# Patient Record
Sex: MALE | Race: WHITE | NOT HISPANIC OR LATINO | Employment: OTHER | URBAN - METROPOLITAN AREA
[De-identification: names, ages, dates, MRNs, and addresses within clinical notes are randomized per-mention and may not be internally consistent; named-entity substitution may affect disease eponyms.]

---

## 2018-02-27 ENCOUNTER — OFFICE VISIT (OUTPATIENT)
Dept: INTERNAL MEDICINE CLINIC | Facility: CLINIC | Age: 68
End: 2018-02-27
Payer: MEDICARE

## 2018-02-27 VITALS
WEIGHT: 200 LBS | BODY MASS INDEX: 27.09 KG/M2 | HEIGHT: 72 IN | DIASTOLIC BLOOD PRESSURE: 80 MMHG | RESPIRATION RATE: 18 BRPM | SYSTOLIC BLOOD PRESSURE: 124 MMHG | TEMPERATURE: 97.7 F | HEART RATE: 100 BPM

## 2018-02-27 DIAGNOSIS — Z23 NEED FOR VACCINATION FOR STREP PNEUMONIAE: ICD-10-CM

## 2018-02-27 DIAGNOSIS — E66.3 OVERWEIGHT (BMI 25.0-29.9): ICD-10-CM

## 2018-02-27 DIAGNOSIS — Z71.6 ENCOUNTER FOR TOBACCO USE CESSATION COUNSELING: ICD-10-CM

## 2018-02-27 DIAGNOSIS — Z12.5 PROSTATE CANCER SCREENING ENCOUNTER, OPTIONS AND RISKS DISCUSSED: ICD-10-CM

## 2018-02-27 DIAGNOSIS — Z72.0 TOBACCO USE: Primary | ICD-10-CM

## 2018-02-27 PROCEDURE — 99202 OFFICE O/P NEW SF 15 MIN: CPT | Performed by: INTERNAL MEDICINE

## 2018-02-27 PROCEDURE — 90670 PCV13 VACCINE IM: CPT | Performed by: INTERNAL MEDICINE

## 2018-02-27 PROCEDURE — G0009 ADMIN PNEUMOCOCCAL VACCINE: HCPCS | Performed by: INTERNAL MEDICINE

## 2018-02-27 NOTE — PATIENT INSTRUCTIONS
1  Fasting blood work  2  Pneumonia vaccine (prevnar 13) today  3  Transfer previous records  4  Return in 2 months or sooner if questions  5  Consider CAT scan for lung cancer screening  6  Quitting smoking is best thing you can do for your health, let me know if you need help with this    Lung Cancer Screening   WHAT YOU NEED TO KNOW:   What is lung cancer screening? Lung cancer screening is a test done every year to find lung cancer early  Screening is different from diagnosis because screening is used before you have any signs or symptoms  Screening may be helpful if you are or were a heavy smoker, or if you smoked for many years  This is because smoking increases your risk for lung cancer  Lung cancer screening has benefits and risks  Talk with your healthcare provider about the benefits and risks to help you decide if lung cancer screening is right for you  What do I need to know about lung cancer? · Lung cancer cells can form a tumor in your lungs and can spread to other areas of your body  This cancer is often found after it has spread  By this time, it is more difficult to treat  Treatment may include surgery to remove lung tissue that contains cancer cells  · Lung cancer is the leading cause of cancer deaths  Each year, about 220,000 people find out that they have lung cancer  About 150,000 people die each year from lung cancer  · Half of the people who have lung cancer are over the age of 79  The most common age of people who die from lung cancer is 67  · Talk to your healthcare provider if you think you have signs or symptoms of lung cancer  Examples include chest pain, a cough that does not go away, coughing up blood, wheezing, hoarseness, and neck swelling  Am I a good candidate for lung cancer screening? You may be able to have lung cancer screening if the following are true:  · You are between 54and [de-identified]years old  · You have never had lung cancer      · You do not currently have any signs or symptoms of lung cancer  · You currently smoke, or you quit less than 15 years ago  You also are or were a heavy smoker (30 pack-years or more)  · You do not have any other serious conditions that may affect how long you live  · You are willing to have surgery if screening shows signs of lung cancer  · You are willing to have screening every year until you have not smoked for 15 years or reach 80years of age  Screening may also stop if you develop another health condition or you are no longer willing to have treatment  What are pack-years? Pack-years are the number of cigarette packs you smoked multiplied by the number of years you smoked  A heavy smoker has 30 pack-years or more  Examples of 30 pack-years are 1 pack of cigarettes smoked each day for 30 years, or 2 packs each day for 15 years  Your healthcare provider can help you calculate your pack-years  How is lung cancer screening done? Lung cancer screening is done with a low-dose CT scan (LDCT)  A CT uses an x-ray and a computer to give detailed pictures of your lungs  You will lie on a table for this test  A low amount of radiation from the x-ray machine is used to take pictures of your lungs  This test only takes a few minutes  You will be asked to hold your breath for about 6 seconds while the pictures are taken  What are the benefits of lung cancer screening? · LDCT can find some kinds of lung cancer early  This means it can be treated with less invasive surgery, or less lung tissue may need to be removed  · The scan is not invasive or painful, and takes only a short amount of time  · LDCT does not leave radiation in your body after the scan is done  · LDCT can lower your risk of death from lung cancer by 16% to 20% because the cancer is found early  Your risk for death from any cause is lowered by 6 7%  This is because the scan may show signs of problems other than lung cancer that need to be treated    What are the risks of lung cancer screening? · LDCT exposes you to a small amount of radiation that can increase your risk for cancer  The amount is less than is given during a mammogram  It is about the same amount you get from the sun in a year  Because you will need to be screened every year, your total radiation exposure over time is increased  · The test can give a false-positive result  This means that screening shows you have lung cancer, but follow-up tests show that you do not  You may get more tests or even treatments that are not needed  It can also be stressful to think you have lung cancer when you do not  The risk for a false-positive result is about 23%  · The test can give a false-negative result  This means that the test does not find signs of cancer, but you later develop signs and symptoms and need treatment  A false-negative result can keep you from getting treatment as early as possible  The risk for a false-negative result is about 4%  · Screening may lead to over-diagnosis if tumors are found that are not life-threatening  Some forms of cancer grow quickly and need to be treated  Other forms grow slowly and may not be life-threatening in your lifetime  · Screening may lead to over-treatment  This means you get treatment that is not necessary  About 10% of people with lung cancer receive treatment that was not needed  What happens after I have lung cancer screening? You will meet with your healthcare provider to go over the results of your screening  You may need more tests to diagnose anything that showed up on the screening test   Lung cancer screening needs to be done each year  Even if your result shows you do not have lung cancer, it is important to continue getting screened each year  This is the best way to find a new cancer, at the earliest possible stage  What questions should I ask my healthcare provider to help me make a decision about screening?    · How high is my risk for lung cancer? · What are my pack-years? · Will I be able to help create my treatment plan if screening shows I have lung cancer? · Will my insurance cover screening? · Where is the screening done? · Do I need to do anything to get ready to have screening? · When and how do I get the results of my screening? What do I need to think about before I decide to have lung cancer screening? · Benefits and risks of lung cancer screening:      ¨ Do I understand the benefits and risks of lung cancer screening with LDCT? How concerned am I about the risks? Do I think the benefits are greater than the risks? ¨ One of the main risks is that the radiation I get during screening can cause cancer  Do I understand how high the risk is? How worried am I about the risk? · Possibility of finding lung cancer: How will I feel if I find out I have lung cancer? Am I willing to get the treatment I might need? · Possibility of false results:  I might get a false-positive or false-negative result  How will I feel if the test results are wrong? · Need for annual screening:  Screening needs to be done every year  Am I willing to have screening until I am a nonsmoker for 15 years, am 80, or develop another condition? Screening will also stop if I am no longer willing to have treatment  How do I feel about the need for surgery or other treatment for lung cancer? Will I be comfortable with my decision not to have treatment, and to stop having screening? What do I need to know about quitting smoking? If you currently smoke, it is very important that you quit  If you already quit smoking, it is important that you do not start smoking again  You will also need to avoid secondhand smoke from others  Nicotine and other chemicals in cigarettes and cigars increase your risk for cancer  Your risk for lung cancer gets lower each year that you do not smoke   Even if you get lung cancer screening every year, it is still Detail Level: Zone important not to smoke  Ask your healthcare provider for information if you currently smoke and need help to quit  You can find support and more information here:  · Smokefree  gov  Phone: 7- 382 - 558-8214  Web Address: www smokefree  gov  Where can I find support and more information about lung cancer? · American Lung Association  [facility]  [address]  Phone: [facility] [address]  Phone: 7- 755 - 994-5926  Web Address: Emily Schmitt  walter  CARE AGREEMENT:   You have the right to help plan your care  Learn about your health condition and how it may be treated  Discuss treatment options with your caregivers to decide what care you want to receive  You always have the right to refuse treatment  The above information is an  only  It is not intended as medical advice for individual conditions or treatments  Talk to your doctor, nurse or pharmacist before following any medical regimen to see if it is safe and effective for you  © 2017 [employer]  Information is for End User's use only and may not be sold, redistributed or otherwise used for commercial purposes  All illustrations and images included in CareNotes® are the copyrighted property of [employer]  or [employer]  Cigarette Smoking and Your Health   AMBULATORY CARE:   Risks to your health if you smoke:  Nicotine and other chemicals found in tobacco damage every cell in your body  Even if you are a light smoker, you have an increased risk for cancer, heart disease, and lung disease  If you are pregnant or have diabetes, smoking increases your risk for complications  Benefits to your health if you stop smoking:   · You decrease respiratory symptoms such as coughing, wheezing, and shortness of breath  · You reduce your risk for cancers of the lung, mouth, throat, kidney, bladder, pancreas, stomach, and cervix  If you already have cancer, you increase the benefits of chemotherapy   You also reduce your risk for cancer returning or a second cancer from developing  · You reduce your risk for heart disease, blood clots, heart attack, and stroke  · You reduce your risk for lung infections, and diseases such as pneumonia, asthma, chronic bronchitis, and emphysema  · Your circulation improves  More oxygen can be delivered to your body  If you have diabetes, you lower your risk for complications, such as kidney, artery, and eye diseases  You also lower your risk for nerve damage  Nerve damage can lead to amputations, poor vision, and blindness  · You improve your body's ability to heal and to fight infections  Benefits to the health of others if you stop smoking:  Tobacco is harmful to nonsmokers who breathe in your secondhand smoke  The following are ways the health of others around you may improve when you stop smoking:  · You lower the risks for lung cancer and heart disease in nonsmoking adults  · If you are pregnant, you lower the risk for miscarriage, early delivery, low birth weight, and stillbirth  You also lower your baby's risk for SIDS, obesity, developmental delay, and neurobehavioral problems, such as ADHD  · If you have children, you lower their risk for ear infections, colds, pneumonia, bronchitis, and asthma  For more information and support to stop smoking:   · Smokefree  gov  Phone: 5- 173 - 995-3741  Web Address: www "[employer]  Follow up with your healthcare provider as directed:  Write down your questions so you remember to ask them during your visits  © 2017 [employer] [employer] Information is for End User's use only and may not be sold, redistributed or otherwise used for commercial purposes  All illustrations and images included in CareNotes® are the copyrighted property of [employer]  or Fabrice Calderon  The above information is an  only  It is not intended as medical advice for individual conditions or treatments  Talk to your doctor, nurse or pharmacist before following any medical regimen to see if it is safe and effective for you

## 2018-02-27 NOTE — PROGRESS NOTES
Assessment/Plan:     Diagnoses and all orders for this visit:    Tobacco use  Comments:  smoking cess counseling discussed, pt trying on his own  Orders:  -     Comprehensive metabolic panel; Future  -     Lipid Panel with Direct LDL reflex; Future    Encounter for tobacco use cessation counseling    Need for vaccination for Strep pneumoniae  -     PNEUMOCOCCAL CONJUGATE VACCINE 13-VALENT GREATER THAN 6 MONTHS    Prostate cancer screening encounter, options and risks discussed  Comments:  PSA ordered, will discuss prostate exam at f/u visit  Orders:  -     PSA    Overweight (BMI 25 0-29  9)  Comments:  BW including FLP ordered, f/u 2 mos or prn  Orders:  -     Lipid Panel with Direct LDL reflex; Future      t/c shingles vaccine at f/u visit in 2 mos    Subjective:      Patient ID: Ibrahima White is a 78 y o  male  HPI     New to practice here to establish care  Here with wife(daniela) who provides add'l history  Takes no medications on regular basis  Current smoker for 30 years, has cut back to 1/2 ppd recently, trying to quit on his own  We discussed LDCT/lung cancer screening and he would like to think about this test till next time, information provided  Exercises regularly with weights and walking, denies CP/SOB/SANCHEZ  Had colonoscopy 1 5 years ago which showed polyps, due for repeat 5 yrs from then  Never had pneumonia vaccine in past   Has had chicken pox in past but never had shingles vaccine  No BW In years  We discussed risk/benefit of prostate cancer screening and he wishes to proceed with PSA BW  No other complaints        Family History   Problem Relation Age of Onset    Heart disease Mother     Hypertension Father     Heart disease Father     Diabetes Brother     Dementia Neg Hx     Drug abuse Neg Hx     Mental illness Neg Hx     Substance Abuse Neg Hx      Social History     Social History    Marital status: /Civil Union     Spouse name: N/A    Number of children: N/A    Years of education: N/A     Occupational History    retired      Social History Main Topics    Smoking status: Current Every Day Smoker     Packs/day: 1 00     Years: 30 00    Smokeless tobacco: Never Used    Alcohol use Yes    Drug use: No    Sexual activity: Not on file     Other Topics Concern    Not on file     Social History Narrative    Drinks coffee     History reviewed  No pertinent past medical history  Vitals:    02/27/18 1154   BP: 124/80   Pulse: 100   Resp: 18   Temp: 97 7 °F (36 5 °C)   Weight: 90 7 kg (200 lb)   Height: 6' (1 829 m)     No current outpatient prescriptions on file  No Known Allergies  Past Surgical History:   Procedure Laterality Date    HERNIA REPAIR           Review of Systems   Constitutional: Negative for fever  HENT: Negative for congestion  Eyes: Positive for visual disturbance (wears glasses)  Respiratory: Negative for shortness of breath  Cardiovascular: Negative for chest pain  Genitourinary: Negative for difficulty urinating  Musculoskeletal: Negative for arthralgias and myalgias  Skin: Negative for rash  Neurological: Negative for headaches  Psychiatric/Behavioral: Negative for behavioral problems and dysphoric mood  Objective:      /80   Pulse 100   Temp 97 7 °F (36 5 °C)   Resp 18   Ht 6' (1 829 m)   Wt 90 7 kg (200 lb)   BMI 27 12 kg/m²          Physical Exam   Constitutional: He is oriented to person, place, and time  He appears well-developed and well-nourished  HENT:   Head: Normocephalic and atraumatic  Right Ear: External ear normal    Left Ear: External ear normal    Eyes: Conjunctivae are normal  Pupils are equal, round, and reactive to light  Cardiovascular: Normal rate, regular rhythm and normal heart sounds  No murmur heard  Pulmonary/Chest: Effort normal and breath sounds normal  He has no wheezes  He has no rales  Abdominal: Soft  Bowel sounds are normal  There is no tenderness     Musculoskeletal: He exhibits no edema  Neurological: He is alert and oriented to person, place, and time  Psychiatric: He has a normal mood and affect  His behavior is normal    Vitals reviewed

## 2018-02-28 ENCOUNTER — TELEPHONE (OUTPATIENT)
Dept: INTERNAL MEDICINE CLINIC | Facility: CLINIC | Age: 68
End: 2018-02-28

## 2018-02-28 ENCOUNTER — TRANSCRIBE ORDERS (OUTPATIENT)
Dept: LAB | Facility: CLINIC | Age: 68
End: 2018-02-28

## 2018-02-28 ENCOUNTER — APPOINTMENT (OUTPATIENT)
Dept: LAB | Facility: CLINIC | Age: 68
End: 2018-02-28
Payer: MEDICARE

## 2018-02-28 DIAGNOSIS — Z72.0 TOBACCO USE: ICD-10-CM

## 2018-02-28 DIAGNOSIS — R79.89 ELEVATED SERUM CREATININE: Primary | ICD-10-CM

## 2018-02-28 DIAGNOSIS — E66.3 OVERWEIGHT (BMI 25.0-29.9): ICD-10-CM

## 2018-02-28 LAB
ALBUMIN SERPL BCP-MCNC: 3.3 G/DL (ref 3.5–5)
ALP SERPL-CCNC: 100 U/L (ref 46–116)
ALT SERPL W P-5'-P-CCNC: 30 U/L (ref 12–78)
ANION GAP SERPL CALCULATED.3IONS-SCNC: 5 MMOL/L (ref 4–13)
AST SERPL W P-5'-P-CCNC: 15 U/L (ref 5–45)
BILIRUB SERPL-MCNC: 0.4 MG/DL (ref 0.2–1)
BUN SERPL-MCNC: 18 MG/DL (ref 5–25)
CALCIUM SERPL-MCNC: 9.2 MG/DL (ref 8.3–10.1)
CHLORIDE SERPL-SCNC: 107 MMOL/L (ref 100–108)
CHOLEST SERPL-MCNC: 163 MG/DL (ref 50–200)
CO2 SERPL-SCNC: 29 MMOL/L (ref 21–32)
CREAT SERPL-MCNC: 1.35 MG/DL (ref 0.6–1.3)
GFR SERPL CREATININE-BSD FRML MDRD: 54 ML/MIN/1.73SQ M
GLUCOSE P FAST SERPL-MCNC: 119 MG/DL (ref 65–99)
HDLC SERPL-MCNC: 50 MG/DL (ref 40–60)
LDLC SERPL CALC-MCNC: 96 MG/DL (ref 0–100)
POTASSIUM SERPL-SCNC: 5.4 MMOL/L (ref 3.5–5.3)
PROT SERPL-MCNC: 7.8 G/DL (ref 6.4–8.2)
PSA SERPL-MCNC: 0.8 NG/ML (ref 0–4)
SODIUM SERPL-SCNC: 141 MMOL/L (ref 136–145)
TRIGL SERPL-MCNC: 83 MG/DL

## 2018-02-28 PROCEDURE — G0103 PSA SCREENING: HCPCS | Performed by: INTERNAL MEDICINE

## 2018-02-28 PROCEDURE — 36415 COLL VENOUS BLD VENIPUNCTURE: CPT | Performed by: INTERNAL MEDICINE

## 2018-02-28 PROCEDURE — 80061 LIPID PANEL: CPT

## 2018-02-28 PROCEDURE — 80053 COMPREHEN METABOLIC PANEL: CPT

## 2018-03-01 NOTE — TELEPHONE ENCOUNTER
Called pt's provided home phone # & spoke to pt's wife(daniela, per pt's consent)    Reviewed BW results with Sandi Sanchez over phone    Pt feeling well overall, takes nsaid only occaissionally for aches/pains    Drinking fluids per Sandi Sanchez    No muscle cramps/palpitations or other sx/complaints    Plan - hydrate over next 2 weeks, avoid nsaids and re-check non-fasting BMP In 2 weeks    Sandi Sanchez appreciative of phone call    Results for orders placed or performed in visit on 02/28/18   Comprehensive metabolic panel   Result Value Ref Range    Sodium 141 136 - 145 mmol/L    Potassium 5 4 (H) 3 5 - 5 3 mmol/L    Chloride 107 100 - 108 mmol/L    CO2 29 21 - 32 mmol/L    Anion Gap 5 4 - 13 mmol/L    BUN 18 5 - 25 mg/dL    Creatinine 1 35 (H) 0 60 - 1 30 mg/dL    Glucose, Fasting 119 (H) 65 - 99 mg/dL    Calcium 9 2 8 3 - 10 1 mg/dL    AST 15 5 - 45 U/L    ALT 30 12 - 78 U/L    Alkaline Phosphatase 100 46 - 116 U/L    Total Protein 7 8 6 4 - 8 2 g/dL    Albumin 3 3 (L) 3 5 - 5 0 g/dL    Total Bilirubin 0 40 0 20 - 1 00 mg/dL    eGFR 54 ml/min/1 73sq m   Lipid Panel with Direct LDL reflex   Result Value Ref Range    Cholesterol 163 50 - 200 mg/dL    Triglycerides 83 <=150 mg/dL    HDL, Direct 50 40 - 60 mg/dL    LDL Calculated 96 0 - 100 mg/dL

## 2018-03-14 ENCOUNTER — APPOINTMENT (OUTPATIENT)
Dept: LAB | Facility: CLINIC | Age: 68
End: 2018-03-14
Payer: MEDICARE

## 2018-03-14 DIAGNOSIS — R79.89 ELEVATED SERUM CREATININE: ICD-10-CM

## 2018-03-14 LAB
ANION GAP SERPL CALCULATED.3IONS-SCNC: 9 MMOL/L (ref 4–13)
BUN SERPL-MCNC: 16 MG/DL (ref 5–25)
CALCIUM SERPL-MCNC: 9.2 MG/DL (ref 8.3–10.1)
CHLORIDE SERPL-SCNC: 101 MMOL/L (ref 100–108)
CO2 SERPL-SCNC: 29 MMOL/L (ref 21–32)
CREAT SERPL-MCNC: 1.41 MG/DL (ref 0.6–1.3)
GFR SERPL CREATININE-BSD FRML MDRD: 52 ML/MIN/1.73SQ M
GLUCOSE P FAST SERPL-MCNC: 135 MG/DL (ref 65–99)
POTASSIUM SERPL-SCNC: 4.1 MMOL/L (ref 3.5–5.3)
SODIUM SERPL-SCNC: 139 MMOL/L (ref 136–145)

## 2018-03-14 PROCEDURE — 80048 BASIC METABOLIC PNL TOTAL CA: CPT

## 2018-03-14 PROCEDURE — 36415 COLL VENOUS BLD VENIPUNCTURE: CPT

## 2018-03-15 ENCOUNTER — TELEPHONE (OUTPATIENT)
Dept: INTERNAL MEDICINE CLINIC | Facility: CLINIC | Age: 68
End: 2018-03-15

## 2018-03-15 DIAGNOSIS — R79.89 ELEVATED SERUM CREATININE: Primary | ICD-10-CM

## 2018-03-15 NOTE — TELEPHONE ENCOUNTER
----- Message from Courtney Galvez DO sent at 3/15/2018  8:03 AM EDT -----  Kidney test still shows changes/kidney damage, recommend Kelly Esteves see [facility] dr for eval   pls provide phone # to call/schedule & send msg back to me to enter ref, thx

## 2018-03-22 ENCOUNTER — OFFICE VISIT (OUTPATIENT)
Dept: NEPHROLOGY | Facility: CLINIC | Age: 68
End: 2018-03-22
Payer: MEDICARE

## 2018-03-22 ENCOUNTER — TELEPHONE (OUTPATIENT)
Dept: UROLOGY | Facility: AMBULATORY SURGERY CENTER | Age: 68
End: 2018-03-22

## 2018-03-22 VITALS
HEART RATE: 112 BPM | BODY MASS INDEX: 29.26 KG/M2 | SYSTOLIC BLOOD PRESSURE: 104 MMHG | HEIGHT: 70 IN | DIASTOLIC BLOOD PRESSURE: 84 MMHG | WEIGHT: 204.4 LBS

## 2018-03-22 DIAGNOSIS — R31.29 MICROSCOPIC HEMATURIA: ICD-10-CM

## 2018-03-22 DIAGNOSIS — R80.1 PERSISTENT PROTEINURIA: ICD-10-CM

## 2018-03-22 DIAGNOSIS — N18.30 CHRONIC KIDNEY DISEASE, STAGE III (MODERATE) (HCC): ICD-10-CM

## 2018-03-22 DIAGNOSIS — I95.9 HYPOTENSION, UNSPECIFIED HYPOTENSION TYPE: ICD-10-CM

## 2018-03-22 DIAGNOSIS — R63.5 WEIGHT GAIN: ICD-10-CM

## 2018-03-22 DIAGNOSIS — R31.0 GROSS HEMATURIA: ICD-10-CM

## 2018-03-22 DIAGNOSIS — R79.89 ELEVATED SERUM CREATININE: Primary | ICD-10-CM

## 2018-03-22 DIAGNOSIS — R73.09 ELEVATED GLUCOSE: ICD-10-CM

## 2018-03-22 PROCEDURE — 99205 OFFICE O/P NEW HI 60 MIN: CPT | Performed by: INTERNAL MEDICINE

## 2018-03-22 RX ORDER — AMOXICILLIN 125 MG/1
125 TABLET, CHEWABLE ORAL 2 TIMES DAILY
COMMUNITY
End: 2018-03-29 | Stop reason: ALTCHOICE

## 2018-03-22 RX ORDER — IBUPROFEN 600 MG/1
600 TABLET ORAL 2 TIMES DAILY
COMMUNITY
End: 2018-03-29 | Stop reason: ALTCHOICE

## 2018-03-22 RX ORDER — CHLORHEXIDINE GLUCONATE 0.12 MG/ML
15 RINSE ORAL 3 TIMES DAILY
COMMUNITY
End: 2018-03-29 | Stop reason: ALTCHOICE

## 2018-03-22 NOTE — PATIENT INSTRUCTIONS
1   Please go for fasting lab work   The in the next week or so, but please wait 1 full week after taking Motrin  2  Please go for an ultrasound of your kidneys now   3  Please go for an echocardiogram of your heart  4  Please make an appointment to see Urology regarding the blood in the urine  5  Please go for lab work in 1 month   6  Follow-up appointment in 2 months  7  General instructions:  -avoid medications such as Motrin, Naprosyn, ibuprofen, Aleve or Advil or Celebrex as they can affect your kidney function; you can use Tylenol as needed for pain or fevers if you have no liver problems  -avoid medications such as Sudafed or other medications with decongestants as they can raise your blood pressure  -try to exercise at least 30 minutes at least 3 days a week with an ultimate goal of 5 days a week  -try to lose 5-10 lb by your next visit  - please work with your medical physician to stop smoking this is imperative to your overall health  8  Depending upon the above workup I will be contacting you for further evaluation in between the appointments

## 2018-03-22 NOTE — PROGRESS NOTES
Consultation - Nephrology 3/22/2018        History of Present Illness   Reason for Consult / Principal Problem:  CKD stage 3    HPI: Gilson Lemus is a 78y o  year old male who recently moved into the area  He was noted to have a creatinine of 1 35 mg/dL 02/28/2018  Subsequently he drank well and hydrated well and is follow-up creatinine was 1 41 mg/dL  He has a history of gross hematuria back June 2016 with lower mid abdominal pain  Urinalysis at that time demonstrated 2+ proteinuria greater than 60 RBCs and leukocytes he was treated with antibiotics at that time  Urine culture was negative at that time  Patient states that the gross hematuria only lasted 1 day and he has had no recurrence  To the best of his knowledge she has never had any kidney disease  He denies any kidney stones, urinary tract infections, prostate problems, bladder problems  He currently denies any dysuria hematuria voiding symptoms or foamy urine  He has only been taking Motrin for 2 days since his tooth was pulled otherwise does not take nonsteroidal anti-inflammatory drugs on a regular basis  He overall feels well  He denies any recent illnesses  He denies any leg swelling, myalgias or arthralgias at the ordinary  No unusual skin rash  His weight has been stable he has a good appetite and energy  He denies any chronic medical conditions  General:    Good appetite and energy no fevers chills or recent cough or colds  Perhaps recent weight gain  Cardiovascular:    No chest pain shortness of breath or leg swelling  Respiratory:   No shortness of breath, no cough and no respiratory symptoms otherwise  Gastrointestinal:   No nausea vomiting or diarrhea, no constipation, no melanotic symptoms or blood in the stool  Genitourinary:   See HPI  Neurology:   No headaches, dizziness or lightheadedness  No paresthesias or any other focal symptoms    Rest of review of systems as completely reviewed with the patient are negative    Historical Information      HE DENIES ANY PAST MEDICAL HISTORY INCLUDING:  Diabetes mellitus, hypertension, CAD, cancer, thyroid disease, asthma or emphysema, liver disease, other type of lung disease, stroke or seizure, or anemia or low blood count  Past Surgical History:   Procedure Laterality Date    HERNIA REPAIR       Right inguinal herniorrhaphy    Social History   History   Alcohol Use    Yes    social alcohol    History   Drug Use No    no illicit drug use    History   Smoking Status    Current Every Day Smoker    Packs/day: 0 50    Years: 20 00    Types: Cigarettes   Smokeless Tobacco    Never Used    21  Year smoker with half pack per day currently smoking    Family History   Problem Relation Age of Onset    Heart disease Mother     Hypertension Father     Heart disease Father     Diabetes Brother     Dementia Neg Hx     Drug abuse Neg Hx     Mental illness Neg Hx     Substance Abuse Neg Hx     no family history of kidney disease    Meds/Allergies   all current active meds have been reviewed, current meds:   Current Outpatient Prescriptions:     amoxicillin (AMOXIL) 125 MG chewable tablet, Chew 125 mg 2 (two) times a day, Disp: , Rfl:     chlorhexidine (PERIDEX) 0 12 % solution, Apply 15 mL to the mouth or throat 3 (three) times a day, Disp: , Rfl:     ibuprofen (MOTRIN) 600 mg tablet, Take 600 mg by mouth 2 (two) times a day, Disp: , Rfl:     No Known Allergies    Objective   Vitals:    03/22/18 1016   BP: 104/84   Pulse: (!) 112     Body mass index is 28 94 kg/m²      General:   Well-developed well-nourished slightly obese in no acute distress  Skin:   No acute rash  Eyes:   No scleral icterus, noninjected  ENT:   Normocephalic/atraumatic, mucous membranes moist  Neck:   Supple, no jugular venous distention, 1+ carotid upstroke without carotid bruit, no thyromegaly; no lymphadenopathy  Back:   No CVA tenderness  Chest:   Good respiratory effort, clear to auscultation and percussion  CVS:   Increased heart rate but regular, no rub, murmurs or gallops appreciable  Abdomen:   Slightly obese, soft and nontender with normal bowel sounds, no hepatosplenomegaly or bruits appreciable  Extremities:   No clubbing, no cyanosis, no edema, 2+ posterior tibialis, poor dorsalis pedis; no femoral bruits; what appears to be a varicose vein nontender non erythematous on the lateral aspect of his distal left lower extremity  Neuro:   Grossly intact  Psych:   Alert, oriented x3 and appropriate    Current Weight:   Weight (last 2 days)     Date/Time   Weight    03/22/18 0955  92 7 (204 4)                Lab Results:  I have personally reviewed pertinent labs  Results for orders placed or performed in visit on 76/26/32   Basic metabolic panel   Result Value Ref Range    Sodium 139 136 - 145 mmol/L    Potassium 4 1 3 5 - 5 3 mmol/L    Chloride 101 100 - 108 mmol/L    CO2 29 21 - 32 mmol/L    Anion Gap 9 4 - 13 mmol/L    BUN 16 5 - 25 mg/dL    Creatinine 1 41 (H) 0 60 - 1 30 mg/dL    Glucose, Fasting 135 (H) 65 - 99 mg/dL    Calcium 9 2 8 3 - 10 1 mg/dL    eGFR 52 ml/min/1 73sq m      UA today in the office:  1+ proteinuria, trace non hemolyzed on dipstick with trace ketones  UA microscopic today:  0-2 RBCs per high-powered field, occasional WBCs and occasional squamous epithelial cells no casts or crystals seen        ASSESSMENT AND PLAN:   1    CKD stage 3:  Would appear the patient does have some chronic kidney disease based on the 2 separate labs which are elevated  We have no prior labs to compare with  Differential diagnosis would include prerenal azotemia secondary to relative hypotension? Cardiac dysfunction; obstructive uropathy especially given the history of hematuria; and intrinsic kidney disease such as IgA nephropathy with the episode of gross hematuria and mild microscopic hematuria now; rule out diabetic nephropathy; and other possible autoimmune/collagen vascular disorders    I would 1st do an initial workup including lab work, urinary studies including urine protein creatinine ratio and renal ultrasound  I would also recommend a urology evaluation given the episode of gross hematuria microscopic hematuria to rule out any urologic disorder  Based upon the urine protein creatinine ratio and urinalysis repeat, consideration for serologies and possibly even a kidney biopsy going forward if he had significant proteinuria or any positive serologies  In terms of treatment at this time, there is no specific treatment given low normal blood pressure,  And pending lipid profile another studies  Further recommendations will be forthcoming depending upon the results  Recommendations: Workup:  -Repeat UA   -urine protein creatinine ratio   - CBC/CMP /magnesium / phosphorus / PTH intact level/ vitamin-D level  - renal ultrasound with a PVR  -formal urology consultation regarding hematuria both microscopic and gross  - depending upon the urine protein creatinine ratio, potential serologies will be ordered  -lipid profile:  LDL 96, HDL 50 and triglycerides 83 which is a goal  - I would check SPEP/ UPEP light chains to rule out possible amyloid/ multiple myeloma given the relative hypotension in association with renal disease as both organs can be effectively amyloidosis for example  Treatment:  -We will depend upon the above results  - for now continue salt intake pending echocardiogram given low blood pressure   - we continue to exercise but lose weight, patient counseled as such  -Work to stop cigarette smoking, patient counseled as such as a can affect arteries as well as kidney function  -avoid NSAIDs, patient counseled as such as ibuprofen or Motrin  -Further treatment will be forthcoming depending upon the above results  2  Relative hypotension and mild tachycardia:  Patient is asymptomatic  But I am concerned about hypothyroidism, adrenal insufficiency and abnormal cardiac function  Recommendations:  - TSH/free T4   -A m  Cortisol  -Echocardiogram  Further workup and treatment will be forthcoming depending upon the above evaluation  3  Gross hematuria /microscopic hematuria: Recommend urologic evaluation as mentioned above  I reviewed all the above with the patient and his wife  I spent an additional 35 minutes plus the 35 minutes appointment time counseling the patient reviewing all the above information and plan and course of action going for  Answered all questions  Patient Instructions   1  Please go for fasting lab work   The in the next week or so, but please wait 1 full week after taking Motrin  2  Please go for an ultrasound of your kidneys now   3  Please go for an echocardiogram of your heart  4  Please make an appointment to see Urology regarding the blood in the urine  5  Please go for lab work in 1 month   6  Follow-up appointment in 2 months  7  General instructions:  -avoid medications such as Motrin, Naprosyn, ibuprofen, Aleve or Advil or Celebrex as they can affect your kidney function; you can use Tylenol as needed for pain or fevers if you have no liver problems  -avoid medications such as Sudafed or other medications with decongestants as they can raise your blood pressure  -try to exercise at least 30 minutes at least 3 days a week with an ultimate goal of 5 days a week  -try to lose 5-10 lb by your next visit  - please work with your medical physician to stop smoking this is imperative to your overall health  8  Depending upon the above workup I will be contacting you for further evaluation in between the appointments  Portions of the record may have been created with voice recognition software   Occasional wrong word or "sound a like" substitutions may have occurred due to the inherent limitations of voice recognition software   Read the chart carefully and recognize, using context, where substitutions have occurred      Tavo Lin MD

## 2018-03-22 NOTE — LETTER
March 22, 2018     Schuyler Dejesus DO  [address]  [place] [address]    Patient: Guadalupe Webb   YOB: 1950   Date of Visit: 3/22/2018       Dear Dr Jyoti Arnett:    Thank you for referring Guadalupe Webb to me for evaluation  Below are my notes for this consultation  If you have questions, please do not hesitate to call me  I look forward to following your patient along with you  Sincerely,        Estevan Roach MD        CC: No Recipients  Estevan Roach MD  3/22/2018 11:04 AM  Sign at close encounter  Consultation - Nephrology 3/22/2018        History of Present Illness   Reason for Consult / Principal Problem:  CKD stage 3    HPI: Guadalupe Webb is a 78y o  year old male who recently moved into the area  He was noted to have a creatinine of 1 35 mg/dL 02/28/2018  Subsequently he drank well and hydrated well and is follow-up creatinine was 1 41 mg/dL  He has a history of gross hematuria back June 2016 with lower mid abdominal pain  Urinalysis at that time demonstrated 2+ proteinuria greater than 60 RBCs and leukocytes he was treated with antibiotics at that time  Urine culture was negative at that time  Patient states that the gross hematuria only lasted 1 day and he has had no recurrence  To the best of his knowledge she has never had any kidney disease  He denies any kidney stones, urinary tract infections, prostate problems, bladder problems  He currently denies any dysuria hematuria voiding symptoms or foamy urine  He has only been taking Motrin for 2 days since his tooth was pulled otherwise does not take nonsteroidal anti-inflammatory drugs on a regular basis  He overall feels well  He denies any recent illnesses  He denies any leg swelling, myalgias or arthralgias at the ordinary  No unusual skin rash  His weight has been stable he has a good appetite and energy  He denies any chronic medical conditions          General:    Good appetite and energy no fevers chills or recent cough or colds  Perhaps recent weight gain  Cardiovascular:    No chest pain shortness of breath or leg swelling  Respiratory:   No shortness of breath, no cough and no respiratory symptoms otherwise  Gastrointestinal:   No nausea vomiting or diarrhea, no constipation, no melanotic symptoms or blood in the stool  Genitourinary:   See HPI  Neurology:   No headaches, dizziness or lightheadedness  No paresthesias or any other focal symptoms  Rest of review of systems as completely reviewed with the patient are negative    Historical Information      HE DENIES ANY PAST MEDICAL HISTORY INCLUDING:  Diabetes mellitus, hypertension, CAD, cancer, thyroid disease, asthma or emphysema, liver disease, other type of lung disease, stroke or seizure, or anemia or low blood count      Past Surgical History:   Procedure Laterality Date    HERNIA REPAIR       Right inguinal herniorrhaphy    Social History   History   Alcohol Use    Yes    social alcohol    History   Drug Use No    no illicit drug use    History   Smoking Status    Current Every Day Smoker    Packs/day: 0 50    Years: 20 00    Types: Cigarettes   Smokeless Tobacco    Never Used    21  Year smoker with half pack per day currently smoking    Family History   Problem Relation Age of Onset    Heart disease Mother     Hypertension Father     Heart disease Father     Diabetes Brother     Dementia Neg Hx     Drug abuse Neg Hx     Mental illness Neg Hx     Substance Abuse Neg Hx     no family history of kidney disease    Meds/Allergies   all current active meds have been reviewed, current meds:   Current Outpatient Prescriptions:     amoxicillin (AMOXIL) 125 MG chewable tablet, Chew 125 mg 2 (two) times a day, Disp: , Rfl:     chlorhexidine (PERIDEX) 0 12 % solution, Apply 15 mL to the mouth or throat 3 (three) times a day, Disp: , Rfl:     ibuprofen (MOTRIN) 600 mg tablet, Take 600 mg by mouth 2 (two) times a day, Disp: , Rfl: No Known Allergies    Objective   Vitals:    03/22/18 1016   BP: 104/84   Pulse: (!) 112     Body mass index is 28 94 kg/m²  General:   Well-developed well-nourished slightly obese in no acute distress  Skin:   No acute rash  Eyes:   No scleral icterus, noninjected  ENT:   Normocephalic/atraumatic, mucous membranes moist  Neck:   Supple, no jugular venous distention, 1+ carotid upstroke without carotid bruit, no thyromegaly; no lymphadenopathy  Back:   No CVA tenderness  Chest:   Good respiratory effort, clear to auscultation and percussion  CVS:   Increased heart rate but regular, no rub, murmurs or gallops appreciable  Abdomen:   Slightly obese, soft and nontender with normal bowel sounds, no hepatosplenomegaly or bruits appreciable  Extremities:   No clubbing, no cyanosis, no edema, 2+ posterior tibialis, poor dorsalis pedis; no femoral bruits; what appears to be a varicose vein nontender non erythematous on the lateral aspect of his distal left lower extremity  Neuro:   Grossly intact  Psych:   Alert, oriented x3 and appropriate    Current Weight:   Weight (last 2 days)     Date/Time   Weight    03/22/18 0955  92 7 (204 4)                Lab Results:  I have personally reviewed pertinent labs    Results for orders placed or performed in visit on 21/38/57   Basic metabolic panel   Result Value Ref Range    Sodium 139 136 - 145 mmol/L    Potassium 4 1 3 5 - 5 3 mmol/L    Chloride 101 100 - 108 mmol/L    CO2 29 21 - 32 mmol/L    Anion Gap 9 4 - 13 mmol/L    BUN 16 5 - 25 mg/dL    Creatinine 1 41 (H) 0 60 - 1 30 mg/dL    Glucose, Fasting 135 (H) 65 - 99 mg/dL    Calcium 9 2 8 3 - 10 1 mg/dL    eGFR 52 ml/min/1 73sq m      UA today in the office:  1+ proteinuria, trace non hemolyzed on dipstick with trace ketones  UA microscopic today:  0-2 RBCs per high-powered field, occasional WBCs and occasional squamous epithelial cells no casts or crystals seen        ASSESSMENT AND PLAN:   1    CKD stage 3:  Would appear the patient does have some chronic kidney disease based on the 2 separate labs which are elevated  We have no prior labs to compare with  Differential diagnosis would include prerenal azotemia secondary to relative hypotension? Cardiac dysfunction; obstructive uropathy especially given the history of hematuria; and intrinsic kidney disease such as IgA nephropathy with the episode of gross hematuria and mild microscopic hematuria now; rule out diabetic nephropathy; and other possible autoimmune/collagen vascular disorders  I would 1st do an initial workup including lab work, urinary studies including urine protein creatinine ratio and renal ultrasound  I would also recommend a urology evaluation given the episode of gross hematuria microscopic hematuria to rule out any urologic disorder  Based upon the urine protein creatinine ratio and urinalysis repeat, consideration for serologies and possibly even a kidney biopsy going forward if he had significant proteinuria or any positive serologies  In terms of treatment at this time, there is no specific treatment given low normal blood pressure,  And pending lipid profile another studies  Further recommendations will be forthcoming depending upon the results  Recommendations: Workup:  -Repeat UA   -urine protein creatinine ratio   - CBC/CMP /magnesium / phosphorus / PTH intact level/ vitamin-D level  - renal ultrasound with a PVR  -formal urology consultation regarding hematuria both microscopic and gross  - depending upon the urine protein creatinine ratio, potential serologies will be ordered  -lipid profile:  LDL 96, HDL 50 and triglycerides 83 which is a goal  - I would check SPEP/ UPEP light chains to rule out possible amyloid/ multiple myeloma given the relative hypotension in association with renal disease as both organs can be effectively amyloidosis for example  Treatment:  -We will depend upon the above results      - for now continue salt intake pending echocardiogram given low blood pressure   - we continue to exercise but lose weight, patient counseled as such  -Work to stop cigarette smoking, patient counseled as such as a can affect arteries as well as kidney function  -avoid NSAIDs, patient counseled as such as ibuprofen or Motrin  -Further treatment will be forthcoming depending upon the above results  2  Relative hypotension and mild tachycardia:  Patient is asymptomatic  But I am concerned about hypothyroidism, adrenal insufficiency and abnormal cardiac function  Recommendations:  - TSH/free T4   -A m  Cortisol  -Echocardiogram  Further workup and treatment will be forthcoming depending upon the above evaluation  3  Gross hematuria /microscopic hematuria: Recommend urologic evaluation as mentioned above  Patient Instructions   1  Please go for fasting lab work   The in the next week or so, but please wait 1 full week after taking Motrin  2  Please go for an ultrasound of your kidneys now   3  Please go for an echocardiogram of your heart  4  Please make an appointment to see Urology regarding the blood in the urine  5  Please go for lab work in 1 month   6  Follow-up appointment in 2 months  7  General instructions:  -avoid medications such as Motrin, Naprosyn, ibuprofen, Aleve or Advil or Celebrex as they can affect your kidney function; you can use Tylenol as needed for pain or fevers if you have no liver problems  -avoid medications such as Sudafed or other medications with decongestants as they can raise your blood pressure  -try to exercise at least 30 minutes at least 3 days a week with an ultimate goal of 5 days a week  -try to lose 5-10 lb by your next visit  - please work with your medical physician to stop smoking this is imperative to your overall health  8  Depending upon the above workup I will be contacting you for further evaluation in between the appointments          Portions of the record may have been created with voice recognition software   Occasional wrong word or "sound a like" substitutions may have occurred due to the inherent limitations of voice recognition software   Read the chart carefully and recognize, using context, where substitutions have occurred      Ines Parekh MD

## 2018-03-26 ENCOUNTER — HOSPITAL ENCOUNTER (OUTPATIENT)
Dept: ULTRASOUND IMAGING | Facility: HOSPITAL | Age: 68
Discharge: HOME | End: 2018-03-26
Attending: INTERNAL MEDICINE
Payer: MEDICARE

## 2018-03-26 DIAGNOSIS — R73.09 ELEVATED GLUCOSE: ICD-10-CM

## 2018-03-26 DIAGNOSIS — R31.0 GROSS HEMATURIA: ICD-10-CM

## 2018-03-26 DIAGNOSIS — R31.29 MICROSCOPIC HEMATURIA: ICD-10-CM

## 2018-03-26 DIAGNOSIS — N18.30 CHRONIC KIDNEY DISEASE, STAGE III (MODERATE) (HCC): ICD-10-CM

## 2018-03-26 DIAGNOSIS — R79.89 ELEVATED SERUM CREATININE: ICD-10-CM

## 2018-03-26 DIAGNOSIS — R80.1 PERSISTENT PROTEINURIA: ICD-10-CM

## 2018-03-26 PROCEDURE — 51798 US URINE CAPACITY MEASURE: CPT

## 2018-03-27 ENCOUNTER — HOSPITAL ENCOUNTER (OUTPATIENT)
Dept: NON INVASIVE DIAGNOSTICS | Facility: CLINIC | Age: 68
Discharge: HOME | End: 2018-03-27
Payer: MEDICARE

## 2018-03-27 DIAGNOSIS — R79.89 ELEVATED SERUM CREATININE: ICD-10-CM

## 2018-03-27 DIAGNOSIS — N18.30 CHRONIC KIDNEY DISEASE, STAGE III (MODERATE) (HCC): ICD-10-CM

## 2018-03-27 DIAGNOSIS — R31.29 MICROSCOPIC HEMATURIA: ICD-10-CM

## 2018-03-27 DIAGNOSIS — R80.1 PERSISTENT PROTEINURIA: ICD-10-CM

## 2018-03-27 DIAGNOSIS — R73.09 ELEVATED GLUCOSE: ICD-10-CM

## 2018-03-27 DIAGNOSIS — R31.0 GROSS HEMATURIA: ICD-10-CM

## 2018-03-27 PROCEDURE — 93306 TTE W/DOPPLER COMPLETE: CPT

## 2018-03-27 PROCEDURE — 93306 TTE W/DOPPLER COMPLETE: CPT | Performed by: INTERNAL MEDICINE

## 2018-03-29 ENCOUNTER — OFFICE VISIT (OUTPATIENT)
Dept: UROLOGY | Facility: AMBULATORY SURGERY CENTER | Age: 68
End: 2018-03-29
Payer: MEDICARE

## 2018-03-29 VITALS
SYSTOLIC BLOOD PRESSURE: 128 MMHG | WEIGHT: 203 LBS | HEIGHT: 70 IN | DIASTOLIC BLOOD PRESSURE: 80 MMHG | BODY MASS INDEX: 29.06 KG/M2 | HEART RATE: 88 BPM

## 2018-03-29 DIAGNOSIS — R31.29 MICROSCOPIC HEMATURIA: ICD-10-CM

## 2018-03-29 DIAGNOSIS — R31.0 GROSS HEMATURIA: Primary | ICD-10-CM

## 2018-03-29 DIAGNOSIS — R73.09 ELEVATED GLUCOSE: ICD-10-CM

## 2018-03-29 DIAGNOSIS — R79.89 ELEVATED SERUM CREATININE: ICD-10-CM

## 2018-03-29 DIAGNOSIS — N18.30 CHRONIC KIDNEY DISEASE, STAGE III (MODERATE) (HCC): ICD-10-CM

## 2018-03-29 DIAGNOSIS — R80.1 PERSISTENT PROTEINURIA: ICD-10-CM

## 2018-03-29 LAB
SL AMB  POCT GLUCOSE, UA: NORMAL
SL AMB LEUKOCYTE ESTERASE,UA: NORMAL
SL AMB POCT BILIRUBIN,UA: NORMAL
SL AMB POCT BLOOD,UA: NORMAL
SL AMB POCT CLARITY,UA: CLEAR
SL AMB POCT COLOR,UA: YELLOW
SL AMB POCT KETONES,UA: NORMAL
SL AMB POCT NITRITE,UA: NORMAL
SL AMB POCT PH,UA: 5
SL AMB POCT SPECIFIC GRAVITY,UA: 1
SL AMB POCT URINE PROTEIN: NORMAL
SL AMB POCT UROBILINOGEN: NORMAL

## 2018-03-29 PROCEDURE — 81002 URINALYSIS NONAUTO W/O SCOPE: CPT | Performed by: UROLOGY

## 2018-03-29 PROCEDURE — 99204 OFFICE O/P NEW MOD 45 MIN: CPT | Performed by: UROLOGY

## 2018-03-29 NOTE — PROGRESS NOTES
3/29/2018    Jacob Banner  1950  [id number]        Assessment  Microscopic hematuria      Discussion  We discussed his recent ultrasound of the kidneys which is normal   I recommend performing flexible cystoscopy to complete his microscopic hematuria workup  Smoking cessation discussed  History of Present Illness  78 y o  male with a history of microscopic hematuria he believes he may have had an episode of gross hematuria 2-3 years ago  He has not seen any blood since that time  He states that he is otherwise relatively healthy, but he does follow with Nephrology regarding a creatinine  AUA Symptom Score  AUA SYMPTOM SCORE    Flowsheet Row Most Recent Value   AUA SYMPTOM SCORE   How often have you had a sensation of not emptying your bladder completely after you finished urinating? 0   How often have you had to urinate again less than two hours after you finished urinating? 0   How often have you found you stopped and started again several times when you urinate?  0   How often have you found it difficult to postpone urination? 0   How often have you had a weak urinary stream?  0   How often have you had to push or strain to begin urination? 0   How many times did you most typically get up to urinate from the time you went to bed at night until the time you got up in the morning?  0   Quality of Life: If you were to spend the rest of your life with your urinary condition just the way it is now, how would you feel about that?  0   AUA SYMPTOM SCORE  0          Review of Systems  Review of Systems   Constitutional: Negative  HENT: Negative  Eyes: Negative  Respiratory: Negative  Cardiovascular: Negative  Gastrointestinal: Negative  Endocrine: Negative  Genitourinary: Positive for hematuria  Musculoskeletal: Negative  Skin: Negative  Allergic/Immunologic: Negative  Neurological: Negative  Hematological: Negative  Psychiatric/Behavioral: Negative  All other systems reviewed and are negative  Past Medical History  Past Medical History:   Diagnosis Date    Hematuria        Past Social History  Past Surgical History:   Procedure Laterality Date    HERNIA REPAIR         Past Family History  Family History   Problem Relation Age of Onset    Heart disease Mother     Hypertension Father     Heart disease Father     Diabetes Brother     Dementia Neg Hx     Drug abuse Neg Hx     Mental illness Neg Hx     Substance Abuse Neg Hx        Past Social history  Social History     Social History    Marital status: /Civil Union     Spouse name: N/A    Number of children: N/A    Years of education: N/A     Occupational History    retired      Social History Main Topics    Smoking status: Current Every Day Smoker     Packs/day: 0 50     Years: 20 00     Types: Cigarettes    Smokeless tobacco: Never Used    Alcohol use Yes    Drug use: No    Sexual activity: Not on file     Other Topics Concern    Not on file     Social History Narrative    Drinks coffee       Current Medications  No current outpatient prescriptions on file  No current facility-administered medications for this visit  Allergies  No Known Allergies    Past Medical History, Social History, Family History, medications and allergies were reviewed  Vitals  Vitals:    03/29/18 0852   BP: 128/80   Pulse: 88   Weight: 92 1 kg (203 lb)   Height: 5' 9 75" (1 772 m)       Physical Exam  Physical Exam   On examination he is in no acute distress  His abdomen is soft obese nontender nondistended   examination is deferred until the time of cystoscopy  Skin is warm  Extremities without edema    Neurologic is grossly intact and nonfocal   Gait normal   Affect normal        Results  Lab Results   Component Value Date    PSA 0 8 02/28/2018     Lab Results   Component Value Date    CALCIUM 9 2 03/14/2018     03/14/2018    K 4 1 03/14/2018    CO2 29 03/14/2018     03/14/2018    BUN 16 03/14/2018    CREATININE 1 41 (H) 03/14/2018     No results found for: WBC, HGB, HCT, MCV, PLT      Office Urine Dip  Recent Results (from the past 1 hour(s))   POCT urine dip    Collection Time: 03/29/18  9:04 AM   Result Value Ref Range    LEUKOCYTE ESTERASE,UA -      NITRITE,UA -     SL AMB POCT UROBILINOGEN -     SL AMB POCT URINE PROTEIN -      PH,UA 5      BLOOD,UA -      SPECIFIC GRAVITY,UA 1 005      KETONES,UA -      BILIRUBIN,UA -     GLUCOSE, UA -      COLOR,UA yellow      CLARITY,UA clear    ]

## 2018-04-09 ENCOUNTER — APPOINTMENT (OUTPATIENT)
Dept: LAB | Facility: CLINIC | Age: 68
End: 2018-04-09
Payer: MEDICARE

## 2018-04-09 ENCOUNTER — TELEPHONE (OUTPATIENT)
Dept: OTHER | Facility: HOSPITAL | Age: 68
End: 2018-04-09

## 2018-04-09 DIAGNOSIS — R31.0 GROSS HEMATURIA: ICD-10-CM

## 2018-04-09 DIAGNOSIS — R80.1 PERSISTENT PROTEINURIA: ICD-10-CM

## 2018-04-09 DIAGNOSIS — R31.29 MICROSCOPIC HEMATURIA: ICD-10-CM

## 2018-04-09 DIAGNOSIS — N18.30 CHRONIC KIDNEY DISEASE, STAGE III (MODERATE) (HCC): ICD-10-CM

## 2018-04-09 DIAGNOSIS — R79.89 ELEVATED SERUM CREATININE: ICD-10-CM

## 2018-04-09 DIAGNOSIS — I95.0 IDIOPATHIC HYPOTENSION: Primary | ICD-10-CM

## 2018-04-09 DIAGNOSIS — R73.09 ELEVATED GLUCOSE: ICD-10-CM

## 2018-04-09 DIAGNOSIS — I95.9 HYPOTENSION, UNSPECIFIED HYPOTENSION TYPE: ICD-10-CM

## 2018-04-09 DIAGNOSIS — R63.5 WEIGHT GAIN: ICD-10-CM

## 2018-04-09 LAB
25(OH)D3 SERPL-MCNC: 30.5 NG/ML (ref 30–100)
ALBUMIN SERPL BCP-MCNC: 3.4 G/DL (ref 3.5–5)
ALP SERPL-CCNC: 103 U/L (ref 46–116)
ALT SERPL W P-5'-P-CCNC: 30 U/L (ref 12–78)
ANION GAP SERPL CALCULATED.3IONS-SCNC: 6 MMOL/L (ref 4–13)
AST SERPL W P-5'-P-CCNC: 15 U/L (ref 5–45)
BACTERIA UR QL AUTO: ABNORMAL /HPF
BILIRUB SERPL-MCNC: 0.5 MG/DL (ref 0.2–1)
BILIRUB UR QL STRIP: NEGATIVE
BUN SERPL-MCNC: 16 MG/DL (ref 5–25)
CALCIUM SERPL-MCNC: 9.1 MG/DL (ref 8.3–10.1)
CHLORIDE SERPL-SCNC: 103 MMOL/L (ref 100–108)
CLARITY UR: CLEAR
CO2 SERPL-SCNC: 29 MMOL/L (ref 21–32)
COLOR UR: YELLOW
CORTIS AM PEAK SERPL-MCNC: 20.9 UG/DL (ref 4.2–22.4)
CREAT SERPL-MCNC: 1.13 MG/DL (ref 0.6–1.3)
CREAT UR-MCNC: 139 MG/DL
ERYTHROCYTE [DISTWIDTH] IN BLOOD BY AUTOMATED COUNT: 15 % (ref 11.6–15.1)
EST. AVERAGE GLUCOSE BLD GHB EST-MCNC: 131 MG/DL
GFR SERPL CREATININE-BSD FRML MDRD: 67 ML/MIN/1.73SQ M
GLUCOSE P FAST SERPL-MCNC: 114 MG/DL (ref 65–99)
GLUCOSE UR STRIP-MCNC: NEGATIVE MG/DL
HBA1C MFR BLD: 6.2 % (ref 4.2–6.3)
HCT VFR BLD AUTO: 50.8 % (ref 36.5–49.3)
HGB BLD-MCNC: 16.6 G/DL (ref 12–17)
HGB UR QL STRIP.AUTO: ABNORMAL
KETONES UR STRIP-MCNC: NEGATIVE MG/DL
LEUKOCYTE ESTERASE UR QL STRIP: NEGATIVE
MAGNESIUM SERPL-MCNC: 1.9 MG/DL (ref 1.6–2.6)
MCH RBC QN AUTO: 28.7 PG (ref 26.8–34.3)
MCHC RBC AUTO-ENTMCNC: 32.7 G/DL (ref 31.4–37.4)
MCV RBC AUTO: 88 FL (ref 82–98)
NITRITE UR QL STRIP: NEGATIVE
NON-SQ EPI CELLS URNS QL MICRO: ABNORMAL /HPF
PH UR STRIP.AUTO: 5.5 [PH] (ref 4.5–8)
PHOSPHATE SERPL-MCNC: 3.4 MG/DL (ref 2.3–4.1)
PLATELET # BLD AUTO: 295 THOUSANDS/UL (ref 149–390)
PMV BLD AUTO: 9.7 FL (ref 8.9–12.7)
POTASSIUM SERPL-SCNC: 4.3 MMOL/L (ref 3.5–5.3)
PROT SERPL-MCNC: 7.5 G/DL (ref 6.4–8.2)
PROT UR STRIP-MCNC: NEGATIVE MG/DL
PROT UR-MCNC: 21 MG/DL
PROT/CREAT UR: 0.15 MG/G{CREAT} (ref 0–0.1)
PTH-INTACT SERPL-MCNC: 38.3 PG/ML (ref 18.4–80.1)
RBC # BLD AUTO: 5.79 MILLION/UL (ref 3.88–5.62)
RBC #/AREA URNS AUTO: ABNORMAL /HPF
SODIUM SERPL-SCNC: 138 MMOL/L (ref 136–145)
SP GR UR STRIP.AUTO: 1.02 (ref 1–1.03)
TSH SERPL DL<=0.05 MIU/L-ACNC: 1.52 UIU/ML (ref 0.36–3.74)
UROBILINOGEN UR QL STRIP.AUTO: 0.2 E.U./DL
WBC # BLD AUTO: 9.37 THOUSAND/UL (ref 4.31–10.16)
WBC #/AREA URNS AUTO: ABNORMAL /HPF

## 2018-04-09 PROCEDURE — 82533 TOTAL CORTISOL: CPT

## 2018-04-09 PROCEDURE — 36415 COLL VENOUS BLD VENIPUNCTURE: CPT

## 2018-04-09 PROCEDURE — 82306 VITAMIN D 25 HYDROXY: CPT

## 2018-04-09 PROCEDURE — 84165 PROTEIN E-PHORESIS SERUM: CPT

## 2018-04-09 PROCEDURE — 84156 ASSAY OF PROTEIN URINE: CPT | Performed by: INTERNAL MEDICINE

## 2018-04-09 PROCEDURE — 84100 ASSAY OF PHOSPHORUS: CPT

## 2018-04-09 PROCEDURE — 85027 COMPLETE CBC AUTOMATED: CPT

## 2018-04-09 PROCEDURE — 81001 URINALYSIS AUTO W/SCOPE: CPT | Performed by: INTERNAL MEDICINE

## 2018-04-09 PROCEDURE — 83883 ASSAY NEPHELOMETRY NOT SPEC: CPT

## 2018-04-09 PROCEDURE — 83970 ASSAY OF PARATHORMONE: CPT

## 2018-04-09 PROCEDURE — 84165 PROTEIN E-PHORESIS SERUM: CPT | Performed by: PATHOLOGY

## 2018-04-09 PROCEDURE — 84166 PROTEIN E-PHORESIS/URINE/CSF: CPT | Performed by: INTERNAL MEDICINE

## 2018-04-09 PROCEDURE — 82570 ASSAY OF URINE CREATININE: CPT | Performed by: INTERNAL MEDICINE

## 2018-04-09 PROCEDURE — 84166 PROTEIN E-PHORESIS/URINE/CSF: CPT | Performed by: PATHOLOGY

## 2018-04-09 PROCEDURE — 80053 COMPREHEN METABOLIC PANEL: CPT

## 2018-04-09 PROCEDURE — 84443 ASSAY THYROID STIM HORMONE: CPT

## 2018-04-09 PROCEDURE — 83735 ASSAY OF MAGNESIUM: CPT

## 2018-04-09 PROCEDURE — 83036 HEMOGLOBIN GLYCOSYLATED A1C: CPT

## 2018-04-09 NOTE — TELEPHONE ENCOUNTER
I spoke with the wife Severa Crow who agrees with the cardiology consultation that I requested regarding slightly abnormal echocardiogram to make sure he has no coronary artery disease or any other problem to explain why his heart is not working quite normally in the setting of mildly low blood pressure and tachycardia  She has agreed to this

## 2018-04-10 LAB
KAPPA LC FREE SER-MCNC: 25.5 MG/L (ref 3.3–19.4)
KAPPA LC FREE/LAMBDA FREE SER: 1 {RATIO} (ref 0.26–1.65)
LAMBDA LC FREE SERPL-MCNC: 25.6 MG/L (ref 5.7–26.3)

## 2018-04-12 LAB
ALBUMIN SERPL ELPH-MCNC: 4.03 G/DL (ref 3.5–5)
ALBUMIN SERPL ELPH-MCNC: 54.5 % (ref 52–65)
ALBUMIN UR ELPH-MCNC: 100 %
ALPHA1 GLOB MFR UR ELPH: 0 %
ALPHA1 GLOB SERPL ELPH-MCNC: 0.41 G/DL (ref 0.1–0.4)
ALPHA1 GLOB SERPL ELPH-MCNC: 5.6 % (ref 2.5–5)
ALPHA2 GLOB MFR UR ELPH: 0 %
ALPHA2 GLOB SERPL ELPH-MCNC: 0.81 G/DL (ref 0.4–1.2)
ALPHA2 GLOB SERPL ELPH-MCNC: 11 % (ref 7–13)
B-GLOBULIN MFR UR ELPH: 0 %
BETA GLOB ABNORMAL SERPL ELPH-MCNC: 0.44 G/DL (ref 0.4–0.8)
BETA1 GLOB SERPL ELPH-MCNC: 5.9 % (ref 5–13)
BETA2 GLOB SERPL ELPH-MCNC: 5.6 % (ref 2–8)
BETA2+GAMMA GLOB SERPL ELPH-MCNC: 0.41 G/DL (ref 0.2–0.5)
GAMMA GLOB ABNORMAL SERPL ELPH-MCNC: 1.29 G/DL (ref 0.5–1.6)
GAMMA GLOB MFR UR ELPH: 0 %
GAMMA GLOB SERPL ELPH-MCNC: 17.4 % (ref 12–22)
IGG/ALB SER: 1.2 {RATIO} (ref 1.1–1.8)
PROT PATTERN SERPL ELPH-IMP: ABNORMAL
PROT PATTERN UR ELPH-IMP: ABNORMAL
PROT SERPL-MCNC: 7.4 G/DL (ref 6.4–8.2)
PROT UR-MCNC: 26 MG/DL

## 2018-04-30 ENCOUNTER — OFFICE VISIT (OUTPATIENT)
Dept: INTERNAL MEDICINE CLINIC | Facility: CLINIC | Age: 68
End: 2018-04-30
Payer: MEDICARE

## 2018-04-30 VITALS
SYSTOLIC BLOOD PRESSURE: 118 MMHG | HEART RATE: 98 BPM | DIASTOLIC BLOOD PRESSURE: 84 MMHG | RESPIRATION RATE: 16 BRPM | BODY MASS INDEX: 28.77 KG/M2 | OXYGEN SATURATION: 95 % | WEIGHT: 201 LBS | HEIGHT: 70 IN

## 2018-04-30 DIAGNOSIS — Z12.11 SCREENING FOR COLON CANCER: ICD-10-CM

## 2018-04-30 DIAGNOSIS — Z13.6 SCREENING FOR AAA (ABDOMINAL AORTIC ANEURYSM): ICD-10-CM

## 2018-04-30 DIAGNOSIS — Z72.0 TOBACCO USE: ICD-10-CM

## 2018-04-30 DIAGNOSIS — R94.30 EJECTION FRACTION < 50%: Primary | ICD-10-CM

## 2018-04-30 DIAGNOSIS — Z00.00 MEDICARE ANNUAL WELLNESS VISIT, SUBSEQUENT: ICD-10-CM

## 2018-04-30 DIAGNOSIS — Z12.2 ENCOUNTER FOR SCREENING FOR LUNG CANCER: ICD-10-CM

## 2018-04-30 PROCEDURE — 99214 OFFICE O/P EST MOD 30 MIN: CPT | Performed by: INTERNAL MEDICINE

## 2018-04-30 PROCEDURE — G0439 PPPS, SUBSEQ VISIT: HCPCS | Performed by: INTERNAL MEDICINE

## 2018-04-30 NOTE — PROGRESS NOTES
HPI:  Joni Alarcon is a 78 y o  male here for his Subsequent Wellness Visit  Patient Active Problem List   Diagnosis    Overweight (BMI 25 0-29  9)    Elevated serum creatinine    Chronic kidney disease, stage III (moderate)    Gross hematuria    Persistent proteinuria    Elevated glucose    Microscopic hematuria    Hypotension    Weight gain     Past Medical History:   Diagnosis Date    Hematuria      Past Surgical History:   Procedure Laterality Date    HERNIA REPAIR       Family History   Problem Relation Age of Onset    Heart disease Mother     Hypertension Father     Heart disease Father     Diabetes Brother     Dementia Neg Hx     Drug abuse Neg Hx     Mental illness Neg Hx     Substance Abuse Neg Hx      History   Smoking Status    Current Every Day Smoker    Packs/day: 0 50    Years: 20 00    Types: Cigarettes   Smokeless Tobacco    Never Used     History   Alcohol Use    Yes      History   Drug Use No     /84   Pulse 98   Resp 16   Ht 5' 9 75" (1 772 m)   Wt 91 2 kg (201 lb)   SpO2 95%   BMI 29 05 kg/m²       No current outpatient prescriptions on file  No current facility-administered medications for this visit        No Known Allergies  Immunization History   Administered Date(s) Administered    Pneumococcal Conjugate 13-Valent 02/27/2018       Patient Care Team:  Britton Alba DO as PCP - General (Internal Medicine)      Medicare Screening Tests and Risk Assessments:  AWV Clinical     ISAR:       Once in a Lifetime Medicare Screening:       Medicare Screening Tests and Risk Assessment:   AAA Risk Assessment    Age over 72 (males only):  Yes    Osteoporosis Risk Assessment    HIV Risk Assessment        Drug and Alcohol Use:   Tobacco use    Tobacco use duration    Tobacco Cessation Readiness    Alcohol use    Alcohol Treatment Readiness   Illicit Drug Use        Diet & Exercise:   Diet   How many servings a day of the following:   Exercise        Cognitive Impairment Screening:   Cognitive Impairment Screening        Functional Ability/Level of Safety:   Hearing    Hearing Impairment Assessment    Current Activities    Help needed with the folllowing:    ADL    Fall Risk   Injury History       Home Safety:   Home Safety Risk Factors       Advanced Directives:   Advanced Directives    Patient's End of Life Decisions        Urinary Incontinence:       Glaucoma:            Provider Screening     Preventative Screening/Counseling:   Cardiovascular Screening/Counseling:   (Labs Q5 years, EKG optional one-time)   General:  Screening Current           Diabetes Screening/Counseling:   (2 tests/year if Pre-Diabetes or 1 test/year if no Diabetes)   General:  Screening Current           Colorectal Cancer Screening/Counseling:   (FOBT Q1 yr; Flex Sig Q4 yrs or Q10 yrs after Screening Colonoscopy; Screening Colonoscpy Q2 yrs High Risk or Q10 yrs Low Risk; Barium Enema Q2 yrs High Risk or Q4 yrs Low Risk)   General:  Screening Current           Prostate Cancer Screening/Counseling:   (Annual)    General:  Screening Current          Breast Cancer Screening/Counseling:   (Baseline Age 28 - 43; Annual Age 36+)         Cervical Cancer Screening/Counseling:   (Annual for High Risk or Childbearing Age with Abnormal Pap in Last 3 yrs; Every 2 all others)         Osteoporosis Screening/Counseling:   (Every 2 Yrs if at risk or more if medically necessary)   General:  Screening Not Indicated           AAA Screening/Counseling:   (Once per Lifetime with risk factors)     Age over 72 (males only):  Yes     Counseling:  tobacco cessation counseling given [place] US:  Screening US         Glaucoma Screening/Counseling:   (Annual)         HIV Screening/Counseling:   (Voluntary;  Once annually for high risk OR 3 times for Pregnancy at diagnosis of IUP; 3rd trimester; and at Labor         Hepatitis C Screening:             Immunizations:   Influenza (annual):  Patient Declines   Pneumococcal (Once in a Lifetime):  Risks & Benefits Discussed   Zostavax (Medicare D Coverage, Pt >70 yo):  Patient Declines, Risks & Benefits Discussed       Other Preventative Couseling (Non-Medicare Wellness Visit Required):       Referrals (Non-Medicare Wellness Visit Required):       Medical Equipment/Suppliers:           Reviewed Updated [facility] Prior Wellness Visits:   Last Medicare wellness visit information was reviewed, patient interviewed and updates made to the record today yes    Assessment and Plan:  1  Ejection fraction < 50%      asymptomatic but found on echo, cardiology appt upcoming   advised to call for sooner appt   2  Tobacco use      tob cess counseling discussed, pt not ready to quit at this time   3  Screening for AAA (abdominal aortic aneurysm)  US abdominal aorta screening aaa    never had US to r/o AAA, ordered   4  Encounter for screening for lung cancer      pt defers LDCT for lung cancer screening, will re-visit at [facility] later this year   11   Medicare annual wellness visit, subsequent         Health Maintenance Due   Topic Date Due    Hepatitis C Screening  1950    COLONOSCOPY  1950    DTaP,Tdap,and Td Vaccines (1 - Tdap) 09/12/1972    Fall Risk  09/12/2016    ABDOMINAL AORTIC ANEURYSM (AAA) SCREEN  09/12/2016    PNEUMOCOCCAL POLYSACCHARIDE VACCINE AGE 72 AND OVER  09/12/2016    INFLUENZA VACCINE  09/01/2017

## 2018-04-30 NOTE — PATIENT INSTRUCTIONS
1  Ultrasound of aorta  2  Transfer colonoscopy records  3  Return in 3 months or sooner if questions  4  Call for sooner cardiology appointment, they have another office in [address]  5  consider Low dose CAT scan of chest for lung cancer screening later this year  6  Shingles vaccine(SHINGRIX vaccine)    Lung Cancer Screening   AMBULATORY CARE:   Lung cancer screening  is a test done every year to find lung cancer early  Screening is different from diagnosis because screening is used before you have any signs or symptoms  Screening may be helpful if you are or were a heavy smoker, or if you smoked for many years  This is because smoking increases your risk for lung cancer  Lung cancer screening has benefits and risks  Talk with your healthcare provider about the benefits and risks to help you decide if lung cancer screening is right for you  What you need to know about lung cancer:   · Lung cancer cells can form a tumor in your lungs and can spread to other areas of your body  This cancer is often found after it has spread  By this time, it is more difficult to treat  Treatment may include surgery to remove lung tissue that contains cancer cells  · Lung cancer is the leading cause of cancer deaths  Each year, about 220,000 people find out that they have lung cancer  About 150,000 people die each year from lung cancer  · Half of the people who have lung cancer are over the age of 79  The most common age of people who die from lung cancer is 67  · Talk to your healthcare provider if you think you have signs or symptoms of lung cancer  Examples include chest pain, a cough that does not go away, coughing up blood, wheezing, hoarseness, and neck swelling  How to know if you are a good candidate for lung cancer screening: You may be able to have lung cancer screening if the following are true:  · You are between 54and [de-identified]years old  · You have never had lung cancer      · You do not currently have any signs or symptoms of lung cancer  · You currently smoke, or you quit less than 15 years ago  You also are or were a heavy smoker (30 pack-years or more)  · You do not have any other serious conditions that may affect how long you live  · You are willing to have surgery if screening shows signs of lung cancer  · You are willing to have screening every year until you have not smoked for 15 years or reach 80years of age  Screening may also stop if you develop another health condition or you are no longer willing to have treatment  Pack-years:  Pack-years are the number of cigarette packs you smoked multiplied by the number of years you smoked  A heavy smoker has 30 pack-years or more  Examples of 30 pack-years are 1 pack of cigarettes smoked each day for 30 years, or 2 packs each day for 15 years  Your healthcare provider can help you calculate your pack-years  How lung cancer screening is done:  Lung cancer screening is done with a low-dose CT scan (LDCT)  A CT uses an x-ray and a computer to give detailed pictures of your lungs  You will lie on a table for this test  A low amount of radiation from the x-ray machine is used to take pictures of your lungs  This test only takes a few minutes  You will be asked to hold your breath for about 6 seconds while the pictures are taken  Benefits of lung cancer screening:   · LDCT can find some kinds of lung cancer early  This means it can be treated with less invasive surgery, or less lung tissue may need to be removed  · The scan is not invasive or painful, and takes only a short amount of time  · LDCT does not leave radiation in your body after the scan is done  · LDCT can lower your risk of death from lung cancer by 16% to 20% because the cancer is found early  Your risk for death from any cause is lowered by 6 7%  This is because the scan may show signs of problems other than lung cancer that need to be treated    Risks of lung cancer screening:   · LDCT exposes you to a small amount of radiation that can increase your risk for cancer  The amount is less than is given during a mammogram  It is about the same amount you get from the sun in a year  Because you will need to be screened every year, your total radiation exposure over time is increased  · The test can give a false-positive result  This means that screening shows you have lung cancer, but follow-up tests show that you do not  You may get more tests or even treatments that are not needed  It can also be stressful to think you have lung cancer when you do not  The risk for a false-positive result is about 23%  · The test can give a false-negative result  This means the test does not find signs of cancer, but you later develop signs and symptoms and need treatment  A false-negative result can keep you from getting treatment as early as possible  The risk for a false-negative result is about 4%  · Screening may lead to over-diagnosis if tumors are found that are not life-threatening  Some forms of cancer grow quickly and need to be treated  Other forms grow slowly and may not be life-threatening in your lifetime  · Screening may lead to over-treatment  This means that you get treatment that is not necessary  About 10% of people with lung cancer receive treatment that was not needed  What happens after lung cancer screening: You will meet with your healthcare provider to go over the results of your screening  You may need more tests to diagnose anything that showed up on the screening test   Lung cancer screening needs to be done each year  Even if your result shows you do not have lung cancer, it is important to continue getting screened each year  This is the best way to find a new cancer, at the earliest possible stage  Questions to ask your healthcare provider to help you make a decision about screening:   · How high is my risk for lung cancer?     · What are my pack-years? · Will I be able to help create my treatment plan if screening shows I have lung cancer? · Will my insurance cover screening? · Where is the screening done? · Do I need to do anything to get ready to have screening? · When and how do I get the results of my screening? What to think about before you decide if you will have lung cancer screening:   · Benefits and risks of lung cancer screening:      ¨ Do I understand the benefits and risks of lung cancer screening with LDCT? How concerned am I about the risks? Do I think the benefits are greater than the risks? ¨ One of the main risks is that the radiation I get during screening can cause cancer  Do I understand how high the risk is? How worried am I about the risk? · Possibility of finding lung cancer: How will I feel if I find out I have lung cancer? Am I willing to get the treatment I might need? · Possibility of false results:  I might get a false-positive or false-negative result  How will I feel if the test results are wrong? · Need for annual screening:  Screening needs to be done every year  Am I willing to have screening until I am a nonsmoker for 15 years, am 80, or develop another condition? Screening will also stop if I am no longer willing to have treatment  How do I feel about the need for surgery or other treatment for lung cancer? Will I be comfortable with my decision not to have treatment, and to stop having screening? What you need to know about quitting smoking:  If you currently smoke, it is very important that you quit  If you already quit smoking, it is important that you do not start smoking again  You will also need to avoid secondhand smoke from others  Nicotine and other chemicals in cigarettes and cigars increase your risk for cancer  Your risk for lung cancer gets lower each year that you do not smoke  Even if you get lung cancer screening every year, it is still important not to smoke   Ask your healthcare provider for information if you currently smoke and need help to quit  You can find support and more information here:  · Smokefree  gov  Phone: 8- 664 - 354-5001  Web Address: www [employer]  gov  For support and more information about lung cancer:   · American [employer] Association  [address] [facility]  [address]  Phone: [facility] [address]  Phone: 0- 573 - 020-3074  Web Address: [place]  org  © 2017 [facility] Information is for End User's use only and may not be sold, redistributed or otherwise used for commercial purposes  All illustrations and images included in CareNotes® are the copyrighted property of [employer]  or Fabrice Calderon  The above information is an  only  It is not intended as medical advice for individual conditions or treatments  Talk to your doctor, nurse or pharmacist before following any medical regimen to see if it is safe and effective for you

## 2018-04-30 NOTE — PROGRESS NOTES
HPI:  Sharon Sood is a 78 y o  male here for his Subsequent Wellness Visit  Patient Active Problem List   Diagnosis    Overweight (BMI 25 0-29  9)    Elevated serum creatinine    Chronic kidney disease, stage III (moderate)    Gross hematuria    Persistent proteinuria    Elevated glucose    Microscopic hematuria    Hypotension    Weight gain     Past Medical History:   Diagnosis Date    Hematuria      Past Surgical History:   Procedure Laterality Date    HERNIA REPAIR       Family History   Problem Relation Age of Onset    Heart disease Mother     Hypertension Father     Heart disease Father     Diabetes Brother     Dementia Neg Hx     Drug abuse Neg Hx     Mental illness Neg Hx     Substance Abuse Neg Hx      History   Smoking Status    Current Every Day Smoker    Packs/day: 0 50    Years: 20 00    Types: Cigarettes   Smokeless Tobacco    Never Used     History   Alcohol Use    Yes      History   Drug Use No     /84   Pulse 98   Resp 16   Ht 5' 9 75" (1 772 m)   Wt 91 2 kg (201 lb)   SpO2 95%   BMI 29 05 kg/m²       No current outpatient prescriptions on file  No current facility-administered medications for this visit        No Known Allergies  Immunization History   Administered Date(s) Administered    Pneumococcal Conjugate 13-Valent 02/27/2018       Patient Care Team:  Eli Wray DO as PCP - General (Internal Medicine)      Medicare Screening Tests and Risk Assessments:  AWV Clinical     ISAR:       Once in a Lifetime Medicare Screening:       Medicare Screening Tests and Risk Assessment:   AAA Risk Assessment    Age over 72 (males only):  Yes    Osteoporosis Risk Assessment    HIV Risk Assessment        Drug and Alcohol Use:   Tobacco use    Tobacco use duration    Tobacco Cessation Readiness    Alcohol use    Alcohol Treatment Readiness   Illicit Drug Use        Diet & Exercise:   Diet   How many servings a day of the following:   Exercise        Cognitive Impairment Screening:   Cognitive Impairment Screening        Functional Ability/Level of Safety:   Hearing    Hearing Impairment Assessment    Current Activities    Help needed with the folllowing:    ADL    Fall Risk   Injury History       Home Safety:   Home Safety Risk Factors       Advanced Directives:   Advanced Directives    Patient's End of Life Decisions        Urinary Incontinence:       Glaucoma:            Provider Screening     Preventative Screening/Counseling:   Cardiovascular Screening/Counseling:   (Labs Q5 years, EKG optional one-time)   General:  Screening Current           Diabetes Screening/Counseling:   (2 tests/year if Pre-Diabetes or 1 test/year if no Diabetes)   General:  Screening Current           Colorectal Cancer Screening/Counseling:   (FOBT Q1 yr; Flex Sig Q4 yrs or Q10 yrs after Screening Colonoscopy; Screening Colonoscpy Q2 yrs High Risk or Q10 yrs Low Risk; Barium Enema Q2 yrs High Risk or Q4 yrs Low Risk)   General:  Screening Current           Prostate Cancer Screening/Counseling:   (Annual)    General:  Screening Current          Breast Cancer Screening/Counseling:   (Baseline Age 28 - 43; Annual Age 36+)         Cervical Cancer Screening/Counseling:   (Annual for High Risk or Childbearing Age with Abnormal Pap in Last 3 yrs; Every 2 all others)         Osteoporosis Screening/Counseling:   (Every 2 Yrs if at risk or more if medically necessary)   General:  Screening Not Indicated           AAA Screening/Counseling:   (Once per Lifetime with risk factors)     Age over 72 (males only):  Yes     Counseling:  tobacco cessation counseling given [place] US:  Screening US         Glaucoma Screening/Counseling:   (Annual)         HIV Screening/Counseling:   (Voluntary;  Once annually for high risk OR 3 times for Pregnancy at diagnosis of IUP; 3rd trimester; and at Labor         Hepatitis C Screening:             Immunizations:   Influenza (annual):  Patient Declines   Pneumococcal (Once in a Lifetime):  Risks & Benefits Discussed   Zostavax (Medicare D Coverage, Pt >72 yo):  Patient Declines, Risks & Benefits Discussed       Other Preventative Couseling (Non-Medicare Wellness Visit Required):       Referrals (Non-Medicare Wellness Visit Required):       Medical Equipment/Suppliers:           No exam data present  Reviewed Updated [facility] Prior Wellness Visits:   Last Medicare wellness visit information was reviewed, patient interviewed , no change since last AWVyes  Last Medicare wellness visit information was reviewed, patient interviewed and updates made to the record today yes    Assessment and Plan:  1  Ejection fraction < 50%      asymptomatic but found on echo, cardiology appt upcoming   advised to call for sooner appt   2  Tobacco use      tob cess counseling discussed, pt not ready to quit at this time   3  Screening for AAA (abdominal aortic aneurysm)  US abdominal aorta screening aaa    never had US to r/o AAA, ordered   4  Encounter for screening for lung cancer      pt defers LDCT for lung cancer screening, will [facility]visit [facility] later this year   11   Medicare annual wellness visit, subsequent         Health Maintenance Due   Topic Date Due    Hepatitis C Screening  1950    COLONOSCOPY  1950    DTaP,Tdap,and Td Vaccines (1 - Tdap) 09/12/1972    Fall Risk  09/12/2016    ABDOMINAL AORTIC ANEURYSM (AAA) SCREEN  09/12/2016    PNEUMOCOCCAL POLYSACCHARIDE VACCINE AGE 72 AND OVER  09/12/2016    INFLUENZA VACCINE  09/01/2017

## 2018-04-30 NOTE — PROGRESS NOTES
Assessment/Plan:     Diagnoses and all orders for this visit:    Ejection fraction < 50%  Comments:  asymptomatic but found on echo, cardiology appt upcoming   advised to call for sooner appt    Tobacco use  Comments:  tob cess counseling discussed, pt not ready to quit at this time    Screening for AAA (abdominal aortic aneurysm)  Comments:  never had US to r/o AAA, ordered  Orders:  -     US abdominal aorta screening aaa; Future    Encounter for screening for lung cancer  Comments:  pt defers LDCT for lung cancer screening, will re-visit at [address] later this year    Medicare annual wellness visit, subsequent    Other orders  -     Cancel: Ambulatory referral to Gastroenterology; Future      Spent >25 mins with patient including >50% of time counseling/coordination of care        Subjective:      Patient ID: Joni Alarcon is a 78 y o  male  HPI    Here for follow up, here with wife(daniela)  Saw nephrology and advised to see urology for microscopic hematuria & being scheduled for cystoscopy  Had echo as well which showed low ER of 48% and advised to see cardiology  Current smoker not ready to quit  No hx of heart disease or MI in past & no CP/SOB/SANCHEZ  Had colonoscopy with Dr Ronni Funk couple of years ago  We discussed shingles vaccine, LDCT for lung cancer screening and US for AAA screening  No other complaints    Past Medical History:   Diagnosis Date    Hematuria      Vitals:    04/30/18 0937   BP: 118/84   Pulse: 98   Resp: 16   SpO2: 95%   Weight: 91 2 kg (201 lb)   Height: 5' 9 75" (1 772 m)     Body mass index is 29 05 kg/m²  No current outpatient prescriptions on file  No Known Allergies      Review of Systems   Constitutional: Negative for fever  HENT: Negative for congestion  Respiratory: Negative for shortness of breath  Cardiovascular: Negative for chest pain  Gastrointestinal: Negative for abdominal pain  Genitourinary: Negative for difficulty urinating     Musculoskeletal: Negative for arthralgias  Neurological: Negative for headaches  Psychiatric/Behavioral: Negative for dysphoric mood  Objective:      /84   Pulse 98   Resp 16   Ht 5' 9 75" (1 772 m)   Wt 91 2 kg (201 lb)   SpO2 95%   BMI 29 05 kg/m²          Physical Exam   Constitutional: He is oriented to person, place, and time  He appears well-developed and well-nourished  HENT:   Head: Normocephalic and atraumatic  Mouth/Throat: No oropharyngeal exudate  Eyes: Conjunctivae are normal  Pupils are equal, round, and reactive to light  Cardiovascular: Normal rate, regular rhythm and normal heart sounds  No murmur heard  Pulmonary/Chest: Effort normal and breath sounds normal  He has no wheezes  He has no rales  Abdominal: Soft  Bowel sounds are normal  He exhibits no abdominal bruit  There is no tenderness  Musculoskeletal: He exhibits no edema  Neurological: He is alert and oriented to person, place, and time  Psychiatric: He has a normal mood and affect  His behavior is normal    Vitals reviewed

## 2018-05-03 ENCOUNTER — HOSPITAL ENCOUNTER (OUTPATIENT)
Dept: RADIOLOGY | Age: 68
Discharge: HOME | End: 2018-05-03
Payer: MEDICARE

## 2018-05-03 ENCOUNTER — TELEPHONE (OUTPATIENT)
Dept: NEPHROLOGY | Facility: CLINIC | Age: 68
End: 2018-05-03

## 2018-05-03 ENCOUNTER — TRANSCRIBE ORDERS (OUTPATIENT)
Dept: LAB | Facility: CLINIC | Age: 68
End: 2018-05-03

## 2018-05-03 ENCOUNTER — APPOINTMENT (OUTPATIENT)
Dept: LAB | Facility: CLINIC | Age: 68
End: 2018-05-03
Payer: MEDICARE

## 2018-05-03 DIAGNOSIS — R63.5 WEIGHT GAIN: ICD-10-CM

## 2018-05-03 DIAGNOSIS — Z13.6 SCREENING FOR AAA (ABDOMINAL AORTIC ANEURYSM): ICD-10-CM

## 2018-05-03 DIAGNOSIS — R79.89 ELEVATED SERUM CREATININE: ICD-10-CM

## 2018-05-03 DIAGNOSIS — R31.0 GROSS HEMATURIA: ICD-10-CM

## 2018-05-03 DIAGNOSIS — N18.30 CHRONIC KIDNEY DISEASE, STAGE III (MODERATE) (HCC): ICD-10-CM

## 2018-05-03 DIAGNOSIS — R80.1 PERSISTENT PROTEINURIA: ICD-10-CM

## 2018-05-03 DIAGNOSIS — D72.829 LEUKOCYTOSIS, UNSPECIFIED TYPE: Primary | ICD-10-CM

## 2018-05-03 DIAGNOSIS — R73.09 ELEVATED GLUCOSE: ICD-10-CM

## 2018-05-03 DIAGNOSIS — I95.9 HYPOTENSION, UNSPECIFIED HYPOTENSION TYPE: ICD-10-CM

## 2018-05-03 DIAGNOSIS — R31.29 MICROSCOPIC HEMATURIA: ICD-10-CM

## 2018-05-03 LAB
ALBUMIN SERPL BCP-MCNC: 3.3 G/DL (ref 3.5–5)
ALP SERPL-CCNC: 105 U/L (ref 46–116)
ALT SERPL W P-5'-P-CCNC: 29 U/L (ref 12–78)
ANION GAP SERPL CALCULATED.3IONS-SCNC: 9 MMOL/L (ref 4–13)
AST SERPL W P-5'-P-CCNC: 20 U/L (ref 5–45)
BILIRUB SERPL-MCNC: 0.4 MG/DL (ref 0.2–1)
BUN SERPL-MCNC: 25 MG/DL (ref 5–25)
CALCIUM SERPL-MCNC: 8.8 MG/DL (ref 8.3–10.1)
CHLORIDE SERPL-SCNC: 106 MMOL/L (ref 100–108)
CO2 SERPL-SCNC: 26 MMOL/L (ref 21–32)
CREAT SERPL-MCNC: 1.26 MG/DL (ref 0.6–1.3)
CREAT UR-MCNC: 283 MG/DL
ERYTHROCYTE [DISTWIDTH] IN BLOOD BY AUTOMATED COUNT: 15.3 % (ref 11.6–15.1)
GFR SERPL CREATININE-BSD FRML MDRD: 59 ML/MIN/1.73SQ M
GLUCOSE P FAST SERPL-MCNC: 113 MG/DL (ref 65–99)
HCT VFR BLD AUTO: 48.5 % (ref 36.5–49.3)
HGB BLD-MCNC: 15.7 G/DL (ref 12–17)
MAGNESIUM SERPL-MCNC: 2.2 MG/DL (ref 1.6–2.6)
MCH RBC QN AUTO: 28.5 PG (ref 26.8–34.3)
MCHC RBC AUTO-ENTMCNC: 32.4 G/DL (ref 31.4–37.4)
MCV RBC AUTO: 88 FL (ref 82–98)
PHOSPHATE SERPL-MCNC: 4.1 MG/DL (ref 2.3–4.1)
PLATELET # BLD AUTO: 366 THOUSANDS/UL (ref 149–390)
PMV BLD AUTO: 9.5 FL (ref 8.9–12.7)
POTASSIUM SERPL-SCNC: 4 MMOL/L (ref 3.5–5.3)
PROT SERPL-MCNC: 7.5 G/DL (ref 6.4–8.2)
PROT UR-MCNC: 53 MG/DL
PROT/CREAT UR: 0.19 MG/G{CREAT} (ref 0–0.1)
RBC # BLD AUTO: 5.51 MILLION/UL (ref 3.88–5.62)
SODIUM SERPL-SCNC: 141 MMOL/L (ref 136–145)
WBC # BLD AUTO: 12.2 THOUSAND/UL (ref 4.31–10.16)

## 2018-05-03 PROCEDURE — 36415 COLL VENOUS BLD VENIPUNCTURE: CPT

## 2018-05-03 PROCEDURE — 80053 COMPREHEN METABOLIC PANEL: CPT

## 2018-05-03 PROCEDURE — 82570 ASSAY OF URINE CREATININE: CPT

## 2018-05-03 PROCEDURE — 84100 ASSAY OF PHOSPHORUS: CPT

## 2018-05-03 PROCEDURE — 83735 ASSAY OF MAGNESIUM: CPT

## 2018-05-03 PROCEDURE — 85027 COMPLETE CBC AUTOMATED: CPT

## 2018-05-03 PROCEDURE — 84156 ASSAY OF PROTEIN URINE: CPT

## 2018-05-03 PROCEDURE — 76706 US ABDL AORTA SCREEN AAA: CPT

## 2018-05-03 NOTE — TELEPHONE ENCOUNTER
A message has been left asking pt to contact the office       ----- Message from Marciano Samaniego MD sent at 5/3/2018  8:01 AM EDT -----  Patient with a slightly elevated WBC count  Please check with the patient make sure he has no fevers or chills or colds or any other signs or symptoms of an infection  Check to see if he is on steroids  Repeat a CBC with differential either tomorrow or early next week  Please send the results of the current CBC to the primary medical physician to follow up on as well and let him know my plan

## 2018-05-03 NOTE — TELEPHONE ENCOUNTER
I spoke with pt and made him aware of WBC count, pt denies any signs or sx of possible infection, no chills/fevers   Pt denies being on steroids as well, a repeat CBC with diff has been ordered pt will be going on Monday to have this done        ----- Message from Kavitha Morillo MD sent at 5/3/2018  8:01 AM EDT -----  Patient with a slightly elevated WBC count  Please check with the patient make sure he has no fevers or chills or colds or any other signs or symptoms of an infection  Check to see if he is on steroids  Repeat a CBC with differential either tomorrow or early next week  Please send the results of the current CBC to the primary medical physician to follow up on as well and let him know my plan

## 2018-05-04 ENCOUNTER — TELEPHONE (OUTPATIENT)
Dept: INTERNAL MEDICINE CLINIC | Facility: CLINIC | Age: 68
End: 2018-05-04

## 2018-05-04 ENCOUNTER — TELEPHONE (OUTPATIENT)
Dept: NEPHROLOGY | Facility: CLINIC | Age: 68
End: 2018-05-04

## 2018-05-04 PROBLEM — Z91.89 FRAMINGHAM CARDIAC RISK 10-20% IN NEXT 10 YEARS: Status: ACTIVE | Noted: 2018-05-04

## 2018-05-04 PROBLEM — I70.0 ABDOMINAL AORTIC ATHEROSCLEROSIS (HCC): Status: ACTIVE | Noted: 2018-05-04

## 2018-05-04 NOTE — TELEPHONE ENCOUNTER
US results are back & there is no aneurysm of abdominal aorta  There is evidence, however of atherosclerosis in his blood vessels      Based on these findings and use of tobacco, Katherine Raygoza should be taking a cholesterol lowering medication such as atorvastatin once a day    Please advise him to discuss this with cardiologist as well at his upcoming appt on 5/11 and let me know if he will start medication    thx

## 2018-05-04 NOTE — TELEPHONE ENCOUNTER
----- Message from Adam Trammell MD sent at 5/3/2018  8:01 AM EDT -----  Patient with a slightly elevated WBC count  Please check with the patient make sure he has no fevers or chills or colds or any other signs or symptoms of an infection  Check to see if he is on steroids  Repeat a CBC with differential either tomorrow or early next week  Please send the results of the current CBC to the primary medical physician to follow up on as well and let him know my plan

## 2018-05-07 ENCOUNTER — APPOINTMENT (OUTPATIENT)
Dept: LAB | Facility: CLINIC | Age: 68
End: 2018-05-07
Payer: MEDICARE

## 2018-05-07 DIAGNOSIS — D72.829 LEUKOCYTOSIS, UNSPECIFIED TYPE: ICD-10-CM

## 2018-05-07 LAB
BASOPHILS # BLD AUTO: 0.03 THOUSANDS/ΜL (ref 0–0.1)
BASOPHILS NFR BLD AUTO: 0 % (ref 0–1)
EOSINOPHIL # BLD AUTO: 0.23 THOUSAND/ΜL (ref 0–0.61)
EOSINOPHIL NFR BLD AUTO: 2 % (ref 0–6)
ERYTHROCYTE [DISTWIDTH] IN BLOOD BY AUTOMATED COUNT: 15.4 % (ref 11.6–15.1)
HCT VFR BLD AUTO: 49.8 % (ref 36.5–49.3)
HGB BLD-MCNC: 16.1 G/DL (ref 12–17)
LYMPHOCYTES # BLD AUTO: 3.07 THOUSANDS/ΜL (ref 0.6–4.47)
LYMPHOCYTES NFR BLD AUTO: 27 % (ref 14–44)
MCH RBC QN AUTO: 28.7 PG (ref 26.8–34.3)
MCHC RBC AUTO-ENTMCNC: 32.3 G/DL (ref 31.4–37.4)
MCV RBC AUTO: 89 FL (ref 82–98)
MONOCYTES # BLD AUTO: 1 THOUSAND/ΜL (ref 0.17–1.22)
MONOCYTES NFR BLD AUTO: 9 % (ref 4–12)
NEUTROPHILS # BLD AUTO: 7.17 THOUSANDS/ΜL (ref 1.85–7.62)
NEUTS SEG NFR BLD AUTO: 62 % (ref 43–75)
PLATELET # BLD AUTO: 362 THOUSANDS/UL (ref 149–390)
PMV BLD AUTO: 9.6 FL (ref 8.9–12.7)
RBC # BLD AUTO: 5.61 MILLION/UL (ref 3.88–5.62)
WBC # BLD AUTO: 11.5 THOUSAND/UL (ref 4.31–10.16)

## 2018-05-07 PROCEDURE — 85025 COMPLETE CBC W/AUTO DIFF WBC: CPT

## 2018-05-07 PROCEDURE — 36415 COLL VENOUS BLD VENIPUNCTURE: CPT

## 2018-05-07 NOTE — TELEPHONE ENCOUNTER
Notified patient, patient states he will discuss with cardiologist first and get back to us if he decides to start medication  No questions at this time

## 2018-05-08 ENCOUNTER — TELEPHONE (OUTPATIENT)
Dept: INTERNAL MEDICINE CLINIC | Facility: CLINIC | Age: 68
End: 2018-05-08

## 2018-05-08 ENCOUNTER — TELEPHONE (OUTPATIENT)
Dept: NEPHROLOGY | Facility: CLINIC | Age: 68
End: 2018-05-08

## 2018-05-08 NOTE — TELEPHONE ENCOUNTER
Dr Annie Hanson office called (Pt's nephrologist) stating that his Bw that was completed again yesterday show elevated WBC and would like for you to take over at this time  If you have any questions you can call there office

## 2018-05-08 NOTE — TELEPHONE ENCOUNTER
I spoke with Camilo Bamberger in regards to his elevated WBC, pt denies having fever/chills or signs of possible infection, states " I feel great " this has been handed over to Dr Cherry Hernandez for evaluation, I did speak with Sally Hoffman and made her aware          ----- Message from Brett Payton MD sent at 5/7/2018 10:16 AM EDT -----  Please make sure the primary physician is aware of the elevated WBC count and address as it  Also, make sure the patient has no fevers chills cough colds stomach problems or urinary symptoms

## 2018-05-11 ENCOUNTER — PROCEDURE VISIT (OUTPATIENT)
Dept: UROLOGY | Facility: AMBULATORY SURGERY CENTER | Age: 68
End: 2018-05-11
Payer: MEDICARE

## 2018-05-11 ENCOUNTER — OFFICE VISIT (OUTPATIENT)
Dept: CARDIOLOGY CLINIC | Facility: CLINIC | Age: 68
End: 2018-05-11
Payer: MEDICARE

## 2018-05-11 VITALS
HEIGHT: 71 IN | DIASTOLIC BLOOD PRESSURE: 90 MMHG | WEIGHT: 201 LBS | SYSTOLIC BLOOD PRESSURE: 124 MMHG | HEART RATE: 68 BPM | BODY MASS INDEX: 28.14 KG/M2

## 2018-05-11 VITALS
BODY MASS INDEX: 28.63 KG/M2 | WEIGHT: 200 LBS | DIASTOLIC BLOOD PRESSURE: 70 MMHG | HEART RATE: 104 BPM | HEIGHT: 70 IN | SYSTOLIC BLOOD PRESSURE: 102 MMHG

## 2018-05-11 DIAGNOSIS — Z72.0 TOBACCO ABUSE: ICD-10-CM

## 2018-05-11 DIAGNOSIS — R31.29 MICROHEMATURIA: Primary | ICD-10-CM

## 2018-05-11 DIAGNOSIS — R31.29 MICROSCOPIC HEMATURIA: ICD-10-CM

## 2018-05-11 DIAGNOSIS — R93.1 ABNORMAL ECHOCARDIOGRAM: Primary | ICD-10-CM

## 2018-05-11 DIAGNOSIS — E78.00 HYPERCHOLESTEROLEMIA: ICD-10-CM

## 2018-05-11 DIAGNOSIS — I45.2 RBBB (RIGHT BUNDLE BRANCH BLOCK WITH LEFT ANTERIOR FASCICULAR BLOCK): ICD-10-CM

## 2018-05-11 DIAGNOSIS — I25.5 ISCHEMIC CARDIOMYOPATHY: ICD-10-CM

## 2018-05-11 LAB
SL AMB  POCT GLUCOSE, UA: NORMAL
SL AMB LEUKOCYTE ESTERASE,UA: NORMAL
SL AMB POCT BILIRUBIN,UA: NORMAL
SL AMB POCT BLOOD,UA: NORMAL
SL AMB POCT CLARITY,UA: CLEAR
SL AMB POCT COLOR,UA: YELLOW
SL AMB POCT KETONES,UA: NORMAL
SL AMB POCT NITRITE,UA: NORMAL
SL AMB POCT PH,UA: 5
SL AMB POCT SPECIFIC GRAVITY,UA: 1.01
SL AMB POCT URINE PROTEIN: NORMAL
SL AMB POCT UROBILINOGEN: NORMAL

## 2018-05-11 PROCEDURE — 81002 URINALYSIS NONAUTO W/O SCOPE: CPT | Performed by: UROLOGY

## 2018-05-11 PROCEDURE — 93000 ELECTROCARDIOGRAM COMPLETE: CPT | Performed by: INTERNAL MEDICINE

## 2018-05-11 PROCEDURE — 52000 CYSTOURETHROSCOPY: CPT | Performed by: UROLOGY

## 2018-05-11 PROCEDURE — 99204 OFFICE O/P NEW MOD 45 MIN: CPT | Performed by: INTERNAL MEDICINE

## 2018-05-11 NOTE — PROGRESS NOTES
Consultation - Cardiology   Deisy Benítez 78 y o  male MRN: [id number]  Unit/Bed#:  Encounter: [id number]  Physician Requesting Consult: No att  providers found  Reason for Consult / Principal Problem: abnormal echo    Assessment:  1  Abnormal ECHO  2  Cardiomyopathy  3  RBBB / LAFB  4  Tobacco abuse      Plan:  I am very concerned that he has CAD and may possibly have had a silent MI in the past  I have asked him to start ASA 81 mg daily  He will be scheduled for an exercise stress myoview study to be done on 5/30/2018  He remains totally asymptomatic from a cardiac standpoint  Further recommendations will be based on his stress test results  History of Present Illness     HPI: Deisy Benítez is a 78y o  year old male who is seen for cardiac evaluation because of an abnormal ECHO  He denies any cardiac history  He is very active and works out at [employer] 3 days a week  He denies CP, SOB, palpitations  He denies any cardiac history  He denies HTN, DM, FH of CAD  His cholesterol levels are elevated  He does smoke 1/2 packs a day  ECG - ST, , RBBB, LAFB  ECHO - EF 48%, severe hypokinesis of the apical anterior, mid anteroseptal, mid inferoseptal, apical inferior, apical septal, and apical walls  He takes no medications      Review of Systems:    Alert awake oriented, comfortable, denies any complaints  No fevers chills nausea vomiting  No weakness, dizziness, seizures  no cough, shortness of breath, or wheezing  Denies any palpitations, chest pain, diaphoresis  Denies leg edema, pain or paresthesias  Denies any skin rashes  Denies abdominal pain, bloody stools, masses  Denies any depression or suicidal ideations      Historical Information   Past Medical History:   Diagnosis Date    Hematuria      Past Surgical History:   Procedure Laterality Date    HERNIA REPAIR       History   Alcohol Use    Yes     History   Drug Use No     History   Smoking Status    Current Every Day Smoker    Packs/day: 0 50    Years: 20 00    Types: Cigarettes   Smokeless Tobacco    Never Used     Family History: non-contributory    Meds/Allergies   all current active meds have been reviewed  No Known Allergies    Objective   Vitals: Blood pressure 102/70, pulse 104, height 5' 9 75" (1 772 m), weight 90 7 kg (200 lb)  , Body mass index is 28 9 kg/m²  ,   Weight (last 2 days)     Date/Time   Weight    05/11/18 1129  90 7 (200)                        Physical Exam:  GEN: Kole Powell appears well, alert and oriented x 3, pleasant and cooperative   HEENT: pupils equal, round, and reactive to light; extraocular muscles intact  NECK: supple, no carotid bruits   HEART: regular rhythm, normal S1 and S2, no murmurs, clicks, gallops or rubs   LUNGS: clear to auscultation bilaterally; no wheezes, rales, or rhonchi   ABDOMEN: normal bowel sounds, soft, no tenderness, no distention  EXTREMITIES: peripheral pulses normal; no clubbing, cyanosis, or edema  NEURO: no focal findings   SKIN: normal without suspicious lesions on exposed skin    Lab Results:   Office Visit on 05/11/2018   Component Date Value Ref Range Status    Interpretation 05/11/2018    Final    Sinus Tachycardia    RBBB  LAFB  Procedure visit on 05/11/2018   Component Date Value Ref Range Status    LEUKOCYTE ESTERASE,UA 05/11/2018 -   Final     NITRITE,UA 05/11/2018 -   Final    SL AMB POCT UROBILINOGEN 05/11/2018 -   Final    SL AMB POCT URINE PROTEIN 05/11/2018 -   Final     PH,UA 05/11/2018 5 0   Final     BLOOD,UA 05/11/2018 -   Final     SPECIFIC GRAVITY,UA 05/11/2018 1 010   Final     KETONES,UA 05/11/2018 -   Final     BILIRUBIN,UA 05/11/2018 -   Final    GLUCOSE, UA 05/11/2018 -   Final     COLOR,UA 05/11/2018 yellow   Final     CLARITY,UA 05/11/2018 clear   Final           Results from last 7 days  Lab Units 05/07/18  0628   WBC Thousand/uL 11 50*   HEMOGLOBIN g/dL 16 1   HEMATOCRIT % 49 8*   PLATELETS Thousands/uL 362               Imaging:  I have personally reviewed pertinent reports

## 2018-05-11 NOTE — PROGRESS NOTES
Carlton Miranda is a 50-year-old male with reported microscopic hematuria  A PSA from early 2018 is noted to be 0 8  There is no family history of prostate cancer  He denies ever seeing gross hematuria  He smokes 1/2 pack of cigarettes daily  A retroperitoneal ultrasound is normal   He presents to the office today to undergo flexible cystoscopy  Male cystoscopy procedure note:    Risk and benefits of flexible cystoscopy were discussed  Informed consent was obtained  The patient was placed in the supine position  His genitalia was prepped and draped in a sterile fashion  Viscous lidocaine jelly was instilled into the urethra and flexible cystoscopy was then performed  The urethra, prostatic urethra, and bladder were all thoroughly inspected there was no evidence of mucosal abnormalities or lesions  Both ureteral orifices were visualized with clear efflux of urine  Retroflexion was normal  Overall this was a negative cystoscopy  My impression is microscopic hematuria, negative hematuria workup  Provided the patient with reassurance that his retroperitoneal ultrasound and cystoscopy are within normal limits  Follow-up will be in 1 year with a repeat urinalysis  His next PSA and digital rectal examination can be performed in 2020 for routine prostate cancer screening

## 2018-05-16 PROBLEM — E78.5 DYSLIPIDEMIA: Status: ACTIVE | Noted: 2018-05-16

## 2018-05-16 PROBLEM — E55.9 VITAMIN D DEFICIENCY: Status: ACTIVE | Noted: 2018-05-16

## 2018-05-16 NOTE — PROGRESS NOTES
RENAL FOLLOW UP NOTE: td    ASSESSMENT AND PLAN:  1   CKD stage 3:  Felt secondary to cardiorenal syndrome/mild tachycardia and relative hypotension/arteriolar nephrosclerosis  Creatinine appears to be stable at a typical range of 1 1-1 41  It is currently 1 26 mg/dL without significant proteinuria or microscopic hematuria to rule out any significant primary glomerular process; very unlikely diabetic nephropathy given lack of proteinuria, and no obstructive uropathy  Workup:  -ultrasound 03/26/2018: Unremarkable with a negative PVR  -UA:  04/09/2018: No proteinuria, 0-1 RBCs, 0-1 WBCs  -UPC:  0 19 g  -multiple myeloma evaluation is negative:  Negative UPEP/SPEP/light chain ratio  Treatment:  -maximize hemodynamics avoid hypotension/hypoperfusion, which is acceptable today  -treat dyslipidemia  -avoid nephrotoxic agents such as NSAIDs  2   Volume:  Euvolemic  3  Hypotension:  Actually quite good today  I will have him send in 1 week a readings  Compatible with cardiomyopathy  Workup:  -ejection fraction 48% associated with severe hypokinesis of apical anterior, mild anteroseptal and mild inferoseptal, apical inferior and apical septal and apical walls  Diastolic dysfunction grade 1  Mild mitral regurgitation and tricuspid regurgitation  NO PERICARDIAL EFFUSION AND NO EVIDENCE OF AMYLOIDOSIS   -a m  Cortisol normal 20 9  -TSH normal at 1 51  4  Electrolytes: All acceptable at this time  5   Mineral bone disorder:  All acceptable including vitamin-D which is borderline at 30 5   -Recommend 2000 units over the counter daily  6   Dyslipidemia:  Was at goal with an LDL of 96  2/28/18  Recheck next visit  7   Anemia:  Normal at this time at 16 1  Mild chronic leukocytosis which will be followed  No evidence of infection  Repeat in 1   month  8   Gross hematuria/microscopic hematuria: This has resolved  Patient seen by Urology for gross hematuria  Cystoscopy was negative and recent PSA negative   No further investigation by follow-up in 1 year by Urology  9   Abnormal echocardiogram/cardiomyopathy:  Being evaluated currently by Cardiology  He probably has CAD may have had a silent MI in the past   He is being evaluated with an exercise stress Myoview study 05/30/2018 and will follow up with Cardiology subsequently  PATIENT INSTRUCTIONS:    Patient Instructions   1  Please take vitamin-D 2000 units over the counter on a daily basis  2   At your convenience send in 1 week a blood pressure readings morning and evening sitting and standing  3  Please go for nonfasting lab work in about 1 month  This is to recheck a white blood cell count  4   Follow-up in 4 months:  -please go for fasting lab work prior to your appointment  -please bring in 1 week a blood pressure readings at that appointment morning and evening, sitting and standing  -please bring in your blood pressure machine at that visit for correlation with the office machine  5  General instructions:  -please cut back on salt intake  -avoid medications such as Motrin, Naprosyn, ibuprofen, Aleve or Advil or Celebrex as they can affect your kidney function; you can use Tylenol as needed for pain or fevers if you have no liver problems  -avoid medications such as Sudafed or other medications with decongestants as they can raise your blood pressure  -continue year excellent exercise regimen  -try to lose 5-10 lb by your next visit  -please work with her primary medical physician to stop smoking  This is imperative with potential heart disease  Subjective: The patient overall is feeling well  No fevers chills cough or colds    Good appetite and good energy  No urinary symptoms including foamy urine or blood in the urine  No gastrointestinal symptoms  No cardiovascular symptoms including swelling of the legs  No headaches, dizziness or lightheadedness, except when he worked in the garden early in the week for most of the day and felt slightly lightheaded when he stood up  On aspirin for CAD      ROS:  See HPI, otherwise review of systems as completely reviewed with the patient are negative    Past Medical History:   Diagnosis Date    Hematuria      Past Surgical History:   Procedure Laterality Date    HERNIA REPAIR       Family History   Problem Relation Age of Onset    Heart disease Mother     Hypertension Father     Heart disease Father     Diabetes Brother     Dementia Neg Hx     Drug abuse Neg Hx     Mental illness Neg Hx     Substance Abuse Neg Hx       reports that he has been smoking Cigarettes  He has a 10 00 pack-year smoking history  He has never used smokeless tobacco  He reports that he drinks alcohol  He reports that he does not use drugs  I COMPLETELY REVIEWED THE PAST MEDICAL HISTORY/PAST SURGICAL HISTORY/SOCIAL HISTORY/FAMILY HISTORY/AND MEDICATIONS  AND UPDATED ALL    Objective:     Vitals:   Vitals:    18 1000   BP: 110/82    BP on left sittin/84 with a heart rate of 92 and regular  BP standing on left:  126/92 with a heart rate of 96 and regular    Weight (last 2 days)     Date/Time   Weight    18 0957  90 3 (199)            Body mass index is 29 39 kg/m²      Physical Exam: General:  Obese, No acute distress  Skin:  No acute rash  Eyes:  No scleral icterus, noninjected  ENT:  Moist mucous membranes  Neck:  Supple, no jugular venous distention  Back   No CVAT  Chest:  Clear to auscultation and percussion  CVS:  Regular rate and rhythm without a rub, murmurs or gallops  Abdomen:  Obese, Soft and nontender with normal bowel sounds  Extremities:  No cyanosis and no edema, significant varicose veins in left lower extremity but nontender  Neuro:  Grossly intact  Psych:  Alert, oriented x3 and appropriate      Medications:    Current Outpatient Prescriptions:     aspirin (ECOTRIN LOW STRENGTH) 81 mg EC tablet, Take 81 mg by mouth daily, Disp: , Rfl:     Lab, Imaging and other studies: I have personally reviewed pertinent labs  Laboratory Results:  Results for orders placed or performed in visit on 05/11/18   POCT ECG   Result Value Ref Range    Interpretation                 Radiology review:   chest X-ray    Ultrasound      Portions of the record may have been created with voice recognition software   Occasional wrong word or "sound a like" substitutions may have occurred due to the inherent limitations of voice recognition software   Read the chart carefully and recognize, using context, where substitutions have occurred

## 2018-05-17 ENCOUNTER — OFFICE VISIT (OUTPATIENT)
Dept: NEPHROLOGY | Facility: CLINIC | Age: 68
End: 2018-05-17
Payer: MEDICARE

## 2018-05-17 VITALS
BODY MASS INDEX: 29.47 KG/M2 | SYSTOLIC BLOOD PRESSURE: 110 MMHG | HEIGHT: 69 IN | DIASTOLIC BLOOD PRESSURE: 82 MMHG | WEIGHT: 199 LBS

## 2018-05-17 DIAGNOSIS — E78.5 DYSLIPIDEMIA: ICD-10-CM

## 2018-05-17 DIAGNOSIS — N18.30 CHRONIC KIDNEY DISEASE, STAGE III (MODERATE) (HCC): Primary | ICD-10-CM

## 2018-05-17 DIAGNOSIS — I95.89 OTHER SPECIFIED HYPOTENSION: ICD-10-CM

## 2018-05-17 DIAGNOSIS — E55.9 VITAMIN D DEFICIENCY: ICD-10-CM

## 2018-05-17 PROCEDURE — 99214 OFFICE O/P EST MOD 30 MIN: CPT | Performed by: INTERNAL MEDICINE

## 2018-05-17 RX ORDER — ASPIRIN 81 MG/1
81 TABLET ORAL DAILY
COMMUNITY
End: 2019-04-22

## 2018-05-17 NOTE — LETTER
May 17, 2018     Abby Pugh DO  [address]  [place] [address]    Patient: Lawrence Hunter   YOB: 1950   Date of Visit: 5/17/2018       Dear Dr Aniyah Tsang:    Thank you for referring Lawrence Hunter to me for evaluation  Below are my notes for this consultation  If you have questions, please do not hesitate to call me  I look forward to following your patient along with you  Sincerely,        Cuauhtemoc Mccollum MD        CC: MD Igor Coombs MD Awanda Hack, MD  5/17/2018 10:21 AM  Sign at close encounter  RENAL FOLLOW UP NOTE: td    ASSESSMENT AND PLAN:  1   CKD stage 3:  Felt secondary to cardiorenal syndrome/mild tachycardia and relative hypotension/arteriolar nephrosclerosis  Creatinine appears to be stable at a typical range of 1 1-1 41  It is currently 1 26 mg/dL without significant proteinuria or microscopic hematuria to rule out any significant primary glomerular process; very unlikely diabetic nephropathy given lack of proteinuria, and no obstructive uropathy  Workup:  -ultrasound 03/26/2018: Unremarkable with a negative PVR  -UA:  04/09/2018: No proteinuria, 0-1 RBCs, 0-1 WBCs  -UPC:  0 19 g  -multiple myeloma evaluation is negative:  Negative UPEP/SPEP/light chain ratio  Treatment:  -maximize hemodynamics avoid hypotension/hypoperfusion, which is acceptable today  -treat dyslipidemia  -avoid nephrotoxic agents such as NSAIDs  2   Volume:  Euvolemic  3  Hypotension:  Actually quite good today  I will have him send in 1 week a readings  Compatible with cardiomyopathy  Workup:  -ejection fraction 48% associated with severe hypokinesis of apical anterior, mild anteroseptal and mild inferoseptal, apical inferior and apical septal and apical walls  Diastolic dysfunction grade 1  Mild mitral regurgitation and tricuspid regurgitation  NO PERICARDIAL EFFUSION AND NO EVIDENCE OF AMYLOIDOSIS   -a m  Cortisol normal 20 9  -TSH normal at 1 51  4  Electrolytes: All acceptable at this time  5   Mineral bone disorder:  All acceptable including vitamin-D which is borderline at 30 5   -Recommend 2000 units over the counter daily  6   Dyslipidemia:  Was at goal with an LDL of 96  2/28/18  Recheck next visit  7   Anemia:  Normal at this time at 16 1  Mild chronic leukocytosis which will be followed  No evidence of infection  Repeat in 1   month  8   Gross hematuria/microscopic hematuria: This has resolved  Patient seen by Urology for gross hematuria  Cystoscopy was negative and recent PSA negative  No further investigation by follow-up in 1 year by Urology  9   Abnormal echocardiogram/cardiomyopathy:  Being evaluated currently by Cardiology  He probably has CAD may have had a silent MI in the past   He is being evaluated with an exercise stress Myoview study 05/30/2018 and will follow up with Cardiology subsequently  PATIENT INSTRUCTIONS:    Patient Instructions   1  Please take vitamin-D 2000 units over the counter on a daily basis  2   At your convenience send in 1 week a blood pressure readings morning and evening sitting and standing  3  Please go for nonfasting lab work in about 1 month  This is to recheck a white blood cell count  4   Follow-up in 4 months:  -please go for fasting lab work prior to your appointment  -please bring in 1 week a blood pressure readings at that appointment morning and evening, sitting and standing  -please bring in your blood pressure machine at that visit for correlation with the office machine  5  General instructions:  -please cut back on salt intake  -avoid medications such as Motrin, Naprosyn, ibuprofen, Aleve or Advil or Celebrex as they can affect your kidney function; you can use Tylenol as needed for pain or fevers if you have no liver problems  -avoid medications such as Sudafed or other medications with decongestants as they can raise your blood pressure  -continue year excellent exercise regimen  -try to lose 5-10 lb by your next visit  -please work with her primary medical physician to stop smoking  This is imperative with potential heart disease  Subjective: The patient overall is feeling well  No fevers chills cough or colds  Good appetite and good energy  No urinary symptoms including foamy urine or blood in the urine  No gastrointestinal symptoms  No cardiovascular symptoms including swelling of the legs  No headaches, dizziness or lightheadedness, except when he worked in the garden early in the week for most of the day and felt slightly lightheaded when he stood up  On aspirin for CAD      ROS:  See HPI, otherwise review of systems as completely reviewed with the patient are negative    Past Medical History:   Diagnosis Date    Hematuria      Past Surgical History:   Procedure Laterality Date    HERNIA REPAIR       Family History   Problem Relation Age of Onset    Heart disease Mother     Hypertension Father     Heart disease Father     Diabetes Brother     Dementia Neg Hx     Drug abuse Neg Hx     Mental illness Neg Hx     Substance Abuse Neg Hx       reports that he has been smoking Cigarettes  He has a 10 00 pack-year smoking history  He has never used smokeless tobacco  He reports that he drinks alcohol  He reports that he does not use drugs  I COMPLETELY REVIEWED THE PAST MEDICAL HISTORY/PAST SURGICAL HISTORY/SOCIAL HISTORY/FAMILY HISTORY/AND MEDICATIONS  AND UPDATED ALL    Objective:     Vitals:   Vitals:    18 1000   BP: 110/82    BP on left sittin/84 with a heart rate of 92 and regular  BP standing on left:  126/92 with a heart rate of 96 and regular    Weight (last 2 days)     Date/Time   Weight    18 0957  90 3 (199)            Body mass index is 29 39 kg/m²      Physical Exam: General:  Obese, No acute distress  Skin:  No acute rash  Eyes:  No scleral icterus, noninjected  ENT:  Moist mucous membranes  Neck:  Supple, no jugular venous distention  Back   No CVAT  Chest:  Clear to auscultation and percussion  CVS:  Regular rate and rhythm without a rub, murmurs or gallops  Abdomen:  Obese, Soft and nontender with normal bowel sounds  Extremities:  No cyanosis and no edema, significant varicose veins in left lower extremity but nontender  Neuro:  Grossly intact  Psych:  Alert, oriented x3 and appropriate      Medications:    Current Outpatient Prescriptions:     aspirin (ECOTRIN LOW STRENGTH) 81 mg EC tablet, Take 81 mg by mouth daily, Disp: , Rfl:     Lab, Imaging and other studies: I have personally reviewed pertinent labs  Laboratory Results:  Results for orders placed or performed in visit on 05/11/18   POCT ECG   Result Value Ref Range    Interpretation                 Radiology review:   chest X-ray    Ultrasound      Portions of the record may have been created with voice recognition software   Occasional wrong word or "sound a like" substitutions may have occurred due to the inherent limitations of voice recognition software   Read the chart carefully and recognize, using context, where substitutions have occurred

## 2018-05-17 NOTE — PATIENT INSTRUCTIONS
1   Please take vitamin-D 2000 units over the counter on a daily basis  2   At your convenience send in 1 week a blood pressure readings morning and evening sitting and standing  3  Please go for nonfasting lab work in about 1 month  This is to recheck a white blood cell count  4   Follow-up in 4 months:  -please go for fasting lab work prior to your appointment  -please bring in 1 week a blood pressure readings at that appointment morning and evening, sitting and standing  -please bring in your blood pressure machine at that visit for correlation with the office machine  5  General instructions:  -please cut back on salt intake  -avoid medications such as Motrin, Naprosyn, ibuprofen, Aleve or Advil or Celebrex as they can affect your kidney function; you can use Tylenol as needed for pain or fevers if you have no liver problems  -avoid medications such as Sudafed or other medications with decongestants as they can raise your blood pressure  -continue year excellent exercise regimen  -try to lose 5-10 lb by your next visit  -please work with her primary medical physician to stop smoking  This is imperative with potential heart disease

## 2018-05-30 ENCOUNTER — HOSPITAL ENCOUNTER (OUTPATIENT)
Dept: NON INVASIVE DIAGNOSTICS | Facility: CLINIC | Age: 68
Discharge: HOME | End: 2018-05-30
Payer: MEDICARE

## 2018-05-30 DIAGNOSIS — I25.5 ISCHEMIC CARDIOMYOPATHY: ICD-10-CM

## 2018-05-30 DIAGNOSIS — R93.1 ABNORMAL ECHOCARDIOGRAM: ICD-10-CM

## 2018-05-30 PROCEDURE — A9502 TC99M TETROFOSMIN: HCPCS

## 2018-05-30 PROCEDURE — 93017 CV STRESS TEST TRACING ONLY: CPT

## 2018-05-30 PROCEDURE — 78452 HT MUSCLE IMAGE SPECT MULT: CPT

## 2018-05-31 LAB
CHEST PAIN STATEMENT: NORMAL
MAX DIASTOLIC BP: 90 MMHG
MAX HEART RATE: 151 BPM
MAX PREDICTED HEART RATE: 154 BPM
MAX. SYSTOLIC BP: 170 MMHG
PROTOCOL NAME: NORMAL
REASON FOR TERMINATION: NORMAL
TARGET HR FORMULA: NORMAL
TEST INDICATION: NORMAL
TIME IN EXERCISE PHASE: NORMAL

## 2018-06-13 NOTE — TELEPHONE ENCOUNTER
Okay    Please remind LÄNGHEM it is time to repeat the CBC(blood count test) ONLY    Dr Kacie Joseph ordered this & we can send him a copy of this order or he can  order from office    He should Not complete the other BW tests ordered by Dr Kacie Joseph until before his next appointment with Dr Carie Chris     Let me know if ?'s

## 2018-06-22 ENCOUNTER — APPOINTMENT (OUTPATIENT)
Dept: LAB | Facility: CLINIC | Age: 68
End: 2018-06-22
Payer: MEDICARE

## 2018-06-22 DIAGNOSIS — N18.30 CHRONIC KIDNEY DISEASE, STAGE III (MODERATE) (HCC): ICD-10-CM

## 2018-06-22 DIAGNOSIS — I95.89 OTHER SPECIFIED HYPOTENSION: ICD-10-CM

## 2018-06-22 DIAGNOSIS — E55.9 VITAMIN D DEFICIENCY: ICD-10-CM

## 2018-06-22 DIAGNOSIS — E78.5 DYSLIPIDEMIA: ICD-10-CM

## 2018-06-22 LAB
ERYTHROCYTE [DISTWIDTH] IN BLOOD BY AUTOMATED COUNT: 15.4 % (ref 11.6–15.1)
HCT VFR BLD AUTO: 49.7 % (ref 36.5–49.3)
HGB BLD-MCNC: 15.9 G/DL (ref 12–17)
MCH RBC QN AUTO: 29 PG (ref 26.8–34.3)
MCHC RBC AUTO-ENTMCNC: 32 G/DL (ref 31.4–37.4)
MCV RBC AUTO: 91 FL (ref 82–98)
PLATELET # BLD AUTO: 361 THOUSANDS/UL (ref 149–390)
PMV BLD AUTO: 9.6 FL (ref 8.9–12.7)
RBC # BLD AUTO: 5.49 MILLION/UL (ref 3.88–5.62)
WBC # BLD AUTO: 11.06 THOUSAND/UL (ref 4.31–10.16)

## 2018-06-22 PROCEDURE — 36415 COLL VENOUS BLD VENIPUNCTURE: CPT

## 2018-06-22 PROCEDURE — 85027 COMPLETE CBC AUTOMATED: CPT

## 2018-07-30 ENCOUNTER — OFFICE VISIT (OUTPATIENT)
Dept: INTERNAL MEDICINE CLINIC | Facility: CLINIC | Age: 68
End: 2018-07-30
Payer: MEDICARE

## 2018-07-30 VITALS
SYSTOLIC BLOOD PRESSURE: 110 MMHG | DIASTOLIC BLOOD PRESSURE: 86 MMHG | RESPIRATION RATE: 18 BRPM | OXYGEN SATURATION: 96 % | TEMPERATURE: 97 F | BODY MASS INDEX: 28.26 KG/M2 | HEART RATE: 96 BPM | HEIGHT: 70 IN | WEIGHT: 197.4 LBS

## 2018-07-30 DIAGNOSIS — D72.829 LEUKOCYTOSIS, UNSPECIFIED TYPE: ICD-10-CM

## 2018-07-30 DIAGNOSIS — Z11.59 NEED FOR HEPATITIS C SCREENING TEST: ICD-10-CM

## 2018-07-30 DIAGNOSIS — Z72.0 TOBACCO USE: ICD-10-CM

## 2018-07-30 DIAGNOSIS — E66.3 OVERWEIGHT (BMI 25.0-29.9): ICD-10-CM

## 2018-07-30 DIAGNOSIS — I70.0 ABDOMINAL AORTIC ATHEROSCLEROSIS (HCC): Primary | ICD-10-CM

## 2018-07-30 DIAGNOSIS — N18.30 CHRONIC KIDNEY DISEASE, STAGE III (MODERATE) (HCC): ICD-10-CM

## 2018-07-30 DIAGNOSIS — Z23 NEED FOR SHINGLES VACCINE: ICD-10-CM

## 2018-07-30 PROBLEM — E78.00 HYPERCHOLESTEROLEMIA: Status: RESOLVED | Noted: 2018-05-11 | Resolved: 2018-07-30

## 2018-07-30 PROBLEM — I95.9 ARTERIAL HYPOTENSION: Status: RESOLVED | Noted: 2018-03-22 | Resolved: 2018-07-30

## 2018-07-30 PROBLEM — E78.5 DYSLIPIDEMIA: Status: RESOLVED | Noted: 2018-05-16 | Resolved: 2018-07-30

## 2018-07-30 PROBLEM — R63.5 WEIGHT GAIN: Status: RESOLVED | Noted: 2018-03-22 | Resolved: 2018-07-30

## 2018-07-30 PROBLEM — R73.01 IMPAIRED FASTING BLOOD SUGAR: Status: ACTIVE | Noted: 2018-03-22

## 2018-07-30 PROCEDURE — 99214 OFFICE O/P EST MOD 30 MIN: CPT | Performed by: INTERNAL MEDICINE

## 2018-07-30 RX ORDER — MULTIVIT-MIN/IRON/FOLIC ACID/K 18-600-40
2000 CAPSULE ORAL DAILY
COMMUNITY
End: 2021-10-25

## 2018-07-30 NOTE — PROGRESS NOTES
Assessment/Plan:       Diagnoses and all orders for this visit:    Abdominal aortic atherosclerosis ([address] )  Comments:  discussed recommendation for statin based on this and elevated FRS, he declined and wishes to speak with cardiologist first    Tobacco use  Comments:  trying to quit on his own    Overweight (BMI 25 0-29  9)    Chronic kidney disease, stage III (moderate)  Comments:  c/w asa/vit D per renal   avoid nsaids and t/c statin(as above)    Need for hepatitis C screening test  Comments:  never had before, ordered  Orders:  -     Hepatitis C antibody; Future    Need for shingles vaccine  Comments:  d/w pt risk/benefit of shingrix vaccine and he wishes to receive  Orders:  -     Zoster Vac Recomb Adjuvanted 50 MCG SUSR; Inject 1 Dose into a muscle once for 1 dose    Leukocytosis, unspecified type  Comments:  trending down, re-check CBC In 3 mos(9/2018)  Orders:  -     Cancel: CBC and differential; Future    Other orders  -     Cholecalciferol (VITAMIN D) 2000 units CAPS; Take 2,000 Units by mouth daily          Subjective:      Patient ID: Mirna Gastelum is a 78 y o  male  HPI    Here for follow up  Reviewed meds with patient, here with wife today  Pt and wife request shingrix vaccine, has had chicken pox in past   Still smoking but trying to quit on his own  Never had Hep C test in past   AAA results are back and no aneurysm noted but atherosclerosis of aorta noted  We discussed need for statin based on elevated FRS and atherosclerosis of aorta and he declined, wished to discuss with his cardiologist   Takes asa 81mg per day  Not taking nsaids, took before for right knee pain/arthralgia  Exercising regularly - weights  We discussed low weight bearing exercises  No other complaints      Past Medical History:   Diagnosis Date    Hematuria     History of rheumatic fever      Vitals:    07/30/18 0852 07/30/18 0921   BP: 110/86    Pulse: (!) 108 96   Resp: 18    Temp: (!) 97 °F (36 1 °C)    TempSrc: Oral    SpO2: 96%    Weight: 89 5 kg (197 lb 6 4 oz)    Height: 5' 10" (1 778 m)      Body mass index is 28 32 kg/m²  Current Outpatient Prescriptions:     aspirin (ECOTRIN LOW STRENGTH) 81 mg EC tablet, Take 81 mg by mouth daily, Disp: , Rfl:     Cholecalciferol (VITAMIN D) 2000 units CAPS, Take 2,000 Units by mouth daily, Disp: , Rfl:     Zoster Vac Recomb Adjuvanted 50 MCG SUSR, Inject 1 Dose into a muscle once for 1 dose, Disp: 1 each, Rfl: 1  No Known Allergies      Review of Systems   Constitutional: Negative for fever  HENT: Negative for congestion  Eyes: Negative for visual disturbance  Respiratory: Negative for shortness of breath  Cardiovascular: Negative for chest pain  Gastrointestinal: Negative for abdominal pain  Genitourinary: Negative for difficulty urinating  Musculoskeletal: Positive for arthralgias (right knee intermittent)  Neurological: Negative for headaches  Psychiatric/Behavioral: Negative for dysphoric mood  Objective:      /86   Pulse 96   Temp (!) 97 °F (36 1 °C) (Oral)   Resp 18   Ht 5' 10" (1 778 m)   Wt 89 5 kg (197 lb 6 4 oz)   SpO2 96%   BMI 28 32 kg/m²          Physical Exam   Constitutional: He appears well-developed and well-nourished  HENT:   Head: Normocephalic and atraumatic  Mouth/Throat: Oropharynx is clear and moist    Eyes: Conjunctivae are normal  Pupils are equal, round, and reactive to light  Cardiovascular: Normal rate, regular rhythm and normal heart sounds  No murmur heard  Pulmonary/Chest: Effort normal and breath sounds normal  He has no wheezes  He has no rales  Abdominal: Soft  Bowel sounds are normal  There is no tenderness  Musculoskeletal: He exhibits no edema  Neurological: He is alert  Psychiatric: He has a normal mood and affect  His behavior is normal    Vitals reviewed

## 2018-07-30 NOTE — PATIENT INSTRUCTIONS
1  Blood work in September  2  Shingles vaccine  3  Keep up with quitting smoking  4  Recommend cholesterol lowering medication such as atorvastatin due to atherosclerosis in aorta and smoking tobacco  5  Drink plenty of fluids/water  6   Return in February 2019 or sooner if questions

## 2018-08-20 ENCOUNTER — OFFICE VISIT (OUTPATIENT)
Dept: CARDIOLOGY CLINIC | Facility: CLINIC | Age: 68
End: 2018-08-20
Payer: MEDICARE

## 2018-08-20 VITALS
BODY MASS INDEX: 28.2 KG/M2 | HEIGHT: 70 IN | DIASTOLIC BLOOD PRESSURE: 84 MMHG | HEART RATE: 100 BPM | SYSTOLIC BLOOD PRESSURE: 112 MMHG | WEIGHT: 197 LBS

## 2018-08-20 DIAGNOSIS — I45.2 RBBB (RIGHT BUNDLE BRANCH BLOCK WITH LEFT ANTERIOR FASCICULAR BLOCK): Primary | ICD-10-CM

## 2018-08-20 DIAGNOSIS — R93.1 ABNORMAL ECHOCARDIOGRAM: ICD-10-CM

## 2018-08-20 DIAGNOSIS — N18.30 CHRONIC KIDNEY DISEASE, STAGE III (MODERATE) (HCC): ICD-10-CM

## 2018-08-20 DIAGNOSIS — Z72.0 TOBACCO USE: ICD-10-CM

## 2018-08-20 PROCEDURE — 99213 OFFICE O/P EST LOW 20 MIN: CPT | Performed by: INTERNAL MEDICINE

## 2018-08-20 NOTE — PROGRESS NOTES
Cardiology Follow Up    Hari Campos  1950  [id number]  [address] [facility] [id number] [facility] [phone]    1  RBBB (right bundle branch block with left anterior fascicular block)     2  Abnormal echocardiogram     3  Chronic kidney disease, stage III (moderate)     4  Tobacco use           Discussion/Summary: I stressed to him that he needs to stop smoking  His BP is controlled  He does not have DM  Recent nuclear stress test was unremarkable  I have reviewed his medications and made no changes  No further cardiac testing is needed at this time  RTO as needed  Interval History: He has not had any cardiac problems since his last OV  He still goes to the gym 3x a week  He denies CP, SOB, palpitations  He did have an ECHO which showed a slightly decreased EF with wall motion abnormalities  A subsequent nuclear stress test showed normal perfusion with a normal EF  His BP is controlled  He continues to smoke  Patient Active Problem List   Diagnosis    Overweight (BMI 25 0-29  9)    Chronic kidney disease, stage III (moderate)    Gross hematuria    Persistent proteinuria    Impaired fasting glucose    Microscopic hematuria    Rogers cardiac risk 10-20% in next 10 years    Abdominal aortic atherosclerosis (HCC)    RBBB (right bundle branch block with left anterior fascicular block)    Tobacco use    Vitamin D deficiency    Abnormal echocardiogram     Past Medical History:   Diagnosis Date    Hematuria     History of rheumatic fever      Social History     Social History    Marital status: /Civil Union     Spouse name: N/A    Number of children: N/A    Years of education: N/A     Occupational History    retired      Social History Main Topics    Smoking status: Current Every Day Smoker     Packs/day: 0 50     Years: 30 00     Types: Cigarettes    Smokeless tobacco: Never Used    Alcohol use Yes    Drug use: No    Sexual activity: Not on file     Other Topics Concern    Not on file     Social History Narrative    Drinks coffee      Family History   Problem Relation Age of Onset    Heart disease Mother     Hyperlipidemia Mother     Hypertension Father     Heart disease Father     Stroke Father     Hyperlipidemia Father     Diabetes Brother     Dementia Neg Hx     Drug abuse Neg Hx     Mental illness Neg Hx     Substance Abuse Neg Hx      Past Surgical History:   Procedure Laterality Date    HERNIA REPAIR         Current Outpatient Prescriptions:     aspirin (ECOTRIN LOW STRENGTH) 81 mg EC tablet, Take 81 mg by mouth daily, Disp: , Rfl:     Cholecalciferol (VITAMIN D) 2000 units CAPS, Take 2,000 Units by mouth daily, Disp: , Rfl:   No Known Allergies  Vitals:    08/20/18 0851   BP: 112/84   BP Location: Right arm   Patient Position: Sitting   Cuff Size: Standard   Pulse: 100   Weight: 89 4 kg (197 lb)   Height: 5' 10" (1 778 m)     Weight (last 2 days)     Date/Time   Weight    08/20/18 0851  89 4 (197)             Blood pressure 112/84, pulse 100, height 5' 10" (1 778 m), weight 89 4 kg (197 lb)  , Body mass index is 28 27 kg/m²  Labs:  Appointment on 06/22/2018   Component Date Value    WBC 06/22/2018 11 06*    RBC 06/22/2018 5 49     Hemoglobin 06/22/2018 15 9     Hematocrit 06/22/2018 49 7*    MCV 06/22/2018 91     [address] 06/22/2018 29 0     MCHC 06/22/2018 32 0     RDW 06/22/2018 15 4*    Platelets 18/02/9342 361     MPV 06/22/2018 9 6    Hospital Outpatient Visit on 05/30/2018   Component Date Value    Protocol Name 05/30/2018 ZIA     Time In Exercise Phase 05/30/2018 00:06:01     MAX   SYSTOLIC BP 98/56/2568 670     Max Diastolic Bp 71/83/3224 90     Max Heart Rate 05/30/2018 151     Max Predicted Heart Rate 05/30/2018 154     Reason for Termination 05/30/2018 Dyspnea     Test Indication 05/30/2018 CAD screening     Target Hr Formular 05/30/2018 (220 - Age)*100%     Chest Pain Statement 05/30/2018 none    Office Visit on 05/11/2018   Component Date Value    Interpretation 05/11/2018     Procedure visit on 05/11/2018   Component Date Value    LEUKOCYTE ESTERASE,UA 05/11/2018 -      NITRITE,UA 05/11/2018 -     SL AMB POCT UROBILINOGEN 05/11/2018 -     SL AMB POCT URINE PROTEIN 05/11/2018 -      PH,UA 05/11/2018 5 0      BLOOD,UA 05/11/2018 -      SPECIFIC GRAVITY,UA 05/11/2018 1 010      KETONES,UA 05/11/2018 -      BILIRUBIN,UA 05/11/2018 -     GLUCOSE, UA 05/11/2018 -      COLOR,UA 05/11/2018 yellow      CLARITY,UA 05/11/2018 clear    Appointment on 05/07/2018   Component Date Value    WBC 05/07/2018 11 50*    RBC 05/07/2018 5 61     Hemoglobin 05/07/2018 16 1     Hematocrit 05/07/2018 49 8*    MCV 05/07/2018 89     MCH 05/07/2018 28 7     MCHC 05/07/2018 32 3     RDW 05/07/2018 15 4*    MPV 05/07/2018 9 6     Platelets 32/08/3471 362     Neutrophils Relative 05/07/2018 62     Lymphocytes Relative 05/07/2018 27     Monocytes Relative 05/07/2018 9     Eosinophils Relative 05/07/2018 2     Basophils Relative 05/07/2018 0     Neutrophils Absolute 05/07/2018 7 17     Lymphocytes Absolute 05/07/2018 3 07     Monocytes Absolute 05/07/2018 1 00     Eosinophils Absolute 05/07/2018 0 23     Basophils Absolute 05/07/2018 0 03    Appointment on 05/03/2018   Component Date Value    Sodium 05/03/2018 141     Potassium 05/03/2018 4 0     Chloride 05/03/2018 106     CO2 05/03/2018 26     Anion Gap 05/03/2018 9     BUN 05/03/2018 25     Creatinine 05/03/2018 1 26     Glucose, Fasting 05/03/2018 113*    Calcium 05/03/2018 8 8     AST 05/03/2018 20     ALT 05/03/2018 29     Alkaline Phosphatase 05/03/2018 105     Total Protein 05/03/2018 7 5     Albumin 05/03/2018 3 3*    Total Bilirubin 05/03/2018 0 40     eGFR 05/03/2018 59     WBC 05/03/2018 12 20*    RBC 05/03/2018 5 51     Hemoglobin 05/03/2018 15 7     Hematocrit 05/03/2018 48 5     MCV 05/03/2018 88     MCH 05/03/2018 28 5     MCHC 05/03/2018 32 4     RDW 05/03/2018 15 3*    Platelets 18/61/0466 366     MPV 05/03/2018 9 5     Magnesium 05/03/2018 2 2     Phosphorus 05/03/2018 4 1     Creatinine, Ur 05/03/2018 283 0     Protein Urine Random 05/03/2018 53     Prot/Creat Ratio, Ur 05/03/2018 0 19*   Appointment on 04/09/2018   Component Date Value    Sodium 04/09/2018 138     Potassium 04/09/2018 4 3     Chloride 04/09/2018 103     CO2 04/09/2018 29     Anion Gap 04/09/2018 6     BUN 04/09/2018 16     Creatinine 04/09/2018 1 13     Glucose, Fasting 04/09/2018 114*    Calcium 04/09/2018 9 1     AST 04/09/2018 15     ALT 04/09/2018 30     Alkaline Phosphatase 04/09/2018 103     Total Protein 04/09/2018 7 5     Albumin 04/09/2018 3 4*    Total Bilirubin 04/09/2018 0 50     eGFR 04/09/2018 67     WBC 04/09/2018 9 37     RBC 04/09/2018 5 79*    Hemoglobin 04/09/2018 16 6     Hematocrit 04/09/2018 50 8*    MCV 04/09/2018 88     MCH 04/09/2018 28 7     MCHC 04/09/2018 32 7     RDW 04/09/2018 15 0     Platelets 96/84/1229 295     MPV 04/09/2018 9 7     Magnesium 04/09/2018 1 9     Phosphorus 04/09/2018 3 4     PTH 04/09/2018 38 3     Vit D, 25-Hydroxy 04/09/2018 30 5     Cortisol - AM 04/09/2018 20 9     Ig [place] Free Light Chain 04/09/2018 25 5*    Ig Lambda Free Light Tyra* 04/09/2018 25 6     Kappa/Lambda FluidC Ratio 04/09/2018 1 00     A/G Ratio 04/09/2018 1 20     Albumin Electrophoresis 04/09/2018 54 5     Albumin CONC 04/09/2018 4 03     Alpha 1 04/09/2018 5 6*    ALPHA 1 CONC 04/09/2018 0 41*    Alpha 2 04/09/2018 11 0     ALPHA 2 CONC 04/09/2018 0 81     Beta-1 04/09/2018 5 9     BETA 1 CONC 04/09/2018 0 44     Beta-2 04/09/2018 5 6     BETA 2 CONC 04/09/2018 0 41     Gamma Globulin 04/09/2018 17 4     GAMMA CONC 04/09/2018 1 29     SPEP Interpretation 04/09/2018 The serum total protein and albumin are within normal limits  The serum protein electrophoresis shows an increased alpha-1 region  No monoclonal bands noted  Reviewed by: Kuldeep Cedeno MD ([id number]) **Electronic Signature**     Total Protein 04/09/2018 7 4     TSH 3RD GENERATON 04/09/2018 1 517     Hemoglobin A1C 04/09/2018 6 2     EAG 04/09/2018 131    Office Visit on 03/29/2018   Component Date Value    LEUKOCYTE ESTERASE,UA 03/29/2018 -      NITRITE,UA 03/29/2018 -     SL AMB POCT UROBILINOGEN 03/29/2018 -     SL AMB POCT URINE PROTEIN 03/29/2018 -      PH,UA 03/29/2018 5      BLOOD,UA 03/29/2018 -      SPECIFIC GRAVITY,UA 03/29/2018 1 005      KETONES,UA 03/29/2018 -      BILIRUBIN,UA 03/29/2018 -     GLUCOSE, UA 03/29/2018 -      COLOR,UA 03/29/2018 yellow      CLARITY,UA 03/29/2018 clear    Office Visit on 03/22/2018   Component Date Value    Creatinine, Ur 04/09/2018 139 0     Protein Urine Random 04/09/2018 21     Prot/Creat Ratio, Ur 04/09/2018 0 15*    Clarity, UA 04/09/2018 Clear     Color, UA 04/09/2018 Yellow     Specific Gravity, UA 04/09/2018 1 025     pH, UA 04/09/2018 5 5     Glucose, UA 04/09/2018 Negative     Ketones, UA 04/09/2018 Negative     Blood, UA 04/09/2018 Small*    Protein, UA 04/09/2018 Negative     Nitrite, UA 04/09/2018 Negative     Bilirubin, UA 04/09/2018 Negative     Urobilinogen, UA 04/09/2018 0 2     Leukocytes, UA 04/09/2018 Negative     WBC, UA 04/09/2018 0-1*    RBC, UA 04/09/2018 0-1*    Bacteria, UA 04/09/2018 Occasional     Epithelial Cells 04/09/2018 Occasional     Urine Protein 04/09/2018 26 0*    Albumin ELP, Urine 04/09/2018 100 0     Alpha 1, Urine 04/09/2018 0 0     Alpha 2, Urine 04/09/2018 0 0     Beta, Urine 04/09/2018 0 0     Gamma Globulin, Urine 04/09/2018 0 0     UPEP Interp 04/09/2018 The urine total protein is increased  The urine protein electrophoresis shows selective proteinuria  No monoclonal bands noted  Reviewed by Kuldeep Cedeno, ([id number]) **Electronic Signature**    Appointment on 03/14/2018   Component Date Value    Sodium 03/14/2018 139     Potassium 03/14/2018 4 1     Chloride 03/14/2018 101     CO2 03/14/2018 29     Anion Gap 03/14/2018 9     BUN 03/14/2018 16     Creatinine 03/14/2018 1 41*    Glucose, Fasting 03/14/2018 135*    Calcium 03/14/2018 9 2     eGFR 03/14/2018 52    There may be more visits with results that are not included  Imaging: No results found  Review of Systems:  Review of Systems   Constitutional: Negative for diaphoresis, fatigue, fever and unexpected weight change  HENT: Negative  Respiratory: Negative for cough, shortness of breath and wheezing  Cardiovascular: Negative for chest pain, palpitations and leg swelling  Gastrointestinal: Negative for abdominal pain, diarrhea and nausea  Musculoskeletal: Negative for gait problem and myalgias  Skin: Negative for rash  Neurological: Negative for dizziness and numbness  Psychiatric/Behavioral: Negative  Physical Exam:  Physical Exam   Constitutional: He is oriented to person, place, and time  He appears well-developed and well-nourished  HENT:   Head: Normocephalic and atraumatic  Eyes: Pupils are equal, round, and reactive to light  Neck: Normal range of motion  Neck supple  No JVD present  Cardiovascular: Regular rhythm, S1 normal, S2 normal and normal pulses  Pulses:       Carotid pulses are 2+ on the right side, and 2+ on the left side  Pulmonary/Chest: Effort normal and breath sounds normal  He has no wheezes  He has no rales  Abdominal: Soft  Bowel sounds are normal  There is no tenderness  Musculoskeletal: Normal range of motion  He exhibits no edema or tenderness  Neurological: He is alert and oriented to person, place, and time  He has normal reflexes  No cranial nerve deficit  Skin: Skin is warm  Psychiatric: He has a normal mood and affect

## 2018-09-17 ENCOUNTER — TRANSCRIBE ORDERS (OUTPATIENT)
Dept: LAB | Facility: CLINIC | Age: 68
End: 2018-09-17

## 2018-09-17 ENCOUNTER — APPOINTMENT (OUTPATIENT)
Dept: LAB | Facility: CLINIC | Age: 68
End: 2018-09-17
Payer: MEDICARE

## 2018-09-17 DIAGNOSIS — E55.9 VITAMIN D DEFICIENCY: ICD-10-CM

## 2018-09-17 DIAGNOSIS — E78.5 DYSLIPIDEMIA: ICD-10-CM

## 2018-09-17 DIAGNOSIS — I95.89 OTHER SPECIFIED HYPOTENSION: ICD-10-CM

## 2018-09-17 DIAGNOSIS — N18.30 CHRONIC KIDNEY DISEASE, STAGE III (MODERATE) (HCC): ICD-10-CM

## 2018-09-17 LAB
25(OH)D3 SERPL-MCNC: 39.9 NG/ML (ref 30–100)
ALBUMIN SERPL BCP-MCNC: 3 G/DL (ref 3.5–5)
ALP SERPL-CCNC: 121 U/L (ref 46–116)
ALT SERPL W P-5'-P-CCNC: 31 U/L (ref 12–78)
ANION GAP SERPL CALCULATED.3IONS-SCNC: 7 MMOL/L (ref 4–13)
AST SERPL W P-5'-P-CCNC: 15 U/L (ref 5–45)
BILIRUB SERPL-MCNC: 0.3 MG/DL (ref 0.2–1)
BUN SERPL-MCNC: 16 MG/DL (ref 5–25)
CALCIUM SERPL-MCNC: 8.7 MG/DL (ref 8.3–10.1)
CHLORIDE SERPL-SCNC: 104 MMOL/L (ref 100–108)
CHOLEST SERPL-MCNC: 164 MG/DL (ref 50–200)
CO2 SERPL-SCNC: 27 MMOL/L (ref 21–32)
CREAT SERPL-MCNC: 1.14 MG/DL (ref 0.6–1.3)
CREAT UR-MCNC: 167 MG/DL
ERYTHROCYTE [DISTWIDTH] IN BLOOD BY AUTOMATED COUNT: 14.6 % (ref 11.6–15.1)
GFR SERPL CREATININE-BSD FRML MDRD: 66 ML/MIN/1.73SQ M
GLUCOSE P FAST SERPL-MCNC: 113 MG/DL (ref 65–99)
HCT VFR BLD AUTO: 48.7 % (ref 36.5–49.3)
HDLC SERPL-MCNC: 48 MG/DL (ref 40–60)
HGB BLD-MCNC: 15.6 G/DL (ref 12–17)
LDLC SERPL CALC-MCNC: 87 MG/DL (ref 0–100)
MAGNESIUM SERPL-MCNC: 2 MG/DL (ref 1.6–2.6)
MCH RBC QN AUTO: 29.3 PG (ref 26.8–34.3)
MCHC RBC AUTO-ENTMCNC: 32 G/DL (ref 31.4–37.4)
MCV RBC AUTO: 92 FL (ref 82–98)
PHOSPHATE SERPL-MCNC: 3.3 MG/DL (ref 2.3–4.1)
PLATELET # BLD AUTO: 421 THOUSANDS/UL (ref 149–390)
PMV BLD AUTO: 9.4 FL (ref 8.9–12.7)
POTASSIUM SERPL-SCNC: 4.1 MMOL/L (ref 3.5–5.3)
PROT SERPL-MCNC: 7.4 G/DL (ref 6.4–8.2)
PROT UR-MCNC: 22 MG/DL
PROT/CREAT UR: 0.13 MG/G{CREAT} (ref 0–0.1)
PTH-INTACT SERPL-MCNC: 38.4 PG/ML (ref 18.4–80.1)
RBC # BLD AUTO: 5.32 MILLION/UL (ref 3.88–5.62)
SODIUM SERPL-SCNC: 138 MMOL/L (ref 136–145)
TRIGL SERPL-MCNC: 147 MG/DL
WBC # BLD AUTO: 12.33 THOUSAND/UL (ref 4.31–10.16)

## 2018-09-17 PROCEDURE — 84100 ASSAY OF PHOSPHORUS: CPT

## 2018-09-17 PROCEDURE — 82306 VITAMIN D 25 HYDROXY: CPT

## 2018-09-17 PROCEDURE — 80053 COMPREHEN METABOLIC PANEL: CPT

## 2018-09-17 PROCEDURE — 84156 ASSAY OF PROTEIN URINE: CPT

## 2018-09-17 PROCEDURE — 82570 ASSAY OF URINE CREATININE: CPT

## 2018-09-17 PROCEDURE — 80061 LIPID PANEL: CPT

## 2018-09-17 PROCEDURE — 83970 ASSAY OF PARATHORMONE: CPT

## 2018-09-17 PROCEDURE — 85027 COMPLETE CBC AUTOMATED: CPT

## 2018-09-17 PROCEDURE — 36415 COLL VENOUS BLD VENIPUNCTURE: CPT

## 2018-09-17 PROCEDURE — 83735 ASSAY OF MAGNESIUM: CPT

## 2018-09-24 ENCOUNTER — OFFICE VISIT (OUTPATIENT)
Dept: NEPHROLOGY | Facility: CLINIC | Age: 68
End: 2018-09-24
Payer: MEDICARE

## 2018-09-24 VITALS
DIASTOLIC BLOOD PRESSURE: 70 MMHG | HEIGHT: 70 IN | BODY MASS INDEX: 28.26 KG/M2 | HEART RATE: 84 BPM | WEIGHT: 197.4 LBS | SYSTOLIC BLOOD PRESSURE: 118 MMHG

## 2018-09-24 DIAGNOSIS — R74.8 ABNORMAL LEVELS OF OTHER SERUM ENZYMES: ICD-10-CM

## 2018-09-24 DIAGNOSIS — E55.9 VITAMIN D DEFICIENCY: ICD-10-CM

## 2018-09-24 DIAGNOSIS — I95.9 HYPOTENSION, UNSPECIFIED HYPOTENSION TYPE: ICD-10-CM

## 2018-09-24 DIAGNOSIS — N18.30 CHRONIC KIDNEY DISEASE, STAGE III (MODERATE) (HCC): Primary | ICD-10-CM

## 2018-09-24 PROCEDURE — 99213 OFFICE O/P EST LOW 20 MIN: CPT | Performed by: PHYSICIAN ASSISTANT

## 2018-09-24 NOTE — LETTER
September 24, 2018     [place] KatyDO reji  [address] [address]    Patient: Celina Smith   YOB: 1950   Date of Visit: 9/24/2018       Dear Dr Rodas Gip:    Thank you for referring Celina Smith to me for evaluation  Below are my notes for this consultation  If you have questions, please do not hesitate to call me  I look forward to following your patient along with you  Sincerely,        Ana Farnsworth PA-C        CC: MD Ana Mccartney PA-C  9/24/2018  1:14 PM  Cosign Needed  Assessment and Plan:    Diagnoses and all orders for this visit:    Chronic kidney disease, stage III (moderate)  -     Comprehensive metabolic panel; Future  -     Gamma GT; Future  -     Comprehensive metabolic panel; Future  -     CBC; Future  -     Magnesium; Future  -     Phosphorus; Future  -     Urinalysis with microscopic; Future    Vitamin D deficiency    Hypotension, unspecified hypotension type    Abnormal levels of other serum enzymes   -     Gamma GT; Future       Chronic Kidney Disease stage 3- Baseline creatinine is 1 1-1 4  Presumed etiology is due to cardiorenal syndrome with relative hypotension  Minimal proteinuria  Creatinine and electrolytes are stable  Hypotension with an EF of 48%    Vitamin D Deficiency- Improved  Continue vitamin D3 OTC 2,000 units daily  Elevated Alkaline phosphatase- repeat CMP/GGT  Follow up with Dr Ann-Marie Chiu in 6 months  Please call the office with any questions or concerns  Reason for Visit: No chief complaint on file  HPI: Celina Smith is a 80 y o  male who is here for follow up of chronic kidney disease  He last saw Dr Ann-Marie Chiu in May  The patient is feeling well and offers no acute complaints  He presents today with his wife  ROS: A complete review of systems was performed and was negative unless otherwise noted in the history of present illness      Allergies:   Patient has no known allergies  Medications:     Current Outpatient Prescriptions:     aspirin (ECOTRIN LOW STRENGTH) 81 mg EC tablet, Take 81 mg by mouth daily, Disp: , Rfl:     Cholecalciferol (VITAMIN D) 2000 units CAPS, Take 2,000 Units by mouth daily, Disp: , Rfl:     Past Medical History:   Diagnosis Date    Hematuria     History of rheumatic fever      Past Surgical History:   Procedure Laterality Date    HERNIA REPAIR       Family History   Problem Relation Age of Onset    Heart disease Mother     Hyperlipidemia Mother     Hypertension Father     Heart disease Father     Stroke Father     Hyperlipidemia Father     Diabetes Brother     Dementia Neg Hx     Drug abuse Neg Hx     Mental illness Neg Hx     Substance Abuse Neg Hx       reports that he has been smoking Cigarettes  He has a 15 00 pack-year smoking history  He has never used smokeless tobacco  He reports that he drinks alcohol  He reports that he does not use drugs  Physical Exam:   Vitals:    09/24/18 1256 09/24/18 1306   BP:  118/70   BP Location:  Right arm   Patient Position:  Sitting   Cuff Size:  Standard   Pulse:  84   Weight: 89 5 kg (197 lb 6 4 oz)    Height: 5' 10" (1 778 m)      Body mass index is 28 32 kg/m²  General: NAD  Neuro: AAO  Neck: supple  Skin: no rash  Heart: RRR  Lungs: CTAB  Abdomen: soft nt nd  Extremities: no edema    Procedure:  No results found for this or any previous visit  Labs reviewed      Lab Results   Component Value Date    CALCIUM 8 7 09/17/2018     09/17/2018    K 4 1 09/17/2018    CO2 27 09/17/2018     09/17/2018    BUN 16 09/17/2018    CREATININE 1 14 09/17/2018

## 2018-09-24 NOTE — PROGRESS NOTES
Assessment and Plan:    Diagnoses and all orders for this visit:    Chronic kidney disease, stage III (moderate)  -     Comprehensive metabolic panel; Future  -     Gamma GT; Future  -     Comprehensive metabolic panel; Future  -     CBC; Future  -     Magnesium; Future  -     Phosphorus; Future  -     Urinalysis with microscopic; Future    Vitamin D deficiency    Hypotension, unspecified hypotension type    Abnormal levels of other serum enzymes   -     Gamma GT; Future       Chronic Kidney Disease stage 3- Baseline creatinine is 1 1-1 4  Presumed etiology is due to cardiorenal syndrome with relative hypotension  Minimal proteinuria  Creatinine and electrolytes are stable  Hypotension with an EF of 48%    Vitamin D Deficiency- Improved  Continue vitamin D3 OTC 2,000 units daily  Elevated Alkaline phosphatase- repeat CMP/GGT  Follow up with Dr Courtney Eason in 6 months  Please call the office with any questions or concerns  Reason for Visit: No chief complaint on file  HPI: Layo Melvin is a 80 y o  male who is here for follow up of chronic kidney disease  He last saw Dr Courtney Eason in May  The patient is feeling well and offers no acute complaints  He presents today with his wife  ROS: A complete review of systems was performed and was negative unless otherwise noted in the history of present illness  Allergies:   Patient has no known allergies      Medications:     Current Outpatient Prescriptions:     aspirin (ECOTRIN LOW STRENGTH) 81 mg EC tablet, Take 81 mg by mouth daily, Disp: , Rfl:     Cholecalciferol (VITAMIN D) 2000 units CAPS, Take 2,000 Units by mouth daily, Disp: , Rfl:     Past Medical History:   Diagnosis Date    Hematuria     History of rheumatic fever      Past Surgical History:   Procedure Laterality Date    HERNIA REPAIR       Family History   Problem Relation Age of Onset    Heart disease Mother     Hyperlipidemia Mother     Hypertension Father     Heart disease Father     Stroke Father     Hyperlipidemia Father     Diabetes Brother     Dementia Neg Hx     Drug abuse Neg Hx     Mental illness Neg Hx     Substance Abuse Neg Hx       reports that he has been smoking Cigarettes  He has a 15 00 pack-year smoking history  He has never used smokeless tobacco  He reports that he drinks alcohol  He reports that he does not use drugs  Physical Exam:   Vitals:    09/24/18 1256 09/24/18 1306   BP:  118/70   BP Location:  Right arm   Patient Position:  Sitting   Cuff Size:  Standard   Pulse:  84   Weight: 89 5 kg (197 lb 6 4 oz)    Height: 5' 10" (1 778 m)      Body mass index is 28 32 kg/m²  General: NAD  Neuro: AAO  Neck: supple  Skin: no rash  Heart: RRR  Lungs: CTAB  Abdomen: soft nt nd  Extremities: no edema    Procedure:  No results found for this or any previous visit  Labs reviewed      Lab Results   Component Value Date    CALCIUM 8 7 09/17/2018     09/17/2018    K 4 1 09/17/2018    CO2 27 09/17/2018     09/17/2018    BUN 16 09/17/2018    CREATININE 1 14 09/17/2018

## 2018-09-24 NOTE — PATIENT INSTRUCTIONS
Chronic Kidney Disease stage 3- Baseline creatinine is 1 1-1 4  Presumed etiology is due to cardiorenal syndrome with relative hypotension  Minimal proteinuria  Creatinine and electrolytes are stable  Hypotension with an EF of 48%    Vitamin D Deficiency- Improved  Continue vitamin D3 OTC 2,000 units daily  Elevated Alkaline phosphatase- repeat CMP/GGT  Follow up with Dr Radha Avila in 6 months  Please call the office with any questions or concerns

## 2018-10-05 ENCOUNTER — APPOINTMENT (OUTPATIENT)
Dept: LAB | Facility: CLINIC | Age: 68
End: 2018-10-05
Payer: MEDICARE

## 2018-10-05 DIAGNOSIS — N18.30 CHRONIC KIDNEY DISEASE, STAGE III (MODERATE) (HCC): ICD-10-CM

## 2018-10-05 DIAGNOSIS — R74.8 ABNORMAL LEVELS OF OTHER SERUM ENZYMES: ICD-10-CM

## 2018-10-05 LAB
ALBUMIN SERPL BCP-MCNC: 3 G/DL (ref 3.5–5)
ALP SERPL-CCNC: 113 U/L (ref 46–116)
ALT SERPL W P-5'-P-CCNC: 27 U/L (ref 12–78)
ANION GAP SERPL CALCULATED.3IONS-SCNC: 10 MMOL/L (ref 4–13)
AST SERPL W P-5'-P-CCNC: 13 U/L (ref 5–45)
BILIRUB SERPL-MCNC: 0.2 MG/DL (ref 0.2–1)
BUN SERPL-MCNC: 16 MG/DL (ref 5–25)
CALCIUM SERPL-MCNC: 8.7 MG/DL (ref 8.3–10.1)
CHLORIDE SERPL-SCNC: 107 MMOL/L (ref 100–108)
CO2 SERPL-SCNC: 25 MMOL/L (ref 21–32)
CREAT SERPL-MCNC: 1.16 MG/DL (ref 0.6–1.3)
GFR SERPL CREATININE-BSD FRML MDRD: 65 ML/MIN/1.73SQ M
GGT SERPL-CCNC: 49 U/L (ref 5–85)
GLUCOSE P FAST SERPL-MCNC: 113 MG/DL (ref 65–99)
POTASSIUM SERPL-SCNC: 4.2 MMOL/L (ref 3.5–5.3)
PROT SERPL-MCNC: 7.4 G/DL (ref 6.4–8.2)
SODIUM SERPL-SCNC: 142 MMOL/L (ref 136–145)

## 2018-10-05 PROCEDURE — 80053 COMPREHEN METABOLIC PANEL: CPT

## 2018-10-05 PROCEDURE — 36415 COLL VENOUS BLD VENIPUNCTURE: CPT

## 2018-10-05 PROCEDURE — 82977 ASSAY OF GGT: CPT

## 2018-10-19 ENCOUNTER — CLINICAL SUPPORT (OUTPATIENT)
Dept: INTERNAL MEDICINE CLINIC | Facility: CLINIC | Age: 68
End: 2018-10-19
Payer: MEDICARE

## 2018-10-19 DIAGNOSIS — Z23 FLU VACCINE NEED: Primary | ICD-10-CM

## 2018-10-19 PROCEDURE — G0008 ADMIN INFLUENZA VIRUS VAC: HCPCS

## 2018-10-19 PROCEDURE — 90662 IIV NO PRSV INCREASED AG IM: CPT

## 2018-12-28 ENCOUNTER — TELEPHONE (OUTPATIENT)
Dept: NEPHROLOGY | Facility: CLINIC | Age: 68
End: 2018-12-28

## 2018-12-28 NOTE — TELEPHONE ENCOUNTER
I called and left message for patient to call back to schedule February follow up appt with Dr Celena Mittal

## 2019-02-28 ENCOUNTER — TELEPHONE (OUTPATIENT)
Dept: INTERNAL MEDICINE CLINIC | Facility: CLINIC | Age: 69
End: 2019-02-28

## 2019-02-28 ENCOUNTER — APPOINTMENT (OUTPATIENT)
Dept: LAB | Facility: CLINIC | Age: 69
End: 2019-02-28
Payer: MEDICARE

## 2019-02-28 ENCOUNTER — OFFICE VISIT (OUTPATIENT)
Dept: INTERNAL MEDICINE CLINIC | Facility: CLINIC | Age: 69
End: 2019-02-28
Payer: MEDICARE

## 2019-02-28 ENCOUNTER — TRANSCRIBE ORDERS (OUTPATIENT)
Dept: LAB | Facility: CLINIC | Age: 69
End: 2019-02-28

## 2019-02-28 VITALS
HEIGHT: 70 IN | HEART RATE: 115 BPM | DIASTOLIC BLOOD PRESSURE: 72 MMHG | SYSTOLIC BLOOD PRESSURE: 124 MMHG | OXYGEN SATURATION: 95 % | BODY MASS INDEX: 27.6 KG/M2 | TEMPERATURE: 96.8 F | RESPIRATION RATE: 18 BRPM | WEIGHT: 192.8 LBS

## 2019-02-28 DIAGNOSIS — R01.2 ABNORMAL HEART SOUNDS: ICD-10-CM

## 2019-02-28 DIAGNOSIS — R19.7 DIARRHEA OF PRESUMED INFECTIOUS ORIGIN: ICD-10-CM

## 2019-02-28 DIAGNOSIS — R00.0 TACHYCARDIA: ICD-10-CM

## 2019-02-28 DIAGNOSIS — I70.0 ABDOMINAL AORTIC ATHEROSCLEROSIS (HCC): ICD-10-CM

## 2019-02-28 DIAGNOSIS — R19.7 DIARRHEA OF PRESUMED INFECTIOUS ORIGIN: Primary | ICD-10-CM

## 2019-02-28 DIAGNOSIS — Z91.89 FRAMINGHAM CARDIAC RISK 10-20% IN NEXT 10 YEARS: ICD-10-CM

## 2019-02-28 DIAGNOSIS — Z72.0 TOBACCO USE: ICD-10-CM

## 2019-02-28 PROBLEM — I95.9 HYPOTENSION: Status: RESOLVED | Noted: 2018-03-22 | Resolved: 2019-02-28

## 2019-02-28 PROBLEM — Z53.20 STATIN DECLINED: Status: ACTIVE | Noted: 2019-02-28

## 2019-02-28 LAB
ANION GAP SERPL CALCULATED.3IONS-SCNC: 8 MMOL/L (ref 4–13)
BASOPHILS # BLD AUTO: 0.06 THOUSANDS/ΜL (ref 0–0.1)
BASOPHILS NFR BLD AUTO: 1 % (ref 0–1)
BUN SERPL-MCNC: 15 MG/DL (ref 5–25)
CALCIUM SERPL-MCNC: 8.7 MG/DL (ref 8.3–10.1)
CHLORIDE SERPL-SCNC: 103 MMOL/L (ref 100–108)
CO2 SERPL-SCNC: 26 MMOL/L (ref 21–32)
CREAT SERPL-MCNC: 1.35 MG/DL (ref 0.6–1.3)
ECG INTERP DURING EX: NORMAL MS
EOSINOPHIL # BLD AUTO: 0.08 THOUSAND/ΜL (ref 0–0.61)
EOSINOPHIL NFR BLD AUTO: 1 % (ref 0–6)
ERYTHROCYTE [DISTWIDTH] IN BLOOD BY AUTOMATED COUNT: 14.4 % (ref 11.6–15.1)
GFR SERPL CREATININE-BSD FRML MDRD: 54 ML/MIN/1.73SQ M
GLUCOSE SERPL-MCNC: 130 MG/DL (ref 65–140)
HCT VFR BLD AUTO: 51.4 % (ref 36.5–49.3)
HGB BLD-MCNC: 16.8 G/DL (ref 12–17)
IMM GRANULOCYTES # BLD AUTO: 0.1 THOUSAND/UL (ref 0–0.2)
IMM GRANULOCYTES NFR BLD AUTO: 1 % (ref 0–2)
LYMPHOCYTES # BLD AUTO: 2.1 THOUSANDS/ΜL (ref 0.6–4.47)
LYMPHOCYTES NFR BLD AUTO: 22 % (ref 14–44)
MCH RBC QN AUTO: 29 PG (ref 26.8–34.3)
MCHC RBC AUTO-ENTMCNC: 32.7 G/DL (ref 31.4–37.4)
MCV RBC AUTO: 89 FL (ref 82–98)
MONOCYTES # BLD AUTO: 1.42 THOUSAND/ΜL (ref 0.17–1.22)
MONOCYTES NFR BLD AUTO: 15 % (ref 4–12)
NEUTROPHILS # BLD AUTO: 5.99 THOUSANDS/ΜL (ref 1.85–7.62)
NEUTS SEG NFR BLD AUTO: 60 % (ref 43–75)
NRBC BLD AUTO-RTO: 0 /100 WBCS
PLATELET # BLD AUTO: 486 THOUSANDS/UL (ref 149–390)
PMV BLD AUTO: 8.9 FL (ref 8.9–12.7)
POTASSIUM SERPL-SCNC: 4 MMOL/L (ref 3.5–5.3)
RBC # BLD AUTO: 5.8 MILLION/UL (ref 3.88–5.62)
SODIUM SERPL-SCNC: 137 MMOL/L (ref 136–145)
WBC # BLD AUTO: 9.75 THOUSAND/UL (ref 4.31–10.16)

## 2019-02-28 PROCEDURE — 36415 COLL VENOUS BLD VENIPUNCTURE: CPT | Performed by: INTERNAL MEDICINE

## 2019-02-28 PROCEDURE — 87177 OVA AND PARASITES SMEARS: CPT

## 2019-02-28 PROCEDURE — 85025 COMPLETE CBC W/AUTO DIFF WBC: CPT | Performed by: INTERNAL MEDICINE

## 2019-02-28 PROCEDURE — 99214 OFFICE O/P EST MOD 30 MIN: CPT | Performed by: INTERNAL MEDICINE

## 2019-02-28 PROCEDURE — 80048 BASIC METABOLIC PNL TOTAL CA: CPT

## 2019-02-28 PROCEDURE — 87505 NFCT AGENT DETECTION GI: CPT

## 2019-02-28 PROCEDURE — 93000 ELECTROCARDIOGRAM COMPLETE: CPT | Performed by: INTERNAL MEDICINE

## 2019-02-28 PROCEDURE — 87329 GIARDIA AG IA: CPT

## 2019-02-28 PROCEDURE — 87209 SMEAR COMPLEX STAIN: CPT

## 2019-02-28 PROCEDURE — 87493 C DIFF AMPLIFIED PROBE: CPT

## 2019-02-28 NOTE — Clinical Note
HiMartin was tachycardic today and I heard some skipped beats on examDid an EKG(no afib) which shows bifascicular block/conduction system disease   wanted to get your opinion on EKG if Sera Angel needs further work up(holter?)No palpitations, he has been dealing with diarrheal illness over last 1 weekaviva-Lyndon

## 2019-02-28 NOTE — PATIENT INSTRUCTIONS
1  Blood work -> my office will call you with results  2  Stool testing  3  Stay hydrated/clear liquids  4   If symptoms worsen, go to Emergency Room

## 2019-02-28 NOTE — TELEPHONE ENCOUNTER
BW is back & overall looks fine except that Aixa Jenkins appears a bit dehydrated    pls continue with current treatment plan including stool tests & if symptoms worsen -> go to ER    Results for orders placed or performed in visit on 89/00/96   Basic metabolic panel   Result Value Ref Range    Sodium 137 136 - 145 mmol/L    Potassium 4 0 3 5 - 5 3 mmol/L    Chloride 103 100 - 108 mmol/L    CO2 26 21 - 32 mmol/L    ANION GAP 8 4 - 13 mmol/L    BUN 15 5 - 25 mg/dL    Creatinine 1 35 (H) 0 60 - 1 30 mg/dL    Glucose 130 65 - 140 mg/dL    Calcium 8 7 8 3 - 10 1 mg/dL    eGFR 54 ml/min/1 73sq m

## 2019-02-28 NOTE — PROGRESS NOTES
Assessment/Plan:     Diagnoses and all orders for this visit:    Diarrhea of presumed infectious origin  Comments:  etiology of sx not clear, no known exposure but ongoing for 1+ week  Check stool studies and BW today & precautions advised  Orders:  -     Clostridium difficile toxin by PCR; Future  -     Giardia antigen; Future  -     Ova and parasite examination; Future  -     Stool Enteric Bacterial Panel by PCR; Future  -     CBC and differential  -     Basic metabolic panel; Future    Abdominal aortic atherosclerosis (HCC)  Comments:  declines statin    [place] cardiac risk 10-20% in next 10 years    Tobacco use  Comments:  tob cess counseling but pt not ready to quit    Abnormal heart sounds  Comments:  EKG now with bifascicular block and signs of conduction system disease  elevated HR likely related to dehydration from diarrheal illness  Orders:  -     POCT ECG  -     Basic metabolic panel; Future  -     Cancel: TSH, 3rd generation with Free T4 reflex    Tachycardia  -     POCT ECG  -     Basic metabolic panel; Future  -     Cancel: TSH, 3rd generation with Free T4 reflex      will discuss EKG results with pt's cardiologist    Subjective:      Patient ID: Himanshu Sosa is a 80 y o  male  HPI    Here for follow up, here with wife and with c/o diarrhea with lower abd soreness/discomfort over last 1 week  No abd pain, fever, blood in stool or N/V  No recent travel or sick contacts  Refusing to take statin despite his to abd aortic atherosclerosis and elevated ASCVD risk score(>14%)  Still smoking and not ready to quit  HR elevated today and with skipped beats on auscultation but Lorena Angle denies palpitations or CP  Had echo last year with cardiology which was abnl but followed by nuclear stress test which showed no ischemia  No other complaints      Past Medical History:   Diagnosis Date    Hematuria     History of rheumatic fever      Vitals:    02/28/19 0959 02/28/19 1110   BP: 120/72 124/72 Pulse: (!) 110 (!) 115   Resp: 18    Temp: (!) 96 8 °F (36 °C)    TempSrc: Axillary    SpO2: 95%    Weight: 87 5 kg (192 lb 12 8 oz)    Height: 5' 10" (1 778 m)      Body mass index is 27 66 kg/m²  Current Outpatient Medications:     aspirin (ECOTRIN LOW STRENGTH) 81 mg EC tablet, Take 81 mg by mouth daily, Disp: , Rfl:     Cholecalciferol (VITAMIN D) 2000 units CAPS, Take 2,000 Units by mouth daily, Disp: , Rfl:   No Known Allergies      Review of Systems   Constitutional: Negative for fever  HENT: Negative for congestion  Eyes: Negative for visual disturbance  Respiratory: Negative for shortness of breath  Cardiovascular: Negative for chest pain  Gastrointestinal: Negative for abdominal pain  Genitourinary: Negative for dysuria  Musculoskeletal: Negative for arthralgias  Skin: Negative for rash  Neurological: Negative for headaches  Psychiatric/Behavioral: Negative for dysphoric mood  Objective:      /72 Comment: standing BP  Pulse (!) 115   Temp (!) 96 8 °F (36 °C) (Axillary)   Resp 18   Ht 5' 10" (1 778 m)   Wt 87 5 kg (192 lb 12 8 oz)   SpO2 95%   BMI 27 66 kg/m²          Physical Exam   Constitutional: He appears well-developed and well-nourished  HENT:   Head: Normocephalic and atraumatic  Cardiovascular: An irregular rhythm present  Tachycardia present  No murmur heard  Pulmonary/Chest: Effort normal and breath sounds normal  He has no wheezes  He has no rales  Abdominal: Soft  Bowel sounds are increased  There is no tenderness  Musculoskeletal: He exhibits no edema  Lymphadenopathy:     He has no cervical adenopathy  Neurological: He is alert  Psychiatric: He has a normal mood and affect  His behavior is normal    Vitals reviewed        EKG: sinus tachycardia at 105 bpm with bifasicular block, IVCD and other conduction system changes

## 2019-03-01 ENCOUNTER — TELEPHONE (OUTPATIENT)
Dept: INTERNAL MEDICINE CLINIC | Facility: CLINIC | Age: 69
End: 2019-03-01

## 2019-03-01 LAB
C DIFF TOX GENS STL QL NAA+PROBE: NORMAL
CAMPYLOBACTER DNA SPEC NAA+PROBE: NORMAL
SALMONELLA DNA SPEC QL NAA+PROBE: NORMAL
SHIGA TOXIN STX GENE SPEC NAA+PROBE: NORMAL
SHIGELLA DNA SPEC QL NAA+PROBE: NORMAL

## 2019-03-01 NOTE — TELEPHONE ENCOUNTER
----- Message from Brigida Barrett DO sent at 3/1/2019  4:48 PM EST -----  Some of stool tests are back and are normal   I released the results on [facility] online    Please continue with current treatment plan including clear liquids, avoiding dairy/greasy foods

## 2019-03-03 LAB
G LAMBLIA AG STL QL IA: NEGATIVE
O+P STL CONC: NORMAL

## 2019-03-04 NOTE — TELEPHONE ENCOUNTER
Remainder of stool tests are back and look fine    He can take imodium over the counter if needs anti-diarrheal medication    But how is Rulon Sever doing? Is diarrhea better?

## 2019-03-12 ENCOUNTER — TELEPHONE (OUTPATIENT)
Dept: INTERNAL MEDICINE CLINIC | Facility: CLINIC | Age: 69
End: 2019-03-12

## 2019-03-12 ENCOUNTER — TELEPHONE (OUTPATIENT)
Dept: GASTROENTEROLOGY | Facility: AMBULARY SURGERY CENTER | Age: 69
End: 2019-03-12

## 2019-03-12 NOTE — TELEPHONE ENCOUNTER
Patients wife called and states that Gala Vigil still has stomach issues and cant get into GI until June with Enmanuel Becker  Can you suggest a provider who is located at the [facility]? She prefers Dr Brennan Lala but he longer is at the [facility]

## 2019-03-12 NOTE — TELEPHONE ENCOUNTER
NEW PT    Pt called requesting to schedule an appt with dr Cherelle Juarez at any location except [place]  Pt is experiencing gastro problems that need attention   Please assist thank you

## 2019-03-20 ENCOUNTER — LAB REQUISITION (OUTPATIENT)
Dept: LAB | Facility: HOSPITAL | Age: 69
End: 2019-03-20
Payer: MEDICARE

## 2019-03-20 DIAGNOSIS — R10.13 EPIGASTRIC PAIN: ICD-10-CM

## 2019-03-20 DIAGNOSIS — K21.9 GASTRO-ESOPHAGEAL REFLUX DISEASE WITHOUT ESOPHAGITIS: ICD-10-CM

## 2019-03-20 DIAGNOSIS — R19.7 DIARRHEA: ICD-10-CM

## 2019-03-20 DIAGNOSIS — K57.30 DIVERTICULOSIS OF LARGE INTESTINE WITHOUT PERFORATION OR ABSCESS WITHOUT BLEEDING: ICD-10-CM

## 2019-03-20 DIAGNOSIS — R11.2 NAUSEA WITH VOMITING: ICD-10-CM

## 2019-03-20 DIAGNOSIS — D12.4 BENIGN NEOPLASM OF DESCENDING COLON: ICD-10-CM

## 2019-03-20 DIAGNOSIS — D12.5 BENIGN NEOPLASM OF SIGMOID COLON: ICD-10-CM

## 2019-03-20 DIAGNOSIS — R10.30 LOWER ABDOMINAL PAIN: ICD-10-CM

## 2019-03-20 DIAGNOSIS — K62.1 RECTAL POLYP: ICD-10-CM

## 2019-03-20 PROCEDURE — 88342 IMHCHEM/IMCYTCHM 1ST ANTB: CPT | Performed by: PATHOLOGY

## 2019-03-20 PROCEDURE — 88305 TISSUE EXAM BY PATHOLOGIST: CPT | Performed by: PATHOLOGY

## 2019-03-20 PROCEDURE — 88312 SPECIAL STAINS GROUP 1: CPT | Performed by: PATHOLOGY

## 2019-04-01 ENCOUNTER — TRANSCRIBE ORDERS (OUTPATIENT)
Dept: LAB | Facility: CLINIC | Age: 69
End: 2019-04-01

## 2019-04-01 ENCOUNTER — TELEPHONE (OUTPATIENT)
Dept: INTERNAL MEDICINE CLINIC | Facility: CLINIC | Age: 69
End: 2019-04-01

## 2019-04-01 ENCOUNTER — APPOINTMENT (OUTPATIENT)
Dept: LAB | Facility: CLINIC | Age: 69
End: 2019-04-01
Payer: MEDICARE

## 2019-04-01 DIAGNOSIS — N18.30 CHRONIC KIDNEY DISEASE, STAGE III (MODERATE) (HCC): ICD-10-CM

## 2019-04-01 LAB
ALBUMIN SERPL BCP-MCNC: 2.4 G/DL (ref 3.5–5)
ALP SERPL-CCNC: 104 U/L (ref 46–116)
ALT SERPL W P-5'-P-CCNC: 16 U/L (ref 12–78)
ANION GAP SERPL CALCULATED.3IONS-SCNC: 6 MMOL/L (ref 4–13)
AST SERPL W P-5'-P-CCNC: 11 U/L (ref 5–45)
BACTERIA UR QL AUTO: NORMAL /HPF
BILIRUB SERPL-MCNC: 0.4 MG/DL (ref 0.2–1)
BILIRUB UR QL STRIP: NEGATIVE
BUN SERPL-MCNC: 16 MG/DL (ref 5–25)
CALCIUM SERPL-MCNC: 8.8 MG/DL (ref 8.3–10.1)
CHLORIDE SERPL-SCNC: 103 MMOL/L (ref 100–108)
CLARITY UR: CLEAR
CO2 SERPL-SCNC: 28 MMOL/L (ref 21–32)
COLOR UR: YELLOW
CREAT SERPL-MCNC: 1.31 MG/DL (ref 0.6–1.3)
ERYTHROCYTE [DISTWIDTH] IN BLOOD BY AUTOMATED COUNT: 14.4 % (ref 11.6–15.1)
GFR SERPL CREATININE-BSD FRML MDRD: 56 ML/MIN/1.73SQ M
GLUCOSE P FAST SERPL-MCNC: 116 MG/DL (ref 65–99)
GLUCOSE UR STRIP-MCNC: NEGATIVE MG/DL
HCT VFR BLD AUTO: 49.8 % (ref 36.5–49.3)
HGB BLD-MCNC: 16.2 G/DL (ref 12–17)
HGB UR QL STRIP.AUTO: NEGATIVE
KETONES UR STRIP-MCNC: NEGATIVE MG/DL
LEUKOCYTE ESTERASE UR QL STRIP: NEGATIVE
MAGNESIUM SERPL-MCNC: 1.9 MG/DL (ref 1.6–2.6)
MCH RBC QN AUTO: 28.5 PG (ref 26.8–34.3)
MCHC RBC AUTO-ENTMCNC: 32.5 G/DL (ref 31.4–37.4)
MCV RBC AUTO: 88 FL (ref 82–98)
NITRITE UR QL STRIP: NEGATIVE
NON-SQ EPI CELLS URNS QL MICRO: NORMAL /HPF
PH UR STRIP.AUTO: 5.5 [PH]
PHOSPHATE SERPL-MCNC: 4.3 MG/DL (ref 2.3–4.1)
PLATELET # BLD AUTO: 539 THOUSANDS/UL (ref 149–390)
PMV BLD AUTO: 9.1 FL (ref 8.9–12.7)
POTASSIUM SERPL-SCNC: 4.3 MMOL/L (ref 3.5–5.3)
PROT SERPL-MCNC: 6.9 G/DL (ref 6.4–8.2)
PROT UR STRIP-MCNC: NEGATIVE MG/DL
RBC # BLD AUTO: 5.68 MILLION/UL (ref 3.88–5.62)
RBC #/AREA URNS AUTO: NORMAL /HPF
SODIUM SERPL-SCNC: 137 MMOL/L (ref 136–145)
SP GR UR STRIP.AUTO: >=1.03 (ref 1–1.03)
UROBILINOGEN UR QL STRIP.AUTO: 0.2 E.U./DL
WBC # BLD AUTO: 10.35 THOUSAND/UL (ref 4.31–10.16)
WBC #/AREA URNS AUTO: NORMAL /HPF

## 2019-04-01 PROCEDURE — 83735 ASSAY OF MAGNESIUM: CPT

## 2019-04-01 PROCEDURE — 81001 URINALYSIS AUTO W/SCOPE: CPT

## 2019-04-01 PROCEDURE — 36415 COLL VENOUS BLD VENIPUNCTURE: CPT

## 2019-04-01 PROCEDURE — 80053 COMPREHEN METABOLIC PANEL: CPT

## 2019-04-01 PROCEDURE — 84100 ASSAY OF PHOSPHORUS: CPT

## 2019-04-01 PROCEDURE — 85027 COMPLETE CBC AUTOMATED: CPT

## 2019-04-03 ENCOUNTER — OFFICE VISIT (OUTPATIENT)
Dept: NEPHROLOGY | Facility: CLINIC | Age: 69
End: 2019-04-03
Payer: MEDICARE

## 2019-04-03 VITALS — HEIGHT: 70 IN | BODY MASS INDEX: 26.51 KG/M2 | WEIGHT: 185.2 LBS

## 2019-04-03 DIAGNOSIS — I95.89 CHRONIC HYPOTENSION: ICD-10-CM

## 2019-04-03 DIAGNOSIS — R80.1 PERSISTENT PROTEINURIA: ICD-10-CM

## 2019-04-03 DIAGNOSIS — E78.5 DYSLIPIDEMIA: ICD-10-CM

## 2019-04-03 DIAGNOSIS — E55.9 VITAMIN D DEFICIENCY: ICD-10-CM

## 2019-04-03 DIAGNOSIS — N18.30 CHRONIC KIDNEY DISEASE, STAGE III (MODERATE) (HCC): Primary | ICD-10-CM

## 2019-04-03 PROCEDURE — 99214 OFFICE O/P EST MOD 30 MIN: CPT | Performed by: INTERNAL MEDICINE

## 2019-04-05 PROBLEM — K22.70 BARRETT'S ESOPHAGUS WITHOUT DYSPLASIA: Status: ACTIVE | Noted: 2019-04-05

## 2019-04-05 PROBLEM — K63.5 COLON POLYPS: Status: ACTIVE | Noted: 2019-04-05

## 2019-04-05 PROBLEM — K21.00 GERD WITH ESOPHAGITIS: Status: ACTIVE | Noted: 2019-04-05

## 2019-04-05 PROBLEM — K22.719 BARRETT'S ESOPHAGUS WITH DYSPLASIA, UNSPECIFIED: Status: ACTIVE | Noted: 2019-04-05

## 2019-04-18 ENCOUNTER — APPOINTMENT (OUTPATIENT)
Dept: LAB | Facility: CLINIC | Age: 69
End: 2019-04-18
Payer: MEDICARE

## 2019-04-18 DIAGNOSIS — E55.9 VITAMIN D DEFICIENCY: ICD-10-CM

## 2019-04-18 DIAGNOSIS — R80.1 PERSISTENT PROTEINURIA: ICD-10-CM

## 2019-04-18 DIAGNOSIS — N18.30 CHRONIC KIDNEY DISEASE, STAGE III (MODERATE) (HCC): ICD-10-CM

## 2019-04-18 DIAGNOSIS — E78.5 DYSLIPIDEMIA: ICD-10-CM

## 2019-04-18 DIAGNOSIS — I95.89 CHRONIC HYPOTENSION: ICD-10-CM

## 2019-04-18 LAB
25(OH)D3 SERPL-MCNC: 38.7 NG/ML (ref 30–100)
CHOLEST SERPL-MCNC: 158 MG/DL (ref 50–200)
CREAT UR-MCNC: 316 MG/DL
HDLC SERPL-MCNC: 47 MG/DL (ref 40–60)
LDLC SERPL CALC-MCNC: 88 MG/DL (ref 0–100)
MAGNESIUM SERPL-MCNC: 2 MG/DL (ref 1.6–2.6)
PHOSPHATE SERPL-MCNC: 4.2 MG/DL (ref 2.3–4.1)
PROT UR-MCNC: 50 MG/DL
PROT/CREAT UR: 0.16 MG/G{CREAT} (ref 0–0.1)
PTH-INTACT SERPL-MCNC: 45.9 PG/ML (ref 18.4–80.1)
TRIGL SERPL-MCNC: 114 MG/DL

## 2019-04-18 PROCEDURE — 82570 ASSAY OF URINE CREATININE: CPT | Performed by: INTERNAL MEDICINE

## 2019-04-18 PROCEDURE — 82306 VITAMIN D 25 HYDROXY: CPT

## 2019-04-18 PROCEDURE — 83970 ASSAY OF PARATHORMONE: CPT

## 2019-04-18 PROCEDURE — 84156 ASSAY OF PROTEIN URINE: CPT | Performed by: INTERNAL MEDICINE

## 2019-04-18 PROCEDURE — 83735 ASSAY OF MAGNESIUM: CPT

## 2019-04-18 PROCEDURE — 36415 COLL VENOUS BLD VENIPUNCTURE: CPT

## 2019-04-18 PROCEDURE — 84100 ASSAY OF PHOSPHORUS: CPT

## 2019-04-18 PROCEDURE — 80061 LIPID PANEL: CPT

## 2019-04-22 ENCOUNTER — OFFICE VISIT (OUTPATIENT)
Dept: INTERNAL MEDICINE CLINIC | Facility: CLINIC | Age: 69
End: 2019-04-22
Payer: MEDICARE

## 2019-04-22 VITALS
HEART RATE: 108 BPM | DIASTOLIC BLOOD PRESSURE: 74 MMHG | BODY MASS INDEX: 26.71 KG/M2 | TEMPERATURE: 97.7 F | HEIGHT: 70 IN | WEIGHT: 186.6 LBS | RESPIRATION RATE: 18 BRPM | SYSTOLIC BLOOD PRESSURE: 110 MMHG | OXYGEN SATURATION: 96 %

## 2019-04-22 DIAGNOSIS — L81.9 DISCOLORATION OF SKIN OF TOE: Primary | ICD-10-CM

## 2019-04-22 DIAGNOSIS — Z72.0 TOBACCO USE: ICD-10-CM

## 2019-04-22 DIAGNOSIS — E78.5 DYSLIPIDEMIA: ICD-10-CM

## 2019-04-22 PROCEDURE — 99213 OFFICE O/P EST LOW 20 MIN: CPT | Performed by: INTERNAL MEDICINE

## 2019-04-22 RX ORDER — OMEPRAZOLE 40 MG/1
CAPSULE, DELAYED RELEASE ORAL
Refills: 5 | COMMUNITY
Start: 2019-04-16 | End: 2020-04-20 | Stop reason: SDUPTHER

## 2019-04-23 ENCOUNTER — HOSPITAL ENCOUNTER (OUTPATIENT)
Dept: NON INVASIVE DIAGNOSTICS | Facility: CLINIC | Age: 69
Discharge: HOME | End: 2019-04-23
Payer: MEDICARE

## 2019-04-23 ENCOUNTER — TELEPHONE (OUTPATIENT)
Dept: INTERNAL MEDICINE CLINIC | Facility: CLINIC | Age: 69
End: 2019-04-23

## 2019-04-23 DIAGNOSIS — L81.9 DISCOLORATION OF SKIN OF TOE: ICD-10-CM

## 2019-04-23 PROCEDURE — 93926 LOWER EXTREMITY STUDY: CPT

## 2019-04-23 PROCEDURE — 93926 LOWER EXTREMITY STUDY: CPT | Performed by: SURGERY

## 2019-04-23 PROCEDURE — 93922 UPR/L XTREMITY ART 2 LEVELS: CPT | Performed by: SURGERY

## 2019-04-24 ENCOUNTER — TELEPHONE (OUTPATIENT)
Dept: INTERNAL MEDICINE CLINIC | Facility: CLINIC | Age: 69
End: 2019-04-24

## 2019-04-24 DIAGNOSIS — I73.9 PAD (PERIPHERAL ARTERY DISEASE) (HCC): Primary | ICD-10-CM

## 2019-04-24 DIAGNOSIS — I73.9 PVD (PERIPHERAL VASCULAR DISEASE) (HCC): ICD-10-CM

## 2019-04-24 RX ORDER — ATORVASTATIN CALCIUM 20 MG/1
TABLET, FILM COATED ORAL
Qty: 30 TABLET | Refills: 3 | Status: ON HOLD | OUTPATIENT
Start: 2019-04-24 | End: 2019-05-17 | Stop reason: SDUPTHER

## 2019-05-06 ENCOUNTER — CONSULT (OUTPATIENT)
Dept: VASCULAR SURGERY | Facility: CLINIC | Age: 69
End: 2019-05-06
Payer: MEDICARE

## 2019-05-06 VITALS
HEIGHT: 71 IN | WEIGHT: 183 LBS | SYSTOLIC BLOOD PRESSURE: 124 MMHG | TEMPERATURE: 97.4 F | DIASTOLIC BLOOD PRESSURE: 86 MMHG | BODY MASS INDEX: 25.62 KG/M2 | HEART RATE: 74 BPM

## 2019-05-06 DIAGNOSIS — Z72.0 TOBACCO USE: ICD-10-CM

## 2019-05-06 DIAGNOSIS — I73.9 PAD (PERIPHERAL ARTERY DISEASE) (HCC): ICD-10-CM

## 2019-05-06 DIAGNOSIS — E78.5 DYSLIPIDEMIA: ICD-10-CM

## 2019-05-06 DIAGNOSIS — N18.30 CHRONIC KIDNEY DISEASE, STAGE III (MODERATE) (HCC): ICD-10-CM

## 2019-05-06 DIAGNOSIS — I70.0 ABDOMINAL AORTIC ATHEROSCLEROSIS (HCC): ICD-10-CM

## 2019-05-06 DIAGNOSIS — I73.9 PVD (PERIPHERAL VASCULAR DISEASE) (HCC): ICD-10-CM

## 2019-05-06 DIAGNOSIS — N18.30 CHRONIC KIDNEY DISEASE, STAGE III (MODERATE) (HCC): Primary | ICD-10-CM

## 2019-05-06 DIAGNOSIS — I73.9 PAD (PERIPHERAL ARTERY DISEASE) (HCC): Primary | ICD-10-CM

## 2019-05-06 PROCEDURE — 99204 OFFICE O/P NEW MOD 45 MIN: CPT | Performed by: NURSE PRACTITIONER

## 2019-05-06 RX ORDER — ASPIRIN 81 MG/1
81 TABLET ORAL DAILY
COMMUNITY
End: 2020-01-08 | Stop reason: HOSPADM

## 2019-05-08 ENCOUNTER — TELEPHONE (OUTPATIENT)
Dept: VASCULAR SURGERY | Facility: CLINIC | Age: 69
End: 2019-05-08

## 2019-05-08 ENCOUNTER — APPOINTMENT (OUTPATIENT)
Dept: LAB | Facility: CLINIC | Age: 69
End: 2019-05-08
Payer: MEDICARE

## 2019-05-08 DIAGNOSIS — I73.9 PAD (PERIPHERAL ARTERY DISEASE) (HCC): ICD-10-CM

## 2019-05-08 DIAGNOSIS — N18.30 CHRONIC KIDNEY DISEASE, STAGE III (MODERATE) (HCC): ICD-10-CM

## 2019-05-08 LAB
ANION GAP SERPL CALCULATED.3IONS-SCNC: 9 MMOL/L (ref 4–13)
BUN SERPL-MCNC: 17 MG/DL (ref 5–25)
CALCIUM SERPL-MCNC: 8.5 MG/DL (ref 8.3–10.1)
CHLORIDE SERPL-SCNC: 107 MMOL/L (ref 100–108)
CO2 SERPL-SCNC: 28 MMOL/L (ref 21–32)
CREAT SERPL-MCNC: 1.25 MG/DL (ref 0.6–1.3)
GFR SERPL CREATININE-BSD FRML MDRD: 59 ML/MIN/1.73SQ M
GLUCOSE P FAST SERPL-MCNC: 109 MG/DL (ref 65–99)
POTASSIUM SERPL-SCNC: 3.7 MMOL/L (ref 3.5–5.3)
SODIUM SERPL-SCNC: 144 MMOL/L (ref 136–145)

## 2019-05-08 PROCEDURE — 80048 BASIC METABOLIC PNL TOTAL CA: CPT

## 2019-05-08 PROCEDURE — 36415 COLL VENOUS BLD VENIPUNCTURE: CPT

## 2019-05-09 ENCOUNTER — CLINICAL SUPPORT (OUTPATIENT)
Dept: INTERNAL MEDICINE CLINIC | Facility: CLINIC | Age: 69
End: 2019-05-09
Payer: MEDICARE

## 2019-05-09 DIAGNOSIS — N18.30 CHRONIC KIDNEY DISEASE, STAGE III (MODERATE) (HCC): Primary | ICD-10-CM

## 2019-05-09 DIAGNOSIS — Z23 NEED FOR VACCINATION FOR STREP PNEUMONIAE: Primary | ICD-10-CM

## 2019-05-09 PROCEDURE — G0009 ADMIN PNEUMOCOCCAL VACCINE: HCPCS

## 2019-05-09 PROCEDURE — 90732 PPSV23 VACC 2 YRS+ SUBQ/IM: CPT

## 2019-05-10 RX ORDER — SODIUM CHLORIDE 9 MG/ML
100 INJECTION, SOLUTION INTRAVENOUS CONTINUOUS
Status: DISPENSED | OUTPATIENT
Start: 2019-05-11 | End: 2019-05-11

## 2019-05-11 ENCOUNTER — HOSPITAL ENCOUNTER (OUTPATIENT)
Dept: INFUSION CENTER | Facility: CLINIC | Age: 69
Discharge: HOME | End: 2019-05-11
Payer: MEDICARE

## 2019-05-11 ENCOUNTER — HOSPITAL ENCOUNTER (OUTPATIENT)
Dept: CT IMAGING | Facility: HOSPITAL | Age: 69
Discharge: HOME | End: 2019-05-11
Payer: MEDICARE

## 2019-05-11 VITALS
DIASTOLIC BLOOD PRESSURE: 74 MMHG | OXYGEN SATURATION: 98 % | SYSTOLIC BLOOD PRESSURE: 106 MMHG | TEMPERATURE: 97.6 F | RESPIRATION RATE: 16 BRPM | HEART RATE: 118 BPM

## 2019-05-11 DIAGNOSIS — I73.9 PAD (PERIPHERAL ARTERY DISEASE) (HCC): ICD-10-CM

## 2019-05-11 DIAGNOSIS — I73.9 PVD (PERIPHERAL VASCULAR DISEASE) (HCC): ICD-10-CM

## 2019-05-11 PROCEDURE — 75635 CT ANGIO ABDOMINAL ARTERIES: CPT

## 2019-05-11 RX ADMIN — SODIUM CHLORIDE 100 ML/HR: 0.9 INJECTION, SOLUTION INTRAVENOUS at 08:43

## 2019-05-11 RX ADMIN — IODIXANOL 120 ML: 320 INJECTION, SOLUTION INTRAVASCULAR at 11:25

## 2019-05-11 NOTE — PROGRESS NOTES
Pt  Tolerated treatment w/out adverse reaction  Pt  Has no further appts  Scheduled at this time   Pt  Declined AVS

## 2019-05-14 ENCOUNTER — OFFICE VISIT (OUTPATIENT)
Dept: UROLOGY | Facility: AMBULATORY SURGERY CENTER | Age: 69
End: 2019-05-14
Payer: MEDICARE

## 2019-05-14 ENCOUNTER — TELEPHONE (OUTPATIENT)
Dept: INTERNAL MEDICINE CLINIC | Facility: CLINIC | Age: 69
End: 2019-05-14

## 2019-05-14 ENCOUNTER — TELEPHONE (OUTPATIENT)
Dept: VASCULAR SURGERY | Facility: CLINIC | Age: 69
End: 2019-05-14

## 2019-05-14 VITALS
BODY MASS INDEX: 25.62 KG/M2 | HEART RATE: 68 BPM | DIASTOLIC BLOOD PRESSURE: 72 MMHG | HEIGHT: 71 IN | WEIGHT: 183 LBS | SYSTOLIC BLOOD PRESSURE: 96 MMHG

## 2019-05-14 DIAGNOSIS — R31.29 MICROSCOPIC HEMATURIA: Primary | ICD-10-CM

## 2019-05-14 DIAGNOSIS — Z12.5 SCREENING FOR PROSTATE CANCER: ICD-10-CM

## 2019-05-14 PROCEDURE — 99213 OFFICE O/P EST LOW 20 MIN: CPT | Performed by: NURSE PRACTITIONER

## 2019-05-15 ENCOUNTER — HOSPITAL ENCOUNTER (INPATIENT)
Facility: HOSPITAL | Age: 69
LOS: 2 days | Discharge: HOME | DRG: 644 | End: 2019-05-17
Attending: EMERGENCY MEDICINE | Admitting: INTERNAL MEDICINE
Payer: MEDICARE

## 2019-05-15 ENCOUNTER — APPOINTMENT (EMERGENCY)
Dept: CT IMAGING | Facility: HOSPITAL | Age: 69
DRG: 644 | End: 2019-05-15
Payer: MEDICARE

## 2019-05-15 DIAGNOSIS — E27.8 ADRENAL MASS, RIGHT (HCC): ICD-10-CM

## 2019-05-15 DIAGNOSIS — I71.4 AAA (ABDOMINAL AORTIC ANEURYSM) (HCC): ICD-10-CM

## 2019-05-15 DIAGNOSIS — K56.600 PARTIAL SMALL BOWEL OBSTRUCTION (HCC): Primary | ICD-10-CM

## 2019-05-15 DIAGNOSIS — E27.8 MASS OF BOTH ADRENAL GLANDS (HCC): ICD-10-CM

## 2019-05-15 DIAGNOSIS — N18.30 CHRONIC KIDNEY DISEASE, STAGE III (MODERATE) (HCC): ICD-10-CM

## 2019-05-15 DIAGNOSIS — Z72.0 TOBACCO USE: ICD-10-CM

## 2019-05-15 DIAGNOSIS — I73.9 PAD (PERIPHERAL ARTERY DISEASE) (HCC): ICD-10-CM

## 2019-05-15 DIAGNOSIS — G96.198 EPIDURAL MASS: ICD-10-CM

## 2019-05-15 PROBLEM — K56.609 SBO (SMALL BOWEL OBSTRUCTION) (HCC): Status: ACTIVE | Noted: 2019-05-15

## 2019-05-15 LAB
ALBUMIN SERPL BCP-MCNC: 2.6 G/DL (ref 3.5–5)
ALP SERPL-CCNC: 125 U/L (ref 46–116)
ALT SERPL W P-5'-P-CCNC: 13 U/L (ref 12–78)
ANION GAP SERPL CALCULATED.3IONS-SCNC: 8 MMOL/L (ref 4–13)
AST SERPL W P-5'-P-CCNC: 14 U/L (ref 5–45)
BACTERIA UR QL AUTO: ABNORMAL /HPF
BASOPHILS # BLD AUTO: 0.06 THOUSANDS/ΜL (ref 0–0.1)
BASOPHILS NFR BLD AUTO: 1 % (ref 0–1)
BILIRUB SERPL-MCNC: 0.5 MG/DL (ref 0.2–1)
BILIRUB UR QL STRIP: ABNORMAL
BUN SERPL-MCNC: 25 MG/DL (ref 5–25)
CALCIUM SERPL-MCNC: 9 MG/DL (ref 8.3–10.1)
CHLORIDE SERPL-SCNC: 102 MMOL/L (ref 100–108)
CLARITY UR: CLEAR
CO2 SERPL-SCNC: 24 MMOL/L (ref 21–32)
COLOR UR: ABNORMAL
CORTIS SERPL-MCNC: 19.6 UG/DL
CREAT SERPL-MCNC: 1.57 MG/DL (ref 0.6–1.3)
EOSINOPHIL # BLD AUTO: 0.06 THOUSAND/ΜL (ref 0–0.61)
EOSINOPHIL NFR BLD AUTO: 1 % (ref 0–6)
ERYTHROCYTE [DISTWIDTH] IN BLOOD BY AUTOMATED COUNT: 15 % (ref 11.6–15.1)
GFR SERPL CREATININE-BSD FRML MDRD: 45 ML/MIN/1.73SQ M
GLUCOSE SERPL-MCNC: 113 MG/DL (ref 65–140)
GLUCOSE UR STRIP-MCNC: NEGATIVE MG/DL
HCT VFR BLD AUTO: 51.4 % (ref 36.5–49.3)
HGB BLD-MCNC: 16.8 G/DL (ref 12–17)
HGB UR QL STRIP.AUTO: NEGATIVE
HYALINE CASTS #/AREA URNS LPF: ABNORMAL /LPF
IMM GRANULOCYTES # BLD AUTO: 0.08 THOUSAND/UL (ref 0–0.2)
IMM GRANULOCYTES NFR BLD AUTO: 1 % (ref 0–2)
KETONES UR STRIP-MCNC: ABNORMAL MG/DL
LEUKOCYTE ESTERASE UR QL STRIP: NEGATIVE
LIPASE SERPL-CCNC: 147 U/L (ref 73–393)
LYMPHOCYTES # BLD AUTO: 2.18 THOUSANDS/ΜL (ref 0.6–4.47)
LYMPHOCYTES NFR BLD AUTO: 26 % (ref 14–44)
MCH RBC QN AUTO: 28.9 PG (ref 26.8–34.3)
MCHC RBC AUTO-ENTMCNC: 32.7 G/DL (ref 31.4–37.4)
MCV RBC AUTO: 88 FL (ref 82–98)
MONOCYTES # BLD AUTO: 1.17 THOUSAND/ΜL (ref 0.17–1.22)
MONOCYTES NFR BLD AUTO: 14 % (ref 4–12)
MUCOUS THREADS UR QL AUTO: ABNORMAL
NEUTROPHILS # BLD AUTO: 4.95 THOUSANDS/ΜL (ref 1.85–7.62)
NEUTS SEG NFR BLD AUTO: 57 % (ref 43–75)
NITRITE UR QL STRIP: NEGATIVE
NON-SQ EPI CELLS URNS QL MICRO: ABNORMAL /HPF
NRBC BLD AUTO-RTO: 0 /100 WBCS
PH UR STRIP.AUTO: 6 [PH] (ref 4.5–8)
PLATELET # BLD AUTO: 523 THOUSANDS/UL (ref 149–390)
PMV BLD AUTO: 8.8 FL (ref 8.9–12.7)
POTASSIUM SERPL-SCNC: 4.7 MMOL/L (ref 3.5–5.3)
PROT SERPL-MCNC: 7.6 G/DL (ref 6.4–8.2)
PROT UR STRIP-MCNC: ABNORMAL MG/DL
RBC # BLD AUTO: 5.82 MILLION/UL (ref 3.88–5.62)
RBC #/AREA URNS AUTO: ABNORMAL /HPF
SODIUM SERPL-SCNC: 134 MMOL/L (ref 136–145)
SP GR UR STRIP.AUTO: >=1.03 (ref 1–1.03)
TSH SERPL DL<=0.05 MIU/L-ACNC: 2.79 UIU/ML (ref 0.36–3.74)
UROBILINOGEN UR QL STRIP.AUTO: 1 E.U./DL
WBC # BLD AUTO: 8.5 THOUSAND/UL (ref 4.31–10.16)
WBC #/AREA URNS AUTO: ABNORMAL /HPF

## 2019-05-15 PROCEDURE — 82384 ASSAY THREE CATECHOLAMINES: CPT | Performed by: PHYSICIAN ASSISTANT

## 2019-05-15 PROCEDURE — 96361 HYDRATE IV INFUSION ADD-ON: CPT

## 2019-05-15 PROCEDURE — 99204 OFFICE O/P NEW MOD 45 MIN: CPT | Performed by: PHYSICIAN ASSISTANT

## 2019-05-15 PROCEDURE — 85025 COMPLETE CBC W/AUTO DIFF WBC: CPT | Performed by: PHYSICIAN ASSISTANT

## 2019-05-15 PROCEDURE — 99284 EMERGENCY DEPT VISIT MOD MDM: CPT | Performed by: PHYSICIAN ASSISTANT

## 2019-05-15 PROCEDURE — 96360 HYDRATION IV INFUSION INIT: CPT

## 2019-05-15 PROCEDURE — 99223 1ST HOSP IP/OBS HIGH 75: CPT | Performed by: INTERNAL MEDICINE

## 2019-05-15 PROCEDURE — 99285 EMERGENCY DEPT VISIT HI MDM: CPT

## 2019-05-15 PROCEDURE — 36415 COLL VENOUS BLD VENIPUNCTURE: CPT | Performed by: PHYSICIAN ASSISTANT

## 2019-05-15 PROCEDURE — 81001 URINALYSIS AUTO W/SCOPE: CPT

## 2019-05-15 PROCEDURE — 80053 COMPREHEN METABOLIC PANEL: CPT | Performed by: PHYSICIAN ASSISTANT

## 2019-05-15 PROCEDURE — 74177 CT ABD & PELVIS W/CONTRAST: CPT

## 2019-05-15 PROCEDURE — 83690 ASSAY OF LIPASE: CPT | Performed by: PHYSICIAN ASSISTANT

## 2019-05-15 PROCEDURE — 84443 ASSAY THYROID STIM HORMONE: CPT | Performed by: PHYSICIAN ASSISTANT

## 2019-05-15 PROCEDURE — 82088 ASSAY OF ALDOSTERONE: CPT | Performed by: PHYSICIAN ASSISTANT

## 2019-05-15 PROCEDURE — 82533 TOTAL CORTISOL: CPT | Performed by: PHYSICIAN ASSISTANT

## 2019-05-15 RX ORDER — SODIUM CHLORIDE 9 MG/ML
75 INJECTION, SOLUTION INTRAVENOUS ONCE
Status: COMPLETED | OUTPATIENT
Start: 2019-05-15 | End: 2019-05-15

## 2019-05-15 RX ORDER — ASPIRIN 81 MG/1
81 TABLET ORAL DAILY
Status: DISCONTINUED | OUTPATIENT
Start: 2019-05-16 | End: 2019-05-17 | Stop reason: HOSPADM

## 2019-05-15 RX ORDER — ACETAMINOPHEN 325 MG/1
650 TABLET ORAL EVERY 6 HOURS PRN
Status: DISCONTINUED | OUTPATIENT
Start: 2019-05-15 | End: 2019-05-17 | Stop reason: HOSPADM

## 2019-05-15 RX ORDER — MELATONIN
2000 DAILY
Status: DISCONTINUED | OUTPATIENT
Start: 2019-05-16 | End: 2019-05-17 | Stop reason: HOSPADM

## 2019-05-15 RX ORDER — PANTOPRAZOLE SODIUM 40 MG/1
40 TABLET, DELAYED RELEASE ORAL
Status: DISCONTINUED | OUTPATIENT
Start: 2019-05-16 | End: 2019-05-17 | Stop reason: HOSPADM

## 2019-05-15 RX ORDER — ATORVASTATIN CALCIUM 10 MG/1
10 TABLET, FILM COATED ORAL
Status: DISCONTINUED | OUTPATIENT
Start: 2019-05-15 | End: 2019-05-17 | Stop reason: HOSPADM

## 2019-05-15 RX ORDER — NICOTINE 21 MG/24HR
1 PATCH, TRANSDERMAL 24 HOURS TRANSDERMAL DAILY
Status: DISCONTINUED | OUTPATIENT
Start: 2019-05-16 | End: 2019-05-17 | Stop reason: HOSPADM

## 2019-05-15 RX ADMIN — SODIUM CHLORIDE 1000 ML: 0.9 INJECTION, SOLUTION INTRAVENOUS at 10:06

## 2019-05-15 RX ADMIN — IOHEXOL 50 ML: 240 INJECTION, SOLUTION INTRATHECAL; INTRAVASCULAR; INTRAVENOUS; ORAL at 10:13

## 2019-05-15 RX ADMIN — SODIUM CHLORIDE 75 ML/HR: 0.9 INJECTION, SOLUTION INTRAVENOUS at 18:23

## 2019-05-15 RX ADMIN — IODIXANOL 100 ML: 320 INJECTION, SOLUTION INTRAVASCULAR at 12:28

## 2019-05-15 RX ADMIN — SODIUM CHLORIDE 500 ML: 0.9 INJECTION, SOLUTION INTRAVENOUS at 13:28

## 2019-05-16 ENCOUNTER — APPOINTMENT (INPATIENT)
Dept: MRI IMAGING | Facility: HOSPITAL | Age: 69
DRG: 644 | End: 2019-05-16
Payer: MEDICARE

## 2019-05-16 ENCOUNTER — APPOINTMENT (INPATIENT)
Dept: CT IMAGING | Facility: HOSPITAL | Age: 69
DRG: 644 | End: 2019-05-16
Payer: MEDICARE

## 2019-05-16 LAB
ANION GAP SERPL CALCULATED.3IONS-SCNC: 7 MMOL/L (ref 4–13)
BUN SERPL-MCNC: 19 MG/DL (ref 5–25)
CALCIUM SERPL-MCNC: 8.1 MG/DL (ref 8.3–10.1)
CHLORIDE SERPL-SCNC: 108 MMOL/L (ref 100–108)
CO2 SERPL-SCNC: 25 MMOL/L (ref 21–32)
CORTIS SERPL-MCNC: 17.5 UG/DL
CREAT SERPL-MCNC: 1.44 MG/DL (ref 0.6–1.3)
ERYTHROCYTE [DISTWIDTH] IN BLOOD BY AUTOMATED COUNT: 14.8 % (ref 11.6–15.1)
GFR SERPL CREATININE-BSD FRML MDRD: 50 ML/MIN/1.73SQ M
GLUCOSE SERPL-MCNC: 93 MG/DL (ref 65–140)
HCT VFR BLD AUTO: 44.4 % (ref 36.5–49.3)
HGB BLD-MCNC: 14.4 G/DL (ref 12–17)
MCH RBC QN AUTO: 28.9 PG (ref 26.8–34.3)
MCHC RBC AUTO-ENTMCNC: 32.4 G/DL (ref 31.4–37.4)
MCV RBC AUTO: 89 FL (ref 82–98)
PLATELET # BLD AUTO: 420 THOUSANDS/UL (ref 149–390)
PLATELET # BLD AUTO: 421 THOUSANDS/UL (ref 149–390)
PMV BLD AUTO: 8.8 FL (ref 8.9–12.7)
PMV BLD AUTO: 8.9 FL (ref 8.9–12.7)
POTASSIUM SERPL-SCNC: 4.8 MMOL/L (ref 3.5–5.3)
RBC # BLD AUTO: 4.98 MILLION/UL (ref 3.88–5.62)
SODIUM SERPL-SCNC: 140 MMOL/L (ref 136–145)
WBC # BLD AUTO: 6.94 THOUSAND/UL (ref 4.31–10.16)

## 2019-05-16 PROCEDURE — 82088 ASSAY OF ALDOSTERONE: CPT | Performed by: PHYSICIAN ASSISTANT

## 2019-05-16 PROCEDURE — 85027 COMPLETE CBC AUTOMATED: CPT | Performed by: PHYSICIAN ASSISTANT

## 2019-05-16 PROCEDURE — 99223 1ST HOSP IP/OBS HIGH 75: CPT | Performed by: PHYSICIAN ASSISTANT

## 2019-05-16 PROCEDURE — 99232 SBSQ HOSP IP/OBS MODERATE 35: CPT | Performed by: PHYSICIAN ASSISTANT

## 2019-05-16 PROCEDURE — 82384 ASSAY THREE CATECHOLAMINES: CPT | Performed by: PHYSICIAN ASSISTANT

## 2019-05-16 PROCEDURE — 80048 BASIC METABOLIC PNL TOTAL CA: CPT | Performed by: PHYSICIAN ASSISTANT

## 2019-05-16 PROCEDURE — 71260 CT THORAX DX C+: CPT

## 2019-05-16 PROCEDURE — 82024 ASSAY OF ACTH: CPT | Performed by: PHYSICIAN ASSISTANT

## 2019-05-16 PROCEDURE — 85049 AUTOMATED PLATELET COUNT: CPT | Performed by: PHYSICIAN ASSISTANT

## 2019-05-16 PROCEDURE — 74181 MRI ABDOMEN W/O CONTRAST: CPT

## 2019-05-16 PROCEDURE — 82533 TOTAL CORTISOL: CPT | Performed by: PHYSICIAN ASSISTANT

## 2019-05-16 PROCEDURE — 99232 SBSQ HOSP IP/OBS MODERATE 35: CPT | Performed by: SURGERY

## 2019-05-16 RX ORDER — LORAZEPAM 2 MG/ML
1 INJECTION INTRAMUSCULAR ONCE
Status: COMPLETED | OUTPATIENT
Start: 2019-05-16 | End: 2019-05-16

## 2019-05-16 RX ADMIN — LORAZEPAM 1 MG: 2 INJECTION INTRAMUSCULAR; INTRAVENOUS at 12:25

## 2019-05-16 RX ADMIN — IOHEXOL 85 ML: 350 INJECTION, SOLUTION INTRAVENOUS at 20:47

## 2019-05-16 RX ADMIN — VITAMIN D, TAB 1000IU (100/BT) 2000 UNITS: 25 TAB at 08:46

## 2019-05-16 RX ADMIN — PANTOPRAZOLE SODIUM 40 MG: 40 TABLET, DELAYED RELEASE ORAL at 05:21

## 2019-05-16 RX ADMIN — ATORVASTATIN CALCIUM 10 MG: 10 TABLET, FILM COATED ORAL at 17:04

## 2019-05-16 RX ADMIN — ENOXAPARIN SODIUM 40 MG: 40 INJECTION SUBCUTANEOUS at 08:44

## 2019-05-16 RX ADMIN — ASPIRIN 81 MG: 81 TABLET, COATED ORAL at 08:45

## 2019-05-17 ENCOUNTER — TRANSITIONAL CARE MANAGEMENT (OUTPATIENT)
Dept: INTERNAL MEDICINE CLINIC | Facility: CLINIC | Age: 69
End: 2019-05-17

## 2019-05-17 VITALS
DIASTOLIC BLOOD PRESSURE: 70 MMHG | OXYGEN SATURATION: 95 % | HEART RATE: 111 BPM | RESPIRATION RATE: 18 BRPM | SYSTOLIC BLOOD PRESSURE: 123 MMHG | TEMPERATURE: 97.7 F

## 2019-05-17 LAB — ACTH PLAS-MCNC: 3.2 PG/ML (ref 7.2–63.3)

## 2019-05-17 PROCEDURE — 99239 HOSP IP/OBS DSCHRG MGMT >30: CPT | Performed by: PHYSICIAN ASSISTANT

## 2019-05-17 PROCEDURE — 99232 SBSQ HOSP IP/OBS MODERATE 35: CPT | Performed by: SURGERY

## 2019-05-17 PROCEDURE — 99232 SBSQ HOSP IP/OBS MODERATE 35: CPT | Performed by: PHYSICIAN ASSISTANT

## 2019-05-17 RX ORDER — ATORVASTATIN CALCIUM 20 MG/1
20 TABLET, FILM COATED ORAL DAILY
Qty: 30 TABLET | Refills: 0 | Status: SHIPPED | OUTPATIENT
Start: 2019-05-17 | End: 2019-08-16 | Stop reason: SDUPTHER

## 2019-05-17 RX ORDER — SODIUM CHLORIDE 9 MG/ML
125 INJECTION, SOLUTION INTRAVENOUS CONTINUOUS
Status: DISCONTINUED | OUTPATIENT
Start: 2019-05-17 | End: 2019-05-17 | Stop reason: HOSPADM

## 2019-05-17 RX ORDER — NICOTINE 21 MG/24HR
1 PATCH, TRANSDERMAL 24 HOURS TRANSDERMAL DAILY
Qty: 28 PATCH | Refills: 0 | Status: SHIPPED | OUTPATIENT
Start: 2019-05-18 | End: 2019-06-12 | Stop reason: HOSPADM

## 2019-05-17 RX ADMIN — ENOXAPARIN SODIUM 40 MG: 40 INJECTION SUBCUTANEOUS at 08:35

## 2019-05-17 RX ADMIN — ASPIRIN 81 MG: 81 TABLET, COATED ORAL at 08:35

## 2019-05-17 RX ADMIN — VITAMIN D, TAB 1000IU (100/BT) 2000 UNITS: 25 TAB at 08:35

## 2019-05-17 RX ADMIN — SODIUM CHLORIDE 125 ML/HR: 0.9 INJECTION, SOLUTION INTRAVENOUS at 09:10

## 2019-05-17 RX ADMIN — PANTOPRAZOLE SODIUM 40 MG: 40 TABLET, DELAYED RELEASE ORAL at 05:17

## 2019-05-20 ENCOUNTER — APPOINTMENT (OUTPATIENT)
Dept: LAB | Facility: CLINIC | Age: 69
End: 2019-05-20
Payer: MEDICARE

## 2019-05-20 ENCOUNTER — TRANSCRIBE ORDERS (OUTPATIENT)
Dept: LAB | Facility: CLINIC | Age: 69
End: 2019-05-20

## 2019-05-20 DIAGNOSIS — N18.30 CHRONIC KIDNEY DISEASE, STAGE III (MODERATE) (HCC): ICD-10-CM

## 2019-05-20 LAB
ALDOST SERPL-MCNC: 19.1 NG/DL (ref 0–30)
ALDOST SERPL-MCNC: 3.3 NG/DL (ref 0–30)
ANION GAP SERPL CALCULATED.3IONS-SCNC: 9 MMOL/L (ref 4–13)
BUN SERPL-MCNC: 26 MG/DL (ref 5–25)
CALCIUM SERPL-MCNC: 8.9 MG/DL (ref 8.3–10.1)
CHLORIDE SERPL-SCNC: 105 MMOL/L (ref 100–108)
CO2 SERPL-SCNC: 26 MMOL/L (ref 21–32)
CREAT SERPL-MCNC: 1.95 MG/DL (ref 0.6–1.3)
DOPAMINE 24H UR-MRATE: 65 PG/ML (ref 0–48)
EPINEPH PLAS-MCNC: 49 PG/ML (ref 0–62)
GFR SERPL CREATININE-BSD FRML MDRD: 35 ML/MIN/1.73SQ M
GLUCOSE P FAST SERPL-MCNC: 109 MG/DL (ref 65–99)
NOREPINEPH PLAS-MCNC: 1501 PG/ML (ref 0–874)
POTASSIUM SERPL-SCNC: 4 MMOL/L (ref 3.5–5.3)
SODIUM SERPL-SCNC: 140 MMOL/L (ref 136–145)

## 2019-05-20 PROCEDURE — 36415 COLL VENOUS BLD VENIPUNCTURE: CPT

## 2019-05-20 PROCEDURE — 80048 BASIC METABOLIC PNL TOTAL CA: CPT

## 2019-05-22 ENCOUNTER — OFFICE VISIT (OUTPATIENT)
Dept: INTERNAL MEDICINE CLINIC | Facility: CLINIC | Age: 69
End: 2019-05-22
Payer: MEDICARE

## 2019-05-22 ENCOUNTER — TELEPHONE (OUTPATIENT)
Dept: INTERNAL MEDICINE CLINIC | Facility: CLINIC | Age: 69
End: 2019-05-22

## 2019-05-22 ENCOUNTER — TELEPHONE (OUTPATIENT)
Dept: ADMINISTRATIVE | Facility: HOSPITAL | Age: 69
End: 2019-05-22

## 2019-05-22 ENCOUNTER — OFFICE VISIT (OUTPATIENT)
Dept: VASCULAR SURGERY | Facility: CLINIC | Age: 69
End: 2019-05-22
Payer: MEDICARE

## 2019-05-22 VITALS
HEART RATE: 68 BPM | DIASTOLIC BLOOD PRESSURE: 58 MMHG | HEIGHT: 70 IN | BODY MASS INDEX: 25.05 KG/M2 | WEIGHT: 175 LBS | TEMPERATURE: 98.2 F | SYSTOLIC BLOOD PRESSURE: 88 MMHG

## 2019-05-22 VITALS
HEART RATE: 80 BPM | WEIGHT: 177 LBS | SYSTOLIC BLOOD PRESSURE: 92 MMHG | TEMPERATURE: 97.7 F | HEIGHT: 70 IN | RESPIRATION RATE: 16 BRPM | OXYGEN SATURATION: 99 % | DIASTOLIC BLOOD PRESSURE: 62 MMHG | BODY MASS INDEX: 25.34 KG/M2

## 2019-05-22 DIAGNOSIS — Z87.19 HISTORY OF SMALL BOWEL OBSTRUCTION: ICD-10-CM

## 2019-05-22 DIAGNOSIS — E27.8 MASS OF BOTH ADRENAL GLANDS (HCC): ICD-10-CM

## 2019-05-22 DIAGNOSIS — I73.9 PAD (PERIPHERAL ARTERY DISEASE) (HCC): Primary | ICD-10-CM

## 2019-05-22 DIAGNOSIS — Z00.00 MEDICARE ANNUAL WELLNESS VISIT, SUBSEQUENT: ICD-10-CM

## 2019-05-22 DIAGNOSIS — N18.30 CHRONIC KIDNEY DISEASE, STAGE III (MODERATE) (HCC): ICD-10-CM

## 2019-05-22 DIAGNOSIS — I75.021 BLUE TOE SYNDROME, RIGHT (HCC): ICD-10-CM

## 2019-05-22 DIAGNOSIS — Z09 HOSPITAL DISCHARGE FOLLOW-UP: Primary | ICD-10-CM

## 2019-05-22 PROCEDURE — 99495 TRANSJ CARE MGMT MOD F2F 14D: CPT | Performed by: INTERNAL MEDICINE

## 2019-05-22 PROCEDURE — G0439 PPPS, SUBSEQ VISIT: HCPCS | Performed by: INTERNAL MEDICINE

## 2019-05-22 PROCEDURE — 99215 OFFICE O/P EST HI 40 MIN: CPT | Performed by: SURGERY

## 2019-05-24 ENCOUNTER — TELEPHONE (OUTPATIENT)
Dept: INTERNAL MEDICINE CLINIC | Facility: CLINIC | Age: 69
End: 2019-05-24

## 2019-05-28 ENCOUNTER — TRANSCRIBE ORDERS (OUTPATIENT)
Dept: LAB | Facility: CLINIC | Age: 69
End: 2019-05-28

## 2019-05-28 ENCOUNTER — OFFICE VISIT (OUTPATIENT)
Dept: CARDIOLOGY CLINIC | Facility: CLINIC | Age: 69
End: 2019-05-28
Payer: MEDICARE

## 2019-05-28 ENCOUNTER — APPOINTMENT (OUTPATIENT)
Dept: LAB | Facility: CLINIC | Age: 69
End: 2019-05-28
Payer: MEDICARE

## 2019-05-28 VITALS
DIASTOLIC BLOOD PRESSURE: 78 MMHG | SYSTOLIC BLOOD PRESSURE: 112 MMHG | WEIGHT: 182.3 LBS | OXYGEN SATURATION: 97 % | BODY MASS INDEX: 26.1 KG/M2 | HEART RATE: 101 BPM | HEIGHT: 70 IN

## 2019-05-28 DIAGNOSIS — I25.5 ISCHEMIC CARDIOMYOPATHY: ICD-10-CM

## 2019-05-28 DIAGNOSIS — Z01.810 PREOP CARDIOVASCULAR EXAM: Primary | ICD-10-CM

## 2019-05-28 DIAGNOSIS — I48.91 EMBOLIC PHENOMENON SECONDARY TO ATRIAL FIBRILLATION (HCC): ICD-10-CM

## 2019-05-28 DIAGNOSIS — I45.2 RBBB (RIGHT BUNDLE BRANCH BLOCK WITH LEFT ANTERIOR FASCICULAR BLOCK): ICD-10-CM

## 2019-05-28 DIAGNOSIS — Z11.59 NEED FOR HEPATITIS C SCREENING TEST: ICD-10-CM

## 2019-05-28 DIAGNOSIS — I48.91 ATRIAL FIBRILLATION, UNSPECIFIED TYPE (HCC): Primary | ICD-10-CM

## 2019-05-28 DIAGNOSIS — I74.9 EMBOLIC PHENOMENON SECONDARY TO ATRIAL FIBRILLATION (HCC): ICD-10-CM

## 2019-05-28 DIAGNOSIS — R19.7 DIARRHEA OF PRESUMED INFECTIOUS ORIGIN: Primary | ICD-10-CM

## 2019-05-28 DIAGNOSIS — N18.30 CHRONIC KIDNEY DISEASE, STAGE III (MODERATE) (HCC): ICD-10-CM

## 2019-05-28 DIAGNOSIS — R19.7 DIARRHEA OF PRESUMED INFECTIOUS ORIGIN: ICD-10-CM

## 2019-05-28 PROCEDURE — 87209 SMEAR COMPLEX STAIN: CPT

## 2019-05-28 PROCEDURE — 89055 LEUKOCYTE ASSESSMENT FECAL: CPT

## 2019-05-28 PROCEDURE — 99214 OFFICE O/P EST MOD 30 MIN: CPT | Performed by: INTERNAL MEDICINE

## 2019-05-28 PROCEDURE — 87177 OVA AND PARASITES SMEARS: CPT

## 2019-05-28 PROCEDURE — 87505 NFCT AGENT DETECTION GI: CPT

## 2019-05-29 ENCOUNTER — APPOINTMENT (OUTPATIENT)
Dept: LAB | Facility: CLINIC | Age: 69
End: 2019-05-29
Payer: MEDICARE

## 2019-05-29 LAB
C DIFF TOX GENS STL QL NAA+PROBE: NORMAL
CAMPYLOBACTER DNA SPEC NAA+PROBE: NORMAL
DOPAMINE 24H UR-MRATE: 397 UG/24 HR (ref 0–510)
DOPAMINE UR-MCNC: 882 UG/L
EPINEPH 24H UR-MRATE: 8 UG/24 HR (ref 0–20)
EPINEPH UR-MCNC: 18 UG/L
HCV AB SER QL: NORMAL
HEMOCCULT STL QL IA: POSITIVE
NOREPINEPH 24H UR-MRATE: 73 UG/24 HR (ref 0–135)
NOREPINEPH UR-MCNC: 163 UG/L
SALMONELLA DNA SPEC QL NAA+PROBE: NORMAL
SHIGA TOXIN STX GENE SPEC NAA+PROBE: NORMAL
SHIGELLA DNA SPEC QL NAA+PROBE: NORMAL

## 2019-05-29 PROCEDURE — 36415 COLL VENOUS BLD VENIPUNCTURE: CPT

## 2019-05-29 PROCEDURE — G0328 FECAL BLOOD SCRN IMMUNOASSAY: HCPCS

## 2019-05-29 PROCEDURE — 86803 HEPATITIS C AB TEST: CPT

## 2019-05-30 LAB
G LAMBLIA AG STL QL IA: NEGATIVE
O+P STL CONC: NORMAL

## 2019-06-01 LAB — WBC SPEC QL GRAM STN: NORMAL

## 2019-06-03 ENCOUNTER — OFFICE VISIT (OUTPATIENT)
Dept: NEPHROLOGY | Facility: CLINIC | Age: 69
End: 2019-06-03
Payer: MEDICARE

## 2019-06-03 VITALS
SYSTOLIC BLOOD PRESSURE: 102 MMHG | HEART RATE: 68 BPM | HEIGHT: 70 IN | BODY MASS INDEX: 26.11 KG/M2 | DIASTOLIC BLOOD PRESSURE: 78 MMHG | WEIGHT: 182.4 LBS

## 2019-06-03 DIAGNOSIS — E78.5 DYSLIPIDEMIA: ICD-10-CM

## 2019-06-03 DIAGNOSIS — R80.1 PERSISTENT PROTEINURIA: ICD-10-CM

## 2019-06-03 DIAGNOSIS — N18.30 CHRONIC KIDNEY DISEASE, STAGE III (MODERATE) (HCC): Primary | ICD-10-CM

## 2019-06-03 DIAGNOSIS — I95.89 CHRONIC HYPOTENSION: ICD-10-CM

## 2019-06-03 DIAGNOSIS — E55.9 VITAMIN D DEFICIENCY: ICD-10-CM

## 2019-06-03 PROBLEM — R93.1 ABNORMAL ECHOCARDIOGRAM: Status: RESOLVED | Noted: 2018-08-20 | Resolved: 2019-06-03

## 2019-06-03 PROBLEM — I25.5 ISCHEMIC CARDIOMYOPATHY: Status: ACTIVE | Noted: 2019-06-03

## 2019-06-03 PROCEDURE — 93000 ELECTROCARDIOGRAM COMPLETE: CPT | Performed by: INTERNAL MEDICINE

## 2019-06-03 PROCEDURE — 99215 OFFICE O/P EST HI 40 MIN: CPT | Performed by: INTERNAL MEDICINE

## 2019-06-04 ENCOUNTER — APPOINTMENT (OUTPATIENT)
Dept: LAB | Facility: CLINIC | Age: 69
End: 2019-06-04
Payer: MEDICARE

## 2019-06-04 ENCOUNTER — TELEPHONE (OUTPATIENT)
Dept: NEPHROLOGY | Facility: CLINIC | Age: 69
End: 2019-06-04

## 2019-06-04 DIAGNOSIS — E55.9 VITAMIN D DEFICIENCY: ICD-10-CM

## 2019-06-04 DIAGNOSIS — R80.1 PERSISTENT PROTEINURIA: ICD-10-CM

## 2019-06-04 DIAGNOSIS — I95.89 CHRONIC HYPOTENSION: ICD-10-CM

## 2019-06-04 DIAGNOSIS — N18.30 CHRONIC KIDNEY DISEASE, STAGE III (MODERATE) (HCC): ICD-10-CM

## 2019-06-04 DIAGNOSIS — E78.5 DYSLIPIDEMIA: ICD-10-CM

## 2019-06-04 LAB
ANION GAP SERPL CALCULATED.3IONS-SCNC: 6 MMOL/L (ref 4–13)
BUN SERPL-MCNC: 19 MG/DL (ref 5–25)
CALCIUM SERPL-MCNC: 8.6 MG/DL (ref 8.3–10.1)
CHLORIDE SERPL-SCNC: 107 MMOL/L (ref 100–108)
CO2 SERPL-SCNC: 27 MMOL/L (ref 21–32)
CREAT SERPL-MCNC: 1.21 MG/DL (ref 0.6–1.3)
CREAT UR-MCNC: 194 MG/DL
GFR SERPL CREATININE-BSD FRML MDRD: 62 ML/MIN/1.73SQ M
GLUCOSE P FAST SERPL-MCNC: 92 MG/DL (ref 65–99)
MAGNESIUM SERPL-MCNC: 1.5 MG/DL (ref 1.6–2.6)
PHOSPHATE SERPL-MCNC: 4.2 MG/DL (ref 2.3–4.1)
POTASSIUM SERPL-SCNC: 3.9 MMOL/L (ref 3.5–5.3)
PROT UR-MCNC: 55 MG/DL
PROT/CREAT UR: 0.28 MG/G{CREAT} (ref 0–0.1)
SODIUM SERPL-SCNC: 140 MMOL/L (ref 136–145)

## 2019-06-04 PROCEDURE — 36415 COLL VENOUS BLD VENIPUNCTURE: CPT

## 2019-06-04 PROCEDURE — 82570 ASSAY OF URINE CREATININE: CPT | Performed by: INTERNAL MEDICINE

## 2019-06-04 PROCEDURE — 83735 ASSAY OF MAGNESIUM: CPT

## 2019-06-04 PROCEDURE — 80048 BASIC METABOLIC PNL TOTAL CA: CPT

## 2019-06-04 PROCEDURE — 84156 ASSAY OF PROTEIN URINE: CPT | Performed by: INTERNAL MEDICINE

## 2019-06-04 PROCEDURE — 84100 ASSAY OF PHOSPHORUS: CPT

## 2019-06-05 ENCOUNTER — HOSPITAL ENCOUNTER (OUTPATIENT)
Dept: NON INVASIVE DIAGNOSTICS | Facility: HOSPITAL | Age: 69
Discharge: HOME | End: 2019-06-05
Attending: INTERNAL MEDICINE | Admitting: INTERNAL MEDICINE
Payer: MEDICARE

## 2019-06-05 ENCOUNTER — HOSPITAL ENCOUNTER (OUTPATIENT)
Dept: NON INVASIVE DIAGNOSTICS | Facility: HOSPITAL | Age: 69
Discharge: HOME | End: 2019-06-05
Payer: MEDICARE

## 2019-06-05 ENCOUNTER — ANESTHESIA (OUTPATIENT)
Dept: NON INVASIVE DIAGNOSTICS | Facility: HOSPITAL | Age: 69
End: 2019-06-05
Payer: MEDICARE

## 2019-06-05 ENCOUNTER — ANESTHESIA EVENT (OUTPATIENT)
Dept: NON INVASIVE DIAGNOSTICS | Facility: HOSPITAL | Age: 69
End: 2019-06-05
Payer: MEDICARE

## 2019-06-05 VITALS
OXYGEN SATURATION: 93 % | BODY MASS INDEX: 26.2 KG/M2 | HEART RATE: 94 BPM | WEIGHT: 183 LBS | SYSTOLIC BLOOD PRESSURE: 112 MMHG | RESPIRATION RATE: 18 BRPM | DIASTOLIC BLOOD PRESSURE: 68 MMHG | HEIGHT: 70 IN

## 2019-06-05 VITALS
RESPIRATION RATE: 18 BRPM | SYSTOLIC BLOOD PRESSURE: 115 MMHG | DIASTOLIC BLOOD PRESSURE: 82 MMHG | OXYGEN SATURATION: 98 % | HEART RATE: 90 BPM

## 2019-06-05 DIAGNOSIS — I48.91 ATRIAL FIBRILLATION, UNSPECIFIED TYPE (HCC): ICD-10-CM

## 2019-06-05 DIAGNOSIS — I74.9 EMBOLIC PHENOMENON SECONDARY TO ATRIAL FIBRILLATION (HCC): ICD-10-CM

## 2019-06-05 DIAGNOSIS — I48.91 EMBOLIC PHENOMENON SECONDARY TO ATRIAL FIBRILLATION (HCC): ICD-10-CM

## 2019-06-05 PROCEDURE — 93005 ELECTROCARDIOGRAM TRACING: CPT

## 2019-06-05 PROCEDURE — 33285 INSJ SUBQ CAR RHYTHM MNTR: CPT | Performed by: PHYSICIAN ASSISTANT

## 2019-06-05 PROCEDURE — 93312 ECHO TRANSESOPHAGEAL: CPT | Performed by: INTERNAL MEDICINE

## 2019-06-05 PROCEDURE — 93321 DOPPLER ECHO F-UP/LMTD STD: CPT | Performed by: INTERNAL MEDICINE

## 2019-06-05 PROCEDURE — 93312 ECHO TRANSESOPHAGEAL: CPT

## 2019-06-05 PROCEDURE — C1764 EVENT RECORDER, CARDIAC: HCPCS

## 2019-06-05 PROCEDURE — 33285 INSJ SUBQ CAR RHYTHM MNTR: CPT

## 2019-06-05 PROCEDURE — 93325 DOPPLER ECHO COLOR FLOW MAPG: CPT | Performed by: INTERNAL MEDICINE

## 2019-06-05 RX ORDER — LIDOCAINE HYDROCHLORIDE 10 MG/ML
INJECTION, SOLUTION INFILTRATION; PERINEURAL AS NEEDED
Status: DISCONTINUED | OUTPATIENT
Start: 2019-06-05 | End: 2019-06-05 | Stop reason: SURG

## 2019-06-05 RX ORDER — SODIUM CHLORIDE 9 MG/ML
INJECTION, SOLUTION INTRAVENOUS CONTINUOUS PRN
Status: DISCONTINUED | OUTPATIENT
Start: 2019-06-05 | End: 2019-06-05 | Stop reason: SURG

## 2019-06-05 RX ORDER — PROPOFOL 10 MG/ML
INJECTION, EMULSION INTRAVENOUS CONTINUOUS PRN
Status: DISCONTINUED | OUTPATIENT
Start: 2019-06-05 | End: 2019-06-05 | Stop reason: SURG

## 2019-06-05 RX ORDER — LIDOCAINE HYDROCHLORIDE AND EPINEPHRINE 10; 10 MG/ML; UG/ML
INJECTION, SOLUTION INFILTRATION; PERINEURAL CODE/TRAUMA/SEDATION MEDICATION
Status: COMPLETED | OUTPATIENT
Start: 2019-06-05 | End: 2019-06-05

## 2019-06-05 RX ORDER — PROPOFOL 10 MG/ML
INJECTION, EMULSION INTRAVENOUS AS NEEDED
Status: DISCONTINUED | OUTPATIENT
Start: 2019-06-05 | End: 2019-06-05 | Stop reason: SURG

## 2019-06-05 RX ADMIN — PROPOFOL 140 MCG/KG/MIN: 10 INJECTION, EMULSION INTRAVENOUS at 14:17

## 2019-06-05 RX ADMIN — LIDOCAINE HYDROCHLORIDE AND EPINEPHRINE 15 ML: 10; 10 INJECTION, SOLUTION INFILTRATION; PERINEURAL at 15:46

## 2019-06-05 RX ADMIN — PROPOFOL 50 MG: 10 INJECTION, EMULSION INTRAVENOUS at 14:17

## 2019-06-05 RX ADMIN — LIDOCAINE HYDROCHLORIDE 50 MG: 10 INJECTION, SOLUTION INFILTRATION; PERINEURAL at 14:17

## 2019-06-05 RX ADMIN — SODIUM CHLORIDE: 0.9 INJECTION, SOLUTION INTRAVENOUS at 13:40

## 2019-06-06 LAB
ATRIAL RATE: 96 BPM
P AXIS: 21 DEGREES
PR INTERVAL: 124 MS
QRS AXIS: -41 DEGREES
QRSD INTERVAL: 116 MS
QT INTERVAL: 382 MS
QTC INTERVAL: 482 MS
T WAVE AXIS: -5 DEGREES
VENTRICULAR RATE: 96 BPM

## 2019-06-06 PROCEDURE — 93010 ELECTROCARDIOGRAM REPORT: CPT | Performed by: INTERNAL MEDICINE

## 2019-06-12 ENCOUNTER — APPOINTMENT (OUTPATIENT)
Dept: LAB | Facility: CLINIC | Age: 69
End: 2019-06-12
Payer: MEDICARE

## 2019-06-12 ENCOUNTER — OFFICE VISIT (OUTPATIENT)
Dept: SURGICAL ONCOLOGY | Facility: CLINIC | Age: 69
End: 2019-06-12
Payer: MEDICARE

## 2019-06-12 ENCOUNTER — TRANSCRIBE ORDERS (OUTPATIENT)
Dept: LAB | Facility: CLINIC | Age: 69
End: 2019-06-12

## 2019-06-12 ENCOUNTER — TELEPHONE (OUTPATIENT)
Dept: VASCULAR SURGERY | Facility: CLINIC | Age: 69
End: 2019-06-12

## 2019-06-12 ENCOUNTER — PREP FOR PROCEDURE (OUTPATIENT)
Dept: VASCULAR SURGERY | Facility: CLINIC | Age: 69
End: 2019-06-12

## 2019-06-12 VITALS
HEIGHT: 71 IN | BODY MASS INDEX: 25.62 KG/M2 | RESPIRATION RATE: 18 BRPM | DIASTOLIC BLOOD PRESSURE: 80 MMHG | WEIGHT: 183 LBS | HEART RATE: 100 BPM | TEMPERATURE: 97.5 F | SYSTOLIC BLOOD PRESSURE: 104 MMHG

## 2019-06-12 DIAGNOSIS — I73.9 PAD (PERIPHERAL ARTERY DISEASE) (HCC): ICD-10-CM

## 2019-06-12 DIAGNOSIS — I73.9 PERIPHERAL VASCULAR DISEASE, UNSPECIFIED (HCC): Primary | ICD-10-CM

## 2019-06-12 DIAGNOSIS — E27.8 MASS OF BOTH ADRENAL GLANDS (HCC): Primary | ICD-10-CM

## 2019-06-12 DIAGNOSIS — I73.9 PERIPHERAL VASCULAR DISEASE, UNSPECIFIED (HCC): ICD-10-CM

## 2019-06-12 LAB
ABO GROUP BLD: NORMAL
ANION GAP SERPL CALCULATED.3IONS-SCNC: 4 MMOL/L (ref 4–13)
BASOPHILS # BLD AUTO: 0.05 THOUSANDS/ΜL (ref 0–0.1)
BASOPHILS NFR BLD AUTO: 1 % (ref 0–1)
BLD GP AB SCN SERPL QL: NEGATIVE
BUN SERPL-MCNC: 15 MG/DL (ref 5–25)
CALCIUM SERPL-MCNC: 9.1 MG/DL (ref 8.3–10.1)
CHLORIDE SERPL-SCNC: 108 MMOL/L (ref 100–108)
CO2 SERPL-SCNC: 30 MMOL/L (ref 21–32)
CREAT SERPL-MCNC: 1.14 MG/DL (ref 0.6–1.3)
EOSINOPHIL # BLD AUTO: 0.13 THOUSAND/ΜL (ref 0–0.61)
EOSINOPHIL NFR BLD AUTO: 2 % (ref 0–6)
ERYTHROCYTE [DISTWIDTH] IN BLOOD BY AUTOMATED COUNT: 18.2 % (ref 11.6–15.1)
GFR SERPL CREATININE-BSD FRML MDRD: 66 ML/MIN/1.73SQ M
GLUCOSE P FAST SERPL-MCNC: 93 MG/DL (ref 65–99)
HCT VFR BLD AUTO: 45.4 % (ref 36.5–49.3)
HGB BLD-MCNC: 14.1 G/DL (ref 12–17)
IMM GRANULOCYTES # BLD AUTO: 0.03 THOUSAND/UL (ref 0–0.2)
IMM GRANULOCYTES NFR BLD AUTO: 0 % (ref 0–2)
LYMPHOCYTES # BLD AUTO: 2.02 THOUSANDS/ΜL (ref 0.6–4.47)
LYMPHOCYTES NFR BLD AUTO: 24 % (ref 14–44)
MCH RBC QN AUTO: 29.1 PG (ref 26.8–34.3)
MCHC RBC AUTO-ENTMCNC: 31.1 G/DL (ref 31.4–37.4)
MCV RBC AUTO: 94 FL (ref 82–98)
MONOCYTES # BLD AUTO: 0.84 THOUSAND/ΜL (ref 0.17–1.22)
MONOCYTES NFR BLD AUTO: 10 % (ref 4–12)
NEUTROPHILS # BLD AUTO: 5.39 THOUSANDS/ΜL (ref 1.85–7.62)
NEUTS SEG NFR BLD AUTO: 63 % (ref 43–75)
NRBC BLD AUTO-RTO: 0 /100 WBCS
PLATELET # BLD AUTO: 487 THOUSANDS/UL (ref 149–390)
PMV BLD AUTO: 8.9 FL (ref 8.9–12.7)
POTASSIUM SERPL-SCNC: 5.2 MMOL/L (ref 3.5–5.3)
RBC # BLD AUTO: 4.84 MILLION/UL (ref 3.88–5.62)
RH BLD: POSITIVE
SODIUM SERPL-SCNC: 142 MMOL/L (ref 136–145)
SPECIMEN EXPIRATION DATE: NORMAL
WBC # BLD AUTO: 8.46 THOUSAND/UL (ref 4.31–10.16)

## 2019-06-12 PROCEDURE — 85025 COMPLETE CBC W/AUTO DIFF WBC: CPT

## 2019-06-12 PROCEDURE — 86901 BLOOD TYPING SEROLOGIC RH(D): CPT

## 2019-06-12 PROCEDURE — 86900 BLOOD TYPING SEROLOGIC ABO: CPT

## 2019-06-12 PROCEDURE — 80048 BASIC METABOLIC PNL TOTAL CA: CPT

## 2019-06-12 PROCEDURE — 99214 OFFICE O/P EST MOD 30 MIN: CPT | Performed by: SURGERY

## 2019-06-12 PROCEDURE — 86850 RBC ANTIBODY SCREEN: CPT

## 2019-06-12 PROCEDURE — 36415 COLL VENOUS BLD VENIPUNCTURE: CPT

## 2019-06-12 RX ORDER — THIAMINE HCL 100 MG
1 TABLET ORAL DAILY
COMMUNITY

## 2019-06-13 ENCOUNTER — APPOINTMENT (OUTPATIENT)
Dept: LAB | Facility: CLINIC | Age: 69
End: 2019-06-13
Payer: MEDICARE

## 2019-06-13 ENCOUNTER — TRANSCRIBE ORDERS (OUTPATIENT)
Dept: LAB | Facility: CLINIC | Age: 69
End: 2019-06-13

## 2019-06-13 DIAGNOSIS — R19.7 DIARRHEA OF PRESUMED INFECTIOUS ORIGIN: Primary | ICD-10-CM

## 2019-06-13 PROCEDURE — 82656 EL-1 FECAL QUAL/SEMIQ: CPT

## 2019-06-16 LAB — ELASTASE PANC STL-MCNT: 202 UG ELAST./G

## 2019-06-19 ENCOUNTER — IN-CLINIC DEVICE VISIT (OUTPATIENT)
Dept: CARDIOLOGY CLINIC | Facility: CLINIC | Age: 69
End: 2019-06-19

## 2019-06-19 DIAGNOSIS — I74.9 EMBOLIC PHENOMENON SECONDARY TO ATRIAL FIBRILLATION (HCC): Primary | ICD-10-CM

## 2019-06-19 DIAGNOSIS — Z95.818 PRESENCE OF OTHER CARDIAC IMPLANTS AND GRAFTS: ICD-10-CM

## 2019-06-19 DIAGNOSIS — I48.91 EMBOLIC PHENOMENON SECONDARY TO ATRIAL FIBRILLATION (HCC): Primary | ICD-10-CM

## 2019-06-19 DIAGNOSIS — I73.9 PAD (PERIPHERAL ARTERY DISEASE) (HCC): ICD-10-CM

## 2019-06-19 PROCEDURE — 99024 POSTOP FOLLOW-UP VISIT: CPT | Performed by: INTERNAL MEDICINE

## 2019-06-20 ENCOUNTER — ANESTHESIA EVENT (OUTPATIENT)
Dept: PERIOP | Facility: HOSPITAL | Age: 69
End: 2019-06-20
Payer: MEDICARE

## 2019-06-21 ENCOUNTER — TELEPHONE (OUTPATIENT)
Dept: VASCULAR SURGERY | Facility: CLINIC | Age: 69
End: 2019-06-21

## 2019-06-21 ENCOUNTER — TELEPHONE (OUTPATIENT)
Dept: NEPHROLOGY | Facility: CLINIC | Age: 69
End: 2019-06-21

## 2019-06-25 ENCOUNTER — APPOINTMENT (OUTPATIENT)
Dept: LAB | Facility: CLINIC | Age: 69
End: 2019-06-25
Payer: MEDICARE

## 2019-06-25 PROCEDURE — 36415 COLL VENOUS BLD VENIPUNCTURE: CPT | Performed by: SURGERY

## 2019-06-27 ENCOUNTER — APPOINTMENT (OUTPATIENT)
Dept: RADIOLOGY | Facility: HOSPITAL | Age: 69
End: 2019-06-27
Attending: SURGERY
Payer: MEDICARE

## 2019-06-27 ENCOUNTER — ANESTHESIA (OUTPATIENT)
Dept: PERIOP | Facility: HOSPITAL | Age: 69
End: 2019-06-27
Payer: MEDICARE

## 2019-06-27 ENCOUNTER — HOSPITAL ENCOUNTER (OUTPATIENT)
Facility: HOSPITAL | Age: 69
Setting detail: OUTPATIENT SURGERY
Discharge: HOME | End: 2019-06-27
Attending: SURGERY | Admitting: SURGERY
Payer: MEDICARE

## 2019-06-27 VITALS
RESPIRATION RATE: 18 BRPM | SYSTOLIC BLOOD PRESSURE: 111 MMHG | BODY MASS INDEX: 26.2 KG/M2 | OXYGEN SATURATION: 94 % | HEIGHT: 70 IN | HEART RATE: 94 BPM | DIASTOLIC BLOOD PRESSURE: 79 MMHG | TEMPERATURE: 98.4 F | WEIGHT: 183 LBS

## 2019-06-27 DIAGNOSIS — I73.9 PAD (PERIPHERAL ARTERY DISEASE) (HCC): ICD-10-CM

## 2019-06-27 DIAGNOSIS — I73.9 PERIPHERAL ARTERY DISEASE (HCC): ICD-10-CM

## 2019-06-27 PROCEDURE — C1769 GUIDE WIRE: HCPCS | Performed by: SURGERY

## 2019-06-27 PROCEDURE — NC001 PR NO CHARGE: Performed by: SURGERY

## 2019-06-27 PROCEDURE — 37226 PR REVASCULARIZE FEM/POP ARTERY,ANGIOPLASTY/STENT: CPT | Performed by: SURGERY

## 2019-06-27 PROCEDURE — C1876 STENT, NON-COA/NON-COV W/DEL: HCPCS

## 2019-06-27 PROCEDURE — 76937 US GUIDE VASCULAR ACCESS: CPT

## 2019-06-27 PROCEDURE — C1894 INTRO/SHEATH, NON-LASER: HCPCS | Performed by: SURGERY

## 2019-06-27 PROCEDURE — 75710 ARTERY X-RAYS ARM/LEG: CPT

## 2019-06-27 PROCEDURE — C1887 CATHETER, GUIDING: HCPCS | Performed by: SURGERY

## 2019-06-27 PROCEDURE — C1760 CLOSURE DEV, VASC: HCPCS | Performed by: SURGERY

## 2019-06-27 PROCEDURE — 75625 CONTRAST EXAM ABDOMINL AORTA: CPT

## 2019-06-27 PROCEDURE — C1725 CATH, TRANSLUMIN NON-LASER: HCPCS | Performed by: SURGERY

## 2019-06-27 PROCEDURE — 75710 ARTERY X-RAYS ARM/LEG: CPT | Performed by: SURGERY

## 2019-06-27 DEVICE — CLOSURE DEVICE MYNX ACE 6F/7FR: Type: IMPLANTABLE DEVICE | Site: ARTERIAL | Status: FUNCTIONAL

## 2019-06-27 RX ORDER — SODIUM CHLORIDE, SODIUM LACTATE, POTASSIUM CHLORIDE, CALCIUM CHLORIDE 600; 310; 30; 20 MG/100ML; MG/100ML; MG/100ML; MG/100ML
50 INJECTION, SOLUTION INTRAVENOUS CONTINUOUS
Status: DISCONTINUED | OUTPATIENT
Start: 2019-06-27 | End: 2019-06-27 | Stop reason: HOSPADM

## 2019-06-27 RX ORDER — SODIUM CHLORIDE, SODIUM LACTATE, POTASSIUM CHLORIDE, CALCIUM CHLORIDE 600; 310; 30; 20 MG/100ML; MG/100ML; MG/100ML; MG/100ML
125 INJECTION, SOLUTION INTRAVENOUS CONTINUOUS
Status: DISCONTINUED | OUTPATIENT
Start: 2019-06-27 | End: 2019-06-27 | Stop reason: HOSPADM

## 2019-06-27 RX ORDER — MIDAZOLAM HYDROCHLORIDE 1 MG/ML
INJECTION INTRAMUSCULAR; INTRAVENOUS AS NEEDED
Status: DISCONTINUED | OUTPATIENT
Start: 2019-06-27 | End: 2019-06-27 | Stop reason: SURG

## 2019-06-27 RX ORDER — ONDANSETRON 2 MG/ML
4 INJECTION INTRAMUSCULAR; INTRAVENOUS ONCE AS NEEDED
Status: DISCONTINUED | OUTPATIENT
Start: 2019-06-27 | End: 2019-06-27 | Stop reason: HOSPADM

## 2019-06-27 RX ORDER — FENTANYL CITRATE/PF 50 MCG/ML
25 SYRINGE (ML) INJECTION
Status: DISCONTINUED | OUTPATIENT
Start: 2019-06-27 | End: 2019-06-27 | Stop reason: HOSPADM

## 2019-06-27 RX ORDER — LIDOCAINE HYDROCHLORIDE 10 MG/ML
INJECTION, SOLUTION INFILTRATION; PERINEURAL AS NEEDED
Status: DISCONTINUED | OUTPATIENT
Start: 2019-06-27 | End: 2019-06-27 | Stop reason: HOSPADM

## 2019-06-27 RX ORDER — SODIUM CHLORIDE 9 MG/ML
250 INJECTION, SOLUTION INTRAVENOUS CONTINUOUS
Status: DISCONTINUED | OUTPATIENT
Start: 2019-06-27 | End: 2019-06-27 | Stop reason: HOSPADM

## 2019-06-27 RX ORDER — KETAMINE HYDROCHLORIDE 50 MG/ML
INJECTION, SOLUTION, CONCENTRATE INTRAMUSCULAR; INTRAVENOUS AS NEEDED
Status: DISCONTINUED | OUTPATIENT
Start: 2019-06-27 | End: 2019-06-27 | Stop reason: SURG

## 2019-06-27 RX ORDER — ACETAMINOPHEN 325 MG/1
650 TABLET ORAL EVERY 4 HOURS PRN
Status: DISCONTINUED | OUTPATIENT
Start: 2019-06-27 | End: 2019-06-27 | Stop reason: HOSPADM

## 2019-06-27 RX ORDER — HEPARIN SODIUM 200 [USP'U]/100ML
INJECTION, SOLUTION INTRAVENOUS
Status: COMPLETED | OUTPATIENT
Start: 2019-06-27 | End: 2019-06-27

## 2019-06-27 RX ORDER — ONDANSETRON 2 MG/ML
INJECTION INTRAMUSCULAR; INTRAVENOUS AS NEEDED
Status: DISCONTINUED | OUTPATIENT
Start: 2019-06-27 | End: 2019-06-27 | Stop reason: SURG

## 2019-06-27 RX ORDER — PROTAMINE SULFATE 10 MG/ML
INJECTION, SOLUTION INTRAVENOUS AS NEEDED
Status: DISCONTINUED | OUTPATIENT
Start: 2019-06-27 | End: 2019-06-27 | Stop reason: SURG

## 2019-06-27 RX ORDER — HEPARIN SODIUM 1000 [USP'U]/ML
INJECTION, SOLUTION INTRAVENOUS; SUBCUTANEOUS AS NEEDED
Status: DISCONTINUED | OUTPATIENT
Start: 2019-06-27 | End: 2019-06-27 | Stop reason: SURG

## 2019-06-27 RX ORDER — GLYCOPYRROLATE 0.2 MG/ML
INJECTION INTRAMUSCULAR; INTRAVENOUS AS NEEDED
Status: DISCONTINUED | OUTPATIENT
Start: 2019-06-27 | End: 2019-06-27 | Stop reason: SURG

## 2019-06-27 RX ORDER — LIDOCAINE HYDROCHLORIDE 10 MG/ML
0.5 INJECTION, SOLUTION EPIDURAL; INFILTRATION; INTRACAUDAL; PERINEURAL ONCE AS NEEDED
Status: COMPLETED | OUTPATIENT
Start: 2019-06-27 | End: 2019-06-27

## 2019-06-27 RX ADMIN — GLYCOPYRROLATE 0.1 MG: 0.2 INJECTION, SOLUTION INTRAMUSCULAR; INTRAVENOUS at 07:42

## 2019-06-27 RX ADMIN — MIDAZOLAM 2 MG: 1 INJECTION INTRAMUSCULAR; INTRAVENOUS at 07:42

## 2019-06-27 RX ADMIN — LIDOCAINE HYDROCHLORIDE 0.5 ML: 10 INJECTION, SOLUTION EPIDURAL; INFILTRATION; INTRACAUDAL; PERINEURAL at 07:24

## 2019-06-27 RX ADMIN — PROTAMINE SULFATE 20 MG: 10 INJECTION, SOLUTION INTRAVENOUS at 08:40

## 2019-06-27 RX ADMIN — ONDANSETRON 4 MG: 2 INJECTION INTRAMUSCULAR; INTRAVENOUS at 07:42

## 2019-06-27 RX ADMIN — SODIUM CHLORIDE, SODIUM LACTATE, POTASSIUM CHLORIDE, AND CALCIUM CHLORIDE: .6; .31; .03; .02 INJECTION, SOLUTION INTRAVENOUS at 07:48

## 2019-06-27 RX ADMIN — KETAMINE HYDROCHLORIDE 10 MG: 50 INJECTION, SOLUTION INTRAMUSCULAR; INTRAVENOUS at 08:01

## 2019-06-27 RX ADMIN — KETAMINE HYDROCHLORIDE 15 MG: 50 INJECTION, SOLUTION INTRAMUSCULAR; INTRAVENOUS at 07:53

## 2019-06-27 RX ADMIN — HEPARIN SODIUM 6000 UNITS: 1000 INJECTION INTRAVENOUS; SUBCUTANEOUS at 08:16

## 2019-06-27 RX ADMIN — SODIUM CHLORIDE, SODIUM LACTATE, POTASSIUM CHLORIDE, AND CALCIUM CHLORIDE 125 ML/HR: .6; .31; .03; .02 INJECTION, SOLUTION INTRAVENOUS at 09:44

## 2019-06-27 RX ADMIN — KETAMINE HYDROCHLORIDE 25 MG: 50 INJECTION, SOLUTION INTRAMUSCULAR; INTRAVENOUS at 07:48

## 2019-06-27 RX ADMIN — SODIUM CHLORIDE 250 ML/HR: 0.9 INJECTION, SOLUTION INTRAVENOUS at 07:24

## 2019-06-28 ENCOUNTER — OFFICE VISIT (OUTPATIENT)
Dept: INTERNAL MEDICINE CLINIC | Facility: CLINIC | Age: 69
End: 2019-06-28
Payer: MEDICARE

## 2019-06-28 VITALS
BODY MASS INDEX: 26.74 KG/M2 | RESPIRATION RATE: 18 BRPM | HEIGHT: 70 IN | SYSTOLIC BLOOD PRESSURE: 116 MMHG | WEIGHT: 186.8 LBS | TEMPERATURE: 96.9 F | OXYGEN SATURATION: 96 % | DIASTOLIC BLOOD PRESSURE: 70 MMHG | HEART RATE: 94 BPM

## 2019-06-28 DIAGNOSIS — I73.9 PAD (PERIPHERAL ARTERY DISEASE) (HCC): ICD-10-CM

## 2019-06-28 DIAGNOSIS — E78.5 DYSLIPIDEMIA: Primary | ICD-10-CM

## 2019-06-28 DIAGNOSIS — Z95.820 S/P PERIPHERAL ARTERY ANGIOPLASTY WITH STENT PLACEMENT: ICD-10-CM

## 2019-06-28 DIAGNOSIS — Z72.0 TOBACCO USE: ICD-10-CM

## 2019-06-28 DIAGNOSIS — R73.01 IMPAIRED FASTING GLUCOSE: ICD-10-CM

## 2019-06-28 PROBLEM — Z53.20 STATIN DECLINED: Status: RESOLVED | Noted: 2019-02-28 | Resolved: 2019-06-28

## 2019-06-28 PROCEDURE — 99214 OFFICE O/P EST MOD 30 MIN: CPT | Performed by: INTERNAL MEDICINE

## 2019-07-08 PROCEDURE — 88305 TISSUE EXAM BY PATHOLOGIST: CPT | Performed by: PATHOLOGY

## 2019-07-09 ENCOUNTER — LAB REQUISITION (OUTPATIENT)
Dept: LAB | Facility: HOSPITAL | Age: 69
End: 2019-07-09
Payer: MEDICARE

## 2019-07-09 DIAGNOSIS — R19.7 DIARRHEA: ICD-10-CM

## 2019-07-09 DIAGNOSIS — K57.30 DIVERTICULOSIS OF LARGE INTESTINE WITHOUT PERFORATION OR ABSCESS WITHOUT BLEEDING: ICD-10-CM

## 2019-07-19 ENCOUNTER — OFFICE VISIT (OUTPATIENT)
Dept: VASCULAR SURGERY | Facility: CLINIC | Age: 69
End: 2019-07-19
Payer: MEDICARE

## 2019-07-19 VITALS
DIASTOLIC BLOOD PRESSURE: 60 MMHG | SYSTOLIC BLOOD PRESSURE: 112 MMHG | RESPIRATION RATE: 18 BRPM | TEMPERATURE: 97.6 F | BODY MASS INDEX: 26.34 KG/M2 | WEIGHT: 184 LBS | HEART RATE: 92 BPM | HEIGHT: 70 IN

## 2019-07-19 DIAGNOSIS — I87.8 VENOUS STASIS: ICD-10-CM

## 2019-07-19 DIAGNOSIS — I75.021 BLUE TOE SYNDROME, RIGHT (HCC): Primary | ICD-10-CM

## 2019-07-19 DIAGNOSIS — I73.9 PAD (PERIPHERAL ARTERY DISEASE) (HCC): ICD-10-CM

## 2019-07-19 DIAGNOSIS — Z95.820 S/P PERIPHERAL ARTERY ANGIOPLASTY WITH STENT PLACEMENT: ICD-10-CM

## 2019-07-19 PROCEDURE — 99213 OFFICE O/P EST LOW 20 MIN: CPT | Performed by: SURGERY

## 2019-07-19 NOTE — ASSESSMENT & PLAN NOTE
71yo M s/p RLE angiogram with popliteal artery stent for an atheroembolic lesion  Presents for follow-up  Doing well overall  Denies any recurrent episodes of blue toe syndrome  One small lesion on the second toe is healing  Denies any symptoms of claudication  Continue asa/statin  Xarelto for a total of 6 months (until December)   3month LEADs with clinic visit

## 2019-07-19 NOTE — PROGRESS NOTES
Assessment/Plan:    S/P peripheral artery angioplasty with stent placement  69yo M s/p RLE angiogram with popliteal artery stent for an atheroembolic lesion  Presents for follow-up  Doing well overall  Denies any recurrent episodes of blue toe syndrome  One small lesion on the second toe is healing  Denies any symptoms of claudication  Continue asa/statin  Xarelto for a total of 6 months (until December)   3month LEADs with clinic visit  Venous stasis  Has asymptomatic varicose veins and reticular veins of the left lower extremity  Recommend compression stockings knee high 15-20mmHG with rest and elevation of the lower extremity when possible  Problem List Items Addressed This Visit        Cardiovascular and Mediastinum    PAD (peripheral artery disease) (HCC)    Relevant Medications    rivaroxaban (XARELTO) 15 mg tablet    Blue toe syndrome, right (HCC) - Primary    Relevant Orders    VAS lower limb arterial duplex, complete bilateral       Other    S/P peripheral artery angioplasty with stent placement     69yo M s/p RLE angiogram with popliteal artery stent for an atheroembolic lesion  Presents for follow-up  Doing well overall  Denies any recurrent episodes of blue toe syndrome  One small lesion on the second toe is healing  Denies any symptoms of claudication  Continue asa/statin  Xarelto for a total of 6 months (until December)   3month LEADs with clinic visit  Venous stasis     Has asymptomatic varicose veins and reticular veins of the left lower extremity  Recommend compression stockings knee high 15-20mmHG with rest and elevation of the lower extremity when possible  Subjective:      Patient ID: Nancy Haider is a 80 y o  male  Patient had AGRAM 6/27 by Dr Theo Wolf  Patient states color has come back to his toe  He does have a dark areas on his second digit and states he still has pain at times  He states pain is much better   He continues to smoke a 1/4 pack of cigarettes daily  Pt takes ASA, Statin and Xarelto  The following portions of the patient's history were reviewed and updated as appropriate: allergies, current medications, past family history, past medical history, past social history, past surgical history and problem list     Review of Systems   Constitutional: Negative  HENT: Negative  Eyes: Negative  Respiratory: Negative  Cardiovascular: Negative  Gastrointestinal: Negative  Endocrine: Negative  Genitourinary: Negative  Musculoskeletal: Positive for gait problem  Toe pain   Skin: Negative  Allergic/Immunologic: Negative  Hematological: Negative  Psychiatric/Behavioral: Negative  I have reviewed and updated the ROS as appropriate  Objective:      /60 (BP Location: Right arm, Patient Position: Sitting)   Pulse 92   Temp 97 6 °F (36 4 °C)   Resp 18   Ht 5' 10" (1 778 m)   Wt 83 5 kg (184 lb)   BMI 26 40 kg/m²          Physical Exam   Constitutional: He is oriented to person, place, and time  He appears well-developed and well-nourished  HENT:   Head: Normocephalic and atraumatic  Eyes: Pupils are equal, round, and reactive to light  EOM are normal    Neck: Normal range of motion  Neck supple  Cardiovascular: Normal rate, regular rhythm, normal heart sounds and intact distal pulses  Pulmonary/Chest: Effort normal and breath sounds normal    Abdominal: Soft  Bowel sounds are normal    Musculoskeletal: Normal range of motion  He exhibits no edema or deformity  Neurological: He is alert and oriented to person, place, and time  Skin: Skin is warm and dry  Capillary refill takes less than 2 seconds  Psychiatric: He has a normal mood and affect   His behavior is normal  Judgment and thought content normal

## 2019-07-19 NOTE — ASSESSMENT & PLAN NOTE
Has asymptomatic varicose veins and reticular veins of the left lower extremity  Recommend compression stockings knee high 15-20mmHG with rest and elevation of the lower extremity when possible

## 2019-07-22 PROBLEM — R19.8 GI SYMPTOMS: Status: ACTIVE | Noted: 2019-07-22

## 2019-08-16 DIAGNOSIS — I73.9 PAD (PERIPHERAL ARTERY DISEASE) (HCC): ICD-10-CM

## 2019-08-16 RX ORDER — ATORVASTATIN CALCIUM 20 MG/1
20 TABLET, FILM COATED ORAL DAILY
Qty: 30 TABLET | Refills: 2 | Status: SHIPPED | OUTPATIENT
Start: 2019-08-16 | End: 2019-12-05 | Stop reason: SDUPTHER

## 2019-09-12 ENCOUNTER — OFFICE VISIT (OUTPATIENT)
Dept: CARDIOLOGY CLINIC | Facility: CLINIC | Age: 69
End: 2019-09-12
Payer: MEDICARE

## 2019-09-12 VITALS
SYSTOLIC BLOOD PRESSURE: 100 MMHG | DIASTOLIC BLOOD PRESSURE: 58 MMHG | WEIGHT: 178 LBS | HEIGHT: 70 IN | BODY MASS INDEX: 25.48 KG/M2 | HEART RATE: 64 BPM

## 2019-09-12 DIAGNOSIS — Z72.0 TOBACCO USE: ICD-10-CM

## 2019-09-12 DIAGNOSIS — I95.89 CHRONIC HYPOTENSION: ICD-10-CM

## 2019-09-12 DIAGNOSIS — N18.30 CHRONIC KIDNEY DISEASE, STAGE III (MODERATE) (HCC): ICD-10-CM

## 2019-09-12 DIAGNOSIS — I48.0 PAROXYSMAL ATRIAL FIBRILLATION (HCC): ICD-10-CM

## 2019-09-12 DIAGNOSIS — I45.2 RBBB (RIGHT BUNDLE BRANCH BLOCK WITH LEFT ANTERIOR FASCICULAR BLOCK): ICD-10-CM

## 2019-09-12 DIAGNOSIS — Z95.820 S/P PERIPHERAL ARTERY ANGIOPLASTY WITH STENT PLACEMENT: ICD-10-CM

## 2019-09-12 DIAGNOSIS — I25.5 ISCHEMIC CARDIOMYOPATHY: Primary | ICD-10-CM

## 2019-09-12 DIAGNOSIS — I73.9 PAD (PERIPHERAL ARTERY DISEASE) (HCC): ICD-10-CM

## 2019-09-12 PROCEDURE — 99214 OFFICE O/P EST MOD 30 MIN: CPT | Performed by: INTERNAL MEDICINE

## 2019-09-12 PROCEDURE — 1123F ACP DISCUSS/DSCN MKR DOCD: CPT | Performed by: INTERNAL MEDICINE

## 2019-09-12 NOTE — PROGRESS NOTES
Cardiology Follow Up    Kole Powell  1950  [id number]  [address] [facility]  [address]  [address]  [phone]    1  Ischemic cardiomyopathy     2  PAD (peripheral artery disease) ([address] )     3  RBBB (right bundle branch block with left anterior fascicular block)     4  Chronic kidney disease, stage III (moderate) (HCC)     5  Tobacco use     6  S/P peripheral artery angioplasty with stent placement     7  Paroxysmal atrial fibrillation (HCC)     8  Chronic hypotension           Discussion/Summary: I will continues to monitor his heart rhythm closely through his ILR  I stressed to need that he must stop smoking with his known vascular disease  I have reviewed his medications and made no changes  No cardiac testing is ordered  RTO 9 months  Interval History: He feels well and is much more active since his LE PCI with stenting  He denies CP, significant SOB  He continues to smoke about 1/2 packs a day  Recent DENIS - EF 50%, no thrombus  ILR - 0 4% AF  He is on Xarelto and ASA  SBP is chronically low and is 100 mmHg today  He gets occasional lightheadedness  Patient Active Problem List   Diagnosis    Overweight (BMI 25 0-29  9)    Chronic kidney disease, stage III (moderate) (HCC)    Gross hematuria    Persistent proteinuria    Impaired fasting glucose    Microscopic hematuria    Chronic hypotension    [place] cardiac risk 10-20% in next 10 years    Abdominal aortic atherosclerosis (HCC)    RBBB (right bundle branch block with left anterior fascicular block)    Tobacco use    Dyslipidemia    Vitamin D deficiency    GERD with esophagitis    Harris's esophagus without dysplasia    Colon polyps    PAD (peripheral artery disease) (HCC)    Mass of both adrenal glands (HCC)    History of small bowel obstruction    Blue toe syndrome, right (HCC)    Ischemic cardiomyopathy    S/P peripheral artery angioplasty with stent placement    Venous stasis    GI symptoms    Paroxysmal atrial fibrillation (HCC)     Past Medical History:   Diagnosis Date    Blue toe syndrome (HCC)     Chronic kidney disease     Colon polyps     GERD (gastroesophageal reflux disease)     Hematuria     History of rheumatic fever     Hyperlipidemia     Hypotension     Ischemic cardiomyopathy     PAD (peripheral artery disease) (HCC)     Pulmonary emphysema (HCC)      Social History     Socioeconomic History    Marital status: /Civil Union     Spouse name: Not on file    Number of children: Not on file    Years of education: Not on file    Highest education level: Not on file   Occupational History    Occupation: retired   Social Needs    Financial resource strain: Not on file    Food insecurity:     Worry: Not on file     Inability: Not on file   [employer] needs:     Medical: Not on file     Non-medical: Not on file   Tobacco Use    Smoking status: Current Every Day Smoker     Packs/day: 0 25     Years: 30 00     Pack years: 7 50     Types: Cigarettes    Smokeless tobacco: Never Used    Tobacco comment: " I will do it on my own"   Substance and Sexual Activity    Alcohol use: Yes     Frequency: Monthly or less     Drinks per session: 1 or 2     Binge frequency: Less than monthly    Drug use: No    Sexual activity: Not on file   Lifestyle    Physical activity:     Days per week: Not on file     Minutes per session: Not on file    Stress: Not on file   Relationships    Social connections:     Talks on phone: Not on file     Gets together: Not on file     Attends Anabaptist service: Not on file     Active member of club or organization: Not on file     Attends meetings of clubs or organizations: Not on file     Relationship status: Not on file    Intimate partner violence:     Fear of current or ex partner: Not on file     Emotionally abused: Not on file     Physically abused: Not on file     Forced sexual activity: Not on file   Other Topics Concern    Not on file   Social History Narrative    Drinks coffee      Family History   Problem Relation Age of Onset    Heart disease Mother     Hyperlipidemia Mother     Hypertension Father     Heart disease Father     Stroke Father     Hyperlipidemia Father     Diabetes Brother     Dementia Neg Hx     Drug abuse Neg Hx     Mental illness Neg Hx     Substance Abuse Neg Hx     Alcohol abuse Neg Hx     Depression Neg Hx      Past Surgical History:   Procedure Laterality Date    COLONOSCOPY 2019    HERNIA REPAIR      IR ABDOMINAL ANGIOGRAPHY / INTERVENTION  6/27/2019    POPLITEAL ARTERY STENT Right     6/2019    CT SLCTV CATHJ 3RD+ ORD SLCTV ABDL PEL/LXTR [facility] Right 6/27/2019    Procedure: leg ANGIOGRAM WITH STENT, BALLOON ANGIOPLASTY, LEFT GROIN ACCESS;  Surgeon: Ruth Goel MD;  Location: [facility];  Service: Vascular       Current Outpatient Medications:     aspirin (ECOTRIN LOW STRENGTH) 81 mg EC tablet, Take 81 mg by mouth daily, Disp: , Rfl:     atorvastatin (LIPITOR) 20 mg tablet, Take 1 tablet (20 mg total) by mouth daily, Disp: 30 tablet, Rfl: 2    Cholecalciferol (VITAMIN D) 2000 units CAPS, Take 2,000 Units by mouth daily, Disp: , Rfl:     Magnesium 500 MG TABS, Take 1 tablet by mouth daily, Disp: , Rfl:     omeprazole (PriLOSEC) 40 MG capsule, TAKE 1 CAPSULE BY MOUTH EVERY DAY ONE-HALF HOUR BEFORE BREAKFAST, Disp: , Rfl: 5    rivaroxaban (XARELTO) 20 mg tablet, Take 1 tablet (20 mg total) by mouth daily with breakfast, Disp: 90 tablet, Rfl: 3  No Known Allergies  Vitals:    09/12/19 0956   BP: 100/58   BP Location: Left arm   Cuff Size: Standard   Pulse: 64   Weight: 80 7 kg (178 lb)   Height: 5' 10" (1 778 m)     Weight (last 2 days)     Date/Time   Weight    09/12/19 0956   80 7 (178)             Blood pressure 100/58, pulse 64, height 5' 10" (1 778 m), weight 80 7 kg (178 lb)  , Body mass index is 25 54 kg/m²  Labs:  Lab Requisition on 07/08/2019   Component Date Value    Case Report 07/08/2019                      Value:Surgical Pathology Report                         Case: [id number]                                   Authorizing Provider:  Marley Cantor MD            Collected:           07/08/2019 1551              Ordering Location:     [address] [place] [address] Road      Received:            07/09/2019 [address] [place] [address] Specialty                                                                                  Laboratory                                                                   Pathologist:           Maryuri Perez MD                                                            Specimens:   A) - Colon, sigmoid                                                                                 B) - Rectum                                                                                Final Diagnosis 07/08/2019                      Value: This result contains rich text formatting which cannot be displayed here   Additional Information 07/08/2019                      Value: This result contains rich text formatting which cannot be displayed here  [facility] Gross Description 07/08/2019                      Value: This result contains rich text formatting which cannot be displayed here     Admission on 06/27/2019, Discharged on 06/27/2019   Component Date Value    ABO Grouping 06/12/2019 B     Rh Factor 06/12/2019 Positive     Antibody Screen 06/12/2019 Negative     Specimen Expiration Date 06/12/2019 [id number]    Appointment on 06/13/2019   Component Date Value    Pancreatic Elastase-1 06/13/2019 202    Appointment on 06/12/2019   Component Date Value    Sodium 06/12/2019 142     Potassium 06/12/2019 5 2     Chloride 06/12/2019 108     CO2 06/12/2019 30     ANION GAP 06/12/2019 4     BUN 06/12/2019 15     Creatinine 06/12/2019 1 14     Glucose, Fasting 06/12/2019 93     Calcium 06/12/2019 9 1     eGFR 06/12/2019 66     WBC 06/12/2019 8 46     RBC 06/12/2019 4 84     Hemoglobin 06/12/2019 14 1     Hematocrit 06/12/2019 45 4     MCV 06/12/2019 94     MCH 06/12/2019 29 1     MCHC 06/12/2019 31 1*    RDW 06/12/2019 18 2*    MPV 06/12/2019 8 9     Platelets 11/66/7338 487*    nRBC 06/12/2019 0     Neutrophils Relative 06/12/2019 63     Immat GRANS % 06/12/2019 0     Lymphocytes Relative 06/12/2019 24     Monocytes Relative 06/12/2019 10     Eosinophils Relative 06/12/2019 2     Basophils Relative 06/12/2019 1     Neutrophils Absolute 06/12/2019 5 39     Immature Grans Absolute 06/12/2019 0 03     Lymphocytes Absolute 06/12/2019 2 02     Monocytes Absolute 06/12/2019 0 84     Eosinophils Absolute 06/12/2019 0 13     Basophils Absolute 06/12/2019 0 05    Admission on 06/05/2019, Discharged on 06/05/2019   Component Date Value    Ventricular Rate 06/05/2019 96     Atrial Rate 06/05/2019 96     AR Interval 06/05/2019 124     QRSD Interval 06/05/2019 116     QT Interval 06/05/2019 382     QTC Interval 06/05/2019 482     P Axis 06/05/2019 21     QRS Axis 06/05/2019 -41     T Wave Axis 06/05/2019 -5    Appointment on 06/04/2019   Component Date Value    Sodium 06/04/2019 140     Potassium 06/04/2019 3 9     Chloride 06/04/2019 107     CO2 06/04/2019 27     ANION GAP 06/04/2019 6     BUN 06/04/2019 19     Creatinine 06/04/2019 1 21     Glucose, Fasting 06/04/2019 92     Calcium 06/04/2019 8 6     eGFR 06/04/2019 62     Magnesium 06/04/2019 1 5*    Phosphorus 06/04/2019 4 2*   Office Visit on 06/03/2019   Component Date Value    Creatinine, Ur 06/04/2019 194 0     Protein Urine Random 06/04/2019 55     Prot/Creat Ratio, Ur 06/04/2019 0 28*   Appointment on 05/28/2019   Component Date Value    Hepatitis C Ab 05/29/2019 Non-reactive     OCCULT BLD, FECAL IMMUNO* 05/29/2019 Positive*    C difficile toxin by PCR 05/28/2019 NEGATIVE for C difficle toxin by PCR       Salmonella sp PCR 05/28/2019 None Detected     Shigella sp/Enteroinvasi* 05/28/2019 None Detected     Campylobacter sp (jejuni* 05/28/2019 None Detected     Shiga toxin 1/Shiga toxi* 05/28/2019 None Detected     Giardia Ag, Stl 05/28/2019 Negative     Ova + Parasite Exam 05/28/2019 No ova, cysts, or parasites seen     One negative specimen does not rule out the possibility of a  parasitic infection   White Blood Cells, Stool 05/28/2019 No white blood cells seen  Office Visit on 05/28/2019   Component Date Value    INTERPRETATIONS 06/03/2019     Appointment on 05/20/2019   Component Date Value    Sodium 05/20/2019 140     Potassium 05/20/2019 4 0     Chloride 05/20/2019 105     CO2 05/20/2019 26     ANION GAP 05/20/2019 9     BUN 05/20/2019 26*    Creatinine 05/20/2019 1 95*    Glucose, Fasting 05/20/2019 109*    Calcium 05/20/2019 8 9     eGFR 05/20/2019 35    There may be more visits with results that are not included  Imaging: No results found  Review of Systems:  Review of Systems   Constitutional: Negative for diaphoresis, fatigue, fever and unexpected weight change  HENT: Negative  Respiratory: Negative for cough, shortness of breath and wheezing  Cardiovascular: Negative for chest pain, palpitations and leg swelling  Gastrointestinal: Negative for abdominal pain, diarrhea and nausea  Musculoskeletal: Negative for gait problem and myalgias  Skin: Negative for rash  Neurological: Positive for light-headedness  Negative for dizziness and numbness  Psychiatric/Behavioral: Negative  Physical Exam:  Physical Exam   Constitutional: He is oriented to person, place, and time  He appears well-developed and well-nourished  HENT:   Head: Normocephalic and atraumatic  Eyes: Pupils are equal, round, and reactive to light  Neck: Normal range of motion  Neck supple  No JVD present  Cardiovascular: Regular rhythm, S1 normal, S2 normal and normal pulses  Pulses:       Carotid pulses are 2+ on the right side, and 2+ on the left side  Pulmonary/Chest: Effort normal and breath sounds normal  He has no wheezes  He has no rales  Abdominal: Soft  Bowel sounds are normal  There is no tenderness  Musculoskeletal: Normal range of motion  He exhibits no edema or tenderness  Neurological: He is alert and oriented to person, place, and time  He has normal reflexes  No cranial nerve deficit  Skin: Skin is warm  Psychiatric: He has a normal mood and affect

## 2019-09-13 ENCOUNTER — TELEPHONE (OUTPATIENT)
Dept: GASTROENTEROLOGY | Facility: CLINIC | Age: 69
End: 2019-09-13

## 2019-09-13 NOTE — TELEPHONE ENCOUNTER
New pt needs an appt in [place] with dr Cristina Barcenas for weight loss   Please assist in scheduling - [phone]

## 2019-09-23 ENCOUNTER — REMOTE DEVICE CLINIC VISIT (OUTPATIENT)
Dept: CARDIOLOGY CLINIC | Facility: CLINIC | Age: 69
End: 2019-09-23
Payer: MEDICARE

## 2019-09-23 DIAGNOSIS — Z95.818 PRESENCE OF CARDIAC DEVICE: Primary | ICD-10-CM

## 2019-09-23 PROCEDURE — 93296 REM INTERROG EVL PM/IDS: CPT | Performed by: INTERNAL MEDICINE

## 2019-09-23 PROCEDURE — 93298 REM INTERROG DEV EVAL SCRMS: CPT | Performed by: INTERNAL MEDICINE

## 2019-09-23 NOTE — PROGRESS NOTES
Results for orders placed or performed in visit on 09/23/19   Cardiac EP device report    Narrative    MDT LOOP  CARELINK TRANSMISSION: BATTERY STATUS "OK"  201 AF EPISODES; TOTAL BURDEN 2% (LONGEST: 52 MNTS) WITH AVAILABLE E-GRAMS SUGGESTING FREQUENT PAC'S & NOISE  PT  TAKES XARELTO  NORMAL DEVICE FUNCTION   PL

## 2019-10-04 ENCOUNTER — TELEPHONE (OUTPATIENT)
Dept: NEPHROLOGY | Facility: CLINIC | Age: 69
End: 2019-10-04

## 2019-10-07 ENCOUNTER — APPOINTMENT (OUTPATIENT)
Dept: LAB | Facility: CLINIC | Age: 69
End: 2019-10-07
Payer: MEDICARE

## 2019-10-07 DIAGNOSIS — I95.89 CHRONIC HYPOTENSION: ICD-10-CM

## 2019-10-07 DIAGNOSIS — E78.5 DYSLIPIDEMIA: ICD-10-CM

## 2019-10-07 DIAGNOSIS — R80.1 PERSISTENT PROTEINURIA: ICD-10-CM

## 2019-10-07 DIAGNOSIS — N18.30 CHRONIC KIDNEY DISEASE, STAGE III (MODERATE) (HCC): ICD-10-CM

## 2019-10-07 DIAGNOSIS — E55.9 VITAMIN D DEFICIENCY: ICD-10-CM

## 2019-10-07 LAB
ALBUMIN SERPL BCP-MCNC: 2.3 G/DL (ref 3.5–5)
ALP SERPL-CCNC: 115 U/L (ref 46–116)
ALT SERPL W P-5'-P-CCNC: 22 U/L (ref 12–78)
ANION GAP SERPL CALCULATED.3IONS-SCNC: 5 MMOL/L (ref 4–13)
AST SERPL W P-5'-P-CCNC: 16 U/L (ref 5–45)
BILIRUB SERPL-MCNC: 0.4 MG/DL (ref 0.2–1)
BUN SERPL-MCNC: 13 MG/DL (ref 5–25)
CALCIUM SERPL-MCNC: 8.4 MG/DL (ref 8.3–10.1)
CHLORIDE SERPL-SCNC: 109 MMOL/L (ref 100–108)
CHOLEST SERPL-MCNC: 117 MG/DL (ref 50–200)
CK SERPL-CCNC: 27 U/L (ref 39–308)
CO2 SERPL-SCNC: 30 MMOL/L (ref 21–32)
CREAT SERPL-MCNC: 1.06 MG/DL (ref 0.6–1.3)
CREAT UR-MCNC: 114 MG/DL
ERYTHROCYTE [DISTWIDTH] IN BLOOD BY AUTOMATED COUNT: 14.9 % (ref 11.6–15.1)
GFR SERPL CREATININE-BSD FRML MDRD: 72 ML/MIN/1.73SQ M
GLUCOSE P FAST SERPL-MCNC: 96 MG/DL (ref 65–99)
HCT VFR BLD AUTO: 40.3 % (ref 36.5–49.3)
HDLC SERPL-MCNC: 45 MG/DL (ref 40–60)
HGB BLD-MCNC: 12.7 G/DL (ref 12–17)
LDLC SERPL CALC-MCNC: 55 MG/DL (ref 0–100)
MAGNESIUM SERPL-MCNC: 1.5 MG/DL (ref 1.6–2.6)
MCH RBC QN AUTO: 29.6 PG (ref 26.8–34.3)
MCHC RBC AUTO-ENTMCNC: 31.5 G/DL (ref 31.4–37.4)
MCV RBC AUTO: 94 FL (ref 82–98)
PHOSPHATE SERPL-MCNC: 3.9 MG/DL (ref 2.3–4.1)
PLATELET # BLD AUTO: 442 THOUSANDS/UL (ref 149–390)
PMV BLD AUTO: 9.4 FL (ref 8.9–12.7)
POTASSIUM SERPL-SCNC: 4.1 MMOL/L (ref 3.5–5.3)
PROT SERPL-MCNC: 6 G/DL (ref 6.4–8.2)
PROT UR-MCNC: 24 MG/DL
PROT/CREAT UR: 0.21 MG/G{CREAT} (ref 0–0.1)
PTH-INTACT SERPL-MCNC: 39.3 PG/ML (ref 18.4–80.1)
RBC # BLD AUTO: 4.29 MILLION/UL (ref 3.88–5.62)
SODIUM SERPL-SCNC: 144 MMOL/L (ref 136–145)
TRIGL SERPL-MCNC: 83 MG/DL
WBC # BLD AUTO: 12.21 THOUSAND/UL (ref 4.31–10.16)

## 2019-10-07 PROCEDURE — 84156 ASSAY OF PROTEIN URINE: CPT

## 2019-10-07 PROCEDURE — 36415 COLL VENOUS BLD VENIPUNCTURE: CPT

## 2019-10-07 PROCEDURE — 83735 ASSAY OF MAGNESIUM: CPT

## 2019-10-07 PROCEDURE — 80053 COMPREHEN METABOLIC PANEL: CPT

## 2019-10-07 PROCEDURE — 80061 LIPID PANEL: CPT

## 2019-10-07 PROCEDURE — 82550 ASSAY OF CK (CPK): CPT

## 2019-10-07 PROCEDURE — 84100 ASSAY OF PHOSPHORUS: CPT

## 2019-10-07 PROCEDURE — 85027 COMPLETE CBC AUTOMATED: CPT

## 2019-10-07 PROCEDURE — 82570 ASSAY OF URINE CREATININE: CPT

## 2019-10-07 PROCEDURE — 82306 VITAMIN D 25 HYDROXY: CPT

## 2019-10-07 PROCEDURE — 83970 ASSAY OF PARATHORMONE: CPT

## 2019-10-09 ENCOUNTER — OFFICE VISIT (OUTPATIENT)
Dept: NEPHROLOGY | Facility: CLINIC | Age: 69
End: 2019-10-09
Payer: MEDICARE

## 2019-10-09 VITALS
DIASTOLIC BLOOD PRESSURE: 70 MMHG | WEIGHT: 182.2 LBS | SYSTOLIC BLOOD PRESSURE: 124 MMHG | HEIGHT: 70 IN | BODY MASS INDEX: 26.08 KG/M2

## 2019-10-09 DIAGNOSIS — I95.89 CHRONIC HYPOTENSION: ICD-10-CM

## 2019-10-09 DIAGNOSIS — E55.9 VITAMIN D DEFICIENCY: ICD-10-CM

## 2019-10-09 DIAGNOSIS — R80.1 PERSISTENT PROTEINURIA: ICD-10-CM

## 2019-10-09 DIAGNOSIS — N18.30 CHRONIC KIDNEY DISEASE, STAGE III (MODERATE) (HCC): Primary | ICD-10-CM

## 2019-10-09 DIAGNOSIS — D63.1 ANEMIA OF CHRONIC RENAL FAILURE, STAGE 3 (MODERATE) (HCC): ICD-10-CM

## 2019-10-09 DIAGNOSIS — E78.5 DYSLIPIDEMIA: ICD-10-CM

## 2019-10-09 DIAGNOSIS — N18.30 ANEMIA OF CHRONIC RENAL FAILURE, STAGE 3 (MODERATE) (HCC): ICD-10-CM

## 2019-10-09 PROCEDURE — 99214 OFFICE O/P EST MOD 30 MIN: CPT | Performed by: INTERNAL MEDICINE

## 2019-10-09 NOTE — LETTER
October 9, 2019     Elton Davis MD  [address]  [address] [facility] [address] [facility]    Patient: Alina Irwin   YOB: 1950   Date of Visit: 10/9/2019       Dear Dr Ramya Maki: Thank you for referring Alina Irwin to me for evaluation  Below are my notes for this consultation  If you have questions, please do not hesitate to call me  I look forward to following your patient along with you  Sincerely,        Joni Long MD        CC: MD Sachin Juarez MD Darlys Murdoch, MD Lauran Sensing, MD Otto Ink, MD Obed Manuel, MD  10/9/2019  9:26 AM  Sign at close encounter  RENAL FOLLOW UP NOTE: td    ASSESSMENT AND PLAN:  1   CKD stage 3 :  · Etiology:  Cardiorenal syndrome/arteriolar nephrosclerosis/? Atheroemboli/prior prerenal associated mild tachycardia and relative hypotension in the past  · Baseline creatinine:  1 25-1 41  · Current creatinine:  1 06 below baseline  · Urine protein creatinine ratio:  0 21 g at goal  Recommendations:  · Treat hypertension-please see below  · Treat dyslipidemia-please see below  · Maintain proteinuria less than 1 g or as low as possible  · Avoid nephrotoxic agents such as NSAIDs, patient counseled as such  2   Volume:  Euvolemic  3  Hypotension:  Workup was compatible with low ejection fraction 48% associated severe hypokinesis of the apical anterior and mid anterior septal in mild in for septal and apical inferior and apical septal and apical walls  No pericardial effusion  Cortisol/TSH were unremarkable  · Currently blood pressure:  Chronic hypotension unchanged and essentially tolerating  4  Electrolytes: All acceptable  5  Mineral bone disorder:  · Calcium/magnesium/phosphorus:  Hypomagnesemia:  Replete by increasing magnesium to twice a day as tolerated  In part this is related to the diarrhea  · PTH intact:  39 3 which is normal  · Vitamin-D:  Pending  6    Dyslipidemia:  · Goal LDL:  Less than 70 given PAD  · Current lipid profile:  LDL 55/HDL 45/triglycerides 83    Recommendations: At goal so no changes  7  Anemia:  Hemoglobin:  Decreased 12 7  Typically 14-16              Recommendations:  · Recheck CBC  · Iron studies  · Multiple myeloma evaluation with SPEP/UPEP and light chains  · Stool for occult blood x3  · In March had EGD colonoscopy and recent sigmoidoscopy:  Harris's esophagus/diverticulosis/colonic polyps  Patient will be seeing Dr Daisy Curtis  8   Other problems:  · Chronic intermittent mild leukocytosis to be repeated  · Gross hematuria/microscopic hematuria:  This has resolved   Patient seen by Urology for gross hematuria   Cystoscopy was negative and recent PSA negative  No further investigation by follow-up in 1 year by Urology  · Abnormal echocardiogram/cardiomyopathy:  Followed in the past by Dr Poonam Yap  · Paroxysmal atrial fibrillation followed by Dr Poonam Yap:  Currently on Xarelto  · Recent admission in May for signs of small-bowel obstruction associated with abdominal pain and diarrhea  · Bilateral adrenal gland abnormalities being followed by surgical oncology  24 hour urine for Dopamine epinephrine all unremarkable  Aldosterone was unremarkable  Cortisol was okay at 17 5  To be followed by Dr Cordova  · PAD:  Status post stent for the right popliteal artery  PATIENT INSTRUCTIONS:    Patient Instructions   1  Medication changes today:  · Increase magnesium to twice a day watch for diarrhea    2  Please go for repeat lab work nonfasting this will include stool for occult blood  3  Follow-up in 4 months:  · Please go for fasting lab work prior to your appointment    4  General instructions:   AVOID SALT BUT NOT ADDING AN READING LABELS TO MAKE SURE THERE IS LOW-SALT IN THE FOOD THAT YOU ARE EATING     Avoid nonsteroidal anti-inflammatory drugs such as Naprosyn, ibuprofen, Aleve, Advil, Celebrex, etc   You can use Tylenol as needed if you do not have any liver condition to be concerned about     Avoid medications such as Sudafed or decongestants and antihistamines that contained the D component which is the decongestant  You can take antihistamines without the decongestant or D component   Continue exercise as you are doing     Try to lose 5-10 lb by your next visit     Please do not drink more than 2 glasses of alcohol/wine on a daily basis as this may contribute to your high blood pressure  Subjective:   Patient had an arteriogram 06/27/2019 status post percutaneous Treatment a right popliteal artery lesion resulting in recurring atheroembolic events  This was treated with a stent  The patient overall is feeling well  No fevers chills cough or colds  Good appetite and good energy  No urinary symptoms including foamy urine or blood in the urine  No gastrointestinal symptoms at this time however he did have ongoing diarrhea  Once he switched the Omeprazole to after eating he feels better  He had been seen by GI  No cardiovascular symptoms including swelling of the legs  No headaches, occasional dizziness and lightheadedness upon standing perhaps once a week  Blood pressure medications:  None at this time given history of hypotension        ROS:  See HPI, otherwise review of systems as completely reviewed with the patient are negative    Past Medical History:   Diagnosis Date    Blue toe syndrome ([address] )     Chronic kidney disease     Colon polyps     GERD (gastroesophageal reflux disease)     Hematuria     History of rheumatic fever     Hyperlipidemia     Hypotension     Ischemic cardiomyopathy     [facility] [address] [facility]     Pulmonary emphysema ([facility] [address] )      Past Surgical History:   Procedure Laterality Date    COLONOSCOPY  2019    HERNIA REPAIR      IR ABDOMINAL ANGIOGRAPHY / INTERVENTION  6/27/2019    POPLITEAL ARTERY STENT Right     6/2019    AR SLCTV CATHJ 3RD+ ORD SLCTV ABDL PEL/LXTR [facility] Right 6/27/2019 Procedure: leg ANGIOGRAM WITH STENT, BALLOON ANGIOPLASTY, LEFT GROIN ACCESS;  Surgeon: Dary Hernandez MD;  [facility];  Service: Vascular     Family History   Problem Relation Age of Onset    Heart disease Mother     Hyperlipidemia Mother     Hypertension Father     Heart disease Father     Stroke Father     Hyperlipidemia Father     Diabetes Brother     Dementia Neg Hx     Drug abuse Neg Hx     Mental illness Neg Hx     Substance Abuse Neg Hx     Alcohol abuse Neg Hx     Depression Neg Hx       reports that he has been smoking cigarettes  He has a 7 50 pack-year smoking history  He has never used smokeless tobacco  He reports that he drinks alcohol  He reports that he does not use drugs  I COMPLETELY REVIEWED THE PAST MEDICAL HISTORY/PAST SURGICAL HISTORY/SOCIAL HISTORY/FAMILY HISTORY/AND MEDICATIONS  AND UPDATED ALL    Objective:     Vitals:   BP sitting on left:  104/60 with a heart rate of 80 and irregular  BP standing on left:  88/60, standing on right:  92/60 with a heart rate of 68 and irregular    Weight (last 2 days)     Date/Time   Weight    10/09/19 0846   82 6 (182 2)            Wt Readings from Last 3 Encounters:   10/09/19 82 6 kg (182 lb 3 2 oz)   09/12/19 80 7 kg (178 lb)   07/19/19 83 5 kg (184 lb)       Body mass index is 26 14 kg/m²      Physical Exam: General:  Obese, but in No acute distress  Skin:  No acute rash  Eyes:  No scleral icterus, noninjected  ENT:  Moist mucous membranes  Neck:  Supple, no jugular venous distention  Back   No CVAT  Chest:  Clear to auscultation and percussion, good respiratory effort  CVS:  Irregular rate, without a rub, murmurs or gallops  Abdomen:  Obese, Soft and nontender with normal bowel sounds  Extremities:  No cyanosis and no edema  Neuro:  Grossly intact  Psych:  Alert, oriented x3 and appropriate      Medications:    Current Outpatient Medications:     aspirin (ECOTRIN LOW STRENGTH) 81 mg EC tablet, Take 81 mg by mouth daily, Disp: , Rfl:     atorvastatin (LIPITOR) 20 mg tablet, Take 1 tablet (20 mg total) by mouth daily, Disp: 30 tablet, Rfl: 2    Cholecalciferol (VITAMIN D) 2000 units CAPS, Take 2,000 Units by mouth daily, Disp: , Rfl:     Magnesium 500 MG TABS, Take 1 tablet by mouth 2 (two) times a day, Disp: , Rfl:     omeprazole (PriLOSEC) 40 MG capsule, TAKE 1 CAPSULE BY MOUTH EVERY DAY ONE-HALF HOUR BEFORE BREAKFAST, Disp: , Rfl: 5    rivaroxaban (XARELTO) 20 mg tablet, Take 1 tablet (20 mg total) by mouth daily with breakfast, Disp: 90 tablet, Rfl: 3    Lab, Imaging and other studies: I have personally reviewed pertinent labs    Laboratory Results:  Results for orders placed or performed in visit on 10/07/19   Comprehensive metabolic panel   Result Value Ref Range    Sodium 144 136 - 145 mmol/L    Potassium 4 1 3 5 - 5 3 mmol/L    Chloride 109 (H) 100 - 108 mmol/L    CO2 30 21 - 32 mmol/L    ANION GAP 5 4 - 13 mmol/L    BUN 13 5 - 25 mg/dL    Creatinine 1 06 0 60 - 1 30 mg/dL    Glucose, Fasting 96 65 - 99 mg/dL    Calcium 8 4 8 3 - 10 1 mg/dL    AST 16 5 - 45 U/L    ALT 22 12 - 78 U/L    Alkaline Phosphatase 115 46 - 116 U/L    Total Protein 6 0 (L) 6 4 - 8 2 g/dL    Albumin 2 3 (L) 3 5 - 5 0 g/dL    Total Bilirubin 0 40 0 20 - 1 00 mg/dL    eGFR 72 ml/min/1 73sq m   CBC   Result Value Ref Range    WBC 12 21 (H) 4 31 - 10 16 Thousand/uL    RBC 4 29 3 88 - 5 62 Million/uL    Hemoglobin 12 7 12 0 - 17 0 g/dL    Hematocrit 40 3 36 5 - 49 3 %    MCV 94 82 - 98 fL    MCH 29 6 26 8 - 34 3 pg    MCHC 31 5 31 4 - 37 4 g/dL    RDW 14 9 11 6 - 15 1 %    Platelets 308 (H) 113 - 390 Thousands/uL    MPV 9 4 8 9 - 12 7 fL   Lipid Panel with Direct LDL reflex   Result Value Ref Range    Cholesterol 117 50 - 200 mg/dL    Triglycerides 83 <=150 mg/dL    HDL, Direct 45 40 - 60 mg/dL    LDL Calculated 55 0 - 100 mg/dL   Magnesium   Result Value Ref Range    Magnesium 1 5 (L) 1 6 - 2 6 mg/dL   Phosphorus   Result Value Ref Range    Phosphorus 3 9 2 3 - 4 1 mg/dL   Protein / creatinine ratio, urine   Result Value Ref Range    Creatinine, Ur 114 0 mg/dL    Protein Urine Random 24 mg/dL    Prot/Creat Ratio, Ur 0 21 (H) 0 00 - 0 10   PTH, intact   Result Value Ref Range    PTH 39 3 18 4 - 80 1 pg/mL   CK   Result Value Ref Range    Total CK 27 (L) 39 - 308 U/L       Results from last 7 days   Lab Units 10/07/19  0725   WBC Thousand/uL 12 21*   HEMOGLOBIN g/dL 12 7   HEMATOCRIT % 40 3   PLATELETS Thousands/uL 442*   POTASSIUM mmol/L 4 1   CHLORIDE mmol/L 109*   CO2 mmol/L 30   BUN mg/dL 13   CREATININE mg/dL 1 06   CALCIUM mg/dL 8 4   MAGNESIUM mg/dL 1 5*   PHOSPHORUS mg/dL 3 9         Radiology review:   chest X-ray    Ultrasound      Portions of the record may have been created with voice recognition software  Occasional wrong word or "sound a like" substitutions may have occurred due to the inherent limitations of voice recognition software  Read the chart carefully and recognize, using context, where substitutions have occurred

## 2019-10-09 NOTE — PROGRESS NOTES
RENAL FOLLOW UP NOTE: td    ASSESSMENT AND PLAN:  1   CKD stage 3 :  · Etiology:  Cardiorenal syndrome/arteriolar nephrosclerosis/? Atheroemboli/prior prerenal associated mild tachycardia and relative hypotension in the past  · Baseline creatinine:  1 25-1 41  · Current creatinine:  1 06 below baseline  · Urine protein creatinine ratio:  0 21 g at goal  Recommendations:  · Treat hypertension-please see below  · Treat dyslipidemia-please see below  · Maintain proteinuria less than 1 g or as low as possible  · Avoid nephrotoxic agents such as NSAIDs, patient counseled as such  2   Volume:  Euvolemic  3  Hypotension:  Workup was compatible with low ejection fraction 48% associated severe hypokinesis of the apical anterior and mid anterior septal in mild in for septal and apical inferior and apical septal and apical walls  No pericardial effusion  Cortisol/TSH were unremarkable  · Currently blood pressure:  Chronic hypotension unchanged and essentially tolerating  4  Electrolytes: All acceptable  5  Mineral bone disorder:  · Calcium/magnesium/phosphorus:  Hypomagnesemia:  Replete by increasing magnesium to twice a day as tolerated  In part this is related to the diarrhea  · PTH intact:  39 3 which is normal  · Vitamin-D:  Pending  6  Dyslipidemia:  · Goal LDL:  Less than 70 given PAD  · Current lipid profile:  LDL 55/HDL 45/triglycerides 83    Recommendations: At goal so no changes  7  Anemia:  Hemoglobin:  Decreased 12 7  Typically 14-16              Recommendations:  · Recheck CBC  · Iron studies  · Multiple myeloma evaluation with SPEP/UPEP and light chains  · Stool for occult blood x3  · In March had EGD colonoscopy and recent sigmoidoscopy:  Harris's esophagus/diverticulosis/colonic polyps    Patient will be seeing Dr Shyam Olson  8   Other problems:  · Chronic intermittent mild leukocytosis to be repeated  · Gross hematuria/microscopic hematuria:  This has resolved   Patient seen by Urology for gross hematuria   Cystoscopy was negative and recent PSA negative  No further investigation by follow-up in 1 year by Urology  · Abnormal echocardiogram/cardiomyopathy:  Followed in the past by Dr Nanette Segura  · Paroxysmal atrial fibrillation followed by Dr Nanette Segura:  Currently on Xarelto  · Recent admission in May for signs of small-bowel obstruction associated with abdominal pain and diarrhea  · Bilateral adrenal gland abnormalities being followed by surgical oncology  24 hour urine for Dopamine epinephrine all unremarkable  Aldosterone was unremarkable  Cortisol was okay at 17 5  To be followed by Dr Cordova  · PAD:  Status post stent for the right popliteal artery  PATIENT INSTRUCTIONS:    Patient Instructions   1  Medication changes today:  · Increase magnesium to twice a day watch for diarrhea    2  Please go for repeat lab work nonfasting this will include stool for occult blood  3  Follow-up in 4 months:  · Please go for fasting lab work prior to your appointment    4  General instructions:   AVOID SALT BUT NOT ADDING AN READING LABELS TO MAKE SURE THERE IS LOW-SALT IN THE FOOD THAT YOU ARE EATING     Avoid nonsteroidal anti-inflammatory drugs such as Naprosyn, ibuprofen, Aleve, Advil, Celebrex, etc   You can use Tylenol as needed if you do not have any liver condition to be concerned about     Avoid medications such as Sudafed or decongestants and antihistamines that contained the D component which is the decongestant  You can take antihistamines without the decongestant or D component   Continue exercise as you are doing     Try to lose 5-10 lb by your next visit     Please do not drink more than 2 glasses of alcohol/wine on a daily basis as this may contribute to your high blood pressure  Subjective:   Patient had an arteriogram 06/27/2019 status post percutaneous Treatment a right popliteal artery lesion resulting in recurring atheroembolic events    This was treated with a stent   The patient overall is feeling well  No fevers chills cough or colds  Good appetite and good energy  No urinary symptoms including foamy urine or blood in the urine  No gastrointestinal symptoms at this time however he did have ongoing diarrhea  Once he switched the Omeprazole to after eating he feels better  He had been seen by GI  No cardiovascular symptoms including swelling of the legs  No headaches, occasional dizziness and lightheadedness upon standing perhaps once a week  Blood pressure medications:  None at this time given history of hypotension  ROS:  See HPI, otherwise review of systems as completely reviewed with the patient are negative    Past Medical History:   Diagnosis Date    [facility] [address] [facility]     Chronic kidney disease     Colon polyps     GERD (gastroesophageal reflux disease)     Hematuria     History of rheumatic fever     Hyperlipidemia     Hypotension     Ischemic cardiomyopathy     [facility]     Pulmonary emphysema ([facility])      Past Surgical History:   Procedure Laterality Date    COLONOSCOPY  2019    HERNIA REPAIR      IR ABDOMINAL ANGIOGRAPHY / INTERVENTION  6/27/2019    POPLITEAL ARTERY STENT Right     6/2019    [facility] SLCTV CATHJ 3RD+ ORD SLCTV ABDL PEL/LXTR [facility] Right 6/27/2019    Procedure: leg ANGIOGRAM WITH STENT, BALLOON ANGIOPLASTY, LEFT GROIN ACCESS;  Surgeon: Madeline Miller MD;  [facility];  Service: Vascular     Family History   Problem Relation Age of Onset    Heart disease Mother     Hyperlipidemia Mother     Hypertension Father     Heart disease Father     Stroke Father     Hyperlipidemia Father     Diabetes Brother     Dementia Neg Hx     Drug abuse Neg Hx     Mental illness Neg Hx     Substance Abuse Neg Hx     Alcohol abuse Neg Hx     Depression Neg Hx       reports that he has been smoking cigarettes  He has a 7 50 pack-year smoking history   He has never used smokeless tobacco  He reports that he drinks alcohol  He reports that he does not use drugs  I COMPLETELY REVIEWED THE PAST MEDICAL HISTORY/PAST SURGICAL HISTORY/SOCIAL HISTORY/FAMILY HISTORY/AND MEDICATIONS  AND UPDATED ALL    Objective:     Vitals:   BP sitting on left:  104/60 with a heart rate of 80 and irregular  BP standing on left:  88/60, standing on right:  92/60 with a heart rate of 68 and irregular    Weight (last 2 days)     Date/Time   Weight    10/09/19 0846   82 6 (182 2)            Wt Readings from Last 3 Encounters:   10/09/19 82 6 kg (182 lb 3 2 oz)   09/12/19 80 7 kg (178 lb)   07/19/19 83 5 kg (184 lb)       Body mass index is 26 14 kg/m²  Physical Exam: General:  Obese, but in No acute distress  Skin:  No acute rash  Eyes:  No scleral icterus, noninjected  ENT:  Moist mucous membranes  Neck:  Supple, no jugular venous distention  Back   No CVAT  Chest:  Clear to auscultation and percussion, good respiratory effort  CVS:  Irregular rate, without a rub, murmurs or gallops  Abdomen:  Obese, Soft and nontender with normal bowel sounds  Extremities:  No cyanosis and no edema  Neuro:  Grossly intact  Psych:  Alert, oriented x3 and appropriate      Medications:    Current Outpatient Medications:     aspirin (ECOTRIN LOW STRENGTH) 81 mg EC tablet, Take 81 mg by mouth daily, Disp: , Rfl:     atorvastatin (LIPITOR) 20 mg tablet, Take 1 tablet (20 mg total) by mouth daily, Disp: 30 tablet, Rfl: 2    Cholecalciferol (VITAMIN D) 2000 units CAPS, Take 2,000 Units by mouth daily, Disp: , Rfl:     Magnesium 500 MG TABS, Take 1 tablet by mouth 2 (two) times a day, Disp: , Rfl:     omeprazole (PriLOSEC) 40 MG capsule, TAKE 1 CAPSULE BY MOUTH EVERY DAY ONE-HALF HOUR BEFORE BREAKFAST, Disp: , Rfl: 5    rivaroxaban (XARELTO) 20 mg tablet, Take 1 tablet (20 mg total) by mouth daily with breakfast, Disp: 90 tablet, Rfl: 3    Lab, Imaging and other studies: I have personally reviewed pertinent labs    Laboratory Results:  Results for orders placed or performed in visit on 10/07/19   Comprehensive metabolic panel   Result Value Ref Range    Sodium 144 136 - 145 mmol/L    Potassium 4 1 3 5 - 5 3 mmol/L    Chloride 109 (H) 100 - 108 mmol/L    CO2 30 21 - 32 mmol/L    ANION GAP 5 4 - 13 mmol/L    BUN 13 5 - 25 mg/dL    Creatinine 1 06 0 60 - 1 30 mg/dL    Glucose, Fasting 96 65 - 99 mg/dL    Calcium 8 4 8 3 - 10 1 mg/dL    AST 16 5 - 45 U/L    ALT 22 12 - 78 U/L    Alkaline Phosphatase 115 46 - 116 U/L    Total Protein 6 0 (L) 6 4 - 8 2 g/dL    Albumin 2 3 (L) 3 5 - 5 0 g/dL    Total Bilirubin 0 40 0 20 - 1 00 mg/dL    eGFR 72 ml/min/1 73sq m   CBC   Result Value Ref Range    WBC 12 21 (H) 4 31 - 10 16 Thousand/uL    RBC 4 29 3 88 - 5 62 Million/uL    Hemoglobin 12 7 12 0 - 17 0 g/dL    Hematocrit 40 3 36 5 - 49 3 %    MCV 94 82 - 98 fL    MCH 29 6 26 8 - 34 3 pg    MCHC 31 5 31 4 - 37 4 g/dL    RDW 14 9 11 6 - 15 1 %    Platelets 546 (H) 291 - 390 Thousands/uL    MPV 9 4 8 9 - 12 7 fL   Lipid Panel with Direct LDL reflex   Result Value Ref Range    Cholesterol 117 50 - 200 mg/dL    Triglycerides 83 <=150 mg/dL    HDL, Direct 45 40 - 60 mg/dL    LDL Calculated 55 0 - 100 mg/dL   Magnesium   Result Value Ref Range    Magnesium 1 5 (L) 1 6 - 2 6 mg/dL   Phosphorus   Result Value Ref Range    Phosphorus 3 9 2 3 - 4 1 mg/dL   Protein / creatinine ratio, urine   Result Value Ref Range    Creatinine, Ur 114 0 mg/dL    Protein Urine Random 24 mg/dL    Prot/Creat Ratio, Ur 0 21 (H) 0 00 - 0 10   PTH, intact   Result Value Ref Range    PTH 39 3 18 4 - 80 1 pg/mL   CK   Result Value Ref Range    Total CK 27 (L) 39 - 308 U/L       Results from last 7 days   Lab Units 10/07/19  0725   WBC Thousand/uL 12 21*   HEMOGLOBIN g/dL 12 7   HEMATOCRIT % 40 3   PLATELETS Thousands/uL 442*   POTASSIUM mmol/L 4 1   CHLORIDE mmol/L 109*   CO2 mmol/L 30   BUN mg/dL 13   CREATININE mg/dL 1 06   CALCIUM mg/dL 8 4   MAGNESIUM mg/dL 1 5*   PHOSPHORUS mg/dL 3 9 Radiology review:   chest X-ray    Ultrasound      Portions of the record may have been created with voice recognition software  Occasional wrong word or "sound a like" substitutions may have occurred due to the inherent limitations of voice recognition software  Read the chart carefully and recognize, using context, where substitutions have occurred

## 2019-10-09 NOTE — PATIENT INSTRUCTIONS
1  Medication changes today:  · Increase magnesium to twice a day watch for diarrhea    2  Please go for repeat lab work nonfasting this will include stool for occult blood  3  Follow-up in 4 months:  · Please go for fasting lab work prior to your appointment    4  General instructions:   AVOID SALT BUT NOT ADDING AN READING LABELS TO MAKE SURE THERE IS LOW-SALT IN THE FOOD THAT YOU ARE EATING     Avoid nonsteroidal anti-inflammatory drugs such as Naprosyn, ibuprofen, Aleve, Advil, Celebrex, etc   You can use Tylenol as needed if you do not have any liver condition to be concerned about     Avoid medications such as Sudafed or decongestants and antihistamines that contained the D component which is the decongestant  You can take antihistamines without the decongestant or D component   Continue exercise as you are doing     Try to lose 5-10 lb by your next visit     Please do not drink more than 2 glasses of alcohol/wine on a daily basis as this may contribute to your high blood pressure

## 2019-10-12 LAB
25(OH)D2 SERPL-MCNC: <1 NG/ML
25(OH)D3 SERPL-MCNC: 35 NG/ML
25(OH)D3+25(OH)D2 SERPL-MCNC: 35 NG/ML

## 2019-10-14 ENCOUNTER — CLINICAL SUPPORT (OUTPATIENT)
Dept: INTERNAL MEDICINE CLINIC | Facility: CLINIC | Age: 69
End: 2019-10-14
Payer: MEDICARE

## 2019-10-14 DIAGNOSIS — Z23 NEED FOR INFLUENZA VACCINATION: Primary | ICD-10-CM

## 2019-10-14 PROCEDURE — G0008 ADMIN INFLUENZA VIRUS VAC: HCPCS

## 2019-10-14 PROCEDURE — 90662 IIV NO PRSV INCREASED AG IM: CPT

## 2019-10-21 ENCOUNTER — TELEPHONE (OUTPATIENT)
Dept: NEPHROLOGY | Facility: CLINIC | Age: 69
End: 2019-10-21

## 2019-10-21 ENCOUNTER — APPOINTMENT (OUTPATIENT)
Dept: LAB | Facility: CLINIC | Age: 69
End: 2019-10-21
Payer: MEDICARE

## 2019-10-21 ENCOUNTER — HOSPITAL ENCOUNTER (OUTPATIENT)
Dept: NON INVASIVE DIAGNOSTICS | Facility: CLINIC | Age: 69
Discharge: HOME | End: 2019-10-21
Payer: MEDICARE

## 2019-10-21 DIAGNOSIS — R80.1 PERSISTENT PROTEINURIA: ICD-10-CM

## 2019-10-21 DIAGNOSIS — D72.829 LEUKOCYTOSIS, UNSPECIFIED TYPE: Primary | ICD-10-CM

## 2019-10-21 DIAGNOSIS — E55.9 VITAMIN D DEFICIENCY: ICD-10-CM

## 2019-10-21 DIAGNOSIS — E78.5 DYSLIPIDEMIA: ICD-10-CM

## 2019-10-21 DIAGNOSIS — D75.839 THROMBOCYTOSIS: ICD-10-CM

## 2019-10-21 DIAGNOSIS — I75.021 BLUE TOE SYNDROME, RIGHT (HCC): ICD-10-CM

## 2019-10-21 DIAGNOSIS — D63.1 ANEMIA OF CHRONIC RENAL FAILURE, STAGE 3 (MODERATE) (HCC): ICD-10-CM

## 2019-10-21 DIAGNOSIS — N18.30 CHRONIC KIDNEY DISEASE, STAGE III (MODERATE) (HCC): ICD-10-CM

## 2019-10-21 DIAGNOSIS — I95.89 CHRONIC HYPOTENSION: ICD-10-CM

## 2019-10-21 DIAGNOSIS — N18.30 ANEMIA OF CHRONIC RENAL FAILURE, STAGE 3 (MODERATE) (HCC): ICD-10-CM

## 2019-10-21 LAB
ERYTHROCYTE [DISTWIDTH] IN BLOOD BY AUTOMATED COUNT: 14.5 % (ref 11.6–15.1)
FERRITIN SERPL-MCNC: 271 NG/ML (ref 8–388)
HCT VFR BLD AUTO: 46.6 % (ref 36.5–49.3)
HEMOCCULT STL QL: NEGATIVE
HGB BLD-MCNC: 14.9 G/DL (ref 12–17)
IRON SATN MFR SERPL: 25 %
IRON SERPL-MCNC: 52 UG/DL (ref 65–175)
MCH RBC QN AUTO: 29.2 PG (ref 26.8–34.3)
MCHC RBC AUTO-ENTMCNC: 32 G/DL (ref 31.4–37.4)
MCV RBC AUTO: 91 FL (ref 82–98)
PLATELET # BLD AUTO: 603 THOUSANDS/UL (ref 149–390)
PMV BLD AUTO: 9 FL (ref 8.9–12.7)
RBC # BLD AUTO: 5.1 MILLION/UL (ref 3.88–5.62)
TIBC SERPL-MCNC: 207 UG/DL (ref 250–450)
WBC # BLD AUTO: 10.17 THOUSAND/UL (ref 4.31–10.16)

## 2019-10-21 PROCEDURE — 93925 LOWER EXTREMITY STUDY: CPT | Performed by: SURGERY

## 2019-10-21 PROCEDURE — 93922 UPR/L XTREMITY ART 2 LEVELS: CPT | Performed by: SURGERY

## 2019-10-21 PROCEDURE — 82728 ASSAY OF FERRITIN: CPT

## 2019-10-21 PROCEDURE — 82272 OCCULT BLD FECES 1-3 TESTS: CPT

## 2019-10-21 PROCEDURE — 84166 PROTEIN E-PHORESIS/URINE/CSF: CPT | Performed by: INTERNAL MEDICINE

## 2019-10-21 PROCEDURE — 83550 IRON BINDING TEST: CPT

## 2019-10-21 PROCEDURE — 83540 ASSAY OF IRON: CPT

## 2019-10-21 PROCEDURE — 93923 UPR/LXTR ART STDY 3+ LVLS: CPT

## 2019-10-21 PROCEDURE — 93925 LOWER EXTREMITY STUDY: CPT

## 2019-10-21 PROCEDURE — 85027 COMPLETE CBC AUTOMATED: CPT

## 2019-10-21 PROCEDURE — 84165 PROTEIN E-PHORESIS SERUM: CPT

## 2019-10-21 PROCEDURE — 84166 PROTEIN E-PHORESIS/URINE/CSF: CPT | Performed by: PATHOLOGY

## 2019-10-21 PROCEDURE — 36415 COLL VENOUS BLD VENIPUNCTURE: CPT

## 2019-10-21 PROCEDURE — 84165 PROTEIN E-PHORESIS SERUM: CPT | Performed by: PATHOLOGY

## 2019-10-21 PROCEDURE — 83883 ASSAY NEPHELOMETRY NOT SPEC: CPT

## 2019-10-21 NOTE — TELEPHONE ENCOUNTER
Patient's wife is retuning your call and she states that he will be seeing a gastroenterologist tomorrow and she wants to wait until after that appointment to decide if he needs to see Hematology  She states that the patient has not seen a Hematologist and she will contact us tomorrow to let us know about the Hematology appointment        Levar Olea MA

## 2019-10-21 NOTE — TELEPHONE ENCOUNTER
Left message for patient to verify if he has ever seen hematology  - will need to place hematology consult; Location, time/date  preference needed to schedule appt

## 2019-10-21 NOTE — TELEPHONE ENCOUNTER
I will be happy to speak with her  The patient absolutely needs to see hematology oncologist given the elevated platelet count and WBC count which is different than the GI doctor

## 2019-10-21 NOTE — TELEPHONE ENCOUNTER
----- Message from Oral Valdivia MD sent at 10/21/2019 10:06 AM EDT -----  Please consult hematology oncology regarding leukocytosis and thrombocytosis which is progressing  See if the patient is ever seen a hematologist 1st and then of course have him follow up with that person otherwise new consult with Hematology Oncology would be great in the next few weeks

## 2019-10-22 ENCOUNTER — OFFICE VISIT (OUTPATIENT)
Dept: GASTROENTEROLOGY | Facility: CLINIC | Age: 69
End: 2019-10-22
Payer: MEDICARE

## 2019-10-22 VITALS
SYSTOLIC BLOOD PRESSURE: 104 MMHG | WEIGHT: 174 LBS | HEIGHT: 70 IN | DIASTOLIC BLOOD PRESSURE: 66 MMHG | TEMPERATURE: 97.6 F | BODY MASS INDEX: 24.91 KG/M2 | HEART RATE: 44 BPM

## 2019-10-22 DIAGNOSIS — R10.30 LOWER ABDOMINAL PAIN: ICD-10-CM

## 2019-10-22 DIAGNOSIS — K63.5 POLYP OF COLON, UNSPECIFIED PART OF COLON, UNSPECIFIED TYPE: ICD-10-CM

## 2019-10-22 DIAGNOSIS — R10.9 ABDOMINAL CRAMPING: Primary | ICD-10-CM

## 2019-10-22 DIAGNOSIS — K22.70 BARRETT'S ESOPHAGUS WITHOUT DYSPLASIA: ICD-10-CM

## 2019-10-22 LAB
KAPPA LC FREE SER-MCNC: 54.8 MG/L (ref 3.3–19.4)
KAPPA LC FREE/LAMBDA FREE SER: 1.25 {RATIO} (ref 0.26–1.65)
LAMBDA LC FREE SERPL-MCNC: 43.7 MG/L (ref 5.7–26.3)

## 2019-10-22 PROCEDURE — 99214 OFFICE O/P EST MOD 30 MIN: CPT | Performed by: INTERNAL MEDICINE

## 2019-10-22 RX ORDER — DICYCLOMINE HYDROCHLORIDE 10 MG/1
10 CAPSULE ORAL
Qty: 30 CAPSULE | Refills: 0 | Status: SHIPPED | OUTPATIENT
Start: 2019-10-22 | End: 2020-02-10 | Stop reason: ALTCHOICE

## 2019-10-22 NOTE — PROGRESS NOTES
[facility] - Outpatient Consultation  Aida oMntgomery 77 y o  male MRN: [id number]  Encounter: [id number]          ASSESSMENT AND PLAN:      1  Abdominal cramping  2  Lower abdominal pain    He was having diarrhea which has improved somewhat  The past month he was doing really well  He was not having any diarrhea or abdominal discomfort  This past week he has felt weak, tired, fatigued and having a abdominal cramping  He also has had loose stools during this time  Suspect this could be viral gastroenteritis or transient irritable bowel  Will prescribed dicyclomine to help with abdominal cramping  If this persists he will come back for follow up     - dicyclomine (BENTYL) 10 mg capsule; Take 1 capsule (10 mg total) by mouth 4 (four) times a day (before meals and at bedtime)  Dispense: 30 capsule; Refill: 0    3  Colorectal cancer surveillance  Unsure of polyp pathology from colonoscopy that was completed by Dr Jalil Chavez  He should follow up with repeat colonoscopy in 3 years if these were adenomatous polyps  4  Harris's esophagus  He should have repeat upper endoscopy every 3 years  Given his upper endoscopy was in March of 2019 he should have repeat EGD in 2022        ______________________________________________________________    HPI:  Aida Montgomery is a 77y o  year old male who presents to the office for evaluation of abdominal pain/discomfort  They have a past medical history of CAD s/p PCI with stent placement, afib on xarelto,  PAD s/p popliteal artery stent for atheroemboli, CKD st III,  and follows with their PCP Dr Mimi Osorio MD  He sees Dr Kevin Green of cardiology  He does have adrenal masses that he is following with Dr Reta Cockayne for  He had MRCP in May for this reason    There did appear to be fluid filled small bowel loops with a transition concerning for small-bowel obstruction, however he is not having any symptoms of small-bowel obstruction  I have personally viewed the images in PACS  He saw Dr Tushar Zee in July for diarreha  He had EGD/colonsocopy in March 2019 [facility] revealed Harris's esophagus, and he had 3 polyps removed, diverticulosis with small hemorrhoids on colonoscopy  He has had diarrhea in the past, had stools studies  He was started on imodium  He also takes prilosec and undertakes in anti reflux measures  for the Harris's esophagus and reflux that he has  He has been eating the past week, and has lost 6 lbs  He was feeling better for the 3 weeks prior to this and then 1 week ago started having abomdinal discomfort again  He has been having some cramps and hasn't felt the same  Currently he is not having abdominal pain but occasionally will have this cramp and not feel well at all  He has lost 6 lb in the past week because he is not eating  He has lost 20 lb in the past 1-2 years due to persistent pain and diarrhea  REVIEW OF SYSTEMS:    CONSTITUTIONAL: Denies any fever, chills, rigors, and weight loss  HEENT: No earache or tinnitus  Denies hearing loss or visual disturbances  CARDIOVASCULAR: No chest pain or palpitations  RESPIRATORY: Denies any cough, hemoptysis, shortness of breath or dyspnea on exertion  GASTROINTESTINAL: As noted in the History of Present Illness  GENITOURINARY: No problems with urination  Denies any hematuria or dysuria  NEUROLOGIC: No dizziness or vertigo, denies headaches  MUSCULOSKELETAL: Denies any muscle or joint pain  SKIN: Denies skin rashes or itching  ENDOCRINE: Denies excessive thirst  Denies intolerance to heat or cold  PSYCHOSOCIAL: Denies depression or anxiety  Denies any recent memory loss         Historical Information   Past Medical History:   Diagnosis Date    Blue toe syndrome (HCC)     Chronic kidney disease     Colon polyps     GERD (gastroesophageal reflux disease)     Hematuria     History of rheumatic fever     Hyperlipidemia     Hypotension     Ischemic cardiomyopathy     PAD (peripheral artery disease) (HCC)     Pulmonary emphysema (HCC)      Past Surgical History:   Procedure Laterality Date    COLONOSCOPY  2019    HERNIA REPAIR      IR ABDOMINAL ANGIOGRAPHY / INTERVENTION  6/27/2019    POPLITEAL ARTERY STENT Right     6/2019    WA SLCTV CATHJ 3RD+ ORD SLCTV ABDL PEL/LXTR [facility] Right 6/27/2019    Procedure: leg ANGIOGRAM WITH STENT, BALLOON ANGIOPLASTY, LEFT GROIN ACCESS;  Surgeon: Tariq Ricks MD;  Location: [facility];  Service: Vascular     Social History   Social History     Substance and Sexual Activity   Alcohol Use Yes    Frequency: Monthly or less    Drinks per session: 1 or 2    Binge frequency: Less than monthly    Comment: oscional     Social History     Substance and Sexual Activity   Drug Use No     Social History     Tobacco Use   Smoking Status Current Every Day Smoker    Packs/day: 0 25    Years: 30 00    Pack years: 7 50    Types: Cigarettes   Smokeless Tobacco Never Used   Tobacco Comment    " I will do it on my own"     Family History   Problem Relation Age of Onset    Heart disease Mother     Hyperlipidemia Mother     Hypertension Father     Heart disease Father     Stroke Father     Hyperlipidemia Father     Diabetes Brother     Dementia Neg Hx     Drug abuse Neg Hx     Mental illness Neg Hx     Substance Abuse Neg Hx     Alcohol abuse Neg Hx     Depression Neg Hx        Meds/Allergies       Current Outpatient Medications:     aspirin (ECOTRIN LOW STRENGTH) 81 mg EC tablet    atorvastatin (LIPITOR) 20 mg tablet    Cholecalciferol (VITAMIN D) 2000 units CAPS    Magnesium 500 MG TABS    omeprazole (PriLOSEC) 40 MG capsule    rivaroxaban (XARELTO) 20 mg tablet    No Known Allergies        Objective     Blood pressure 104/66, pulse (!) 44, temperature 97 6 °F (36 4 °C), temperature source Tympanic, height 5' 10" (1 778 m), weight 78 9 kg (174 lb)   Body mass index is 24 97 kg/m²         PHYSICAL EXAM:      General Appearance:   Alert, cooperative, no distress   HEENT:   Normocephalic, atraumatic, anicteric  Neck:  Supple, symmetrical, trachea midline   Lungs:   Clear to auscultation bilaterally; no rales, rhonchi or wheezing; respirations unlabored    Heart[de-identified]   Regular rate and rhythm; no murmur, rub, or gallop  Abdomen:   Soft, non-tender, non-distended; normal bowel sounds; no masses, no organomegaly    Genitalia:   Deferred    Rectal:   Deferred    Extremities:  No cyanosis, clubbing or edema    Pulses:  2+ and symmetric    Skin:  No jaundice, rashes, or lesions    Lymph nodes:  No palpable cervical lymphadenopathy        Lab Results:   No visits with results within 1 Day(s) from this visit  Latest known visit with results is:   Appointment on 10/21/2019   Component Date Value    Fecal Occult Blood Diagn* 10/21/2019 Negative          Radiology Results:   Vas Lower Limb Arterial Duplex, Complete Bilateral    Result Date: 10/21/2019  Narrative:  [facility] REPORT CLINICAL: Indications: 3 month surveillance for progression of disease, S/P intervention  Operative History: 2019-06-27 Right Popliteal stent Risk Factors The patient has history of HLD, PAD and smoking (current) 0 5 ppd  He has no history of Obesity, HTN or Diabetes  The patient current BMI is 26 11, Weight is 182 lb and height is 70 in  Clinical Right Pressure:  104/ mm Hg, Left Pressure:  103/ mm Hg  FINDINGS:  Segment                Rig  Left                                          PSV  PSV  Common Femoral Artery   83   96  Prox Profunda           95  118  Prox SFA               110  103  Mid SFA                 80   94  Dist SFA                     56  Dist SFA - Stent        72       Proximal Pop                 53  Proximal Pop - Stent    71       Distal Pop              70   53  Tibioperoneal           54   54  Dist Post Tibial        65   73  Dist  Ant   Tibial       23   66     CONCLUSION: Impression: RIGHT LOWER LIMB: This resting evaluation shows no evidence of significant lower extremity arterial occlusive disease  The popliteal artery and stent appear to be patent  There is low flow noted in the distal anterior tibial artery  Ankle/Brachial index:  1 21 , Prior 1 09 PVR/ PPG tracings are normal  Metatarsal and great toe pressures were unobtainable due to flat line PPG tracings, Prior 48mmHg  LEFT LOWER LIMB: This resting evaluation shows no evidence of significant lower extremity arterial occlusive disease  Ankle/Brachial index:  1 14 , Prior 1 07 PVR/ PPG tracings are normal  Metatarsal pressure of 136mmHg Great toe pressure of 45mmHg, within the healing range, Prior 86mmHg  Compared to previous study on 04/23/2019 , the right popliteal artery stenosis resolved S/P intervention, and bilateral great toe pressures have decreased    SIGNATURE: Electronically Signed by: Ramesh Thacker on 2019-10-21 03:54:05 PM        Answers for HPI/ROS submitted by the patient on 10/21/2019   Abdominal pain  Chronicity: chronic  Onset: more than 1 year ago  Onset quality: gradual  Frequency: constantly  Episode duration: 1 hours  Progression since onset: waxing and waning  Pain location: LLQ  Pain - numeric: 7/10  Pain quality: cramping  Radiates to: LLQ  anorexia: Yes  arthralgias: No  belching: Yes  constipation: No  diarrhea: Yes  fever: No  flatus: No  frequency: No  headaches: No  hematochezia: No  hematuria: No  melena: No  myalgias: Yes  nausea: Yes  weight loss: Yes  vomiting: No  Aggravated by: certain positions, eating  Relieved by: belching, bowel movements, certain positions  Diagnostic workup: lower endoscopy, ultrasound, upper endoscopy

## 2019-10-23 ENCOUNTER — TELEPHONE (OUTPATIENT)
Dept: HEMATOLOGY ONCOLOGY | Facility: CLINIC | Age: 69
End: 2019-10-23

## 2019-10-23 LAB
ALBUMIN SERPL ELPH-MCNC: 2.63 G/DL (ref 3.5–5)
ALBUMIN SERPL ELPH-MCNC: 42.4 % (ref 52–65)
ALBUMIN UR ELPH-MCNC: 27.7 %
ALPHA1 GLOB MFR UR ELPH: 18.4 %
ALPHA1 GLOB SERPL ELPH-MCNC: 0.65 G/DL (ref 0.1–0.4)
ALPHA1 GLOB SERPL ELPH-MCNC: 10.5 % (ref 2.5–5)
ALPHA2 GLOB MFR UR ELPH: 22.4 %
ALPHA2 GLOB SERPL ELPH-MCNC: 0.92 G/DL (ref 0.4–1.2)
ALPHA2 GLOB SERPL ELPH-MCNC: 14.8 % (ref 7–13)
B-GLOBULIN MFR UR ELPH: 17.4 %
BETA GLOB ABNORMAL SERPL ELPH-MCNC: 0.38 G/DL (ref 0.4–0.8)
BETA1 GLOB SERPL ELPH-MCNC: 6.1 % (ref 5–13)
BETA2 GLOB SERPL ELPH-MCNC: 6.1 % (ref 2–8)
BETA2+GAMMA GLOB SERPL ELPH-MCNC: 0.38 G/DL (ref 0.2–0.5)
GAMMA GLOB ABNORMAL SERPL ELPH-MCNC: 1.25 G/DL (ref 0.5–1.6)
GAMMA GLOB MFR UR ELPH: 14.1 %
GAMMA GLOB SERPL ELPH-MCNC: 20.1 % (ref 12–22)
IGG/ALB SER: 0.74 {RATIO} (ref 1.1–1.8)
PROT PATTERN SERPL ELPH-IMP: ABNORMAL
PROT PATTERN UR ELPH-IMP: ABNORMAL
PROT SERPL-MCNC: 6.2 G/DL (ref 6.4–8.2)
PROT UR-MCNC: 180 MG/DL

## 2019-10-23 NOTE — TELEPHONE ENCOUNTER
New Patient Encounter    New Patient Intake Form   Patient Details:  Raffaele Coppola  1950  [id number]    Background Information:  [address] starts by opening a telephone encounter and gathering the following information   Who is calling to schedule? If not self, relationship to patient? provider   Referring Provider Leora Madrid   What is the diagnosis? thrombocytosis   When was the diagnosis? 10/2019   Is patient aware of diagnosis? Yes   Reason for visit? NP DX   Have you had any testing done? If so: when, where? Yes   Are records in [place]? yes   Was the patient told to bring a disk? no   Scheduling Information:   Preferred [place]: [place]     Requesting Specific Provider? Will Branch   Are there any dates/time the patient cannot be seen? no   Counseling Pre-Screen:  If the patient answers YES to any of the below questions, please route to the appropriate location specific counselor    Have you felt anxious or worried about cancer and the treatment you are receiving? Did Not Speak to Patient   Has your diagnosis caused physical, emotional, or financial hardship for you? Did Not Speak to Patient   Note: Do not ask the patient about transportation issues/needs  Please notate if the patient brings it up and the counselor will schedule accordingly  Miscellaneous: NA   After completing the above information, please route to Financial Counselor and the appropriate Nurse Navigator for review

## 2019-10-25 ENCOUNTER — TELEPHONE (OUTPATIENT)
Dept: VASCULAR SURGERY | Facility: CLINIC | Age: 69
End: 2019-10-25

## 2019-10-25 ENCOUNTER — OFFICE VISIT (OUTPATIENT)
Dept: VASCULAR SURGERY | Facility: CLINIC | Age: 69
End: 2019-10-25
Payer: MEDICARE

## 2019-10-25 VITALS
DIASTOLIC BLOOD PRESSURE: 68 MMHG | BODY MASS INDEX: 25.62 KG/M2 | HEART RATE: 80 BPM | TEMPERATURE: 96.5 F | SYSTOLIC BLOOD PRESSURE: 114 MMHG | WEIGHT: 179 LBS | HEIGHT: 70 IN

## 2019-10-25 DIAGNOSIS — I73.9 PAD (PERIPHERAL ARTERY DISEASE) (HCC): ICD-10-CM

## 2019-10-25 DIAGNOSIS — Z95.820 S/P PERIPHERAL ARTERY ANGIOPLASTY WITH STENT PLACEMENT: Primary | ICD-10-CM

## 2019-10-25 PROCEDURE — 99214 OFFICE O/P EST MOD 30 MIN: CPT | Performed by: SURGERY

## 2019-10-25 NOTE — ASSESSMENT & PLAN NOTE
71yo M s/p RLE angiogram with popliteal artery stent for an atheroembolic lesion  Presents for follow-up  Doing well overall  Denies any recurrent episodes of blue toe syndrome  Denies any symptoms of claudication  LEADs demonstrate widely patent popliteal artery stent with an DAVID of 1 2 on the right       Continue asa/statin  Continue xarelto for paroxysmal a fib   6 month LEADs with clinic visit

## 2019-10-25 NOTE — PROGRESS NOTES
Assessment/Plan:    S/P peripheral artery angioplasty with stent placement  69yo M s/p RLE angiogram with popliteal artery stent for an atheroembolic lesion  Presents for follow-up  Doing well overall  Denies any recurrent episodes of blue toe syndrome  Denies any symptoms of claudication  LEADs demonstrate widely patent popliteal artery stent with an DAVDI of 1 2 on the right       Continue asa/statin  Continue xarelto for paroxysmal a fib   6 month LEADs with clinic visit  Problem List Items Addressed This Visit        Cardiovascular and Mediastinum    PAD (peripheral artery disease) ([facility] [address] )       Other    S/P peripheral artery angioplasty with stent placement - Primary     69yo M s/p RLE angiogram with popliteal artery stent for an atheroembolic lesion  Presents for follow-up  Doing well overall  Denies any recurrent episodes of blue toe syndrome  Denies any symptoms of claudication  LEADs demonstrate widely patent popliteal artery stent with an DAVID of 1 2 on the right       Continue asa/statin  Continue xarelto for paroxysmal a fib   6 month LEADs with clinic visit  Relevant Orders    VAS lower limb arterial duplex, complete bilateral            Subjective:      Patient ID: Diogenes York is a 77 y o  male  Patient presents today for results review of STEFFEN, s/p agram w/ pop artery stenting 6/27  Patient denies symptoms of claudication and no toe discoloration  Patient is a 1/2 PPD/smoker  The following portions of the patient's history were reviewed and updated as appropriate: allergies, current medications, past family history, past medical history, past social history, past surgical history and problem list     Review of Systems   Constitutional: Negative  HENT: Negative  Eyes: Negative  Respiratory: Positive for cough and shortness of breath  Cardiovascular: Negative  Gastrointestinal: Negative  Endocrine: Negative  Genitourinary: Negative      Musculoskeletal: Negative  Skin: Negative  Allergic/Immunologic: Negative  Neurological: Negative  Hematological: Negative  Psychiatric/Behavioral: Negative  I have reviewed and updated the ROS as appropriate  Objective:      /68 (BP Location: Right arm, Patient Position: Sitting)   Pulse 80   Temp (!) 96 5 °F (35 8 °C) (Tympanic)   Ht 5' 10" (1 778 m)   Wt 81 2 kg (179 lb)   BMI 25 68 kg/m²          Physical Exam   Constitutional: He is oriented to person, place, and time  He appears well-developed and well-nourished  HENT:   Head: Normocephalic and atraumatic  Eyes: Pupils are equal, round, and reactive to light  EOM are normal    Neck: Normal range of motion  Neck supple  Cardiovascular: Normal rate, normal heart sounds and intact distal pulses  Irregularly irregular    Pulmonary/Chest: Effort normal and breath sounds normal    Abdominal: Soft  Bowel sounds are normal    Musculoskeletal: Normal range of motion  He exhibits edema  Stigmata of chronic venous stasis on the left with varicose veins in the calf   Left pedal pulses 1+ palpable     Right PT tripahsic 1+ palpable, AT monophasic   Neurological: He is alert and oriented to person, place, and time  Skin: Skin is warm and dry  Capillary refill takes less than 2 seconds  Psychiatric: He has a normal mood and affect   His behavior is normal  Judgment and thought content normal

## 2019-10-25 NOTE — TELEPHONE ENCOUNTER
Lmom to see if pt could come in at 12:30 pm to see Dr Raul George at [facility]  Appointment change per Dr Raul George

## 2019-11-04 NOTE — PROGRESS NOTES
Oncology Outpatient Consult Note  Abigail Roach 77 y o  male MRN: @ Encounter: [id number]        Date:  11/5/2019      Assessment/ Plan:    1  Thrombocytosis, progressive since 9/2018, normal 6/2018 and prior  400s 9/2018, increasing into 500s and 603 10/21/2019  We did discuss that this may be secondary to myeloproliferative disorder  We typically hold off on side a reductive treatment such as Hydrea unless platelets go to 657,540  He is on aspirin 81 milligrams daily  He is asked to have additional labs and f/u thereafter for review  - CBC and differential  - JAK2 V617F,Ql,W/RFL Exons 12,13 and MPL M826,U109; Future  - Erythropoietin; Future ordered 2nd to elevated H/H  If unrevealing,   Calreticulin (CALR) Mutation and/or - Leukemia/Lymphoma flow cytometry could be considered  2    Hemoglobin has fluctuated 12 7 10/2/19 - 16 8 5/2019  Normal iron studies 10/2019  EGD and colonoscopy 3/2019 unrevealing  7/8/19  Colonoscopy perfomed due to persistent diarrhea- unrevealing  Negative hemoccult 10/19       3   WBC fluctuant between 6 9 5/2019  - 12 2 5/2018   Differential normal 6/2019       4   Bilateral adrenal masses identified on imaging 5/2019 which are indeterminate and grossly unchanged from recent examination  MRI ordered per Dr Pasquale Feldman and scheduled for 12/27/2019 prior to 1/8/2020 office visit  Liver and spleen unremarkable  5   CKD stage 3, followed by Dr Sabine Chacko  6   Ischemic cardiomyopathy, PAF, s/p peripheral artery angioplasty with stent placement  Follows with Dr Hollis Antonio  On ASA 81mg po daily and Xareloto 20 mg po daily  HPI:  Abigail Roach is a 77 y o  seen for initial consultation 11/5/2019 accompanied by his wife, El Calderon, at the referral of Shakira Barr MD regarding progressive thrombocytosis since 9/2018        Platelet count was 447,040 5/2018, 400- 500s 9/2018- 10/7/2019 and 603,000 10/21/2019       10/21/2019:  Post serum kappa free light chains are mildly elevated at 54 8 43 7 respectively  Ratio is normal at 1 25  FOBT negative  Iron saturation 25%, Ferritin 271  UPEP identified nonselective proteinuria without evidence of monoclonal bands  No monoclonal bands on SPEP  CT A/P 5/15/2019:  Liver and spleen were unremarkable  Bilateral adrenal masses are identified which are indeterminate and grossly unchanged from recent examination  MRI ordered per Dr Val Preston and scheduled for 12/27/2019 prior to 1/8/2020 office visit  He continues to have diarrhea for the past year  Workup has been unrevealing  It has improved somewhat  He did lose 30 pounds over the summer since his May 2019 hospitalization for small bowel obstruction  Weight loss has ceased and he has put a few pounds back on  No autoimmune signs/ symptoms  Does smoke  1/2ppd x 30 years  Drinks liquor/ alcohol seldomly  Test Results:      Labs:   Lab Results   Component Value Date    HGB 14 9 10/21/2019    HCT 46 6 10/21/2019    MCV 91 10/21/2019     (H) 10/21/2019    WBC 10 17 (H) 10/21/2019    NRBC 0 06/12/2019     Lab Results   Component Value Date    K 4 1 10/07/2019     (H) 10/07/2019    CO2 30 10/07/2019    BUN 13 10/07/2019    CREATININE 1 06 10/07/2019    GLUF 96 10/07/2019    CALCIUM 8 4 10/07/2019    AST 16 10/07/2019    ALT 22 10/07/2019    ALKPHOS 115 10/07/2019    EGFR 72 10/07/2019           Imaging:   Vas Lower Limb Arterial Duplex, Complete Bilateral    Result Date: 10/21/2019  Narrative:  [facility] REPORT [facility]INICAL: Indications: 3 month surveillance for progression of disease, S/P intervention  Operative History: 2019-06-27 Right Popliteal stent Risk Factors The patient has history of HLD, PAD and smoking (current) 0 5 ppd  He has no history of Obesity, HTN or Diabetes  The patient current BMI is 26 11, Weight is 182 lb and height is 70 in  Clinical Right Pressure:  104/ mm Hg, Left Pressure:  103/ mm Hg    FINDINGS: Segment                Rig  Left                                          PSV  PSV  Common Femoral Artery   83   96  Prox Profunda           95  118  Prox SFA               110  103  Mid SFA                 80   94  Dist SFA                     56  Dist SFA - Stent        72       Proximal Pop                 53  Proximal Pop - Stent    71       Distal Pop              70   53  Tibioperoneal           54   54  Dist Post Tibial        65   73  Dist  Ant  Tibial       23   66     CONCLUSION: Impression: RIGHT LOWER LIMB: This resting evaluation shows no evidence of significant lower extremity arterial occlusive disease  The popliteal artery and stent appear to be patent  There is low flow noted in the distal anterior tibial artery  Ankle/Brachial index:  1 21 , Prior 1 09 PVR/ PPG tracings are normal  Metatarsal and great toe pressures were unobtainable due to flat line PPG tracings, Prior 48mmHg  LEFT LOWER LIMB: This resting evaluation shows no evidence of significant lower extremity arterial occlusive disease  Ankle/Brachial index:  1 14 , Prior 1 07 PVR/ PPG tracings are normal  Metatarsal pressure of 136mmHg Great toe pressure of 45mmHg, within the healing range, Prior 86mmHg  Compared to previous study on 04/23/2019 , the right popliteal artery stenosis resolved S/P intervention, and bilateral great toe pressures have decreased  SIGNATURE: Electronically Signed by: Prachi Albrecht on 2019-10-21 03:54:05 PM          ROS: As mentioned in HPI & Interval History otherwise 14 point ROS negative  Active Problems:   Patient Active Problem List   Diagnosis    Overweight (BMI 25 0-29  9)    Chronic kidney disease, stage III (moderate) (HCC)    Gross hematuria    Persistent proteinuria    Impaired fasting glucose    Microscopic hematuria    Chronic hypotension    [place] cardiac risk 10-20% in next 10 years    Abdominal aortic atherosclerosis (HCC)    RBBB (right bundle branch block with left anterior fascicular block)    Tobacco use    Dyslipidemia    Vitamin D deficiency    GERD with esophagitis    Harris's esophagus without dysplasia    Colon polyps    PAD (peripheral artery disease) (HCC)    Mass of both adrenal glands (HCC)    History of small bowel obstruction    Blue toe syndrome, right (HCC)    Ischemic cardiomyopathy    S/P peripheral artery angioplasty with stent placement    Venous stasis    GI symptoms    Paroxysmal atrial fibrillation (HCC)    Anemia of chronic renal failure, stage 3 (moderate) (HCC)       Past Medical History:   Past Medical History:   Diagnosis Date    Blue toe syndrome (HCC)     Chronic kidney disease     Colon polyps     GERD (gastroesophageal reflux disease)     Hematuria     History of rheumatic fever     Hyperlipidemia     Hypotension     Ischemic cardiomyopathy     PAD (peripheral artery disease) (HCC)     Pulmonary emphysema (HCC)        Surgical History:   Past Surgical History:   Procedure Laterality Date    COLONOSCOPY  2019    HERNIA REPAIR      IR ABDOMINAL ANGIOGRAPHY / INTERVENTION  6/27/2019    POPLITEAL ARTERY STENT Right     6/2019    SC SLCTV CATHJ 3RD+ ORD SLCTV ABDL PEL/LXTR [facility] Right 6/27/2019    Procedure: leg ANGIOGRAM WITH STENT, BALLOON ANGIOPLASTY, LEFT GROIN ACCESS;  Surgeon: Valerie Estrella MD;  Location: [facility];  Service: Vascular       Family History:    Family History   Problem Relation Age of Onset    Heart disease Mother     Hyperlipidemia Mother     Hypertension Father     Heart disease Father     Stroke Father     Hyperlipidemia Father     Diabetes Brother     Dementia Neg Hx     Drug abuse Neg Hx     Mental illness Neg Hx     Substance Abuse Neg Hx     Alcohol abuse Neg Hx     Depression Neg Hx        Cancer-related family history is not on file      Social History:   Social History     Socioeconomic History    Marital status: /Civil Union     Spouse name: Not on file    Number of children: Not on file    Years of education: Not on file    Highest education level: Not on file   Occupational History    Occupation: retired   Social Needs    Financial resource strain: Not on file    Food insecurity:     Worry: Not on file     Inability: Not on file   [place] needs:     Medical: Not on file     Non-medical: Not on file   Tobacco Use    Smoking status: Current Every Day Smoker     Packs/day: 0 25     Years: 30 00     Pack years: 7 50     Types: Cigarettes    Smokeless tobacco: Never Used    Tobacco comment: " I will do it on my own"   Substance and Sexual Activity    Alcohol use: Yes     Frequency: Monthly or less     Drinks per session: 1 or 2     Binge frequency: Less than monthly     Comment: oscional    Drug use: No    Sexual activity: Not on file   Lifestyle    Physical activity:     Days per week: Not on file     Minutes per session: Not on file    Stress: Not on file   Relationships    Social connections:     Talks on phone: Not on file     Gets together: Not on file     Attends Faith service: Not on file     Active member of club or organization: Not on file     Attends meetings of clubs or organizations: Not on file     Relationship status: Not on file    Intimate partner violence:     Fear of current or ex partner: Not on file     Emotionally abused: Not on file     Physically abused: Not on file     Forced sexual activity: Not on file   Other Topics Concern    Not on file   Social History Narrative    Drinks coffee       Current Medications:   Current Outpatient Medications   Medication Sig Dispense Refill    aspirin (ECOTRIN LOW STRENGTH) 81 mg EC tablet Take 81 mg by mouth daily      atorvastatin (LIPITOR) 20 mg tablet Take 1 tablet (20 mg total) by mouth daily 30 tablet 2    Cholecalciferol (VITAMIN D) 2000 units CAPS Take 2,000 Units by mouth daily      dicyclomine (BENTYL) 10 mg capsule Take 1 capsule (10 mg total) by mouth 4 (four) times a day (before meals and at bedtime) 30 capsule 0    Magnesium 500 MG TABS Take 1 tablet by mouth 2 (two) times a day      omeprazole (PriLOSEC) 40 MG capsule TAKE 1 CAPSULE BY MOUTH EVERY DAY ONE-HALF HOUR BEFORE BREAKFAST  5    rivaroxaban (XARELTO) 20 mg tablet Take 1 tablet (20 mg total) by mouth daily with breakfast 90 tablet 3     No current facility-administered medications for this visit  Allergies: No Known Allergies      Physical Exam:    There is no height or weight on file to calculate BSA  Ht Readings from Last 3 Encounters:   10/25/19 5' 10" (1 778 m)   10/22/19 5' 10" (1 778 m)   10/09/19 5' 10" (1 778 m)       Wt Readings from Last 3 Encounters:   10/25/19 81 2 kg (179 lb)   10/22/19 78 9 kg (174 lb)   10/09/19 82 6 kg (182 lb 3 2 oz)        Temp Readings from Last 3 Encounters:   10/25/19 (!) 96 5 °F (35 8 °C) (Tympanic)   10/22/19 97 6 °F (36 4 °C) (Tympanic)   07/19/19 97 6 °F (36 4 °C)        BP Readings from Last 3 Encounters:   10/25/19 114/68   10/22/19 104/66   10/09/19 124/70         Pulse Readings from Last 3 Encounters:   10/25/19 80   10/22/19 (!) 44   09/12/19 64         Physical Exam    Physical Exam   Constitutional: He is oriented to person, place, and time  He appears well-developed and well-nourished  No distress  HENT:   Head: Normocephalic and atraumatic  Eyes: Conjunctivae are normal    Neck: Normal range of motion  Neck supple  No tracheal deviation present  Cardiovascular: Normal rate  An irregularly irregular rhythm present  Exam reveals no gallop and no friction rub  No murmur heard  Pulmonary/Chest: Effort normal and breath sounds normal  No respiratory distress  He has no wheezes  He has no rales  He exhibits no tenderness  Abdominal: Soft  He exhibits no distension  There is no hepatosplenomegaly  There is no tenderness  Lymphadenopathy:     He has no cervical adenopathy  Neurological: He is alert and oriented to person, place, and time     Skin: Skin is warm and dry  He is not diaphoretic  No erythema  No pallor  Psychiatric: He has a normal mood and affect  His behavior is normal  Judgment and thought content normal    Vitals reviewed        Emergency Contacts:    Adalgisa Bennett, [phone],

## 2019-11-05 ENCOUNTER — CONSULT (OUTPATIENT)
Dept: HEMATOLOGY ONCOLOGY | Facility: CLINIC | Age: 69
End: 2019-11-05
Payer: MEDICARE

## 2019-11-05 VITALS
RESPIRATION RATE: 16 BRPM | WEIGHT: 178 LBS | TEMPERATURE: 97.8 F | HEART RATE: 98 BPM | HEIGHT: 70 IN | SYSTOLIC BLOOD PRESSURE: 126 MMHG | DIASTOLIC BLOOD PRESSURE: 78 MMHG | BODY MASS INDEX: 25.48 KG/M2 | OXYGEN SATURATION: 97 %

## 2019-11-05 DIAGNOSIS — D58.2 ELEVATED HEMOGLOBIN (HCC): Primary | ICD-10-CM

## 2019-11-05 DIAGNOSIS — D75.839 THROMBOCYTOSIS: ICD-10-CM

## 2019-11-05 DIAGNOSIS — D72.829 LEUKOCYTOSIS, UNSPECIFIED TYPE: ICD-10-CM

## 2019-11-05 PROCEDURE — 99213 OFFICE O/P EST LOW 20 MIN: CPT | Performed by: PHYSICIAN ASSISTANT

## 2019-11-06 ENCOUNTER — OFFICE VISIT (OUTPATIENT)
Dept: INTERNAL MEDICINE CLINIC | Facility: CLINIC | Age: 69
End: 2019-11-06
Payer: MEDICARE

## 2019-11-06 VITALS
TEMPERATURE: 96.6 F | HEART RATE: 76 BPM | RESPIRATION RATE: 18 BRPM | DIASTOLIC BLOOD PRESSURE: 80 MMHG | BODY MASS INDEX: 25.6 KG/M2 | WEIGHT: 178.8 LBS | SYSTOLIC BLOOD PRESSURE: 132 MMHG | HEIGHT: 70 IN | OXYGEN SATURATION: 99 %

## 2019-11-06 DIAGNOSIS — N18.30 CHRONIC KIDNEY DISEASE, STAGE III (MODERATE) (HCC): ICD-10-CM

## 2019-11-06 DIAGNOSIS — Z71.9 HEALTH COUNSELING: ICD-10-CM

## 2019-11-06 DIAGNOSIS — I73.9 PAD (PERIPHERAL ARTERY DISEASE) (HCC): ICD-10-CM

## 2019-11-06 DIAGNOSIS — I48.0 PAROXYSMAL ATRIAL FIBRILLATION (HCC): ICD-10-CM

## 2019-11-06 DIAGNOSIS — R19.7 DIARRHEA, UNSPECIFIED TYPE: ICD-10-CM

## 2019-11-06 DIAGNOSIS — R73.01 IMPAIRED FASTING GLUCOSE: Primary | ICD-10-CM

## 2019-11-06 DIAGNOSIS — J43.8 OTHER EMPHYSEMA (HCC): ICD-10-CM

## 2019-11-06 DIAGNOSIS — Z72.0 TOBACCO USE: ICD-10-CM

## 2019-11-06 PROCEDURE — 99214 OFFICE O/P EST MOD 30 MIN: CPT | Performed by: INTERNAL MEDICINE

## 2019-11-06 NOTE — PROGRESS NOTES
Assessment/Plan:    Tobacco use  Still smoking about 6-8 cigs / day  Intermittent non productive cough  Emphysema on CT, agrees to do PFTs  PAD (peripheral artery disease) (HCC)  On ASA, statin  Mass of both adrenal glands [facility])  Keep appointment with Dr Cece Zamora  Paroxysmal atrial fibrillation (HCC)  On Xarelto  Harris's esophagus without dysplasia  On daily PPI  Chronic kidney disease, stage III (moderate) (HCC)  Renal function has been normal     Impaired fasting glucose  Check A1c  Diagnoses and all orders for this visit:    Impaired fasting glucose  -     Hemoglobin A1C    Tobacco use  -     Complete PFT with post bronchodilator; Future    Other emphysema ([facility] [address] )  -     Complete PFT with post bronchodilator; Future    PAD (peripheral artery disease) (HCC)    Chronic kidney disease, stage III (moderate) (HCC)    Paroxysmal atrial fibrillation (HCC)    Diarrhea, unspecified type  Comments:  Intermittent, not associated with specific foods  Gio did not help, saw GI  Health counseling  Comments:  Eye exam due  Flu vaccine updated  Follow up in 6 months or as needed  Subjective:      Patient ID: Aida Montgomery is a 77 y o  male  Mr Akanksha Cope is here with his wife  He has no new complaints  He reports having loose stools about 1 to 4 times a month  He would suddenly experience abdominal cramping and loose stools  It would usually occur after a meal   He cannot say what foods would trigger it, could be anything   He denies any nausea or vomiting  He normally moves his bowels once a day  He denies any chest pain, shortness of breath, dizziness or other symptoms  He continues to smoke about 6-8 cigarettes daily  He reports that he has cut back the past few years  He is feeling well, no new complaints         The following portions of the patient's history were reviewed and updated as appropriate: allergies, current medications, past medical history, past social history and problem list     Review of Systems   Constitutional: Negative for appetite change and fatigue  HENT: Negative for congestion and hearing loss  Eyes: Negative  Negative for visual disturbance  Respiratory: Negative for cough, chest tightness and shortness of breath  Cardiovascular: Negative for chest pain, palpitations and leg swelling  Gastrointestinal: Positive for diarrhea  Negative for abdominal pain, constipation, nausea and vomiting  Genitourinary: Negative for dysuria and frequency  Musculoskeletal: Negative for arthralgias  Skin: Negative for rash and wound  Neurological: Negative for dizziness, numbness and headaches  Psychiatric/Behavioral: Positive for sleep disturbance  The patient is not nervous/anxious  Objective:      /80   Pulse 76   Temp (!) 96 6 °F (35 9 °C) (Oral)   Resp 18   Ht 5' 10" (1 778 m)   Wt 81 1 kg (178 lb 12 8 oz)   SpO2 99%   BMI 25 66 kg/m²          Physical Exam   Constitutional: He is oriented to person, place, and time  He appears well-developed and well-nourished  HENT:   Head: Normocephalic and atraumatic  Mouth/Throat: Mucous membranes are normal    Eyes: Pupils are equal, round, and reactive to light  Neck: Neck supple  Cardiovascular: Normal rate, regular rhythm and normal heart sounds  Pulmonary/Chest: Effort normal  He has no wheezes  He has no rhonchi  Abdominal: Soft  Bowel sounds are normal    Musculoskeletal: He exhibits no edema  Neurological: He is alert and oriented to person, place, and time  Skin: Skin is warm  No rash noted  Psychiatric: He has a normal mood and affect  His behavior is normal    Nursing note and vitals reviewed  Lab &/or imaging results reviewed with patient

## 2019-11-06 NOTE — ASSESSMENT & PLAN NOTE
Still smoking about 6-8 cigs / day  Intermittent non productive cough  Emphysema on CT, agrees to do PFTs

## 2019-11-15 ENCOUNTER — HOSPITAL ENCOUNTER (OUTPATIENT)
Dept: PULMONOLOGY | Facility: HOSPITAL | Age: 69
Discharge: HOME | End: 2019-11-15
Payer: MEDICARE

## 2019-11-15 DIAGNOSIS — I48.0 PAROXYSMAL ATRIAL FIBRILLATION (HCC): ICD-10-CM

## 2019-11-15 DIAGNOSIS — Z72.0 TOBACCO USE: ICD-10-CM

## 2019-11-15 DIAGNOSIS — I45.2 RBBB (RIGHT BUNDLE BRANCH BLOCK WITH LEFT ANTERIOR FASCICULAR BLOCK): Primary | ICD-10-CM

## 2019-11-15 DIAGNOSIS — J43.8 OTHER EMPHYSEMA (HCC): ICD-10-CM

## 2019-11-15 PROCEDURE — 94726 PLETHYSMOGRAPHY LUNG VOLUMES: CPT

## 2019-11-15 PROCEDURE — 94729 DIFFUSING CAPACITY: CPT

## 2019-11-15 PROCEDURE — 94060 EVALUATION OF WHEEZING: CPT | Performed by: INTERNAL MEDICINE

## 2019-11-15 PROCEDURE — 94060 EVALUATION OF WHEEZING: CPT

## 2019-11-15 PROCEDURE — 94760 N-INVAS EAR/PLS OXIMETRY 1: CPT

## 2019-11-15 PROCEDURE — 94729 DIFFUSING CAPACITY: CPT | Performed by: INTERNAL MEDICINE

## 2019-11-15 PROCEDURE — 94726 PLETHYSMOGRAPHY LUNG VOLUMES: CPT | Performed by: INTERNAL MEDICINE

## 2019-11-15 RX ORDER — ALBUTEROL SULFATE 2.5 MG/3ML
2.5 SOLUTION RESPIRATORY (INHALATION) ONCE
Status: COMPLETED | OUTPATIENT
Start: 2019-11-15 | End: 2019-11-15

## 2019-11-15 RX ADMIN — ALBUTEROL SULFATE 2.5 MG: 2.5 SOLUTION RESPIRATORY (INHALATION) at 11:18

## 2019-11-17 RX ORDER — METOPROLOL SUCCINATE 25 MG/1
25 TABLET, EXTENDED RELEASE ORAL DAILY
Qty: 90 TABLET | Refills: 3 | Status: SHIPPED | OUTPATIENT
Start: 2019-11-17 | End: 2020-01-21 | Stop reason: HOSPADM

## 2019-11-18 ENCOUNTER — TELEPHONE (OUTPATIENT)
Dept: CARDIOLOGY CLINIC | Facility: CLINIC | Age: 69
End: 2019-11-18

## 2019-11-18 NOTE — TELEPHONE ENCOUNTER
I called & spoke to Ricardo Antwon advising him to take the Lopressor 1 tablet daily  He said he already picked up the medication

## 2019-12-03 ENCOUNTER — TRANSCRIBE ORDERS (OUTPATIENT)
Dept: LAB | Facility: CLINIC | Age: 69
End: 2019-12-03

## 2019-12-03 ENCOUNTER — APPOINTMENT (OUTPATIENT)
Dept: LAB | Facility: CLINIC | Age: 69
End: 2019-12-03
Payer: MEDICARE

## 2019-12-03 DIAGNOSIS — D72.829 LEUKOCYTOSIS, UNSPECIFIED TYPE: ICD-10-CM

## 2019-12-03 DIAGNOSIS — D75.839 THROMBOCYTOSIS: ICD-10-CM

## 2019-12-03 DIAGNOSIS — E27.8 MASS OF BOTH ADRENAL GLANDS (HCC): ICD-10-CM

## 2019-12-03 DIAGNOSIS — E27.8 MASS OF BOTH ADRENAL GLANDS (HCC): Primary | ICD-10-CM

## 2019-12-03 LAB
ANION GAP SERPL CALCULATED.3IONS-SCNC: 7 MMOL/L (ref 4–13)
BASOPHILS # BLD AUTO: 0.07 THOUSANDS/ΜL (ref 0–0.1)
BASOPHILS NFR BLD AUTO: 1 % (ref 0–1)
BUN SERPL-MCNC: 20 MG/DL (ref 5–25)
CALCIUM SERPL-MCNC: 8.8 MG/DL (ref 8.3–10.1)
CHLORIDE SERPL-SCNC: 108 MMOL/L (ref 100–108)
CO2 SERPL-SCNC: 28 MMOL/L (ref 21–32)
CREAT SERPL-MCNC: 1.51 MG/DL (ref 0.6–1.3)
EOSINOPHIL # BLD AUTO: 0.16 THOUSAND/ΜL (ref 0–0.61)
EOSINOPHIL NFR BLD AUTO: 1 % (ref 0–6)
ERYTHROCYTE [DISTWIDTH] IN BLOOD BY AUTOMATED COUNT: 15.2 % (ref 11.6–15.1)
EST. AVERAGE GLUCOSE BLD GHB EST-MCNC: 114 MG/DL
GFR SERPL CREATININE-BSD FRML MDRD: 47 ML/MIN/1.73SQ M
GLUCOSE P FAST SERPL-MCNC: 108 MG/DL (ref 65–99)
HBA1C MFR BLD: 5.6 % (ref 4.2–6.3)
HCT VFR BLD AUTO: 42.7 % (ref 36.5–49.3)
HGB BLD-MCNC: 13.5 G/DL (ref 12–17)
IMM GRANULOCYTES # BLD AUTO: 0.13 THOUSAND/UL (ref 0–0.2)
IMM GRANULOCYTES NFR BLD AUTO: 1 % (ref 0–2)
LYMPHOCYTES # BLD AUTO: 3.28 THOUSANDS/ΜL (ref 0.6–4.47)
LYMPHOCYTES NFR BLD AUTO: 29 % (ref 14–44)
MCH RBC QN AUTO: 29.2 PG (ref 26.8–34.3)
MCHC RBC AUTO-ENTMCNC: 31.6 G/DL (ref 31.4–37.4)
MCV RBC AUTO: 92 FL (ref 82–98)
MONOCYTES # BLD AUTO: 1.05 THOUSAND/ΜL (ref 0.17–1.22)
MONOCYTES NFR BLD AUTO: 9 % (ref 4–12)
NEUTROPHILS # BLD AUTO: 6.45 THOUSANDS/ΜL (ref 1.85–7.62)
NEUTS SEG NFR BLD AUTO: 59 % (ref 43–75)
NRBC BLD AUTO-RTO: 0 /100 WBCS
PLATELET # BLD AUTO: 554 THOUSANDS/UL (ref 149–390)
PMV BLD AUTO: 9.2 FL (ref 8.9–12.7)
POTASSIUM SERPL-SCNC: 4.8 MMOL/L (ref 3.5–5.3)
RBC # BLD AUTO: 4.62 MILLION/UL (ref 3.88–5.62)
SODIUM SERPL-SCNC: 143 MMOL/L (ref 136–145)
WBC # BLD AUTO: 11.14 THOUSAND/UL (ref 4.31–10.16)

## 2019-12-03 PROCEDURE — 80048 BASIC METABOLIC PNL TOTAL CA: CPT

## 2019-12-03 PROCEDURE — 36415 COLL VENOUS BLD VENIPUNCTURE: CPT | Performed by: PHYSICIAN ASSISTANT

## 2019-12-03 PROCEDURE — 82668 ASSAY OF ERYTHROPOIETIN: CPT

## 2019-12-03 PROCEDURE — 85025 COMPLETE CBC W/AUTO DIFF WBC: CPT | Performed by: PHYSICIAN ASSISTANT

## 2019-12-03 PROCEDURE — 83036 HEMOGLOBIN GLYCOSYLATED A1C: CPT | Performed by: INTERNAL MEDICINE

## 2019-12-03 PROCEDURE — 81270 JAK2 GENE: CPT

## 2019-12-04 LAB — EPO SERPL-ACNC: 15 MIU/ML (ref 2.6–18.5)

## 2019-12-05 DIAGNOSIS — I73.9 PAD (PERIPHERAL ARTERY DISEASE) (HCC): ICD-10-CM

## 2019-12-05 RX ORDER — ATORVASTATIN CALCIUM 20 MG/1
20 TABLET, FILM COATED ORAL DAILY
Qty: 90 TABLET | Refills: 1 | Status: SHIPPED | OUTPATIENT
Start: 2019-12-05 | End: 2020-05-29

## 2019-12-10 ENCOUNTER — OFFICE VISIT (OUTPATIENT)
Dept: HEMATOLOGY ONCOLOGY | Facility: CLINIC | Age: 69
End: 2019-12-10
Payer: MEDICARE

## 2019-12-10 VITALS
OXYGEN SATURATION: 96 % | SYSTOLIC BLOOD PRESSURE: 124 MMHG | HEART RATE: 52 BPM | DIASTOLIC BLOOD PRESSURE: 82 MMHG | BODY MASS INDEX: 25.66 KG/M2 | RESPIRATION RATE: 14 BRPM | HEIGHT: 70 IN | TEMPERATURE: 98.6 F

## 2019-12-10 DIAGNOSIS — D75.839 THROMBOCYTOSIS: Primary | ICD-10-CM

## 2019-12-10 DIAGNOSIS — I25.5 ISCHEMIC CARDIOMYOPATHY: ICD-10-CM

## 2019-12-10 PROCEDURE — 99214 OFFICE O/P EST MOD 30 MIN: CPT | Performed by: INTERNAL MEDICINE

## 2019-12-10 NOTE — PROGRESS NOTES
Hematology Outpatient Follow - Up Note  Ron Urias 77 y o  male MRN: @ Encounter: [id number]        Date:  12/10/2019        Assessment/ Plan: Thrombocytosis progressive since 09/2018, negative for JAK2 mutation, await for additional workup such as CALR mutation    Currently on rivaroxaban 20 mg p o  Daily for atrial fibrillation, continue treatment, no need for hydroxyurea  Normal erythropoietin level secondary to elevated H&H    Might be reactive as well    Follow-up in 6 months with CBC            HPI:  Ron Urias is a 77 y o  seen for initial consultation 11/5/2019 accompanied by his wife, Erling Moritz, at the referral of Maggie Delgado MD regarding progressive thrombocytosis since 9/2018        Platelet count was 418,476 5/2018, 400- 500s 9/2018- 10/7/2019 and 603,000 10/21/2019        10/21/2019:  Post serum kappa free light chains are mildly elevated at 54 8 43 7 respectively  Ratio is normal at 1 25  FOBT negative  Iron saturation 25%, Ferritin 271  UPEP identified nonselective proteinuria without evidence of monoclonal bands  No monoclonal bands on SPEP      CT A/P 5/15/2019:  Liver and spleen were unremarkable  Bilateral adrenal masses are identified which are indeterminate and grossly unchanged from recent examination   MRI ordered per Dr Poornima Stokes and scheduled for 12/27/2019 prior to 1/8/2020 office visit    Interval History:  Negative for JAK2 mutation, normal iron studies       Previous Treatment:         Test Results:    Imaging: No results found      Labs:   Lab Results   Component Value Date    WBC 11 14 (H) 12/03/2019    HGB 13 5 12/03/2019    HCT 42 7 12/03/2019    MCV 92 12/03/2019     (H) 12/03/2019     Lab Results   Component Value Date    K 4 8 12/03/2019     12/03/2019    CO2 28 12/03/2019    BUN 20 12/03/2019    CREATININE 1 51 (H) 12/03/2019    GLUF 108 (H) 12/03/2019    CALCIUM 8 8 12/03/2019    AST 16 10/07/2019    ALT 22 10/07/2019    ALKPHOS 115 10/07/2019 EGFR 47 12/03/2019       Lab Results   Component Value Date    IRON 52 (L) 10/21/2019    TIBC 207 (L) 10/21/2019    FERRITIN 271 10/21/2019       No results found for: [id number]      ROS: Review of Systems   Constitutional: Positive for fatigue  Negative for appetite change, chills, diaphoresis, fever and unexpected weight change  HENT:   Negative for hearing loss, lump/mass, mouth sores, nosebleeds, sore throat, trouble swallowing and voice change  Eyes: Negative  Negative for eye problems and icterus  Respiratory: Negative  Negative for chest tightness, cough, hemoptysis and shortness of breath  Cardiovascular: Negative for chest pain and leg swelling  Gastrointestinal: Negative for abdominal distention, abdominal pain, blood in stool, constipation, diarrhea and nausea  Endocrine: Negative  Genitourinary: Negative for dysuria, frequency, hematuria and pelvic pain  Musculoskeletal: Negative  Negative for arthralgias, back pain, flank pain, gait problem, myalgias and neck stiffness  Skin: Negative for itching and rash  Neurological: Negative for dizziness, gait problem, headaches, light-headedness, numbness and speech difficulty  Hematological: Negative for adenopathy  Does not bruise/bleed easily  Psychiatric/Behavioral: Negative for confusion, decreased concentration, depression and sleep disturbance  The patient is not nervous/anxious  Current Medications: Reviewed  Allergies: Reviewed  PMH/FH/SH:  Reviewed      Physical Exam:    Body surface area is 1 99 meters squared      Wt Readings from Last 3 Encounters:   11/06/19 81 1 kg (178 lb 12 8 oz)   11/05/19 80 7 kg (178 lb)   10/25/19 81 2 kg (179 lb)        Temp Readings from Last 3 Encounters:   12/10/19 98 6 °F (37 °C)   11/06/19 (!) 96 6 °F (35 9 °C) (Oral)   11/05/19 97 8 °F (36 6 °C) (Tympanic)        BP Readings from Last 3 Encounters:   12/10/19 124/82   11/06/19 132/80   11/05/19 126/78         Pulse Readings from Last 3 Encounters:   12/10/19 (!) 52   19 76   19 98        Physical Exam   Constitutional: He is oriented to person, place, and time  He appears well-developed and well-nourished  No distress  HENT:   Head: Normocephalic and atraumatic  Eyes: Conjunctivae are normal    Neck: Normal range of motion  Neck supple  No tracheal deviation present  Cardiovascular: Normal rate  An irregularly irregular rhythm present  Exam reveals no gallop and no friction rub  Murmur heard  Pulmonary/Chest: Effort normal and breath sounds normal  No respiratory distress  He has no wheezes  He has no rales  He exhibits no tenderness  Abdominal: Soft  He exhibits no distension  There is no tenderness  Musculoskeletal: He exhibits no edema  Lymphadenopathy:     He has no cervical adenopathy  Neurological: He is alert and oriented to person, place, and time  Skin: Skin is warm and dry  He is not diaphoretic  No erythema  No pallor  Psychiatric: He has a normal mood and affect  His behavior is normal  Judgment and thought content normal    Vitals reviewed  ECO    Goals and Barriers:  Current Goal: Minimize effects of disease  Barriers: None  Patient's Capacity to Self Care:  Patient is able to self care      Code Status: [unfilled]

## 2019-12-12 ENCOUNTER — HOSPITAL ENCOUNTER (OUTPATIENT)
Dept: MRI IMAGING | Facility: HOSPITAL | Age: 69
Discharge: HOME | End: 2019-12-12
Attending: SURGERY

## 2019-12-12 DIAGNOSIS — E27.8 MASS OF BOTH ADRENAL GLANDS (HCC): ICD-10-CM

## 2019-12-13 LAB
Lab: NORMAL
MISCELLANEOUS LAB TEST RESULT: NORMAL
STONE ANALYSIS-IMP: NORMAL

## 2019-12-27 ENCOUNTER — REMOTE DEVICE CLINIC VISIT (OUTPATIENT)
Dept: CARDIOLOGY CLINIC | Facility: CLINIC | Age: 69
End: 2019-12-27
Payer: MEDICARE

## 2019-12-27 DIAGNOSIS — Z95.818 PRESENCE OF CARDIAC DEVICE: Primary | ICD-10-CM

## 2019-12-27 PROCEDURE — 93298 REM INTERROG DEV EVAL SCRMS: CPT | Performed by: INTERNAL MEDICINE

## 2019-12-27 PROCEDURE — 93299 PR REM INTERROG ICPMS/SCRMS <30 D TECH REVIEW: CPT | Performed by: INTERNAL MEDICINE

## 2019-12-27 NOTE — PROGRESS NOTES
Results for orders placed or performed in visit on 12/27/19   Cardiac EP device report    Narrative    MDT LOOP  CARELINK TRANSMISSION: BATTERY STATUS "OK"  5 TACHY EPISODES WITH AVAIABLE E-GRAMS SUGGESTING A-TACH (UP  BPM & 16 SEC)  AF 14% (LONGEST: 4:52 HRS/AVAILABLE E-GRAMS SUGGESTING FREQUENT PAC'S)  PT  TAKES CARVEDILOL & WARFARIN  EF 48% (2018)  SENT TO DR Vera Murray FOR >200 BPM EPISODE  NORMAL DEVICE FUNCTION   PL

## 2020-01-07 ENCOUNTER — HOSPITAL ENCOUNTER (OUTPATIENT)
Dept: CT IMAGING | Facility: HOSPITAL | Age: 70
Discharge: HOME | End: 2020-01-07
Attending: SURGERY
Payer: MEDICARE

## 2020-01-07 DIAGNOSIS — E27.8 MASS OF BOTH ADRENAL GLANDS (HCC): ICD-10-CM

## 2020-01-07 PROCEDURE — 74170 CT ABD WO CNTRST FLWD CNTRST: CPT

## 2020-01-07 RX ADMIN — IODIXANOL 85 ML: 320 INJECTION, SOLUTION INTRAVASCULAR at 07:57

## 2020-01-08 ENCOUNTER — OFFICE VISIT (OUTPATIENT)
Dept: SURGICAL ONCOLOGY | Facility: CLINIC | Age: 70
End: 2020-01-08
Payer: MEDICARE

## 2020-01-08 VITALS
SYSTOLIC BLOOD PRESSURE: 120 MMHG | HEART RATE: 70 BPM | HEIGHT: 70 IN | TEMPERATURE: 97.5 F | WEIGHT: 175 LBS | BODY MASS INDEX: 25.05 KG/M2 | DIASTOLIC BLOOD PRESSURE: 72 MMHG | RESPIRATION RATE: 16 BRPM

## 2020-01-08 DIAGNOSIS — E27.8 ADRENAL MASS (HCC): Primary | ICD-10-CM

## 2020-01-08 PROCEDURE — 99214 OFFICE O/P EST MOD 30 MIN: CPT | Performed by: SURGERY

## 2020-01-08 NOTE — LETTER
January 8, 2020     Maria G Escamilla MD  [address]  [place] [address]    Patient: Bre Camara   YOB: 1950   Date of Visit: 1/8/2020       Dear Dr Perry Can: Thank you for referring Bre Camara to me for evaluation  Below are my notes for this consultation  If you have questions, please do not hesitate to call me  I look forward to following your patient along with you  Sincerely,        Patrice Toth MD        CC: MD Dee Dye MD Claybon Meyer, MD Bobie Mangle, MD Elayne Mayo, CRNP Holland Starr, MD Wilkins Dillon, MD  1/8/2020  8:38 AM  Sign at close encounter     Surgical Oncology Follow Up       [address] [facility]  [address] [facility]  [facility]  1950  [id number]  [address] [facility] SURGICAL ONCOLOGY [place]  [address] [facility] [place] [address]    Chief Complaint   Patient presents with    Follow-up     6 month follow up  Assessment/Plan:    No problem-specific Assessment & Plan notes found for this encounter  Diagnoses and all orders for this visit:    Adrenal mass ([address] )  -     CT abdomen w wo contrast; Future  -     Basic metabolic panel; Future        Advance Care Planning/Advance Directives:  Discussed disease status, cancer treatment plans and/or cancer treatment goals  with the patient  No history exists  History of Present Illness:  Patient is a 23-year-old man with instantly found bilateral adrenal adenomas  We had seen these last year when he underwent scan for abdominal pain  There were worked up and found to be nonfunctioning  He is here for follow-up scan to assess for stability   -Interval History:  He is otherwise asymptomatic from adrenal standpoint  He occasionally has left lower quadrant pain  He is being followed by the GI team for this      Review of Systems:  Review of Systems   Constitutional: Negative  HENT: Negative  Eyes: Negative  Respiratory: Negative  Cardiovascular: Negative  Gastrointestinal: Negative  Endocrine: Negative  Genitourinary: Negative  Musculoskeletal: Negative  Skin: Negative  Allergic/Immunologic: Negative  Neurological: Negative  Hematological: Negative  Psychiatric/Behavioral: Negative  Patient Active Problem List   Diagnosis    Overweight (BMI 25 0-29  9)    Chronic kidney disease, stage III (moderate) (HCC)    Gross hematuria    Persistent proteinuria    Impaired fasting glucose    Microscopic hematuria    Chronic hypotension    [place] cardiac risk 10-20% in next 10 years    Abdominal aortic atherosclerosis (HCC)    RBBB (right bundle branch block with left anterior fascicular block)    Tobacco use    Dyslipidemia    Vitamin D deficiency    GERD with esophagitis    Harris's esophagus without dysplasia    Colon polyps    PAD (peripheral artery disease) (HCC)    Mass of both adrenal glands (HCC)    History of small bowel obstruction    Blue toe syndrome, right (HCC)    Ischemic cardiomyopathy    S/P peripheral artery angioplasty with stent placement    Venous stasis    GI symptoms    Paroxysmal atrial fibrillation (HCC)    Anemia of chronic renal failure, stage 3 (moderate) (HCC)    Thrombocytosis (HCC)    Leukocytosis    Elevated hemoglobin (HCC)    Other emphysema (HCC)    Adrenal mass (HCC)     Past Medical History:   Diagnosis Date    Blue toe syndrome (HCC)     Chronic kidney disease     Colon polyps     GERD (gastroesophageal reflux disease)     Hematuria     History of rheumatic fever     Hyperlipidemia     Hypotension     Ischemic cardiomyopathy     PAD (peripheral artery disease) (HCC)     Pulmonary emphysema (HCC)      Past Surgical History:   Procedure Laterality Date    COLONOSCOPY  2019    HERNIA REPAIR      IR ABDOMINAL ANGIOGRAPHY / INTERVENTION  6/27/2019    POPLITEAL ARTERY STENT Right     6/2019    [place] SLCTV CATHJ 3RD+ ORD SLCTV ABDL PEL/LXTR [facility] Right 6/27/2019    Procedure: leg ANGIOGRAM WITH STENT, BALLOON ANGIOPLASTY, LEFT GROIN ACCESS;  Surgeon: Carmen Estevez MD;  [facility];  Service: Vascular     Family History   Problem Relation Age of Onset    Heart disease Mother     Hyperlipidemia Mother     Hypertension Father     Heart disease Father     Stroke Father     Hyperlipidemia Father     Diabetes Brother     Dementia Neg Hx     Drug abuse Neg Hx     Mental illness Neg Hx     Substance Abuse Neg Hx     Alcohol abuse Neg Hx     Depression Neg Hx      Social History     Socioeconomic History    Marital status: /Civil Union     Spouse name: Not on file    Number of children: Not on file    Years of education: Not on file    Highest education level: Not on file   Occupational History    Occupation: retired   Social Needs    Financial resource strain: Not on file    Food insecurity:     Worry: Not on file     Inability: Not on file   [employer] needs:     Medical: Not on file     Non-medical: Not on file   Tobacco Use    Smoking status: Current Every Day Smoker     Packs/day: 0 25     Years: 30 00     Pack years: 7 50     Types: Cigarettes    Smokeless tobacco: Never Used    Tobacco comment: " I will do it on my own"   Substance and Sexual Activity    Alcohol use: Yes     Frequency: Monthly or less     Drinks per session: 1 or 2     Binge frequency: Less than monthly     Comment: oscional    Drug use: No    Sexual activity: Not on file   Lifestyle    Physical activity:     Days per week: Not on file     Minutes per session: Not on file    Stress: Not on file   Relationships    Social connections:     Talks on phone: Not on file     Gets together: Not on file     Attends Syn[employer] service: Not on file     Active member of club or organization: Not on file     Attends meetings of clubs or organizations: Not on file     Relationship status: Not on file    Intimate partner violence:     Fear of current or ex partner: Not on file     Emotionally abused: Not on file     Physically abused: Not on file     Forced sexual activity: Not on file   Other Topics Concern    Not on file   Social History Narrative    Drinks coffee       Current Outpatient Medications:     atorvastatin (LIPITOR) 20 mg tablet, Take 1 tablet (20 mg total) by mouth daily, Disp: 90 tablet, Rfl: 1    Cholecalciferol (VITAMIN D) 2000 units CAPS, Take 2,000 Units by mouth daily, Disp: , Rfl:     dicyclomine (BENTYL) 10 mg capsule, Take 1 capsule (10 mg total) by mouth 4 (four) times a day (before meals and at bedtime), Disp: 30 capsule, Rfl: 0    Magnesium 500 MG TABS, Take 1 tablet by mouth 2 (two) times a day, Disp: , Rfl:     metoprolol succinate (TOPROL-XL) 25 mg 24 hr tablet, Take 1 tablet (25 mg total) by mouth daily, Disp: 90 tablet, Rfl: 3    omeprazole (PriLOSEC) 40 MG capsule, TAKE 1 CAPSULE BY MOUTH EVERY DAY ONE-HALF HOUR BEFORE BREAKFAST, Disp: , Rfl: 5    rivaroxaban (XARELTO) 20 mg tablet, Take 1 tablet (20 mg total) by mouth daily with breakfast, Disp: 90 tablet, Rfl: 3  No Known Allergies  Vitals:    01/08/20 0806   BP: 120/72   Pulse: 70   Resp: 16   Temp: 97 5 °F (36 4 °C)       Physical Exam   Constitutional: He is oriented to person, place, and time  He appears well-developed and well-nourished  HENT:   Head: Normocephalic and atraumatic  Right Ear: External ear normal    Left Ear: External ear normal    Eyes: Pupils are equal, round, and reactive to light  EOM are normal    Neck: Normal range of motion  Neck supple  Cardiovascular: Normal rate and regular rhythm  Pulmonary/Chest: Effort normal and breath sounds normal    Abdominal: Soft  Bowel sounds are normal  He exhibits no distension and no mass  There is no tenderness  There is no rebound and no guarding  No hernia  Musculoskeletal: Normal range of motion  Neurological: He is alert and oriented to person, place, and time  Skin: Skin is warm and dry  Psychiatric: He has a normal mood and affect  His behavior is normal  Judgment and thought content normal          Results:  Labs:  none    Imaging  Ct Abdomen W Wo Contrast    Result Date: 1/7/2020  Narrative: CT - ABDOMEN WITHOUT AND WITH IV CONTRAST INDICATION:   E27 8: Other specified disorders of adrenal gland  COMPARISON: May 15, 2019 TECHNIQUE: CT examination of the abdomen was performed  Reformatted images were created in axial, sagittal, and coronal planes  Radiation dose length product (DLP) for this visit:  938 mGy-cm   This examination, like all CT scans performed in the [facility], was performed utilizing techniques to minimize radiation dose exposure, including the use of iterative reconstruction and automated exposure control  IV Contrast:  85 mL of iodixanol (VISIPAQUE) Enteric Contrast:  Enteric contrast was administered  FINDINGS: ABDOMEN LOWER CHEST:  No clinically significant abnormality identified in the visualized lower chest  LIVER/BILIARY TREE:  Unremarkable  GALLBLADDER:  No calcified gallstones  No pericholecystic inflammatory change  SPLEEN:  Unremarkable  PANCREAS:  Unremarkable  ADRENAL GLANDS:  Bilateral adrenal adenomas  KIDNEYS/URETERS:  Left upper pole renal 2 mm nonobstructing calculus  Right mid pole 3 mm nonobstructing calculus  VISUALIZED STOMACH AND BOWEL:  Partially visualized dilated small bowel loops (with wall thickening) could relate to obstruction and/or infection such as enteritis, correlate with oral and IV contrast enhanced abdominal /pelvic MRI  Partially visualized colonic diverticulosis  VISUALIZED ABDOMINAL CAVITY:  No pathologically enlarged periportal, mesenteric or upper retroperitoneal lymph nodes  No ascites or free intraperitoneal air  No fluid collection  VISUALIZED BODY WALL:  Unremarkable  VISUALIZED ABDOMINAL VESSELS: Focal saccular infrarenal abdominal aortic borderline aneurysm measuring approximately 3 cm  This was also identified on the prior CT May 15, 2019  OSSEOUS STRUCTURES:  No acute fracture or destructive osseous lesion  Impression: 1  Bilateral adrenal adenomas (right with 2 nodules which measuring 3 0 and 1 6 cm in size  Left with 1 nodule measuring 1 2 cm in size  These are stable compared to prior study ) 2  Bilateral renal nonobstructing calculi  3  Partially visualized dilated small bowel loops and wall thickening as described above could relate to obstruction and/or infection  Workstation performed: [id number]     I reviewed the above laboratory and imaging data  Discussion/Summary:  Bilateral stable adrenal nodules  Nonfunctioning  Therefore plan on follow-up in 1 year to assess stability  All questions answered  Films reviewed with Radiology team as well as with patient

## 2020-01-08 NOTE — PROGRESS NOTES
Surgical Oncology Follow Up       [address] [facility],[address]  [facility] [place]  [address] [facility] [place] [address]    Enio Ortega  1950  [id number]  [address] [facility],[address]  [facility] [place]  [address] [facility] [address]    Chief Complaint   Patient presents with    Follow-up     6 month follow up  Assessment/Plan:    No problem-specific Assessment & Plan notes found for this encounter  Diagnoses and all orders for this visit:    Adrenal mass ([address] )  -     CT abdomen w wo contrast; Future  -     Basic metabolic panel; Future        Advance Care Planning/Advance Directives:  Discussed disease status, cancer treatment plans and/or cancer treatment goals  with the patient  No history exists  History of Present Illness:  Patient is a 60-year-old man with instantly found bilateral adrenal adenomas  We had seen these last year when he underwent scan for abdominal pain  There were worked up and found to be nonfunctioning  He is here for follow-up scan to assess for stability   -Interval History:  He is otherwise asymptomatic from adrenal standpoint  He occasionally has left lower quadrant pain  He is being followed by the GI team for this  Review of Systems:  Review of Systems   Constitutional: Negative  HENT: Negative  Eyes: Negative  Respiratory: Negative  Cardiovascular: Negative  Gastrointestinal: Negative  Endocrine: Negative  Genitourinary: Negative  Musculoskeletal: Negative  Skin: Negative  Allergic/Immunologic: Negative  Neurological: Negative  Hematological: Negative  Psychiatric/Behavioral: Negative  Patient Active Problem List   Diagnosis    Overweight (BMI 25 0-29  9)    Chronic kidney disease, stage III (moderate) (HCC)    Gross hematuria    Persistent proteinuria    Impaired fasting glucose    Microscopic hematuria    Chronic hypotension    [place] cardiac risk 10-20% in next 10 years    Abdominal aortic atherosclerosis (HCC)    RBBB (right bundle branch block with left anterior fascicular block)    Tobacco use    Dyslipidemia    Vitamin D deficiency    GERD with esophagitis    Harris's esophagus without dysplasia    Colon polyps    PAD (peripheral artery disease) (HCC)    Mass of both adrenal glands (HCC)    History of small bowel obstruction    Blue toe syndrome, right (HCC)    Ischemic cardiomyopathy    S/P peripheral artery angioplasty with stent placement    Venous stasis    GI symptoms    Paroxysmal atrial fibrillation (HCC)    Anemia of chronic renal failure, stage 3 (moderate) (HCC)    Thrombocytosis (HCC)    Leukocytosis    Elevated hemoglobin (HCC)    Other emphysema (HCC)    Adrenal mass (HCC)     Past Medical History:   Diagnosis Date    Blue toe syndrome (HCC)     Chronic kidney disease     Colon polyps     GERD (gastroesophageal reflux disease)     Hematuria     History of rheumatic fever     Hyperlipidemia     Hypotension     Ischemic cardiomyopathy     PAD (peripheral artery disease) (HCC)     Pulmonary emphysema (HCC)      Past Surgical History:   Procedure Laterality Date    COLONOSCOPY  2019    HERNIA REPAIR      IR ABDOMINAL ANGIOGRAPHY / INTERVENTION  6/27/2019    POPLITEAL ARTERY STENT Right     6/2019    [place] SLCTV CATHJ 3RD+ ORD SLCTV ABDL PEL/LXTR [facility] Right 6/27/2019    Procedure: leg ANGIOGRAM WITH STENT, BALLOON ANGIOPLASTY, LEFT GROIN ACCESS;  Surgeon: Wei Iraheta MD;  [facility];  Service: Vascular     Family History   Problem Relation Age of Onset    Heart disease Mother     Hyperlipidemia Mother     Hypertension Father     Heart disease Father     Stroke Father     Hyperlipidemia Father     Diabetes Brother     Dementia Neg Hx     Drug abuse Neg Hx     Mental illness Neg Hx     Substance Abuse Neg Hx     Alcohol abuse Neg Hx     Depression Neg Hx Social History     Socioeconomic History    Marital status: /Civil Union     Spouse name: Not on file    Number of children: Not on file    Years of education: Not on file    Highest education level: Not on file   Occupational History    Occupation: retired   Social Needs    Financial resource strain: Not on file    Food insecurity:     Worry: Not on file     Inability: Not on file   [employer] needs:     Medical: Not on file     Non-medical: Not on file   Tobacco Use    Smoking status: Current Every Day Smoker     Packs/day: 0 25     Years: 30 00     Pack years: 7 50     Types: Cigarettes    Smokeless tobacco: Never Used    Tobacco comment: " I will do it on my own"   Substance and Sexual Activity    Alcohol use: Yes     Frequency: Monthly or less     Drinks per session: 1 or 2     Binge frequency: Less than monthly     Comment: oscional    Drug use: No    Sexual activity: Not on file   Lifestyle    Physical activity:     Days per week: Not on file     Minutes per session: Not on file    Stress: Not on file   Relationships    Social connections:     Talks on phone: Not on file     Gets together: Not on file     Attends [employer] service: Not on file     Active member of club or organization: Not on file     Attends meetings of clubs or organizations: Not on file     Relationship status: Not on file    Intimate partner violence:     Fear of current or ex partner: Not on file     Emotionally abused: Not on file     Physically abused: Not on file     Forced sexual activity: Not on file   Other Topics Concern    Not on file   Social History Narrative    Drinks coffee       Current Outpatient Medications:     atorvastatin (LIPITOR) 20 mg tablet, Take 1 tablet (20 mg total) by mouth daily, Disp: 90 tablet, Rfl: 1    Cholecalciferol (VITAMIN D) 2000 units CAPS, Take 2,000 Units by mouth daily, Disp: , Rfl:     dicyclomine (BENTYL) 10 mg capsule, Take 1 capsule (10 mg total) by mouth 4 (four) times a day (before meals and at bedtime), Disp: 30 capsule, Rfl: 0    Magnesium 500 MG TABS, Take 1 tablet by mouth 2 (two) times a day, Disp: , Rfl:     metoprolol succinate (TOPROL-XL) 25 mg 24 hr tablet, Take 1 tablet (25 mg total) by mouth daily, Disp: 90 tablet, Rfl: 3    omeprazole (PriLOSEC) 40 MG capsule, TAKE 1 CAPSULE BY MOUTH EVERY DAY ONE-HALF HOUR BEFORE BREAKFAST, Disp: , Rfl: 5    rivaroxaban (XARELTO) 20 mg tablet, Take 1 tablet (20 mg total) by mouth daily with breakfast, Disp: 90 tablet, Rfl: 3  No Known Allergies  Vitals:    01/08/20 0806   BP: 120/72   Pulse: 70   Resp: 16   Temp: 97 5 °F (36 4 °C)       Physical Exam   Constitutional: He is oriented to person, place, and time  He appears well-developed and well-nourished  HENT:   Head: Normocephalic and atraumatic  Right Ear: External ear normal    Left Ear: External ear normal    Eyes: Pupils are equal, round, and reactive to light  EOM are normal    Neck: Normal range of motion  Neck supple  Cardiovascular: Normal rate and regular rhythm  Pulmonary/Chest: Effort normal and breath sounds normal    Abdominal: Soft  Bowel sounds are normal  He exhibits no distension and no mass  There is no tenderness  There is no rebound and no guarding  No hernia  Musculoskeletal: Normal range of motion  Neurological: He is alert and oriented to person, place, and time  Skin: Skin is warm and dry  Psychiatric: He has a normal mood and affect  His behavior is normal  Judgment and thought content normal          Results:  Labs:  none    Imaging  Ct Abdomen W Wo Contrast    Result Date: 1/7/2020  Narrative: CT - ABDOMEN WITHOUT AND WITH IV CONTRAST INDICATION:   E27 8: Other specified disorders of adrenal gland  COMPARISON: May 15, 2019 TECHNIQUE: CT examination of the abdomen was performed  Reformatted images were created in axial, sagittal, and coronal planes  Radiation dose length product (DLP) for this visit:  938 mGy-cm     This examination, like all CT scans performed in the [facility], was performed utilizing techniques to minimize radiation dose exposure, including the use of iterative reconstruction and automated exposure control  IV Contrast:  85 mL of iodixanol (VISIPAQUE) Enteric Contrast:  Enteric contrast was administered  FINDINGS: ABDOMEN LOWER CHEST:  No clinically significant abnormality identified in the visualized lower chest  LIVER/BILIARY TREE:  Unremarkable  GALLBLADDER:  No calcified gallstones  No pericholecystic inflammatory change  SPLEEN:  Unremarkable  PANCREAS:  Unremarkable  ADRENAL GLANDS:  Bilateral adrenal adenomas  KIDNEYS/URETERS:  Left upper pole renal 2 mm nonobstructing calculus  Right mid pole 3 mm nonobstructing calculus  VISUALIZED STOMACH AND BOWEL:  Partially visualized dilated small bowel loops (with wall thickening) could relate to obstruction and/or infection such as enteritis, correlate with oral and IV contrast enhanced abdominal /pelvic MRI  Partially visualized colonic diverticulosis  VISUALIZED ABDOMINAL CAVITY:  No pathologically enlarged periportal, mesenteric or upper retroperitoneal lymph nodes  No ascites or free intraperitoneal air  No fluid collection  VISUALIZED BODY WALL:  Unremarkable  VISUALIZED ABDOMINAL VESSELS: Focal saccular infrarenal abdominal aortic borderline aneurysm measuring approximately 3 cm  This was also identified on the prior CT May 15, 2019  OSSEOUS STRUCTURES:  No acute fracture or destructive osseous lesion  Impression: 1  Bilateral adrenal adenomas (right with 2 nodules which measuring 3 0 and 1 6 cm in size  Left with 1 nodule measuring 1 2 cm in size  These are stable compared to prior study ) 2  Bilateral renal nonobstructing calculi  3  Partially visualized dilated small bowel loops and wall thickening as described above could relate to obstruction and/or infection   Workstation performed: [id number]     I reviewed the above laboratory and imaging data  Discussion/Summary:  Bilateral stable adrenal nodules  Nonfunctioning  Therefore plan on follow-up in 1 year to assess stability  All questions answered  Films reviewed with Radiology team as well as with patient

## 2020-01-13 ENCOUNTER — APPOINTMENT (EMERGENCY)
Dept: RADIOLOGY | Facility: HOSPITAL | Age: 70
DRG: 314 | End: 2020-01-13
Payer: MEDICARE

## 2020-01-13 ENCOUNTER — HOSPITAL ENCOUNTER (INPATIENT)
Facility: HOSPITAL | Age: 70
LOS: 8 days | Discharge: HOME | DRG: 314 | End: 2020-01-21
Attending: EMERGENCY MEDICINE | Admitting: HOSPITALIST
Payer: MEDICARE

## 2020-01-13 ENCOUNTER — TELEPHONE (OUTPATIENT)
Dept: VASCULAR SURGERY | Facility: CLINIC | Age: 70
End: 2020-01-13

## 2020-01-13 ENCOUNTER — APPOINTMENT (EMERGENCY)
Dept: NON INVASIVE DIAGNOSTICS | Facility: HOSPITAL | Age: 70
DRG: 314 | End: 2020-01-13
Payer: MEDICARE

## 2020-01-13 DIAGNOSIS — R63.4 ABNORMAL WEIGHT LOSS: ICD-10-CM

## 2020-01-13 DIAGNOSIS — K52.9 INFLAMMATORY BOWEL DISEASE: ICD-10-CM

## 2020-01-13 DIAGNOSIS — I48.0 PAROXYSMAL ATRIAL FIBRILLATION (HCC): ICD-10-CM

## 2020-01-13 DIAGNOSIS — I95.89 CHRONIC HYPOTENSION: ICD-10-CM

## 2020-01-13 DIAGNOSIS — I73.9 PAD (PERIPHERAL ARTERY DISEASE) (HCC): Primary | ICD-10-CM

## 2020-01-13 DIAGNOSIS — R78.81 BACTEREMIA: ICD-10-CM

## 2020-01-13 DIAGNOSIS — N18.30 CHRONIC KIDNEY DISEASE, STAGE III (MODERATE) (HCC): ICD-10-CM

## 2020-01-13 DIAGNOSIS — K56.609 SBO (SMALL BOWEL OBSTRUCTION) (HCC): ICD-10-CM

## 2020-01-13 DIAGNOSIS — R20.0 NUMBNESS IN RIGHT LEG: Primary | ICD-10-CM

## 2020-01-13 DIAGNOSIS — I99.8 LIMB ISCHEMIA: ICD-10-CM

## 2020-01-13 DIAGNOSIS — M79.604 RIGHT LEG PAIN: ICD-10-CM

## 2020-01-13 DIAGNOSIS — I47.1 SVT (SUPRAVENTRICULAR TACHYCARDIA) (HCC): ICD-10-CM

## 2020-01-13 DIAGNOSIS — I49.1 PAC (PREMATURE ATRIAL CONTRACTION): ICD-10-CM

## 2020-01-13 DIAGNOSIS — R19.7 DIARRHEA, UNSPECIFIED TYPE: ICD-10-CM

## 2020-01-13 DIAGNOSIS — N17.9 ARF (ACUTE RENAL FAILURE) (HCC): ICD-10-CM

## 2020-01-13 PROBLEM — I48.91 A-FIB (HCC): Status: ACTIVE | Noted: 2019-09-12

## 2020-01-13 PROBLEM — E87.1 HYPONATREMIA: Status: ACTIVE | Noted: 2020-01-13

## 2020-01-13 LAB
ALBUMIN SERPL BCP-MCNC: 2.7 G/DL (ref 3.5–5)
ALP SERPL-CCNC: 177 U/L (ref 46–116)
ALT SERPL W P-5'-P-CCNC: 27 U/L (ref 12–78)
ANION GAP SERPL CALCULATED.3IONS-SCNC: 11 MMOL/L (ref 4–13)
APTT PPP: 34 SECONDS (ref 23–37)
APTT PPP: 69 SECONDS (ref 23–37)
AST SERPL W P-5'-P-CCNC: 24 U/L (ref 5–45)
BACTERIA UR QL AUTO: ABNORMAL /HPF
BASOPHILS # BLD AUTO: 0.05 THOUSANDS/ΜL (ref 0–0.1)
BASOPHILS NFR BLD AUTO: 0 % (ref 0–1)
BILIRUB SERPL-MCNC: 0.62 MG/DL (ref 0.2–1)
BILIRUB UR QL STRIP: ABNORMAL
BUN SERPL-MCNC: 47 MG/DL (ref 5–25)
CALCIUM SERPL-MCNC: 9 MG/DL (ref 8.3–10.1)
CHLORIDE SERPL-SCNC: 97 MMOL/L (ref 100–108)
CLARITY UR: ABNORMAL
CO2 SERPL-SCNC: 23 MMOL/L (ref 21–32)
COLOR UR: ABNORMAL
CREAT SERPL-MCNC: 3.99 MG/DL (ref 0.6–1.3)
EOSINOPHIL # BLD AUTO: 0 THOUSAND/ΜL (ref 0–0.61)
EOSINOPHIL NFR BLD AUTO: 0 % (ref 0–6)
ERYTHROCYTE [DISTWIDTH] IN BLOOD BY AUTOMATED COUNT: 14.4 % (ref 11.6–15.1)
GFR SERPL CREATININE-BSD FRML MDRD: 14 ML/MIN/1.73SQ M
GLUCOSE SERPL-MCNC: 171 MG/DL (ref 65–140)
GLUCOSE UR STRIP-MCNC: NEGATIVE MG/DL
HCT VFR BLD AUTO: 49.5 % (ref 36.5–49.3)
HGB BLD-MCNC: 16.2 G/DL (ref 12–17)
HGB UR QL STRIP.AUTO: NEGATIVE
HYALINE CASTS #/AREA URNS LPF: ABNORMAL /LPF
IMM GRANULOCYTES # BLD AUTO: 0.1 THOUSAND/UL (ref 0–0.2)
IMM GRANULOCYTES NFR BLD AUTO: 1 % (ref 0–2)
INR PPP: 1.85 (ref 0.84–1.19)
KETONES UR STRIP-MCNC: ABNORMAL MG/DL
LEUKOCYTE ESTERASE UR QL STRIP: NEGATIVE
LIPASE SERPL-CCNC: 93 U/L (ref 73–393)
LYMPHOCYTES # BLD AUTO: 2.04 THOUSANDS/ΜL (ref 0.6–4.47)
LYMPHOCYTES NFR BLD AUTO: 14 % (ref 14–44)
MAGNESIUM SERPL-MCNC: 2.1 MG/DL (ref 1.6–2.6)
MCH RBC QN AUTO: 28.9 PG (ref 26.8–34.3)
MCHC RBC AUTO-ENTMCNC: 32.7 G/DL (ref 31.4–37.4)
MCV RBC AUTO: 88 FL (ref 82–98)
MONOCYTES # BLD AUTO: 1.56 THOUSAND/ΜL (ref 0.17–1.22)
MONOCYTES NFR BLD AUTO: 11 % (ref 4–12)
MUCOUS THREADS UR QL AUTO: ABNORMAL
NEUTROPHILS # BLD AUTO: 10.42 THOUSANDS/ΜL (ref 1.85–7.62)
NEUTS SEG NFR BLD AUTO: 74 % (ref 43–75)
NITRITE UR QL STRIP: NEGATIVE
NON-SQ EPI CELLS URNS QL MICRO: ABNORMAL /HPF
NRBC BLD AUTO-RTO: 0 /100 WBCS
PH UR STRIP.AUTO: 5 [PH] (ref 4.5–8)
PLATELET # BLD AUTO: 685 THOUSANDS/UL (ref 149–390)
PMV BLD AUTO: 9.7 FL (ref 8.9–12.7)
POTASSIUM SERPL-SCNC: 4 MMOL/L (ref 3.5–5.3)
PROT SERPL-MCNC: 7.8 G/DL (ref 6.4–8.2)
PROT UR STRIP-MCNC: ABNORMAL MG/DL
PROTHROMBIN TIME: 20.9 SECONDS (ref 11.6–14.5)
RBC # BLD AUTO: 5.61 MILLION/UL (ref 3.88–5.62)
RBC #/AREA URNS AUTO: ABNORMAL /HPF
SODIUM SERPL-SCNC: 131 MMOL/L (ref 136–145)
SP GR UR STRIP.AUTO: 1.02 (ref 1–1.03)
TROPONIN I SERPL-MCNC: <0.02 NG/ML
UROBILINOGEN UR QL STRIP.AUTO: 0.2 E.U./DL
WBC # BLD AUTO: 14.17 THOUSAND/UL (ref 4.31–10.16)
WBC #/AREA URNS AUTO: ABNORMAL /HPF

## 2020-01-13 PROCEDURE — 71046 X-RAY EXAM CHEST 2 VIEWS: CPT

## 2020-01-13 PROCEDURE — 93923 UPR/LXTR ART STDY 3+ LVLS: CPT

## 2020-01-13 PROCEDURE — 84484 ASSAY OF TROPONIN QUANT: CPT | Performed by: EMERGENCY MEDICINE

## 2020-01-13 PROCEDURE — 93922 UPR/L XTREMITY ART 2 LEVELS: CPT | Performed by: SURGERY

## 2020-01-13 PROCEDURE — 93925 LOWER EXTREMITY STUDY: CPT

## 2020-01-13 PROCEDURE — 96361 HYDRATE IV INFUSION ADD-ON: CPT

## 2020-01-13 PROCEDURE — 99222 1ST HOSP IP/OBS MODERATE 55: CPT | Performed by: SURGERY

## 2020-01-13 PROCEDURE — 99285 EMERGENCY DEPT VISIT HI MDM: CPT

## 2020-01-13 PROCEDURE — 99285 EMERGENCY DEPT VISIT HI MDM: CPT | Performed by: EMERGENCY MEDICINE

## 2020-01-13 PROCEDURE — 85730 THROMBOPLASTIN TIME PARTIAL: CPT | Performed by: HOSPITALIST

## 2020-01-13 PROCEDURE — 85610 PROTHROMBIN TIME: CPT | Performed by: PHYSICIAN ASSISTANT

## 2020-01-13 PROCEDURE — 96366 THER/PROPH/DIAG IV INF ADDON: CPT

## 2020-01-13 PROCEDURE — 81001 URINALYSIS AUTO W/SCOPE: CPT

## 2020-01-13 PROCEDURE — 85730 THROMBOPLASTIN TIME PARTIAL: CPT | Performed by: PHYSICIAN ASSISTANT

## 2020-01-13 PROCEDURE — NC001 PR NO CHARGE: Performed by: SURGERY

## 2020-01-13 PROCEDURE — 1123F ACP DISCUSS/DSCN MKR DOCD: CPT | Performed by: SURGERY

## 2020-01-13 PROCEDURE — 83690 ASSAY OF LIPASE: CPT | Performed by: EMERGENCY MEDICINE

## 2020-01-13 PROCEDURE — 85025 COMPLETE CBC W/AUTO DIFF WBC: CPT | Performed by: EMERGENCY MEDICINE

## 2020-01-13 PROCEDURE — 93005 ELECTROCARDIOGRAM TRACING: CPT

## 2020-01-13 PROCEDURE — 83735 ASSAY OF MAGNESIUM: CPT | Performed by: EMERGENCY MEDICINE

## 2020-01-13 PROCEDURE — 99223 1ST HOSP IP/OBS HIGH 75: CPT | Performed by: SURGERY

## 2020-01-13 PROCEDURE — 96368 THER/DIAG CONCURRENT INF: CPT

## 2020-01-13 PROCEDURE — 74176 CT ABD & PELVIS W/O CONTRAST: CPT

## 2020-01-13 PROCEDURE — 96375 TX/PRO/DX INJ NEW DRUG ADDON: CPT

## 2020-01-13 PROCEDURE — 80053 COMPREHEN METABOLIC PANEL: CPT | Performed by: EMERGENCY MEDICINE

## 2020-01-13 PROCEDURE — 93925 LOWER EXTREMITY STUDY: CPT | Performed by: SURGERY

## 2020-01-13 PROCEDURE — 99223 1ST HOSP IP/OBS HIGH 75: CPT | Performed by: HOSPITALIST

## 2020-01-13 PROCEDURE — 36415 COLL VENOUS BLD VENIPUNCTURE: CPT | Performed by: EMERGENCY MEDICINE

## 2020-01-13 PROCEDURE — 99285 EMERGENCY DEPT VISIT HI MDM: CPT | Performed by: INTERNAL MEDICINE

## 2020-01-13 PROCEDURE — 96365 THER/PROPH/DIAG IV INF INIT: CPT

## 2020-01-13 RX ORDER — ASPIRIN 81 MG/1
81 TABLET, CHEWABLE ORAL DAILY
Status: DISCONTINUED | OUTPATIENT
Start: 2020-01-13 | End: 2020-01-21 | Stop reason: HOSPADM

## 2020-01-13 RX ORDER — HEPARIN SODIUM 1000 [USP'U]/ML
6000 INJECTION, SOLUTION INTRAVENOUS; SUBCUTANEOUS AS NEEDED
Status: DISCONTINUED | OUTPATIENT
Start: 2020-01-13 | End: 2020-01-21

## 2020-01-13 RX ORDER — HEPARIN SODIUM 10000 [USP'U]/100ML
3-30 INJECTION, SOLUTION INTRAVENOUS
Status: DISCONTINUED | OUTPATIENT
Start: 2020-01-13 | End: 2020-01-21

## 2020-01-13 RX ORDER — HEPARIN SODIUM 1000 [USP'U]/ML
3000 INJECTION, SOLUTION INTRAVENOUS; SUBCUTANEOUS AS NEEDED
Status: DISCONTINUED | OUTPATIENT
Start: 2020-01-13 | End: 2020-01-21

## 2020-01-13 RX ORDER — HEPARIN SODIUM 1000 [USP'U]/ML
6000 INJECTION, SOLUTION INTRAVENOUS; SUBCUTANEOUS ONCE
Status: COMPLETED | OUTPATIENT
Start: 2020-01-13 | End: 2020-01-13

## 2020-01-13 RX ORDER — METOPROLOL TARTRATE 5 MG/5ML
5 INJECTION INTRAVENOUS EVERY 6 HOURS PRN
Status: DISCONTINUED | OUTPATIENT
Start: 2020-01-13 | End: 2020-01-21 | Stop reason: HOSPADM

## 2020-01-13 RX ORDER — SODIUM CHLORIDE, SODIUM GLUCONATE, SODIUM ACETATE, POTASSIUM CHLORIDE, MAGNESIUM CHLORIDE, SODIUM PHOSPHATE, DIBASIC, AND POTASSIUM PHOSPHATE .53; .5; .37; .037; .03; .012; .00082 G/100ML; G/100ML; G/100ML; G/100ML; G/100ML; G/100ML; G/100ML
100 INJECTION, SOLUTION INTRAVENOUS CONTINUOUS
Status: DISCONTINUED | OUTPATIENT
Start: 2020-01-13 | End: 2020-01-15

## 2020-01-13 RX ADMIN — SODIUM CHLORIDE, SODIUM GLUCONATE, SODIUM ACETATE, POTASSIUM CHLORIDE, MAGNESIUM CHLORIDE, SODIUM PHOSPHATE, DIBASIC, AND POTASSIUM PHOSPHATE 100 ML/HR: .53; .5; .37; .037; .03; .012; .00082 INJECTION, SOLUTION INTRAVENOUS at 15:09

## 2020-01-13 RX ADMIN — SODIUM CHLORIDE 500 ML: 0.9 INJECTION, SOLUTION INTRAVENOUS at 10:16

## 2020-01-13 RX ADMIN — HEPARIN SODIUM AND DEXTROSE 18 UNITS/KG/HR: 10000; 5 INJECTION INTRAVENOUS at 12:14

## 2020-01-13 RX ADMIN — HEPARIN SODIUM 6000 UNITS: 1000 INJECTION INTRAVENOUS; SUBCUTANEOUS at 12:14

## 2020-01-13 RX ADMIN — SODIUM CHLORIDE 1000 ML: 0.9 INJECTION, SOLUTION INTRAVENOUS at 14:31

## 2020-01-13 RX ADMIN — SODIUM CHLORIDE 500 ML: 0.9 INJECTION, SOLUTION INTRAVENOUS at 12:00

## 2020-01-13 RX ADMIN — SODIUM CHLORIDE, SODIUM GLUCONATE, SODIUM ACETATE, POTASSIUM CHLORIDE, MAGNESIUM CHLORIDE, SODIUM PHOSPHATE, DIBASIC, AND POTASSIUM PHOSPHATE 100 ML/HR: .53; .5; .37; .037; .03; .012; .00082 INJECTION, SOLUTION INTRAVENOUS at 20:04

## 2020-01-13 NOTE — ED ATTENDING ATTESTATION
1/13/2020  IZaire MD, saw and evaluated the patient  I have discussed the patient with the resident/non-physician practitioner and agree with the resident's/non-physician practitioner's findings, Plan of Care, and MDM as documented in the resident's/non-physician practitioner's note, except where noted  All available labs and Radiology studies were reviewed  I was present for key portions of any procedure(s) performed by the resident/non-physician practitioner and I was immediately available to provide assistance  At this point I agree with the current assessment done in the Emergency Department    I have conducted an independent evaluation of this patient a history and physical is as follows:  H/o PAD had stenting of the right leg 7 months ago had been seen follow-up in October he had no symptoms of claudication in the leg up until 4 days ago  htn and cad   Has claudication  Symptoms only with walking   Gets better with rest    Numbness in foot  Also  complaints of GI symptoms  No leg symptoms  Presently,     Has gi symptoms with loose stool no vomiting      Followed by vascular surgery   Called office and told to come in for eval  Exam  nad    Lungs clear heart rrr no m abd soft obese mild tenderness pos bs  Pulses palpable in femoral  right foot is somewhat cool capillary refill is delayed no dopplerable PT pulse on the right left Pt is intact warm and perfused    Concern for arterial insufficiency  N/v intact     Consul obtain from vascular surgery they have evaluated the patient they requested that the patient be admitted to medicine started on heparin drip  During course of workup labs revealed acute kidney injury with a creatinine of 3 99 baseline 1 5 a month ago  Patient also noted to have elevated white blood cell count [id number]  Consider the new renal failure and elevated white count with the GI symptoms a CT scan of the abdomen and pelvis was obtained  CT scan reveals small-bowel obstruction  Surgery consult  ED Course         Critical Care Time  Procedures

## 2020-01-13 NOTE — ASSESSMENT & PLAN NOTE
· Going on since last 1 year  · According to patient underwent EGD and colonoscopy which showed some colonic polyps but nothing else

## 2020-01-13 NOTE — ASSESSMENT & PLAN NOTE
Acute on chronic diarrhea    CT A/P 1/7- Bilateral adrenal adenomas  Bilateral renal nonobstructing calculi  Partially visualized dilated small bowel loops and wall thickening as described above could relate to obstruction and/or infection      Plan:  Medical Management   Consider GI evaluation

## 2020-01-13 NOTE — ASSESSMENT & PLAN NOTE
· Found on CT scan in ED today, with transition near terminal ileum, there are places with suspicious inflammation in TI with possible skipped areas concerning for crohn's  · Seen by Gen Surg - serial abdo exams  · NGT placed by ED - will continue for now & consider advancement in AM  · GI consult recommended by Surgery, placed that  · Last BM in AM  · Monitor output

## 2020-01-13 NOTE — ASSESSMENT & PLAN NOTE
· Sodium 131  · Suspect hypovolemic in the setting of GI loss and inadequate oral intake  · Placed on IV fluids  · BMP in a m

## 2020-01-13 NOTE — ASSESSMENT & PLAN NOTE
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 s/p Right Popliteal Stent placement 6/27/19 and Afib on Xarelto, presents with acute Right leg coldness and numbness for approximately 5-6 days  He also complains of diarrhea, nausea and anorexia over the same time period  Contrast CT A/P was performed to electively evaluate adrenal adenomas on 1/8  He is noted to have significant PHUONG at the time of admission w/ Creatinine 3 99  LEAD 1/13 in ED- Prelim report Acute Right popliteal stent occlusion w/ minimal distal flow      Plan:  Pt will benefit from RLE Arteriogram w/ Popliteal lysis/intervention once medically optimized due to PHUONG  Initiate Heparin gtt  Initiate ASA 81mg daily, Continue Statin  [facility] Nephrology re: PHUONG and eventual need for Agram  Neurovascular monitoring  Reverse Trendelenberg

## 2020-01-13 NOTE — H&P
H&P- David Medrano 1950, 77 y o  male MRN: [id number]    Unit/Bed#: ED 29 Encounter: [id number]    Primary Care Provider: Ean Brown MD   Date and time admitted to hospital: 1/13/2020  9:23 AM        * Limb ischemia  Assessment & Plan  · Acute right leg ischemic pain with numbness, both have improved now  · At present does not have any sensory or motor compromise his numbness has also improved  · Evaluated by vascular surgery, currently placed on heparin infusion, home Xarelto held  · Given his PHUONG I and current stable blood flow no acute intervention is planned right now until clearance from Nephrology  · Leads ordered and follow up results  · Continues to smoke    SBO (small bowel obstruction) ([facility] [address] )  Assessment & Plan  · Found on CT scan in ED today, with transition near terminal ileum, there are places with suspicious inflammation in TI with possible skipped areas concerning for crohn's  · Seen by Gen Surg - serial abdo exams  · NGT placed by ED - will continue for now & consider advancement in AM  · GI consult recommended by Surgery, placed that  · Last BM in AM  · Monitor output    PHUONG (acute kidney injury) ([facility] [address] )  Assessment & Plan  · BL Cr 1-1 5  · Today 3 99 with BUN 47  · Suspect pre-renal 2/2 GI losses + IV contrast use on 1/7 + took 2 exedrins last night for pain  · R/o ATN - f/u repeat BMps, check PVR  · Evaluated by nephrology already, placed on isolyte @ 100/hr  · BMP in am    Hyponatremia  Assessment & Plan  · Sodium 131  · Suspect hypovolemic in the setting of GI loss and inadequate oral intake  · Placed on IV fluids  · BMP in a m      Diarrhea  Assessment & Plan  · Going on since last 1 year  · According to patient underwent EGD and colonoscopy which showed some colonic polyps but nothing else    Thrombocytosis ([facility] [address] )  Assessment & Plan  · Being followed by Hematology  · JAK2 mutation negative  · Being observed    Paroxysmal atrial fibrillation ([facility] [address] )  Assessment & Plan  · On Toprol XL 25 mg daily at home, in the setting of PHUONG and to avoid hypotension with his blood pressures in 90s to 100s will hold off on metoprolol right now  · Will order IV Lopressor p r n  As needed for elevated heart rate  · Home Xarelto held, now on heparin infusion    PAD (peripheral artery disease) (HCC)  Assessment & Plan  · S/p rigth popliteal stent  · Not on AP, was on xarelto & statin at home    Tobacco use  Assessment & Plan  · Half pack per day  · refused nicotine patch          History and Physical - [facility]    Patient Information: Ramila La 77 y o  male MRN: [id number]  Unit/Bed#: ED 29 Encounter: [id number]  Admitting Physician: Delio Emerson MD  PCP: Fransico Rothman MD  Date of Admission:  01/13/20    Assessment/Plan:    Hospital Problem List:     Principal Problem:    Limb ischemia  Active Problems:    PHUONG (acute kidney injury) ([facility] [address] )    SBO (small bowel obstruction) ([facility] [address] )    Chronic kidney disease, stage III (moderate) ([facility] [address] )    Tobacco use    Harris's esophagus without dysplasia    PAD (peripheral artery disease) ([facility] [address] )    S/P peripheral artery angioplasty with stent placement    Paroxysmal atrial fibrillation (HCC)    Thrombocytosis (HCC)    Diarrhea    Hyponatremia      VTE Prophylaxis: Heparin Drip  / sequential compression device   Code Status: FC  POLST: There is no POLST form on file for this patient (pre-hospital)    Anticipated Length of Stay:  Patient will be admitted on an Inpatient basis with an anticipated length of stay of  > 2 midnights  Justification for Hospital Stay: PHUONG, limb ischemia, SBO    Total Time for Visit, including Counseling / Coordination of Care: 1 hour  Greater than 50% of this total time spent on direct patient counseling and coordination of care      Chief Complaint:   Right leg pain & numbness    History of Present Illness:    Ramila La is a 77 y o  male who presents with complain off right leg pain, calf pain going on since 1 week, more with activity, it got worse last night associated with numbness of his foot hence he called vascular surgery of his it who recommended him to come to the ED  Patient does have history of peripheral arterial disease, status post right popliteal stenting, current smoker, paroxysmal atrial fibrillation on metoprolol and Xarelto, thrombocytosis, chronic diarrhea since 1 year  After is palpated standing he was told by vascular surgery that if he has any leg pain he should contact them hence he did today  Subsequently after presenting here in his blood work was found to have creatinine of 3 99 hence CT scan of his abdomen was performed in which evidence of SBO was added 5  He states that since 1 year he has been having diarrhea up to 6 to 7 times a day, loose watery which was not like that before, associated with abdominal cramping for which he has been taking Bentyl as needed every day, he has underwent endoscopy and colonoscopy which showed colonic polyps but no other abnormalities according to the patient  Recently also had a CT scan on 01/07/2020 which also showed some dilatation and some mention of small-bowel lobe suspicious of enteritis  But today it appears worse with transition point near terminal ileum with inflammation of the wall of ileum as well as areas of skip lesions suspicious of Crohn's disease  Denies any nausea vomiting thinks his abdominal slightly more distended than usual, no changes in urine output no changes in frequency color no hematuria  Had some chills and fever at home denies any URI symptoms no cough no chest pain palpitations or shortness of breath    Review of Systems:    Review of Systems   Constitutional: Positive for appetite change, chills and fever  Negative for activity change  HENT: Negative for congestion and rhinorrhea  Respiratory: Positive for cough  Negative for shortness of breath and wheezing      Cardiovascular: Negative for chest pain, palpitations and leg swelling  Gastrointestinal: Positive for abdominal distention, abdominal pain and diarrhea  Negative for blood in stool, nausea and vomiting  Genitourinary: Negative for difficulty urinating, dysuria, frequency and hematuria  Neurological: Positive for numbness  Negative for dizziness and light-headedness  All other systems reviewed and are negative  Past Medical and Surgical History:     Past Medical History:   Diagnosis Date    Blue toe syndrome (HCC)     Chronic kidney disease     Colon polyps     GERD (gastroesophageal reflux disease)     Hematuria     History of rheumatic fever     Hyperlipidemia     Hypotension     Ischemic cardiomyopathy     PAD (peripheral artery disease) (HCC)     Pulmonary emphysema (HCC)        Past Surgical History:   Procedure Laterality Date    COLONOSCOPY  2019    HERNIA REPAIR      IR ABDOMINAL ANGIOGRAPHY / INTERVENTION  6/27/2019    POPLITEAL ARTERY STENT Right     6/2019    LA SLCTV CATHJ 3RD+ ORD SLCTV ABDL PEL/LXTR [facility] Right 6/27/2019    Procedure: leg ANGIOGRAM WITH STENT, BALLOON ANGIOPLASTY, LEFT GROIN ACCESS;  Surgeon: Kaylene Tinsley MD;  Location: [facility];  Service: Vascular       Meds/Allergies:    Prior to Admission medications    Medication Sig Start Date End Date Taking?  Authorizing Provider   atorvastatin (LIPITOR) 20 mg tablet Take 1 tablet (20 mg total) by mouth daily 12/5/19  Yes Khoi Crenshaw MD   Cholecalciferol (VITAMIN D) 2000 units CAPS Take 2,000 Units by mouth daily   Yes Historical Provider, MD   dicyclomine (BENTYL) 10 mg capsule Take 1 capsule (10 mg total) by mouth 4 (four) times a day (before meals and at bedtime) 10/22/19  Yes Michael Blackburn MD   Magnesium 500 MG TABS Take 1 tablet by mouth 2 (two) times a day   Yes Historical Provider, MD   metoprolol succinate (TOPROL-XL) 25 mg 24 hr tablet Take 1 tablet (25 mg total) by mouth daily 11/17/19  Yes Pastora Hanson MD   omeprazole (PriLOSEC) 40 MG capsule TAKE 1 CAPSULE BY MOUTH EVERY DAY ONE-HALF HOUR BEFORE BREAKFAST 4/16/19  Yes Historical Provider, MD   rivaroxaban (XARELTO) 20 mg tablet Take 1 tablet (20 mg total) by mouth daily with breakfast 7/19/19  Yes LENORA Sargent     I have reviewed home medications with a medical source (PCP, Pharmacy, other)  Allergies: No Known Allergies    Social History:     Marital Status: /Civil Union   Occupation:   Patient Pre-hospital Living Situation: at home  Patient Pre-hospital Level of Mobility: active  Patient Pre-hospital Diet Restrictions: none  Substance Use History:   Social History     Substance and Sexual Activity   Alcohol Use Yes    Frequency: Monthly or less    Drinks per session: 1 or 2    Binge frequency: Less than monthly    Comment: occasional     Social History     Tobacco Use   Smoking Status Current Some Day Smoker    Packs/day: 0 25    Years: 30 00    Pack years: 7 50    Types: Cigarettes   Smokeless Tobacco Never Used   Tobacco Comment    " I will do it on my own"     Social History     Substance and Sexual Activity   Drug Use No       Family History:    No family history of IBD    Physical Exam:     Vitals:   Blood Pressure: 103/76 (01/13/20 1615)  Pulse: 96 (01/13/20 1615)  Temperature: 97 5 °F (36 4 °C) (01/13/20 0933)  Temp Source: Tympanic (01/13/20 0933)  Respirations: 18 (01/13/20 1615)  Height: 5' 10" (177 8 cm) (01/13/20 0931)  Weight - Scale: 77 1 kg (170 lb) (01/13/20 0931)  SpO2: 94 % (01/13/20 1615)    Physical Exam   Constitutional: He is oriented to person, place, and time  He appears well-developed and well-nourished  No distress  HENT:   Head: Normocephalic and atraumatic  Mouth/Throat: Oropharynx is clear and moist    Eyes: Conjunctivae are normal    Cardiovascular: Normal rate and regular rhythm  Exam reveals no friction rub  No murmur heard  Pulmonary/Chest: Effort normal and breath sounds normal  No stridor  No respiratory distress  He has no wheezes  Abdominal: Soft  He exhibits distension  There is no tenderness  Diminished bowel sounds   Musculoskeletal: He exhibits no tenderness  Neurological: He is alert and oriented to person, place, and time  Skin: He is not diaphoretic  Right distal foot cold, DP not palpable   Vitals reviewed  Additional Data:     Lab Results: I have personally reviewed pertinent reports  Results from last 7 days   Lab Units 01/13/20  1013   WBC Thousand/uL 14 17*   HEMOGLOBIN g/dL 16 2   HEMATOCRIT % 49 5*   PLATELETS Thousands/uL 685*   NEUTROS PCT % 74   LYMPHS PCT % 14   MONOS PCT % 11   EOS PCT % 0     Results from last 7 days   Lab Units 01/13/20  1013   POTASSIUM mmol/L 4 0   CHLORIDE mmol/L 97*   CO2 mmol/L 23   BUN mg/dL 47*   CREATININE mg/dL 3 99*   CALCIUM mg/dL 9 0   ALK PHOS U/L 177*   ALT U/L 27   AST U/L 24     Results from last 7 days   Lab Units 01/13/20  1204   INR  1 85*       Imaging: I have personally reviewed pertinent reports  Ct Abdomen Pelvis Wo Contrast    Result Date: 1/13/2020  Narrative: CT ABDOMEN AND PELVIS WITHOUT IV CONTRAST INDICATION:   Abdominal pain, acute, nonlocalized  COMPARISON:  CT abdomen 1/7/2020 and CT abdomen/pelvis 5/15/2019 TECHNIQUE:  CT examination of the abdomen and pelvis was performed without intravenous contrast   Axial, sagittal, and coronal 2D reformatted images were created from the source data and submitted for interpretation  Radiation dose length product (DLP) for this visit:  559 99 mGy-cm   This examination, like all CT scans performed in the [facility], was performed utilizing techniques to minimize radiation dose exposure, including the use of iterative  reconstruction and automated exposure control  Enteric contrast was not administered  FINDINGS: ABDOMEN LOWER CHEST:  No clinically significant abnormality identified in the visualized lower chest  LIVER/BILIARY TREE:  Unremarkable  GALLBLADDER:  No calcified gallstones   No pericholecystic inflammatory change  SPLEEN:  Unremarkable  PANCREAS:  Unremarkable  ADRENAL GLANDS:  Stable bilateral adenomas  KIDNEYS/URETERS:  Punctate nonobstructing bilateral renal calculi  No hydronephrosis  STOMACH AND BOWEL:  The proximal small bowel is dilated with fluid and gas and there is a gradual transition point in the pelvis at a long segment of distal ileum that demonstrates mural thickening, mucosal hyperenhancement, prominence of the associated mesenteric vasculature, and relative luminal narrowing  At the distal end of the inflamed segment, the terminal ileum is abruptly decompressed (2/73) and fibrostenosing disease cannot be excluded  There is a separate, upstream, short segment of distal ileum that is also inflamed and demonstrates luminal narrowing (2/41)  Near the ileocecal valve, there is intramural fat ileal wall  The colon is completely decompressed and there is redemonstration of colonic diverticulosis without evidence of diverticulitis  No pneumatosis intestinalis or portal venous gas  No abscess or evidence of a fistula  APPENDIX:  Normal  ABDOMINOPELVIC CAVITY:  No ascites or free intraperitoneal air  No lymphadenopathy  VESSELS:  Atherosclerotic changes are present and there is a stable 3 2 x 2 6 cm infrarenal abdominal aortic aneurysm  PELVIS REPRODUCTIVE ORGANS:  Unremarkable for patient's age  URINARY BLADDER:  Unremarkable  ABDOMINAL WALL/INGUINAL REGIONS:  Unremarkable  OSSEOUS STRUCTURES:  No acute fracture or destructive osseous lesion  Impression: 1  Findings consistent with a small bowel obstruction with a transition point at a long segment of distal ileitis  There is also a separate short segment of upstream ileitis ("skip lesion") with luminal narrowing  There is intramural fat at the terminal ileum suggesting chronic/old inflammation and further abrupt decompression of the terminal ileum, raising the possibility of fibrostenosing disease/fibrotic stricture  Overall, findings are concerning for Crohn's disease  No evidence of penetrating disease  2   Bilateral nonobstructing nephrolithiasis  3   Stable 3 2 x 2 6 cm infrarenal abdominal aortic aneurysm  I personally discussed this study with Rabia Dia on 1/13/2020 at 1:55 PM  Workstation performed: [id number]     Xr Chest 2 Views    Result Date: 1/13/2020  Narrative: CHEST INDICATION:   weakness  COMPARISON:  CT chest 5/16/2019 EXAM PERFORMED/VIEWS:  XR CHEST PA & LATERAL FINDINGS: Cardiomediastinal silhouette appears unremarkable  The lungs are clear  No pneumothorax or pleural effusion  Osseous structures appear within normal limits for patient age  Impression: No acute cardiopulmonary disease  Workstation performed: [id number]     Ct Abdomen W Wo Contrast    Addendum Date: 1/8/2020 Addendum:   ADDENDUM: Right-sided adrenal adenoma measuring 3 3 x 2 9 x 3 cm is similar to the prior CT May 15, 2019  Left adrenal 2 x 2 3 x 2 cm adrenal adenoma is also stable from the prior CT May 15, 2019  Result Date: 1/8/2020  Narrative: CT - ABDOMEN WITHOUT AND WITH IV CONTRAST INDICATION:   E27 8: Other specified disorders of adrenal gland  COMPARISON: May 15, 2019 TECHNIQUE: CT examination of the abdomen was performed  Reformatted images were created in axial, sagittal, and coronal planes  Radiation dose length product (DLP) for this visit:  938 mGy-cm   This examination, like all CT scans performed in the [facility], was performed utilizing techniques to minimize radiation dose exposure, including the use of iterative reconstruction and automated exposure control  IV Contrast:  85 mL of iodixanol (VISIPAQUE) Enteric Contrast:  Enteric contrast was administered  FINDINGS: ABDOMEN LOWER CHEST:  No clinically significant abnormality identified in the visualized lower chest  LIVER/BILIARY TREE:  Unremarkable  GALLBLADDER:  No calcified gallstones  No pericholecystic inflammatory change   SPLEEN: Unremarkable  PANCREAS:  Unremarkable  ADRENAL GLANDS:  Bilateral adrenal adenomas  KIDNEYS/URETERS:  Left upper pole renal 2 mm nonobstructing calculus  Right mid pole 3 mm nonobstructing calculus  VISUALIZED STOMACH AND BOWEL:  Partially visualized dilated small bowel loops (with wall thickening) could relate to obstruction and/or infection such as enteritis, correlate with oral and IV contrast enhanced abdominal /pelvic MRI  Partially visualized colonic diverticulosis  VISUALIZED ABDOMINAL CAVITY:  No pathologically enlarged periportal, mesenteric or upper retroperitoneal lymph nodes  No ascites or free intraperitoneal air  No fluid collection  VISUALIZED BODY WALL:  Unremarkable  VISUALIZED ABDOMINAL VESSELS: Focal saccular infrarenal abdominal aortic borderline aneurysm measuring approximately 3 cm  This was also identified on the prior CT May 15, 2019  OSSEOUS STRUCTURES:  No acute fracture or destructive osseous lesion  Impression: 1  Bilateral adrenal adenomas  2  Bilateral renal nonobstructing calculi  3  Partially visualized dilated small bowel loops and wall thickening as described above could relate to obstruction and/or infection  Workstation performed: [id number]         Epic / [facility] Everywhere Records Reviewed: Yes     ** Please Note: This note has been constructed using a voice recognition system   **

## 2020-01-13 NOTE — ASSESSMENT & PLAN NOTE
· On Toprol XL 25 mg daily at home, in the setting of PHUONG and to avoid hypotension with his blood pressures in 90s to 100s will hold off on metoprolol right now  · Will order IV Lopressor p r n   As needed for elevated heart rate  · Home Xarelto held, now on heparin infusion

## 2020-01-13 NOTE — CONSULTS
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 s/p Right Popliteal Stent placement 6/27/19 and Afib on Xarelto, presents with acute Right leg coldness and numbness for approximately 5-6 days  He also complains of diarrhea, nausea and anorexia over the same time period  Contrast CT A/P was performed to electively evaluate adrenal adenomas on 1/8  He is noted to have significant PHUONG at the time of admission w/ Creatinine 3 99  LEAD 1/13 in ED- Prelim report Acute Right popliteal stent occlusion w/ minimal distal flow  Plan:  Pt will benefit from RLE Arteriogram w/ Popliteal lysis/intervention once medically optimized due to PHUONG  Initiate Heparin gtt  Initiate ASA 81mg daily, Continue Statin  Hold Xarelto  Consult Nephrology re: PHUONG and eventual need for Agram  Neurovascular monitoring  Reverse Trendelenberg        PHUONG (acute kidney injury) ([facility] [address] )  Assessment & Plan  PHUONG on CKD 3 in setting of recent contrast study and dehydration related to diarrhea/anorexia  Baseline Creatinine 1 25-1 4    Plan:  Nephrology consultation   IVF    Diarrhea  Assessment & Plan  Acute on chronic diarrhea    CT A/P 1/7- Bilateral adrenal adenomas  Bilateral renal nonobstructing calculi  Partially visualized dilated small bowel loops and wall thickening as described above could relate to obstruction and/or infection      Plan:  Medical Management   Consider GI evaluation    Paroxysmal atrial fibrillation [facility])  Assessment & Plan  Plan:  Hold Xarelto  Heparin gtt  Medical Management      Consulting Service: Emergency Medicine    Chief Complaint: Right leg coldness and numbness    HPI: Scott Chen is a 77 y o  male who has a known history of blue toe syndrome/PAD involving the right foot and is s/p RLE arteriogram with right popliteal stenting in June 2019 presents with right foot coolness and numbness which has been present since approximately Wednesday  He states that he noticed right lower extremity pain symptoms consistent with claudication while ambulating on a treadmill at the gym which have progressed over the last several days to include coldness and numbness of the right foot  He has past medical history of AFib on Xarelto, CKD stage 3 with baseline creatinine 1 2-1 4, thrombocytosis, chronic diarrhea, GERD, hyperlipidemia, hypertension, ischemic cardiomyopathy, emphysema  At the time of admission he is also complaining of acute on chronic diarrhea which also started approximately on Wednesday  He has had episodes of nausea and one episode of vomiting within the last couple of days and he has been unable to eat or drink appropriately  He called the vascular office this morning complaining of coldness and numbness in the right foot and was referred to the emergency department for evaluation  In the emergency department lower extremity arterial duplex was obtained preliminary results reveal acute occlusion of right popliteal artery stent with minimal flow distally  On physical examination he remains motor and sensory intact  He denies significant pain unless he is ambulating  Laboratory evaluation has revealed significant a KI with creatinine 3 99  Incidentally he recently underwent CT scan of the abdomen and pelvis with contrast on 01/07/20 to evaluate adrenal adenomas  He will require medical admission for hydration and treatment of a KI and diarrhea  He will ultimately require vascular intervention for right popliteal artery occlusion once medically optimized      Review of Systems:  General: negative  Cardiovascular: no chest pain or dyspnea on exertion  Respiratory: no cough, shortness of breath, or wheezing  Gastrointestinal: positive for - abdominal pain, appetite loss, change in stools, diarrhea and nausea/vomiting  negative for - blood in stools, constipation, heartburn, hematemesis, melena or swallowing difficulty/pain  Genitourinary ROS: no dysuria, trouble voiding, or hematuria  Musculoskeletal ROS: positive for - pain in leg - right and coldness, numbness  negative for - joint pain, joint stiffness, joint swelling or swelling in leg - bilateral  Neurological ROS: no TIA or stroke symptoms  Hematological and Lymphatic ROS: negative  Dermatological ROS: negative  Psychological ROS: negative  Ophthalmic ROS: negative  ENT ROS: negative    Past Medical History:  Past Medical History:   Diagnosis Date    Blue toe syndrome (HCC)     Chronic kidney disease     Colon polyps     GERD (gastroesophageal reflux disease)     Hematuria     History of rheumatic fever     Hyperlipidemia     Hypotension     Ischemic cardiomyopathy     PAD (peripheral artery disease) (HCC)     Pulmonary emphysema ([address] )        Past Surgical History:  Past Surgical History:   Procedure Laterality Date    COLONOSCOPY  2019    HERNIA REPAIR      IR ABDOMINAL ANGIOGRAPHY / INTERVENTION  6/27/2019    POPLITEAL ARTERY STENT Right     6/2019    [place] SLCTV CATHJ 3RD+ ORD SLCTV ABDL PEL/LXTR [facility] Right 6/27/2019    Procedure: leg ANGIOGRAM WITH STENT, BALLOON ANGIOPLASTY, LEFT GROIN ACCESS;  Surgeon: Valerie Estrella MD;  [facility];  Service: Vascular       Social History:  Social History     Substance and Sexual Activity   Alcohol Use Yes    Frequency: Monthly or less    Drinks per session: 1 or 2    Binge frequency: Less than monthly    Comment: occasional     Social History     Substance and Sexual Activity   Drug Use No     Social History     Tobacco Use   Smoking Status Current Some Day Smoker    Packs/day: 0 25    Years: 30 00    Pack years: 7 50    Types: Cigarettes   Smokeless Tobacco Never Used   Tobacco Comment    " I will do it on my own"       Family History:  Family History   Problem Relation Age of Onset    Heart disease Mother     Hyperlipidemia Mother     Hypertension Father     Heart disease Father     Stroke Father     Hyperlipidemia Father     Diabetes Brother     Dementia Neg Hx     Drug abuse Neg Hx     Mental illness Neg Hx     Substance Abuse Neg Hx     Alcohol abuse Neg Hx     Depression Neg Hx        Allergies:  No Known Allergies    Medications:  Current Facility-Administered Medications   Medication Dose Route Frequency    heparin (porcine) 25,000 units in 250 mL infusion (premix)  3-30 Units/kg/hr (Order-Specific) Intravenous Titrated    heparin (porcine) injection 3,000 Units  3,000 Units Intravenous PRN    heparin (porcine) injection 6,000 Units  6,000 Units Intravenous PRN    sodium chloride 0 9 % bolus 500 mL  500 mL Intravenous Once       Vitals:  BP 94/66   Pulse 96   Temp 97 5 °F (36 4 °C) (Tympanic)   Resp 18   Ht 5' 10" (1 778 m)   Wt 77 1 kg (170 lb)   SpO2 95%   BMI 24 39 kg/m²     I/Os:  I/O last 24 hours:   In: 500 [IV Piggyback:500]  Out: -     Lab Results and Cultures:   Lab Results   Component Value Date    WBC 14 17 (H) 01/13/2020    HGB 16 2 01/13/2020    HCT 49 5 (H) 01/13/2020    MCV 88 01/13/2020     (H) 01/13/2020     Lab Results   Component Value Date    CALCIUM 9 0 01/13/2020    K 4 0 01/13/2020    CO2 23 01/13/2020    CL 97 (L) 01/13/2020    BUN 47 (H) 01/13/2020    CREATININE 3 99 (H) 01/13/2020     No results found for: INR, PROTIME    Lipid Panel: No results found for: CHOL,     Blood Culture: No results found for: BLOODCX,   Urinalysis:   Lab Results   Component Value Date    COLORU Brown 05/15/2019    CLARITYU Clear 05/15/2019    SPECGRAV >=1 030 05/15/2019    PHUR 6 0 05/15/2019    LEUKOCYTESUR Negative 05/15/2019 NITRITE Negative 05/15/2019    GLUCOSEU Negative 05/15/2019    KETONESU 15 (1+) (A) 05/15/2019    BILIRUBINUR Interference- unable to analyze (A) 05/15/2019    BLOODU Negative 05/15/2019   ,   Urine Culture: No results found for: URINECX,   Wound Culure: No results found for: WOUNDCULT    Imaging:  See assessment and plan    Physical Exam:    General appearance: alert and oriented, in no acute distress  Skin: Skin color, texture, turgor normal  No rashes or lesions  Neurologic: Mental status: Alert, oriented, thought content appropriate  Cranial nerves: normal  Sensory: normal  Motor: grossly normal  Head: Normocephalic, without obvious abnormality, atraumatic  Eyes: conjunctivae/corneas clear  PERRL, EOM's intact  Fundi benign  Throat: lips, mucosa, and tongue normal; teeth and gums normal  Neck: no adenopathy, no carotid bruit, no JVD, supple, symmetrical, trachea midline and thyroid not enlarged, symmetric, no tenderness/mass/nodules  Back: symmetric, no curvature  ROM normal  No CVA tenderness  Lungs: clear to auscultation bilaterally  Chest wall: no tenderness  Heart: regular rate and rhythm, S1, S2 normal, no murmur, click, rub or gallop  Abdomen: soft, non-tender; bowel sounds normal; no masses,  no organomegaly  Extremities: Right foot cool, hallux slightly cyanotic at tip, motor and sensation grossly intact, delayed capillary refill present  LLE no cyanosis clubbing and edema, warm      Pulse exam:  Radial: Right: 2+ Left[de-identified] 2+  Ulnar: Right: 2+ Left[de-identified] 2+  Femoral: Right: 2+ Left: 2+  Popliteal: Right: monophasic venous signal Left: 2+  DP: Right: 0 Left: 2+  PT: Right: doppler signal venous Left: 2+ and doppler signal      Herb Ferreira PA-C  1/13/2020

## 2020-01-13 NOTE — ASSESSMENT & PLAN NOTE
· Acute right leg ischemic pain with numbness, both have improved now  · At present does not have any sensory or motor compromise his numbness has also improved  · Evaluated by vascular surgery, currently placed on heparin infusion, home Xarelto held  · Given his PHUONG I and current stable blood flow no acute intervention is planned right now until clearance from Nephrology  · Leads ordered and follow up results  · Continues to smoke

## 2020-01-13 NOTE — CONSULTS
Consultation - General Surgery   Wyatt Pena 77 y o  male MRN: [id number]  Unit/Bed#: ED 29 Encounter: [id number]      Assessment/Plan      Assessment:  Patient is a 64-year-old male presenting with acute right lower extremity the popliteal stent thrombosis  General surgery consulted for CT findings of small-bowel obstruction, transition point at the long segment of distal ileum concerning for stricture  Vital signs are stable  Afebrile  WBC 14  Obese abdomen, mildly distended, nontender  No rebound, no guarding, no peritoneal signs  Plan:  Clear liquids as tolerated  IV fluids  Continue heparin drip for right lower extremity  Ischemia  GI consult  History of Present Illness   Physician Requesting Consult: Edin Calvo MD  Reason for Consult / Principal Problem:  Acute on chronic small-bowel obstruction  History, ROS and PFSH unobtainable from any source due to none  HPI: Wyatt Pena is a 77y o  year old male with past medical history of peripheral artery disease, complicated with right popliteal stent, chronic kidney disease, GERD, hyperlipidemia, AFib on Xarelto, with loop recorder who presents with right lower extremity ischemia  Noted history of right inguinal hernia repair in 2007  In addition to patient's right leg pain patient explained that he has had multiple episodes of diarrhea starting last Wednesday January 8th, associated with cramping abdominal pain  Patient explained that he has had intermittent abdominal pain for the past year associated with intermittent periods of abdominal distension  Patient has undergone GI workup Dr Mohini Lott at outside facility, and in March and July 2019 multiple biopsies with EGD and colonoscopy negative for gastritis and stomach or any dysplasia or malignancy in the colon  Explained that he has had multiple episodes of diarrhea starting last week, crampy abdominal pain is intermittent nature since then, and daily bowel movements since    Last bowel movement was this morning  Denied any chest pain or shortness of breath today  Denied any vomiting  Endorsed intermittent episodes of nausea  Tolerating drinking water, and clear liquids  Did endorse intermittent subjective fevers and chills, however afebrile on presentation emergency department  Inpatient consult to Acute Care Surgery  Consult performed by: Augustus Townsend MD  Consult ordered by: Cristina June MD          Review of Systems   Constitutional: Positive for chills and fever  HENT: Negative  Eyes: Negative  Respiratory: Negative  Cardiovascular: Negative  Gastrointestinal: Positive for abdominal distention, abdominal pain, diarrhea and nausea  Negative for vomiting  Endocrine: Negative  Genitourinary: Negative  Musculoskeletal: Negative  Allergic/Immunologic: Negative  Neurological: Negative  Hematological: Negative  Psychiatric/Behavioral: Negative          Historical Information   Past Medical History:   Diagnosis Date    Blue toe syndrome (HCC)     Chronic kidney disease     Colon polyps     GERD (gastroesophageal reflux disease)     Hematuria     History of rheumatic fever     Hyperlipidemia     Hypotension     Ischemic cardiomyopathy     PAD (peripheral artery disease) (HCC)     Pulmonary emphysema (HCC)      Past Surgical History:   Procedure Laterality Date    COLONOSCOPY  2019    HERNIA REPAIR      IR ABDOMINAL ANGIOGRAPHY / INTERVENTION  6/27/2019    POPLITEAL ARTERY STENT Right     6/2019    [place] SLCTV CATHJ 3RD+ ORD SLCTV ABDL PEL/LXTR [facility] Right 6/27/2019    Procedure: leg ANGIOGRAM WITH STENT, BALLOON ANGIOPLASTY, LEFT GROIN ACCESS;  Surgeon: Wei Iraheta MD;  [facility];  Service: Vascular     Social History   Social History     Substance and Sexual Activity   Alcohol Use Yes    Frequency: Monthly or less    Drinks per session: 1 or 2    Binge frequency: Less than monthly    Comment: occasional     Social History     Substance and Sexual Activity   Drug Use No     Social History     Tobacco Use   Smoking Status Current Some Day Smoker    Packs/day: 0 25    Years: 30 00    Pack years: 7 50    Types: Cigarettes   Smokeless Tobacco Never Used   Tobacco Comment    " I will do it on my own"     Family History: non-contributory}    Meds/Allergies   all current active meds have been reviewed  No Known Allergies    Objective   Vitals: Blood pressure 121/73, pulse 92, temperature 97 5 °F (36 4 °C), temperature source Tympanic, resp  rate 20, height 5' 10" (1 778 m), weight 77 1 kg (170 lb), SpO2 96 %  ,Body mass index is 24 39 kg/m²  Intake/Output Summary (Last 24 hours) at 1/13/2020 1524  Last data filed at 1/13/2020 1431  Gross per 24 hour   Intake 1000 ml   Output    Net 1000 ml     Invasive Devices     Peripheral Intravenous Line            Peripheral IV 01/13/20 Right Antecubital less than 1 day                Physical Exam   Constitutional: He is oriented to person, place, and time  He appears well-developed and well-nourished  HENT:   Head: Normocephalic  Eyes: Pupils are equal, round, and reactive to light  No scleral icterus  Neck: Normal range of motion  No JVD present  No tracheal deviation present  Cardiovascular: Normal rate  Pulmonary/Chest: Effort normal    Abdominal: Soft  Bowel sounds are normal  He exhibits distension  He exhibits no mass  There is no tenderness  There is no guarding  Genitourinary:   Genitourinary Comments: Deferred   Musculoskeletal: Normal range of motion  He exhibits no edema  Neurological: He is alert and oriented to person, place, and time  Skin: Skin is warm  Capillary refill takes less than 2 seconds  Psychiatric: He has a normal mood and affect  Vitals reviewed  Lab Results:   I have personally reviewed pertinent reports    , Coags:   Lab Results   Component Value Date    PTT 34 01/13/2020    INR 1 85 (H) 01/13/2020   , Creatinine:   Lab Results Component Value Date    CREATININE 3 99 (H) 01/13/2020   , CBC with diff:   Lab Results   Component Value Date    WBC 14 17 (H) 01/13/2020    HGB 16 2 01/13/2020    HCT 49 5 (H) 01/13/2020    MCV 88 01/13/2020     (H) 01/13/2020    MCH 28 9 01/13/2020    MCHC 32 7 01/13/2020    RDW 14 4 01/13/2020    MPV 9 7 01/13/2020    NRBC 0 01/13/2020   , BMP/CMP:   Lab Results   Component Value Date    SODIUM 131 (L) 01/13/2020    K 4 0 01/13/2020    CL 97 (L) 01/13/2020    CO2 23 01/13/2020    BUN 47 (H) 01/13/2020    CREATININE 3 99 (H) 01/13/2020    CALCIUM 9 0 01/13/2020    AST 24 01/13/2020    ALT 27 01/13/2020    ALKPHOS 177 (H) 01/13/2020    EGFR 14 01/13/2020     Imaging Studies: I have personally reviewed pertinent reports  EKG, Pathology, and Other Studies: I have personally reviewed pertinent reports  VTE Prophylaxis: Heparin     Code Status: Prior  Advance Directive and Living Will:      Power of :    POLST:      Counseling / Coordination of Care  Counseling/Coordination of Care: Total floor / unit time spent today 30 minutes  Greater than 50% of total time was spent with the patient and / or family counseling and / or coordination of care   A description of the counseling / coordination of care: 30

## 2020-01-13 NOTE — ASSESSMENT & PLAN NOTE
PHUONG on CKD 3 in setting of recent contrast study and dehydration related to diarrhea/anorexia  Baseline Creatinine 1 25-1 4    Plan:  Nephrology consultation   IVF

## 2020-01-13 NOTE — ED PROVIDER NOTES
History  Chief Complaint   Patient presents with    Leg Pain     Pt c/o calf pain and numbness in R leg starting thurs  Pt reports last night being severe so came in today  Pt has hx of stent placed in that leg last year  Pt denies cp, reports sob     27-year-old man with a past medical history of peripheral artery disease s/p right popliteal stenting in June 2019, CKD 3 presents for evaluation of right leg pain and abdominal pain  He has been experiencing right calf pain for the last 4 days  The pain is worse ambulating is relieved with rest   He has a trauma  Last evening his right foot was numb  This resolved with rest as well  Patient has also been experiencing abdominal pain, diarrhea and nausea for the past 5-6 days  The diarrhea is watery, nonbloody  He had a CT scan on 01/07/2020 that showed dilated loops of bowel consistent with infection  He has had similar symptoms in the past and had a significant workup last year that did not show any acute process  He denies history abdominal surgery  On review of systems he complains of decreased urine output, decreased appetite/p o  Intake  He denies chest pain, shortness of breath, fever          Prior to Admission Medications   Prescriptions Last Dose Informant Patient Reported? Taking?    Cholecalciferol (VITAMIN D) 2000 units CAPS 1/12/2020 at Unknown time Self Yes Yes   Sig: Take 2,000 Units by mouth daily   Magnesium 500 MG TABS 1/12/2020 at Unknown time Self Yes Yes   Sig: Take 1 tablet by mouth 2 (two) times a day   atorvastatin (LIPITOR) 20 mg tablet 1/12/2020 at Unknown time  No Yes   Sig: Take 1 tablet (20 mg total) by mouth daily   dicyclomine (BENTYL) 10 mg capsule 1/12/2020 at Unknown time Self No Yes   Sig: Take 1 capsule (10 mg total) by mouth 4 (four) times a day (before meals and at bedtime)   metoprolol succinate (TOPROL-XL) 25 mg 24 hr tablet 1/12/2020 at Unknown time  No Yes   Sig: Take 1 tablet (25 mg total) by mouth daily omeprazole (PriLOSEC) 40 MG capsule 1/12/2020 at Unknown time Self Yes Yes   Sig: TAKE 1 CAPSULE BY MOUTH EVERY DAY ONE-HALF HOUR BEFORE BREAKFAST   rivaroxaban (XARELTO) 20 mg tablet 1/12/2020 at Unknown time Self No Yes   Sig: Take 1 tablet (20 mg total) by mouth daily with breakfast      Facility-Administered Medications: None       Past Medical History:   Diagnosis Date    Blue toe syndrome (HCC)     Chronic kidney disease     Colon polyps     GERD (gastroesophageal reflux disease)     Hematuria     History of rheumatic fever     Hyperlipidemia     Hypotension     Ischemic cardiomyopathy     PAD (peripheral artery disease) (HCC)     Pulmonary emphysema (HCC)        Past Surgical History:   Procedure Laterality Date    COLONOSCOPY  2019    HERNIA REPAIR      IR ABDOMINAL ANGIOGRAPHY / INTERVENTION  6/27/2019    POPLITEAL ARTERY STENT Right     6/2019    [place] SLCTV CATHJ 3RD+ ORD SLCTV ABDL PEL/LXTR [facility] Right 6/27/2019    Procedure: leg ANGIOGRAM WITH STENT, BALLOON ANGIOPLASTY, LEFT GROIN ACCESS;  Surgeon: Pal Garcia MD;  [facility];  Service: Vascular       Family History   Problem Relation Age of Onset    Heart disease Mother     Hyperlipidemia Mother     Hypertension Father     Heart disease Father     Stroke Father     Hyperlipidemia Father     Diabetes Brother     Dementia Neg Hx     Drug abuse Neg Hx     Mental illness Neg Hx     Substance Abuse Neg Hx     Alcohol abuse Neg Hx     Depression Neg Hx      I have reviewed and agree with the history as documented      Social History     Tobacco Use    Smoking status: Current Some Day Smoker     Packs/day: 0 25     Years: 30 00     Pack years: 7 50     Types: Cigarettes    Smokeless tobacco: Never Used    Tobacco comment: " I will do it on my own"   Substance Use Topics    Alcohol use: Yes     Frequency: Monthly or less     Drinks per session: 1 or 2     Binge frequency: Less than monthly     Comment: occasional  Drug use: No        Review of Systems   Constitutional: Negative for appetite change, chills, diaphoresis, fatigue and fever  HENT: Negative for congestion, rhinorrhea and sore throat  Respiratory: Negative for apnea, cough, choking, chest tightness, shortness of breath, wheezing and stridor  Cardiovascular: Negative for chest pain, palpitations and leg swelling  Gastrointestinal: Positive for abdominal distention, abdominal pain, diarrhea and nausea  Negative for constipation and vomiting  Genitourinary: Negative for dysuria and hematuria  Musculoskeletal: Positive for arthralgias (right leg pain  )  Negative for back pain, gait problem, neck pain and neck stiffness  Skin: Negative for pallor, rash and wound  Neurological: Negative for dizziness, light-headedness and headaches  Psychiatric/Behavioral: Negative for behavioral problems and confusion  All other systems reviewed and are negative  Physical Exam  ED Triage Vitals   Temperature Pulse Respirations Blood Pressure SpO2   01/13/20 0933 01/13/20 0931 01/13/20 0931 01/13/20 0931 01/13/20 1025   97 5 °F (36 4 °C) (!) 116 20 125/80 95 %      Temp Source Heart Rate Source Patient Position - Orthostatic VS BP Location FiO2 (%)   01/13/20 0933 01/13/20 0931 01/13/20 0931 01/13/20 0931 --   Tympanic Monitor Lying Left arm       Pain Score       01/13/20 0931       4             Orthostatic Vital Signs  Vitals:    01/13/20 1429 01/13/20 1432 01/13/20 1445 01/13/20 1615   BP: 96/63  121/73 103/76   Pulse: 98 97 92 96   Patient Position - Orthostatic VS:   Lying        Physical Exam   Constitutional: He is oriented to person, place, and time  He appears well-developed and well-nourished  No distress  HENT:   Head: Normocephalic and atraumatic  Eyes: Pupils are equal, round, and reactive to light  Conjunctivae and EOM are normal  No scleral icterus  Neck: Normal range of motion  Neck supple     Cardiovascular: Normal rate, regular rhythm and normal heart sounds  Exam reveals no gallop and no friction rub  No murmur heard  Right: Palpable PT pulses confirmed by US/Doppler  Left:  Palpable DP/PT pulses  Bilateral lower extremities cool  No cyanosis   Pulmonary/Chest: Effort normal and breath sounds normal  No stridor  No respiratory distress  He has no wheezes  He has no rales  He exhibits no tenderness  Abdominal: Soft  Bowel sounds are normal  He exhibits distension  He exhibits no mass  There is tenderness (Diffuse lower abdominal tenderness to palpation)  There is no rebound and no guarding  Musculoskeletal: Normal range of motion  He exhibits tenderness  He exhibits no edema or deformity  No tenderness to right calf  No edema   Neurological: He is alert and oriented to person, place, and time  He displays normal reflexes  No cranial nerve deficit or sensory deficit  He exhibits normal muscle tone  Coordination normal    Skin: Skin is warm and dry  No rash noted  He is not diaphoretic  Psychiatric: He has a normal mood and affect  His behavior is normal    Nursing note and vitals reviewed        ED Medications  Medications   heparin (porcine) 25,000 units in 250 mL infusion (premix) (18 Units/kg/hr × 75 kg (Order-Specific) Intravenous New Bag 1/13/20 1214)   heparin (porcine) injection 6,000 Units (has no administration in time range)   heparin (porcine) injection 3,000 Units (has no administration in time range)   multi-electrolyte (PLASMALYTE-A/ISOLYTE-S PH 7 4) IV solution (100 mL/hr Intravenous New Bag 1/13/20 1509)   aspirin chewable tablet 81 mg (81 mg Oral Not Given 1/13/20 1511)   sodium chloride 0 9 % bolus 500 mL (0 mL Intravenous Stopped 1/13/20 1159)   sodium chloride 0 9 % bolus 500 mL (0 mL Intravenous Stopped 1/13/20 1431)   heparin (porcine) injection 6,000 Units (6,000 Units Intravenous Given 1/13/20 1214)   sodium chloride 0 9 % bolus 1,000 mL (0 mL Intravenous Stopped 1/13/20 1631)       Diagnostic Studies  Results Reviewed     Procedure Component Value Units Date/Time    APTT [[id number]]     Lab Status:  No result Specimen:  Blood     APTT [[id number]]  (Normal) Collected:  01/13/20 1204    Lab Status:  Final result Specimen:  Blood from Arm, Right Updated:  01/13/20 1248     PTT 34 seconds     Protime-INR [[id number]]  (Abnormal) Collected:  01/13/20 1204    Lab Status:  Final result Specimen:  Blood from Arm, Right Updated:  01/13/20 1248     Protime 20 9 seconds      INR 1 85    Urinalysis with microscopic [[id number]]     Lab Status:  No result Specimen:  Urine     APTT [[id number]]     Lab Status:  No result Specimen:  Blood     Troponin I [[id number]]  (Normal) Collected:  01/13/20 1013    Lab Status:  Final result Specimen:  Blood from Arm, Right Updated:  01/13/20 1045     Troponin I <0 02 ng/mL     Comprehensive metabolic panel [[id number]]  (Abnormal) Collected:  01/13/20 1013    Lab Status:  Final result Specimen:  Blood from Arm, Right Updated:  01/13/20 1043     Sodium 131 mmol/L      Potassium 4 0 mmol/L      Chloride 97 mmol/L      CO2 23 mmol/L      ANION GAP 11 mmol/L      BUN 47 mg/dL      Creatinine 3 99 mg/dL      Glucose 171 mg/dL      Calcium 9 0 mg/dL      AST 24 U/L      ALT 27 U/L      Alkaline Phosphatase 177 U/L      Total Protein 7 8 g/dL      Albumin 2 7 g/dL      Total Bilirubin 0 62 mg/dL      eGFR 14 ml/min/1 73sq m     Narrative:       Rick guidelines for Chronic Kidney Disease (CKD):     Stage 1 with normal or high GFR (GFR > 90 mL/min/1 73 square meters)    Stage 2 Mild CKD (GFR = 60-89 mL/min/1 73 square meters)    Stage 3A Moderate CKD (GFR = 45-59 mL/min/1 73 square meters)    Stage 3B Moderate CKD (GFR = 30-44 mL/min/1 73 square meters)    Stage 4 Severe CKD (GFR = 15-29 mL/min/1 73 square meters)    Stage 5 End Stage CKD (GFR <15 mL/min/1 73 square meters)  Note: GFR calculation is accurate only with a steady state creatinine    Lipase [[id number]]  (Normal) Collected:  01/13/20 1013    Lab Status:  Final result Specimen:  Blood from Arm, Right Updated:  01/13/20 1043     Lipase 93 u/L     Magnesium [[id number]]  (Normal) Collected:  01/13/20 1013    Lab Status:  Final result Specimen:  Blood from Arm, Right Updated:  01/13/20 1043     Magnesium 2 1 mg/dL     CBC and differential [[id number]]  (Abnormal) Collected:  01/13/20 1013    Lab Status:  Final result Specimen:  Blood from Arm, Right Updated:  01/13/20 1023     WBC 14 17 Thousand/uL      RBC 5 61 Million/uL      Hemoglobin 16 2 g/dL      Hematocrit 49 5 %      MCV 88 fL      MCH 28 9 pg      MCHC 32 7 g/dL      RDW 14 4 %      MPV 9 7 fL      Platelets 108 Thousands/uL      nRBC 0 /100 WBCs      Neutrophils Relative 74 %      Immat GRANS % 1 %      Lymphocytes Relative 14 %      Monocytes Relative 11 %      Eosinophils Relative 0 %      Basophils Relative 0 %      Neutrophils Absolute 10 42 Thousands/µL      Immature Grans Absolute 0 10 Thousand/uL      Lymphocytes Absolute 2 04 Thousands/µL      Monocytes Absolute 1 56 Thousand/µL      Eosinophils Absolute 0 00 Thousand/µL      Basophils Absolute 0 05 Thousands/µL     POCT urinalysis dipstick [[id number]]     Lab Status:  No result Specimen:  Urine                  CT abdomen pelvis wo contrast   Final Result by Young Cast MD (01/13 1416)      1  Findings consistent with a small bowel obstruction with a transition point at a long segment of distal ileitis  There is also a separate short segment of upstream ileitis ("skip lesion") with luminal narrowing  There is intramural fat at the    terminal ileum suggesting chronic/old inflammation and further abrupt decompression of the terminal ileum, raising the possibility of fibrostenosing disease/fibrotic stricture  Overall, findings are concerning for Crohn's disease  No evidence of    penetrating disease  2   Bilateral nonobstructing nephrolithiasis        3   Stable 3 2 x 2 6 cm infrarenal abdominal aortic aneurysm  I personally discussed this study with Dennykayy Rashid on 1/13/2020 at 1:55 PM       Workstation performed: [id number]         XR chest 2 views   ED Interpretation by Benny Locke MD (01/13 1255)   Widened mediastinum seen on prior studies      Final Result by Cortney Malone MD (01/13 1416)      No acute cardiopulmonary disease  Workstation performed: [id number]         VAS lower limb arterial duplex, complete bilateral    (Results Pending)         Procedures  Procedures      ED Course  ED Course as of Jan 13 1708   Mon Jan 13, 2020   1027 WBC(!): 14 17   1045 Creatinine(!): 3 99           Identification of Seniors at Risk      Most Recent Value   (ISAR) Identification of Seniors at Risk   Before the illness or injury that brought you to the Emergency, did you need someone to help you on a regular basis? 0 Filed at: 01/13/2020 0933   In the last 24 hours, have you needed more help than usual?  0 Filed at: 01/13/2020 6659   Have you been hospitalized for one or more nights during the past 6 months? 0 Filed at: 01/13/2020 0933   In general, do you see well?  0 Filed at: 01/13/2020 0933   In general, do you have serious problems with your memory? 0 Filed at: 01/13/2020 1174   Do you take more than three different medications every day?  0 Filed at: 01/13/2020 7152   ISAR Score  0 Filed at: 01/13/2020 5678                          MDM  Number of Diagnoses or Management Options  ARF (acute renal failure) ([facility] [address] ): new and requires workup  PAD (peripheral artery disease) ([facility] [address] ): new and requires workup  Right leg pain: new and requires workup  SBO (small bowel obstruction) ([facility] [address] ): new and requires workup  Diagnosis management comments: 21-year-old man with a past medical history of peripheral arterial disease presents with right leg pain  Pain is worse with ambulation relieved with rest   Consistent with claudication  Patient has weak distal pulses  Unable to find a DP pulse is using Doppler  PT palpated and confirmed with ultrasound  Will order arterial duplex in consult vascular in the emergency department  Patient also having a few weeks of abdominal pain and diarrhea  Will check labs, urinalysis  Patient evaluated by vascular surgery and started on heparin drip per their request   Patient script creatinine found to be 3 9 up from a normal baseline  Will check CT abdomen pelvis given patient's symptoms and abdominal distention on exam     CT shows small bowel obstruction verses ileal stricture consistent with possible Crohn's disease will consult General surgery  Patient given IV fluids  5:06 PM  Patient evaluated by General surgery  They are not concerned that patient has a need for surgery at this time  They will follow along with serial abdominal exams  Given patient's significant renal failure, heparin drip will admit to slim as level 2 step-down         Amount and/or Complexity of Data Reviewed  Clinical lab tests: ordered and reviewed  Tests in the radiology section of CPT®: ordered and reviewed  Decide to obtain previous medical records or to obtain history from someone other than the patient: yes  Obtain history from someone other than the patient: yes  Review and summarize past medical records: yes  Discuss the patient with other providers: yes  Independent visualization of images, tracings, or specimens: yes    Risk of Complications, Morbidity, and/or Mortality  Presenting problems: high  Diagnostic procedures: moderate  Management options: moderate    Patient Progress  Patient progress: stable        Disposition  Final diagnoses:   PAD (peripheral artery disease) ([facility] [address] )   SBO (small bowel obstruction) ([facility] [address] )   Right leg pain   ARF (acute renal failure) ([facility] [address] )     Time reflects when diagnosis was documented in both MDM as applicable and the Disposition within this note     Time User Action Codes Description Comment    1/13/2020 11:08 AM Octaviano Edmondson Add [I73 9] PAD (peripheral artery disease) ([facility] )     1/13/2020 11:08 AM Citlaly Doan Modify [I73 9] PAD (peripheral artery disease) ([facility] )     1/13/2020 11:46 AM Katie Ciara Add [N18 3] Chronic kidney disease, stage III (moderate) ([place] [address] )     1/13/2020  2:42 PM Octaviano Edmondson Add [K56 609] SBO (small bowel obstruction) ([place] [address] )     1/13/2020  4:59 PM Octaviano Edmondson Add [T43 573] Right leg pain     1/13/2020  4:59 PM Octaviano Edmondson Add [N17 9] ARF (acute renal failure) [facility])       ED Disposition     ED Disposition Condition Date/Time Comment    Admit Stable Mon Jan 13, 2020  4:59 PM Case was discussed with NUNU and the patient's admission status was agreed to be Admission Status: inpatient status to the service of Dr Bernadette Huff  Follow-up Information    None         Patient's Medications   Discharge Prescriptions    No medications on file     No discharge procedures on file  ED Provider  Attending physically available and evaluated Aida Montgomery I managed the patient along with the ED Attending      Electronically Signed by         Mynor Jauregui MD  01/13/20 5769

## 2020-01-13 NOTE — CONSULTS
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   3  Hyponatremia: suspect from hypovolemia and will monitor response to IVF  Further work up if no improvement with IVF   4  PVD: prior popliteal stent occluded per discussion with vascular   · Will need agram/lysis but attempting to wait until renal function improves  5  Diarrhea: recent CT showed possible bowel obstruction vs infection  Getting repeat CT now   6  Bilateral adrenal adenomas: follows with surgical oncology     HISTORY OF PRESENT ILLNESS:  Requesting Physician: Joshua Anderson MD  Reason for Consult:  Acute kidney injury    Scott Chen is a 77y o  year old male who was admitted to [facility] with concern for ischemic leg  Patient has a history of peripheral vascular disease with popliteal artery stent placed June 2019 and called vascular this morning with complaints that his right leg was ice cold and painful  There was concern for ischemic leg so he was instructed to go to the emergency room  On arrival he was found to have a creatinine of 3 99 so Nephrology was consulted  Patient does have a history of CKD and follows with Dr Colten Albrecht in our office  He reports having significant watery diarrhea and not being able to eat anything since about 1/8  He has had occasional dry heaves  He is still urinating normally  No chest pain or shortness of Breath        PAST MEDICAL HISTORY:  Past Medical History: Diagnosis Date    Blue toe syndrome (HCC)     Chronic kidney disease     Colon polyps     GERD (gastroesophageal reflux disease)     Hematuria     History of rheumatic fever     Hyperlipidemia     Hypotension     Ischemic cardiomyopathy     PAD (peripheral artery disease) (HCC)     Pulmonary emphysema (HCC)        PAST SURGICAL HISTORY:  Past Surgical History:   Procedure Laterality Date    COLONOSCOPY  2019    HERNIA REPAIR      IR ABDOMINAL ANGIOGRAPHY / INTERVENTION  6/27/2019    POPLITEAL ARTERY STENT Right     6/2019    [place] SLCTV CATHJ 3RD+ ORD SLCTV ABDL PEL/LXTR [facility] Right 6/27/2019    Procedure: leg ANGIOGRAM WITH STENT, BALLOON ANGIOPLASTY, LEFT GROIN ACCESS;  Surgeon: Lara Pete MD;  Location: [facility];  Service: Vascular       ALLERGIES:  No Known Allergies    SOCIAL HISTORY:  Social History     Substance and Sexual Activity   Alcohol Use Yes    Frequency: Monthly or less    Drinks per session: 1 or 2    Binge frequency: Less than monthly    Comment: occasional     Social History     Substance and Sexual Activity   Drug Use No     Social History     Tobacco Use   Smoking Status Current Some Day Smoker    Packs/day: 0 25    Years: 30 00    Pack years: 7 50    Types: Cigarettes   Smokeless Tobacco Never Used   Tobacco Comment    " I will do it on my own"       FAMILY HISTORY:  Family History   Problem Relation Age of Onset    Heart disease Mother     Hyperlipidemia Mother     Hypertension Father     Heart disease Father     Stroke Father     Hyperlipidemia Father     Diabetes Brother     Dementia Neg Hx     Drug abuse Neg Hx     Mental illness Neg Hx     Substance Abuse Neg Hx     Alcohol abuse Neg Hx     Depression Neg Hx        MEDICATIONS:  Scheduled Meds:  Current Facility-Administered Medications:  heparin (porcine) 3-30 Units/kg/hr (Order-Specific) Intravenous Titrated Meaghan Oliveros PA-C    heparin (porcine) 3,000 Units Intravenous PRN Raoul Landry PA-C heparin (porcine) 6,000 Units Intravenous PRN Meaghan Oliveros PA-C    sodium chloride 500 mL Intravenous Once Susan Prasad MD Last Rate: 500 mL (01/13/20 1200)       PRN Meds: heparin (porcine)    heparin (porcine)    Continuous Infusions:  heparin (porcine) 3-30 Units/kg/hr (Order-Specific)       REVIEW OF SYSTEMS:  A complete review of systems was done  Pertinent positives and negatives noted in the HPI but otherwise the review of systems is negative      PHYSICAL EXAM:  Current Weight: Weight - Scale: 77 1 kg (170 lb)  First Weight: Weight - Scale: 77 1 kg (170 lb)  Vitals:    01/13/20 1025   BP: 94/66   Pulse: 96   Resp: 18   Temp:    SpO2: 95%       Intake/Output Summary (Last 24 hours) at 1/13/2020 1214  Last data filed at 1/13/2020 1159  Gross per 24 hour   Intake 500 ml   Output    Net 500 ml     General:  No acute distress  Skin:  No rash  Eyes:  Sclerae anicteric  ENT:  Moist mucous membranes  Neck:  Trachea midline with no JVD  Chest:  Clear to auscultation bilaterally  CVS:  Regular rate and rhythm  Abdomen:  Soft, nontender, nondistended  Extremities:  No edema  Neuro:  Awake and alert  Psych:  Appropriate affect    Lab Results:   Results from last 7 days   Lab Units 01/13/20  1013   WBC Thousand/uL 14 17*   HEMOGLOBIN g/dL 16 2   HEMATOCRIT % 49 5*   PLATELETS Thousands/uL 685*   SODIUM mmol/L 131*   POTASSIUM mmol/L 4 0   CHLORIDE mmol/L 97*   CO2 mmol/L 23   BUN mg/dL 47*   CREATININE mg/dL 3 99*   CALCIUM mg/dL 9 0   MAGNESIUM mg/dL 2 1       Radiology Results:   XR chest 2 views    (Results Pending)   CT abdomen pelvis wo contrast    (Results Pending)   VAS lower limb arterial duplex, complete bilateral    (Results Pending)

## 2020-01-13 NOTE — TELEPHONE ENCOUNTER
Received call from pt because he states his R leg is "ice cold", the foot is numb, and he is having claudication symptoms since Thursday  Pt has had a popliteal artery stent placed 6/27/19  Instructed pt to go straight to [facility] to be evaluated  Pt verbalized understanding

## 2020-01-14 ENCOUNTER — TELEPHONE (OUTPATIENT)
Dept: NEPHROLOGY | Facility: CLINIC | Age: 70
End: 2020-01-14

## 2020-01-14 PROBLEM — R65.10 SIRS (SYSTEMIC INFLAMMATORY RESPONSE SYNDROME) (HCC): Status: ACTIVE | Noted: 2020-01-14

## 2020-01-14 LAB
ALBUMIN SERPL BCP-MCNC: 2.1 G/DL (ref 3.5–5)
ALP SERPL-CCNC: 126 U/L (ref 46–116)
ALT SERPL W P-5'-P-CCNC: 18 U/L (ref 12–78)
ANION GAP SERPL CALCULATED.3IONS-SCNC: 9 MMOL/L (ref 4–13)
ANISOCYTOSIS BLD QL SMEAR: PRESENT
APTT PPP: 54 SECONDS (ref 23–37)
APTT PPP: 62 SECONDS (ref 23–37)
AST SERPL W P-5'-P-CCNC: 14 U/L (ref 5–45)
ATRIAL RATE: 138 BPM
BACTERIA UR QL AUTO: ABNORMAL /HPF
BASOPHILS # BLD MANUAL: 0 THOUSAND/UL (ref 0–0.1)
BASOPHILS NFR MAR MANUAL: 0 % (ref 0–1)
BILIRUB SERPL-MCNC: 0.53 MG/DL (ref 0.2–1)
BILIRUB UR QL STRIP: ABNORMAL
BUN SERPL-MCNC: 48 MG/DL (ref 5–25)
BURR CELLS BLD QL SMEAR: PRESENT
CALCIUM SERPL-MCNC: 7.6 MG/DL (ref 8.3–10.1)
CHLORIDE SERPL-SCNC: 105 MMOL/L (ref 100–108)
CLARITY UR: CLEAR
CO2 SERPL-SCNC: 23 MMOL/L (ref 21–32)
COLOR UR: YELLOW
CREAT SERPL-MCNC: 2.25 MG/DL (ref 0.6–1.3)
CRP SERPL QL: 88.6 MG/L
EOSINOPHIL # BLD MANUAL: 0 THOUSAND/UL (ref 0–0.4)
EOSINOPHIL NFR BLD MANUAL: 0 % (ref 0–6)
ERYTHROCYTE [DISTWIDTH] IN BLOOD BY AUTOMATED COUNT: 14.4 % (ref 11.6–15.1)
GFR SERPL CREATININE-BSD FRML MDRD: 29 ML/MIN/1.73SQ M
GLUCOSE SERPL-MCNC: 117 MG/DL (ref 65–140)
GLUCOSE UR STRIP-MCNC: NEGATIVE MG/DL
HCT VFR BLD AUTO: 41 % (ref 36.5–49.3)
HGB BLD-MCNC: 13.4 G/DL (ref 12–17)
HGB UR QL STRIP.AUTO: NEGATIVE
HYALINE CASTS #/AREA URNS LPF: ABNORMAL /LPF
INR PPP: 1.79 (ref 0.84–1.19)
KETONES UR STRIP-MCNC: ABNORMAL MG/DL
LEUKOCYTE ESTERASE UR QL STRIP: NEGATIVE
LYMPHOCYTES # BLD AUTO: 2.1 THOUSAND/UL (ref 0.6–4.47)
LYMPHOCYTES # BLD AUTO: 23 % (ref 14–44)
MCH RBC QN AUTO: 28.5 PG (ref 26.8–34.3)
MCHC RBC AUTO-ENTMCNC: 32.7 G/DL (ref 31.4–37.4)
MCV RBC AUTO: 87 FL (ref 82–98)
MICROCYTES BLD QL AUTO: PRESENT
MONOCYTES # BLD AUTO: 1.01 THOUSAND/UL (ref 0–1.22)
MONOCYTES NFR BLD: 11 % (ref 4–12)
NEUTROPHILS # BLD MANUAL: 5.67 THOUSAND/UL (ref 1.85–7.62)
NEUTS BAND NFR BLD MANUAL: 13 % (ref 0–8)
NEUTS SEG NFR BLD AUTO: 49 % (ref 43–75)
NITRITE UR QL STRIP: NEGATIVE
NON-SQ EPI CELLS URNS QL MICRO: ABNORMAL /HPF
NRBC BLD AUTO-RTO: 0 /100 WBCS
P AXIS: 110 DEGREES
PH UR STRIP.AUTO: 5 [PH]
PLATELET # BLD AUTO: 516 THOUSANDS/UL (ref 149–390)
PLATELET BLD QL SMEAR: ABNORMAL
PMV BLD AUTO: 9.3 FL (ref 8.9–12.7)
POIKILOCYTOSIS BLD QL SMEAR: PRESENT
POLYCHROMASIA BLD QL SMEAR: PRESENT
POTASSIUM SERPL-SCNC: 3.7 MMOL/L (ref 3.5–5.3)
PROT SERPL-MCNC: 6 G/DL (ref 6.4–8.2)
PROT UR STRIP-MCNC: ABNORMAL MG/DL
PROTHROMBIN TIME: 20.3 SECONDS (ref 11.6–14.5)
QRS AXIS: -61 DEGREES
QRSD INTERVAL: 132 MS
QT INTERVAL: 382 MS
QTC INTERVAL: 500 MS
RBC # BLD AUTO: 4.71 MILLION/UL (ref 3.88–5.62)
RBC #/AREA URNS AUTO: ABNORMAL /HPF
RBC MORPH BLD: PRESENT
SODIUM SERPL-SCNC: 137 MMOL/L (ref 136–145)
SP GR UR STRIP.AUTO: 1.02 (ref 1–1.03)
T WAVE AXIS: 50 DEGREES
UROBILINOGEN UR QL STRIP.AUTO: 0.2 E.U./DL
VARIANT LYMPHS # BLD AUTO: 4 %
VENTRICULAR RATE: 103 BPM
WBC # BLD AUTO: 9.14 THOUSAND/UL (ref 4.31–10.16)
WBC #/AREA URNS AUTO: ABNORMAL /HPF

## 2020-01-14 PROCEDURE — 86140 C-REACTIVE PROTEIN: CPT | Performed by: INTERNAL MEDICINE

## 2020-01-14 PROCEDURE — 83993 ASSAY FOR CALPROTECTIN FECAL: CPT | Performed by: INTERNAL MEDICINE

## 2020-01-14 PROCEDURE — 85027 COMPLETE CBC AUTOMATED: CPT | Performed by: NURSE PRACTITIONER

## 2020-01-14 PROCEDURE — 85007 BL SMEAR W/DIFF WBC COUNT: CPT | Performed by: NURSE PRACTITIONER

## 2020-01-14 PROCEDURE — 99232 SBSQ HOSP IP/OBS MODERATE 35: CPT | Performed by: INTERNAL MEDICINE

## 2020-01-14 PROCEDURE — 80053 COMPREHEN METABOLIC PANEL: CPT | Performed by: NURSE PRACTITIONER

## 2020-01-14 PROCEDURE — 93010 ELECTROCARDIOGRAM REPORT: CPT | Performed by: INTERNAL MEDICINE

## 2020-01-14 PROCEDURE — 85730 THROMBOPLASTIN TIME PARTIAL: CPT | Performed by: PHYSICIAN ASSISTANT

## 2020-01-14 PROCEDURE — 85610 PROTHROMBIN TIME: CPT | Performed by: NURSE PRACTITIONER

## 2020-01-14 PROCEDURE — 85730 THROMBOPLASTIN TIME PARTIAL: CPT | Performed by: HOSPITALIST

## 2020-01-14 PROCEDURE — 81001 URINALYSIS AUTO W/SCOPE: CPT | Performed by: PHYSICIAN ASSISTANT

## 2020-01-14 PROCEDURE — 99223 1ST HOSP IP/OBS HIGH 75: CPT | Performed by: INTERNAL MEDICINE

## 2020-01-14 PROCEDURE — 99232 SBSQ HOSP IP/OBS MODERATE 35: CPT | Performed by: SURGERY

## 2020-01-14 RX ORDER — PANTOPRAZOLE SODIUM 40 MG/1
40 TABLET, DELAYED RELEASE ORAL
Status: DISCONTINUED | OUTPATIENT
Start: 2020-01-14 | End: 2020-01-21 | Stop reason: HOSPADM

## 2020-01-14 RX ORDER — ATORVASTATIN CALCIUM 20 MG/1
20 TABLET, FILM COATED ORAL
Status: DISCONTINUED | OUTPATIENT
Start: 2020-01-14 | End: 2020-01-21 | Stop reason: HOSPADM

## 2020-01-14 RX ORDER — ONDANSETRON 2 MG/ML
4 INJECTION INTRAMUSCULAR; INTRAVENOUS EVERY 6 HOURS PRN
Status: DISCONTINUED | OUTPATIENT
Start: 2020-01-14 | End: 2020-01-21 | Stop reason: HOSPADM

## 2020-01-14 RX ORDER — ACETAMINOPHEN 325 MG/1
650 TABLET ORAL EVERY 6 HOURS PRN
Status: DISCONTINUED | OUTPATIENT
Start: 2020-01-14 | End: 2020-01-21 | Stop reason: HOSPADM

## 2020-01-14 RX ORDER — MELATONIN
2000 DAILY
Status: DISCONTINUED | OUTPATIENT
Start: 2020-01-14 | End: 2020-01-21 | Stop reason: HOSPADM

## 2020-01-14 RX ADMIN — POLYETHYLENE GLYCOL 3350, SODIUM SULFATE ANHYDROUS, SODIUM BICARBONATE, SODIUM CHLORIDE, POTASSIUM CHLORIDE 4000 ML: 236; 22.74; 6.74; 5.86; 2.97 POWDER, FOR SOLUTION ORAL at 15:00

## 2020-01-14 RX ADMIN — ASPIRIN 81 MG 81 MG: 81 TABLET ORAL at 08:28

## 2020-01-14 RX ADMIN — MELATONIN 2000 UNITS: at 08:28

## 2020-01-14 RX ADMIN — PANTOPRAZOLE SODIUM 40 MG: 40 TABLET, DELAYED RELEASE ORAL at 05:44

## 2020-01-14 RX ADMIN — HEPARIN SODIUM AND DEXTROSE 18 UNITS/KG/HR: 10000; 5 INJECTION INTRAVENOUS at 05:46

## 2020-01-14 RX ADMIN — BISACODYL 20 MG: 5 TABLET, DELAYED RELEASE ORAL at 13:20

## 2020-01-14 RX ADMIN — HEPARIN SODIUM 3000 UNITS: 1000 INJECTION INTRAVENOUS; SUBCUTANEOUS at 19:32

## 2020-01-14 RX ADMIN — SODIUM CHLORIDE, SODIUM GLUCONATE, SODIUM ACETATE, POTASSIUM CHLORIDE, MAGNESIUM CHLORIDE, SODIUM PHOSPHATE, DIBASIC, AND POTASSIUM PHOSPHATE 100 ML/HR: .53; .5; .37; .037; .03; .012; .00082 INJECTION, SOLUTION INTRAVENOUS at 05:44

## 2020-01-14 NOTE — PROGRESS NOTES
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 s/p right popliteal stent placement 6/27/19 and afib on xarelto, presents with acute right leg limb ischemia  Plan:  1  RLE limb ischemia, probable 2/2 thrombosis of popliteal stent  - motor and sensory intact  - heparin gtt, hold xarelto  - asa and statin  - eventual RLE arteriogram with popliteal lysis once medically optimized   - Cr improving, down to 2 25 today, from 3 99 1/13  - c/w IVF   - neurovasc monitoring  - reverse trendelenberg  - gen surg following for SBO  - f/u GI      Subjective/Objective   Chief Complaint: no acute complaints this am    Subjective: Patient interviewed in room, lying in bed, offered no acute complaints today  Denies any foot or leg pain  Denies fevers, chills, CP, sob  Overnight, vascular resident on call was called because nurse could not find R pedal pulse on doppler  Resident examined pt and found the physical exam to be equal to the one done earlier that day during the vascular consult  Objective:     Blood pressure 107/69, pulse 66, temperature 98 3 °F (36 8 °C), resp  rate 16, height 5' 10" (1 778 m), weight 78 7 kg (173 lb 9 6 oz), SpO2 95 %  ,Body mass index is 24 91 kg/m²  Intake/Output Summary (Last 24 hours) at 1/14/2020 0626  Last data filed at 1/14/2020 0546  Gross per 24 hour   Intake 3681 66 ml   Output 663 ml   Net 3018 66 ml       Invasive Devices     Peripheral Intravenous Line            Peripheral IV 01/13/20 Right Antecubital less than 1 day    Peripheral IV 01/13/20 Right Forearm less than 1 day                Physical Exam   Constitutional: He is oriented to person, place, and time  He appears well-developed and well-nourished  No distress  HENT:   Head: Normocephalic and atraumatic  Eyes: EOM are normal    Neck: Neck supple     Cardiovascular: Normal rate, regular rhythm and normal heart sounds  Exam reveals no gallop and no friction rub  No murmur heard  Pulmonary/Chest: Effort normal and breath sounds normal  No stridor  No respiratory distress  He has no wheezes  He has no rales  Neurological: He is alert and oriented to person, place, and time  Skin: Skin is warm and dry  He is not diaphoretic  RLE and LLE warm and dry  Motor and sensations intact b/l LE  Psychiatric: He has a normal mood and affect  His behavior is normal    Nursing note and vitals reviewed  Pulses:  R and L Radial: 2+ b/l  RLE: DP not audible on doppler  PT monophasic doppler signal  Popliteal monophasic venous doppler signal Femoral 2+ palpable  LLE: DP 2+  PT doppler signals  Popliteal 2+   Femoral 2+    Scheduled Meds:  Current Facility-Administered Medications:  acetaminophen 650 mg Oral Q6H PRN Beena Hunt MD    aspirin 81 mg Oral Daily Viktoriya Vogel MD    atorvastatin 20 mg Oral Daily With Sage Gomes MD    cholecalciferol 2,000 Units Oral Daily Beena Hunt MD    heparin (porcine) 3-30 Units/kg/hr (Order-Specific) Intravenous Titrated Beena Hunt MD Last Rate: 18 Units/kg/hr (01/14/20 0546)   heparin (porcine) 3,000 Units Intravenous PRN Beena Hunt MD    heparin (porcine) 6,000 Units Intravenous PRN Viktoriya Vogle MD    metoprolol 5 mg Intravenous Q6H PRN Viktoriya Vogel MD    multi-electrolyte 100 mL/hr Intravenous Continuous Beena Hunt MD Last Rate: 100 mL/hr (01/14/20 0544)   ondansetron 4 mg Intravenous Q6H PRN Viktoriya Vogel MD    pantoprazole 40 mg Oral Early Morning Viktoriya Vogel MD    phenol 1 spray Mouth/Throat Q2H PRN LENORA Genao      Continuous Infusions:  heparin (porcine) 3-30 Units/kg/hr (Order-Specific) Last Rate: 18 Units/kg/hr (01/14/20 0546)   multi-electrolyte 100 mL/hr Last Rate: 100 mL/hr (01/14/20 0544)     PRN Meds:   acetaminophen    heparin (porcine)    heparin (porcine)   metoprolol    ondansetron    phenol      Lab, Imaging and other studies:  I have personally reviewed pertinent lab results    , CBC:   Lab Results   Component Value Date    WBC 9 14 01/14/2020    HGB 13 4 01/14/2020    HCT 41 0 01/14/2020    MCV 87 01/14/2020     (H) 01/14/2020    MCH 28 5 01/14/2020    MCHC 32 7 01/14/2020    RDW 14 4 01/14/2020    MPV 9 3 01/14/2020    NRBC 0 01/14/2020   , CMP:   Lab Results   Component Value Date    SODIUM 137 01/14/2020    K 3 7 01/14/2020     01/14/2020    CO2 23 01/14/2020    BUN 48 (H) 01/14/2020    CREATININE 2 25 (H) 01/14/2020    CALCIUM 7 6 (L) 01/14/2020    AST 14 01/14/2020    ALT 18 01/14/2020    ALKPHOS 126 (H) 01/14/2020    EGFR 29 01/14/2020   , Coagulation:   Lab Results   Component Value Date    INR 1 79 (H) 01/14/2020   , Urinalysis:   Lab Results   Component Value Date    COLORU Yellow 01/14/2020    CLARITYU Clear 01/14/2020    SPECGRAV 1 022 01/14/2020    PHUR 5 0 01/14/2020    PHUR 5 0 01/13/2020    LEUKOCYTESUR Negative 01/14/2020    NITRITE Negative 01/14/2020    GLUCOSEU Negative 01/14/2020    KETONESU Trace (A) 01/14/2020    BILIRUBINUR Interference- unable to analyze (A) 01/14/2020    BLOODU Negative 01/14/2020   , Amylase: No results found for: AMYLASE, Lipase:   Lab Results   Component Value Date    LIPASE 93 01/13/2020     VTE Pharmacologic Prophylaxis: Heparin  VTE Mechanical Prophylaxis: sequential compression device      Merrell Krabbe, PA-C  1/14/2020 6:26 AM

## 2020-01-14 NOTE — PROGRESS NOTES
Progress Note - General Surgery   Abigail Graft 77 y o  male MRN: [id number]  Unit/Bed#: [facility] 827-01 Encounter: [id number]    Assessment:  77 y o  M who presented with a R popliteal stent occlusion and CT findings of SBO w transition point vs stricture at the distal ileum    AVSS    + bowel function     Plan:  Keep on clears in case GI wants to scope soon  Amy@[email or web address] - d/c when po intake increases   F/u GI consult   Vascular surgery following - continue heparin gtt  Rest of care per primary team      Subjective/Objective     Subjective: No acute events overnight  States his abdominal pain is much improved from yesterday  Patient had 2 BMs overnight, diarrhea  Tolerating clear liquid diet without nausea/vomiting  No fevers, chills  Objective:     Blood pressure 107/69, pulse 66, temperature 98 3 °F (36 8 °C), resp  rate 16, height 5' 10" (1 778 m), weight 78 7 kg (173 lb 9 6 oz), SpO2 95 %  ,Body mass index is 24 91 kg/m²        Intake/Output Summary (Last 24 hours) at 1/14/2020 0550  Last data filed at 1/14/2020 0546  Gross per 24 hour   Intake 3681 66 ml   Output 663 ml   Net 3018 66 ml       Invasive Devices     Peripheral Intravenous Line            Peripheral IV 01/13/20 Right Antecubital less than 1 day    Peripheral IV 01/13/20 Right Forearm less than 1 day                Physical Exam:  NAD, alert and oriented x3  Normocephalic, atraumatic  MMM, EOMI, PERRLA  Norm resp effort on RA  RRR  Abd soft, obese, mild distention, non-tender   No calf tenderness or peripheral edema  Motor/sensation intact in distal extremities  CN grossly intact  -rash/lesions      Lab, Imaging and other studies:  CBC:   Lab Results   Component Value Date    WBC 9 14 01/14/2020    HGB 13 4 01/14/2020    HCT 41 0 01/14/2020    MCV 87 01/14/2020     (H) 01/14/2020    MCH 28 5 01/14/2020    MCHC 32 7 01/14/2020    RDW 14 4 01/14/2020    MPV 9 3 01/14/2020    NRBC 0 01/14/2020   , CMP:   Lab Results   Component Value Date SODIUM 137 01/14/2020    K 3 7 01/14/2020     01/14/2020    CO2 23 01/14/2020    BUN 48 (H) 01/14/2020    CREATININE 2 25 (H) 01/14/2020    CALCIUM 7 6 (L) 01/14/2020    AST 14 01/14/2020    ALT 18 01/14/2020    ALKPHOS 126 (H) 01/14/2020    EGFR 29 01/14/2020     VTE Pharmacologic Prophylaxis: Heparin  VTE Mechanical Prophylaxis: sequential compression device

## 2020-01-14 NOTE — CONSULTS
Consultation - [facility]  Abigail Roach 77 y o  male MRN: [id number]  Unit/Bed#: [facility] 827-01 Encounter: [id number]              Inpatient consult to gastroenterology     Performed by  Pinky Juarez MD     Authorized by Eder Antunez MD              Reason for Consult / Principal Problem:     Diarrhea      ASSESSMENT AND PLAN:      Diarrhea  78-year-old male patient found to have stenosis in the distal TI associated with proximal small-bowel distension  Patient also has diarrhea that has been ongoing for about a year  A prior workup as an outpatient was negative twice for any infectious causes including C diff  Prior sigmoid and rectal biopsies negative  CT of the abdomen shows an inflamed segment of the terminal ileum suspicious for Crohn's disease  Will perform colonoscopy with TI biopsies  Will obtain inflammatory markers  Patient will require CT enterography outpatient versus inpatient  ______________________________________________________________________    HPI:    78-year-old isabelle with past medical history significant for peripheral artery disease, CKD, the patient was admitted to the hospital after presenting with leg pain, during admission he was found to have abnormal CT of the abdomen with small bowel distension and an area of stenosis in the distal TI, after further interrogation the patient disclose ongoing diarrhea for about a year associated with mild weight loss, patient described diarrhea is nonbloody, patient is a current smoker, denies any family history of IBD or colon cancer, patient had a recent colonoscopy and EGD 1 year ago and at that time he was found to have multiple polyps but as per the patient these were benign    REVIEW OF SYSTEMS:    CONSTITUTIONAL: Denies any fever, chills, rigors, mild unintentional weight loss  HEENT: No earache or tinnitus  Denies hearing loss or visual disturbances  CARDIOVASCULAR: No chest pain or palpitations  RESPIRATORY: Denies any cough, hemoptysis, shortness of breath or dyspnea on exertion  GASTROINTESTINAL: As noted in the History of Present Illness  GENITOURINARY: No problems with urination  Denies any hematuria or dysuria  NEUROLOGIC: No dizziness or vertigo, denies headaches  MUSCULOSKELETAL: Denies any muscle or joint pain  SKIN: Denies skin rashes or itching  ENDOCRINE: Denies excessive thirst  Denies intolerance to heat or cold  PSYCHOSOCIAL: Denies depression or anxiety  Denies any recent memory loss         Historical Information   Past Medical History:   Diagnosis Date    Blue toe syndrome (HCC)     Chronic kidney disease     Colon polyps     GERD (gastroesophageal reflux disease)     Hematuria     History of rheumatic fever     Hyperlipidemia     Hypotension     Ischemic cardiomyopathy     PAD (peripheral artery disease) (HCC)     Pulmonary emphysema (HCC)      Past Surgical History:   Procedure Laterality Date    COLONOSCOPY  2019    HERNIA REPAIR      IR ABDOMINAL ANGIOGRAPHY / INTERVENTION  6/27/2019    POPLITEAL ARTERY STENT Right     6/2019    IL SLCTV CATHJ 3RD+ ORD SLCTV ABDL PEL/LXTR [facility] Right 6/27/2019    Procedure: leg ANGIOGRAM WITH STENT, BALLOON ANGIOPLASTY, LEFT GROIN ACCESS;  Surgeon: Collin Gore MD;  Location: [facility];  Service: Vascular     Social History   Social History     Substance and Sexual Activity   Alcohol Use Yes    Frequency: Monthly or less    Drinks per session: 1 or 2    Binge frequency: Less than monthly    Comment: occasional     Social History     Substance and Sexual Activity   Drug Use No     Social History     Tobacco Use   Smoking Status Current Some Day Smoker    Packs/day: 0 25    Years: 30 00    Pack years: 7 50    Types: Cigarettes   Smokeless Tobacco Never Used   Tobacco Comment    " I will do it on my own"     Family History   Problem Relation Age of Onset    Heart disease Mother     Hyperlipidemia Mother    [facility] Hypertension Father     Heart disease Father     Stroke Father     Hyperlipidemia Father     Diabetes Brother     Dementia Neg Hx     Drug abuse Neg Hx     Mental illness Neg Hx     Substance Abuse Neg Hx     Alcohol abuse Neg Hx     Depression Neg Hx        Meds/Allergies     Medications Prior to Admission   Medication    atorvastatin (LIPITOR) 20 mg tablet    Cholecalciferol (VITAMIN D) 2000 units CAPS    dicyclomine (BENTYL) 10 mg capsule    Magnesium 500 MG TABS    metoprolol succinate (TOPROL-XL) 25 mg 24 hr tablet    omeprazole (PriLOSEC) 40 MG capsule    rivaroxaban (XARELTO) 20 mg tablet     Current Facility-Administered Medications   Medication Dose Route Frequency    acetaminophen (TYLENOL) tablet 650 mg  650 mg Oral Q6H PRN    aspirin chewable tablet 81 mg  81 mg Oral Daily    atorvastatin (LIPITOR) tablet 20 mg  20 mg Oral Daily With Dinner    cholecalciferol (VITAMIN D3) tablet 2,000 Units  2,000 Units Oral Daily    heparin (porcine) 25,000 units in 250 mL infusion (premix)  3-30 Units/kg/hr (Order-Specific) Intravenous Titrated    heparin (porcine) injection 3,000 Units  3,000 Units Intravenous PRN    heparin (porcine) injection 6,000 Units  6,000 Units Intravenous PRN    metoprolol (LOPRESSOR) injection 5 mg  5 mg Intravenous Q6H PRN    multi-electrolyte (PLASMALYTE-A/ISOLYTE-S PH 7 4) IV solution  100 mL/hr Intravenous Continuous    ondansetron (ZOFRAN) injection 4 mg  4 mg Intravenous Q6H PRN    pantoprazole (PROTONIX) EC tablet 40 mg  40 mg Oral Early Morning    phenol (CHLORASEPTIC) 1 4 % mucosal liquid 1 spray  1 spray Mouth/Throat Q2H PRN       No Known Allergies        Objective     Blood pressure 106/72, pulse 102, temperature 97 5 °F (36 4 °C), resp  rate 18, height 5' 10" (1 778 m), weight 79 6 kg (175 lb 8 oz), SpO2 95 %  Body mass index is 25 18 kg/m²        Intake/Output Summary (Last 24 hours) at 1/14/2020 1157  Last data filed at 1/14/2020 0800  Gross per 24 hour   Intake 4101 66 ml   Output 663 ml   Net 3438 66 ml         PHYSICAL EXAM:      General Appearance:   Alert, cooperative, no distress   HEENT:   Normocephalic, atraumatic, anicteric  Neck:  Supple, symmetrical, trachea midline   Lungs:   Clear to auscultation bilaterally; no rales, rhonchi or wheezing; respirations unlabored    Heart[de-identified]   Regular rate and rhythm; no murmur, rub, or gallop     Abdomen:   Soft, non-tender, mildly distended abdomen; normal bowel sounds; no masses, no organomegaly    Genitalia:   Deferred    Rectal:   Deferred    Extremities:  No cyanosis, clubbing or edema    Skin:  No jaundice, rashes, or lesions    Lymph nodes:  No palpable cervical lymphadenopathy          Lab Results:   Admission on 01/13/2020   Component Date Value    WBC 01/13/2020 14 17*    RBC 01/13/2020 5 61     Hemoglobin 01/13/2020 16 2     Hematocrit 01/13/2020 49 5*    MCV 01/13/2020 88     MCH 01/13/2020 28 9     MCHC 01/13/2020 32 7     RDW 01/13/2020 14 4     MPV 01/13/2020 9 7     Platelets 34/21/6156 685*    nRBC 01/13/2020 0     Neutrophils Relative 01/13/2020 74     Immat GRANS % 01/13/2020 1     Lymphocytes Relative 01/13/2020 14     Monocytes Relative 01/13/2020 11     Eosinophils Relative 01/13/2020 0     Basophils Relative 01/13/2020 0     Neutrophils Absolute 01/13/2020 10 42*    Immature Grans Absolute 01/13/2020 0 10     Lymphocytes Absolute 01/13/2020 2 04     Monocytes Absolute 01/13/2020 1 56*    Eosinophils Absolute 01/13/2020 0 00     Basophils Absolute 01/13/2020 0 05     Sodium 01/13/2020 131*    Potassium 01/13/2020 4 0     Chloride 01/13/2020 97*    CO2 01/13/2020 23     ANION GAP 01/13/2020 11     BUN 01/13/2020 47*    Creatinine 01/13/2020 3 99*    Glucose 01/13/2020 171*    Calcium 01/13/2020 9 0     AST 01/13/2020 24     ALT 01/13/2020 27     Alkaline Phosphatase 01/13/2020 177*    Total Protein 01/13/2020 7 8     Albumin 01/13/2020 2 7*    Total Bilirubin 01/13/2020 0 62     eGFR 01/13/2020 14     Lipase 01/13/2020 93     Troponin I 01/13/2020 <0 02     Magnesium 01/13/2020 2 1     PTT 01/13/2020 34     Protime 01/13/2020 20 9*    INR 01/13/2020 1 85*    Clarity, UA 01/14/2020 Clear     Color, UA 01/14/2020 Yellow     Specific Gravity, UA 01/14/2020 1 022     pH, UA 01/14/2020 5 0     Glucose, UA 01/14/2020 Negative     Ketones, UA 01/14/2020 Trace*    Blood, UA 01/14/2020 Negative     Protein, UA 01/14/2020 Trace*    Nitrite, UA 01/14/2020 Negative     Bilirubin, UA 01/14/2020 Interference- unable to analyze*    Urobilinogen, UA 01/14/2020 0 2     Leukocytes, UA 01/14/2020 Negative     WBC, UA 01/14/2020 None Seen     RBC, UA 01/14/2020 None Seen     Hyaline Casts, UA 01/14/2020 10-25*    Bacteria, UA 01/14/2020 None Seen     Epithelial Cells 01/14/2020 None Seen     Color, UA 01/13/2020 Abby     Clarity, UA 01/13/2020 Slightly Cloudy     pH, UA 01/13/2020 5 0     Leukocytes, UA 01/13/2020 Negative     Nitrite, UA 01/13/2020 Negative     Protein, UA 01/13/2020 30 (1+)*    Glucose, UA 01/13/2020 Negative     Ketones, UA 01/13/2020 15 (1+)*    Urobilinogen, UA 01/13/2020 0 2     Bilirubin, UA 01/13/2020 Interference- unable to analyze*    Blood, UA 01/13/2020 Negative     Specific Gravity, UA 01/13/2020 1 025     RBC, UA 01/13/2020 2-4*    WBC, UA 01/13/2020 4-10*    Epithelial Cells 01/13/2020 Moderate*    Bacteria, UA 01/13/2020 Innumerable*    Hyaline Casts, UA 01/13/2020 3-5*    MUCUS THREADS 01/13/2020 Moderate*    PTT 01/13/2020 69*    PTT 01/14/2020 62*    WBC 01/14/2020 9 14     RBC 01/14/2020 4 71     Hemoglobin 01/14/2020 13 4     Hematocrit 01/14/2020 41 0     MCV 01/14/2020 87     MCH 01/14/2020 28 5     MCHC 01/14/2020 32 7     RDW 01/14/2020 14 4     MPV 01/14/2020 9 3     Platelets 48/34/4132 516*    nRBC 01/14/2020 0     Sodium 01/14/2020 137     Potassium 01/14/2020 3 7     Chloride 01/14/2020 105     CO2 01/14/2020 23     ANION GAP 01/14/2020 9     BUN 01/14/2020 48*    Creatinine 01/14/2020 2 25*    Glucose 01/14/2020 117     Calcium 01/14/2020 7 6*    AST 01/14/2020 14     ALT 01/14/2020 18     Alkaline Phosphatase 01/14/2020 126*    Total Protein 01/14/2020 6 0*    Albumin 01/14/2020 2 1*    Total Bilirubin 01/14/2020 0 53     eGFR 01/14/2020 29     Protime 01/14/2020 20 3*    INR 01/14/2020 1 79*    Segmented % 01/14/2020 49     Bands % 01/14/2020 13*    Lymphocytes % 01/14/2020 23     Monocytes % 01/14/2020 11     Eosinophils, % 01/14/2020 0     Basophils % 01/14/2020 0     Atypical Lymphocytes % 01/14/2020 4*    Absolute Neutrophils 01/14/2020 5 67     Lymphocytes Absolute 01/14/2020 2 10     Monocytes Absolute 01/14/2020 1 01     Eosinophils Absolute 01/14/2020 0 00     Basophils Absolute 01/14/2020 0 00     RBC Morphology 01/14/2020 Present     Anisocytosis 01/14/2020 Present     [place] Cells 01/14/2020 Present     Microcytes 01/14/2020 Present     Poikilocytes 01/14/2020 Present     Polychromasia 01/14/2020 Present     Platelet Estimate 32/33/2054 Increased*       Imaging Studies: I have personally reviewed pertinent imaging studies

## 2020-01-14 NOTE — NURSING NOTE
Unable to palpate R pedal pulse or hear on doppler  Able to hear R pos  Tibial w/ doppler  Pt denies numbness/tingling, R foot is cool/dry  Spoke w Laura Glez w Vascular who said he will come up to assess pt

## 2020-01-14 NOTE — TELEPHONE ENCOUNTER
I called and left message for patient to call back and schedule February follow up with Dr Delia Fuchs

## 2020-01-14 NOTE — PROGRESS NOTES
NEPHROLOGY PROGRESS NOTE   Tiki Lira 77 y o  male MRN: [id number]  Unit/Bed#: [facility] 827-01 Encounter: [id number]      ASSESSMENT/PLAN:  1  PHUONG, POA: most likely prerenal from diarrhea and poor oral intake as creatinine has rapidly improved with IV fluids and UA shows significant hyaline cast   Also had contrast CT on 1/7  · Creatinine has significantly improved with IV fluids from 3 9 yesterday down to 2 2 today  · UA:  Trace ketones, trace protein, 10-25 hyaline casts  · CT showed bilateral nonobstructing nephrolithiasis  · Continue isolyte at 100cc/h   · Check am BMP   2  CKD III: baseline creatinine 1-1 5 and follows with Dr Brandi Kim in our office   · 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   3  Hyponatremia:  Due to hypovolemia and resolved with IV fluids  4  Right lower extremity limb ischemia:  Suspected thrombosis of popliteal stent  · Planning for right lower extremity angiogram with possible lysis once renal function is stable at baseline  5  Diarrhea:  CT revealed small bowel obstruction  Possible colonoscopy but awaiting GI consult  6  Bilateral adrenal adenomas: follows with surgical oncology         SUBJECTIVE:  Still having significant watery diarrhea  No chest pain, shortness of breath, nausea, vomiting  His leg pain has significantly improved      OBJECTIVE:  Current Weight: Weight - Scale: 79 6 kg (175 lb 8 oz)  Vitals:    01/14/20 0657   BP: 106/72   Pulse: 102   Resp: 18   Temp: 97 5 °F (36 4 °C)   SpO2: 95%       Intake/Output Summary (Last 24 hours) at 1/14/2020 1126  Last data filed at 1/14/2020 0800  Gross per 24 hour   Intake 4101 66 ml   Output 663 ml   Net 3438 66 ml       General:  No acute distress  Skin:  No rash  Eyes:  Sclerae anicteric  ENT:  Moist mucous membranes  Neck:  Trachea midline with no JVD  Chest:  Clear to auscultation bilaterally  CVS:  Regular rate and rhythm  Abdomen:  Soft, nontender, nondistended  Extremities:  No edema  Neuro:  Awake and alert  Psych: Appropriate affect        Medications:  Scheduled Meds:  Current Facility-Administered Medications:  acetaminophen 650 mg Oral Q6H PRN Gwyn Kemp MD    aspirin 81 mg Oral Daily Viktoriya Vogel MD    atorvastatin 20 mg Oral Daily With Deyanira Alexander MD    cholecalciferol 2,000 Units Oral Daily Gwyn Kemp MD    heparin (porcine) 3-30 Units/kg/hr (Order-Specific) Intravenous Titrated Gwyn Kemp MD Last Rate: 18 Units/kg/hr (01/14/20 0546)   heparin (porcine) 3,000 Units Intravenous PRN Gwyn Kemp MD    heparin (porcine) 6,000 Units Intravenous PRN Gwyn Kemp MD    metoprolol 5 mg Intravenous Q6H PRN Gwyn Kemp MD    multi-electrolyte 100 mL/hr Intravenous Continuous Gwyn Kemp MD Last Rate: 100 mL/hr (01/14/20 0544)   ondansetron 4 mg Intravenous Q6H PRN Viktoriya Vogel MD    pantoprazole 40 mg Oral Early Morning Viktoriya Espinosa MD    phenol 1 spray Mouth/Throat Q2H PRN Corinne Roup, CRNP        PRN Meds:   acetaminophen    heparin (porcine)    heparin (porcine)    metoprolol    ondansetron    phenol    Continuous Infusions:  heparin (porcine) 3-30 Units/kg/hr (Order-Specific) Last Rate: 18 Units/kg/hr (01/14/20 0546)   multi-electrolyte 100 mL/hr Last Rate: 100 mL/hr (01/14/20 0544)       Laboratory Results:  Results from last 7 days   Lab Units 01/14/20  0338 01/14/20  0156 01/13/20  1013   WBC Thousand/uL 9 14  --  14 17*   HEMOGLOBIN g/dL 13 4  --  16 2   HEMATOCRIT % 41 0  --  49 5*   PLATELETS Thousands/uL 516*  --  685*   SODIUM mmol/L  --  137 131*   POTASSIUM mmol/L  --  3 7 4 0   CHLORIDE mmol/L  --  105 97*   CO2 mmol/L  --  23 23   BUN mg/dL  --  48* 47*   CREATININE mg/dL  --  2 25* 3 99*   CALCIUM mg/dL  --  7 6* 9 0   MAGNESIUM mg/dL  --   --  2 1

## 2020-01-14 NOTE — NURSING NOTE
No diet ordered, pt with NGT placed in ED  Pt having bowel sounds  Spoke w [id number]  Dr Fabio Bolton not recommending NGT at this time per his note  Order placed to discontinue to NGT and placed pt on clear liquid diet

## 2020-01-14 NOTE — PROGRESS NOTES
Patient unable to tolerate full bowel prep  Competed about 75% and vomited x2 clear liquid  Abd firm and distended with hypoactive bowel sounds  GI made aware  No new orders at this time

## 2020-01-14 NOTE — PROGRESS NOTES
Vascular surgery  Progress    Called concerning absence of signals in RLE  Pt seen and examined  Denied any worsening pain of RLE  Sensory and motor intact  Good cap refill  B/l palpable femoral pulses  RLE with dopplerable popliteal  Absent DP/ AT/ peroneal signals  RLE with dopplerable venous PT  This exam is unchanged from prior documented findings at 12:00 this afternoon by Tin Tsang PA-C  Please contact with questions or concerns  Thank you       Abena So MD  1/13/2020  10:00PM

## 2020-01-14 NOTE — PLAN OF CARE
Problem: PAIN - ADULT  Goal: Verbalizes/displays adequate comfort level or baseline comfort level  Description  Interventions:  - Encourage patient to monitor pain and request assistance  - Assess pain using appropriate pain scale  - Administer analgesics based on type and severity of pain and evaluate response  - Implement non-pharmacological measures as appropriate and evaluate response  - Consider cultural and social influences on pain and pain management  - Notify physician/advanced practitioner if interventions unsuccessful or patient reports new pain  Outcome: Progressing     Problem: SAFETY ADULT  Goal: Patient will remain free of falls  Description  INTERVENTIONS:  - Assess patient frequently for physical needs  -  Identify cognitive and physical deficits and behaviors that affect risk of falls    -  [place] fall precautions as indicated by assessment   - Educate patient/family on patient safety including physical limitations  - Instruct patient to call for assistance with activity based on assessment  - Modify environment to reduce risk of injury  - Consider OT/PT consult to assist with strengthening/mobility  Outcome: Progressing  Goal: Maintain or return to baseline ADL function  Description  INTERVENTIONS:  -  Assess patient's ability to carry out ADLs; assess patient's baseline for ADL function and identify physical deficits which impact ability to perform ADLs (bathing, care of mouth/teeth, toileting, grooming, dressing, etc )  - Assess/evaluate cause of self-care deficits   - Assess range of motion  - Assess patient's mobility; develop plan if impaired  - Assess patient's need for assistive devices and provide as appropriate  - Encourage maximum independence but intervene and supervise when necessary  - Involve family in performance of ADLs  - Assess for home care needs following discharge   - Consider OT consult to assist with ADL evaluation and planning for discharge  - Provide patient education as appropriate  Outcome: Progressing  Goal: Maintain or return mobility status to optimal level  Description  INTERVENTIONS:  - Assess patient's baseline mobility status (ambulation, transfers, stairs, etc )    - Identify cognitive and physical deficits and behaviors that affect mobility  - Identify mobility aids required to assist with transfers and/or ambulation (gait belt, sit-to-stand, lift, walker, cane, etc )  - [place] fall precautions as indicated by assessment  - Record patient progress and toleration of activity level on Mobility SBAR; progress patient to next Phase/Stage  - Instruct patient to call for assistance with activity based on assessment  - Consider rehabilitation consult to assist with strengthening/weightbearing, etc   Outcome: Progressing     Problem: DISCHARGE PLANNING  Goal: Discharge to home or other facility with appropriate resources  Description  INTERVENTIONS:  - Identify barriers to discharge w/patient and caregiver  - Arrange for needed discharge resources and transportation as appropriate  - Identify discharge learning needs (meds, wound care, etc )  - Arrange for interpretive services to assist at discharge as needed  - Refer to Case Management Department for coordinating discharge planning if the patient needs post-hospital services based on physician/advanced practitioner order or complex needs related to functional status, cognitive ability, or social support system  Outcome: Progressing     Problem: Knowledge Deficit  Goal: Patient/family/caregiver demonstrates understanding of disease process, treatment plan, medications, and discharge instructions  Description  Complete learning assessment and assess knowledge base    Interventions:  - Provide teaching at level of understanding  - Provide teaching via preferred learning methods  Outcome: Progressing

## 2020-01-15 ENCOUNTER — APPOINTMENT (INPATIENT)
Dept: GASTROENTEROLOGY | Facility: HOSPITAL | Age: 70
DRG: 314 | End: 2020-01-15
Payer: MEDICARE

## 2020-01-15 ENCOUNTER — ANESTHESIA (INPATIENT)
Dept: GASTROENTEROLOGY | Facility: HOSPITAL | Age: 70
DRG: 314 | End: 2020-01-15
Payer: MEDICARE

## 2020-01-15 ENCOUNTER — ANESTHESIA EVENT (INPATIENT)
Dept: GASTROENTEROLOGY | Facility: HOSPITAL | Age: 70
DRG: 314 | End: 2020-01-15
Payer: MEDICARE

## 2020-01-15 ENCOUNTER — APPOINTMENT (INPATIENT)
Dept: RADIOLOGY | Facility: HOSPITAL | Age: 70
DRG: 314 | End: 2020-01-15
Payer: MEDICARE

## 2020-01-15 PROBLEM — E87.6 HYPOKALEMIA: Status: ACTIVE | Noted: 2020-01-15

## 2020-01-15 LAB
ANION GAP SERPL CALCULATED.3IONS-SCNC: 9 MMOL/L (ref 4–13)
APTT PPP: 114 SECONDS (ref 23–37)
APTT PPP: 57 SECONDS (ref 23–37)
BUN SERPL-MCNC: 25 MG/DL (ref 5–25)
CALCIUM SERPL-MCNC: 7.4 MG/DL (ref 8.3–10.1)
CHLORIDE SERPL-SCNC: 107 MMOL/L (ref 100–108)
CO2 SERPL-SCNC: 25 MMOL/L (ref 21–32)
CREAT SERPL-MCNC: 1.06 MG/DL (ref 0.6–1.3)
ERYTHROCYTE [DISTWIDTH] IN BLOOD BY AUTOMATED COUNT: 14.5 % (ref 11.6–15.1)
GFR SERPL CREATININE-BSD FRML MDRD: 72 ML/MIN/1.73SQ M
GLUCOSE SERPL-MCNC: 100 MG/DL (ref 65–140)
HCT VFR BLD AUTO: 37.3 % (ref 36.5–49.3)
HGB BLD-MCNC: 12.3 G/DL (ref 12–17)
INR PPP: 1.34 (ref 0.84–1.19)
MCH RBC QN AUTO: 28.9 PG (ref 26.8–34.3)
MCHC RBC AUTO-ENTMCNC: 33 G/DL (ref 31.4–37.4)
MCV RBC AUTO: 88 FL (ref 82–98)
PLATELET # BLD AUTO: 477 THOUSANDS/UL (ref 149–390)
PMV BLD AUTO: 9.5 FL (ref 8.9–12.7)
POTASSIUM SERPL-SCNC: 3 MMOL/L (ref 3.5–5.3)
PROTHROMBIN TIME: 16.1 SECONDS (ref 11.6–14.5)
RBC # BLD AUTO: 4.25 MILLION/UL (ref 3.88–5.62)
SODIUM SERPL-SCNC: 141 MMOL/L (ref 136–145)
WBC # BLD AUTO: 7.35 THOUSAND/UL (ref 4.31–10.16)

## 2020-01-15 PROCEDURE — 85610 PROTHROMBIN TIME: CPT | Performed by: PHYSICIAN ASSISTANT

## 2020-01-15 PROCEDURE — 0DBN8ZX EXCISION OF SIGMOID COLON, VIA NATURAL OR ARTIFICIAL OPENING ENDOSCOPIC, DIAGNOSTIC: ICD-10-PCS | Performed by: INTERNAL MEDICINE

## 2020-01-15 PROCEDURE — 99232 SBSQ HOSP IP/OBS MODERATE 35: CPT | Performed by: SURGERY

## 2020-01-15 PROCEDURE — 88305 TISSUE EXAM BY PATHOLOGIST: CPT | Performed by: PATHOLOGY

## 2020-01-15 PROCEDURE — 99232 SBSQ HOSP IP/OBS MODERATE 35: CPT | Performed by: INTERNAL MEDICINE

## 2020-01-15 PROCEDURE — 43239 EGD BIOPSY SINGLE/MULTIPLE: CPT | Performed by: INTERNAL MEDICINE

## 2020-01-15 PROCEDURE — 85730 THROMBOPLASTIN TIME PARTIAL: CPT | Performed by: INTERNAL MEDICINE

## 2020-01-15 PROCEDURE — 0DB78ZX EXCISION OF STOMACH, PYLORUS, VIA NATURAL OR ARTIFICIAL OPENING ENDOSCOPIC, DIAGNOSTIC: ICD-10-PCS | Performed by: INTERNAL MEDICINE

## 2020-01-15 PROCEDURE — 85730 THROMBOPLASTIN TIME PARTIAL: CPT | Performed by: HOSPITALIST

## 2020-01-15 PROCEDURE — 80048 BASIC METABOLIC PNL TOTAL CA: CPT | Performed by: PHYSICIAN ASSISTANT

## 2020-01-15 PROCEDURE — 85027 COMPLETE CBC AUTOMATED: CPT | Performed by: PHYSICIAN ASSISTANT

## 2020-01-15 PROCEDURE — 0DB88ZX EXCISION OF SMALL INTESTINE, VIA NATURAL OR ARTIFICIAL OPENING ENDOSCOPIC, DIAGNOSTIC: ICD-10-PCS | Performed by: INTERNAL MEDICINE

## 2020-01-15 PROCEDURE — 70450 CT HEAD/BRAIN W/O DYE: CPT

## 2020-01-15 PROCEDURE — 0DB68ZX EXCISION OF STOMACH, VIA NATURAL OR ARTIFICIAL OPENING ENDOSCOPIC, DIAGNOSTIC: ICD-10-PCS | Performed by: INTERNAL MEDICINE

## 2020-01-15 PROCEDURE — 0DB98ZX EXCISION OF DUODENUM, VIA NATURAL OR ARTIFICIAL OPENING ENDOSCOPIC, DIAGNOSTIC: ICD-10-PCS | Performed by: INTERNAL MEDICINE

## 2020-01-15 PROCEDURE — 45380 COLONOSCOPY AND BIOPSY: CPT | Performed by: INTERNAL MEDICINE

## 2020-01-15 RX ORDER — PROPOFOL 10 MG/ML
INJECTION, EMULSION INTRAVENOUS CONTINUOUS PRN
Status: DISCONTINUED | OUTPATIENT
Start: 2020-01-15 | End: 2020-01-15 | Stop reason: SURG

## 2020-01-15 RX ORDER — SODIUM CHLORIDE, SODIUM GLUCONATE, SODIUM ACETATE, POTASSIUM CHLORIDE, MAGNESIUM CHLORIDE, SODIUM PHOSPHATE, DIBASIC, AND POTASSIUM PHOSPHATE .53; .5; .37; .037; .03; .012; .00082 G/100ML; G/100ML; G/100ML; G/100ML; G/100ML; G/100ML; G/100ML
75 INJECTION, SOLUTION INTRAVENOUS CONTINUOUS
Status: DISCONTINUED | OUTPATIENT
Start: 2020-01-15 | End: 2020-01-16

## 2020-01-15 RX ORDER — POTASSIUM CHLORIDE 20 MEQ/1
40 TABLET, EXTENDED RELEASE ORAL ONCE
Status: COMPLETED | OUTPATIENT
Start: 2020-01-15 | End: 2020-01-15

## 2020-01-15 RX ORDER — LIDOCAINE HYDROCHLORIDE 10 MG/ML
INJECTION, SOLUTION EPIDURAL; INFILTRATION; INTRACAUDAL; PERINEURAL AS NEEDED
Status: DISCONTINUED | OUTPATIENT
Start: 2020-01-15 | End: 2020-01-15 | Stop reason: SURG

## 2020-01-15 RX ORDER — SODIUM CHLORIDE 9 MG/ML
INJECTION, SOLUTION INTRAVENOUS CONTINUOUS PRN
Status: DISCONTINUED | OUTPATIENT
Start: 2020-01-15 | End: 2020-01-15 | Stop reason: SURG

## 2020-01-15 RX ORDER — MAGNESIUM CARB/ALUMINUM HYDROX 105-160MG
296 TABLET,CHEWABLE ORAL ONCE
Status: COMPLETED | OUTPATIENT
Start: 2020-01-15 | End: 2020-01-15

## 2020-01-15 RX ADMIN — HEPARIN SODIUM AND DEXTROSE 20 UNITS/KG/HR: 10000; 5 INJECTION INTRAVENOUS at 00:54

## 2020-01-15 RX ADMIN — PANTOPRAZOLE SODIUM 40 MG: 40 TABLET, DELAYED RELEASE ORAL at 05:55

## 2020-01-15 RX ADMIN — PHENYLEPHRINE HYDROCHLORIDE 100 MCG: 10 INJECTION INTRAVENOUS at 14:36

## 2020-01-15 RX ADMIN — ATORVASTATIN CALCIUM 20 MG: 20 TABLET, FILM COATED ORAL at 16:51

## 2020-01-15 RX ADMIN — MAGNESIUM CITRATE 296 ML: 1.75 LIQUID ORAL at 09:00

## 2020-01-15 RX ADMIN — PHENYLEPHRINE HYDROCHLORIDE 100 MCG: 10 INJECTION INTRAVENOUS at 15:04

## 2020-01-15 RX ADMIN — SODIUM CHLORIDE, SODIUM GLUCONATE, SODIUM ACETATE, POTASSIUM CHLORIDE, MAGNESIUM CHLORIDE, SODIUM PHOSPHATE, DIBASIC, AND POTASSIUM PHOSPHATE 100 ML/HR: .53; .5; .37; .037; .03; .012; .00082 INJECTION, SOLUTION INTRAVENOUS at 00:55

## 2020-01-15 RX ADMIN — PHENYLEPHRINE HYDROCHLORIDE 100 MCG: 10 INJECTION INTRAVENOUS at 14:59

## 2020-01-15 RX ADMIN — LIDOCAINE HYDROCHLORIDE 80 MG: 10 INJECTION, SOLUTION EPIDURAL; INFILTRATION; INTRACAUDAL; PERINEURAL at 14:07

## 2020-01-15 RX ADMIN — PROPOFOL 150 MCG/KG/MIN: 10 INJECTION, EMULSION INTRAVENOUS at 14:07

## 2020-01-15 RX ADMIN — PHENYLEPHRINE HYDROCHLORIDE 150 MCG: 10 INJECTION INTRAVENOUS at 14:17

## 2020-01-15 RX ADMIN — SODIUM CHLORIDE: 0.9 INJECTION, SOLUTION INTRAVENOUS at 14:05

## 2020-01-15 RX ADMIN — POTASSIUM CHLORIDE 40 MEQ: 1500 TABLET, EXTENDED RELEASE ORAL at 08:50

## 2020-01-15 RX ADMIN — HEPARIN SODIUM 3000 UNITS: 1000 INJECTION INTRAVENOUS; SUBCUTANEOUS at 01:46

## 2020-01-15 RX ADMIN — MELATONIN 2000 UNITS: at 08:50

## 2020-01-15 RX ADMIN — POTASSIUM CHLORIDE 40 MEQ: 1500 TABLET, EXTENDED RELEASE ORAL at 16:50

## 2020-01-15 RX ADMIN — PHENYLEPHRINE HYDROCHLORIDE 50 MCG: 10 INJECTION INTRAVENOUS at 14:42

## 2020-01-15 NOTE — ASSESSMENT & PLAN NOTE
· Going on since last 1 year  · According to patient underwent EGD and colonoscopy which showed some colonic polyps but nothing else  · Given CT imaging concern for underlying inflammatory bowel disease  GI on board    Plan for EGD/colonoscopy today

## 2020-01-15 NOTE — PROGRESS NOTES
NEPHROLOGY PROGRESS NOTE   Kamari Else 77 y o  male MRN: [id number]  Unit/Bed#: [facility] 827-01 Encounter: [id number]    ASSESSMENT & PLAN:  77 y o male initially presented with complain of right leg pain  He has a history of chronic diarrhea, AFib, peripheral vascular disease status post right popliteal stent  On admission he was found to have creatinine of 3 99  CT on admission was suggestive of small-bowel obstruction  Nephrology consulted for PHUONG  · Acute kidney injury, POA likely prerenal from volume depletion due to diarrhea and poor oral intake, renal function improving from admission creatinine 3 9-2 2 on 1/14  Further improved to cr 1 06 today   · Avoid hypotension and avoid nephrotoxins  · Blood pressure is marginal-avoid hypotension  · Decreased iv fluid plasmalyte to  75 ml/hr  Can be stopped once oral diet resumed  · Discussed about risk of MARCIAL with patient on 1/14 with lower extremity angiogram planned for Friday  Will restart IVF Thursday evening  Okay for angiogram on Friday if renal function remains stable  · 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 with baseline creatinine 1 0-1 5, follows with Dr Ulysses Cline  SPE in October was negative for monoclonal protein, kappa lambda ratio 1 25  UPEP was negative for monoclonal protein  · Hypokalemia: replaced  · Diarrhea/small bowel obstruction:  Continue management per surgery and GI  · Right lower extremity ischemia:  Suspected thrombosis of popliteal stent  Discussed with patient about risk of worsening renal function with IV contrast and about the possibility of requiring dialysis and he verbalized understanding  Planned for arteriogram and popliteal lysis on Friday as informed by vascular surgery  Will restart IVF Thursday night  · Hyponatremia:  Resolved    Discussed with primary attending- Dr Mora Brochure:  No SOB  No nausea or vomiting   Plan for colonoscopy today    OBJECTIVE:  Current Weight: Weight - Scale: 81 2 kg (179 lb 0 2 oz)  Vitals:    01/15/20 0804   BP: 109/69   Pulse: 61   Resp: 18   Temp: 97 7 °F (36 5 °C)   SpO2: 92%       Intake/Output Summary (Last 24 hours) at 1/15/2020 1111  Last data filed at 1/14/2020 1911  Gross per 24 hour   Intake 5485 38 ml   Output    Net 5485 38 ml       Physical Exam  General:  Ill looking, awake  Eyes: Conjunctivae pink,  Sclera anicteric  ENT: lips and mucous membranes moist  Neck: supple   Chest: Clear to Auscultation both lungs,  no crackles, ronchus or wheezing  CVS: S1 & S2 present, normal rate, regular rhythm, no murmur    Abdomen: soft, non-tender, non-distended, Bowel sounds normoactive  Extremities: no edema of  legs  Skin: no rash  Neuro: awake, alert, oriented x 3       Medications:    Current Facility-Administered Medications:     acetaminophen (TYLENOL) tablet 650 mg, 650 mg, Oral, Q6H PRN, Keenan Mayes MD    aspirin chewable tablet 81 mg, 81 mg, Oral, Daily, Keenan Mayes MD, 81 mg at 01/14/20 0828    atorvastatin (LIPITOR) tablet 20 mg, 20 mg, Oral, Daily With Arabella Brody MD    cholecalciferol (VITAMIN D3) tablet 2,000 Units, 2,000 Units, Oral, Daily, Keenan Mayes MD, 2,000 Units at 01/15/20 0850    heparin (porcine) 25,000 units in 250 mL infusion (premix), 3-30 Units/kg/hr (Order-Specific), Intravenous, Titrated, Keenan Mayes MD, Stopped at 01/15/20 0802    heparin (porcine) injection 3,000 Units, 3,000 Units, Intravenous, PRN, Keenan Mayes MD, 3,000 Units at 01/15/20 0146    heparin (porcine) injection 6,000 Units, 6,000 Units, Intravenous, PRN, Keenan Mayes MD    metoprolol (LOPRESSOR) injection 5 mg, 5 mg, Intravenous, Q6H PRN, Viktoriya Vogel MD    multi-electrolyte (PLASMALYTE-A/ISOLYTE-S PH 7 4) IV solution, 100 mL/hr, Intravenous, Continuous, Viktoriya Vogel MD, Last Rate: 100 mL/hr at 01/15/20 0055, 100 mL/hr at 01/15/20 0055    ondansetron (ZOFRAN) injection 4 mg, 4 mg, Intravenous, Q6H PRN, Jackie Bean MD    pantoprazole (PROTONIX) EC tablet 40 mg, 40 mg, Oral, Early Morning, Viktoriya Vogel MD, 40 mg at 01/15/20 0555    phenol (CHLORASEPTIC) 1 4 % mucosal liquid 1 spray, 1 spray, Mouth/Throat, Q2H PRN, LENORA Todd    potassium chloride (K-DUR,KLOR-CON) CR tablet 40 mEq, 40 mEq, Oral, Once, Radha Gonzalez MD    Invasive Devices:        Lab Results:   Results from last 7 days   Lab Units 01/15/20  0501 01/14/20  0338 01/14/20  0156 01/13/20  1013   WBC Thousand/uL 7 35 9 14  --  14 17*   HEMOGLOBIN g/dL 12 3 13 4  --  16 2   HEMATOCRIT % 37 3 41 0  --  49 5*   PLATELETS Thousands/uL 477* 516*  --  685*   POTASSIUM mmol/L 3 0*  --  3 7 4 0   CHLORIDE mmol/L 107  --  105 97*   CO2 mmol/L 25  --  23 23   BUN mg/dL 25  --  48* 47*   CREATININE mg/dL 1 06  --  2 25* 3 99*   CALCIUM mg/dL 7 4*  --  7 6* 9 0   MAGNESIUM mg/dL  --   --   --  2 1   ALK PHOS U/L  --   --  126* 177*   ALT U/L  --   --  18 27   AST U/L  --   --  14 24       Previous work up:         Portions of the record may have been created with voice recognition software  Occasional wrong word or "sound a like" substitutions may have occurred due to the inherent limitations of voice recognition software  Read the chart carefully and recognize, using context, where substitutions have occurred  If you have any questions, please contact the dictating provider

## 2020-01-15 NOTE — ANESTHESIA POSTPROCEDURE EVALUATION
Post-Op Assessment Note    CV Status:  Stable    Pain management: adequate     Mental Status:  Alert and awake   Hydration Status:  Euvolemic   PONV Controlled:  Controlled   Airway Patency:  Patent   Post Op Vitals Reviewed: Yes      Staff: CRNA           /72 (01/15/20 1510)    Temp      Pulse 80 (01/15/20 1510)   Resp 18 (01/15/20 1510)    SpO2 93 % (01/15/20 1510)

## 2020-01-15 NOTE — ASSESSMENT & PLAN NOTE
· Found on CT scan in ED today, with transition near terminal ileum, there are places with suspicious inflammation in TI with possible skipped areas concerning for crohn's  · General surgery following, continue serial abdominal exams  · Status post NG tube removal  · Positive bowel movements  · GI on board, planned for colonoscopy/egd today

## 2020-01-15 NOTE — PROGRESS NOTES
Progress Note - Wyatt Pena 77 y o  male MRN: [id number]    Unit/Bed#: [facility] 827-01 Encounter: [id number]      Assessment:  77 y o  M who presented with a R popliteal stent occlusion and CT findings of SBO w transition point vs stricture at the distal ileum     Vss  Afebrile  Tolerated golytely prep  Plan:  Npo  c-scope today    Subjective:   No complaints  Multiple watery bowel mvts  Denied fever, chills, chest pain, shortness of breath, nausea, vomiting, or abdominal pain this morning  Objective:     Vitals: Blood pressure 106/81, pulse 91, temperature 98 °F (36 7 °C), resp  rate 18, height 5' 10" (1 778 m), weight 81 2 kg (179 lb 0 2 oz), SpO2 93 %  ,Body mass index is 25 69 kg/m²  Intake/Output Summary (Last 24 hours) at 1/15/2020 0628  Last data filed at 1/14/2020 1911  Gross per 24 hour   Intake 5905 38 ml   Output    Net 5905 38 ml       Physical Exam  General: NAD  HEENT: NC/AT  MMM  Cv: RRR     Lungs: normal effort  Ab: Soft, NT/ND  Ex: no CCE  Neuro: AAOx3    Scheduled Meds:  Current Facility-Administered Medications:  acetaminophen 650 mg Oral Q6H PRN Laverne Painter MD    aspirin 81 mg Oral Daily Viktoriya Vogel MD    atorvastatin 20 mg Oral Daily With Ita Macdonald MD    cholecalciferol 2,000 Units Oral Daily Viktoriya Vogel MD    heparin (porcine) 3-30 Units/kg/hr (Order-Specific) Intravenous Titrated Laverne Painter MD Last Rate: 22 Units/kg/hr (01/15/20 0148)   heparin (porcine) 3,000 Units Intravenous PRN Viktoriya Vogel MD    heparin (porcine) 6,000 Units Intravenous PRN Viktoriya Vogel MD    metoprolol 5 mg Intravenous Q6H PRN Viktoriya Vogel MD    multi-electrolyte 100 mL/hr Intravenous Continuous Laverne Painter MD Last Rate: 100 mL/hr (01/15/20 0055)   ondansetron 4 mg Intravenous Q6H PRN Viktoriya Vogel MD    pantoprazole 40 mg Oral Early Morning Viktoriya Vogel MD    phenol 1 spray Mouth/Throat Q2H PRN LENORA Gomez Continuous Infusions:  heparin (porcine) 3-30 Units/kg/hr (Order-Specific) Last Rate: 22 Units/kg/hr (01/15/20 0148)   multi-electrolyte 100 mL/hr Last Rate: 100 mL/hr (01/15/20 0055)     PRN Meds:   acetaminophen    heparin (porcine)    heparin (porcine)    metoprolol    ondansetron    phenol      Invasive Devices     Peripheral Intravenous Line            Peripheral IV 01/13/20 Right Antecubital 1 day    Peripheral IV 01/13/20 Right Forearm 1 day                Lab, Imaging and other studies: I have personally reviewed pertinent reports      VTE Pharmacologic Prophylaxis: Heparin  VTE Mechanical Prophylaxis: sequential compression device

## 2020-01-15 NOTE — ASSESSMENT & PLAN NOTE
· Acute right leg ischemic pain with numbness, both have improved now  · Underlying history of peripheral artery disease  · Vascular on board, presently on heparin bridge as Xarelto was on hold  · Planned for RLE arteriogram and revascularization tomorrow  · Status post Leads   · Educated on smoking cessation

## 2020-01-15 NOTE — PROGRESS NOTES
Progress Note - Wyatt Pena 1950, 77 y o  male MRN: [id number]    Unit/Bed#: [facility] 827-01 Encounter: [id number]    Primary Care Provider: Jenise Crabtree MD   Date and time admitted to hospital: 1/13/2020  9:23 AM        * Limb ischemia  Assessment & Plan  · Acute right leg ischemic pain with numbness, both have improved now  · Underlying history of peripheral artery disease  · Vascular on board, presently on heparin bridge as Xarelto was on hold  · Planned for RLE arteriogram and revascularization once optimized from nephrology standpoint  · Status post Leads   · Educated on smoking cessation    SBO (small bowel obstruction) ([facility] [address] )  Assessment & Plan  · Found on CT scan in ED today, with transition near terminal ileum, there are places with suspicious inflammation in TI with possible skipped areas concerning for crohn's  · General surgery following, continue serial abdominal exams  · Status post NG tube removal  · Positive bowel movements  · GI on board, planned for colonoscopy/egd today      Diarrhea  Assessment & Plan  · Going on since last 1 year  · According to patient underwent EGD and colonoscopy which showed some colonic polyps but nothing else  · Given CT imaging concern for underlying inflammatory bowel disease  GI on board    Plan for EGD/colonoscopy today    PHUONG (acute kidney injury) ([facility] [address] )  Assessment & Plan  · On CKD stage 3  · Nephrology following, etiology secondary to pre renal  · Creatinine trending down   · Continue IV fluid hydration  · Avoid nephrotoxins and renally dose medications    Hypokalemia  Assessment & Plan  Repleted accordingly    SIRS (systemic inflammatory response syndrome) (HCC)  Assessment & Plan  Of non infectious etiology, secondary to limb ischemia  No focal source of infection    Thrombocytosis (HCC)  Assessment & Plan  · Chronic, being followed by Hematology  · JAK2 mutation negative  · Continue to monitor    Paroxysmal atrial fibrillation ([facility] [address] )  Assessment & Plan  Rate controlled  Anticoagulation with heparin drip as Xarelto on hold    PAD (peripheral artery disease) (HCC)  Assessment & Plan  · S/p right popliteal stent  · was on xarelto & statin at home      VTE Pharmacologic Prophylaxis:   Pharmacologic: Heparin Drip  Mechanical VTE Prophylaxis in Place: No    Patient Centered Rounds: I have performed bedside rounds with nursing staff today  Discussions with Specialists or Other Care Team Provider:     Education and Discussions with Family / Patient:  Patient and his wife    Time Spent for Care: 30 minutes  More than 50% of total time spent on counseling and coordination of care as described above  Current Length of Stay: 2 day(s)    Current Patient Status: Inpatient   Certification Statement: The patient will continue to require additional inpatient hospital stay due to Pending EGD/colonoscopy, right lower extremity arteriogram    Discharge Plan:     Code Status: Level 1 - Full Code      Subjective:   No abdominal pain, nausea vomiting  Status post bowel prep  Right lower extremity pain resolved    Objective:     Vitals:   Temp (24hrs), Av °F (36 7 °C), Min:97 4 °F (36 3 °C), Max:98 4 °F (36 9 °C)    Temp:  [97 4 °F (36 3 °C)-98 4 °F (36 9 °C)] 98 4 °F (36 9 °C)  HR:  [] 81  Resp:  [16-18] 18  BP: (104-112)/(67-83) 111/83  SpO2:  [89 %-94 %] 92 %  Body mass index is 25 69 kg/m²  Input and Output Summary (last 24 hours): Intake/Output Summary (Last 24 hours) at 1/15/2020 1549  Last data filed at 1/15/2020 1457  Gross per 24 hour   Intake 5400 ml   Output    Net 5400 ml       Physical Exam:     Physical Exam   Constitutional: He is oriented to person, place, and time  He appears well-developed and well-nourished  Neck: Neck supple  Cardiovascular: Normal rate, regular rhythm, normal heart sounds and intact distal pulses  Exam reveals no gallop and no friction rub  No murmur heard    Pulmonary/Chest: Effort normal and breath sounds normal  No stridor  No respiratory distress  He has no wheezes  He has no rales  He exhibits no tenderness  Abdominal: Soft  Bowel sounds are normal  He exhibits no distension  There is no tenderness  There is no rebound and no guarding  Musculoskeletal: Normal range of motion  He exhibits no edema, tenderness or deformity  Palpable pedal pulses on the right   Neurological: He is alert and oriented to person, place, and time  He displays normal reflexes  No cranial nerve deficit or sensory deficit  He exhibits normal muscle tone  Coordination normal          Additional Data:     Labs:    Results from last 7 days   Lab Units 01/15/20  0501 01/14/20  0338 01/13/20  1013   WBC Thousand/uL 7 35 9 14 14 17*   HEMOGLOBIN g/dL 12 3 13 4 16 2   HEMATOCRIT % 37 3 41 0 49 5*   PLATELETS Thousands/uL 477* 516* 685*   BANDS PCT %  --  13*  --    NEUTROS PCT %  --   --  74   LYMPHS PCT %  --   --  14   LYMPHO PCT %  --  23  --    MONOS PCT %  --   --  11   MONO PCT %  --  11  --    EOS PCT %  --  0 0     Results from last 7 days   Lab Units 01/15/20  0501 01/14/20  0156   SODIUM mmol/L 141 137   POTASSIUM mmol/L 3 0* 3 7   CHLORIDE mmol/L 107 105   CO2 mmol/L 25 23   BUN mg/dL 25 48*   CREATININE mg/dL 1 06 2 25*   ANION GAP mmol/L 9 9   CALCIUM mg/dL 7 4* 7 6*   ALBUMIN g/dL  --  2 1*   TOTAL BILIRUBIN mg/dL  --  0 53   ALK PHOS U/L  --  126*   ALT U/L  --  18   AST U/L  --  14   GLUCOSE RANDOM mg/dL 100 117     Results from last 7 days   Lab Units 01/15/20  0501   INR  1 34*                       * I Have Reviewed All Lab Data Listed Above  * Additional Pertinent Lab Tests Reviewed:  All Labs Within Last 24 Hours Reviewed    Imaging:    Imaging Reports Reviewed Today Include:   Imaging Personally Reviewed by Myself Includes:      Recent Cultures (last 7 days):           Last 24 Hours Medication List:     Current Facility-Administered Medications:  acetaminophen 650 mg Oral Q6H PRN Keenan Mayes MD    aspirin 81 mg Oral Daily Zechariah Bañuelos MD    atorvastatin 20 mg Oral Daily With Gail Varma MD    cholecalciferol 2,000 Units Oral Daily Zechariah Bañuelos MD    heparin (porcine) 3-30 Units/kg/hr (Order-Specific) Intravenous Titrated Zechariah Bañuelos MD Last Rate: Stopped (01/15/20 0802)   heparin (porcine) 3,000 Units Intravenous PRN Zechariah Bañuelos MD    heparin (porcine) 6,000 Units Intravenous PRN Viktoriya Vogel MD    metoprolol 5 mg Intravenous Q6H PRN Zechariah Bañuelos MD    multi-electrolyte 75 mL/hr Intravenous Continuous Nancy Rosa MD Last Rate: 75 mL/hr (01/15/20 1139)   ondansetron 4 mg Intravenous Q6H PRN Viktoriya Vogel MD    pantoprazole 40 mg Oral Early Morning Viktoriya Vogel MD    phenol 1 spray Mouth/Throat Q2H PRN LENORA Silva    potassium chloride 40 mEq Oral Once Nancy Rosa MD         Today, Patient Was Seen By: Herson Harvey DO    ** Please Note: Dictation voice to text software may have been used in the creation of this document   **

## 2020-01-15 NOTE — ASSESSMENT & PLAN NOTE
· S/p right popliteal stent  · was on xarelto & statin at home  · On heparin gtt while xarelto is on hold

## 2020-01-15 NOTE — ASSESSMENT & PLAN NOTE
· Found on CT scan in ED today, with transition near terminal ileum, there are places with suspicious inflammation in TI with possible skipped areas concerning for crohn's  · General surgery following, continue serial abdominal exams  · Status post NG tube removal  · Positive bowel movements

## 2020-01-15 NOTE — ASSESSMENT & PLAN NOTE
· Acute right leg ischemic pain with numbness, both have improved now  · Underlying history of peripheral artery disease  · Vascular on board, presently on heparin bridge as Xarelto was on hold  · Given his PHUONG no acute intervention is planned right now until clearance from Nephrology  · Status post Leads   · Educated on smoking cessation

## 2020-01-15 NOTE — ASSESSMENT & PLAN NOTE
· Found on CT scan in ED today, with transition near terminal ileum, there are places with suspicious inflammation in TI with possible skipped areas concerning for crohn's  · General surgery following, continue serial abdominal exams  · Status post NG tube removal  · Positive bowel movements  · GI on board, s/p colonoscopy findings suspicious for inflammatory disease, planned for ct enterography today

## 2020-01-15 NOTE — ASSESSMENT & PLAN NOTE
· Acute right leg ischemic pain with numbness, both have improved now  · Underlying history of peripheral artery disease  · Vascular on board, presently on heparin bridge as Xarelto was on hold  · Planned for RLE arteriogram and revascularization once optimized from nephrology standpoint  · Status post Leads   · Educated on smoking cessation

## 2020-01-15 NOTE — PROGRESS NOTES
Progress Note - Marcelle Nixon 1950, 77 y o  male MRN: [id number]    Unit/Bed#: [facility] 827-01 Encounter: [id number]    Primary Care Provider: Manjinder Kong MD   Date and time admitted to hospital: 1/13/2020  9:23 AM        * Limb ischemia  Assessment & Plan  · Acute right leg ischemic pain with numbness, both have improved now  · Underlying history of peripheral artery disease  · Vascular on board, presently on heparin bridge as Xarelto was on hold  · Given his PUHONG no acute intervention is planned right now until clearance from Nephrology  · Status post Leads   · Educated on smoking cessation    SBO (small bowel obstruction) ([facility] [address] )  Assessment & Plan  · Found on CT scan in ED today, with transition near terminal ileum, there are places with suspicious inflammation in TI with possible skipped areas concerning for crohn's  · General surgery following, continue serial abdominal exams  · Status post NG tube removal  · Positive bowel movements      Diarrhea  Assessment & Plan  · Going on since last 1 year  · According to patient underwent EGD and colonoscopy which showed some colonic polyps but nothing else  · Given CT imaging concern for underlying inflammatory bowel disease  GI on board    Plan for EGD/colonoscopy tomorrow    PHUONG (acute kidney injury) ([facility] [address] )  Assessment & Plan  · On CKD stage 3  · Nephrology following, etiology secondary to pre renal  · Creatinine trending down BL Cr 1-1 5  · Continue IV fluid hydration  · Avoid nephrotoxins and renally dose medications    SIRS (systemic inflammatory response syndrome) (HCC)  Assessment & Plan  Of non infectious etiology, secondary to limb ischemia  No focal source of infection    Thrombocytosis (HCC)  Assessment & Plan  · Chronic, being followed by Hematology  · JAK2 mutation negative  · Continue to monitor    Paroxysmal atrial fibrillation (HCC)  Assessment & Plan  Rate controlled  Anticoagulation with heparin drip as Xarelto on hold    PAD (peripheral artery disease) ([facility] [address] )  Assessment & Plan  · S/p rigth popliteal stent  · Not on AP, was on xarelto & statin at home      VTE Pharmacologic Prophylaxis:   Pharmacologic: Heparin Drip  Mechanical VTE Prophylaxis in Place: No    Patient Centered Rounds: I have performed bedside rounds with nursing staff today  Discussions with Specialists or Other Care Team Provider:     Education and Discussions with Family / Patient:  Patient    Time Spent for Care: 30 minutes  More than 50% of total time spent on counseling and coordination of care as described above  Current Length of Stay: 1 day(s)    Current Patient Status: Inpatient   Certification Statement: The patient will continue to require additional inpatient hospital stay due to PHUONG, limb ischemia, pending endoscopic evaluation    Discharge Plan:     Code Status: Level 1 - Full Code      Subjective:   Abdominal pain resolved only noted with bloatedness  Has been having bowel movements  No nausea vomiting  Objective:     Vitals:   Temp (24hrs), Av 8 °F (36 6 °C), Min:97 4 °F (36 3 °C), Max:98 3 °F (36 8 °C)    Temp:  [97 4 °F (36 3 °C)-98 3 °F (36 8 °C)] 97 4 °F (36 3 °C)  HR:  [] 100  Resp:  [16-18] 18  BP: (106-112)/(69-76) 111/76  SpO2:  [89 %-96 %] 89 %  Body mass index is 25 18 kg/m²  Input and Output Summary (last 24 hours): Intake/Output Summary (Last 24 hours) at 2020  Last data filed at 2020  Gross per 24 hour   Intake 7587 04 ml   Output 663 ml   Net 6924 04 ml       Physical Exam:     Physical Exam   Constitutional: He is oriented to person, place, and time  He appears well-developed and well-nourished  Cardiovascular: Normal rate, regular rhythm, normal heart sounds and intact distal pulses  Exam reveals no friction rub  No murmur heard  Pulmonary/Chest: Effort normal and breath sounds normal  No stridor  No respiratory distress  He has no wheezes  He has no rales  He exhibits no tenderness     Abdominal: Soft  Bowel sounds are normal  He exhibits distension  There is no tenderness  There is no rebound and no guarding  Musculoskeletal: Normal range of motion  He exhibits no edema, tenderness or deformity  Neurological: He is alert and oriented to person, place, and time  He displays normal reflexes  No cranial nerve deficit or sensory deficit  He exhibits normal muscle tone  Coordination normal      Additional Data:     Labs:    Results from last 7 days   Lab Units 01/14/20  0338 01/13/20  1013   WBC Thousand/uL 9 14 14 17*   HEMOGLOBIN g/dL 13 4 16 2   HEMATOCRIT % 41 0 49 5*   PLATELETS Thousands/uL 516* 685*   BANDS PCT % 13*  --    NEUTROS PCT %  --  74   LYMPHS PCT %  --  14   LYMPHO PCT % 23  --    MONOS PCT %  --  11   MONO PCT % 11  --    EOS PCT % 0 0     Results from last 7 days   Lab Units 01/14/20  0156   SODIUM mmol/L 137   POTASSIUM mmol/L 3 7   CHLORIDE mmol/L 105   CO2 mmol/L 23   BUN mg/dL 48*   CREATININE mg/dL 2 25*   ANION GAP mmol/L 9   CALCIUM mg/dL 7 6*   ALBUMIN g/dL 2 1*   TOTAL BILIRUBIN mg/dL 0 53   ALK PHOS U/L 126*   ALT U/L 18   AST U/L 14   GLUCOSE RANDOM mg/dL 117     Results from last 7 days   Lab Units 01/14/20  0240   INR  1 79*                       * I Have Reviewed All Lab Data Listed Above  * Additional Pertinent Lab Tests Reviewed:  All Labs Within Last 24 Hours Reviewed    Imaging:    Imaging Reports Reviewed Today Include:   Imaging Personally Reviewed by Myself Includes:      Recent Cultures (last 7 days):           Last 24 Hours Medication List:     Current Facility-Administered Medications:  acetaminophen 650 mg Oral Q6H PRN Jackie Bean MD    aspirin 81 mg Oral Daily Jackie Bean MD    atorvastatin 20 mg Oral Daily With Valerie Garcia MD    cholecalciferol 2,000 Units Oral Daily Jackie Bean MD    heparin (porcine) 3-30 Units/kg/hr (Order-Specific) Intravenous Titrated Jackie Bean MD Last Rate: 18 Units/kg/hr (01/14/20 0546) heparin (porcine) 3,000 Units Intravenous PRN Greg Branch MD    heparin (porcine) 6,000 Units Intravenous PRN Viktoriya Vogel MD    metoprolol 5 mg Intravenous Q6H PRN Viktoriya Vogel MD    multi-electrolyte 100 mL/hr Intravenous Continuous Greg Branch MD Last Rate: 100 mL/hr (01/14/20 0544)   ondansetron 4 mg Intravenous Q6H PRN Viktoriya Vogel MD    pantoprazole 40 mg Oral Early Morning Greg Branch MD    phenol 1 spray Mouth/Throat Q2H PRN LENORA Baldwin         Today, Patient Was Seen By: Antwan Rondon DO    ** Please Note: Dictation voice to text software may have been used in the creation of this document   **

## 2020-01-15 NOTE — ASSESSMENT & PLAN NOTE
· Going on since last 1 year  · According to patient underwent EGD and colonoscopy which showed some colonic polyps but nothing else  · Given CT imaging concern for underlying inflammatory bowel disease  GI on board    S/p EGD/colonoscopy findings suspicious for inflammatory disease  · Pending ct enterography

## 2020-01-15 NOTE — PROGRESS NOTES
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 s/p right popliteal stent placement 6/27/19 and afib on xarelto, presents with acute right leg limb ischemia  Plan:  1  RLE limb ischemia, probable 2/2 thrombosis of popliteal stent  - motor and sensory intact  - b/l lower extremities warm  - asa and statin  - eventual RLE arteriogram with popliteal lysis once medically optimized  - continue neurovasc monitornig    2  Concern for Crohn's disease  - GI following  - going for colonoscopy and EGD today  - NPO for procedures    3  PHUONG  - Cr improving, 3 99>2 2  1/15 pending   - continue IVF   - Nephrology following    - rest of care per primary team    Subjective/Objective   Chief Complaint: no acute complaints today    Subjective: Patient interviewed in room, lying in bed, offer no acute complaints this morning  Denies leg, calf, foot pain  Denies fevers, chills, CP, Sob  Objective:     Blood pressure 106/81, pulse 91, temperature 98 °F (36 7 °C), resp  rate 18, height 5' 10" (1 778 m), weight 79 6 kg (175 lb 8 oz), SpO2 93 %  ,Body mass index is 25 18 kg/m²  Intake/Output Summary (Last 24 hours) at 1/15/2020 0524  Last data filed at 1/14/2020 1911  Gross per 24 hour   Intake 7098 71 ml   Output    Net 7098 71 ml       Invasive Devices     Peripheral Intravenous Line            Peripheral IV 01/13/20 Right Antecubital 1 day    Peripheral IV 01/13/20 Right Forearm 1 day                Physical Exam   Constitutional: He is oriented to person, place, and time  He appears well-developed and well-nourished  No distress  HENT:   Head: Normocephalic and atraumatic  Eyes: EOM are normal    Cardiovascular: Normal rate, regular rhythm and normal heart sounds  Exam reveals no gallop and no friction rub  No murmur heard    Pulmonary/Chest: Effort normal and breath sounds normal  No stridor  No respiratory distress  He has no wheezes  Neurological: He is alert and oriented to person, place, and time  Skin: Skin is warm and dry  He is not diaphoretic  Psychiatric: He has a normal mood and affect  His behavior is normal    Nursing note and vitals reviewed  Pulses:  DP: R: not appreciated on doppler, L: 2+  PT: R: monophasic signal, L: 2+  Popliteal: R: present on doppler, L: 2+  Femoral: 2+ b/l    Motor and sensory intact LE b/l  Scheduled Meds:  Current Facility-Administered Medications:  acetaminophen 650 mg Oral Q6H PRN Joni Bermudez MD    aspirin 81 mg Oral Daily Viktoriya Vogel MD    atorvastatin 20 mg Oral Daily With Lencho Serrato MD    cholecalciferol 2,000 Units Oral Daily Joni Bermudez MD    heparin (porcine) 3-30 Units/kg/hr (Order-Specific) Intravenous Titrated Joni Bermudez MD Last Rate: 22 Units/kg/hr (01/15/20 0148)   heparin (porcine) 3,000 Units Intravenous PRN Viktoriya Vogel MD    heparin (porcine) 6,000 Units Intravenous PRN Viktoriya Vogel MD    metoprolol 5 mg Intravenous Q6H PRN Viktoriya Vogel MD    multi-electrolyte 100 mL/hr Intravenous Continuous Joni Bermudez MD Last Rate: 100 mL/hr (01/15/20 0055)   ondansetron 4 mg Intravenous Q6H PRN Viktoriya Vogel MD    pantoprazole 40 mg Oral Early Morning Viktoriya Vogel MD    phenol 1 spray Mouth/Throat Q2H PRN LENORA Self      Continuous Infusions:  heparin (porcine) 3-30 Units/kg/hr (Order-Specific) Last Rate: 22 Units/kg/hr (01/15/20 0148)   multi-electrolyte 100 mL/hr Last Rate: 100 mL/hr (01/15/20 0055)     PRN Meds:   acetaminophen    heparin (porcine)    heparin (porcine)    metoprolol    ondansetron    phenol      Lab, Imaging and other studies:I have personally reviewed pertinent lab results    , CBC: No results found for: WBC, HGB, HCT, MCV, PLT, ADJUSTEDWBC, MCH, MCHC, RDW, MPV, NRBC, CMP: No results found for: SODIUM, K, CL, CO2, ANIONGAP, BUN, CREATININE, GLUCOSE, CALCIUM, AST, ALT, ALKPHOS, PROT, BILITOT, EGFR, Coagulation: No results found for: PT, INR, APTT, Urinalysis: No results found for: COLORU, CLARITYU, SPECGRAV, PHUR, LEUKOCYTESUR, NITRITE, PROTEINUA, GLUCOSEU, KETONESU, BILIRUBINUR, BLOODU, Amylase: No results found for: AMYLASE, Lipase: No results found for: LIPASE  VTE Pharmacologic Prophylaxis: Heparin  VTE Mechanical Prophylaxis: sequential compression device      Gildardo Garnica PA-C  1/15/2020 5:24 AM

## 2020-01-15 NOTE — SOCIAL WORK
Met with patient he lives with his wife in 1 level home  3 [address]  Pt has no DME  He drives  No hx of VNA  Denies MH or substance issues  Independent Pta  Wife Sully Pringle at bedside and family will drive home at d/c      CM reviewed d/c planning process including the following: identifying help at home, patient preference for d/c planning needs, Discharge Lounge, [facility] Meds to Bed program, availability of treatment team to discuss questions or concerns patient and/or family may have regarding understanding medications and recognizing signs and symptoms once discharged  CM also encouraged patient to follow up with all recommended appointments after discharge  Patient advised of importance for patient and family to participate in managing patients medical well being

## 2020-01-15 NOTE — ASSESSMENT & PLAN NOTE
· Going on since last 1 year  · According to patient underwent EGD and colonoscopy which showed some colonic polyps but nothing else  · Given CT imaging concern for underlying inflammatory bowel disease  GI on board    Plan for EGD/colonoscopy tomorrow

## 2020-01-15 NOTE — ASSESSMENT & PLAN NOTE
· On CKD stage 3  · Nephrology following, etiology secondary to pre renal  · Creatinine trended down   · Avoid nephrotoxins and renally dose medications

## 2020-01-15 NOTE — ASSESSMENT & PLAN NOTE
· On CKD stage 3  · Nephrology following, etiology secondary to pre renal  · Creatinine trending down BL Cr 1-1 5  · Continue IV fluid hydration  · Avoid nephrotoxins and renally dose medications

## 2020-01-15 NOTE — ANESTHESIA PREPROCEDURE EVALUATION
Review of Systems/Medical History  Patient summary reviewed  Chart reviewed  No history of anesthetic complications     Cardiovascular  EKG reviewed, Exercise tolerance (METS): >4,  Hyperlipidemia, Dysrhythmias (RBBB) , atrial fibrillation, PVD,   Comment: TTE 6/2019  LVEF 50%, normal RV,  Pulmonary  Smoker cigarette smoker  , Tobacco cessation counseling given , COPD mild- PRN medicaiton ,        GI/Hepatic    GERD well controlled,   Comment: Diarrhea, small bowel distension on CT, concern for Crohn's  Denies N/V     Chronic kidney disease stage 3,        Endo/Other  Negative endo/other ROS      GYN       Hematology    Coagulation disorder (Xarelto last 1/13/20) currently taking oral anticoagulants,    Musculoskeletal  Negative musculoskeletal ROS        Neurology  Negative neurology ROS      Psychology   Negative psychology ROS              Physical Exam    Airway    Mallampati score: III  TM Distance: >3 FB  Neck ROM: full     Dental   No notable dental hx     Cardiovascular      Pulmonary      Other Findings        Anesthesia Plan  ASA Score- 3     Anesthesia Type- IV sedation with anesthesia with ASA Monitors  Additional Monitors:   Airway Plan:     Comment: Back up GA  Plan Factors- Patient instructed to abstain from smoking on day of procedure  Patient did not smoke on day of surgery  Induction-     Postoperative Plan-     Informed Consent- Anesthetic plan and risks discussed with patient  I personally reviewed this patient with the CRNA  Discussed and agreed on the Anesthesia Plan with the CRNA  Tyrone Funk

## 2020-01-15 NOTE — ASSESSMENT & PLAN NOTE
· On CKD stage 3  · Nephrology following, etiology secondary to pre renal  · Creatinine trending down   · Continue IV fluid hydration  · Avoid nephrotoxins and renally dose medications

## 2020-01-16 ENCOUNTER — APPOINTMENT (INPATIENT)
Dept: RADIOLOGY | Facility: HOSPITAL | Age: 70
DRG: 314 | End: 2020-01-16
Payer: MEDICARE

## 2020-01-16 LAB
ANION GAP SERPL CALCULATED.3IONS-SCNC: 4 MMOL/L (ref 4–13)
APTT PPP: 103 SECONDS (ref 23–37)
APTT PPP: 36 SECONDS (ref 23–37)
APTT PPP: 58 SECONDS (ref 23–37)
APTT PPP: 60 SECONDS (ref 23–37)
BASOPHILS # BLD MANUAL: 0 THOUSAND/UL (ref 0–0.1)
BASOPHILS NFR MAR MANUAL: 0 % (ref 0–1)
BUN SERPL-MCNC: 13 MG/DL (ref 5–25)
CALCIUM SERPL-MCNC: 7.8 MG/DL (ref 8.3–10.1)
CALPROTECTIN STL-MCNT: 399 UG/G (ref 0–120)
CHLORIDE SERPL-SCNC: 102 MMOL/L (ref 100–108)
CO2 SERPL-SCNC: 30 MMOL/L (ref 21–32)
CREAT SERPL-MCNC: 0.95 MG/DL (ref 0.6–1.3)
EOSINOPHIL # BLD MANUAL: 0.08 THOUSAND/UL (ref 0–0.4)
EOSINOPHIL NFR BLD MANUAL: 1 % (ref 0–6)
ERYTHROCYTE [DISTWIDTH] IN BLOOD BY AUTOMATED COUNT: 14.4 % (ref 11.6–15.1)
FIBRINOGEN PPP-MCNC: 492 MG/DL (ref 227–495)
GFR SERPL CREATININE-BSD FRML MDRD: 82 ML/MIN/1.73SQ M
GLUCOSE SERPL-MCNC: 91 MG/DL (ref 65–140)
HCT VFR BLD AUTO: 39.4 % (ref 36.5–49.3)
HGB BLD-MCNC: 12.8 G/DL (ref 12–17)
LYMPHOCYTES # BLD AUTO: 1.52 THOUSAND/UL (ref 0.6–4.47)
LYMPHOCYTES # BLD AUTO: 20 % (ref 14–44)
MAGNESIUM SERPL-MCNC: 2.4 MG/DL (ref 1.6–2.6)
MCH RBC QN AUTO: 28.6 PG (ref 26.8–34.3)
MCHC RBC AUTO-ENTMCNC: 32.5 G/DL (ref 31.4–37.4)
MCV RBC AUTO: 88 FL (ref 82–98)
METAMYELOCYTES NFR BLD MANUAL: 2 % (ref 0–1)
MONOCYTES # BLD AUTO: 0.53 THOUSAND/UL (ref 0–1.22)
MONOCYTES NFR BLD: 7 % (ref 4–12)
MYELOCYTES NFR BLD MANUAL: 1 % (ref 0–1)
NEUTROPHILS # BLD MANUAL: 5.25 THOUSAND/UL (ref 1.85–7.62)
NEUTS BAND NFR BLD MANUAL: 2 % (ref 0–8)
NEUTS SEG NFR BLD AUTO: 67 % (ref 43–75)
NRBC BLD AUTO-RTO: 0 /100 WBCS
PHOSPHATE SERPL-MCNC: 2.2 MG/DL (ref 2.3–4.1)
PLATELET # BLD AUTO: 434 THOUSANDS/UL (ref 149–390)
PLATELET BLD QL SMEAR: ABNORMAL
PMV BLD AUTO: 9.1 FL (ref 8.9–12.7)
POIKILOCYTOSIS BLD QL SMEAR: PRESENT
POTASSIUM SERPL-SCNC: 3.5 MMOL/L (ref 3.5–5.3)
RBC # BLD AUTO: 4.47 MILLION/UL (ref 3.88–5.62)
RBC MORPH BLD: PRESENT
SODIUM SERPL-SCNC: 136 MMOL/L (ref 136–145)
WBC # BLD AUTO: 7.61 THOUSAND/UL (ref 4.31–10.16)

## 2020-01-16 PROCEDURE — 85007 BL SMEAR W/DIFF WBC COUNT: CPT | Performed by: SURGERY

## 2020-01-16 PROCEDURE — 80048 BASIC METABOLIC PNL TOTAL CA: CPT | Performed by: SURGERY

## 2020-01-16 PROCEDURE — 99232 SBSQ HOSP IP/OBS MODERATE 35: CPT | Performed by: INTERNAL MEDICINE

## 2020-01-16 PROCEDURE — 85384 FIBRINOGEN ACTIVITY: CPT | Performed by: PHYSICIAN ASSISTANT

## 2020-01-16 PROCEDURE — 85730 THROMBOPLASTIN TIME PARTIAL: CPT | Performed by: INTERNAL MEDICINE

## 2020-01-16 PROCEDURE — 85027 COMPLETE CBC AUTOMATED: CPT | Performed by: SURGERY

## 2020-01-16 PROCEDURE — 84100 ASSAY OF PHOSPHORUS: CPT | Performed by: INTERNAL MEDICINE

## 2020-01-16 PROCEDURE — 74177 CT ABD & PELVIS W/CONTRAST: CPT

## 2020-01-16 PROCEDURE — 83735 ASSAY OF MAGNESIUM: CPT | Performed by: INTERNAL MEDICINE

## 2020-01-16 PROCEDURE — 99232 SBSQ HOSP IP/OBS MODERATE 35: CPT | Performed by: PHYSICIAN ASSISTANT

## 2020-01-16 PROCEDURE — 99232 SBSQ HOSP IP/OBS MODERATE 35: CPT | Performed by: SURGERY

## 2020-01-16 RX ORDER — SODIUM CHLORIDE, SODIUM GLUCONATE, SODIUM ACETATE, POTASSIUM CHLORIDE, MAGNESIUM CHLORIDE, SODIUM PHOSPHATE, DIBASIC, AND POTASSIUM PHOSPHATE .53; .5; .37; .037; .03; .012; .00082 G/100ML; G/100ML; G/100ML; G/100ML; G/100ML; G/100ML; G/100ML
100 INJECTION, SOLUTION INTRAVENOUS CONTINUOUS
Status: DISCONTINUED | OUTPATIENT
Start: 2020-01-16 | End: 2020-01-19

## 2020-01-16 RX ADMIN — SODIUM CHLORIDE, SODIUM GLUCONATE, SODIUM ACETATE, POTASSIUM CHLORIDE, MAGNESIUM CHLORIDE, SODIUM PHOSPHATE, DIBASIC, AND POTASSIUM PHOSPHATE 75 ML/HR: .53; .5; .37; .037; .03; .012; .00082 INJECTION, SOLUTION INTRAVENOUS at 00:00

## 2020-01-16 RX ADMIN — IOHEXOL 100 ML: 350 INJECTION, SOLUTION INTRAVENOUS at 18:35

## 2020-01-16 RX ADMIN — ASPIRIN 81 MG 81 MG: 81 TABLET ORAL at 08:30

## 2020-01-16 RX ADMIN — PANTOPRAZOLE SODIUM 40 MG: 40 TABLET, DELAYED RELEASE ORAL at 05:26

## 2020-01-16 RX ADMIN — SODIUM CHLORIDE, SODIUM GLUCONATE, SODIUM ACETATE, POTASSIUM CHLORIDE, MAGNESIUM CHLORIDE, SODIUM PHOSPHATE, DIBASIC, AND POTASSIUM PHOSPHATE 100 ML/HR: .53; .5; .37; .037; .03; .012; .00082 INJECTION, SOLUTION INTRAVENOUS at 22:20

## 2020-01-16 RX ADMIN — HEPARIN SODIUM AND DEXTROSE 23.1 UNITS/KG/HR: 10000; 5 INJECTION INTRAVENOUS at 19:35

## 2020-01-16 RX ADMIN — HEPARIN SODIUM 6000 UNITS: 1000 INJECTION INTRAVENOUS; SUBCUTANEOUS at 02:01

## 2020-01-16 RX ADMIN — MELATONIN 2000 UNITS: at 08:30

## 2020-01-16 RX ADMIN — HEPARIN SODIUM AND DEXTROSE 23 UNITS/KG/HR: 10000; 5 INJECTION INTRAVENOUS at 02:05

## 2020-01-16 NOTE — PROGRESS NOTES
Gastroenterology Progress Note   - Khushbu Lin 77 y o  male MRN: [id number]    [facility] Encounter: [id number]      Assessment and Plan:   Diarrhea  56-year-old male patient found to have stenosis in the distal TI associated with proximal small-bowel distension  Patient also has diarrhea that has been ongoing for about a year  A prior workup as an outpatient was negative twice for any infectious causes including C diff  Prior sigmoid and rectal biopsies negative  CT of the abdomen shows an inflamed segment of the terminal ileum suspicious for Crohn's disease  Colonoscopy was performed, he was found to have inflammation in the TI, biopsies were performed, there is high possibility this represents Crohn's disease  Inflammatory markers showed elevated CRP and fecal calprotectin  Patient will require CT enterography     Subjective:   Patient seen and examined at the bed, denies any events overnight, currently is tolerating PO route partially, denies nausea or vomiting, is not passing flatus or having bowel movements, denies chest pain or shortness of breath, no abdominal pain but abdominal distention, patient is not ambulating     Objective:     Vitals: Blood pressure 121/79, pulse 83, temperature 98 2 °F (36 8 °C), resp  rate 18, height 5' 10" (1 778 m), weight 82 4 kg (181 lb 11 2 oz), SpO2 91 %  ,Body mass index is 26 07 kg/m²  Intake/Output Summary (Last 24 hours) at 1/16/2020 1123  Last data filed at 1/16/2020 0800  Gross per 24 hour   Intake 2865 ml   Output 50 ml   Net 2815 ml       Physical Exam:   Physical Exam   Constitutional: He is oriented to person, place, and time  He appears well-developed and well-nourished  HENT:   Head: Normocephalic and atraumatic  Mouth/Throat: Oropharynx is clear and moist    Eyes: EOM are normal    Neck: Normal range of motion  Neck supple  Cardiovascular: Normal rate     Pulmonary/Chest: Effort normal and breath sounds normal    Abdominal: He exhibits distension  Neurological: He is alert and oriented to person, place, and time  Skin: Skin is warm and dry  Nursing note and vitals reviewed  Invasive Devices     Peripheral Intravenous Line            Peripheral IV 01/13/20 Right Antecubital 3 days    Peripheral IV 01/13/20 Right Forearm 2 days                Lab Results:  Results from last 7 days   Lab Units 01/16/20  0813  01/13/20  1013   WBC Thousand/uL 7 61   < > 14 17*   HEMOGLOBIN g/dL 12 8   < > 16 2   HEMATOCRIT % 39 4   < > 49 5*   PLATELETS Thousands/uL 434*   < > 685*   NEUTROS PCT %  --   --  74   LYMPHS PCT %  --   --  14   LYMPHO PCT % 20   < >  --    MONOS PCT %  --   --  11   MONO PCT % 7   < >  --    EOS PCT % 1   < > 0    < > = values in this interval not displayed  Results from last 7 days   Lab Units 01/16/20  0813  01/14/20  0156   POTASSIUM mmol/L 3 5   < > 3 7   CHLORIDE mmol/L 102   < > 105   CO2 mmol/L 30   < > 23   BUN mg/dL 13   < > 48*   CREATININE mg/dL 0 95   < > 2 25*   CALCIUM mg/dL 7 8*   < > 7 6*   ALK PHOS U/L  --   --  126*   ALT U/L  --   --  18   AST U/L  --   --  14    < > = values in this interval not displayed  Results from last 7 days   Lab Units 01/15/20  0501   INR  1 34*     Results from last 7 days   Lab Units 01/13/20  1013   LIPASE u/L 93       Imaging Studies: I have personally reviewed pertinent imaging studies  Ct Abdomen Pelvis Wo Contrast    Result Date: 1/13/2020  Impression: 1  Findings consistent with a small bowel obstruction with a transition point at a long segment of distal ileitis  There is also a separate short segment of upstream ileitis ("skip lesion") with luminal narrowing  There is intramural fat at the terminal ileum suggesting chronic/old inflammation and further abrupt decompression of the terminal ileum, raising the possibility of fibrostenosing disease/fibrotic stricture  Overall, findings are concerning for Crohn's disease  No evidence of penetrating disease   2  Bilateral nonobstructing nephrolithiasis  3   Stable 3 2 x 2 6 cm infrarenal abdominal aortic aneurysm  I personally discussed this study with Hugo Louis on 1/13/2020 at 1:55 PM  Workstation performed: [id number]     Xr Chest 2 Views    Result Date: 1/13/2020  Impression: No acute cardiopulmonary disease  Workstation performed: [id number]     Ct Head Wo Contrast    Result Date: 1/15/2020  Impression: No acute intracranial abnormality   Workstation performed: [id number]

## 2020-01-16 NOTE — PROGRESS NOTES
NEPHROLOGY PROGRESS NOTE   Rebecca Beltran 77 y o  male MRN: [id number]  Unit/Bed#: [facility] 827-01 Encounter: [id number]    ASSESSMENT & PLAN:  77 y o male initially presented with complain of right leg pain  He has a history of chronic diarrhea, AFib, peripheral vascular disease status post right popliteal stent  On admission he was found to have creatinine of 3 99  CT on admission was suggestive of small-bowel obstruction  Nephrology consulted for PHUONG  · Acute kidney injury, POA likely prerenal from volume depletion due to diarrhea and poor oral intake, renal function improving from admission creatinine 3 9-2 2 on 1/14  Further improved to cr 0 9 today   · Avoid hypotension and avoid nephrotoxins  · Blood pressure improved   · Discussed again about risk of MARCIAL with patient  And his wife at bedside with lower extremity angiogram planned for Friday and he verbalized understanding  Okay for angiogram on Friday if renal function remains stable  · Will stop IV fluid for now and restarted 9:00 p m  Tonight  Will continue same IV fluid at 100 mL/hour and stop 6 hours after arteriogram   · 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 with baseline creatinine 1 0-1 5, follows with Dr Nigel Martinez  SPEP in October was negative for monoclonal protein, kappa lambda ratio 1 25  UPEP was negative for monoclonal protein  · Hypokalemia: replaced  · Diarrhea/small bowel obstruction:  Continue management per surgery and GI  Status post EGD on 01/15 with normal findings, colonoscopy showed ulcerated mucosa and inflammation of terminal ileum and areas of stricture  Continue management per surgery as well as GI  · Right lower extremity ischemia:  Suspected thrombosis of popliteal stent  Discussed with patient about risk of worsening renal function with IV contrast and about the possibility of requiring dialysis and he verbalized understanding  Planned for arteriogram and popliteal lysis on Friday    IV hydration as mentioned above  · Hypophosphatemia:  Continue to monitor  Should improve with increased oral intake  · Hyponatremia:  Resolved    Discussed with primary attending- Dr Dewitte Bamberger  SUBJECTIVE:  No SOB  Appears awake and alert  Plan for arteriogram tomorrow    OBJECTIVE:  Current Weight: Weight - Scale: 82 4 kg (181 lb 11 2 oz)  Vitals:    01/16/20 0717   BP: 121/79   Pulse: 83   Resp: 18   Temp: 98 2 °F (36 8 °C)   SpO2: 91%       Intake/Output Summary (Last 24 hours) at 1/16/2020 1114  Last data filed at 1/16/2020 0800  Gross per 24 hour   Intake 2865 ml   Output 50 ml   Net 2815 ml       Physical Exam  General:  Ill looking, awake  Eyes: Conjunctivae pink,  Sclera anicteric  ENT: lips and mucous membranes moist  Neck: supple   Chest: Clear to Auscultation both lungs,  no crackles, ronchus or wheezing  CVS: S1 & S2 present, normal rate, regular rhythm, no murmur    Abdomen: soft, non-tender, non-distended, Bowel sounds normoactive  Extremities: no edema of  legs  Skin: no rash  Neuro: awake, alert, oriented x 3     Medications:    Current Facility-Administered Medications:     acetaminophen (TYLENOL) tablet 650 mg, 650 mg, Oral, Q6H PRN, Joni Bermudez MD    aspirin chewable tablet 81 mg, 81 mg, Oral, Daily, Viktoriya Vogel MD, 81 mg at 01/16/20 0830    atorvastatin (LIPITOR) tablet 20 mg, 20 mg, Oral, Daily With Malik Acuna MD, 20 mg at 01/15/20 1651    cholecalciferol (VITAMIN D3) tablet 2,000 Units, 2,000 Units, Oral, Daily, Joni Bermudez MD, 2,000 Units at 01/16/20 0830    heparin (porcine) 25,000 units in 250 mL infusion (premix), 3-30 Units/kg/hr (Order-Specific), Intravenous, Titrated, Viktoriya Vogel MD, Last Rate: 15 8 mL/hr at 01/16/20 0936, 21 1 Units/kg/hr at 01/16/20 0936    heparin (porcine) injection 3,000 Units, 3,000 Units, Intravenous, PRN, Joni Bermudez MD, 3,000 Units at 01/15/20 0146    heparin (porcine) injection 6,000 Units, 6,000 Units, Intravenous, PRN, Jackie Bean MD, 6,000 Units at 01/16/20 0201    metoprolol (LOPRESSOR) injection 5 mg, 5 mg, Intravenous, Q6H PRN, Jackie Bean MD    multi-electrolyte (PLASMALYTE-A/ISOLYTE-S PH 7 4) IV solution, 75 mL/hr, Intravenous, Continuous, Radha Gonzalez MD, Last Rate: 75 mL/hr at 01/16/20 0000, 75 mL/hr at 01/16/20 0000    ondansetron (ZOFRAN) injection 4 mg, 4 mg, Intravenous, Q6H PRN, Jackie Bean MD    pantoprazole (PROTONIX) EC tablet 40 mg, 40 mg, Oral, Early Morning, Viktoriya Vogel MD, 40 mg at 01/16/20 0526    phenol (CHLORASEPTIC) 1 4 % mucosal liquid 1 spray, 1 spray, Mouth/Throat, Q2H PRN, LENORA Todd    Invasive Devices:        Lab Results:   Results from last 7 days   Lab Units 01/16/20  0813 01/15/20  0501 01/14/20  0338 01/14/20  0156 01/13/20  1013   WBC Thousand/uL 7 61 7 35 9 14  --  14 17*   HEMOGLOBIN g/dL 12 8 12 3 13 4  --  16 2   HEMATOCRIT % 39 4 37 3 41 0  --  49 5*   PLATELETS Thousands/uL 434* 477* 516*  --  685*   POTASSIUM mmol/L 3 5 3 0*  --  3 7 4 0   CHLORIDE mmol/L 102 107  --  105 97*   CO2 mmol/L 30 25  --  23 23   BUN mg/dL 13 25  --  48* 47*   CREATININE mg/dL 0 95 1 06  --  2 25* 3 99*   CALCIUM mg/dL 7 8* 7 4*  --  7 6* 9 0   MAGNESIUM mg/dL 2 4  --   --   --  2 1   PHOSPHORUS mg/dL 2 2*  --   --   --   --    ALK PHOS U/L  --   --   --  126* 177*   ALT U/L  --   --   --  18 27   AST U/L  --   --   --  14 24       Previous work up:         Portions of the record may have been created with voice recognition software  Occasional wrong word or "sound a like" substitutions may have occurred due to the inherent limitations of voice recognition software  Read the chart carefully and recognize, using context, where substitutions have occurred  If you have any questions, please contact the dictating provider

## 2020-01-16 NOTE — PROGRESS NOTES
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 s/p right popliteal stent placement 6/27/19 and afib on xarelto, presents with acute right leg ischemia  Plan:  1  RLE limb ischemia, probable 2/2 thrombosis of popliteal stent  - Plan for angiogram tomorrow 1/17, as long as renal function stable  - CT head 1/15: no acute abnormalities  - NPO after MN  - xarelto held  - cont hep gtt    2  Concern for Crohn's disease  - EGD: no abnormalities  - colonoscopy: ulcerated mucosa and inflammation of terminal ileum  bx sent  - GI following     3  PHUONG  - Cr stable  - IVF  - nephrology following    Rest of care per primary team    Subjective/Objective   Chief Complaint: tingling in toe    Subjective:  Patient interviewed in room, lying in bed, states right 2nd toe felt a little tingly earlier but has since went away  Per patient, states this happens on and off and was the worst prior to his stent placement in the June of 2019  Is eager to get the angiogram in go home  Denies any other acute complaints  Denies leg or foot pain, numbness, chest pain, shortness of breath, fever, chills  Objective:     Blood pressure 120/73, pulse 85, temperature 98 3 °F (36 8 °C), resp  rate 18, height 5' 10" (1 778 m), weight 81 2 kg (179 lb 0 2 oz), SpO2 91 %  ,Body mass index is 25 69 kg/m²  Intake/Output Summary (Last 24 hours) at 1/16/2020 0631  Last data filed at 1/16/2020 0400  Gross per 24 hour   Intake 2787 85 ml   Output 50 ml   Net 2737 85 ml       Invasive Devices     Peripheral Intravenous Line            Peripheral IV 01/13/20 Right Antecubital 2 days    Peripheral IV 01/13/20 Right Forearm 2 days                Physical Exam   Constitutional: He is oriented to person, place, and time  He appears well-developed and well-nourished  No distress  HENT:   Head: Normocephalic and atraumatic  Eyes: EOM are normal    Neck: Neck supple  Cardiovascular: Normal rate, regular rhythm and normal heart sounds  Exam reveals no gallop and no friction rub  No murmur heard  Pulmonary/Chest: Effort normal and breath sounds normal  No stridor  No respiratory distress  He has no wheezes  Neurological: He is alert and oriented to person, place, and time  Skin: Skin is warm and dry  He is not diaphoretic  Psychiatric: He has a normal mood and affect  His behavior is normal    Nursing note and vitals reviewed  Pulses:  Femoral: 2+ b/l  Pop: R: signal on doppler, L: 2+  PT: R: monophasic signal, L: 2+  DP: R: no signal, L: 2+    Motor and sensory intact b/l LE  LE warm b/l       Scheduled Meds:  Current Facility-Administered Medications:  acetaminophen 650 mg Oral Q6H PRN Eder Antunez MD    aspirin 81 mg Oral Daily Viktoriya Vogel MD    atorvastatin 20 mg Oral Daily With Nolan Caballero MD    cholecalciferol 2,000 Units Oral Daily Eder Antunez MD    heparin (porcine) 3-30 Units/kg/hr (Order-Specific) Intravenous Titrated Eder Antunez MD Last Rate: 23 Units/kg/hr (01/16/20 0205)   heparin (porcine) 3,000 Units Intravenous PRN Viktoriya Vogel MD    heparin (porcine) 6,000 Units Intravenous PRN Viktoriya Vogel MD    metoprolol 5 mg Intravenous Q6H PRN Eder Antunez MD    multi-electrolyte 75 mL/hr Intravenous Continuous Orvil MD Corey Last Rate: 75 mL/hr (01/16/20 0000)   ondansetron 4 mg Intravenous Q6H PRN Viktoriya Vogel MD    pantoprazole 40 mg Oral Early Morning Viktoriya Vogel MD    phenol 1 spray Mouth/Throat Q2H PRN LENORA Martinez      Continuous Infusions:  heparin (porcine) 3-30 Units/kg/hr (Order-Specific) Last Rate: 23 Units/kg/hr (01/16/20 0205)   multi-electrolyte 75 mL/hr Last Rate: 75 mL/hr (01/16/20 0000)     PRN Meds:   acetaminophen    heparin (porcine)    heparin (porcine)    metoprolol    ondansetron    phenol      Lab, Imaging and other studies:I have personally reviewed pertinent lab results    , CBC: No results found for: WBC, HGB, HCT, MCV, PLT, ADJUSTEDWBC, MCH, MCHC, RDW, MPV, NRBC, CMP: No results found for: SODIUM, K, CL, CO2, ANIONGAP, BUN, CREATININE, GLUCOSE, CALCIUM, AST, ALT, ALKPHOS, PROT, BILITOT, EGFR, Coagulation: No results found for: PT, INR, APTT, Urinalysis: No results found for: COLORU, CLARITYU, SPECGRAV, PHUR, LEUKOCYTESUR, NITRITE, PROTEINUA, GLUCOSEU, KETONESU, BILIRUBINUR, BLOODU, Amylase: No results found for: AMYLASE, Lipase: No results found for: LIPASE     VTE Pharmacologic Prophylaxis: Heparin  VTE Mechanical Prophylaxis: sequential compression device      Dawson Avalos PA-C  1/16/2020 6:31 AM

## 2020-01-16 NOTE — PROGRESS NOTES
Progress Note - David Medrano 1950, 77 y o  male MRN: [id number]    Unit/Bed#: [facility] 827-01 Encounter: [id number]    Primary Care Provider: Ean Brown MD   Date and time admitted to hospital: 1/13/2020  9:23 AM        * Limb ischemia  Assessment & Plan  · Acute right leg ischemic pain with numbness, both have improved now  · Underlying history of peripheral artery disease  · Vascular on board, presently on heparin bridge as Xarelto was on hold  · Planned for RLE arteriogram and revascularization tomorrow  · Status post Leads   · Educated on smoking cessation    SBO (small bowel obstruction) ([facility] )  Assessment & Plan  · Found on CT scan in ED today, with transition near terminal ileum, there are places with suspicious inflammation in TI with possible skipped areas concerning for crohn's  · General surgery following, continue serial abdominal exams  · Status post NG tube removal  · Positive bowel movements  · GI on board, s/p colonoscopy findings suspicious for inflammatory disease, planned for ct enterography today      Diarrhea  Assessment & Plan  · Going on since last 1 year  · According to patient underwent EGD and colonoscopy which showed some colonic polyps but nothing else  · Given CT imaging concern for underlying inflammatory bowel disease  GI on board    S/p EGD/colonoscopy findings suspicious for inflammatory disease  · Pending ct enterography    PHUONG (acute kidney injury) ([facility] )  Assessment & Plan  · On CKD stage 3  · Nephrology following, etiology secondary to pre renal  · Creatinine trended down   · Avoid nephrotoxins and renally dose medications    Hypokalemia  Assessment & Plan  Repleted accordingly    SIRS (systemic inflammatory response syndrome) ([facility] )  Assessment & Plan  Of non infectious etiology, secondary to limb ischemia  No focal source of infection    Thrombocytosis (HCC)  Assessment & Plan  · Chronic, being followed by Hematology  · JAK2 mutation negative  · Continue to monitor    Paroxysmal atrial fibrillation (HCC)  Assessment & Plan  Rate controlled  Anticoagulation with heparin drip as Xarelto on hold    PAD (peripheral artery disease) (HCC)  Assessment & Plan  · S/p right popliteal stent  · was on xarelto & statin at home  · On heparin gtt while xarelto is on hold      VTE Pharmacologic Prophylaxis:   Pharmacologic: Heparin Drip  Mechanical VTE Prophylaxis in Place: No    Patient Centered Rounds: I have performed bedside rounds with nursing staff today  Discussions with Specialists or Other Care Team Provider: Nephrology    Education and Discussions with Family / Patient: Patient and his wife    Time Spent for Care: 30 minutes  More than 50% of total time spent on counseling and coordination of care as described above  Current Length of Stay: 3 day(s)    Current Patient Status: Inpatient   Certification Statement: The patient will continue to require additional inpatient hospital stay due to pending arteriogram    Discharge Plan:     Code Status: Level 1 - Full Code      Subjective:   No complaints  RLE pain resolved  No abd pain    Objective:     Vitals:   Temp (24hrs), Av 3 °F (36 8 °C), Min:98 2 °F (36 8 °C), Max:98 5 °F (36 9 °C)    Temp:  [98 2 °F (36 8 °C)-98 5 °F (36 9 °C)] 98 5 °F (36 9 °C)  HR:  [83-99] 99  Resp:  [18-20] 20  BP: (114-121)/(73-79) 114/77  SpO2:  [91 %-93 %] 93 %  Body mass index is 26 07 kg/m²  Input and Output Summary (last 24 hours): Intake/Output Summary (Last 24 hours) at 2020 1755  Last data filed at 2020 1602  Gross per 24 hour   Intake 2501 25 ml   Output 150 ml   Net 2351 25 ml       Physical Exam:     Physical Exam   Constitutional: He is oriented to person, place, and time  He appears well-developed and well-nourished  Neck: Neck supple  Cardiovascular: Normal rate, regular rhythm, normal heart sounds and intact distal pulses  Exam reveals no gallop and no friction rub  No murmur heard    Pulmonary/Chest: Effort normal and breath sounds normal  No stridor  No respiratory distress  He has no wheezes  He has no rales  He exhibits no tenderness  Abdominal: Soft  Bowel sounds are normal  He exhibits no distension  There is no tenderness  There is no rebound and no guarding  No hernia  Musculoskeletal: Normal range of motion  He exhibits no edema, tenderness or deformity  Neurological: He is alert and oriented to person, place, and time  He displays normal reflexes  No cranial nerve deficit or sensory deficit  He exhibits normal muscle tone  Coordination normal            Additional Data:     Labs:    Results from last 7 days   Lab Units 01/16/20  0813 01/13/20  1013   WBC Thousand/uL 7 61   < > 14 17*   HEMOGLOBIN g/dL 12 8   < > 16 2   HEMATOCRIT % 39 4   < > 49 5*   PLATELETS Thousands/uL 434*   < > 685*   BANDS PCT % 2   < >  --    NEUTROS PCT %  --   --  74   LYMPHS PCT %  --   --  14   LYMPHO PCT % 20   < >  --    MONOS PCT %  --   --  11   MONO PCT % 7   < >  --    EOS PCT % 1   < > 0    < > = values in this interval not displayed  Results from last 7 days   Lab Units 01/16/20  0813  01/14/20  0156   SODIUM mmol/L 136   < > 137   POTASSIUM mmol/L 3 5   < > 3 7   CHLORIDE mmol/L 102   < > 105   CO2 mmol/L 30   < > 23   BUN mg/dL 13   < > 48*   CREATININE mg/dL 0 95   < > 2 25*   ANION GAP mmol/L 4   < > 9   CALCIUM mg/dL 7 8*   < > 7 6*   ALBUMIN g/dL  --   --  2 1*   TOTAL BILIRUBIN mg/dL  --   --  0 53   ALK PHOS U/L  --   --  126*   ALT U/L  --   --  18   AST U/L  --   --  14   GLUCOSE RANDOM mg/dL 91   < > 117    < > = values in this interval not displayed  Results from last 7 days   Lab Units 01/15/20  0501   INR  1 34*                       * I Have Reviewed All Lab Data Listed Above  * Additional Pertinent Lab Tests Reviewed:  All Labs Within Last 24 Hours Reviewed    Imaging:    Imaging Reports Reviewed Today Include:   Imaging Personally Reviewed by Myself Includes:      Recent Cultures (last 7 days):           Last 24 Hours Medication List:     Current Facility-Administered Medications:  acetaminophen 650 mg Oral Q6H PRN Laverne Painter MD    aspirin 81 mg Oral Daily Viktoriya Vogel MD    atorvastatin 20 mg Oral Daily With Ita Macdonald MD    cholecalciferol 2,000 Units Oral Daily Laverne Painter MD    heparin (porcine) 3-30 Units/kg/hr (Order-Specific) Intravenous Titrated Laverne Painter MD Last Rate: 23 1 Units/kg/hr (01/16/20 1710)   heparin (porcine) 3,000 Units Intravenous PRN Laverne Painter MD    heparin (porcine) 6,000 Units Intravenous PRN Viktoriya Vogel MD    metoprolol 5 mg Intravenous Q6H PRN Laverne Painter MD    multi-electrolyte 100 mL/hr Intravenous Continuous Gissellebijan Pelaez MD    ondansetron 4 mg Intravenous Q6H PRN Viktoriya Vogel MD    pantoprazole 40 mg Oral Early Morning Laverne Painter MD    phenol 1 spray Mouth/Throat Q2H PRN LENORA Gomez         Today, Patient Was Seen By: Donato Bonds DO    ** Please Note: Dictation voice to text software may have been used in the creation of this document   **

## 2020-01-16 NOTE — PROGRESS NOTES
Progress Note - Taqueria Hughes 77 y o  male MRN: [id number]    Unit/Bed#: [facility] 827-01 Encounter: [id number]      Assessment:  77 y o  M who presented with a R popliteal stent occlusion and CT findings of SBO w transition point vs stricture at the distal ileum  1/15 EGD w normal findings, however c-scope w ulcerated mucosa and inflammation of terminal ileum, also w areas of strictures  VSS  Afebrile  Abdomen soft/ nontender/ mild distension  + bowel fcn  Plan:  Continue diet  ambualte  Continue heparin gtt     Subjective:   Abdominal pain improved/ back to baseline  Tolerating diet  Having bowel mvts  Denied fever, chills, chest pain, shortness of breath, nausea, vomiting, or abdominal pain this morning  Objective:     Vitals: Blood pressure 120/73, pulse 85, temperature 98 3 °F (36 8 °C), resp  rate 18, height 5' 10" (1 778 m), weight 81 2 kg (179 lb 0 2 oz), SpO2 91 %  ,Body mass index is 25 69 kg/m²  Intake/Output Summary (Last 24 hours) at 1/16/2020 0616  Last data filed at 1/16/2020 0400  Gross per 24 hour   Intake 2787 85 ml   Output 50 ml   Net 2737 85 ml       Physical Exam  General: NAD  HEENT: NC/AT  MMM  Cv: RRR     Lungs: normal effort  Ab: Soft, NT/ND  Ex: no CCE  Neuro: AAOx3    Scheduled Meds:  Current Facility-Administered Medications:  acetaminophen 650 mg Oral Q6H PRN Oral Cockayne, MD    aspirin 81 mg Oral Daily Viktoriya Vogel MD    atorvastatin 20 mg Oral Daily With Fernando Thompson MD    cholecalciferol 2,000 Units Oral Daily Oral Cockayne, MD    heparin (porcine) 3-30 Units/kg/hr (Order-Specific) Intravenous Titrated Oral Cockayne, MD Last Rate: 23 Units/kg/hr (01/16/20 0205)   heparin (porcine) 3,000 Units Intravenous PRN Oral Cockayne, MD    heparin (porcine) 6,000 Units Intravenous PRN Viktoriya Vogel MD    metoprolol 5 mg Intravenous Q6H PRN Oral Cockayne, MD    multi-electrolyte 75 mL/hr Intravenous Continuous Margurite Cabot, MD Last Rate: 75 mL/hr (01/16/20 0000)   ondansetron 4 mg Intravenous Q6H PRN Viktoriya Vogel MD    pantoprazole 40 mg Oral Early Morning Viktoriya Vogel MD    phenol 1 spray Mouth/Throat Q2H PRN LENORA Todd      Continuous Infusions:  heparin (porcine) 3-30 Units/kg/hr (Order-Specific) Last Rate: 23 Units/kg/hr (01/16/20 0205)   multi-electrolyte 75 mL/hr Last Rate: 75 mL/hr (01/16/20 0000)     PRN Meds:   acetaminophen    heparin (porcine)    heparin (porcine)    metoprolol    ondansetron    phenol      Invasive Devices     Peripheral Intravenous Line            Peripheral IV 01/13/20 Right Antecubital 2 days    Peripheral IV 01/13/20 Right Forearm 2 days                Lab, Imaging and other studies: I have personally reviewed pertinent reports      VTE Pharmacologic Prophylaxis: Heparin  VTE Mechanical Prophylaxis: sequential compression device

## 2020-01-17 ENCOUNTER — APPOINTMENT (INPATIENT)
Dept: RADIOLOGY | Facility: HOSPITAL | Age: 70
DRG: 314 | End: 2020-01-17
Payer: MEDICARE

## 2020-01-17 LAB
ANION GAP SERPL CALCULATED.3IONS-SCNC: 5 MMOL/L (ref 4–13)
APTT PPP: 65 SECONDS (ref 23–37)
BACTERIA UR QL AUTO: ABNORMAL /HPF
BILIRUB UR QL STRIP: ABNORMAL
BUN SERPL-MCNC: 12 MG/DL (ref 5–25)
CALCIUM SERPL-MCNC: 7.6 MG/DL (ref 8.3–10.1)
CHLORIDE SERPL-SCNC: 103 MMOL/L (ref 100–108)
CLARITY UR: ABNORMAL
CO2 SERPL-SCNC: 26 MMOL/L (ref 21–32)
COLOR UR: ABNORMAL
CREAT SERPL-MCNC: 0.88 MG/DL (ref 0.6–1.3)
ERYTHROCYTE [DISTWIDTH] IN BLOOD BY AUTOMATED COUNT: 14.4 % (ref 11.6–15.1)
GFR SERPL CREATININE-BSD FRML MDRD: 88 ML/MIN/1.73SQ M
GLUCOSE SERPL-MCNC: 110 MG/DL (ref 65–140)
GLUCOSE UR STRIP-MCNC: NEGATIVE MG/DL
HBV CORE AB SER QL: NORMAL
HBV CORE IGM SER QL: NORMAL
HBV SURFACE AG SER QL: NORMAL
HCT VFR BLD AUTO: 36.9 % (ref 36.5–49.3)
HCV AB SER QL: NORMAL
HGB BLD-MCNC: 12.1 G/DL (ref 12–17)
HGB UR QL STRIP.AUTO: NEGATIVE
HYALINE CASTS #/AREA URNS LPF: ABNORMAL /LPF
KETONES UR STRIP-MCNC: ABNORMAL MG/DL
LEUKOCYTE ESTERASE UR QL STRIP: NEGATIVE
MCH RBC QN AUTO: 28.9 PG (ref 26.8–34.3)
MCHC RBC AUTO-ENTMCNC: 32.8 G/DL (ref 31.4–37.4)
MCV RBC AUTO: 88 FL (ref 82–98)
NITRITE UR QL STRIP: NEGATIVE
NON-SQ EPI CELLS URNS QL MICRO: ABNORMAL /HPF
PH UR STRIP.AUTO: 5.5 [PH]
PHOSPHATE SERPL-MCNC: 2.9 MG/DL (ref 2.3–4.1)
PLATELET # BLD AUTO: 414 THOUSANDS/UL (ref 149–390)
PMV BLD AUTO: 10 FL (ref 8.9–12.7)
POTASSIUM SERPL-SCNC: 3.2 MMOL/L (ref 3.5–5.3)
PROT UR STRIP-MCNC: ABNORMAL MG/DL
RBC # BLD AUTO: 4.18 MILLION/UL (ref 3.88–5.62)
RBC #/AREA URNS AUTO: ABNORMAL /HPF
SODIUM SERPL-SCNC: 134 MMOL/L (ref 136–145)
SP GR UR STRIP.AUTO: 1.02 (ref 1–1.03)
UROBILINOGEN UR QL STRIP.AUTO: 0.2 E.U./DL
WBC # BLD AUTO: 9.94 THOUSAND/UL (ref 4.31–10.16)
WBC #/AREA URNS AUTO: ABNORMAL /HPF

## 2020-01-17 PROCEDURE — 84100 ASSAY OF PHOSPHORUS: CPT | Performed by: INTERNAL MEDICINE

## 2020-01-17 PROCEDURE — 99232 SBSQ HOSP IP/OBS MODERATE 35: CPT | Performed by: INTERNAL MEDICINE

## 2020-01-17 PROCEDURE — 81001 URINALYSIS AUTO W/SCOPE: CPT | Performed by: INTERNAL MEDICINE

## 2020-01-17 PROCEDURE — 87340 HEPATITIS B SURFACE AG IA: CPT | Performed by: INTERNAL MEDICINE

## 2020-01-17 PROCEDURE — 86705 HEP B CORE ANTIBODY IGM: CPT | Performed by: INTERNAL MEDICINE

## 2020-01-17 PROCEDURE — 71045 X-RAY EXAM CHEST 1 VIEW: CPT

## 2020-01-17 PROCEDURE — 86803 HEPATITIS C AB TEST: CPT | Performed by: INTERNAL MEDICINE

## 2020-01-17 PROCEDURE — 85730 THROMBOPLASTIN TIME PARTIAL: CPT | Performed by: INTERNAL MEDICINE

## 2020-01-17 PROCEDURE — 87077 CULTURE AEROBIC IDENTIFY: CPT | Performed by: INTERNAL MEDICINE

## 2020-01-17 PROCEDURE — 85027 COMPLETE CBC AUTOMATED: CPT | Performed by: PHYSICIAN ASSISTANT

## 2020-01-17 PROCEDURE — 86704 HEP B CORE ANTIBODY TOTAL: CPT | Performed by: INTERNAL MEDICINE

## 2020-01-17 PROCEDURE — 87389 HIV-1 AG W/HIV-1&-2 AB AG IA: CPT | Performed by: INTERNAL MEDICINE

## 2020-01-17 PROCEDURE — 80048 BASIC METABOLIC PNL TOTAL CA: CPT | Performed by: PHYSICIAN ASSISTANT

## 2020-01-17 PROCEDURE — 99232 SBSQ HOSP IP/OBS MODERATE 35: CPT | Performed by: PHYSICIAN ASSISTANT

## 2020-01-17 PROCEDURE — 87040 BLOOD CULTURE FOR BACTERIA: CPT | Performed by: INTERNAL MEDICINE

## 2020-01-17 PROCEDURE — 87186 SC STD MICRODIL/AGAR DIL: CPT | Performed by: INTERNAL MEDICINE

## 2020-01-17 PROCEDURE — 86787 VARICELLA-ZOSTER ANTIBODY: CPT | Performed by: INTERNAL MEDICINE

## 2020-01-17 PROCEDURE — 82542 COL CHROMOTOGRAPHY QUAL/QUAN: CPT | Performed by: INTERNAL MEDICINE

## 2020-01-17 PROCEDURE — 86480 TB TEST CELL IMMUN MEASURE: CPT | Performed by: INTERNAL MEDICINE

## 2020-01-17 RX ORDER — PREDNISONE 20 MG/1
40 TABLET ORAL DAILY
Status: DISCONTINUED | OUTPATIENT
Start: 2020-01-18 | End: 2020-01-21 | Stop reason: HOSPADM

## 2020-01-17 RX ORDER — POTASSIUM CHLORIDE 20 MEQ/1
40 TABLET, EXTENDED RELEASE ORAL ONCE
Status: COMPLETED | OUTPATIENT
Start: 2020-01-17 | End: 2020-01-18

## 2020-01-17 RX ADMIN — ACETAMINOPHEN 650 MG: 325 TABLET ORAL at 16:55

## 2020-01-17 RX ADMIN — SODIUM CHLORIDE, SODIUM GLUCONATE, SODIUM ACETATE, POTASSIUM CHLORIDE, MAGNESIUM CHLORIDE, SODIUM PHOSPHATE, DIBASIC, AND POTASSIUM PHOSPHATE 100 ML/HR: .53; .5; .37; .037; .03; .012; .00082 INJECTION, SOLUTION INTRAVENOUS at 06:23

## 2020-01-17 RX ADMIN — METOPROLOL TARTRATE 5 MG: 5 INJECTION INTRAVENOUS at 16:55

## 2020-01-17 RX ADMIN — HEPARIN SODIUM AND DEXTROSE 23.1 UNITS/KG/HR: 10000; 5 INJECTION INTRAVENOUS at 09:35

## 2020-01-17 RX ADMIN — SODIUM CHLORIDE, SODIUM GLUCONATE, SODIUM ACETATE, POTASSIUM CHLORIDE, MAGNESIUM CHLORIDE, SODIUM PHOSPHATE, DIBASIC, AND POTASSIUM PHOSPHATE 100 ML/HR: .53; .5; .37; .037; .03; .012; .00082 INJECTION, SOLUTION INTRAVENOUS at 17:26

## 2020-01-17 RX ADMIN — ASPIRIN 81 MG 81 MG: 81 TABLET ORAL at 16:56

## 2020-01-17 RX ADMIN — ATORVASTATIN CALCIUM 20 MG: 20 TABLET, FILM COATED ORAL at 16:55

## 2020-01-17 RX ADMIN — PANTOPRAZOLE SODIUM 40 MG: 40 TABLET, DELAYED RELEASE ORAL at 06:22

## 2020-01-17 RX ADMIN — HEPARIN SODIUM AND DEXTROSE 23.1 UNITS/KG/HR: 10000; 5 INJECTION INTRAVENOUS at 20:22

## 2020-01-17 NOTE — ASSESSMENT & PLAN NOTE
· Going on since last 1 year  · According to patient underwent EGD and colonoscopy which showed some colonic polyps but nothing else  · Given CT imaging concern for underlying inflammatory bowel disease  GI on board    S/p EGD/colonoscopy findings suspicious for inflammatory disease  · s/p ct enterography

## 2020-01-17 NOTE — ASSESSMENT & PLAN NOTE
· On CKD stage 3  · Nephrology following, etiology secondary to pre renal  · Creatinine trended down   · Avoid nephrotoxins and renally dose medications  · Discussed with nephrology to d/c IVF 6 hours post arteriogram

## 2020-01-17 NOTE — PROGRESS NOTES
NEPHROLOGY PROGRESS NOTE   Arvind Castañeda 77 y o  male MRN: [id number]  Unit/Bed#: [facility] 827-01 Encounter: [id number]    ASSESSMENT & PLAN:  77 y o male initially presented with complain of right leg pain  He has a history of chronic diarrhea, AFib, peripheral vascular disease status post right popliteal stent  On admission he was found to have creatinine of 3 99  CT on admission was suggestive of small-bowel obstruction  Nephrology consulted for PHUONG  · Acute kidney injury, POA likely prerenal from volume depletion due to diarrhea and poor oral intake, renal function improving from admission creatinine 3 9 to 2 2 on 1/14  Further improved to cr 0 9 on 1/16 and stable  · Avoid hypotension and avoid nephrotoxins  · Blood pressure improved   · Discussed  about risk of MARCIAL with patient   and he verbalized understanding  Okay for angiogram today  On IVF isolyte 100 ml /hr - can stop after 6 hrs post contrast     · 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 with baseline creatinine 1 0-1 5, follows with Dr Fern ASENCIO in October was negative for monoclonal protein, kappa lambda ratio 1 25  UPEP was negative for monoclonal protein  · Hypokalemia: replaced  · Diarrhea/small bowel obstruction:  Continue management per surgery and GI  Status post EGD on 01/15 with normal findings, colonoscopy showed ulcerated mucosa and inflammation of terminal ileum and areas of stricture  Continue management per surgery as well as GI  · Right lower extremity ischemia:  Suspected thrombosis of popliteal stent  Discussed with patient about risk of worsening renal function with IV contrast and about the possibility of requiring dialysis and he verbalized understanding  Planned for arteriogram and popliteal lysis today  IV hydration as mentioned above  · Hypophosphatemia: resolved   · Hyponatremia: sodium again dropped to 134 today- continue to monitor       Discussed with primary attending-  Jack Kulkarni  SUBJECTIVE:  No SOB  No chest pain  Plan for arteriogram today  OBJECTIVE:  Current Weight: Weight - Scale: 82 3 kg (181 lb 8 oz)  Vitals:    01/17/20 0737   BP: 112/79   Pulse: 101   Resp: 18   Temp: 98 4 °F (36 9 °C)   SpO2: 91%       Intake/Output Summary (Last 24 hours) at 1/17/2020 1329  Last data filed at 1/17/2020 0800  Gross per 24 hour   Intake 803 33 ml   Output 200 ml   Net 603 33 ml       Physical Exam  General:  Ill looking, awake  Eyes: Conjunctivae pink,  Sclera anicteric  ENT: lips and mucous membranes moist  Neck: supple   Chest: Clear to Auscultation both lungs,  no crackles, ronchus or wheezing  CVS: S1 & S2 present, normal rate, regular rhythm, no murmur    Abdomen: soft, non-tender, non-distended, Bowel sounds normoactive  Extremities: no edema of  legs  Skin: no rash  Neuro: awake, alert, oriented x 3         Medications:    Current Facility-Administered Medications:     acetaminophen (TYLENOL) tablet 650 mg, 650 mg, Oral, Q6H PRN, Layo Ly MD    aspirin chewable tablet 81 mg, 81 mg, Oral, Daily, Layo Ly MD, Stopped at 01/17/20 0936    atorvastatin (LIPITOR) tablet 20 mg, 20 mg, Oral, Daily With Paulette Kaur MD, 20 mg at 01/15/20 1651    cholecalciferol (VITAMIN D3) tablet 2,000 Units, 2,000 Units, Oral, Daily, Layo Ly MD, Stopped at 01/17/20 0936    heparin (porcine) 25,000 units in 250 mL infusion (premix), 3-30 Units/kg/hr (Order-Specific), Intravenous, Titrated, Layo Ly MD, Last Rate: 17 3 mL/hr at 01/17/20 0935, 23 1 Units/kg/hr at 01/17/20 0935    heparin (porcine) injection 3,000 Units, 3,000 Units, Intravenous, PRN, Layo Ly MD, 3,000 Units at 01/15/20 0146    heparin (porcine) injection 6,000 Units, 6,000 Units, Intravenous, PRN, Layo Ly MD, 6,000 Units at 01/16/20 0201    metoprolol (LOPRESSOR) injection 5 mg, 5 mg, Intravenous, Q6H PRN, Layo Ly MD    multi-electrolyte (PLASMALYTE-A/ISOLYTE-S PH 7 4) IV solution, 100 mL/hr, Intravenous, Continuous, Nancy Rosa MD, Last Rate: 100 mL/hr at 01/17/20 0623, 100 mL/hr at 01/17/20 0623    ondansetron (ZOFRAN) injection 4 mg, 4 mg, Intravenous, Q6H PRN, Zechariah Bañuelos MD    pantoprazole (PROTONIX) EC tablet 40 mg, 40 mg, Oral, Early Morning, Viktoriya Vogel MD, 40 mg at 01/17/20 0622    phenol (CHLORASEPTIC) 1 4 % mucosal liquid 1 spray, 1 spray, Mouth/Throat, Q2H PRN, LENORA Silva    potassium chloride (K-DUR,KLOR-CON) CR tablet 40 mEq, 40 mEq, Oral, Once, Herson Harvey DO, Stopped at 01/17/20 0936    Invasive Devices:        Lab Results:   Results from last 7 days   Lab Units 01/17/20  0556 01/17/20  0546 01/16/20  0813 01/15/20  0501  01/14/20  0156 01/13/20  1013   WBC Thousand/uL 9 94  --  7 61 7 35   < >  --  14 17*   HEMOGLOBIN g/dL 12 1  --  12 8 12 3   < >  --  16 2   HEMATOCRIT % 36 9  --  39 4 37 3   < >  --  49 5*   PLATELETS Thousands/uL 414*  --  434* 477*   < >  --  685*   POTASSIUM mmol/L  --  3 2* 3 5 3 0*  --  3 7 4 0   CHLORIDE mmol/L  --  103 102 107  --  105 97*   CO2 mmol/L  --  26 30 25  --  23 23   BUN mg/dL  --  12 13 25  --  48* 47*   CREATININE mg/dL  --  0 88 0 95 1 06  --  2 25* 3 99*   CALCIUM mg/dL  --  7 6* 7 8* 7 4*  --  7 6* 9 0   MAGNESIUM mg/dL  --   --  2 4  --   --   --  2 1   PHOSPHORUS mg/dL  --  2 9 2 2*  --   --   --   --    ALK PHOS U/L  --   --   --   --   --  126* 177*   ALT U/L  --   --   --   --   --  18 27   AST U/L  --   --   --   --   --  14 24    < > = values in this interval not displayed  Previous work up:         Portions of the record may have been created with voice recognition software  Occasional wrong word or "sound a like" substitutions may have occurred due to the inherent limitations of voice recognition software  Read the chart carefully and recognize, using context, where substitutions have occurred  If you have any questions, please contact the dictating provider

## 2020-01-17 NOTE — ASSESSMENT & PLAN NOTE
· Acute right leg ischemic pain with numbness  · Underlying history of peripheral artery disease  · Vascular on board, presently on heparin bridge as Xarelto was on hold  · Planned for RLE arteriogram and revascularization today  · Status post Leads   · Educated on smoking cessation

## 2020-01-17 NOTE — PROGRESS NOTES
Progress Note - Vascular Surgery   Scott Chen 77 y o  male MRN: [id number]  Unit/Bed#: 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 s/p right popliteal stent placement 6/27/19 and afib on xarelto, presents with acute right leg ischemia  Plan:  1  RLE limb ischemia, probable thrombosis of popliteal stent  - Angio today   - NPO     2  Concern for Crohn's disease  - CT enterography: small bowel with decreased dilation  Long segment wall thickening and enhancement of distal ileum consistent with IBD  - EGD: no abnormalities  - colonoscopy: ulcerated mucosa and inflammation of terminal ileum  Biopsy pending  - GI following    3  PHUONG  - Cr stable  - IVF  - nephrology following    Rest of care per primary team    Subjective/Objective   Chief Complaint: no acute complaints    Subjective: Patient doing well  [facility] for procedure today  Denies leg pain, numbness  Denies cp, sob, fever, chills  Objective:     Blood pressure 112/78, pulse (!) 113, temperature 98 °F (36 7 °C), resp  rate 20, height 5' 10" (1 778 m), weight 82 4 kg (181 lb 11 2 oz), SpO2 91 %  ,Body mass index is 26 07 kg/m²  Intake/Output Summary (Last 24 hours) at 1/17/2020 8467  Last data filed at 1/16/2020 2214  Gross per 24 hour   Intake 1317 5 ml   Output 200 ml   Net 1117 5 ml       Invasive Devices     Peripheral Intravenous Line            Peripheral IV 01/13/20 Right Forearm 3 days    Peripheral IV 01/16/20 Distal;Right;Ventral (anterior) Forearm less than 1 day                Physical Exam   Constitutional: He is oriented to person, place, and time  He appears well-developed and well-nourished  No distress  HENT:   Head: Normocephalic and atraumatic  Eyes: EOM are normal    Neck: Neck supple  Cardiovascular: Normal rate and regular rhythm  Exam reveals no gallop and no friction rub  No murmur heard  Pulmonary/Chest: Effort normal and breath sounds normal  No stridor   No respiratory distress  He has no wheezes  Neurological: He is alert and oriented to person, place, and time  Skin: Skin is warm and dry  He is not diaphoretic  Psychiatric: He has a normal mood and affect  His behavior is normal    Nursing note and vitals reviewed  Pulses:  Femoral: 2+ b/l  Pop: R doppler, L 2+  PT: R doppler monophasic, L 2+  DP: R no signal, L 2+    Motor/sensory intact in b/l LE  B/l LE warm  Scheduled Meds:  Current Facility-Administered Medications:  acetaminophen 650 mg Oral Q6H PRN Aurora Gonsales MD    aspirin 81 mg Oral Daily Viktoriya Vogel MD    atorvastatin 20 mg Oral Daily With Prieto Francis MD    cholecalciferol 2,000 Units Oral Daily Aurora Gonsales MD    heparin (porcine) 3-30 Units/kg/hr (Order-Specific) Intravenous Titrated Aurora Gonsales MD Last Rate: 23 1 Units/kg/hr (01/16/20 1935)   heparin (porcine) 3,000 Units Intravenous PRN Viktoriya Vogel MD    heparin (porcine) 6,000 Units Intravenous PRN Viktoriya Vogel MD    metoprolol 5 mg Intravenous Q6H PRN Aurora Gonsales MD    multi-electrolyte 100 mL/hr Intravenous Continuous Scarlet Primrose, MD Last Rate: 100 mL/hr (01/16/20 2220)   ondansetron 4 mg Intravenous Q6H PRN Viktoriya Vogel MD    pantoprazole 40 mg Oral Early Morning Viktoriya Vogel MD    phenol 1 spray Mouth/Throat Q2H PRN LENORA Blue      Continuous Infusions:  heparin (porcine) 3-30 Units/kg/hr (Order-Specific) Last Rate: 23 1 Units/kg/hr (01/16/20 1935)   multi-electrolyte 100 mL/hr Last Rate: 100 mL/hr (01/16/20 2220)     PRN Meds:   acetaminophen    heparin (porcine)    heparin (porcine)    metoprolol    ondansetron    phenol      Lab, Imaging and other studies:  I have personally reviewed pertinent lab results    , CBC:   Lab Results   Component Value Date    WBC 7 61 01/16/2020    HGB 12 8 01/16/2020    HCT 39 4 01/16/2020    MCV 88 01/16/2020     (H) 01/16/2020    MCH 28 6 01/16/2020    MCHC 32 5 01/16/2020    RDW 14 4 01/16/2020    MPV 9 1 01/16/2020    NRBC 0 01/16/2020   , CMP:   Lab Results   Component Value Date    SODIUM 136 01/16/2020    K 3 5 01/16/2020     01/16/2020    CO2 30 01/16/2020    BUN 13 01/16/2020    CREATININE 0 95 01/16/2020    CALCIUM 7 8 (L) 01/16/2020    EGFR 82 01/16/2020   , Coagulation: No results found for: PT, INR, APTT, Urinalysis: No results found for: COLORU, CLARITYU, SPECGRAV, PHUR, LEUKOCYTESUR, NITRITE, PROTEINUA, GLUCOSEU, KETONESU, BILIRUBINUR, BLOODU, Amylase: No results found for: AMYLASE, Lipase: No results found for: LIPASE  VTE Pharmacologic Prophylaxis: Heparin  VTE Mechanical Prophylaxis: sequential compression device      Johanna Venegas PA-C  1/17/2020 6:19 AM

## 2020-01-17 NOTE — PROGRESS NOTES
Progress Note - Taqueria Hughes 1950, 77 y o  male MRN: [id number]    Unit/Bed#: [facility] 827-01 Encounter: [id number]    Primary Care Provider: Harshal Stokes MD   Date and time admitted to hospital: 1/13/2020  9:23 AM        * Limb ischemia  Assessment & Plan  · Acute right leg ischemic pain with numbness  · Underlying history of peripheral artery disease  · Vascular on board, presently on heparin bridge as Xarelto was on hold  · Planned for RLE arteriogram and revascularization today  · Status post Leads   · Educated on smoking cessation    SBO (small bowel obstruction) ([facility] [address] )  Assessment & Plan  · Found on CT scan in ED today, with transition near terminal ileum, there are places with suspicious inflammation in TI with possible skipped areas concerning for crohn's  · General surgery following, continue serial abdominal exams, diet advanced with tolerance  · Status post NG tube removal  · Positive bowel movements  · GI on board, s/p colonoscopy findings suspicious for inflammatory disease, s/p ct enterography       Diarrhea  Assessment & Plan  · Going on since last 1 year  · According to patient underwent EGD and colonoscopy which showed some colonic polyps but nothing else  · Given CT imaging concern for underlying inflammatory bowel disease  GI on board    S/p EGD/colonoscopy findings suspicious for inflammatory disease  · s/p ct enterography    PHUONG (acute kidney injury) ([facility] [address] )  Assessment & Plan  · On CKD stage 3  · Nephrology following, etiology secondary to pre renal  · Creatinine trended down   · Avoid nephrotoxins and renally dose medications  · Discussed with nephrology to d/c IVF 6 hours post arteriogram    Hypokalemia  Assessment & Plan  Repleted accordingly    SIRS (systemic inflammatory response syndrome) ([facility] )  Assessment & Plan  Of non infectious etiology, secondary to limb ischemia  No focal source of infection    Thrombocytosis (HCC)  Assessment & Plan  · Chronic, being followed by Hematology  · JAK2 mutation negative  · Continue to monitor    Paroxysmal atrial fibrillation (HCC)  Assessment & Plan  Rate controlled  Anticoagulation with heparin drip as Xarelto on hold; resume back on xarelto when ok per vascular    PAD (peripheral artery disease) ([facility] [address] )  Assessment & Plan  · S/p right popliteal stent  · was on xarelto & statin at home  · On heparin gtt while xarelto is on hold    VTE Pharmacologic Prophylaxis:   Pharmacologic: Heparin Drip  Mechanical VTE Prophylaxis in Place: No    Patient Centered Rounds: I have performed bedside rounds with nursing staff today  Discussions with Specialists or Other Care Team Provider: Nephrology    Education and Discussions with Family / Patient: Patient and wife    Time Spent for Care: 30 minutes  More than 50% of total time spent on counseling and coordination of care as described above  Current Length of Stay: 4 day(s)    Current Patient Status: Inpatient   Certification Statement: The patient will continue to require additional inpatient hospital stay due to Pending arteriogram    Discharge Plan:     Code Status: Level 1 - Full Code      Subjective:   Hungry arteriogram pending  Denies abd pain  No acute events overnight    Objective:     Vitals:   Temp (24hrs), Av 2 °F (36 8 °C), Min:98 °F (36 7 °C), Max:98 4 °F (36 9 °C)    Temp:  [98 °F (36 7 °C)-98 4 °F (36 9 °C)] 98 1 °F (36 7 °C)  HR:  [101-122] 122  Resp:  [18-20] 18  BP: (109-112)/(74-79) 109/74  SpO2:  [86 %-91 %] 86 %  Body mass index is 26 04 kg/m²  Input and Output Summary (last 24 hours): Intake/Output Summary (Last 24 hours) at 2020 1551  Last data filed at 2020 1200  Gross per 24 hour   Intake 803 33 ml   Output 200 ml   Net 603 33 ml       Physical Exam:     Physical Exam   Constitutional: He is oriented to person, place, and time  He appears well-developed and well-nourished  Neck: Neck supple     Cardiovascular: Normal rate, regular rhythm, normal heart sounds and intact distal pulses  Exam reveals no gallop and no friction rub  No murmur heard  Pulmonary/Chest: Effort normal and breath sounds normal  No stridor  No respiratory distress  He has no wheezes  He has no rales  He exhibits no tenderness  Abdominal: Soft  Bowel sounds are normal  He exhibits no distension  There is no tenderness  There is no rebound and no guarding  Musculoskeletal: Normal range of motion  He exhibits no edema, tenderness or deformity  Neurological: He is alert and oriented to person, place, and time  He displays normal reflexes  No cranial nerve deficit or sensory deficit  He exhibits normal muscle tone  Coordination normal        Additional Data:     Labs:    Results from last 7 days   Lab Units 01/17/20  0556 01/16/20  0813  01/13/20  1013   WBC Thousand/uL 9 94 7 61   < > 14 17*   HEMOGLOBIN g/dL 12 1 12 8   < > 16 2   HEMATOCRIT % 36 9 39 4   < > 49 5*   PLATELETS Thousands/uL 414* 434*   < > 685*   BANDS PCT %  --  2   < >  --    NEUTROS PCT %  --   --   --  74   LYMPHS PCT %  --   --   --  14   LYMPHO PCT %  --  20   < >  --    MONOS PCT %  --   --   --  11   MONO PCT %  --  7   < >  --    EOS PCT %  --  1   < > 0    < > = values in this interval not displayed  Results from last 7 days   Lab Units 01/17/20  0546  01/14/20  0156   SODIUM mmol/L 134*   < > 137   POTASSIUM mmol/L 3 2*   < > 3 7   CHLORIDE mmol/L 103   < > 105   CO2 mmol/L 26   < > 23   BUN mg/dL 12   < > 48*   CREATININE mg/dL 0 88   < > 2 25*   ANION GAP mmol/L 5   < > 9   CALCIUM mg/dL 7 6*   < > 7 6*   ALBUMIN g/dL  --   --  2 1*   TOTAL BILIRUBIN mg/dL  --   --  0 53   ALK PHOS U/L  --   --  126*   ALT U/L  --   --  18   AST U/L  --   --  14   GLUCOSE RANDOM mg/dL 110   < > 117    < > = values in this interval not displayed  Results from last 7 days   Lab Units 01/15/20  0501   INR  1 34*                   * I Have Reviewed All Lab Data Listed Above    * Additional Pertinent Lab Tests Reviewed: All Labs Within Last 24 Hours Reviewed    Imaging:    Imaging Reports Reviewed Today Include:   Imaging Personally Reviewed by Myself Includes:      Recent Cultures (last 7 days):           Last 24 Hours Medication List:     Current Facility-Administered Medications:  acetaminophen 650 mg Oral Q6H PRN Joni Bermudez MD    aspirin 81 mg Oral Daily Viktoriya Vogel MD    atorvastatin 20 mg Oral Daily With Lencho Serrato MD    cholecalciferol 2,000 Units Oral Daily Joni Bermudez MD    heparin (porcine) 3-30 Units/kg/hr (Order-Specific) Intravenous Titrated Joni Bermudez MD Last Rate: 23 1 Units/kg/hr (01/17/20 0935)   heparin (porcine) 3,000 Units Intravenous PRN Joni Bermudez MD    heparin (porcine) 6,000 Units Intravenous PRN Viktoriya Vogel MD    metoprolol 5 mg Intravenous Q6H PRN Joni Bermudez MD    multi-electrolyte 100 mL/hr Intravenous Continuous Bean Cooper MD Last Rate: 100 mL/hr (01/17/20 0623)   ondansetron 4 mg Intravenous Q6H PRN Viktoriya Vogel MD    pantoprazole 40 mg Oral Early Morning Viktoriya Vogel MD    phenol 1 spray Mouth/Throat Q2H PRN LENORA Self    potassium chloride 40 mEq Oral Once Erick Webb DO         Today, Patient Was Seen By: Erick Webb DO    ** Please Note: Dictation voice to text software may have been used in the creation of this document   **

## 2020-01-17 NOTE — ASSESSMENT & PLAN NOTE
· Found on CT scan in ED today, with transition near terminal ileum, there are places with suspicious inflammation in TI with possible skipped areas concerning for crohn's  · General surgery following, continue serial abdominal exams, diet advanced with tolerance  · Status post NG tube removal  · Positive bowel movements  · GI on board, s/p colonoscopy findings suspicious for inflammatory disease, s/p ct enterography

## 2020-01-17 NOTE — PROGRESS NOTES
Gastroenterology Progress Note   - Wyatt Pena 77 y o  male MRN: [id number]    Unit/Bed#: [facility] 827-01 Encounter: [id number]      Assessment and Plan:   Diarrhea  28-year-old male patient found to have stenosis in the distal TI associated with proximal small-bowel distension  Patient also has diarrhea that has been ongoing for about a year  A prior workup as an outpatient was negative twice for any infectious causes including C diff  Prior sigmoid and rectal biopsies negative  CT of the abdomen showed an inflamed segment of the terminal ileum suspicious for Crohn's disease  Colonoscopy was performed, he was found to have inflammation in the TI, biopsies were performed, there is high possibility this represents Crohn's disease  Inflammatory markers showed elevated CRP and fecal calprotectin  CT enterography  was consistent with possible IBD  Given the high pretest probability for IBD will order the workup prior to start biologics and then start steroids  Then will follow as an outpatient once biopsies are back    Subjective:   Patient seen and examined at the bed, denies any events overnight, currently is tolerating PO route, denies nausea or vomiting, is passing flatus and having bowel movements, denies chest pain or shortness of breath, no abdominal pain, patient is ambulating     Objective:     Vitals: Blood pressure 112/79, pulse 101, temperature 98 4 °F (36 9 °C), resp  rate 18, height 5' 10" (1 778 m), weight 82 3 kg (181 lb 8 oz), SpO2 91 %  ,Body mass index is 26 04 kg/m²  Intake/Output Summary (Last 24 hours) at 1/17/2020 1428  Last data filed at 1/17/2020 1200  Gross per 24 hour   Intake 803 33 ml   Output 200 ml   Net 603 33 ml       Physical Exam:   Physical Exam   Constitutional: He is oriented to person, place, and time  He appears well-developed and well-nourished  HENT:   Head: Normocephalic and atraumatic     Mouth/Throat: Oropharynx is clear and moist    Eyes: EOM are normal    Neck: Normal range of motion  Neck supple  Cardiovascular: Normal rate  Pulmonary/Chest: Effort normal and breath sounds normal    Abdominal: He exhibits distension  Neurological: He is alert and oriented to person, place, and time  Skin: Skin is warm and dry  Nursing note and vitals reviewed  Invasive Devices     Peripheral Intravenous Line            Peripheral IV 01/13/20 Right Forearm 3 days    Peripheral IV 01/16/20 Distal;Right;Ventral (anterior) Forearm less than 1 day                Lab Results:  Results from last 7 days   Lab Units 01/17/20  0556 01/16/20  0813  01/13/20  1013   WBC Thousand/uL 9 94 7 61   < > 14 17*   HEMOGLOBIN g/dL 12 1 12 8   < > 16 2   HEMATOCRIT % 36 9 39 4   < > 49 5*   PLATELETS Thousands/uL 414* 434*   < > 685*   NEUTROS PCT %  --   --   --  74   LYMPHS PCT %  --   --   --  14   LYMPHO PCT %  --  20   < >  --    MONOS PCT %  --   --   --  11   MONO PCT %  --  7   < >  --    EOS PCT %  --  1   < > 0    < > = values in this interval not displayed  Results from last 7 days   Lab Units 01/17/20  0546  01/14/20  0156   POTASSIUM mmol/L 3 2*   < > 3 7   CHLORIDE mmol/L 103   < > 105   CO2 mmol/L 26   < > 23   BUN mg/dL 12   < > 48*   CREATININE mg/dL 0 88   < > 2 25*   CALCIUM mg/dL 7 6*   < > 7 6*   ALK PHOS U/L  --   --  126*   ALT U/L  --   --  18   AST U/L  --   --  14    < > = values in this interval not displayed  Results from last 7 days   Lab Units 01/15/20  0501   INR  1 34*     Results from last 7 days   Lab Units 01/13/20  1013   LIPASE u/L 93       Imaging Studies: I have personally reviewed pertinent imaging studies  Ct Abdomen Pelvis Wo Contrast    Result Date: 1/13/2020  Impression: 1  Findings consistent with a small bowel obstruction with a transition point at a long segment of distal ileitis  There is also a separate short segment of upstream ileitis ("skip lesion") with luminal narrowing    There is intramural fat at the terminal ileum suggesting chronic/old inflammation and further abrupt decompression of the terminal ileum, raising the possibility of fibrostenosing disease/fibrotic stricture  Overall, findings are concerning for Crohn's disease  No evidence of penetrating disease  2   Bilateral nonobstructing nephrolithiasis  3   Stable 3 2 x 2 6 cm infrarenal abdominal aortic aneurysm  I personally discussed this study with Victor Manuel Nino on 1/13/2020 at 1:55 PM  Workstation performed: [id number]     Xr Chest 2 Views    Result Date: 1/13/2020  Impression: No acute cardiopulmonary disease  Workstation performed: [id number]     Ct Head Wo Contrast    Result Date: 1/15/2020  Impression: No acute intracranial abnormality  Workstation performed: [id number]     Ct Small Bowel Enterography    Result Date: 1/16/2020  Impression: Persistent though slightly improved small bowel obstruction secondary to active long segment distal ileitis  Nonobstructing bilateral renal calculi  Unchanged mild infrarenal aortic aneurysm  Bilateral adrenal adenomas again noted  The study was marked in [facility] for immediate notification      Workstation performed: [id number]

## 2020-01-17 NOTE — ASSESSMENT & PLAN NOTE
Rate controlled  Anticoagulation with heparin drip as Xarelto on hold; resume back on xarelto when ok per vascular

## 2020-01-18 ENCOUNTER — APPOINTMENT (INPATIENT)
Dept: RADIOLOGY | Facility: HOSPITAL | Age: 70
DRG: 314 | End: 2020-01-18
Payer: MEDICARE

## 2020-01-18 PROBLEM — A41.9 SEPSIS (HCC): Status: ACTIVE | Noted: 2020-01-18

## 2020-01-18 PROBLEM — I47.10 SVT (SUPRAVENTRICULAR TACHYCARDIA): Status: ACTIVE | Noted: 2020-01-18

## 2020-01-18 PROBLEM — I47.1 SVT (SUPRAVENTRICULAR TACHYCARDIA) (HCC): Status: ACTIVE | Noted: 2020-01-18

## 2020-01-18 LAB
ANION GAP SERPL CALCULATED.3IONS-SCNC: 6 MMOL/L (ref 4–13)
ANION GAP SERPL CALCULATED.3IONS-SCNC: 7 MMOL/L (ref 4–13)
ANION GAP SERPL CALCULATED.3IONS-SCNC: 7 MMOL/L (ref 4–13)
APTT PPP: 105 SECONDS (ref 23–37)
APTT PPP: 58 SECONDS (ref 23–37)
APTT PPP: 81 SECONDS (ref 23–37)
APTT PPP: 94 SECONDS (ref 23–37)
ATRIAL RATE: 108 BPM
BASOPHILS # BLD MANUAL: 0 THOUSAND/UL (ref 0–0.1)
BASOPHILS NFR MAR MANUAL: 0 % (ref 0–1)
BUN SERPL-MCNC: 14 MG/DL (ref 5–25)
BUN SERPL-MCNC: 15 MG/DL (ref 5–25)
BUN SERPL-MCNC: 15 MG/DL (ref 5–25)
BURR CELLS BLD QL SMEAR: PRESENT
CALCIUM SERPL-MCNC: 6.8 MG/DL (ref 8.3–10.1)
CALCIUM SERPL-MCNC: 6.9 MG/DL (ref 8.3–10.1)
CALCIUM SERPL-MCNC: 7.1 MG/DL (ref 8.3–10.1)
CHLORIDE SERPL-SCNC: 103 MMOL/L (ref 100–108)
CHLORIDE SERPL-SCNC: 104 MMOL/L (ref 100–108)
CHLORIDE SERPL-SCNC: 107 MMOL/L (ref 100–108)
CO2 SERPL-SCNC: 26 MMOL/L (ref 21–32)
CO2 SERPL-SCNC: 27 MMOL/L (ref 21–32)
CO2 SERPL-SCNC: 29 MMOL/L (ref 21–32)
CREAT SERPL-MCNC: 1 MG/DL (ref 0.6–1.3)
CREAT SERPL-MCNC: 1.04 MG/DL (ref 0.6–1.3)
CREAT SERPL-MCNC: 1.06 MG/DL (ref 0.6–1.3)
EOSINOPHIL # BLD MANUAL: 0 THOUSAND/UL (ref 0–0.4)
EOSINOPHIL NFR BLD MANUAL: 0 % (ref 0–6)
ERYTHROCYTE [DISTWIDTH] IN BLOOD BY AUTOMATED COUNT: 14.4 % (ref 11.6–15.1)
ERYTHROCYTE [DISTWIDTH] IN BLOOD BY AUTOMATED COUNT: 14.5 % (ref 11.6–15.1)
ERYTHROCYTE [DISTWIDTH] IN BLOOD BY AUTOMATED COUNT: 14.6 % (ref 11.6–15.1)
GFR SERPL CREATININE-BSD FRML MDRD: 72 ML/MIN/1.73SQ M
GFR SERPL CREATININE-BSD FRML MDRD: 73 ML/MIN/1.73SQ M
GFR SERPL CREATININE-BSD FRML MDRD: 77 ML/MIN/1.73SQ M
GLUCOSE SERPL-MCNC: 103 MG/DL (ref 65–140)
GLUCOSE SERPL-MCNC: 105 MG/DL (ref 65–140)
GLUCOSE SERPL-MCNC: 142 MG/DL (ref 65–140)
HCT VFR BLD AUTO: 32.4 % (ref 36.5–49.3)
HCT VFR BLD AUTO: 33.4 % (ref 36.5–49.3)
HCT VFR BLD AUTO: 34.2 % (ref 36.5–49.3)
HGB BLD-MCNC: 10.7 G/DL (ref 12–17)
HGB BLD-MCNC: 11.3 G/DL (ref 12–17)
HGB BLD-MCNC: 11.4 G/DL (ref 12–17)
INR PPP: 1.39 (ref 0.84–1.19)
INR PPP: 1.49 (ref 0.84–1.19)
LACTATE SERPL-SCNC: 0.9 MMOL/L (ref 0.5–2)
LACTATE SERPL-SCNC: 1.8 MMOL/L (ref 0.5–2)
LYMPHOCYTES # BLD AUTO: 0.16 THOUSAND/UL (ref 0.6–4.47)
LYMPHOCYTES # BLD AUTO: 1 % (ref 14–44)
MAGNESIUM SERPL-MCNC: 1.8 MG/DL (ref 1.6–2.6)
MAGNESIUM SERPL-MCNC: 2.1 MG/DL (ref 1.6–2.6)
MAGNESIUM SERPL-MCNC: 2.7 MG/DL (ref 1.6–2.6)
MCH RBC QN AUTO: 28.7 PG (ref 26.8–34.3)
MCH RBC QN AUTO: 29.1 PG (ref 26.8–34.3)
MCH RBC QN AUTO: 29.5 PG (ref 26.8–34.3)
MCHC RBC AUTO-ENTMCNC: 33 G/DL (ref 31.4–37.4)
MCHC RBC AUTO-ENTMCNC: 33.3 G/DL (ref 31.4–37.4)
MCHC RBC AUTO-ENTMCNC: 33.8 G/DL (ref 31.4–37.4)
MCV RBC AUTO: 87 FL (ref 82–98)
METAMYELOCYTES NFR BLD MANUAL: 1 % (ref 0–1)
MONOCYTES # BLD AUTO: 0.32 THOUSAND/UL (ref 0–1.22)
MONOCYTES NFR BLD: 2 % (ref 4–12)
NEUTROPHILS # BLD MANUAL: 15.03 THOUSAND/UL (ref 1.85–7.62)
NEUTS BAND NFR BLD MANUAL: 9 % (ref 0–8)
NEUTS SEG NFR BLD AUTO: 86 % (ref 43–75)
NRBC BLD AUTO-RTO: 0 /100 WBCS
P AXIS: 5 DEGREES
PLATELET # BLD AUTO: 245 THOUSANDS/UL (ref 149–390)
PLATELET # BLD AUTO: 307 THOUSANDS/UL (ref 149–390)
PLATELET # BLD AUTO: 307 THOUSANDS/UL (ref 149–390)
PLATELET BLD QL SMEAR: ADEQUATE
PMV BLD AUTO: 9.3 FL (ref 8.9–12.7)
PMV BLD AUTO: 9.3 FL (ref 8.9–12.7)
PMV BLD AUTO: 9.4 FL (ref 8.9–12.7)
POIKILOCYTOSIS BLD QL SMEAR: PRESENT
POTASSIUM SERPL-SCNC: 2.9 MMOL/L (ref 3.5–5.3)
POTASSIUM SERPL-SCNC: 3.4 MMOL/L (ref 3.5–5.3)
POTASSIUM SERPL-SCNC: 3.4 MMOL/L (ref 3.5–5.3)
PR INTERVAL: 112 MS
PROCALCITONIN SERPL-MCNC: 11.07 NG/ML
PROTHROMBIN TIME: 16.6 SECONDS (ref 11.6–14.5)
PROTHROMBIN TIME: 17.6 SECONDS (ref 11.6–14.5)
QRS AXIS: -47 DEGREES
QRSD INTERVAL: 136 MS
QT INTERVAL: 404 MS
QTC INTERVAL: 541 MS
RBC # BLD AUTO: 3.73 MILLION/UL (ref 3.88–5.62)
RBC # BLD AUTO: 3.83 MILLION/UL (ref 3.88–5.62)
RBC # BLD AUTO: 3.92 MILLION/UL (ref 3.88–5.62)
RBC MORPH BLD: PRESENT
SODIUM SERPL-SCNC: 138 MMOL/L (ref 136–145)
SODIUM SERPL-SCNC: 139 MMOL/L (ref 136–145)
SODIUM SERPL-SCNC: 139 MMOL/L (ref 136–145)
T WAVE AXIS: 4 DEGREES
VARIANT LYMPHS # BLD AUTO: 1 %
VENTRICULAR RATE: 108 BPM
WBC # BLD AUTO: 10.16 THOUSAND/UL (ref 4.31–10.16)
WBC # BLD AUTO: 15.82 THOUSAND/UL (ref 4.31–10.16)
WBC # BLD AUTO: 7.51 THOUSAND/UL (ref 4.31–10.16)

## 2020-01-18 PROCEDURE — 85007 BL SMEAR W/DIFF WBC COUNT: CPT | Performed by: NURSE PRACTITIONER

## 2020-01-18 PROCEDURE — 85730 THROMBOPLASTIN TIME PARTIAL: CPT | Performed by: PHYSICIAN ASSISTANT

## 2020-01-18 PROCEDURE — 85730 THROMBOPLASTIN TIME PARTIAL: CPT | Performed by: SURGERY

## 2020-01-18 PROCEDURE — 83605 ASSAY OF LACTIC ACID: CPT | Performed by: NURSE PRACTITIONER

## 2020-01-18 PROCEDURE — 85027 COMPLETE CBC AUTOMATED: CPT | Performed by: PHYSICIAN ASSISTANT

## 2020-01-18 PROCEDURE — 99233 SBSQ HOSP IP/OBS HIGH 50: CPT | Performed by: INTERNAL MEDICINE

## 2020-01-18 PROCEDURE — 93005 ELECTROCARDIOGRAM TRACING: CPT

## 2020-01-18 PROCEDURE — 74018 RADEX ABDOMEN 1 VIEW: CPT

## 2020-01-18 PROCEDURE — 83735 ASSAY OF MAGNESIUM: CPT | Performed by: NURSE PRACTITIONER

## 2020-01-18 PROCEDURE — 83735 ASSAY OF MAGNESIUM: CPT | Performed by: PHYSICIAN ASSISTANT

## 2020-01-18 PROCEDURE — 99232 SBSQ HOSP IP/OBS MODERATE 35: CPT | Performed by: INTERNAL MEDICINE

## 2020-01-18 PROCEDURE — 80048 BASIC METABOLIC PNL TOTAL CA: CPT | Performed by: NURSE PRACTITIONER

## 2020-01-18 PROCEDURE — 84145 PROCALCITONIN (PCT): CPT | Performed by: PHYSICIAN ASSISTANT

## 2020-01-18 PROCEDURE — 93010 ELECTROCARDIOGRAM REPORT: CPT | Performed by: INTERNAL MEDICINE

## 2020-01-18 PROCEDURE — 83605 ASSAY OF LACTIC ACID: CPT | Performed by: PHYSICIAN ASSISTANT

## 2020-01-18 PROCEDURE — 85610 PROTHROMBIN TIME: CPT | Performed by: NURSE PRACTITIONER

## 2020-01-18 PROCEDURE — 85027 COMPLETE CBC AUTOMATED: CPT | Performed by: NURSE PRACTITIONER

## 2020-01-18 PROCEDURE — 85730 THROMBOPLASTIN TIME PARTIAL: CPT | Performed by: NURSE PRACTITIONER

## 2020-01-18 PROCEDURE — 99223 1ST HOSP IP/OBS HIGH 75: CPT | Performed by: INTERNAL MEDICINE

## 2020-01-18 PROCEDURE — 85610 PROTHROMBIN TIME: CPT | Performed by: PHYSICIAN ASSISTANT

## 2020-01-18 PROCEDURE — 99232 SBSQ HOSP IP/OBS MODERATE 35: CPT | Performed by: SURGERY

## 2020-01-18 PROCEDURE — 80048 BASIC METABOLIC PNL TOTAL CA: CPT | Performed by: PHYSICIAN ASSISTANT

## 2020-01-18 RX ORDER — POTASSIUM CHLORIDE 20 MEQ/1
40 TABLET, EXTENDED RELEASE ORAL ONCE
Status: COMPLETED | OUTPATIENT
Start: 2020-01-18 | End: 2020-01-18

## 2020-01-18 RX ORDER — POTASSIUM CHLORIDE 20 MEQ/1
20 TABLET, EXTENDED RELEASE ORAL ONCE
Status: COMPLETED | OUTPATIENT
Start: 2020-01-18 | End: 2020-01-18

## 2020-01-18 RX ORDER — POTASSIUM CHLORIDE 14.9 MG/ML
20 INJECTION INTRAVENOUS ONCE
Status: COMPLETED | OUTPATIENT
Start: 2020-01-19 | End: 2020-01-19

## 2020-01-18 RX ORDER — DILTIAZEM HYDROCHLORIDE 5 MG/ML
10 INJECTION INTRAVENOUS ONCE
Status: COMPLETED | OUTPATIENT
Start: 2020-01-18 | End: 2020-01-18

## 2020-01-18 RX ORDER — ALBUMIN (HUMAN) 12.5 G/50ML
12.5 SOLUTION INTRAVENOUS ONCE
Status: COMPLETED | OUTPATIENT
Start: 2020-01-18 | End: 2020-01-18

## 2020-01-18 RX ORDER — POTASSIUM CHLORIDE 14.9 MG/ML
20 INJECTION INTRAVENOUS ONCE
Status: COMPLETED | OUTPATIENT
Start: 2020-01-18 | End: 2020-01-18

## 2020-01-18 RX ORDER — METOPROLOL SUCCINATE 25 MG/1
25 TABLET, EXTENDED RELEASE ORAL DAILY
Status: DISCONTINUED | OUTPATIENT
Start: 2020-01-19 | End: 2020-01-19

## 2020-01-18 RX ORDER — METOPROLOL SUCCINATE 50 MG/1
50 TABLET, EXTENDED RELEASE ORAL ONCE
Status: DISCONTINUED | OUTPATIENT
Start: 2020-01-18 | End: 2020-01-18

## 2020-01-18 RX ORDER — POTASSIUM CHLORIDE 14.9 MG/ML
20 INJECTION INTRAVENOUS ONCE
Status: COMPLETED | OUTPATIENT
Start: 2020-01-18 | End: 2020-01-19

## 2020-01-18 RX ORDER — MAGNESIUM SULFATE HEPTAHYDRATE 40 MG/ML
2 INJECTION, SOLUTION INTRAVENOUS ONCE
Status: COMPLETED | OUTPATIENT
Start: 2020-01-18 | End: 2020-01-18

## 2020-01-18 RX ADMIN — HEPARIN SODIUM AND DEXTROSE 21 UNITS/KG/HR: 10000; 5 INJECTION INTRAVENOUS at 10:27

## 2020-01-18 RX ADMIN — HEPARIN SODIUM 3000 UNITS: 1000 INJECTION INTRAVENOUS; SUBCUTANEOUS at 18:09

## 2020-01-18 RX ADMIN — SODIUM CHLORIDE, SODIUM GLUCONATE, SODIUM ACETATE, POTASSIUM CHLORIDE, MAGNESIUM CHLORIDE, SODIUM PHOSPHATE, DIBASIC, AND POTASSIUM PHOSPHATE 50 ML/HR: .53; .5; .37; .037; .03; .012; .00082 INJECTION, SOLUTION INTRAVENOUS at 14:08

## 2020-01-18 RX ADMIN — DILTIAZEM HYDROCHLORIDE 10 MG: 5 INJECTION INTRAVENOUS at 16:58

## 2020-01-18 RX ADMIN — CEFEPIME HYDROCHLORIDE 2000 MG: 2 INJECTION, POWDER, FOR SOLUTION INTRAVENOUS at 17:06

## 2020-01-18 RX ADMIN — ALBUMIN (HUMAN) 12.5 G: 12.5 SOLUTION INTRAVENOUS at 22:10

## 2020-01-18 RX ADMIN — POTASSIUM CHLORIDE 20 MEQ: 1500 TABLET, EXTENDED RELEASE ORAL at 07:00

## 2020-01-18 RX ADMIN — POTASSIUM CHLORIDE 20 MEQ: 14.9 INJECTION, SOLUTION INTRAVENOUS at 05:03

## 2020-01-18 RX ADMIN — ACETAMINOPHEN 650 MG: 325 TABLET ORAL at 07:52

## 2020-01-18 RX ADMIN — SODIUM CHLORIDE 500 ML: 0.9 INJECTION, SOLUTION INTRAVENOUS at 18:10

## 2020-01-18 RX ADMIN — POTASSIUM CHLORIDE 40 MEQ: 1500 TABLET, EXTENDED RELEASE ORAL at 05:04

## 2020-01-18 RX ADMIN — ATORVASTATIN CALCIUM 20 MG: 20 TABLET, FILM COATED ORAL at 17:02

## 2020-01-18 RX ADMIN — PIPERACILLIN SODIUM AND TAZOBACTAM SODIUM 3.38 G: 36; 4.5 INJECTION, POWDER, FOR SOLUTION INTRAVENOUS at 10:44

## 2020-01-18 RX ADMIN — MAGNESIUM SULFATE HEPTAHYDRATE 2 G: 40 INJECTION, SOLUTION INTRAVENOUS at 06:49

## 2020-01-18 RX ADMIN — POTASSIUM CHLORIDE 40 MEQ: 1500 TABLET, EXTENDED RELEASE ORAL at 05:19

## 2020-01-18 RX ADMIN — PREDNISONE 40 MG: 20 TABLET ORAL at 08:48

## 2020-01-18 RX ADMIN — POTASSIUM CHLORIDE 40 MEQ: 1500 TABLET, EXTENDED RELEASE ORAL at 14:12

## 2020-01-18 RX ADMIN — POTASSIUM CHLORIDE 20 MEQ: 14.9 INJECTION, SOLUTION INTRAVENOUS at 23:33

## 2020-01-18 RX ADMIN — ASPIRIN 81 MG 81 MG: 81 TABLET ORAL at 08:48

## 2020-01-18 RX ADMIN — SODIUM CHLORIDE, SODIUM GLUCONATE, SODIUM ACETATE, POTASSIUM CHLORIDE, MAGNESIUM CHLORIDE, SODIUM PHOSPHATE, DIBASIC, AND POTASSIUM PHOSPHATE 100 ML/HR: .53; .5; .37; .037; .03; .012; .00082 INJECTION, SOLUTION INTRAVENOUS at 03:14

## 2020-01-18 RX ADMIN — MELATONIN 2000 UNITS: at 08:48

## 2020-01-18 RX ADMIN — SODIUM CHLORIDE 250 ML: 0.9 INJECTION, SOLUTION INTRAVENOUS at 06:59

## 2020-01-18 RX ADMIN — SODIUM CHLORIDE 500 ML: 0.9 INJECTION, SOLUTION INTRAVENOUS at 20:03

## 2020-01-18 RX ADMIN — PANTOPRAZOLE SODIUM 40 MG: 40 TABLET, DELAYED RELEASE ORAL at 06:45

## 2020-01-18 RX ADMIN — METOPROLOL TARTRATE 5 MG: 5 INJECTION INTRAVENOUS at 15:26

## 2020-01-18 NOTE — PROGRESS NOTES
Progress Note - Scott Chen 1950, 77 y o  male MRN: [id number]    Unit/Bed#: [facility] 827-01 Encounter: [id number]    Primary Care Provider: Vandana Snyder MD   Date and time admitted to hospital: 1/13/2020  9:23 AM        * Sepsis [facility])  Assessment & Plan  Met SIRs criteria with fever, tachycardia; Source GNR bacteremia possibly translocation intra-abdominally; U/a benign  ID consulted  Will start on zosyn  Repeat blood cx tomorrow  Lactic acid normal    Limb ischemia  Assessment & Plan  · Acute right leg ischemic pain with numbness  · Underlying history of peripheral artery disease s/p R popliteal stent  · Vascular on board, presently on heparin bridge as Xarelto was on hold  · Planned for RLE arteriogram and revascularization today but may have to be on hold given bacteremia  I have made vascular surgery aware  · Status post Leads   · Educated on smoking cessation    SBO (small bowel obstruction) ([facility] [address] )  Assessment & Plan  S/p CT a/p revealing small bowel obstruction with a transition point at a long segment of distal ileitis  There is also a separate short segment of upstream ileitis ("skip lesion") with luminal narrowing  Gen surgery, GI on board  Concern for underlying inflammatory disease  Diet advanced with tolerance    Diarrhea  Assessment & Plan  · Chronic > 1 yr, nonbloody  · S/p egd/colonoscopy, 1/14  · s/p ct small bowel enterography, 1/16  · Biopsy pending  · Started on prednisone per GI; QuantiFERON gold, HIV pending    Negative chronic hepatitis panel      PHUONG (acute kidney injury) ([facility] [address] )  Assessment & Plan  · On CKD stage 3  · Nephrology following, etiology secondary to pre renal  · Creatinine trended down   · Avoid nephrotoxins and renally dose medications  · To d/c IVF 6 hours post arteriogram    SVT (supraventricular tachycardia) ([facility] [address] )  Assessment & Plan  Secondary to hypokalemia in setting of sepsis/fever  Potassium repleted  Rpt bmp pending    Hypokalemia  Assessment & Plan  Ordered KDur x 1 yesterday which was not administered  Repleted accordingly today  Repeat bmp pending  Mg level nml    Thrombocytosis (HCC)  Assessment & Plan  · Chronic, being followed by Hematology  · JAK2 mutation negative  · Continue to monitor    Paroxysmal atrial fibrillation (HCC)  Assessment & Plan  Intermittent periods of tachycardia secondary to febrile episodes  Continue telemetry monitoring  Anticoagulation with heparin drip as Xarelto on hold; resume back on xarelto when ok per vascular    PAD (peripheral artery disease) ([facility] [address] )  Assessment & Plan  · S/p right popliteal stent  · was on xarelto & statin at home  · On heparin gtt while xarelto is on hold      VTE Pharmacologic Prophylaxis:   Pharmacologic: Heparin Drip  Mechanical VTE Prophylaxis in Place: No    Patient Centered Rounds: I have performed bedside rounds with nursing staff today  Discussions with Specialists or Other Care Team Provider:Vascular    Education and Discussions with Family / Patient: Patient and his wife    Time Spent for Care: 30 minutes  More than 50% of total time spent on counseling and coordination of care as described above  Current Length of Stay: 5 day(s)    Current Patient Status: Inpatient   Certification Statement: The patient will continue to require additional inpatient hospital stay due to Sepsis    Discharge Plan:     Code Status: Level 1 - Full Code      Subjective:   Patient seen and examined at bedside   Yesterday evening had rigors, tachycardia and fever thus blood cx sent now returned revealing GNR  Overnight had runs of svt, asymptomatic, repeat bmp revealing hypokalemia  Arteriogram rescheduled for today, already made vascular aware of recent events  Intermittent diarrhea, chronic though non bloody    Objective:     Vitals:   Temp (24hrs), Av 7 °F (37 1 °C), Min:97 3 °F (36 3 °C), Max:102 1 °F (38 9 °C)    Temp:  [97 3 °F (36 3 °C)-102 1 °F (38 9 °C)] 97 4 °F (36 3 °C)  HR:  [] 67  Resp: [18-20] 18  BP: ()/() 101/71  SpO2:  [86 %-94 %] 94 %  Body mass index is 26 77 kg/m²  Input and Output Summary (last 24 hours): Intake/Output Summary (Last 24 hours) at 1/18/2020 1131  Last data filed at 1/18/2020 2781  Gross per 24 hour   Intake 2331 67 ml   Output 450 ml   Net 1881 67 ml       Physical Exam:     Physical Exam   Constitutional: He is oriented to person, place, and time  He appears well-developed and well-nourished  Non toxic appearing   Neck: Neck supple  Cardiovascular: Normal rate, regular rhythm and intact distal pulses  Exam reveals no gallop and no friction rub  No murmur heard  Pulmonary/Chest: Effort normal and breath sounds normal  No stridor  No respiratory distress  He has no wheezes  He has no rales  He exhibits no tenderness  Abdominal: Soft  Bowel sounds are normal  He exhibits no distension  There is no tenderness  There is no rebound and no guarding  Musculoskeletal: Normal range of motion  He exhibits no edema, tenderness or deformity  Neurological: He is alert and oriented to person, place, and time  He displays normal reflexes  No cranial nerve deficit or sensory deficit  He exhibits normal muscle tone  Coordination normal      Additional Data:     Labs:    Results from last 7 days   Lab Units 01/18/20  1048  01/16/20  0813  01/13/20  1013   WBC Thousand/uL 7 51   < > 7 61   < > 14 17*   HEMOGLOBIN g/dL 11 4*   < > 12 8   < > 16 2   HEMATOCRIT % 34 2*   < > 39 4   < > 49 5*   PLATELETS Thousands/uL 307   < > 434*   < > 685*   BANDS PCT %  --   --  2   < >  --    NEUTROS PCT %  --   --   --   --  74   LYMPHS PCT %  --   --   --   --  14   LYMPHO PCT %  --   --  20   < >  --    MONOS PCT %  --   --   --   --  11   MONO PCT %  --   --  7   < >  --    EOS PCT %  --   --  1   < > 0    < > = values in this interval not displayed       Results from last 7 days   Lab Units 01/18/20  1048  01/14/20  0156   SODIUM mmol/L 138   < > 137   POTASSIUM mmol/L 3 4*   < > 3 7   CHLORIDE mmol/L 104   < > 105   CO2 mmol/L 27   < > 23   BUN mg/dL 14   < > 48*   CREATININE mg/dL 1 00   < > 2 25*   ANION GAP mmol/L 7   < > 9   CALCIUM mg/dL 7 1*   < > 7 6*   ALBUMIN g/dL  --   --  2 1*   TOTAL BILIRUBIN mg/dL  --   --  0 53   ALK PHOS U/L  --   --  126*   ALT U/L  --   --  18   AST U/L  --   --  14   GLUCOSE RANDOM mg/dL 103   < > 117    < > = values in this interval not displayed  Results from last 7 days   Lab Units 01/15/20  0501   INR  1 34*             Results from last 7 days   Lab Units 01/18/20  1048   LACTIC ACID mmol/L 0 9       * I Have Reviewed All Lab Data Listed Above  * Additional Pertinent Lab Tests Reviewed:  All Labs Within Last 24 Hours Reviewed    Imaging:    Imaging Reports Reviewed Today Include:   Imaging Personally Reviewed by Myself Includes:      Recent Cultures (last 7 days):     Results from last 7 days   Lab Units 01/17/20  1725 01/17/20  1722   GRAM STAIN RESULT  Gram negative rods* Gram negative rods*       Last 24 Hours Medication List:     Current Facility-Administered Medications:  acetaminophen 650 mg Oral Q6H PRN Katelyn Gee MD    aspirin 81 mg Oral Daily Viktoriya Vogel MD    atorvastatin 20 mg Oral Daily With Ervin Bonds MD    cholecalciferol 2,000 Units Oral Daily Katelyn Gee MD    heparin (porcine) 3-30 Units/kg/hr (Order-Specific) Intravenous Titrated Katelyn Gee MD Last Rate: 21 Units/kg/hr (01/18/20 1027)   heparin (porcine) 3,000 Units Intravenous PRN Katelyn Gee MD    heparin (porcine) 6,000 Units Intravenous PRN Katelyn Gee MD    metoprolol 5 mg Intravenous Q6H PRN Katelyn Gee MD    multi-electrolyte 100 mL/hr Intravenous Continuous Shikha Warner MD Last Rate: 100 mL/hr (01/18/20 0314)   ondansetron 4 mg Intravenous Q6H PRN Viktoriya Vogel MD    pantoprazole 40 mg Oral Early Morning Viktoriya Vogel MD    phenol 1 spray Mouth/Throat Q2H PRN LENORA Perez piperacillin-tazobactam 3 375 g Intravenous Q6H Yun Keita DO Last Rate: 3 375 g (01/18/20 1044)   predniSONE 40 mg Oral Daily Antonietta Hooper MD         Today, Patient Was Seen By: Yun Keita DO    ** Please Note: Dictation voice to text software may have been used in the creation of this document   **

## 2020-01-18 NOTE — NURSING NOTE
Patient presented this afternoon with tachycardia, elevated BP and febrile- 102  1  SLIM notified and will continue to monitor

## 2020-01-18 NOTE — CONSULTS
Consultation - Infectious Disease   Conchita White 77 y o  male MRN: [id number]  Unit/Bed#: [facility] 827-01 Encounter: [id number]      IMPRESSION & RECOMMENDATIONS:   Impression/Recommendations:  1  Sepsis  Evolving since admission:  Fever, tachycardia  Due to # 2  No other appreciable source  UA, chest x-ray negative  Clinically stable and nontoxic  Rec:  · Continue antibiotics as below  · Follow temperatures closely  · Recheck CBC in a m  · Supportive care as per the primary service    2  Gram-negative bacteremia  Suspect transient GI source in setting of # 3/4  No other appreciable source  Abdominal exam benign making perforation or other complication from recent colonoscopy unlikely  UA negative and offers no  symptoms  Rec:  · Change antibiotics to cefepime  · Follow-up ID/susceptibility and tailor antibiotics as indicated  · Anticipate hopeful transition to oral agent with plan to complete 7 days total of treatment  3   Chronic SBO  Due to terminal ileitis in the setting of # 4  Clinically stable, tolerating p o  Rec:  · Serial abdominal exams  · Surgery, GI follow-up ongoing    4  Probable IBD  In setting of elevated fecal calprotectin and focal areas of ileitis with stricture seen on colonoscopy  Biopsies pending  Rec:  · Empirical steroids per GI (OK from ID perspective)  · Follow-up biopsy    5  Right lower extremity ischemia  Due to popliteal stent thrombosis in setting of all of above  Extremity appears perfused without critical ischemia  Rec:  · As ischemia does not appear to be critical or limb-threatening, agree with deferring any intervention until patient is more stable on antibiotics for at least 24-48 hours  · Serial exams  · Close vascular surgery follow-up ongoing    The above impression and plan was discussed in detail with the patient and Dr Ravinder Kennedy  Antibiotics:  Zosyn # 1  Steroids # 1    Thank you for this consultation  We will follow along with you      HISTORY OF PRESENT ILLNESS:  Reason for Consult:  Gram-negative bacteremia    HPI: Alysha Osorio is a 77 y o  male with a history of PD status post right lower extremity stenting June 2019  He was initially seen by GI in October for diarrhea and abdominal cramping  He had a prior MRI in May for a history of adrenal masses which noted possible small-bowel transition point in the right lower quadrant  The patient was asymptomatic and conservative management  He recently had a follow-up CT for follow-up of the adrenal masses and was again noted to have partially visualized dilated small bowel loops  More recently the patient presented to the emergency department on 1/13 complaining of right leg pain and numbness  Upon presentation he was noted to be afebrile but did have tachycardia  His labs revealed a mild leukocytosis  He underwent Dopplers which showed occlusion versus high-grade stenosis in the popliteal artery  He was seen by vascular surgery and felt to have acute thrombosis of prior stent  He was placed on anticoagulation  He underwent a CTA/P which showed again small bowel obstruction with transition point at the distal ileus  He was seen by General surgery who recommended conservative management  He was seen by GI and suspected to have inflammatory bowel disease  He underwent EGD and colonoscopy on 1/14 which showed multiple focal areas of terminal ileitis with strictures  Biopsies were obtained which are pending  He was started on prednisone today  Yesterday afternoon the patient spiked a new fever and developed tachycardia  He had blood cultures obtained which are now growing gram-negative rods  We are asked to comment on further evaluation and management  In performing this consult, I have reviewed prior admission and outpatient visit records in detail  REVIEW OF SYSTEMS:  Denies nausea, vomiting, abdominal pain, or diarrhea    Continues to have numbness and mild discomfort to his right leg   Denies any dysuria, frequency, or hesitancy  A complete system-based review of systems is otherwise negative  PAST MEDICAL HISTORY:  Past Medical History:   Diagnosis Date    Blue toe syndrome (HCC)     Chronic kidney disease     Colon polyps     GERD (gastroesophageal reflux disease)     Hematuria     History of rheumatic fever     Hyperlipidemia     Hypotension     Ischemic cardiomyopathy     PAD (peripheral artery disease) (HCC)     Pulmonary emphysema (HCC)      Past Surgical History:   Procedure Laterality Date    COLONOSCOPY  2019    HERNIA REPAIR      IR ABDOMINAL ANGIOGRAPHY / INTERVENTION  2019    POPLITEAL ARTERY STENT Right     2019    VA SLCTV CATHJ 3RD+ ORD SLCTV ABDL PEL/LXTR [facility] Right 2019    Procedure: leg ANGIOGRAM WITH STENT, BALLOON ANGIOPLASTY, LEFT GROIN ACCESS;  Surgeon: Willy Brooke MD;  Location: [facility];  Service: Vascular       FAMILY HISTORY:  Non-contributory    SOCIAL HISTORY:  Social History     Substance and Sexual Activity   Alcohol Use Yes    Frequency: Monthly or less    Drinks per session: 1 or 2    Binge frequency: Less than monthly    Comment: occasional     Social History     Substance and Sexual Activity   Drug Use No     Social History     Tobacco Use   Smoking Status Current Some Day Smoker    Packs/day: 0 25    Years: 30 00    Pack years: 7 50    Types: Cigarettes   Smokeless Tobacco Never Used   Tobacco Comment    " I will do it on my own"       ALLERGIES:  No Known Allergies    MEDICATIONS:  All current active medications have been reviewed      PHYSICAL EXAM:  Vitals:  Temp:  [97 3 °F (36 3 °C)-102 1 °F (38 9 °C)] 97 4 °F (36 3 °C)  HR:  [] 67  Resp:  [18-20] 18  BP: ()/() 101/71  SpO2:  [86 %-94 %] 94 %  Temp (24hrs), Av 7 °F (37 1 °C), Min:97 3 °F (36 3 °C), Max:102 1 °F (38 9 °C)  Current: Temperature: (!) 97 4 °F (36 3 °C)     Physical Exam:  General:  Well-nourished, well-developed, in no acute distress  Eyes:  Conjunctive clear with no hemorrhages or effusions  Oropharynx:  No ulcers, no lesions  Neck:  Supple, no lymphadenopathy  Lungs:  Clear to auscultation bilaterally, no accessory muscle use  Cardiac:  Regular rate and rhythm, no murmurs  Abdomen:  Soft, non-tender, distended, mildly tympanitic  Extremities:  No peripheral cyanosis, clubbing, or edema  Right leg is warm to touch  Skin:  No rashes, no ulcers  Neurological:  Moves all four extremities spontaneously, sensation grossly intact    LABS, IMAGING, & OTHER STUDIES:  Lab Results:  I have personally reviewed pertinent labs  Results from last 7 days   Lab Units 01/18/20  1048 01/18/20  0355 01/17/20  0546  01/14/20  0156 01/13/20  1013   POTASSIUM mmol/L 3 4* 2 9* 3 2*   < > 3 7 4 0   CHLORIDE mmol/L 104 103 103   < > 105 97*   CO2 mmol/L 27 29 26   < > 23 23   BUN mg/dL 14 15 12   < > 48* 47*   CREATININE mg/dL 1 00 1 04 0 88   < > 2 25* 3 99*   EGFR ml/min/1 73sq m 77 73 88   < > 29 14   CALCIUM mg/dL 7 1* 6 9* 7 6*   < > 7 6* 9 0   AST U/L  --   --   --   --  14 24   ALT U/L  --   --   --   --  18 27   ALK PHOS U/L  --   --   --   --  126* 177*    < > = values in this interval not displayed  Results from last 7 days   Lab Units 01/18/20  1048 01/18/20  0355 01/17/20  0556   WBC Thousand/uL 7 51 10 16 9 94   HEMOGLOBIN g/dL 11 4* 11 3* 12 1   PLATELETS Thousands/uL 307 307 414*     Results from last 7 days   Lab Units 01/17/20  1725 01/17/20  1722   GRAM STAIN RESULT  Gram negative rods* Gram negative rods*       Imaging Studies:   I have personally reviewed pertinent imaging study reports and images in PACS  CXR reviewed personally no pneumonia  CT SBE persistent though slightly improved SBO secondary to distal ileitis  CTA/P 1/13 small bowel obstruction with transition point at distal ileus  Suspected skip lesion upstream with luminal narrowing concerning for Crohn's disease      EKG, Pathology, and Other Studies:   I have personally reviewed pertinent reports

## 2020-01-18 NOTE — PROGRESS NOTES
Progress Note - Alysha Fedeflakito 1950, 77 y o  male MRN: [id number]    [facility] Encounter: [id number]    Primary Care Provider: Berto Flynn MD   Date and time admitted to hospital: 1/13/2020  9:23 AM        PAD (peripheral artery disease) [facility])  Assessment & Plan  Patient is a 77 y o  male who presented with RLE ischemia secondary to R pop stent thrombosis and SBO s/ colonoscopy and biopsies who was planned to undergo IR lysis yesterday, which was postponed secondary to fever or 102  1  This morning, pt is hypokalemic to 2 9 receiving repletion, and hypomagnesemic to 1 8 receiving repletion  On telemetry was noted to have runs of SVT with rates in the 130-140s, asymptomatic  Pt received 250 cc bolus  Plan:  F/u IR plan for lysis  F/u repeat labs after repletion  Continue telemetry monitoring  Continue hep gtt  ASA/statin                   Subjective/Objective     Subjective: events as outlined above  Pt denies pain, numbness of tingling in feet  Objective:     Blood pressure 94/60, pulse 91, temperature 98 °F (36 7 °C), resp  rate 20, height 5' 10" (1 778 m), weight 84 6 kg (186 lb 9 6 oz), SpO2 91 %  ,Body mass index is 26 77 kg/m²  Intake/Output Summary (Last 24 hours) at 1/18/2020 0841  Last data filed at 1/18/2020 6451  Gross per 24 hour   Intake 2331 67 ml   Output 450 ml   Net 1881 67 ml       Invasive Devices     Peripheral Intravenous Line            Peripheral IV 01/13/20 Right Forearm 4 days    Peripheral IV 01/16/20 Distal;Right;Ventral (anterior) Forearm 1 day    Peripheral IV 01/18/20 Right Hand less than 1 day                Physical Exam:   Gen:  Well-developed, well-nourished male in NAD  CV: RRR, distal pulses intact  Lungs: Normal respiratory effort  Abd: soft, nontender, distended  Ext: extremities slightly cool on right compared to left, motor and sensory intact  Left: Palpable Dp, Palpable PT  Right Monophasic doppler Dp/PT,   Neuro:  AxO x3    Lab, Imaging and other studies:  CBC:   Lab Results   Component Value Date    WBC 10 16 01/18/2020    HGB 11 3 (L) 01/18/2020    HCT 33 4 (L) 01/18/2020    MCV 87 01/18/2020     01/18/2020    MCH 29 5 01/18/2020    MCHC 33 8 01/18/2020    RDW 14 4 01/18/2020    MPV 9 3 01/18/2020   , CMP:   Lab Results   Component Value Date    SODIUM 139 01/18/2020    K 2 9 (L) 01/18/2020     01/18/2020    CO2 29 01/18/2020    BUN 15 01/18/2020    CREATININE 1 04 01/18/2020    CALCIUM 6 9 (L) 01/18/2020    EGFR 73 01/18/2020     VTE Pharmacologic Prophylaxis: Heparin  VTE Mechanical Prophylaxis: sequential compression device

## 2020-01-18 NOTE — ASSESSMENT & PLAN NOTE
· On CKD stage 3  · Nephrology following, etiology secondary to pre renal  · Creatinine trended down   · Avoid nephrotoxins and renally dose medications  · To d/c IVF 6 hours post arteriogram

## 2020-01-18 NOTE — ASSESSMENT & PLAN NOTE
Met SIRs criteria with fever, tachycardia; Source GNR bacteremia possibly translocation intra-abdominally; U/a benign  ID consulted  Will start on zosyn  Repeat blood cx tomorrow  Lactic acid normal

## 2020-01-18 NOTE — ASSESSMENT & PLAN NOTE
· Chronic > 1 yr, nonbloody  · S/p egd/colonoscopy, 1/14  · s/p ct small bowel enterography, 1/16  · Biopsy pending  · Started on prednisone per GI; QuantiFERON gold, HIV pending    Negative chronic hepatitis panel

## 2020-01-18 NOTE — ASSESSMENT & PLAN NOTE
· Acute right leg ischemic pain with numbness  · Underlying history of peripheral artery disease s/p R popliteal stent  · Vascular on board, presently on heparin bridge as Xarelto was on hold  · Planned for RLE arteriogram and revascularization today but may have to be on hold given bacteremia   I have made vascular surgery aware  · Status post Leads   · Educated on smoking cessation

## 2020-01-18 NOTE — ASSESSMENT & PLAN NOTE
Ordered KDur x 1 yesterday which was not administered  Repleted accordingly today  Repeat bmp pending  Mg level nml

## 2020-01-18 NOTE — PROGRESS NOTES
NEPHROLOGY PROGRESS NOTE   Jose Galindo 77 y o  male MRN: [id number]  Unit/Bed#: [facility] 827-01 Encounter: [id number]  Reason for Consult:  Acute kidney injury    ASSESSMENT/PLAN:  1  Acute kidney injury, likely prerenal in nature given diarrhea and poor oral intake  Overall has resolved  2  Chronic kidney disease, baseline creatinine 1 0-1 5, follows Dr Delia Fuchs  3  Hypokalemia, improved post replacement, continue with 40 daily  4  Small bowel obstruction with concerns for inflammatory bowel disease  5  Right lower extremity ischemia, IR intervention currently delayed due to fever  6  Anemia, appears stable     PLAN:  · Overall renal function remains stable  · Continue with IV fluids although can decrease to 50 an hour  · Potassium replacement    SUBJECTIVE:  Seen examined  Patient awake alert  Frustrated as he is unable to go to IR for thrombectomy  No reports of chest pain shortness of breath  Denies abdominal pain  Review of Systems    OBJECTIVE:  Current Weight: Weight - Scale: 84 6 kg (186 lb 9 6 oz)  Vitals:    01/18/20 0626 01/18/20 0710 01/18/20 0840 01/18/20 1123   BP: 90/52 90/60 94/60 101/71   BP Location: Left arm Left arm Left arm    Pulse:  76 91 67   Resp:  20  18   Temp:  98 °F (36 7 °C)  (!) 97 4 °F (36 3 °C)   TempSrc:       SpO2:  91%  94%   Weight:       Height:           Intake/Output Summary (Last 24 hours) at 1/18/2020 1316  Last data filed at 1/18/2020 0840  Gross per 24 hour   Intake 2331 67 ml   Output 300 ml   Net 2031 67 ml       Physical Exam   Constitutional: He is oriented to person, place, and time  No distress  HENT:   Head: Normocephalic  Neck: Neck supple  Cardiovascular: Normal rate and regular rhythm  Pulmonary/Chest: Effort normal and breath sounds normal    Abdominal: Soft  He exhibits distension  There is no tenderness  Musculoskeletal: He exhibits no edema  Neurological: He is alert and oriented to person, place, and time  Skin: Skin is warm and dry  Medications:    Current Facility-Administered Medications:     acetaminophen (TYLENOL) tablet 650 mg, 650 mg, Oral, Q6H PRN, Cristiane Samuels MD, 650 mg at 01/18/20 0752    aspirin chewable tablet 81 mg, 81 mg, Oral, Daily, Cristiane Samuels MD, 81 mg at 01/18/20 0848    atorvastatin (LIPITOR) tablet 20 mg, 20 mg, Oral, Daily With Catina Silveira MD, 20 mg at 01/17/20 1655    cholecalciferol (VITAMIN D3) tablet 2,000 Units, 2,000 Units, Oral, Daily, Cristiane Samuels MD, 2,000 Units at 01/18/20 0848    heparin (porcine) 25,000 units in 250 mL infusion (premix), 3-30 Units/kg/hr (Order-Specific), Intravenous, Titrated, Cristiane Samuels MD, Last Rate: 15 8 mL/hr at 01/18/20 1027, 21 Units/kg/hr at 01/18/20 1027    heparin (porcine) injection 3,000 Units, 3,000 Units, Intravenous, PRN, Cristiane Samuels MD, 3,000 Units at 01/15/20 0146    heparin (porcine) injection 6,000 Units, 6,000 Units, Intravenous, PRN, Cristiane Samuels MD, 6,000 Units at 01/16/20 0201    metoprolol (LOPRESSOR) injection 5 mg, 5 mg, Intravenous, Q6H PRN, Cristiane Samules MD, 5 mg at 01/17/20 1655    multi-electrolyte (PLASMALYTE-A/ISOLYTE-S PH 7 4) IV solution, 100 mL/hr, Intravenous, Continuous, Jaylan Black MD, Last Rate: 100 mL/hr at 01/18/20 0314, 100 mL/hr at 01/18/20 0314    ondansetron (ZOFRAN) injection 4 mg, 4 mg, Intravenous, Q6H PRN, Cristiane Samuels MD    pantoprazole (PROTONIX) EC tablet 40 mg, 40 mg, Oral, Early Morning, Viktoriya Vogel MD, 40 mg at 01/18/20 0645    phenol (CHLORASEPTIC) 1 4 % mucosal liquid 1 spray, 1 spray, Mouth/Throat, Q2H PRN, Renetta Jakub, CRNP    piperacillin-tazobactam (ZOSYN) 3 375 g in sodium chloride 0 9 % 50 mL IVPB, 3 375 g, Intravenous, Q6H, Tariq Jacques, DO, Last Rate: 100 mL/hr at 01/18/20 1044, 3 375 g at 01/18/20 1044    potassium chloride (K-DUR,KLOR-CON) CR tablet 40 mEq, 40 mEq, Oral, Once, Tariq Jacques, DO    predniSONE tablet 40 mg, 40 mg, Oral, Daily, Vona Prader, MD, 40 mg at 01/18/20 0848    Laboratory Results:  Results from last 7 days   Lab Units 01/18/20  1048 01/18/20  0355 01/17/20  0556 01/17/20  0546 01/16/20  0813 01/15/20  0501 01/14/20  0338 01/14/20  0156 01/13/20  1013   WBC Thousand/uL 7 51 10 16 9 94  --  7 61 7 35 9 14  --  14 17*   HEMOGLOBIN g/dL 11 4* 11 3* 12 1  --  12 8 12 3 13 4  --  16 2   HEMATOCRIT % 34 2* 33 4* 36 9  --  39 4 37 3 41 0  --  49 5*   PLATELETS Thousands/uL 307 307 414*  --  434* 477* 516*  --  685*   POTASSIUM mmol/L 3 4* 2 9*  --  3 2* 3 5 3 0*  --  3 7 4 0   CHLORIDE mmol/L 104 103  --  103 102 107  --  105 97*   CO2 mmol/L 27 29  --  26 30 25  --  23 23   BUN mg/dL 14 15  --  12 13 25  --  48* 47*   CREATININE mg/dL 1 00 1 04  --  0 88 0 95 1 06  --  2 25* 3 99*   CALCIUM mg/dL 7 1* 6 9*  --  7 6* 7 8* 7 4*  --  7 6* 9 0   MAGNESIUM mg/dL 2 7* 1 8  --   --  2 4  --   --   --  2 1   PHOSPHORUS mg/dL  --   --   --  2 9 2 2*  --   --   --   --

## 2020-01-18 NOTE — PROGRESS NOTES
Pt on telemetry due to low potassium (2 9)  Pt having intermittent bursts of SVT  Fritzi Meigs from [place] notified and made aware  Pt remains asymptomatic  BP is 88/56, HR 92  EKG ordered  Will continue to monitor

## 2020-01-18 NOTE — ASSESSMENT & PLAN NOTE
Intermittent periods of tachycardia secondary to febrile episodes  Continue telemetry monitoring  Anticoagulation with heparin drip as Xarelto on hold; resume back on xarelto when ok per vascular

## 2020-01-18 NOTE — QUICK NOTE
Discussed this patient with Dr Stefano Montero  In light of his underlying medical issues in the last 2 days, we will be delaying his arteriography and possible lysis, which is now elective at this point  We can reconsider this after he is more medically stable

## 2020-01-18 NOTE — QUICK NOTE
On chart review yesterday afternoon patient tachycardic HR 140s on Masimo and febrile  Given Lopressor 5mg IV once  At that time blood cultures, UA, CXR ordered without sign of infection  Early this AM RN noted K 3 2 yesterday AM  Order placed to collect labs  K 2 9 and Mg 1 8  Order for telemetry monitoring, 40mEq PO KCl and 20mEq IV KCl  On telemetry patient noted to have arrhythmias with runs of SVT with HR ranging from 90s - high 100s  Order placed for additional 20mEq PO KCl, 2g IV Mg, 250 cc IVF bolus  Patient laying in bed in NAD  SpO2 stable on RA, no increased WOB  BP soft SBP high 80s-90s  No complaints  Denies CP, palpitations, SOB, dizziness, cough, dysuria  Chronic diarrhea and ongoing abdominal pain, denies pain at this time  HR tachycardic and irregular  Lungs diminished bilaterally  Abdomen distended, BS present and non tender to palpation  Skin warm, no lower extremity edema  Plan: No clear source of infection  Order repeat labs now  Improve BP prior to rate control, possibly albumin after IVF bolus

## 2020-01-18 NOTE — ASSESSMENT & PLAN NOTE
Patient is a 77 y o  male who presented with RLE ischemia secondary to R pop stent thrombosis and SBO s/ colonoscopy and biopsies who was planned to undergo IR lysis yesterday, which was postponed secondary to fever or 102  1  This morning, pt is hypokalemic to 2 9 receiving repletion, and hypomagnesemic to 1 8 receiving repletion  On telemetry was noted to have runs of SVT with rates in the 130-140s, asymptomatic  Pt received 250 cc bolus      Plan:  F/u IR plan for lysis  F/u repeat labs after repletion  Continue telemetry monitoring  Continue hep gtt  ASA/statin

## 2020-01-18 NOTE — ASSESSMENT & PLAN NOTE
S/p CT a/p revealing small bowel obstruction with a transition point at a long segment of distal ileitis  There is also a separate short segment of upstream ileitis ("skip lesion") with luminal narrowing    Gen surgery, GI on board  Concern for underlying inflammatory disease  Diet advanced with tolerance

## 2020-01-19 ENCOUNTER — APPOINTMENT (INPATIENT)
Dept: RADIOLOGY | Facility: HOSPITAL | Age: 70
DRG: 314 | End: 2020-01-19
Payer: MEDICARE

## 2020-01-19 PROBLEM — E87.70 VOLUME OVERLOAD: Status: ACTIVE | Noted: 2020-01-19

## 2020-01-19 PROBLEM — I95.9 HYPOTENSION: Status: ACTIVE | Noted: 2020-01-19

## 2020-01-19 LAB
ANION GAP SERPL CALCULATED.3IONS-SCNC: 5 MMOL/L (ref 4–13)
APTT PPP: 123 SECONDS (ref 23–37)
APTT PPP: 49 SECONDS (ref 23–37)
APTT PPP: 63 SECONDS (ref 23–37)
APTT PPP: 75 SECONDS (ref 23–37)
ATRIAL RATE: 138 BPM
BASOPHILS # BLD MANUAL: 0 THOUSAND/UL (ref 0–0.1)
BASOPHILS NFR MAR MANUAL: 0 % (ref 0–1)
BUN SERPL-MCNC: 16 MG/DL (ref 5–25)
CALCIUM SERPL-MCNC: 6.7 MG/DL (ref 8.3–10.1)
CHLORIDE SERPL-SCNC: 106 MMOL/L (ref 100–108)
CO2 SERPL-SCNC: 27 MMOL/L (ref 21–32)
CREAT SERPL-MCNC: 1.08 MG/DL (ref 0.6–1.3)
EOSINOPHIL # BLD MANUAL: 0.17 THOUSAND/UL (ref 0–0.4)
EOSINOPHIL NFR BLD MANUAL: 2 % (ref 0–6)
ERYTHROCYTE [DISTWIDTH] IN BLOOD BY AUTOMATED COUNT: 14.8 % (ref 11.6–15.1)
ERYTHROCYTE [DISTWIDTH] IN BLOOD BY AUTOMATED COUNT: 14.9 % (ref 11.6–15.1)
GFR SERPL CREATININE-BSD FRML MDRD: 70 ML/MIN/1.73SQ M
GLUCOSE SERPL-MCNC: 120 MG/DL (ref 65–140)
HCT VFR BLD AUTO: 35.2 % (ref 36.5–49.3)
HCT VFR BLD AUTO: 38.4 % (ref 36.5–49.3)
HGB BLD-MCNC: 11.6 G/DL (ref 12–17)
HGB BLD-MCNC: 12.5 G/DL (ref 12–17)
HIV 1+2 AB+HIV1 P24 AG SERPL QL IA: NORMAL
LYMPHOCYTES # BLD AUTO: 0.17 THOUSAND/UL (ref 0.6–4.47)
LYMPHOCYTES # BLD AUTO: 2 % (ref 14–44)
MAGNESIUM SERPL-MCNC: 2.1 MG/DL (ref 1.6–2.6)
MCH RBC QN AUTO: 28.9 PG (ref 26.8–34.3)
MCH RBC QN AUTO: 29.3 PG (ref 26.8–34.3)
MCHC RBC AUTO-ENTMCNC: 32.6 G/DL (ref 31.4–37.4)
MCHC RBC AUTO-ENTMCNC: 33 G/DL (ref 31.4–37.4)
MCV RBC AUTO: 89 FL (ref 82–98)
MCV RBC AUTO: 89 FL (ref 82–98)
MONOCYTES # BLD AUTO: 0.26 THOUSAND/UL (ref 0–1.22)
MONOCYTES NFR BLD: 3 % (ref 4–12)
NEUTROPHILS # BLD MANUAL: 8.02 THOUSAND/UL (ref 1.85–7.62)
NEUTS BAND NFR BLD MANUAL: 5 % (ref 0–8)
NEUTS SEG NFR BLD AUTO: 88 % (ref 43–75)
NRBC BLD AUTO-RTO: 0 /100 WBCS
P AXIS: -4 DEGREES
PLATELET # BLD AUTO: 220 THOUSANDS/UL (ref 149–390)
PLATELET # BLD AUTO: 237 THOUSANDS/UL (ref 149–390)
PLATELET BLD QL SMEAR: ADEQUATE
PMV BLD AUTO: 9.5 FL (ref 8.9–12.7)
PMV BLD AUTO: 9.7 FL (ref 8.9–12.7)
POIKILOCYTOSIS BLD QL SMEAR: PRESENT
POTASSIUM SERPL-SCNC: 3.8 MMOL/L (ref 3.5–5.3)
PR INTERVAL: 116 MS
PROCALCITONIN SERPL-MCNC: 35.67 NG/ML
QRS AXIS: -53 DEGREES
QRSD INTERVAL: 122 MS
QT INTERVAL: 290 MS
QTC INTERVAL: 439 MS
RBC # BLD AUTO: 3.96 MILLION/UL (ref 3.88–5.62)
RBC # BLD AUTO: 4.32 MILLION/UL (ref 3.88–5.62)
RBC MORPH BLD: PRESENT
SODIUM SERPL-SCNC: 138 MMOL/L (ref 136–145)
T WAVE AXIS: 72 DEGREES
VENTRICULAR RATE: 138 BPM
WBC # BLD AUTO: 10.5 THOUSAND/UL (ref 4.31–10.16)
WBC # BLD AUTO: 8.62 THOUSAND/UL (ref 4.31–10.16)

## 2020-01-19 PROCEDURE — 74022 RADEX COMPL AQT ABD SERIES: CPT

## 2020-01-19 PROCEDURE — 80048 BASIC METABOLIC PNL TOTAL CA: CPT | Performed by: INTERNAL MEDICINE

## 2020-01-19 PROCEDURE — 99233 SBSQ HOSP IP/OBS HIGH 50: CPT | Performed by: INTERNAL MEDICINE

## 2020-01-19 PROCEDURE — 85007 BL SMEAR W/DIFF WBC COUNT: CPT | Performed by: INTERNAL MEDICINE

## 2020-01-19 PROCEDURE — 85730 THROMBOPLASTIN TIME PARTIAL: CPT | Performed by: INTERNAL MEDICINE

## 2020-01-19 PROCEDURE — 99232 SBSQ HOSP IP/OBS MODERATE 35: CPT | Performed by: SURGERY

## 2020-01-19 PROCEDURE — 83735 ASSAY OF MAGNESIUM: CPT | Performed by: INTERNAL MEDICINE

## 2020-01-19 PROCEDURE — 84145 PROCALCITONIN (PCT): CPT | Performed by: PHYSICIAN ASSISTANT

## 2020-01-19 PROCEDURE — 85730 THROMBOPLASTIN TIME PARTIAL: CPT | Performed by: NURSE PRACTITIONER

## 2020-01-19 PROCEDURE — 85027 COMPLETE CBC AUTOMATED: CPT | Performed by: INTERNAL MEDICINE

## 2020-01-19 PROCEDURE — 93010 ELECTROCARDIOGRAM REPORT: CPT | Performed by: INTERNAL MEDICINE

## 2020-01-19 PROCEDURE — 99222 1ST HOSP IP/OBS MODERATE 55: CPT | Performed by: INTERNAL MEDICINE

## 2020-01-19 PROCEDURE — 85730 THROMBOPLASTIN TIME PARTIAL: CPT | Performed by: SURGERY

## 2020-01-19 RX ORDER — FUROSEMIDE 10 MG/ML
40 INJECTION INTRAMUSCULAR; INTRAVENOUS ONCE
Status: COMPLETED | OUTPATIENT
Start: 2020-01-19 | End: 2020-01-19

## 2020-01-19 RX ORDER — MIDODRINE HYDROCHLORIDE 5 MG/1
2.5 TABLET ORAL
Status: DISCONTINUED | OUTPATIENT
Start: 2020-01-19 | End: 2020-01-21 | Stop reason: HOSPADM

## 2020-01-19 RX ORDER — METOPROLOL SUCCINATE 25 MG/1
25 TABLET, EXTENDED RELEASE ORAL ONCE
Status: COMPLETED | OUTPATIENT
Start: 2020-01-19 | End: 2020-01-19

## 2020-01-19 RX ORDER — METOPROLOL SUCCINATE 50 MG/1
50 TABLET, EXTENDED RELEASE ORAL DAILY
Status: DISCONTINUED | OUTPATIENT
Start: 2020-01-20 | End: 2020-01-21 | Stop reason: HOSPADM

## 2020-01-19 RX ADMIN — HEPARIN SODIUM 3000 UNITS: 1000 INJECTION INTRAVENOUS; SUBCUTANEOUS at 11:10

## 2020-01-19 RX ADMIN — MELATONIN 2000 UNITS: at 08:24

## 2020-01-19 RX ADMIN — ATORVASTATIN CALCIUM 20 MG: 20 TABLET, FILM COATED ORAL at 17:58

## 2020-01-19 RX ADMIN — ASPIRIN 81 MG 81 MG: 81 TABLET ORAL at 08:24

## 2020-01-19 RX ADMIN — HEPARIN SODIUM AND DEXTROSE 20 UNITS/KG/HR: 10000; 5 INJECTION INTRAVENOUS at 04:29

## 2020-01-19 RX ADMIN — CEFEPIME HYDROCHLORIDE 2000 MG: 2 INJECTION, POWDER, FOR SOLUTION INTRAVENOUS at 05:04

## 2020-01-19 RX ADMIN — PREDNISONE 40 MG: 20 TABLET ORAL at 08:24

## 2020-01-19 RX ADMIN — SODIUM CHLORIDE, SODIUM GLUCONATE, SODIUM ACETATE, POTASSIUM CHLORIDE, MAGNESIUM CHLORIDE, SODIUM PHOSPHATE, DIBASIC, AND POTASSIUM PHOSPHATE 50 ML/HR: .53; .5; .37; .037; .03; .012; .00082 INJECTION, SOLUTION INTRAVENOUS at 04:30

## 2020-01-19 RX ADMIN — POTASSIUM CHLORIDE 20 MEQ: 14.9 INJECTION, SOLUTION INTRAVENOUS at 02:34

## 2020-01-19 RX ADMIN — METOPROLOL SUCCINATE 25 MG: 50 TABLET, EXTENDED RELEASE ORAL at 08:35

## 2020-01-19 RX ADMIN — HEPARIN SODIUM AND DEXTROSE 22 UNITS/KG/HR: 10000; 5 INJECTION INTRAVENOUS at 21:41

## 2020-01-19 RX ADMIN — CEFEPIME HYDROCHLORIDE 2000 MG: 2 INJECTION, POWDER, FOR SOLUTION INTRAVENOUS at 18:06

## 2020-01-19 RX ADMIN — PANTOPRAZOLE SODIUM 40 MG: 40 TABLET, DELAYED RELEASE ORAL at 05:05

## 2020-01-19 RX ADMIN — FUROSEMIDE 40 MG: 10 INJECTION, SOLUTION INTRAMUSCULAR; INTRAVENOUS at 15:55

## 2020-01-19 RX ADMIN — MIDODRINE HYDROCHLORIDE 2.5 MG: 5 TABLET ORAL at 17:58

## 2020-01-19 RX ADMIN — METOPROLOL SUCCINATE 25 MG: 25 TABLET, EXTENDED RELEASE ORAL at 09:27

## 2020-01-19 NOTE — ASSESSMENT & PLAN NOTE
Met SIRs criteria with fever, tachycardia; Source bacteremia, klebsiella possibly translocation intra-abdominally  ID following  On Cefepime  Lactic acid normal, resolved leukocytosis  F/u sensitivities  Cont supportive care

## 2020-01-19 NOTE — ASSESSMENT & PLAN NOTE
S/p CT a/p revealing small bowel obstruction with a transition point at a long segment of distal ileitis  There is also a separate short segment of upstream ileitis ("skip lesion") with luminal narrowing    Gen surgery, GI on board  Concern for underlying inflammatory disease; s/p colonoscopy, bx pending  Diet advanced with tolerance  Abd distension overnight though normoactive bs and + bs, f/u abd xr and obstruction series (have made call out to radiology for read)

## 2020-01-19 NOTE — ASSESSMENT & PLAN NOTE
Met SIRs criteria with fever, tachycardia; Source bacteremia, klebsiella possibly translocation intra-abdominally  ID following  Abx changed to cefazolin, when stable transition to po keflex, last dose on 1/24  Lactic acid normal, resolved leukocytosis  OK per ID to proceed with endovascular intervention  Cont supportive care

## 2020-01-19 NOTE — ASSESSMENT & PLAN NOTE
· Chronic > 1 yr, nonbloody  · S/p egd/colonoscopy (1/14) and s/p ct small bowel enterography, 1/16  · Biopsy pending  · Presumed inflammatory bowel disease  · GI was on board, started on prednisone  Tapering schedule in GI note (dated 1/20)  · S/p QuantiFERON gold intermediate; neg HIV and varicella    Negative chronic hepatitis panel

## 2020-01-19 NOTE — CONSULTS
Consultation - Cardiology Team One  Raffaele Coppola 77 y o  male MRN: [id number]  Unit/Bed#: [facility] 827-01 Encounter: [id number]    Inpatient consult to Cardiology  Consult performed by: LENORA Easton  Consult ordered by: Colton Pickard DO      Physician Requesting Consult: Colton Pickard DO  Reason for Consult / Principal Problem: PAF       Assessment/ Plan    1  Paroxysmal atrial fibrillation-   Reviewed telemetry showing atrial fibrillation with RVR  Patient takes metoprolol XL 25 mg PO daily at home  He has not been taking since admission  He received first dose this morning  Metoprolol increased to 50 mg PO daily  He is asymptomatic  No palpitations, chest pain or SOB  He is currently on heparin gtt  He takes xarelto at home     2  RLE ischemia secondary to R pop stent thrombosis   IR consulted   On heparin gtt     3  Gram negative bacteremia- followed by ID   On IV cefepime     3  Chronic SBO- followed by primary team   On regular diet now and tolerating     4  CKD- creatinine stable now  PHUONG on admission   Nephrology following     History of Present Illness   HPI: Raffaele Coppola is a 77y o  year old male who has a history of paroxysmal atrial fibrillation, PAD s/p stent to RLE, CKD stage III, tobacco abuse, hypotension and RBBB  He follows with cardiologist Dr Fermin Monzon  He presents to [facility] 1/13/20 with RLE numbness and tingling  Patient reports he went to the gym and when he got home he noticed numbness and tingling in his RLE  He called vascular who recommended he come to there ER  Imaging revealed occlusion versus high grade stenosis in the R popliteal, peroneal and anteriortibial arteries throughout  IR was consulted for RLE angiogram with possible lysis but has been on hold due to other finding including SBO on CT scan, PHUONG with creatinine 3 99 on admission and gram negative bacteremia  Cardiology consulted due to atrial fibrillation with RVR   Patient has history of PAF and takes metoprolol XL 25 mg PO daily and Xarelto daily  Since admission BB has been on hold and he has been on heparin gtt for anticoagulation  Telemetry shows HR 90-150s  He denies palpitations, SOB or chest pain  He is eager to have RLE procedure completed  No fever or chills  He is now on IV antibiotic: cefepime  DENIS 6/5/19 showed EF 50%, mild mitral regurgitation and mild tricuspid regurgitation  EKG reviewed personally:  Sinus tachycardic with RBBB and left anterior fascicular block  Ventricular rate 108  OK interval 112 ms  QRSD 136 ms  QT interval 404 ms  QTc interval 541 ms     Telemetry reviewed personally:   Atrial fibrillation -130s    Review of Systems   Constitution: Negative for chills and fever  Cardiovascular: Negative for chest pain, dyspnea on exertion, irregular heartbeat, leg swelling, orthopnea and palpitations  Respiratory: Negative for cough and shortness of breath  Musculoskeletal: Negative for falls  Gastrointestinal: Positive for bloating  Negative for nausea and vomiting  Neurological: Negative for dizziness and light-headedness  Psychiatric/Behavioral: Negative for altered mental status  All other systems reviewed and are negative      Historical Information   Past Medical History:   Diagnosis Date    Blue toe syndrome (HCC)     Chronic kidney disease     Colon polyps     GERD (gastroesophageal reflux disease)     Hematuria     History of rheumatic fever     Hyperlipidemia     Hypotension     Ischemic cardiomyopathy     PAD (peripheral artery disease) (HCC)     Pulmonary emphysema (HCC)      Past Surgical History:   Procedure Laterality Date    COLONOSCOPY  2019    HERNIA REPAIR      IR ABDOMINAL ANGIOGRAPHY / INTERVENTION  6/27/2019    POPLITEAL ARTERY STENT Right     6/2019    OK SLCTV CATHJ 3RD+ ORD SLCTV ABDL PEL/LXTR [facility] Right 6/27/2019    Procedure: leg ANGIOGRAM WITH STENT, BALLOON ANGIOPLASTY, LEFT GROIN ACCESS;  Surgeon: Huong Cardona Martha Melvin MD;  Location: [facility];  Service: Vascular     Social History     Substance and Sexual Activity   Alcohol Use Yes    Frequency: Monthly or less    Drinks per session: 1 or 2    Binge frequency: Less than monthly    Comment: occasional     Social History     Substance and Sexual Activity   Drug Use No     Social History     Tobacco Use   Smoking Status Current Some Day Smoker    Packs/day: 0 25    Years: 30 00    Pack years: 7 50    Types: Cigarettes   Smokeless Tobacco Never Used   Tobacco Comment    " I will do it on my own"     Family History:   Family History   Problem Relation Age of Onset    Heart disease Mother     Hyperlipidemia Mother     Hypertension Father     Heart disease Father     Stroke Father     Hyperlipidemia Father     Diabetes Brother     Dementia Neg Hx     Drug abuse Neg Hx     Mental illness Neg Hx     Substance Abuse Neg Hx     Alcohol abuse Neg Hx     Depression Neg Hx        Meds/Allergies   all current active meds have been reviewed and current meds:   Current Facility-Administered Medications   Medication Dose Route Frequency    acetaminophen (TYLENOL) tablet 650 mg  650 mg Oral Q6H PRN    aspirin chewable tablet 81 mg  81 mg Oral Daily    atorvastatin (LIPITOR) tablet 20 mg  20 mg Oral Daily With Dinner    cefepime (MAXIPIME) 2,000 mg in dextrose 5 % 50 mL IVPB  2,000 mg Intravenous Q12H    cholecalciferol (VITAMIN D3) tablet 2,000 Units  2,000 Units Oral Daily    heparin (porcine) 25,000 units in 250 mL infusion (premix)  3-30 Units/kg/hr (Order-Specific) Intravenous Titrated    heparin (porcine) injection 3,000 Units  3,000 Units Intravenous PRN    heparin (porcine) injection 6,000 Units  6,000 Units Intravenous PRN    metoprolol (LOPRESSOR) injection 5 mg  5 mg Intravenous Q6H PRN    metoprolol succinate (TOPROL-XL) 24 hr tablet 25 mg  25 mg Oral Once    [START ON 1/20/2020] metoprolol succinate (TOPROL-XL) 24 hr tablet 50 mg  50 mg Oral Daily    multi-electrolyte (PLASMALYTE-A/ISOLYTE-S PH 7 4) IV solution  100 mL/hr Intravenous Continuous    ondansetron (ZOFRAN) injection 4 mg  4 mg Intravenous Q6H PRN    pantoprazole (PROTONIX) EC tablet 40 mg  40 mg Oral Early Morning    phenol (CHLORASEPTIC) 1 4 % mucosal liquid 1 spray  1 spray Mouth/Throat Q2H PRN    predniSONE tablet 40 mg  40 mg Oral Daily       heparin (porcine) 3-30 Units/kg/hr (Order-Specific) Last Rate: 20 Units/kg/hr (01/19/20 0429)   multi-electrolyte 100 mL/hr Last Rate: 50 mL/hr (01/19/20 0430)       No Known Allergies    Objective   Vitals: Blood pressure 112/70, pulse 76, temperature 98 2 °F (36 8 °C), resp  rate 16, height 5' 10" (1 778 m), weight (!) 195 kg (430 lb 8 9 oz), SpO2 (!) 86 %  ,     Body mass index is 61 78 kg/m²  ,     Systolic (52EVM), PWR:307 , Min:78 , WXR:229     Diastolic (39KJC), TDO:43, Min:50, Max:94      Intake/Output Summary (Last 24 hours) at 1/19/2020 0832  Last data filed at 1/19/2020 0430  Gross per 24 hour   Intake 2104 67 ml   Output 210 ml   Net 1894 67 ml     Weight (last 2 days)     Date/Time   Weight    01/19/20 0600   (!) 195 (430 56)    01/18/20 0600   84 6 (186 6)    01/17/20 0600   82 3 (181 5)            Invasive Devices     Peripheral Intravenous Line            Peripheral IV 01/16/20 Distal;Right;Ventral (anterior) Forearm 2 days    Peripheral IV 01/18/20 Right Hand 1 day    Peripheral IV 01/18/20 Left Wrist less than 1 day              Physical Exam   Constitutional: He is oriented to person, place, and time  No distress  HENT:   Head: Normocephalic  Neck: Neck supple  Cardiovascular: Intact distal pulses  Irregular rate and rhythm    Pulmonary/Chest: Effort normal and breath sounds normal  No stridor  No respiratory distress  RA   Abdominal: Soft  Bowel sounds are normal  He exhibits distension  Musculoskeletal: He exhibits no edema  Neurological: He is alert and oriented to person, place, and time     Skin: Skin is warm and dry  He is not diaphoretic  Psychiatric: He has a normal mood and affect   His behavior is normal          LABORATORY RESULTS:  Results from last 7 days   Lab Units 01/13/20  1013   TROPONIN I ng/mL <0 02     CBC with diff:   Results from last 7 days   Lab Units 01/19/20  0827 01/18/20  2158 01/18/20  1048 01/18/20  0355 01/17/20  0556 01/16/20  0813 01/15/20  0501 01/14/20  0338 01/13/20  1013   WBC Thousand/uL 10 50* 15 82* 7 51 10 16 9 94 7 61 7 35 9 14 14 17*   HEMOGLOBIN g/dL 12 5 10 7* 11 4* 11 3* 12 1 12 8 12 3 13 4 16 2   HEMATOCRIT % 38 4 32 4* 34 2* 33 4* 36 9 39 4 37 3 41 0 49 5*   MCV fL 89 87 87 87 88 88 88 87 88   PLATELETS Thousands/uL 237 245 307 307 414* 434* 477* 516* 685*   MCH pg 28 9 28 7 29 1 29 5 28 9 28 6 28 9 28 5 28 9   MCHC g/dL 32 6 33 0 33 3 33 8 32 8 32 5 33 0 32 7 32 7   RDW % 14 9 14 6 14 5 14 4 14 4 14 4 14 5 14 4 14 4   MPV fL 9 5 9 3 9 4 9 3 10 0 9 1 9 5 9 3 9 7   NRBC AUTO /100 WBCs  --  0  --   --   --  0  --  0 0       CMP:  Results from last 7 days   Lab Units 01/18/20 2158 01/18/20  1048 01/18/20  0355 01/17/20  0546 01/16/20  0813 01/15/20  0501 01/14/20  0156 01/13/20  1013   POTASSIUM mmol/L 3 4* 3 4* 2 9* 3 2* 3 5 3 0* 3 7 4 0   CHLORIDE mmol/L 107 104 103 103 102 107 105 97*   CO2 mmol/L 26 27 29 26 30 25 23 23   BUN mg/dL 15 14 15 12 13 25 48* 47*   CREATININE mg/dL 1 06 1 00 1 04 0 88 0 95 1 06 2 25* 3 99*   CALCIUM mg/dL 6 8* 7 1* 6 9* 7 6* 7 8* 7 4* 7 6* 9 0   AST U/L  --   --   --   --   --   --  14 24   ALT U/L  --   --   --   --   --   --  18 27   ALK PHOS U/L  --   --   --   --   --   --  126* 177*   EGFR ml/min/1 73sq m 72 77 73 88 82 72 29 14       BMP:  Results from last 7 days   Lab Units 01/18/20  2158 01/18/20  1048 01/18/20  0355 01/17/20  0546 01/16/20  0813 01/15/20  0501 01/14/20  0156   POTASSIUM mmol/L 3 4* 3 4* 2 9* 3 2* 3 5 3 0* 3 7   CHLORIDE mmol/L 107 104 103 103 102 107 105   CO2 mmol/L 26 27 29 26 30 25 23   BUN mg/dL 15 14 15 12 13 25 48* CREATININE mg/dL 1 06 1 00 1 04 0 88 0 95 1 06 2 25*   CALCIUM mg/dL 6 8* 7 1* 6 9* 7 6* 7 8* 7 4* 7 6*          No results found for: NTBNP         Results from last 7 days   Lab Units 20  2158 20  1048 20  0355 20  0813 20  1013   MAGNESIUM mg/dL 2 1 2 7* 1 8 2 4 2 1                     Results from last 7 days   Lab Units 20  2158 20  1048 01/15/20  0501 20  0240 20  1204   INR  1 39* 1 49* 1 34* 1 79* 1 85*     Lipid Profile:   No results found for: CHOL  Lab Results   Component Value Date    HDL 45 10/07/2019    HDL 47 2019    HDL 48 2018     Lab Results   Component Value Date    LDLCALC 55 10/07/2019    LDLCALC 88 2019    LDLCALC 87 2018     Lab Results   Component Value Date    TRIG 83 10/07/2019    TRIG 114 2019    TRIG 147 2018         Cardiac testing:   Results for orders placed during the hospital encounter of 18   Echo complete with contrast if indicated    Narrative [place] [address], [address] [place] [address]  [phone]    Transthoracic Echocardiogram  2D, M-mode, Doppler, and Color Doppler    Study date:  27-Mar-2018    Patient: Layla Baird  MR number: [id number]  Account number: [de-identified]  : 1950  Age: 78 years  Gender: Male  Status: Outpatient  Location: [address] [place] [address] [facility]  Height: 70 in  Weight: 203 5 lb  BP: 124/ 80 mmHg    Indications: Assess left ventricular function  Diagnoses: R00 0 - Tachycardia, unspecified    Sonographer:  VIRGILIO Varela  Primary Physician:  Henri Santos DO  Referring Physician:  Janeth Gallegos MD  Group:  [facility]  Interpreting Physician:  Guru Ribera MD    SUMMARY    LEFT VENTRICLE:  Systolic function was mildly reduced  Ejection fraction was estimated to be 48 %    There was severe hypokinesis of the apical anterior, mid anteroseptal, mid inferoseptal, apical inferior, apical septal, and apical wall(s)  Wall thickness was mildly increased  Doppler parameters were consistent with abnormal left ventricular relaxation (grade 1 diastolic dysfunction)  MITRAL VALVE:  There was mild regurgitation  TRICUSPID VALVE:  There was mild regurgitation  Pulmonary artery systolic pressure was within the normal range  HISTORY: PRIOR HISTORY: Tachycardia, Hypotension, Smoker, CKD    PROCEDURE: The study was performed in the [facility]  This was a routine study  The transthoracic approach was used  The study included complete 2D imaging, M-mode, complete spectral Doppler, and color Doppler  The  heart rate was 81 bpm, at the start of the study  Images were obtained from the parasternal, apical, subcostal, and suprasternal notch acoustic windows  Image quality was adequate  LEFT VENTRICLE: Size was normal  Systolic function was mildly reduced  Ejection fraction was estimated to be 48 %  There was severe hypokinesis of the apical anterior, mid anteroseptal, mid inferoseptal, apical inferior, apical septal, and  apical wall(s)  Wall thickness was mildly increased  DOPPLER: Doppler parameters were consistent with abnormal left ventricular relaxation (grade 1 diastolic dysfunction)  RIGHT VENTRICLE: The size was normal  Systolic function was normal  Wall thickness was normal     LEFT ATRIUM: Size was normal     RIGHT ATRIUM: Size was normal     MITRAL VALVE: Valve structure was normal  There was normal leaflet separation  DOPPLER: The transmitral velocity was within the normal range  There was no evidence for stenosis  There was mild regurgitation  AORTIC VALVE: The valve was trileaflet  Leaflets exhibited normal thickness and normal cuspal separation  DOPPLER: Transaortic velocity was within the normal range  There was no evidence for stenosis  There was no significant  regurgitation  TRICUSPID VALVE: The valve structure was normal  There was normal leaflet separation  DOPPLER: The transtricuspid velocity was within the normal range  There was no evidence for stenosis  There was mild regurgitation  Pulmonary artery  systolic pressure was within the normal range  PULMONIC VALVE: Leaflets exhibited normal thickness, no calcification, and normal cuspal separation  DOPPLER: The transpulmonic velocity was within the normal range  There was mild regurgitation  PERICARDIUM: There was no pericardial effusion  The pericardium was normal in appearance  AORTA: The root exhibited normal size  SYSTEMIC VEINS: IVC: The inferior vena cava was normal in size      SYSTEM MEASUREMENT TABLES    2D  %FS: 25 28 %  AV Diam: 4 1 cm  EDV(Teich): 81 89 ml  EF(Cube): 58 29 %  EF(Teich): 50 23 %  ESV(Cube): 32 56 ml  ESV(Teich): 40 75 ml  IVSd: 1 27 cm  LA Area: 19 19 cm2  LA Diam: 3 81 cm  LVEDV MOD A4C: 91 16 ml  LVEF MOD A4C: 48 82 %  LVESV MOD A4C: 46 66 ml  LVIDd: 4 27 cm  LVIDs: 3 19 cm  LVLd A4C: 8 15 cm  LVLs A4C: 7 29 cm  LVPWd: 1 3 cm  RA Area: 15 96 cm2  RV Diam: 2 76 cm  SI(Cube): 21 57 ml/m2  SI(Teich): 19 49 ml/m2  SV MOD A4C: 44 5 ml  SV(Cube): 45 51 ml  SV(Teich): 41 13 ml    CW  TR MaxP mmHg  TR Vmax: 2 29 m/s    MM  TAPSE: 2 09 cm    PW  AVC: 374 23 ms  E': 0 05 m/s  E/E': 11 02  MV A Joaquin: 0 8 m/s  MV Dec [place]: 3 02 m/s2  MV DecT: 179 89 ms  MV E Joaquin: 0 54 m/s  MV E/A Ratio: 0 68    [employer] Accredited Echocardiography Laboratory    Prepared and electronically signed by    Siddharth Arredondo MD  Signed 27-Mar-2018 16:46:43       Results for orders placed during the hospital encounter of 19   DENIS    Narrative [address]  [facility], [address] [place] [address]  [phone]    Transesophageal Echocardiogram  Limited 2D and Color Doppler    Study date:  2019    Patient: Maranda Astorga  MR number: [id number]  Account number: [de-identified]  : 1950  Age: 80 years  Gender: Male  Status: Outpatient  Location: Echo lab  Height: 70 in  Weight: 183 lb  BP: 93/ 55 mmHg    Indications: Rule out LV thrombus; going for a loop recorder device  Diagnoses: I23 6 - Thrombosis of atrium, auricular appendage, and ventricle as current complications following acute my    Sonographer:  Luis George RDCS  Primary Physician:  Collin Bernardo DO  Referring Physician:  Carmen Daly MD  Group:  [facility]  RN:  Laith Ward RN  RN:  Sallie Nunez RN  Interpreting Physician:  Carmen Daly MD    SUMMARY    LEFT VENTRICLE:  Systolic function was at the lower limits of normal  Ejection fraction was estimated to be 50 %  This study was inadequate for the evaluation of regional wall motion  MITRAL VALVE:  There was mild regurgitation  TRICUSPID VALVE:  There was mild regurgitation  HISTORY: PRIOR HISTORY: CKD3  RBBB  PAD  Ischemic cardiomyopathy  Dyslipidemia  History of rheumatic fever  PROCEDURE: The procedure was performed in the echo lab  This was a routine study  The risks and alternatives of the procedure were explained to the patient and informed consent was obtained  The transesophageal approach was used  The study  included limited 2D imaging, color Doppler, and probe insertion (without interpretation)  The heart rate was 110 bpm, at the start of the study  An adult omniplane probe was inserted by the attending cardiologist  Intubated with ease  One  intubation attempt(s)  There was no blood detected on the probe  Echocardiographic views were limited due to poor acoustic window availability and lung interference  This was a technically difficult study  There were no complications  during the procedure  MEDICATIONS: Anesthesia administered by anesthesia team     LEFT VENTRICLE: Size was normal  Systolic function was at the lower limits of normal  Ejection fraction was estimated to be 50 %  This study was inadequate for the evaluation of regional wall motion      RIGHT VENTRICLE: The size was normal  Systolic function was normal     LEFT ATRIUM: Size was normal  No thrombus was identified  APPENDAGE: The size was normal  No thrombus was identified  RIGHT ATRIUM: Size was normal  No thrombus was identified  MITRAL VALVE: There was normal leaflet separation  DOPPLER: There was mild regurgitation  AORTIC VALVE: The valve was trileaflet  Leaflets exhibited normal cuspal separation  DOPPLER: There was no significant regurgitation  TRICUSPID VALVE: There was normal leaflet separation  DOPPLER: There was mild regurgitation  PULMONIC VALVE: Leaflets exhibited normal cuspal separation  DOPPLER: There was trace regurgitation  AORTA: The root exhibited normal size  There was no atheroma  There was no evidence for dissection  [address] Accredited Echocardiography Laboratory    Prepared and electronically signed by    Genia Sacks, MD  Signed 2019 14:47:57       No procedure found  Results for orders placed during the hospital encounter of 18   NM myocardial perfusion spect (stress and/or rest)    Narrative  [address] [place] [address]  [phone]    Rest/Stress Gated SPECT Myocardial Perfusion Imaging After Exercise    Patient: Vicky Burger  MR number: [id number]  Account number: [de-identified]  : 1950  Age: 78 years  Gender: Male  Status: Outpatient  Location: Stress lab  Height: 69 in  Weight: 199 lb  BP: 122/ 86 mmHg    Allergies: NO KNOWN ALLERGIES    Diagnosis: R94 31 - Abnormal electrocardiogram [ECG] [EKG]    Primary Physician:  Marjorie Clemons DO  RN:  Sylvia Olguin RN  Referring Physician:  Adonay Mchugh MD  [facility]  [facility]  Report Prepared By[de-identified]  Sylvia Olguin RN  Technician:  Becca Springer  Interpreting Physician:  Genia Sacks, MD    INDICATIONS: Evaluation of known coronary artery disease  HISTORY: The patient is a 78year old  male   Chest pain status: no chest pain  Coronary artery disease risk factors: smoking  Cardiovascular history: coronary artery disease, arrhythmia-RBBB/LAFB, and ischemic  cardiomyopathy  ischemic cardiomyopathy Co-morbidity: obesity  Medications: no cardiac drugs and aspirin  PHYSICAL EXAM: Baseline physical exam screening: no wheezes audible  REST ECG: Normal sinus rhythm  The ECG showed right bundle branch block  PROCEDURE: The study was performed in the stress lab  Treadmill exercise testing was performed, using the Zia protocol  Gated SPECT myocardial perfusion imaging was performed after stress  Systolic blood pressure was 122 mmHg, at the  start of the study  Diastolic blood pressure was 86 mmHg, at the start of the study  The heart rate was 104 bpm, at the start of the study  IV double checked  ZIA PROTOCOL:  HR bpm SBP mmHg DBP mmHg Symptoms Rhythm/conduct  Baseline 104 122 86 none --  Stage 1 130 124 80 mild dyspnea --  Stage 2 151 162 90 moderate dyspnea, moderate fatigue rare PAC's  Recovery 1 146 170 90 subsiding rare PAC's  Recovery 2 121 150 90 none occasional PAC's  Recovery 3 116 -- -- -- --  No medications or fluids given  STRESS SUMMARY: Duration of exercise was 6 min and 1 sec  The patient exercised to protocol stage 2  Maximal work rate was 7 METs  Functional capacity was normal  Maximal heart rate during stress was 151 bpm ( 98 % of maximal predicted  heart rate)  The heart rate response to stress was normal  There was normal resting blood pressure with an appropriate response to stress  The rate-pressure product for the peak heart rate and blood pressure was 03689  There was no chest  pain during stress  The stress test was terminated due to moderate dyspnea and moderate fatigue  Pre oxygen saturation: 96 %  Peak oxygen saturation: 97 %  The stress ECG was negative for ischemia and normal  Arrhythmia during stress:  isolated atrial premature beats      ISOTOPE ADMINISTRATION:  Resting isotope administration Stress isotope administration  Agent Tetrofosmin Tetrofosmin  Dose 10 4 mCi 31 5 mCi  Date 05/30/2018 05/30/2018  Injection time 12:36 13:49  Injection-image interval 30 min 31 min    The radiopharmaceutical was injected one minute before the end of exercise  MYOCARDIAL PERFUSION IMAGING:  The image quality was good  Left ventricular size was normal  The TID ratio was 1 14  PERFUSION DEFECTS:  -  There were no perfusion defects  GATED SPECT:  The calculated left ventricular ejection fraction was 54 %  Left ventricular ejection fraction was within normal limits by visual estimate  There was no left ventricular regional abnormality  SUMMARY:  -  Stress results: Duration of exercise was 6 min and 1 sec  Target heart rate was achieved  There was no chest pain during stress  -  ECG conclusions: The stress ECG was negative for ischemia and normal   -  Perfusion imaging: There were no perfusion defects   -  Gated SPECT: The calculated left ventricular ejection fraction was 54 %  Left ventricular ejection fraction was within normal limits by visual estimate  There was no left ventricular regional abnormality  IMPRESSIONS: Normal study after maximal exercise  Myocardial perfusion imaging was normal at rest and with stress  Left ventricular systolic function was normal     Prepared and signed by    Andrea Gil MD  Signed 05/30/2018 15:22:08           Imaging:  Ct Abdomen Pelvis Wo Contrast    Result Date: 1/13/2020  Narrative: CT ABDOMEN AND PELVIS WITHOUT IV CONTRAST INDICATION:   Abdominal pain, acute, nonlocalized  COMPARISON:  CT abdomen 1/7/2020 and CT abdomen/pelvis 5/15/2019 TECHNIQUE:  CT examination of the abdomen and pelvis was performed without intravenous contrast   Axial, sagittal, and coronal 2D reformatted images were created from the source data and submitted for interpretation  Radiation dose length product (DLP) for this visit:  559 99 mGy-cm     This examination, like all CT scans performed in the [facility], was performed utilizing techniques to minimize radiation dose exposure, including the use of iterative  reconstruction and automated exposure control  Enteric contrast was not administered  FINDINGS: ABDOMEN LOWER CHEST:  No clinically significant abnormality identified in the visualized lower chest  LIVER/BILIARY TREE:  Unremarkable  GALLBLADDER:  No calcified gallstones  No pericholecystic inflammatory change  SPLEEN:  Unremarkable  PANCREAS:  Unremarkable  ADRENAL GLANDS:  Stable bilateral adenomas  KIDNEYS/URETERS:  Punctate nonobstructing bilateral renal calculi  No hydronephrosis  STOMACH AND BOWEL:  The proximal small bowel is dilated with fluid and gas and there is a gradual transition point in the pelvis at a long segment of distal ileum that demonstrates mural thickening, mucosal hyperenhancement, prominence of the associated mesenteric vasculature, and relative luminal narrowing  At the distal end of the inflamed segment, the terminal ileum is abruptly decompressed (2/73) and fibrostenosing disease cannot be excluded  There is a separate, upstream, short segment of distal ileum that is also inflamed and demonstrates luminal narrowing (2/41)  Near the ileocecal valve, there is intramural fat ileal wall  The colon is completely decompressed and there is redemonstration of colonic diverticulosis without evidence of diverticulitis  No pneumatosis intestinalis or portal venous gas  No abscess or evidence of a fistula  APPENDIX:  Normal  ABDOMINOPELVIC CAVITY:  No ascites or free intraperitoneal air  No lymphadenopathy  VESSELS:  Atherosclerotic changes are present and there is a stable 3 2 x 2 6 cm infrarenal abdominal aortic aneurysm  PELVIS REPRODUCTIVE ORGANS:  Unremarkable for patient's age  URINARY BLADDER:  Unremarkable  ABDOMINAL WALL/INGUINAL REGIONS:  Unremarkable  OSSEOUS STRUCTURES:  No acute fracture or destructive osseous lesion  Impression: 1  Findings consistent with a small bowel obstruction with a transition point at a long segment of distal ileitis  There is also a separate short segment of upstream ileitis ("skip lesion") with luminal narrowing  There is intramural fat at the terminal ileum suggesting chronic/old inflammation and further abrupt decompression of the terminal ileum, raising the possibility of fibrostenosing disease/fibrotic stricture  Overall, findings are concerning for Crohn's disease  No evidence of penetrating disease  2   Bilateral nonobstructing nephrolithiasis  3   Stable 3 2 x 2 6 cm infrarenal abdominal aortic aneurysm  I personally discussed this study with Ban Monzon on 1/13/2020 at 1:55 PM  Workstation performed: [id number]     Xr Chest Portable    Result Date: 1/18/2020  Narrative: CHEST INDICATION:   fever  COMPARISON:  Two-view chest 1/13/2020 EXAM PERFORMED/VIEWS:  XR CHEST PORTABLE FINDINGS: Cardiomediastinal silhouette appears unremarkable  Left chest wall loop recorder in place  Unfolded aorta  Evaluation is somewhat limited by low lung volumes and crowding of bronchovascular markings  No definitive airspace consolidation or pulmonary edema  No pneumothorax or definitive pleural effusion  Bilateral acromioclavicular degenerative disease  Multilevel thoracolumbar spondylosis  Impression: No radiographic evidence of acute intrathoracic process on this examination which is somewhat limited by low lung volumes  Workstation performed: [id number]     Xr Chest 2 Views    Result Date: 1/13/2020  Narrative: CHEST INDICATION:   weakness  COMPARISON:  CT chest 5/16/2019 EXAM PERFORMED/VIEWS:  XR CHEST PA & LATERAL FINDINGS: Cardiomediastinal silhouette appears unremarkable  The lungs are clear  No pneumothorax or pleural effusion  Osseous structures appear within normal limits for patient age  Impression: No acute cardiopulmonary disease   Workstation performed: [id number]     Ct Head Wo Contrast    Result Date: 1/15/2020  Narrative: CT BRAIN - WITHOUT CONTRAST INDICATION:   r/o pathology to prevent thrombolysis  COMPARISON:  None  TECHNIQUE:  CT examination of the brain was performed  In addition to axial images, coronal 2D reformatted images were created and submitted for interpretation  Radiation dose length product (DLP) for this visit:  828 29 mGy-cm   This examination, like all CT scans performed in the [facility], was performed utilizing techniques to minimize radiation dose exposure, including the use of iterative  reconstruction and automated exposure control  IMAGE QUALITY:  Diagnostic  FINDINGS: PARENCHYMA:  No intracranial mass, mass effect or midline shift  No CT signs of acute infarction  No acute parenchymal hemorrhage  VENTRICLES AND EXTRA-AXIAL SPACES:  Normal for the patient's age  VISUALIZED ORBITS AND PARANASAL SINUSES:  No acute abnormality involving the orbits  Mild scattered sinus mucosal thickening is noted  No fluid levels are seen  CALVARIUM AND EXTRACRANIAL SOFT TISSUES:  Normal      Impression: No acute intracranial abnormality  Workstation performed: [id number]     Ct Abdomen W Wo Contrast    Addendum Date: 1/8/2020 Addendum:   ADDENDUM: Right-sided adrenal adenoma measuring 3 3 x 2 9 x 3 cm is similar to the prior CT May 15, 2019  Left adrenal 2 x 2 3 x 2 cm adrenal adenoma is also stable from the prior CT May 15, 2019  Result Date: 1/8/2020  Narrative: CT - ABDOMEN WITHOUT AND WITH IV CONTRAST INDICATION:   E27 8: Other specified disorders of adrenal gland  COMPARISON: May 15, 2019 TECHNIQUE: CT examination of the abdomen was performed  Reformatted images were created in axial, sagittal, and coronal planes  Radiation dose length product (DLP) for this visit:  938 mGy-cm     This examination, like all CT scans performed in the [facility], was performed utilizing techniques to minimize radiation dose exposure, including the use of iterative reconstruction and automated exposure control  IV Contrast:  85 mL of iodixanol (VISIPAQUE) Enteric Contrast:  Enteric contrast was administered  FINDINGS: ABDOMEN LOWER CHEST:  No clinically significant abnormality identified in the visualized lower chest  LIVER/BILIARY TREE:  Unremarkable  GALLBLADDER:  No calcified gallstones  No pericholecystic inflammatory change  SPLEEN:  Unremarkable  PANCREAS:  Unremarkable  ADRENAL GLANDS:  Bilateral adrenal adenomas  KIDNEYS/URETERS:  Left upper pole renal 2 mm nonobstructing calculus  Right mid pole 3 mm nonobstructing calculus  VISUALIZED STOMACH AND BOWEL:  Partially visualized dilated small bowel loops (with wall thickening) could relate to obstruction and/or infection such as enteritis, correlate with oral and IV contrast enhanced abdominal /pelvic MRI  Partially visualized colonic diverticulosis  VISUALIZED ABDOMINAL CAVITY:  No pathologically enlarged periportal, mesenteric or upper retroperitoneal lymph nodes  No ascites or free intraperitoneal air  No fluid collection  VISUALIZED BODY WALL:  Unremarkable  VISUALIZED ABDOMINAL VESSELS: Focal saccular infrarenal abdominal aortic borderline aneurysm measuring approximately 3 cm  This was also identified on the prior CT May 15, 2019  OSSEOUS STRUCTURES:  No acute fracture or destructive osseous lesion  Impression: 1  Bilateral adrenal adenomas  2  Bilateral renal nonobstructing calculi  3  Partially visualized dilated small bowel loops and wall thickening as described above could relate to obstruction and/or infection  Workstation performed: [id number]     Ct Small Bowel Enterography    Result Date: 1/16/2020  Narrative: CT ABDOMEN AND PELVIS WITH IV CONTRAST- ENTEROGRAPHY - WITH CONTRAST INDICATION:   Inflammatory bowel disease  COMPARISON:  1/13/2020, 1/7/2020   TECHNIQUE:  Contrast-enhanced CT examination of the abdomen and pelvis was performed utilizing thin section technique and after the administration of low density enteric contrast according to protocol designed specifically to obtain sensitive evaluation of the small bowel  Axial, sagittal, and coronal 2D reformatted images were created from the source data and submitted for interpretation  Radiation dose length product (DLP) for this visit:  1415 mGy-cm   This examination, like all CT scans performed in the [facility], was performed utilizing techniques to minimize radiation dose exposure, including the use of iterative reconstruction and automated exposure control  IV Contrast:  100 mL of iohexol (OMNIPAQUE) Enteric Contrast:  1500 cc of VoLumen enteric contrast was administered  FINDINGS: ABDOMEN SMALL BOWEL: There is moderate diffuse dilatation of the small bowel, slightly decreased since the prior study  There is long segment wall thickening and enhancement of the distal ileum, including the terminal ileum, consistent with active inflammatory bowel disease  LARGE BOWEL:  Normal caliber  No focal wall thickening or pericolonic inflammatory change  There is colonic diverticulosis without diverticulitis  STOMACH:  Moderately distended  LOWER CHEST:  Mild bibasilar atelectasis  No effusions  LIVER/BILIARY TREE:  Unremarkable  GALLBLADDER:  Contracted gallbladder, limiting evaluation  SPLEEN:  Unremarkable  PANCREAS:  Unchanged bilateral adrenal masses, which were characterized as adenomas on prior MRI  ADRENAL GLANDS:  Unremarkable  KIDNEYS/URETERS:  No hydronephrosis  5 mm calculus in the lower pole right kidney, and punctate calculus in the upper pole left kidney again seen  ABDOMINOPELVIC CAVITY:  No ascites or free intraperitoneal air  No lymphadenopathy  VESSELS:  Atherosclerotic changes are present  Unchanged ectasia of the mid abdominal aorta measuring 3 0 cm  PELVIS REPRODUCTIVE ORGANS:  Unremarkable for patient's age  URINARY BLADDER:  Unremarkable  ABDOMINAL WALL/INGUINAL REGIONS:  Unremarkable   OSSEOUS STRUCTURES:  No acute fracture or destructive osseous lesion  Impression: Persistent though slightly improved small bowel obstruction secondary to active long segment distal ileitis  Nonobstructing bilateral renal calculi  Unchanged mild infrarenal aortic aneurysm  Bilateral adrenal adenomas again noted  The study was marked in [facility] for immediate notification    Workstation performed: [id number]     Vas Lower Limb Arterial Duplex, Complete Bilateral    Result Date: 1/13/2020  Narrative:  [facility] REPORT CLINICAL: Indications: Patient presents with pain in his right leg while walking  His right foot is cold to the touch and numb  He has a h/o a right popliteal stent  Denies any open wounds or ulcers at this time  Operative History: 2019-06-27 Right Popliteal stent Risk Factors The patient has history of HLD, PAD and smoking (current) 0 5 ppd  Clinical Right Pressure: IV, Left Pressure: 119/ mm Hg  FINDINGS:  Segment                Right                 Left                                          Impression  PSV  EDV  PSV  EDV  Common Femoral Artery               90    0  101    2  Prox Profunda                       73    5   70    2  Prox SFA                            88    6   94    0  Mid SFA                             44    0  101    0  Dist SFA                                     102    0  Dist SFA - Stent                    36    0            Proximal Pop                                  31    0  Proximal Pop - Stent   Occluded                        Distal Pop                          16    4   30    0  Dist Post Tibial                    10    5   67    3  Dist  Ant  Tibial      Occluded               33    0  Dist Peroneal          Occluded               40    0     CONCLUSION:  Impression: RIGHT LOWER LIMB: Occlusion versus high grade stenosis in the popliteal, peroneal and anterior tibial arteries throughout  Ankle/Brachial index: 0 55, which is in the severe claudication range   Prior: 1 21 The anterior tibial pressure was unable to be obtained  PVR/ PPG tracings are dampened  Metatarsal and great toe pressures were not obtained secondary to attenuated PPG tracings  Prior: Same LEFT LOWER LIMB: This resting evaluation shows no evidence of significant lower extremity arterial occlusive disease  Ankle/Brachial index: 0 88, which is in the mild claudication range  Prior: 1 14 PVR/ PPG tracings are normal  Metatarsal pressure of 131 mmHg  Prior: 136 mmHg Great toe pressure of 41 mmHg, within the healing range  Prior: 45 mmHg  Compared to previous study on 10/21/19, there is a progression of disease on the right  Technical findings were given to SAMIRA Chappell in the ED  SIGNATURE: Electronically Signed by: Maria Del Rosario Jefferson on 2020-01-13 07:43:41 PM          Thank you for allowing us to participate in this patient's care  This pt will follow up with Dr Tom Corado once discharged  Counseling / Coordination of Care  Total floor / unit time spent today 45 minutes  Greater than 50% of total time was spent with the patient and / or family counseling and / or coordination of care  A description of the counseling / coordination of care: Review of history, current assessment, development of a plan  Code Status: Level 1 - Full Code    ** Please Note: Dragon 360 Dictation voice to text software may have been used in the creation of this document   **

## 2020-01-19 NOTE — PROGRESS NOTES
Progress Note - Rosalva Spurling 77 y o  male MRN: [id number]    Unit/Bed#: [facility] 827-01 Encounter: [id number]      Assessment:  Patient is a 77 y o  male who presented with RLE ischemia secondary to R pop stent thrombosis and partial SBO likely 2/2 Crohn's now s/p colonoscopy and biopsies  Pt had planned IR lysis now postponed 2/2 medical optimization  1/17 blood cx: GNR  Intermittently tachycardic 120-130 and hypotensive 70s/50s- 100/65 with runs of SVT  Afebrile  RLE motor and sensory intact  RLE absent dp signal, monphasic pt  Plan:  Continue heparin gtt  Continue prednisone for crohns tx  Continue abx for GNR bacteremia  Holding off on IR procedure for now  F/u Cards consult    Subjective:   Feeling well  Denied any worsening pain to RLE or any changes to sensation  Denied fever, chills, chest pain, shortness of breath, nausea, vomiting, or abdominal pain this morning  Objective:     Vitals: Blood pressure 105/65, pulse (!) 131, temperature 97 5 °F (36 4 °C), resp  rate 17, height 5' 10" (1 778 m), weight 84 6 kg (186 lb 9 6 oz), SpO2 90 %  ,Body mass index is 26 77 kg/m²  Intake/Output Summary (Last 24 hours) at 1/19/2020 0624  Last data filed at 1/19/2020 0430  Gross per 24 hour   Intake 2354 67 ml   Output 210 ml   Net 2144 67 ml       Physical Exam  General: NAD  HEENT: NC/AT  MMM  Cv: RRR  Lungs: normal effort  Ab: Soft, NT/ND  Ex: no CCE  RLE sensory motor intact  Absent dp signal, + monphasic PT  Good cap refill     Neuro: AAOx3      Invasive Devices     Peripheral Intravenous Line            Peripheral IV 01/16/20 Distal;Right;Ventral (anterior) Forearm 2 days    Peripheral IV 01/18/20 Left Wrist less than 1 day    Peripheral IV 01/18/20 Right Hand less than 1 day              Scheduled Meds:  Current Facility-Administered Medications:  acetaminophen 650 mg Oral Q6H PRN Torin Child MD    aspirin 81 mg Oral Daily Viktoriya Vogel MD    atorvastatin 20 mg Oral Daily With Prieto Francis MD    cefepime 2,000 mg Intravenous Q12H Lizzy Betancur MD Last Rate: 2,000 mg (01/19/20 0504)   cholecalciferol 2,000 Units Oral Daily Aurora Gonsales MD    heparin (porcine) 3-30 Units/kg/hr (Order-Specific) Intravenous Titrated Aurora Gonsales MD Last Rate: 20 Units/kg/hr (01/19/20 0429)   heparin (porcine) 3,000 Units Intravenous PRN Aurora Gonsales MD    heparin (porcine) 6,000 Units Intravenous PRN Viktoriya Vogel MD    metoprolol 5 mg Intravenous Q6H PRN Viktoriya Vogel MD    metoprolol succinate 25 mg Oral Daily Cordella Savin, DO    multi-electrolyte 100 mL/hr Intravenous Continuous LENORA Blue Last Rate: 50 mL/hr (01/19/20 0430)   ondansetron 4 mg Intravenous Q6H PRN Viktoriya Vogel MD    pantoprazole 40 mg Oral Early Morning Viktoriya Vogel MD    phenol 1 spray Mouth/Throat Q2H PRN LENORA Blue    predniSONE 40 mg Oral Daily Sebastian Rivero MD      Continuous Infusions:  heparin (porcine) 3-30 Units/kg/hr (Order-Specific) Last Rate: 20 Units/kg/hr (01/19/20 0429)   multi-electrolyte 100 mL/hr Last Rate: 50 mL/hr (01/19/20 0430)     PRN Meds:   acetaminophen    heparin (porcine)    heparin (porcine)    metoprolol    ondansetron    phenol    Lab, Imaging and other studies: I have personally reviewed pertinent reports      VTE Pharmacologic Prophylaxis: Heparin  VTE Mechanical Prophylaxis: sequential compression device

## 2020-01-19 NOTE — QUICK NOTE
Reviewed labs lactic normal ,  Wbc increase 15 83 (pt has received first dose of steroids this am 40mg po) unclear if dt worsening sepisi  However, hr improving sbp improved with ivf and albumin 94/62 hr 93 will continue to run ivf at 100ml for now will need to monitor sob   Potassium 3 4 will administer iv dt abdominal inflammation ?  Absorption  20meq iv x 2   Pending abdominal xray

## 2020-01-19 NOTE — ASSESSMENT & PLAN NOTE
· Acute right leg ischemic pain with numbness  · Underlying history of peripheral artery disease s/p R popliteal stent  · Vascular on board, presently on heparin gtt as Xarelto was on hold  · Ok per ID to proceed with RLE arteriogram, timing dependent upon vascular surgery  · Status post Leads   · Educated on smoking cessation

## 2020-01-19 NOTE — ASSESSMENT & PLAN NOTE
Required IVF hydration given PHUONG  IV lasix ordered per cardiology if bp able to tolerate  IVF discontinued

## 2020-01-19 NOTE — ASSESSMENT & PLAN NOTE
S/p CT a/p revealing small bowel obstruction with a transition point at a long segment of distal ileitis  There is also a separate short segment of upstream ileitis ("skip lesion") with luminal narrowing  Gen surgery, GI on board  Concern for underlying inflammatory disease; s/p colonoscopy, bx pending  Diet advanced with tolerance  Abd distension 2 nights ago now improved  Normoactive bs, + bm, abd xr and obstruction series revealing SBO  Discussed results with gen surg known areas of stricture s/p colonoscopy/ct enterography, states no surgical intervention required, pt passing gas and has been having multiple bm  Also tolerating diet w/ no vomiting thus no need for NGT  If has vomiting episodes then consider placement of NGT

## 2020-01-19 NOTE — ASSESSMENT & PLAN NOTE
· Chronic, being followed by Hematology as outpatient, JAK2 mutation negative  · resolved  · Continue to monitor

## 2020-01-19 NOTE — PROGRESS NOTES
Progress Note - Kent Else 1950, 77 y o  male MRN: [id number]    Unit/Bed#: [facility] 827-01 Encounter: [id number]    Primary Care Provider: Ruthie Ragland MD   Date and time admitted to hospital: 1/13/2020  9:23 AM        * Sepsis [facility])  Assessment & Plan  Met SIRs criteria with fever, tachycardia; Source bacteremia, klebsiella possibly translocation intra-abdominally  ID following  On Cefepime  Lactic acid normal, resolved leukocytosis  F/u sensitivities  Cont supportive care      Limb ischemia  Assessment & Plan  · Acute right leg ischemic pain with numbness  · Underlying history of peripheral artery disease s/p R popliteal stent  · Vascular on board, presently on heparin bridge as Xarelto was on hold  · Planned for RLE arteriogram and revascularization however on hold (given sepsis) until medically optimized  · Status post Leads   · Educated on smoking cessation    Paroxysmal atrial fibrillation (HCC)  Assessment & Plan  W/ RVR in setting of sepsis  Appreciate cardiology input  Cont metoprolol, holding parameters lowered  Continue telemetry monitoring  Anticoagulation with heparin drip as Xarelto on hold; resume back on xarelto when ok per vascular    SBO (small bowel obstruction) ([facility] )  Assessment & Plan  S/p CT a/p revealing small bowel obstruction with a transition point at a long segment of distal ileitis  There is also a separate short segment of upstream ileitis ("skip lesion") with luminal narrowing  Gen surgery, GI on board  Concern for underlying inflammatory disease; s/p colonoscopy, bx pending  Diet advanced with tolerance  Abd distension overnight though normoactive bs and + bs, abd xr and obstruction series revealing SBO  Discussed results with gen surg known areas of stricture s/p colonoscopy/ct enterography, states no surgical intervention required, pt passing gas and has been having multiple bm  Also tolerating diet w/ no vomiting thus no need for NGT    If has vomiting episodes then consider placement of NGT  Diarrhea  Assessment & Plan  · Chronic > 1 yr, nonbloody  · S/p egd/colonoscopy, 1/14  · s/p ct small bowel enterography, 1/16  · Biopsy pending  · Started on prednisone per GI; QuantiFERON gold, HIV pending  Negative chronic hepatitis panel      PHUONG (acute kidney injury) ([facility] )  Assessment & Plan  · On CKD stage 3  · Nephrology following, etiology secondary to pre renal  · Creatinine trended down   · Avoid nephrotoxins and renally dose medications  · Off IVF    Hypokalemia  Assessment & Plan  resolved    Volume overload  Assessment & Plan  Required IVF hydration given PHUONG  IV lasix ordered per cardiology if bp able to tolerate  IVF discontinued    Hypotension  Assessment & Plan  Also reports of chronic hx  Asymptomatic  Use of albumin as needed    Thrombocytosis (HCC)  Assessment & Plan  · Chronic, being followed by Hematology as outpatient, JAK2 mutation negative  · resolved  · Continue to monitor    PAD (peripheral artery disease) ([facility] )  Assessment & Plan  · S/p right popliteal stent  · was on xarelto & statin at home  · On heparin gtt while xarelto is on hold    VTE Pharmacologic Prophylaxis:   Pharmacologic: Heparin Drip  Mechanical VTE Prophylaxis in Place: No    Patient Centered Rounds: I have performed bedside rounds with nursing staff today  Discussions with Specialists or Other Care Team Provider: Cardiology, nephrology    Education and Discussions with Family / Patient: Patient and his wife    Time Spent for Care: 30 minutes  More than 50% of total time spent on counseling and coordination of care as described above      Current Length of Stay: 6 day(s)    Current Patient Status: Inpatient   Certification Statement: The patient will continue to require additional inpatient hospital stay due to Clinical improvement    Discharge Plan:     Code Status: Level 1 - Full Code      Subjective:   C/o distended abdomen, xr results pending  HR improved to 110-120, still asymptomatic  Afebrile  Diarrhea freq lessend    Objective:     Vitals:   Temp (24hrs), Av °F (36 7 °C), Min:97 5 °F (36 4 °C), Max:98 6 °F (37 °C)    Temp:  [97 5 °F (36 4 °C)-98 6 °F (37 °C)] 98 2 °F (36 8 °C)  HR:  [] 92  Resp:  [16-24] 22  BP: ()/(50-85) 110/72  SpO2:  [86 %-93 %] 89 %  Body mass index is 61 78 kg/m²  Input and Output Summary (last 24 hours): Intake/Output Summary (Last 24 hours) at 2020 1614  Last data filed at 2020 1330  Gross per 24 hour   Intake 2730 67 ml   Output 410 ml   Net 2320 67 ml       Physical Exam:     Physical Exam   Constitutional: He is oriented to person, place, and time  He appears well-developed and well-nourished  Cardiovascular: Normal heart sounds and intact distal pulses  Exam reveals no gallop and no friction rub  No murmur heard  Irregular, tachycardiac   Pulmonary/Chest: Effort normal  No stridor  No respiratory distress  He has no wheezes  He exhibits no tenderness  Mild bibasilar crackles   Abdominal: Soft  Bowel sounds are normal  He exhibits distension  There is no tenderness  There is no rebound and no guarding  Musculoskeletal: Normal range of motion  He exhibits no edema, tenderness or deformity  Neurological: He is alert and oriented to person, place, and time  He displays normal reflexes  No cranial nerve deficit or sensory deficit  He exhibits normal muscle tone  Coordination normal      Additional Data:     Labs:    Results from last 7 days   Lab Units 20  1019  20  1013   WBC Thousand/uL 8 62   < > 14 17*   HEMOGLOBIN g/dL 11 6*   < > 16 2   HEMATOCRIT % 35 2*   < > 49 5*   PLATELETS Thousands/uL 220   < > 685*   BANDS PCT % 5   < >  --    NEUTROS PCT %  --   --  74   LYMPHS PCT %  --   --  14   LYMPHO PCT % 2*   < >  --    MONOS PCT %  --   --  11   MONO PCT % 3*   < >  --    EOS PCT % 2   < > 0    < > = values in this interval not displayed       Results from last 7 days   Lab Units 20  1030 01/14/20  0156   SODIUM mmol/L 138   < > 137   POTASSIUM mmol/L 3 8   < > 3 7   CHLORIDE mmol/L 106   < > 105   CO2 mmol/L 27   < > 23   BUN mg/dL 16   < > 48*   CREATININE mg/dL 1 08   < > 2 25*   ANION GAP mmol/L 5   < > 9   CALCIUM mg/dL 6 7*   < > 7 6*   ALBUMIN g/dL  --   --  2 1*   TOTAL BILIRUBIN mg/dL  --   --  0 53   ALK PHOS U/L  --   --  126*   ALT U/L  --   --  18   AST U/L  --   --  14   GLUCOSE RANDOM mg/dL 120   < > 117    < > = values in this interval not displayed  Results from last 7 days   Lab Units 01/18/20  2158   INR  1 39*             Results from last 7 days   Lab Units 01/19/20  1019 01/18/20  2158 01/18/20  1048   LACTIC ACID mmol/L  --  1 8 0 9   PROCALCITONIN ng/ml 35 67*  --  11 07*           * I Have Reviewed All Lab Data Listed Above  * Additional Pertinent Lab Tests Reviewed:  All Labs Within Last 24 Hours Reviewed    Imaging:    Imaging Reports Reviewed Today Include:   Imaging Personally Reviewed by Myself Includes:      Recent Cultures (last 7 days):     Results from last 7 days   Lab Units 01/17/20  1725 01/17/20  1722   BLOOD CULTURE  Klebsiella pneumoniae* Klebsiella pneumoniae*   GRAM STAIN RESULT  Gram negative rods* Gram negative rods*       Last 24 Hours Medication List:     Current Facility-Administered Medications:  acetaminophen 650 mg Oral Q6H PRN Oral Cockayne, MD    aspirin 81 mg Oral Daily Oral Cockayne, MD    atorvastatin 20 mg Oral Daily With Karo Brown MD    cefepime 2,000 mg Intravenous Q12H Rodger Moffett MD Last Rate: 2,000 mg (01/19/20 0504)   cholecalciferol 2,000 Units Oral Daily Oral Cockayne, MD    heparin (porcine) 3-30 Units/kg/hr (Order-Specific) Intravenous Titrated Oral Cockayne, MD Last Rate: 22 Units/kg/hr (01/19/20 1104)   heparin (porcine) 3,000 Units Intravenous PRN Oral Cockayne, MD    heparin (porcine) 6,000 Units Intravenous PRN Viktoriya Vogel MD    metoprolol 5 mg Intravenous Q6H PRN Viktoriya MOSELEY Isha Perez MD    [START ON 1/20/2020] metoprolol succinate 50 mg Oral Daily LENORA Hyatt    midodrine 2 5 mg Oral TID SONNY Gray DO    ondansetron 4 mg Intravenous Q6H PRN Ilan Renee MD    pantoprazole 40 mg Oral Early Morning Viktoriya Vogel MD    phenol 1 spray Mouth/Throat Q2H PRN LENORA Allison    predniSONE 40 mg Oral Daily Young Franklin MD         Today, Patient Was Seen By: Jade Rosario DO    ** Please Note: Dictation voice to text software may have been used in the creation of this document   **

## 2020-01-19 NOTE — QUICK NOTE
· Called by nursing pt with hr between 110 - 180 however very labile and irregular occasionally in 90's  · Pt is in rapid afib on iv heparin gtt for ischemic right lower extremity secondary to right pop stent thrombosis  · Also noted to have had sbo  Felt to have resolved pts diet has since been increased pt with  Liquid bms at this time abdomen in large round and distended  Pt has no complaints denies sob chest pain dizziness or lightheadedness  · Pt was scheduled to go to IR for lysis on 1/17 this was held dt fever orf 102 1  · Pt is now noted to be bacteremic with gram negative organism, seen by ID and placed on cefepime, pt remains afebrile right now  ·  At this time sbp is 78 asymptomatic pt has received 500 nss bolus also on iv fluids 50 cc will increase rate to 100 ml now first  will administer albumin at this time as pt with ef 54% but no left ventricular abnormality     · At this minute pts hr 93 (reluctant to administer bb due to hypotension and pts hr swings)   · This am pt noted to be hypokalemic and with hypomagnesemia (these were both repleted)   · Will chk cbc, bmp, magnesium and  lactic    · Will obtain xray abdomen completely r/o obstruction   · Pending electrolytes at this time

## 2020-01-19 NOTE — ASSESSMENT & PLAN NOTE
· S/p right popliteal stent  · was on xarelto & statin at home  · On heparin gtt while xarelto is on hold  · Ok per ID to proceed with endovascular procedure

## 2020-01-19 NOTE — ASSESSMENT & PLAN NOTE
· Acute right leg ischemic pain with numbness  · Underlying history of peripheral artery disease s/p R popliteal stent  · Vascular on board, presently on heparin bridge as Xarelto was on hold  · Planned for RLE arteriogram and revascularization however on hold (given sepsis) until medically optimized  · Status post Leads   · Educated on smoking cessation

## 2020-01-19 NOTE — PROGRESS NOTES
NEPHROLOGY PROGRESS NOTE   Colleen Parks 77 y o  male MRN: [id number]  Unit/Bed#: [facility] 827-01 Encounter: [id number]  Reason for Consult:  Acute kidney injury    ASSESSMENT/PLAN:  1  Acute kidney injury, likely prerenal in nature given diarrhea and poor oral intake  Overall has resolved  2  Chronic kidney disease, baseline creatinine 1 0-1 5, follows Dr Raymond Lawton  3  Gram-negative caroline bacteremia, Infectious Disease following  4  Hypokalemia, improved post replacement, continue with 40 daily  5  Small bowel obstruction with concerns for inflammatory bowel disease  6  Right lower extremity ischemia, IR intervention currently delayed due to fever/bacteremia  7  Component of volume overload, IV Lasix to be given today  8  Labile blood pressures, given slightly volume overload, start low-dose midodrine, discontinue IV fluids    PLAN:  · Given labile blood pressures, start low-dose midodrine 2 5 mg t i d   · Discontinue IV fluids given degree of volume overload  · Lasix to be given as per Cardiology, continued to diurese as tolerated    SUBJECTIVE:  Seen examined  Patient awake alert  Mild shortness of breath  Complains of abdominal distension  Appetite is poor  Review of Systems    OBJECTIVE:  Current Weight: Weight - Scale: (!) 195 kg (430 lb 8 9 oz)  Vitals:    01/19/20 0705 01/19/20 0834 01/19/20 0924 01/19/20 1109   BP: 112/70 114/83 117/79 92/60   Pulse: 76 (!) 110 98 98   Resp: 16   20   Temp: 98 2 °F (36 8 °C)   97 5 °F (36 4 °C)   TempSrc:       SpO2: (!) 86% 91% 91% 90%   Weight:       Height:           Intake/Output Summary (Last 24 hours) at 1/19/2020 1324  Last data filed at 1/19/2020 5353  Gross per 24 hour   Intake 2162 67 ml   Output 410 ml   Net 1752 67 ml       Physical Exam   Constitutional: He is oriented to person, place, and time  No distress  HENT:   Head: Normocephalic  Eyes: No scleral icterus  Neck: Neck supple  Cardiovascular: Normal rate and regular rhythm     Pulmonary/Chest: Effort normal  He has rales in the right lower field and the left lower field  Abdominal: Soft  He exhibits distension  There is no tenderness  Musculoskeletal: He exhibits no edema  Neurological: He is alert and oriented to person, place, and time  Skin: Skin is warm and dry  No rash noted         Medications:    Current Facility-Administered Medications:     acetaminophen (TYLENOL) tablet 650 mg, 650 mg, Oral, Q6H PRN, Layo Ly MD, 650 mg at 01/18/20 0752    aspirin chewable tablet 81 mg, 81 mg, Oral, Daily, Layo Ly MD, 81 mg at 01/19/20 0824    atorvastatin (LIPITOR) tablet 20 mg, 20 mg, Oral, Daily With Paulette Kaur MD, 20 mg at 01/18/20 1702    cefepime (MAXIPIME) 2,000 mg in dextrose 5 % 50 mL IVPB, 2,000 mg, Intravenous, Q12H, [place] Espana MD, Last Rate: 100 mL/hr at 01/19/20 0504, 2,000 mg at 01/19/20 0504    cholecalciferol (VITAMIN D3) tablet 2,000 Units, 2,000 Units, Oral, Daily, Layo Ly MD, 2,000 Units at 01/19/20 0824    furosemide (LASIX) injection 40 mg, 40 mg, Intravenous, Once, Fina Arrieta MD    heparin (porcine) 25,000 units in 250 mL infusion (premix), 3-30 Units/kg/hr (Order-Specific), Intravenous, Titrated, Layo Ly MD, Last Rate: 16 5 mL/hr at 01/19/20 1104, 22 Units/kg/hr at 01/19/20 1104    heparin (porcine) injection 3,000 Units, 3,000 Units, Intravenous, PRN, Layo Ly MD, 3,000 Units at 01/19/20 1110    heparin (porcine) injection 6,000 Units, 6,000 Units, Intravenous, PRN, Layo Ly MD, 6,000 Units at 01/16/20 0201    metoprolol (LOPRESSOR) injection 5 mg, 5 mg, Intravenous, Q6H PRN, Layo Ly MD, 5 mg at 01/18/20 1526    [START ON 1/20/2020] metoprolol succinate (TOPROL-XL) 24 hr tablet 50 mg, 50 mg, Oral, Daily, LENORA Miller    multi-electrolyte (PLASMALYTE-A/ISOLYTE-S PH 7 4) IV solution, 100 mL/hr, Intravenous, Continuous, LENORA Cobb, Last Rate: 50 mL/hr at 01/19/20 0430, 50 mL/hr at 01/19/20 0430    ondansetron (ZOFRAN) injection 4 mg, 4 mg, Intravenous, Q6H PRN, Oral Cockayne, MD    pantoprazole (PROTONIX) EC tablet 40 mg, 40 mg, Oral, Early Morning, Viktoriya Vogel MD, 40 mg at 01/19/20 0505    phenol (CHLORASEPTIC) 1 4 % mucosal liquid 1 spray, 1 spray, Mouth/Throat, Q2H PRN, LENORA Lopez    predniSONE tablet 40 mg, 40 mg, Oral, Daily, Audrey Middleton MD, 40 mg at 01/19/20 5362    Laboratory Results:  Results from last 7 days   Lab Units 01/19/20  1030 01/19/20  1019 01/19/20  0827 01/18/20  2158 01/18/20  1048 01/18/20  0355 01/17/20  0556 01/17/20  0546 01/16/20  0813 01/15/20  0501  01/13/20  1013   WBC Thousand/uL  --  8 62 10 50* 15 82* 7 51 10 16 9 94  --  7 61 7 35   < > 14 17*   HEMOGLOBIN g/dL  --  11 6* 12 5 10 7* 11 4* 11 3* 12 1  --  12 8 12 3   < > 16 2   HEMATOCRIT %  --  35 2* 38 4 32 4* 34 2* 33 4* 36 9  --  39 4 37 3   < > 49 5*   PLATELETS Thousands/uL  --  220 237 245 307 307 414*  --  434* 477*   < > 685*   POTASSIUM mmol/L 3 8  --   --  3 4* 3 4* 2 9*  --  3 2* 3 5 3 0*   < > 4 0   CHLORIDE mmol/L 106  --   --  107 104 103  --  103 102 107   < > 97*   CO2 mmol/L 27  --   --  26 27 29  --  26 30 25   < > 23   BUN mg/dL 16  --   --  15 14 15  --  12 13 25   < > 47*   CREATININE mg/dL 1 08  --   --  1 06 1 00 1 04  --  0 88 0 95 1 06   < > 3 99*   CALCIUM mg/dL 6 7*  --   --  6 8* 7 1* 6 9*  --  7 6* 7 8* 7 4*   < > 9 0   MAGNESIUM mg/dL 2 1  --   --  2 1 2 7* 1 8  --   --  2 4  --   --  2 1   PHOSPHORUS mg/dL  --   --   --   --   --   --   --  2 9 2 2*  --   --   --     < > = values in this interval not displayed

## 2020-01-19 NOTE — PROGRESS NOTES
Progress Note - Infectious Disease   Abigail Graft 77 y o  male MRN: [id number]  Unit/Bed#: [facility] 827-01 Encounter: [id number]      Impression/Recommendations:  1  Sepsis  Evolving since admission:  Fever, tachycardia  Due to # 2  No other appreciable source  UA, chest x-ray negative  Clinically stable and nontoxic  Rec:  ? Continue antibiotics as below  ? Follow temperatures closely  ? Supportive care as per the primary service     2  Gram-negative bacteremia  Suspect transient GI source in setting of # 3/4  No other appreciable source  Abdominal exam benign making perforation or other complication from recent colonoscopy unlikely  UA negative and offers no  symptoms  Rec:  ? Continue cefepime for now  ? Follow-up ID/susceptibility and tailor antibiotics as indicated  ? Anticipate hopeful transition to oral agent with plan to complete 7 days total of treatment      3  Chronic SBO  Due to terminal ileitis in the setting of # 4  Clinically stable, tolerating p o  Rec:  ? Serial abdominal exams  ? Surgery, GI follow-up ongoing     4  Probable IBD  In setting of elevated fecal calprotectin and focal areas of ileitis with stricture seen on colonoscopy  Biopsies pending  Rec:  ? Empirical steroids per GI  ? Follow-up biopsy     5  Right lower extremity ischemia  Due to popliteal stent thrombosis in setting of all of above  Extremity appears perfused without critical ischemia  Rec:  ? As ischemia does not appear to be critical or limb-threatening, agree with deferring any intervention until patient is more stable on antibiotics for at least 24-48 hours  ? Serial exams  ? Close vascular surgery follow-up ongoing     The above impression and plan was discussed in detail with the patient and his wife at the bedside      Antibiotics:  Cefepime #1  Steroids # 2    Subjective:  Patient seen on AM rounds    Continues to be frustrated with how long he has been in the hospital   Denies fevers, chills, sweats, nausea, vomiting, or diarrhea  24 Hour Events:  AFib with RVR overnight  No documented fevers, chills, sweats, nausea, vomiting, or diarrhea  Objective:  Vitals:  Temp:  [97 4 °F (36 3 °C)-98 6 °F (37 °C)] 97 5 °F (36 4 °C)  HR:  [] 98  Resp:  [16-24] 20  BP: ()/(50-94) 92/60  SpO2:  [86 %-98 %] 90 %  Temp (24hrs), Av 8 °F (36 6 °C), Min:97 4 °F (36 3 °C), Max:98 6 °F (37 °C)  Current: Temperature: 97 5 °F (36 4 °C)    Physical Exam:   General:  No acute distress  Psychiatric:  Awake and alert  Pulmonary:  Normal respiratory excursion without accessory muscle use  Abdomen:  Soft, nontender  Extremities:  No edema  Skin:  No rashes    Lab Results:  I have personally reviewed pertinent labs  Results from last 7 days   Lab Units 20  1030 20  2158 20  1048  20  0156 20  1013   POTASSIUM mmol/L 3 8 3 4* 3 4*   < > 3 7 4 0   CHLORIDE mmol/L 106 107 104   < > 105 97*   CO2 mmol/L 27 26 27   < > 23 23   BUN mg/dL 16 15 14   < > 48* 47*   CREATININE mg/dL 1 08 1 06 1 00   < > 2 25* 3 99*   EGFR ml/min/1 73sq m 70 72 77   < > 29 14   CALCIUM mg/dL 6 7* 6 8* 7 1*   < > 7 6* 9 0   AST U/L  --   --   --   --  14 24   ALT U/L  --   --   --   --  18 27   ALK PHOS U/L  --   --   --   --  126* 177*    < > = values in this interval not displayed  Results from last 7 days   Lab Units 20  1019 20  0827 20  2158   WBC Thousand/uL 8 62 10 50* 15 82*   HEMOGLOBIN g/dL 11 6* 12 5 10 7*   PLATELETS Thousands/uL 220 237 245     Results from last 7 days   Lab Units 20  1725 20  1722   BLOOD CULTURE  Gram Negative Jeremy Enteric Like* Gram Negative Jeremy Enteric Like*   GRAM STAIN RESULT  Gram negative rods* Gram negative rods*       Imaging Studies:   I have personally reviewed pertinent imaging study reports and images in PACS  EKG, Pathology, and Other Studies:   I have personally reviewed pertinent reports

## 2020-01-19 NOTE — ASSESSMENT & PLAN NOTE
· On CKD stage 3  · Nephrology following, etiology secondary to pre renal  · Creatinine trended down   · Avoid nephrotoxins and renally dose medications  · Off IVF

## 2020-01-19 NOTE — ASSESSMENT & PLAN NOTE
W/ RVR in setting of sepsis  Appreciate cardiology input  Cont metoprolol, holding parameters lowered  Continue telemetry monitoring  Anticoagulation with heparin drip as Xarelto on hold; resume back on xarelto when ok per vascular

## 2020-01-19 NOTE — ASSESSMENT & PLAN NOTE
W/ RVR in setting of sepsis; HR much improved  Cardiology following  Cont metoprolol, holding parameters lowered  Continue telemetry monitoring  Anticoagulation with heparin drip as Xarelto on hold; resume back on xarelto when ok per vascular

## 2020-01-20 PROBLEM — R76.12 POSITIVE QUANTIFERON-TB GOLD TEST: Status: ACTIVE | Noted: 2020-01-20

## 2020-01-20 LAB
ANION GAP SERPL CALCULATED.3IONS-SCNC: 6 MMOL/L (ref 4–13)
APTT PPP: 58 SECONDS (ref 23–37)
APTT PPP: 65 SECONDS (ref 23–37)
APTT PPP: 90 SECONDS (ref 23–37)
BUN SERPL-MCNC: 17 MG/DL (ref 5–25)
CALCIUM SERPL-MCNC: 6.4 MG/DL (ref 8.3–10.1)
CHLORIDE SERPL-SCNC: 107 MMOL/L (ref 100–108)
CO2 SERPL-SCNC: 25 MMOL/L (ref 21–32)
CREAT SERPL-MCNC: 0.94 MG/DL (ref 0.6–1.3)
ERYTHROCYTE [DISTWIDTH] IN BLOOD BY AUTOMATED COUNT: 14.7 % (ref 11.6–15.1)
GAMMA INTERFERON BACKGROUND BLD IA-ACNC: 0.16 IU/ML
GFR SERPL CREATININE-BSD FRML MDRD: 83 ML/MIN/1.73SQ M
GLUCOSE SERPL-MCNC: 108 MG/DL (ref 65–140)
HCT VFR BLD AUTO: 31.1 % (ref 36.5–49.3)
HGB BLD-MCNC: 10.2 G/DL (ref 12–17)
M TB IFN-G BLD-IMP: ABNORMAL
M TB IFN-G CD4+ BCKGRND COR BLD-ACNC: -0.01 IU/ML
M TB IFN-G CD4+ BCKGRND COR BLD-ACNC: -0.01 IU/ML
MAGNESIUM SERPL-MCNC: 2 MG/DL (ref 1.6–2.6)
MCH RBC QN AUTO: 28.3 PG (ref 26.8–34.3)
MCHC RBC AUTO-ENTMCNC: 32.8 G/DL (ref 31.4–37.4)
MCV RBC AUTO: 86 FL (ref 82–98)
MITOGEN IGNF BCKGRD COR BLD-ACNC: <0.1 IU/ML
PLATELET # BLD AUTO: 118 THOUSANDS/UL (ref 149–390)
PMV BLD AUTO: 10.2 FL (ref 8.9–12.7)
POTASSIUM SERPL-SCNC: 3.2 MMOL/L (ref 3.5–5.3)
RBC # BLD AUTO: 3.61 MILLION/UL (ref 3.88–5.62)
SODIUM SERPL-SCNC: 138 MMOL/L (ref 136–145)
TSH SERPL DL<=0.05 MIU/L-ACNC: 1.7 UIU/ML (ref 0.36–3.74)
VZV IGG SER IA-ACNC: NORMAL
WBC # BLD AUTO: 7.42 THOUSAND/UL (ref 4.31–10.16)

## 2020-01-20 PROCEDURE — 99232 SBSQ HOSP IP/OBS MODERATE 35: CPT | Performed by: INTERNAL MEDICINE

## 2020-01-20 PROCEDURE — 87040 BLOOD CULTURE FOR BACTERIA: CPT | Performed by: INTERNAL MEDICINE

## 2020-01-20 PROCEDURE — 84443 ASSAY THYROID STIM HORMONE: CPT | Performed by: NURSE PRACTITIONER

## 2020-01-20 PROCEDURE — 85730 THROMBOPLASTIN TIME PARTIAL: CPT | Performed by: INTERNAL MEDICINE

## 2020-01-20 PROCEDURE — 85027 COMPLETE CBC AUTOMATED: CPT | Performed by: INTERNAL MEDICINE

## 2020-01-20 PROCEDURE — 85730 THROMBOPLASTIN TIME PARTIAL: CPT | Performed by: SURGERY

## 2020-01-20 PROCEDURE — 99233 SBSQ HOSP IP/OBS HIGH 50: CPT | Performed by: INTERNAL MEDICINE

## 2020-01-20 PROCEDURE — 87076 CULTURE ANAEROBE IDENT EACH: CPT | Performed by: INTERNAL MEDICINE

## 2020-01-20 PROCEDURE — 83735 ASSAY OF MAGNESIUM: CPT | Performed by: INTERNAL MEDICINE

## 2020-01-20 PROCEDURE — 80048 BASIC METABOLIC PNL TOTAL CA: CPT | Performed by: INTERNAL MEDICINE

## 2020-01-20 PROCEDURE — 99231 SBSQ HOSP IP/OBS SF/LOW 25: CPT | Performed by: SURGERY

## 2020-01-20 RX ORDER — CEFAZOLIN SODIUM 2 G/50ML
2000 SOLUTION INTRAVENOUS EVERY 8 HOURS
Status: DISCONTINUED | OUTPATIENT
Start: 2020-01-20 | End: 2020-01-21

## 2020-01-20 RX ORDER — POTASSIUM CHLORIDE 20 MEQ/1
40 TABLET, EXTENDED RELEASE ORAL 2 TIMES DAILY
Status: COMPLETED | OUTPATIENT
Start: 2020-01-20 | End: 2020-01-20

## 2020-01-20 RX ADMIN — PANTOPRAZOLE SODIUM 40 MG: 40 TABLET, DELAYED RELEASE ORAL at 05:31

## 2020-01-20 RX ADMIN — CEFAZOLIN SODIUM 2000 MG: 2 SOLUTION INTRAVENOUS at 14:33

## 2020-01-20 RX ADMIN — PREDNISONE 40 MG: 20 TABLET ORAL at 08:34

## 2020-01-20 RX ADMIN — MELATONIN 2000 UNITS: at 08:34

## 2020-01-20 RX ADMIN — MIDODRINE HYDROCHLORIDE 2.5 MG: 5 TABLET ORAL at 06:31

## 2020-01-20 RX ADMIN — TUBERCULIN PURIFIED PROTEIN DERIVATIVE 5 UNITS: 5 INJECTION, SOLUTION INTRADERMAL at 18:40

## 2020-01-20 RX ADMIN — ASPIRIN 81 MG 81 MG: 81 TABLET ORAL at 08:34

## 2020-01-20 RX ADMIN — MIDODRINE HYDROCHLORIDE 2.5 MG: 5 TABLET ORAL at 11:32

## 2020-01-20 RX ADMIN — ATORVASTATIN CALCIUM 20 MG: 20 TABLET, FILM COATED ORAL at 15:36

## 2020-01-20 RX ADMIN — MIDODRINE HYDROCHLORIDE 2.5 MG: 5 TABLET ORAL at 15:36

## 2020-01-20 RX ADMIN — POTASSIUM CHLORIDE 40 MEQ: 1500 TABLET, EXTENDED RELEASE ORAL at 17:31

## 2020-01-20 RX ADMIN — METOPROLOL SUCCINATE 50 MG: 50 TABLET, EXTENDED RELEASE ORAL at 11:32

## 2020-01-20 RX ADMIN — CEFAZOLIN SODIUM 2000 MG: 2 SOLUTION INTRAVENOUS at 21:02

## 2020-01-20 RX ADMIN — HEPARIN SODIUM 3000 UNITS: 1000 INJECTION INTRAVENOUS; SUBCUTANEOUS at 00:50

## 2020-01-20 RX ADMIN — CEFEPIME HYDROCHLORIDE 2000 MG: 2 INJECTION, POWDER, FOR SOLUTION INTRAVENOUS at 05:31

## 2020-01-20 RX ADMIN — HEPARIN SODIUM AND DEXTROSE 24 UNITS/KG/HR: 10000; 5 INJECTION INTRAVENOUS at 11:34

## 2020-01-20 RX ADMIN — POTASSIUM CHLORIDE 40 MEQ: 1500 TABLET, EXTENDED RELEASE ORAL at 08:34

## 2020-01-20 NOTE — PROGRESS NOTES
Cardiology Progress Note - Diogenes York 77 y o  male MRN: [id number]    Unit/Bed#: [facility] 827-01 Encounter: [id number]      Assessment:  Principal Problem:    Sepsis ([facility] )  Active Problems:    PAD (peripheral artery disease) ([facility])    Paroxysmal atrial fibrillation ([facility])    Thrombocytosis (HCC)    Limb ischemia    PHUONG (acute kidney injury) ([facility] )    Diarrhea    SBO (small bowel obstruction) ([facility])    Hypokalemia    Hypotension    Volume overload      Plan:  Patient is comfortable this morning  He has no chest pain or significant dyspnea  He remains on should venous heparin  Telemetry demonstrates atrial fibrillation with a controlled ventricular response  Patient awaiting endovascular intervention with date to be determined  He is normally on Xarelto which is being held  BMP today with potassium of 3 2 and creatinine of 0 94  Will supplement potassium  Subjective:   Patient seen and examined  No significant events overnight   negative  Objective:     Vitals: Blood pressure 90/60, pulse 57, temperature 97 8 °F (36 6 °C), resp  rate 18, height 5' 10" (1 778 m), weight 89 kg (196 lb 4 8 oz), SpO2 91 % , Body mass index is 28 17 kg/m² ,   Orthostatic Blood Pressures      Most Recent Value   Blood Pressure  90/60 filed at 01/20/2020 4611   Patient Position - Orthostatic VS  Lying filed at 01/18/2020 2300      ,      Intake/Output Summary (Last 24 hours) at 1/20/2020 0749  Last data filed at 1/19/2020 1722  Gross per 24 hour   Intake 1106 ml   Output 650 ml   Net 456 ml       No significant arrhythmias seen on telemetry review         Physical Exam:    GEN: Diogenes York appears well, alert and oriented x 3, pleasant and cooperative   NECK: supple, no carotid bruits, no JVD or HJR  HEART: normal rate, regular rhythm, normal S1 and S2, no murmurs, clicks, gallops or rubs   LUNGS: clear to auscultation bilaterally; no wheezes, rales, or rhonchi   ABDOMEN: normal bowel sounds, soft, no tenderness, no distention    Labs & Results:    No results displayed because visit has over 200 results  Ct Abdomen Pelvis Wo Contrast    Result Date: 1/13/2020  Narrative: CT ABDOMEN AND PELVIS WITHOUT IV CONTRAST INDICATION:   Abdominal pain, acute, nonlocalized  COMPARISON:  CT abdomen 1/7/2020 and CT abdomen/pelvis 5/15/2019 TECHNIQUE:  CT examination of the abdomen and pelvis was performed without intravenous contrast   Axial, sagittal, and coronal 2D reformatted images were created from the source data and submitted for interpretation  Radiation dose length product (DLP) for this visit:  559 99 mGy-cm   This examination, like all CT scans performed in the [facility], was performed utilizing techniques to minimize radiation dose exposure, including the use of iterative  reconstruction and automated exposure control  Enteric contrast was not administered  FINDINGS: ABDOMEN LOWER CHEST:  No clinically significant abnormality identified in the visualized lower chest  LIVER/BILIARY TREE:  Unremarkable  GALLBLADDER:  No calcified gallstones  No pericholecystic inflammatory change  SPLEEN:  Unremarkable  PANCREAS:  Unremarkable  ADRENAL GLANDS:  Stable bilateral adenomas  KIDNEYS/URETERS:  Punctate nonobstructing bilateral renal calculi  No hydronephrosis  STOMACH AND BOWEL:  The proximal small bowel is dilated with fluid and gas and there is a gradual transition point in the pelvis at a long segment of distal ileum that demonstrates mural thickening, mucosal hyperenhancement, prominence of the associated mesenteric vasculature, and relative luminal narrowing  At the distal end of the inflamed segment, the terminal ileum is abruptly decompressed (2/73) and fibrostenosing disease cannot be excluded  There is a separate, upstream, short segment of distal ileum that is also inflamed and demonstrates luminal narrowing (2/41)  Near the ileocecal valve, there is intramural fat ileal wall    The colon is completely decompressed and there is redemonstration of colonic diverticulosis without evidence of diverticulitis  No pneumatosis intestinalis or portal venous gas  No abscess or evidence of a fistula  APPENDIX:  Normal  ABDOMINOPELVIC CAVITY:  No ascites or free intraperitoneal air  No lymphadenopathy  VESSELS:  Atherosclerotic changes are present and there is a stable 3 2 x 2 6 cm infrarenal abdominal aortic aneurysm  PELVIS REPRODUCTIVE ORGANS:  Unremarkable for patient's age  URINARY BLADDER:  Unremarkable  ABDOMINAL WALL/INGUINAL REGIONS:  Unremarkable  OSSEOUS STRUCTURES:  No acute fracture or destructive osseous lesion  Impression: 1  Findings consistent with a small bowel obstruction with a transition point at a long segment of distal ileitis  There is also a separate short segment of upstream ileitis ("skip lesion") with luminal narrowing  There is intramural fat at the terminal ileum suggesting chronic/old inflammation and further abrupt decompression of the terminal ileum, raising the possibility of fibrostenosing disease/fibrotic stricture  Overall, findings are concerning for Crohn's disease  No evidence of penetrating disease  2   Bilateral nonobstructing nephrolithiasis  3   Stable 3 2 x 2 6 cm infrarenal abdominal aortic aneurysm  I personally discussed this study with Shailesh Price on 1/13/2020 at 1:55 PM  Workstation performed: [id number]     Xr Chest Portable    Result Date: 1/18/2020  Narrative: CHEST INDICATION:   fever  COMPARISON:  Two-view chest 1/13/2020 EXAM PERFORMED/VIEWS:  XR CHEST PORTABLE FINDINGS: Cardiomediastinal silhouette appears unremarkable  Left chest wall loop recorder in place  Unfolded aorta  Evaluation is somewhat limited by low lung volumes and crowding of bronchovascular markings  No definitive airspace consolidation or pulmonary edema  No pneumothorax or definitive pleural effusion  Bilateral acromioclavicular degenerative disease   Multilevel thoracolumbar spondylosis  Impression: No radiographic evidence of acute intrathoracic process on this examination which is somewhat limited by low lung volumes  Workstation performed: [id number]     Xr Chest 2 Views    Result Date: 1/13/2020  Narrative: CHEST INDICATION:   weakness  COMPARISON:  CT chest 5/16/2019 EXAM PERFORMED/VIEWS:  XR CHEST PA & LATERAL FINDINGS: Cardiomediastinal silhouette appears unremarkable  The lungs are clear  No pneumothorax or pleural effusion  Osseous structures appear within normal limits for patient age  Impression: No acute cardiopulmonary disease  Workstation performed: [id number]     Xr Abdomen 1 View Kub    Result Date: 1/19/2020  Narrative: ABDOMEN INDICATION:   round distended  COMPARISON:  CT 1/16/2020 VIEWS:  AP supine FINDINGS: Persistent multiple markedly gas distended small bowel loops  Apparent proximal descending colon normal caliber  No discernible free air on this supine study  Upright or left lateral decubitus imaging is more sensitive to detect subtle free air in the appropriate setting  No pathologic calcifications or soft tissue masses  Visualized lung bases are clear  Visualized osseous structures are unremarkable for the patient's age  Impression: Persistent CT reported small bowel obstruction  The study was marked in EPIC for significant notification  Workstation performed: [id number]     Xr Abdomen Obstruction Series    Result Date: 1/19/2020  Narrative: OBSTRUCTION SERIES INDICATION:   abdominal obstruction  COMPARISON:  1/18/2020  CT 1/16/2020 EXAM PERFORMED/VIEWS:  XR ABDOMEN OBSTRUCTION SERIES FINDINGS: Persistent multiple dilated small bowel loops with air-fluid levels with suggestion of increasing caliber  No rectal gas appreciated  Its No free air beneath the hemidiaphragms  No pathologic calcifications or soft tissue masses evident  Osseous structures are unremarkable   Examination of the chest reveals an unremarkable cardiomediastinal silhouette  Loop recorder noted  Marked aortic ectasia suggested  Lungs are clear  Impression: Persistent worsening small bowel obstruction  The study was marked in EPIC for significant notification  Workstation performed: [id number]     Ct Head Wo Contrast    Result Date: 1/15/2020  Narrative: CT BRAIN - WITHOUT CONTRAST INDICATION:   r/o pathology to prevent thrombolysis  COMPARISON:  None  TECHNIQUE:  CT examination of the brain was performed  In addition to axial images, coronal 2D reformatted images were created and submitted for interpretation  Radiation dose length product (DLP) for this visit:  828 29 mGy-cm   This examination, like all CT scans performed in the [facility], was performed utilizing techniques to minimize radiation dose exposure, including the use of iterative  reconstruction and automated exposure control  IMAGE QUALITY:  Diagnostic  FINDINGS: PARENCHYMA:  No intracranial mass, mass effect or midline shift  No CT signs of acute infarction  No acute parenchymal hemorrhage  VENTRICLES AND EXTRA-AXIAL SPACES:  Normal for the patient's age  VISUALIZED ORBITS AND PARANASAL SINUSES:  No acute abnormality involving the orbits  Mild scattered sinus mucosal thickening is noted  No fluid levels are seen  CALVARIUM AND EXTRACRANIAL SOFT TISSUES:  Normal      Impression: No acute intracranial abnormality  Workstation performed: [id number]     Ct Abdomen W Wo Contrast    Addendum Date: 1/8/2020 Addendum:   ADDENDUM: Right-sided adrenal adenoma measuring 3 3 x 2 9 x 3 cm is similar to the prior CT May 15, 2019  Left adrenal 2 x 2 3 x 2 cm adrenal adenoma is also stable from the prior CT May 15, 2019  Result Date: 1/8/2020  Narrative: CT - ABDOMEN WITHOUT AND WITH IV CONTRAST INDICATION:   E27 8: Other specified disorders of adrenal gland  COMPARISON: May 15, 2019 TECHNIQUE: CT examination of the abdomen was performed   Reformatted images were created in axial, sagittal, and coronal planes  Radiation dose length product (DLP) for this visit:  938 mGy-cm   This examination, like all CT scans performed in the [facility], was performed utilizing techniques to minimize radiation dose exposure, including the use of iterative reconstruction and automated exposure control  IV Contrast:  85 mL of iodixanol (VISIPAQUE) Enteric Contrast:  Enteric contrast was administered  FINDINGS: ABDOMEN LOWER CHEST:  No clinically significant abnormality identified in the visualized lower chest  LIVER/BILIARY TREE:  Unremarkable  GALLBLADDER:  No calcified gallstones  No pericholecystic inflammatory change  SPLEEN:  Unremarkable  PANCREAS:  Unremarkable  ADRENAL GLANDS:  Bilateral adrenal adenomas  KIDNEYS/URETERS:  Left upper pole renal 2 mm nonobstructing calculus  Right mid pole 3 mm nonobstructing calculus  VISUALIZED STOMACH AND BOWEL:  Partially visualized dilated small bowel loops (with wall thickening) could relate to obstruction and/or infection such as enteritis, correlate with oral and IV contrast enhanced abdominal /pelvic MRI  Partially visualized colonic diverticulosis  VISUALIZED ABDOMINAL CAVITY:  No pathologically enlarged periportal, mesenteric or upper retroperitoneal lymph nodes  No ascites or free intraperitoneal air  No fluid collection  VISUALIZED BODY WALL:  Unremarkable  VISUALIZED ABDOMINAL VESSELS: Focal saccular infrarenal abdominal aortic borderline aneurysm measuring approximately 3 cm  This was also identified on the prior CT May 15, 2019  OSSEOUS STRUCTURES:  No acute fracture or destructive osseous lesion  Impression: 1  Bilateral adrenal adenomas  2  Bilateral renal nonobstructing calculi  3  Partially visualized dilated small bowel loops and wall thickening as described above could relate to obstruction and/or infection   Workstation performed: [id number]     Ct Small Bowel Enterography    Result Date: 1/16/2020  Narrative: CT ABDOMEN AND PELVIS WITH IV CONTRAST- ENTEROGRAPHY - WITH CONTRAST INDICATION:   Inflammatory bowel disease  COMPARISON:  1/13/2020, 1/7/2020  TECHNIQUE:  Contrast-enhanced CT examination of the abdomen and pelvis was performed utilizing thin section technique and after the administration of low density enteric contrast according to protocol designed specifically to obtain sensitive evaluation of the small bowel  Axial, sagittal, and coronal 2D reformatted images were created from the source data and submitted for interpretation  Radiation dose length product (DLP) for this visit:  1415 mGy-cm   This examination, like all CT scans performed in the [facility], was performed utilizing techniques to minimize radiation dose exposure, including the use of iterative reconstruction and automated exposure control  IV Contrast:  100 mL of iohexol (OMNIPAQUE) Enteric Contrast:  1500 cc of VoLumen enteric contrast was administered  FINDINGS: ABDOMEN SMALL BOWEL: There is moderate diffuse dilatation of the small bowel, slightly decreased since the prior study  There is long segment wall thickening and enhancement of the distal ileum, including the terminal ileum, consistent with active inflammatory bowel disease  LARGE BOWEL:  Normal caliber  No focal wall thickening or pericolonic inflammatory change  There is colonic diverticulosis without diverticulitis  STOMACH:  Moderately distended  LOWER CHEST:  Mild bibasilar atelectasis  No effusions  LIVER/BILIARY TREE:  Unremarkable  GALLBLADDER:  Contracted gallbladder, limiting evaluation  SPLEEN:  Unremarkable  PANCREAS:  Unchanged bilateral adrenal masses, which were characterized as adenomas on prior MRI  ADRENAL GLANDS:  Unremarkable  KIDNEYS/URETERS:  No hydronephrosis  5 mm calculus in the lower pole right kidney, and punctate calculus in the upper pole left kidney again seen  ABDOMINOPELVIC CAVITY:  No ascites or free intraperitoneal air  No lymphadenopathy   VESSELS: Atherosclerotic changes are present  Unchanged ectasia of the mid abdominal aorta measuring 3 0 cm  PELVIS REPRODUCTIVE ORGANS:  Unremarkable for patient's age  URINARY BLADDER:  Unremarkable  ABDOMINAL WALL/INGUINAL REGIONS:  Unremarkable  OSSEOUS STRUCTURES:  No acute fracture or destructive osseous lesion  Impression: Persistent though slightly improved small bowel obstruction secondary to active long segment distal ileitis  Nonobstructing bilateral renal calculi  Unchanged mild infrarenal aortic aneurysm  Bilateral adrenal adenomas again noted  The study was marked in [facility] for immediate notification    Workstation performed: [id number]     Vas Lower Limb Arterial Duplex, Complete Bilateral    Result Date: 1/13/2020  Narrative:  [facility] REPORT CLINICAL: Indications: Patient presents with pain in his right leg while walking  His right foot is cold to the touch and numb  He has a h/o a right popliteal stent  Denies any open wounds or ulcers at this time  Operative History: 2019-06-27 Right Popliteal stent Risk Factors The patient has history of HLD, PAD and smoking (current) 0 5 ppd  Clinical Right Pressure: IV, Left Pressure: 119/ mm Hg  FINDINGS:  Segment                Right                 Left                                          Impression  PSV  EDV  PSV  EDV  Common Femoral Artery               90    0  101    2  Prox Profunda                       73    5   70    2  Prox SFA                            88    6   94    0  Mid SFA                             44    0  101    0  Dist SFA                                     102    0  Dist SFA - Stent                    36    0            Proximal Pop                                  31    0  Proximal Pop - Stent   Occluded                        Distal Pop                          16    4   30    0  Dist Post Tibial                    10    5   67    3  Dist  Ant   Tibial      Occluded               33    0  Dist Peroneal          Occluded 40    0     CONCLUSION:  Impression: RIGHT LOWER LIMB: Occlusion versus high grade stenosis in the popliteal, peroneal and anterior tibial arteries throughout  Ankle/Brachial index: 0 55, which is in the severe claudication range  Prior: 1 21 The anterior tibial pressure was unable to be obtained  PVR/ PPG tracings are dampened  Metatarsal and great toe pressures were not obtained secondary to attenuated PPG tracings  Prior: Same LEFT LOWER LIMB: This resting evaluation shows no evidence of significant lower extremity arterial occlusive disease  Ankle/Brachial index: 0 88, which is in the mild claudication range  Prior: 1 14 PVR/ PPG tracings are normal  Metatarsal pressure of 131 mmHg  Prior: 136 mmHg Great toe pressure of 41 mmHg, within the healing range  Prior: 45 mmHg  Compared to previous study on 10/21/19, there is a progression of disease on the right  Technical findings were given to SAMIRA Gardner in the ED  SIGNATURE: Electronically Signed by: Rodo Willingham on 2020-01-13 07:43:41 PM      EKG personally reviewed by Neal Moreno MD      Counseling / Coordination of Care  Total floor / unit time spent today 30 minutes  Greater than 50% of total time was spent with the patient and / or family counseling and / or coordination of care

## 2020-01-20 NOTE — PROGRESS NOTES
[address] [facility] NOTE   Conchita White 77 y o  male MRN: [id number]  Unit/Bed#: [facility] 827-01 Encounter: [id number]  Reason for Consult: PHUONG on CKD III    ASSESSMENT and PLAN:    56-year-old male with a past medical history of PVD, prior stenting lower extremity, smoker, AFib, thrombocytosis, chronic diarrhea who initially presents with right lower extremity pain  Nephrology is consulted for acute kidney injury  Patient was initially started on heparin infusion due to initial concern for lower extremity pain possibly secondary to ischemia  There is also initial concern for elevated creatinine and patient underwent a CT scan of the abdomen and pelvis which showed concerns for small bowel obstruction  Course has also been complicated by bacteremia    1) PHUONG on CKD III   baseline Cr 1-1 5 mg/dL   Initial creatinine 4 mg/dL on January 13th   Etiology-possibly prerenal/hypovolemia/poor p o  Intake/diarrhea/leading to possible ATN   outpatient nephrologist Dr Haley Shelby   UA -   initially started on IVF   to note patient was to undergo angiogram  But had medical instability and was placed on hold  Creatinine stable 0 9 mg/dL  Hypokalemia-being repleted  Hypocalcemia-check albumin and ionized calcium in a m  Hypotension-if the blood pressures remain marginal, will need to increase midodrine cautiously to 5 mg  2) SBO   Per Primary Team and Surgical team  3) LE pain with history of PVD   Vascular on board   Angiogram January 17th but was held due to fevers and bacteremia  4) electrolytes   Hypokalemia- required repletion; mag stable  5) Acid/base - bicarb stable  6) HTN         - now with hypotension on midodrine  7) diarrhea - per Primary Team   There is concern for underlying Crohn's disease   Gastroenterology on board   Underwent EGD and colonoscopy   Colonoscopy with ulcerated mucosa in inflammation of the terminal ileum and stricture areas   QuantiFERON gold-   Biopsy-   Hepatitis serologies-  8) b/l adrenal adenoma   may need to consider checking metanephrines   9) SVT-per primary team   10) fevers - source possible GI  Per primary team   Follow-up final cultures  Found to have Gram negative bacteremia and started on antibiotics      SUBJECTIVE / INTERVAL HISTORY:    Blood pressure is marginal  systolic  Afebrile  Weight today unclear if accurate  89 kg  Urine output 650 cc but unclear if accurate  Patient denies complaints  No nausea, vomiting, diarrhea  No pain      OBJECTIVE:  Current Weight: Weight - Scale: 89 kg (196 lb 4 8 oz)  Vitals:    01/20/20 0600 01/20/20 0735 01/20/20 0738 01/20/20 0828   BP:  (!) 88/59 90/60 (!) 89/61   Pulse:  57  85   Resp:  18     Temp:  97 8 °F (36 6 °C)     TempSrc:       SpO2:  91%  (!) 89%   Weight: 89 kg (196 lb 4 8 oz)      Height:           Intake/Output Summary (Last 24 hours) at 1/20/2020 7745  Last data filed at 1/19/2020 1722  Gross per 24 hour   Intake 868 ml   Output 450 ml   Net 418 ml     General: NAD  Skin: no rash  Eyes: anicteric sclera  ENT: moist mucous membrane  Neck: supple  Chest: CTA b/l, no ronchii, no wheeze, no rubs, no rales  CVS: s1s2, no murmur, no gallop, no rub  Abdomen: soft, nontender, nl sounds  Extremities: no edema LE b/l  : no duque  Neuro: AAOX3  Psych: normal affect    Medications:    Current Facility-Administered Medications:     acetaminophen (TYLENOL) tablet 650 mg, 650 mg, Oral, Q6H PRN, Donna Fuentes MD, 650 mg at 01/18/20 0752    aspirin chewable tablet 81 mg, 81 mg, Oral, Daily, Viktoriya Vogel MD, 81 mg at 01/20/20 0834    atorvastatin (LIPITOR) tablet 20 mg, 20 mg, Oral, Daily With Abiola Johnston MD, 20 mg at 01/19/20 1758    ceFAZolin (ANCEF) IVPB (premix) 2,000 mg, 2,000 mg, Intravenous, Q8H, Darren Chin MD    cholecalciferol (VITAMIN D3) tablet 2,000 Units, 2,000 Units, Oral, Daily, Donna Fuentes MD, 2,000 Units at 01/20/20 0834    heparin (porcine) 25,000 units in 250 mL infusion (premix), 3-30 Units/kg/hr (Order-Specific), Intravenous, Titrated, Viktoriya Vogel MD, Last Rate: 18 mL/hr at 01/20/20 0700, 24 Units/kg/hr at 01/20/20 0700    heparin (porcine) injection 3,000 Units, 3,000 Units, Intravenous, PRN, Miko Souza MD, 3,000 Units at 01/20/20 0050    heparin (porcine) injection 6,000 Units, 6,000 Units, Intravenous, PRN, Miko Souza MD, 6,000 Units at 01/16/20 0201    metoprolol (LOPRESSOR) injection 5 mg, 5 mg, Intravenous, Q6H PRN, Miko Souza MD, 5 mg at 01/18/20 1526    metoprolol succinate (TOPROL-XL) 24 hr tablet 50 mg, 50 mg, Oral, Daily, LENORA Toussaint    midodrine (PROAMATINE) tablet 2 5 mg, 2 5 mg, Oral, TID AC, Rylie Gray DO, 2 5 mg at 01/20/20 0631    ondansetron (ZOFRAN) injection 4 mg, 4 mg, Intravenous, Q6H PRN, Miko Souza MD    pantoprazole (PROTONIX) EC tablet 40 mg, 40 mg, Oral, Early Morning, Viktoriya Vogel MD, 40 mg at 01/20/20 0531    phenol (CHLORASEPTIC) 1 4 % mucosal liquid 1 spray, 1 spray, Mouth/Throat, Q2H PRN, LENORA Vidal Cea    potassium chloride (K-DUR,KLOR-CON) CR tablet 40 mEq, 40 mEq, Oral, BID, Clifton Matute MD, 40 mEq at 01/20/20 1625    predniSONE tablet 40 mg, 40 mg, Oral, Daily, Hay Al MD, 40 mg at 01/20/20 6139    Laboratory Results:  Results from last 7 days   Lab Units 01/20/20  0651 01/19/20  1030 01/19/20  1019 01/19/20  0827 01/18/20  2158 01/18/20  1048 01/18/20  0355 01/17/20  0556 01/17/20  0546 01/16/20  0813  01/13/20  1013   WBC Thousand/uL  --   --  8 62 10 50* 15 82* 7 51 10 16 9 94  --  7 61   < > 14 17*   HEMOGLOBIN g/dL  --   --  11 6* 12 5 10 7* 11 4* 11 3* 12 1  --  12 8   < > 16 2   HEMATOCRIT %  --   --  35 2* 38 4 32 4* 34 2* 33 4* 36 9  --  39 4   < > 49 5*   PLATELETS Thousands/uL  --   --  220 237 245 307 307 414*  --  434*   < > 685*   POTASSIUM mmol/L 3 2* 3 8  --   --  3 4* 3 4* 2 9*  --  3 2* 3 5   < > 4 0   CHLORIDE mmol/L 107 106 --   --  107 104 103  --  103 102   < > 97*   CO2 mmol/L 25 27  --   --  26 27 29  --  26 30   < > 23   BUN mg/dL 17 16  --   --  15 14 15  --  12 13   < > 47*   CREATININE mg/dL 0 94 1 08  --   --  1 06 1 00 1 04  --  0 88 0 95   < > 3 99*   CALCIUM mg/dL 6 4* 6 7*  --   --  6 8* 7 1* 6 9*  --  7 6* 7 8*   < > 9 0   MAGNESIUM mg/dL 2 0 2 1  --   --  2 1 2 7* 1 8  --   --  2 4  --  2 1   PHOSPHORUS mg/dL  --   --   --   --   --   --   --   --  2 9 2 2*  --   --     < > = values in this interval not displayed

## 2020-01-20 NOTE — PROGRESS NOTES
Progress Note - Vascular Surgery   Abigail Graft 77 y o  male MRN: [id number]  Unit/Bed#: [facility] 827-01 Encounter: [id number]    Assessment:  77 M with RLE ischemia, right popliteal stent thrombosis, also with partial SBO status post colonoscopy and Klebsiella bacteremia, rapid AFib    Plan:  Continue heparin gtt  ASA/statin  Antibiotics per ID  Metoprolol per cardiology  Will discuss timing of endovascular intervention    Subjective/Objective     Subjective: No acute events overnight  Objective:    Blood pressure (!) 86/54, pulse 76, temperature 97 8 °F (36 6 °C), resp  rate 17, height 5' 10" (1 778 m), weight 89 kg (196 lb 4 8 oz), SpO2 90 %  ,Body mass index is 28 17 kg/m²        Intake/Output Summary (Last 24 hours) at 1/20/2020 0721  Last data filed at 1/19/2020 1722  Gross per 24 hour   Intake 1106 ml   Output 650 ml   Net 456 ml       Invasive Devices     Peripheral Intravenous Line            Peripheral IV 01/16/20 Distal;Right;Ventral (anterior) Forearm 3 days    Peripheral IV 01/18/20 Right Hand 2 days    Peripheral IV 01/18/20 Left Wrist 1 day                Physical Exam:   General: NAD, AAOx3  Eyes: PERRL  ENT: moist mucous membranes  Neck: supple  CV: RRR +S1/S2  Chest: breath sounds bilaterally  Abdomen: soft, NT ND  Extremities: motor/sensation intact      Results from last 7 days   Lab Units 01/19/20  1019 01/19/20  0827 01/18/20  2158   WBC Thousand/uL 8 62 10 50* 15 82*   HEMOGLOBIN g/dL 11 6* 12 5 10 7*   HEMATOCRIT % 35 2* 38 4 32 4*   PLATELETS Thousands/uL 220 237 245     Results from last 7 days   Lab Units 01/19/20  1030 01/18/20  2158 01/18/20  1048   POTASSIUM mmol/L 3 8 3 4* 3 4*   CHLORIDE mmol/L 106 107 104   CO2 mmol/L 27 26 27   BUN mg/dL 16 15 14   CREATININE mg/dL 1 08 1 06 1 00   CALCIUM mg/dL 6 7* 6 8* 7 1*     Results from last 7 days   Lab Units 01/19/20  2357 01/19/20  1803 01/19/20  1019  01/18/20  2158  01/18/20  1048  01/15/20  0501   INR   --   --   --   --  1 39*  -- 1 49*  --  1 34*   PTT seconds 58* 75* 49*   < > 105*   < > 81*   < >  --     < > = values in this interval not displayed

## 2020-01-20 NOTE — PROGRESS NOTES
Progress Note- Wyatt Pena 77 y o  male MRN: [id number]  Unit/Bed#: [facility] 827-01 Encounter: [id number]    Assessment and Plan:  Wyatt Pena is a 77 y o  old male with PMH:  Peripheral arterial disease with right popliteal stent, CKD,  GERD, hyperlipidemia, atrial fibrillation on Xarelto who presented concerns for small-bowel obstruction and was found to have ileal stricturing concerning for inflammatory bowel disease  #Chronic bloody diarrhea  Patient presenting with distal terminal ileum stenosis with associated proximal small bowel distention  Patient was noted to have diarrhea that been going on for a year and underwent a CT of the abdomen which showed inflamed segment of the terminal ileum suspicious for Crohn's disease  Colonoscopy was performed and found to have inflammation of the terminal ileum with biopsy sent in currently pending  Suspect the patient has high possibility for Crohn's disease given endoscopic findings and elevated CRP and fecal calprotectin  Patient underwent a CT enterography which was consistent with possible IBD  Plan:  -quantiferon - indetermination,  Chest x-ray 01/17/2020 - negative   -PPD order placed today (if discharged prior to weds - will need to be read)  -hepatitis chronic panel - negative  -TMPT - pending  -HIV - negative  -f/u colonic biopsies   -plan for Prednisone taper as below:  -40mg PO prednisone - week 1   -35mg PO prednisone - week 2   -30mg PO prednisone - week 3   -25mg PO prednisone - week 4   -20mg PO prednisone - week 5   -stay on 20mg PO after until seen by GI    GI team will sign off at this time, please do not hesitate to contact us with any further questions or concerns about the patient at any point  Patient to be referred to IBD expert Dr Sahra Banuelos for outpatient follow-up      ______________________________________________________________________    Subjective:     Patient states his abdominal pain has improved    Denies any significant discomfort at this time  Had a loose bowel movement today which was nonbloody  Patient asking when he will be discharged       Medication Administration - last 24 hours from 01/19/2020 1412 to 01/20/2020 1412       Date/Time Order Dose Route Action Action by     01/20/2020 1134 heparin (porcine) 25,000 units in 250 mL infusion (premix) 24 Units/kg/hr Intravenous [address] Karena Kelly RN     01/20/2020 1133 heparin (porcine) 25,000 units in 250 mL infusion (premix) 0 Units/kg/hr Intravenous Stopped Karena Kelly RN     01/20/2020 0700 heparin (porcine) 25,000 units in 250 mL infusion (premix) 24 Units/kg/hr Intravenous Rate/Dose Change Erma Márquez RN     01/20/2020 0050 heparin (porcine) 25,000 units in 250 mL infusion (premix) 24 Units/kg/hr Intravenous Rate/Dose Verify Jose Angel Carolina, RN     01/19/2020 2141 heparin (porcine) 25,000 units in 250 mL infusion (premix) 22 Units/kg/hr Intravenous [address] Jose Angel Ventura, [facility]     01/19/2020 2140 heparin (porcine) 25,000 units in 250 mL infusion (premix) 0 Units/kg/hr Intravenous Stopped Meaghan Garcia, JESUS     01/20/2020 0050 heparin (porcine) injection 3,000 Units 3,000 Units Intravenous Given Meaghan Radha, RN     01/20/2020 8120 aspirin chewable tablet 81 mg 81 mg Oral Given Karena Kelly RN     01/19/2020 1758 atorvastatin (LIPITOR) tablet 20 mg 20 mg Oral Given Karena Kelly RN     01/20/2020 7342 cholecalciferol (VITAMIN D3) tablet 2,000 Units 2,000 Units Oral Given Karena Kelly RN     01/20/2020 0531 pantoprazole (PROTONIX) EC tablet 40 mg 40 mg Oral Given Meaghan Garcia, RN     01/20/2020 1002 predniSONE tablet 40 mg 40 mg Oral Given Karena Kelly RN     01/20/2020 1132 cefepime (MAXIPIME) 2,000 mg in dextrose 5 % 50 mL IVPB 0 mg Intravenous Stopped Erma Márquez RN     01/20/2020 0531 cefepime (MAXIPIME) 2,000 mg in dextrose 5 % 50 mL IVPB 2,000 mg Intravenous [address] Jose Angel Ventura RN     01/19/2020 1806 cefepime (MAXIPIME) 2,000 mg in dextrose 5 % 50 mL IVPB 2,000 mg Intravenous [address] Eliseo Holter, RN     01/20/2020 1132 metoprolol succinate (TOPROL-XL) 24 hr tablet 50 mg 50 mg Oral Given Eliseo Holter, RN     01/19/2020 1555 furosemide (LASIX) injection 40 mg 40 mg Intravenous Given Eliseo Holter, RN     01/20/2020 1132 midodrine (PROAMATINE) tablet 2 5 mg 2 5 mg Oral Given Eliseo Holter, RN     01/20/2020 0631 midodrine (PROAMATINE) tablet 2 5 mg 2 5 mg Oral Given Noemi Herndon RN     01/19/2020 1758 midodrine (PROAMATINE) tablet 2 5 mg 2 5 mg Oral Given Eliseo Holter, RN     01/20/2020 0834 potassium chloride (K-DUR,KLOR-CON) CR tablet 40 mEq 40 mEq Oral Given Erma Crabtree RN          Objective:     Vitals: Blood pressure 97/61, pulse 76, temperature 98 3 °F (36 8 °C), resp  rate 18, height 5' 10" (1 778 m), weight 89 kg (196 lb 4 8 oz), SpO2 94 %  ,Body mass index is 28 17 kg/m²  Intake/Output Summary (Last 24 hours) at 1/20/2020 1412  Last data filed at 1/20/2020 1300  Gross per 24 hour   Intake 700 ml   Output 450 ml   Net 250 ml       Physical Exam:   General Appearance:   Alert, cooperative, no distress   HEENT:   Normocephalic, atraumatic, anicteric  Neck:  Supple, symmetrical, trachea midline   Lungs:   Clear to auscultation bilaterally; no rales, rhonchi or wheezing; respirations unlabored    Heart[de-identified]   Regular rate and rhythm; no murmur, rub, or gallop     Abdomen:   Soft, non-tender, non-distended; normal bowel sounds; no masses, no organomegaly    Genitalia:   Deferred    Rectal:   Deferred    Extremities:  No cyanosis, clubbing or edema    Pulses:  2+ and symmetric all extremities    Skin:  No jaundice, rashes, or lesions    Lymph nodes:  No palpable cervical lymphadenopathy        Invasive Devices     Peripheral Intravenous Line            Peripheral IV 01/18/20 Right Hand 2 days    Peripheral IV 01/18/20 Left Wrist 1 day                Lab Results:  No results displayed because visit has over 200 results  Imaging Studies: I have personally reviewed pertinent imaging studies        ---------------------------------------------------  Note Electronically Signed By:    Guilford Opitz, MD [address] Gastroenterology Fellow PGY-4  PI #: [id number]

## 2020-01-20 NOTE — PROGRESS NOTES
Progress Note - Infectious Disease   Bre Camara 77 y o  male MRN: [id number]  Unit/Bed#: [facility] 827-01 Encounter: [id number]      Impression/Recommendations:  1   Sepsis   Evolving since admission:  Fever, tachycardia   Due to # 2  No other appreciable source   UA, chest x-ray negative   Clinically stable and nontoxic  Improved  Rec:  ? Continue antibiotics as below  ? Follow temperatures closely  ? Supportive care as per the primary service     2   Klebsiella bacteremia   Suspect transient GI source in setting of # 3/4   No other appreciable source   Abdominal exam benign making perforation or other complication from recent colonoscopy unlikely   UA negative and offers no  symptoms  Rec:  ? Change antibiotics to cefazolin  ? If remains stable, can transition to Keflex 500 mg PO Q6 with plan to complete 7 days total of treatment through 1/24/20      3   Chronic SBO   Due to terminal ileitis in the setting of # 4   Clinically stable, tolerating p o  Rec:  ? Serial abdominal exams  ? Surgery, GI follow-up ongoing     4   Probable IBD   In setting of elevated fecal calprotectin and focal areas of ileitis with stricture seen on colonoscopy   Biopsies pending  Rec:  ? Empirical steroids per GI  ? Follow-up biopsy     5   Right lower extremity ischemia   Due to popliteal stent thrombosis in setting of all of above   Extremity appears perfused without critical ischemia  Rec:  ? As sepsis improved, OK from ID for arteriogram at any time if clinically indicated  ? Serial exams  ? Close vascular surgery follow-up ongoing     The above impression and plan was discussed in detail with the patient and his wife at the bedside as well as Dr Cassidy Toure      Antibiotics:  Cefepime # 3  Steroids # 3    Subjective:  Patient seen on AM rounds  Passed a lot of gas last night and feels less distended  Denies fevers, chills, sweats, nausea, vomiting, or diarrhea      24 Hour Events:  No documented fevers, chills, sweats, nausea, vomiting, or diarrhea  Objective:  Vitals:  Temp:  [97 5 °F (36 4 °C)-98 2 °F (36 8 °C)] 97 8 °F (36 6 °C)  HR:  [] 85  Resp:  [17-22] 18  BP: ()/(50-79) 89/61  SpO2:  [87 %-91 %] 89 %  Temp (24hrs), Av 9 °F (36 6 °C), Min:97 5 °F (36 4 °C), Max:98 2 °F (36 8 °C)  Current: Temperature: 97 8 °F (36 6 °C)    Physical Exam:   General:  No acute distress  Psychiatric:  Awake and alert  Pulmonary:  Normal respiratory excursion without accessory muscle use  Abdomen:  Soft, nontender, decreased distention  Extremities:  No edema, right leg warm  Skin:  No rashes    Lab Results:  I have personally reviewed pertinent labs  Results from last 7 days   Lab Units 20  0651 20  1030 20  2158  20  0156 20  1013   POTASSIUM mmol/L 3 2* 3 8 3 4*   < > 3 7 4 0   CHLORIDE mmol/L 107 106 107   < > 105 97*   CO2 mmol/L 25 27 26   < > 23 23   BUN mg/dL 17 16 15   < > 48* 47*   CREATININE mg/dL 0 94 1 08 1 06   < > 2 25* 3 99*   EGFR ml/min/1 73sq m 83 70 72   < > 29 14   CALCIUM mg/dL 6 4* 6 7* 6 8*   < > 7 6* 9 0   AST U/L  --   --   --   --  14 24   ALT U/L  --   --   --   --  18 27   ALK PHOS U/L  --   --   --   --  126* 177*    < > = values in this interval not displayed  Results from last 7 days   Lab Units 20  1019 20  0827 20  2158   WBC Thousand/uL 8 62 10 50* 15 82*   HEMOGLOBIN g/dL 11 6* 12 5 10 7*   PLATELETS Thousands/uL 220 237 245     Results from last 7 days   Lab Units 20  1725 20  1722   BLOOD CULTURE  Klebsiella pneumoniae* Klebsiella pneumoniae*   GRAM STAIN RESULT  Gram negative rods* Gram negative rods*       Imaging Studies:   I have personally reviewed pertinent imaging study reports and images in PACS  EKG, Pathology, and Other Studies:   I have personally reviewed pertinent reports

## 2020-01-20 NOTE — PROGRESS NOTES
Progress Note - Taqueria Hughes 1950, 77 y o  male MRN: [id number]    Unit/Bed#: [facility] 827-01 Encounter: [id number]    Primary Care Provider: Harshal Stokes MD   Date and time admitted to hospital: 1/13/2020  9:23 AM        * Sepsis [facility])  Assessment & Plan  Met SIRs criteria with fever, tachycardia; Source bacteremia, klebsiella possibly translocation intra-abdominally  ID following  Abx changed to cefazolin, when stable transition to po keflex, last dose on 1/24  Lactic acid normal, resolved leukocytosis  OK per ID to proceed with endovascular intervention  Cont supportive care      Limb ischemia  Assessment & Plan  · Acute right leg ischemic pain with numbness  · Underlying history of peripheral artery disease s/p R popliteal stent  · Vascular on board, presently on heparin gtt as Xarelto was on hold  · Ok per ID to proceed with RLE arteriogram, timing dependent upon vascular surgery  · Status post Leads   · Educated on smoking cessation    Paroxysmal atrial fibrillation (HCC)  Assessment & Plan  W/ RVR in setting of sepsis; HR much improved  Cardiology following  Cont metoprolol, holding parameters lowered  Continue telemetry monitoring  Anticoagulation with heparin drip as Xarelto on hold; resume back on xarelto when ok per vascular    SBO (small bowel obstruction) ([facility] [address] )  Assessment & Plan  S/p CT a/p revealing small bowel obstruction with a transition point at a long segment of distal ileitis  There is also a separate short segment of upstream ileitis ("skip lesion") with luminal narrowing  Gen surgery, GI on board  Concern for underlying inflammatory disease; s/p colonoscopy, bx pending  Diet advanced with tolerance  Abd distension 2 nights ago now improved  Normoactive bs, + bm, abd xr and obstruction series revealing SBO    Discussed results with gen surg known areas of stricture s/p colonoscopy/ct enterography, states no surgical intervention required, pt passing gas and has been having multiple bm  Also tolerating diet w/ no vomiting thus no need for NGT  If has vomiting episodes then consider placement of NGT  Diarrhea  Assessment & Plan  · Chronic > 1 yr, nonbloody  · S/p egd/colonoscopy (1/14) and s/p ct small bowel enterography, 1/16  · Biopsy pending  · Presumed inflammatory bowel disease  · GI was on board, started on prednisone  Tapering schedule in GI note (dated 1/20)  · S/p QuantiFERON gold intermediate; neg HIV and varicella  Negative chronic hepatitis panel      PHUONG (acute kidney injury) ([facility] [address] )  Assessment & Plan  · On CKD stage 3  · Nephrology following, etiology secondary to pre renal  · Creatinine trended down   · Avoid nephrotoxins and renally dose medications  · Off IVF    Hypokalemia  Assessment & Plan  repleted accordingly    Positive QuantiFERON-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  Assessment & Plan  Results revealing indeterminate  PPD ordered  Please check reading by Wednesday 1/22  If patient gets discharged prior to that will need arrangements for it to be read as outpatient    Volume overload  Assessment & Plan  Required IVF hydration given PHUONG  Received IV lasix x 1    Hypotension  Assessment & Plan  Also reports of chronic hx  Asymptomatic  Started on midodrine    Thrombocytosis (HCC)  Assessment & Plan  · Chronic, being followed by Hematology as outpatient, JAK2 mutation negative  · resolved  · Continue to monitor    PAD (peripheral artery disease) ([facility] )  Assessment & Plan  · S/p right popliteal stent  · was on xarelto & statin at home  · On heparin gtt while xarelto is on hold  · Ok per ID to proceed with endovascular procedure    VTE Pharmacologic Prophylaxis:   Pharmacologic: Heparin Drip  Mechanical VTE Prophylaxis in Place: No    Patient Centered Rounds: I have performed bedside rounds with nursing staff today      Discussions with Specialists or Other Care Team Provider: ID, GI    Education and Discussions with Family / Patient: Patient and his wife    Time Spent for Care: 27 minutes  More than 50% of total time spent on counseling and coordination of care as described above  Current Length of Stay: 7 day(s)    Current Patient Status: Inpatient   Certification Statement: The patient will continue to require additional inpatient hospital stay due to Pending endovascular intervention    Discharge Plan:     Code Status: Level 1 - Full Code      Subjective:   Abd distension improved  Decreased frequency of diarrhea, no n/v  Tele reviewed, afib but much improved HR, denies any cardiac symptoms  Non toxic appearing    Objective:     Vitals:   Temp (24hrs), Av °F (36 7 °C), Min:97 8 °F (36 6 °C), Max:98 3 °F (36 8 °C)    Temp:  [97 8 °F (36 6 °C)-98 3 °F (36 8 °C)] 98 1 °F (36 7 °C)  HR:  [] 62  Resp:  [16-20] 18  BP: ()/(50-69) 90/60  SpO2:  [87 %-94 %] 94 %  Body mass index is 28 17 kg/m²  Input and Output Summary (last 24 hours): Intake/Output Summary (Last 24 hours) at 2020 1558  Last data filed at 2020 1300  Gross per 24 hour   Intake 700 ml   Output 450 ml   Net 250 ml       Physical Exam:     Physical Exam   Constitutional: He is oriented to person, place, and time  He appears well-developed and well-nourished  Neck: Neck supple  Cardiovascular: Normal rate, normal heart sounds and intact distal pulses  Irregularly Irregular   Pulmonary/Chest: Effort normal and breath sounds normal  No stridor  No respiratory distress  He has no wheezes  He has no rales  He exhibits no tenderness  Abdominal: Soft  Bowel sounds are normal  There is no tenderness  There is no rebound and no guarding  Distension much improved   Musculoskeletal: Normal range of motion  He exhibits no edema, tenderness or deformity  Neurological: He is alert and oriented to person, place, and time  He displays normal reflexes  No cranial nerve deficit or sensory deficit  He exhibits normal muscle tone   Coordination normal          Additional Data:     Labs:    Results from last 7 days   Lab Units 01/20/20  0651 01/19/20  1019   WBC Thousand/uL 7 42 8 62   HEMOGLOBIN g/dL 10 2* 11 6*   HEMATOCRIT % 31 1* 35 2*   PLATELETS Thousands/uL 118* 220   BANDS PCT %  --  5   LYMPHO PCT %  --  2*   MONO PCT %  --  3*   EOS PCT %  --  2     Results from last 7 days   Lab Units 01/20/20  0651  01/14/20  0156   SODIUM mmol/L 138   < > 137   POTASSIUM mmol/L 3 2*   < > 3 7   CHLORIDE mmol/L 107   < > 105   CO2 mmol/L 25   < > 23   BUN mg/dL 17   < > 48*   CREATININE mg/dL 0 94   < > 2 25*   ANION GAP mmol/L 6   < > 9   CALCIUM mg/dL 6 4*   < > 7 6*   ALBUMIN g/dL  --   --  2 1*   TOTAL BILIRUBIN mg/dL  --   --  0 53   ALK PHOS U/L  --   --  126*   ALT U/L  --   --  18   AST U/L  --   --  14   GLUCOSE RANDOM mg/dL 108   < > 117    < > = values in this interval not displayed  Results from last 7 days   Lab Units 01/18/20  2158   INR  1 39*             Results from last 7 days   Lab Units 01/19/20  1019 01/18/20  2158 01/18/20  1048   LACTIC ACID mmol/L  --  1 8 0 9   PROCALCITONIN ng/ml 35 67*  --  11 07*           * I Have Reviewed All Lab Data Listed Above  * Additional Pertinent Lab Tests Reviewed: All Labs Within Last 24 Hours Reviewed    Imaging:    Imaging Reports Reviewed Today Include:   Imaging Personally Reviewed by Myself Includes:      Recent Cultures (last 7 days):     Results from last 7 days   Lab Units 01/20/20  0651 01/17/20  1725 01/17/20  1722   BLOOD CULTURE  Received in Microbiology Lab  Culture in Progress   Klebsiella pneumoniae* Klebsiella pneumoniae*   GRAM STAIN RESULT   --  Gram negative rods* Gram negative rods*       Last 24 Hours Medication List:     Current Facility-Administered Medications:  acetaminophen 650 mg Oral Q6H PRN Linda Moon MD    aspirin 81 mg Oral Daily Viktoriya Vogel MD    atorvastatin 20 mg Oral Daily With Felicia Geller MD    cefazolin 2,000 mg Intravenous Q8H Krissy Munroe MD Last Rate: 2,000 mg (01/20/20 1433) cholecalciferol 2,000 Units Oral Daily Laverne Painter MD    heparin (porcine) 3-30 Units/kg/hr (Order-Specific) Intravenous Titrated Laverne Painter MD Last Rate: 24 Units/kg/hr (01/20/20 1134)   heparin (porcine) 3,000 Units Intravenous PRN Laverne Painter MD    heparin (porcine) 6,000 Units Intravenous PRN iVktoriya Vogel MD    metoprolol 5 mg Intravenous Q6H PRN Viktoriya Vogel MD    metoprolol succinate 50 mg Oral Daily LENORA De Anda    midodrine 2 5 mg Oral TID AC María Elena Gray DO    ondansetron 4 mg Intravenous Q6H PRN Laverne Painter MD    pantoprazole 40 mg Oral Early Morning Viktoriya Vogel MD    phenol 1 spray Mouth/Throat Q2H PRN LENORA Gomez    potassium chloride 40 mEq Oral BID Daly Puga MD    predniSONE 40 mg Oral Daily Mckay Cruz MD    tuberculin 5 Units Intradermal Once Freddy Sawant MD         Today, Patient Was Seen By: Donato Bonds DO    ** Please Note: Dictation voice to text software may have been used in the creation of this document   **

## 2020-01-20 NOTE — PROGRESS NOTES
Tuberculin injection given intradermal to left forearm at 1843  Site circled and dated    To be checked in 48 and 72 hours

## 2020-01-20 NOTE — ASSESSMENT & PLAN NOTE
Results revealing indeterminate  PPD ordered  Please check reading by Wednesday 1/22    If patient gets discharged prior to that will need arrangements for it to be read as outpatient

## 2020-01-21 VITALS
OXYGEN SATURATION: 98 % | HEART RATE: 88 BPM | RESPIRATION RATE: 18 BRPM | SYSTOLIC BLOOD PRESSURE: 97 MMHG | WEIGHT: 188.93 LBS | HEIGHT: 70 IN | DIASTOLIC BLOOD PRESSURE: 67 MMHG | TEMPERATURE: 97.3 F | BODY MASS INDEX: 27.05 KG/M2

## 2020-01-21 PROBLEM — N17.9 AKI (ACUTE KIDNEY INJURY) (HCC): Status: RESOLVED | Noted: 2020-01-13 | Resolved: 2020-01-21

## 2020-01-21 PROBLEM — A41.9 SEPSIS (HCC): Status: RESOLVED | Noted: 2020-01-18 | Resolved: 2020-01-21

## 2020-01-21 LAB
ALBUMIN SERPL BCP-MCNC: 1.5 G/DL (ref 3.5–5)
ANION GAP SERPL CALCULATED.3IONS-SCNC: 6 MMOL/L (ref 4–13)
APTT PPP: 64 SECONDS (ref 23–37)
BACTERIA BLD CULT: ABNORMAL
BACTERIA BLD CULT: ABNORMAL
BUN SERPL-MCNC: 17 MG/DL (ref 5–25)
CA-I BLD-SCNC: 0.92 MMOL/L (ref 1.12–1.32)
CALCIUM SERPL-MCNC: 6.4 MG/DL (ref 8.3–10.1)
CHLORIDE SERPL-SCNC: 109 MMOL/L (ref 100–108)
CO2 SERPL-SCNC: 24 MMOL/L (ref 21–32)
CREAT SERPL-MCNC: 0.94 MG/DL (ref 0.6–1.3)
GFR SERPL CREATININE-BSD FRML MDRD: 83 ML/MIN/1.73SQ M
GLUCOSE SERPL-MCNC: 119 MG/DL (ref 65–140)
GRAM STN SPEC: ABNORMAL
GRAM STN SPEC: ABNORMAL
POTASSIUM SERPL-SCNC: 3.6 MMOL/L (ref 3.5–5.3)
SODIUM SERPL-SCNC: 139 MMOL/L (ref 136–145)

## 2020-01-21 PROCEDURE — 99232 SBSQ HOSP IP/OBS MODERATE 35: CPT | Performed by: SURGERY

## 2020-01-21 PROCEDURE — 82330 ASSAY OF CALCIUM: CPT | Performed by: INTERNAL MEDICINE

## 2020-01-21 PROCEDURE — 99239 HOSP IP/OBS DSCHRG MGMT >30: CPT | Performed by: INTERNAL MEDICINE

## 2020-01-21 PROCEDURE — 85730 THROMBOPLASTIN TIME PARTIAL: CPT | Performed by: INTERNAL MEDICINE

## 2020-01-21 PROCEDURE — 99232 SBSQ HOSP IP/OBS MODERATE 35: CPT | Performed by: INTERNAL MEDICINE

## 2020-01-21 PROCEDURE — 82040 ASSAY OF SERUM ALBUMIN: CPT | Performed by: INTERNAL MEDICINE

## 2020-01-21 PROCEDURE — 80048 BASIC METABOLIC PNL TOTAL CA: CPT | Performed by: INTERNAL MEDICINE

## 2020-01-21 RX ORDER — CEPHALEXIN 500 MG/1
500 CAPSULE ORAL EVERY 6 HOURS SCHEDULED
Qty: 12 CAPSULE | Refills: 0 | Status: SHIPPED | OUTPATIENT
Start: 2020-01-21 | End: 2020-01-24

## 2020-01-21 RX ORDER — CALCIUM GLUCONATE 20 MG/ML
2 INJECTION, SOLUTION INTRAVENOUS ONCE
Status: COMPLETED | OUTPATIENT
Start: 2020-01-21 | End: 2020-01-21

## 2020-01-21 RX ORDER — CEPHALEXIN 500 MG/1
500 CAPSULE ORAL EVERY 6 HOURS SCHEDULED
Status: DISCONTINUED | OUTPATIENT
Start: 2020-01-21 | End: 2020-01-21 | Stop reason: HOSPADM

## 2020-01-21 RX ORDER — POTASSIUM CHLORIDE 20 MEQ/1
40 TABLET, EXTENDED RELEASE ORAL ONCE
Status: COMPLETED | OUTPATIENT
Start: 2020-01-21 | End: 2020-01-21

## 2020-01-21 RX ORDER — MIDODRINE HYDROCHLORIDE 2.5 MG/1
2.5 TABLET ORAL
Qty: 90 TABLET | Refills: 0 | Status: SHIPPED | OUTPATIENT
Start: 2020-01-21 | End: 2020-01-21

## 2020-01-21 RX ORDER — METOPROLOL SUCCINATE 50 MG/1
50 TABLET, EXTENDED RELEASE ORAL DAILY
Qty: 30 TABLET | Refills: 0 | Status: SHIPPED | OUTPATIENT
Start: 2020-01-22 | End: 2020-03-02 | Stop reason: SDUPTHER

## 2020-01-21 RX ORDER — PREDNISONE 1 MG/1
5 TABLET ORAL DAILY
Qty: 200 TABLET | Refills: 0 | Status: SHIPPED | OUTPATIENT
Start: 2020-01-22 | End: 2020-01-21

## 2020-01-21 RX ORDER — MIDODRINE HYDROCHLORIDE 2.5 MG/1
2.5 TABLET ORAL
Qty: 90 TABLET | Refills: 0 | Status: SHIPPED | OUTPATIENT
Start: 2020-01-21 | End: 2020-02-24

## 2020-01-21 RX ORDER — ASPIRIN 81 MG/1
81 TABLET, CHEWABLE ORAL DAILY
Qty: 30 TABLET | Refills: 0 | Status: SHIPPED | OUTPATIENT
Start: 2020-01-22 | End: 2020-02-10 | Stop reason: ALTCHOICE

## 2020-01-21 RX ORDER — PREDNISONE 1 MG/1
5 TABLET ORAL DAILY
Qty: 200 TABLET | Refills: 0 | Status: SHIPPED | OUTPATIENT
Start: 2020-01-22 | End: 2020-02-03 | Stop reason: SDUPTHER

## 2020-01-21 RX ADMIN — METOPROLOL SUCCINATE 50 MG: 50 TABLET, EXTENDED RELEASE ORAL at 09:13

## 2020-01-21 RX ADMIN — CALCIUM GLUCONATE 2 G: 20 INJECTION, SOLUTION INTRAVENOUS at 11:24

## 2020-01-21 RX ADMIN — CEPHALEXIN 500 MG: 500 CAPSULE ORAL at 11:24

## 2020-01-21 RX ADMIN — MIDODRINE HYDROCHLORIDE 2.5 MG: 5 TABLET ORAL at 06:07

## 2020-01-21 RX ADMIN — PREDNISONE 40 MG: 20 TABLET ORAL at 09:13

## 2020-01-21 RX ADMIN — ASPIRIN 81 MG 81 MG: 81 TABLET ORAL at 09:13

## 2020-01-21 RX ADMIN — PANTOPRAZOLE SODIUM 40 MG: 40 TABLET, DELAYED RELEASE ORAL at 05:32

## 2020-01-21 RX ADMIN — CEFAZOLIN SODIUM 2000 MG: 2 SOLUTION INTRAVENOUS at 05:30

## 2020-01-21 RX ADMIN — HEPARIN SODIUM AND DEXTROSE 24 UNITS/KG/HR: 10000; 5 INJECTION INTRAVENOUS at 02:16

## 2020-01-21 RX ADMIN — MELATONIN 2000 UNITS: at 09:13

## 2020-01-21 RX ADMIN — RIVAROXABAN 20 MG: 20 TABLET, FILM COATED ORAL at 12:34

## 2020-01-21 RX ADMIN — POTASSIUM CHLORIDE 40 MEQ: 1500 TABLET, EXTENDED RELEASE ORAL at 09:13

## 2020-01-21 RX ADMIN — MIDODRINE HYDROCHLORIDE 2.5 MG: 5 TABLET ORAL at 11:24

## 2020-01-21 NOTE — DISCHARGE SUMMARY
Discharge- [facility] 1950, 77 y o  male MRN: [id number]    Unit/Bed#: [facility] 827-01 Encounter: [id number]    Primary Care Provider: Vandana Snyder MD   Date and time admitted to hospital: 1/13/2020  9:23 AM        Positive QuantiFERON-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  Assessment & Plan  Results revealing indeterminate  PPD ordered  Outpatient follow-up with primary care physician    Volume overload  Assessment & Plan  Improved  Monitor    Hypotension  Assessment & Plan  Also reports of chronic hx  Asymptomatic  Continue midodrine    Hypokalemia  Assessment & Plan  repleted accordingly    SBO (small bowel obstruction) ([facility] )  Assessment & Plan  S/p CT a/p revealing small bowel obstruction with a transition point at a long segment of distal ileitis  There is also a separate short segment of upstream ileitis ("skip lesion") with luminal narrowing  Gen surgery, GI on board  Concern for underlying inflammatory disease; s/p colonoscopy, bx pending  Diet advanced with tolerance  Abd distension 2 nights ago now improved  Normoactive bs, + bm, abd xr and obstruction series revealing SBO  Per gen surg known areas of stricture s/p colonoscopy/ct enterography, states no surgical intervention required, pt passing gas and has been having multiple bm    Also tolerating diet w/ no vomiting thus no need for NGT    Diarrhea  Assessment & Plan  · Chronic > 1 yr, nonbloody  · S/p egd/colonoscopy (1/14) and s/p ct small bowel enterography, 1/16  · Biopsy pending  · Presumed inflammatory bowel disease  · GI input noted started on prednisone  · Prednisone taper on discharge  · Outpatient GI follow-up      Limb ischemia  Assessment & Plan  · Acute right leg ischemic pain with numbness  · Underlying history of peripheral artery disease s/p R popliteal stent  · Vascular input noted  · Outpatient vascular follow-up  · Xarelto for anticoagulation    Thrombocytosis (HCC)  Assessment & Plan  · Chronic, being followed by Hematology as outpatient, JAK2 mutation negative  · resolved  · Continue to monitor    Paroxysmal atrial fibrillation (HCC)  Assessment & Plan  W/ RVR in setting of sepsis; HR much improved  Cardiology following  Cont metoprolol  Xarelto for anticoagulation    PAD (peripheral artery disease) ([facility] )  Assessment & Plan  · S/p right popliteal stent  · was on xarelto & statin at home  · Xarelto resumed          Discharge Summary - [facility]  Internal Medicine    Patient Information: Aida Montgomery 77 y o  male MRN: [id number]  Unit/Bed#: [facility] 827-01 Encounter: [id number]    Discharging Physician / Practitioner: Kathi Rider MD  PCP: Mimi Osorio MD  Admission Date: 1/13/2020  Discharge Date: 01/21/20    Disposition:     Home    Reason for Admission:  Right leg pain and numbness    Discharge Diagnoses:     Principal Problem (Resolved):    Sepsis ([facility] )  Active Problems:    PAD (peripheral artery disease) (HCC)    Paroxysmal atrial fibrillation (HCC)    Thrombocytosis (HCC)    Limb ischemia    Diarrhea    SBO (small bowel obstruction) ([facility])    Hypokalemia    Hypotension    Volume overload    Positive QuantiFERON-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  Resolved Problems:    PHUONG (acute kidney injury) ([facility] )      Consultations During Hospital Stay:  · Cardiology  · Function disease  · GI  · General surgery  · Vascular  · Nephrology    Procedures Performed:     · Lower extremity arterial Doppler occlusion versus high-grade stenosis right popliteal peroneal anterior tibial arteries, left side no evidence of significant lower extremity arterial occlusive disease  · Chest x-ray no active lung disease  · CT abdomen pelvis Findings consistent with a small bowel obstruction with a transition point at a long segment of distal ileitis  There is also a separate short segment of upstream ileitis ("skip lesion") with luminal narrowing    There is intramural fat at the terminal ileum suggesting chronic/old inflammation and further abrupt decompression of the terminal ileum, raising the possibility of fibrostenosing disease/fibrotic stricture  Overall, findings are concerning for Crohn's disease  No evidence of penetrating disease  Bilateral nonobstructing nephrolithiasis  3   Stable 3 2 x 2 6 cm infrarenal abdominal aortic aneurysm  · CT head no acute interval abnormality  · Stay small bowel enterography Persistent though slightly improved small bowel obstruction secondary to active long segment distal ileitis  Bilateral adrenal adenomas again noted  · Colonoscopy mild eroded erythematous ulcerated mucosa in the terminal ileum multiple areas of focal ileitis and ulceration with pale mucosa  · EGD esophagus duodenum appeared normal, stomach abnormal mucosa in the antrum      Hospital Course:     Etta Bragg is a 77 y o  male patient who originally presented to the hospital on 1/13/2020 due to right leg pain and numbness  Patient presented with worsening pain and numbness right leg patient called the vascular surgery and was recommended to present to ED  In the ED patient was noted to acute kidney injury with elevated creatinine he was evaluated nephrology and was planned for optimization for the vascular procedure  During the workup imaging studies of the abdomen revealed small bowel lesion suspicious for enteritis  He was seen in consult with GI underwent EGD colonoscopy and imaging studies as outlined above  During his stay in the hospital was also noted to have sepsis and workup revealed Klebsiella bacteremia this was likely translocation from abdomen, he received IV antibiotics these are changed to p o  Keflex based on sensitivities to complete antibiotics through 01/24/2020  He was noted to have atrial fibrillation with rapid ventricular response during his stay in the hospital, he was seen in consultation cardiology he continues metoprolol, he is on Xarelto for anticoagulation  This will be continued at discharge      Patient is recommended to follow-up with vascular surgery as outpatient for further workup of his lower extremity pain and occlusion  Patient has enteritis on imaging studies and colonoscopy, he will follow with GI for biopsy results  He has been placed on prednisone taper,  He will follow-up with GI  He reports symptomatic improvement since admission and is today deemed ready for discharge  Kindly review the chart for details  Condition at Discharge: fair     Discharge Day Visit / Exam:     Subjective:      Sitting up in bed  Reports feeling better  Requesting discharge  History chart labs medications reviewed      Vitals: Blood Pressure: 97/67 (01/21/20 1057)  Pulse: 88 (01/21/20 1057)  Temperature: (!) 97 3 °F (36 3 °C) (01/21/20 1057)  Temp Source: Oral (01/21/20 1057)  Respirations: 18 (01/21/20 1057)  Height: 5' 10" (177 8 cm) (01/13/20 1936)  Weight - Scale: 85 7 kg (188 lb 15 oz) (01/21/20 0600)  SpO2: 98 % (01/21/20 1057)  Exam:   Physical Exam    Comfortably sitting up in bed  Neck supple  Lungs diminished breath sounds bilateral  Heart sounds S1-S2 noted  Abdomen soft  Awake obeys simple commands  Pulses noted  No pedal edema  No rash    Discharge instructions/Information to patient and family:   See after visit summary for information provided to patient and family  Discharge plan discussed with infectious disease  Discharge plan discussed the patient, spouse at bedside  Outpatient close follow-up with vascular, GI, Cardiology, primary care physician    Provisions for Follow-Up Care:  See after visit summary for information related to follow-up care and any pertinent home health orders  Planned Readmission: no     Discharge Statement:  I spent 45 minutes discharging the patient  This time was spent on the day of discharge  I had direct contact with the patient on the day of discharge   Greater than 50% of the total time was spent examining patient, answering all patient questions, arranging and discussing plan of care with patient as well as directly providing post-discharge instructions  Additional time then spent on discharge activities  Discharge Medications:  See after visit summary for reconciled discharge medications provided to patient and family        ** Please Note: This note has been constructed using a voice recognition system **

## 2020-01-21 NOTE — ASSESSMENT & PLAN NOTE
· Chronic > 1 yr, nonbloody  · S/p egd/colonoscopy (1/14) and s/p ct small bowel enterography, 1/16  · Biopsy pending  · Presumed inflammatory bowel disease  · GI input noted started on prednisone  · Prednisone taper on discharge  · Outpatient GI follow-up

## 2020-01-21 NOTE — PROGRESS NOTES
[address] [facility] NOTE   Kennedi Staples 77 y o  male MRN: [id number]  Unit/Bed#: [facility] 827-01 Encounter: [id number]  Reason for Consult: PHUONG on CKD III    ASSESSMENT and PLAN:     52-year-old male with a past medical history of PVD, prior stenting lower extremity, smoker, AFib, thrombocytosis, chronic diarrhea who initially presents with right lower extremity pain  Nephrology is consulted for acute kidney injury  Patient was initially started on heparin infusion due to initial concern for lower extremity pain possibly secondary to ischemia  There is also initial concern for elevated creatinine and patient underwent a CT scan of the abdomen and pelvis which showed concerns for small bowel obstruction  Course has also been complicated by bacteremia     1) PHUNOG on CKD III   · baseline Cr 1-1 5 mg/dL   · Initial creatinine 4 mg/dL on January 13th   · Etiology-possibly prerenal/hypovolemia/poor p o  Intake/diarrhea/leading to possible ATN   · outpatient nephrologist Dr Apryl Zhong   · UA - with 4-10 RBC, 1+ protein, hyalaine casts many  · initially started on IVF   · to note patient was to undergo angiogram  But had medical instability and was placed on hold  · Creatinine stable 0 9 mg/dL  · Hypokalemia-being repleted per Cardiology  · Replete calcium today - ordered (hypoalbuminemia but also with low ionized Ca)  · From renal standpoint, if patient is being d/c please have pt follow BMP, mag, phos this Friday or Monday  · Message sent to renal office for f/u  · Hypotension-if the blood pressures remain marginal, will need to increase midodrine cautiously to 5 mg    2) SBO   · Per Primary Team and Surgical team  3) LE pain with history of PVD   · Vascular on board   · Angiogram January 17th but was held due to fevers and bacteremia  4) electrolytes   · Hypokalemia- required repletion; mag stable  · Hypocalcemia - repletion ordered  5) Acid/base - bicarb stable  6) HTN         - now with hypotension on midodrine  7) diarrhea - per Primary Team   · There is concern for underlying Crohn's disease   · Gastroenterology on board   · Underwent EGD and colonoscopy  Colonoscopy with ulcerated mucosa in inflammation of the terminal ileum and stricture areas   · QuantiFERON gold- indeterminate - per Primary Team  · Biopsy-   · Hepatitis serologies-  8) b/l adrenal adenoma   · may need to consider checking metanephrines   9) SVT-per primary team   10) fevers - source possible GI  · Per primary team   · Follow-up final cultures  · Found to have Gram negative bacteremia and started on antibiotics    SUBJECTIVE / INTERVAL HISTORY:    Patient denies complaints  Blood pressure is 90 systolic  Overall improved  Patient states this is chronic blood pressure levels at home 03Q to 667 systolic      OBJECTIVE:  Current Weight: Weight - Scale: 85 7 kg (188 lb 15 oz)  Vitals:    01/20/20 2307 01/21/20 0522 01/21/20 0600 01/21/20 0656   BP: 91/62   99/65   Pulse: 60   (!) 52   Resp: 18   22   Temp: 97 6 °F (36 4 °C)   (!) 97 4 °F (36 3 °C)   TempSrc:       SpO2: 96%   91%   Weight:  85 7 kg (189 lb) 85 7 kg (188 lb 15 oz)    Height:           Intake/Output Summary (Last 24 hours) at 1/21/2020 1004  Last data filed at 1/21/2020 0900  Gross per 24 hour   Intake 1306 5 ml   Output 400 ml   Net 906 5 ml     General: NAD  Skin: no rash  Eyes: anicteric sclera  ENT: moist mucous membrane  Neck: supple  Chest: CTA b/l, no ronchii, no wheeze, no rubs, no rales  CVS: s1s2, no murmur, no gallop, no rub  Abdomen: soft, nontender, nl sounds  Extremities: no edema LE b/l  : no duque  Neuro: AAOX3  Psych: normal affect    Medications:    Current Facility-Administered Medications:     acetaminophen (TYLENOL) tablet 650 mg, 650 mg, Oral, Q6H PRN, Viktoriya Voegl MD, 650 mg at 01/18/20 0752    aspirin chewable tablet 81 mg, 81 mg, Oral, Daily, Viktoriya Vogel MD, 81 mg at 01/21/20 0913    atorvastatin (LIPITOR) tablet 20 mg, 20 mg, Oral, Daily With Catina Silveira MD, 20 mg at 01/20/20 1536    calcium gluconate 2 g in sodium chloride 0 9% 100 mL (premix), 2 g, Intravenous, Once, Linnette Adame MD    ceFAZolin (ANCEF) IVPB (premix) 2,000 mg, 2,000 mg, Intravenous, Q8H, Dhara Shah MD, Stopped at 01/21/20 0600    cholecalciferol (VITAMIN D3) tablet 2,000 Units, 2,000 Units, Oral, Daily, Cristiane Samuels MD, 2,000 Units at 01/21/20 0913    magnesium oxide (MAG-OX) tablet 400 mg, 400 mg, Oral, BID, Linnette Adame MD    metoprolol (LOPRESSOR) injection 5 mg, 5 mg, Intravenous, Q6H PRN, Cristiane Samuels MD, 5 mg at 01/18/20 1526    metoprolol succinate (TOPROL-XL) 24 hr tablet 50 mg, 50 mg, Oral, Daily, LENORA Rodriguez, 50 mg at 01/21/20 0913    midodrine (PROAMATINE) tablet 2 5 mg, 2 5 mg, Oral, TID Stacy Pall Gray, DO, 2 5 mg at 01/21/20 0607    ondansetron (ZOFRAN) injection 4 mg, 4 mg, Intravenous, Q6H PRN, Cristiane Samuels MD    pantoprazole (PROTONIX) EC tablet 40 mg, 40 mg, Oral, Early Morning, Viktoriya Vogel MD, 40 mg at 01/21/20 0532    phenol (CHLORASEPTIC) 1 4 % mucosal liquid 1 spray, 1 spray, Mouth/Throat, Q2H PRN, LENORA Mohamud    predniSONE tablet 40 mg, 40 mg, Oral, Daily, Tameka Francois MD, 40 mg at 01/21/20 0913    rivaroxaban (XARELTO) tablet 20 mg, 20 mg, Oral, Daily With Parveen Gaytan MD    tuberculin injection 5 Units, 5 Units, Intradermal, Once, Marisol Groves MD, 5 Units at 01/20/20 1840    Laboratory Results:  Results from last 7 days   Lab Units 01/21/20  0518 01/20/20  0651 01/19/20  1030 01/19/20  1019 01/19/20  0827 01/18/20  2158 01/18/20  1048 01/18/20  0355 01/17/20  0556 01/17/20  0546 01/16/20  0813   WBC Thousand/uL  --  7 42  --  8 62 10 50* 15 82* 7 51 10 16 9 94  --  7 61   HEMOGLOBIN g/dL  --  10 2*  --  11 6* 12 5 10 7* 11 4* 11 3* 12 1  --  12 8   HEMATOCRIT %  --  31 1*  --  35 2* 38 4 32 4* 34 2* 33 4* 36 9  --  39 4   PLATELETS Thousands/uL  --  118*  --  220 237 245 307 307 414*  --  434*   POTASSIUM mmol/L 3 6 3 2* 3 8  --   --  3 4* 3 4* 2 9*  --  3 2* 3 5   CHLORIDE mmol/L 109* 107 106  --   --  107 104 103  --  103 102   CO2 mmol/L 24 25 27  --   --  26 27 29  --  26 30   BUN mg/dL 17 17 16  --   --  15 14 15  --  12 13   CREATININE mg/dL 0 94 0 94 1 08  --   --  1 06 1 00 1 04  --  0 88 0 95   CALCIUM mg/dL 6 4* 6 4* 6 7*  --   --  6 8* 7 1* 6 9*  --  7 6* 7 8*   MAGNESIUM mg/dL  --  2 0 2 1  --   --  2 1 2 7* 1 8  --   --  2 4   PHOSPHORUS mg/dL  --   --   --   --   --   --   --   --   --  2 9 2 2*

## 2020-01-21 NOTE — ASSESSMENT & PLAN NOTE
· Acute right leg ischemic pain with numbness  · Underlying history of peripheral artery disease s/p R popliteal stent  · Vascular input noted  · Outpatient vascular follow-up  · Xarelto for anticoagulation

## 2020-01-21 NOTE — ASSESSMENT & PLAN NOTE
W/ RVR in setting of sepsis; HR much improved  Cardiology following  Cont metoprolol  Xarelto for anticoagulation

## 2020-01-21 NOTE — PROGRESS NOTES
Progress Note - Infectious Disease   Zeb Pencil 77 y o  male MRN: [id number]  Unit/Bed#: [facility] 827-01 Encounter: [id number]      Impression/Recommendations:  1   Sepsis   Evolving since admission:  Fever, tachycardia   Due to # 2  No other appreciable source   UA, chest x-ray negative   Clinically stable and nontoxic  Improved  Rec:  ? Continue antibiotics as below  ? Follow temperatures closely  ? Supportive care as per the primary service     2   Klebsiella bacteremia   Suspect transient GI source in setting of # 3/4   No other appreciable source   Abdominal exam benign making perforation or other complication from recent colonoscopy unlikely   UA negative and offers no  symptoms  Rec:  ? Change antibiotics to Keflex 500 mg PO Q6 with plan to complete 7 days total of treatment through 1/24/20      3   Chronic SBO   Due to terminal ileitis in the setting of # 4   Clinically stable, tolerating p o  Rec:  ? Serial abdominal exams  ? Surgery, GI follow-up ongoing     4   Probable IBD   In setting of elevated fecal calprotectin and focal areas of ileitis with stricture seen on colonoscopy   Biopsies pending  Rec:  ? Empirical steroids per GI  ? Follow-up biopsy     5   Right lower extremity ischemia   Due to popliteal stent thrombosis in setting of all of above   Extremity appears perfused without critical ischemia  Rec:  ? As sepsis improved, OK from ID for arteriogram at any time if clinically indicated  ? Serial exams  ? Close vascular surgery follow-up ongoing     6  Indeterminate Quantiferon  Due to low positive control  PPD pending  CXR negative and no cough  Rec:  · Can follow-up with ID as an outpatient if PPD positive  The patient is stable from an ID standpoint  The above plan was discussed in detail with Dr Becky Bowers      Antibiotics:  Cefazolin #2  Antibiotics #4  Steroids #4    Subjective:  Patient seen on AM rounds  Feels well and wants to go home    Denies fevers, chills, sweats, nausea, vomiting, or diarrhea  24 Hour Events:  No documented fevers, chills, sweats, nausea, vomiting, or diarrhea  Objective:  Vitals:  Temp:  [97 4 °F (36 3 °C)-98 3 °F (36 8 °C)] 97 4 °F (36 3 °C)  HR:  [52-76] 52  Resp:  [16-22] 22  BP: (90-99)/(60-65) 99/65  SpO2:  [91 %-96 %] 91 %  Temp (24hrs), Av 8 °F (36 6 °C), Min:97 4 °F (36 3 °C), Max:98 3 °F (36 8 °C)  Current: Temperature: (!) 97 4 °F (36 3 °C)    Physical Exam:   General:  No acute distress  Psychiatric:  Awake and alert  Pulmonary:  Normal respiratory excursion without accessory muscle use  Abdomen:  Soft, nontender, slightly distended  Extremities:  No edema  Skin:  No rashes    Lab Results:  I have personally reviewed pertinent labs  Results from last 7 days   Lab Units 20  0518 20  0651 20  1030   POTASSIUM mmol/L 3 6 3 2* 3 8   CHLORIDE mmol/L 109* 107 106   CO2 mmol/L 24 25 27   BUN mg/dL 17 17 16   CREATININE mg/dL 0 94 0 94 1 08   EGFR ml/min/1 73sq m 83 83 70   CALCIUM mg/dL 6 4* 6 4* 6 7*     Results from last 7 days   Lab Units 20  0651 20  1019 20  0827   WBC Thousand/uL 7 42 8 62 10 50*   HEMOGLOBIN g/dL 10 2* 11 6* 12 5   PLATELETS Thousands/uL 118* 220 237     Results from last 7 days   Lab Units 20  0651 20  1725 20  1722   BLOOD CULTURE  Received in Microbiology Lab  Culture in Progress  Klebsiella pneumoniae* Klebsiella pneumoniae*   GRAM STAIN RESULT   --  Gram negative rods* Gram negative rods*       Imaging Studies:   I have personally reviewed pertinent imaging study reports and images in PACS  EKG, Pathology, and Other Studies:   I have personally reviewed pertinent reports

## 2020-01-21 NOTE — PROGRESS NOTES
Progress Note - Vascular Surgery   Isabel Rodriguez 77 y o  male MRN: [id number]  [facility] Encounter: [id number]    Assessment:  77 M with RLE ischemia, right popliteal stent thrombosis, partial SBO status post colonoscopy, Klebsiella bacteremia, Afib    Plan:  Continue ASA/statin  May transition to PO anticoagulation  Antibiotics per ID  Will plan for endovascular intervention on an outpatient basis    Subjective/Objective     Subjective: No acute events overnight  Hoping to get out of the hospital soon  Objective:    Blood pressure 91/62, pulse 60, temperature 97 6 °F (36 4 °C), resp  rate 18, height 5' 10" (1 778 m), weight 85 7 kg (189 lb), SpO2 96 %  ,Body mass index is 27 12 kg/m²        Intake/Output Summary (Last 24 hours) at 1/21/2020 0636  Last data filed at 1/21/2020 0631  Gross per 24 hour   Intake 1368 5 ml   Output 400 ml   Net 968 5 ml       Invasive Devices     Peripheral Intravenous Line            Peripheral IV 01/18/20 Left Wrist 2 days    Peripheral IV 01/18/20 Right Hand 2 days                Physical Exam:   General: NAD, AAOx3  Eyes: PERRL  ENT: moist mucous membranes  Neck: supple  CV: RRR +S1/S2  Chest: breath sounds bilaterally  Abdomen: soft, NT ND  Extremities: absent DP/PT right  DP/PT signal left  Motor/sensation intact bilateral lower extremities      Results from last 7 days   Lab Units 01/20/20  0651 01/19/20  1019 01/19/20  0827   WBC Thousand/uL 7 42 8 62 10 50*   HEMOGLOBIN g/dL 10 2* 11 6* 12 5   HEMATOCRIT % 31 1* 35 2* 38 4   PLATELETS Thousands/uL 118* 220 237     Results from last 7 days   Lab Units 01/21/20  0518 01/20/20  0651 01/19/20  1030   POTASSIUM mmol/L 3 6 3 2* 3 8   CHLORIDE mmol/L 109* 107 106   CO2 mmol/L 24 25 27   BUN mg/dL 17 17 16   CREATININE mg/dL 0 94 0 94 1 08   CALCIUM mg/dL 6 4* 6 4* 6 7*     Results from last 7 days   Lab Units 01/21/20  0518 01/20/20  1303 01/20/20  0651  01/18/20  2158  01/18/20  1048  01/15/20  0501   INR   --   -- --   --  1 39*  --  1 49*  --  1 34*   PTT seconds 64* 65* 90*   < > 105*   < > 81*   < >  --     < > = values in this interval not displayed

## 2020-01-21 NOTE — PROGRESS NOTES
Cardiology Progress Note - Enio Ortega 77 y o  male MRN: [id number]    Unit/Bed#: [facility] 827-01 Encounter: [id number]      Assessment:  Principal Problem:    Sepsis ([facility] [address] )  Active Problems:    PAD (peripheral artery disease) ([facility])    Paroxysmal atrial fibrillation (HCC)    Thrombocytosis (HCC)    Limb ischemia    PHUONG (acute kidney injury) ([facility] [address] )    Diarrhea    SBO (small bowel obstruction) ([facility])    Hypokalemia    Hypotension    Volume overload    Positive QuantiFERON-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      Plan:  Patient is comfortable this morning  He has no chest pain or significant dyspnea  On telemetry he is in sinus with intermittent ectopic atrial rhythm and PACs  He has a history of paroxysmal atrial fibrillation  Vascular surgery note appreciated  Patient to have endovascular intervention as an outpatient  Okay to transition to oral anticoagulation  Will discontinue heparin and start Xarelto  BMP today with potassium of 3 6 and creatinine of 0 94  Will supplement potassium  Subjective:   Patient seen and examined  No significant events overnight   negative  Objective:     Vitals: Blood pressure 99/65, pulse (!) 52, temperature (!) 97 4 °F (36 3 °C), resp  rate 22, height 5' 10" (1 778 m), weight 85 7 kg (188 lb 15 oz), SpO2 91 % , Body mass index is 27 11 kg/m² ,   Orthostatic Blood Pressures      Most Recent Value   Blood Pressure  99/65 filed at 01/21/2020 0656   Patient Position - Orthostatic VS  Lying filed at 01/18/2020 2300      ,      Intake/Output Summary (Last 24 hours) at 1/21/2020 0830  Last data filed at 1/21/2020 0631  Gross per 24 hour   Intake 1188 5 ml   Output 400 ml   Net 788 5 ml       No significant arrhythmias seen on telemetry review    Normal sinus rhythm with intermittent ectopic atrial rhythm      Physical Exam:    GEN: Enio Ortega appears well, alert and oriented x 3, pleasant and cooperative   NECK: supple, no carotid bruits, no JVD or HJR  HEART: normal rate, regular rhythm, normal S1 and S2, no murmurs, clicks, gallops or rubs   LUNGS: clear to auscultation bilaterally; no wheezes, rales, or rhonchi   Labs & Results:    No results displayed because visit has over 200 results  Ct Abdomen Pelvis Wo Contrast    Result Date: 1/13/2020  Narrative: CT ABDOMEN AND PELVIS WITHOUT IV CONTRAST INDICATION:   Abdominal pain, acute, nonlocalized  COMPARISON:  CT abdomen 1/7/2020 and CT abdomen/pelvis 5/15/2019 TECHNIQUE:  CT examination of the abdomen and pelvis was performed without intravenous contrast   Axial, sagittal, and coronal 2D reformatted images were created from the source data and submitted for interpretation  Radiation dose length product (DLP) for this visit:  559 99 mGy-cm   This examination, like all CT scans performed in the [facility], was performed utilizing techniques to minimize radiation dose exposure, including the use of iterative  reconstruction and automated exposure control  Enteric contrast was not administered  FINDINGS: ABDOMEN LOWER CHEST:  No clinically significant abnormality identified in the visualized lower chest  LIVER/BILIARY TREE:  Unremarkable  GALLBLADDER:  No calcified gallstones  No pericholecystic inflammatory change  SPLEEN:  Unremarkable  PANCREAS:  Unremarkable  ADRENAL GLANDS:  Stable bilateral adenomas  KIDNEYS/URETERS:  Punctate nonobstructing bilateral renal calculi  No hydronephrosis  STOMACH AND BOWEL:  The proximal small bowel is dilated with fluid and gas and there is a gradual transition point in the pelvis at a long segment of distal ileum that demonstrates mural thickening, mucosal hyperenhancement, prominence of the associated mesenteric vasculature, and relative luminal narrowing  At the distal end of the inflamed segment, the terminal ileum is abruptly decompressed (2/73) and fibrostenosing disease cannot be excluded    There is a separate, upstream, short segment of distal ileum that is also inflamed and demonstrates luminal narrowing (2/41)  Near the ileocecal valve, there is intramural fat ileal wall  The colon is completely decompressed and there is redemonstration of colonic diverticulosis without evidence of diverticulitis  No pneumatosis intestinalis or portal venous gas  No abscess or evidence of a fistula  APPENDIX:  Normal  ABDOMINOPELVIC CAVITY:  No ascites or free intraperitoneal air  No lymphadenopathy  VESSELS:  Atherosclerotic changes are present and there is a stable 3 2 x 2 6 cm infrarenal abdominal aortic aneurysm  PELVIS REPRODUCTIVE ORGANS:  Unremarkable for patient's age  URINARY BLADDER:  Unremarkable  ABDOMINAL WALL/INGUINAL REGIONS:  Unremarkable  OSSEOUS STRUCTURES:  No acute fracture or destructive osseous lesion  Impression: 1  Findings consistent with a small bowel obstruction with a transition point at a long segment of distal ileitis  There is also a separate short segment of upstream ileitis ("skip lesion") with luminal narrowing  There is intramural fat at the terminal ileum suggesting chronic/old inflammation and further abrupt decompression of the terminal ileum, raising the possibility of fibrostenosing disease/fibrotic stricture  Overall, findings are concerning for Crohn's disease  No evidence of penetrating disease  2   Bilateral nonobstructing nephrolithiasis  3   Stable 3 2 x 2 6 cm infrarenal abdominal aortic aneurysm  I personally discussed this study with Amanda Varela on 1/13/2020 at 1:55 PM  Workstation performed: [id number]     Xr Chest Portable    Result Date: 1/18/2020  Narrative: CHEST INDICATION:   fever  COMPARISON:  Two-view chest 1/13/2020 EXAM PERFORMED/VIEWS:  XR CHEST PORTABLE FINDINGS: Cardiomediastinal silhouette appears unremarkable  Left chest wall loop recorder in place  Unfolded aorta  Evaluation is somewhat limited by low lung volumes and crowding of bronchovascular markings  No definitive airspace consolidation or pulmonary edema    No pneumothorax or definitive pleural effusion  Bilateral acromioclavicular degenerative disease  Multilevel thoracolumbar spondylosis  Impression: No radiographic evidence of acute intrathoracic process on this examination which is somewhat limited by low lung volumes  Workstation performed: [id number]     Xr Chest 2 Views    Result Date: 1/13/2020  Narrative: CHEST INDICATION:   weakness  COMPARISON:  CT chest 5/16/2019 EXAM PERFORMED/VIEWS:  XR CHEST PA & LATERAL FINDINGS: Cardiomediastinal silhouette appears unremarkable  The lungs are clear  No pneumothorax or pleural effusion  Osseous structures appear within normal limits for patient age  Impression: No acute cardiopulmonary disease  Workstation performed: [id number]     Xr Abdomen 1 View Kub    Result Date: 1/19/2020  Narrative: ABDOMEN INDICATION:   round distended  COMPARISON:  CT 1/16/2020 VIEWS:  AP supine FINDINGS: Persistent multiple markedly gas distended small bowel loops  Apparent proximal descending colon normal caliber  No discernible free air on this supine study  Upright or left lateral decubitus imaging is more sensitive to detect subtle free air in the appropriate setting  No pathologic calcifications or soft tissue masses  Visualized lung bases are clear  Visualized osseous structures are unremarkable for the patient's age  Impression: Persistent CT reported small bowel obstruction  The study was marked in EPIC for significant notification  Workstation performed: [id number]     Xr Abdomen Obstruction Series    Result Date: 1/19/2020  Narrative: OBSTRUCTION SERIES INDICATION:   abdominal obstruction  COMPARISON:  1/18/2020  CT 1/16/2020 EXAM PERFORMED/VIEWS:  XR ABDOMEN OBSTRUCTION SERIES FINDINGS: Persistent multiple dilated small bowel loops with air-fluid levels with suggestion of increasing caliber  No rectal gas appreciated  Its No free air beneath the hemidiaphragms  No pathologic calcifications or soft tissue masses evident   Osseous structures are unremarkable  Examination of the chest reveals an unremarkable cardiomediastinal silhouette  Loop recorder noted  Marked aortic ectasia suggested  Lungs are clear  Impression: Persistent worsening small bowel obstruction  The study was marked in EPIC for significant notification  Workstation performed: [id number]     Ct Head Wo Contrast    Result Date: 1/15/2020  Narrative: CT BRAIN - WITHOUT CONTRAST INDICATION:   r/o pathology to prevent thrombolysis  COMPARISON:  None  TECHNIQUE:  CT examination of the brain was performed  In addition to axial images, coronal 2D reformatted images were created and submitted for interpretation  Radiation dose length product (DLP) for this visit:  828 29 mGy-cm   This examination, like all CT scans performed in the [facility], was performed utilizing techniques to minimize radiation dose exposure, including the use of iterative  reconstruction and automated exposure control  IMAGE QUALITY:  Diagnostic  FINDINGS: PARENCHYMA:  No intracranial mass, mass effect or midline shift  No CT signs of acute infarction  No acute parenchymal hemorrhage  VENTRICLES AND EXTRA-AXIAL SPACES:  Normal for the patient's age  VISUALIZED ORBITS AND PARANASAL SINUSES:  No acute abnormality involving the orbits  Mild scattered sinus mucosal thickening is noted  No fluid levels are seen  CALVARIUM AND EXTRACRANIAL SOFT TISSUES:  Normal      Impression: No acute intracranial abnormality  Workstation performed: [id number]     Ct Abdomen W Wo Contrast    Addendum Date: 1/8/2020 Addendum:   ADDENDUM: Right-sided adrenal adenoma measuring 3 3 x 2 9 x 3 cm is similar to the prior CT May 15, 2019  Left adrenal 2 x 2 3 x 2 cm adrenal adenoma is also stable from the prior CT May 15, 2019  Result Date: 1/8/2020  Narrative: CT - ABDOMEN WITHOUT AND WITH IV CONTRAST INDICATION:   E27 8: Other specified disorders of adrenal gland   COMPARISON: May 15, 2019 TECHNIQUE: CT examination of the abdomen was performed  Reformatted images were created in axial, sagittal, and coronal planes  Radiation dose length product (DLP) for this visit:  938 mGy-cm   This examination, like all CT scans performed in the [facility], was performed utilizing techniques to minimize radiation dose exposure, including the use of iterative reconstruction and automated exposure control  IV Contrast:  85 mL of iodixanol (VISIPAQUE) Enteric Contrast:  Enteric contrast was administered  FINDINGS: ABDOMEN LOWER CHEST:  No clinically significant abnormality identified in the visualized lower chest  LIVER/BILIARY TREE:  Unremarkable  GALLBLADDER:  No calcified gallstones  No pericholecystic inflammatory change  SPLEEN:  Unremarkable  PANCREAS:  Unremarkable  ADRENAL GLANDS:  Bilateral adrenal adenomas  KIDNEYS/URETERS:  Left upper pole renal 2 mm nonobstructing calculus  Right mid pole 3 mm nonobstructing calculus  VISUALIZED STOMACH AND BOWEL:  Partially visualized dilated small bowel loops (with wall thickening) could relate to obstruction and/or infection such as enteritis, correlate with oral and IV contrast enhanced abdominal /pelvic MRI  Partially visualized colonic diverticulosis  VISUALIZED ABDOMINAL CAVITY:  No pathologically enlarged periportal, mesenteric or upper retroperitoneal lymph nodes  No ascites or free intraperitoneal air  No fluid collection  VISUALIZED BODY WALL:  Unremarkable  VISUALIZED ABDOMINAL VESSELS: Focal saccular infrarenal abdominal aortic borderline aneurysm measuring approximately 3 cm  This was also identified on the prior CT May 15, 2019  OSSEOUS STRUCTURES:  No acute fracture or destructive osseous lesion  Impression: 1  Bilateral adrenal adenomas  2  Bilateral renal nonobstructing calculi  3  Partially visualized dilated small bowel loops and wall thickening as described above could relate to obstruction and/or infection   Workstation performed: [id number] Ct Small Bowel Enterography    Result Date: 1/16/2020  Narrative: CT ABDOMEN AND PELVIS WITH IV CONTRAST- ENTEROGRAPHY - WITH CONTRAST INDICATION:   Inflammatory bowel disease  COMPARISON:  1/13/2020, 1/7/2020  TECHNIQUE:  Contrast-enhanced CT examination of the abdomen and pelvis was performed utilizing thin section technique and after the administration of low density enteric contrast according to protocol designed specifically to obtain sensitive evaluation of the small bowel  Axial, sagittal, and coronal 2D reformatted images were created from the source data and submitted for interpretation  Radiation dose length product (DLP) for this visit:  1415 mGy-cm   This examination, like all CT scans performed in the [facility], was performed utilizing techniques to minimize radiation dose exposure, including the use of iterative reconstruction and automated exposure control  IV Contrast:  100 mL of iohexol (OMNIPAQUE) Enteric Contrast:  1500 cc of VoLumen enteric contrast was administered  FINDINGS: ABDOMEN SMALL BOWEL: There is moderate diffuse dilatation of the small bowel, slightly decreased since the prior study  There is long segment wall thickening and enhancement of the distal ileum, including the terminal ileum, consistent with active inflammatory bowel disease  LARGE BOWEL:  Normal caliber  No focal wall thickening or pericolonic inflammatory change  There is colonic diverticulosis without diverticulitis  STOMACH:  Moderately distended  LOWER CHEST:  Mild bibasilar atelectasis  No effusions  LIVER/BILIARY TREE:  Unremarkable  GALLBLADDER:  Contracted gallbladder, limiting evaluation  SPLEEN:  Unremarkable  PANCREAS:  Unchanged bilateral adrenal masses, which were characterized as adenomas on prior MRI  ADRENAL GLANDS:  Unremarkable  KIDNEYS/URETERS:  No hydronephrosis  5 mm calculus in the lower pole right kidney, and punctate calculus in the upper pole left kidney again seen  ABDOMINOPELVIC CAVITY:  No ascites or free intraperitoneal air  No lymphadenopathy  VESSELS:  Atherosclerotic changes are present  Unchanged ectasia of the mid abdominal aorta measuring 3 0 cm  PELVIS REPRODUCTIVE ORGANS:  Unremarkable for patient's age  URINARY BLADDER:  Unremarkable  ABDOMINAL WALL/INGUINAL REGIONS:  Unremarkable  OSSEOUS STRUCTURES:  No acute fracture or destructive osseous lesion  Impression: Persistent though slightly improved small bowel obstruction secondary to active long segment distal ileitis  Nonobstructing bilateral renal calculi  Unchanged mild infrarenal aortic aneurysm  Bilateral adrenal adenomas again noted  The study was marked in [facility] for immediate notification    Workstation performed: [id number]     Vas Lower Limb Arterial Duplex, Complete Bilateral    Result Date: 1/13/2020  Narrative:  [facility] REPORT CLINICAL: Indications: Patient presents with pain in his right leg while walking  His right foot is cold to the touch and numb  He has a h/o a right popliteal stent  Denies any open wounds or ulcers at this time  Operative History: 2019-06-27 Right Popliteal stent Risk Factors The patient has history of HLD, PAD and smoking (current) 0 5 ppd  Clinical Right Pressure: IV, Left Pressure: 119/ mm Hg    FINDINGS:  Segment                Right                 Left                                          Impression  PSV  EDV  PSV  EDV  Common Femoral Artery               90    0  101    2  Prox Profunda                       73    5   70    2  Prox SFA                            88    6   94    0  Mid SFA                             44    0  101    0  Dist SFA                                     102    0  Dist SFA - Stent                    36    0            Proximal Pop                                  31    0  Proximal Pop - Stent   Occluded                        Distal Pop                          16    4   30    0  Dist Post Tibial                    10    5   67 3  Dist  Ant  Tibial      Occluded               33    0  Dist Peroneal          Occluded               40    0     CONCLUSION:  Impression: RIGHT LOWER LIMB: Occlusion versus high grade stenosis in the popliteal, peroneal and anterior tibial arteries throughout  Ankle/Brachial index: 0 55, which is in the severe claudication range  Prior: 1 21 The anterior tibial pressure was unable to be obtained  PVR/ PPG tracings are dampened  Metatarsal and great toe pressures were not obtained secondary to attenuated PPG tracings  Prior: Same LEFT LOWER LIMB: This resting evaluation shows no evidence of significant lower extremity arterial occlusive disease  Ankle/Brachial index: 0 88, which is in the mild claudication range  Prior: 1 14 PVR/ PPG tracings are normal  Metatarsal pressure of 131 mmHg  Prior: 136 mmHg Great toe pressure of 41 mmHg, within the healing range  Prior: 45 mmHg  Compared to previous study on 10/21/19, there is a progression of disease on the right  Technical findings were given to SAMIRA Fermin in the ED  SIGNATURE: Electronically Signed by: Marcella Flower on 2020-01-13 07:43:41 PM      EKG personally reviewed by Kaeton Law MD      Counseling / Coordination of Care  Total floor / unit time spent today 30 minutes  Greater than 50% of total time was spent with the patient and / or family counseling and / or coordination of care

## 2020-01-21 NOTE — ASSESSMENT & PLAN NOTE
S/p CT a/p revealing small bowel obstruction with a transition point at a long segment of distal ileitis  There is also a separate short segment of upstream ileitis ("skip lesion") with luminal narrowing  Gen surgery, GI on board  Concern for underlying inflammatory disease; s/p colonoscopy, bx pending  Diet advanced with tolerance  Abd distension 2 nights ago now improved  Normoactive bs, + bm, abd xr and obstruction series revealing SBO  Per gen surg known areas of stricture s/p colonoscopy/ct enterography, states no surgical intervention required, pt passing gas and has been having multiple bm    Also tolerating diet w/ no vomiting thus no need for NGT

## 2020-01-22 ENCOUNTER — TRANSITIONAL CARE MANAGEMENT (OUTPATIENT)
Dept: INTERNAL MEDICINE CLINIC | Facility: CLINIC | Age: 70
End: 2020-01-22

## 2020-01-23 LAB
REF LAB TEST METHOD: NORMAL
TEST INTERPRETATION: NORMAL
TPMT RBC-CCNC: 22 UNITS/ML RBC

## 2020-01-23 NOTE — QUICK NOTE
Call received from lab last night of (+) preliminary blood culture collected 1/20/20 resulting gram negative rods 1/22/20  Only one blood culture obtained at that time  Blood cultures collected 1/17/20 x 2 (+) for gram negative rods, klebsiella pneumoniae  Followed by ID through admission, antibiotics tailored to cultures  Discharged on PO Keflex through 1/24/20  At this time appropriately treated, continue antibiotics  Follow up with PCP

## 2020-01-27 ENCOUNTER — APPOINTMENT (OUTPATIENT)
Dept: LAB | Facility: CLINIC | Age: 70
End: 2020-01-27
Payer: MEDICARE

## 2020-01-27 ENCOUNTER — TRANSCRIBE ORDERS (OUTPATIENT)
Dept: LAB | Facility: CLINIC | Age: 70
End: 2020-01-27

## 2020-01-27 ENCOUNTER — TELEPHONE (OUTPATIENT)
Dept: GASTROENTEROLOGY | Facility: MEDICAL CENTER | Age: 70
End: 2020-01-27

## 2020-01-27 DIAGNOSIS — E27.8 ADRENAL MASS (HCC): ICD-10-CM

## 2020-01-27 DIAGNOSIS — N18.30 CHRONIC KIDNEY DISEASE, STAGE III (MODERATE) (HCC): ICD-10-CM

## 2020-01-27 DIAGNOSIS — I73.9 PAD (PERIPHERAL ARTERY DISEASE) (HCC): ICD-10-CM

## 2020-01-27 LAB
ANION GAP SERPL CALCULATED.3IONS-SCNC: 9 MMOL/L (ref 4–13)
BUN SERPL-MCNC: 18 MG/DL (ref 5–25)
CALCIUM SERPL-MCNC: 7.7 MG/DL (ref 8.3–10.1)
CHLORIDE SERPL-SCNC: 106 MMOL/L (ref 100–108)
CO2 SERPL-SCNC: 27 MMOL/L (ref 21–32)
CREAT SERPL-MCNC: 1.03 MG/DL (ref 0.6–1.3)
GFR SERPL CREATININE-BSD FRML MDRD: 74 ML/MIN/1.73SQ M
GLUCOSE P FAST SERPL-MCNC: 103 MG/DL (ref 65–99)
POTASSIUM SERPL-SCNC: 4.5 MMOL/L (ref 3.5–5.3)
SODIUM SERPL-SCNC: 142 MMOL/L (ref 136–145)

## 2020-01-27 PROCEDURE — 80048 BASIC METABOLIC PNL TOTAL CA: CPT

## 2020-01-27 PROCEDURE — 36415 COLL VENOUS BLD VENIPUNCTURE: CPT

## 2020-01-27 NOTE — TELEPHONE ENCOUNTER
The patient is scheduled on 2/3/20 with Dr Jess Snow in the [facility]  I cancelled the follow up with Di HOGAN for 1/28/20

## 2020-01-27 NOTE — TELEPHONE ENCOUNTER
----- Message from Becky Caputo PA-C sent at 1/27/2020  8:16 AM EST -----  Regarding: hospital follow up  Patient seen by Dr Marley Corrales in the office recently, recently hospitalized with complicated course-likely new crohns disease and small bowel obstruction with sepsis  Dr Marley Corrales recommended he establish with Dr Park Fearing  He was discharged last Thursday  He was accidentally scheduled for follow up with me at the [place] office which did not make sense for his care so this was canceled  He wishes to establish at the [facility] office as he is going to see Dr Xavier Villalta  Please have patient scheduled with Dr Xavier Villalta (or PA if necessary) in the next 2 weeks  Thank you!

## 2020-01-28 LAB
BACTERIA BLD CULT: ABNORMAL
GRAM STN SPEC: ABNORMAL

## 2020-01-30 ENCOUNTER — OFFICE VISIT (OUTPATIENT)
Dept: INTERNAL MEDICINE CLINIC | Facility: CLINIC | Age: 70
End: 2020-01-30
Payer: MEDICARE

## 2020-01-30 VITALS
RESPIRATION RATE: 18 BRPM | OXYGEN SATURATION: 97 % | WEIGHT: 180.4 LBS | SYSTOLIC BLOOD PRESSURE: 130 MMHG | TEMPERATURE: 97.6 F | BODY MASS INDEX: 25.83 KG/M2 | HEIGHT: 70 IN | HEART RATE: 88 BPM | DIASTOLIC BLOOD PRESSURE: 84 MMHG

## 2020-01-30 DIAGNOSIS — I48.0 PAROXYSMAL ATRIAL FIBRILLATION (HCC): ICD-10-CM

## 2020-01-30 DIAGNOSIS — K21.00 GERD WITH ESOPHAGITIS: ICD-10-CM

## 2020-01-30 DIAGNOSIS — N18.30 ANEMIA OF CHRONIC RENAL FAILURE, STAGE 3 (MODERATE) (HCC): Primary | ICD-10-CM

## 2020-01-30 DIAGNOSIS — R65.10 SIRS (SYSTEMIC INFLAMMATORY RESPONSE SYNDROME) (HCC): ICD-10-CM

## 2020-01-30 DIAGNOSIS — D63.1 ANEMIA OF CHRONIC RENAL FAILURE, STAGE 3 (MODERATE) (HCC): Primary | ICD-10-CM

## 2020-01-30 DIAGNOSIS — R19.7 DIARRHEA, UNSPECIFIED TYPE: ICD-10-CM

## 2020-01-30 DIAGNOSIS — I73.9 PAD (PERIPHERAL ARTERY DISEASE) (HCC): ICD-10-CM

## 2020-01-30 PROBLEM — K56.609 SBO (SMALL BOWEL OBSTRUCTION) (HCC): Status: RESOLVED | Noted: 2020-01-13 | Resolved: 2020-01-30

## 2020-01-30 PROBLEM — D58.2 ELEVATED HEMOGLOBIN (HCC): Status: RESOLVED | Noted: 2019-11-05 | Resolved: 2020-01-30

## 2020-01-30 PROCEDURE — 99495 TRANSJ CARE MGMT MOD F2F 14D: CPT | Performed by: INTERNAL MEDICINE

## 2020-01-30 NOTE — ASSESSMENT & PLAN NOTE
Normal BM  Reviewed pathology which was normal   Complete tapering dose of prednisone    Keep appointment with Colorectal

## 2020-01-30 NOTE — PROGRESS NOTES
Assessment/Plan:    SIRS (systemic inflammatory response syndrome) ([facility])  (+) Klebsiella on blood culture  Completed cephalexin  No fevers  SBO (small bowel obstruction) ([facility])  Resolved  Normal BM, good appetite  Diarrhea  Normal BM  Reviewed pathology which was normal   Complete tapering dose of prednisone  Keep appointment with Colorectal     PAD (peripheral artery disease) ([facility] [address] )  On Xarelto, statin  Reviewed recent ultrasound which showed occlusion RLE  Follow up with vascular  Paroxysmal atrial fibrillation ([facility])  No symptoms, metoprolol increased to 50 mg daily  Anemia of chronic renal failure, stage 3 (moderate) ([facility])  Repeat CBC next month  Renal function stable  Follow up with nephrology tomorrow  GERD with esophagitis  S/p EGD, showed mild chronic inactive gastritis  Continue daily PPI  Diagnoses and all orders for this visit:    Anemia of chronic renal failure, stage 3 (moderate) ([facility])  -     CBC and differential; Future  -     Comprehensive metabolic panel; Future    SIRS (systemic inflammatory response syndrome) ([facility])    Diarrhea, unspecified type    PAD (peripheral artery disease) ([facility])    Paroxysmal atrial fibrillation ([facility])    GERD with esophagitis      Follow up as scheduled or as needed  Subjective:      Patient ID: Ron Urias is a 77 y o  male  Mr Nikunj Rodrigez is here with his wife  He reports feeling well since his hospital discharge  He reports his appetite has returned  He is moving his bowels regularly, one formed bowel movement daily  He is still taking prednisone  He complains of her bilateral leg weakness, right is worse  He has completed the antibiotics a few days after his hospital discharge  He has no issues with urination, drinking a lot of fluids daily  He denies any chest pain, shortness of breath or palpitations  He was apparently in rapid AFib a few days ago, received a call about it    He had no symptoms the night before, when it happened  The following portions of the patient's history were reviewed and updated as appropriate: allergies, current medications, past medical history, past social history, past surgical history and problem list     Review of Systems   Constitutional: Positive for fatigue  Negative for activity change and appetite change  Respiratory: Negative for choking and shortness of breath  Cardiovascular: Negative for chest pain and leg swelling  Gastrointestinal: Negative for abdominal distention, abdominal pain, blood in stool, constipation, diarrhea and nausea  Genitourinary: Negative for difficulty urinating and dysuria  Musculoskeletal: Positive for gait problem  Neurological: Negative for dizziness, weakness, numbness and headaches  Objective:      /84   Pulse 88   Temp 97 6 °F (36 4 °C) (Oral)   Resp 18   Ht 5' 10" (1 778 m)   Wt 81 8 kg (180 lb 6 4 oz)   SpO2 97%   BMI 25 88 kg/m²          Physical Exam   Constitutional: He is oriented to person, place, and time  He appears well-developed and well-nourished  HENT:   Head: Normocephalic and atraumatic  Mouth/Throat: Mucous membranes are normal    Eyes: Pupils are equal, round, and reactive to light  Conjunctivae are normal    Cardiovascular: Normal rate and normal heart sounds  An irregularly irregular rhythm present  Pulmonary/Chest: Effort normal  He has no wheezes  He has no rhonchi  Abdominal: Soft  Bowel sounds are normal    Musculoskeletal: He exhibits no edema  Neurological: He is alert and oriented to person, place, and time  Skin: Skin is warm  No rash noted  Psychiatric: He has a normal mood and affect  His behavior is normal    Nursing note and vitals reviewed          TCM Call (since 12/30/2019)     Date and time call was made  1/22/2020  9:41 AM    Hospital care reviewed  Records reviewed    Patient was hospitialized at  [facility]    Date of Admission  01/13/20    Date of discharge  01/21/20 Diagnosis  Sepsis ([facility] [address] )    Disposition  Home    Were the patients medications reviewed and updated  Yes    Current Symptoms  None      TCM Call (since 12/30/2019)     Post hospital issues  None    Should patient be enrolled in anticoag monitoring? No    Scheduled for follow up? Yes    Did you obtain your prescribed medications  Yes    Do you need help managing your prescriptions or medications  No    Is transportation to your appointment needed  No    I have advised the patient to call PCP with any new or worsening symptoms  [address] results reviewed with patient

## 2020-01-31 ENCOUNTER — OFFICE VISIT (OUTPATIENT)
Dept: NEPHROLOGY | Facility: CLINIC | Age: 70
End: 2020-01-31
Payer: MEDICARE

## 2020-01-31 VITALS
DIASTOLIC BLOOD PRESSURE: 70 MMHG | HEIGHT: 70 IN | WEIGHT: 181.6 LBS | HEART RATE: 80 BPM | BODY MASS INDEX: 26 KG/M2 | SYSTOLIC BLOOD PRESSURE: 112 MMHG

## 2020-01-31 DIAGNOSIS — R10.9 ABDOMINAL CRAMPING: ICD-10-CM

## 2020-01-31 DIAGNOSIS — I95.89 CHRONIC HYPOTENSION: ICD-10-CM

## 2020-01-31 DIAGNOSIS — N18.30 CHRONIC KIDNEY DISEASE, STAGE III (MODERATE) (HCC): ICD-10-CM

## 2020-01-31 DIAGNOSIS — R10.30 LOWER ABDOMINAL PAIN: ICD-10-CM

## 2020-01-31 DIAGNOSIS — D63.1 ANEMIA OF CHRONIC RENAL FAILURE, STAGE 3 (MODERATE) (HCC): Primary | ICD-10-CM

## 2020-01-31 DIAGNOSIS — N18.30 ANEMIA OF CHRONIC RENAL FAILURE, STAGE 3 (MODERATE) (HCC): Primary | ICD-10-CM

## 2020-01-31 DIAGNOSIS — Z87.19 HISTORY OF SMALL BOWEL OBSTRUCTION: ICD-10-CM

## 2020-01-31 DIAGNOSIS — E87.1 HYPONATREMIA: ICD-10-CM

## 2020-01-31 PROCEDURE — 99214 OFFICE O/P EST MOD 30 MIN: CPT | Performed by: NURSE PRACTITIONER

## 2020-01-31 NOTE — PROGRESS NOTES
NEPHROLOGY OFFICE VISIT   David Medrano 77 y o  male MRN: [id number]  1/31/2020    Reason for Visit:  Hospital follow-up    INTERVAL HISTORY and SUBJECTIVE:    I had the pleasure of seeing Tran Tatum today in the renal clinic for hospital follow-up  He was recently hospitalized from 01/13/2020 to 1/21/20 for management of small-bowel obstruction and was treated for Klebsiella bacteremia  He completed a course of cephalexin  He originally presented with right leg pain and was seen by vascular surgery  Nephrology was consulted for optimization for the vascular procedure  During imaging/workup small bowel lesion suspicious for enteritis was noted  He had an EGD and colonoscopy in 1/14 and small bowel under a gravity on 01/16  He was started on prednisone due to presumed inflammatory bowel disease  He was also found to have a 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   Results were indeterminate  He also had an episode of atrial fibrillation with RVR during his hospital stay  He was followed by Cardiology and remains on a beta-blocker and Xarelto  He also has a history of GERD, anemia of chronic disease, PAF, PAD  Since hospital discharge he has been making progress  He no longer has diarrhea  He gained 5 lb  No shortness of breath or chest pain  He has atrial fibrillation of unknown duration- he is under surveillance with a loop recorder  He is phoned occasionally due to increases in heart rate which she is unaware of  He has some lower extremity edema since hospital discharge  The last blood work was done on 01/27/2020, which we have reviewed together  ASSESSMENT AND PLAN:  1  Chronic kidney disease, stage III:  Recent acute kidney injury during hospitalization for Klebsiella bacteremia/diarrhea/leg pain/SBO  · Nephrologist:  Dr Skip Ayala   · Current creatinine 1 03 mg/dL  · Baseline creatinine 1-1 5 mg/dL  · Patient presented to the hospital with creatinine of 4 mg/dL on 01/13/2020    · Etiology:  Likely pre renal/hypovolemia/poor oral intake/diarrhea/bacteremia leading to ATN  · Urinalysis 4-10 RBCs, 1+ protein, hyaline casts  · Renal function stable; acute kidney injury resolved  · Follow-up with Dr Kristen Diaz in approximately 1 month  Studies before next appointment  2  Blood pressure/volume status:    · History of hypertension  · Blood pressure has been running low, on midodrine 2 5 mg 3 times a day   · Blood pressure is stable at this time  His wife reports that generally blood pressure is between 810-648 systolic  · On metoprolol which he needs for control of atrial fibrillation  3  Klebsiella bacteremia:  Source:  Translocation from bowel  Treated with cephalexin, switch to Keflex  Completed course of antibiotics  4  PAD:  Vascular follow-up planned 2/10/20  5  Small-bowel obstruction:  On tapering dose of prednisone  Doing well, normal diet  6  Diarrhea:  Resolved  Concern for Underlying Crohn's disease- awaiting biopsy report  Follow up with GI Monday  7  Acid-base:  Bicarbonate acceptable, 27  8  Electrolytes:  Last potassium level acceptable, 4 5  Sodium level normal  9  Hypocalcemia:  Treated during hospitalization since ionized calcium was also low  Follow-up calcium level 7 7-  Likely corrects to normal due to hypoalbuminemia  10  Anemia:  Related to chronic disease/recent hospitalization  Last hemoglobin 10 2  Continue to monitor  11  Borderline low platelet count:  Continue to monitor  12  Atrial fibrillation with RVR:  Onset unknown  Loop recorder in place  Followed by Cardiology  PATIENT INSTRUCTIONS:    There are no Patient Instructions on file for this visit  No orders of the defined types were placed in this encounter  OBJECTIVE:  Current Weight:    There were no vitals filed for this visit  There is no height or weight on file to calculate BMI        REVIEW OF SYSTEMS:    Review of Systems   Constitutional: Positive for fatigue (More fatigue since recent hospitalization)  Negative for fever and unexpected weight change ( gained 5 lb since hospital discharge due to increased intake)  HENT: Negative  Eyes: Negative  Respiratory: Negative  Negative for shortness of breath  Cardiovascular: Positive for leg swelling  Negative for chest pain and palpitations ( patient denies palpitations  Has a loop recorder in place with occasional  episodes of RVR)  Gastrointestinal: Positive for abdominal distention  Negative for diarrhea  Genitourinary: Negative  Musculoskeletal: Positive for arthralgias  Claudication with activity   Skin: Negative  Ecchymotic areas on the upper extremities noted   Neurological: Negative  Negative for dizziness, syncope and headaches  Psychiatric/Behavioral: Negative  PHYSICAL EXAM:      Physical Exam   Constitutional: He is oriented to person, place, and time  No distress  HENT:   Head: Normocephalic and atraumatic  Mouth/Throat: Oropharynx is clear and moist    Eyes: Conjunctivae and EOM are normal  No scleral icterus  Neck: Normal range of motion  Neck supple  No JVD present  Carotid bruit is not present  No tracheal deviation present  No thyromegaly present  Cardiovascular: Normal rate and intact distal pulses  An irregularly irregular rhythm present  Exam reveals no gallop and no friction rub  No murmur heard  Pulmonary/Chest: Effort normal and breath sounds normal  No stridor  No respiratory distress  He has no wheezes  He has no rales  Abdominal: Soft  Bowel sounds are normal  He exhibits distension  He exhibits no mass  There is no tenderness  There is no rebound and no guarding  Musculoskeletal: Normal range of motion  He exhibits edema  He exhibits no tenderness (+1 pretibial edema) or deformity  Neurological: He is alert and oriented to person, place, and time  Skin: Skin is warm and dry  No rash noted  He is not diaphoretic  No erythema  No pallor     Psychiatric: He has a normal mood and affect   His behavior is normal  Judgment and thought content normal        Medications:    Current Outpatient Medications:     aspirin 81 mg chewable tablet, Chew 1 tablet (81 mg total) daily, Disp: 30 tablet, Rfl: 0    atorvastatin (LIPITOR) 20 mg tablet, Take 1 tablet (20 mg total) by mouth daily, Disp: 90 tablet, Rfl: 1    Cholecalciferol (VITAMIN D) 2000 units CAPS, Take 2,000 Units by mouth daily, Disp: , Rfl:     dicyclomine (BENTYL) 10 mg capsule, Take 1 capsule (10 mg total) by mouth 4 (four) times a day (before meals and at bedtime), Disp: 30 capsule, Rfl: 0    Magnesium 500 MG TABS, Take 1 tablet by mouth 2 (two) times a day, Disp: , Rfl:     metoprolol succinate (TOPROL-XL) 50 mg 24 hr tablet, Take 1 tablet (50 mg total) by mouth daily, Disp: 30 tablet, Rfl: 0    midodrine (PROAMATINE) 2 5 mg tablet, Take 1 tablet (2 5 mg total) by mouth 3 (three) times a day before meals, Disp: 90 tablet, Rfl: 0    omeprazole (PriLOSEC) 40 MG capsule, TAKE 1 CAPSULE BY MOUTH EVERY DAY ONE-HALF HOUR BEFORE BREAKFAST, Disp: , Rfl: 5    predniSONE 5 mg tablet, Take 1 tablet (5 mg total) by mouth daily 40 mg (8 tabs) for 7 days followed by 35 mg (7 tabs) for 7 days, 30 mg (6 tabs) for 7, 25 mg (5 tabs) for 7 days followed by 20 mg to be continued till seen by GI, Disp: 200 tablet, Rfl: 0    rivaroxaban (XARELTO) 20 mg tablet, Take 1 tablet (20 mg total) by mouth daily with breakfast, Disp: 90 tablet, Rfl: 3    Laboratory Results:  Results from last 7 days   Lab Units 01/27/20  0751   POTASSIUM mmol/L 4 5   CHLORIDE mmol/L 106   CO2 mmol/L 27   BUN mg/dL 18   CREATININE mg/dL 1 03   CALCIUM mg/dL 7 7*       Results for orders placed or performed in visit on 00/32/13   Basic metabolic panel   Result Value Ref Range    Sodium 142 136 - 145 mmol/L    Potassium 4 5 3 5 - 5 3 mmol/L    Chloride 106 100 - 108 mmol/L    CO2 27 21 - 32 mmol/L    ANION GAP 9 4 - 13 mmol/L    BUN 18 5 - 25 mg/dL    Creatinine 1  03 0 60 - 1 30 mg/dL    Glucose, Fasting 103 (H) 65 - 99 mg/dL    Calcium 7 7 (L) 8 3 - 10 1 mg/dL    eGFR 74 ml/min/1 73sq m

## 2020-01-31 NOTE — PATIENT INSTRUCTIONS
1  No change in medication at this time  2   Follow up in approximately 1 month  3  Blood work and urine studies before appointment with Dr Triston Kelly

## 2020-02-03 ENCOUNTER — OFFICE VISIT (OUTPATIENT)
Dept: GASTROENTEROLOGY | Facility: MEDICAL CENTER | Age: 70
End: 2020-02-03

## 2020-02-03 ENCOUNTER — TELEPHONE (OUTPATIENT)
Dept: INTERNAL MEDICINE CLINIC | Facility: CLINIC | Age: 70
End: 2020-02-03

## 2020-02-03 VITALS
BODY MASS INDEX: 25.94 KG/M2 | TEMPERATURE: 97.9 F | SYSTOLIC BLOOD PRESSURE: 116 MMHG | HEIGHT: 70 IN | WEIGHT: 181.2 LBS | HEART RATE: 55 BPM | DIASTOLIC BLOOD PRESSURE: 78 MMHG

## 2020-02-03 DIAGNOSIS — K52.9 INFLAMMATORY BOWEL DISEASE: Primary | ICD-10-CM

## 2020-02-03 PROCEDURE — 99214 OFFICE O/P EST MOD 30 MIN: CPT | Performed by: INTERNAL MEDICINE

## 2020-02-03 PROCEDURE — 3008F BODY MASS INDEX DOCD: CPT | Performed by: INTERNAL MEDICINE

## 2020-02-03 PROCEDURE — 4004F PT TOBACCO SCREEN RCVD TLK: CPT | Performed by: INTERNAL MEDICINE

## 2020-02-03 PROCEDURE — 4040F PNEUMOC VAC/ADMIN/RCVD: CPT | Performed by: INTERNAL MEDICINE

## 2020-02-03 PROCEDURE — 1160F RVW MEDS BY RX/DR IN RCRD: CPT | Performed by: INTERNAL MEDICINE

## 2020-02-03 PROCEDURE — 1111F DSCHRG MED/CURRENT MED MERGE: CPT | Performed by: INTERNAL MEDICINE

## 2020-02-03 RX ORDER — SULFASALAZINE 500 MG/1
500 TABLET, DELAYED RELEASE ORAL 2 TIMES DAILY
Qty: 60 TABLET | Refills: 3 | Status: SHIPPED | OUTPATIENT
Start: 2020-02-03 | End: 2020-05-20

## 2020-02-03 RX ORDER — FOLIC ACID 1 MG/1
1 TABLET ORAL DAILY
Qty: 30 TABLET | Refills: 3 | Status: SHIPPED | OUTPATIENT
Start: 2020-02-03 | End: 2020-04-27

## 2020-02-03 RX ORDER — PREDNISONE 1 MG/1
5 TABLET ORAL DAILY
Qty: 360 TABLET | Refills: 1 | Status: SHIPPED | OUTPATIENT
Start: 2020-02-03 | End: 2020-03-23

## 2020-02-03 NOTE — PROGRESS NOTES
[facility] - Outpatient Follow-up Note  Nomi Dukes 77 y o  male MRN: [id number]  Encounter: [id number]          ASSESSMENT AND PLAN:      1  Inflammatory bowel disease  76 yo male patient with recent admission to the hospital and found to have SBO on imaging although he was having diarrhea   Patient stated diarrhea was ongoing for a year  Work up for infection was performed and was negative, inflammatory markers came back elevated and colonoscopy and CTE showed inflammation in the TI  Biopsies showed acute inflammation in the TI  His symptoms have improved with the steroids  Will continue steroids until tapering off  Based on his symptoms, radiologic, colonoscopy, laboratory and pathology findings most likely patient has mild Crohn's disease  Will start sulfasalazine  F/u in 6 months         ______________________________________________________________________    SUBJECTIVE:  Patient seen and examined, denies any recent events since being discharged, currently is tolerating PO route with better appetite, denies nausea or vomiting, is passing flatus and having daily bowel movements of normal consistency, denies abdominal pain, no recent weight loss      REVIEW OF SYSTEMS IS OTHERWISE NEGATIVE        Historical Information   Past Medical History:   Diagnosis Date    Blue toe syndrome (HCC)     Chronic kidney disease     Colon polyps     GERD (gastroesophageal reflux disease)     Hematuria     History of rheumatic fever     Hyperlipidemia     Hypotension     Ischemic cardiomyopathy     PAD (peripheral artery disease) (HCC)     Pulmonary emphysema (HCC)      Past Surgical History:   Procedure Laterality Date    COLONOSCOPY  2019    HERNIA REPAIR      IR ABDOMINAL ANGIOGRAPHY / INTERVENTION  6/27/2019    POPLITEAL ARTERY STENT Right     6/2019    CA SLCTV CATHJ 3RD+ ORD SLCTV ABDL PEL/LXTR [facility] Right 6/27/2019    Procedure: leg ANGIOGRAM WITH STENT, BALLOON ANGIOPLASTY, LEFT GROIN ACCESS;  Surgeon: Nadir Steel MD;  Location: [facility];  Service: Vascular     Social History   Social History     Substance and Sexual Activity   Alcohol Use Yes    Frequency: Monthly or less    Drinks per session: 1 or 2    Binge frequency: Less than monthly    Comment: occasional     Social History     Substance and Sexual Activity   Drug Use No     Social History     Tobacco Use   Smoking Status Current Some Day Smoker    Packs/day: 0 25    Years: 30 00    Pack years: 7 50    Types: Cigarettes   Smokeless Tobacco Never Used   Tobacco Comment    " I will do it on my own"     Family History   Problem Relation Age of Onset    Heart disease Mother     Hyperlipidemia Mother     Hypertension Father     Heart disease Father     Stroke Father     Hyperlipidemia Father     Diabetes Brother     No Known Problems Maternal Grandmother     Dementia Neg Hx     Drug abuse Neg Hx     Mental illness Neg Hx     Substance Abuse Neg Hx     Alcohol abuse Neg Hx     Depression Neg Hx        Meds/Allergies       Current Outpatient Medications:     atorvastatin (LIPITOR) 20 mg tablet    Cholecalciferol (VITAMIN D) 2000 units CAPS    Magnesium 500 MG TABS    metoprolol succinate (TOPROL-XL) 50 mg 24 hr tablet    midodrine (PROAMATINE) 2 5 mg tablet    omeprazole (PriLOSEC) 40 MG capsule    predniSONE 5 mg tablet    rivaroxaban (XARELTO) 20 mg tablet    aspirin 81 mg chewable tablet    dicyclomine (BENTYL) 10 mg capsule    folic acid (FOLVITE) 1 mg tablet    sulfaSALAzine (AZULFIDINE-EN) 500 mg EC tablet    No Known Allergies        Objective     Blood pressure 116/78, pulse 55, temperature 97 9 °F (36 6 °C), height 5' 10" (1 778 m), weight 82 2 kg (181 lb 3 2 oz)  Body mass index is 26 kg/m²        PHYSICAL EXAM:      General Appearance:   Alert, cooperative, no distress   HEENT:   Normocephalic, atraumatic, anicteric      Neck:  Supple, symmetrical, trachea midline   Lungs:   Clear to auscultation bilaterally; no rales, rhonchi or wheezing; respirations unlabored    Heart[de-identified]   Regular rate and rhythm; no murmur, rub, or gallop  Abdomen:   Soft, non-tender, non-distended; normal bowel sounds; no masses, no organomegaly    Genitalia:   Deferred    Rectal:   Deferred    Extremities:  No cyanosis, clubbing or edema    Pulses:  2+ and symmetric    Skin:  No jaundice, rashes, or lesions    Lymph nodes:  No palpable cervical lymphadenopathy        Lab Results:   No visits with results within 1 Day(s) from this visit  Latest known visit with results is:   Appointment on 01/27/2020   Component Date Value    Sodium 01/27/2020 142     Potassium 01/27/2020 4 5     Chloride 01/27/2020 106     CO2 01/27/2020 27     ANION GAP 01/27/2020 9     BUN 01/27/2020 18     Creatinine 01/27/2020 1 03     Glucose, Fasting 01/27/2020 103*    Calcium 01/27/2020 7 7*    eGFR 01/27/2020 74          Radiology Results:   Ct Abdomen Pelvis Wo Contrast    Result Date: 1/13/2020  Narrative: CT ABDOMEN AND PELVIS WITHOUT IV CONTRAST INDICATION:   Abdominal pain, acute, nonlocalized  COMPARISON:  CT abdomen 1/7/2020 and CT abdomen/pelvis 5/15/2019 TECHNIQUE:  CT examination of the abdomen and pelvis was performed without intravenous contrast   Axial, sagittal, and coronal 2D reformatted images were created from the source data and submitted for interpretation  Radiation dose length product (DLP) for this visit:  559 99 mGy-cm   This examination, like all CT scans performed in the [facility], was performed utilizing techniques to minimize radiation dose exposure, including the use of iterative  reconstruction and automated exposure control  Enteric contrast was not administered  FINDINGS: ABDOMEN LOWER CHEST:  No clinically significant abnormality identified in the visualized lower chest  LIVER/BILIARY TREE:  Unremarkable  GALLBLADDER:  No calcified gallstones  No pericholecystic inflammatory change  SPLEEN:  Unremarkable  PANCREAS:  Unremarkable  ADRENAL GLANDS:  Stable bilateral adenomas  KIDNEYS/URETERS:  Punctate nonobstructing bilateral renal calculi  No hydronephrosis  STOMACH AND BOWEL:  The proximal small bowel is dilated with fluid and gas and there is a gradual transition point in the pelvis at a long segment of distal ileum that demonstrates mural thickening, mucosal hyperenhancement, prominence of the associated mesenteric vasculature, and relative luminal narrowing  At the distal end of the inflamed segment, the terminal ileum is abruptly decompressed (2/73) and fibrostenosing disease cannot be excluded  There is a separate, upstream, short segment of distal ileum that is also inflamed and demonstrates luminal narrowing (2/41)  Near the ileocecal valve, there is intramural fat ileal wall  The colon is completely decompressed and there is redemonstration of colonic diverticulosis without evidence of diverticulitis  No pneumatosis intestinalis or portal venous gas  No abscess or evidence of a fistula  APPENDIX:  Normal  ABDOMINOPELVIC CAVITY:  No ascites or free intraperitoneal air  No lymphadenopathy  VESSELS:  Atherosclerotic changes are present and there is a stable 3 2 x 2 6 cm infrarenal abdominal aortic aneurysm  PELVIS REPRODUCTIVE ORGANS:  Unremarkable for patient's age  URINARY BLADDER:  Unremarkable  ABDOMINAL WALL/INGUINAL REGIONS:  Unremarkable  OSSEOUS STRUCTURES:  No acute fracture or destructive osseous lesion  Impression: 1  Findings consistent with a small bowel obstruction with a transition point at a long segment of distal ileitis  There is also a separate short segment of upstream ileitis ("skip lesion") with luminal narrowing  There is intramural fat at the terminal ileum suggesting chronic/old inflammation and further abrupt decompression of the terminal ileum, raising the possibility of fibrostenosing disease/fibrotic stricture    Overall, findings are concerning for Crohn's disease  No evidence of penetrating disease  2   Bilateral nonobstructing nephrolithiasis  3   Stable 3 2 x 2 6 cm infrarenal abdominal aortic aneurysm  I personally discussed this study with Filemon Swanson on 1/13/2020 at 1:55 PM  Workstation performed: [id number]     Xr Chest Portable    Result Date: 1/18/2020  Narrative: CHEST INDICATION:   fever  COMPARISON:  Two-view chest 1/13/2020 EXAM PERFORMED/VIEWS:  XR CHEST PORTABLE FINDINGS: Cardiomediastinal silhouette appears unremarkable  Left chest wall loop recorder in place  Unfolded aorta  Evaluation is somewhat limited by low lung volumes and crowding of bronchovascular markings  No definitive airspace consolidation or pulmonary edema  No pneumothorax or definitive pleural effusion  Bilateral acromioclavicular degenerative disease  Multilevel thoracolumbar spondylosis  Impression: No radiographic evidence of acute intrathoracic process on this examination which is somewhat limited by low lung volumes  Workstation performed: [id number]     Xr Chest 2 Views    Result Date: 1/13/2020  Narrative: CHEST INDICATION:   weakness  COMPARISON:  CT chest 5/16/2019 EXAM PERFORMED/VIEWS:  XR CHEST PA & LATERAL FINDINGS: Cardiomediastinal silhouette appears unremarkable  The lungs are clear  No pneumothorax or pleural effusion  Osseous structures appear within normal limits for patient age  Impression: No acute cardiopulmonary disease  Workstation performed: [id number]     Xr Abdomen 1 View Kub    Result Date: 1/19/2020  Narrative: ABDOMEN INDICATION:   round distended  COMPARISON:  CT 1/16/2020 VIEWS:  AP supine FINDINGS: Persistent multiple markedly gas distended small bowel loops  Apparent proximal descending colon normal caliber  No discernible free air on this supine study  Upright or left lateral decubitus imaging is more sensitive to detect subtle free air in the appropriate setting  No pathologic calcifications or soft tissue masses  Visualized lung bases are clear  Visualized osseous structures are unremarkable for the patient's age  Impression: Persistent CT reported small bowel obstruction  The study was marked in EPIC for significant notification  Workstation performed: [id number]     Xr Abdomen Obstruction Series    Result Date: 1/19/2020  Narrative: OBSTRUCTION SERIES INDICATION:   abdominal obstruction  COMPARISON:  1/18/2020  CT 1/16/2020 EXAM PERFORMED/VIEWS:  XR ABDOMEN OBSTRUCTION SERIES FINDINGS: Persistent multiple dilated small bowel loops with air-fluid levels with suggestion of increasing caliber  No rectal gas appreciated  Its No free air beneath the hemidiaphragms  No pathologic calcifications or soft tissue masses evident  Osseous structures are unremarkable  Examination of the chest reveals an unremarkable cardiomediastinal silhouette  Loop recorder noted  Marked aortic ectasia suggested  Lungs are clear  Impression: Persistent worsening small bowel obstruction  The study was marked in EPIC for significant notification  Workstation performed: [id number]     Ct Head Wo Contrast    Result Date: 1/15/2020  Narrative: CT BRAIN - WITHOUT CONTRAST INDICATION:   r/o pathology to prevent thrombolysis  COMPARISON:  None  TECHNIQUE:  CT examination of the brain was performed  In addition to axial images, coronal 2D reformatted images were created and submitted for interpretation  Radiation dose length product (DLP) for this visit:  828 29 mGy-cm   This examination, like all CT scans performed in the [facility], was performed utilizing techniques to minimize radiation dose exposure, including the use of iterative  reconstruction and automated exposure control  IMAGE QUALITY:  Diagnostic  FINDINGS: PARENCHYMA:  No intracranial mass, mass effect or midline shift  No CT signs of acute infarction  No acute parenchymal hemorrhage  VENTRICLES AND EXTRA-AXIAL SPACES:  Normal for the patient's age   VISUALIZED ORBITS AND PARANASAL SINUSES:  No acute abnormality involving the orbits  Mild scattered sinus mucosal thickening is noted  No fluid levels are seen  CALVARIUM AND EXTRACRANIAL SOFT TISSUES:  Normal      Impression: No acute intracranial abnormality  Workstation performed: [id number]     Ct Abdomen W Wo Contrast    Addendum Date: 1/8/2020 Addendum:   ADDENDUM: Right-sided adrenal adenoma measuring 3 3 x 2 9 x 3 cm is similar to the prior CT May 15, 2019  Left adrenal 2 x 2 3 x 2 cm adrenal adenoma is also stable from the prior CT May 15, 2019  Result Date: 1/8/2020  Narrative: CT - ABDOMEN WITHOUT AND WITH IV CONTRAST INDICATION:   E27 8: Other specified disorders of adrenal gland  COMPARISON: May 15, 2019 TECHNIQUE: CT examination of the abdomen was performed  Reformatted images were created in axial, sagittal, and coronal planes  Radiation dose length product (DLP) for this visit:  938 mGy-cm   This examination, like all CT scans performed in the [facility], was performed utilizing techniques to minimize radiation dose exposure, including the use of iterative reconstruction and automated exposure control  IV Contrast:  85 mL of iodixanol (VISIPAQUE) Enteric Contrast:  Enteric contrast was administered  FINDINGS: ABDOMEN LOWER CHEST:  No clinically significant abnormality identified in the visualized lower chest  LIVER/BILIARY TREE:  Unremarkable  GALLBLADDER:  No calcified gallstones  No pericholecystic inflammatory change  SPLEEN:  Unremarkable  PANCREAS:  Unremarkable  ADRENAL GLANDS:  Bilateral adrenal adenomas  KIDNEYS/URETERS:  Left upper pole renal 2 mm nonobstructing calculus  Right mid pole 3 mm nonobstructing calculus  VISUALIZED STOMACH AND BOWEL:  Partially visualized dilated small bowel loops (with wall thickening) could relate to obstruction and/or infection such as enteritis, correlate with oral and IV contrast enhanced abdominal /pelvic MRI   Partially visualized colonic diverticulosis  VISUALIZED ABDOMINAL CAVITY:  No pathologically enlarged periportal, mesenteric or upper retroperitoneal lymph nodes  No ascites or free intraperitoneal air  No fluid collection  VISUALIZED BODY WALL:  Unremarkable  VISUALIZED ABDOMINAL VESSELS: Focal saccular infrarenal abdominal aortic borderline aneurysm measuring approximately 3 cm  This was also identified on the prior CT May 15, 2019  OSSEOUS STRUCTURES:  No acute fracture or destructive osseous lesion  Impression: 1  Bilateral adrenal adenomas  2  Bilateral renal nonobstructing calculi  3  Partially visualized dilated small bowel loops and wall thickening as described above could relate to obstruction and/or infection  Workstation performed: [id number]     Ct Small Bowel Enterography    Result Date: 1/16/2020  Narrative: CT ABDOMEN AND PELVIS WITH IV CONTRAST- ENTEROGRAPHY - WITH CONTRAST INDICATION:   Inflammatory bowel disease  COMPARISON:  1/13/2020, 1/7/2020  TECHNIQUE:  Contrast-enhanced CT examination of the abdomen and pelvis was performed utilizing thin section technique and after the administration of low density enteric contrast according to protocol designed specifically to obtain sensitive evaluation of the small bowel  Axial, sagittal, and coronal 2D reformatted images were created from the source data and submitted for interpretation  Radiation dose length product (DLP) for this visit:  1415 mGy-cm   This examination, like all CT scans performed in the [facility], was performed utilizing techniques to minimize radiation dose exposure, including the use of iterative reconstruction and automated exposure control  IV Contrast:  100 mL of iohexol (OMNIPAQUE) Enteric Contrast:  1500 cc of VoLumen enteric contrast was administered  FINDINGS: ABDOMEN SMALL BOWEL: There is moderate diffuse dilatation of the small bowel, slightly decreased since the prior study    There is long segment wall thickening and enhancement of the distal ileum, including the terminal ileum, consistent with active inflammatory bowel disease  LARGE BOWEL:  Normal caliber  No focal wall thickening or pericolonic inflammatory change  There is colonic diverticulosis without diverticulitis  STOMACH:  Moderately distended  LOWER CHEST:  Mild bibasilar atelectasis  No effusions  LIVER/BILIARY TREE:  Unremarkable  GALLBLADDER:  Contracted gallbladder, limiting evaluation  SPLEEN:  Unremarkable  PANCREAS:  Unchanged bilateral adrenal masses, which were characterized as adenomas on prior MRI  ADRENAL GLANDS:  Unremarkable  KIDNEYS/URETERS:  No hydronephrosis  5 mm calculus in the lower pole right kidney, and punctate calculus in the upper pole left kidney again seen  ABDOMINOPELVIC CAVITY:  No ascites or free intraperitoneal air  No lymphadenopathy  VESSELS:  Atherosclerotic changes are present  Unchanged ectasia of the mid abdominal aorta measuring 3 0 cm  PELVIS REPRODUCTIVE ORGANS:  Unremarkable for patient's age  URINARY BLADDER:  Unremarkable  ABDOMINAL WALL/INGUINAL REGIONS:  Unremarkable  OSSEOUS STRUCTURES:  No acute fracture or destructive osseous lesion  Impression: Persistent though slightly improved small bowel obstruction secondary to active long segment distal ileitis  Nonobstructing bilateral renal calculi  Unchanged mild infrarenal aortic aneurysm  Bilateral adrenal adenomas again noted  The study was marked in [facility] for immediate notification    Workstation performed: [id number]     Vas Lower Limb Arterial Duplex, Complete Bilateral    Result Date: 1/13/2020  Narrative:  [facility] REPORT CLINICAL: Indications: Patient presents with pain in his right leg while walking  His right foot is cold to the touch and numb  He has a h/o a right popliteal stent  Denies any open wounds or ulcers at this time  Operative History: 2019-06-27 Right Popliteal stent Risk Factors The patient has history of HLD, PAD and smoking (current) 0 5 ppd  Clinical Right Pressure: IV, Left Pressure: 119/ mm Hg  FINDINGS:  Segment                Right                 Left                                          Impression  PSV  EDV  PSV  EDV  Common Femoral Artery               90    0  101    2  Prox Profunda                       73    5   70    2  Prox SFA                            88    6   94    0  Mid SFA                             44    0  101    0  Dist SFA                                     102    0  Dist SFA - Stent                    36    0            Proximal Pop                                  31    0  Proximal Pop - Stent   Occluded                        Distal Pop                          16    4   30    0  Dist Post Tibial                    10    5   67    3  Dist  Ant  Tibial      Occluded               33    0  Dist Peroneal          Occluded               40    0     CONCLUSION:  Impression: RIGHT LOWER LIMB: Occlusion versus high grade stenosis in the popliteal, peroneal and anterior tibial arteries throughout  Ankle/Brachial index: 0 55, which is in the severe claudication range  Prior: 1 21 The anterior tibial pressure was unable to be obtained  PVR/ PPG tracings are dampened  Metatarsal and great toe pressures were not obtained secondary to attenuated PPG tracings  Prior: Same LEFT LOWER LIMB: This resting evaluation shows no evidence of significant lower extremity arterial occlusive disease  Ankle/Brachial index: 0 88, which is in the mild claudication range  Prior: 1 14 PVR/ PPG tracings are normal  Metatarsal pressure of 131 mmHg  Prior: 136 mmHg Great toe pressure of 41 mmHg, within the healing range  Prior: 45 mmHg  Compared to previous study on 10/21/19, there is a progression of disease on the right  Technical findings were given to SAMIRA Adams in the ED    SIGNATURE: Electronically Signed by: Gerardo Alonso on 2020-01-13 07:43:41 PM

## 2020-02-03 NOTE — TELEPHONE ENCOUNTER
Do you have any prednisone left to take? Staff: please clarify with pharmacy   New prednisone prescription to start at 40 mg again, tapering dose

## 2020-02-03 NOTE — TELEPHONE ENCOUNTER
Left message for pt to call back  Tried calling pharmacy 2x, received busy signal  Will try again later

## 2020-02-03 NOTE — TELEPHONE ENCOUNTER
Pt  had his Prednisone refilled today by another Doctor-he had his original script filled on 1/21  He took his script to [facility] on [address] , and they won't refill it because they said he isn't due for refill until 2/6  Please advise

## 2020-02-04 NOTE — PROGRESS NOTES
Addendum to discharge summary  Biopsy results as outlined    Final Diagnosis   A  Duodenum,  endoscopic biopsy:  - Benign duodenal mucosa without specific histopathologic abnormality   - No villous atrophy, no intraepithelial lymphocytosis and no crypt hyperplasia to suggest malabsorptive enteropathy   - No active or chronic duodenitis  - No epithelial dysplasia and no evidence of malignancy      B  Stomach, endoscopic biopsy:  - Gastric antral mucosa with mild chronic, inactive gastritis  - Negative for intestinal metaplasia, dysplasia or carcinoma  - No Helicobacter pylori is identified on H&E stained slides      C  Terminal ileum,  endoscopic biopsy:  - Small intestinal mucosa with focal acute inflammation   - No evidence chronic enteritis is seen      Comment:  The histologic findings are nonspecific, and may be present in many conditions includingacute self-limited and/or infectious colitis, inflammatory bowel disease, drug reaction (NSAID),  bowel preparation artifact, and may also be idiopathic  Correlation with clinical impression is recommended      D  Appendiceal orifice, biopsy:  - Benign colonic mucosa with nonspecific reactive changes   - No active or chronic colitis  - No glandular dysplasia and no evidence of malignancy      E  Ascending colon, endoscopic biopsy:  - Benign colonic mucosa with nonspecific reactive changes   - No active or chronic colitis  - No glandular dysplasia and no evidence of malignancy      F  Colon, random endoscopic biopsy:  - Colonic mucosa with no pathologic alteration   - No evidence of lymphocytic or collagenous colitis      G  Rectosigmoid colon polyp, endoscopic polypectomy:  - Inflammatory polyp   - No epithelial dysplasia and no evidence of malignancy      Comment: Additional histologic levels were examined       H  Rectum, endoscopic biopsy:  - Colonic mucosa with no pathologic alteration   - No evidence of lymphocytic or collagenous colitis         Jung Severance

## 2020-02-10 ENCOUNTER — TELEPHONE (OUTPATIENT)
Dept: VASCULAR SURGERY | Facility: CLINIC | Age: 70
End: 2020-02-10

## 2020-02-10 ENCOUNTER — OFFICE VISIT (OUTPATIENT)
Dept: VASCULAR SURGERY | Facility: CLINIC | Age: 70
End: 2020-02-10
Payer: MEDICARE

## 2020-02-10 ENCOUNTER — TELEPHONE (OUTPATIENT)
Dept: RADIOLOGY | Facility: HOSPITAL | Age: 70
End: 2020-02-10

## 2020-02-10 VITALS
BODY MASS INDEX: 26.34 KG/M2 | HEIGHT: 70 IN | HEART RATE: 101 BPM | WEIGHT: 184 LBS | SYSTOLIC BLOOD PRESSURE: 136 MMHG | TEMPERATURE: 97.4 F | DIASTOLIC BLOOD PRESSURE: 86 MMHG

## 2020-02-10 DIAGNOSIS — I99.8 LIMB ISCHEMIA: ICD-10-CM

## 2020-02-10 DIAGNOSIS — I73.9 PAD (PERIPHERAL ARTERY DISEASE) (HCC): Primary | ICD-10-CM

## 2020-02-10 PROCEDURE — 1160F RVW MEDS BY RX/DR IN RCRD: CPT | Performed by: SURGERY

## 2020-02-10 PROCEDURE — 4004F PT TOBACCO SCREEN RCVD TLK: CPT | Performed by: SURGERY

## 2020-02-10 PROCEDURE — 4040F PNEUMOC VAC/ADMIN/RCVD: CPT | Performed by: SURGERY

## 2020-02-10 PROCEDURE — 99215 OFFICE O/P EST HI 40 MIN: CPT | Performed by: SURGERY

## 2020-02-10 PROCEDURE — 1111F DSCHRG MED/CURRENT MED MERGE: CPT | Performed by: SURGERY

## 2020-02-10 PROCEDURE — 3008F BODY MASS INDEX DOCD: CPT | Performed by: SURGERY

## 2020-02-10 RX ORDER — SODIUM CHLORIDE 9 MG/ML
75 INJECTION, SOLUTION INTRAVENOUS CONTINUOUS
Status: CANCELLED | OUTPATIENT
Start: 2020-02-10

## 2020-02-10 NOTE — TELEPHONE ENCOUNTER
Spoke with pt to schedule agram on 2-12-20 at [facility] with Dr Augustine Goldman  Pt will go for labs today  Pt told he took xarelto today, checked with Dr Augustine Goldman, it is ok to hold from tomorrow  Pt was told npo from midnight, hospital will call with arrival time, and no hold on ASA  Sj    Called pt again to confirm not to take xarelto from now until after the procedure  Also scheduled f/u with Candice Monique (Dr Augustine Goldman is away) on 2-28-20 at [facility]

## 2020-02-10 NOTE — H&P (VIEW-ONLY)
Assessment/Plan:    Limb ischemia  66yo M with an atheroembolic lesion in the right popliteal artery s/p Stent placement, who had presented to the hospital in the setting of SBO, diarrhea, dehydration, acute kidney injury and resulting stent thrombosis  He improved with conservative management for his SBO and his renal function quickly resolved to baseline  His symptoms in his RLE also improved with anticoagulation  He presents to clinic for follow-up and to discuss reintervention  HE does report intermittent claudication in the right leg accompanied by paresthesias  On occasion his toes also turn cyanotic  On exam he has monophasic right pop and PT signal and no AT signal      Had an extensive risks/benefits alternatives discussion  Including risk of atheroembolization and the patient consented to proceed with RLE angiogram with possible intervention  Continue xarelto and statin  Restart asa 81mg daily     I have spent 30 minutes with Patient and family today in which greater than 50% of this time was spent in counseling/coordination of care regarding Diagnostic results, Prognosis, Risks and benefits of tx options, Risk factor reductions and Impressions  Problem List Items Addressed This Visit        Cardiovascular and Mediastinum    PAD (peripheral artery disease) ([facility] [address] ) - Primary    Relevant Orders    Basic metabolic panel    Limb ischemia     66yo M with an atheroembolic lesion in the right popliteal artery s/p Stent placement, who had presented to the hospital in the setting of SBO, diarrhea, dehydration, acute kidney injury and resulting stent thrombosis  He improved with conservative management for his SBO and his renal function quickly resolved to baseline  His symptoms in his RLE also improved with anticoagulation  He presents to clinic for follow-up and to discuss reintervention  HE does report intermittent claudication in the right leg accompanied by paresthesias   On occasion his toes also turn cyanotic  On exam he has monophasic right pop and PT signal and no AT signal      Had an extensive risks/benefits alternatives discussion  Including risk of atheroembolization and the patient consented to proceed with RLE angiogram with possible intervention  Continue xarelto and statin  Restart asa 81mg daily     I have spent 30 minutes with Patient and family today in which greater than 50% of this time was spent in counseling/coordination of care regarding Diagnostic results, Prognosis, Risks and benefits of tx options, Risk factor reductions and Impressions  Subjective:      Patient ID: Lien Moss is a 77 y o  male  Patient presents in office to review the results of his CT abdomin and pelvis done on 1/13/2020  Patient was seen in [place] ED on 1/13/2020 for R leg pain and numbness  Patient reports the R leg pain in the calf and the numbness is the foot is still there, but has not gotten worse  Patient reports the R calf cramps when walking with no specific distance relation  Patient is a current every day smoker x 1/4 pack per day  Patient is taking atorvastatin and xarelto  The following portions of the patient's history were reviewed and updated as appropriate: allergies, current medications, past family history, past medical history, past social history, past surgical history and problem list     Review of Systems   Constitutional: Positive for unexpected weight change (gain)  HENT: Positive for drooling  Eyes: Negative  Respiratory: Negative  Cardiovascular: Positive for leg swelling  Irregular heart rate  Painful veins   Gastrointestinal: Negative  Endocrine: Negative  Genitourinary: Negative  Musculoskeletal:        R leg pain  R leg cramping with walking   Skin: Negative  Negative for wound  Allergic/Immunologic: Negative  Neurological: Negative  Hematological: Negative      Psychiatric/Behavioral: Negative  I have reviewed and updated the ROS as appropriate  Objective:      /86 (BP Location: Left arm, Patient Position: Sitting, Cuff Size: Adult)   Pulse 101   Temp (!) 97 4 °F (36 3 °C) (Tympanic)   Ht 5' 10" (1 778 m)   Wt 83 5 kg (184 lb)   BMI 26 40 kg/m²          Physical Exam   Constitutional: He appears well-developed and well-nourished  HENT:   Head: Normocephalic and atraumatic  Eyes: Pupils are equal, round, and reactive to light  EOM are normal    Neck: Normal range of motion  Neck supple  Cardiovascular: Normal rate, regular rhythm and normal heart sounds  Pulses:       Femoral pulses are 2+ on the right side, and 2+ on the left side  Popliteal pulses are 0 on the right side, and 2+ on the left side  Dorsalis pedis pulses are 0 on the right side, and 2+ on the left side  Posterior tibial pulses are 0 on the right side, and 2+ on the left side  Monophasic PT signal , no AT signal   Mono-biphasic Pop signal    Musculoskeletal: Normal range of motion  He exhibits no edema  Right toes mildy cyanotic, stigmata of chronic venous stasis L>R   Neurological: He is alert  Skin: Skin is warm and dry  Capillary refill takes 2 to 3 seconds  Psychiatric: He has a normal mood and affect   His behavior is normal  Judgment and thought content normal        Operative Scheduling Information:    Hospital:  IR [facility] or IR [facility]    Physician:  Me    Surgery: RLE angiogram with possible intervention     Urgency:  Urgent: ASAP    Case Length:  Normal    Post-op Bed:  Outpatient    OR Table:  IR    Equipment Needs:  None    Medication Instructions:  Xarelto:  Hold for 2 days prior to procedure    Hydration:  No

## 2020-02-10 NOTE — PROGRESS NOTES
Assessment/Plan:    Limb ischemia  68yo M with an atheroembolic lesion in the right popliteal artery s/p Stent placement, who had presented to the hospital in the setting of SBO, diarrhea, dehydration, acute kidney injury and resulting stent thrombosis  He improved with conservative management for his SBO and his renal function quickly resolved to baseline  His symptoms in his RLE also improved with anticoagulation  He presents to clinic for follow-up and to discuss reintervention  HE does report intermittent claudication in the right leg accompanied by paresthesias  On occasion his toes also turn cyanotic  On exam he has monophasic right pop and PT signal and no AT signal      Had an extensive risks/benefits alternatives discussion  Including risk of atheroembolization and the patient consented to proceed with RLE angiogram with possible intervention  Continue xarelto and statin  Restart asa 81mg daily     I have spent 30 minutes with Patient and family today in which greater than 50% of this time was spent in counseling/coordination of care regarding Diagnostic results, Prognosis, Risks and benefits of tx options, Risk factor reductions and Impressions  Problem List Items Addressed This Visit        Cardiovascular and Mediastinum    PAD (peripheral artery disease) ([facility] [address] ) - Primary    Relevant Orders    Basic metabolic panel    Limb ischemia     68yo M with an atheroembolic lesion in the right popliteal artery s/p Stent placement, who had presented to the hospital in the setting of SBO, diarrhea, dehydration, acute kidney injury and resulting stent thrombosis  He improved with conservative management for his SBO and his renal function quickly resolved to baseline  His symptoms in his RLE also improved with anticoagulation  He presents to clinic for follow-up and to discuss reintervention  HE does report intermittent claudication in the right leg accompanied by paresthesias   On occasion his toes also turn cyanotic  On exam he has monophasic right pop and PT signal and no AT signal      Had an extensive risks/benefits alternatives discussion  Including risk of atheroembolization and the patient consented to proceed with RLE angiogram with possible intervention  Continue xarelto and statin  Restart asa 81mg daily     I have spent 30 minutes with Patient and family today in which greater than 50% of this time was spent in counseling/coordination of care regarding Diagnostic results, Prognosis, Risks and benefits of tx options, Risk factor reductions and Impressions  Subjective:      Patient ID: Rosalva Spurling is a 77 y o  male  Patient presents in office to review the results of his CT abdomin and pelvis done on 1/13/2020  Patient was seen in [place] ED on 1/13/2020 for R leg pain and numbness  Patient reports the R leg pain in the calf and the numbness is the foot is still there, but has not gotten worse  Patient reports the R calf cramps when walking with no specific distance relation  Patient is a current every day smoker x 1/4 pack per day  Patient is taking atorvastatin and xarelto  The following portions of the patient's history were reviewed and updated as appropriate: allergies, current medications, past family history, past medical history, past social history, past surgical history and problem list     Review of Systems   Constitutional: Positive for unexpected weight change (gain)  HENT: Positive for drooling  Eyes: Negative  Respiratory: Negative  Cardiovascular: Positive for leg swelling  Irregular heart rate  Painful veins   Gastrointestinal: Negative  Endocrine: Negative  Genitourinary: Negative  Musculoskeletal:        R leg pain  R leg cramping with walking   Skin: Negative  Negative for wound  Allergic/Immunologic: Negative  Neurological: Negative  Hematological: Negative      Psychiatric/Behavioral: Negative  I have reviewed and updated the ROS as appropriate  Objective:      /86 (BP Location: Left arm, Patient Position: Sitting, Cuff Size: Adult)   Pulse 101   Temp (!) 97 4 °F (36 3 °C) (Tympanic)   Ht 5' 10" (1 778 m)   Wt 83 5 kg (184 lb)   BMI 26 40 kg/m²          Physical Exam   Constitutional: He appears well-developed and well-nourished  HENT:   Head: Normocephalic and atraumatic  Eyes: Pupils are equal, round, and reactive to light  EOM are normal    Neck: Normal range of motion  Neck supple  Cardiovascular: Normal rate, regular rhythm and normal heart sounds  Pulses:       Femoral pulses are 2+ on the right side, and 2+ on the left side  Popliteal pulses are 0 on the right side, and 2+ on the left side  Dorsalis pedis pulses are 0 on the right side, and 2+ on the left side  Posterior tibial pulses are 0 on the right side, and 2+ on the left side  Monophasic PT signal , no AT signal   Mono-biphasic Pop signal    Musculoskeletal: Normal range of motion  He exhibits no edema  Right toes mildy cyanotic, stigmata of chronic venous stasis L>R   Neurological: He is alert  Skin: Skin is warm and dry  Capillary refill takes 2 to 3 seconds  Psychiatric: He has a normal mood and affect   His behavior is normal  Judgment and thought content normal        Operative Scheduling Information:    Hospital:  IR [facility] or IR [facility]T    Physician:  Me    Surgery: RLE angiogram with possible intervention     Urgency:  Urgent: ASAP    Case Length:  Normal    Post-op Bed:  Outpatient    OR Table:  IR    Equipment Needs:  None    Medication Instructions:  Xarelto:  Hold for 2 days prior to procedure    Hydration:  No

## 2020-02-10 NOTE — ASSESSMENT & PLAN NOTE
66yo M with an atheroembolic lesion in the right popliteal artery s/p Stent placement, who had presented to the hospital in the setting of SBO, diarrhea, dehydration, acute kidney injury and resulting stent thrombosis  He improved with conservative management for his SBO and his renal function quickly resolved to baseline  His symptoms in his RLE also improved with anticoagulation  He presents to clinic for follow-up and to discuss reintervention  HE does report intermittent claudication in the right leg accompanied by paresthesias  On occasion his toes also turn cyanotic  On exam he has monophasic right pop and PT signal and no AT signal      Had an extensive risks/benefits alternatives discussion  Including risk of atheroembolization and the patient consented to proceed with RLE angiogram with possible intervention  Continue xarelto and statin  Restart asa 81mg daily     I have spent 30 minutes with Patient and family today in which greater than 50% of this time was spent in counseling/coordination of care regarding Diagnostic results, Prognosis, Risks and benefits of tx options, Risk factor reductions and Impressions

## 2020-02-12 ENCOUNTER — HOSPITAL ENCOUNTER (OUTPATIENT)
Dept: RADIOLOGY | Facility: HOSPITAL | Age: 70
Discharge: HOME | End: 2020-02-12
Attending: SURGERY | Admitting: SURGERY
Payer: MEDICARE

## 2020-02-12 VITALS
TEMPERATURE: 97.9 F | RESPIRATION RATE: 18 BRPM | OXYGEN SATURATION: 94 % | SYSTOLIC BLOOD PRESSURE: 115 MMHG | HEIGHT: 70 IN | WEIGHT: 184 LBS | HEART RATE: 81 BPM | DIASTOLIC BLOOD PRESSURE: 82 MMHG | BODY MASS INDEX: 26.34 KG/M2

## 2020-02-12 DIAGNOSIS — I73.9 PAD (PERIPHERAL ARTERY DISEASE) (HCC): ICD-10-CM

## 2020-02-12 PROCEDURE — 99152 MOD SED SAME PHYS/QHP 5/>YRS: CPT

## 2020-02-12 PROCEDURE — 37226 HB FEM/POPL REVASC W/STENT (AKA CPT37226): CPT

## 2020-02-12 PROCEDURE — 99152 MOD SED SAME PHYS/QHP 5/>YRS: CPT | Performed by: SURGERY

## 2020-02-12 PROCEDURE — 75710 ARTERY X-RAYS ARM/LEG: CPT

## 2020-02-12 PROCEDURE — C2623 CATH, TRANSLUMIN, DRUG-COAT: HCPCS

## 2020-02-12 PROCEDURE — C1894 INTRO/SHEATH, NON-LASER: HCPCS

## 2020-02-12 PROCEDURE — C1725 CATH, TRANSLUMIN NON-LASER: HCPCS

## 2020-02-12 PROCEDURE — C1757 CATH, THROMBECTOMY/EMBOLECT: HCPCS

## 2020-02-12 PROCEDURE — 37224 PR REVSC OPN/PRG FEM/POP W/ANGIOPLASTY UNI: CPT | Performed by: SURGERY

## 2020-02-12 PROCEDURE — 99153 MOD SED SAME PHYS/QHP EA: CPT

## 2020-02-12 PROCEDURE — G9198 NO ORDER FOR CEPH NO REASON: HCPCS | Performed by: SURGERY

## 2020-02-12 PROCEDURE — C1887 CATHETER, GUIDING: HCPCS

## 2020-02-12 PROCEDURE — C1769 GUIDE WIRE: HCPCS

## 2020-02-12 PROCEDURE — C1760 CLOSURE DEV, VASC: HCPCS

## 2020-02-12 PROCEDURE — 76937 US GUIDE VASCULAR ACCESS: CPT | Performed by: SURGERY

## 2020-02-12 PROCEDURE — C1874 STENT, COATED/COV W/DEL SYS: HCPCS

## 2020-02-12 RX ORDER — OXYCODONE HYDROCHLORIDE 5 MG/1
5 TABLET ORAL EVERY 4 HOURS PRN
Status: DISCONTINUED | OUTPATIENT
Start: 2020-02-12 | End: 2020-02-13 | Stop reason: HOSPADM

## 2020-02-12 RX ORDER — MIDAZOLAM HYDROCHLORIDE 2 MG/2ML
INJECTION, SOLUTION INTRAMUSCULAR; INTRAVENOUS CODE/TRAUMA/SEDATION MEDICATION
Status: COMPLETED | OUTPATIENT
Start: 2020-02-12 | End: 2020-02-12

## 2020-02-12 RX ORDER — FENTANYL CITRATE 50 UG/ML
INJECTION, SOLUTION INTRAMUSCULAR; INTRAVENOUS CODE/TRAUMA/SEDATION MEDICATION
Status: COMPLETED | OUTPATIENT
Start: 2020-02-12 | End: 2020-02-12

## 2020-02-12 RX ORDER — SODIUM CHLORIDE 9 MG/ML
75 INJECTION, SOLUTION INTRAVENOUS CONTINUOUS
Status: DISCONTINUED | OUTPATIENT
Start: 2020-02-12 | End: 2020-02-13 | Stop reason: HOSPADM

## 2020-02-12 RX ORDER — ACETAMINOPHEN 325 MG/1
650 TABLET ORAL EVERY 4 HOURS PRN
Status: DISCONTINUED | OUTPATIENT
Start: 2020-02-12 | End: 2020-02-13 | Stop reason: HOSPADM

## 2020-02-12 RX ORDER — SODIUM CHLORIDE, SODIUM LACTATE, POTASSIUM CHLORIDE, CALCIUM CHLORIDE 600; 310; 30; 20 MG/100ML; MG/100ML; MG/100ML; MG/100ML
125 INJECTION, SOLUTION INTRAVENOUS CONTINUOUS
Status: DISPENSED | OUTPATIENT
Start: 2020-02-12 | End: 2020-02-12

## 2020-02-12 RX ORDER — HEPARIN SODIUM 1000 [USP'U]/ML
INJECTION, SOLUTION INTRAVENOUS; SUBCUTANEOUS CODE/TRAUMA/SEDATION MEDICATION
Status: COMPLETED | OUTPATIENT
Start: 2020-02-12 | End: 2020-02-12

## 2020-02-12 RX ADMIN — HEPARIN SODIUM 7000 UNITS: 1000 INJECTION INTRAVENOUS; SUBCUTANEOUS at 08:51

## 2020-02-12 RX ADMIN — MIDAZOLAM HYDROCHLORIDE 1 MG: 1 INJECTION, SOLUTION INTRAMUSCULAR; INTRAVENOUS at 08:21

## 2020-02-12 RX ADMIN — IODIXANOL 70 ML: 320 INJECTION, SOLUTION INTRAVASCULAR at 10:21

## 2020-02-12 RX ADMIN — FENTANYL CITRATE 25 MCG: 50 INJECTION INTRAMUSCULAR; INTRAVENOUS at 09:19

## 2020-02-12 RX ADMIN — FENTANYL CITRATE 50 MCG: 50 INJECTION INTRAMUSCULAR; INTRAVENOUS at 08:21

## 2020-02-12 RX ADMIN — MIDAZOLAM HYDROCHLORIDE 0.5 MG: 1 INJECTION, SOLUTION INTRAMUSCULAR; INTRAVENOUS at 09:19

## 2020-02-12 NOTE — PROCEDURES
Procedures  OPERATIVE PROCEDURE:  Ultrasound guided access of the Left CFA   Catheter Placement in the Aorta   3rd order selective catheter placement in the Left LE   PTA SFA/popliteal artery [id number]  PTA Drug Coated Balloon [id number]   [id number] Viabahn Stent placement, popliteal artery    ANESTHESIA: Conscious Sedation    ESTIMATED BLOOD LOSS: Minimal    INDICATION:  81-year-old male with and previous right lower extremity angiogram with popliteal artery stent placement for an atheroembolic lesion  Patient presented to the hospital few weeks ago in the setting of a small-bowel obstruction resulting in severe dehydration and acute kidney injury with resultant right popliteal artery stent thrombosis  Patient improved on anticoagulation and now is brought for an elective angiogram to treat the and stent thrombosis  DESCRIPTION OF PROCEDURE: The patient's consent was verified  The patient was brought to the angiosuite and conscious sedation was established  Bilateral groins were prepped and draped in the usual sterile fashion  DIAGNOSTIC TECHNIQUE:  Retrograde access of the left common femoral artery was obtained utilizing ultrasound-guidance  A micropuncture kit was initially utilized to gain access which was then upsized to a short 5 Western Jen sheath  Diagnostic imaging was performed with an Omni Flush catheter placed in the aorta and then advanced to the right common femoral artery to obtain images of the lower extremity  Interventions were performed as described below  The conclusion of the case all wires catheters and sheaths were removed and hemostasis was obtained utilizing a Mynx closure device  DIAGNOSTIC SUPERVISION AND INTERPRETATION:  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 the iliac bifurcation as fairly splayed out but without any significant stenosis  There was no significant aorta iliac occlusive disease the right common femoral artery is ectatic but without any significant stenosis    The femoral bifurcation on the right was patent with the SFA noted to be also ectatic  The profunda femoris was patent without any significant stenosis  Distally at the location of the previous distal SFA popliteal artery stent there was noted to be totally occluded with distal constitution via collaterals in the below-knee popliteal artery just proximal to the tibial trifurcation  As noted in his previous angiogram the tibial trifurcation was patent however the anterior tibial artery and peroneal artery were atretic as a progressed distally and occluded at the level of the distal calf  The dominant runoff was via the posterior tibial artery which ran into the foot  INTERVENTION:  Patient was heparinized with 7000 units of IV heparin and a 6 x 65 destination sheath was advanced into the right superficial femoral artery  The stent thrombosis was successfully crossed with the use of a 0 035 glidewire Advantage and a angled glide cath  The catheter was successfully advanced into the posterior tibial artery and luminal position was confirmed via subsequent angiography  The stent thrombosis was initially treated with a 5 mm x 200 mm percutaneous angioplasty balloon placed across the stent with a few cm above and below the previous stent  Subsequent angiography demonstrated recanalization of the stent however with significant residual stenosis  The stent was then treated with a 6 mm x 150 mm drug coated balloon (Ole Lark), subsequent angiography demonstrated good resolution of stenosis within the stent however there was noted to be some thrombus resulting in flow limiting defect just distal to the stent  This area was treated again with the 5 mm balloon with improvement however there was still mild flow limiting defect there within the below-knee popliteal artery  As such a 5 mm x 150 mm Viabahn stent was placed with overlap in the previous Supera stent    This was post dilated with the 5 mm balloon and subsequent angiography demonstrated complete resolution with good flow within the Supera and Viabahn stent with brisk preserved runoff as described above  This was felt to be an excellent result  IMPRESSION: Successful percutaneous recanalization InStent thrombosis of a previous popliteal artery stent with percutaneous angioplasty drug coated balloon angioplasty and an extension into the below-knee popliteal artery with a covered Viabahn stent  I was present for the entire duration of the case      Lara Pete MD 2/12/2020 9:59 AM

## 2020-02-12 NOTE — DISCHARGE INSTRUCTIONS
ARTERIOGRAM    WHAT YOU SHOULD KNOW:   An angiogram is a procedure to look at arteries in your body  Arteries are the blood vessels that carry blood from your heart to your body  AFTER YOU LEAVE:     Self-care:   · Limit activity: Rest for the remainder of the day of your procedure  Have some one with you until the next morning  Keep your arm or leg straight as much as possible  Rest as much as possible, sitting lying or reclining  Walk only to go to the bathroom, to bed or to eat  If the angiogram catheter was put in your leg, use the stairs as little as possible  No driving  · Keep your wound clean and dry  You may shower 24 hours after your procedure  The bandage you have on should fall off in 2-3 days  If there is any drainage from the puncture site, you should put on a clean bandage  · Watch for bleeding and bruising: It is normal to have a bruise and soreness where the angiogram catheter went in  · Diet:   · You may resume your regular diet, Sips of flat soda will help with mild nausea  · Drink more liquids than usual for the next 24 hours      · IMMEDIATELY Contact Interventional Radiology at [phone] [place] PATIENTS: Contact Interventional Radiology at [phone]) Tabby Armendariz PATIENTS: Contact Interventional Radiology at [phone]) if any of the following occur:  · If your bruise gets larger or if you notice any active bleeding  APPLY DIRECT PRESSURE TO THE BLEEDING SITE  · If you notice increased swelling or have increased pain at the puncture site   · If you have any numbness or pain in the extremity of the puncture site   · If that extremity seems cold or pale      · You have fever greater than 101  · Persistent nausea or vomitting    Follow up with your primary healthcare provider  as directed: Write down your questions so you remember to ask them during your visits

## 2020-02-12 NOTE — INTERVAL H&P NOTE
Update: (This section must be completed if the H&P was completed greater than 24 hrs to procedure or admission)    H&P reviewed  After examining the patient, I find no changed to the H&P since it had been written  Patient re-evaluated   Accept as history and physical     Jolie Flowers MD/February 12, 2020/8:16 AM

## 2020-02-14 ENCOUNTER — TELEPHONE (OUTPATIENT)
Dept: NEPHROLOGY | Facility: CLINIC | Age: 70
End: 2020-02-14

## 2020-02-14 ENCOUNTER — APPOINTMENT (OUTPATIENT)
Dept: LAB | Facility: CLINIC | Age: 70
End: 2020-02-14
Payer: MEDICARE

## 2020-02-14 DIAGNOSIS — N18.30 CHRONIC KIDNEY DISEASE, STAGE III (MODERATE) (HCC): Primary | ICD-10-CM

## 2020-02-14 DIAGNOSIS — D72.829 LEUKOCYTOSIS, UNSPECIFIED TYPE: ICD-10-CM

## 2020-02-14 DIAGNOSIS — E55.9 VITAMIN D DEFICIENCY: ICD-10-CM

## 2020-02-14 DIAGNOSIS — E78.5 DYSLIPIDEMIA: ICD-10-CM

## 2020-02-14 DIAGNOSIS — N18.30 ANEMIA OF CHRONIC RENAL FAILURE, STAGE 3 (MODERATE) (HCC): ICD-10-CM

## 2020-02-14 DIAGNOSIS — I95.89 CHRONIC HYPOTENSION: ICD-10-CM

## 2020-02-14 DIAGNOSIS — D63.1 ANEMIA OF CHRONIC RENAL FAILURE, STAGE 3 (MODERATE) (HCC): ICD-10-CM

## 2020-02-14 DIAGNOSIS — R80.1 PERSISTENT PROTEINURIA: ICD-10-CM

## 2020-02-14 DIAGNOSIS — N18.30 CHRONIC KIDNEY DISEASE, STAGE III (MODERATE) (HCC): ICD-10-CM

## 2020-02-14 LAB
25(OH)D3 SERPL-MCNC: 25.7 NG/ML (ref 30–100)
ALBUMIN SERPL BCP-MCNC: 2.7 G/DL (ref 3.5–5)
ALP SERPL-CCNC: 113 U/L (ref 46–116)
ALT SERPL W P-5'-P-CCNC: 68 U/L (ref 12–78)
ANION GAP SERPL CALCULATED.3IONS-SCNC: 7 MMOL/L (ref 4–13)
AST SERPL W P-5'-P-CCNC: 22 U/L (ref 5–45)
BASOPHILS # BLD AUTO: 0.03 THOUSANDS/ΜL (ref 0–0.1)
BASOPHILS NFR BLD AUTO: 0 % (ref 0–1)
BILIRUB SERPL-MCNC: 0.59 MG/DL (ref 0.2–1)
BUN SERPL-MCNC: 21 MG/DL (ref 5–25)
CALCIUM SERPL-MCNC: 8.6 MG/DL (ref 8.3–10.1)
CHLORIDE SERPL-SCNC: 106 MMOL/L (ref 100–108)
CO2 SERPL-SCNC: 31 MMOL/L (ref 21–32)
CREAT SERPL-MCNC: 0.99 MG/DL (ref 0.6–1.3)
CREAT UR-MCNC: 96 MG/DL
EOSINOPHIL # BLD AUTO: 0.07 THOUSAND/ΜL (ref 0–0.61)
EOSINOPHIL NFR BLD AUTO: 1 % (ref 0–6)
ERYTHROCYTE [DISTWIDTH] IN BLOOD BY AUTOMATED COUNT: 19.5 % (ref 11.6–15.1)
FERRITIN SERPL-MCNC: 111 NG/ML (ref 8–388)
GFR SERPL CREATININE-BSD FRML MDRD: 78 ML/MIN/1.73SQ M
GLUCOSE P FAST SERPL-MCNC: 100 MG/DL (ref 65–99)
HCT VFR BLD AUTO: 43.6 % (ref 36.5–49.3)
HGB BLD-MCNC: 13.4 G/DL (ref 12–17)
IMM GRANULOCYTES # BLD AUTO: 0.13 THOUSAND/UL (ref 0–0.2)
IMM GRANULOCYTES NFR BLD AUTO: 1 % (ref 0–2)
IRON SATN MFR SERPL: 19 %
IRON SERPL-MCNC: 56 UG/DL (ref 65–175)
LYMPHOCYTES # BLD AUTO: 2.21 THOUSANDS/ΜL (ref 0.6–4.47)
LYMPHOCYTES NFR BLD AUTO: 19 % (ref 14–44)
MAGNESIUM SERPL-MCNC: 2 MG/DL (ref 1.6–2.6)
MCH RBC QN AUTO: 29.5 PG (ref 26.8–34.3)
MCHC RBC AUTO-ENTMCNC: 30.7 G/DL (ref 31.4–37.4)
MCV RBC AUTO: 96 FL (ref 82–98)
MONOCYTES # BLD AUTO: 1.12 THOUSAND/ΜL (ref 0.17–1.22)
MONOCYTES NFR BLD AUTO: 10 % (ref 4–12)
NEUTROPHILS # BLD AUTO: 7.93 THOUSANDS/ΜL (ref 1.85–7.62)
NEUTS SEG NFR BLD AUTO: 69 % (ref 43–75)
NRBC BLD AUTO-RTO: 0 /100 WBCS
PHOSPHATE SERPL-MCNC: 3.7 MG/DL (ref 2.3–4.1)
PLATELET # BLD AUTO: 222 THOUSANDS/UL (ref 149–390)
PMV BLD AUTO: 10.3 FL (ref 8.9–12.7)
POTASSIUM SERPL-SCNC: 4.3 MMOL/L (ref 3.5–5.3)
PROT SERPL-MCNC: 6.2 G/DL (ref 6.4–8.2)
PROT UR-MCNC: 43 MG/DL
PROT/CREAT UR: 0.45 MG/G{CREAT} (ref 0–0.1)
PTH-INTACT SERPL-MCNC: 65.7 PG/ML (ref 18.4–80.1)
RBC # BLD AUTO: 4.54 MILLION/UL (ref 3.88–5.62)
SODIUM SERPL-SCNC: 144 MMOL/L (ref 136–145)
TIBC SERPL-MCNC: 294 UG/DL (ref 250–450)
WBC # BLD AUTO: 11.49 THOUSAND/UL (ref 4.31–10.16)

## 2020-02-14 PROCEDURE — 83735 ASSAY OF MAGNESIUM: CPT

## 2020-02-14 PROCEDURE — 85025 COMPLETE CBC W/AUTO DIFF WBC: CPT

## 2020-02-14 PROCEDURE — 84156 ASSAY OF PROTEIN URINE: CPT

## 2020-02-14 PROCEDURE — 36415 COLL VENOUS BLD VENIPUNCTURE: CPT

## 2020-02-14 PROCEDURE — 83550 IRON BINDING TEST: CPT

## 2020-02-14 PROCEDURE — 83970 ASSAY OF PARATHORMONE: CPT

## 2020-02-14 PROCEDURE — 82570 ASSAY OF URINE CREATININE: CPT

## 2020-02-14 PROCEDURE — 83540 ASSAY OF IRON: CPT

## 2020-02-14 PROCEDURE — 82306 VITAMIN D 25 HYDROXY: CPT

## 2020-02-14 PROCEDURE — 84100 ASSAY OF PHOSPHORUS: CPT

## 2020-02-14 PROCEDURE — 80053 COMPREHEN METABOLIC PANEL: CPT

## 2020-02-14 PROCEDURE — 82728 ASSAY OF FERRITIN: CPT

## 2020-02-14 NOTE — TELEPHONE ENCOUNTER
----- Message from Maire Heimlich, MD sent at 2/14/2020  7:45 AM EST -----  The patient's white count is up again a little bit  I know he was in the hospital recently  Make sure he is feeling better with no fevers or chills or other symptoms  Repeat a CBC in 1-2 weeks

## 2020-02-18 PROBLEM — K50.00 TERMINAL ILEITIS OF SMALL INTESTINE (HCC): Status: ACTIVE | Noted: 2020-02-18

## 2020-02-20 NOTE — PROGRESS NOTES
RENAL FOLLOW UP NOTE: td    ASSESSMENT AND PLAN:  1   CKD stage 3 :  · Etiology:  Cardiorenal syndrome/arteriolar nephrosclerosis/? Atheroemboli/prior prerenal associated mild tachycardia and relative hypotension in the past  · Baseline creatinine:  1 25-1 41  · Current creatinine:   below baseline at 0 99  · Urine protein creatinine ratio:  0 45 g at goal  Recommendations:  · Treat hypertension-please see below  · Treat dyslipidemia-please see below  · Maintain proteinuria less than 1 g or as low as possible  · Avoid nephrotoxic agents such as NSAIDs, patient counseled as such  2   Volume:  Euvolemic  3  Hypotension:  Workup was compatible with low ejection fraction 48% associated severe hypokinesis of the apical anterior and mid anterior septal in mild in for septal and apical inferior and apical septal and apical walls  No pericardial effusion  Cortisol/TSH were unremarkable  · Currently blood pressure:  Chronic hypotension unchanged and essentially tolerating  · Medication changes today:  Doing well today no changes tolerating the blood pressure  4   Electrolytes: All acceptable  5  Mineral bone disorder:  · Calcium/magnesium/phosphorus:  All acceptable including the magnesium  · PTH intact:   65 7 which is normal  · Vitamin-D:  25 7 replete  6  Dyslipidemia:  · Goal LDL:  Less than 70 given PAD  · Current lipid profile:  LDL 55/HDL 45/triglycerides 83   check next visit  Recommendations: At goal so no changes  7  Anemia:  Hemoglobin:  13 4:              Recommendations:  · Iron studies:  19 percent saturation ferritin 111:  ORAL IRON  · Multiple myeloma evaluation with SPEP/UPEP and light chains:  Negative  · Stool for occult blood x3:  NEGATIVE X1  · In March had EGD colonoscopy and recent sigmoidoscopy:  Harris's esophagus/diverticulosis/colonic polyps  Patient will be seeing Dr Deonna Pablo  8   Other problems:  · Chronic intermittent mild leukocytosis to be repeated:  Stable at 11 49    Patient is currently taking prednisone  · Gross hematuria/microscopic hematuria:  This has resolved   Patient seen by Urology for gross hematuria   Cystoscopy was negative and recent PSA negative  No further investigation by follow-up in 1 year by Urology  · Abnormal echocardiogram/cardiomyopathy:  Followed in the past by Dr Almanza  · Paroxysmal atrial fibrillation followed by Dr Daria Hu:  Currently on Xarelto  · Recent admission in May for signs of small-bowel obstruction associated with abdominal pain and diarrhea  · Bilateral adrenal gland abnormalities being followed by surgical oncology   24 hour urine for Dopamine epinephrine all unremarkable   Aldosterone was unremarkable   Cortisol was okay at 17 5  To be followed by Dr Cordova  · PAD:  Status post stent for the right popliteal artery/revision 02/12/2020  · Recent admission for SBO/felt to have Crohn's followed by GI  Also felt to have IBS       PATIENT INSTRUCTIONS:    Patient Instructions   1  Medication changes today:  · Please take vitamin-D booster 93829 units once a week for 12 weeks  After completing this medication go back to vitamin-D 2000 units daily  · Please take iron over-the-counter twice a week watch for constipation    2  Please go for nonfasting lab work at this time any time of the day    3  Please go for nonfasting lab work in 3 months any time of the day    4  Follow-up in 6 months:  -please go for fasting lab work 1-2 weeks prior to your appointment    5  General instructions:   AVOID SALT BUT NOT ADDING AN READING LABELS TO MAKE SURE THERE IS LOW-SALT IN THE FOOD THAT YOU ARE EATING     Avoid nonsteroidal anti-inflammatory drugs such as Naprosyn, ibuprofen, Aleve, Advil, Celebrex, etc   You can use Tylenol as needed if you do not have any liver condition to be concerned about     Avoid medications such as Sudafed or decongestants and antihistamines that contained the D component which is the decongestant    You can take antihistamines without the decongestant or D component   Try to avoid medications such as pantoprazole or  Protonix/Nexium or Esomeprazole)/Prilosec or omeprazole/Prevacid or lansoprazole/AcipHex or Rabeprazole  If you are able to, use Pepcid as this is safer for your kidneys   Try to exercise at least 30 minutes 3 days a week to begin with with an ultimate goal of 5 days a week for at least 30 minutes     Try to lose 5-10 lb by your next visit     Please do not drink more than 2 glasses of alcohol/wine on a daily basis as this may contribute to your high blood pressure  Subjective: The patient had PAD arteriogram on 02/12/2020 with percutaneous recanalization in stent thrombosis the previous popliteal artery stent  He was hospitalized from 01/13/2020 to 1/21/20:    · His initial complaint was right lower extremity pain he was found to have AK I in addition to small bowel lesions suspicious for enteritis  He underwent EGD colonoscopy  This demonstrated mild eroded erythematous ulcerated mucosa in the terminal ileum multiple areas of focal ileitis and ulceration with pale mucosa  EGD was essentially unremarkable  He was treated with prednisone taper  There was acute inflammation  Thus suspicion is he has mild Crohn's disease and was started on sulfasalazine  Also felt to have IBD  · He was found to have sepsis with Klebsiella bacteremia thought related to translocation from the abdomen  He received intravenous antibiotics subsequently switched to Keflex and stopped 01/24/2020  · Also atrial fibrillation with rapid ventricular response treated with Xarelto anticoagulation  · He had also developed AK I during the hospitalization with a peak creatinine 3 99 and improved to 0 94 mg/dL upon discharge    Then the patient fell with no trauma to the head  Just sore at this time     No fevers chills cough or colds    Good appetite and fair to poor energy at this time  No hematuria, dysuria, voiding symptoms or foamy urine  No gastrointestinal symptoms  No cardiovascular symptoms; improved swelling of his legs as he has decrease prednisone  No headaches, dizziness or lightheadedness  Blood pressure medications:  · Toprol XL 50 mg daily in the evening    ROS:  See HPI, otherwise review of systems as completely reviewed with the patient are negative    Past Medical History:   Diagnosis Date    [facility] syndrome ([facility] )     Chronic kidney disease     Colon polyps     GERD (gastroesophageal reflux disease)     Hematuria     History of rheumatic fever     Hyperlipidemia     Hypolipidemia     Hypotension     Ischemic cardiomyopathy     PAD [facility]peripheral artery disease) ([facility] )     Pulmonary emphysema ([facility] )      Past Surgical History:   Procedure Laterality Date    COLONOSCOPY  2019    HERNIA REPAIR      IR ABDOMINAL ANGIOGRAPHY / INTERVENTION  6/27/2019    IR ABDOMINAL ANGIOGRAPHY / INTERVENTION  2/12/2020    POPLITEAL ARTERY STENT Right     6/2019    MO SLCTV CATHJ 3RD+ ORD SLCTV ABDL PEL/LXTR [facility] Right 6/27/2019    Procedure: leg ANGIOGRAM WITH STENT, BALLOON ANGIOPLASTY, LEFT GROIN ACCESS;  Surgeon: Lola Linton MD;  [facility];  Service: Vascular     Family History   Problem Relation Age of Onset    Heart disease Mother     Hyperlipidemia Mother     Hypertension Father     Heart disease Father     Stroke Father     Hyperlipidemia Father     Diabetes Brother     No Known Problems Maternal Grandmother     Dementia Neg Hx     Drug abuse Neg Hx     Mental illness Neg Hx     Substance Abuse Neg Hx     Alcohol abuse Neg Hx     Depression Neg Hx     Colon cancer Neg Hx       reports that he has been smoking cigarettes  He has a 7 50 pack-year smoking history  He has never used smokeless tobacco  He reports that he drinks alcohol  He reports that he does not use drugs      I COMPLETELY REVIEWED THE PAST MEDICAL HISTORY/PAST SURGICAL HISTORY/SOCIAL HISTORY/FAMILY HISTORY/AND MEDICATIONS  AND UPDATED ALL    Objective:     Vitals:   Vitals:    02/24/20 1052   BP: 112/71   Pulse: 94   BP sitting on left:  110/70 with a heart rate of 88 and irregular  BP standing on left:    Weight (last 2 days)     Date/Time   Weight    02/24/20 1052   88 (194)            Wt Readings from Last 3 Encounters:   02/24/20 88 kg (194 lb)   02/18/20 84 8 kg (187 lb)   02/12/20 83 5 kg (184 lb)       Body mass index is 27 84 kg/m²      Physical Exam: General:  Obese, but in No acute distress  Skin:  No acute rash; scattered ecchymoses  Eyes:  No scleral icterus, noninjected, no discharge from eyes  ENT:  Moist mucous membranes  Neck:  Supple, no jugular venous distention, trachea is midline, no lymphadenopathy and no thyromegaly  Back   No CVAT  Chest:  Clear to auscultation and percussion, good respiratory effort  CVS:  Irregular rhythm, without a rub, or gallops or murmurs  Abdomen:  Obese, Soft and nontender with normal bowel sounds  Extremities:  No cyanosis and no significant lower extremity edema, no arthritic changes, normal range of motion  Neuro:  Grossly intact  Psych:  Alert, oriented x3 and appropriate      Medications:    Current Outpatient Medications:     aspirin 81 MG tablet, Take 81 mg by mouth daily, Disp: , Rfl:     atorvastatin (LIPITOR) 20 mg tablet, Take 1 tablet (20 mg total) by mouth daily, Disp: 90 tablet, Rfl: 1    Cholecalciferol (VITAMIN D) 2000 units CAPS, Take 2,000 Units by mouth daily, Disp: , Rfl:     dicyclomine (Bentyl) 10 mg capsule, Take 10 mg by mouth 4 (four) times a day (before meals and at bedtime), Disp: , Rfl:     folic acid (FOLVITE) 1 mg tablet, Take 1 tablet (1 mg total) by mouth daily, Disp: 30 tablet, Rfl: 3    Magnesium 500 MG TABS, Take 1 tablet by mouth 2 (two) times a day, Disp: , Rfl:     metoprolol succinate (TOPROL-XL) 50 mg 24 hr tablet, Take 1 tablet (50 mg total) by mouth daily, Disp: 30 tablet, Rfl: 0    omeprazole (PriLOSEC) 40 MG capsule, TAKE 1 CAPSULE BY MOUTH EVERY DAY ONE-HALF HOUR BEFORE BREAKFAST, Disp: , Rfl: 5    predniSONE 5 mg tablet, Take 1 tablet (5 mg total) by mouth daily 40 mg (8 tabs) for 7 days followed by 35 mg (7 tabs) for 7 days, 30 mg (6 tabs) for 7 days and continue to decrease by 5 per day every week, Disp: 360 tablet, Rfl: 1    rivaroxaban (XARELTO) 20 mg tablet, Take 1 tablet (20 mg total) by mouth daily with breakfast, Disp: 90 tablet, Rfl: 3    sulfaSALAzine (AZULFIDINE-EN) 500 mg EC tablet, Take 1 tablet (500 mg total) by mouth 2 (two) times a day, Disp: 60 tablet, Rfl: 3    ergocalciferol (VITAMIN D2) 50,000 units, Take 1 capsule (50,000 Units total) by mouth once a week for 12 doses, Disp: 12 capsule, Rfl: 0    Lab, Imaging and other studies: I have personally reviewed pertinent labs  Laboratory Results:  Results for orders placed or performed in visit on 02/21/20   CBC and Platelet   Result Value Ref Range    WBC 11 30 (H) 4 31 - 10 16 Thousand/uL    RBC 4 48 3 88 - 5 62 Million/uL    Hemoglobin 13 2 12 0 - 17 0 g/dL    Hematocrit 43 5 36 5 - 49 3 %    MCV 97 82 - 98 fL    MCH 29 5 26 8 - 34 3 pg    MCHC 30 3 (L) 31 4 - 37 4 g/dL    RDW 19 2 (H) 11 6 - 15 1 %    Platelets 791 431 - 475 Thousands/uL    MPV 9 6 8 9 - 12 7 fL       Results from last 7 days   Lab Units 02/21/20  0607   WBC Thousand/uL 11 30*   HEMOGLOBIN g/dL 13 2   HEMATOCRIT % 43 5   PLATELETS Thousands/uL 350         Radiology review:   chest X-ray    Ultrasound      Portions of the record may have been created with voice recognition software  Occasional wrong word or "sound a like" substitutions may have occurred due to the inherent limitations of voice recognition software  Read the chart carefully and recognize, using context, where substitutions have occurred

## 2020-02-21 ENCOUNTER — APPOINTMENT (OUTPATIENT)
Dept: LAB | Facility: CLINIC | Age: 70
End: 2020-02-21
Payer: MEDICARE

## 2020-02-21 DIAGNOSIS — N18.30 CHRONIC KIDNEY DISEASE, STAGE III (MODERATE) (HCC): ICD-10-CM

## 2020-02-21 DIAGNOSIS — D72.829 LEUKOCYTOSIS, UNSPECIFIED TYPE: ICD-10-CM

## 2020-02-21 LAB
ERYTHROCYTE [DISTWIDTH] IN BLOOD BY AUTOMATED COUNT: 19.2 % (ref 11.6–15.1)
HCT VFR BLD AUTO: 43.5 % (ref 36.5–49.3)
HGB BLD-MCNC: 13.2 G/DL (ref 12–17)
MCH RBC QN AUTO: 29.5 PG (ref 26.8–34.3)
MCHC RBC AUTO-ENTMCNC: 30.3 G/DL (ref 31.4–37.4)
MCV RBC AUTO: 97 FL (ref 82–98)
PLATELET # BLD AUTO: 350 THOUSANDS/UL (ref 149–390)
PMV BLD AUTO: 9.6 FL (ref 8.9–12.7)
RBC # BLD AUTO: 4.48 MILLION/UL (ref 3.88–5.62)
WBC # BLD AUTO: 11.3 THOUSAND/UL (ref 4.31–10.16)

## 2020-02-21 PROCEDURE — 36415 COLL VENOUS BLD VENIPUNCTURE: CPT

## 2020-02-21 PROCEDURE — 85027 COMPLETE CBC AUTOMATED: CPT

## 2020-02-24 ENCOUNTER — OFFICE VISIT (OUTPATIENT)
Dept: NEPHROLOGY | Facility: CLINIC | Age: 70
End: 2020-02-24
Payer: MEDICARE

## 2020-02-24 VITALS
HEIGHT: 70 IN | BODY MASS INDEX: 27.77 KG/M2 | DIASTOLIC BLOOD PRESSURE: 71 MMHG | WEIGHT: 194 LBS | HEART RATE: 94 BPM | SYSTOLIC BLOOD PRESSURE: 112 MMHG

## 2020-02-24 DIAGNOSIS — N18.30 CHRONIC KIDNEY DISEASE, STAGE III (MODERATE) (HCC): Primary | ICD-10-CM

## 2020-02-24 DIAGNOSIS — D63.1 ANEMIA OF CHRONIC RENAL FAILURE, STAGE 3 (MODERATE) (HCC): ICD-10-CM

## 2020-02-24 DIAGNOSIS — E55.9 VITAMIN D DEFICIENCY: ICD-10-CM

## 2020-02-24 DIAGNOSIS — E78.5 DYSLIPIDEMIA: ICD-10-CM

## 2020-02-24 DIAGNOSIS — R80.1 PERSISTENT PROTEINURIA: ICD-10-CM

## 2020-02-24 DIAGNOSIS — I95.89 CHRONIC HYPOTENSION: ICD-10-CM

## 2020-02-24 DIAGNOSIS — N18.30 ANEMIA OF CHRONIC RENAL FAILURE, STAGE 3 (MODERATE) (HCC): ICD-10-CM

## 2020-02-24 PROCEDURE — 4040F PNEUMOC VAC/ADMIN/RCVD: CPT | Performed by: INTERNAL MEDICINE

## 2020-02-24 PROCEDURE — 4004F PT TOBACCO SCREEN RCVD TLK: CPT | Performed by: INTERNAL MEDICINE

## 2020-02-24 PROCEDURE — 3008F BODY MASS INDEX DOCD: CPT | Performed by: INTERNAL MEDICINE

## 2020-02-24 PROCEDURE — 99214 OFFICE O/P EST MOD 30 MIN: CPT | Performed by: INTERNAL MEDICINE

## 2020-02-24 PROCEDURE — 1111F DSCHRG MED/CURRENT MED MERGE: CPT | Performed by: INTERNAL MEDICINE

## 2020-02-24 PROCEDURE — 1160F RVW MEDS BY RX/DR IN RCRD: CPT | Performed by: INTERNAL MEDICINE

## 2020-02-24 RX ORDER — ERGOCALCIFEROL 1.25 MG/1
50000 CAPSULE ORAL WEEKLY
Qty: 12 CAPSULE | Refills: 0 | Status: SHIPPED | OUTPATIENT
Start: 2020-02-24 | End: 2020-02-28 | Stop reason: ALTCHOICE

## 2020-02-24 RX ORDER — DICYCLOMINE HYDROCHLORIDE 10 MG/1
10 CAPSULE ORAL
COMMUNITY
End: 2020-03-23

## 2020-02-24 NOTE — LETTER
February 24, 2020     Harshal Stokes MD  [address]  [address] [place] [address]    Patient: Taqueria Hughes   YOB: 1950   Date of Visit: 2/24/2020       Dear Dr Jina Carter: Thank you for referring Taqueria Hughes to me for evaluation  Below are my notes for this consultation  If you have questions, please do not hesitate to call me  I look forward to following your patient along with you  Sincerely,        Heriberto Clemons MD        CC: MD Casey Donald MD Tyna Ales, MD Liana Cypher, MD Wanetta Hanger, MD  2/24/2020 11:33 AM  Sign at close encounter  RENAL FOLLOW UP NOTE: td    ASSESSMENT AND PLAN:  1   CKD stage 3 :  · Etiology:  Cardiorenal syndrome/arteriolar nephrosclerosis/? Atheroemboli/prior prerenal associated mild tachycardia and relative hypotension in the past  · Baseline creatinine:  1 25-1 41  · Current creatinine:   below baseline at 0 99  · Urine protein creatinine ratio:  0  45 g at goal  Recommendations:  · Treat hypertension-please see below  · Treat dyslipidemia-please see below  · Maintain proteinuria less than 1 g or as low as possible  · Avoid nephrotoxic agents such as NSAIDs, patient counseled as such  2   Volume:  Euvolemic  3  Hypotension:  Workup was compatible with low ejection fraction 48% associated severe hypokinesis of the apical anterior and mid anterior septal in mild in for septal and apical inferior and apical septal and apical walls  No pericardial effusion  Cortisol/TSH were unremarkable  · Currently blood pressure:  Chronic hypotension unchanged and essentially tolerating  · Medication changes today:  Doing well today no changes tolerating the blood pressure  4   Electrolytes: All acceptable  5  Mineral bone disorder:  · Calcium/magnesium/phosphorus:  All acceptable including the magnesium  · PTH intact:    65 7 which is normal  · Vitamin-D:  25 7 replete  6    Dyslipidemia:  · Goal LDL: Less than 70 given PAD  · Current lipid profile:  LDL 55/HDL 45/triglycerides 83    check next visit  Recommendations: At goal so no changes  7  Anemia:  Hemoglobin:   13 4:              Recommendations:  · Iron studies:  19 percent saturation ferritin 111:  ORAL IRON  · Multiple myeloma evaluation with SPEP/UPEP and light chains:  Negative  · Stool for occult blood x3:  NEGATIVE X1  · In March had EGD colonoscopy and recent sigmoidoscopy:  Harris's esophagus/diverticulosis/colonic polyps  Patient will be seeing Dr Haresh Judd  8   Other problems:  · Chronic intermittent mild leukocytosis to be repeated:  Stable at 11 49  Patient is currently taking prednisone  · Gross hematuria/microscopic hematuria:  This has resolved   Patient seen by Urology for gross hematuria   Cystoscopy was negative and recent PSA negative  No further investigation by follow-up in 1 year by Urology  · Abnormal echocardiogram/cardiomyopathy:  Followed in the past by Dr Almanza  · Paroxysmal atrial fibrillation followed by Dr Nanette Segura:  Currently on Xarelto  · Recent admission in May for signs of small-bowel obstruction associated with abdominal pain and diarrhea  · Bilateral adrenal gland abnormalities being followed by surgical oncology   24 hour urine for Dopamine epinephrine all unremarkable   Aldosterone was unremarkable   Cortisol was okay at 17 5  To be followed by Dr Cordova  · PAD:  Status post stent for the right popliteal artery/revision 02/12/2020  · Recent admission for SBO/felt to have Crohn's followed by GI  Also felt to have IBS       PATIENT INSTRUCTIONS:    Patient Instructions   1  Medication changes today:  · Please take vitamin-D booster 06639 units once a week for 12 weeks  After completing this medication go back to vitamin-D 2000 units daily  · Please take iron over-the-counter twice a week watch for constipation    2  Please go for nonfasting lab work at this time any time of the day    3   Please go for nonfasting lab work in 3 months any time of the day    4  Follow-up in 6 months:  -please go for fasting lab work 1-2 weeks prior to your appointment    5  General instructions:   AVOID SALT BUT NOT ADDING AN READING LABELS TO MAKE SURE THERE IS LOW-SALT IN THE FOOD THAT YOU ARE EATING     Avoid nonsteroidal anti-inflammatory drugs such as Naprosyn, ibuprofen, Aleve, Advil, Celebrex, etc   You can use Tylenol as needed if you do not have any liver condition to be concerned about     Avoid medications such as Sudafed or decongestants and antihistamines that contained the D component which is the decongestant  You can take antihistamines without the decongestant or D component   Try to avoid medications such as pantoprazole or  Protonix/Nexium or Esomeprazole)/Prilosec or omeprazole/Prevacid or lansoprazole/AcipHex or Rabeprazole  If you are able to, use Pepcid as this is safer for your kidneys   Try to exercise at least 30 minutes 3 days a week to begin with with an ultimate goal of 5 days a week for at least 30 minutes     Try to lose 5-10 lb by your next visit     Please do not drink more than 2 glasses of alcohol/wine on a daily basis as this may contribute to your high blood pressure  Subjective: The patient had PAD arteriogram on 02/12/2020 with percutaneous recanalization in stent thrombosis the previous popliteal artery stent  He was hospitalized from 01/13/2020 to 1/21/20:    · His initial complaint was right lower extremity pain he was found to have AK I in addition to small bowel lesions suspicious for enteritis  He underwent EGD colonoscopy  This demonstrated mild eroded erythematous ulcerated mucosa in the terminal ileum multiple areas of focal ileitis and ulceration with pale mucosa  EGD was essentially unremarkable  He was treated with prednisone taper  There was acute inflammation  Thus suspicion is he has mild Crohn's disease and was started on sulfasalazine    Also felt to have IBD  · He was found to have sepsis with Klebsiella bacteremia thought related to translocation from the abdomen  He received intravenous antibiotics subsequently switched to Keflex and stopped 01/24/2020  · Also atrial fibrillation with rapid ventricular response treated with Xarelto anticoagulation  · He had also developed AK I during the hospitalization with a peak creatinine 3 99 and improved to 0 94 mg/dL upon discharge    Then the patient fell with no trauma to the head  Just sore at this time     No fevers chills cough or colds    Good appetite and fair to poor energy at this time  No hematuria, dysuria, voiding symptoms or foamy urine  No gastrointestinal symptoms  No cardiovascular symptoms; improved swelling of his legs as he has decrease prednisone  No headaches, dizziness or lightheadedness  Blood pressure medications:  · Toprol XL 50 mg daily in the evening    ROS:  See HPI, otherwise review of systems as completely reviewed with the patient are negative    Past Medical History:   Diagnosis Date    [facility] )     Chronic kidney disease     Colon polyps     GERD (gastroesophageal reflux disease)     Hematuria     History of rheumatic fever     Hyperlipidemia     Hypolipidemia     Hypotension     Ischemic cardiomyopathy     [facility] )     Pulmonary emphysema ([facility] )      Past Surgical History:   Procedure Laterality Date    COLONOSCOPY  2019    HERNIA REPAIR      IR ABDOMINAL ANGIOGRAPHY / INTERVENTION  6/27/2019    IR ABDOMINAL ANGIOGRAPHY / INTERVENTION  2/12/2020    POPLITEAL ARTERY STENT Right     6/2019    [place] SLCTV CATHJ 3RD+ ORD SLCTV ABDL PEL/LXTR [facility] Right 6/27/2019    Procedure: leg ANGIOGRAM WITH STENT, BALLOON ANGIOPLASTY, LEFT GROIN ACCESS;  Surgeon: Charity Stevens MD;  Location: [facility];  Service: Vascular     Family History   Problem Relation Age of Onset    Heart disease Mother     Hyperlipidemia Mother    [facility] Hypertension Father     Heart disease Father     Stroke Father     Hyperlipidemia Father     Diabetes Brother     No Known Problems Maternal Grandmother     Dementia Neg Hx     Drug abuse Neg Hx     Mental illness Neg Hx     Substance Abuse Neg Hx     Alcohol abuse Neg Hx     Depression Neg Hx     Colon cancer Neg Hx       reports that he has been smoking cigarettes  He has a 7 50 pack-year smoking history  He has never used smokeless tobacco  He reports that he drinks alcohol  He reports that he does not use drugs  I COMPLETELY REVIEWED THE PAST MEDICAL HISTORY/PAST SURGICAL HISTORY/SOCIAL HISTORY/FAMILY HISTORY/AND MEDICATIONS  AND UPDATED ALL    Objective:     Vitals:   Vitals:    02/24/20 1052   BP: 112/71   Pulse: 94   BP sitting on left:  110/70 with a heart rate of 88 and irregular  BP standing on left:    Weight (last 2 days)     Date/Time   Weight    02/24/20 1052   88 (194)            Wt Readings from Last 3 Encounters:   02/24/20 88 kg (194 lb)   02/18/20 84 8 kg (187 lb)   02/12/20 83 5 kg (184 lb)       Body mass index is 27 84 kg/m²      Physical Exam: General:  Obese, but in No acute distress  Skin:  No acute rash; scattered ecchymoses  Eyes:  No scleral icterus, noninjected, no discharge from eyes  ENT:  Moist mucous membranes  Neck:  Supple, no jugular venous distention, trachea is midline, no lymphadenopathy and no thyromegaly  Back   No CVAT  Chest:  Clear to auscultation and percussion, good respiratory effort  CVS:  Irregular rhythm, without a rub, or gallops or murmurs  Abdomen:  Obese, Soft and nontender with normal bowel sounds  Extremities:  No cyanosis and no significant lower extremity edema, no arthritic changes, normal range of motion  Neuro:  Grossly intact  Psych:  Alert, oriented x3 and appropriate      Medications:    Current Outpatient Medications:     aspirin 81 MG tablet, Take 81 mg by mouth daily, Disp: , Rfl:     atorvastatin (LIPITOR) 20 mg tablet, Take 1 tablet (20 mg total) by mouth daily, Disp: 90 tablet, Rfl: 1    Cholecalciferol (VITAMIN D) 2000 units CAPS, Take 2,000 Units by mouth daily, Disp: , Rfl:     dicyclomine (Bentyl) 10 mg capsule, Take 10 mg by mouth 4 (four) times a day (before meals and at bedtime), Disp: , Rfl:     folic acid (FOLVITE) 1 mg tablet, Take 1 tablet (1 mg total) by mouth daily, Disp: 30 tablet, Rfl: 3    Magnesium 500 MG TABS, Take 1 tablet by mouth 2 (two) times a day, Disp: , Rfl:     metoprolol succinate (TOPROL-XL) 50 mg 24 hr tablet, Take 1 tablet (50 mg total) by mouth daily, Disp: 30 tablet, Rfl: 0    omeprazole (PriLOSEC) 40 MG capsule, TAKE 1 CAPSULE BY MOUTH EVERY DAY ONE-HALF HOUR BEFORE BREAKFAST, Disp: , Rfl: 5    predniSONE 5 mg tablet, Take 1 tablet (5 mg total) by mouth daily 40 mg (8 tabs) for 7 days followed by 35 mg (7 tabs) for 7 days, 30 mg (6 tabs) for 7 days and continue to decrease by 5 per day every week, Disp: 360 tablet, Rfl: 1    rivaroxaban (XARELTO) 20 mg tablet, Take 1 tablet (20 mg total) by mouth daily with breakfast, Disp: 90 tablet, Rfl: 3    sulfaSALAzine (AZULFIDINE-EN) 500 mg EC tablet, Take 1 tablet (500 mg total) by mouth 2 (two) times a day, Disp: 60 tablet, Rfl: 3    ergocalciferol (VITAMIN D2) 50,000 units, Take 1 capsule (50,000 Units total) by mouth once a week for 12 doses, Disp: 12 capsule, Rfl: 0    Lab, Imaging and other studies: I have personally reviewed pertinent labs    Laboratory Results:  Results for orders placed or performed in visit on 02/21/20   CBC and Platelet   Result Value Ref Range    WBC 11 30 (H) 4 31 - 10 16 Thousand/uL    RBC 4 48 3 88 - 5 62 Million/uL    Hemoglobin 13 2 12 0 - 17 0 g/dL    Hematocrit 43 5 36 5 - 49 3 %    MCV 97 82 - 98 fL    MCH 29 5 26 8 - 34 3 pg    MCHC 30 3 (L) 31 4 - 37 4 g/dL    RDW 19 2 (H) 11 6 - 15 1 %    Platelets 083 817 - 554 Thousands/uL    MPV 9 6 8 9 - 12 7 fL       Results from last 7 days   Lab Units 02/21/20  0607   WBC Thousand/uL 11 30*   HEMOGLOBIN g/dL 13 2   HEMATOCRIT % 43 5   PLATELETS Thousands/uL 350         Radiology review:   chest X-ray    Ultrasound      Portions of the record may have been created with voice recognition software  Occasional wrong word or "sound a like" substitutions may have occurred due to the inherent limitations of voice recognition software  Read the chart carefully and recognize, using context, where substitutions have occurred

## 2020-02-24 NOTE — PATIENT INSTRUCTIONS
1  Medication changes today:  · Please take vitamin-D booster 04501 units once a week for 12 weeks  After completing this medication go back to vitamin-D 2000 units daily  · Please take iron over-the-counter twice a week watch for constipation    2  Please go for nonfasting lab work at this time any time of the day    3  Please go for nonfasting lab work in 3 months any time of the day    4  Follow-up in 6 months:  -please go for fasting lab work 1-2 weeks prior to your appointment    5  General instructions:   AVOID SALT BUT NOT ADDING AN READING LABELS TO MAKE SURE THERE IS LOW-SALT IN THE FOOD THAT YOU ARE EATING     Avoid nonsteroidal anti-inflammatory drugs such as Naprosyn, ibuprofen, Aleve, Advil, Celebrex, etc   You can use Tylenol as needed if you do not have any liver condition to be concerned about     Avoid medications such as Sudafed or decongestants and antihistamines that contained the D component which is the decongestant  You can take antihistamines without the decongestant or D component   Try to avoid medications such as pantoprazole or  Protonix/Nexium or Esomeprazole)/Prilosec or omeprazole/Prevacid or lansoprazole/AcipHex or Rabeprazole  If you are able to, use Pepcid as this is safer for your kidneys   Try to exercise at least 30 minutes 3 days a week to begin with with an ultimate goal of 5 days a week for at least 30 minutes     Try to lose 5-10 lb by your next visit     Please do not drink more than 2 glasses of alcohol/wine on a daily basis as this may contribute to your high blood pressure

## 2020-02-26 ENCOUNTER — TELEPHONE (OUTPATIENT)
Dept: VASCULAR SURGERY | Facility: CLINIC | Age: 70
End: 2020-02-26

## 2020-02-26 NOTE — TELEPHONE ENCOUNTER
Patients wife called, he had stent placed right leg on 2/12 and is on xarelto post procedure  Patient fell on 2/21 and now his left leg is ecchymotic and swollen and hard  He is able to walk, and has an appt with dr Miroslava Amado on 2/28,  Should he wait to appt , but she thinks the swelling is increasing left leg and thigh appears hard    Please advise

## 2020-02-26 NOTE — TELEPHONE ENCOUNTER
Nursing reported recent RIGHT lower extremity procedure on 2/12  He on rivaroxaban of blue toe syndrome    He had a mechanical fall where is walking [place] a [place] sustaining ecchymoses over the left thigh and he has pain in the left calf  He is walking ok      -recommend PCP evaluation of bruising to make sure that there are no more extensive injuries  -he already has a follow-up appointment with Dr Lo Richardson in 2 days  Duration reassess continue appropriateness for continued anticoagulation in setting of mechanical fall and ecchymosis

## 2020-02-26 NOTE — TELEPHONE ENCOUNTER
I spoke to Solitario Sullivan PA-C and she said patient should be evaluated probably by PCP  He could also try conservative measures like ice and elevation and he should keep his appt for 2/28 with dr Jerry Harris

## 2020-02-28 ENCOUNTER — APPOINTMENT (OUTPATIENT)
Dept: LAB | Facility: CLINIC | Age: 70
End: 2020-02-28
Payer: MEDICARE

## 2020-02-28 ENCOUNTER — OFFICE VISIT (OUTPATIENT)
Dept: VASCULAR SURGERY | Facility: CLINIC | Age: 70
End: 2020-02-28
Payer: MEDICARE

## 2020-02-28 VITALS
RESPIRATION RATE: 18 BRPM | TEMPERATURE: 97.2 F | HEART RATE: 106 BPM | WEIGHT: 199 LBS | HEIGHT: 70 IN | DIASTOLIC BLOOD PRESSURE: 84 MMHG | SYSTOLIC BLOOD PRESSURE: 128 MMHG | BODY MASS INDEX: 28.49 KG/M2

## 2020-02-28 DIAGNOSIS — R80.1 PERSISTENT PROTEINURIA: ICD-10-CM

## 2020-02-28 DIAGNOSIS — I73.9 PAD (PERIPHERAL ARTERY DISEASE) (HCC): Primary | ICD-10-CM

## 2020-02-28 DIAGNOSIS — D63.1 ANEMIA OF CHRONIC RENAL FAILURE, STAGE 3 (MODERATE) (HCC): ICD-10-CM

## 2020-02-28 DIAGNOSIS — N18.30 ANEMIA OF CHRONIC RENAL FAILURE, STAGE 3 (MODERATE) (HCC): ICD-10-CM

## 2020-02-28 DIAGNOSIS — N18.30 CHRONIC KIDNEY DISEASE, STAGE III (MODERATE) (HCC): ICD-10-CM

## 2020-02-28 DIAGNOSIS — I48.0 PAROXYSMAL ATRIAL FIBRILLATION (HCC): ICD-10-CM

## 2020-02-28 DIAGNOSIS — I75.021 BLUE TOE SYNDROME, RIGHT (HCC): ICD-10-CM

## 2020-02-28 DIAGNOSIS — Z95.820 S/P PERIPHERAL ARTERY ANGIOPLASTY WITH STENT PLACEMENT: ICD-10-CM

## 2020-02-28 DIAGNOSIS — I25.5 ISCHEMIC CARDIOMYOPATHY: ICD-10-CM

## 2020-02-28 DIAGNOSIS — E66.3 OVERWEIGHT (BMI 25.0-29.9): ICD-10-CM

## 2020-02-28 DIAGNOSIS — E78.5 DYSLIPIDEMIA: ICD-10-CM

## 2020-02-28 DIAGNOSIS — I95.89 CHRONIC HYPOTENSION: ICD-10-CM

## 2020-02-28 DIAGNOSIS — E55.9 VITAMIN D DEFICIENCY: ICD-10-CM

## 2020-02-28 LAB
ANION GAP SERPL CALCULATED.3IONS-SCNC: 8 MMOL/L (ref 4–13)
BUN SERPL-MCNC: 18 MG/DL (ref 5–25)
CALCIUM SERPL-MCNC: 8.1 MG/DL (ref 8.3–10.1)
CHLORIDE SERPL-SCNC: 108 MMOL/L (ref 100–108)
CO2 SERPL-SCNC: 26 MMOL/L (ref 21–32)
CREAT SERPL-MCNC: 1.08 MG/DL (ref 0.6–1.3)
GFR SERPL CREATININE-BSD FRML MDRD: 70 ML/MIN/1.73SQ M
GLUCOSE SERPL-MCNC: 104 MG/DL (ref 65–140)
POTASSIUM SERPL-SCNC: 4.3 MMOL/L (ref 3.5–5.3)
SODIUM SERPL-SCNC: 142 MMOL/L (ref 136–145)

## 2020-02-28 PROCEDURE — 1111F DSCHRG MED/CURRENT MED MERGE: CPT | Performed by: SURGERY

## 2020-02-28 PROCEDURE — 4004F PT TOBACCO SCREEN RCVD TLK: CPT | Performed by: SURGERY

## 2020-02-28 PROCEDURE — 80048 BASIC METABOLIC PNL TOTAL CA: CPT

## 2020-02-28 PROCEDURE — 4040F PNEUMOC VAC/ADMIN/RCVD: CPT | Performed by: SURGERY

## 2020-02-28 PROCEDURE — 3008F BODY MASS INDEX DOCD: CPT | Performed by: SURGERY

## 2020-02-28 PROCEDURE — 99214 OFFICE O/P EST MOD 30 MIN: CPT | Performed by: SURGERY

## 2020-02-28 PROCEDURE — 36415 COLL VENOUS BLD VENIPUNCTURE: CPT

## 2020-02-28 PROCEDURE — 1160F RVW MEDS BY RX/DR IN RCRD: CPT | Performed by: SURGERY

## 2020-02-28 NOTE — ASSESSMENT & PLAN NOTE
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 s/p popliteal artery stent placement, subsequent occlusion and now popliteal stent recanalization with PTA/stent extension  He has not noted significant improvement since the procedure and still has claudication, paresthesias in the foot and blue toes  He has a monophasic popliteal and PT signal with significant right foot/ankle edema  He has had a recent traumatic fall but denies injuring his right leg  Reviewing right leg angiogram findings and results as well as imaging with the patient and his wife  Excellent result with patent SFA/popliteal with direct inline flow to the posterior tibial artery    Continue ASA, xarelto, statin  Advised going to the ED if any worsening back or lower extremity pain after recent fall on blood thinners with significant bruising  Will obtain lower extremity arterial duplex to assess arterial status/stent patency and MTP/GTP after right lower extremity angiogram

## 2020-02-28 NOTE — PROGRESS NOTES
Assessment/Plan:    S/P peripheral artery angioplasty with stent placement  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 s/p popliteal artery stent placement, subsequent occlusion and now popliteal stent recanalization with PTA/stent extension  He has not noted significant improvement since the procedure and still has claudication, paresthesias in the foot and blue toes  He has a monophasic popliteal and PT signal with significant right foot/ankle edema  He has had a recent traumatic fall but denies injuring his right leg  Reviewing right leg angiogram findings and results as well as imaging with the patient and his wife  Excellent result with patent SFA/popliteal with direct inline flow to the posterior tibial artery  Continue ASA, xarelto, statin  Advised going to the ED if any worsening back or lower extremity pain after recent fall on blood thinners with significant bruising  Will obtain lower extremity arterial duplex to assess arterial status/stent patency and MTP/GTP after right lower extremity angiogram       Diagnoses and all orders for this visit:    PAD (peripheral artery disease) ([facility])  -     VAS lower limb arterial duplex, complete bilateral; Future    Chronic kidney disease, stage III (moderate) ([facility])    Overweight (BMI 25 0-29  9)    S/P peripheral artery angioplasty with stent placement    Dyslipidemia    Blue toe syndrome, right (HCC)    Ischemic cardiomyopathy    Paroxysmal atrial fibrillation ([facility] [address] )        I have spent 25 minutes with Patient  today in which greater than 50% of this time was spent in counseling/coordination of care regarding Intructions for management, Importance of tx compliance and Impressions  Subjective:      Patient ID: Christian Green is a 77 y o  male  Patient presents in office to review the results of his R leg Agram w poss intervention done 2/12/2020 by Dr Moira Spivey  Patient reports since procedure all symptoms have stayed the same   Patient reports still having R leg pain and numbness  Patient reports having R leg calf cramping when walking  Patient reports falling on 2/21/2020 which has resulted in his L side of his body to have bruising and swelling  Patient denies going to the hospital or seeking any medical attention  Patient denies any fever or chills  Patient denies any wounds or ulcers  Patient is a current everyday smoker x 1/2 pack/day  Patient is taking aspirin, atorvastatin, and xarelto  HPI  Mr Pratibha Velarde is a 66yo male with hx blue toe syndrome/PAD s/p right popliteal stent placement, afib on xarelto, CKD3, thrombocytosis, chronic diarrhea, GERD, HLD, HTN, ischemic cardiomyopathy and emphysema, had presented with right popliteal stent occlusion and right lower extremity angiogram with recanalization of stent occlusion, SFA and popliteal artery PTA and Viabahn stent extension on 2/12  He had a fall last week and tripped over a plantar, landing on his left side and back resulting in significant ecchymosis and bruising all throughout with left leg swelling  He also noted more right foot/ankle swelling over the last day  He states that his symptoms are mostly back discomfort and has not wanted to go to the ED to be examined  He has been taking ASA, xarelto and statin  Since the right lower extremity intervention, he has not noted significant improvement in his symptoms of right calf claudication with intermittent paresthesias/cyanotic toes  The following portions of the patient's history were reviewed and updated as appropriate: allergies, current medications, past family history, past medical history, past social history, past surgical history and problem list     Review of Systems   Constitutional: Negative  Negative for chills and fever  HENT: Negative  Eyes: Negative  Respiratory: Negative  Cardiovascular: Positive for leg swelling  Gastrointestinal: Negative  Endocrine: Negative  Genitourinary: Negative  Musculoskeletal: Positive for back pain  Leg pain  Calf cramping with walking   Skin: Negative for wound  Allergic/Immunologic: Negative  Neurological: Negative  Hematological: Negative  Psychiatric/Behavioral: Negative  I have personally reviewed the ROS entered by MA and agree as documented  Objective:      /84 (BP Location: Right arm, Patient Position: Sitting, Cuff Size: Adult)   Pulse (!) 106   Temp (!) 97 2 °F (36 2 °C) (Tympanic)   Resp 18   Ht 5' 10" (1 778 m)   Wt 90 3 kg (199 lb)   BMI 28 55 kg/m²          Physical Exam   Constitutional: He is oriented to person, place, and time  He appears well-developed and well-nourished  HENT:   Head: Normocephalic and atraumatic  Eyes: EOM are normal    Neck: Normal range of motion  Neck supple  Cardiovascular: Normal rate and regular rhythm  Pulses:       Femoral pulses are 1+ on the right side, and 1+ on the left side  Left multiphasic dopplerable DP/PT signals  Right dopplerable popliteal and faint monophasic PT signal   Pulmonary/Chest: Effort normal    Abdominal: Soft  Musculoskeletal: Normal range of motion  Neurological: He is alert and oriented to person, place, and time  Skin: Skin is warm and dry  Capillary refill takes less than 2 seconds  Left lower extremity and left lower abdomen/flank/back ecchymosis  Left groin soft without hematoma or ecchymosis   Psychiatric: He has a normal mood and affect  His behavior is normal  Judgment and thought content normal    Nursing note and vitals reviewed

## 2020-02-28 NOTE — PATIENT INSTRUCTIONS
S/P peripheral artery angioplasty with stent placement  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 s/p popliteal artery stent placement, subsequent occlusion and now popliteal stent recanalization with PTA/stent extension  He has not noted significant improvement since the procedure and still has claudication, paresthesias in the foot and blue toes  He has a monophasic popliteal and PT signal with significant right foot/ankle edema  He has had a recent traumatic fall but denies injuring his right leg      Reviewing right leg angiogram findings and results as well as imaging with the patient and his wife  Excellent result with patent SFA/popliteal with direct inline flow to the posterior tibial artery    Continue ASA, xarelto, statin  Advised going to the ED if any worsening back or lower extremity pain after recent fall on blood thinners with significant bruising  Will obtain lower extremity arterial duplex to assess arterial status/stent patency and MTP/GTP after right lower extremity angiogram

## 2020-03-02 ENCOUNTER — HOSPITAL ENCOUNTER (OUTPATIENT)
Dept: NON INVASIVE DIAGNOSTICS | Facility: CLINIC | Age: 70
Discharge: HOME | End: 2020-03-02
Payer: MEDICARE

## 2020-03-02 ENCOUNTER — TELEPHONE (OUTPATIENT)
Dept: VASCULAR SURGERY | Facility: CLINIC | Age: 70
End: 2020-03-02

## 2020-03-02 DIAGNOSIS — I48.0 PAROXYSMAL ATRIAL FIBRILLATION (HCC): ICD-10-CM

## 2020-03-02 DIAGNOSIS — I73.9 PAD (PERIPHERAL ARTERY DISEASE) (HCC): ICD-10-CM

## 2020-03-02 PROCEDURE — 93925 LOWER EXTREMITY STUDY: CPT

## 2020-03-02 PROCEDURE — 93923 UPR/LXTR ART STDY 3+ LVLS: CPT

## 2020-03-02 RX ORDER — METOPROLOL SUCCINATE 50 MG/1
50 TABLET, EXTENDED RELEASE ORAL DAILY
Qty: 90 TABLET | Refills: 3 | Status: SHIPPED | OUTPATIENT
Start: 2020-03-02 | End: 2020-03-16 | Stop reason: DRUGHIGH

## 2020-03-02 NOTE — TELEPHONE ENCOUNTER
----- Message from Jerry Harris MD sent at 3/2/2020  4:23 PM EST -----  Regarding: f/u appt after LEAD  Please schedule followup appointment with me asap in the office  Thanks!

## 2020-03-03 ENCOUNTER — TELEPHONE (OUTPATIENT)
Dept: ADMINISTRATIVE | Facility: HOSPITAL | Age: 70
End: 2020-03-03

## 2020-03-03 DIAGNOSIS — I73.9 PAD (PERIPHERAL ARTERY DISEASE) (HCC): Primary | ICD-10-CM

## 2020-03-03 PROCEDURE — 93922 UPR/L XTREMITY ART 2 LEVELS: CPT | Performed by: SURGERY

## 2020-03-03 PROCEDURE — 93925 LOWER EXTREMITY STUDY: CPT | Performed by: SURGERY

## 2020-03-03 NOTE — TELEPHONE ENCOUNTER
Rec email from Dr Isamar Ferguson to schedule patient for vein mapping prior to office visit next week  Patient is scheduled for 11am 3/4/20 at [facility]  Called patient and confirmed  From: María Camera   Sent: Tuesday, March 03, 2020 2:20 PM  To: Vascular Surgery Schedulers  Subject: umer eid vein mapping for bypass    Hi all,  I have Javiernitinbijan Coppola coming to see me next Tuesday with occluded popliteal stent and hell need a bypass  Can you help get him scheduled for vein mapping this week so we have it for the office appointment?     Thanks,  Ruth Jalloh

## 2020-03-04 ENCOUNTER — HOSPITAL ENCOUNTER (OUTPATIENT)
Dept: NON INVASIVE DIAGNOSTICS | Facility: CLINIC | Age: 70
Discharge: HOME | End: 2020-03-04
Payer: MEDICARE

## 2020-03-04 DIAGNOSIS — I73.9 PAD (PERIPHERAL ARTERY DISEASE) (HCC): ICD-10-CM

## 2020-03-04 PROCEDURE — 93971 EXTREMITY STUDY: CPT

## 2020-03-05 PROCEDURE — 93971 EXTREMITY STUDY: CPT | Performed by: SURGERY

## 2020-03-09 ENCOUNTER — OFFICE VISIT (OUTPATIENT)
Dept: INTERNAL MEDICINE CLINIC | Facility: CLINIC | Age: 70
End: 2020-03-09
Payer: MEDICARE

## 2020-03-09 VITALS
BODY MASS INDEX: 29.47 KG/M2 | DIASTOLIC BLOOD PRESSURE: 86 MMHG | WEIGHT: 199 LBS | TEMPERATURE: 97.9 F | HEART RATE: 106 BPM | SYSTOLIC BLOOD PRESSURE: 128 MMHG | HEIGHT: 69 IN | OXYGEN SATURATION: 96 %

## 2020-03-09 DIAGNOSIS — Z72.0 TOBACCO USE: ICD-10-CM

## 2020-03-09 DIAGNOSIS — R60.0 LOCALIZED EDEMA: ICD-10-CM

## 2020-03-09 DIAGNOSIS — I87.8 VENOUS STASIS: ICD-10-CM

## 2020-03-09 DIAGNOSIS — Z95.820 S/P PERIPHERAL ARTERY ANGIOPLASTY WITH STENT PLACEMENT: Primary | ICD-10-CM

## 2020-03-09 PROBLEM — E87.70 VOLUME OVERLOAD: Status: RESOLVED | Noted: 2020-01-19 | Resolved: 2020-03-09

## 2020-03-09 PROCEDURE — 4040F PNEUMOC VAC/ADMIN/RCVD: CPT | Performed by: INTERNAL MEDICINE

## 2020-03-09 PROCEDURE — 1111F DSCHRG MED/CURRENT MED MERGE: CPT | Performed by: INTERNAL MEDICINE

## 2020-03-09 PROCEDURE — 3008F BODY MASS INDEX DOCD: CPT | Performed by: INTERNAL MEDICINE

## 2020-03-09 PROCEDURE — 99213 OFFICE O/P EST LOW 20 MIN: CPT | Performed by: INTERNAL MEDICINE

## 2020-03-09 PROCEDURE — 4004F PT TOBACCO SCREEN RCVD TLK: CPT | Performed by: INTERNAL MEDICINE

## 2020-03-09 PROCEDURE — 1160F RVW MEDS BY RX/DR IN RCRD: CPT | Performed by: INTERNAL MEDICINE

## 2020-03-09 RX ORDER — FUROSEMIDE 20 MG/1
20 TABLET ORAL DAILY PRN
Qty: 30 TABLET | Refills: 0 | Status: SHIPPED | OUTPATIENT
Start: 2020-03-09 | End: 2020-03-23

## 2020-03-09 NOTE — ASSESSMENT & PLAN NOTE
Rash on lower leg/ankle area  Apply petroleum jelly daily, may use hydrocortisone cream prn  Suspect due to PVD, keep appointment with vascular later this week

## 2020-03-09 NOTE — PROGRESS NOTES
Assessment/Plan:    S/P peripheral artery angioplasty with stent placement  Rash on lower leg/ankle area  Apply petroleum jelly daily, may use hydrocortisone cream prn  Suspect due to PVD, keep appointment with vascular later this week  Diarrhea  Will complete prednisone taper by next week  Follow up with [facility] as scheduled  Tobacco use  Still smoking 1-2 cigarettes most days a week  Diagnoses and all orders for this visit:    S/P peripheral artery angioplasty with stent placement    Localized edema  Comments:  Suspect due to recent fall  Start furosemide x 1 week then prn  Orders:  -     furosemide (LASIX) 20 mg tablet; Take 1 tablet (20 mg total) by mouth daily as needed (edema)    Tobacco use    Venous stasis      Follow up as scheduled or as needed  Subjective:      Patient ID: Ramila La is a 77 y o  male  Mr Pratibha Velarde is here with his wife  He reports that he fell a few weeks ago, missed the steps and fell on his left side  No head injury or loss of consciousness  He fell a large bruise on his left buttock and left thigh area, this has since resolved  He complains of left leg swelling, intermittent since then  He reports area on the lower leg would open up and ooze sometimes  This has been improving since the swelling has improved  He has not taken any diuretics  He also complains of a rash on his right ankle and lower leg area  This started about a week ago  Area is dry, would occasionally be itchy  He does not scratch  He reports there was an open wound which had discharge but this has healed  He reports toes are blue, complains of an ache on his lower foot and leg area  He has an appointment with vascular to discuss surgery  He will complete his prednisone taper, currently taking 5 mg daily  No abdominal pain or diarrhea      The following portions of the patient's history were reviewed and updated as appropriate: allergies, current medications, past medical history, past social history and problem list     Review of Systems   Constitutional: Negative for activity change and appetite change  Respiratory: Negative for cough and shortness of breath  Cardiovascular: Positive for leg swelling  Negative for chest pain  Genitourinary: Negative for dysuria  Musculoskeletal: Negative for gait problem and joint swelling  Skin: Positive for rash and wound  Hematological: Does not bruise/bleed easily  Objective:      /86   Pulse (!) 106   Temp 97 9 °F (36 6 °C)   Ht 5' 9" (1 753 m)   Wt 90 3 kg (199 lb)   SpO2 96%   BMI 29 39 kg/m²          Physical Exam   Constitutional: He is oriented to person, place, and time  He appears well-developed and well-nourished  HENT:   Head: Normocephalic  Cardiovascular: Normal rate and regular rhythm  Pitting edema, left LE   Pulmonary/Chest: Effort normal and breath sounds normal    Abdominal: Soft  Musculoskeletal: He exhibits edema  Neurological: He is alert and oriented to person, place, and time  Skin: Rash noted  There is erythema  Nursing note and vitals reviewed

## 2020-03-09 NOTE — PATIENT INSTRUCTIONS
Take furosemide daily for a week only, then as needed  Apply Vaseline (petroleum jelly) 1-2x a day  May apply hydrocortisone thinly, once a day as needed

## 2020-03-12 ENCOUNTER — TELEPHONE (OUTPATIENT)
Dept: ADMINISTRATIVE | Facility: HOSPITAL | Age: 70
End: 2020-03-12

## 2020-03-12 ENCOUNTER — OFFICE VISIT (OUTPATIENT)
Dept: VASCULAR SURGERY | Facility: CLINIC | Age: 70
End: 2020-03-12
Payer: MEDICARE

## 2020-03-12 VITALS
RESPIRATION RATE: 20 BRPM | WEIGHT: 193 LBS | TEMPERATURE: 97.9 F | HEART RATE: 117 BPM | SYSTOLIC BLOOD PRESSURE: 132 MMHG | HEIGHT: 69 IN | DIASTOLIC BLOOD PRESSURE: 72 MMHG | BODY MASS INDEX: 28.58 KG/M2

## 2020-03-12 DIAGNOSIS — I87.8 VENOUS STASIS: ICD-10-CM

## 2020-03-12 DIAGNOSIS — I73.9 PAD (PERIPHERAL ARTERY DISEASE) (HCC): Primary | ICD-10-CM

## 2020-03-12 DIAGNOSIS — Z95.820 S/P PERIPHERAL ARTERY ANGIOPLASTY WITH STENT PLACEMENT: ICD-10-CM

## 2020-03-12 PROCEDURE — 99215 OFFICE O/P EST HI 40 MIN: CPT | Performed by: SURGERY

## 2020-03-12 PROCEDURE — 4040F PNEUMOC VAC/ADMIN/RCVD: CPT | Performed by: SURGERY

## 2020-03-12 PROCEDURE — 4004F PT TOBACCO SCREEN RCVD TLK: CPT | Performed by: SURGERY

## 2020-03-12 PROCEDURE — 3008F BODY MASS INDEX DOCD: CPT | Performed by: SURGERY

## 2020-03-12 PROCEDURE — 1111F DSCHRG MED/CURRENT MED MERGE: CPT | Performed by: SURGERY

## 2020-03-12 PROCEDURE — 1160F RVW MEDS BY RX/DR IN RCRD: CPT | Performed by: SURGERY

## 2020-03-12 RX ORDER — CHLORHEXIDINE GLUCONATE 0.12 MG/ML
15 RINSE ORAL EVERY 12 HOURS SCHEDULED
Status: CANCELLED | OUTPATIENT
Start: 2020-03-12

## 2020-03-12 RX ORDER — CEFAZOLIN SODIUM 2 G/50ML
2000 SOLUTION INTRAVENOUS ONCE
Status: CANCELLED | OUTPATIENT
Start: 2020-03-12 | End: 2020-03-12

## 2020-03-12 RX ORDER — CHLORHEXIDINE GLUCONATE 0.12 MG/ML
15 RINSE ORAL ONCE
Status: CANCELLED | OUTPATIENT
Start: 2020-03-12 | End: 2020-03-12

## 2020-03-12 NOTE — H&P (VIEW-ONLY)
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 s/p popliteal artery stent placement, subsequent occlusion and now popliteal stent recanalization with PTA/stent extension  Unfortunately has experienced early stent thrombosis, which was noted on LEADs  He also underwent vein mapping prior to his visit which demonstrates a suitable Right GSV for bypass  He reports that he is experiencing lifestyle limiting/disabling claudication of the right lower extremity similar to what his symptoms were prior to any intervention  At this point patient would benefit from open surgical bypass rather than repeat endo-intervention  Had an extensive risks/benefits alternatives discussion  and the patient consented to proceed with Right SFA-below knee popliteal artery bypass with greater saphenous vein and completion angiogram       Continue ASA, xarelto, statin  -will hold xarelto pre-procedurally   -will obtain pre-op cardiac risk stratification          Problem List Items Addressed This Visit        Cardiovascular and Mediastinum    PAD (peripheral artery disease) ([facility] [address] ) - Primary    Relevant Orders    Case request operating room: BYPASS FEMORAL-POPLITEAL (Completed)    Type and screen    Ambulatory referral to Cardiology    HEMOGLOBIN A1C W/ EAG ESTIMATION       Other    S/P peripheral artery angioplasty with stent placement     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 s/p popliteal artery stent placement, subsequent occlusion and now popliteal stent recanalization with PTA/stent extension  Unfortunately has experienced early stent thrombosis, which was noted on LEADs     He also underwent vein mapping prior to his visit which demonstrates a suitable Right GSV for bypass  He reports that he is experiencing lifestyle limiting/disabling claudication of the right lower extremity similar to what his symptoms were prior to any intervention  At this point patient would benefit from open surgical bypass rather than repeat endo-intervention  Had an extensive risks/benefits alternatives discussion  and the patient consented to proceed with Right SFA-below knee popliteal artery bypass with greater saphenous vein and completion angiogram       Continue ASA, xarelto, statin  -will hold xarelto pre-procedurally   -will obtain pre-op cardiac risk stratification          Relevant Orders    Case request operating room: BYPASS FEMORAL-POPLITEAL (Completed)    Type and screen    Ambulatory referral to Cardiology    HEMOGLOBIN A1C W/ EAG ESTIMATION    Venous stasis     Has asymptomatic varicose veins and reticular veins of the left lower extremity      Recommend compression stockings knee high 15-20mmHG with rest and elevation of the lower extremity when possible  Subjective:      Patient ID: Chad Florian is a 77 y o  male  Patient presents in office to review the results of his STEFFEN done on 3/2/2020  Patient had LEV vein mapping on 3/4/2020  Patient had a R leg agram done by Dr Fantasma Freeman on 2/12/2020  Patient reports having R leg pain and cramping with walking in his calf  Patient reports with rest of about 3-4 minutes the cramping stops and he can walk again  Patient reports having R foot tingling  Patient reports having a rash on his R ankle x 2-3 weeks  Patient reports the swelling in his legs have gotten better, but still swollen L>R  Patient is a current every day smoker x 1/2 pack/day  Patient is taking aspirin, atorvastatin, and xarelto         The following portions of the patient's history were reviewed and updated as appropriate: allergies, current medications, past family history, past medical history, past social history, past surgical history and problem list     Review of Systems   Constitutional: Negative  Negative for chills and fever  HENT: Negative  Eyes: Negative  Respiratory: Negative  Cardiovascular: Positive for leg swelling (B/L L>R)  Gastrointestinal: Negative  Endocrine: Negative  Genitourinary: Negative  Musculoskeletal: Positive for gait problem (uses a cane)  R Leg pain   R Leg cramping with walking    Skin: Negative  Negative for wound  Allergic/Immunologic: Negative  Hematological: Negative  Psychiatric/Behavioral: Negative  I have reviewed and updated the ROS as appropriate  Objective:      /72 (BP Location: Left arm, Patient Position: Sitting, Cuff Size: Adult)   Pulse (!) 117   Temp 97 9 °F (36 6 °C) (Tympanic)   Resp 20   Ht 5' 9" (1 753 m)   Wt 87 5 kg (193 lb)   BMI 28 50 kg/m²          Physical Exam   Constitutional: He is oriented to person, place, and time  He appears well-developed and well-nourished  HENT:   Head: Normocephalic and atraumatic  Eyes: Pupils are equal, round, and reactive to light  EOM are normal    Neck: Normal range of motion  Neck supple  Cardiovascular: Normal rate, regular rhythm and normal heart sounds  Pulses:       Femoral pulses are 2+ on the right side, and 2+ on the left side  Popliteal pulses are 0 on the right side, and 1+ on the left side  Dorsalis pedis pulses are 0 on the right side  Posterior tibial pulses are 0 on the right side  Triphasic DP/AT on the left    Monophasic PT, absent AT on the right   Pulmonary/Chest: Effort normal and breath sounds normal    Abdominal: Soft  Bowel sounds are normal    Musculoskeletal: Normal range of motion  He exhibits edema  2+ edema on the left foot, with chronic venous stasis changes  Neurological: He is alert and oriented to person, place, and time  Skin: Skin is warm and dry   Capillary refill takes less than 2 seconds  Petechial rash on the lateral aspect of his right ankle   Psychiatric: He has a normal mood and affect   His behavior is normal  Judgment and thought content normal          Operative Scheduling Information:    Hospital:  [facility]    Physician:  Me    Surgery: RLE SFA-bk pop bypass with GSV, completion angiogram    Urgency:  Standard    Case Length:  4 hours    Post-op Bed:  Stepdown    OR Table:  Stille with C-arm    Equipment Needs:  None    Medication Instructions:  Xarelto:  Hold for 2 days prior to procedure  ASA/plavix do not hold    Hydration:  No

## 2020-03-12 NOTE — TELEPHONE ENCOUNTER
Ekaterina Llanes,    Is the patient having general anaesthesia? Who Is the surgeon and the date of surgery    Thank you Sandrita Mcguire

## 2020-03-12 NOTE — ASSESSMENT & PLAN NOTE
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 s/p popliteal artery stent placement, subsequent occlusion and now popliteal stent recanalization with PTA/stent extension  Unfortunately has experienced early stent thrombosis, which was noted on LEADs  He also underwent vein mapping prior to his visit which demonstrates a suitable Right GSV for bypass  He reports that he is experiencing lifestyle limiting/disabling claudication of the right lower extremity similar to what his symptoms were prior to any intervention  At this point patient would benefit from open surgical bypass rather than repeat endo-intervention   Had an extensive risks/benefits alternatives discussion  and the patient consented to proceed with Right SFA-below knee popliteal artery bypass with greater saphenous vein and completion angiogram       Continue ASA, xarelto, statin  -will hold xarelto pre-procedurally   -will obtain pre-op cardiac risk stratification

## 2020-03-12 NOTE — PROGRESS NOTES
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 s/p popliteal artery stent placement, subsequent occlusion and now popliteal stent recanalization with PTA/stent extension  Unfortunately has experienced early stent thrombosis, which was noted on LEADs  He also underwent vein mapping prior to his visit which demonstrates a suitable Right GSV for bypass  He reports that he is experiencing lifestyle limiting/disabling claudication of the right lower extremity similar to what his symptoms were prior to any intervention  At this point patient would benefit from open surgical bypass rather than repeat endo-intervention  Had an extensive risks/benefits alternatives discussion  and the patient consented to proceed with Right SFA-below knee popliteal artery bypass with greater saphenous vein and completion angiogram       Continue ASA, xarelto, statin  -will hold xarelto pre-procedurally   -will obtain pre-op cardiac risk stratification          Problem List Items Addressed This Visit        Cardiovascular and Mediastinum    PAD (peripheral artery disease) ([facility] [address] ) - Primary    Relevant Orders    Case request operating room: BYPASS FEMORAL-POPLITEAL (Completed)    Type and screen    Ambulatory referral to Cardiology    HEMOGLOBIN A1C W/ EAG ESTIMATION       Other    S/P peripheral artery angioplasty with stent placement     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 s/p popliteal artery stent placement, subsequent occlusion and now popliteal stent recanalization with PTA/stent extension  Unfortunately has experienced early stent thrombosis, which was noted on LEADs     He also underwent vein mapping prior to his visit which demonstrates a suitable Right GSV for bypass  He reports that he is experiencing lifestyle limiting/disabling claudication of the right lower extremity similar to what his symptoms were prior to any intervention  At this point patient would benefit from open surgical bypass rather than repeat endo-intervention  Had an extensive risks/benefits alternatives discussion  and the patient consented to proceed with Right SFA-below knee popliteal artery bypass with greater saphenous vein and completion angiogram       Continue ASA, xarelto, statin  -will hold xarelto pre-procedurally   -will obtain pre-op cardiac risk stratification          Relevant Orders    Case request operating room: BYPASS FEMORAL-POPLITEAL (Completed)    Type and screen    Ambulatory referral to Cardiology    HEMOGLOBIN A1C W/ EAG ESTIMATION    Venous stasis     Has asymptomatic varicose veins and reticular veins of the left lower extremity      Recommend compression stockings knee high 15-20mmHG with rest and elevation of the lower extremity when possible  Subjective:      Patient ID: Diogenes York is a 77 y o  male  Patient presents in office to review the results of his STEFFEN done on 3/2/2020  Patient had LEV vein mapping on 3/4/2020  Patient had a R leg agram done by Dr Syl Valladares on 2/12/2020  Patient reports having R leg pain and cramping with walking in his calf  Patient reports with rest of about 3-4 minutes the cramping stops and he can walk again  Patient reports having R foot tingling  Patient reports having a rash on his R ankle x 2-3 weeks  Patient reports the swelling in his legs have gotten better, but still swollen L>R  Patient is a current every day smoker x 1/2 pack/day  Patient is taking aspirin, atorvastatin, and xarelto         The following portions of the patient's history were reviewed and updated as appropriate: allergies, current medications, past family history, past medical history, past social history, past surgical history and problem list     Review of Systems   Constitutional: Negative  Negative for chills and fever  HENT: Negative  Eyes: Negative  Respiratory: Negative  Cardiovascular: Positive for leg swelling (B/L L>R)  Gastrointestinal: Negative  Endocrine: Negative  Genitourinary: Negative  Musculoskeletal: Positive for gait problem (uses a cane)  R Leg pain   R Leg cramping with walking    Skin: Negative  Negative for wound  Allergic/Immunologic: Negative  Hematological: Negative  Psychiatric/Behavioral: Negative  I have reviewed and updated the ROS as appropriate  Objective:      /72 (BP Location: Left arm, Patient Position: Sitting, Cuff Size: Adult)   Pulse (!) 117   Temp 97 9 °F (36 6 °C) (Tympanic)   Resp 20   Ht 5' 9" (1 753 m)   Wt 87 5 kg (193 lb)   BMI 28 50 kg/m²          Physical Exam   Constitutional: He is oriented to person, place, and time  He appears well-developed and well-nourished  HENT:   Head: Normocephalic and atraumatic  Eyes: Pupils are equal, round, and reactive to light  EOM are normal    Neck: Normal range of motion  Neck supple  Cardiovascular: Normal rate, regular rhythm and normal heart sounds  Pulses:       Femoral pulses are 2+ on the right side, and 2+ on the left side  Popliteal pulses are 0 on the right side, and 1+ on the left side  Dorsalis pedis pulses are 0 on the right side  Posterior tibial pulses are 0 on the right side  Triphasic DP/AT on the left    Monophasic PT, absent AT on the right   Pulmonary/Chest: Effort normal and breath sounds normal    Abdominal: Soft  Bowel sounds are normal    Musculoskeletal: Normal range of motion  He exhibits edema  2+ edema on the left foot, with chronic venous stasis changes  Neurological: He is alert and oriented to person, place, and time  Skin: Skin is warm and dry   Capillary refill takes less than 2 seconds  Petechial rash on the lateral aspect of his right ankle   Psychiatric: He has a normal mood and affect   His behavior is normal  Judgment and thought content normal          Operative Scheduling Information:    Hospital:  [facility]    Physician:  Me    Surgery: RLE SFA-bk pop bypass with GSV, completion angiogram    Urgency:  Standard    Case Length:  4 hours    Post-op Bed:  Stepdown    OR Table:  Stille with C-arm    Equipment Needs:  None    Medication Instructions:  Xarelto:  Hold for 2 days prior to procedure  ASA/plavix do not hold    Hydration:  No

## 2020-03-16 ENCOUNTER — OFFICE VISIT (OUTPATIENT)
Dept: CARDIOLOGY CLINIC | Facility: CLINIC | Age: 70
End: 2020-03-16
Payer: MEDICARE

## 2020-03-16 VITALS
HEIGHT: 69 IN | BODY MASS INDEX: 28.53 KG/M2 | DIASTOLIC BLOOD PRESSURE: 74 MMHG | WEIGHT: 192.6 LBS | HEART RATE: 106 BPM | SYSTOLIC BLOOD PRESSURE: 110 MMHG

## 2020-03-16 DIAGNOSIS — Z72.0 TOBACCO USE: ICD-10-CM

## 2020-03-16 DIAGNOSIS — I45.2 RBBB (RIGHT BUNDLE BRANCH BLOCK WITH LEFT ANTERIOR FASCICULAR BLOCK): ICD-10-CM

## 2020-03-16 DIAGNOSIS — I25.5 ISCHEMIC CARDIOMYOPATHY: ICD-10-CM

## 2020-03-16 DIAGNOSIS — I73.9 PAD (PERIPHERAL ARTERY DISEASE) (HCC): ICD-10-CM

## 2020-03-16 DIAGNOSIS — N18.30 CHRONIC KIDNEY DISEASE, STAGE III (MODERATE) (HCC): ICD-10-CM

## 2020-03-16 DIAGNOSIS — I48.0 PAROXYSMAL ATRIAL FIBRILLATION (HCC): Primary | ICD-10-CM

## 2020-03-16 DIAGNOSIS — E78.5 DYSLIPIDEMIA: ICD-10-CM

## 2020-03-16 DIAGNOSIS — Z01.810 PREOP CARDIOVASCULAR EXAM: ICD-10-CM

## 2020-03-16 PROCEDURE — 4004F PT TOBACCO SCREEN RCVD TLK: CPT | Performed by: INTERNAL MEDICINE

## 2020-03-16 PROCEDURE — 99214 OFFICE O/P EST MOD 30 MIN: CPT | Performed by: INTERNAL MEDICINE

## 2020-03-16 PROCEDURE — 1160F RVW MEDS BY RX/DR IN RCRD: CPT | Performed by: INTERNAL MEDICINE

## 2020-03-16 PROCEDURE — 93000 ELECTROCARDIOGRAM COMPLETE: CPT | Performed by: INTERNAL MEDICINE

## 2020-03-16 PROCEDURE — 1111F DSCHRG MED/CURRENT MED MERGE: CPT | Performed by: INTERNAL MEDICINE

## 2020-03-16 PROCEDURE — 4040F PNEUMOC VAC/ADMIN/RCVD: CPT | Performed by: INTERNAL MEDICINE

## 2020-03-16 RX ORDER — METOPROLOL SUCCINATE 100 MG/1
100 TABLET, EXTENDED RELEASE ORAL DAILY
Qty: 90 TABLET | Refills: 4 | Status: SHIPPED | OUTPATIENT
Start: 2020-03-16 | End: 2020-10-07 | Stop reason: SDUPTHER

## 2020-03-18 PROBLEM — Z01.810 PREOP CARDIOVASCULAR EXAM: Status: ACTIVE | Noted: 2020-03-18

## 2020-03-18 NOTE — PROGRESS NOTES
Cardiology Follow Up    Ron Urias  1950  [id number]  [address] [address] [phone]    1  Paroxysmal atrial fibrillation (HCC)  POCT ECG    metoprolol succinate (TOPROL-XL) 100 mg 24 hr tablet   2  Preop cardiovascular exam     3  Ischemic cardiomyopathy     4  RBBB (right bundle branch block with left anterior fascicular block)     5  PAD (peripheral artery disease) ([address] )     6  Chronic kidney disease, stage III (moderate) ([facility])     7  Tobacco use     8  Dyslipidemia           Discussion/Summary:  1  Increase Toprol XL to 100 mg daily  2  I stressed to him that he must stop smoking given his known vascular disease and recurrent revascularization procedures on his LEs  3  I feel that his cardiac risk for vascular surgery is acceptable and he is cleared without further cardiac testing needed  He may hold his Xarelto for 4 days prior to surgery if needed  4  RTO 1 year  Interval History: He has not had any cardiac problems since his last office visit  He continues to smoke at least 1/2 packs a day  He has significant vascular disease including PVD and is scheduled for LLE bypass surgery  He is not very active  He denies CP, SOB  Creatinine is 1 08  BP is controlled  He has an implantable loop recorder which shows rare PAF  He is on Xarelto 20 mg daily  Last echo 6/2019 - EF 50%  Nuclear stress test 5/2018 - no ischemia    ECG today - ST, , LAD, RBBB, LVH  Patient Active Problem List   Diagnosis    Overweight (BMI 25 0-29  9)    Chronic kidney disease, stage III (moderate) (HCC)    Gross hematuria    Persistent proteinuria    Impaired fasting glucose    Microscopic hematuria    Chronic hypotension    [place] cardiac risk 10-20% in next 10 years    Abdominal aortic atherosclerosis (HCC)    RBBB (right bundle branch block with left anterior fascicular block)    Tobacco use    Dyslipidemia    Vitamin D deficiency    GERD with esophagitis    Harris's esophagus without dysplasia    Colon polyps    PAD (peripheral artery disease) (HCC)    Mass of both adrenal glands (HCC)    History of small bowel obstruction    Blue toe syndrome, right (HCC)    Ischemic cardiomyopathy    S/P peripheral artery angioplasty with stent placement    Venous stasis    GI symptoms    Paroxysmal atrial fibrillation (HCC)    Anemia of chronic renal failure, stage 3 (moderate) (HCC)    Thrombocytosis (HCC)    Leukocytosis    Other emphysema (HCC)    Adrenal mass (HCC)    Limb ischemia    Diarrhea    Hyponatremia    SIRS (systemic inflammatory response syndrome) (HCC)    Hypokalemia    SVT (supraventricular tachycardia) (HCC)    Hypotension    Positive QuantiFERON-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    Terminal ileitis of small intestine (HCC)    Preop cardiovascular exam     Past Medical History:   Diagnosis Date    Blue toe syndrome (HCC)     Chronic kidney disease     Colon polyps     GERD (gastroesophageal reflux disease)     Hematuria     History of rheumatic fever     Hyperlipidemia     Hypolipidemia     Hypotension     Ischemic cardiomyopathy     PAD (peripheral artery disease) (HCC)     Pulmonary emphysema (HCC)      Social History     Socioeconomic History    Marital status: /Civil Union     Spouse name: Not on file    Number of children: Not on file    Years of education: Not on file    Highest education level: Not on file   Occupational History    Occupation: retired   Social Needs    Financial resource strain: Not on file    Food insecurity:     Worry: Not on file     Inability: Not on file   IIIMOBI needs:     Medical: Not on file     Non-medical: Not on file   Tobacco Use    Smoking status: Current Some Day Smoker     Packs/day: 0 50     Years: 30 00     Pack years: 15 00     Types: Cigarettes    Smokeless tobacco: Never Used  Tobacco comment: " I will do it on my own"   Substance and Sexual Activity    Alcohol use: Yes     Frequency: Monthly or less     Drinks per session: 1 or 2     Binge frequency: Less than monthly     Comment: occasional    Drug use: No    Sexual activity: Not on file   Lifestyle    Physical activity:     Days per week: Not on file     Minutes per session: Not on file    Stress: Not on file   Relationships    Social connections:     Talks on phone: Not on file     Gets together: Not on file     Attends Bahai service: Not on file     Active member of club or organization: Not on file     Attends meetings of clubs or organizations: Not on file     Relationship status: Not on file    Intimate partner violence:     Fear of current or ex partner: Not on file     Emotionally abused: Not on file     Physically abused: Not on file     Forced sexual activity: Not on file   Other Topics Concern    Not on file   Social History Narrative    Drinks coffee      Family History   Problem Relation Age of Onset    Heart disease Mother     Hyperlipidemia Mother     Hypertension Father     Heart disease Father     Stroke Father     Hyperlipidemia Father     Diabetes Brother     No Known Problems Maternal Grandmother     Dementia Neg Hx     Drug abuse Neg Hx     Mental illness Neg Hx     Substance Abuse Neg Hx     Alcohol abuse Neg Hx     Depression Neg Hx     Colon cancer Neg Hx      Past Surgical History:   Procedure Laterality Date    COLONOSCOPY  2019    HERNIA REPAIR      IR ABDOMINAL ANGIOGRAPHY / INTERVENTION  6/27/2019    IR ABDOMINAL ANGIOGRAPHY / INTERVENTION  2/12/2020    POPLITEAL ARTERY STENT Right     6/2019    IL SLCTV CATHJ 3RD+ ORD SLCTV ABDL PEL/LXTR [facility] Right 6/27/2019    Procedure: leg ANGIOGRAM WITH STENT, BALLOON ANGIOPLASTY, LEFT GROIN ACCESS;  Surgeon: Mahad Vivas MD;  Location: [facility];  Service: Vascular       Current Outpatient Medications:     aspirin 81 MG tablet, Take 81 mg by mouth daily, Disp: , Rfl:     atorvastatin (LIPITOR) 20 mg tablet, Take 1 tablet (20 mg total) by mouth daily, Disp: 90 tablet, Rfl: 1    Cholecalciferol (VITAMIN D) 2000 units CAPS, Take 2,000 Units by mouth daily, Disp: , Rfl:     dicyclomine (Bentyl) 10 mg capsule, Take 10 mg by mouth 4 (four) times a day (before meals and at bedtime), Disp: , Rfl:     furosemide (LASIX) 20 mg tablet, Take 1 tablet (20 mg total) by mouth daily as needed (edema), Disp: 30 tablet, Rfl: 0    Magnesium 500 MG TABS, Take 1 tablet by mouth 2 (two) times a day, Disp: , Rfl:     omeprazole (PriLOSEC) 40 MG capsule, TAKE 1 CAPSULE BY MOUTH EVERY DAY ONE-HALF HOUR BEFORE BREAKFAST, Disp: , Rfl: 5    rivaroxaban (XARELTO) 20 mg tablet, Take 1 tablet (20 mg total) by mouth daily with breakfast, Disp: 90 tablet, Rfl: 3    folic acid (FOLVITE) 1 mg tablet, Take 1 tablet (1 mg total) by mouth daily, Disp: 30 tablet, Rfl: 3    metoprolol succinate (TOPROL-XL) 100 mg 24 hr tablet, Take 1 tablet (100 mg total) by mouth daily, Disp: 90 tablet, Rfl: 4    predniSONE 5 mg tablet, Take 1 tablet (5 mg total) by mouth daily 40 mg (8 tabs) for 7 days followed by 35 mg (7 tabs) for 7 days, 30 mg (6 tabs) for 7 days and continue to decrease by 5 per day every week (Patient not taking: Reported on 3/16/2020), Disp: 360 tablet, Rfl: 1    sulfaSALAzine (AZULFIDINE-EN) 500 mg EC tablet, Take 1 tablet (500 mg total) by mouth 2 (two) times a day, Disp: 60 tablet, Rfl: 3  No Known Allergies  Vitals:    03/16/20 1333   BP: 110/74   BP Location: Right arm   Patient Position: Sitting   Cuff Size: Adult   Pulse: (!) 106   Weight: 87 4 kg (192 lb 9 6 oz)   Height: 5' 9" (1 753 m)     Weight (last 2 days)     Date/Time   Weight    03/16/20 1333   87 4 (192 6)             Blood pressure 110/74, pulse (!) 106, height 5' 9" (1 753 m), weight 87 4 kg (192 lb 9 6 oz)  , Body mass index is 28 44 kg/m²      Labs:  Appointment on 02/28/2020 Component Date Value    Sodium 02/28/2020 142     Potassium 02/28/2020 4 3     Chloride 02/28/2020 108     CO2 02/28/2020 26     ANION GAP 02/28/2020 8     BUN 02/28/2020 18     Creatinine 02/28/2020 1 08     Glucose 02/28/2020 104     Calcium 02/28/2020 8 1*    eGFR 02/28/2020 70    Appointment on 02/21/2020   Component Date Value    WBC 02/21/2020 11 30*    RBC 02/21/2020 4 48     Hemoglobin 02/21/2020 13 2     Hematocrit 02/21/2020 43 5     MCV 02/21/2020 97     MCH 02/21/2020 29 5     MCHC 02/21/2020 30 3*    RDW 02/21/2020 19 2*    Platelets 62/25/1509 350     MPV 02/21/2020 9 6    Office Visit on 02/18/2020   Component Date Value    Ventricular Rate 01/18/2020 108     Atrial Rate 01/18/2020 108     FL Interval 01/18/2020 112     QRSD Interval 01/18/2020 136     QT Interval 01/18/2020 404     QTC Interval 01/18/2020 541     P Axis 01/18/2020 5     QRS Axis 01/18/2020 -52     T Wave [place] 01/18/2020 4    Appointment on 02/14/2020   Component Date Value    Sodium 02/14/2020 144     Potassium 02/14/2020 4 3     Chloride 02/14/2020 106     CO2 02/14/2020 31     ANION GAP 02/14/2020 7     BUN 02/14/2020 21     Creatinine 02/14/2020 0 99     Glucose, Fasting 02/14/2020 100*    Calcium 02/14/2020 8 6     AST 02/14/2020 22     ALT 02/14/2020 68     Alkaline Phosphatase 02/14/2020 113     Total Protein 02/14/2020 6 2*    Albumin 02/14/2020 2 7*    Total Bilirubin 02/14/2020 0 59     eGFR 02/14/2020 78     Ferritin 02/14/2020 111     Iron Saturation 02/14/2020 19     TIBC 02/14/2020 294     Iron 02/14/2020 56*    Magnesium 02/14/2020 2 0     Phosphorus 02/14/2020 3 7     Creatinine, Ur 02/14/2020 96 0     Protein Urine Random 02/14/2020 43     Prot/Creat Ratio, Ur 02/14/2020 0 45*    PTH 02/14/2020 65 7     Vit D, 25-Hydroxy 02/14/2020 25 7*    WBC 02/14/2020 11 49*    RBC 02/14/2020 4 54     Hemoglobin 02/14/2020 13 4     Hematocrit 02/14/2020 43 6     MCV 02/14/2020 96     MCH 02/14/2020 29 5     MCHC 02/14/2020 30 7*    RDW 02/14/2020 19 5*    MPV 02/14/2020 10 3     Platelets 18/11/8860 222     nRBC 02/14/2020 0     Neutrophils Relative 02/14/2020 69     Immat GRANS % 02/14/2020 1     Lymphocytes Relative 02/14/2020 19     Monocytes Relative 02/14/2020 10     Eosinophils Relative 02/14/2020 1     Basophils Relative 02/14/2020 0     Neutrophils Absolute 02/14/2020 7 93*    Immature Grans Absolute 02/14/2020 0 13     Lymphocytes Absolute 02/14/2020 2 21     Monocytes Absolute 02/14/2020 1 12     Eosinophils Absolute 02/14/2020 0 07     Basophils Absolute 02/14/2020 0 03    Appointment on 01/27/2020   Component Date Value    Sodium 01/27/2020 142     Potassium 01/27/2020 4 5     Chloride 01/27/2020 106     CO2 01/27/2020 27     ANION GAP 01/27/2020 9     BUN 01/27/2020 18     Creatinine 01/27/2020 1 03     Glucose, Fasting 01/27/2020 103*    Calcium 01/27/2020 7 7*    eGFR 01/27/2020 74    No results displayed because visit has over 200 results        Appointment on 12/03/2019   Component Date Value    Sodium 12/03/2019 143     Potassium 12/03/2019 4 8     Chloride 12/03/2019 108     CO2 12/03/2019 28     ANION GAP 12/03/2019 7     BUN 12/03/2019 20     Creatinine 12/03/2019 1 51*    Glucose, Fasting 12/03/2019 108*    Calcium 12/03/2019 8 8     eGFR 12/03/2019 47     Miscellaneous Lab Test R* 12/03/2019 JAK2 V617F Rfx CALR/E12-15/MPL     Comment 12/03/2019 SEE WRITTEN REPORT     REFERRAL TEST NAME 12/03/2019 JAK2 V617F Rfx CALR/E12-15/MPL     REFERRAL LAB 12/03/2019 [facility]     REFERRAL TEST CODE 12/03/2019 133195     Erythropoietin 12/03/2019 15 0    Office Visit on 11/06/2019   Component Date Value    Hemoglobin A1C 12/03/2019 5 6     EAG 12/03/2019 114    Consult on 11/05/2019   Component Date Value    WBC 12/03/2019 11 14*    RBC 12/03/2019 4 62     Hemoglobin 12/03/2019 13 5     Hematocrit 12/03/2019 42 7     MCV 12/03/2019 92     MCH 12/03/2019 29 2     MCHC 12/03/2019 31 6     RDW 12/03/2019 15 2*    MPV 12/03/2019 9 2     Platelets 28/92/0647 554*    nRBC 12/03/2019 0     Neutrophils Relative 12/03/2019 59     Immat GRANS % 12/03/2019 1     Lymphocytes Relative 12/03/2019 29     Monocytes Relative 12/03/2019 9     Eosinophils Relative 12/03/2019 1     Basophils Relative 12/03/2019 1     Neutrophils Absolute 12/03/2019 6 45     Immature Grans Absolute 12/03/2019 0 13     Lymphocytes Absolute 12/03/2019 3 28     Monocytes Absolute 12/03/2019 1 05     Eosinophils Absolute 12/03/2019 0 16     Basophils Absolute 12/03/2019 0 07    Appointment on 10/21/2019   Component Date Value    Fecal Occult Blood Diagn* 10/21/2019 Negative    There may be more visits with results that are not included  Imaging: Vas Lower Limb Arterial Duplex, Complete Bilateral    Result Date: 3/3/2020  Narrative:  [facility] REPORT CLINICAL: Indications: PVD, Unspecified [I73 9]  Initial Post-op evaluation s/p revascularization  Patient has not noticed improvement s/p intervention  Has RLE pain and LLE has extensive bruise-like discoloration after recent fall  Operative History: 2020-02-12 Right Pop Stent Thrombectomy (Angioplasty & 2nd Stent) 2019-06-27 Right Popliteal stent Risk Factors The patient has history of HLD, CKD, PAD, AFIB, on blood thinners, and smoking (current) 0 5 ppd  Clinical: Right Pressure:  119/ mm Hg, Left Pressure:  115/ mm Hg    FINDINGS:  Segment                Right                     Left                                          Impression           PSV  Impression  PSV  Common Femoral Artery                        85               97  Prox Profunda                                79               51  Prox SFA                                     58               72  Mid SFA                                      33               82  Dist SFA               Occluded                               51 Proximal Pop                                                  76  Proximal Pop - Stent   Occluded                                   Distal Pop                                                    54  Distal Pop - Stent     Occluded                                   Dist Post Tibial       Dampened Monophasic   12  Triphasic    87  Dist  Ant  Tibial      Occluded               0  Triphasic    81  Dist Peroneal          Not visualized                                CONCLUSION: Impression: RIGHT LOWER LIMB: There is occlusion of the distal superficial femoral artery, popliteal artery/stent, and anterior tibial artery  Ankle/Brachial index: unable to insonate the posterior tibial artery for DAVID  PVR/ PPG tracings are absent  Metatarsal and Great toe pressure not obtainable due to flatline PPG waveform  LEFT LOWER LIMB: This resting evaluation shows no evidence of significant lower extremity arterial occlusive disease  Ankle/Brachial index: 1 33, normal (Prior 0 88) PVR/ PPG tracings are normal  Metatarsal pressure of 141 mmHg Great toe pressure of 80 mmHg, within the healing range (Prior 41 mmHg)  Compared to previous study on 01/13/2020, the right distal SFA is now occluded, popliteal artery is still occluded s/p intervention, and the DAVID/Toe pressure are not obtainable  Improvement of left DAVID/Toe pressure  Technical findings TigerTexted to Dr Casey Campos at 15:38 *EA  SIGNATURE: Electronically Signed by: Natty Curtis MD on 2020-03-03 02:21:42 PM    Vas Lower Limb Vein Mapping Bypass Graft    Result Date: 3/5/2020  Narrative:  [facility] REPORT CLINICAL: Indications: PAD unspecified [I73 9]  Pre-op Vein Mapping for Future Lower Extremity Bypass Graft Operative History: 2020-02-12 Right Pop Stent Thrombectomy (Angioplasty & 2nd Stent) 2019-06-27 Right Popliteal stent Risk Factors The patient has history of HLD, CKD, PAD, CAD and smoking (current) 0 5 ppd    FINDINGS:  Segment         Right        Left Diameter AP  Impression                           Diameter AP  GSV Inguinal            4 4                                               6 3  GSV Prox Thigh          4 0  E1  Non Occlusive Thrombus (Chronic)          7 3  GSV Mid Thigh           1 3  E1  Non Occlusive Thrombus (Chronic)          7 2  GSV Dist Thigh          2 4  E1  Non Occlusive Thrombus (Chronic)          6 9  GSV Knee                3 5  E1  Non Occlusive Thrombus (Chronic)          4 0  GSV Prox Calf           2 9                                               1 8  GSV Mid Calf            2 9                                               4 6  GSV Dist Calf           2 5                                               4 6  GSV Ankle               3 6                                               4 6  SSV Mid Calf            0 5                                               0 6  SSV Knee                0 6                                               1 2  SSV Ankle               0 8                                               1 6     CONCLUSION:  Impression: RIGHT LOWER LIMB: The great saphenous vein is patent  from the groin to the ankle  The vein is of adequate caliber and quality for graft conduit with intraluminal diameters ranging from 2 4 mm to 3 6 mm from the ankle to distal thigh  The small saphenous vein is not of adequate caliber for graft conduit  LEFT LOWER LIMB: There is chronic non-occlusive superficial thrombophlebitis noted in the great saphenous vein from the knee to the groin  The vein is of adequate caliber and quality for graft conduit with intraluminal diameters measuring about 4 6 mm from the ankle to mid calf  The small saphenous vein is not of adequate caliber for graft conduit  SIGNATURE: Electronically Signed by: Danuta Coley MD on 2020-03-05 12:48:50 PM      Review of Systems:  Review of Systems   Constitutional: Negative for diaphoresis, fatigue, fever and unexpected weight change  HENT: Negative      Respiratory: Negative for cough, shortness of breath and wheezing  Cardiovascular: Negative for chest pain, palpitations and leg swelling  Gastrointestinal: Negative for abdominal pain, diarrhea and nausea  Musculoskeletal: Negative for gait problem and myalgias  Skin: Negative for rash  Neurological: Negative for dizziness and numbness  Psychiatric/Behavioral: Negative  Physical Exam:  Physical Exam   Constitutional: He is oriented to person, place, and time  He appears well-developed and well-nourished  HENT:   Head: Normocephalic and atraumatic  Eyes: Pupils are equal, round, and reactive to light  Neck: Normal range of motion  Neck supple  No JVD present  Cardiovascular: Regular rhythm, S1 normal, S2 normal and normal pulses  Pulses:       Carotid pulses are 2+ on the right side, and 2+ on the left side  Pulmonary/Chest: Effort normal and breath sounds normal  He has no wheezes  He has no rales  Abdominal: Soft  Bowel sounds are normal  There is no tenderness  Musculoskeletal: Normal range of motion  He exhibits no edema or tenderness  Neurological: He is alert and oriented to person, place, and time  He has normal reflexes  No cranial nerve deficit  Skin: Skin is warm  Psychiatric: He has a normal mood and affect

## 2020-03-20 ENCOUNTER — PREP FOR PROCEDURE (OUTPATIENT)
Dept: VASCULAR SURGERY | Facility: CLINIC | Age: 70
End: 2020-03-20

## 2020-03-20 ENCOUNTER — TELEPHONE (OUTPATIENT)
Dept: VASCULAR SURGERY | Facility: CLINIC | Age: 70
End: 2020-03-20

## 2020-03-20 DIAGNOSIS — I73.9 PAD (PERIPHERAL ARTERY DISEASE) (HCC): Primary | ICD-10-CM

## 2020-03-20 NOTE — TELEPHONE ENCOUNTER
S/w pt and scheduled his RLE SFA-BK pop bypass with GSV and completion angiogram for 3/26/20 in [facility] with Dr Naty Wood  He is aware nothing to eat or drink after midnight on 3/25/20  He will have his blood work done at a [facility] facility  Ekg done 1/18/20  He is aware of hold on Xarelto 2 days prior and his last dose will be 3/23/20  He was cleared by Dr Tom Corado 3/16/20  Surgery scheduling form scanned to prior auth department to determine if prior auth is needed

## 2020-03-23 ENCOUNTER — APPOINTMENT (OUTPATIENT)
Dept: LAB | Facility: CLINIC | Age: 70
End: 2020-03-23
Payer: MEDICARE

## 2020-03-23 ENCOUNTER — TRANSCRIBE ORDERS (OUTPATIENT)
Dept: LAB | Facility: CLINIC | Age: 70
End: 2020-03-23

## 2020-03-23 ENCOUNTER — ANESTHESIA EVENT (OUTPATIENT)
Dept: PERIOP | Facility: HOSPITAL | Age: 70
DRG: 253 | End: 2020-03-23
Payer: MEDICARE

## 2020-03-23 DIAGNOSIS — E74.09 1,4,ALPHA-GLUCAN 6-ALPHA-GLUCOSYLTRANSFERASE DEFICIENCY (HCC): Primary | ICD-10-CM

## 2020-03-23 DIAGNOSIS — I73.9 PAD (PERIPHERAL ARTERY DISEASE) (HCC): ICD-10-CM

## 2020-03-23 DIAGNOSIS — E74.09 1,4,ALPHA-GLUCAN 6-ALPHA-GLUCOSYLTRANSFERASE DEFICIENCY (HCC): ICD-10-CM

## 2020-03-23 DIAGNOSIS — Z95.820 S/P PERIPHERAL ARTERY ANGIOPLASTY WITH STENT PLACEMENT: ICD-10-CM

## 2020-03-23 LAB
ABO GROUP BLD: NORMAL
ANION GAP SERPL CALCULATED.3IONS-SCNC: 7 MMOL/L (ref 4–13)
BLD GP AB SCN SERPL QL: NEGATIVE
BUN SERPL-MCNC: 14 MG/DL (ref 5–25)
CALCIUM SERPL-MCNC: 8.4 MG/DL (ref 8.3–10.1)
CHLORIDE SERPL-SCNC: 106 MMOL/L (ref 100–108)
CO2 SERPL-SCNC: 29 MMOL/L (ref 21–32)
CREAT SERPL-MCNC: 1.02 MG/DL (ref 0.6–1.3)
ERYTHROCYTE [DISTWIDTH] IN BLOOD BY AUTOMATED COUNT: 17 % (ref 11.6–15.1)
EST. AVERAGE GLUCOSE BLD GHB EST-MCNC: 100 MG/DL
GFR SERPL CREATININE-BSD FRML MDRD: 75 ML/MIN/1.73SQ M
GLUCOSE P FAST SERPL-MCNC: 100 MG/DL (ref 65–99)
HBA1C MFR BLD: 5.1 %
HCT VFR BLD AUTO: 41.9 % (ref 36.5–49.3)
HGB BLD-MCNC: 13 G/DL (ref 12–17)
INR PPP: 2.12 (ref 0.84–1.19)
MCH RBC QN AUTO: 31.6 PG (ref 26.8–34.3)
MCHC RBC AUTO-ENTMCNC: 31 G/DL (ref 31.4–37.4)
MCV RBC AUTO: 102 FL (ref 82–98)
PLATELET # BLD AUTO: 362 THOUSANDS/UL (ref 149–390)
PMV BLD AUTO: 9.9 FL (ref 8.9–12.7)
POTASSIUM SERPL-SCNC: 4.2 MMOL/L (ref 3.5–5.3)
PROTHROMBIN TIME: 22.9 SECONDS (ref 11.6–14.5)
RBC # BLD AUTO: 4.12 MILLION/UL (ref 3.88–5.62)
RH BLD: POSITIVE
SODIUM SERPL-SCNC: 142 MMOL/L (ref 136–145)
SPECIMEN EXPIRATION DATE: NORMAL
WBC # BLD AUTO: 6.64 THOUSAND/UL (ref 4.31–10.16)

## 2020-03-23 PROCEDURE — 86901 BLOOD TYPING SEROLOGIC RH(D): CPT

## 2020-03-23 PROCEDURE — 80048 BASIC METABOLIC PNL TOTAL CA: CPT

## 2020-03-23 PROCEDURE — 85027 COMPLETE CBC AUTOMATED: CPT

## 2020-03-23 PROCEDURE — 36415 COLL VENOUS BLD VENIPUNCTURE: CPT

## 2020-03-23 PROCEDURE — 86900 BLOOD TYPING SEROLOGIC ABO: CPT

## 2020-03-23 PROCEDURE — 83036 HEMOGLOBIN GLYCOSYLATED A1C: CPT

## 2020-03-23 PROCEDURE — 86850 RBC ANTIBODY SCREEN: CPT

## 2020-03-23 PROCEDURE — 85610 PROTHROMBIN TIME: CPT

## 2020-03-23 NOTE — PRE-PROCEDURE INSTRUCTIONS
Pre-Surgery Instructions:   Medication Instructions    aspirin 81 MG tablet Patient was instructed by Physician and understands   atorvastatin (LIPITOR) 20 mg tablet Instructed patient per Anesthesia Guidelines   Cholecalciferol (VITAMIN D) 2000 units CAPS Instructed patient per Anesthesia Guidelines   folic acid (FOLVITE) 1 mg tablet Instructed patient per Anesthesia Guidelines   Magnesium 500 MG TABS Instructed patient per Anesthesia Guidelines   metoprolol succinate (TOPROL-XL) 100 mg 24 hr tablet Instructed patient per Anesthesia Guidelines   omeprazole (PriLOSEC) 40 MG capsule Instructed patient per Anesthesia Guidelines   rivaroxaban (XARELTO) 20 mg tablet Patient was instructed by Physician and understands   sulfaSALAzine (AZULFIDINE-EN) 500 mg EC tablet Instructed patient per Anesthesia Guidelines  Review with patient via phone medications and showering instructions  Advice starting today stop vitamins and Sulfasalazine He says he stop aspirin already and  last dose of xarelto  Advice ASC call and [facility] phone number  Verbalized understanding  ASA Med Class: Aspirin   Should be discontinued at least one week prior to planned operation, unless specifically stated otherwise by surgical service  Your Surgeon may have patient stop taking aspirin up to a week before surgery if having intracranial, middle ear, posterior eye, spine surgery or prostate surgery  [Patients taking aspirin for coronary stents should be reviewed by an anesthesiologist in the optimization clinic  Please do not discontinue aspirin in patients with coronary stents unless given specific permission to do so by the cardiologist who prescribed medication ]   If your surgeon approves please continue to take this medication on your normal schedule  You may take this medication on the morning of your surgery with a sip of water        Beta blocker Med Class   Continue to take this heart medication on your normal schedule  If this is an oral medication and you take it in the morning, then you may take this medicine with a sip of water  Direct Xa Inhibitor Med Class   Stop taking this medication at least 3 days prior to surgery/procedure with prescribing Physician and Surgeon consultation  Statin Med Class   Continue to take this medication on your normal schedule  If this is an oral medication and you take it in the morning, then you may take this medicine with a sip of water  Sulfasalazine/Azathioprine Med Class   Stop taking this medication 7 days prior to surgery/procedure       Vitamin Med Class   You may continue to take any vitamin that your surgeon has prescribed to you up to the day before surgery   If your surgeon has not specifically prescribed this vitamin or instructed you to continue then stop taking 7 days prior to surgery

## 2020-03-25 NOTE — ANESTHESIA PREPROCEDURE EVALUATION
Review of Systems/Medical History  Patient summary reviewed  Chart reviewed  No history of anesthetic complications     Cardiovascular  Exercise tolerance (METS): <4,  Hyperlipidemia, No past MI , Dysrhythmias , atrial fibrillation, No angina , CHF (LVEF 18%, diastolic dysfunction) compensated CHF, PVD (LE claudication, s/p right popliteal artery stenting x2),    Pulmonary  Smoker cigarette smoker  , COPD (no medications) ,        GI/Hepatic    GERD well controlled, Esophageal disease plasencia esophagus,        Chronic kidney disease stage 3,        Endo/Other  Negative endo/other ROS      GYN       Hematology  Anemia iron deficiency anemia,     Musculoskeletal  Negative musculoskeletal ROS        Neurology  Negative neurology ROS   No CVA ,    Psychology       DENIS 6/2019:  LEFT VENTRICLE:  Systolic function was at the lower limits of normal  Ejection fraction was estimated to be 50 %  This study was inadequate for the evaluation of regional wall motion  RIGHT VENTRICLE: The size was normal  Systolic function was normal     MITRAL VALVE:  There was mild regurgitation      TRICUSPID VALVE:  There was mild regurgitation  Exercise Stress 5/2018:  -  Stress results: Duration of exercise was 6 min and 1 sec  Target heart rate was achieved  There was no chest pain during stress  -  ECG conclusions: The stress ECG was negative for ischemia and normal   -  Perfusion imaging: There were no perfusion defects   -  Gated SPECT: The calculated left ventricular ejection fraction was 54 %  Left ventricular ejection fraction was within normal limits by visual estimate  There was no left ventricular regional abnormality      IMPRESSIONS: Normal study after maximal exercise  Myocardial perfusion imaging was normal at rest and with stress  Left ventricular systolic function was normal     TTE 3/2018:  LEFT VENTRICLE:  Systolic function was mildly reduced  Ejection fraction was estimated to be 48 %    There was severe hypokinesis of the apical anterior, mid anteroseptal, mid inferoseptal, apical inferior, apical septal, and apical wall(s)  Wall thickness was mildly increased  Doppler parameters were consistent with abnormal left ventricular relaxation (grade 1 diastolic dysfunction)  RIGHT VENTRICLE: The size was normal  Systolic function was normal  Wall thickness was normal     MITRAL VALVE:  There was mild regurgitation      TRICUSPID VALVE:  There was mild regurgitation  Pulmonary artery systolic pressure was within the normal range  Lab Results   Component Value Date    WBC 6 64 03/23/2020    HGB 13 0 03/23/2020    HCT 41 9 03/23/2020     (H) 03/23/2020     03/23/2020     Lab Results   Component Value Date    SODIUM 142 03/23/2020    K 4 2 03/23/2020     03/23/2020    CO2 29 03/23/2020    BUN 14 03/23/2020    CREATININE 1 02 03/23/2020    GLUC 104 02/28/2020    CALCIUM 8 4 03/23/2020     Lab Results   Component Value Date    INR 2 12 (H) 03/23/2020    INR 1 39 (H) 01/18/2020    INR 1 49 (H) 01/18/2020    PROTIME 22 9 (H) 03/23/2020    PROTIME 16 6 (H) 01/18/2020    PROTIME 17 6 (H) 01/18/2020         Physical Exam    Airway    Mallampati score: III  TM Distance: >3 FB  Neck ROM: limited     Dental   No notable dental hx     Cardiovascular  Cardiovascular exam normal    Pulmonary  Pulmonary exam normal     Other Findings        Anesthesia Plan  ASA Score- 3     Anesthesia Type- general with ASA Monitors  Additional Monitors: arterial line  Airway Plan: ETT  Plan Factors-    Induction- intravenous  Postoperative Plan- Plan for postoperative opioid use  Planned trial extubation    Informed Consent- Anesthetic plan and risks discussed with patient  I personally reviewed this patient with the CRNA  Discussed and agreed on the Anesthesia Plan with the CRNA  Ashanti More

## 2020-03-26 ENCOUNTER — ANESTHESIA (OUTPATIENT)
Dept: PERIOP | Facility: HOSPITAL | Age: 70
DRG: 253 | End: 2020-03-26
Payer: MEDICARE

## 2020-03-26 ENCOUNTER — HOSPITAL ENCOUNTER (INPATIENT)
Facility: HOSPITAL | Age: 70
LOS: 2 days | Discharge: HOME | DRG: 253 | End: 2020-03-28
Attending: SURGERY | Admitting: SURGERY
Payer: MEDICARE

## 2020-03-26 ENCOUNTER — APPOINTMENT (OUTPATIENT)
Dept: RADIOLOGY | Facility: HOSPITAL | Age: 70
DRG: 253 | End: 2020-03-26
Attending: SURGERY
Payer: MEDICARE

## 2020-03-26 DIAGNOSIS — Z95.820 STATUS POST ANGIOPLASTY WITH STENT: ICD-10-CM

## 2020-03-26 DIAGNOSIS — I73.9 PERIPHERAL VASCULAR DISEASE, UNSPECIFIED (HCC): ICD-10-CM

## 2020-03-26 DIAGNOSIS — Z95.820 S/P PERIPHERAL ARTERY ANGIOPLASTY WITH STENT PLACEMENT: ICD-10-CM

## 2020-03-26 DIAGNOSIS — I73.9 PAD (PERIPHERAL ARTERY DISEASE) (HCC): ICD-10-CM

## 2020-03-26 DIAGNOSIS — I99.8 LIMB ISCHEMIA: Primary | ICD-10-CM

## 2020-03-26 LAB
ANION GAP SERPL CALCULATED.3IONS-SCNC: 3 MMOL/L (ref 4–13)
BUN SERPL-MCNC: 16 MG/DL (ref 5–25)
CALCIUM SERPL-MCNC: 8.1 MG/DL (ref 8.3–10.1)
CHLORIDE SERPL-SCNC: 115 MMOL/L (ref 100–108)
CO2 SERPL-SCNC: 26 MMOL/L (ref 21–32)
CREAT SERPL-MCNC: 0.86 MG/DL (ref 0.6–1.3)
ERYTHROCYTE [DISTWIDTH] IN BLOOD BY AUTOMATED COUNT: 16.5 % (ref 11.6–15.1)
GFR SERPL CREATININE-BSD FRML MDRD: 89 ML/MIN/1.73SQ M
GLUCOSE SERPL-MCNC: 134 MG/DL (ref 65–140)
GLUCOSE SERPL-MCNC: 147 MG/DL (ref 65–140)
HCT VFR BLD AUTO: 36.5 % (ref 36.5–49.3)
HGB BLD-MCNC: 11.4 G/DL (ref 12–17)
KCT BLD-ACNC: 218 SEC (ref 89–137)
KCT BLD-ACNC: 224 SEC (ref 89–137)
MCH RBC QN AUTO: 31.7 PG (ref 26.8–34.3)
MCHC RBC AUTO-ENTMCNC: 31.2 G/DL (ref 31.4–37.4)
MCV RBC AUTO: 101 FL (ref 82–98)
PLATELET # BLD AUTO: 340 THOUSANDS/UL (ref 149–390)
PLATELET # BLD AUTO: 350 THOUSANDS/UL (ref 149–390)
PMV BLD AUTO: 10.1 FL (ref 8.9–12.7)
PMV BLD AUTO: 10.1 FL (ref 8.9–12.7)
POTASSIUM SERPL-SCNC: 4.2 MMOL/L (ref 3.5–5.3)
RBC # BLD AUTO: 3.6 MILLION/UL (ref 3.88–5.62)
SODIUM SERPL-SCNC: 144 MMOL/L (ref 136–145)
SPECIMEN SOURCE: ABNORMAL
SPECIMEN SOURCE: ABNORMAL
WBC # BLD AUTO: 12.74 THOUSAND/UL (ref 4.31–10.16)

## 2020-03-26 PROCEDURE — G9197 ORDER FOR CEPH: HCPCS | Performed by: SURGERY

## 2020-03-26 PROCEDURE — 041K09L BYPASS RIGHT FEMORAL ARTERY TO POPLITEAL ARTERY WITH AUTOLOGOUS VENOUS TISSUE, OPEN APPROACH: ICD-10-PCS | Performed by: SURGERY

## 2020-03-26 PROCEDURE — 82948 REAGENT STRIP/BLOOD GLUCOSE: CPT

## 2020-03-26 PROCEDURE — 85049 AUTOMATED PLATELET COUNT: CPT | Performed by: STUDENT IN AN ORGANIZED HEALTH CARE EDUCATION/TRAINING PROGRAM

## 2020-03-26 PROCEDURE — C1757 CATH, THROMBECTOMY/EMBOLECT: HCPCS | Performed by: SURGERY

## 2020-03-26 PROCEDURE — 80048 BASIC METABOLIC PNL TOTAL CA: CPT | Performed by: STUDENT IN AN ORGANIZED HEALTH CARE EDUCATION/TRAINING PROGRAM

## 2020-03-26 PROCEDURE — 35556 ART BYP GRFT FEM-POPLITEAL: CPT | Performed by: SURGERY

## 2020-03-26 PROCEDURE — 85347 COAGULATION TIME ACTIVATED: CPT

## 2020-03-26 PROCEDURE — 85027 COMPLETE CBC AUTOMATED: CPT | Performed by: STUDENT IN AN ORGANIZED HEALTH CARE EDUCATION/TRAINING PROGRAM

## 2020-03-26 RX ORDER — SODIUM CHLORIDE, SODIUM LACTATE, POTASSIUM CHLORIDE, CALCIUM CHLORIDE 600; 310; 30; 20 MG/100ML; MG/100ML; MG/100ML; MG/100ML
75 INJECTION, SOLUTION INTRAVENOUS CONTINUOUS
Status: DISCONTINUED | OUTPATIENT
Start: 2020-03-26 | End: 2020-03-27

## 2020-03-26 RX ORDER — ATORVASTATIN CALCIUM 20 MG/1
20 TABLET, FILM COATED ORAL DAILY
Status: DISCONTINUED | OUTPATIENT
Start: 2020-03-27 | End: 2020-03-28 | Stop reason: HOSPADM

## 2020-03-26 RX ORDER — ONDANSETRON 2 MG/ML
4 INJECTION INTRAMUSCULAR; INTRAVENOUS ONCE AS NEEDED
Status: DISCONTINUED | OUTPATIENT
Start: 2020-03-26 | End: 2020-03-26 | Stop reason: HOSPADM

## 2020-03-26 RX ORDER — DEXAMETHASONE SODIUM PHOSPHATE 10 MG/ML
INJECTION, SOLUTION INTRAMUSCULAR; INTRAVENOUS AS NEEDED
Status: DISCONTINUED | OUTPATIENT
Start: 2020-03-26 | End: 2020-03-26 | Stop reason: SURG

## 2020-03-26 RX ORDER — PANTOPRAZOLE SODIUM 40 MG/1
40 TABLET, DELAYED RELEASE ORAL
Status: DISCONTINUED | OUTPATIENT
Start: 2020-03-27 | End: 2020-03-28 | Stop reason: HOSPADM

## 2020-03-26 RX ORDER — ROCURONIUM BROMIDE 10 MG/ML
INJECTION, SOLUTION INTRAVENOUS AS NEEDED
Status: DISCONTINUED | OUTPATIENT
Start: 2020-03-26 | End: 2020-03-26 | Stop reason: SURG

## 2020-03-26 RX ORDER — HEPARIN SODIUM 1000 [USP'U]/ML
INJECTION, SOLUTION INTRAVENOUS; SUBCUTANEOUS AS NEEDED
Status: DISCONTINUED | OUTPATIENT
Start: 2020-03-26 | End: 2020-03-26 | Stop reason: SURG

## 2020-03-26 RX ORDER — METOCLOPRAMIDE HYDROCHLORIDE 5 MG/ML
10 INJECTION INTRAMUSCULAR; INTRAVENOUS ONCE AS NEEDED
Status: DISCONTINUED | OUTPATIENT
Start: 2020-03-26 | End: 2020-03-26 | Stop reason: HOSPADM

## 2020-03-26 RX ORDER — SUCCINYLCHOLINE/SOD CL,ISO/PF 100 MG/5ML
SYRINGE (ML) INTRAVENOUS AS NEEDED
Status: DISCONTINUED | OUTPATIENT
Start: 2020-03-26 | End: 2020-03-26 | Stop reason: SURG

## 2020-03-26 RX ORDER — HYDROMORPHONE HCL/PF 1 MG/ML
0.5 SYRINGE (ML) INJECTION
Status: DISCONTINUED | OUTPATIENT
Start: 2020-03-26 | End: 2020-03-28 | Stop reason: HOSPADM

## 2020-03-26 RX ORDER — NEOSTIGMINE METHYLSULFATE 1 MG/ML
INJECTION INTRAVENOUS AS NEEDED
Status: DISCONTINUED | OUTPATIENT
Start: 2020-03-26 | End: 2020-03-26 | Stop reason: SURG

## 2020-03-26 RX ORDER — METOPROLOL SUCCINATE 100 MG/1
100 TABLET, EXTENDED RELEASE ORAL DAILY
Status: DISCONTINUED | OUTPATIENT
Start: 2020-03-27 | End: 2020-03-28 | Stop reason: HOSPADM

## 2020-03-26 RX ORDER — ALBUTEROL SULFATE 2.5 MG/3ML
2.5 SOLUTION RESPIRATORY (INHALATION) ONCE AS NEEDED
Status: DISCONTINUED | OUTPATIENT
Start: 2020-03-26 | End: 2020-03-26 | Stop reason: HOSPADM

## 2020-03-26 RX ORDER — SODIUM CHLORIDE, SODIUM LACTATE, POTASSIUM CHLORIDE, CALCIUM CHLORIDE 600; 310; 30; 20 MG/100ML; MG/100ML; MG/100ML; MG/100ML
100 INJECTION, SOLUTION INTRAVENOUS CONTINUOUS
Status: DISCONTINUED | OUTPATIENT
Start: 2020-03-26 | End: 2020-03-27

## 2020-03-26 RX ORDER — ALBUMIN, HUMAN INJ 5% 5 %
SOLUTION INTRAVENOUS CONTINUOUS PRN
Status: DISCONTINUED | OUTPATIENT
Start: 2020-03-26 | End: 2020-03-26 | Stop reason: SURG

## 2020-03-26 RX ORDER — SODIUM CHLORIDE, SODIUM LACTATE, POTASSIUM CHLORIDE, CALCIUM CHLORIDE 600; 310; 30; 20 MG/100ML; MG/100ML; MG/100ML; MG/100ML
INJECTION, SOLUTION INTRAVENOUS CONTINUOUS PRN
Status: DISCONTINUED | OUTPATIENT
Start: 2020-03-26 | End: 2020-03-26

## 2020-03-26 RX ORDER — CEFAZOLIN SODIUM 2 G/50ML
2000 SOLUTION INTRAVENOUS ONCE
Status: COMPLETED | OUTPATIENT
Start: 2020-03-26 | End: 2020-03-26

## 2020-03-26 RX ORDER — CHLORHEXIDINE GLUCONATE 0.12 MG/ML
15 RINSE ORAL EVERY 12 HOURS SCHEDULED
Status: DISCONTINUED | OUTPATIENT
Start: 2020-03-26 | End: 2020-03-26 | Stop reason: HOSPADM

## 2020-03-26 RX ORDER — ONDANSETRON 2 MG/ML
INJECTION INTRAMUSCULAR; INTRAVENOUS AS NEEDED
Status: DISCONTINUED | OUTPATIENT
Start: 2020-03-26 | End: 2020-03-26 | Stop reason: SURG

## 2020-03-26 RX ORDER — ASPIRIN 81 MG/1
81 TABLET, CHEWABLE ORAL DAILY
Status: DISCONTINUED | OUTPATIENT
Start: 2020-03-27 | End: 2020-03-28 | Stop reason: HOSPADM

## 2020-03-26 RX ORDER — OXYCODONE HYDROCHLORIDE 5 MG/1
5 TABLET ORAL EVERY 4 HOURS PRN
Status: DISCONTINUED | OUTPATIENT
Start: 2020-03-26 | End: 2020-03-28 | Stop reason: HOSPADM

## 2020-03-26 RX ORDER — CHLORHEXIDINE GLUCONATE 0.12 MG/ML
15 RINSE ORAL ONCE
Status: COMPLETED | OUTPATIENT
Start: 2020-03-26 | End: 2020-03-26

## 2020-03-26 RX ORDER — GLYCOPYRROLATE 0.2 MG/ML
INJECTION INTRAMUSCULAR; INTRAVENOUS AS NEEDED
Status: DISCONTINUED | OUTPATIENT
Start: 2020-03-26 | End: 2020-03-26 | Stop reason: SURG

## 2020-03-26 RX ORDER — FENTANYL CITRATE/PF 50 MCG/ML
25 SYRINGE (ML) INJECTION
Status: DISCONTINUED | OUTPATIENT
Start: 2020-03-26 | End: 2020-03-26 | Stop reason: HOSPADM

## 2020-03-26 RX ORDER — HEPARIN SODIUM 5000 [USP'U]/ML
5000 INJECTION, SOLUTION INTRAVENOUS; SUBCUTANEOUS EVERY 8 HOURS SCHEDULED
Status: DISCONTINUED | OUTPATIENT
Start: 2020-03-26 | End: 2020-03-27

## 2020-03-26 RX ORDER — EPHEDRINE SULFATE 50 MG/ML
INJECTION INTRAVENOUS AS NEEDED
Status: DISCONTINUED | OUTPATIENT
Start: 2020-03-26 | End: 2020-03-26 | Stop reason: SURG

## 2020-03-26 RX ORDER — LIDOCAINE HYDROCHLORIDE 10 MG/ML
INJECTION, SOLUTION EPIDURAL; INFILTRATION; INTRACAUDAL; PERINEURAL AS NEEDED
Status: DISCONTINUED | OUTPATIENT
Start: 2020-03-26 | End: 2020-03-26 | Stop reason: SURG

## 2020-03-26 RX ORDER — PROPOFOL 10 MG/ML
INJECTION, EMULSION INTRAVENOUS AS NEEDED
Status: DISCONTINUED | OUTPATIENT
Start: 2020-03-26 | End: 2020-03-26 | Stop reason: SURG

## 2020-03-26 RX ORDER — ONDANSETRON 2 MG/ML
4 INJECTION INTRAMUSCULAR; INTRAVENOUS EVERY 6 HOURS PRN
Status: DISCONTINUED | OUTPATIENT
Start: 2020-03-26 | End: 2020-03-28 | Stop reason: HOSPADM

## 2020-03-26 RX ORDER — FOLIC ACID 1 MG/1
1 TABLET ORAL DAILY
Status: DISCONTINUED | OUTPATIENT
Start: 2020-03-27 | End: 2020-03-28 | Stop reason: HOSPADM

## 2020-03-26 RX ORDER — SULFASALAZINE 500 MG/1
500 TABLET, DELAYED RELEASE ORAL 2 TIMES DAILY
Status: DISCONTINUED | OUTPATIENT
Start: 2020-03-26 | End: 2020-03-28 | Stop reason: HOSPADM

## 2020-03-26 RX ORDER — SODIUM CHLORIDE 9 MG/ML
INJECTION, SOLUTION INTRAVENOUS CONTINUOUS PRN
Status: DISCONTINUED | OUTPATIENT
Start: 2020-03-26 | End: 2020-03-26

## 2020-03-26 RX ORDER — ACETAMINOPHEN 325 MG/1
975 TABLET ORAL EVERY 8 HOURS SCHEDULED
Status: DISCONTINUED | OUTPATIENT
Start: 2020-03-26 | End: 2020-03-28 | Stop reason: HOSPADM

## 2020-03-26 RX ORDER — OXYCODONE HYDROCHLORIDE 10 MG/1
10 TABLET ORAL EVERY 4 HOURS PRN
Status: DISCONTINUED | OUTPATIENT
Start: 2020-03-26 | End: 2020-03-28 | Stop reason: HOSPADM

## 2020-03-26 RX ORDER — HYDROMORPHONE HCL/PF 1 MG/ML
0.2 SYRINGE (ML) INJECTION
Status: DISCONTINUED | OUTPATIENT
Start: 2020-03-26 | End: 2020-03-26 | Stop reason: HOSPADM

## 2020-03-26 RX ORDER — FENTANYL CITRATE 50 UG/ML
INJECTION, SOLUTION INTRAMUSCULAR; INTRAVENOUS AS NEEDED
Status: DISCONTINUED | OUTPATIENT
Start: 2020-03-26 | End: 2020-03-26 | Stop reason: SURG

## 2020-03-26 RX ADMIN — CEFAZOLIN SODIUM 2000 MG: 2 SOLUTION INTRAVENOUS at 08:36

## 2020-03-26 RX ADMIN — PHENYLEPHRINE HYDROCHLORIDE 200 MCG: 10 INJECTION INTRAVENOUS at 08:32

## 2020-03-26 RX ADMIN — ROCURONIUM BROMIDE 50 MG: 10 INJECTION, SOLUTION INTRAVENOUS at 08:34

## 2020-03-26 RX ADMIN — ROCURONIUM BROMIDE 10 MG: 10 INJECTION, SOLUTION INTRAVENOUS at 11:58

## 2020-03-26 RX ADMIN — HEPARIN SODIUM 5000 UNITS: 5000 INJECTION INTRAVENOUS; SUBCUTANEOUS at 21:50

## 2020-03-26 RX ADMIN — FENTANYL CITRATE 100 MCG: 50 INJECTION, SOLUTION INTRAMUSCULAR; INTRAVENOUS at 08:24

## 2020-03-26 RX ADMIN — HYDROMORPHONE HYDROCHLORIDE 0.5 MG: 1 INJECTION, SOLUTION INTRAMUSCULAR; INTRAVENOUS; SUBCUTANEOUS at 20:23

## 2020-03-26 RX ADMIN — SODIUM CHLORIDE: 0.9 INJECTION, SOLUTION INTRAVENOUS at 08:30

## 2020-03-26 RX ADMIN — PROPOFOL 150 MG: 10 INJECTION, EMULSION INTRAVENOUS at 08:24

## 2020-03-26 RX ADMIN — HEPARIN SODIUM 7000 UNITS: 1000 INJECTION INTRAVENOUS; SUBCUTANEOUS at 10:27

## 2020-03-26 RX ADMIN — Medication 140 MG: at 08:25

## 2020-03-26 RX ADMIN — NEOSTIGMINE METHYLSULFATE 5 MG: 1 INJECTION, SOLUTION INTRAVENOUS at 12:44

## 2020-03-26 RX ADMIN — PHENYLEPHRINE HYDROCHLORIDE 50 MCG/MIN: 10 INJECTION INTRAVENOUS at 08:28

## 2020-03-26 RX ADMIN — HEPARIN SODIUM 4000 UNITS: 1000 INJECTION INTRAVENOUS; SUBCUTANEOUS at 11:25

## 2020-03-26 RX ADMIN — PHENYLEPHRINE HYDROCHLORIDE 100 MCG: 10 INJECTION INTRAVENOUS at 12:57

## 2020-03-26 RX ADMIN — HEPARIN SODIUM 3000 UNITS: 1000 INJECTION INTRAVENOUS; SUBCUTANEOUS at 10:41

## 2020-03-26 RX ADMIN — SODIUM CHLORIDE, SODIUM LACTATE, POTASSIUM CHLORIDE, AND CALCIUM CHLORIDE: .6; .31; .03; .02 INJECTION, SOLUTION INTRAVENOUS at 12:14

## 2020-03-26 RX ADMIN — FENTANYL CITRATE 25 MCG: 50 INJECTION, SOLUTION INTRAMUSCULAR; INTRAVENOUS at 10:23

## 2020-03-26 RX ADMIN — ROCURONIUM BROMIDE 20 MG: 10 INJECTION, SOLUTION INTRAVENOUS at 10:45

## 2020-03-26 RX ADMIN — CHLORHEXIDINE GLUCONATE 0.12% ORAL RINSE 15 ML: 1.2 LIQUID ORAL at 07:23

## 2020-03-26 RX ADMIN — ALBUMIN (HUMAN): 12.5 SOLUTION INTRAVENOUS at 11:57

## 2020-03-26 RX ADMIN — FENTANYL CITRATE 50 MCG: 50 INJECTION, SOLUTION INTRAMUSCULAR; INTRAVENOUS at 11:57

## 2020-03-26 RX ADMIN — CEFAZOLIN SODIUM 2000 MG: 2 SOLUTION INTRAVENOUS at 12:30

## 2020-03-26 RX ADMIN — SODIUM CHLORIDE, SODIUM LACTATE, POTASSIUM CHLORIDE, AND CALCIUM CHLORIDE 100 ML/HR: .6; .31; .03; .02 INJECTION, SOLUTION INTRAVENOUS at 14:33

## 2020-03-26 RX ADMIN — SULFASALAZINE 500 MG: 500 TABLET, DELAYED RELEASE ORAL at 18:19

## 2020-03-26 RX ADMIN — ROCURONIUM BROMIDE 20 MG: 10 INJECTION, SOLUTION INTRAVENOUS at 10:00

## 2020-03-26 RX ADMIN — HEPARIN SODIUM 5000 UNITS: 5000 INJECTION INTRAVENOUS; SUBCUTANEOUS at 16:49

## 2020-03-26 RX ADMIN — PROPOFOL 30 MG: 10 INJECTION, EMULSION INTRAVENOUS at 12:47

## 2020-03-26 RX ADMIN — PHENYLEPHRINE HYDROCHLORIDE 100 MCG: 10 INJECTION INTRAVENOUS at 11:08

## 2020-03-26 RX ADMIN — PHENYLEPHRINE HYDROCHLORIDE 200 MCG: 10 INJECTION INTRAVENOUS at 08:29

## 2020-03-26 RX ADMIN — ONDANSETRON 4 MG: 2 INJECTION INTRAMUSCULAR; INTRAVENOUS at 12:15

## 2020-03-26 RX ADMIN — FENTANYL CITRATE 25 MCG: 50 INJECTION, SOLUTION INTRAMUSCULAR; INTRAVENOUS at 12:48

## 2020-03-26 RX ADMIN — EPHEDRINE SULFATE 5 MG: 50 INJECTION, SOLUTION INTRAVENOUS at 08:58

## 2020-03-26 RX ADMIN — SODIUM CHLORIDE, SODIUM LACTATE, POTASSIUM CHLORIDE, AND CALCIUM CHLORIDE: .6; .31; .03; .02 INJECTION, SOLUTION INTRAVENOUS at 08:00

## 2020-03-26 RX ADMIN — DEXAMETHASONE SODIUM PHOSPHATE 5 MG: 10 INJECTION, SOLUTION INTRAMUSCULAR; INTRAVENOUS at 08:45

## 2020-03-26 RX ADMIN — PHENYLEPHRINE HYDROCHLORIDE 300 MCG: 10 INJECTION INTRAVENOUS at 08:47

## 2020-03-26 RX ADMIN — PHENYLEPHRINE HYDROCHLORIDE 100 MCG: 10 INJECTION INTRAVENOUS at 08:40

## 2020-03-26 RX ADMIN — ACETAMINOPHEN 975 MG: 325 TABLET ORAL at 21:50

## 2020-03-26 RX ADMIN — LIDOCAINE HYDROCHLORIDE 50 MG: 10 INJECTION, SOLUTION EPIDURAL; INFILTRATION; INTRACAUDAL; PERINEURAL at 08:24

## 2020-03-26 RX ADMIN — ALBUMIN (HUMAN): 12.5 SOLUTION INTRAVENOUS at 11:10

## 2020-03-26 RX ADMIN — FENTANYL CITRATE 25 MCG: 50 INJECTION, SOLUTION INTRAMUSCULAR; INTRAVENOUS at 10:00

## 2020-03-26 RX ADMIN — GLYCOPYRROLATE 0.6 MG: 0.2 INJECTION, SOLUTION INTRAMUSCULAR; INTRAVENOUS at 12:44

## 2020-03-26 RX ADMIN — ACETAMINOPHEN 975 MG: 325 TABLET ORAL at 16:48

## 2020-03-26 RX ADMIN — OXYCODONE HYDROCHLORIDE 10 MG: 10 TABLET ORAL at 18:47

## 2020-03-26 NOTE — ANESTHESIA PROCEDURE NOTES
Arterial Line Insertion  Performed by: Clearance Mohs, MD  Authorized by: Clearance Mohs, MD   Consent: Verbal consent obtained  Written consent obtained  Risks and benefits: risks, benefits and alternatives were discussed  Consent given by: patient  Patient understanding: patient states understanding of the procedure being performed  Preparation: Patient was prepped and draped in the usual sterile fashion    Indications: hemodynamic monitoring  Orientation:  Right  Location: radial artery  Procedure Details:  Needle gauge: 20  Seldinger technique: Seldinger technique used  Number of attempts: 1    Post-procedure:  Post-procedure: dressing applied  Patient tolerance: Patient tolerated the procedure well with no immediate complications

## 2020-03-26 NOTE — OP NOTE
OPERATIVE REPORT  PATIENT NAME: David Medrano    :  1950  MRN: [id number]  Pt Location: [facility]BR[facility] OR ROOM 02    SURGERY DATE: 3/26/2020    Surgeon(s) and Role:     * Jose Lynn MD - Primary     * Erika Najera MD - Assisting    Preop Diagnosis:  PAD (peripheral artery disease) ([facility] [address] ) [I73 9]  S/P peripheral artery angioplasty with stent placement [Z95 820]    Post-Op Diagnosis Codes:     * PAD (peripheral artery disease) ([facility] [address] ) [I73 9]     * S/P peripheral artery angioplasty with stent placement [Z95 820]    Procedure(s) (LRB):  BYPASS FEMORAL-POPLITEAL; Right lower extremity bypass, fem-bk pop with GSV (Right)    Specimen(s):  * No specimens in log *    Estimated Blood Loss:   200 mL    Drains:  Urethral Catheter Latex 16 Fr  (Active)   Number of days: 0       Anesthesia Type:   General    Operative Indications:  PAD [facility]peripheral artery [facility] ([facility] ) [I73 9]  S/P peripheral artery angioplasty with stent placement [[id number]]  77YEAR-OLD MALE WITH A PREVIOUSLY ATHEROEMBOLIC LESION IN HIS POPLITEAL ARTERY WHICH WAS TREATED WITH popliteal artery stent  Patient then presented acutely to the hospital in the setting of a GI bleed and acute kidney injury several months after in that setting he ended up with popliteal artery stent thrombosis  After that admission he was brought back electively for a redo right lower extremity angiogram with recanalization of his popliteal artery stent with extension into the BK pop artery  However in the ensuing months you again thrombosed his popliteal artery stents  Given multiple endovascular interventions with poor long-term results decision was made to perform a femoral to below-knee artery popliteal artery bypass  Operative Findings: Thrombus within the below-knee popliteal artery extending across the anterior tibial artery into the tibioperoneal trunk      Complications:   None    Procedure and Technique:  Patient was brought to the OR and placed on the table in supine position  General anesthesia was initiated  Preoperative antibiotics were administered  The lower abdomen and right lower extremity was prepped and draped in circumferential fashion surgical time-out was performed  Using ultrasound the location of the greater saphenous vein had been marked was noted to be fairly posterior in the thigh  Based on preoperative imaging the bypass was to be performed from the mid superficial femoral artery to below-knee popliteal artery  A longitudinal incision 2 cm inferior to the femur edge was made for approximately 5 cm  Dissection was carried down through the deep fascia and the superficial femoral artery was exposed this location  Proximal distal control was obtained with vessel loops  An antibiotic-soaked Ray-Shazia was placed in the incision we then turned our attention to exposing the below-knee popliteal artery  Given that the stents extended into the mid below-knee popliteal artery based on his preoperative imaging the decision was made to the distal on the BK pop and possibly onto the tibioperoneal trunk for the distal anastomosis  A longitudinal incision was made in the upper calf 2 cm inferior to the tibial edge  Care was taken to identify the greater saphenous vein and mobilized it inferiorly  Dissection was then carried down through the deep fashion in the popliteal fossa was exposed  We countered the popliteal vein initial which was then dissected and mobilized to allow exposure of the popliteal artery  Notably there was significant scarring at this location, likely from previous thrombotic events per the below-knee popliteal artery was then exposed and dissection was carried down distally to expose the anterior tibial artery and tibioperoneal trunk  In order for adequate mobilization of the crossing vein over the anterior tibial artery was ligated  This ileus was also taken down from tibial attachments to allow exposure of the tibioperoneal trunk  Once adequate exposure was obtained proximal distal control was obtained with vessel loops in Haney fashion  After this we went ahead and performed exposure of the greater saphenous vein the calf incision was extended for few more cm distally  The saphenous vein was harvested the calf all the way out to the mid thigh using skip incisions  All side branches were ligated with silk ties and hemoclips  Once the greater saphenous vein was circumferentially dissected we created a subcutaneous tunnel through the 2 incisions and passed a red rubber catheter with location of the bypass graft  The patient was then heparinized with 7000 units of IV heparin and a CTs were maintained close to 250 throughout the critical portions of the case  The saphenous vein was ligated in its proximal and distal extent  With then flushed with heparinized saline on the back table and any bleeding sites were repaired with 6-0 Prolene suture a which there was only 1  Proximal distal clamps were applied on the superficial femoral artery and the bypass graft was sewn in end-to-side fashion  Prior to the completion of the anastomosis all arteries were back bled and flushed as appropriate vein was allowed to distend under arterial pressure and subsequently pass a previously created tunnel to the calf incision  We then flushed the vein with heparinized saline and a stealth clamp was applied just distal to the proximal anastomosis  Then applied a clamp on the below-knee popliteal artery and the tibioperoneal trunk and anterior tibial artery were occluded by blood tension on the vessel loops  Longitudinal arteriotomy was made with an 11 blade and we noted that there was thrombus at this location which was removed with forceps  There was brisk backbleeding from the anterior tibial artery however the tibioperoneal trunk had no back bleeding  As such a 2   Luis Alberto embolectomy catheter was passed and with removal of what appeared to be subacute clot   We passed the Luis Alberto embolectomy catheter another 2 times both times there was no further vertebral of clot  After which there was brisk backbleeding from the tibioperoneal trunk  Both the AT and T PT were flushed with heparinized saline and vessel loops were again pulled under tension to obtain hemostasis  We then performed the distal anastomosis with 6-0 Prolene suture in an end-to-side fashion with a spatulated anastomosis  Prior to the completion of the anastomosis all arteries were back bled and flushed as appropriate  Once all clamps were removed the bypass grafts was noted to have a strong 2+ pulse with a 2+ pulse in the anterior tibial artery and tibioperoneal trunk  Doppler insonation of the posterior tibial artery was was the dominant outflow vessel as previously noted through preoperative imaging had a very strong hyperemic low resistance signal for as a was monophasic preoperatively  This was felt to be an excellent result  Hemostasis was obtained  All incisions were copiously irrigated with antibiotic laden saline  All incisions were closed with layers of 2-0 Monocryl  Monocryl in for-0 Monocryl for the skin  After all incisions were closed the bypass graft was still noted to have a 2+ pulse with a very strong PT signal at the level of the ankle  General anesthesia was terminated and the patient was transferred to recovery in stable condition       I was present for the entire procedure and I was present for all critical portions of the procedure    Patient Disposition:  PACU     SIGNATURE: Debbie Vail MD  DATE: March 26, 2020  TIME: 12:53 PM

## 2020-03-26 NOTE — PROGRESS NOTES
PGY3 Post-Op Check Note    S: Feeling well  Eating dinner  Pain controlled  O:   Vitals:    03/26/20 1642   BP: 140/92   Pulse: 99   Resp: 21   Temp:    SpO2: 93%       No intake/output data recorded    I/O this shift:  In: 3000 [I V :2500; IV Piggyback:500]  Out: 925 [Urine:725; Blood:200]    PE:  AAOx3  NAD  Norm resp effort  RRR  Abd soft  R leg dressings c/d/i  R palp bypass, dopp PT, monophasic DP  L dopp PT/DP    Lab Results   Component Value Date    WBC 12 74 (H) 03/26/2020    HGB 11 4 (L) 03/26/2020    HCT 36 5 03/26/2020     (H) 03/26/2020     03/26/2020     Lab Results   Component Value Date    CALCIUM 8 1 (L) 03/26/2020    K 4 2 03/26/2020    CO2 26 03/26/2020     (H) 03/26/2020    BUN 16 03/26/2020    CREATININE 0 86 03/26/2020         A/P: 77 y o  M w/ PAD with occluded popliteal stent s/p 3/26 R distal SFA to BK popliteal bypass with ipsilateral reversed GSV  - Diet as tolerated  - IVF  - [place]  - [place]  - prn pain control  - neurovascular checks  - ASA/statin  - PT/OT  - SQH/SCDs

## 2020-03-26 NOTE — ANESTHESIA POSTPROCEDURE EVALUATION
Post-Op Assessment Note    CV Status:  Stable    Pain management: adequate     Mental Status:  Alert and awake   Hydration Status:  Euvolemic   PONV Controlled:  Controlled   Airway Patency:  Patent   Post Op Vitals Reviewed: Yes      Staff: CRNA           /92 (03/26/20 1329)    Temp 98 2 °F (36 8 °C) (03/26/20 1329)    Pulse 87 (03/26/20 1329)   Resp   18   SpO2 92 % (03/26/20 1329) FM

## 2020-03-27 PROBLEM — D62 ACUTE BLOOD LOSS ANEMIA: Status: ACTIVE | Noted: 2020-03-27

## 2020-03-27 LAB
ANION GAP SERPL CALCULATED.3IONS-SCNC: 5 MMOL/L (ref 4–13)
BUN SERPL-MCNC: 11 MG/DL (ref 5–25)
CALCIUM SERPL-MCNC: 8.3 MG/DL (ref 8.3–10.1)
CHLORIDE SERPL-SCNC: 108 MMOL/L (ref 100–108)
CO2 SERPL-SCNC: 27 MMOL/L (ref 21–32)
CREAT SERPL-MCNC: 0.83 MG/DL (ref 0.6–1.3)
ERYTHROCYTE [DISTWIDTH] IN BLOOD BY AUTOMATED COUNT: 16.1 % (ref 11.6–15.1)
GFR SERPL CREATININE-BSD FRML MDRD: 90 ML/MIN/1.73SQ M
GLUCOSE SERPL-MCNC: 120 MG/DL (ref 65–140)
HCT VFR BLD AUTO: 34.3 % (ref 36.5–49.3)
HGB BLD-MCNC: 10.6 G/DL (ref 12–17)
MCH RBC QN AUTO: 31.2 PG (ref 26.8–34.3)
MCHC RBC AUTO-ENTMCNC: 30.9 G/DL (ref 31.4–37.4)
MCV RBC AUTO: 101 FL (ref 82–98)
PLATELET # BLD AUTO: 318 THOUSANDS/UL (ref 149–390)
PMV BLD AUTO: 10.2 FL (ref 8.9–12.7)
POTASSIUM SERPL-SCNC: 4 MMOL/L (ref 3.5–5.3)
RBC # BLD AUTO: 3.4 MILLION/UL (ref 3.88–5.62)
SODIUM SERPL-SCNC: 140 MMOL/L (ref 136–145)
WBC # BLD AUTO: 11.45 THOUSAND/UL (ref 4.31–10.16)

## 2020-03-27 PROCEDURE — 85027 COMPLETE CBC AUTOMATED: CPT | Performed by: STUDENT IN AN ORGANIZED HEALTH CARE EDUCATION/TRAINING PROGRAM

## 2020-03-27 PROCEDURE — 80048 BASIC METABOLIC PNL TOTAL CA: CPT | Performed by: STUDENT IN AN ORGANIZED HEALTH CARE EDUCATION/TRAINING PROGRAM

## 2020-03-27 PROCEDURE — 99024 POSTOP FOLLOW-UP VISIT: CPT | Performed by: SURGERY

## 2020-03-27 PROCEDURE — 97163 PT EVAL HIGH COMPLEX 45 MIN: CPT

## 2020-03-27 PROCEDURE — 97167 OT EVAL HIGH COMPLEX 60 MIN: CPT

## 2020-03-27 RX ADMIN — METOPROLOL SUCCINATE 100 MG: 100 TABLET, EXTENDED RELEASE ORAL at 08:35

## 2020-03-27 RX ADMIN — PANTOPRAZOLE SODIUM 40 MG: 40 TABLET, DELAYED RELEASE ORAL at 05:38

## 2020-03-27 RX ADMIN — RIVAROXABAN 20 MG: 20 TABLET, FILM COATED ORAL at 08:35

## 2020-03-27 RX ADMIN — FOLIC ACID 1 MG: 1 TABLET ORAL at 08:35

## 2020-03-27 RX ADMIN — SODIUM CHLORIDE, SODIUM LACTATE, POTASSIUM CHLORIDE, AND CALCIUM CHLORIDE 100 ML/HR: .6; .31; .03; .02 INJECTION, SOLUTION INTRAVENOUS at 01:34

## 2020-03-27 RX ADMIN — SULFASALAZINE 500 MG: 500 TABLET, DELAYED RELEASE ORAL at 17:06

## 2020-03-27 RX ADMIN — ACETAMINOPHEN 975 MG: 325 TABLET ORAL at 21:01

## 2020-03-27 RX ADMIN — HEPARIN SODIUM 5000 UNITS: 5000 INJECTION INTRAVENOUS; SUBCUTANEOUS at 05:38

## 2020-03-27 RX ADMIN — OXYCODONE HYDROCHLORIDE 10 MG: 10 TABLET ORAL at 04:19

## 2020-03-27 RX ADMIN — ACETAMINOPHEN 975 MG: 325 TABLET ORAL at 13:12

## 2020-03-27 RX ADMIN — SULFASALAZINE 500 MG: 500 TABLET, DELAYED RELEASE ORAL at 08:38

## 2020-03-27 RX ADMIN — ACETAMINOPHEN 975 MG: 325 TABLET ORAL at 05:38

## 2020-03-27 RX ADMIN — ASPIRIN 81 MG 81 MG: 81 TABLET ORAL at 08:35

## 2020-03-27 RX ADMIN — ATORVASTATIN CALCIUM 20 MG: 20 TABLET, FILM COATED ORAL at 08:35

## 2020-03-27 NOTE — OCCUPATIONAL THERAPY NOTE
Occupational Therapy Evaluation      Adam Zelaya    3/27/2020    Principal Problem:    PAD (peripheral artery disease) ([address] )  Active Problems:    Acute blood loss anemia      Past Medical History:   Diagnosis Date    Blue toe syndrome (HCC)     Chronic kidney disease     Colon polyps     GERD (gastroesophageal reflux disease)     Hematuria     History of rheumatic fever     Hypolipidemia     Hypotension     Ischemic cardiomyopathy     PAD (peripheral artery disease) (HCC)     Pulmonary emphysema (HCC)        Past Surgical History:   Procedure Laterality Date    COLONOSCOPY  2019    HERNIA REPAIR      IR ABDOMINAL ANGIOGRAPHY / INTERVENTION  6/27/2019    IR ABDOMINAL ANGIOGRAPHY / INTERVENTION  2/12/2020    POPLITEAL ARTERY STENT Right     6/2019    AL SLCTV CATHJ 3RD+ ORD SLCTV ABDL PEL/LXTR [facility] Right 6/27/2019    Procedure: leg ANGIOGRAM WITH STENT, BALLOON ANGIOPLASTY, LEFT GROIN ACCESS;  Surgeon: Carmen Estevez MD;  Location: [facility];  Service: Vascular    AL VEIN BYPASS GRAFT,FEM-POP Right 3/26/2020    Procedure: BYPASS FEMORAL-POPLITEAL; Right lower extremity bypass, fem-bk pop with GSV;  Surgeon: Carmen Estevez MD;  Location: [facility];  Service: Vascular        03/27/20 1100   Note Type   Note type Eval/Treat   Restrictions/Precautions   Weight Bearing Precautions Per Order No   Other Precautions Multiple lines; Fall Risk;Pain   Pain Assessment   Pain Assessment Tool 0-10   Pain Score 5   Pain Location/Orientation Orientation: Right;Location: Leg;Location: Incision   Patient's Stated Pain Goal No pain   Hospital Pain Intervention(s) Repositioned; Ambulation/increased activity; Emotional support   Home Living   Type of [facility] One level; Laundry in basement;Performs ADLs on one level; Able to live on main level with bedroom/bathroom;Stairs to enter with rails  ([place]; 3STE in front and back)   Bathroom Shower/Tub Tub/shower unit   Bathroom Toilet Standard   Bathroom Equipment Other (Comment)  (denies DME)   [address] Walker;Cane   Additional Comments Pt reports occaisonal use of SPC for mobility "ever since I fell about 4 weeks ago" PTA   Prior Function   Level of [place] Independent with ADLs and functional mobility   Lives With Spouse   Receives Help From Family   ADL Assistance Independent   IADLs Independent   Falls in the last 6 months 1 to 4  (1 fall 4 week ago, per Pt)   Vocational Retired   Lifestyle   Autonomy Pt reports being IND w/ all ADLS and shares IADLS w/ spouse; (+) drives PTA   Reciprocal Relationships Pt lives w/ supportive spouse who is retired and in good heatlh to provide A as needed  Service to Others Pt is retired   Intrinsic Gratification Pt reports enjoying gardening   Psychosocial   Psychosocial (WDL) WDL   ADL   Where Assessed Chair   Eating Assistance 5  Supervision/Setup   Grooming Assistance 5  Supervision/Setup   UB [address] 4  Minimal Assistance   LB [address] 3  Moderate Assistance   [address] 4  Minimal Assistance    [facility] 3  Moderate [address]  4  Minimal Assistance   Bed Mobility   Supine to Sit Unable to assess   Sit to Supine Unable to assess   Additional Comments Pt seated OOB in chair upon OT arrival  Pt seated OOB in chair w/ all needs in reach s/p OT session  Transfers   Sit to Stand 4  Minimal assistance   Additional items Assist x 1; Increased time required;Verbal cues;Armrests   Stand to Sit 4  Minimal assistance   Additional items Assist x 1; Increased time required;Verbal cues;Armrests   Additional Comments Transfers w/ RW  VC required for safety and hand placement  Functional Mobility   Functional Mobility 4  Minimal assistance   Additional Comments Pt demonstrated short distance household mobility in hallway w/ RW at [address] level  Pt experienced 1 mild LOB posteriorly, requiring Min-Mod A to correct  Additional items Rolling walker   Balance   Static Sitting Fair +   Dynamic Sitting Fair   Static Standing Fair -   Dynamic Standing Poor +   Ambulatory Poor +   Activity Tolerance   Activity Tolerance Patient limited by fatigue;Patient limited by pain   Medical Staff Made Aware CM for safe dispo planning   Nurse Made Aware RN cleared Pt for OT eval   RUE Assessment   RUE Assessment WFL   LUE Assessment   LUE Assessment WFL   Hand Function   Gross Motor Coordination Functional   Fine Motor Coordination Functional   Sensation   Light Touch No apparent deficits   Cognition   Overall Cognitive Status WFL   Arousal/Participation Alert; Cooperative   Attention Within functional limits   Orientation Level Oriented X4   Memory Within functional limits   Following Commands Follows one step commands without difficulty   Comments Pt is pleasant and cooperative to participate in therapy this day  Pt required VC for safety and safe RW management  Assessment   Limitation Decreased ADL status; Decreased Safe judgement during ADL;Decreased endurance;Decreased high-level ADLs   Prognosis Good   Assessment Pt is a 76 yo male seen for OT eval s/p adm to [facility] on 3/26/2020 dx'd w/ PAD  Pt s/p Right distal SFA to BK popliteal bypass with ipsilateral reversed GSV 3/26  Co-morbidities include a h/o ABLA, CKD stage 3, gross hematuria, chronic hypotension, abdominal aortic atherosclerosis, RBBB, tobacco use, GERD, mass of both adrenal glands, SBO, R blue toe syndrome, ischemic cardiomyopathy, s/p peripheral artery angioplasty w/ stent placement, venous stasis, PAF, thrombocytosis, emphysema, SIRS, SVT, 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  Pt with active OT orders and ambulate  orders  Pt is retired and resides w/ spouse in [place] w/ 3STE  Pt reports spouse is home and in good health to provide A as needed  Pt was I w/  ADLS and shared IADLS w/ spouse, drove, & required use of DME PTA, including occasional use of SPC for mobility   Pt is currently demonstrating the following occupational deficits: Min A UB bathing/dressing, Mod A LB bathing/dressing, Min A toileting, Min A STS and short distance household mobility w/ RW  These deficits that are impacting pt's baseline areas of occupation are a result of the following impairments: pain, endurance, activity tolerance, functional mobility, balance, functional standing tolerance and decreased I w/ ADLS/IADLS  The following Occupational Performance Areas to address include: grooming, bathing/shower, toilet hygiene, dressing, health maintenance, functional mobility and clothing management  Based on the aforementioned OT evaluation, functional performance deficits, and assessments, pt has been identified as a high complexity evaluation  Recommend home with family support pending progress upon D/C  Pt to continue to benefit from acute immediate OT services to address the following goals 3-5x/week to  w/in 7-10 days:    Goals   Patient Goals To decrease pain   LTG Time Frame 7-10   Long Term Goal #1 Refer to goals below   Plan   Treatment Interventions ADL retraining;Functional transfer training;UE strengthening/ROM; Endurance training;Patient/family training; Compensatory technique education; Activityengagement; Energy conservation   Goal Expiration Date 20   OT Frequency 3-5x/wk   Recommendation   OT Discharge Recommendation Home with family support  (pending progress)   Equipment Recommended Other (comment)  (RW)   OT - OK to Discharge Yes  (when medically cleared)   Modified [place] Scale   Modified [place] Scale 4         GOALS    1) Pt will improve activity tolerance to G for min 30 min txment sessions for increase engagement in functional tasks    2) Pt will complete UB/LB dressing/self care w/ mod I using adaptive device and DME as needed    3) Pt will complete bathing w/ Mod I w/ use of AE and DME as needed    4) Pt will complete toileting w/ mod I w/ G hygiene/thoroughness using DME as needed    5) Pt will improve functional transfers to Mod I on/off all surfaces using DME as needed w/ G balance/safety     6) Pt will improve functional mobility during ADL/IADL/leisure tasks to Mod I using DME as needed w/ G balance/safety     7) Pt will participate in simulated IADL management task to increase independence to Mod I w/ G safety and endurance    8) Pt will be attentive 100% of the time during ongoing cognitive assessment w/ G participation to assist w/ safe d/c planning/recommendations    9) Pt will demonstrate G carryover of pt/caregiver education and training as appropriate w/o cues w/ good tolerance to increase safety during functional tasks    10) Pt will demonstrate 100% carryover of energy conservation techniques t/o functional I/ADL/leisure tasks w/o cues s/p skilled education to increase endurance during functional tasks             Allegra Stanley MS, OTR/L

## 2020-03-27 NOTE — PHYSICAL THERAPY NOTE
Physical Therapy Evaluation     Patient's Name: Conchita White    Admitting Diagnosis  PAD (peripheral artery disease) ([facility] 75 ) [I73 9]  S/P peripheral artery angioplasty with stent placement [Z95 [id number]]    Problem List  Patient Active Problem List   Diagnosis    Overweight (BMI 25 0-29  9)    Chronic kidney disease, stage III (moderate) (HCC)    Gross hematuria    Persistent proteinuria    Impaired fasting glucose    Microscopic hematuria    Chronic hypotension    [place] cardiac risk 10-20% in next 10 years    Abdominal aortic atherosclerosis (HCC)    RBBB (right bundle branch block with left anterior fascicular block)    Tobacco use    Dyslipidemia    Vitamin D deficiency    GERD with esophagitis    Harris's esophagus without dysplasia    Colon polyps    PAD (peripheral artery disease) (HCC)    Mass of both adrenal glands (HCC)    History of small bowel obstruction    Blue toe syndrome, right (HCC)    Ischemic cardiomyopathy    S/P peripheral artery angioplasty with stent placement    Venous stasis    GI symptoms    Paroxysmal atrial fibrillation (HCC)    Anemia of chronic renal failure, stage 3 (moderate) (HCC)    Thrombocytosis (HCC)    Leukocytosis    Other emphysema (HCC)    Adrenal mass (HCC)    Limb ischemia    Diarrhea    Hyponatremia    SIRS (systemic inflammatory response syndrome) (HCC)    Hypokalemia    SVT (supraventricular tachycardia) (HCC)    Hypotension    Positive QuantiFERON-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    Terminal ileitis of small intestine (HCC)    Preop cardiovascular exam    Acute blood loss anemia       Past Medical History  Past Medical History:   Diagnosis Date    Blue toe syndrome (HCC)     Chronic kidney disease     Colon polyps     GERD (gastroesophageal reflux disease)     Hematuria     History of rheumatic fever     Hypolipidemia     Hypotension     Ischemic cardiomyopathy     PAD (peripheral artery disease) ([facility] )     Pulmonary emphysema (HCC) Past Surgical History  Past Surgical History:   Procedure Laterality Date    COLONOSCOPY  2019    HERNIA REPAIR      IR ABDOMINAL ANGIOGRAPHY / INTERVENTION  6/27/2019    IR ABDOMINAL ANGIOGRAPHY / INTERVENTION  2/12/2020    POPLITEAL ARTERY STENT Right     6/2019    KS SLCTV CATHJ 3RD+ ORD SLCTV ABDL PEL/LXTR [facility] Right 6/27/2019    Procedure: leg ANGIOGRAM WITH STENT, BALLOON ANGIOPLASTY, LEFT GROIN ACCESS;  Surgeon: Wayne Ford MD;  Location: [facility];  Service: Vascular    KS VEIN BYPASS GRAFT,FEM-POP Right 3/26/2020    Procedure: BYPASS FEMORAL-POPLITEAL; Right lower extremity bypass, fem-bk pop with GSV;  Surgeon: Wayne Ford MD;  Location: [facility];  Service: Vascular        03/27/20 1121   Note Type   Note type Eval only   Pain Assessment   Pain Assessment Tool 0-10   Pain Score 5   Pain Location/Orientation Orientation: Right;Location: Leg;Location: Incision   Patient's Stated Pain Goal No pain   Hospital Pain Intervention(s) Repositioned; Ambulation/increased activity; Rest   Home Living   Type of [address] One level  (3+1STE in front and back)   Bathroom Shower/Tub Tub/shower unit   Bathroom Toilet Standard   Bathroom Equipment   (denies)   Home Equipment Walker;Cane   Additional Comments Pt reports occasional use of cane  Pt reports RW is very old and he would like a new one     Prior Function   Level of [place] Independent with ADLs and functional mobility   Lives With Spouse   Receives Help From Family   ADL Assistance Independent   IADLs Independent   Falls in the last 6 months 1 to 4  (1 per pt reprot)   Vocational Retired   Restrictions/Precautions   [place] Bearing Precautions Per Order No   Braces or Orthoses   (none)   Other Precautions Fall Risk;Multiple lines;Telemetry;Pain   General   Family/Caregiver Present No   Cognition   Overall Cognitive Status WFL   Arousal/Participation Alert   Attention Within functional limits   Orientation Level Oriented X4   Memory Within functional limits   Following Commands Follows one step commands without difficulty   Comments Pt requires VC for safe use of RW, demonstrates good carry over  RLE Assessment   RLE Assessment   (functionally 4/5)   LLE Assessment   LLE Assessment   (functinoally 4/5)   Bed Mobility   Additional Comments OOB in chair upon PT arrival   Pt left upright in bedside chair with all needs in reach at end of therapy session  Transfers   Sit to Stand 4  Minimal assistance   Additional items Assist x 1; Increased time required;Verbal cues   Stand to Sit 4  Minimal assistance   Additional items Assist x 1; Increased time required;Verbal cues   Additional Comments Transfers with RW   VC for hand placement and safety  Ambulation/Elevation   Gait pattern Excessively slow; Short stride  (unsteady)   Gait Assistance 4  Minimal assist  (supervision with RW, CGA with SPC)   Additional items Assist x 1;Verbal cues   Assistive Device Rolling walker;SPC   Distance 140 ft x 1 with RW, 140 ft x 1 with SPC   Stair Management Assistance 4  Minimal assist  (CGA)   Additional items Assist x 1   Stair Management Technique With cane; Step to pattern; Foreward   Number of Stairs 4   Balance   Static Sitting Fair +   Dynamic Sitting Fair   Static Standing Fair -   Dynamic Standing Poor +   Ambulatory Poor +   Endurance Deficit   Endurance Deficit Yes   Endurance Deficit Description fatigue, pain   Activity Tolerance   Activity Tolerance Patient limited by fatigue;Patient limited by pain   Medical Staff Made Aware OT Vikki   Nurse Made Aware RN cleared pt to be seen by PT   Assessment   Prognosis Good   Problem List Decreased strength;Decreased endurance; Impaired balance;Decreased mobility;Pain   Assessment Pt seen for high complexity PT evaluation due to decrease in functional mobility status compared to baseline  Pt with active PT eval/treat and ambulate pt orders at this time    Pt is a 77 y o  yo M who presented to [facility] with PAD on 3/26/20  Pt is s/p fem-pop bypass on R  Pt  has a past medical history of Blue toe syndrome ([facility] ), Chronic kidney disease, Colon polyps, GERD (gastroesophageal reflux disease), Hematuria, History of rheumatic fever, Hypolipidemia, Hypotension, Ischemic cardiomyopathy, P[facility]peripheral artery [facility]) ([facility] ), and Pulmonary emphysema ([facility] )  Pt resides with spouse in [facility] with 3+1STE and reports I PTA  Pt presents with decreased strength, balance, endurance that contribute to limitations in functional transfers, functional mobility  Pt requires Min A for STS, CGA for ambulation with SPC, supervision for ambulation with RW, and CGA for negotiation of 4 steps at this time  Pt left upright in bedside chair with all needs in reach  Pt will benefit from skilled therapy in order to address current impairments and functional limitations  PT to follow pt and recommending home with family support once medically cleared  Barriers to Discharge None   Goals   Patient Goals to have less pain   STG Expiration Date 04/10/20   Short Term Goal #1 1  Pt will demonstrate ability to perform all aspects of bed mobility with I in order to increase independence and decrease burden on caregivers  2  Pt will demonstrate ability to perform functional transfers with Mod I in order to increase independence and decrease burden on caregivers  3  Pt will demonstrate ability to ambulate 200 ft with least restrictive AD with Mod I in order to return to mobility safely  4  Pt will demonstrate ability to negotiate 4 steps with/without HR and Mod I in order to return to household/community mobility safely  5  Pt will demonstrate improved balance by one grade order to decrease risk of falls  6  Pt will increase b/l LE strength by 1 grade in order to increase ease of functional mobility and transfers  Plan   Treatment/Interventions Functional transfer training;LE strengthening/ROM; Elevations; Therapeutic exercise; Endurance training;Patient/family training;Equipment eval/education; Bed mobility;Gait training;Spoke to nursing   PT Frequency Other (Comment)  (3-5x/wk)   Recommendation   Recommendation Home with family support   Equipment Recommended Walker   PT - OK to Discharge Yes  (once medically cleared)   Modified Banner Scale   Modified Brianne Scale 4           Cierra Garcia, PT, DPT

## 2020-03-27 NOTE — PLAN OF CARE
Problem: Potential for Falls  Goal: Patient will remain free of falls  Description  INTERVENTIONS:  - Assess patient frequently for physical needs  -  Identify cognitive and physical deficits and behaviors that affect risk of falls    -  Kennard fall precautions as indicated by assessment   - Educate patient/family on patient safety including physical limitations  - Instruct patient to call for assistance with activity based on assessment  - Modify environment to reduce risk of injury  - Consider OT/PT consult to assist with strengthening/mobility  Outcome: Progressing     Problem: Prexisting or High Potential for Compromised Skin Integrity  Goal: Skin integrity is maintained or improved  Description  INTERVENTIONS:  - Identify patients at risk for skin breakdown  - Assess and monitor skin integrity  - Assess and monitor nutrition and hydration status  - Monitor labs   - Assess for incontinence   - Turn and reposition patient  - Assist with mobility/ambulation  - Relieve pressure over bony prominences  - Avoid friction and shearing  - Provide appropriate hygiene as needed including keeping skin clean and dry  - Evaluate need for skin moisturizer/barrier cream  - Collaborate with interdisciplinary team   - Patient/family teaching  - Consider wound care consult   Outcome: Progressing     Problem: DISCHARGE PLANNING  Goal: Discharge to home or other facility with appropriate resources  Description  INTERVENTIONS:  - Identify barriers to discharge w/patient and caregiver  - Arrange for needed discharge resources and transportation as appropriate  - Identify discharge learning needs (meds, wound care, etc )  - Arrange for interpretive services to assist at discharge as needed  - Refer to Case Management Department for coordinating discharge planning if the patient needs post-hospital services based on physician/advanced practitioner order or complex needs related to functional status, cognitive ability, or social support system  Outcome: Progressing     Problem: Knowledge Deficit  Goal: Patient/family/caregiver demonstrates understanding of disease process, treatment plan, medications, and discharge instructions  Description  Complete learning assessment and assess knowledge base    Interventions:  - Provide teaching at level of understanding  - Provide teaching via preferred learning methods  Outcome: Progressing

## 2020-03-27 NOTE — ASSESSMENT & PLAN NOTE
77 y o  M w/ PAD with occluded popliteal stent     S/P Right distal SFA to BK popliteal bypass with ipsilateral reversed GSV 3/26    Plan  Continue ASA, lipitor  Resume Xarelto  D/C [place]  D/C [place]  Saline lock  Transition to M/S   OOB/Amb  Pain control

## 2020-03-27 NOTE — PROGRESS NOTES
Vascular Surgery    Progress Note - Marcelle Nixon 1950, 77 y o  male MRN: [id number]    Unit/Bed#: [facility] 531-01 Encounter: [id number]    Primary Care Provider: Manjinder Kong MD   Date and time admitted to hospital: 3/26/2020  6:22 AM    * PAD (peripheral artery disease) [facility])  Assessment & Plan  77 y o  M w/ PAD with occluded popliteal stent     S/P Right distal SFA to BK popliteal bypass with ipsilateral reversed GSV 3/26    Plan  Continue ASA, lipitor  Resume Xarelto  D/C [place]  D/C [place]  Saline lock  Transition to M/S   OOB/Amb  Pain control    Acute blood loss anemia  Assessment & Plan  As expected for procedure    Plan:  Monitor      Subjective:  Pt doing well, c/o incisional pain    Vitals:  /83 (BP Location: Left arm)   Pulse 80   Temp 97 8 °F (36 6 °C) (Oral)   Resp 18   Ht 5' 9" (1 753 m)   Wt 87 5 kg (193 lb)   SpO2 97%   BMI 28 50 kg/m²     I/Os:  I/O last 3 completed shifts: In: 0 [I  V :4100; IV Piggyback:500]  Out: 1152 [Urine:1375; Blood:200]  No intake/output data recorded      Lab Results and Cultures:   Lab Results   Component Value Date    WBC 11 45 (H) 03/27/2020    HGB 10 6 (L) 03/27/2020    HCT 34 3 (L) 03/27/2020     (H) 03/27/2020     03/27/2020     Lab Results   Component Value Date    CALCIUM 8 3 03/27/2020    K 4 0 03/27/2020    CO2 27 03/27/2020     03/27/2020    BUN 11 03/27/2020    CREATININE 0 83 03/27/2020     Lab Results   Component Value Date    INR 2 12 (H) 03/23/2020    INR 1 39 (H) 01/18/2020    INR 1 49 (H) 01/18/2020    PROTIME 22 9 (H) 03/23/2020    PROTIME 16 6 (H) 01/18/2020    PROTIME 17 6 (H) 01/18/2020        Blood Culture:   Lab Results   Component Value Date    BLOODCX Anaerobic Gram Negative Bacilli (A) 01/20/2020   ,   Urinalysis:   Lab Results   Component Value Date    COLORU Dk Yellow 01/17/2020    CLARITYU Cloudy 01/17/2020    SPECGRAV 1 024 01/17/2020    PHUR 5 5 01/17/2020    PHUR 5 0 01/13/2020    LEUKOCYTESUR Negative 01/17/2020    NITRITE Negative 01/17/2020    GLUCOSEU Negative 01/17/2020    KETONESU Trace (A) 01/17/2020    BILIRUBINUR Interference- unable to analyze (A) 01/17/2020    BLOODU Negative 01/17/2020   ,   Urine Culture: No results found for: URINECX,   Wound Culure: No results found for: WOUNDCULT    Medications:  Current Facility-Administered Medications   Medication Dose Route Frequency    acetaminophen (TYLENOL) tablet 975 mg  975 mg Oral Q8H [facility]    aspirin chewable tablet 81 mg  81 mg Oral Daily    atorvastatin (LIPITOR) tablet 20 mg  20 mg Oral Daily    folic acid (FOLVITE) tablet 1 mg  1 mg Oral Daily    heparin (porcine) subcutaneous injection 5,000 Units  5,000 Units Subcutaneous Q8H [facility]    HYDROmorphone (DILAUDID) injection 0 5 mg  0 5 mg Intravenous Q3H PRN    lactated ringers bolus 500 mL  500 mL Intravenous Once PRN    And    lactated ringers bolus 500 mL  500 mL Intravenous Once PRN    lactated ringers infusion  75 mL/hr Intravenous Continuous    lactated ringers infusion  100 mL/hr Intravenous Continuous    metoprolol succinate (TOPROL-XL) 24 hr tablet 100 mg  100 mg Oral Daily    ondansetron (ZOFRAN) injection 4 mg  4 mg Intravenous Q6H PRN    oxyCODONE (ROXICODONE) immediate release tablet 10 mg  10 mg Oral Q4H PRN    oxyCODONE (ROXICODONE) IR tablet 5 mg  5 mg Oral Q4H PRN    pantoprazole (PROTONIX) EC tablet 40 mg  40 mg Oral Early Morning    sodium chloride 0 9 % bolus 500 mL  500 mL Intravenous Once PRN    And    sodium chloride 0 9 % bolus 500 mL  500 mL Intravenous Once PRN    sulfaSALAzine (AZULFIDINE-EN) EC tablet 500 mg  500 mg Oral BID       Physical Exam:    General appearance: alert and oriented, in no acute distress  Lungs: clear to auscultation bilaterally  Heart: regular rate and rhythm, S1, S2 normal, no murmur, click, rub or gallop  Abdomen: soft, non-tender; bowel sounds normal; no masses,  no organomegaly  Extremities: Right leg hot, pink, +edema, M/S intact    Wound/Incision:  RLE incision clean, dry, and intact    Pulse exam:  Graft: Right: 2+   DP: Right: doppler signal  PT: Right: doppler signal       Sundeep Vazquez PA-C  3/27/2020

## 2020-03-27 NOTE — SOCIAL WORK
CM met with pt to discuss DCP and cm role  Pt lives with his wife in a 1sh with 3ste  Prior to admission pt was independent with ambulation and with ADLS  No prior hx of VNA  Pt's preference for pharmacy is [facility]  Pt denies any hx of drugs/ETOH or inpt psych stays  Patient/caregiver received discharge checklist  Content reviewed  Patient/caregiver encouraged to participate in discharge plan of care prior to discharge home  CM reviewed d/c planning process including the following: identifying help at home, patient preference for d/c planning needs, Discharge Lounge, [facility] Meds to Bed program, availability of treatment team to discuss questions or concerns patient and/or family may have regarding understanding medications and recognizing signs and symptoms once discharged  CM also encouraged patient to follow up with all recommended appointments after discharge  Patient advised of importance for patient and family to participate in managing patients medical well being

## 2020-03-27 NOTE — PLAN OF CARE
Problem: PHYSICAL THERAPY ADULT  Goal: Performs mobility at highest level of function for planned discharge setting  See evaluation for individualized goals  Description  Treatment/Interventions: Functional transfer training, LE strengthening/ROM, Elevations, Therapeutic exercise, Endurance training, Patient/family training, Equipment eval/education, Bed mobility, Gait training, Spoke to nursing  Equipment Recommended: Shahid Rivas       See flowsheet documentation for full assessment, interventions and recommendations  Note:   Prognosis: Good  Problem List: Decreased strength, Decreased endurance, Impaired balance, Decreased mobility, Pain  Assessment: Pt seen for high complexity PT evaluation due to decrease in functional mobility status compared to baseline  Pt with active PT eval/treat and ambulate pt orders at this time  Pt is a 77 y o  yo M who presented to [facility] with PAD on 3/26/20  Pt is s/p fem-pop bypass on R  Pt  has a past medical history of Blue toe syndrome ([facility] ), Chronic kidney disease, Colon polyps, GERD (gastroesophageal reflux disease), Hematuria, History of rheumatic fever, Hypolipidemia, Hypotension, Ischemic cardiomyopathy, [facility] and Pulmonary emphysema ([facility] )  Pt resides with spouse in [facility] with 3+1STE and reports I PTA  Pt presents with decreased strength, balance, endurance that contribute to limitations in functional transfers, functional mobility  Pt requires Min A for STS, CGA for ambulation with SPC, supervision for ambulation with RW, and CGA for negotiation of 4 steps at this time  Pt left upright in bedside chair with all needs in reach  Pt will benefit from skilled therapy in order to address current impairments and functional limitations  PT to follow pt and recommending home with family support once medically cleared    Barriers to Discharge: None     Recommendation: Home with family support     PT - OK to Discharge: Yes(once medically cleared)    See flowsheet documentation for full assessment

## 2020-03-27 NOTE — PLAN OF CARE
Problem: OCCUPATIONAL THERAPY ADULT  Goal: Performs self-care activities at highest level of function for planned discharge setting  See evaluation for individualized goals  Description  Treatment Interventions: ADL retraining, Functional transfer training, UE strengthening/ROM, Endurance training, Patient/family training, Compensatory technique education, Activityengagement, Energy conservation  Equipment Recommended: Other (comment)(RW)       See flowsheet documentation for full assessment, interventions and recommendations  Note:   Limitation: Decreased ADL status, Decreased Safe judgement during ADL, Decreased endurance, Decreased high-level ADLs  Prognosis: Good  Assessment: Pt is a 78 yo male seen for OT eval s/p adm to [facility] on 3/26/2020 dx'd w/ PAD  Pt s/p Right distal SFA to BK popliteal bypass with ipsilateral reversed GSV 3/26  Co-morbidities include a h/o ABLA, CKD stage 3, gross hematuria, chronic hypotension, abdominal aortic atherosclerosis, RBBB, tobacco use, GERD, mass of both adrenal glands, SBO, R blue toe syndrome, ischemic cardiomyopathy, s/p peripheral artery angioplasty w/ stent placement, venous stasis, PAF, thrombocytosis, emphysema, SIRS, SVT, 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  Pt with active OT orders and ambulate  orders  Pt is retired and resides w/ spouse in [facility] w/ 3STE  Pt reports spouse is home and in good health to provide A as needed  Pt was I w/  ADLS and shared IADLS w/ spouse, drove, & required use of DME PTA, including occasional use of SPC for mobility  Pt is currently demonstrating the following occupational deficits: Min A UB bathing/dressing, Mod A LB bathing/dressing, Min A toileting, Min A STS and short distance household mobility w/ RW    These deficits that are impacting pt's baseline areas of occupation are a result of the following impairments: pain, endurance, activity tolerance, functional mobility, balance, functional standing tolerance and decreased I w/ ADLS/IADLS  The following Occupational Performance Areas to address include: grooming, bathing/shower, toilet hygiene, dressing, health maintenance, functional mobility and clothing management  Based on the aforementioned OT evaluation, functional performance deficits, and assessments, pt has been identified as a high complexity evaluation  Recommend home with family support pending progress upon D/C   Pt to continue to benefit from acute immediate OT services to address the following goals 3-5x/week to  w/in 7-10 days:      OT Discharge Recommendation: Home with family support(pending progress)  OT - OK to Discharge: Yes(when medically cleared)

## 2020-03-28 VITALS
HEIGHT: 69 IN | DIASTOLIC BLOOD PRESSURE: 96 MMHG | HEART RATE: 92 BPM | WEIGHT: 193 LBS | RESPIRATION RATE: 17 BRPM | SYSTOLIC BLOOD PRESSURE: 132 MMHG | BODY MASS INDEX: 28.58 KG/M2 | OXYGEN SATURATION: 92 % | TEMPERATURE: 98.2 F

## 2020-03-28 PROBLEM — D62 ACUTE BLOOD LOSS ANEMIA: Status: RESOLVED | Noted: 2020-03-27 | Resolved: 2020-03-28

## 2020-03-28 LAB
ANION GAP SERPL CALCULATED.3IONS-SCNC: 4 MMOL/L (ref 4–13)
BUN SERPL-MCNC: 16 MG/DL (ref 5–25)
CALCIUM SERPL-MCNC: 8.5 MG/DL (ref 8.3–10.1)
CHLORIDE SERPL-SCNC: 110 MMOL/L (ref 100–108)
CO2 SERPL-SCNC: 29 MMOL/L (ref 21–32)
CREAT SERPL-MCNC: 0.78 MG/DL (ref 0.6–1.3)
ERYTHROCYTE [DISTWIDTH] IN BLOOD BY AUTOMATED COUNT: 16 % (ref 11.6–15.1)
GFR SERPL CREATININE-BSD FRML MDRD: 93 ML/MIN/1.73SQ M
GLUCOSE SERPL-MCNC: 92 MG/DL (ref 65–140)
HCT VFR BLD AUTO: 35.1 % (ref 36.5–49.3)
HGB BLD-MCNC: 10.8 G/DL (ref 12–17)
MCH RBC QN AUTO: 30.8 PG (ref 26.8–34.3)
MCHC RBC AUTO-ENTMCNC: 30.8 G/DL (ref 31.4–37.4)
MCV RBC AUTO: 100 FL (ref 82–98)
PLATELET # BLD AUTO: 321 THOUSANDS/UL (ref 149–390)
PMV BLD AUTO: 10 FL (ref 8.9–12.7)
POTASSIUM SERPL-SCNC: 3.7 MMOL/L (ref 3.5–5.3)
RBC # BLD AUTO: 3.51 MILLION/UL (ref 3.88–5.62)
SODIUM SERPL-SCNC: 143 MMOL/L (ref 136–145)
WBC # BLD AUTO: 9.58 THOUSAND/UL (ref 4.31–10.16)

## 2020-03-28 PROCEDURE — 80048 BASIC METABOLIC PNL TOTAL CA: CPT | Performed by: PHYSICIAN ASSISTANT

## 2020-03-28 PROCEDURE — 99024 POSTOP FOLLOW-UP VISIT: CPT | Performed by: SURGERY

## 2020-03-28 PROCEDURE — 85027 COMPLETE CBC AUTOMATED: CPT | Performed by: PHYSICIAN ASSISTANT

## 2020-03-28 PROCEDURE — NC001 PR NO CHARGE: Performed by: SURGERY

## 2020-03-28 RX ORDER — OXYCODONE HYDROCHLORIDE 5 MG/1
5 TABLET ORAL EVERY 4 HOURS PRN
Qty: 15 TABLET | Refills: 0 | Status: SHIPPED | OUTPATIENT
Start: 2020-03-28 | End: 2020-04-07

## 2020-03-28 RX ADMIN — ACETAMINOPHEN 975 MG: 325 TABLET ORAL at 05:22

## 2020-03-28 RX ADMIN — FOLIC ACID 1 MG: 1 TABLET ORAL at 08:49

## 2020-03-28 RX ADMIN — ATORVASTATIN CALCIUM 20 MG: 20 TABLET, FILM COATED ORAL at 08:49

## 2020-03-28 RX ADMIN — RIVAROXABAN 20 MG: 20 TABLET, FILM COATED ORAL at 08:49

## 2020-03-28 RX ADMIN — METOPROLOL SUCCINATE 100 MG: 100 TABLET, EXTENDED RELEASE ORAL at 08:48

## 2020-03-28 RX ADMIN — SULFASALAZINE 500 MG: 500 TABLET, DELAYED RELEASE ORAL at 08:49

## 2020-03-28 RX ADMIN — PANTOPRAZOLE SODIUM 40 MG: 40 TABLET, DELAYED RELEASE ORAL at 05:22

## 2020-03-28 RX ADMIN — ASPIRIN 81 MG 81 MG: 81 TABLET ORAL at 08:49

## 2020-03-28 NOTE — PROGRESS NOTES
Progress Note - Vascular Surgery   Alexander Junior 77 y o  male MRN: [id number]  [facility] Encounter: [id number]    Assessment:  77 M with occluded popliteal stent status post R distal SFA to below-knee popliteal bypass with reversed GSV    Plan:  Diet as tolerated  Continue xarelto  ASA/statin  Out of bed and ambulate  Pain control prn  Discharge home today    Subjective/Objective     Subjective: No acute events overnight  Pain is well controlled  Leg feels well  Objective:    Blood pressure 132/96, pulse 92, temperature 98 2 °F (36 8 °C), temperature source Oral, resp  rate 17, height 5' 9" (1 753 m), weight 87 5 kg (193 lb), SpO2 92 %  ,Body mass index is 28 5 kg/m²  Intake/Output Summary (Last 24 hours) at 3/28/2020 0719  Last data filed at 3/28/2020 0706  Gross per 24 hour   Intake 1008  33 ml   Output 850 ml   Net 158 33 ml       Invasive Devices     Peripheral Intravenous Line            Peripheral IV 03/26/20 Left Hand 1 day    Peripheral IV 03/26/20 Right Hand 1 day                Physical Exam:   General: NAD, AAOx3  Eyes: PERRL  ENT: moist mucous membranes  Neck: supple  CV: RRR +S1/S2  Chest: breath sounds bilaterally  Abdomen: soft, NT ND  Extremities: dressings c/d/i  Palp graft  R dopp PT      Results from last 7 days   Lab Units 03/28/20  0520 03/27/20  0553 03/26/20  1651 03/26/20  1343   WBC Thousand/uL 9 58 11 45*  --  12 74*   HEMOGLOBIN g/dL 10 8* 10 6*  --  11 4*   HEMATOCRIT % 35 1* 34 3*  --  36 5   PLATELETS Thousands/uL 321 318 340 350     Results from last 7 days   Lab Units 03/28/20  0520 03/27/20  0553 03/26/20  1343   POTASSIUM mmol/L 3 7 4 0 4 2   CHLORIDE mmol/L 110* 108 115*   CO2 mmol/L 29 27 26   BUN mg/dL 16 11 16   CREATININE mg/dL 0 78 0 83 0 86   CALCIUM mg/dL 8 5 8 3 8 1*     Results from last 7 days   Lab Units 03/23/20  0738   INR  2 12*

## 2020-03-28 NOTE — DISCHARGE SUMMARY
Discharge Summary - Etta Bragg 77 y o  male MRN: [id number]    Unit/Bed#: [facility] 531-01 Encounter: [id number]    Admission Date:   Admission Orders (From admission, onward)     Ordered        03/26/20 1252  Inpatient Admission  Once                     Admitting Diagnosis: PAD (peripheral artery disease) ([facility] ) [I73 9]  S/P peripheral artery angioplasty with stent placement [Z95 820]    HPI:  Patient is a 69-year-old male past medical history of emphysema, peripheral artery disease, ischemic cardiomyopathy, hypertension, rheumatic fever, hematuria, GERD, chronic kidney disease, and history of multiple bilateral lower extremity arteriogram with stent placements  Patient previously had an atheroembolic lesion in his popliteal artery which was treated with popliteal artery stent placement complicated with popliteal artery stent thrombosis, and later redo right lower extremity arteriogram with recanalization of the popliteal artery which again reoccluded  Therefore the Patient was worked up for degree femoral to popliteal artery bypass using great saphenous vein  Procedures Performed: No orders of the defined types were placed in this encounter  Summary of Hospital Course:  Patient was admitted to vascular surgery service under attending professor Dr Pool Jain on 03/26/2020 and underwent right distal superficial femoral artery to below-the-knee popliteal bypass with ipsilateral reversed great saphenous vein  Procedure was performed without complications, and patient was sent to PACU in stable condition for full operative dictation please see Dr Sanam Rivas operative report in the medical record  Patient was then transferred to the medical surgery rowan where he recovered without further complications  Patient's pain was adequately controlled, his vital signs were continually stable, he was neurovascularly intact of the right lower extremity, and he was ambulating without difficulty    Patient was tolerating a diet, voiding adequate urine, having bowel function, and deemed medically stable for discharge with follow-up with vascular surgery Clinic in 2 weeks  Patient was discharged on Xarelto  Significant Findings, Care, Treatment and Services Provided: none    Complications: none    Discharge Diagnosis: same    Resolved Problems  Date Reviewed: 3/27/2020    None          Condition at Discharge: good         Discharge instructions/Information to patient and family:   See after visit summary for information provided to patient and family  Provisions for Follow-Up Care:  See after visit summary for information related to follow-up care and any pertinent home health orders  PCP: Nora Maxwell MD    Disposition: See After Visit Summary for discharge disposition information  Planned Readmission: No      Discharge Statement   I spent 30 minutes discharging the patient  This time was spent on the day of discharge  I had direct contact with the patient on the day of discharge  Additional documentation is required if more than 30 minutes were spent on discharge  Discharge Medications:  See after visit summary for reconciled discharge medications provided to patient and family

## 2020-03-28 NOTE — DISCHARGE INSTRUCTIONS
Femoropopliteal Bypass   WHAT YOU NEED TO KNOW:   Femoropopliteal bypass is surgery to place a graft to go around narrowed arteries in your upper leg  The graft may be from a blood vessel in your arm or leg, or it may be man-made  A femoropopliteal bypass can improve blood flow to your leg and foot, and decrease your symptoms  DISCHARGE INSTRUCTIONS:   Medicines:   · Prescription pain medicine  helps decrease pain  Do not wait until the pain is severe before you take this medicine  You may also need aspirin to thin your blood and prevent blood clots  This medicine makes it more likely for you to bleed or bruise  · Take your medicine as directed  Contact your healthcare provider if you think your medicine is not helping or if you have side effects  Tell him if you are allergic to any medicine  Keep a list of the medicines, vitamins, and herbs you take  Include the amounts, and when and why you take them  Bring the list or the pill bottles to follow-up visits  Carry your medicine list with you in case of an emergency  Follow up with your healthcare provider or surgeon as directed: You may need to return for blood tests  Healthcare providers may also need to check the blood flow in your leg  Write down your questions so you remember to ask them during your visits  Wound care:  Carefully wash the wound with soap and water  Dry the area and put on new, clean bandages as directed  Change your bandages when they get wet or dirty  Elevate your leg:  Raise your leg above the level of your heart as often as you can  This will help decrease swelling and pain  Prop your leg on pillows or blankets to keep it elevated comfortably  Activity:  Ask about the best exercise plan for you  Walking is a low-impact exercise that can help prevent blood clots  Ask when you can return to work or your other daily activities  Do not smoke: If you smoke, it is never too late to quit   Smoking increases the risk that your new graft will become blocked  Contact your healthcare provider or surgeon if:   · You have a fever  · Your wound is swollen, warm, red, or has pus coming from it  · You are not able to eat, you have nausea, or you are vomiting  · You have diarrhea  · You have new pain, or your leg or foot pain suddenly gets worse  · You have new sores on your legs or feet, or an old ulcer is not healing  · You have questions or concerns about your condition or care  Seek care immediately or call 911 if:   · Blood soaks through your bandage  · You are urinating less than usual, or not at all  · Your toes or foot suddenly become cold, darker in color, or pale  · Your leg feels warm, tender, and painful  It may look swollen and red  · You cough up blood  · You have any of the following signs of a heart attack:     ¨ Squeezing, pressure, fullness, or pain in your chest that lasts longer than a few minutes or returns     ¨ Discomfort or pain in your back, neck, jaw, stomach, or arm    ¨ Shortness of breath or breathing problems    ¨ A sudden cold sweat, lightheadedness, dizziness, or nausea, especially with chest pain or trouble breathing  © 2017 [facility] Information is for End User's use only and may not be sold, redistributed or otherwise used for commercial purposes  All illustrations and images included in CareNotes® are the copyrighted property of [employer]  or Fabrice Calderon  The above information is an  only  It is not intended as medical advice for individual conditions or treatments  Talk to your doctor, nurse or pharmacist before following any medical regimen to see if it is safe and effective for you

## 2020-03-30 ENCOUNTER — TRANSITIONAL CARE MANAGEMENT (OUTPATIENT)
Dept: INTERNAL MEDICINE CLINIC | Facility: CLINIC | Age: 70
End: 2020-03-30

## 2020-03-30 ENCOUNTER — REMOTE DEVICE CLINIC VISIT (OUTPATIENT)
Dept: CARDIOLOGY CLINIC | Facility: CLINIC | Age: 70
End: 2020-03-30
Payer: MEDICARE

## 2020-03-30 DIAGNOSIS — Z95.818 PRESENCE OF OTHER CARDIAC IMPLANTS AND GRAFTS: Primary | ICD-10-CM

## 2020-03-30 PROCEDURE — 93298 REM INTERROG DEV EVAL SCRMS: CPT | Performed by: INTERNAL MEDICINE

## 2020-03-30 PROCEDURE — G2066 INTER DEVC REMOTE 30D: HCPCS | Performed by: INTERNAL MEDICINE

## 2020-03-30 NOTE — PROGRESS NOTES
Patient spoke to [facility]  States he is just having some wound drainage  No other symptoms  TCM scheduled   3/31

## 2020-03-30 NOTE — PROGRESS NOTES
Results for orders placed or performed in visit on 03/30/20   Cardiac EP device report    Narrative    MDT LOOP  CARELINK TRANSMISSION (LOOP) : BATTERY STATUS "OK"  PRESENTING RHYTHM SHOWS SR @ 93 BPM  NO PATIENT ACTIVATED EPISODES  1 DEVICE DETECTED PAUSE EPISODE DURING SLEEP, ASYMPTOMATIC WHILE SLEEPING  [address], MOST RECENT DURATION 12 SECS W/ECG SHOWING SVT @ ~ 20 BEATS,  BPM  279 AF EPISODES (6 9% BURDEN) W/MOST RECENT EPISODE DURATION 6 MINS  EF 50% (6/2019 ECHO)  PT ON ASA 81, XARELTO, METOPROLOL SUCC  NORMAL DEVICE FUNCTION     EB

## 2020-03-31 ENCOUNTER — TELEMEDICINE (OUTPATIENT)
Dept: INTERNAL MEDICINE CLINIC | Facility: CLINIC | Age: 70
End: 2020-03-31
Payer: MEDICARE

## 2020-03-31 ENCOUNTER — TELEPHONE (OUTPATIENT)
Dept: VASCULAR SURGERY | Facility: CLINIC | Age: 70
End: 2020-03-31

## 2020-03-31 DIAGNOSIS — I48.0 PAROXYSMAL ATRIAL FIBRILLATION (HCC): ICD-10-CM

## 2020-03-31 DIAGNOSIS — N18.30 ANEMIA OF CHRONIC RENAL FAILURE, STAGE 3 (MODERATE) (HCC): ICD-10-CM

## 2020-03-31 DIAGNOSIS — Z72.0 TOBACCO USE: ICD-10-CM

## 2020-03-31 DIAGNOSIS — D63.1 ANEMIA OF CHRONIC RENAL FAILURE, STAGE 3 (MODERATE) (HCC): ICD-10-CM

## 2020-03-31 DIAGNOSIS — Z95.820 S/P PERIPHERAL ARTERY ANGIOPLASTY WITH STENT PLACEMENT: Primary | ICD-10-CM

## 2020-03-31 DIAGNOSIS — R60.0 LOCALIZED EDEMA: ICD-10-CM

## 2020-03-31 PROBLEM — I95.89 CHRONIC HYPOTENSION: Status: RESOLVED | Noted: 2018-03-22 | Resolved: 2020-03-31

## 2020-03-31 PROCEDURE — 99495 TRANSJ CARE MGMT MOD F2F 14D: CPT | Performed by: INTERNAL MEDICINE

## 2020-03-31 RX ORDER — FUROSEMIDE 20 MG/1
20 TABLET ORAL DAILY PRN
Qty: 30 TABLET | Refills: 0 | OUTPATIENT
Start: 2020-03-31

## 2020-03-31 NOTE — TELEPHONE ENCOUNTER
Spoke with patient and he stated that drainage is a pinkish in color  He is changing the dressing once a day with a gauze pad and wrapping with an ace wrap that is loose to hold on guaze  He said when he changes the dressing once a day, the guaze is soaked  The incisions look approximated with no redness or temperature change  He stated the skin around the incision is the same color and temperature are the rest of his leg/skin

## 2020-03-31 NOTE — PROGRESS NOTES
Reason for visit is hospital f/u  Encounter provider Zunilda Nguyen MD       Provider located at [address] [place] [id number]      Recent Visits  No visits were found meeting these conditions  Showing recent visits within past 7 days and meeting all other requirements     Today's Visits  Date Type Provider Dept   03/31/20 Telemedicine Zunilda Nguyen, [facility]   Showing today's visits and meeting all other requirements     Future Appointments  No visits were found meeting these conditions  Showing future appointments within next 150 days and meeting all other requirements        After connecting through [employer], the patient was identified by name and date of birth  Enio Ortega was informed that this is a telemedicine visit and that the visit is being conducted through [employer]  My office door was closed  No one else was in the room  He acknowledged consent and understanding of privacy and security of the video platform  The patient has agreed to participate and understands they can discontinue the visit at any time  Patient is aware this is a billable service  Assessment/Plan:     S/P peripheral artery angioplasty with stent placement  S/p surgery  Minimal pain, takes Tylenol prn  On ASA, Xarelto  Elevated extremities due to mild leg edema  Continue daily dressing changes  Follow up with surgery next week  Tobacco use  He has not smoked since last week  Anemia of chronic renal failure, stage 3 (moderate) (HCC)  Renal function stable  Monitor CBC  Paroxysmal atrial fibrillation (HCC)  On metoprolol, Xarelto         Diagnoses and all orders for this visit:    S/P peripheral artery angioplasty with stent placement    Tobacco use    Anemia of chronic renal failure, stage 3 (moderate) (HCC)    Paroxysmal atrial fibrillation ([address] )      Follow up as scheduled or as needed  Subjective:     Patient ID: Etta Bragg is a 77 y o  male  Mr Marleny Lord has been feeling well  He was discharged from the hospital 3 days ago  He reports clear fluid coming out of his wounds  Denies any pain, redness, bleeding  He has been changing the dressings at least once a day  He reports minimal pain, takes Tylenol at bedtime  He reports mild right leg swelling  He is asking if he should take the water pill again  He denies any headache, dizziness, shortness of breath, chest pain or other symptoms  Review of Systems   Constitutional: Negative for activity change, appetite change, fatigue and fever  HENT: Negative for congestion  Respiratory: Negative for cough and shortness of breath  Cardiovascular: Positive for leg swelling  Negative for chest pain  Musculoskeletal: Negative for arthralgias  Skin: Positive for wound  Negative for color change  Neurological: Negative for dizziness, weakness and headaches  Psychiatric/Behavioral: Negative for confusion  Objective:     Physical Exam   Constitutional: He is oriented to person, place, and time  He appears well-developed  HENT:   Head: Normocephalic  Eyes: Pupils are equal, round, and reactive to light  Cardiovascular:   Mild pitting edema RLE  Minimal edema LLE   Pulmonary/Chest: Effort normal    Musculoskeletal: He exhibits edema  Neurological: He is alert and oriented to person, place, and time  Skin: No rash noted  No erythema  There were no vitals filed for this visit  Transitional Care Management Review:  Etta Bragg is a 77 y o  male here for TCM follow up       During the TCM phone call patient stated:    TCM Call (since 2/29/2020)     Date and time call was made  3/30/2020  3:19 PM    Hospital care reviewed  Records reviewed    Patient was hospitialized at  [facility]    Date of Admission  03/26/20    Date of discharge  03/28/20    Diagnosis  PAD (peripheral artery disease)    Disposition  Home    Were the patients medications reviewed and updated  Yes    Current Symptoms  Wound drainage      TCM Call (since 2/29/2020)     Post hospital issues  None    Should patient be enrolled in anticoag monitoring? No    Scheduled for follow up? Yes    Did you obtain your prescribed medications  Yes    Do you need help managing your prescriptions or medications  No    Is transportation to your appointment needed  No    I have advised the patient to call PCP with any new or worsening symptoms  Katie Mcrae MA 3/30/20          Eli Roldan MD    Labs results reviewed with patient  Reviewed hospital records

## 2020-03-31 NOTE — ASSESSMENT & PLAN NOTE
S/p surgery  Minimal pain, takes Tylenol prn  On ASA, Xarelto  Elevated extremities due to mild leg edema  Continue daily dressing changes  Follow up with surgery next week

## 2020-03-31 NOTE — TELEPHONE ENCOUNTER
Recommend he change the dressing twice daily with gauze to avoid maceration from the wet dressing sitting on the incision  Notify the office with increased drainage, redness, breakdown of the incision or temperature  I don't see a post op visit in Epic - this should be set up with patient's surgeon and preferably a video visit so the wound can be visualized   If cannot be a video visit ask patient to send picture of incision

## 2020-03-31 NOTE — TELEPHONE ENCOUNTER
Clear drainage? Can he quantify how much drainage - how frequently is he changing the dressing and is he soaking thru? Any incisional breakdown, redness or temperature?

## 2020-03-31 NOTE — TELEPHONE ENCOUNTER
Vascular Nurse Navigator Post Op Call    Procedure: Right distal SFA to BK popliteal bypass with ipsilateral reversed GSV     Date of Procedure: 3/26/20    Surgeon: Dr Bhavna Cole    Discharge Date: 3/28/20    Discharge Disposition: Home    Leg Weakness?: No    Leg Swelling?: Yes, he stated that he is have swelling of his RLE since Saturday and his knee area is double in size  Leg Numbness?: No    Chest Pain?: No    Shortness of Breath?: No    Orthopnea?: No    Anticoagulation?: Aspirin and Rivaroxaban (Xarelto)    Bleeding?: No    Uncontrolled Pain?: No    Incision Concerns?: Yes, he stated that he is having clear drainage from his incisions  They are placing a dry dressing on the incisions    Fever or Chills?: No      Reviewed discharge instructions and incision care with patient  NEXT OFFICE VISIT SCHEDULED: 4/9/20 with Dr Bhavna Cole at [facility] [place]    Transportation Confirmed?: Yes    Other:  Smoking - he stated that he quit smoking when he went into the hospital for his procedure - he stated he quit on 3/26/20    Any further questions/concerns? Spoke with patient and his wife Milly Ayala in regards to post op care  They stated that he was having leaking from one of his wounds/incision of a clear drainage  They stated that his right leg is swollen  He does not have pain with ambulation and it leaks more when he walks and slows down at night  He stated that his knee is more swollen and is double in size  They stated that they have a video visit today at 1 pm with his PCP to discuss the leaking and swelling and inquire if he would need to be placed back on lasix as she had prescribed previously  Will also send to triage provider for recommendations

## 2020-03-31 NOTE — TELEPHONE ENCOUNTER
Spoke with pt and his wife Jayden Reyes and informed them to change the dressing twice a day  They stated that they just recently completed a video visit with his PCP and she was able to look at the incision and drainage  Informed them to contact the office with any further concerns and with any increase drainage, redness, incision breakdown, or temperature change  Reminded them of appointment scheduled for 4/9 with Dr Aj Davis

## 2020-04-09 ENCOUNTER — TELEMEDICINE (OUTPATIENT)
Dept: VASCULAR SURGERY | Facility: CLINIC | Age: 70
End: 2020-04-09

## 2020-04-09 VITALS — TEMPERATURE: 96.5 F | WEIGHT: 188 LBS | HEIGHT: 69 IN | BODY MASS INDEX: 27.85 KG/M2

## 2020-04-09 DIAGNOSIS — Z98.890 S/P FEMORAL-TIBIAL BYPASS: Primary | ICD-10-CM

## 2020-04-09 PROCEDURE — 99024 POSTOP FOLLOW-UP VISIT: CPT | Performed by: SURGERY

## 2020-04-20 DIAGNOSIS — K21.00 GERD WITH ESOPHAGITIS: Primary | ICD-10-CM

## 2020-04-20 RX ORDER — OMEPRAZOLE 40 MG/1
40 CAPSULE, DELAYED RELEASE ORAL DAILY
Qty: 90 CAPSULE | Refills: 0 | Status: SHIPPED | OUTPATIENT
Start: 2020-04-20 | End: 2020-07-14

## 2020-04-27 DIAGNOSIS — K52.9 INFLAMMATORY BOWEL DISEASE: ICD-10-CM

## 2020-04-27 RX ORDER — FOLIC ACID 1 MG/1
TABLET ORAL
Qty: 90 TABLET | Refills: 1 | Status: SHIPPED | OUTPATIENT
Start: 2020-04-27 | End: 2020-10-15

## 2020-05-06 ENCOUNTER — APPOINTMENT (OUTPATIENT)
Dept: LAB | Facility: CLINIC | Age: 70
End: 2020-05-06
Payer: MEDICARE

## 2020-05-06 ENCOUNTER — TELEPHONE (OUTPATIENT)
Dept: UROLOGY | Facility: AMBULATORY SURGERY CENTER | Age: 70
End: 2020-05-06

## 2020-05-06 DIAGNOSIS — N18.9 ANEMIA OF CHRONIC RENAL FAILURE, UNSPECIFIED CKD STAGE: ICD-10-CM

## 2020-05-06 DIAGNOSIS — K52.9 INFLAMMATORY BOWEL DISEASE: ICD-10-CM

## 2020-05-06 DIAGNOSIS — Z12.5 SCREENING FOR PROSTATE CANCER: ICD-10-CM

## 2020-05-06 DIAGNOSIS — E55.9 AVITAMINOSIS D: ICD-10-CM

## 2020-05-06 DIAGNOSIS — D63.1 ANEMIA OF CHRONIC RENAL FAILURE, UNSPECIFIED CKD STAGE: ICD-10-CM

## 2020-05-06 DIAGNOSIS — I95.89 CHRONIC HYPOTENSION: ICD-10-CM

## 2020-05-06 DIAGNOSIS — R31.29 MICROSCOPIC HEMATURIA: ICD-10-CM

## 2020-05-06 DIAGNOSIS — N18.30 CHRONIC KIDNEY DISEASE, STAGE III (MODERATE) (HCC): Primary | ICD-10-CM

## 2020-05-06 DIAGNOSIS — R80.1 PERSISTENT PROTEINURIA: ICD-10-CM

## 2020-05-06 LAB
ALBUMIN SERPL BCP-MCNC: 2.7 G/DL (ref 3.5–5)
ALP SERPL-CCNC: 130 U/L (ref 46–116)
ALT SERPL W P-5'-P-CCNC: 16 U/L (ref 12–78)
ANION GAP SERPL CALCULATED.3IONS-SCNC: 9 MMOL/L (ref 4–13)
AST SERPL W P-5'-P-CCNC: 13 U/L (ref 5–45)
BACTERIA UR QL AUTO: ABNORMAL /HPF
BASOPHILS # BLD AUTO: 0.05 THOUSANDS/ΜL (ref 0–0.1)
BASOPHILS NFR BLD AUTO: 1 % (ref 0–1)
BILIRUB SERPL-MCNC: 0.31 MG/DL (ref 0.2–1)
BILIRUB UR QL STRIP: ABNORMAL
BUN SERPL-MCNC: 18 MG/DL (ref 5–25)
CALCIUM SERPL-MCNC: 8.5 MG/DL (ref 8.3–10.1)
CHLORIDE SERPL-SCNC: 107 MMOL/L (ref 100–108)
CLARITY UR: CLEAR
CO2 SERPL-SCNC: 26 MMOL/L (ref 21–32)
COLOR UR: YELLOW
CREAT SERPL-MCNC: 1.15 MG/DL (ref 0.6–1.3)
CRP SERPL QL: 69.1 MG/L
EOSINOPHIL # BLD AUTO: 0.17 THOUSAND/ΜL (ref 0–0.61)
EOSINOPHIL NFR BLD AUTO: 2 % (ref 0–6)
ERYTHROCYTE [DISTWIDTH] IN BLOOD BY AUTOMATED COUNT: 13.6 % (ref 11.6–15.1)
GFR SERPL CREATININE-BSD FRML MDRD: 65 ML/MIN/1.73SQ M
GLUCOSE P FAST SERPL-MCNC: 146 MG/DL (ref 65–99)
GLUCOSE UR STRIP-MCNC: NEGATIVE MG/DL
HCT VFR BLD AUTO: 43.1 % (ref 36.5–49.3)
HGB BLD-MCNC: 13.3 G/DL (ref 12–17)
HGB UR QL STRIP.AUTO: ABNORMAL
IMM GRANULOCYTES # BLD AUTO: 0.03 THOUSAND/UL (ref 0–0.2)
IMM GRANULOCYTES NFR BLD AUTO: 0 % (ref 0–2)
KETONES UR STRIP-MCNC: NEGATIVE MG/DL
LEUKOCYTE ESTERASE UR QL STRIP: NEGATIVE
LYMPHOCYTES # BLD AUTO: 2.17 THOUSANDS/ΜL (ref 0.6–4.47)
LYMPHOCYTES NFR BLD AUTO: 28 % (ref 14–44)
MCH RBC QN AUTO: 30.6 PG (ref 26.8–34.3)
MCHC RBC AUTO-ENTMCNC: 30.9 G/DL (ref 31.4–37.4)
MCV RBC AUTO: 99 FL (ref 82–98)
MONOCYTES # BLD AUTO: 0.83 THOUSAND/ΜL (ref 0.17–1.22)
MONOCYTES NFR BLD AUTO: 11 % (ref 4–12)
MUCOUS THREADS UR QL AUTO: ABNORMAL
NEUTROPHILS # BLD AUTO: 4.65 THOUSANDS/ΜL (ref 1.85–7.62)
NEUTS SEG NFR BLD AUTO: 58 % (ref 43–75)
NITRITE UR QL STRIP: NEGATIVE
NON-SQ EPI CELLS URNS QL MICRO: ABNORMAL /HPF
NRBC BLD AUTO-RTO: 0 /100 WBCS
PH UR STRIP.AUTO: 5.5 [PH]
PLATELET # BLD AUTO: 321 THOUSANDS/UL (ref 149–390)
PMV BLD AUTO: 10.1 FL (ref 8.9–12.7)
POTASSIUM SERPL-SCNC: 3.4 MMOL/L (ref 3.5–5.3)
PROT SERPL-MCNC: 6.6 G/DL (ref 6.4–8.2)
PROT UR STRIP-MCNC: ABNORMAL MG/DL
PSA SERPL-MCNC: 0.4 NG/ML (ref 0–4)
RBC # BLD AUTO: 4.35 MILLION/UL (ref 3.88–5.62)
RBC #/AREA URNS AUTO: ABNORMAL /HPF
SODIUM SERPL-SCNC: 142 MMOL/L (ref 136–145)
SP GR UR STRIP.AUTO: >=1.03 (ref 1–1.03)
UROBILINOGEN UR QL STRIP.AUTO: 0.2 E.U./DL
WBC # BLD AUTO: 7.9 THOUSAND/UL (ref 4.31–10.16)
WBC #/AREA URNS AUTO: ABNORMAL /HPF

## 2020-05-06 PROCEDURE — G0103 PSA SCREENING: HCPCS

## 2020-05-06 PROCEDURE — 86255 FLUORESCENT ANTIBODY SCREEN: CPT

## 2020-05-06 PROCEDURE — 80053 COMPREHEN METABOLIC PANEL: CPT

## 2020-05-06 PROCEDURE — 86140 C-REACTIVE PROTEIN: CPT

## 2020-05-06 PROCEDURE — 83516 IMMUNOASSAY NONANTIBODY: CPT

## 2020-05-06 PROCEDURE — 82784 ASSAY IGA/IGD/IGG/IGM EACH: CPT

## 2020-05-06 PROCEDURE — 36415 COLL VENOUS BLD VENIPUNCTURE: CPT

## 2020-05-06 PROCEDURE — 81001 URINALYSIS AUTO W/SCOPE: CPT

## 2020-05-06 PROCEDURE — 85025 COMPLETE CBC W/AUTO DIFF WBC: CPT

## 2020-05-07 ENCOUNTER — TRANSCRIBE ORDERS (OUTPATIENT)
Dept: LAB | Facility: CLINIC | Age: 70
End: 2020-05-07

## 2020-05-07 ENCOUNTER — APPOINTMENT (OUTPATIENT)
Dept: LAB | Facility: CLINIC | Age: 70
End: 2020-05-07
Payer: MEDICARE

## 2020-05-07 ENCOUNTER — TELEPHONE (OUTPATIENT)
Dept: NEPHROLOGY | Facility: CLINIC | Age: 70
End: 2020-05-07

## 2020-05-07 ENCOUNTER — HOSPITAL ENCOUNTER (OUTPATIENT)
Dept: NON INVASIVE DIAGNOSTICS | Facility: CLINIC | Age: 70
Discharge: HOME | End: 2020-05-07
Payer: MEDICARE

## 2020-05-07 DIAGNOSIS — N18.30 CHRONIC KIDNEY DISEASE, STAGE III (MODERATE) (HCC): Primary | ICD-10-CM

## 2020-05-07 DIAGNOSIS — D63.1 ANEMIA OF CHRONIC RENAL FAILURE, STAGE 3 (MODERATE) (HCC): ICD-10-CM

## 2020-05-07 DIAGNOSIS — K52.9 INFLAMMATORY BOWEL DISEASE: ICD-10-CM

## 2020-05-07 DIAGNOSIS — Z95.820 S/P PERIPHERAL ARTERY ANGIOPLASTY WITH STENT PLACEMENT: ICD-10-CM

## 2020-05-07 DIAGNOSIS — E87.6 HYPOKALEMIA: ICD-10-CM

## 2020-05-07 DIAGNOSIS — R19.8 GI SYMPTOMS: ICD-10-CM

## 2020-05-07 DIAGNOSIS — N18.30 ANEMIA OF CHRONIC RENAL FAILURE, STAGE 3 (MODERATE) (HCC): ICD-10-CM

## 2020-05-07 LAB
ENDOMYSIUM IGA SER QL: NEGATIVE
GLIADIN PEPTIDE IGA SER-ACNC: 5 UNITS (ref 0–19)
GLIADIN PEPTIDE IGG SER-ACNC: 2 UNITS (ref 0–19)
IGA SERPL-MCNC: 127 MG/DL (ref 61–437)
TTG IGA SER-ACNC: <2 U/ML (ref 0–3)
TTG IGG SER-ACNC: <2 U/ML (ref 0–5)

## 2020-05-07 PROCEDURE — 93923 UPR/LXTR ART STDY 3+ LVLS: CPT

## 2020-05-07 PROCEDURE — 83993 ASSAY FOR CALPROTECTIN FECAL: CPT

## 2020-05-07 PROCEDURE — 93925 LOWER EXTREMITY STUDY: CPT

## 2020-05-08 PROCEDURE — 93925 LOWER EXTREMITY STUDY: CPT | Performed by: SURGERY

## 2020-05-08 PROCEDURE — 93922 UPR/L XTREMITY ART 2 LEVELS: CPT | Performed by: SURGERY

## 2020-05-12 LAB — CALPROTECTIN STL-MCNT: 276 UG/G (ref 0–120)

## 2020-05-14 ENCOUNTER — TELEMEDICINE (OUTPATIENT)
Dept: GASTROENTEROLOGY | Facility: MEDICAL CENTER | Age: 70
End: 2020-05-14
Payer: MEDICARE

## 2020-05-14 DIAGNOSIS — I70.0 ABDOMINAL AORTIC ATHEROSCLEROSIS (HCC): ICD-10-CM

## 2020-05-14 DIAGNOSIS — N18.30 ANEMIA OF CHRONIC RENAL FAILURE, STAGE 3 (MODERATE) (HCC): ICD-10-CM

## 2020-05-14 DIAGNOSIS — K21.00 GERD WITH ESOPHAGITIS: ICD-10-CM

## 2020-05-14 DIAGNOSIS — I73.9 PAD (PERIPHERAL ARTERY DISEASE) (HCC): ICD-10-CM

## 2020-05-14 DIAGNOSIS — D63.1 ANEMIA OF CHRONIC RENAL FAILURE, STAGE 3 (MODERATE) (HCC): ICD-10-CM

## 2020-05-14 DIAGNOSIS — N18.30 CHRONIC KIDNEY DISEASE, STAGE III (MODERATE) (HCC): ICD-10-CM

## 2020-05-14 DIAGNOSIS — I48.0 PAROXYSMAL ATRIAL FIBRILLATION (HCC): ICD-10-CM

## 2020-05-14 DIAGNOSIS — K50.012 CROHN'S DISEASE OF ILEUM WITH INTESTINAL OBSTRUCTION (HCC): Primary | ICD-10-CM

## 2020-05-14 DIAGNOSIS — R74.8 ELEVATED ALKALINE PHOSPHATASE LEVEL: ICD-10-CM

## 2020-05-14 DIAGNOSIS — I47.1 SVT (SUPRAVENTRICULAR TACHYCARDIA) (HCC): ICD-10-CM

## 2020-05-14 DIAGNOSIS — K22.70 BARRETT'S ESOPHAGUS WITHOUT DYSPLASIA: ICD-10-CM

## 2020-05-14 PROCEDURE — 99442 PR PHYS/QHP TELEPHONE EVALUATION 11-20 MIN: CPT | Performed by: INTERNAL MEDICINE

## 2020-05-15 ENCOUNTER — OFFICE VISIT (OUTPATIENT)
Dept: INTERNAL MEDICINE CLINIC | Facility: CLINIC | Age: 70
End: 2020-05-15
Payer: MEDICARE

## 2020-05-15 VITALS
TEMPERATURE: 97.4 F | SYSTOLIC BLOOD PRESSURE: 110 MMHG | OXYGEN SATURATION: 97 % | HEIGHT: 69 IN | WEIGHT: 199 LBS | BODY MASS INDEX: 29.47 KG/M2 | DIASTOLIC BLOOD PRESSURE: 76 MMHG | HEART RATE: 79 BPM

## 2020-05-15 DIAGNOSIS — Z72.0 TOBACCO USE: ICD-10-CM

## 2020-05-15 DIAGNOSIS — K50.012 CROHN'S DISEASE OF ILEUM WITH INTESTINAL OBSTRUCTION (HCC): ICD-10-CM

## 2020-05-15 DIAGNOSIS — R73.01 IMPAIRED FASTING GLUCOSE: ICD-10-CM

## 2020-05-15 DIAGNOSIS — N18.30 ANEMIA OF CHRONIC RENAL FAILURE, STAGE 3 (MODERATE) (HCC): ICD-10-CM

## 2020-05-15 DIAGNOSIS — I48.0 PAROXYSMAL ATRIAL FIBRILLATION (HCC): ICD-10-CM

## 2020-05-15 DIAGNOSIS — D63.1 ANEMIA OF CHRONIC RENAL FAILURE, STAGE 3 (MODERATE) (HCC): ICD-10-CM

## 2020-05-15 DIAGNOSIS — R74.8 ELEVATED ALKALINE PHOSPHATASE LEVEL: ICD-10-CM

## 2020-05-15 DIAGNOSIS — Z95.820 S/P PERIPHERAL ARTERY ANGIOPLASTY WITH STENT PLACEMENT: ICD-10-CM

## 2020-05-15 DIAGNOSIS — R73.01 ABNORMAL FASTING GLUCOSE: Primary | ICD-10-CM

## 2020-05-15 DIAGNOSIS — K22.70 BARRETT'S ESOPHAGUS WITHOUT DYSPLASIA: ICD-10-CM

## 2020-05-15 DIAGNOSIS — N18.30 CHRONIC KIDNEY DISEASE, STAGE III (MODERATE) (HCC): ICD-10-CM

## 2020-05-15 DIAGNOSIS — E27.8 MASS OF BOTH ADRENAL GLANDS (HCC): ICD-10-CM

## 2020-05-15 PROBLEM — D72.829 LEUKOCYTOSIS: Status: RESOLVED | Noted: 2019-11-05 | Resolved: 2020-05-15

## 2020-05-15 PROCEDURE — 1036F TOBACCO NON-USER: CPT | Performed by: INTERNAL MEDICINE

## 2020-05-15 PROCEDURE — 1111F DSCHRG MED/CURRENT MED MERGE: CPT | Performed by: INTERNAL MEDICINE

## 2020-05-15 PROCEDURE — 1160F RVW MEDS BY RX/DR IN RCRD: CPT | Performed by: INTERNAL MEDICINE

## 2020-05-15 PROCEDURE — 4040F PNEUMOC VAC/ADMIN/RCVD: CPT | Performed by: INTERNAL MEDICINE

## 2020-05-15 PROCEDURE — 99214 OFFICE O/P EST MOD 30 MIN: CPT | Performed by: INTERNAL MEDICINE

## 2020-05-15 PROCEDURE — 3008F BODY MASS INDEX DOCD: CPT | Performed by: INTERNAL MEDICINE

## 2020-05-20 DIAGNOSIS — K52.9 INFLAMMATORY BOWEL DISEASE: ICD-10-CM

## 2020-05-20 RX ORDER — SULFASALAZINE 500 MG/1
TABLET, DELAYED RELEASE ORAL
Qty: 60 TABLET | Refills: 3 | Status: SHIPPED | OUTPATIENT
Start: 2020-05-20 | End: 2020-08-18

## 2020-05-22 ENCOUNTER — TELEMEDICINE (OUTPATIENT)
Dept: UROLOGY | Facility: AMBULATORY SURGERY CENTER | Age: 70
End: 2020-05-22
Payer: MEDICARE

## 2020-05-22 DIAGNOSIS — Z12.5 SCREENING FOR PROSTATE CANCER: ICD-10-CM

## 2020-05-22 DIAGNOSIS — R31.29 MICROSCOPIC HEMATURIA: Primary | ICD-10-CM

## 2020-05-22 PROCEDURE — 99214 OFFICE O/P EST MOD 30 MIN: CPT | Performed by: NURSE PRACTITIONER

## 2020-05-26 ENCOUNTER — TELEPHONE (OUTPATIENT)
Dept: NEPHROLOGY | Facility: CLINIC | Age: 70
End: 2020-05-26

## 2020-05-26 ENCOUNTER — APPOINTMENT (OUTPATIENT)
Dept: LAB | Facility: CLINIC | Age: 70
End: 2020-05-26
Payer: MEDICARE

## 2020-05-26 DIAGNOSIS — R80.1 PERSISTENT PROTEINURIA: ICD-10-CM

## 2020-05-26 DIAGNOSIS — N18.30 CHRONIC KIDNEY DISEASE, STAGE III (MODERATE) (HCC): Primary | ICD-10-CM

## 2020-05-26 DIAGNOSIS — R31.29 MICROSCOPIC HEMATURIA: ICD-10-CM

## 2020-05-26 LAB
EST. AVERAGE GLUCOSE BLD GHB EST-MCNC: 114 MG/DL
HBA1C MFR BLD: 5.6 %

## 2020-05-26 PROCEDURE — 83036 HEMOGLOBIN GLYCOSYLATED A1C: CPT | Performed by: INTERNAL MEDICINE

## 2020-05-26 PROCEDURE — 36415 COLL VENOUS BLD VENIPUNCTURE: CPT | Performed by: INTERNAL MEDICINE

## 2020-05-28 ENCOUNTER — APPOINTMENT (OUTPATIENT)
Dept: LAB | Facility: CLINIC | Age: 70
End: 2020-05-28
Payer: MEDICARE

## 2020-05-28 DIAGNOSIS — N18.30 CHRONIC KIDNEY DISEASE, STAGE III (MODERATE) (HCC): ICD-10-CM

## 2020-05-28 DIAGNOSIS — E87.6 HYPOKALEMIA: ICD-10-CM

## 2020-05-28 DIAGNOSIS — N18.30 ANEMIA OF CHRONIC RENAL FAILURE, STAGE 3 (MODERATE) (HCC): ICD-10-CM

## 2020-05-28 DIAGNOSIS — R19.8 GI SYMPTOMS: ICD-10-CM

## 2020-05-28 DIAGNOSIS — D63.1 ANEMIA OF CHRONIC RENAL FAILURE, STAGE 3 (MODERATE) (HCC): ICD-10-CM

## 2020-05-28 LAB
ALBUMIN SERPL BCP-MCNC: 2.9 G/DL (ref 3.5–5)
ALP SERPL-CCNC: 153 U/L (ref 46–116)
ALT SERPL W P-5'-P-CCNC: 10 U/L (ref 12–78)
ANION GAP SERPL CALCULATED.3IONS-SCNC: 6 MMOL/L (ref 4–13)
AST SERPL W P-5'-P-CCNC: 14 U/L (ref 5–45)
BILIRUB SERPL-MCNC: 0.56 MG/DL (ref 0.2–1)
BUN SERPL-MCNC: 18 MG/DL (ref 5–25)
CALCIUM SERPL-MCNC: 8.6 MG/DL (ref 8.3–10.1)
CHLORIDE SERPL-SCNC: 108 MMOL/L (ref 100–108)
CO2 SERPL-SCNC: 28 MMOL/L (ref 21–32)
CREAT SERPL-MCNC: 1.06 MG/DL (ref 0.6–1.3)
GFR SERPL CREATININE-BSD FRML MDRD: 72 ML/MIN/1.73SQ M
GGT SERPL-CCNC: 26 U/L (ref 5–85)
GLUCOSE P FAST SERPL-MCNC: 113 MG/DL (ref 65–99)
POTASSIUM SERPL-SCNC: 4.2 MMOL/L (ref 3.5–5.3)
PROT SERPL-MCNC: 6.6 G/DL (ref 6.4–8.2)
SODIUM SERPL-SCNC: 142 MMOL/L (ref 136–145)

## 2020-05-28 PROCEDURE — 36415 COLL VENOUS BLD VENIPUNCTURE: CPT

## 2020-05-28 PROCEDURE — 82977 ASSAY OF GGT: CPT

## 2020-05-28 PROCEDURE — 80053 COMPREHEN METABOLIC PANEL: CPT

## 2020-05-29 DIAGNOSIS — I73.9 PAD (PERIPHERAL ARTERY DISEASE) (HCC): ICD-10-CM

## 2020-05-29 RX ORDER — ATORVASTATIN CALCIUM 20 MG/1
TABLET, FILM COATED ORAL
Qty: 90 TABLET | Refills: 1 | Status: SHIPPED | OUTPATIENT
Start: 2020-05-29 | End: 2020-10-12

## 2020-06-05 ENCOUNTER — TELEPHONE (OUTPATIENT)
Dept: GASTROENTEROLOGY | Facility: AMBULARY SURGERY CENTER | Age: 70
End: 2020-06-05

## 2020-06-05 ENCOUNTER — HOSPITAL ENCOUNTER (OUTPATIENT)
Dept: CT IMAGING | Facility: HOSPITAL | Age: 70
Discharge: HOME | End: 2020-06-05
Attending: INTERNAL MEDICINE
Payer: MEDICARE

## 2020-06-05 DIAGNOSIS — K50.012 CROHN'S DISEASE OF ILEUM WITH INTESTINAL OBSTRUCTION (HCC): ICD-10-CM

## 2020-06-05 DIAGNOSIS — K50.019 CROHN'S DISEASE OF SMALL INTESTINE WITH COMPLICATION (HCC): Primary | ICD-10-CM

## 2020-06-05 PROCEDURE — 74177 CT ABD & PELVIS W/CONTRAST: CPT

## 2020-06-05 RX ORDER — SODIUM CHLORIDE 9 MG/ML
20 INJECTION, SOLUTION INTRAVENOUS ONCE
Status: CANCELLED | OUTPATIENT
Start: 2020-06-22

## 2020-06-05 RX ADMIN — IOHEXOL 100 ML: 350 INJECTION, SOLUTION INTRAVENOUS at 13:08

## 2020-06-12 ENCOUNTER — APPOINTMENT (OUTPATIENT)
Dept: LAB | Facility: CLINIC | Age: 70
End: 2020-06-12
Payer: MEDICARE

## 2020-06-12 DIAGNOSIS — K50.012 CROHN'S DISEASE OF ILEUM WITH INTESTINAL OBSTRUCTION (HCC): ICD-10-CM

## 2020-06-12 DIAGNOSIS — K50.019 CROHN'S DISEASE OF SMALL INTESTINE WITH COMPLICATION (HCC): ICD-10-CM

## 2020-06-12 LAB
25(OH)D3 SERPL-MCNC: 79.2 NG/ML (ref 30–100)
ALBUMIN SERPL BCP-MCNC: 2.9 G/DL (ref 3.5–5)
ALP SERPL-CCNC: 166 U/L (ref 46–116)
ALT SERPL W P-5'-P-CCNC: 17 U/L (ref 12–78)
ANION GAP SERPL CALCULATED.3IONS-SCNC: 8 MMOL/L (ref 4–13)
AST SERPL W P-5'-P-CCNC: 16 U/L (ref 5–45)
BASOPHILS # BLD AUTO: 0.05 THOUSANDS/ΜL (ref 0–0.1)
BASOPHILS NFR BLD AUTO: 1 % (ref 0–1)
BILIRUB SERPL-MCNC: 0.56 MG/DL (ref 0.2–1)
BUN SERPL-MCNC: 14 MG/DL (ref 5–25)
CALCIUM SERPL-MCNC: 8.9 MG/DL (ref 8.3–10.1)
CHLORIDE SERPL-SCNC: 105 MMOL/L (ref 100–108)
CO2 SERPL-SCNC: 27 MMOL/L (ref 21–32)
CREAT SERPL-MCNC: 1 MG/DL (ref 0.6–1.3)
CRP SERPL QL: 10.2 MG/L
EOSINOPHIL # BLD AUTO: 0.14 THOUSAND/ΜL (ref 0–0.61)
EOSINOPHIL NFR BLD AUTO: 2 % (ref 0–6)
ERYTHROCYTE [DISTWIDTH] IN BLOOD BY AUTOMATED COUNT: 14.4 % (ref 11.6–15.1)
FERRITIN SERPL-MCNC: 48 NG/ML (ref 8–388)
FOLATE SERPL-MCNC: >20 NG/ML (ref 3.1–17.5)
GFR SERPL CREATININE-BSD FRML MDRD: 77 ML/MIN/1.73SQ M
GGT SERPL-CCNC: 35 U/L (ref 5–85)
GLUCOSE P FAST SERPL-MCNC: 120 MG/DL (ref 65–99)
HCT VFR BLD AUTO: 46.6 % (ref 36.5–49.3)
HGB BLD-MCNC: 14.4 G/DL (ref 12–17)
IMM GRANULOCYTES # BLD AUTO: 0.06 THOUSAND/UL (ref 0–0.2)
IMM GRANULOCYTES NFR BLD AUTO: 1 % (ref 0–2)
INR PPP: 1.91 (ref 0.84–1.19)
IRON SERPL-MCNC: 94 UG/DL (ref 65–175)
LYMPHOCYTES # BLD AUTO: 1.8 THOUSANDS/ΜL (ref 0.6–4.47)
LYMPHOCYTES NFR BLD AUTO: 20 % (ref 14–44)
MCH RBC QN AUTO: 29.4 PG (ref 26.8–34.3)
MCHC RBC AUTO-ENTMCNC: 30.9 G/DL (ref 31.4–37.4)
MCV RBC AUTO: 95 FL (ref 82–98)
MONOCYTES # BLD AUTO: 0.93 THOUSAND/ΜL (ref 0.17–1.22)
MONOCYTES NFR BLD AUTO: 10 % (ref 4–12)
NEUTROPHILS # BLD AUTO: 5.96 THOUSANDS/ΜL (ref 1.85–7.62)
NEUTS SEG NFR BLD AUTO: 66 % (ref 43–75)
NRBC BLD AUTO-RTO: 0 /100 WBCS
PLATELET # BLD AUTO: 331 THOUSANDS/UL (ref 149–390)
PMV BLD AUTO: 10 FL (ref 8.9–12.7)
POTASSIUM SERPL-SCNC: 4.3 MMOL/L (ref 3.5–5.3)
PROT SERPL-MCNC: 6.7 G/DL (ref 6.4–8.2)
PROTHROMBIN TIME: 21.1 SECONDS (ref 11.6–14.5)
RBC # BLD AUTO: 4.9 MILLION/UL (ref 3.88–5.62)
SODIUM SERPL-SCNC: 140 MMOL/L (ref 136–145)
TRANSFERRIN SERPL-MCNC: 229 MG/DL (ref 200–400)
VIT B12 SERPL-MCNC: 153 PG/ML (ref 100–900)
WBC # BLD AUTO: 8.94 THOUSAND/UL (ref 4.31–10.16)

## 2020-06-12 PROCEDURE — 80053 COMPREHEN METABOLIC PANEL: CPT

## 2020-06-12 PROCEDURE — 83540 ASSAY OF IRON: CPT

## 2020-06-12 PROCEDURE — 86235 NUCLEAR ANTIGEN ANTIBODY: CPT

## 2020-06-12 PROCEDURE — 82728 ASSAY OF FERRITIN: CPT

## 2020-06-12 PROCEDURE — 86140 C-REACTIVE PROTEIN: CPT

## 2020-06-12 PROCEDURE — 36415 COLL VENOUS BLD VENIPUNCTURE: CPT

## 2020-06-12 PROCEDURE — 82977 ASSAY OF GGT: CPT

## 2020-06-12 PROCEDURE — 84466 ASSAY OF TRANSFERRIN: CPT

## 2020-06-12 PROCEDURE — 85610 PROTHROMBIN TIME: CPT

## 2020-06-12 PROCEDURE — 82746 ASSAY OF FOLIC ACID SERUM: CPT

## 2020-06-12 PROCEDURE — 86480 TB TEST CELL IMMUN MEASURE: CPT

## 2020-06-12 PROCEDURE — 82607 VITAMIN B-12: CPT

## 2020-06-12 PROCEDURE — 82306 VITAMIN D 25 HYDROXY: CPT

## 2020-06-12 PROCEDURE — 85025 COMPLETE CBC W/AUTO DIFF WBC: CPT

## 2020-06-13 LAB — ACTIN IGG SERPL-ACNC: 6 UNITS (ref 0–19)

## 2020-06-15 ENCOUNTER — APPOINTMENT (OUTPATIENT)
Dept: LAB | Facility: CLINIC | Age: 70
End: 2020-06-15
Payer: MEDICARE

## 2020-06-15 DIAGNOSIS — K50.012 CROHN'S DISEASE OF ILEUM WITH INTESTINAL OBSTRUCTION (HCC): ICD-10-CM

## 2020-06-15 DIAGNOSIS — R74.8 ELEVATED ALKALINE PHOSPHATASE LEVEL: ICD-10-CM

## 2020-06-15 DIAGNOSIS — R74.8 ELEVATED ALKALINE PHOSPHATASE IN NEWBORN: Primary | ICD-10-CM

## 2020-06-15 LAB
GAMMA INTERFERON BACKGROUND BLD IA-ACNC: 0.02 IU/ML
M TB IFN-G BLD-IMP: NEGATIVE
M TB IFN-G CD4+ BCKGRND COR BLD-ACNC: 0 IU/ML
M TB IFN-G CD4+ BCKGRND COR BLD-ACNC: 0 IU/ML
MITOGEN IGNF BCKGRD COR BLD-ACNC: 7.85 IU/ML

## 2020-06-15 PROCEDURE — 83993 ASSAY FOR CALPROTECTIN FECAL: CPT

## 2020-06-17 DIAGNOSIS — I73.9 PAD (PERIPHERAL ARTERY DISEASE) (HCC): Primary | ICD-10-CM

## 2020-06-18 LAB — CALPROTECTIN STL-MCNT: 163 UG/G (ref 0–120)

## 2020-06-22 ENCOUNTER — OFFICE VISIT (OUTPATIENT)
Dept: HEMATOLOGY ONCOLOGY | Facility: CLINIC | Age: 70
End: 2020-06-22
Payer: MEDICARE

## 2020-06-22 VITALS
TEMPERATURE: 97.6 F | WEIGHT: 205 LBS | HEIGHT: 69 IN | DIASTOLIC BLOOD PRESSURE: 80 MMHG | RESPIRATION RATE: 16 BRPM | HEART RATE: 75 BPM | BODY MASS INDEX: 30.36 KG/M2 | SYSTOLIC BLOOD PRESSURE: 120 MMHG | OXYGEN SATURATION: 92 %

## 2020-06-22 DIAGNOSIS — D75.839 THROMBOCYTOSIS: Primary | ICD-10-CM

## 2020-06-22 PROCEDURE — 4040F PNEUMOC VAC/ADMIN/RCVD: CPT | Performed by: INTERNAL MEDICINE

## 2020-06-22 PROCEDURE — 1036F TOBACCO NON-USER: CPT | Performed by: INTERNAL MEDICINE

## 2020-06-22 PROCEDURE — 99213 OFFICE O/P EST LOW 20 MIN: CPT | Performed by: INTERNAL MEDICINE

## 2020-06-22 PROCEDURE — 3008F BODY MASS INDEX DOCD: CPT | Performed by: INTERNAL MEDICINE

## 2020-06-22 PROCEDURE — 1160F RVW MEDS BY RX/DR IN RCRD: CPT | Performed by: INTERNAL MEDICINE

## 2020-07-01 ENCOUNTER — TELEPHONE (OUTPATIENT)
Dept: VASCULAR SURGERY | Facility: CLINIC | Age: 70
End: 2020-07-01

## 2020-07-01 ENCOUNTER — REMOTE DEVICE CLINIC VISIT (OUTPATIENT)
Dept: CARDIOLOGY CLINIC | Facility: CLINIC | Age: 70
End: 2020-07-01
Payer: MEDICARE

## 2020-07-01 DIAGNOSIS — Z95.818 PRESENCE OF OTHER CARDIAC IMPLANTS AND GRAFTS: Primary | ICD-10-CM

## 2020-07-01 PROCEDURE — G2066 INTER DEVC REMOTE 30D: HCPCS | Performed by: INTERNAL MEDICINE

## 2020-07-01 PROCEDURE — 93298 REM INTERROG DEV EVAL SCRMS: CPT | Performed by: INTERNAL MEDICINE

## 2020-07-01 NOTE — PROGRESS NOTES
MDT LOOP  CARELINK TRANSMISSION: LOOP RECORDER  PRESENTING RHYTHM NSR W/ PACs @ 87 BPM  BATTERY STATUS "OK"  6 TACHY EPISODES W/ EGRAMS SHOWING SVT @ 176-200 BPM  HX OF SAME  2 AF EPISODES W/ EGRAMS SUGGESTING SR W/PACs, OVERSENSING AND NOISE  AF BURDEN = 0%  PT TAKES ASA , XARELTO AND METOPROLOL SUCC  NO PATIENT   ACTIVATED EPISODES  NORMAL DEVICE FUNCTION   DL

## 2020-07-01 NOTE — TELEPHONE ENCOUNTER
1  Do you presently have a fever or flu-like symptoms? No  2  Do you have symptoms of an upper respiratory infection like runny nose, sore throat, or cough? No  3  Are you suffering from new headache that you have not had in the past?  No  4  Do you have/have you experienced any new shortness of breath recently? No  5  Do you have any new diarrhea, nausea or vomiting? No  6  Have you been in contact with anyone who has been sick or diagnosed with COVID-19?  No    Patient and wife coming in, both answered the screening questions

## 2020-07-02 ENCOUNTER — OFFICE VISIT (OUTPATIENT)
Dept: VASCULAR SURGERY | Facility: CLINIC | Age: 70
End: 2020-07-02
Payer: MEDICARE

## 2020-07-02 VITALS
HEIGHT: 69 IN | SYSTOLIC BLOOD PRESSURE: 90 MMHG | HEART RATE: 98 BPM | BODY MASS INDEX: 29.92 KG/M2 | TEMPERATURE: 98.8 F | WEIGHT: 202 LBS | DIASTOLIC BLOOD PRESSURE: 60 MMHG

## 2020-07-02 DIAGNOSIS — Z98.890 S/P FEMORAL-TIBIAL BYPASS: Primary | ICD-10-CM

## 2020-07-02 PROCEDURE — 99214 OFFICE O/P EST MOD 30 MIN: CPT | Performed by: SURGERY

## 2020-07-02 PROCEDURE — 3008F BODY MASS INDEX DOCD: CPT | Performed by: SURGERY

## 2020-07-02 PROCEDURE — 1160F RVW MEDS BY RX/DR IN RCRD: CPT | Performed by: SURGERY

## 2020-07-02 PROCEDURE — 1036F TOBACCO NON-USER: CPT | Performed by: SURGERY

## 2020-07-02 PROCEDURE — 4040F PNEUMOC VAC/ADMIN/RCVD: CPT | Performed by: SURGERY

## 2020-07-02 NOTE — PROGRESS NOTES
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  Patient's symptoms began in march 2019 and initially worsened  i     His LEADs demonstrate intact DAVID's however there is a 50% stenosis of the right popliteal artery  His CT-scan was largely unimpressive for atherosclerotic occlusive disease or any possible proximal embolic source, apart from the right popliteal artery lesion  He has a soft atheromatous appearing plaque in the popliteal artery which is not flow limiting but possibly resulting in embolic events to the toes       He subsequently underwent popliteal artery stenting with good result  Unfortunately he after several month admitted in the setting SBO, dehydration, PHUONG and in that low flow inflammatory state ended up with stent thrombosis  He underwent repeat angiography with PTA/arthrectomy and re-stenting but it thrombosed again after a few months       Most recently he underwent SFA-BK pop bypass with rGSV on 3/26/20  Presents for routine check and to discuss need for anticoagulation  Reports that he doing well overall, he is ambulating without difficult and is even mowing his lawn  He denies any claudication symptoms  His swelling has improved since his last visit and all incision are well healed  He underwent duplex ultrasound on 5/7/20 which demonstrates patent bypass with mild ~50% proximal anastamotic stenosis with preserved distal flow  On exam he has an easily palpable bypass graft with a triphasic PT and biphasic AT      Continue asa/staitn  3 month duplex ultrasound (scheduled in 08/10/20)   Clinic follow-up after     Is on xarelto mainly for paroxysmal a fib noted during his hospitalization  Currently has loop recorder in place, does not have scheduled follow-up with Dr Daniele Juárez ( cardiologist)  From a vascular perspective He is ok either on asa/xarelto or asa/plavix without anticoagulation   Will have Dr Daniele Juárez review findings of loop recorder and dictate need for ongoing anticoagulation therapy  Problem List Items Addressed This Visit        Other    S/P femoral-tibial bypass - Primary     71yo M with HTN, HLD and regarding blue toe syndrome on the right  Patient's symptoms began in march 2019 and initially worsened  i     His LEADs demonstrate intact DAVID's however there is a 50% stenosis of the right popliteal artery  His CT-scan was largely unimpressive for atherosclerotic occlusive disease or any possible proximal embolic source, apart from the right popliteal artery lesion  He has a soft atheromatous appearing plaque in the popliteal artery which is not flow limiting but possibly resulting in embolic events to the toes       He subsequently underwent popliteal artery stenting with good result  Unfortunately he after several month admitted in the setting SBO, dehydration, PHUONG and in that low flow inflammatory state ended up with stent thrombosis  He underwent repeat angiography with PTA/arthrectomy and re-stenting but it thrombosed again after a few months       Most recently he underwent SFA-BK pop bypass with rGSV on 3/26/20  Presents for routine check and to discuss need for anticoagulation  Reports that he doing well overall, he is ambulating without difficult and is even mowing his lawn  He denies any claudication symptoms  His swelling has improved since his last visit and all incision are well healed  He underwent duplex ultrasound on 5/7/20 which demonstrates patent bypass with mild ~50% proximal anastamotic stenosis with preserved distal flow  On exam he has an easily palpable bypass graft with a triphasic PT and biphasic AT      Continue asa/staitn  3 month duplex ultrasound (scheduled in 08/10/20)   Clinic follow-up after     Is on xarelto mainly for paroxysmal a fib noted during his hospitalization   Currently has loop recorder in place, does not have scheduled follow-up with Dr Radha Rodriguez ( cardiologist)  From a vascular perspective He is ok either on asa/xarelto or asa/plavix without anticoagulation  Will have Dr Esperanza Lynch review findings of loop recorder and dictate need for ongoing anticoagulation therapy  Subjective:      Patient ID: Asia Bee is a 77 y o  male  Patient presents in office today to discuss anticoagulation s/p revascularization and stent thrombosis  He is currently taking Aspirin 81 and Xarelto  The following portions of the patient's history were reviewed and updated as appropriate: allergies, current medications, past family history, past medical history, past social history, past surgical history and problem list     Review of Systems   Constitutional: Negative  HENT: Negative  Eyes: Negative  Respiratory: Negative  Cardiovascular: Negative  Gastrointestinal: Negative  Endocrine: Negative  Genitourinary: Negative  Musculoskeletal: Negative  Skin: Negative  Allergic/Immunologic: Negative  Neurological: Negative  Hematological: Negative  Psychiatric/Behavioral: Negative  I have reviewed and updated the ROS as appropriate  Objective:      BP 90/60 (BP Location: Right arm, Patient Position: Sitting)   Pulse 98   Temp 98 8 °F (37 1 °C) (Tympanic)   Ht 5' 9" (1 753 m)   Wt 91 6 kg (202 lb)   BMI 29 83 kg/m²          Physical Exam   Constitutional: He is oriented to person, place, and time  He appears well-developed and well-nourished  HENT:   Head: Normocephalic and atraumatic  Eyes: Pupils are equal, round, and reactive to light  EOM are normal    Neck: Normal range of motion  Neck supple  Cardiovascular: Normal rate and normal heart sounds  Pulses:       Femoral pulses are 2+ on the right side, and 2+ on the left side  Dorsalis pedis pulses are 0 on the right side  Posterior tibial pulses are 1+ on the right side     Occasional PVCs or irregular rhythm noted on doppler Pulmonary/Chest: Effort normal and breath sounds normal    Abdominal: Soft  Bowel sounds are normal    Musculoskeletal: Normal range of motion  He exhibits no edema, tenderness or deformity  Neurological: He is alert and oriented to person, place, and time  Skin: Skin is warm and dry  Capillary refill takes less than 2 seconds  Psychiatric: He has a normal mood and affect   His behavior is normal  Judgment and thought content normal

## 2020-07-02 NOTE — ASSESSMENT & PLAN NOTE
71yo M with HTN, HLD and regarding blue toe syndrome on the right  Patient's symptoms began in march 2019 and initially worsened  i     His LEADs demonstrate intact DAVID's however there is a 50% stenosis of the right popliteal artery  His CT-scan was largely unimpressive for atherosclerotic occlusive disease or any possible proximal embolic source, apart from the right popliteal artery lesion  He has a soft atheromatous appearing plaque in the popliteal artery which is not flow limiting but possibly resulting in embolic events to the toes       He subsequently underwent popliteal artery stenting with good result  Unfortunately he after several month admitted in the setting SBO, dehydration, PHUONG and in that low flow inflammatory state ended up with stent thrombosis  He underwent repeat angiography with PTA/arthrectomy and re-stenting but it thrombosed again after a few months       Most recently he underwent SFA-BK pop bypass with rGSV on 3/26/20  Presents for routine check and to discuss need for anticoagulation  Reports that he doing well overall, he is ambulating without difficult and is even mowing his lawn  He denies any claudication symptoms  His swelling has improved since his last visit and all incision are well healed  He underwent duplex ultrasound on 5/7/20 which demonstrates patent bypass with mild ~50% proximal anastamotic stenosis with preserved distal flow  On exam he has an easily palpable bypass graft with a triphasic PT and biphasic AT      Continue asa/staitn  3 month duplex ultrasound (scheduled in 08/10/20)   Clinic follow-up after     Is on xarelto mainly for paroxysmal a fib noted during his hospitalization  Currently has loop recorder in place, does not have scheduled follow-up with Dr Esperanza Lynch ( cardiologist)  From a vascular perspective He is ok either on asa/xarelto or asa/plavix without anticoagulation   Will have Dr Esperanza Lynch review findings of loop recorder and dictate need for ongoing anticoagulation therapy

## 2020-07-14 DIAGNOSIS — K21.00 GERD WITH ESOPHAGITIS: ICD-10-CM

## 2020-07-14 RX ORDER — OMEPRAZOLE 40 MG/1
CAPSULE, DELAYED RELEASE ORAL
Qty: 90 CAPSULE | Refills: 1 | Status: SHIPPED | OUTPATIENT
Start: 2020-07-14 | End: 2021-01-11 | Stop reason: SDUPTHER

## 2020-07-27 ENCOUNTER — OFFICE VISIT (OUTPATIENT)
Dept: CARDIOLOGY CLINIC | Facility: CLINIC | Age: 70
End: 2020-07-27
Payer: MEDICARE

## 2020-07-27 VITALS
HEART RATE: 76 BPM | BODY MASS INDEX: 30.81 KG/M2 | HEIGHT: 69 IN | WEIGHT: 208 LBS | SYSTOLIC BLOOD PRESSURE: 110 MMHG | DIASTOLIC BLOOD PRESSURE: 68 MMHG

## 2020-07-27 DIAGNOSIS — N18.30 CHRONIC KIDNEY DISEASE, STAGE III (MODERATE) (HCC): ICD-10-CM

## 2020-07-27 DIAGNOSIS — I45.2 RBBB (RIGHT BUNDLE BRANCH BLOCK WITH LEFT ANTERIOR FASCICULAR BLOCK): ICD-10-CM

## 2020-07-27 DIAGNOSIS — I25.5 ISCHEMIC CARDIOMYOPATHY: ICD-10-CM

## 2020-07-27 DIAGNOSIS — I75.021 BLUE TOE SYNDROME, RIGHT (HCC): ICD-10-CM

## 2020-07-27 DIAGNOSIS — I48.0 PAROXYSMAL ATRIAL FIBRILLATION (HCC): Primary | ICD-10-CM

## 2020-07-27 DIAGNOSIS — I73.9 PAD (PERIPHERAL ARTERY DISEASE) (HCC): ICD-10-CM

## 2020-07-27 PROBLEM — I99.8 LIMB ISCHEMIA: Status: RESOLVED | Noted: 2020-01-13 | Resolved: 2020-07-27

## 2020-07-27 PROCEDURE — 1036F TOBACCO NON-USER: CPT | Performed by: INTERNAL MEDICINE

## 2020-07-27 PROCEDURE — 99214 OFFICE O/P EST MOD 30 MIN: CPT | Performed by: INTERNAL MEDICINE

## 2020-07-27 PROCEDURE — 1160F RVW MEDS BY RX/DR IN RCRD: CPT | Performed by: INTERNAL MEDICINE

## 2020-07-27 PROCEDURE — 4040F PNEUMOC VAC/ADMIN/RCVD: CPT | Performed by: INTERNAL MEDICINE

## 2020-07-27 PROCEDURE — 3008F BODY MASS INDEX DOCD: CPT | Performed by: INTERNAL MEDICINE

## 2020-07-27 NOTE — PROGRESS NOTES
Cardiology Follow Up    Wyatt Estevez  1950  [id number]  [facility]  [address]  [phone]    1  Paroxysmal atrial fibrillation (HCC)     2  PAD (peripheral artery disease) ([address] )     3  RBBB (right bundle branch block with left anterior fascicular block)     4  Ischemic cardiomyopathy     5  Chronic kidney disease, stage III (moderate) (HCC)           Discussion/Summary: All of his assessed cardiac problems are stable  He needs to stay on Xarelto for his PAF  I have reviewed his medications and made no changes  No cardiac testing is ordered  RTO 9 months  Interval History: He did well with his RLE PV surgery and his claudication symptoms are much better  He has stopped smoking  His weight is up from 192 to 208 lbs  /68  His ILR still shows PAF ( not a lot )    He denies CP, significant SOB, dizziness  Patient Active Problem List   Diagnosis    Overweight (BMI 25 0-29  9)    Chronic kidney disease, stage III (moderate) (HCC)    Gross hematuria    Persistent proteinuria    Impaired fasting glucose    Microscopic hematuria    [place] cardiac risk 10-20% in next 10 years    Abdominal aortic atherosclerosis (HCC)    RBBB (right bundle branch block with left anterior fascicular block)    Tobacco use    Dyslipidemia    Vitamin D deficiency    GERD with esophagitis    Harris's esophagus without dysplasia    Colon polyps    PAD (peripheral artery disease) (HCC)    Mass of both adrenal glands (HCC)    History of small bowel obstruction    Blue toe syndrome, right (HCC)    Ischemic cardiomyopathy    S/P peripheral artery angioplasty with stent placement    Venous stasis    GI symptoms    Paroxysmal atrial fibrillation (HCC)    Anemia of chronic renal failure, stage 3 (moderate) (HCC)    Other emphysema (HCC)    Adrenal mass (HCC)    Diarrhea    Hyponatremia    SIRS (systemic inflammatory response syndrome) (HCC)    Hypokalemia    SVT (supraventricular tachycardia) (HCC)    Hypotension    Positive QuantiFERON-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    Terminal ileitis of small intestine (HCC)    Preop cardiovascular exam    S/P femoral-tibial bypass    Elevated alkaline phosphatase level     Past Medical History:   Diagnosis Date    Blue toe syndrome (HCC)     Chronic kidney disease     Colon polyps     GERD (gastroesophageal reflux disease)     Hematuria     History of rheumatic fever     Hypolipidemia     Hypotension     Ischemic cardiomyopathy     PAD (peripheral artery disease) (HCC)     Pulmonary emphysema (HCC)      Social History     Socioeconomic History    Marital status: /Civil Union     Spouse name: Not on file    Number of children: Not on file    Years of education: Not on file    Highest education level: Not on file   Occupational History    Occupation: retired   Social Needs    Financial resource strain: Not on file    Food insecurity:     Worry: Not on file     Inability: Not on file   [employer] needs:     Medical: Not on file     Non-medical: Not on file   Tobacco Use    Smoking status: Former Smoker     Packs/day: 0 50     Years: 30 00     Pack years: 15 00     Types: Cigarettes    Smokeless tobacco: Never Used    Tobacco comment: " I will do it on my own"   Substance and Sexual Activity    Alcohol use: Yes     Frequency: 2-4 times a month     Drinks per session: 1 or 2     Binge frequency: Never     Comment: occasional    Drug use: No    Sexual activity: Not Currently   Lifestyle    Physical activity:     Days per week: Not on file     Minutes per session: Not on file    Stress: Not on file   Relationships    Social connections:     Talks on phone: Not on file     Gets together: Not on file     Attends Restorationism service: Not on file     Active member of club or organization: Not on file     Attends meetings of clubs or organizations: Not on file     Relationship status: Not on file    Intimate partner violence:     Fear of current or ex partner: Not on file     Emotionally abused: Not on file     Physically abused: Not on file     Forced sexual activity: Not on file   Other Topics Concern    Not on file   Social History Narrative    Drinks coffee      Family History   Problem Relation Age of Onset    Heart disease Mother     Hyperlipidemia Mother     Hypertension Father     Heart disease Father     Stroke Father     Hyperlipidemia Father     Diabetes Brother     No Known Problems Maternal Grandmother     Dementia Neg Hx     Drug abuse Neg Hx     Mental illness Neg Hx     Substance Abuse Neg Hx     Alcohol abuse Neg Hx     Depression Neg Hx     Colon cancer Neg Hx      Past Surgical History:   Procedure Laterality Date    COLONOSCOPY  2019    HERNIA REPAIR      IR ABDOMINAL ANGIOGRAPHY / INTERVENTION  6/27/2019    IR ABDOMINAL ANGIOGRAPHY / INTERVENTION  2/12/2020    POPLITEAL ARTERY STENT Right     6/2019    AL SLCTV CATHJ 3RD+ ORD SLCTV ABDL PEL/LXTR [facility] Right 6/27/2019    Procedure: leg ANGIOGRAM WITH STENT, BALLOON ANGIOPLASTY, LEFT GROIN ACCESS;  Surgeon: Gabriel Blanc MD;  [facility]: [facility];  Service: Vascular    AL VEIN BYPASS GRAFT,FEM-POP Right 3/26/2020    Procedure: BYPASS FEMORAL-POPLITEAL; Right lower extremity bypass, fem-bk pop with GSV;  Surgeon: Gabriel Blanc MD;  Location: [facility];  Service: Vascular       Current Outpatient Medications:     aspirin 81 MG tablet, Take 81 mg by mouth daily, Disp: , Rfl:     atorvastatin (LIPITOR) 20 mg tablet, TAKE 1 TABLET BY MOUTH EVERY DAY, Disp: 90 tablet, Rfl: 1    Cholecalciferol (VITAMIN D) 2000 units CAPS, Take 50,000 Units by mouth daily , Disp: , Rfl:     Ferrous Sulfate (IRON) 28 MG TABS, Take by mouth daily, Disp: , Rfl:     folic acid (FOLVITE) 1 mg tablet, TAKE 1 TABLET BY MOUTH EVERY DAY, Disp: 90 tablet, Rfl: 1    Magnesium 500 MG TABS, Take 1 tablet by mouth 2 (two) times a day, Disp: , Rfl:     metoprolol succinate (TOPROL-XL) 100 mg 24 hr tablet, Take 1 tablet (100 mg total) by mouth daily, Disp: 90 tablet, Rfl: 4    omeprazole (PriLOSEC) 40 MG capsule, TAKE 1 CAPSULE BY MOUTH EVERY DAY, Disp: 90 capsule, Rfl: 1    rivaroxaban (XARELTO) 20 mg tablet, Take 1 tablet (20 mg total) by mouth daily with breakfast, Disp: 90 tablet, Rfl: 3    sulfaSALAzine (AZULFIDINE-EN) 500 mg EC tablet, TAKE 1 TABLET BY MOUTH TWICE A DAY (Patient taking differently: 2 (two) times a day ), Disp: 60 tablet, Rfl: 3  No Known Allergies  Vitals:    07/27/20 0831   BP: 110/68   BP Location: Left arm   Cuff Size: Standard   Pulse: 76   Weight: 94 3 kg (208 lb)   Height: 5' 9" (1 753 m)     Weight (last 2 days)     Date/Time   Weight    07/27/20 0831   94 3 (208)             Blood pressure 110/68, pulse 76, height 5' 9" (1 753 m), weight 94 3 kg (208 lb)  , Body mass index is 30 72 kg/m²      Labs:  Appointment on 06/15/2020   Component Date Value    Calprotectin 06/15/2020 163*   Appointment on 06/12/2020   Component Date Value    GGT 06/12/2020 35     WBC 06/12/2020 8 94     RBC 06/12/2020 4 90     Hemoglobin 06/12/2020 14 4     Hematocrit 06/12/2020 46 6     MCV 06/12/2020 95     MCH 06/12/2020 29 4     MCHC 06/12/2020 30 9*    RDW 06/12/2020 14 4     MPV 06/12/2020 10 0     Platelets 85/00/6948 331     nRBC 06/12/2020 0     Neutrophils Relative 06/12/2020 66     Immat GRANS % 06/12/2020 1     Lymphocytes Relative 06/12/2020 20     Monocytes Relative 06/12/2020 10     Eosinophils Relative 06/12/2020 2     Basophils Relative 06/12/2020 1     Neutrophils Absolute 06/12/2020 5 96     Immature Grans Absolute 06/12/2020 0 06     Lymphocytes Absolute 06/12/2020 1 80     Monocytes Absolute 06/12/2020 0 93     Eosinophils Absolute 06/12/2020 0 14     Basophils Absolute 06/12/2020 0 05     Sodium 06/12/2020 140  Potassium 06/12/2020 4 3     Chloride 06/12/2020 105     CO2 06/12/2020 27     ANION GAP 06/12/2020 8     BUN 06/12/2020 14     Creatinine 06/12/2020 1 00     Glucose, Fasting 06/12/2020 120*    Calcium 06/12/2020 8 9     AST 06/12/2020 16     ALT 06/12/2020 17     Alkaline Phosphatase 06/12/2020 166*    Total Protein 06/12/2020 6 7     Albumin 06/12/2020 2 9*    Total Bilirubin 06/12/2020 0 56     eGFR 06/12/2020 77     CRP 06/12/2020 10 2*    Ferritin 06/12/2020 48     Iron 06/12/2020 94     Vitamin B-12 06/12/2020 153     Vit D, 25-Hydroxy 06/12/2020 79 2     Folate 06/12/2020 >20 0*    QFT Nil 06/12/2020 0 02     QFT TB1-NIL 06/12/2020 0 00     QFT TB2-NIL 06/12/2020 0 00     QFT Mitogen-NIL 06/12/2020 7 85     QFT Final Interpretation 06/12/2020 Negative     Protime 06/12/2020 21 1*    INR 06/12/2020 1 91*    Transferrin 06/12/2020 229     Smooth Muscle Ab 06/12/2020 6    Appointment on 05/28/2020   Component Date Value    Sodium 05/28/2020 142     Potassium 05/28/2020 4 2     Chloride 05/28/2020 108     CO2 05/28/2020 28     ANION GAP 05/28/2020 6     BUN 05/28/2020 18     Creatinine 05/28/2020 1 06     Glucose, Fasting 05/28/2020 113*    Calcium 05/28/2020 8 6     AST 05/28/2020 14     ALT 05/28/2020 10*    Alkaline Phosphatase 05/28/2020 153*    Total Protein 05/28/2020 6 6     Albumin 05/28/2020 2 9*    Total Bilirubin 05/28/2020 0 56     eGFR 05/28/2020 72     GGT 05/28/2020 26    Office Visit on 05/15/2020   Component Date Value    Hemoglobin A1C 05/26/2020 5 6     EAG 05/26/2020 114    Appointment on 05/07/2020   Component Date Value    Calprotectin 05/07/2020 276*   Appointment on 05/06/2020   Component Date Value    PSA 05/06/2020 0 4     Clarity, UA 05/06/2020 Clear     Color, UA 05/06/2020 Yellow     Specific Gravity, UA 05/06/2020 >=1 030     pH, UA 05/06/2020 5 5     Glucose, UA 05/06/2020 Negative     Ketones, UA 05/06/2020 Negative     Blood, UA 05/06/2020 Small*    Protein, UA 05/06/2020 Trace*    Nitrite, UA 05/06/2020 Negative     Bilirubin, UA 05/06/2020 Small*    Urobilinogen, UA 05/06/2020 0 2     Leukocytes, UA 05/06/2020 Negative     WBC, UA 05/06/2020 0-1*    RBC, UA 05/06/2020 0-1*    Bacteria, UA 05/06/2020 None Seen     Epithelial Cells 05/06/2020 Occasional     MUCUS THREADS 05/06/2020 Occasional*    WBC 05/06/2020 7 90     RBC 05/06/2020 4 35     Hemoglobin 05/06/2020 13 3     Hematocrit 05/06/2020 43 1     MCV 05/06/2020 99*    MCH 05/06/2020 30 6     MCHC 05/06/2020 30 9*    RDW 05/06/2020 13 6     MPV 05/06/2020 10 1     Platelets 28/74/8723 321     nRBC 05/06/2020 0     Neutrophils Relative 05/06/2020 58     Immat GRANS % 05/06/2020 0     Lymphocytes Relative 05/06/2020 28     Monocytes Relative 05/06/2020 11     Eosinophils Relative 05/06/2020 2     Basophils Relative 05/06/2020 1     Neutrophils Absolute 05/06/2020 4 65     Immature Grans Absolute 05/06/2020 0 03     Lymphocytes Absolute 05/06/2020 2 17     Monocytes Absolute 05/06/2020 0 83     Eosinophils Absolute 05/06/2020 0 17     Basophils Absolute 05/06/2020 0 05     Sodium 05/06/2020 142     Potassium 05/06/2020 3 4*    Chloride 05/06/2020 107     CO2 05/06/2020 26     ANION GAP 05/06/2020 9     BUN 05/06/2020 18     Creatinine 05/06/2020 1 15     Glucose, Fasting 05/06/2020 146*    Calcium 05/06/2020 8 5     AST 05/06/2020 13     ALT 05/06/2020 16     Alkaline Phosphatase 05/06/2020 130*    Total Protein 05/06/2020 6 6     Albumin 05/06/2020 2 7*    Total Bilirubin 05/06/2020 0 31     eGFR 05/06/2020 65     CRP 05/06/2020 69 1*    IgA 05/06/2020 127     Gliadin IgA 05/06/2020 5     Gliadin IgG 05/06/2020 2     Tissue Transglut Ab IGG 05/06/2020 <2     TISSUE TRANSGLUTAMINASE * 05/06/2020 <2     Endomysial IgA 05/06/2020 Negative    Admission on 03/26/2020, Discharged on 03/28/2020   Component Date Value    ABO Grouping 03/23/2020 B     Rh Factor 03/23/2020 Positive     Antibody Screen 03/23/2020 Negative     Specimen Expiration Date 03/23/2020 [id number]     Activated Clotting Time,* 03/26/2020 218*    Specimen Type 03/26/2020 ARTERIAL     Activated Clotting Time,* 03/26/2020 224*    Specimen Type 03/26/2020 ARTERIAL     WBC 03/26/2020 12 74*    RBC 03/26/2020 3 60*    Hemoglobin 03/26/2020 11 4*    Hematocrit 03/26/2020 36 5     MCV 03/26/2020 101*    MCH 03/26/2020 31 7     MCHC 03/26/2020 31 2*    RDW 03/26/2020 16 5*    Platelets 09/24/9166 350     MPV 03/26/2020 10 1     POC Glucose 03/26/2020 134     Sodium 03/26/2020 144     Potassium 03/26/2020 4 2     Chloride 03/26/2020 115*    CO2 03/26/2020 26     ANION GAP 03/26/2020 3*    BUN 03/26/2020 16     Creatinine 03/26/2020 0 86     Glucose 03/26/2020 147*    Calcium 03/26/2020 8 1*    eGFR 03/26/2020 89     Platelets 88/98/0597 340     MPV 03/26/2020 10 1     Sodium 03/27/2020 140     Potassium 03/27/2020 4 0     Chloride 03/27/2020 108     CO2 03/27/2020 27     ANION GAP 03/27/2020 5     BUN 03/27/2020 11     Creatinine 03/27/2020 0 83     Glucose 03/27/2020 120     Calcium 03/27/2020 8 3     eGFR 03/27/2020 90     WBC 03/27/2020 11 45*    RBC 03/27/2020 3 40*    Hemoglobin 03/27/2020 10 6*    Hematocrit 03/27/2020 34 3*    MCV 03/27/2020 101*    MCH 03/27/2020 31 2     MCHC 03/27/2020 30 9*    RDW 03/27/2020 16 1*    Platelets 40/38/8063 318     MPV 03/27/2020 10 2     Sodium 03/28/2020 143     Potassium 03/28/2020 3 7     Chloride 03/28/2020 110*    CO2 03/28/2020 29     ANION GAP 03/28/2020 4     BUN 03/28/2020 16     Creatinine 03/28/2020 0 78     Glucose 03/28/2020 92     Calcium 03/28/2020 8 5     eGFR 03/28/2020 93     WBC 03/28/2020 9 58     RBC 03/28/2020 3 51*    Hemoglobin 03/28/2020 10 8*    Hematocrit 03/28/2020 35 1*    MCV 03/28/2020 100*    MCH 03/28/2020 30 8     MCHC 03/28/2020 30 8*  RDW 03/28/2020 16 0*    Platelets 55/14/7567 321     MPV 03/28/2020 10 0    Appointment on 03/23/2020   Component Date Value    Hemoglobin A1C 03/23/2020 5 1     EAG 03/23/2020 100     WBC 03/23/2020 6 64     RBC 03/23/2020 4 12     Hemoglobin 03/23/2020 13 0     Hematocrit 03/23/2020 41 9     MCV 03/23/2020 102*    MCH 03/23/2020 31 6     MCHC 03/23/2020 31 0*    RDW 03/23/2020 17 0*    Platelets 70/06/9327 362     MPV 03/23/2020 9 9     Protime 03/23/2020 22 9*    INR 03/23/2020 2 12*   Office Visit on 03/16/2020   Component Date Value    Interpretation 03/18/2020     Appointment on 02/28/2020   Component Date Value    Sodium 02/28/2020 142     Potassium 02/28/2020 4 3     Chloride 02/28/2020 108     CO2 02/28/2020 26     ANION GAP 02/28/2020 8     BUN 02/28/2020 18     Creatinine 02/28/2020 1 08     Glucose 02/28/2020 104     Calcium 02/28/2020 8 1*    eGFR 02/28/2020 70    There may be more visits with results that are not included  Imaging: No results found  Review of Systems:  Review of Systems   Constitutional: Negative for diaphoresis, fatigue, fever and unexpected weight change  HENT: Negative  Respiratory: Negative for cough, shortness of breath and wheezing  Cardiovascular: Negative for chest pain, palpitations and leg swelling  Gastrointestinal: Negative for abdominal pain, diarrhea and nausea  Musculoskeletal: Negative for gait problem and myalgias  Skin: Negative for rash  Neurological: Negative for dizziness and numbness  Psychiatric/Behavioral: Negative  Physical Exam:  Physical Exam   Constitutional: He is oriented to person, place, and time  He appears well-developed and well-nourished  HENT:   Head: Normocephalic and atraumatic  Eyes: Pupils are equal, round, and reactive to light  Neck: Normal range of motion  Neck supple  No JVD present  Cardiovascular: Regular rhythm, S1 normal, S2 normal and normal pulses     Pulses: Carotid pulses are 2+ on the right side, and 2+ on the left side  Pulmonary/Chest: Effort normal and breath sounds normal  He has no wheezes  He has no rales  Abdominal: Soft  Bowel sounds are normal  There is no tenderness  Musculoskeletal: Normal range of motion  He exhibits no edema or tenderness  Neurological: He is alert and oriented to person, place, and time  He has normal reflexes  No cranial nerve deficit  Skin: Skin is warm  Psychiatric: He has a normal mood and affect

## 2020-08-10 ENCOUNTER — OFFICE VISIT (OUTPATIENT)
Dept: GASTROENTEROLOGY | Facility: MEDICAL CENTER | Age: 70
End: 2020-08-10
Payer: MEDICARE

## 2020-08-10 ENCOUNTER — HOSPITAL ENCOUNTER (OUTPATIENT)
Dept: NON INVASIVE DIAGNOSTICS | Facility: CLINIC | Age: 70
Discharge: HOME | End: 2020-08-10
Payer: MEDICARE

## 2020-08-10 VITALS
BODY MASS INDEX: 31.16 KG/M2 | WEIGHT: 211 LBS | SYSTOLIC BLOOD PRESSURE: 116 MMHG | DIASTOLIC BLOOD PRESSURE: 68 MMHG | TEMPERATURE: 97.8 F | HEART RATE: 68 BPM

## 2020-08-10 DIAGNOSIS — K50.012 CROHN'S DISEASE OF ILEUM WITH INTESTINAL OBSTRUCTION (HCC): Primary | ICD-10-CM

## 2020-08-10 DIAGNOSIS — K22.70 BARRETT'S ESOPHAGUS WITHOUT DYSPLASIA: ICD-10-CM

## 2020-08-10 DIAGNOSIS — I73.9 PAD (PERIPHERAL ARTERY DISEASE) (HCC): ICD-10-CM

## 2020-08-10 DIAGNOSIS — K21.00 GERD WITH ESOPHAGITIS: ICD-10-CM

## 2020-08-10 PROCEDURE — 1160F RVW MEDS BY RX/DR IN RCRD: CPT | Performed by: INTERNAL MEDICINE

## 2020-08-10 PROCEDURE — 93925 LOWER EXTREMITY STUDY: CPT

## 2020-08-10 PROCEDURE — 93923 UPR/LXTR ART STDY 3+ LVLS: CPT

## 2020-08-10 PROCEDURE — 99215 OFFICE O/P EST HI 40 MIN: CPT | Performed by: INTERNAL MEDICINE

## 2020-08-10 PROCEDURE — 4040F PNEUMOC VAC/ADMIN/RCVD: CPT | Performed by: INTERNAL MEDICINE

## 2020-08-10 PROCEDURE — 1036F TOBACCO NON-USER: CPT | Performed by: INTERNAL MEDICINE

## 2020-08-10 NOTE — PROGRESS NOTES
[facility] - Outpatient Follow-up Note  Dhara Francis 77 y o  male MRN: [id number]  Encounter: [id number]          ASSESSMENT AND PLAN:    Dhara Francis is a 77 y o  male with multiple medical problems including PVD, CKD, pAFib on AC who p/w small bowel Crohn's disease with prior SBO, previously on prednisone and recently on sulfasalazine with folic acid  Currently he is feeling well, but recent CTE with ongoing small bowel disease  Blood work with some elevation in inflammatory markers  Despite his minimal GI symptoms, there is ongoing inflammation  He was supposed to start [facility] but he did not start it as of yet  Mild elevation in alk phos  Available labs and imaging reviewed  1  Crohn's disease of ileum with intestinal obstruction ([facility] [address] )    2  GERD with esophagitis    3  Hraris's esophagus without dysplasia        Orders Placed This Encounter   Procedures    CBC and differential    Comprehensive metabolic panel    C-reactive protein    Gamma GT     Will start Entyvio and stop sulfasalazine  Check CBC, CMP, CRP  Avoid NSAIDs  Smoking cessation counseling  Consider repeat colonoscopy January 2021  Consider repeat EGD January 2023 given prior diagnosis of Harris's, although nont seen on last EGD  Avoid live vaccines  Yearly flu shot  Pneumococcal vaccines   Derm, ophtho and dental exams  ______________________________________________________________________    SUBJECTIVE:    Dhara Francis is a 77 y o  male who presents with complaint of Ileal crohn's    He is on sulfasalazine 1 tab twice a day, and folic acid  He is having 3 BMs per day, formed, without blood or black stools  Occasional urgency but no accidents / incontinence  No nocturnal BMs  Rare tenesmus  No abdominal pain, rectal pain  No rashes, joint pains, mouth sores, eye pain or change in vision  No heartburn, dysphagia, odynophagia, nausea, vomiting     He smokes cigarrettes, anywhere from 0-2 per day  He is working on cutting down (he will use less than 40 cigarettes per month       Answers for HPI/ROS submitted by the patient on 8/7/2020   When you are not experiencing symptoms of your inflammatory bowel disease, how many bowel movements do you typically have each day?: 3  Over the last 3 days, what is the maximum number of bowel movements that you had in a single day?: 4  Over the last 3 days, have you had any bowel movements where you passed blood without stool?: No  Since your last visit, have you received any vaccinations?: No  Since the last visit, have you had an infection?: No  In the past three months, have you used tobacco in any form?: Yes  What form(s) of tobacco have you used?: cigarettes  During the last year, how many days have you missed work or school because of your inflammatory bowel disease?: 0  During the last year, how many days have you been hospitalized because of your inflammatory bowel disease?: 0  During the last year, how many days have you visited a hospital emergency department because of your inflammatory bowel disease?: 0  During the last month, have you taken narcotic pain medications (such as Percocet, oxycodone, Oxycontin, morphine, Vicodin, Dilaudid, MS Contin) for your inflammatory bowel disease?: No  During the last month, have you awoken at night to move your bowels?: Yes  During the last month, have you had leakage of your stool while sleeping?: No  During the last month, have you had incontinence of stool while you were awake?: No  During the last month have you unintentionally lost weight?: No  During the last 3 days, have you had a fever?: No  During the last 3 days, have you had eye irritation?: No  During the last 3 days, have you had mouth sores?: No  During the last 3 days, have you had a sore throat?: No  During the last 3 days, have you had chest pain?: No  During the last 3 days, have you had shortness of breath?: No  During the last 3 days, have you had numbness or tingling in your hands or feet?: No  During the last 3 days, have you had a skin rash?: No  During the last 3 days, have you had pain or swelling in your joints?: No  During the last 3 days, have you had bruising or bleeding?: Yes  During the last 3 days, have you felt depressed or blue?: No    EGD (1/2020): · The esophagus and duodenum appeared normal  · Mild abnormal mucosa in the antrum; performed cold biopsy  Possible paraesophageal hernia was visualization retroflexion  Stomach would not distend well so this was difficult disease  Biopsies of performed for H pylori  · Performed biopsies in the body of the stomach, antrum, duodenal bulb and 2nd part of the duodenum  Biopsies were taken from stomach to rule out H pylori  Biopsies were taken from small bowel to rule out celiac disease  Colonoscopy (1/2020):  · All observed locations appeared normal, including the cecum, ascending colon, hepatic flexure, transverse colon, splenic flexure, descending colon, sigmoid colon, rectosigmoid and rectum  Random biopsy performed for evaluation of colitis/microscopic  colitis  · Mild eroded, erythematous and ulcerated mucosa in the terminal ileum  Multiple areas of focal ileitis and ulceration with pale mucosa  There was evidence of moderate ileitis with areas of strictures which was traversal with pediatric endoscope  Sticker  was seen in the terminal ileum which was removed into the colon  · Multiple mild pancolonic diverticula  · Polyp measuring smaller than 5 mm in the sigmoid colon; performed cold biopsy    Path:  A  Duodenum,  endoscopic biopsy:  - Benign duodenal mucosa without specific histopathologic abnormality   - No villous atrophy, no intraepithelial lymphocytosis and no crypt hyperplasia to suggest malabsorptive enteropathy   - No active or chronic duodenitis  - No epithelial dysplasia and no evidence of malignancy      B    Stomach, endoscopic biopsy:  - Gastric antral mucosa with mild chronic, inactive gastritis  - Negative for intestinal metaplasia, dysplasia or carcinoma  - No Helicobacter pylori is identified on H&E stained slides      C  Terminal ileum,  endoscopic biopsy:  - Small intestinal mucosa with focal acute inflammation   - No evidence chronic enteritis is seen      Comment:  The histologic findings are nonspecific, and may be present in many conditions includingacute self-limited and/or infectious colitis, inflammatory bowel disease, drug reaction (NSAID),  bowel preparation artifact, and may also be idiopathic  Correlation with clinical impression is recommended      D  Appendiceal orifice, biopsy:  - Benign colonic mucosa with nonspecific reactive changes   - No active or chronic colitis  - No glandular dysplasia and no evidence of malignancy      E  Ascending colon, endoscopic biopsy:  - Benign colonic mucosa with nonspecific reactive changes   - No active or chronic colitis  - No glandular dysplasia and no evidence of malignancy      F  Colon, random endoscopic biopsy:  - Colonic mucosa with no pathologic alteration   - No evidence of lymphocytic or collagenous colitis      G  Rectosigmoid colon polyp, endoscopic polypectomy:  - Inflammatory polyp   - No epithelial dysplasia and no evidence of malignancy      Comment: Additional histologic levels were examined       H  Rectum, endoscopic biopsy:  - Colonic mucosa with no pathologic alteration   - No evidence of lymphocytic or collagenous colitis  CTE (6/2020):  1  Very long segment active on chronic inflammatory Crohn's disease involving the mid to distal ileum to the level of the ileocecal valve  The extent of inflammatory involvement is similar to January 2020, but the degree of proximal small bowel   dilatation/bowel obstruction has markedly improved  There are possible additional short segments of proximal ileum demonstrating mild inflammatory involvement      2   No fistula, sinus tract, or abscess in the abdomen or pelvis      3   Mild superior endplate compression deformity of L1 vertebral body, new from January 2020      4  Additional stable findings as above, including bilateral adrenal adenomas, nonobstructing renal calculi and mild infrarenal abdominal aortic aneurysm  Fecal calprotectin 163    REVIEW OF SYSTEMS IS OTHERWISE NEGATIVE    10 point ROS reviewed and negative, except as above      Historical Information   Past Medical History:   Diagnosis Date    Blue toe syndrome (HCC)     Chronic kidney disease     Colon polyps     GERD (gastroesophageal reflux disease)     Hematuria     History of rheumatic fever     Hypolipidemia     Hypotension     Ischemic cardiomyopathy     PAD (peripheral artery disease) (HCC)     Pulmonary emphysema (HCC)      Past Surgical History:   Procedure Laterality Date    COLONOSCOPY  2019    HERNIA REPAIR      IR ABDOMINAL ANGIOGRAPHY / INTERVENTION  6/27/2019    IR ABDOMINAL ANGIOGRAPHY / INTERVENTION  2/12/2020    POPLITEAL ARTERY STENT Right     6/2019    MD SLCTV CATHJ 3RD+ ORD SLCTV ABDL PEL/LXTR [facility] Right 6/27/2019    Procedure: leg ANGIOGRAM WITH STENT, BALLOON ANGIOPLASTY, LEFT GROIN ACCESS;  Surgeon: Alona Mendosa MD;  [facility]: [facility];  Service: Vascular    MD VEIN BYPASS GRAFT,FEM-POP Right 3/26/2020    Procedure: BYPASS FEMORAL-POPLITEAL; Right lower extremity bypass, fem-bk pop with GSV;  Surgeon: Alona Mendosa MD;  Location: [facility];  Service: Vascular     Social History   Social History     Substance and Sexual Activity   Alcohol Use Yes    Frequency: 2-4 times a month    Drinks per session: 1 or 2    Binge frequency: Never    Comment: occasional     Social History     Substance and Sexual Activity   Drug Use No     Social History     Tobacco Use   Smoking Status Former Smoker    Packs/day: 0 50    Years: 30 00    Pack years: 15 00    Types: Cigarettes   Smokeless Tobacco Never Used   Tobacco Comment    " I will do it on my own" Family History   Problem Relation Age of Onset    Heart disease Mother     Hyperlipidemia Mother     Hypertension Father     Heart disease Father     Stroke Father     Hyperlipidemia Father     Diabetes Brother     No Known Problems Maternal Grandmother     Dementia Neg Hx     Drug abuse Neg Hx     Mental illness Neg Hx     Substance Abuse Neg Hx     Alcohol abuse Neg Hx     Depression Neg Hx     Colon cancer Neg Hx        Meds/Allergies       Current Outpatient Medications:     aspirin 81 MG tablet    atorvastatin (LIPITOR) 20 mg tablet    Cholecalciferol (VITAMIN D) 2000 units CAPS    Ferrous Sulfate (IRON) 28 MG TABS    folic acid (FOLVITE) 1 mg tablet    Magnesium 500 MG TABS    metoprolol succinate (TOPROL-XL) 100 mg 24 hr tablet    omeprazole (PriLOSEC) 40 MG capsule    rivaroxaban (XARELTO) 20 mg tablet    sulfaSALAzine (AZULFIDINE-EN) 500 mg EC tablet    No Known Allergies        Objective     Blood pressure 116/68, pulse 68, temperature 97 8 °F (36 6 °C), weight 95 7 kg (211 lb)  Body mass index is 31 16 kg/m²  PHYSICAL EXAM:      General Appearance:   Alert, cooperative, no distress   HEENT:   Normocephalic, atraumatic, anicteric  Neck:  Supple, symmetrical, trachea midline   Lungs:   Clear to auscultation bilaterally; no rales, rhonchi or wheezing; respirations unlabored    Heart[de-identified]   Regular rate and rhythm; no murmur, rub, or gallop  Abdomen:   Soft, non-tender, non-distended; normal bowel sounds; no masses, no organomegaly    Genitalia:   Deferred    Rectal:   Deferred    Extremities:  No cyanosis, clubbing or edema    Pulses:  2+ and symmetric    Skin:  No jaundice, rashes, or lesions    Lymph nodes:  No palpable cervical lymphadenopathy        Lab Results:   No visits with results within 1 Day(s) from this visit     Latest known visit with results is:   Appointment on 06/15/2020   Component Date Value    Calprotectin 06/15/2020 163*       Lab Results   Component Value Date    WBC 8 94 06/12/2020    HGB 14 4 06/12/2020    HCT 46 6 06/12/2020    MCV 95 06/12/2020     06/12/2020       Lab Results   Component Value Date    SODIUM 140 06/12/2020    K 4 3 06/12/2020     06/12/2020    CO2 27 06/12/2020    AGAP 8 06/12/2020    BUN 14 06/12/2020    CREATININE 1 00 06/12/2020    GLUC 92 03/28/2020    GLUF 120 (H) 06/12/2020    CALCIUM 8 9 06/12/2020    AST 16 06/12/2020    ALT 17 06/12/2020    ALKPHOS 166 (H) 06/12/2020    TP 6 7 06/12/2020    TBILI 0 56 06/12/2020    EGFR 77 06/12/2020       Lab Results   Component Value Date    CRP 10 2 (H) 06/12/2020       Lab Results   Component Value Date    VVX4YVFHLIAH 1 700 01/20/2020       Lab Results   Component Value Date    IRON 94 06/12/2020    TIBC 294 02/14/2020    FERRITIN 48 06/12/2020       Radiology Results:   No results found

## 2020-08-11 ENCOUNTER — TRANSCRIBE ORDERS (OUTPATIENT)
Dept: LAB | Facility: CLINIC | Age: 70
End: 2020-08-11

## 2020-08-11 ENCOUNTER — APPOINTMENT (OUTPATIENT)
Dept: LAB | Facility: CLINIC | Age: 70
End: 2020-08-11
Payer: MEDICARE

## 2020-08-11 DIAGNOSIS — N18.30 ANEMIA OF CHRONIC RENAL FAILURE, STAGE 3 (MODERATE) (HCC): ICD-10-CM

## 2020-08-11 DIAGNOSIS — N18.30 CHRONIC KIDNEY DISEASE, STAGE III (MODERATE) (HCC): ICD-10-CM

## 2020-08-11 DIAGNOSIS — K50.012 CROHN'S DISEASE OF ILEUM WITH INTESTINAL OBSTRUCTION (HCC): ICD-10-CM

## 2020-08-11 DIAGNOSIS — E55.9 VITAMIN D DEFICIENCY: ICD-10-CM

## 2020-08-11 DIAGNOSIS — R80.1 PERSISTENT PROTEINURIA: ICD-10-CM

## 2020-08-11 DIAGNOSIS — I95.89 CHRONIC HYPOTENSION: ICD-10-CM

## 2020-08-11 DIAGNOSIS — D63.1 ANEMIA OF CHRONIC RENAL FAILURE, STAGE 3 (MODERATE) (HCC): ICD-10-CM

## 2020-08-11 DIAGNOSIS — E78.5 DYSLIPIDEMIA: ICD-10-CM

## 2020-08-11 LAB
ALBUMIN SERPL BCP-MCNC: 2.9 G/DL (ref 3.5–5)
ALP SERPL-CCNC: 167 U/L (ref 46–116)
ALT SERPL W P-5'-P-CCNC: 21 U/L (ref 12–78)
ANION GAP SERPL CALCULATED.3IONS-SCNC: 9 MMOL/L (ref 4–13)
AST SERPL W P-5'-P-CCNC: 14 U/L (ref 5–45)
BASOPHILS # BLD AUTO: 0.05 THOUSANDS/ΜL (ref 0–0.1)
BASOPHILS NFR BLD AUTO: 1 % (ref 0–1)
BILIRUB SERPL-MCNC: 0.43 MG/DL (ref 0.2–1)
BUN SERPL-MCNC: 15 MG/DL (ref 5–25)
CALCIUM SERPL-MCNC: 8.4 MG/DL (ref 8.3–10.1)
CHLORIDE SERPL-SCNC: 104 MMOL/L (ref 100–108)
CHOLEST SERPL-MCNC: 108 MG/DL (ref 50–200)
CK SERPL-CCNC: 39 U/L (ref 39–308)
CO2 SERPL-SCNC: 26 MMOL/L (ref 21–32)
CREAT SERPL-MCNC: 1.13 MG/DL (ref 0.6–1.3)
CREAT UR-MCNC: 139 MG/DL
CRP SERPL QL: 9.5 MG/L
EOSINOPHIL # BLD AUTO: 0.14 THOUSAND/ΜL (ref 0–0.61)
EOSINOPHIL NFR BLD AUTO: 1 % (ref 0–6)
ERYTHROCYTE [DISTWIDTH] IN BLOOD BY AUTOMATED COUNT: 14.8 % (ref 11.6–15.1)
FERRITIN SERPL-MCNC: 44 NG/ML (ref 8–388)
GFR SERPL CREATININE-BSD FRML MDRD: 66 ML/MIN/1.73SQ M
GGT SERPL-CCNC: 42 U/L (ref 5–85)
GLUCOSE P FAST SERPL-MCNC: 156 MG/DL (ref 65–99)
HCT VFR BLD AUTO: 44.3 % (ref 36.5–49.3)
HDLC SERPL-MCNC: 49 MG/DL
HGB BLD-MCNC: 13.7 G/DL (ref 12–17)
IMM GRANULOCYTES # BLD AUTO: 0.08 THOUSAND/UL (ref 0–0.2)
IMM GRANULOCYTES NFR BLD AUTO: 1 % (ref 0–2)
IRON SATN MFR SERPL: 29 %
IRON SERPL-MCNC: 79 UG/DL (ref 65–175)
LDLC SERPL CALC-MCNC: 45 MG/DL (ref 0–100)
LYMPHOCYTES # BLD AUTO: 2.12 THOUSANDS/ΜL (ref 0.6–4.47)
LYMPHOCYTES NFR BLD AUTO: 22 % (ref 14–44)
MAGNESIUM SERPL-MCNC: 1.9 MG/DL (ref 1.6–2.6)
MCH RBC QN AUTO: 29.8 PG (ref 26.8–34.3)
MCHC RBC AUTO-ENTMCNC: 30.9 G/DL (ref 31.4–37.4)
MCV RBC AUTO: 96 FL (ref 82–98)
MONOCYTES # BLD AUTO: 0.92 THOUSAND/ΜL (ref 0.17–1.22)
MONOCYTES NFR BLD AUTO: 9 % (ref 4–12)
NEUTROPHILS # BLD AUTO: 6.54 THOUSANDS/ΜL (ref 1.85–7.62)
NEUTS SEG NFR BLD AUTO: 66 % (ref 43–75)
NRBC BLD AUTO-RTO: 0 /100 WBCS
PHOSPHATE SERPL-MCNC: 4 MG/DL (ref 2.3–4.1)
PLATELET # BLD AUTO: 298 THOUSANDS/UL (ref 149–390)
PMV BLD AUTO: 9.9 FL (ref 8.9–12.7)
POTASSIUM SERPL-SCNC: 4.1 MMOL/L (ref 3.5–5.3)
PROT SERPL-MCNC: 6.4 G/DL (ref 6.4–8.2)
PROT UR-MCNC: 26 MG/DL
PROT/CREAT UR: 0.19 MG/G{CREAT} (ref 0–0.1)
PTH-INTACT SERPL-MCNC: 50.8 PG/ML (ref 18.4–80.1)
RBC # BLD AUTO: 4.6 MILLION/UL (ref 3.88–5.62)
SODIUM SERPL-SCNC: 139 MMOL/L (ref 136–145)
TIBC SERPL-MCNC: 274 UG/DL (ref 250–450)
TRIGL SERPL-MCNC: 69 MG/DL
WBC # BLD AUTO: 9.85 THOUSAND/UL (ref 4.31–10.16)

## 2020-08-11 PROCEDURE — 82550 ASSAY OF CK (CPK): CPT

## 2020-08-11 PROCEDURE — 82728 ASSAY OF FERRITIN: CPT

## 2020-08-11 PROCEDURE — 84100 ASSAY OF PHOSPHORUS: CPT

## 2020-08-11 PROCEDURE — 82306 VITAMIN D 25 HYDROXY: CPT

## 2020-08-11 PROCEDURE — 82977 ASSAY OF GGT: CPT

## 2020-08-11 PROCEDURE — 83550 IRON BINDING TEST: CPT

## 2020-08-11 PROCEDURE — 80053 COMPREHEN METABOLIC PANEL: CPT

## 2020-08-11 PROCEDURE — 83540 ASSAY OF IRON: CPT

## 2020-08-11 PROCEDURE — 80061 LIPID PANEL: CPT

## 2020-08-11 PROCEDURE — 82570 ASSAY OF URINE CREATININE: CPT

## 2020-08-11 PROCEDURE — 36415 COLL VENOUS BLD VENIPUNCTURE: CPT

## 2020-08-11 PROCEDURE — 83970 ASSAY OF PARATHORMONE: CPT

## 2020-08-11 PROCEDURE — 93922 UPR/L XTREMITY ART 2 LEVELS: CPT | Performed by: SURGERY

## 2020-08-11 PROCEDURE — 83735 ASSAY OF MAGNESIUM: CPT

## 2020-08-11 PROCEDURE — 85025 COMPLETE CBC W/AUTO DIFF WBC: CPT

## 2020-08-11 PROCEDURE — 86140 C-REACTIVE PROTEIN: CPT

## 2020-08-11 PROCEDURE — 84156 ASSAY OF PROTEIN URINE: CPT

## 2020-08-11 PROCEDURE — 93925 LOWER EXTREMITY STUDY: CPT | Performed by: SURGERY

## 2020-08-13 ENCOUNTER — TELEPHONE (OUTPATIENT)
Dept: VASCULAR SURGERY | Facility: CLINIC | Age: 70
End: 2020-08-13

## 2020-08-14 ENCOUNTER — OFFICE VISIT (OUTPATIENT)
Dept: VASCULAR SURGERY | Facility: CLINIC | Age: 70
End: 2020-08-14
Payer: MEDICARE

## 2020-08-14 VITALS
HEART RATE: 82 BPM | SYSTOLIC BLOOD PRESSURE: 90 MMHG | TEMPERATURE: 97.3 F | HEIGHT: 69 IN | DIASTOLIC BLOOD PRESSURE: 70 MMHG | WEIGHT: 210 LBS | BODY MASS INDEX: 31.1 KG/M2

## 2020-08-14 DIAGNOSIS — Z98.890 S/P FEMORAL-TIBIAL BYPASS: Primary | ICD-10-CM

## 2020-08-14 PROCEDURE — 1160F RVW MEDS BY RX/DR IN RCRD: CPT | Performed by: SURGERY

## 2020-08-14 PROCEDURE — 3008F BODY MASS INDEX DOCD: CPT | Performed by: SURGERY

## 2020-08-14 PROCEDURE — 99214 OFFICE O/P EST MOD 30 MIN: CPT | Performed by: SURGERY

## 2020-08-14 PROCEDURE — 4040F PNEUMOC VAC/ADMIN/RCVD: CPT | Performed by: SURGERY

## 2020-08-14 PROCEDURE — 1036F TOBACCO NON-USER: CPT | Performed by: SURGERY

## 2020-08-14 RX ORDER — SODIUM CHLORIDE 9 MG/ML
20 INJECTION, SOLUTION INTRAVENOUS ONCE
Status: CANCELLED | OUTPATIENT
Start: 2020-08-18

## 2020-08-14 NOTE — PROGRESS NOTES
Assessment/Plan:    S/P femoral-tibial bypass  69yo M with HTN, HLD and regarding blue toe syndrome on the right  Patient's symptoms began in march 2019 and initially worsened      His LEADs demonstrate intact DAVID's however there is a 50% stenosis of the right popliteal artery  His CT-scan was largely unimpressive for atherosclerotic occlusive disease or any possible proximal embolic source, apart from the right popliteal artery lesion  He has a soft atheromatous appearing plaque in the popliteal artery which is not flow limiting but possibly resulting in embolic events to the toes       He subsequently underwent popliteal artery stenting with good result  Unfortunately he after several month admitted in the setting SBO, dehydration, PHUONG and in that low flow inflammatory state ended up with stent thrombosis  He underwent repeat angiography with PTA/arthrectomy and re-stenting but it thrombosed again after a few months       He underwent SFA-BK pop bypass with rGSV on 3/26/20  HE was also noted to have paroxysmal a fib and is on xarelto  Presents for routine follow-up after repeat LEADs (8/10), demonstrates patent bypass with mild ~50% proximal anastamotic stenosis with preserved distal flow  On exam he has an easily palpable bypass graft with a palpable PT and biphasic AT      Continue asa/staitn/xarelto   6 month duplex ultrasound with f/u          Problem List Items Addressed This Visit        Other    S/P femoral-tibial bypass - Primary     69yo M with HTN, HLD and regarding blue toe syndrome on the right  Patient's symptoms began in march 2019 and initially worsened      His LEADs demonstrate intact DAVID's however there is a 50% stenosis of the right popliteal artery  His CT-scan was largely unimpressive for atherosclerotic occlusive disease or any possible proximal embolic source, apart from the right popliteal artery lesion   He has a soft atheromatous appearing plaque in the popliteal artery which is not flow limiting but possibly resulting in embolic events to the toes       He subsequently underwent popliteal artery stenting with good result  Unfortunately he after several month admitted in the setting SBO, dehydration, PHUONG and in that low flow inflammatory state ended up with stent thrombosis  He underwent repeat angiography with PTA/arthrectomy and re-stenting but it thrombosed again after a few months       He underwent SFA-BK pop bypass with rGSV on 3/26/20  HE was also noted to have paroxysmal a fib and is on xarelto  Presents for routine follow-up after repeat LEADs (8/10), demonstrates patent bypass with mild ~50% proximal anastamotic stenosis with preserved distal flow  On exam he has an easily palpable bypass graft with a palpable PT and biphasic AT      Continue asa/staitn/xarelto   6 month duplex ultrasound with f/u          Relevant Orders    VAS lower limb arterial duplex, limited/unilateral            Subjective:      Patient ID: Dhara Francis is a 77 y o  male  Patient presents today to review STEFFEN s/p SFA-BK pop bypass w/ rGSV on 3/26  Patient is ambulating w/o difficulty, denies any symptoms of claudication  The following portions of the patient's history were reviewed and updated as appropriate: allergies, current medications, past family history, past medical history, past social history, past surgical history and problem list     Review of Systems   Constitutional: Negative  HENT: Negative  Eyes: Negative  Respiratory: Negative  Cardiovascular: Negative  Gastrointestinal: Negative  Endocrine: Negative  Genitourinary: Negative  Musculoskeletal: Negative  Skin: Negative  Allergic/Immunologic: Negative  Neurological: Negative  Hematological: Negative  Psychiatric/Behavioral: Negative  I have reviewed and updated the ROS as appropriate          Objective:      BP 90/70 (BP Location: Right arm, Patient Position: Sitting)   Pulse 82   Temp (!) 97 3 °F (36 3 °C) (Tympanic)   Ht 5' 9" (1 753 m)   Wt 95 3 kg (210 lb)   BMI 31 01 kg/m²          Physical Exam  Constitutional:       Appearance: Normal appearance  HENT:      Head: Normocephalic and atraumatic  Nose: Nose normal    Eyes:      Extraocular Movements: Extraocular movements intact  Pupils: Pupils are equal, round, and reactive to light  Neck:      Musculoskeletal: Normal range of motion and neck supple  Cardiovascular:      Rate and Rhythm: Normal rate and regular rhythm  Pulses: Normal pulses  Heart sounds: Normal heart sounds  Pulmonary:      Effort: Pulmonary effort is normal       Breath sounds: Normal breath sounds  Abdominal:      General: Abdomen is flat  Palpations: Abdomen is soft  Musculoskeletal: Normal range of motion  General: No swelling  Skin:     General: Skin is warm and dry  Capillary Refill: Capillary refill takes less than 2 seconds  Neurological:      General: No focal deficit present  Mental Status: He is alert and oriented to person, place, and time  Psychiatric:         Mood and Affect: Mood normal          Behavior: Behavior normal          Thought Content:  Thought content normal          Judgment: Judgment normal

## 2020-08-14 NOTE — ASSESSMENT & PLAN NOTE
71yo M with HTN, HLD and regarding blue toe syndrome on the right  Patient's symptoms began in march 2019 and initially worsened      His LEADs demonstrate intact DAVID's however there is a 50% stenosis of the right popliteal artery  His CT-scan was largely unimpressive for atherosclerotic occlusive disease or any possible proximal embolic source, apart from the right popliteal artery lesion  He has a soft atheromatous appearing plaque in the popliteal artery which is not flow limiting but possibly resulting in embolic events to the toes       He subsequently underwent popliteal artery stenting with good result  Unfortunately he after several month admitted in the setting SBO, dehydration, PHUONG and in that low flow inflammatory state ended up with stent thrombosis  He underwent repeat angiography with PTA/arthrectomy and re-stenting but it thrombosed again after a few months       He underwent SFA-BK pop bypass with rGSV on 3/26/20  HE was also noted to have paroxysmal a fib and is on xarelto  Presents for routine follow-up after repeat LEADs (8/10), demonstrates patent bypass with mild ~50% proximal anastamotic stenosis with preserved distal flow    On exam he has an easily palpable bypass graft with a palpable PT and biphasic AT      Continue asa/staitn/xarelto   6 month duplex ultrasound with f/u

## 2020-08-16 LAB
25(OH)D2 SERPL-MCNC: 12 NG/ML
25(OH)D3 SERPL-MCNC: 33 NG/ML
25(OH)D3+25(OH)D2 SERPL-MCNC: 45 NG/ML

## 2020-08-18 ENCOUNTER — TELEMEDICINE (OUTPATIENT)
Dept: NEPHROLOGY | Facility: CLINIC | Age: 70
End: 2020-08-18
Payer: MEDICARE

## 2020-08-18 ENCOUNTER — HOSPITAL ENCOUNTER (OUTPATIENT)
Dept: INFUSION CENTER | Facility: CLINIC | Age: 70
Discharge: HOME | End: 2020-08-18
Payer: MEDICARE

## 2020-08-18 VITALS
TEMPERATURE: 96.8 F | DIASTOLIC BLOOD PRESSURE: 70 MMHG | HEART RATE: 80 BPM | RESPIRATION RATE: 16 BRPM | OXYGEN SATURATION: 94 % | SYSTOLIC BLOOD PRESSURE: 117 MMHG

## 2020-08-18 DIAGNOSIS — D63.1 ANEMIA OF CHRONIC RENAL FAILURE, STAGE 3 (MODERATE) (HCC): ICD-10-CM

## 2020-08-18 DIAGNOSIS — E78.5 DYSLIPIDEMIA: ICD-10-CM

## 2020-08-18 DIAGNOSIS — K50.012 CROHN'S DISEASE OF ILEUM WITH INTESTINAL OBSTRUCTION (HCC): Primary | ICD-10-CM

## 2020-08-18 DIAGNOSIS — N18.30 CHRONIC KIDNEY DISEASE, STAGE III (MODERATE) (HCC): Primary | ICD-10-CM

## 2020-08-18 DIAGNOSIS — R80.1 PERSISTENT PROTEINURIA: ICD-10-CM

## 2020-08-18 DIAGNOSIS — N18.30 ANEMIA OF CHRONIC RENAL FAILURE, STAGE 3 (MODERATE) (HCC): ICD-10-CM

## 2020-08-18 DIAGNOSIS — E55.9 VITAMIN D DEFICIENCY: ICD-10-CM

## 2020-08-18 PROCEDURE — 1036F TOBACCO NON-USER: CPT | Performed by: INTERNAL MEDICINE

## 2020-08-18 PROCEDURE — 96413 CHEMO IV INFUSION 1 HR: CPT

## 2020-08-18 PROCEDURE — 1160F RVW MEDS BY RX/DR IN RCRD: CPT | Performed by: INTERNAL MEDICINE

## 2020-08-18 PROCEDURE — 99214 OFFICE O/P EST MOD 30 MIN: CPT | Performed by: INTERNAL MEDICINE

## 2020-08-18 PROCEDURE — 4040F PNEUMOC VAC/ADMIN/RCVD: CPT | Performed by: INTERNAL MEDICINE

## 2020-08-18 RX ORDER — SODIUM CHLORIDE 9 MG/ML
20 INJECTION, SOLUTION INTRAVENOUS ONCE
Status: CANCELLED | OUTPATIENT
Start: 2020-09-01

## 2020-08-18 RX ORDER — SODIUM CHLORIDE 9 MG/ML
20 INJECTION, SOLUTION INTRAVENOUS ONCE
Status: COMPLETED | OUTPATIENT
Start: 2020-08-18 | End: 2020-08-18

## 2020-08-18 RX ADMIN — SODIUM CHLORIDE 20 ML/HR: 0.9 INJECTION, SOLUTION INTRAVENOUS at 15:00

## 2020-08-18 RX ADMIN — VEDOLIZUMAB 300 MG: 300 INJECTION, POWDER, LYOPHILIZED, FOR SOLUTION INTRAVENOUS at 15:00

## 2020-08-18 NOTE — PROGRESS NOTES
Pt resting with no complaints, no signs/symptoms of infection and no recent antibiotic usage  QFT done 6/12/20 and Hep panel done 1/17/20  [facility]bell within reach; will continue to monitor

## 2020-08-18 NOTE — LETTER
August 18, 2020     Jasmine Kunz MD  [address]  [address] [facility] [address] [facility]    Patient: Jesus Bardales   YOB: 1950   Date of Visit: 8/18/2020       Dear Dr Gloria Samuels: Thank you for referring Larae Bernheim to me for evaluation  Below are my notes for this consultation  If you have questions, please do not hesitate to call me  I look forward to following your patient along with you  Sincerely,        Audrey Jensen MD        CC: MD Rohith Lea MD Peggye Dais, MD Fulton Huxley, MD Clelia Orleans, MD  8/18/2020  8:49 AM  Sign when Signing Visit    Virtual Regular Visit      Assessment/Plan:  1   CKD stage 3 :  · Etiology:  Cardiorenal syndrome/arteriolar nephrosclerosis/?  Atheroemboli/prior prerenal associated mild tachycardia and relative hypotension in the past  · Baseline creatinine:  1 25-1 41  · Current creatinine:   below baseline at 1 13  · Urine protein creatinine ratio:  0 19 g at goal  Recommendations:  · Treat hypertension-please see below  · Treat dyslipidemia-please see below  · Maintain proteinuria less than 1 g or as low as possible  · Avoid nephrotoxic agents such as NSAIDs, patient counseled as such  2   Volume:  Euvolemic  3   Hypotension:  Workup was compatible with low ejection fraction 48% associated severe hypokinesis of the apical anterior and mid anterior septal in mild in for septal and apical inferior and apical septal and apical walls   No pericardial effusion  Cortisol/TSH were unremarkable  · Currently blood pressure:  Chronic hypotension unchanged and essentially tolerating :/70-75  · Medication changes today:  Patient is on Toprol XL for cardiac purposes tolerating chronic hypotension    Of  4   Electrolytes:  All acceptable  5   Mineral bone disorder:  · Calcium/magnesium/phosphorus:  All acceptable including the magnesium of 1 9  · PTH intact:    50 8 which is normal  · Vitamin-D:  45 at goal  6   Dyslipidemia:  · Goal LDL:  Less than 70 given PAD  · Current lipid profile:  LDL 45/HDL 49/triglycerides 69  Recommendations:  At goal so no changes  7   Anemia:               Recommendations:  · Hemoglobin stable at 13 7  · Iron studies:   29 percent saturation ferritin 44:  Recommend maintain on  ORAL IRON  · Multiple myeloma evaluation with SPEP/UPEP and light chains:  Negative  · Stool for occult blood x3:  NEGATIVE X1  · In March had EGD colonoscopy and recent sigmoidoscopy:  Harris's esophagus/diverticulosis/colonic polyps   Patient will be seeing Dr Ebenezer Mcdonald  8   Other problems:  · Chronic intermittent mild leukocytosis:  Stable at 9 85  · Gross hematuria/microscopic hematuria:  This has resolved   Patient seen by Urology for gross hematuria   Cystoscopy was negative and recent PSA negative  No further investigation by follow-up in 1 year by Urology  · Abnormal echocardiogram/cardiomyopathy:  Followed in the past by Dr Almanza  · Paroxysmal atrial fibrillation followed by Dr Natan Bradshaw: [place] Dire on Xarelto  · Recent admission in May for signs of small-bowel obstruction associated with abdominal pain and diarrhea  Felt to have Crohn's disease  · Bilateral adrenal gland abnormalities being followed by surgical oncology   24 hour urine for Dopamine epinephrine all unremarkable   Aldosterone was unremarkable   Cortisol was okay at 17 5  To be followed by Dr Cordova  · PAD:  Status post stent for the right popliteal artery/revision 02/12/2020  · Recent admission for SBO/felt to have Crohn's followed by GI  Also felt to have IBS    · Abnormal alkaline phosphatase which is essentially unchanged 167 with normal GGT followed by GI          Problem List Items Addressed This Visit        Genitourinary    Chronic kidney disease, stage III (moderate) (HCC) - Primary    Relevant Orders    Comprehensive metabolic panel    CK    CBC    Ferritin    Iron Saturation %    Lipid Panel with Direct LDL reflex Magnesium    Protein / creatinine ratio, urine    Vitamin D Panel    Anemia of chronic renal failure, stage 3 (moderate) (HCC)    Relevant Orders    Comprehensive metabolic panel    CK    CBC    Ferritin    Iron Saturation %    Lipid Panel with Direct LDL reflex    Magnesium    Protein / creatinine ratio, urine    Vitamin D Panel       Other    Persistent proteinuria    Relevant Orders    Comprehensive metabolic panel    CK    CBC    Ferritin    Iron Saturation %    Lipid Panel with Direct LDL reflex    Magnesium    Protein / creatinine ratio, urine    Vitamin D Panel    Dyslipidemia    Relevant Orders    Comprehensive metabolic panel    CK    CBC    Ferritin    Iron Saturation %    Lipid Panel with Direct LDL reflex    Magnesium    Protein / creatinine ratio, urine    Vitamin D Panel    Vitamin D deficiency    Relevant Orders    Comprehensive metabolic panel    CK    CBC    Ferritin    Iron Saturation %    Lipid Panel with Direct LDL reflex    Magnesium    Protein / creatinine ratio, urine    Vitamin D Panel               Reason for visit is   Chief Complaint   Patient presents with    Virtual Regular Visit        Encounter provider Maxx Ruffin MD    Provider located at [address] [id number]      Recent Visits  No visits were found meeting these conditions  Showing recent visits within past 7 days and meeting all other requirements     Today's Visits  Date Type Provider Dept   08/18/20 Telemedicine Maxx Ruffin MD [facility] [address] [facility] today's visits and meeting all other requirements     Future Appointments  No visits were found meeting these conditions  Showing future appointments within next 150 days and meeting all other requirements        The patient was identified by name and date of birth   Maureen Escobar was informed that this is a telemedicine visit and that the visit is being conducted through [facility] and patient was informed that this is a secure, HIPAA-compliant platform  He agrees to proceed     My office door was closed  No one else was in the room  He acknowledged consent and understanding of privacy and security of the video platform  The patient has agreed to participate and understands they can discontinue the visit at any time  Patient is aware this is a billable service  Subjective  Maria D Nguyen is a 77 y o  male with history of CKD stage 3      HPI   There has been no hospitalizations or acute illnesses since last visit  The patient overall is feeling well  No fevers, chills, or cough or colds    Good appetite and good energy  No hematuria, dysuria, voiding symptoms or foamy urine  No gastrointestinal symptoms  No cardiovascular symptoms including swelling of the legs  No headaches, dizziness or lightheadedness  Blood pressure medications:  · Toprol  mg daily in the morning    Past Medical History:   Diagnosis Date    Blue toe syndrome (HCC)     Chronic kidney disease     Colon polyps     GERD (gastroesophageal reflux disease)     Hematuria     History of rheumatic fever     Hypolipidemia     Hypotension     Ischemic cardiomyopathy     PAD (peripheral artery disease) (HCC)     Pulmonary emphysema (HCC)        Past Surgical History:   Procedure Laterality Date    COLONOSCOPY  2019    HERNIA REPAIR      IR ABDOMINAL ANGIOGRAPHY / INTERVENTION  6/27/2019    IR ABDOMINAL ANGIOGRAPHY / INTERVENTION  2/12/2020    POPLITEAL ARTERY STENT Right     6/2019    [place] SLCTV CATHJ 3RD+ ORD SLCTV ABDL PEL/LXTR [facility] Right 6/27/2019    Procedure: leg ANGIOGRAM WITH STENT, BALLOON ANGIOPLASTY, LEFT GROIN ACCESS;  Surgeon: Juan Torres MD;  Location: [facility];  Service: Vascular    [place] VEIN BYPASS GRAFT,FEM-POP Right 3/26/2020    Procedure: BYPASS FEMORAL-POPLITEAL; Right lower extremity bypass, fem-bk pop with GSV;  Surgeon: Juan Torres MD;  Location: [facility];  Service: Vascular       Current Outpatient Medications   Medication Sig Dispense Refill    aspirin 81 MG tablet Take 81 mg by mouth daily      atorvastatin (LIPITOR) 20 mg tablet TAKE 1 TABLET BY MOUTH EVERY DAY 90 tablet 1    Cholecalciferol (VITAMIN D) 2000 units CAPS Take 50,000 Units by mouth daily       Ferrous Sulfate (IRON) 28 MG TABS Take by mouth daily      folic acid (FOLVITE) 1 mg tablet TAKE 1 TABLET BY MOUTH EVERY DAY 90 tablet 1    Magnesium 500 MG TABS Take 1 tablet by mouth 2 (two) times a day      metoprolol succinate (TOPROL-XL) 100 mg 24 hr tablet Take 1 tablet (100 mg total) by mouth daily 90 tablet 4    omeprazole (PriLOSEC) 40 MG capsule TAKE 1 CAPSULE BY MOUTH EVERY DAY 90 capsule 1    rivaroxaban (XARELTO) 20 mg tablet Take 1 tablet (20 mg total) by mouth daily with breakfast 90 tablet 3     No current facility-administered medications for this visit  No Known Allergies    Review of Systems:  Please see HPI, otherwise review of systems as completely reviewed with the patient are negative    Video Exam    There were no vitals filed for this visit      Physical Exam       General:  Obese, The patient feels comfortable, there is no acute distress/no overt diaphoresis  Skin:  No overt rash/there is no facial flushing or cyanosis and facial color appears normal  Head:  No overt trauma and normal overall appearance  HEENT:  Eyes appear normal without injection or scleral icterus and extraocular movements appear intact; mucous membranes appear moist; oropharynx is clear  Neck:  Trachea appears midline, no overt jugular venous distention,, and Neck is supple  Pulmonary:  Respiratory status appears normal :  The patient is able to speak without any overt shortness of breath and can speak in full sentences without any audible wheezing or stridor    Cardiovascular:  Heart rate is regular per patient/family/caregiver  Abdomen:  Obese, No tenderness as patient/family/caregiver press on the abdominal area with no overt distension  Musculoskeletal:  No overt edema as the patient/family/caregiver presses on the legs or by visualization; no significant tenderness elicited/moderate significant arthritic changes of the hands  Neuro:  Patient appears to be able to move all extremities with no gross focality  Psych:  Patient is alert and oriented and appropriate and fully cooperative    I reviewed all the patient's medications with the patient during this visit  I have reviewed all the above findings and plans with the patient  I have answered the patient's questions to their complete satisfaction  I informed the patient that we will be mailing out instructions compatible with an after visit summary along with lab slips as needed  The patient was satisfied with all of the information as well as the visit  I spent 20 minutes directly with the patient during this visit      VIRTUAL VISIT DISCLAIMER    Shani Cole acknowledges that he has consented to an online visit or consultation  He understands that the online visit is based solely on information provided by him, and that, in the absence of a face-to-face physical evaluation by the physician, the diagnosis he receives is both limited and provisional in terms of accuracy and completeness  This is not intended to replace a full medical face-to-face evaluation by the physician  Shani Cole understands and accepts these terms

## 2020-08-18 NOTE — PATIENT INSTRUCTIONS
1  Medication changes today:   No changes today      2  Follow-up in 6 months   Please bring in 1 week a blood pressure readings morning evening, sitting and standing is outlined above   PLEASE BRING IN YOUR BLOOD PRESSURE MACHINE FOR CORRELATION WITH THE OFFICE MACHINE AT THE NEXT VISIT   Please go for fasting lab work 1-2 weeks prior to your appointment      3  General instructions:   AVOID SALT BUT NOT ADDING AN READING LABELS TO MAKE SURE THERE IS LOW-SALT IN THE FOOD THAT YOU ARE EATING     Avoid nonsteroidal anti-inflammatory drugs such as Naprosyn, ibuprofen, Aleve, Advil, Celebrex, Meloxicam (Mobic) etc   You can use Tylenol as needed if you do not have any liver condition to be concerned about     Avoid medications such as Sudafed or decongestants and antihistamines that contained the D component which is the decongestant  You can take antihistamines without the decongestant or D component   Try to exercise at least 30 minutes 3 days a week to begin with with an ultimate goal of 5 days a week for at least 30 minutes     Try to lose 10 lb by your next visit     Please do not drink more than 2 glasses of alcohol/wine on a daily basis as this may contribute to your high blood pressure

## 2020-09-01 ENCOUNTER — HOSPITAL ENCOUNTER (OUTPATIENT)
Dept: INFUSION CENTER | Facility: CLINIC | Age: 70
Discharge: HOME | End: 2020-09-01
Payer: MEDICARE

## 2020-09-01 VITALS
OXYGEN SATURATION: 94 % | DIASTOLIC BLOOD PRESSURE: 66 MMHG | RESPIRATION RATE: 18 BRPM | TEMPERATURE: 97.2 F | SYSTOLIC BLOOD PRESSURE: 116 MMHG | HEART RATE: 81 BPM

## 2020-09-01 DIAGNOSIS — K50.012 CROHN'S DISEASE OF ILEUM WITH INTESTINAL OBSTRUCTION (HCC): Primary | ICD-10-CM

## 2020-09-01 PROCEDURE — 96413 CHEMO IV INFUSION 1 HR: CPT

## 2020-09-01 RX ORDER — SODIUM CHLORIDE 9 MG/ML
20 INJECTION, SOLUTION INTRAVENOUS ONCE
Status: CANCELLED | OUTPATIENT
Start: 2020-10-13

## 2020-09-01 RX ORDER — SODIUM CHLORIDE 9 MG/ML
20 INJECTION, SOLUTION INTRAVENOUS ONCE
Status: COMPLETED | OUTPATIENT
Start: 2020-09-01 | End: 2020-09-01

## 2020-09-01 RX ADMIN — VEDOLIZUMAB 300 MG: 300 INJECTION, POWDER, LYOPHILIZED, FOR SOLUTION INTRAVENOUS at 13:25

## 2020-09-01 RX ADMIN — SODIUM CHLORIDE 20 ML/HR: 0.9 INJECTION, SOLUTION INTRAVENOUS at 13:03

## 2020-09-01 NOTE — PROGRESS NOTES
Patient is here for entyvio  He offers no complaints at this time   He denies any s/s of infection or being on antibiotics

## 2020-09-01 NOTE — PROGRESS NOTES
Patient tolerated his entyvio without any adverse reactions  Patient made his next appointment on the way out   Patient declined avs

## 2020-09-11 ENCOUNTER — TELEPHONE (OUTPATIENT)
Dept: GASTROENTEROLOGY | Facility: AMBULARY SURGERY CENTER | Age: 70
End: 2020-09-11

## 2020-09-11 RX ORDER — SODIUM CHLORIDE 9 MG/ML
20 INJECTION, SOLUTION INTRAVENOUS ONCE
Status: CANCELLED | OUTPATIENT
Start: 2020-09-29

## 2020-09-11 NOTE — TELEPHONE ENCOUNTER
E-mail received Dr Charlotte Chaney patient:    I had a scheduling question on a patient who has been receiving his loading doses of entyvio  He received week 0 and week 2 on 8 18 and 9 1  His next loading dose would be at week 6, which I think would be approximately 9 29  However it looks like this dose was scheduled for 10 13,at 6 weeks from his 9 1 infusion  I wanted to confirm he should be getting his infusion closer to 9 29, and see if we could get the date changed in the therapy plan and his appointment changed if appropriate?     Message forwarded to Dr Charlotte Chaney for clarification

## 2020-09-16 ENCOUNTER — OFFICE VISIT (OUTPATIENT)
Dept: INTERNAL MEDICINE CLINIC | Facility: CLINIC | Age: 70
End: 2020-09-16
Payer: MEDICARE

## 2020-09-16 VITALS
OXYGEN SATURATION: 95 % | HEART RATE: 74 BPM | TEMPERATURE: 96.2 F | BODY MASS INDEX: 30.35 KG/M2 | SYSTOLIC BLOOD PRESSURE: 118 MMHG | HEIGHT: 70 IN | WEIGHT: 212 LBS | DIASTOLIC BLOOD PRESSURE: 76 MMHG

## 2020-09-16 DIAGNOSIS — R73.01 IMPAIRED FASTING GLUCOSE: ICD-10-CM

## 2020-09-16 DIAGNOSIS — E78.5 DYSLIPIDEMIA: ICD-10-CM

## 2020-09-16 DIAGNOSIS — K50.019 CROHN'S DISEASE OF SMALL INTESTINE WITH COMPLICATION (HCC): ICD-10-CM

## 2020-09-16 DIAGNOSIS — Z00.00 HEALTH MAINTENANCE EXAMINATION: ICD-10-CM

## 2020-09-16 DIAGNOSIS — Z98.890 S/P FEMORAL-TIBIAL BYPASS: ICD-10-CM

## 2020-09-16 DIAGNOSIS — E27.8 MASS OF BOTH ADRENAL GLANDS (HCC): ICD-10-CM

## 2020-09-16 DIAGNOSIS — N18.30 CHRONIC KIDNEY DISEASE, STAGE III (MODERATE) (HCC): Primary | ICD-10-CM

## 2020-09-16 DIAGNOSIS — K22.70 BARRETT'S ESOPHAGUS WITHOUT DYSPLASIA: ICD-10-CM

## 2020-09-16 DIAGNOSIS — E53.8 VITAMIN B12 DEFICIENCY: ICD-10-CM

## 2020-09-16 DIAGNOSIS — I73.9 PAD (PERIPHERAL ARTERY DISEASE) (HCC): ICD-10-CM

## 2020-09-16 DIAGNOSIS — I48.0 PAROXYSMAL ATRIAL FIBRILLATION (HCC): ICD-10-CM

## 2020-09-16 PROBLEM — Z01.810 PREOP CARDIOVASCULAR EXAM: Status: RESOLVED | Noted: 2020-03-18 | Resolved: 2020-09-16

## 2020-09-16 PROBLEM — R65.10 SIRS (SYSTEMIC INFLAMMATORY RESPONSE SYNDROME) (HCC): Status: RESOLVED | Noted: 2020-01-14 | Resolved: 2020-09-16

## 2020-09-16 PROBLEM — R31.0 GROSS HEMATURIA: Status: RESOLVED | Noted: 2018-03-22 | Resolved: 2020-09-16

## 2020-09-16 PROBLEM — Z87.19 HISTORY OF SMALL BOWEL OBSTRUCTION: Status: RESOLVED | Noted: 2019-05-15 | Resolved: 2020-09-16

## 2020-09-16 PROCEDURE — 99214 OFFICE O/P EST MOD 30 MIN: CPT | Performed by: INTERNAL MEDICINE

## 2020-09-16 PROCEDURE — 96372 THER/PROPH/DIAG INJ SC/IM: CPT | Performed by: INTERNAL MEDICINE

## 2020-09-16 PROCEDURE — G0438 PPPS, INITIAL VISIT: HCPCS | Performed by: INTERNAL MEDICINE

## 2020-09-16 RX ORDER — CYANOCOBALAMIN 1000 UG/ML
1000 INJECTION INTRAMUSCULAR; SUBCUTANEOUS
Status: DISCONTINUED | OUTPATIENT
Start: 2020-09-16 | End: 2021-03-17

## 2020-09-16 RX ORDER — LANOLIN ALCOHOL/MO/W.PET/CERES
1000 CREAM (GRAM) TOPICAL DAILY
Qty: 90 TABLET | Refills: 1 | Status: SHIPPED | OUTPATIENT
Start: 2020-09-16 | End: 2021-03-17 | Stop reason: SDUPTHER

## 2020-09-16 RX ADMIN — CYANOCOBALAMIN 1000 MCG: 1000 INJECTION INTRAMUSCULAR; SUBCUTANEOUS at 08:51

## 2020-09-16 NOTE — PROGRESS NOTES
Assessment/Plan:    Chronic kidney disease, stage III (moderate) (HCC)  Renal function stable  Crohn's disease of small intestine with complication (HCC)  Off prednisone and sulfasalazine, now in [place]  No significant change in BMs, more gas  Per GI  S/P femoral-tibial bypass  Ultrasound updated, recently saw vascular  On ASA, Xarelto, statin  Uses compression stockings when not active, as instructed  Vitamin B12 deficiency  B12 injection today, start daily oral supplement  Tobacco use  Continue to be smoke free  Dyslipidemia  At goal, on statin  Harris's esophagus without dysplasia  Takes PPI daily  Mass of both adrenal glands [facility])  CT scheduled next year  Anemia of chronic renal failure, stage 3 (moderate) (HCC)  On daily iron, CBC normal     Paroxysmal atrial fibrillation (HCC)  Asymptomatic  On ASA, Xarelto  Impaired fasting glucose  Monitor A1c  Elevated alkaline phosphatase level  Stable  Diagnoses and all orders for this visit:    Chronic kidney disease, stage III (moderate) (HCC)    Crohn's disease of small intestine with complication ([address] )    Dyslipidemia    Harris's esophagus without dysplasia    S/P femoral-tibial bypass    Paroxysmal atrial fibrillation (HCC)  -     TSH, 3rd generation with Free T4 reflex; Future    PAD (peripheral artery disease) (HCC)  -     TSH, 3rd generation with Free T4 reflex; Future    Mass of both adrenal glands (HCC)    Vitamin B12 deficiency  -     Vitamin B12; Future  -     cyanocobalamin injection 1,000 mcg  -     vitamin B-12 (VITAMIN B-12) 1,000 mcg tablet; Take 1 tablet (1,000 mcg total) by mouth daily    Impaired fasting glucose  -     Hemoglobin A1C; Future    Health maintenance examination  Comments:  Updated  Follow up in 6 months or as needed  Subjective:      Patient ID: Rita Stephenson is a 72 y o  male, here with his wife for a follow up  He is feeling well, has no complaints    He started infusions for his Crohn's  He noticed more gas since the infusions started, still moving his bowels at least 3 times a day  Stools are formed, denies any abdominal pain or cramping  He remains active at home, no regular exercise  He denies any left leg pain or swelling  He was instructed uses compression stocking when he is not active and when at home  He denies any chest pain, shortness of breath, palpitations or dizziness  The following portions of the patient's history were reviewed and updated as appropriate: allergies, current medications, past medical history, past social history and problem list     Review of Systems   Constitutional: Negative for appetite change and fatigue  HENT: Negative for congestion, hearing loss and postnasal drip  Eyes: Negative  Negative for visual disturbance  Respiratory: Negative for cough, chest tightness and shortness of breath  Cardiovascular: Negative for chest pain, palpitations and leg swelling  Gastrointestinal: Negative for abdominal pain and constipation  Genitourinary: Negative for dysuria, frequency and urgency  Musculoskeletal: Negative for arthralgias  Skin: Negative for rash and wound  Neurological: Negative for dizziness, numbness and headaches  Hematological: Negative for adenopathy  Bruises/bleeds easily  Psychiatric/Behavioral: Negative for sleep disturbance  The patient is not nervous/anxious  Objective:      /76   Pulse 74   Temp (!) 96 2 °F (35 7 °C)   Ht 5' 9 5" (1 765 m)   Wt 96 2 kg (212 lb)   SpO2 95%   BMI 30 86 kg/m²          Physical Exam  Vitals signs and nursing note reviewed  Constitutional:       Appearance: He is well-developed  HENT:      Head: Normocephalic and atraumatic  Eyes:      Conjunctiva/sclera: Conjunctivae normal       Pupils: Pupils are equal, round, and reactive to light  Neck:      Musculoskeletal: Neck supple  Cardiovascular:      Rate and Rhythm: Normal rate and regular rhythm  Heart sounds: Normal heart sounds  Comments: Varicose veins LLE  Pulmonary:      Effort: Pulmonary effort is normal       Breath sounds: No wheezing or rhonchi  Abdominal:      General: Bowel sounds are normal       Palpations: Abdomen is soft  Musculoskeletal:      Right lower leg: No edema  Left lower leg: No edema  Skin:     General: Skin is warm  Findings: No rash  Neurological:      Mental Status: He is alert and oriented to person, place, and time  Psychiatric:         Behavior: Behavior normal            Labs & imaging results reviewed with patient

## 2020-09-16 NOTE — PROGRESS NOTES
Assessment and Plan:     Problem List Items Addressed This Visit        Digestive    Harris's esophagus without dysplasia     Takes PPI daily  Crohn's disease of small intestine with complication (HCC)     Off prednisone and sulfasalazine, now in [place]  No significant change in BMs, more gas  Per GI  Endocrine    Impaired fasting glucose     Monitor A1c  Relevant Orders    Hemoglobin A1C       Cardiovascular and Mediastinum    PAD (peripheral artery disease) (HCC)    Relevant Orders    TSH, 3rd generation with Free T4 reflex    Paroxysmal atrial fibrillation ([address] )     Asymptomatic  On ASA, Xarelto  Relevant Orders    TSH, 3rd generation with Free T4 reflex       Genitourinary    Chronic kidney disease, stage III (moderate) (HCC) - Primary     Renal function stable  Other    Dyslipidemia     At goal, on statin  Mass of both adrenal glands [facility])     CT scheduled next year  S/P femoral-tibial bypass     Ultrasound updated, recently saw vascular  On ASA, Xarelto, statin  Uses compression stockings when not active, as instructed  Vitamin B12 deficiency     B12 injection today, start daily oral supplement  Relevant Medications    cyanocobalamin injection 1,000 mcg    vitamin B-12 (VITAMIN B-12) 1,000 mcg tablet    Other Relevant Orders    Vitamin B12      Other Visit Diagnoses     Health maintenance examination        Updated  BMI Counseling: Body mass index is 30 86 kg/m²  The BMI is above normal  Nutrition recommendations include encouraging healthy choices of fruits and vegetables and consuming healthier snacks  Exercise recommendations include exercising 3-5 times per week  Preventive health issues were discussed with patient, and age appropriate screening tests were ordered as noted in patient's After Visit Summary    Personalized health advice and appropriate referrals for health education or preventive services given if needed, as noted in patient's After Visit Summary  History of Present Illness:     Patient presents for Medicare Annual Wellness visit    Patient Care Team:  Malcolm Tomas MD as PCP - General (Internal Medicine)  Siobhan Weiner MD (Nephrology)  Madison Bland MD (Urology)  Jocelyne Jaramillo MD (Cardiology)  Roxanne Clifton MD (Gastroenterology)  Trey Castillo (Vascular Surgery)  Sundeep Torres MD as Surgeon (Surgical Oncology)  Adam John MD (Vascular Surgery)     Problem List:     Patient Active Problem List   Diagnosis    Overweight (BMI 25 0-29  9)    Chronic kidney disease, stage III (moderate) (HCC)    Persistent proteinuria    Impaired fasting glucose    Microscopic hematuria    [place] cardiac risk 10-20% in next 10 years    Abdominal aortic atherosclerosis (HCC)    RBBB (right bundle branch block with left anterior fascicular block)    Tobacco use    Dyslipidemia    Vitamin D deficiency    GERD with esophagitis    Harris's esophagus without dysplasia    Colon polyps    PAD (peripheral artery disease) (HCC)    Mass of both adrenal glands (HCC)    Blue toe syndrome, right (HCC)    Ischemic cardiomyopathy    S/P peripheral artery angioplasty with stent placement    Venous stasis    GI symptoms    Paroxysmal atrial fibrillation (HCC)    Anemia of chronic renal failure, stage 3 (moderate) (HCC)    Other emphysema (HCC)    Crohn's disease of small intestine with complication (HCC)    Hyponatremia    Hypokalemia    SVT (supraventricular tachycardia) (HCC)    Hypotension    Positive QuantiFERON-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    Terminal ileitis of small intestine (HCC)    S/P femoral-tibial bypass    Elevated alkaline phosphatase level    Vitamin B12 deficiency      Past Medical and Surgical History:     Past Medical History:   Diagnosis Date    Blue toe syndrome (HCC)     Chronic kidney disease     Colon polyps     GERD (gastroesophageal reflux disease)     Hematuria     History of rheumatic fever     Hypolipidemia     Hypotension     Ischemic cardiomyopathy     PAD (peripheral artery disease) (HCC)     Pulmonary emphysema (HCC)      Past Surgical History:   Procedure Laterality Date    COLONOSCOPY  2019    HERNIA REPAIR      IR AORTAGRAM WITH RUN-OFF  6/27/2019    IR AORTAGRAM WITH RUN-OFF  2/12/2020    POPLITEAL ARTERY STENT Right     6/2019    UT SLCTV CATHJ 3RD+ ORD SLCTV ABDL PEL/LXTR [facility] Right 6/27/2019    Procedure: leg ANGIOGRAM WITH STENT, BALLOON ANGIOPLASTY, LEFT GROIN ACCESS;  Surgeon: Juan Torres MD;  [facility]: [facility];  Service: Vascular    UT VEIN BYPASS GRAFT,FEM-POP Right 3/26/2020    Procedure: BYPASS FEMORAL-POPLITEAL; Right lower extremity bypass, fem-bk pop with GSV;  Surgeon: Juan Torres MD;  Location: [facility];  Service: Vascular      Family History:     Family History   Problem Relation Age of Onset    Heart disease Mother     Hyperlipidemia Mother     Hypertension Father     Heart disease Father     Stroke Father     Hyperlipidemia Father     Diabetes Brother     No Known Problems Maternal Grandmother     Dementia Neg Hx     Drug abuse Neg Hx     Mental illness Neg Hx     Substance Abuse Neg Hx     Alcohol abuse Neg Hx     Depression Neg Hx     Colon cancer Neg Hx       Social History:     E-Cigarette/Vaping    E-Cigarette Use Never User      E-Cigarette/Vaping Substances    Nicotine No     THC No     CBD No     Flavoring No     Other No     Unknown No      Social History     Socioeconomic History    Marital status: /Civil Union     Spouse name: None    Number of children: None    Years of education: None    Highest education level: None   Occupational History    Occupation: retired   Social Needs    Financial resource strain: None    Food insecurity     Worry: None     Inability: None    Transportation needs     Medical: None     Non-medical: None   Tobacco Use    Smoking status: Former Smoker     Packs/day: 0 50     Years: 30 00     Pack years: 15 00     Types: Cigarettes    Smokeless tobacco: Never Used    Tobacco comment: " I will do it on my own"   Substance and Sexual Activity    Alcohol use: Yes     Frequency: 2-4 times a month     Drinks per session: 1 or 2     Binge frequency: Never     Comment: occasional    Drug use: No    Sexual activity: Not Currently   Lifestyle    Physical activity     Days per week: None     Minutes per session: None    Stress: None   Relationships    Social connections     Talks on phone: None     Gets together: None     Attends [employer] service: None     Active member of club or organization: None     Attends meetings of clubs or organizations: None     Relationship status: None    Intimate partner violence     Fear of current or ex partner: None     Emotionally abused: None     Physically abused: None     Forced sexual activity: None   Other Topics Concern    None   Social History Narrative    Drinks coffee      Medications and Allergies:     Current Outpatient Medications   Medication Sig Dispense Refill    aspirin 81 MG tablet Take 81 mg by mouth daily      atorvastatin (LIPITOR) 20 mg tablet TAKE 1 TABLET BY MOUTH EVERY DAY 90 tablet 1    Cholecalciferol (VITAMIN D) 2000 units CAPS Take 50,000 Units by mouth daily       Ferrous Sulfate (IRON) 28 MG TABS Take by mouth daily      folic acid (FOLVITE) 1 mg tablet TAKE 1 TABLET BY MOUTH EVERY DAY 90 tablet 1    Magnesium 500 MG TABS Take 1 tablet by mouth 2 (two) times a day      metoprolol succinate (TOPROL-XL) 100 mg 24 hr tablet Take 1 tablet (100 mg total) by mouth daily 90 tablet 4    omeprazole (PriLOSEC) 40 MG capsule TAKE 1 CAPSULE BY MOUTH EVERY DAY 90 capsule 1    rivaroxaban (XARELTO) 20 mg tablet Take 1 tablet (20 mg total) by mouth daily with breakfast 90 tablet 3    vitamin B-12 (VITAMIN B-12) 1,000 mcg tablet Take 1 tablet (1,000 mcg total) by mouth daily 90 tablet 1     Current Facility-Administered Medications   Medication Dose Route Frequency Provider Last Rate Last Dose    cyanocobalamin injection 1,000 mcg  1,000 mcg Intramuscular Q30 Days Mendel Chase, MD   1,000 mcg at 09/16/20 0851     No Known Allergies   Immunizations:     Immunization History   Administered Date(s) Administered    Influenza, high dose seasonal 0 7 mL 10/19/2018, 10/14/2019    Pneumococcal Conjugate 13-Valent 02/27/2018    Pneumococcal Polysaccharide PPV23 05/09/2019    Tuberculin Skin Test-PPD Intradermal 01/20/2020    Zoster Vaccine Recombinant 07/30/2018, 03/31/2019      Health Maintenance:         Topic Date Due    Hepatitis C Screening  Completed         Topic Date Due    Influenza Vaccine  07/01/2020      Medicare Health Risk Assessment:     /76   Pulse 74   Temp (!) 96 2 °F (35 7 °C)   Ht 5' 9 5" (1 765 m)   Wt 96 2 kg (212 lb)   SpO2 95%   BMI 30 86 kg/m²      Lucretia Michaud is here for his Subsequent Wellness visit  Last Medicare Wellness visit information reviewed, patient interviewed and updates made to the record today  Health Risk Assessment:   Patient rates overall health as good  Patient feels that their physical health rating is same  Eyesight was rated as same  Hearing was rated as same  Patient feels that their emotional and mental health rating is same  Pain experienced in the last 7 days has been none  Patient states that he has experienced no weight loss or gain in last 6 months  Depression Screening:   PHQ-2 Score: 0      Fall Risk Screening: In the past year, patient has experienced: no history of falling in past year      Home Safety:  Patient does not have trouble with stairs inside or outside of their home  Patient has working smoke alarms and has working carbon monoxide detector  Home safety hazards include: none  Nutrition:   Current diet is Regular  Medications:   Patient is currently taking over-the-counter supplements   OTC medications include: see medication list  Patient is able to manage medications  Activities of Daily Living (ADLs)/Instrumental Activities of Daily Living (IADLs):   Walk and transfer into and out of bed and chair?: Yes  Dress and groom yourself?: Yes    Bathe or shower yourself?: Yes    Feed yourself? Yes  Do your laundry/housekeeping?: Yes  Manage your money, pay your bills and track your expenses?: Yes  Make your own meals?: Yes    Do your own shopping?: Yes    Previous Hospitalizations:   Any hospitalizations or ED visits within the last 12 months?: Yes    How many hospitalizations have you had in the last year?: 1-2    Advance Care Planning:   Living will: Yes    Durable POA for healthcare: Yes    Advanced directive: Yes    End of Life Decisions reviewed with patient: Yes      Cognitive Screening:   Provider or family/friend/caregiver concerned regarding cognition?: No    PREVENTIVE SCREENINGS      Cardiovascular Screening:    General: Screening Current      Diabetes Screening:     General: Screening Current      Colorectal Cancer Screening:     General: Screening Current      Prostate Cancer Screening:    General: Screening Current      Osteoporosis Screening:    General: Screening Not Indicated      Abdominal Aortic Aneurysm (AAA) Screening:    Risk factors include: age between 73-67 yo and tobacco use        General: History AAA and Screening Current      Lung Cancer Screening:     General: Screening Not Indicated      Hepatitis C Screening:    General: Screening Current    Other Counseling Topics:   Regular weightbearing exercise         Sheron Santana MD

## 2020-09-16 NOTE — ASSESSMENT & PLAN NOTE
Ultrasound updated, recently saw vascular  On ASA, Xarelto, statin  Uses compression stockings when not active, as instructed

## 2020-09-30 ENCOUNTER — REMOTE DEVICE CLINIC VISIT (OUTPATIENT)
Dept: CARDIOLOGY CLINIC | Facility: CLINIC | Age: 70
End: 2020-09-30
Payer: MEDICARE

## 2020-09-30 DIAGNOSIS — Z95.818 PRESENCE OF OTHER CARDIAC IMPLANTS AND GRAFTS: Primary | ICD-10-CM

## 2020-09-30 PROCEDURE — G2066 INTER DEVC REMOTE 30D: HCPCS | Performed by: INTERNAL MEDICINE

## 2020-09-30 PROCEDURE — 93298 REM INTERROG DEV EVAL SCRMS: CPT | Performed by: INTERNAL MEDICINE

## 2020-09-30 NOTE — PROGRESS NOTES
MDT LOOP   CARELINK TRANSMISSION: LOOP RECORDER  PRESENTING RHYTHM NSR @ 82 BPM  BATTERY STATUS "OK"  8 PAUSE EPISODES W/ EGRAMS SHOWING 3-4 SEC PAUSES WHILE PT WAS SLEEPING  10 MYRIAM EPISODES W/ EGRAMS SHOWING SB @ 31-56 BPM  NO PATIENT ACTIVATED EPISODES  NORMAL DEVICE FUNCTION   DL

## 2020-10-07 ENCOUNTER — TELEPHONE (OUTPATIENT)
Dept: CARDIOLOGY CLINIC | Facility: CLINIC | Age: 70
End: 2020-10-07

## 2020-10-07 DIAGNOSIS — I48.0 PAROXYSMAL ATRIAL FIBRILLATION (HCC): ICD-10-CM

## 2020-10-07 RX ORDER — METOPROLOL SUCCINATE 100 MG/1
50 TABLET, EXTENDED RELEASE ORAL DAILY
Qty: 90 TABLET | Refills: 4
Start: 2020-10-07 | End: 2021-10-11 | Stop reason: SDUPTHER

## 2020-10-09 RX ORDER — SODIUM CHLORIDE 9 MG/ML
20 INJECTION, SOLUTION INTRAVENOUS ONCE
Status: CANCELLED | OUTPATIENT
Start: 2020-10-13

## 2020-10-10 ENCOUNTER — CLINICAL SUPPORT (OUTPATIENT)
Dept: INTERNAL MEDICINE CLINIC | Facility: CLINIC | Age: 70
End: 2020-10-10
Payer: MEDICARE

## 2020-10-10 DIAGNOSIS — Z23 ENCOUNTER FOR IMMUNIZATION: ICD-10-CM

## 2020-10-10 PROCEDURE — 90662 IIV NO PRSV INCREASED AG IM: CPT | Performed by: INTERNAL MEDICINE

## 2020-10-10 PROCEDURE — G0008 ADMIN INFLUENZA VIRUS VAC: HCPCS | Performed by: INTERNAL MEDICINE

## 2020-10-12 DIAGNOSIS — I73.9 PAD (PERIPHERAL ARTERY DISEASE) (HCC): ICD-10-CM

## 2020-10-12 RX ORDER — ATORVASTATIN CALCIUM 20 MG/1
TABLET, FILM COATED ORAL
Qty: 90 TABLET | Refills: 1 | Status: SHIPPED | OUTPATIENT
Start: 2020-10-12 | End: 2021-03-01 | Stop reason: SDUPTHER

## 2020-10-13 ENCOUNTER — HOSPITAL ENCOUNTER (OUTPATIENT)
Dept: INFUSION CENTER | Facility: CLINIC | Age: 70
Discharge: HOME | End: 2020-10-13
Payer: MEDICARE

## 2020-10-13 VITALS
TEMPERATURE: 96.4 F | HEART RATE: 89 BPM | OXYGEN SATURATION: 94 % | RESPIRATION RATE: 20 BRPM | DIASTOLIC BLOOD PRESSURE: 73 MMHG | SYSTOLIC BLOOD PRESSURE: 122 MMHG

## 2020-10-13 DIAGNOSIS — K50.012 CROHN'S DISEASE OF ILEUM WITH INTESTINAL OBSTRUCTION (HCC): Primary | ICD-10-CM

## 2020-10-13 PROCEDURE — 96413 CHEMO IV INFUSION 1 HR: CPT

## 2020-10-13 RX ORDER — SODIUM CHLORIDE 9 MG/ML
20 INJECTION, SOLUTION INTRAVENOUS ONCE
Status: COMPLETED | OUTPATIENT
Start: 2020-10-13 | End: 2020-10-13

## 2020-10-13 RX ORDER — SODIUM CHLORIDE 9 MG/ML
20 INJECTION, SOLUTION INTRAVENOUS ONCE
Status: CANCELLED | OUTPATIENT
Start: 2020-12-08

## 2020-10-13 RX ADMIN — VEDOLIZUMAB 300 MG: 300 INJECTION, POWDER, LYOPHILIZED, FOR SOLUTION INTRAVENOUS at 10:08

## 2020-10-13 RX ADMIN — SODIUM CHLORIDE 20 ML/HR: 0.9 INJECTION, SOLUTION INTRAVENOUS at 10:00

## 2020-10-15 ENCOUNTER — TELEPHONE (OUTPATIENT)
Dept: GASTROENTEROLOGY | Facility: CLINIC | Age: 70
End: 2020-10-15

## 2020-10-15 ENCOUNTER — CLINICAL SUPPORT (OUTPATIENT)
Dept: INTERNAL MEDICINE CLINIC | Facility: CLINIC | Age: 70
End: 2020-10-15
Payer: MEDICARE

## 2020-10-15 DIAGNOSIS — K52.9 INFLAMMATORY BOWEL DISEASE: ICD-10-CM

## 2020-10-15 DIAGNOSIS — E53.8 VITAMIN B12 DEFICIENCY: ICD-10-CM

## 2020-10-15 DIAGNOSIS — I75.021 BLUE TOE SYNDROME, RIGHT (HCC): ICD-10-CM

## 2020-10-15 PROCEDURE — 96372 THER/PROPH/DIAG INJ SC/IM: CPT | Performed by: INTERNAL MEDICINE

## 2020-10-15 RX ORDER — FOLIC ACID 1 MG/1
TABLET ORAL
Qty: 90 TABLET | Refills: 1 | Status: SHIPPED | OUTPATIENT
Start: 2020-10-15 | End: 2021-01-11 | Stop reason: SDUPTHER

## 2020-10-15 RX ADMIN — CYANOCOBALAMIN 1000 MCG: 1000 INJECTION INTRAMUSCULAR; SUBCUTANEOUS at 08:14

## 2020-11-16 ENCOUNTER — CLINICAL SUPPORT (OUTPATIENT)
Dept: INTERNAL MEDICINE CLINIC | Facility: CLINIC | Age: 70
End: 2020-11-16
Payer: MEDICARE

## 2020-11-16 DIAGNOSIS — E53.8 VITAMIN B12 DEFICIENCY: ICD-10-CM

## 2020-11-16 PROCEDURE — 96372 THER/PROPH/DIAG INJ SC/IM: CPT | Performed by: INTERNAL MEDICINE

## 2020-11-16 RX ADMIN — CYANOCOBALAMIN 1000 MCG: 1000 INJECTION INTRAMUSCULAR; SUBCUTANEOUS at 10:16

## 2020-12-08 ENCOUNTER — HOSPITAL ENCOUNTER (OUTPATIENT)
Dept: INFUSION CENTER | Facility: CLINIC | Age: 70
Discharge: HOME | End: 2020-12-08
Payer: MEDICARE

## 2020-12-08 VITALS
DIASTOLIC BLOOD PRESSURE: 90 MMHG | SYSTOLIC BLOOD PRESSURE: 149 MMHG | RESPIRATION RATE: 18 BRPM | OXYGEN SATURATION: 95 % | HEART RATE: 69 BPM | TEMPERATURE: 96.5 F

## 2020-12-08 DIAGNOSIS — K50.012 CROHN'S DISEASE OF ILEUM WITH INTESTINAL OBSTRUCTION (HCC): Primary | ICD-10-CM

## 2020-12-08 PROCEDURE — 96413 CHEMO IV INFUSION 1 HR: CPT

## 2020-12-08 RX ORDER — SODIUM CHLORIDE 9 MG/ML
20 INJECTION, SOLUTION INTRAVENOUS ONCE
Status: COMPLETED | OUTPATIENT
Start: 2020-12-08 | End: 2020-12-08

## 2020-12-08 RX ORDER — SODIUM CHLORIDE 9 MG/ML
20 INJECTION, SOLUTION INTRAVENOUS ONCE
Status: CANCELLED | OUTPATIENT
Start: 2021-02-02

## 2020-12-08 RX ADMIN — SODIUM CHLORIDE 20 ML/HR: 0.9 INJECTION, SOLUTION INTRAVENOUS at 08:34

## 2020-12-08 RX ADMIN — VEDOLIZUMAB 300 MG: 300 INJECTION, POWDER, LYOPHILIZED, FOR SOLUTION INTRAVENOUS at 09:00

## 2020-12-14 ENCOUNTER — OFFICE VISIT (OUTPATIENT)
Dept: GASTROENTEROLOGY | Facility: MEDICAL CENTER | Age: 70
End: 2020-12-14
Payer: MEDICARE

## 2020-12-14 VITALS
DIASTOLIC BLOOD PRESSURE: 72 MMHG | TEMPERATURE: 97.8 F | HEART RATE: 94 BPM | BODY MASS INDEX: 32.43 KG/M2 | SYSTOLIC BLOOD PRESSURE: 126 MMHG | WEIGHT: 222.8 LBS

## 2020-12-14 DIAGNOSIS — K50.012 CROHN'S DISEASE OF ILEUM WITH INTESTINAL OBSTRUCTION (HCC): Primary | ICD-10-CM

## 2020-12-14 PROCEDURE — 99214 OFFICE O/P EST MOD 30 MIN: CPT | Performed by: INTERNAL MEDICINE

## 2020-12-16 ENCOUNTER — CLINICAL SUPPORT (OUTPATIENT)
Dept: INTERNAL MEDICINE CLINIC | Facility: CLINIC | Age: 70
End: 2020-12-16
Payer: MEDICARE

## 2020-12-16 DIAGNOSIS — E53.8 VITAMIN B12 DEFICIENCY: ICD-10-CM

## 2020-12-16 PROCEDURE — 96372 THER/PROPH/DIAG INJ SC/IM: CPT | Performed by: INTERNAL MEDICINE

## 2020-12-16 RX ADMIN — CYANOCOBALAMIN 1000 MCG: 1000 INJECTION INTRAMUSCULAR; SUBCUTANEOUS at 08:55

## 2021-01-05 ENCOUNTER — REMOTE DEVICE CLINIC VISIT (OUTPATIENT)
Dept: CARDIOLOGY CLINIC | Facility: CLINIC | Age: 71
End: 2021-01-05
Payer: MEDICARE

## 2021-01-05 DIAGNOSIS — Z95.818 PRESENCE OF OTHER CARDIAC IMPLANTS AND GRAFTS: Primary | ICD-10-CM

## 2021-01-05 DIAGNOSIS — I75.021 BLUE TOE SYNDROME, RIGHT (HCC): ICD-10-CM

## 2021-01-05 PROCEDURE — 93298 REM INTERROG DEV EVAL SCRMS: CPT | Performed by: INTERNAL MEDICINE

## 2021-01-05 PROCEDURE — G2066 INTER DEVC REMOTE 30D: HCPCS | Performed by: INTERNAL MEDICINE

## 2021-01-05 NOTE — PROGRESS NOTES
MDT LOOP   CARELINK TRANSMISSION: LOOP RECORDER  PRESENTING RHYTHM NSR @ 84 BPM  BATTERY STATUS "OK"  1 TACHY EPISODES W/ EGRAM SHOWING SVT @ 176 BPM FOR 8 SECs  17 PAUSE EPISODES W/ EGRAMS SHOWING 3-4 SEC PAUSES WHILE PT IS SLEEPING  6 MYRIAM EPISODES W/ EGRAMS SHOWING SB @ 31-53 BPM WHILE PT IS SLEEPING  HX OF PAF  PT TAKES XARELTO, METOPROLOL SUCC AND ASA 81  AFBURDEN = 0%  NO PATIENT ACTIVATED EPISODES  NORMAL DEVICE FUNCTION   DL

## 2021-01-06 ENCOUNTER — TELEPHONE (OUTPATIENT)
Dept: NEPHROLOGY | Facility: CLINIC | Age: 71
End: 2021-01-06

## 2021-01-06 NOTE — TELEPHONE ENCOUNTER
Patient's wife called to inform you that yMron Rodrigez is having a CT Scan on Friday at [facility]  When I asked her if he is having contrast she said he is having barium

## 2021-01-07 ENCOUNTER — TRANSCRIBE ORDERS (OUTPATIENT)
Dept: LAB | Facility: CLINIC | Age: 71
End: 2021-01-07

## 2021-01-07 ENCOUNTER — LAB (OUTPATIENT)
Dept: LAB | Facility: CLINIC | Age: 71
End: 2021-01-07
Payer: MEDICARE

## 2021-01-07 DIAGNOSIS — K50.012 CROHN'S DISEASE OF ILEUM WITH INTESTINAL OBSTRUCTION (HCC): ICD-10-CM

## 2021-01-07 LAB
25(OH)D3 SERPL-MCNC: 40.1 NG/ML (ref 30–100)
ALBUMIN SERPL BCP-MCNC: 3.2 G/DL (ref 3.5–5)
ALP SERPL-CCNC: 204 U/L (ref 46–116)
ALT SERPL W P-5'-P-CCNC: 6 U/L (ref 12–78)
ANION GAP SERPL CALCULATED.3IONS-SCNC: 15 MMOL/L (ref 4–13)
AST SERPL W P-5'-P-CCNC: 22 U/L (ref 5–45)
BASOPHILS # BLD AUTO: 0.04 THOUSANDS/ΜL (ref 0–0.1)
BASOPHILS NFR BLD AUTO: 0 % (ref 0–1)
BILIRUB SERPL-MCNC: 0.67 MG/DL (ref 0.2–1)
BUN SERPL-MCNC: 17 MG/DL (ref 5–25)
CALCIUM ALBUM COR SERPL-MCNC: 9.7 MG/DL (ref 8.3–10.1)
CALCIUM SERPL-MCNC: 9.1 MG/DL (ref 8.3–10.1)
CHLORIDE SERPL-SCNC: 112 MMOL/L (ref 100–108)
CO2 SERPL-SCNC: 23 MMOL/L (ref 21–32)
CREAT SERPL-MCNC: 1 MG/DL (ref 0.6–1.3)
CRP SERPL QL: 9.1 MG/L
EOSINOPHIL # BLD AUTO: 0.21 THOUSAND/ΜL (ref 0–0.61)
EOSINOPHIL NFR BLD AUTO: 2 % (ref 0–6)
ERYTHROCYTE [DISTWIDTH] IN BLOOD BY AUTOMATED COUNT: 14.9 % (ref 11.6–15.1)
GFR SERPL CREATININE-BSD FRML MDRD: 76 ML/MIN/1.73SQ M
GLUCOSE SERPL-MCNC: 84 MG/DL (ref 65–140)
HCT VFR BLD AUTO: 48.5 % (ref 36.5–49.3)
HGB BLD-MCNC: 15.6 G/DL (ref 12–17)
IMM GRANULOCYTES # BLD AUTO: 0.05 THOUSAND/UL (ref 0–0.2)
IMM GRANULOCYTES NFR BLD AUTO: 1 % (ref 0–2)
LYMPHOCYTES # BLD AUTO: 2.89 THOUSANDS/ΜL (ref 0.6–4.47)
LYMPHOCYTES NFR BLD AUTO: 27 % (ref 14–44)
MCH RBC QN AUTO: 28.7 PG (ref 26.8–34.3)
MCHC RBC AUTO-ENTMCNC: 32.2 G/DL (ref 31.4–37.4)
MCV RBC AUTO: 89 FL (ref 82–98)
MONOCYTES # BLD AUTO: 0.91 THOUSAND/ΜL (ref 0.17–1.22)
MONOCYTES NFR BLD AUTO: 9 % (ref 4–12)
NEUTROPHILS # BLD AUTO: 6.63 THOUSANDS/ΜL (ref 1.85–7.62)
NEUTS SEG NFR BLD AUTO: 61 % (ref 43–75)
NRBC BLD AUTO-RTO: 0 /100 WBCS
PLATELET # BLD AUTO: 361 THOUSANDS/UL (ref 149–390)
PMV BLD AUTO: 9.9 FL (ref 8.9–12.7)
POTASSIUM SERPL-SCNC: 4.7 MMOL/L (ref 3.5–5.3)
PROT SERPL-MCNC: 7.4 G/DL (ref 6.4–8.2)
RBC # BLD AUTO: 5.43 MILLION/UL (ref 3.88–5.62)
SODIUM SERPL-SCNC: 150 MMOL/L (ref 136–145)
VIT B12 SERPL-MCNC: 556 PG/ML (ref 100–900)
WBC # BLD AUTO: 10.73 THOUSAND/UL (ref 4.31–10.16)

## 2021-01-07 PROCEDURE — 82306 VITAMIN D 25 HYDROXY: CPT

## 2021-01-07 PROCEDURE — 80053 COMPREHEN METABOLIC PANEL: CPT

## 2021-01-07 PROCEDURE — 82607 VITAMIN B-12: CPT

## 2021-01-07 PROCEDURE — 85025 COMPLETE CBC W/AUTO DIFF WBC: CPT

## 2021-01-07 PROCEDURE — 80280 DRUG ASSAY VEDOLIZUMAB: CPT

## 2021-01-07 PROCEDURE — 36415 COLL VENOUS BLD VENIPUNCTURE: CPT

## 2021-01-07 PROCEDURE — 82397 CHEMILUMINESCENT ASSAY: CPT

## 2021-01-07 PROCEDURE — 86140 C-REACTIVE PROTEIN: CPT

## 2021-01-08 ENCOUNTER — HOSPITAL ENCOUNTER (OUTPATIENT)
Dept: CT IMAGING | Facility: HOSPITAL | Age: 71
Discharge: HOME | End: 2021-01-08
Attending: SURGERY
Payer: MEDICARE

## 2021-01-08 DIAGNOSIS — E27.8 ADRENAL MASS (HCC): ICD-10-CM

## 2021-01-08 PROCEDURE — 74170 CT ABD WO CNTRST FLWD CNTRST: CPT

## 2021-01-08 RX ADMIN — IOHEXOL 100 ML: 350 INJECTION, SOLUTION INTRAVENOUS at 09:01

## 2021-01-11 DIAGNOSIS — K21.00 GERD WITH ESOPHAGITIS: ICD-10-CM

## 2021-01-11 DIAGNOSIS — K52.9 INFLAMMATORY BOWEL DISEASE: ICD-10-CM

## 2021-01-11 RX ORDER — FOLIC ACID 1 MG/1
1000 TABLET ORAL DAILY
Qty: 90 TABLET | Refills: 1 | Status: SHIPPED | OUTPATIENT
Start: 2021-01-11 | End: 2021-07-13

## 2021-01-11 RX ORDER — OMEPRAZOLE 40 MG/1
40 CAPSULE, DELAYED RELEASE ORAL DAILY
Qty: 90 CAPSULE | Refills: 1 | Status: SHIPPED | OUTPATIENT
Start: 2021-01-11 | End: 2021-07-11

## 2021-01-11 NOTE — TELEPHONE ENCOUNTER
GI Physician: Dr Mick Ramos    Refill Request for Medication: folic acid     Dose: 1 mg    Quantity: 90    Pharmacy and Location: [facility]

## 2021-01-12 DIAGNOSIS — D72.829 LEUKOCYTOSIS, UNSPECIFIED TYPE: Primary | ICD-10-CM

## 2021-01-12 DIAGNOSIS — E87.0 HYPERNATREMIA: ICD-10-CM

## 2021-01-13 ENCOUNTER — OFFICE VISIT (OUTPATIENT)
Dept: SURGICAL ONCOLOGY | Facility: CLINIC | Age: 71
End: 2021-01-13
Payer: MEDICARE

## 2021-01-13 VITALS
BODY MASS INDEX: 32.07 KG/M2 | TEMPERATURE: 96.5 F | WEIGHT: 224 LBS | SYSTOLIC BLOOD PRESSURE: 126 MMHG | HEIGHT: 70 IN | DIASTOLIC BLOOD PRESSURE: 80 MMHG | HEART RATE: 76 BPM | RESPIRATION RATE: 16 BRPM

## 2021-01-13 DIAGNOSIS — E27.8 ADRENAL MASS (HCC): Primary | ICD-10-CM

## 2021-01-13 PROCEDURE — 99214 OFFICE O/P EST MOD 30 MIN: CPT | Performed by: SURGERY

## 2021-01-13 NOTE — PROGRESS NOTES
Surgical Oncology Follow Up       [address] [facility],[address] [facility]  [facility]  [address] [place] [address]    Nolvia Borregozinger III  1950  [id number]  [address] [facility],[address] [place] [address]  [facility]  [address] [address]    Chief Complaint   Patient presents with    Follow-up     1 year follow up       Assessment/Plan:    No problem-specific Assessment & Plan notes found for this encounter  Diagnoses and all orders for this visit:    Adrenal mass [facility])        Advance Care Planning/Advance Directives:  Discussed disease status, cancer treatment plans and/or cancer treatment goals with the patient  Oncology History    No history exists  History of Present Illness:  Patient is a 71-year-old man with adrenal adenomas which we have been following for the last couple of years   -Interval History:  He comes in for surveillance with no complaints to report  Review of Systems:  Review of Systems   Constitutional: Negative  HENT: Negative  Eyes: Negative  Respiratory: Negative  Cardiovascular: Negative  Gastrointestinal: Negative  Endocrine: Negative  Genitourinary: Negative  Musculoskeletal: Negative  Skin: Negative  Allergic/Immunologic: Negative  Neurological: Negative  Hematological: Negative  Psychiatric/Behavioral: Negative  Patient Active Problem List   Diagnosis    Overweight (BMI 25 0-29  9)    Chronic kidney disease, stage III (moderate)    Persistent proteinuria    Impaired fasting glucose    Microscopic hematuria    Coolin cardiac risk 10-20% in next 10 years    Abdominal aortic atherosclerosis (HCC)    RBBB (right bundle branch block with left anterior fascicular block)    Tobacco use    Dyslipidemia    Vitamin D deficiency    GERD with esophagitis    Harris's esophagus without dysplasia    Colon polyps    PAD (peripheral artery disease) ([facility]    Mass of both adrenal glands ([facility] )    Blue toe syndrome, right (HCC)    Ischemic cardiomyopathy    S/P peripheral artery angioplasty with stent placement    Venous stasis    GI symptoms    Paroxysmal atrial fibrillation (HCC)    Anemia of chronic renal failure, stage 3 (moderate)    Other emphysema (HCC)    Adrenal mass (HCC)    Crohn's disease of small intestine with complication (HCC)    Hyponatremia    Hypokalemia    SVT (supraventricular tachycardia) (HCC)    Hypotension    Positive QuantiFERON-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    Terminal ileitis of small intestine (HCC)    S/P femoral-tibial bypass    Elevated alkaline phosphatase level    Vitamin B12 deficiency     Past Medical History:   Diagnosis Date    Blue toe syndrome (HCC)     Chronic kidney disease     Colon polyps     GERD (gastroesophageal reflux disease)     Hematuria     History of rheumatic fever     Hypolipidemia     Hypotension     Ischemic cardiomyopathy     PAD (peripheral artery disease) (HCC)     Pulmonary emphysema (HCC)      Past Surgical History:   Procedure Laterality Date    COLONOSCOPY  2019    HERNIA REPAIR      IR AORTAGRAM WITH RUN-OFF  6/27/2019    IR AORTAGRAM WITH RUN-OFF  2/12/2020    POPLITEAL ARTERY STENT Right     6/2019    DC SLCTV CATHJ 3RD+ ORD SLCTV ABDL PEL/LXTR BRNCH Right 6/27/2019    Procedure: leg ANGIOGRAM WITH STENT, BALLOON ANGIOPLASTY, LEFT GROIN ACCESS;  Surgeon: Laura Obrien MD;  [facility]: [facility];  Service: Vascular    DC VEIN BYPASS GRAFT,FEM-POP Right 3/26/2020    Procedure: BYPASS FEMORAL-POPLITEAL; Right lower extremity bypass, fem-bk pop with GSV;  Surgeon: Laura Obrien MD;  Location: [facility];  Service: Vascular     Family History   Problem Relation Age of Onset    Heart disease Mother     Hyperlipidemia Mother     Hypertension Father     Heart disease Father     Stroke Father     Hyperlipidemia Father     Diabetes Brother     No Known Problems Maternal Grandmother     Dementia Neg Hx     Drug abuse Neg Hx     Mental illness Neg Hx     Substance Abuse Neg Hx     Alcohol abuse Neg Hx     Depression Neg Hx     Colon cancer Neg Hx      Social History     Socioeconomic History    Marital status: /Civil Union     Spouse name: Not on file    Number of children: Not on file    Years of education: Not on file    Highest education level: Not on file   Occupational History    Occupation: retired   Social Needs    Financial resource strain: Not on file    Food insecurity     Worry: Not on file     Inability: Not on file   [employer] needs     Medical: Not on file     Non-medical: Not on file   Tobacco Use    Smoking status: Former Smoker     Packs/day: 0 50     Years: 30 00     Pack years: 15 00     Types: Cigarettes    Smokeless tobacco: Never Used    Tobacco comment: " I will do it on my own"   Substance and Sexual Activity    Alcohol use: Yes     Frequency: 2-4 times a month     Drinks per session: 1 or 2     Binge frequency: Never     Comment: occasional    Drug use: No    Sexual activity: Not Currently   Lifestyle    Physical activity     Days per week: Not on file     Minutes per session: Not on file    Stress: Not on file   Relationships    Social connections     Talks on phone: Not on file     Gets together: Not on file     Attends Zoroastrianism service: Not on file     Active member of club or organization: Not on file     Attends meetings of clubs or organizations: Not on file     Relationship status: Not on file    Intimate partner violence     Fear of current or ex partner: Not on file     Emotionally abused: Not on file     Physically abused: Not on file     Forced sexual activity: Not on file   Other Topics Concern    Not on file   Social History Narrative    Drinks coffee       Current Outpatient Medications:     aspirin 81 MG tablet, Take 81 mg by mouth daily, Disp: , Rfl:     atorvastatin (LIPITOR) 20 mg tablet, TAKE 1 TABLET BY MOUTH EVERY DAY, Disp: 90 tablet, Rfl: 1    Cholecalciferol (VITAMIN D) 2000 units CAPS, Take 50,000 Units by mouth daily , Disp: , Rfl:     Ferrous Sulfate (IRON) 28 MG TABS, Take by mouth daily, Disp: , Rfl:     folic acid (FOLVITE) 1 mg tablet, Take 1 tablet (1,000 mcg total) by mouth daily, Disp: 90 tablet, Rfl: 1    Magnesium 500 MG TABS, Take 1 tablet by mouth 2 (two) times a day, Disp: , Rfl:     metoprolol succinate (TOPROL-XL) 100 mg 24 hr tablet, Take 0 5 tablets (50 mg total) by mouth daily Reduction in medication as of 10/7/2020, Disp: 90 tablet, Rfl: 4    omeprazole (PriLOSEC) 40 MG capsule, Take 1 capsule (40 mg total) by mouth daily, Disp: 90 capsule, Rfl: 1    rivaroxaban (XARELTO) 20 mg tablet, Take 1 tablet (20 mg total) by mouth daily with breakfast, Disp: 90 tablet, Rfl: 4    vitamin B-12 (VITAMIN B-12) 1,000 mcg tablet, Take 1 tablet (1,000 mcg total) by mouth daily, Disp: 90 tablet, Rfl: 1    Current Facility-Administered Medications:     cyanocobalamin injection 1,000 mcg, 1,000 mcg, Intramuscular, Q30 Days, Vandana Snyder MD, 1,000 mcg at 12/16/20 0855  No Known Allergies  Vitals:    01/13/21 0801   BP: 126/80   Pulse: 76   Resp: 16   Temp: (!) 96 5 °F (35 8 °C)       Physical Exam  Constitutional:       Appearance: Normal appearance  HENT:      Head: Normocephalic and atraumatic  Nose: Nose normal    Eyes:      Extraocular Movements: Extraocular movements intact  Pupils: Pupils are equal, round, and reactive to light  Neck:      Musculoskeletal: Normal range of motion and neck supple  Cardiovascular:      Rate and Rhythm: Normal rate and regular rhythm  Heart sounds: Normal heart sounds  Pulmonary:      Effort: Pulmonary effort is normal       Breath sounds: Normal breath sounds  Abdominal:      General: Abdomen is flat  Palpations: Abdomen is soft  Musculoskeletal: Normal range of motion     Skin:     General: Skin is warm and dry    Neurological:      General: No focal deficit present  Mental Status: He is alert and oriented to person, place, and time  Psychiatric:         Mood and Affect: Mood normal          Behavior: Behavior normal          Thought Content: Thought content normal          Judgment: Judgment normal            Results:  Labs:  Lab Results   Component Value Date    SODIUM 150 (H) 01/07/2021    K 4 7 01/07/2021     (H) 01/07/2021    CO2 23 01/07/2021    AGAP 15 (H) 01/07/2021    BUN 17 01/07/2021    CREATININE 1 00 01/07/2021    GLUC 84 01/07/2021    GLUF 156 (H) 08/11/2020    CALCIUM 9 1 01/07/2021    AST 22 01/07/2021    ALT 6 (L) 01/07/2021    ALKPHOS 204 (H) 01/07/2021    TP 7 4 01/07/2021    TBILI 0 67 01/07/2021    EGFR 76 01/07/2021         Imaging  Ct Abdomen W Wo Contrast    Result Date: 1/12/2021  Narrative: CT - ABDOMEN WITHOUT AND WITH IV CONTRAST INDICATION:  Adrenal mass follow-up  COMPARISON:  CT abdomen and pelvis 6/5/2020 TECHNIQUE:  CT examination of the abdomen was performed  Images of the abdomen were obtained precontrast, arterial phase and 15 minute delayed phases  Reformatted images were created in axial, sagittal, and coronal planes  Radiation dose length product (DLP) for this visit:  1896 mGy-cm   This examination, like all CT scans performed in the [facility], was performed utilizing techniques to minimize radiation dose exposure, including the use of iterative reconstruction and automated exposure control  IV Contrast:  100 mL of iohexol (OMNIPAQUE) Enteric Contrast:  Enteric contrast was not given  FINDINGS: ABDOMEN LOWER CHEST:  Coronary artery calcifications  LIVER/BILIARY TREE:  Unremarkable  GALLBLADDER:  Gallbladder is contracted  Cholelithiasis without pericholecystic inflammatory changes  SPLEEN:  Unremarkable  PANCREAS:  Unremarkable   ADRENAL GLANDS:  Stable bilateral adrenal adenomas measuring 4 6 x 2 4 cm on the right and 2 0 cm on the left (series 3 images 42 and 38)  KIDNEYS/PROXIMAL URETERS:  Nonobstructing calculi in the left upper pole measuring 2 mm and in the right lower pole measuring 4 mm  No hydronephrosis  VISUALIZED STOMACH AND BOWEL:  Redemonstrated partially visualized segmental areas of ileal wall thickening and submucosal hyperenhancement with intervening skip segments (series 601 images 36 through 44) in keeping with active on chronic inflammatory Crohn's disease  Again noted areas of ileal luminal narrowing with dilatation of apparent proximal small bowel loops for example measuring 6 4 cm in diameter (series 601 images 25 through 28) suggesting underlying component of fibrostenotic disease  Although partially imaged, the degree of small bowel dilatation appears slightly increased from prior were measured  Colonic diverticulosis  Similar nonspecific mild wall thickening and enhancement in the gastric antrum  ABDOMINAL CAVITY:  No ascites  No pneumoperitoneum  No lymphadenopathy  VESSELS:  Unchanged focal infrarenal abdominal aortic aneurysm measuring 3 0 cm (series 601 image 107)  Atherosclerotic calcifications  ABDOMINAL WALL:  Unremarkable  OSSEOUS STRUCTURES:  Degenerative changes of the spine  Compression deformity of L1 vertebral body appears similar to slightly increased in extent compared to the prior study  Impression: 1  Stable bilateral adrenal adenomas  2   Redemonstrated partially visualized findings of active on chronic inflammatory Crohn's disease, with findings again suggesting underlying component of fibrostenotic disease  The degree of small bowel dilatation appears slightly increased from prior in the imaged portions  3   Unchanged infrarenal abdominal aortic aneurysm  The study was marked in EPIC for significant notification  Workstation performed: [id number]     I reviewed the above laboratory and imaging data  Discussion/Summary:  70-year-old man, bilateral adrenal adenomas, stable  Nonfunctioning    We will therefore plan of follow-up for surveillance in 1 year with CT scan  We discussed typical management of adenomas  Specifically, surgery should be considered when they are 4-5 cm or greater  However, he does have 2 adenomas, and therefore would try to hold off on surgery provided they are stable and nonfunctioning for as long as possible given possible need to address 2 separate glands  Hopefully would be able to avoid bilateral resection long-term

## 2021-01-13 NOTE — LETTER
January 13, 2021     Sebastian Donovan MD  [address]  [id number] [place] [address] Road [address]    Patient: Mandi Betancourt III   YOB: 1950   Date of Visit: 1/13/2021       Dear Dr Ayon Brain: Thank you for referring Christian Green to me for evaluation  Below are my notes for this consultation  If you have questions, please do not hesitate to call me  I look forward to following your patient along with you  Sincerely,        Antonietta Vera MD        CC: MD Cesilia Gates MD Kandis Mura, MD Thomasenia Fan, MD Fabio Bruns, MD Antonietta Natarajan MD  1/13/2021  8:32 AM  Sign when Signing Visit     Surgical Oncology Follow Up       [address] [place] [address]  [address] [facility] III  1950  [id number]  [address] [facility],[address]  [facility] [address] [id number]    Chief Complaint   Patient presents with    Follow-up     1 year follow up       Assessment/Plan:    No problem-specific Assessment & Plan notes found for this encounter  Diagnoses and all orders for this visit:    Adrenal mass [facility])        Advance Care Planning/Advance Directives:  Discussed disease status, cancer treatment plans and/or cancer treatment goals with the patient  Oncology History    No history exists  History of Present Illness:  Patient is a 52-year-old man with adrenal adenomas which we have been following for the last couple of years   -Interval History:  He comes in for surveillance with no complaints to report  Review of Systems:  Review of Systems   Constitutional: Negative  HENT: Negative  Eyes: Negative  Respiratory: Negative  Cardiovascular: Negative  Gastrointestinal: Negative  Endocrine: Negative  Genitourinary: Negative  Musculoskeletal: Negative      Skin: Negative  Allergic/Immunologic: Negative  Neurological: Negative  Hematological: Negative  Psychiatric/Behavioral: Negative  Patient Active Problem List   Diagnosis    Overweight (BMI 25 0-29  9)    Chronic kidney disease, stage III (moderate)    Persistent proteinuria    Impaired fasting glucose    Microscopic hematuria    [place] cardiac risk 10-20% in next 10 years    Abdominal aortic atherosclerosis (HCC)    RBBB (right bundle branch block with left anterior fascicular block)    Tobacco use    Dyslipidemia    Vitamin D deficiency    GERD with esophagitis    Harris's esophagus without dysplasia    Colon polyps    PAD (peripheral artery disease) (HCC)    Mass of both adrenal glands (HCC)    Blue toe syndrome, right (HCC)    Ischemic cardiomyopathy    S/P peripheral artery angioplasty with stent placement    Venous stasis    GI symptoms    Paroxysmal atrial fibrillation (HCC)    Anemia of chronic renal failure, stage 3 (moderate)    Other emphysema (HCC)    Adrenal mass (HCC)    Crohn's disease of small intestine with complication (HCC)    Hyponatremia    Hypokalemia    SVT (supraventricular tachycardia) (HCC)    Hypotension    Positive QuantiFERON-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    Terminal ileitis of small intestine (HCC)    S/P femoral-tibial bypass    Elevated alkaline phosphatase level    Vitamin B12 deficiency     Past Medical History:   Diagnosis Date    Blue toe syndrome (HCC)     Chronic kidney disease     Colon polyps     GERD (gastroesophageal reflux disease)     Hematuria     History of rheumatic fever     Hypolipidemia     Hypotension     Ischemic cardiomyopathy     PAD (peripheral artery disease) (HCC)     Pulmonary emphysema (HCC)      Past Surgical History:   Procedure Laterality Date    COLONOSCOPY  2019    HERNIA REPAIR      IR AORTAGRAM WITH RUN-OFF  6/27/2019    IR AORTAGRAM WITH RUN-OFF  2/12/2020    POPLITEAL ARTERY STENT Right     6/2019  MN SLCTV CATHJ 3RD+ ORD SLCTV ABDL PEL/LXTR [facility] Right 6/27/2019    Procedure: leg ANGIOGRAM WITH STENT, BALLOON ANGIOPLASTY, LEFT GROIN ACCESS;  Surgeon: Esteban Roland MD;  Location: [facility];  Service: Vascular    MN VEIN BYPASS GRAFT,FEM-POP Right 3/26/2020    Procedure: BYPASS FEMORAL-POPLITEAL; Right lower extremity bypass, fem-bk pop with GSV;  Surgeon: Esteban Roland MD;  Location: [facility];  Service: Vascular     Family History   Problem Relation Age of Onset    Heart disease Mother     Hyperlipidemia Mother     Hypertension Father     Heart disease Father     Stroke Father     Hyperlipidemia Father     Diabetes Brother     No Known Problems Maternal Grandmother     Dementia Neg Hx     Drug abuse Neg Hx     Mental illness Neg Hx     Substance Abuse Neg Hx     Alcohol abuse Neg Hx     Depression Neg Hx     Colon cancer Neg Hx      Social History     Socioeconomic History    Marital status: /Civil Union     Spouse name: Not on file    Number of children: Not on file    Years of education: Not on file    Highest education level: Not on file   Occupational History    Occupation: retired   Social Needs    Financial resource strain: Not on file    Food insecurity     Worry: Not on file     Inability: Not on file   [place] needs     Medical: Not on file     Non-medical: Not on file   Tobacco Use    Smoking status: Former Smoker     Packs/day: 0 50     Years: 30 00     Pack years: 15 00     Types: Cigarettes    Smokeless tobacco: Never Used    Tobacco comment: " I will do it on my own"   Substance and Sexual Activity    Alcohol use: Yes     Frequency: 2-4 times a month     Drinks per session: 1 or 2     Binge frequency: Never     Comment: occasional    Drug use: No    Sexual activity: Not Currently   Lifestyle    Physical activity     Days per week: Not on file     Minutes per session: Not on file    Stress: Not on file   Relationships    Social connections     Talks on phone: Not on file     Gets together: Not on file     Attends [employer] service: Not on file     Active member of club or organization: Not on file     Attends meetings of clubs or organizations: Not on file     Relationship status: Not on file    Intimate partner violence     Fear of current or ex partner: Not on file     Emotionally abused: Not on file     Physically abused: Not on file     Forced sexual activity: Not on file   Other Topics Concern    Not on file   Social History Narrative    Drinks coffee       Current Outpatient Medications:     aspirin 81 MG tablet, Take 81 mg by mouth daily, Disp: , Rfl:     atorvastatin (LIPITOR) 20 mg tablet, TAKE 1 TABLET BY MOUTH EVERY DAY, Disp: 90 tablet, Rfl: 1    Cholecalciferol (VITAMIN D) 2000 units CAPS, Take 50,000 Units by mouth daily , Disp: , Rfl:     Ferrous Sulfate (IRON) 28 MG TABS, Take by mouth daily, Disp: , Rfl:     folic acid (FOLVITE) 1 mg tablet, Take 1 tablet (1,000 mcg total) by mouth daily, Disp: 90 tablet, Rfl: 1    Magnesium 500 MG TABS, Take 1 tablet by mouth 2 (two) times a day, Disp: , Rfl:     metoprolol succinate (TOPROL-XL) 100 mg 24 hr tablet, Take 0 5 tablets (50 mg total) by mouth daily Reduction in medication as of 10/7/2020, Disp: 90 tablet, Rfl: 4    omeprazole (PriLOSEC) 40 MG capsule, Take 1 capsule (40 mg total) by mouth daily, Disp: 90 capsule, Rfl: 1    rivaroxaban (XARELTO) 20 mg tablet, Take 1 tablet (20 mg total) by mouth daily with breakfast, Disp: 90 tablet, Rfl: 4    vitamin B-12 (VITAMIN B-12) 1,000 mcg tablet, Take 1 tablet (1,000 mcg total) by mouth daily, Disp: 90 tablet, Rfl: 1    Current Facility-Administered Medications:     cyanocobalamin injection 1,000 mcg, 1,000 mcg, Intramuscular, Q30 Days, Martin Quigley MD, 1,000 mcg at 12/16/20 0855  No Known Allergies  Vitals:    01/13/21 0801   BP: 126/80   Pulse: 76   Resp: 16   Temp: (!) 96 5 °F (35 8 °C)       Physical Exam  Constitutional:       Appearance: Normal appearance  HENT:      Head: Normocephalic and atraumatic  Nose: Nose normal    Eyes:      Extraocular Movements: Extraocular movements intact  Pupils: Pupils are equal, round, and reactive to light  Neck:      Musculoskeletal: Normal range of motion and neck supple  Cardiovascular:      Rate and Rhythm: Normal rate and regular rhythm  Heart sounds: Normal heart sounds  Pulmonary:      Effort: Pulmonary effort is normal       Breath sounds: Normal breath sounds  Abdominal:      General: Abdomen is flat  Palpations: Abdomen is soft  Musculoskeletal: Normal range of motion  Skin:     General: Skin is warm and dry  Neurological:      General: No focal deficit present  Mental Status: He is alert and oriented to person, place, and time  Psychiatric:         Mood and Affect: Mood normal          Behavior: Behavior normal          Thought Content: Thought content normal          Judgment: Judgment normal            Results:  Labs:  Lab Results   Component Value Date    SODIUM 150 (H) 01/07/2021    K 4 7 01/07/2021     (H) 01/07/2021    CO2 23 01/07/2021    AGAP 15 (H) 01/07/2021    BUN 17 01/07/2021    CREATININE 1 00 01/07/2021    GLUC 84 01/07/2021    GLUF 156 (H) 08/11/2020    CALCIUM 9 1 01/07/2021    AST 22 01/07/2021    ALT 6 (L) 01/07/2021    ALKPHOS 204 (H) 01/07/2021    TP 7 4 01/07/2021    TBILI 0 67 01/07/2021    EGFR 76 01/07/2021         Imaging  Ct Abdomen W Wo Contrast    Result Date: 1/12/2021  Narrative: CT - ABDOMEN WITHOUT AND WITH IV CONTRAST INDICATION:  Adrenal mass follow-up  COMPARISON:  CT abdomen and pelvis 6/5/2020 TECHNIQUE:  CT examination of the abdomen was performed  Images of the abdomen were obtained precontrast, arterial phase and 15 minute delayed phases  Reformatted images were created in axial, sagittal, and coronal planes  Radiation dose length product (DLP) for this visit:  1896 mGy-cm   This examination, like all CT scans performed in the [facility], was performed utilizing techniques to minimize radiation dose exposure, including the use of iterative reconstruction and automated exposure control  IV Contrast:  100 mL of iohexol (OMNIPAQUE) Enteric Contrast:  Enteric contrast was not given  FINDINGS: ABDOMEN LOWER CHEST:  Coronary artery calcifications  LIVER/BILIARY TREE:  Unremarkable  GALLBLADDER:  Gallbladder is contracted  Cholelithiasis without pericholecystic inflammatory changes  SPLEEN:  Unremarkable  PANCREAS:  Unremarkable  ADRENAL GLANDS:  Stable bilateral adrenal adenomas measuring 4 6 x 2 4 cm on the right and 2 0 cm on the left (series 3 images 42 and 38)  KIDNEYS/PROXIMAL URETERS:  Nonobstructing calculi in the left upper pole measuring 2 mm and in the right lower pole measuring 4 mm  No hydronephrosis  VISUALIZED STOMACH AND BOWEL:  Redemonstrated partially visualized segmental areas of ileal wall thickening and submucosal hyperenhancement with intervening skip segments (series 601 images 36 through 44) in keeping with active on chronic inflammatory Crohn's disease  Again noted areas of ileal luminal narrowing with dilatation of apparent proximal small bowel loops for example measuring 6 4 cm in diameter (series 601 images 25 through 28) suggesting underlying component of fibrostenotic disease  Although partially imaged, the degree of small bowel dilatation appears slightly increased from prior were measured  Colonic diverticulosis  Similar nonspecific mild wall thickening and enhancement in the gastric antrum  ABDOMINAL CAVITY:  No ascites  No pneumoperitoneum  No lymphadenopathy  VESSELS:  Unchanged focal infrarenal abdominal aortic aneurysm measuring 3 0 cm (series 601 image 107)  Atherosclerotic calcifications  ABDOMINAL WALL:  Unremarkable  OSSEOUS STRUCTURES:  Degenerative changes of the spine    Compression deformity of L1 vertebral body appears similar to slightly increased in extent compared to the prior study  Impression: 1  Stable bilateral adrenal adenomas  2   Redemonstrated partially visualized findings of active on chronic inflammatory Crohn's disease, with findings again suggesting underlying component of fibrostenotic disease  The degree of small bowel dilatation appears slightly increased from prior in the imaged portions  3   Unchanged infrarenal abdominal aortic aneurysm  The study was marked in EPIC for significant notification  Workstation performed: [id number]     I reviewed the above laboratory and imaging data  Discussion/Summary:  72-year-old man, bilateral adrenal adenomas, stable  Nonfunctioning  We will therefore plan of follow-up for surveillance in 1 year with CT scan  We discussed typical management of adenomas  Specifically, surgery should be considered when they are 4-5 cm or greater  However, he does have 2 adenomas, and therefore would try to hold off on surgery provided they are stable and nonfunctioning for as long as possible given possible need to address 2 separate glands  Hopefully would be able to avoid bilateral resection long-term

## 2021-01-18 ENCOUNTER — CLINICAL SUPPORT (OUTPATIENT)
Dept: INTERNAL MEDICINE CLINIC | Facility: CLINIC | Age: 71
End: 2021-01-18
Payer: MEDICARE

## 2021-01-18 DIAGNOSIS — E53.8 VITAMIN B12 DEFICIENCY: ICD-10-CM

## 2021-01-18 LAB — MISCELLANEOUS LAB TEST RESULT: NORMAL

## 2021-01-18 PROCEDURE — 96372 THER/PROPH/DIAG INJ SC/IM: CPT

## 2021-01-18 RX ADMIN — CYANOCOBALAMIN 1000 MCG: 1000 INJECTION INTRAMUSCULAR; SUBCUTANEOUS at 09:12

## 2021-01-20 ENCOUNTER — TELEPHONE (OUTPATIENT)
Dept: GASTROENTEROLOGY | Facility: CLINIC | Age: 71
End: 2021-01-20

## 2021-01-20 DIAGNOSIS — E87.0 HYPERNATREMIA: Primary | ICD-10-CM

## 2021-01-20 DIAGNOSIS — D72.829 LEUKOCYTOSIS, UNSPECIFIED TYPE: ICD-10-CM

## 2021-01-20 DIAGNOSIS — K50.012 CROHN'S DISEASE OF ILEUM WITH INTESTINAL OBSTRUCTION (HCC): ICD-10-CM

## 2021-01-20 RX ORDER — SODIUM CHLORIDE 9 MG/ML
20 INJECTION, SOLUTION INTRAVENOUS ONCE
Status: CANCELLED | OUTPATIENT
Start: 2021-01-20

## 2021-01-20 NOTE — TELEPHONE ENCOUNTER
Emma Porras,    His Entyvio level was very low  He needs infusions every 4 weeks  He is agreeable  Can you help get the approval from insurance? I already updated [facility]       Thank you

## 2021-01-22 ENCOUNTER — APPOINTMENT (OUTPATIENT)
Dept: LAB | Facility: CLINIC | Age: 71
End: 2021-01-22
Payer: MEDICARE

## 2021-01-22 DIAGNOSIS — D72.829 LEUKOCYTOSIS, UNSPECIFIED TYPE: ICD-10-CM

## 2021-01-22 DIAGNOSIS — E87.0 HYPERNATREMIA: ICD-10-CM

## 2021-01-22 DIAGNOSIS — K50.012 CROHN'S DISEASE OF ILEUM WITH INTESTINAL OBSTRUCTION (HCC): ICD-10-CM

## 2021-01-22 LAB
ALBUMIN SERPL BCP-MCNC: 3 G/DL (ref 3.5–5)
ALP SERPL-CCNC: 187 U/L (ref 46–116)
ALT SERPL W P-5'-P-CCNC: 25 U/L (ref 12–78)
ANION GAP SERPL CALCULATED.3IONS-SCNC: 8 MMOL/L (ref 4–13)
AST SERPL W P-5'-P-CCNC: 15 U/L (ref 5–45)
BASOPHILS # BLD AUTO: 0.07 THOUSANDS/ΜL (ref 0–0.1)
BASOPHILS NFR BLD AUTO: 1 % (ref 0–1)
BILIRUB SERPL-MCNC: 0.66 MG/DL (ref 0.2–1)
BUN SERPL-MCNC: 14 MG/DL (ref 5–25)
CALCIUM ALBUM COR SERPL-MCNC: 9.4 MG/DL (ref 8.3–10.1)
CALCIUM SERPL-MCNC: 8.6 MG/DL (ref 8.3–10.1)
CHLORIDE SERPL-SCNC: 104 MMOL/L (ref 100–108)
CO2 SERPL-SCNC: 27 MMOL/L (ref 21–32)
CREAT SERPL-MCNC: 1.1 MG/DL (ref 0.6–1.3)
EOSINOPHIL # BLD AUTO: 0.25 THOUSAND/ΜL (ref 0–0.61)
EOSINOPHIL NFR BLD AUTO: 2 % (ref 0–6)
ERYTHROCYTE [DISTWIDTH] IN BLOOD BY AUTOMATED COUNT: 15 % (ref 11.6–15.1)
GFR SERPL CREATININE-BSD FRML MDRD: 68 ML/MIN/1.73SQ M
GLUCOSE P FAST SERPL-MCNC: 120 MG/DL (ref 65–99)
HCT VFR BLD AUTO: 48.1 % (ref 36.5–49.3)
HGB BLD-MCNC: 15.2 G/DL (ref 12–17)
IMM GRANULOCYTES # BLD AUTO: 0.06 THOUSAND/UL (ref 0–0.2)
IMM GRANULOCYTES NFR BLD AUTO: 1 % (ref 0–2)
LYMPHOCYTES # BLD AUTO: 2.54 THOUSANDS/ΜL (ref 0.6–4.47)
LYMPHOCYTES NFR BLD AUTO: 23 % (ref 14–44)
MCH RBC QN AUTO: 28.3 PG (ref 26.8–34.3)
MCHC RBC AUTO-ENTMCNC: 31.6 G/DL (ref 31.4–37.4)
MCV RBC AUTO: 89 FL (ref 82–98)
MONOCYTES # BLD AUTO: 0.96 THOUSAND/ΜL (ref 0.17–1.22)
MONOCYTES NFR BLD AUTO: 9 % (ref 4–12)
NEUTROPHILS # BLD AUTO: 7.31 THOUSANDS/ΜL (ref 1.85–7.62)
NEUTS SEG NFR BLD AUTO: 64 % (ref 43–75)
NRBC BLD AUTO-RTO: 0 /100 WBCS
PLATELET # BLD AUTO: 299 THOUSANDS/UL (ref 149–390)
PMV BLD AUTO: 10.1 FL (ref 8.9–12.7)
POTASSIUM SERPL-SCNC: 4.2 MMOL/L (ref 3.5–5.3)
PROT SERPL-MCNC: 6.8 G/DL (ref 6.4–8.2)
RBC # BLD AUTO: 5.38 MILLION/UL (ref 3.88–5.62)
SODIUM SERPL-SCNC: 139 MMOL/L (ref 136–145)
WBC # BLD AUTO: 11.19 THOUSAND/UL (ref 4.31–10.16)

## 2021-01-22 PROCEDURE — 80053 COMPREHEN METABOLIC PANEL: CPT

## 2021-01-22 PROCEDURE — 36415 COLL VENOUS BLD VENIPUNCTURE: CPT

## 2021-01-22 PROCEDURE — 83993 ASSAY FOR CALPROTECTIN FECAL: CPT

## 2021-01-22 PROCEDURE — 85025 COMPLETE CBC W/AUTO DIFF WBC: CPT

## 2021-01-28 DIAGNOSIS — K50.012 CROHN'S DISEASE OF ILEUM WITH INTESTINAL OBSTRUCTION (HCC): Primary | ICD-10-CM

## 2021-01-28 LAB — CALPROTECTIN STL-MCNT: 64 UG/G (ref 0–120)

## 2021-01-28 RX ORDER — SODIUM CHLORIDE 9 MG/ML
20 INJECTION, SOLUTION INTRAVENOUS ONCE
Status: CANCELLED | OUTPATIENT
Start: 2021-02-02

## 2021-02-02 ENCOUNTER — HOSPITAL ENCOUNTER (OUTPATIENT)
Dept: INFUSION CENTER | Facility: CLINIC | Age: 71
Discharge: HOME | End: 2021-02-02
Payer: MEDICARE

## 2021-02-02 VITALS
HEART RATE: 107 BPM | SYSTOLIC BLOOD PRESSURE: 129 MMHG | DIASTOLIC BLOOD PRESSURE: 75 MMHG | TEMPERATURE: 95.7 F | RESPIRATION RATE: 20 BRPM

## 2021-02-02 DIAGNOSIS — K50.012 CROHN'S DISEASE OF ILEUM WITH INTESTINAL OBSTRUCTION (HCC): Primary | ICD-10-CM

## 2021-02-02 PROCEDURE — 96413 CHEMO IV INFUSION 1 HR: CPT

## 2021-02-02 RX ORDER — SODIUM CHLORIDE 9 MG/ML
20 INJECTION, SOLUTION INTRAVENOUS ONCE
Status: CANCELLED | OUTPATIENT
Start: 2021-03-02

## 2021-02-02 RX ORDER — SODIUM CHLORIDE 9 MG/ML
20 INJECTION, SOLUTION INTRAVENOUS ONCE
Status: COMPLETED | OUTPATIENT
Start: 2021-02-02 | End: 2021-02-02

## 2021-02-02 RX ADMIN — VEDOLIZUMAB 300 MG: 300 INJECTION, POWDER, LYOPHILIZED, FOR SOLUTION INTRAVENOUS at 13:57

## 2021-02-02 RX ADMIN — SODIUM CHLORIDE 20 ML/HR: 0.9 INJECTION, SOLUTION INTRAVENOUS at 13:39

## 2021-02-02 NOTE — PATIENT INSTRUCTIONS
February 2021 Sunday Monday Tuesday Wednesday Thursday Friday Saturday        1     2    INF THERAPY PLAN   1:30 PM   (120 min )   AN INF CHAIR 3   [address] [address]     4     5     6         Cycle 1, Treatment 5       7     8     9     10     11     12     13                14     15    VAS LW LMB ART DUP, JEAN-BAPTISTE, UNILAT  10:00 AM   (90 min )   AN HV VASCULAR 1   [facility] [place] 16     17     18    NURSE VISIT PG   8:30 AM   (15 min )   Nurse [facility] [place]  Internal Medicine 58     87                64     41     08     32     90     77     02                80                                                   Treatment Details       2/2/2021 - Cycle 1, Treatment 5      Supportive Care: VEDOLIZUMAB IVPB

## 2021-02-02 NOTE — PROGRESS NOTES
Patient tolerated entyvio without incident and was discharged post   He offered no c/o and will RTO 3/2/21 for his next dosing  He was recently changed to monthly dosing  AVS given

## 2021-02-02 NOTE — PROGRESS NOTES
Patient here for entyvio and is doing well,no changes reported,denies recent illness with use of antibiotic

## 2021-02-10 ENCOUNTER — LAB (OUTPATIENT)
Dept: LAB | Facility: CLINIC | Age: 71
End: 2021-02-10
Payer: MEDICARE

## 2021-02-10 ENCOUNTER — TRANSCRIBE ORDERS (OUTPATIENT)
Dept: LAB | Facility: CLINIC | Age: 71
End: 2021-02-10

## 2021-02-10 DIAGNOSIS — N18.30 ANEMIA OF CHRONIC RENAL FAILURE, STAGE 3 (MODERATE) (HCC): ICD-10-CM

## 2021-02-10 DIAGNOSIS — E78.5 DYSLIPIDEMIA: ICD-10-CM

## 2021-02-10 DIAGNOSIS — R73.01 IMPAIRED FASTING GLUCOSE: ICD-10-CM

## 2021-02-10 DIAGNOSIS — I73.9 PAD (PERIPHERAL ARTERY DISEASE) (HCC): ICD-10-CM

## 2021-02-10 DIAGNOSIS — N18.30 CHRONIC KIDNEY DISEASE, STAGE III (MODERATE) (HCC): ICD-10-CM

## 2021-02-10 DIAGNOSIS — E53.8 VITAMIN B12 DEFICIENCY: ICD-10-CM

## 2021-02-10 DIAGNOSIS — E55.9 VITAMIN D DEFICIENCY: ICD-10-CM

## 2021-02-10 DIAGNOSIS — R80.1 PERSISTENT PROTEINURIA: ICD-10-CM

## 2021-02-10 DIAGNOSIS — D63.1 ANEMIA OF CHRONIC RENAL FAILURE, STAGE 3 (MODERATE) (HCC): ICD-10-CM

## 2021-02-10 DIAGNOSIS — I48.0 PAROXYSMAL ATRIAL FIBRILLATION (HCC): ICD-10-CM

## 2021-02-10 LAB
ALBUMIN SERPL BCP-MCNC: 3.2 G/DL (ref 3.5–5)
ALP SERPL-CCNC: 192 U/L (ref 46–116)
ALT SERPL W P-5'-P-CCNC: 27 U/L (ref 12–78)
ANION GAP SERPL CALCULATED.3IONS-SCNC: 8 MMOL/L (ref 4–13)
AST SERPL W P-5'-P-CCNC: 17 U/L (ref 5–45)
BILIRUB SERPL-MCNC: 0.61 MG/DL (ref 0.2–1)
BUN SERPL-MCNC: 14 MG/DL (ref 5–25)
CALCIUM ALBUM COR SERPL-MCNC: 9.3 MG/DL (ref 8.3–10.1)
CALCIUM SERPL-MCNC: 8.7 MG/DL (ref 8.3–10.1)
CHLORIDE SERPL-SCNC: 104 MMOL/L (ref 100–108)
CHOLEST SERPL-MCNC: 118 MG/DL (ref 50–200)
CK SERPL-CCNC: 56 U/L (ref 39–308)
CO2 SERPL-SCNC: 28 MMOL/L (ref 21–32)
CREAT SERPL-MCNC: 1.11 MG/DL (ref 0.6–1.3)
CREAT UR-MCNC: 121 MG/DL
ERYTHROCYTE [DISTWIDTH] IN BLOOD BY AUTOMATED COUNT: 15 % (ref 11.6–15.1)
EST. AVERAGE GLUCOSE BLD GHB EST-MCNC: 140 MG/DL
FERRITIN SERPL-MCNC: 22 NG/ML (ref 8–388)
GFR SERPL CREATININE-BSD FRML MDRD: 67 ML/MIN/1.73SQ M
GLUCOSE P FAST SERPL-MCNC: 131 MG/DL (ref 65–99)
HBA1C MFR BLD: 6.5 %
HCT VFR BLD AUTO: 49.9 % (ref 36.5–49.3)
HDLC SERPL-MCNC: 55 MG/DL
HGB BLD-MCNC: 15.5 G/DL (ref 12–17)
IRON SATN MFR SERPL: 22 %
IRON SERPL-MCNC: 70 UG/DL (ref 65–175)
LDLC SERPL CALC-MCNC: 43 MG/DL (ref 0–100)
MAGNESIUM SERPL-MCNC: 2 MG/DL (ref 1.6–2.6)
MCH RBC QN AUTO: 27.7 PG (ref 26.8–34.3)
MCHC RBC AUTO-ENTMCNC: 31.1 G/DL (ref 31.4–37.4)
MCV RBC AUTO: 89 FL (ref 82–98)
PLATELET # BLD AUTO: 300 THOUSANDS/UL (ref 149–390)
PMV BLD AUTO: 9.8 FL (ref 8.9–12.7)
POTASSIUM SERPL-SCNC: 4.3 MMOL/L (ref 3.5–5.3)
PROT SERPL-MCNC: 6.6 G/DL (ref 6.4–8.2)
PROT UR-MCNC: 15 MG/DL
PROT/CREAT UR: 0.12 MG/G{CREAT} (ref 0–0.1)
RBC # BLD AUTO: 5.6 MILLION/UL (ref 3.88–5.62)
SODIUM SERPL-SCNC: 140 MMOL/L (ref 136–145)
TIBC SERPL-MCNC: 324 UG/DL (ref 250–450)
TRIGL SERPL-MCNC: 100 MG/DL
TSH SERPL DL<=0.05 MIU/L-ACNC: 1.21 UIU/ML (ref 0.36–3.74)
VIT B12 SERPL-MCNC: 716 PG/ML (ref 100–900)
WBC # BLD AUTO: 9.71 THOUSAND/UL (ref 4.31–10.16)

## 2021-02-10 PROCEDURE — 82570 ASSAY OF URINE CREATININE: CPT

## 2021-02-10 PROCEDURE — 82306 VITAMIN D 25 HYDROXY: CPT

## 2021-02-10 PROCEDURE — 80053 COMPREHEN METABOLIC PANEL: CPT

## 2021-02-10 PROCEDURE — 80061 LIPID PANEL: CPT

## 2021-02-10 PROCEDURE — 82550 ASSAY OF CK (CPK): CPT

## 2021-02-10 PROCEDURE — 83540 ASSAY OF IRON: CPT

## 2021-02-10 PROCEDURE — 82728 ASSAY OF FERRITIN: CPT

## 2021-02-10 PROCEDURE — 36415 COLL VENOUS BLD VENIPUNCTURE: CPT

## 2021-02-10 PROCEDURE — 85027 COMPLETE CBC AUTOMATED: CPT

## 2021-02-10 PROCEDURE — 83550 IRON BINDING TEST: CPT

## 2021-02-10 PROCEDURE — 83735 ASSAY OF MAGNESIUM: CPT

## 2021-02-10 PROCEDURE — 84443 ASSAY THYROID STIM HORMONE: CPT

## 2021-02-10 PROCEDURE — 84156 ASSAY OF PROTEIN URINE: CPT

## 2021-02-10 PROCEDURE — 83036 HEMOGLOBIN GLYCOSYLATED A1C: CPT

## 2021-02-10 PROCEDURE — 82607 VITAMIN B-12: CPT

## 2021-02-13 DIAGNOSIS — Z23 ENCOUNTER FOR IMMUNIZATION: ICD-10-CM

## 2021-02-15 ENCOUNTER — HOSPITAL ENCOUNTER (OUTPATIENT)
Dept: NON INVASIVE DIAGNOSTICS | Facility: CLINIC | Age: 71
Discharge: HOME | End: 2021-02-15
Payer: MEDICARE

## 2021-02-15 DIAGNOSIS — Z98.890 S/P FEMORAL-TIBIAL BYPASS: ICD-10-CM

## 2021-02-15 PROCEDURE — 93922 UPR/L XTREMITY ART 2 LEVELS: CPT | Performed by: SURGERY

## 2021-02-15 PROCEDURE — 93926 LOWER EXTREMITY STUDY: CPT | Performed by: SURGERY

## 2021-02-15 PROCEDURE — 93926 LOWER EXTREMITY STUDY: CPT

## 2021-02-16 LAB
25(OH)D2 SERPL-MCNC: 4.1 NG/ML
25(OH)D3 SERPL-MCNC: 40 NG/ML
25(OH)D3+25(OH)D2 SERPL-MCNC: 44 NG/ML

## 2021-02-18 ENCOUNTER — CLINICAL SUPPORT (OUTPATIENT)
Dept: INTERNAL MEDICINE CLINIC | Facility: CLINIC | Age: 71
End: 2021-02-18
Payer: MEDICARE

## 2021-02-18 DIAGNOSIS — E53.8 VITAMIN B12 DEFICIENCY: Primary | ICD-10-CM

## 2021-02-18 PROCEDURE — 96372 THER/PROPH/DIAG INJ SC/IM: CPT

## 2021-02-18 RX ADMIN — CYANOCOBALAMIN 1000 MCG: 1000 INJECTION INTRAMUSCULAR; SUBCUTANEOUS at 08:42

## 2021-02-19 ENCOUNTER — IMMUNIZATIONS (OUTPATIENT)
Dept: FAMILY MEDICINE CLINIC | Facility: HOSPITAL | Age: 71
End: 2021-02-19

## 2021-02-19 DIAGNOSIS — Z23 ENCOUNTER FOR IMMUNIZATION: Primary | ICD-10-CM

## 2021-02-19 PROCEDURE — 91300 SARS-COV-2 / COVID-19 MRNA VACCINE (PFIZER-BIONTECH) 30 MCG: CPT

## 2021-02-19 PROCEDURE — 0001A SARS-COV-2 / COVID-19 MRNA VACCINE (PFIZER-BIONTECH) 30 MCG: CPT

## 2021-02-19 NOTE — PROGRESS NOTES
RENAL FOLLOW UP NOTE: td    ASSESSMENT AND PLAN:  1   CKD stage 3 :  · Etiology:  Cardiorenal syndrome/arteriolar nephrosclerosis/?  Atheroemboli/prior prerenal associated mild tachycardia and relative hypotension in the past  · Baseline creatinine:  1 25-1 41  · Current creatinine:   below baseline at 1 11  · Urine protein creatinine ratio:  0 12 g at goal  Recommendations:  · Treat hypertension-please see below  · Treat dyslipidemia-please see below  · Maintain proteinuria less than 1 g or as low as possible  · Avoid nephrotoxic agents such as NSAIDs, patient counseled as such  2   Volume:  Euvolemic  3   Hypotension:  Workup was compatible with low ejection fraction 48% associated severe hypokinesis of the apical anterior and mid anterior septal in mild in for septal and apical inferior and apical septal and apical walls   No pericardial effusion  Cortisol/TSH were unremarkable  · Currently blood pressure:  Chronic hypotension   · Medication changes today:  Patient is on Toprol XL for cardiac purposes tolerating chronic hypotension:  PATIENT'S BLOOD PRESSURE IS ELEVATED TODAY AFTER COFFEE  HE WILL SEND IN A WEEK A READINGS AT THIS TIME TO MAKE SURE HE IS AT GOAL  4   Electrolytes:  All acceptable  5   Mineral bone disorder:  · Calcium/magnesium/phosphorus:  All acceptable including the magnesium of   · PTH intact:    50 8 which is normal  · Vitamin-D:  44 at goal  6   Dyslipidemia:  · Goal LDL:  Less than 70 given PAD  · Current lipid profile:  LDL 43/HDL 55/triglycerides 100  Recommendations:  At goal so no changes  7   Anemia:               Recommendations:  · Hemoglobin stable at 15 5  · No need for iron at this time with a hemoglobin of 15 5  · Multiple myeloma evaluation with SPEP/UPEP and light chains:  Negative  · Stool for occult blood x3:  NEGATIVE X1  · In March had EGD colonoscopy and recent sigmoidoscopy:  Harris's esophagus/diverticulosis/colonic polyps   Patient will be seeing Dr Houston Never  8   Other problems:  · Chronic intermittent mild leukocytosis:  Stable at 9 71   · Gross hematuria/microscopic hematuria:  This has resolved   Patient seen by Urology for gross hematuria   Cystoscopy was negative and recent PSA negative  No further investigation by follow-up in 1 year by Urology  · Abnormal echocardiogram/cardiomyopathy:  Followed in the past by Dr Almanza  · Paroxysmal atrial fibrillation followed by Dr Kiley Jesus: [place] on Xarelto  · Recent admission in May for signs of small-bowel obstruction associated with abdominal pain and diarrhea  Felt to have Crohn's disease  · Bilateral adrenal gland abnormalities being followed by surgical oncology   24 hour urine for Dopamine epinephrine all unremarkable   Aldosterone was unremarkable   Cortisol was okay at 17 5  To be followed by Dr Cordova  · PAD:  Status post stent for the right popliteal artery/revision 02/12/2020  · Admission for SBO/felt to have Crohn's followed by GI   Also felt to have IBS  · Abnormal alkaline phosphatase which is essentially unchanged 192 with normal GGT followed by GI    PATIENT INSTRUCTIONS:    Patient Instructions   1  Medication changes today:   No medication changes today        2  Please take 1 week a blood pressure readings  AT THIS TIME     AS FOLLOWS  MORNING AND EVENING, SITTING AND STANDING as follows:  · TAKE THE MORNING READINGS BEFORE ANY MEDICATIONS AND WHEN YOU ARE RELAXED FOR SEVERAL MINUTES  · TAKE THE EVENING READINGS:  BETWEEN 7-10 P M ; PRIOR TO ANY MEDICATIONS; AT LEAST IN OUR  FROM DINNER; AND CERTAINLY AFTER RELAXING FOR A FEW MINUTES  · PLEASE INCLUDE HEART RATE WITH YOUR BLOOD PRESSURE READINGS  · When taking standing readings, keep your arm supported at heart level and not dangling  · Make sure you are sitting with your back supported and feet on the ground and do not cross your legs or feet  · Make sure you have not taken any coffee or caffeine products or exercised or smoke cigarettes at least 30 minutes before taking your blood pressure  THEN PLEASE BRING IN YOUR BLOOD PRESSURE READINGS ALONG WITH YOUR BLOOD PRESSURE MACHINE TO SEE 1 OF MY ADVANCED PRACTITIONER'S IN THE NEXT FEW WEEKS  3  Follow-up in 6  months   Please bring in 1 week a blood pressure readings morning evening, sitting and standing is outlined above   Please go for fasting lab work 1-2 weeks prior to your appointment      4  General instructions:   AVOID SALT BUT NOT ADDING AN READING LABELS TO MAKE SURE THERE IS LOW-SALT IN THE FOOD THAT YOU ARE EATING  o Goal is less than 2 g of sodium intake or less than 5 g of sodium chloride intake per day     Avoid nonsteroidal anti-inflammatory drugs such as Naprosyn, ibuprofen, Aleve, Advil, Celebrex, Meloxicam (Mobic) etc   You can use Tylenol as needed if you do not have any liver condition to be concerned about     Avoid medications such as Sudafed or decongestants and antihistamines that contained the D component which is the decongestant  You can take antihistamines without the decongestant or D component   Try to avoid medications such as pantoprazole or  Protonix/Nexium or Esomeprazole)/Prilosec or omeprazole/Prevacid or lansoprazole/AcipHex or Rabeprazole  If you are able to, use Pepcid as this is safer for your kidneys   Try to exercise at least 30 minutes 3 days a week to begin with with an ultimate goal of 5 days a week for at least 30 minutes     Try to lose 5-10 lb by your next visit     Please do not drink more than 2 glasses of alcohol/wine on a daily basis as this may contribute to your high blood pressure  Subjective: There has been no hospitalizations or acute illnesses since last visit  The patient overall is feeling well  No fevers, chills, or cough or colds    Good appetite and good energy  No hematuria, dysuria, voiding symptoms or foamy urine  No gastrointestinal symptoms except for occasional loose stools  No cardiovascular symptoms including swelling of the legs  No headaches, dizziness or lightheadedness  Blood pressure medications:  · Toprol XL 50 mg daily    ROS:  See HPI, otherwise review of systems as completely reviewed with the patient are negative    Past Medical History:   Diagnosis Date    Blue toe syndrome (HCC)     Chronic kidney disease     Colon polyps     GERD (gastroesophageal reflux disease)     Hematuria     History of rheumatic fever     Hypolipidemia     Hypotension     Ischemic cardiomyopathy     [facility]per[facility] disease) [facility] [address] [facility]     Pulmonary emphysema ([facility] [address] )      Past Surgical History:   Procedure Laterality Date    COLONOSCOPY  2019    HERNIA REPAIR      IR AORTAGRAM WITH RUN-OFF  6/27/2019    IR AORTAGRAM WITH RUN-OFF  2/12/2020    POPLITEAL ARTERY STENT Right     6/2019    WA SLCTV CATHJ 3RD+ ORD SLCTV ABDL PEL/LXTR [facility] Right 6/27/2019    Procedure: leg ANGIOGRAM WITH STENT, BALLOON ANGIOPLASTY, LEFT GROIN ACCESS;  Surgeon: Doris Monet MD;  [facility]: [facility];  Service: Vascular    WA VEIN BYPASS GRAFT,FEM-POP Right 3/26/2020    Procedure: BYPASS FEMORAL-POPLITEAL; Right lower extremity bypass, fem-bk pop with GSV;  Surgeon: Doris Monet MD;  Location: [facility];  Service: Vascular     Family History   Problem Relation Age of Onset    Heart disease Mother     Hyperlipidemia Mother     Hypertension Father     Heart disease Father     Stroke Father     Hyperlipidemia Father     Diabetes Brother     No Known Problems Maternal Grandmother     Dementia Neg Hx     Drug abuse Neg Hx     Mental illness Neg Hx     Substance Abuse Neg Hx     Alcohol abuse Neg Hx     Depression Neg Hx     Colon cancer Neg Hx       reports that he has quit smoking  His smoking use included cigarettes  He has a 15 00 pack-year smoking history  He has never used smokeless tobacco  He reports current alcohol use  He reports that he does not use drugs      I COMPLETELY REVIEWED THE PAST MEDICAL HISTORY/PAST SURGICAL HISTORY/SOCIAL HISTORY/FAMILY HISTORY/AND MEDICATIONS  AND UPDATED ALL    Objective:     Vitals:   BP sitting on left:  132/88 with a heart rate 64 regular  BP standing on left:  146/86 with a heart rate 78 regular    Weight (last 2 days)     Date/Time   Weight    03/01/21 0851   104 (229 4)            Wt Readings from Last 3 Encounters:   03/01/21 104 kg (229 lb 6 4 oz)   01/13/21 102 kg (224 lb)   12/14/20 101 kg (222 lb 12 8 oz)       Body mass index is 33 39 kg/m²      Physical Exam: General:  Obese,No acute distress  Skin:  No acute rash  Eyes:  No scleral icterus, noninjected, no discharge from eyes  ENT:  Moist mucous membranes  Neck:  Supple, no jugular venous distention, trachea is midline, no lymphadenopathy and no thyromegaly  Back   No CVAT  Chest:  Clear to auscultation and percussion, good respiratory effort  CVS:  Regular rate and rhythm without a rub, or gallops or murmurs  Abdomen:  Obese,Soft and nontender with normal bowel sounds  Extremities:  No cyanosis and no edema, no arthritic changes, normal range of motion  Neuro:  Grossly intact  Psych:  Alert, oriented x3 and appropriate      Medications:    Current Outpatient Medications:     aspirin 81 MG tablet, Take 81 mg by mouth daily, Disp: , Rfl:     atorvastatin (LIPITOR) 20 mg tablet, TAKE 1 TABLET BY MOUTH EVERY DAY, Disp: 90 tablet, Rfl: 1    Cholecalciferol (VITAMIN D) 2000 units CAPS, Take 50,000 Units by mouth daily , Disp: , Rfl:     Ferrous Sulfate (IRON) 28 MG TABS, Take by mouth daily, Disp: , Rfl:     folic acid (FOLVITE) 1 mg tablet, Take 1 tablet (1,000 mcg total) by mouth daily, Disp: 90 tablet, Rfl: 1    Magnesium 500 MG TABS, Take 1 tablet by mouth 2 (two) times a day, Disp: , Rfl:     metoprolol succinate (TOPROL-XL) 100 mg 24 hr tablet, Take 0 5 tablets (50 mg total) by mouth daily Reduction in medication as of 10/7/2020, Disp: 90 tablet, Rfl: 4    omeprazole (PriLOSEC) 40 MG capsule, Take 1 capsule (40 mg total) by mouth daily, Disp: 90 capsule, Rfl: 1    rivaroxaban (XARELTO) 20 mg tablet, Take 1 tablet (20 mg total) by mouth daily with breakfast, Disp: 90 tablet, Rfl: 4    vitamin B-12 (VITAMIN B-12) 1,000 mcg tablet, Take 1 tablet (1,000 mcg total) by mouth daily, Disp: 90 tablet, Rfl: 1    Current Facility-Administered Medications:     cyanocobalamin injection 1,000 mcg, 1,000 mcg, Intramuscular, Q30 Days, Sebastian Donovan MD, 1,000 mcg at 02/18/21 5685    Lab, Imaging and other studies: I have personally reviewed pertinent labs    Laboratory Results:  Results for orders placed or performed in visit on 02/10/21   Comprehensive metabolic panel   Result Value Ref Range    Sodium 140 136 - 145 mmol/L    Potassium 4 3 3 5 - 5 3 mmol/L    Chloride 104 100 - 108 mmol/L    CO2 28 21 - 32 mmol/L    ANION GAP 8 4 - 13 mmol/L    BUN 14 5 - 25 mg/dL    Creatinine 1 11 0 60 - 1 30 mg/dL    Glucose, Fasting 131 (H) 65 - 99 mg/dL    Calcium 8 7 8 3 - 10 1 mg/dL    Corrected Calcium 9 3 8 3 - 10 1 mg/dL    AST 17 5 - 45 U/L    ALT 27 12 - 78 U/L    Alkaline Phosphatase 192 (H) 46 - 116 U/L    Total Protein 6 6 6 4 - 8 2 g/dL    Albumin 3 2 (L) 3 5 - 5 0 g/dL    Total Bilirubin 0 61 0 20 - 1 00 mg/dL    eGFR 67 ml/min/1 73sq m   CK   Result Value Ref Range    Total CK 56 39 - 308 U/L   CBC   Result Value Ref Range    WBC 9 71 4 31 - 10 16 Thousand/uL    RBC 5 60 3 88 - 5 62 Million/uL    Hemoglobin 15 5 12 0 - 17 0 g/dL    Hematocrit 49 9 (H) 36 5 - 49 3 %    MCV 89 82 - 98 fL    MCH 27 7 26 8 - 34 3 pg    MCHC 31 1 (L) 31 4 - 37 4 g/dL    RDW 15 0 11 6 - 15 1 %    Platelets 221 466 - 617 Thousands/uL    MPV 9 8 8 9 - 12 7 fL   Ferritin   Result Value Ref Range    Ferritin 22 8 - 388 ng/mL   Iron Saturation %   Result Value Ref Range    Iron Saturation 22 %    TIBC 324 250 - 450 ug/dL    Iron 70 65 - 175 ug/dL   Lipid Panel with Direct LDL reflex   Result Value Ref Range    Cholesterol 118 50 - 200 mg/dL Triglycerides 100 <=150 mg/dL    HDL, Direct 55 >=40 mg/dL    LDL Calculated 43 0 - 100 mg/dL   Magnesium   Result Value Ref Range    Magnesium 2 0 1 6 - 2 6 mg/dL   Protein / creatinine ratio, urine   Result Value Ref Range    Creatinine, Ur 121 0 mg/dL    Protein Urine Random 15 mg/dL    Prot/Creat Ratio, Ur 0 12 (H) 0 00 - 0 10   Vitamin D Panel   Result Value Ref Range    25-HYDROXY VIT D 44 ng/mL    25-Hydroxy D2 4 1 ng/mL    25-HYDROXY VIT D3 40 ng/mL   TSH, 3rd generation with Free T4 reflex   Result Value Ref Range    TSH 3RD GENERATON 1 212 0 358 - 3 740 uIU/mL   Hemoglobin A1C   Result Value Ref Range    Hemoglobin A1C 6 5 (H) Normal 3 8-5 6%; PreDiabetic 5 7-6 4%; Diabetic >=6 5%; Glycemic control for adults with diabetes <7 0% %     mg/dl   Vitamin B12   Result Value Ref Range    Vitamin B-12 716 100 - 900 pg/mL             Invalid input(s): ALBUMIN      Radiology review:   chest X-ray    Ultrasound      Portions of the record may have been created with voice recognition software  Occasional wrong word or "sound a like" substitutions may have occurred due to the inherent limitations of voice recognition software  Read the chart carefully and recognize, using context, where substitutions have occurred

## 2021-03-01 ENCOUNTER — OFFICE VISIT (OUTPATIENT)
Dept: NEPHROLOGY | Facility: CLINIC | Age: 71
End: 2021-03-01
Payer: MEDICARE

## 2021-03-01 VITALS — BODY MASS INDEX: 32.84 KG/M2 | HEIGHT: 70 IN | WEIGHT: 229.4 LBS

## 2021-03-01 DIAGNOSIS — N18.31 STAGE 3A CHRONIC KIDNEY DISEASE (HCC): Primary | ICD-10-CM

## 2021-03-01 DIAGNOSIS — R80.1 PERSISTENT PROTEINURIA: ICD-10-CM

## 2021-03-01 DIAGNOSIS — R31.29 MICROSCOPIC HEMATURIA: ICD-10-CM

## 2021-03-01 DIAGNOSIS — D63.1 ANEMIA OF CHRONIC RENAL FAILURE, STAGE 3A (HCC): ICD-10-CM

## 2021-03-01 DIAGNOSIS — R74.8 ELEVATED ALKALINE PHOSPHATASE LEVEL: ICD-10-CM

## 2021-03-01 DIAGNOSIS — N18.31 ANEMIA OF CHRONIC RENAL FAILURE, STAGE 3A (HCC): ICD-10-CM

## 2021-03-01 DIAGNOSIS — I73.9 PAD (PERIPHERAL ARTERY DISEASE) (HCC): ICD-10-CM

## 2021-03-01 DIAGNOSIS — E78.5 DYSLIPIDEMIA: ICD-10-CM

## 2021-03-01 DIAGNOSIS — I95.9 HYPOTENSION: ICD-10-CM

## 2021-03-01 DIAGNOSIS — E55.9 VITAMIN D DEFICIENCY: ICD-10-CM

## 2021-03-01 PROCEDURE — 99214 OFFICE O/P EST MOD 30 MIN: CPT | Performed by: INTERNAL MEDICINE

## 2021-03-01 RX ORDER — ATORVASTATIN CALCIUM 20 MG/1
20 TABLET, FILM COATED ORAL DAILY
Qty: 90 TABLET | Refills: 1 | Status: SHIPPED | OUTPATIENT
Start: 2021-03-01 | End: 2021-10-04

## 2021-03-01 NOTE — TELEPHONE ENCOUNTER
Patients last B12 injection was 02/18, he did not schedule another one because he wasn't sure if he was suppose to keep doing it ? Should he schedule a B12 for March?

## 2021-03-01 NOTE — LETTER
March 1, 2021     Meron Roldan MD  [address]  [address] [address] [facility]    Patient: Woody Iglesias III   YOB: 1950   Date of Visit: 3/1/2021       Dear Dr Artem Landry: Thank you for referring Alexander Junior to me for evaluation  Below are my notes for this consultation  If you have questions, please do not hesitate to call me  I look forward to following your patient along with you  Sincerely,        Kitty Gonzalez MD        CC: MD Diana Norris MD Roxanne Sarin, MD Viona Nash, MD Concetta Keeler, MD  3/1/2021  9:08 AM  Sign when Signing Visit  RENAL FOLLOW UP NOTE: td    ASSESSMENT AND PLAN:  1   CKD stage 3 :  · Etiology:  Cardiorenal syndrome/arteriolar nephrosclerosis/?  Atheroemboli/prior prerenal associated mild tachycardia and relative hypotension in the past  · Baseline creatinine:  1 25-1 41  · Current creatinine:   below baseline at 1 11  · Urine protein creatinine ratio:  0 12 g at goal  Recommendations:  · Treat hypertension-please see below  · Treat dyslipidemia-please see below  · Maintain proteinuria less than 1 g or as low as possible  · Avoid nephrotoxic agents such as NSAIDs, patient counseled as such  2   Volume:  Euvolemic  3   Hypotension:  Workup was compatible with low ejection fraction 48% associated severe hypokinesis of the apical anterior and mid anterior septal in mild in for septal and apical inferior and apical septal and apical walls   No pericardial effusion  Cortisol/TSH were unremarkable  · Currently blood pressure:  Chronic hypotension   · Medication changes today:  Patient is on Toprol XL for cardiac purposes tolerating chronic hypotension:  PATIENT'S BLOOD PRESSURE IS ELEVATED TODAY AFTER COFFEE  HE WILL SEND IN A WEEK A READINGS AT THIS TIME TO MAKE SURE HE IS AT GOAL    4   Electrolytes:  All acceptable  5   Mineral bone disorder:  · Calcium/magnesium/phosphorus:  All acceptable including the magnesium of   · PTH intact:    50 8 which is normal  · Vitamin-D:  44 at goal  6   Dyslipidemia:  · Goal LDL:  Less than 70 given PAD  · Current lipid profile:  LDL 43/HDL 55/triglycerides 100  Recommendations:  At goal so no changes  7   Anemia:               Recommendations:  · Hemoglobin stable at 15 5  · No need for iron at this time with a hemoglobin of 15 5  · Multiple myeloma evaluation with SPEP/UPEP and light chains:  Negative  · Stool for occult blood x3:  NEGATIVE X1  · In March had EGD colonoscopy and recent sigmoidoscopy:  Harris's esophagus/diverticulosis/colonic polyps   Patient will be seeing Dr Fadia Lujan  8   Other problems:  · Chronic intermittent mild leukocytosis:  Stable at 9 71   · Gross hematuria/microscopic hematuria:  This has resolved  Karsten Torres seen by Urology for gross hematuria   Cystoscopy was negative and recent PSA negative  No further investigation by follow-up in 1 year by Urology  · Abnormal echocardiogram/cardiomyopathy:  Followed in the past by Dr Almanza  · Paroxysmal atrial fibrillation followed by Dr Gentile Bonds: Kris Calderon on Xarelto  · Recent admission in May for signs of small-bowel obstruction associated with abdominal pain and diarrhea  Felt to have Crohn's disease  · Bilateral adrenal gland abnormalities being followed by surgical oncology   24 hour urine for Dopamine epinephrine all unremarkable   Aldosterone was unremarkable   Cortisol was okay at 17 5  To be followed by Dr Cordova  · PAD:  Status post stent for the right popliteal artery/revision 02/12/2020  · Admission for SBO/felt to have Crohn's followed by GI   Also felt to have IBS  · Abnormal alkaline phosphatase which is essentially unchanged 192 with normal GGT followed by GI    PATIENT INSTRUCTIONS:    Patient Instructions   1  Medication changes today:   No medication changes today        2  Please take 1 week a blood pressure readings  AT THIS TIME     AS FOLLOWS  MORNING AND EVENING, SITTING AND STANDING as follows:  · TAKE THE MORNING READINGS BEFORE ANY MEDICATIONS AND WHEN YOU ARE RELAXED FOR SEVERAL MINUTES  · TAKE THE EVENING READINGS:  BETWEEN 7-10 P M ; PRIOR TO ANY MEDICATIONS; AT LEAST IN OUR  FROM DINNER; AND CERTAINLY AFTER RELAXING FOR A FEW MINUTES  · PLEASE INCLUDE HEART RATE WITH YOUR BLOOD PRESSURE READINGS  · When taking standing readings, keep your arm supported at heart level and not dangling  · Make sure you are sitting with your back supported and feet on the ground and do not cross your legs or feet  · Make sure you have not taken any coffee or caffeine products or exercised or smoke cigarettes at least 30 minutes before taking your blood pressure  THEN PLEASE BRING IN YOUR BLOOD PRESSURE READINGS ALONG WITH YOUR BLOOD PRESSURE MACHINE TO SEE 1 OF MY ADVANCED PRACTITIONER'S IN THE NEXT FEW WEEKS  3  Follow-up in 6  months   Please bring in 1 week a blood pressure readings morning evening, sitting and standing is outlined above   Please go for fasting lab work 1-2 weeks prior to your appointment      4  General instructions:   AVOID SALT BUT NOT ADDING AN READING LABELS TO MAKE SURE THERE IS LOW-SALT IN THE FOOD THAT YOU ARE EATING  o Goal is less than 2 g of sodium intake or less than 5 g of sodium chloride intake per day     Avoid nonsteroidal anti-inflammatory drugs such as Naprosyn, ibuprofen, Aleve, Advil, Celebrex, Meloxicam (Mobic) etc   You can use Tylenol as needed if you do not have any liver condition to be concerned about     Avoid medications such as Sudafed or decongestants and antihistamines that contained the D component which is the decongestant  You can take antihistamines without the decongestant or D component   Try to avoid medications such as pantoprazole or  Protonix/Nexium or Esomeprazole)/Prilosec or omeprazole/Prevacid or lansoprazole/AcipHex or Rabeprazole  If you are able to, use Pepcid as this is safer for your kidneys       Try to exercise at least 30 minutes 3 days a week to begin with with an ultimate goal of 5 days a week for at least 30 minutes     Try to lose 5-10 lb by your next visit     Please do not drink more than 2 glasses of alcohol/wine on a daily basis as this may contribute to your high blood pressure  Subjective: There has been no hospitalizations or acute illnesses since last visit  The patient overall is feeling well  No fevers, chills, or cough or colds    Good appetite and good energy  No hematuria, dysuria, voiding symptoms or foamy urine  No gastrointestinal symptoms except for occasional loose stools  No cardiovascular symptoms including swelling of the legs  No headaches, dizziness or lightheadedness  Blood pressure medications:  · Toprol XL 50 mg daily    ROS:  See HPI, otherwise review of systems as completely reviewed with the patient are negative    Past Medical History:   Diagnosis Date    [facility] toe syndrome ([facility] )     Chronic kidney disease     Colon polyps     GERD (gastroesophageal reflux disease)     Hematuria     History of rheumatic fever     Hypolipidemia     Hypotension     Ischemic cardiomyopathy     PAD (peripheral artery disease) ([facility] )     Pulmonary emphysema ([facility] )      Past Surgical History:   Procedure Laterality Date    COLONOSCOPY  2019    HERNIA REPAIR      IR AORTAGRAM WITH RUN-OFF  6/27/2019    IR AORTAGRAM WITH RUN-OFF  2/12/2020    POPLITEAL ARTERY STENT Right     6/2019    DE SLCTV CATHJ 3RD+ ORD SLCTV ABDL PEL/LXTR BRNCH Right 6/27/2019    Procedure: leg ANGIOGRAM WITH STENT, BALLOON ANGIOPLASTY, LEFT GROIN ACCESS;  Surgeon: Wei Iraheta MD;  Location: [facility];  Service: Vascular    DE VEIN BYPASS GRAFT,FEM-POP Right 3/26/2020    Procedure: BYPASS FEMORAL-POPLITEAL; Right lower extremity bypass, fem-bk pop with GSV;  Surgeon: Wei Iraheta MD;  Location: [facility];  Service: Vascular     Family History   Problem Relation Age of Onset    Heart disease Mother  Hyperlipidemia Mother     Hypertension Father     Heart disease Father     Stroke Father     Hyperlipidemia Father     Diabetes Brother     No Known Problems Maternal Grandmother     Dementia Neg Hx     Drug abuse Neg Hx     Mental illness Neg Hx     Substance Abuse Neg Hx     Alcohol abuse Neg Hx     Depression Neg Hx     Colon cancer Neg Hx       reports that he has quit smoking  His smoking use included cigarettes  He has a 15 00 pack-year smoking history  He has never used smokeless tobacco  He reports current alcohol use  He reports that he does not use drugs  I COMPLETELY REVIEWED THE PAST MEDICAL HISTORY/PAST SURGICAL HISTORY/SOCIAL HISTORY/FAMILY HISTORY/AND MEDICATIONS  AND UPDATED ALL    Objective:     Vitals:   BP sitting on left:  132/88 with a heart rate 64 regular  BP standing on left:  146/86 with a heart rate 78 regular    Weight (last 2 days)     Date/Time   Weight    03/01/21 0851   104 (229 4)            Wt Readings from Last 3 Encounters:   03/01/21 104 kg (229 lb 6 4 oz)   01/13/21 102 kg (224 lb)   12/14/20 101 kg (222 lb 12 8 oz)       Body mass index is 33 39 kg/m²      Physical Exam: General:  Obese,No acute distress  Skin:  No acute rash  Eyes:  No scleral icterus, noninjected, no discharge from eyes  ENT:  Moist mucous membranes  Neck:  Supple, no jugular venous distention, trachea is midline, no lymphadenopathy and no thyromegaly  Back   No CVAT  Chest:  Clear to auscultation and percussion, good respiratory effort  CVS:  Regular rate and rhythm without a rub, or gallops or murmurs  Abdomen:  Obese,Soft and nontender with normal bowel sounds  Extremities:  No cyanosis and no edema, no arthritic changes, normal range of motion  Neuro:  Grossly intact  Psych:  Alert, oriented x3 and appropriate      Medications:    Current Outpatient Medications:     aspirin 81 MG tablet, Take 81 mg by mouth daily, Disp: , Rfl:     atorvastatin (LIPITOR) 20 mg tablet, TAKE 1 TABLET BY MOUTH EVERY DAY, Disp: 90 tablet, Rfl: 1    Cholecalciferol (VITAMIN D) 2000 units CAPS, Take 50,000 Units by mouth daily , Disp: , Rfl:     Ferrous Sulfate (IRON) 28 MG TABS, Take by mouth daily, Disp: , Rfl:     folic acid (FOLVITE) 1 mg tablet, Take 1 tablet (1,000 mcg total) by mouth daily, Disp: 90 tablet, Rfl: 1    Magnesium 500 MG TABS, Take 1 tablet by mouth 2 (two) times a day, Disp: , Rfl:     metoprolol succinate (TOPROL-XL) 100 mg 24 hr tablet, Take 0 5 tablets (50 mg total) by mouth daily Reduction in medication as of 10/7/2020, Disp: 90 tablet, Rfl: 4    omeprazole (PriLOSEC) 40 MG capsule, Take 1 capsule (40 mg total) by mouth daily, Disp: 90 capsule, Rfl: 1    rivaroxaban (XARELTO) 20 mg tablet, Take 1 tablet (20 mg total) by mouth daily with breakfast, Disp: 90 tablet, Rfl: 4    vitamin B-12 (VITAMIN B-12) 1,000 mcg tablet, Take 1 tablet (1,000 mcg total) by mouth daily, Disp: 90 tablet, Rfl: 1    Current Facility-Administered Medications:     cyanocobalamin injection 1,000 mcg, 1,000 mcg, Intramuscular, Q30 Days, Merced Stinson MD, 1,000 mcg at 02/18/21 9281    Lab, Imaging and other studies: I have personally reviewed pertinent labs    Laboratory Results:  Results for orders placed or performed in visit on 02/10/21   Comprehensive metabolic panel   Result Value Ref Range    Sodium 140 136 - 145 mmol/L    Potassium 4 3 3 5 - 5 3 mmol/L    Chloride 104 100 - 108 mmol/L    CO2 28 21 - 32 mmol/L    ANION GAP 8 4 - 13 mmol/L    BUN 14 5 - 25 mg/dL    Creatinine 1 11 0 60 - 1 30 mg/dL    Glucose, Fasting 131 (H) 65 - 99 mg/dL    Calcium 8 7 8 3 - 10 1 mg/dL    Corrected Calcium 9 3 8 3 - 10 1 mg/dL    AST 17 5 - 45 U/L    ALT 27 12 - 78 U/L    Alkaline Phosphatase 192 (H) 46 - 116 U/L    Total Protein 6 6 6 4 - 8 2 g/dL    Albumin 3 2 (L) 3 5 - 5 0 g/dL    Total Bilirubin 0 61 0 20 - 1 00 mg/dL    eGFR 67 ml/min/1 73sq m   CK   Result Value Ref Range    Total CK 56 39 - 308 U/L   CBC Result Value Ref Range    WBC 9 71 4 31 - 10 16 Thousand/uL    RBC 5 60 3 88 - 5 62 Million/uL    Hemoglobin 15 5 12 0 - 17 0 g/dL    Hematocrit 49 9 (H) 36 5 - 49 3 %    MCV 89 82 - 98 fL    MCH 27 7 26 8 - 34 3 pg    MCHC 31 1 (L) 31 4 - 37 4 g/dL    RDW 15 0 11 6 - 15 1 %    Platelets 502 373 - 193 Thousands/uL    MPV 9 8 8 9 - 12 7 fL   Ferritin   Result Value Ref Range    Ferritin 22 8 - 388 ng/mL   Iron Saturation %   Result Value Ref Range    Iron Saturation 22 %    TIBC 324 250 - 450 ug/dL    Iron 70 65 - 175 ug/dL   Lipid Panel with Direct LDL reflex   Result Value Ref Range    Cholesterol 118 50 - 200 mg/dL    Triglycerides 100 <=150 mg/dL    HDL, Direct 55 >=40 mg/dL    LDL Calculated 43 0 - 100 mg/dL   Magnesium   Result Value Ref Range    Magnesium 2 0 1 6 - 2 6 mg/dL   Protein / creatinine ratio, urine   Result Value Ref Range    Creatinine, Ur 121 0 mg/dL    Protein Urine Random 15 mg/dL    Prot/Creat Ratio, Ur 0 12 (H) 0 00 - 0 10   Vitamin D Panel   Result Value Ref Range    25-HYDROXY VIT D 44 ng/mL    25-Hydroxy D2 4 1 ng/mL    25-HYDROXY VIT D3 40 ng/mL   TSH, 3rd generation with Free T4 reflex   Result Value Ref Range    TSH 3RD GENERATON 1 212 0 358 - 3 740 uIU/mL   Hemoglobin A1C   Result Value Ref Range    Hemoglobin A1C 6 5 (H) Normal 3 8-5 6%; PreDiabetic 5 7-6 4%; Diabetic >=6 5%; Glycemic control for adults with diabetes <7 0% %     mg/dl   Vitamin B12   Result Value Ref Range    Vitamin B-12 716 100 - 900 pg/mL             Invalid input(s): ALBUMIN      Radiology review:   chest X-ray    Ultrasound      Portions of the record may have been created with voice recognition software  Occasional wrong word or "sound a like" substitutions may have occurred due to the inherent limitations of voice recognition software  Read the chart carefully and recognize, using context, where substitutions have occurred

## 2021-03-01 NOTE — PATIENT INSTRUCTIONS
1  Medication changes today:   No medication changes today  2  Please take 1 week a blood pressure readings  AT THIS TIME     AS FOLLOWS  MORNING AND EVENING, SITTING AND STANDING as follows:  · TAKE THE MORNING READINGS BEFORE ANY MEDICATIONS AND WHEN YOU ARE RELAXED FOR SEVERAL MINUTES  · TAKE THE EVENING READINGS:  BETWEEN 7-10 P M ; PRIOR TO ANY MEDICATIONS; AT LEAST IN OUR  FROM DINNER; AND CERTAINLY AFTER RELAXING FOR A FEW MINUTES  · PLEASE INCLUDE HEART RATE WITH YOUR BLOOD PRESSURE READINGS  · When taking standing readings, keep your arm supported at heart level and not dangling  · Make sure you are sitting with your back supported and feet on the ground and do not cross your legs or feet  · Make sure you have not taken any coffee or caffeine products or exercised or smoke cigarettes at least 30 minutes before taking your blood pressure  THEN PLEASE BRING IN YOUR BLOOD PRESSURE READINGS ALONG WITH YOUR BLOOD PRESSURE MACHINE TO SEE 1 OF MY ADVANCED PRACTITIONER'S IN THE NEXT FEW WEEKS  3  Follow-up in 6  months   Please bring in 1 week a blood pressure readings morning evening, sitting and standing is outlined above   Please go for fasting lab work 1-2 weeks prior to your appointment      4  General instructions:   AVOID SALT BUT NOT ADDING AN READING LABELS TO MAKE SURE THERE IS LOW-SALT IN THE FOOD THAT YOU ARE EATING  o Goal is less than 2 g of sodium intake or less than 5 g of sodium chloride intake per day     Avoid nonsteroidal anti-inflammatory drugs such as Naprosyn, ibuprofen, Aleve, Advil, Celebrex, Meloxicam (Mobic) etc   You can use Tylenol as needed if you do not have any liver condition to be concerned about     Avoid medications such as Sudafed or decongestants and antihistamines that contained the D component which is the decongestant  You can take antihistamines without the decongestant or D component       Try to avoid medications such as pantoprazole or Protonix/Nexium or Esomeprazole)/Prilosec or omeprazole/Prevacid or lansoprazole/AcipHex or Rabeprazole  If you are able to, use Pepcid as this is safer for your kidneys   Try to exercise at least 30 minutes 3 days a week to begin with with an ultimate goal of 5 days a week for at least 30 minutes     Try to lose 5-10 lb by your next visit     Please do not drink more than 2 glasses of alcohol/wine on a daily basis as this may contribute to your high blood pressure

## 2021-03-02 ENCOUNTER — HOSPITAL ENCOUNTER (OUTPATIENT)
Dept: INFUSION CENTER | Facility: CLINIC | Age: 71
Discharge: HOME | End: 2021-03-02
Payer: MEDICARE

## 2021-03-02 VITALS
RESPIRATION RATE: 18 BRPM | OXYGEN SATURATION: 96 % | TEMPERATURE: 97.5 F | SYSTOLIC BLOOD PRESSURE: 127 MMHG | DIASTOLIC BLOOD PRESSURE: 90 MMHG | HEART RATE: 65 BPM

## 2021-03-02 DIAGNOSIS — K50.012 CROHN'S DISEASE OF ILEUM WITH INTESTINAL OBSTRUCTION (HCC): Primary | ICD-10-CM

## 2021-03-02 PROCEDURE — 96413 CHEMO IV INFUSION 1 HR: CPT

## 2021-03-02 RX ORDER — SODIUM CHLORIDE 9 MG/ML
20 INJECTION, SOLUTION INTRAVENOUS ONCE
Status: CANCELLED | OUTPATIENT
Start: 2021-03-30

## 2021-03-02 RX ORDER — SODIUM CHLORIDE 9 MG/ML
20 INJECTION, SOLUTION INTRAVENOUS ONCE
Status: COMPLETED | OUTPATIENT
Start: 2021-03-02 | End: 2021-03-02

## 2021-03-02 RX ADMIN — VEDOLIZUMAB 300 MG: 300 INJECTION, POWDER, LYOPHILIZED, FOR SOLUTION INTRAVENOUS at 09:13

## 2021-03-02 RX ADMIN — SODIUM CHLORIDE 20 ML/HR: 0.9 INJECTION, SOLUTION INTRAVENOUS at 08:50

## 2021-03-05 ENCOUNTER — REMOTE DEVICE CLINIC VISIT (OUTPATIENT)
Dept: CARDIOLOGY CLINIC | Facility: CLINIC | Age: 71
End: 2021-03-05
Payer: MEDICARE

## 2021-03-05 DIAGNOSIS — Z95.818 PRESENCE OF OTHER CARDIAC IMPLANTS AND GRAFTS: Primary | ICD-10-CM

## 2021-03-05 PROCEDURE — G2066 INTER DEVC REMOTE 30D: HCPCS | Performed by: INTERNAL MEDICINE

## 2021-03-05 PROCEDURE — 93298 REM INTERROG DEV EVAL SCRMS: CPT | Performed by: INTERNAL MEDICINE

## 2021-03-05 NOTE — PROGRESS NOTES
Results for orders placed or performed in visit on 03/05/21   Cardiac EP device report    Narrative    MDT LOOP  CARELINK TRANSMISSION: BATTERY STATUS "OK"  DEVICE DETECTED 3 TACHY EPISODES ON 2/12/21 @ 188-207 BPM MAX DURATION 12 SEC- SVT ON ECG'S  PT ON XARELTO, ASA 81MG & METOPROLOL  DEVICE DETECTED 8 PAUSES MAX DURATION 5 SEC, MOST OCCURRED WHILE SLEEPING; PT WAS READING & ASYMPTOMATIC DURING 5 SEC PAUSE ON 1/27/21 & ADDRESSED BY DR SAUL (NO CHANGES MADE)  DEVICE DETECTED 1 MYRIAM EPISODE @ 32 BPM LASTING 15 SEC DURING TIME  OF SLEEP  NO PT ACTIVATED EPISODES  PRESENTING RHYTHM NSR  NORMAL DEVICE FUNCTION   GV

## 2021-03-10 ENCOUNTER — IMMUNIZATIONS (OUTPATIENT)
Dept: FAMILY MEDICINE CLINIC | Facility: HOSPITAL | Age: 71
End: 2021-03-10

## 2021-03-10 DIAGNOSIS — Z23 ENCOUNTER FOR IMMUNIZATION: Primary | ICD-10-CM

## 2021-03-10 PROCEDURE — 0002A SARS-COV-2 / COVID-19 MRNA VACCINE (PFIZER-BIONTECH) 30 MCG: CPT

## 2021-03-10 PROCEDURE — 91300 SARS-COV-2 / COVID-19 MRNA VACCINE (PFIZER-BIONTECH) 30 MCG: CPT

## 2021-03-16 PROBLEM — E11.51 TYPE 2 DIABETES MELLITUS WITH DIABETIC PERIPHERAL ANGIOPATHY WITHOUT GANGRENE (HCC): Status: ACTIVE | Noted: 2021-03-16

## 2021-03-17 ENCOUNTER — OFFICE VISIT (OUTPATIENT)
Dept: INTERNAL MEDICINE CLINIC | Facility: CLINIC | Age: 71
End: 2021-03-17
Payer: MEDICARE

## 2021-03-17 VITALS
BODY MASS INDEX: 32.78 KG/M2 | SYSTOLIC BLOOD PRESSURE: 124 MMHG | HEIGHT: 70 IN | OXYGEN SATURATION: 92 % | DIASTOLIC BLOOD PRESSURE: 82 MMHG | WEIGHT: 229 LBS | HEART RATE: 85 BPM | TEMPERATURE: 95.1 F

## 2021-03-17 DIAGNOSIS — E27.8 MASS OF BOTH ADRENAL GLANDS (HCC): ICD-10-CM

## 2021-03-17 DIAGNOSIS — I73.9 PAD (PERIPHERAL ARTERY DISEASE) (HCC): ICD-10-CM

## 2021-03-17 DIAGNOSIS — K22.70 BARRETT'S ESOPHAGUS WITHOUT DYSPLASIA: ICD-10-CM

## 2021-03-17 DIAGNOSIS — K50.019 CROHN'S DISEASE OF SMALL INTESTINE WITH COMPLICATION (HCC): ICD-10-CM

## 2021-03-17 DIAGNOSIS — Z98.890 S/P FEMORAL-TIBIAL BYPASS: ICD-10-CM

## 2021-03-17 DIAGNOSIS — E78.5 DYSLIPIDEMIA: ICD-10-CM

## 2021-03-17 DIAGNOSIS — E53.8 VITAMIN B12 DEFICIENCY: ICD-10-CM

## 2021-03-17 DIAGNOSIS — N18.31 STAGE 3A CHRONIC KIDNEY DISEASE (HCC): Primary | ICD-10-CM

## 2021-03-17 DIAGNOSIS — E11.51 TYPE 2 DIABETES MELLITUS WITH DIABETIC PERIPHERAL ANGIOPATHY WITHOUT GANGRENE, WITHOUT LONG-TERM CURRENT USE OF INSULIN (HCC): ICD-10-CM

## 2021-03-17 DIAGNOSIS — Z71.9 HEALTH COUNSELING: ICD-10-CM

## 2021-03-17 PROBLEM — E66.09 CLASS 1 OBESITY DUE TO EXCESS CALORIES WITH SERIOUS COMORBIDITY AND BODY MASS INDEX (BMI) OF 33.0 TO 33.9 IN ADULT: Status: ACTIVE | Noted: 2018-02-27

## 2021-03-17 PROBLEM — Z72.0 TOBACCO USE: Status: RESOLVED | Noted: 2018-05-11 | Resolved: 2021-03-17

## 2021-03-17 PROBLEM — R73.01 IMPAIRED FASTING GLUCOSE: Status: RESOLVED | Noted: 2018-03-22 | Resolved: 2021-03-17

## 2021-03-17 PROBLEM — E66.811 CLASS 1 OBESITY DUE TO EXCESS CALORIES WITH SERIOUS COMORBIDITY AND BODY MASS INDEX (BMI) OF 33.0 TO 33.9 IN ADULT: Status: ACTIVE | Noted: 2018-02-27

## 2021-03-17 PROCEDURE — 99214 OFFICE O/P EST MOD 30 MIN: CPT | Performed by: INTERNAL MEDICINE

## 2021-03-17 RX ORDER — LANOLIN ALCOHOL/MO/W.PET/CERES
1000 CREAM (GRAM) TOPICAL DAILY
Qty: 90 TABLET | Refills: 1 | Status: SHIPPED | OUTPATIENT
Start: 2021-03-17

## 2021-03-17 NOTE — ASSESSMENT & PLAN NOTE
Lab Results   Component Value Date    HGBA1C 6 5 (H) 02/10/2021     A1c worse  Resume regular aerobic exercise  Discussed low carb diet, portion control    Schedule eye exam

## 2021-03-17 NOTE — PROGRESS NOTES
Assessment/Plan:    Type 2 diabetes mellitus with diabetic peripheral angiopathy without gangrene ([facility] )    Lab Results   Component Value Date    HGBA1C 6 5 (H) 02/10/2021     A1c worse  Resume regular aerobic exercise  Discussed low carb diet, portion control  Schedule eye exam     Chronic kidney disease, stage III (moderate) ([facility])  Lab Results   Component Value Date    EGFR 67 02/10/2021    EGFR 68 01/22/2021    EGFR 76 01/07/2021    CREATININE 1 11 02/10/2021    CREATININE 1 10 01/22/2021    CREATININE 1 00 01/07/2021     Stable  Follow up with nephrology tomorrow  Harris's esophagus without dysplasia  On daily PPI  S/P femoral-tibial bypass  Ultrasound updated  On ASA, Xarelto, statin  Uses compression stockings when out of the house  Dry skin on right leg intermittent, applies steroid cream prn  Crohn's disease of small intestine with complication ([place] [id number] )  On Entyvio q4 weeks  Per GI  Mass of both adrenal glands ([place] [address] )  CT updated, repeat in 1 year  Paroxysmal atrial fibrillation ([facility])  No symptoms  On ASA, Xarelto  Anemia of chronic renal failure, stage 3 (moderate) ([facility])  Takes iron daily  Vitamin B12 deficiency  Completed monthly injections, continue oral daily supplement  Vitamin D deficiency  Takes D3 daily  Class 1 obesity due to excess calories with serious comorbidity and body mass index (BMI) of 33 0 to 33 9 in adult  Gained 17 lbs since last visit  He will go back to the gym  Dyslipidemia  At goal, on statin  Diagnoses and all orders for this visit:    Stage 3a chronic kidney disease  -     CBC and differential; Future  -     Basic metabolic panel; Future    Type 2 diabetes mellitus with diabetic peripheral angiopathy without gangrene, without long-term current use of insulin ([facility])  -     CBC and differential; Future  -     Basic metabolic panel;  Future  -     Hemoglobin A1C; Future    PAD (peripheral artery disease) ([facility])    Vitamin B12 deficiency  - vitamin B-12 (VITAMIN B-12) 1,000 mcg tablet; Take 1 tablet (1,000 mcg total) by mouth daily    Harris's esophagus without dysplasia    S/P femoral-tibial bypass    Crohn's disease of small intestine with complication (HCC)    Mass of both adrenal glands ([address] )    Dyslipidemia    Health counseling  Comments:  Received COVID booster  Follow up in 4 months or as needed  Subjective:      Patient ID: Sandi Zamorano III is a 72 y o  male here with his wife, for a follow up  He has been feeling well, no new complaints  He continues to receive the infusion for his Crohn's  He moves his bowel 2 to 3 times a day, no abdominal pain, cramping or incontinence  He has been very sedentary the past few months, has gained weight  He has been eating a little bit more junk food than usual   He denies any leg pain, no wounds  He has a dry spot on his right leg, plica cream as needed  He received his second COVID vaccine, looking forward to returning to the gym  The following portions of the patient's history were reviewed and updated as appropriate: allergies, current medications, past medical history, past social history and problem list     Review of Systems   Constitutional: Negative for appetite change and fatigue  HENT: Negative for congestion, hearing loss and postnasal drip  Eyes: Negative  Respiratory: Negative for cough, chest tightness and shortness of breath  Cardiovascular: Negative for chest pain, palpitations and leg swelling  Gastrointestinal: Negative for abdominal pain and constipation  Genitourinary: Negative for dysuria, frequency and urgency  Musculoskeletal: Negative for arthralgias and gait problem  Skin: Negative for rash and wound  Neurological: Negative for dizziness and headaches  Hematological: Does not bruise/bleed easily  Psychiatric/Behavioral: Negative for sleep disturbance  The patient is not nervous/anxious            Objective:      /82 Pulse 85   Temp (!) 95 1 °F (35 1 °C)   Ht 5' 9 5" (1 765 m)   Wt 104 kg (229 lb)   SpO2 92%   BMI 33 33 kg/m²          Physical Exam  Vitals signs and nursing note reviewed  Constitutional:       Appearance: He is well-developed  HENT:      Head: Normocephalic and atraumatic  Eyes:      Conjunctiva/sclera: Conjunctivae normal       Pupils: Pupils are equal, round, and reactive to light  Neck:      Musculoskeletal: Neck supple  Cardiovascular:      Rate and Rhythm: Normal rate and regular rhythm  Heart sounds: Normal heart sounds  Pulmonary:      Effort: Pulmonary effort is normal       Breath sounds: No wheezing or rhonchi  Abdominal:      General: Bowel sounds are normal       Palpations: Abdomen is soft  Musculoskeletal:         General: No swelling  Right lower leg: No edema  Left lower leg: No edema  Skin:     General: Skin is warm  Findings: No erythema, lesion or rash  Neurological:      General: No focal deficit present  Mental Status: He is alert and oriented to person, place, and time  Psychiatric:         Mood and Affect: Mood normal          Behavior: Behavior normal            Labs & imaging results reviewed with patient

## 2021-03-17 NOTE — ASSESSMENT & PLAN NOTE
Lab Results   Component Value Date    EGFR 67 02/10/2021    EGFR 68 01/22/2021    EGFR 76 01/07/2021    CREATININE 1 11 02/10/2021    CREATININE 1 10 01/22/2021    CREATININE 1 00 01/07/2021     Stable  Follow up with nephrology tomorrow

## 2021-03-17 NOTE — ASSESSMENT & PLAN NOTE
Ultrasound updated  On ASA, Xarelto, statin  Uses compression stockings when out of the house  Dry skin on right leg intermittent, applies steroid cream prn

## 2021-03-17 NOTE — PROGRESS NOTES
Diabetic Foot Exam    Patient's shoes and socks removed  Right Foot/Ankle   Right Foot Inspection  Skin Exam: skin normal, skin intact and dry skin no warmth, no callus, no erythema, no maceration, no abnormal color, no pre-ulcer, no ulcer and no callus                          Toe Exam: ROM and strength within normal limits  Sensory       Monofilament testing: intact  Vascular    The right DP pulse is 2+  Left Foot/Ankle  Left Foot Inspection  Skin Exam: skin normal and skin intactno dry skin, no warmth, no erythema, no maceration, normal color, no pre-ulcer, no ulcer and no callus                         Toe Exam: ROM and strength within normal limits                   Sensory       Monofilament: intact  Vascular    The left DP pulse is 2+  Assign Risk Category:  No deformity present; No loss of protective sensation;  No weak pulses       Risk: 0

## 2021-03-18 ENCOUNTER — OFFICE VISIT (OUTPATIENT)
Dept: NEPHROLOGY | Facility: CLINIC | Age: 71
End: 2021-03-18
Payer: MEDICARE

## 2021-03-18 VITALS
SYSTOLIC BLOOD PRESSURE: 128 MMHG | WEIGHT: 232 LBS | BODY MASS INDEX: 33.21 KG/M2 | HEIGHT: 70 IN | HEART RATE: 75 BPM | DIASTOLIC BLOOD PRESSURE: 89 MMHG

## 2021-03-18 DIAGNOSIS — R03.0 ELEVATED BLOOD PRESSURE READING: ICD-10-CM

## 2021-03-18 DIAGNOSIS — N18.31 STAGE 3A CHRONIC KIDNEY DISEASE (HCC): ICD-10-CM

## 2021-03-18 DIAGNOSIS — I95.9 HYPOTENSION, UNSPECIFIED HYPOTENSION TYPE: Primary | ICD-10-CM

## 2021-03-18 PROCEDURE — 99213 OFFICE O/P EST LOW 20 MIN: CPT | Performed by: PHYSICIAN ASSISTANT

## 2021-03-18 RX ORDER — METOPROLOL SUCCINATE 25 MG/1
25 TABLET, EXTENDED RELEASE ORAL DAILY
Qty: 30 TABLET | Refills: 3 | Status: SHIPPED | OUTPATIENT
Start: 2021-03-18 | End: 2021-03-18

## 2021-03-18 RX ORDER — METOPROLOL SUCCINATE 25 MG/1
TABLET, EXTENDED RELEASE ORAL
Qty: 90 TABLET | Refills: 3 | Status: SHIPPED | OUTPATIENT
Start: 2021-03-18 | End: 2022-01-16 | Stop reason: SDUPTHER

## 2021-03-18 NOTE — PATIENT INSTRUCTIONS
Hypotension- He takes toprol xl 50mg daily for cardiac issues  His blood pressures are averaging in the 553Y systolic  Will increase toprol xl to 50mg PM and 25mg AM   Take blood pressures in your right arm due to it being higher  Chronic Kidney Disease stage III- Baseline creatinine 1 2-1 4  Follow up with Dr Apryl Zhong in September  Please call the office with any questions or concerns

## 2021-03-18 NOTE — PROGRESS NOTES
Assessment and Plan:    Emiliana Pryor was seen today for follow-up  Diagnoses and all orders for this visit:    Hypotension, unspecified hypotension type    Stage 3a chronic kidney disease    Elevated blood pressure reading  -     metoprolol succinate (TOPROL-XL) 25 mg 24 hr tablet; Take 1 tablet (25 mg total) by mouth daily      Hypotension- He takes toprol xl 50mg daily for cardiac issues  His blood pressures are averaging in the 538T systolic  Will increase toprol xl to 50mg PM and 25mg AM   Take blood pressures in your right arm due to it being higher  Cuff correlation: even    Chronic Kidney Disease stage III- Baseline creatinine 1 2-1 4  Follow up with Dr Haley Shelby in September  Please call the office with any questions or concerns  Reason for Visit: Follow-up (BP CHECK)    HPI: Vishnu Howard III is a 72 y o  male who is here for follow up for a blood pressure check  At his last visit a couple weeks ago, his blood pressure was elevated but is usually low  He is unsure when his toprol was decreased from 100mg to 50mg daily  He feels well without acute complaints  He denies SOB  He denies LE edema  He has been checking his BP at home and his readings average in the 848O systolic  ROS: A complete review of systems was performed and was negative unless otherwise noted in the history of present illness  Allergies:   Patient has no known allergies      Medications:     Current Outpatient Medications:     aspirin 81 MG tablet, Take 81 mg by mouth daily, Disp: , Rfl:     atorvastatin (LIPITOR) 20 mg tablet, Take 1 tablet (20 mg total) by mouth daily, Disp: 90 tablet, Rfl: 1    Cholecalciferol (VITAMIN D) 2000 units CAPS, Take 2,000 Units by mouth daily , Disp: , Rfl:     folic acid (FOLVITE) 1 mg tablet, Take 1 tablet (1,000 mcg total) by mouth daily, Disp: 90 tablet, Rfl: 1    Magnesium 500 MG TABS, Take 1 tablet by mouth daily , Disp: , Rfl:     metoprolol succinate (TOPROL-XL) 100 mg 24 hr tablet, Take 0 5 tablets (50 mg total) by mouth daily Reduction in medication as of 10/7/2020, Disp: 90 tablet, Rfl: 4    omeprazole (PriLOSEC) 40 MG capsule, Take 1 capsule (40 mg total) by mouth daily, Disp: 90 capsule, Rfl: 1    rivaroxaban (XARELTO) 20 mg tablet, Take 1 tablet (20 mg total) by mouth daily with breakfast, Disp: 90 tablet, Rfl: 4    vitamin B-12 (VITAMIN B-12) 1,000 mcg tablet, Take 1 tablet (1,000 mcg total) by mouth daily, Disp: 90 tablet, Rfl: 1    Ferrous Sulfate (IRON) 28 MG TABS, Take by mouth daily, Disp: , Rfl:     metoprolol succinate (TOPROL-XL) 25 mg 24 hr tablet, Take 1 tablet (25 mg total) by mouth daily, Disp: 30 tablet, Rfl: 3    Past Medical History:   Diagnosis Date    Blue toe syndrome (HCC)     Chronic kidney disease     Colon polyps     GERD (gastroesophageal reflux disease)     Hematuria     History of rheumatic fever     Hypolipidemia     Hypotension     Ischemic cardiomyopathy     PAD (peripheral artery disease) (HCC)     Pulmonary emphysema (HCC)      Past Surgical History:   Procedure Laterality Date    COLONOSCOPY  2019    HERNIA REPAIR      IR AORTAGRAM WITH RUN-OFF  6/27/2019    IR AORTAGRAM WITH RUN-OFF  2/12/2020    POPLITEAL ARTERY STENT Right     6/2019    DC SLCTV CATHJ 3RD+ ORD SLCTV ABDL PEL/LXTR BRNCH Right 6/27/2019    Procedure: leg ANGIOGRAM WITH STENT, BALLOON ANGIOPLASTY, LEFT GROIN ACCESS;  Surgeon: Valerie Estrella MD;  [facility];  Service: Vascular    DC VEIN BYPASS GRAFT,FEM-POP Right 3/26/2020    Procedure: BYPASS FEMORAL-POPLITEAL; Right lower extremity bypass, fem-bk pop with GSV;  Surgeon: Valerie Estrella MD;  [facility];  Service: Vascular     Family History   Problem Relation Age of Onset    Heart disease Mother     Hyperlipidemia Mother     Hypertension Father     Heart disease Father     Stroke Father     Hyperlipidemia Father     Diabetes Brother     No Known Problems Maternal Grandmother     Dementia Neg Hx     Drug abuse Neg Hx     Mental illness Neg Hx     Substance Abuse Neg Hx     Alcohol abuse Neg Hx     Depression Neg Hx     Colon cancer Neg Hx       reports that he has quit smoking  His smoking use included cigarettes  He has a 15 00 pack-year smoking history  He has never used smokeless tobacco  He reports current alcohol use  He reports that he does not use drugs  Physical Exam:   Vitals:    03/18/21 1437 03/18/21 1438 03/18/21 1440 03/18/21 1441   BP: 132/96 121/85 118/80 128/89   BP Location: Right arm Left arm Left arm Right arm   Patient Position: Sitting Sitting Sitting Sitting   Cuff Size: Standard Standard Standard Standard   Pulse: 76   75   Weight:       Height:         Body mass index is 33 77 kg/m²  General: NAD  Neuro: AAO  Skin: no rash  Eyes: anicteric  ENMT: mm moist  Neck: no masses  Respiratory: CTAB  Cardiovascular: RRR  Extremities: mild bilateral edema  Gastrointestinal: soft nt nd    Procedure:  No results found for this or any previous visit  Lab Results   Component Value Date    CALCIUM 8 7 02/10/2021    K 4 3 02/10/2021    CO2 28 02/10/2021     02/10/2021    BUN 14 02/10/2021    CREATININE 1 11 02/10/2021       I have personally reviewed the blood work as stated above and in my note  I have personally reviewed Dr Debbie Goldstein last note

## 2021-03-30 ENCOUNTER — HOSPITAL ENCOUNTER (OUTPATIENT)
Dept: INFUSION CENTER | Facility: CLINIC | Age: 71
Discharge: HOME | End: 2021-03-30
Payer: MEDICARE

## 2021-03-30 VITALS
RESPIRATION RATE: 20 BRPM | SYSTOLIC BLOOD PRESSURE: 142 MMHG | DIASTOLIC BLOOD PRESSURE: 90 MMHG | HEART RATE: 75 BPM | TEMPERATURE: 97.5 F

## 2021-03-30 DIAGNOSIS — K50.012 CROHN'S DISEASE OF ILEUM WITH INTESTINAL OBSTRUCTION (HCC): Primary | ICD-10-CM

## 2021-03-30 PROCEDURE — 96413 CHEMO IV INFUSION 1 HR: CPT

## 2021-03-30 RX ORDER — SODIUM CHLORIDE 9 MG/ML
20 INJECTION, SOLUTION INTRAVENOUS ONCE
Status: COMPLETED | OUTPATIENT
Start: 2021-03-30 | End: 2021-03-30

## 2021-03-30 RX ORDER — SODIUM CHLORIDE 9 MG/ML
20 INJECTION, SOLUTION INTRAVENOUS ONCE
Status: CANCELLED | OUTPATIENT
Start: 2021-04-27

## 2021-03-30 RX ADMIN — SODIUM CHLORIDE 20 ML/HR: 0.9 INJECTION, SOLUTION INTRAVENOUS at 09:06

## 2021-03-30 RX ADMIN — VEDOLIZUMAB 300 MG: 300 INJECTION, POWDER, LYOPHILIZED, FOR SOLUTION INTRAVENOUS at 09:26

## 2021-03-30 NOTE — PROGRESS NOTES
Pt here for monthly Entyvio infusion, offers no complaints, denies any recent illness/infection/abx use  Vitals stable upon admission  Call bell in reach, will continue to monitor

## 2021-04-13 ENCOUNTER — OFFICE VISIT (OUTPATIENT)
Dept: GASTROENTEROLOGY | Facility: MEDICAL CENTER | Age: 71
End: 2021-04-13
Payer: MEDICARE

## 2021-04-13 VITALS
SYSTOLIC BLOOD PRESSURE: 122 MMHG | BODY MASS INDEX: 33.57 KG/M2 | TEMPERATURE: 99 F | DIASTOLIC BLOOD PRESSURE: 74 MMHG | HEART RATE: 78 BPM | WEIGHT: 230.6 LBS

## 2021-04-13 DIAGNOSIS — K50.012 CROHN'S DISEASE OF ILEUM WITH INTESTINAL OBSTRUCTION (HCC): Primary | ICD-10-CM

## 2021-04-13 DIAGNOSIS — K22.70 BARRETT'S ESOPHAGUS WITHOUT DYSPLASIA: ICD-10-CM

## 2021-04-13 DIAGNOSIS — R74.8 ELEVATED ALKALINE PHOSPHATASE LEVEL: ICD-10-CM

## 2021-04-13 PROCEDURE — 99214 OFFICE O/P EST MOD 30 MIN: CPT | Performed by: INTERNAL MEDICINE

## 2021-04-13 NOTE — PROGRESS NOTES
[facility] - Outpatient Follow-up Note  Beni Zarate III 72 y o  male MRN: [id number]  Encounter: [id number]          ASSESSMENT AND PLAN:    Saturnino Mcmanus is a 72 y o  male who presents with complaint of PVD, CKD, Afib (on [facility]) who p/w small bowel Crohn's c/b prior SBO  He has started on Entyvio and is doing much better clinically, with only mild to no symptoms  Entyvio level was low at 10 and the dose was recently increased  Recent CMP notable for albumin of 3 2  Alk phos elevated at 192  CBC normal  Vitamin D and B12 normal  Calprotectin 64    1  Crohn's disease of ileum with intestinal obstruction ([address] )    2  Plasencia's esophagus without dysplasia    3  Elevated alkaline phosphatase level        Orders Placed This Encounter   Procedures    MRI enterography w wo    Vedolizumab and Anti-Vedo Ab    CBC and differential    Comprehensive metabolic panel    C-reactive protein    Quantiferon TB Gold Plus    Gamma GT     Continue Entyvio every 4 weeks  Repeat blood work including Entyvio level, CBC, CMP, CRP, GGT  Obtain MRI enterography  Repeat colonoscopy and EGD next year (for Crohn's and plasencia's)  IBD HCM discussed, including routine derm, eye and dental examinations  DEXA discussed but will defer for now and possibly reorder in the future  Continue omeprazole for plasencia's  Smoking cessation  Avoid NSAIDs          ______________________________________________________________________    SUBJECTIVE:    Beni Zarate III is a 72 y o  male who presents with complaint of Crohn's    He received his COVID vaccine  He is on Entyvio every 4 weeks (he receievd 3 doses since early February)  He feels well  He is having 3 formed BMs per day, without blood or black stools  No urgency, nocturnal bMs, incontinence  No abdominal pain  + noise and gassiness  Some bloating depending on what he eats  No heartburns, nausea, vomiting, dysphagia, odynophagia     No weight loss  No rashes, mouth sores, joint pains  REVIEW OF SYSTEMS IS OTHERWISE NEGATIVE    10 point ROS reviewed and negative, except as above      Historical Information   Past Medical History:   Diagnosis Date    Blue toe syndrome (HCC)     Chronic kidney disease     Colon polyps     GERD (gastroesophageal reflux disease)     Hematuria     History of rheumatic fever     Hypolipidemia     Hypotension     Ischemic cardiomyopathy     PAD (peripheral artery disease) (HCC)     Pulmonary emphysema (HCC)      Past Surgical History:   Procedure Laterality Date    COLONOSCOPY  2019    HERNIA REPAIR      IR AORTAGRAM WITH RUN-OFF  6/27/2019    IR AORTAGRAM WITH RUN-OFF  2/12/2020    POPLITEAL ARTERY STENT Right     6/2019    SD SLCTV CATHJ 3RD+ ORD SLCTV ABDL PEL/LXTR [facility] Right 6/27/2019    Procedure: leg ANGIOGRAM WITH STENT, BALLOON ANGIOPLASTY, LEFT GROIN ACCESS;  Surgeon: Caity Rod MD;  [facility];  Service: Vascular    SD VEIN BYPASS GRAFT,FEM-POP Right 3/26/2020    Procedure: BYPASS FEMORAL-POPLITEAL; Right lower extremity bypass, fem-bk pop with GSV;  Surgeon: Caity Rod MD;  [facility];  Service: Vascular     Social History   Social History     Substance and Sexual Activity   Alcohol Use Yes    Frequency: 2-4 times a month    Drinks per session: 1 or 2    Binge frequency: Never    Comment: occasional     Social History     Substance and Sexual Activity   Drug Use No     Social History     Tobacco Use   Smoking Status Former Smoker    Packs/day: 0 50    Years: 30 00    Pack years: 15 00    Types: Cigarettes   Smokeless Tobacco Never Used   Tobacco Comment    " I will do it on my own"     Family History   Problem Relation Age of Onset    Heart disease Mother     Hyperlipidemia Mother     Hypertension Father     Heart disease Father     Stroke Father     Hyperlipidemia Father     Diabetes Brother     No Known Problems Maternal Grandmother     Dementia Neg Hx     Drug abuse Neg Hx     Mental illness Neg Hx     Substance Abuse Neg Hx     Alcohol abuse Neg Hx     Depression Neg Hx     Colon cancer Neg Hx        Meds/Allergies       Current Outpatient Medications:     aspirin 81 MG tablet    atorvastatin (LIPITOR) 20 mg tablet    Cholecalciferol (VITAMIN D) 2000 units CAPS    folic acid (FOLVITE) 1 mg tablet    Magnesium 500 MG TABS    metoprolol succinate (TOPROL-XL) 100 mg 24 hr tablet    metoprolol succinate (TOPROL-XL) 25 mg 24 hr tablet    omeprazole (PriLOSEC) 40 MG capsule    rivaroxaban (XARELTO) 20 mg tablet    vitamin B-12 (VITAMIN B-12) 1,000 mcg tablet    Ferrous Sulfate (IRON) 28 MG TABS    No Known Allergies        Objective     Blood pressure 122/74, pulse 78, temperature 99 °F (37 2 °C), weight 105 kg (230 lb 9 6 oz)  Body mass index is 33 57 kg/m²  PHYSICAL EXAMINATION:    General Appearance:   Alert, cooperative, no distress   HEENT:  Normocephalic, atraumatic, anicteric  Neck supple, symmetrical, trachea midline  Lungs:   Equal chest rise and unlabored breathing, normal effort, no coughing  Cardiovascular:   No visualized JVD  Abdomen:   No abdominal distension  Skin:   No jaundice, rashes, or lesions  Musculoskeletal:   Normal range of motion visualized  Psych:  Normal affect and normal insight  Neuro:  Alert and appropriate  Lab Results:   No visits with results within 1 Day(s) from this visit     Latest known visit with results is:   Lab on 02/10/2021   Component Date Value    Sodium 02/10/2021 140     Potassium 02/10/2021 4 3     Chloride 02/10/2021 104     CO2 02/10/2021 28     ANION GAP 02/10/2021 8     BUN 02/10/2021 14     Creatinine 02/10/2021 1 11     Glucose, Fasting 02/10/2021 131*    Calcium 02/10/2021 8 7     Corrected Calcium 02/10/2021 9 3     AST 02/10/2021 17     ALT 02/10/2021 27     Alkaline Phosphatase 02/10/2021 192*    Total Protein 02/10/2021 6 6     Albumin 02/10/2021 3 2*    Total Bilirubin 02/10/2021 0 61     eGFR 02/10/2021 67     Total CK 02/10/2021 56     WBC 02/10/2021 9 71     RBC 02/10/2021 5 60     Hemoglobin 02/10/2021 15 5     Hematocrit 02/10/2021 49 9*    MCV 02/10/2021 89     MCH 02/10/2021 27 7     MCHC 02/10/2021 31 1*    RDW 02/10/2021 15 0     Platelets 54/47/5803 300     MPV 02/10/2021 9 8     Ferritin 02/10/2021 22     Iron Saturation 02/10/2021 22     TIBC 02/10/2021 324     Iron 02/10/2021 70     Cholesterol 02/10/2021 118     Triglycerides 02/10/2021 100     HDL, Direct 02/10/2021 55     LDL Calculated 02/10/2021 43     Magnesium 02/10/2021 2 0     Creatinine, Ur 02/10/2021 121 0     Protein Urine Random 02/10/2021 15     Prot/Creat Ratio, Ur 02/10/2021 0 12*    25-HYDROXY VIT D 02/10/2021 44     25-Hydroxy D2 02/10/2021 4 1     25-HYDROXY VIT D3 02/10/2021 40     TSH 3RD GENERATON 02/10/2021 1 212     Hemoglobin A1C 02/10/2021 6 5*    EAG 02/10/2021 140     Vitamin B-12 02/10/2021 716        Lab Results   Component Value Date    WBC 9 71 02/10/2021    HGB 15 5 02/10/2021    HCT 49 9 (H) 02/10/2021    MCV 89 02/10/2021     02/10/2021       Lab Results   Component Value Date    SODIUM 140 02/10/2021    K 4 3 02/10/2021     02/10/2021    CO2 28 02/10/2021    AGAP 8 02/10/2021    BUN 14 02/10/2021    CREATININE 1 11 02/10/2021    GLUC 84 01/07/2021    GLUF 131 (H) 02/10/2021    CALCIUM 8 7 02/10/2021    AST 17 02/10/2021    ALT 27 02/10/2021    ALKPHOS 192 (H) 02/10/2021    TP 6 6 02/10/2021    TBILI 0 61 02/10/2021    EGFR 67 02/10/2021       Lab Results   Component Value Date    CRP 9 1 (H) 01/07/2021       Lab Results   Component Value Date    [id number] 1 212 02/10/2021       Lab Results   Component Value Date    IRON 70 02/10/2021    TIBC 324 02/10/2021    FERRITIN 22 02/10/2021       Radiology Results:   No results found

## 2021-04-22 ENCOUNTER — OFFICE VISIT (OUTPATIENT)
Dept: CARDIOLOGY CLINIC | Facility: CLINIC | Age: 71
End: 2021-04-22
Payer: MEDICARE

## 2021-04-22 VITALS
HEART RATE: 72 BPM | WEIGHT: 230.5 LBS | BODY MASS INDEX: 33 KG/M2 | SYSTOLIC BLOOD PRESSURE: 138 MMHG | HEIGHT: 70 IN | DIASTOLIC BLOOD PRESSURE: 84 MMHG

## 2021-04-22 DIAGNOSIS — I48.0 PAROXYSMAL ATRIAL FIBRILLATION (HCC): Primary | ICD-10-CM

## 2021-04-22 DIAGNOSIS — I73.9 PAD (PERIPHERAL ARTERY DISEASE) (HCC): ICD-10-CM

## 2021-04-22 DIAGNOSIS — I47.1 SVT (SUPRAVENTRICULAR TACHYCARDIA) (HCC): ICD-10-CM

## 2021-04-22 DIAGNOSIS — I45.2 RBBB (RIGHT BUNDLE BRANCH BLOCK WITH LEFT ANTERIOR FASCICULAR BLOCK): ICD-10-CM

## 2021-04-22 PROBLEM — I95.9 HYPOTENSION: Status: RESOLVED | Noted: 2020-01-19 | Resolved: 2021-04-22

## 2021-04-22 PROCEDURE — 93000 ELECTROCARDIOGRAM COMPLETE: CPT | Performed by: INTERNAL MEDICINE

## 2021-04-22 PROCEDURE — 99213 OFFICE O/P EST LOW 20 MIN: CPT | Performed by: INTERNAL MEDICINE

## 2021-04-22 NOTE — PROGRESS NOTES
Cardiology Follow Up    Merritt Borregozinger III  1950  [id number]  [id number] [facility]  [phone]    1  Paroxysmal atrial fibrillation (HCC)     2  SVT (supraventricular tachycardia) ([address] )     3  RBBB (right bundle branch block with left anterior fascicular block)     4  PAD (peripheral artery disease) ([facility])           Discussion/Summary: All of his assessed cardiac problems are stable  I have reviewed his medications and made no changes  No cardiac testing is ordered  RTO 1 year  Interval History: He remains active and denies CP, SOB, palpitations  His ILR has about 1 year of battery life left  It has shown rare PAF and SVT  He is on Xarelto  LDL is 43    Nuclear stress test and an echo in 5/2018 were normal     Patient Active Problem List   Diagnosis    Class 1 obesity due to excess calories with serious comorbidity and body mass index (BMI) of 33 0 to 33 9 in adult    Chronic kidney disease, stage III (moderate)    Persistent proteinuria    Microscopic hematuria    [place] cardiac risk 10-20% in next 10 years    Abdominal aortic atherosclerosis ([address] )    RBBB (right bundle branch block with left anterior fascicular block)    Dyslipidemia    Vitamin D deficiency    GERD with esophagitis    Harris's esophagus without dysplasia    Colon polyps    PAD (peripheral artery disease) ([address] )    Mass of both adrenal glands ([address] )    Blue toe syndrome, right ([address] )    Ischemic cardiomyopathy    S/P peripheral artery angioplasty with stent placement    Venous stasis    GI symptoms    Paroxysmal atrial fibrillation (HCC)    Anemia of chronic renal failure, stage 3 (moderate)    Other emphysema ([address] )    Adrenal mass ([address] )    Crohn's disease of small intestine with complication (HCC)    Hyponatremia    Hypokalemia    SVT (supraventricular tachycardia) ([address] )    Positive QuantiFERON-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    Terminal ileitis of small intestine (HCC)    S/P femoral-tibial bypass    Elevated alkaline phosphatase level    Vitamin B12 deficiency    Type 2 diabetes mellitus with diabetic peripheral angiopathy without gangrene (HCC)     Past Medical History:   Diagnosis Date    Blue toe syndrome (HCC)     Chronic kidney disease     Colon polyps     GERD (gastroesophageal reflux disease)     Hematuria     History of rheumatic fever     Hypolipidemia     Hypotension     Ischemic cardiomyopathy     PAD (peripheral artery disease) (HCC)     Pulmonary emphysema (HCC)      Social History     Socioeconomic History    Marital status: /Civil Union     Spouse name: Not on file    Number of children: Not on file    Years of education: Not on file    Highest education level: Not on file   Occupational History    Occupation: retired   Social Needs    Financial resource strain: Not on file    Food insecurity     Worry: Not on file     Inability: Not on file   [employer] needs     Medical: Not on file     Non-medical: Not on file   Tobacco Use    Smoking status: Former Smoker     Packs/day: 0 50     Years: 30 00     Pack years: 15 00     Types: Cigarettes    Smokeless tobacco: Never Used    Tobacco comment: " I will do it on my own"   Substance and Sexual Activity    Alcohol use: Yes     Frequency: 2-4 times a month     Drinks per session: 1 or 2     Binge frequency: Never     Comment: occasional    Drug use: No    Sexual activity: Not Currently   Lifestyle    Physical activity     Days per week: Not on file     Minutes per session: Not on file    Stress: Not on file   Relationships    Social connections     Talks on phone: Not on file     Gets together: Not on file     Attends Sikh service: Not on file     Active member of club or organization: Not on file     Attends meetings of clubs or organizations: Not on file     Relationship status: Not on file  Intimate partner violence     Fear of current or ex partner: Not on file     Emotionally abused: Not on file     Physically abused: Not on file     Forced sexual activity: Not on file   Other Topics Concern    Not on file   Social History Narrative    Drinks coffee      Family History   Problem Relation Age of Onset    Heart disease Mother     Hyperlipidemia Mother     Hypertension Father     Heart disease Father     Stroke Father     Hyperlipidemia Father     Diabetes Brother     No Known Problems Maternal Grandmother     Dementia Neg Hx     Drug abuse Neg Hx     Mental illness Neg Hx     Substance Abuse Neg Hx     Alcohol abuse Neg Hx     Depression Neg Hx     Colon cancer Neg Hx      Past Surgical History:   Procedure Laterality Date    COLONOSCOPY  2019    HERNIA REPAIR      IR AORTAGRAM WITH RUN-OFF  6/27/2019    IR AORTAGRAM WITH RUN-OFF  2/12/2020    POPLITEAL ARTERY STENT Right     6/2019    MT SLCTV CATHJ 3RD+ ORD SLCTV ABDL PEL/LXTR [address] [facility] Right 6/27/2019    Procedure: leg ANGIOGRAM WITH STENT, BALLOON ANGIOPLASTY, LEFT GROIN ACCESS;  Surgeon: Sarah Chiu MD;  [facility];  Service: Vascular    MT VEIN BYPASS GRAFT,FEM-POP Right 3/26/2020    Procedure: BYPASS FEMORAL-POPLITEAL; Right lower extremity bypass, fem-bk pop with GSV;  Surgeon: Sarah Chiu MD;  Location[facility];  Service: Vascular       Current Outpatient Medications:     aspirin 81 MG tablet, Take 81 mg by mouth daily, Disp: , Rfl:     atorvastatin (LIPITOR) 20 mg tablet, Take 1 tablet (20 mg total) by mouth daily, Disp: 90 tablet, Rfl: 1    Cholecalciferol (VITAMIN D) 2000 units CAPS, Take 2,000 Units by mouth daily , Disp: , Rfl:     folic acid (FOLVITE) 1 mg tablet, Take 1 tablet (1,000 mcg total) by mouth daily, Disp: 90 tablet, Rfl: 1    Magnesium 500 MG TABS, Take 1 tablet by mouth daily , Disp: , Rfl:     metoprolol succinate (TOPROL-XL) 100 mg 24 hr tablet, Take 0 5 tablets (50 mg total) by mouth daily Reduction in medication as of 10/7/2020, Disp: 90 tablet, Rfl: 4    metoprolol succinate (TOPROL-XL) 25 mg 24 hr tablet, TAKE 1 TABLET(25 MG) BY MOUTH DAILY, Disp: 90 tablet, Rfl: 3    omeprazole (PriLOSEC) 40 MG capsule, Take 1 capsule (40 mg total) by mouth daily, Disp: 90 capsule, Rfl: 1    rivaroxaban (XARELTO) 20 mg tablet, Take 1 tablet (20 mg total) by mouth daily with breakfast, Disp: 90 tablet, Rfl: 4    vitamin B-12 (VITAMIN B-12) 1,000 mcg tablet, Take 1 tablet (1,000 mcg total) by mouth daily, Disp: 90 tablet, Rfl: 1    Ferrous Sulfate (IRON) 28 MG TABS, Take by mouth daily, Disp: , Rfl:   No Known Allergies  Vitals:    04/22/21 0911   BP: 138/84   BP Location: Right arm   Patient Position: Sitting   Cuff Size: Standard   Pulse: 72   Weight: 105 kg (230 lb 8 oz)   Height: 5' 9 5" (1 765 m)     Weight (last 2 days)     Date/Time   Weight    04/22/21 0911   105 (230 5)             Blood pressure 138/84, pulse 72, height 5' 9 5" (1 765 m), weight 105 kg (230 lb 8 oz)  , Body mass index is 33 55 kg/m²      Labs:  Lab on 02/10/2021   Component Date Value    Sodium 02/10/2021 140     Potassium 02/10/2021 4 3     Chloride 02/10/2021 104     CO2 02/10/2021 28     ANION GAP 02/10/2021 8     BUN 02/10/2021 14     Creatinine 02/10/2021 1 11     Glucose, Fasting 02/10/2021 131*    Calcium 02/10/2021 8 7     Corrected Calcium 02/10/2021 9 3     AST 02/10/2021 17     ALT 02/10/2021 27     Alkaline Phosphatase 02/10/2021 192*    Total Protein 02/10/2021 6 6     Albumin 02/10/2021 3 2*    Total Bilirubin 02/10/2021 0 61     eGFR 02/10/2021 67     Total CK 02/10/2021 56     WBC 02/10/2021 9 71     RBC 02/10/2021 5 60     Hemoglobin 02/10/2021 15 5     Hematocrit 02/10/2021 49 9*    MCV 02/10/2021 89     MCH 02/10/2021 27 7     MCHC 02/10/2021 31 1*    RDW 02/10/2021 15 0     Platelets 03/17/7130 300     MPV 02/10/2021 9 8     Ferritin 02/10/2021 22     Iron Saturation 02/10/2021 22     TIBC 02/10/2021 324     Iron 02/10/2021 70     Cholesterol 02/10/2021 118     Triglycerides 02/10/2021 100     HDL, Direct 02/10/2021 55     LDL Calculated 02/10/2021 43     Magnesium 02/10/2021 2 0     Creatinine, Ur 02/10/2021 121 0     Protein Urine Random 02/10/2021 15     Prot/Creat Ratio, Ur 02/10/2021 0 12*    25-HYDROXY VIT D 02/10/2021 44     25-Hydroxy D2 02/10/2021 4 1     25-HYDROXY VIT D3 02/10/2021 40     TSH 3RD GENERATON 02/10/2021 1 212     Hemoglobin A1C 02/10/2021 6 5*    EAG 02/10/2021 140     Vitamin B-12 02/10/2021 716    Appointment on 01/22/2021   Component Date Value    WBC 01/22/2021 11 19*    RBC 01/22/2021 5 38     Hemoglobin 01/22/2021 15 2     Hematocrit 01/22/2021 48 1     MCV 01/22/2021 89     MCH 01/22/2021 28 3     MCHC 01/22/2021 31 6     RDW 01/22/2021 15 0     MPV 01/22/2021 10 1     Platelets 18/29/4096 299     nRBC 01/22/2021 0     Neutrophils Relative 01/22/2021 64     Immat GRANS % 01/22/2021 1     Lymphocytes Relative 01/22/2021 23     Monocytes Relative 01/22/2021 9     Eosinophils Relative 01/22/2021 2     Basophils Relative 01/22/2021 1     Neutrophils Absolute 01/22/2021 7 31     Immature Grans Absolute 01/22/2021 0 06     Lymphocytes Absolute 01/22/2021 2 54     Monocytes Absolute 01/22/2021 0 96     Eosinophils Absolute 01/22/2021 0 25     Basophils Absolute 01/22/2021 0 07     Sodium 01/22/2021 139     Potassium 01/22/2021 4 2     Chloride 01/22/2021 104     CO2 01/22/2021 27     ANION GAP 01/22/2021 8     BUN 01/22/2021 14     Creatinine 01/22/2021 1 10     Glucose, Fasting 01/22/2021 120*    Calcium 01/22/2021 8 6     Corrected Calcium 01/22/2021 9 4     AST 01/22/2021 15     ALT 01/22/2021 25     Alkaline Phosphatase 01/22/2021 187*    Total Protein 01/22/2021 6 8     Albumin 01/22/2021 3 0*    Total Bilirubin 01/22/2021 0 66     eGFR 01/22/2021 68     Calprotectin 01/22/2021 64 Lab on 01/07/2021   Component Date Value    WBC 01/07/2021 10 73*    RBC 01/07/2021 5 43     Hemoglobin 01/07/2021 15 6     Hematocrit 01/07/2021 48 5     MCV 01/07/2021 89     MCH 01/07/2021 28 7     MCHC 01/07/2021 32 2     RDW 01/07/2021 14 9     MPV 01/07/2021 9 9     Platelets 91/26/3140 361     nRBC 01/07/2021 0     Neutrophils Relative 01/07/2021 61     Immat GRANS % 01/07/2021 1     Lymphocytes Relative 01/07/2021 27     Monocytes Relative 01/07/2021 9     Eosinophils Relative 01/07/2021 2     Basophils Relative 01/07/2021 0     Neutrophils Absolute 01/07/2021 6 63     Immature Grans Absolute 01/07/2021 0 05     Lymphocytes Absolute 01/07/2021 2 89     Monocytes Absolute 01/07/2021 0 91     Eosinophils Absolute 01/07/2021 0 21     Basophils Absolute 01/07/2021 0 04     Sodium 01/07/2021 150*    Potassium 01/07/2021 4 7     Chloride 01/07/2021 112*    CO2 01/07/2021 23     ANION GAP 01/07/2021 15*    BUN 01/07/2021 17     Creatinine 01/07/2021 1 00     Glucose 01/07/2021 84     Calcium 01/07/2021 9 1     Corrected Calcium 01/07/2021 9 7     AST 01/07/2021 22     ALT 01/07/2021 6*    Alkaline Phosphatase 01/07/2021 204*    Total Protein 01/07/2021 7 4     Albumin 01/07/2021 3 2*    Total Bilirubin 01/07/2021 0 67     eGFR 01/07/2021 76     CRP 01/07/2021 9 1*    Vitamin B-12 01/07/2021 556     Vit D, 25-Hydroxy 01/07/2021 40 1     Miscellaneous Lab Test R* 01/07/2021 SEE WRITTEN REPORT      Imaging: No results found  Review of Systems:  Review of Systems   Constitutional: Negative for diaphoresis, fatigue, fever and unexpected weight change  HENT: Negative  Respiratory: Negative for cough, shortness of breath and wheezing  Cardiovascular: Negative for chest pain, palpitations and leg swelling  Gastrointestinal: Negative for abdominal pain, diarrhea and nausea  Musculoskeletal: Negative for gait problem and myalgias  Skin: Negative for rash  Neurological: Negative for dizziness and numbness  Psychiatric/Behavioral: Negative  Physical Exam:  Physical Exam  Constitutional:       Appearance: He is well-developed  HENT:      Head: Normocephalic and atraumatic  Eyes:      Pupils: Pupils are equal, round, and reactive to light  Neck:      Musculoskeletal: Normal range of motion and neck supple  Vascular: No JVD  Cardiovascular:      Rate and Rhythm: Regular rhythm  Pulses: Normal pulses  Carotid pulses are 2+ on the right side and 2+ on the left side  Heart sounds: S1 normal and S2 normal    Pulmonary:      Effort: Pulmonary effort is normal       Breath sounds: Normal breath sounds  No wheezing or rales  Abdominal:      General: Bowel sounds are normal       Palpations: Abdomen is soft  Tenderness: There is no abdominal tenderness  Musculoskeletal: Normal range of motion  General: No tenderness  Skin:     General: Skin is warm  Neurological:      Mental Status: He is alert and oriented to person, place, and time  Cranial Nerves: No cranial nerve deficit  Deep Tendon Reflexes: Reflexes are normal and symmetric

## 2021-04-27 ENCOUNTER — HOSPITAL ENCOUNTER (OUTPATIENT)
Dept: INFUSION CENTER | Facility: CLINIC | Age: 71
Discharge: HOME | End: 2021-04-27
Payer: MEDICARE

## 2021-04-27 VITALS
SYSTOLIC BLOOD PRESSURE: 120 MMHG | TEMPERATURE: 96.7 F | HEART RATE: 85 BPM | OXYGEN SATURATION: 95 % | DIASTOLIC BLOOD PRESSURE: 78 MMHG | RESPIRATION RATE: 18 BRPM

## 2021-04-27 DIAGNOSIS — K50.012 CROHN'S DISEASE OF ILEUM WITH INTESTINAL OBSTRUCTION (HCC): Primary | ICD-10-CM

## 2021-04-27 PROCEDURE — 96413 CHEMO IV INFUSION 1 HR: CPT

## 2021-04-27 RX ORDER — SODIUM CHLORIDE 9 MG/ML
20 INJECTION, SOLUTION INTRAVENOUS ONCE
Status: COMPLETED | OUTPATIENT
Start: 2021-04-27 | End: 2021-04-27

## 2021-04-27 RX ORDER — SODIUM CHLORIDE 9 MG/ML
20 INJECTION, SOLUTION INTRAVENOUS ONCE
Status: CANCELLED | OUTPATIENT
Start: 2021-05-25

## 2021-04-27 RX ADMIN — VEDOLIZUMAB 300 MG: 300 INJECTION, POWDER, LYOPHILIZED, FOR SOLUTION INTRAVENOUS at 12:28

## 2021-04-27 RX ADMIN — SODIUM CHLORIDE 20 ML/HR: 0.9 INJECTION, SOLUTION INTRAVENOUS at 12:20

## 2021-04-27 NOTE — PROGRESS NOTES
Patient tolerated infusion without incident  Peripheral IV removed  Patient's next appointment confirmed  AVS offered and declined

## 2021-05-12 ENCOUNTER — TELEPHONE (OUTPATIENT)
Dept: VASCULAR SURGERY | Facility: CLINIC | Age: 71
End: 2021-05-12

## 2021-05-12 DIAGNOSIS — Z98.890 S/P FEMORAL-TIBIAL BYPASS: Primary | ICD-10-CM

## 2021-05-12 NOTE — TELEPHONE ENCOUNTER
Spoke with patient  Scheduled appointment(s) listed below  Provided patient with call back number [phone] option 3, should they need to reschedule  Patient's appointment(s) are scheduled for STEFFEN 08/18/21 OV 08/26/21      Dopplers  [] Abdominal Aorta Iliac (AOIL)  [] Carotid (CV)   [] Celiac and/or Mesenteric  [] Endovascular Aortic Repair (EVAR)   [] Hemodialysis Access (HD)   [x] Lower Limb Arterial (STEFFEN) 8/18/21  [] Lower Limb Venous Duplex with Reflux (LEVDR)  [] Renal Artery  [] Upper Limb (UEA)    Advanced Imaging   [] CTA abdomen    [] CTA abdomen & pelvis    [] CT abdomen with/ without contrast  [] CT abdomen with contrast  [] CT abdomen without contrast    [] CT abdomen & pelvis with/ without contrast  [] CT abdomen & pelvis with contrast  [] CT abdomen & pelvis without contrast    Office Visit 8/26/21  [] New patient, patient last seen over 3 years ago  [] Risk factor modification

## 2021-05-24 ENCOUNTER — APPOINTMENT (OUTPATIENT)
Dept: LAB | Facility: CLINIC | Age: 71
End: 2021-05-24
Payer: MEDICARE

## 2021-05-24 ENCOUNTER — TRANSCRIBE ORDERS (OUTPATIENT)
Dept: LAB | Facility: CLINIC | Age: 71
End: 2021-05-24

## 2021-05-24 DIAGNOSIS — K50.012 CROHN'S DISEASE OF ILEUM WITH INTESTINAL OBSTRUCTION (HCC): ICD-10-CM

## 2021-05-24 DIAGNOSIS — D72.829 LEUKOCYTOSIS, UNSPECIFIED TYPE: ICD-10-CM

## 2021-05-24 DIAGNOSIS — R80.1 PERSISTENT PROTEINURIA: ICD-10-CM

## 2021-05-24 DIAGNOSIS — N18.30 CHRONIC KIDNEY DISEASE, STAGE III (MODERATE) (HCC): ICD-10-CM

## 2021-05-24 DIAGNOSIS — R74.8 ELEVATED ALKALINE PHOSPHATASE LEVEL: ICD-10-CM

## 2021-05-24 DIAGNOSIS — E87.0 HYPERNATREMIA: ICD-10-CM

## 2021-05-24 DIAGNOSIS — R31.29 MICROSCOPIC HEMATURIA: ICD-10-CM

## 2021-05-24 LAB
ALBUMIN SERPL BCP-MCNC: 3 G/DL (ref 3.5–5)
ALP SERPL-CCNC: 198 U/L (ref 46–116)
ALT SERPL W P-5'-P-CCNC: 23 U/L (ref 12–78)
ANION GAP SERPL CALCULATED.3IONS-SCNC: 7 MMOL/L (ref 4–13)
AST SERPL W P-5'-P-CCNC: 17 U/L (ref 5–45)
BASOPHILS # BLD AUTO: 0.04 THOUSANDS/ΜL (ref 0–0.1)
BASOPHILS NFR BLD AUTO: 0 % (ref 0–1)
BILIRUB SERPL-MCNC: 0.42 MG/DL (ref 0.2–1)
BUN SERPL-MCNC: 13 MG/DL (ref 5–25)
CALCIUM ALBUM COR SERPL-MCNC: 9.1 MG/DL (ref 8.3–10.1)
CALCIUM SERPL-MCNC: 8.3 MG/DL (ref 8.3–10.1)
CHLORIDE SERPL-SCNC: 107 MMOL/L (ref 100–108)
CO2 SERPL-SCNC: 28 MMOL/L (ref 21–32)
CREAT SERPL-MCNC: 1.09 MG/DL (ref 0.6–1.3)
CRP SERPL QL: 8.9 MG/L
EOSINOPHIL # BLD AUTO: 0.2 THOUSAND/ΜL (ref 0–0.61)
EOSINOPHIL NFR BLD AUTO: 2 % (ref 0–6)
ERYTHROCYTE [DISTWIDTH] IN BLOOD BY AUTOMATED COUNT: 15.2 % (ref 11.6–15.1)
GFR SERPL CREATININE-BSD FRML MDRD: 69 ML/MIN/1.73SQ M
GGT SERPL-CCNC: 46 U/L (ref 5–85)
GLUCOSE P FAST SERPL-MCNC: 134 MG/DL (ref 65–99)
HCT VFR BLD AUTO: 48.5 % (ref 36.5–49.3)
HGB BLD-MCNC: 15.3 G/DL (ref 12–17)
IMM GRANULOCYTES # BLD AUTO: 0.07 THOUSAND/UL (ref 0–0.2)
IMM GRANULOCYTES NFR BLD AUTO: 1 % (ref 0–2)
LYMPHOCYTES # BLD AUTO: 2.13 THOUSANDS/ΜL (ref 0.6–4.47)
LYMPHOCYTES NFR BLD AUTO: 21 % (ref 14–44)
MCH RBC QN AUTO: 28.2 PG (ref 26.8–34.3)
MCHC RBC AUTO-ENTMCNC: 31.5 G/DL (ref 31.4–37.4)
MCV RBC AUTO: 89 FL (ref 82–98)
MONOCYTES # BLD AUTO: 0.95 THOUSAND/ΜL (ref 0.17–1.22)
MONOCYTES NFR BLD AUTO: 10 % (ref 4–12)
NEUTROPHILS # BLD AUTO: 6.62 THOUSANDS/ΜL (ref 1.85–7.62)
NEUTS SEG NFR BLD AUTO: 66 % (ref 43–75)
NRBC BLD AUTO-RTO: 0 /100 WBCS
PLATELET # BLD AUTO: 273 THOUSANDS/UL (ref 149–390)
PMV BLD AUTO: 10.6 FL (ref 8.9–12.7)
POTASSIUM SERPL-SCNC: 4.6 MMOL/L (ref 3.5–5.3)
PROT SERPL-MCNC: 7.1 G/DL (ref 6.4–8.2)
RBC # BLD AUTO: 5.43 MILLION/UL (ref 3.88–5.62)
SODIUM SERPL-SCNC: 142 MMOL/L (ref 136–145)
WBC # BLD AUTO: 10.01 THOUSAND/UL (ref 4.31–10.16)

## 2021-05-24 PROCEDURE — 82977 ASSAY OF GGT: CPT

## 2021-05-24 PROCEDURE — 84080 ASSAY ALKALINE PHOSPHATASES: CPT

## 2021-05-24 PROCEDURE — 82397 CHEMILUMINESCENT ASSAY: CPT

## 2021-05-24 PROCEDURE — 84153 ASSAY OF PSA TOTAL: CPT

## 2021-05-24 PROCEDURE — 84075 ASSAY ALKALINE PHOSPHATASE: CPT

## 2021-05-24 PROCEDURE — 86480 TB TEST CELL IMMUN MEASURE: CPT

## 2021-05-24 PROCEDURE — 80053 COMPREHEN METABOLIC PANEL: CPT

## 2021-05-24 PROCEDURE — 80280 DRUG ASSAY VEDOLIZUMAB: CPT

## 2021-05-24 PROCEDURE — 36415 COLL VENOUS BLD VENIPUNCTURE: CPT

## 2021-05-24 PROCEDURE — 86140 C-REACTIVE PROTEIN: CPT

## 2021-05-24 PROCEDURE — 85025 COMPLETE CBC W/AUTO DIFF WBC: CPT

## 2021-05-25 ENCOUNTER — TELEPHONE (OUTPATIENT)
Dept: UROLOGY | Facility: AMBULATORY SURGERY CENTER | Age: 71
End: 2021-05-25

## 2021-05-25 ENCOUNTER — HOSPITAL ENCOUNTER (OUTPATIENT)
Dept: INFUSION CENTER | Facility: CLINIC | Age: 71
Discharge: HOME | End: 2021-05-25
Payer: MEDICARE

## 2021-05-25 VITALS
TEMPERATURE: 96.4 F | HEART RATE: 65 BPM | RESPIRATION RATE: 18 BRPM | DIASTOLIC BLOOD PRESSURE: 90 MMHG | SYSTOLIC BLOOD PRESSURE: 132 MMHG

## 2021-05-25 DIAGNOSIS — R31.29 MICROSCOPIC HEMATURIA: Primary | ICD-10-CM

## 2021-05-25 DIAGNOSIS — Z12.5 SCREENING FOR PROSTATE CANCER: ICD-10-CM

## 2021-05-25 DIAGNOSIS — K50.012 CROHN'S DISEASE OF ILEUM WITH INTESTINAL OBSTRUCTION (HCC): Primary | ICD-10-CM

## 2021-05-25 PROCEDURE — 96413 CHEMO IV INFUSION 1 HR: CPT

## 2021-05-25 RX ORDER — SODIUM CHLORIDE 9 MG/ML
20 INJECTION, SOLUTION INTRAVENOUS ONCE
Status: CANCELLED | OUTPATIENT
Start: 2021-06-22

## 2021-05-25 RX ORDER — SODIUM CHLORIDE 9 MG/ML
20 INJECTION, SOLUTION INTRAVENOUS ONCE
Status: COMPLETED | OUTPATIENT
Start: 2021-05-25 | End: 2021-05-25

## 2021-05-25 RX ADMIN — SODIUM CHLORIDE 20 ML/HR: 0.9 INJECTION, SOLUTION INTRAVENOUS at 10:07

## 2021-05-25 RX ADMIN — VEDOLIZUMAB 300 MG: 300 INJECTION, POWDER, LYOPHILIZED, FOR SOLUTION INTRAVENOUS at 10:38

## 2021-05-25 NOTE — TELEPHONE ENCOUNTER
Called lab to see if they could add PSA to pt recent blood work  They will call me if they cannot  We will collect urine at appointment  PT is aware

## 2021-05-25 NOTE — PROGRESS NOTES
Pt here for entyvio infusion, offers no complaints, denies any recent illness/infection/abx use    Vitals stable  Call bell in reach, will continue to monitor

## 2021-05-26 LAB
GAMMA INTERFERON BACKGROUND BLD IA-ACNC: 0.02 IU/ML
M TB IFN-G BLD-IMP: NEGATIVE
M TB IFN-G CD4+ BCKGRND COR BLD-ACNC: 0 IU/ML
M TB IFN-G CD4+ BCKGRND COR BLD-ACNC: 0 IU/ML
MITOGEN IGNF BCKGRD COR BLD-ACNC: 7.13 IU/ML

## 2021-05-27 ENCOUNTER — OFFICE VISIT (OUTPATIENT)
Dept: UROLOGY | Facility: AMBULATORY SURGERY CENTER | Age: 71
End: 2021-05-27
Payer: MEDICARE

## 2021-05-27 VITALS
WEIGHT: 228 LBS | BODY MASS INDEX: 32.64 KG/M2 | HEIGHT: 70 IN | DIASTOLIC BLOOD PRESSURE: 70 MMHG | HEART RATE: 62 BPM | SYSTOLIC BLOOD PRESSURE: 120 MMHG

## 2021-05-27 DIAGNOSIS — R31.29 MICROSCOPIC HEMATURIA: Primary | ICD-10-CM

## 2021-05-27 DIAGNOSIS — Z12.5 SCREENING FOR PROSTATE CANCER: ICD-10-CM

## 2021-05-27 LAB
ALP BONE CFR SERPL: 32 % (ref 12–68)
ALP INTEST CFR SERPL: 3 % (ref 0–18)
ALP LIVER CFR SERPL: 65 % (ref 13–88)
ALP SERPL-CCNC: 197 IU/L (ref 48–121)
BACTERIA UR QL AUTO: NORMAL /HPF
BILIRUB UR QL STRIP: NEGATIVE
CLARITY UR: CLEAR
COLOR UR: YELLOW
GLUCOSE UR STRIP-MCNC: NEGATIVE MG/DL
HGB UR QL STRIP.AUTO: NEGATIVE
HYALINE CASTS #/AREA URNS LPF: NORMAL /LPF
KETONES UR STRIP-MCNC: NEGATIVE MG/DL
LEUKOCYTE ESTERASE UR QL STRIP: NEGATIVE
NITRITE UR QL STRIP: NEGATIVE
NON-SQ EPI CELLS URNS QL MICRO: NORMAL /HPF
PH UR STRIP.AUTO: 6 [PH]
PROT UR STRIP-MCNC: NEGATIVE MG/DL
RBC #/AREA URNS AUTO: NORMAL /HPF
SP GR UR STRIP.AUTO: 1.01 (ref 1–1.03)
UROBILINOGEN UR QL STRIP.AUTO: 0.2 E.U./DL
WBC #/AREA URNS AUTO: NORMAL /HPF

## 2021-05-27 PROCEDURE — 81001 URINALYSIS AUTO W/SCOPE: CPT | Performed by: NURSE PRACTITIONER

## 2021-05-27 PROCEDURE — 99213 OFFICE O/P EST LOW 20 MIN: CPT | Performed by: NURSE PRACTITIONER

## 2021-05-27 NOTE — PROGRESS NOTES
05/27/21    Mary Ricardo Humphrey III   1950   [id number]     Assessment  1  Microscopic hematuria s/p negative workup (5/2019)  2  Prostate cancer screening    Discussion/Plan  1  Microscopic hematuria s/p negative workup (5/2019)   UA (1/13/2020) 2-4 RBC/hpf    Patient did not complete follow up UA microscopic for our visit today   Unable to provide sample in office today  2  Prostate cancer screening   PSA pending results   DANIEL: difficult to palpate due to body habitus, non tender, no nodules felt     Follow up in 1 year with PSA  All questions answered, patient verbalized understanding, and instructed to call with any issues    Subjective  HPI   Gala Melendez is a 72 y o  male with a history of microscopic hematuria and prostate cancer screening  Patient is status post negative hematuria workup with imaging and cystoscopy in May 2019  Cause likely secondary to his use of Xarelto  His most recent PSA from May 2020 is 0 4  PSA for our visit today is pending  He denies any strong family history of prostate cancer  He hydrates with five 12 oz bottles of water daily as well as iced tea  He is satisfied with his urinary pattern with no complaints  He denies any lower urinary tract symptoms, gross hematuria, or dysuria  No changes to his overall health       Review of Systems - History obtained from chart review and the patient  General ROS: negative  Psychological ROS: negative  Ophthalmic ROS: negative  ENT ROS: negative  Allergy and Immunology ROS: negative  Hematological and Lymphatic ROS: negative  Endocrine ROS: negative  Breast ROS: negative for breast lumps  Respiratory ROS: no cough, shortness of breath, or wheezing  Cardiovascular ROS: no chest pain or dyspnea on exertion  Gastrointestinal ROS: no abdominal pain, change in bowel habits, or black or bloody stools  Genito-Urinary ROS: no dysuria, trouble voiding, or hematuria  Musculoskeletal ROS: negative  Neurological ROS: no TIA or stroke symptoms  Dermatological ROS: negative       Objective  Physical Exam  Vitals signs and nursing note reviewed  Constitutional:       General: He is awake  He is not in acute distress  Appearance: Normal appearance  He is well-developed and well-groomed  He is obese  He is not ill-appearing, toxic-appearing or diaphoretic  Neck:      Musculoskeletal: Normal range of motion and neck supple  Pulmonary:      Effort: Pulmonary effort is normal    Genitourinary:     Rectum: Normal       Comments: DANIEL: difficult to palpate due to body habitus, non tender, no nodules felt  Musculoskeletal: Normal range of motion  Skin:     General: Skin is warm  Neurological:      General: No focal deficit present  Mental Status: He is alert and oriented to person, place, and time  Mental status is at baseline  Psychiatric:         Attention and Perception: Attention normal          Mood and Affect: Mood normal          Speech: Speech normal          Behavior: Behavior normal  Behavior is cooperative  Thought Content: Thought content normal          Cognition and Memory: Cognition normal          Judgment: Judgment normal          AUA SYMPTOM SCORE      Most Recent Value   AUA SYMPTOM SCORE   How often have you had a sensation of not emptying your bladder completely after you finished urinating? 0   How often have you had to urinate again less than two hours after you finished urinating? 3   How often have you found you stopped and started again several times when you urinate? 1   How often have you found it difficult to postpone urination? 2   How often have you had a weak urinary stream?  0   How often have you had to push or strain to begin urination?   0   How many times did you most typically get up to urinate from the time you went to bed at night until the time you got up in the morning?  0   Quality of Life: If you were to spend the rest of your life with your urinary condition just the way it is now, how would you feel about that?  0   AUA SYMPTOM SCORE  6            Component      Latest Ref Rng & Units 2/28/2018 5/6/2020   PSA, Total      0 0 - 4 0 ng/mL 0 8 0 4       LENORA Cortez

## 2021-05-29 LAB — PSA SERPL-MCNC: 1.1 NG/ML (ref 0–4)

## 2021-06-04 ENCOUNTER — HOSPITAL ENCOUNTER (OUTPATIENT)
Dept: MRI IMAGING | Facility: HOSPITAL | Age: 71
Discharge: HOME | End: 2021-06-04
Attending: INTERNAL MEDICINE
Payer: MEDICARE

## 2021-06-04 ENCOUNTER — REMOTE DEVICE CLINIC VISIT (OUTPATIENT)
Dept: CARDIOLOGY CLINIC | Facility: CLINIC | Age: 71
End: 2021-06-04
Payer: MEDICARE

## 2021-06-04 DIAGNOSIS — K50.012 CROHN'S DISEASE OF ILEUM WITH INTESTINAL OBSTRUCTION (HCC): ICD-10-CM

## 2021-06-04 DIAGNOSIS — Z95.818 PRESENCE OF OTHER CARDIAC IMPLANTS AND GRAFTS: Primary | ICD-10-CM

## 2021-06-04 LAB — MISCELLANEOUS LAB TEST RESULT: NORMAL

## 2021-06-04 PROCEDURE — 93298 REM INTERROG DEV EVAL SCRMS: CPT | Performed by: INTERNAL MEDICINE

## 2021-06-04 PROCEDURE — G2066 INTER DEVC REMOTE 30D: HCPCS | Performed by: INTERNAL MEDICINE

## 2021-06-04 PROCEDURE — 72197 MRI PELVIS W/O & W/DYE: CPT

## 2021-06-04 PROCEDURE — G1004 CDSM NDSC: HCPCS

## 2021-06-04 PROCEDURE — 74183 MRI ABD W/O CNTR FLWD CNTR: CPT

## 2021-06-04 PROCEDURE — A9585 GADOBUTROL INJECTION: HCPCS | Performed by: INTERNAL MEDICINE

## 2021-06-04 RX ADMIN — GADOBUTROL 10 ML: 604.72 INJECTION INTRAVENOUS at 09:44

## 2021-06-04 NOTE — PROGRESS NOTES
Results for orders placed or performed in visit on 06/04/21   Cardiac EP device report    Narrative    MDT LOOP  CARELINK TRANSMISSION LOOP: BATTERY STATUS "OK"  PRESENTING RHYTHM: NSR @ 86 BPM  SINCE LAST REMOTE 5/20/21 PT HAD 9 PAUSES & 3 BRADYCARDIA EPISODES W/ EGRMS SHOWING BRADYCARDIA W/ HRS 30-40 BPM, MAX DURATION 28 SECS (ALL EPISODES ARE DURING SLEEP)  PAUSE EGRMS SHOW PAUSES ANYWHERE FROM 3-6 SECS, ALL DURING SLEEP  SPOKE TO PTS WIFE, PT IS SLEEPING AT ALL OF THESE TIMES AND IS ASYMPTOMATIC  PT TAKES METOPROLOL SUCC, XARELTO, ASA 81MG  EF: 50% (DENIS 6/5/19)  NORMAL DEVICE FUNCTION    [address] [place] [address]

## 2021-06-04 NOTE — NURSING NOTE
Patient arrived for MRI enterography and screened for diabetes prior to glucagon administration  Per patient is not a diabetic, but previously documented patient is a diabetic  Discussed with radiologist MD Christine Rolls   Per protocol glucagon will not be given to patient for the test

## 2021-06-22 ENCOUNTER — HOSPITAL ENCOUNTER (OUTPATIENT)
Dept: INFUSION CENTER | Facility: CLINIC | Age: 71
Discharge: HOME | End: 2021-06-22
Payer: MEDICARE

## 2021-06-22 VITALS
TEMPERATURE: 97.1 F | SYSTOLIC BLOOD PRESSURE: 109 MMHG | DIASTOLIC BLOOD PRESSURE: 71 MMHG | HEART RATE: 78 BPM | OXYGEN SATURATION: 93 % | RESPIRATION RATE: 18 BRPM

## 2021-06-22 DIAGNOSIS — K50.012 CROHN'S DISEASE OF ILEUM WITH INTESTINAL OBSTRUCTION (HCC): Primary | ICD-10-CM

## 2021-06-22 PROCEDURE — 96413 CHEMO IV INFUSION 1 HR: CPT

## 2021-06-22 RX ORDER — SODIUM CHLORIDE 9 MG/ML
20 INJECTION, SOLUTION INTRAVENOUS ONCE
Status: COMPLETED | OUTPATIENT
Start: 2021-06-22 | End: 2021-06-22

## 2021-06-22 RX ORDER — SODIUM CHLORIDE 9 MG/ML
20 INJECTION, SOLUTION INTRAVENOUS ONCE
Status: CANCELLED | OUTPATIENT
Start: 2021-07-20

## 2021-06-22 RX ADMIN — SODIUM CHLORIDE 20 ML/HR: 0.9 INJECTION, SOLUTION INTRAVENOUS at 08:50

## 2021-06-22 RX ADMIN — VEDOLIZUMAB 300 MG: 300 INJECTION, POWDER, LYOPHILIZED, FOR SOLUTION INTRAVENOUS at 09:14

## 2021-06-22 NOTE — PATIENT INSTRUCTIONS
June 2021 Sunday Monday Tuesday Wednesday Thursday Friday Saturday             1     2     3     4    MRI ENTEROGRAPHY W WO   8:15 AM   (60 min )   AM MRI 1   [address] MRI    REMOTE DEVICE CHECK PG   1:00 PM   (15 min )   [facility] Cardiology [facility] 5                6     7     8     9     10     11     12                13     14     15     16     17     18     19                20     21     22    INF THERAPY PLAN   8:30 AM   (120 min )   AN INF CHAIR [facility] 23     24     25     26         Cycle 1, Treatment 10       27     28     29     30                                    Treatment Details       6/22/2021 - Cycle 1, Treatment 10      Supportive Care: VEDOLIZUMAB IVPB        July 2021 Sunday Monday Tuesday Wednesday Thursday Friday Saturday                       1     2     3                4     5     6     7     8     9     10                11     12     13     14     15     16     17                18     19     20    INF THERAPY PLAN   8:30 AM   (120 min )   AN INF CHAIR [employer] 21    OFFICE VISIT LONG PG   8:15 AM   (30 min )   Pushpa Broussard MD   [facility] Internal Medicine 89     62     17                93     77     86     79     45     97     98

## 2021-06-22 NOTE — PROGRESS NOTES
Patient tolerated treatment today without incident and was discharged post   He will RTO 7/20/21 for his next dosing and declined AVS

## 2021-06-22 NOTE — PROGRESS NOTES
Pt here for Entyvio infusion  IV started with no issue  Pt resting comfortably and has no further questions or concerns  Call bell with in reach

## 2021-07-10 DIAGNOSIS — K21.00 GERD WITH ESOPHAGITIS: ICD-10-CM

## 2021-07-10 DIAGNOSIS — K52.9 INFLAMMATORY BOWEL DISEASE: ICD-10-CM

## 2021-07-11 RX ORDER — OMEPRAZOLE 40 MG/1
CAPSULE, DELAYED RELEASE ORAL
Qty: 90 CAPSULE | Refills: 1 | Status: SHIPPED | OUTPATIENT
Start: 2021-07-11 | End: 2022-01-03

## 2021-07-12 DIAGNOSIS — K21.00 GERD WITH ESOPHAGITIS: ICD-10-CM

## 2021-07-13 ENCOUNTER — LAB (OUTPATIENT)
Dept: LAB | Facility: CLINIC | Age: 71
End: 2021-07-13
Payer: MEDICARE

## 2021-07-13 DIAGNOSIS — N18.31 STAGE 3A CHRONIC KIDNEY DISEASE (HCC): ICD-10-CM

## 2021-07-13 DIAGNOSIS — E11.51 TYPE 2 DIABETES MELLITUS WITH DIABETIC PERIPHERAL ANGIOPATHY WITHOUT GANGRENE, WITHOUT LONG-TERM CURRENT USE OF INSULIN (HCC): ICD-10-CM

## 2021-07-13 LAB
ANION GAP SERPL CALCULATED.3IONS-SCNC: 9 MMOL/L (ref 4–13)
BASOPHILS # BLD AUTO: 0.05 THOUSANDS/ΜL (ref 0–0.1)
BASOPHILS NFR BLD AUTO: 1 % (ref 0–1)
BUN SERPL-MCNC: 18 MG/DL (ref 5–25)
CALCIUM SERPL-MCNC: 8.6 MG/DL (ref 8.3–10.1)
CHLORIDE SERPL-SCNC: 106 MMOL/L (ref 100–108)
CO2 SERPL-SCNC: 26 MMOL/L (ref 21–32)
CREAT SERPL-MCNC: 1.14 MG/DL (ref 0.6–1.3)
EOSINOPHIL # BLD AUTO: 0.17 THOUSAND/ΜL (ref 0–0.61)
EOSINOPHIL NFR BLD AUTO: 2 % (ref 0–6)
ERYTHROCYTE [DISTWIDTH] IN BLOOD BY AUTOMATED COUNT: 15.2 % (ref 11.6–15.1)
EST. AVERAGE GLUCOSE BLD GHB EST-MCNC: 143 MG/DL
GFR SERPL CREATININE-BSD FRML MDRD: 65 ML/MIN/1.73SQ M
GLUCOSE P FAST SERPL-MCNC: 125 MG/DL (ref 65–99)
HBA1C MFR BLD: 6.6 %
HCT VFR BLD AUTO: 50.5 % (ref 36.5–49.3)
HGB BLD-MCNC: 16 G/DL (ref 12–17)
IMM GRANULOCYTES # BLD AUTO: 0.06 THOUSAND/UL (ref 0–0.2)
IMM GRANULOCYTES NFR BLD AUTO: 1 % (ref 0–2)
LYMPHOCYTES # BLD AUTO: 2.82 THOUSANDS/ΜL (ref 0.6–4.47)
LYMPHOCYTES NFR BLD AUTO: 26 % (ref 14–44)
MCH RBC QN AUTO: 28.7 PG (ref 26.8–34.3)
MCHC RBC AUTO-ENTMCNC: 31.7 G/DL (ref 31.4–37.4)
MCV RBC AUTO: 91 FL (ref 82–98)
MONOCYTES # BLD AUTO: 0.97 THOUSAND/ΜL (ref 0.17–1.22)
MONOCYTES NFR BLD AUTO: 9 % (ref 4–12)
NEUTROPHILS # BLD AUTO: 6.95 THOUSANDS/ΜL (ref 1.85–7.62)
NEUTS SEG NFR BLD AUTO: 61 % (ref 43–75)
NRBC BLD AUTO-RTO: 0 /100 WBCS
PLATELET # BLD AUTO: 297 THOUSANDS/UL (ref 149–390)
PMV BLD AUTO: 10.4 FL (ref 8.9–12.7)
POTASSIUM SERPL-SCNC: 4.5 MMOL/L (ref 3.5–5.3)
RBC # BLD AUTO: 5.57 MILLION/UL (ref 3.88–5.62)
SODIUM SERPL-SCNC: 141 MMOL/L (ref 136–145)
WBC # BLD AUTO: 11.02 THOUSAND/UL (ref 4.31–10.16)

## 2021-07-13 PROCEDURE — 85025 COMPLETE CBC W/AUTO DIFF WBC: CPT

## 2021-07-13 PROCEDURE — 83036 HEMOGLOBIN GLYCOSYLATED A1C: CPT

## 2021-07-13 PROCEDURE — 36415 COLL VENOUS BLD VENIPUNCTURE: CPT

## 2021-07-13 PROCEDURE — 80048 BASIC METABOLIC PNL TOTAL CA: CPT

## 2021-07-13 RX ORDER — FOLIC ACID 1 MG/1
TABLET ORAL
Qty: 90 TABLET | Refills: 1 | Status: SHIPPED | OUTPATIENT
Start: 2021-07-13 | End: 2022-01-03

## 2021-07-20 ENCOUNTER — HOSPITAL ENCOUNTER (OUTPATIENT)
Dept: INFUSION CENTER | Facility: CLINIC | Age: 71
Discharge: HOME | End: 2021-07-20
Payer: MEDICARE

## 2021-07-20 VITALS — DIASTOLIC BLOOD PRESSURE: 79 MMHG | TEMPERATURE: 97.1 F | SYSTOLIC BLOOD PRESSURE: 128 MMHG | HEART RATE: 72 BPM

## 2021-07-20 DIAGNOSIS — K50.012 CROHN'S DISEASE OF ILEUM WITH INTESTINAL OBSTRUCTION (HCC): Primary | ICD-10-CM

## 2021-07-20 PROCEDURE — 96413 CHEMO IV INFUSION 1 HR: CPT

## 2021-07-20 RX ORDER — SODIUM CHLORIDE 9 MG/ML
20 INJECTION, SOLUTION INTRAVENOUS ONCE
Status: CANCELLED | OUTPATIENT
Start: 2021-08-16

## 2021-07-20 RX ORDER — SODIUM CHLORIDE 9 MG/ML
20 INJECTION, SOLUTION INTRAVENOUS ONCE
Status: COMPLETED | OUTPATIENT
Start: 2021-07-20 | End: 2021-07-20

## 2021-07-20 RX ADMIN — SODIUM CHLORIDE 20 ML/HR: 0.9 INJECTION, SOLUTION INTRAVENOUS at 08:37

## 2021-07-20 RX ADMIN — VEDOLIZUMAB 300 MG: 300 INJECTION, POWDER, LYOPHILIZED, FOR SOLUTION INTRAVENOUS at 09:04

## 2021-07-20 NOTE — PROGRESS NOTES
Pt tolerated treatment well without any adverse reactions  Aware of next appointments  AVS declined

## 2021-07-20 NOTE — PROGRESS NOTES
Pt here for Entyvio infusion, offers no complaints, denies recent illness/infection/abx use  Vitals stable upon admission  Call bell in reach, will continue to monitor

## 2021-07-21 ENCOUNTER — OFFICE VISIT (OUTPATIENT)
Dept: INTERNAL MEDICINE CLINIC | Facility: CLINIC | Age: 71
End: 2021-07-21
Payer: MEDICARE

## 2021-07-21 VITALS
HEART RATE: 91 BPM | TEMPERATURE: 97.8 F | WEIGHT: 227.6 LBS | HEIGHT: 70 IN | SYSTOLIC BLOOD PRESSURE: 120 MMHG | OXYGEN SATURATION: 95 % | RESPIRATION RATE: 20 BRPM | BODY MASS INDEX: 32.58 KG/M2 | DIASTOLIC BLOOD PRESSURE: 76 MMHG

## 2021-07-21 DIAGNOSIS — K63.5 POLYP OF COLON, UNSPECIFIED PART OF COLON, UNSPECIFIED TYPE: ICD-10-CM

## 2021-07-21 DIAGNOSIS — N18.31 STAGE 3A CHRONIC KIDNEY DISEASE (HCC): ICD-10-CM

## 2021-07-21 DIAGNOSIS — K50.019 CROHN'S DISEASE OF SMALL INTESTINE WITH COMPLICATION (HCC): ICD-10-CM

## 2021-07-21 DIAGNOSIS — E66.09 CLASS 1 OBESITY DUE TO EXCESS CALORIES WITH SERIOUS COMORBIDITY AND BODY MASS INDEX (BMI) OF 33.0 TO 33.9 IN ADULT: ICD-10-CM

## 2021-07-21 DIAGNOSIS — K22.70 BARRETT'S ESOPHAGUS WITHOUT DYSPLASIA: ICD-10-CM

## 2021-07-21 DIAGNOSIS — E11.51 TYPE 2 DIABETES MELLITUS WITH DIABETIC PERIPHERAL ANGIOPATHY WITHOUT GANGRENE, WITHOUT LONG-TERM CURRENT USE OF INSULIN (HCC): ICD-10-CM

## 2021-07-21 DIAGNOSIS — K57.92 DIVERTICULITIS: Primary | ICD-10-CM

## 2021-07-21 DIAGNOSIS — Z71.9 HEALTH COUNSELING: ICD-10-CM

## 2021-07-21 PROBLEM — R19.8 GI SYMPTOMS: Status: RESOLVED | Noted: 2019-07-22 | Resolved: 2021-07-21

## 2021-07-21 PROBLEM — E87.6 HYPOKALEMIA: Status: RESOLVED | Noted: 2020-01-15 | Resolved: 2021-07-21

## 2021-07-21 PROBLEM — E87.1 HYPONATREMIA: Status: RESOLVED | Noted: 2020-01-13 | Resolved: 2021-07-21

## 2021-07-21 PROCEDURE — 99214 OFFICE O/P EST MOD 30 MIN: CPT | Performed by: INTERNAL MEDICINE

## 2021-07-21 RX ORDER — GLIMEPIRIDE 1 MG/1
1 TABLET ORAL
Qty: 90 TABLET | Refills: 1 | Status: SHIPPED | OUTPATIENT
Start: 2021-07-21 | End: 2022-01-03

## 2021-07-21 NOTE — PROGRESS NOTES
orders  -     Cancel: Ambulatory referral to Colorectal Surgery; Future  -     Cancel: Amb referral to Pediatric Ophthalmology; Future  -     Cancel: tetanus-diphtheria-acellular pertussis (ADACEL) 5-2-15 5 LF-mcg/0 5 injection; Inject 0 5 mL into a muscle once for 1 dose      Follow up in 4 months or as needed  Subjective:      Patient ID: Ramonita Ayon III is a 72 y o  male here for a follow up  He feels well, has no complaints  He started walking with his wife recently, also will be starting to go to the gym  He is trying to monitor his food intake  He denies any chest pain or palpitations  He continues to move his bowels at least 2 to 3 times a day, no blood or mucus  He continues to received infusions once a month  He denies any open wounds, no leg pain or swelling  The following portions of the patient's history were reviewed and updated as appropriate: allergies, current medications, past medical history, past social history and problem list     Review of Systems   Constitutional: Positive for activity change  Negative for appetite change and fatigue  HENT: Negative for congestion  Eyes: Negative  Negative for visual disturbance  Respiratory: Negative for cough, chest tightness and shortness of breath  Cardiovascular: Negative for chest pain, palpitations and leg swelling  Gastrointestinal: Negative for abdominal pain, constipation, diarrhea and vomiting  Genitourinary: Negative for dysuria, frequency and urgency  Musculoskeletal: Negative for arthralgias  Skin: Negative for wound  Neurological: Negative for dizziness and headaches  Hematological: Does not bruise/bleed easily  Psychiatric/Behavioral: Negative for sleep disturbance  The patient is not nervous/anxious            Objective:      /76   Pulse 91   Temp 97 8 °F (36 6 °C)   Resp 20   Ht 5' 10" (1 778 m)   Wt 103 kg (227 lb 9 6 oz)   SpO2 95%   BMI 32 66 kg/m²          Physical Exam  Vitals and nursing note reviewed  Constitutional:       Appearance: He is well-developed  HENT:      Head: Normocephalic and atraumatic  Eyes:      Conjunctiva/sclera: Conjunctivae normal       Pupils: Pupils are equal, round, and reactive to light  Cardiovascular:      Rate and Rhythm: Normal rate and regular rhythm  Heart sounds: Normal heart sounds  Comments: Varicose veins Left leg  Pulmonary:      Effort: Pulmonary effort is normal       Breath sounds: No wheezing or rhonchi  Abdominal:      General: Bowel sounds are normal       Palpations: Abdomen is soft  Musculoskeletal:         General: No swelling  Cervical back: Neck supple  Right lower leg: No edema  Left lower leg: No edema  Skin:     General: Skin is warm  Findings: No rash  Neurological:      General: No focal deficit present  Mental Status: He is alert and oriented to person, place, and time  Psychiatric:         Mood and Affect: Mood and affect normal          Behavior: Behavior normal            Labs & imaging results reviewed with patient

## 2021-07-21 NOTE — ASSESSMENT & PLAN NOTE
Lab Results   Component Value Date    EGFR 65 07/13/2021    EGFR 69 05/24/2021    EGFR 67 02/10/2021    CREATININE 1 14 07/13/2021    CREATININE 1 09 05/24/2021    CREATININE 1 11 02/10/2021     Stable  Sees nephrology

## 2021-07-21 NOTE — ASSESSMENT & PLAN NOTE
Lab Results   Component Value Date    HGBA1C 6 6 (H) 07/13/2021     Start low dose glimepiride  Discussed diet, portion control  He started going to the gym

## 2021-07-26 LAB
LEFT EYE DIABETIC RETINOPATHY: NORMAL
RIGHT EYE DIABETIC RETINOPATHY: NORMAL

## 2021-08-05 ENCOUNTER — TELEPHONE (OUTPATIENT)
Dept: NEPHROLOGY | Facility: CLINIC | Age: 71
End: 2021-08-05

## 2021-08-05 NOTE — TELEPHONE ENCOUNTER
I called and left message asking patient to call back and schedule September follow up appt w/ Dr Lou Abt

## 2021-08-12 ENCOUNTER — TELEPHONE (OUTPATIENT)
Dept: INFUSION CENTER | Facility: HOSPITAL | Age: 71
End: 2021-08-12

## 2021-08-12 NOTE — TELEPHONE ENCOUNTER
Patient contacted at this time and informed effective 08/16/2021 there is a no visitor policy at the infusion center

## 2021-08-16 ENCOUNTER — HOSPITAL ENCOUNTER (OUTPATIENT)
Dept: INFUSION CENTER | Facility: CLINIC | Age: 71
Discharge: HOME | End: 2021-08-16
Payer: MEDICARE

## 2021-08-16 VITALS
HEART RATE: 71 BPM | SYSTOLIC BLOOD PRESSURE: 118 MMHG | RESPIRATION RATE: 16 BRPM | TEMPERATURE: 97.1 F | OXYGEN SATURATION: 94 % | DIASTOLIC BLOOD PRESSURE: 72 MMHG

## 2021-08-16 DIAGNOSIS — K50.012 CROHN'S DISEASE OF ILEUM WITH INTESTINAL OBSTRUCTION (HCC): Primary | ICD-10-CM

## 2021-08-16 PROCEDURE — 96365 THER/PROPH/DIAG IV INF INIT: CPT

## 2021-08-16 RX ORDER — SODIUM CHLORIDE 9 MG/ML
20 INJECTION, SOLUTION INTRAVENOUS ONCE
Status: COMPLETED | OUTPATIENT
Start: 2021-08-16 | End: 2021-08-16

## 2021-08-16 RX ORDER — SODIUM CHLORIDE 9 MG/ML
20 INJECTION, SOLUTION INTRAVENOUS ONCE
Status: CANCELLED | OUTPATIENT
Start: 2021-09-14

## 2021-08-16 RX ADMIN — SODIUM CHLORIDE 20 ML/HR: 0.9 INJECTION, SOLUTION INTRAVENOUS at 09:06

## 2021-08-16 RX ADMIN — VEDOLIZUMAB 300 MG: 300 INJECTION, POWDER, LYOPHILIZED, FOR SOLUTION INTRAVENOUS at 09:44

## 2021-08-18 ENCOUNTER — HOSPITAL ENCOUNTER (OUTPATIENT)
Dept: NON INVASIVE DIAGNOSTICS | Facility: CLINIC | Age: 71
Discharge: HOME | End: 2021-08-18
Payer: MEDICARE

## 2021-08-18 DIAGNOSIS — Z98.890 S/P FEMORAL-TIBIAL BYPASS: ICD-10-CM

## 2021-08-18 PROCEDURE — 93925 LOWER EXTREMITY STUDY: CPT

## 2021-08-18 PROCEDURE — 93922 UPR/L XTREMITY ART 2 LEVELS: CPT | Performed by: SURGERY

## 2021-08-18 PROCEDURE — 93925 LOWER EXTREMITY STUDY: CPT | Performed by: SURGERY

## 2021-08-18 PROCEDURE — 93923 UPR/LXTR ART STDY 3+ LVLS: CPT

## 2021-08-19 NOTE — ASSESSMENT & PLAN NOTE
· S/p right popliteal stent  · was on xarelto & statin at home  · On heparin gtt while xarelto is on hold Include Z78.9 (Other Specified Conditions Influencing Health Status) As An Associated Diagnosis?: No Show Applicator Variable?: Yes Detail Level: Detailed Medical Necessity Information: It is in your best interest to select a reason for this procedure from the list below. All of these items fulfill various CMS LCD requirements except the new and changing color options. Consent: The patient's consent was obtained including but not limited to risks of crusting, scabbing, blistering, scarring, darker or lighter pigmentary change, recurrence, incomplete removal and infection. Medical Necessity Clause: This procedure was medically necessary because the lesions that were treated were: Number Of Freeze-Thaw Cycles: 2 freeze-thaw cycles Post-Care Instructions: I reviewed with the patient in detail post-instructions. Patient is to wear sunprotection, and avoid picking at any of the treated lesions. Pt may apply Vaseline to crusted or scabbing areas.

## 2021-08-26 ENCOUNTER — OFFICE VISIT (OUTPATIENT)
Dept: VASCULAR SURGERY | Facility: CLINIC | Age: 71
End: 2021-08-26
Payer: MEDICARE

## 2021-08-26 VITALS
HEART RATE: 72 BPM | TEMPERATURE: 98.8 F | DIASTOLIC BLOOD PRESSURE: 80 MMHG | WEIGHT: 233 LBS | BODY MASS INDEX: 33.36 KG/M2 | SYSTOLIC BLOOD PRESSURE: 140 MMHG | HEIGHT: 70 IN

## 2021-08-26 DIAGNOSIS — E66.09 CLASS 1 OBESITY DUE TO EXCESS CALORIES WITH SERIOUS COMORBIDITY AND BODY MASS INDEX (BMI) OF 33.0 TO 33.9 IN ADULT: ICD-10-CM

## 2021-08-26 DIAGNOSIS — I73.9 PAD (PERIPHERAL ARTERY DISEASE) (HCC): ICD-10-CM

## 2021-08-26 DIAGNOSIS — Z95.820 S/P PERIPHERAL ARTERY ANGIOPLASTY WITH STENT PLACEMENT: ICD-10-CM

## 2021-08-26 DIAGNOSIS — Z98.890 S/P FEMORAL-TIBIAL BYPASS: ICD-10-CM

## 2021-08-26 DIAGNOSIS — N18.31 STAGE 3A CHRONIC KIDNEY DISEASE (HCC): ICD-10-CM

## 2021-08-26 DIAGNOSIS — I70.0 ABDOMINAL AORTIC ATHEROSCLEROSIS (HCC): Primary | ICD-10-CM

## 2021-08-26 DIAGNOSIS — I75.021 BLUE TOE SYNDROME, RIGHT (HCC): ICD-10-CM

## 2021-08-26 DIAGNOSIS — R74.8 ELEVATED ALKALINE PHOSPHATASE LEVEL: ICD-10-CM

## 2021-08-26 PROCEDURE — 99214 OFFICE O/P EST MOD 30 MIN: CPT | Performed by: PHYSICIAN ASSISTANT

## 2021-08-26 NOTE — PROGRESS NOTES
Assessment/Plan:    Peripheral Arterial Disease  S/P right femoral to below-the-knee popliteal bypass 03/26/2020    -doing well; no leg, thigh or calf claudication  -no cramps or wounds  -walking almost 2 miles on most days    STEFFEN 8/18/21:     R 1 07/133/84 widely patent femoral to popliteal bypass     L 1 08/128/93 with no evidence of significant lower extremity arterial occlusive disease    Plan: We reviewed his recent labs and testing which are stabe  Lower extremity arterial duplex study shows patent right femoral to popliteal bypass  ABIs are normal bilaterally with good metatarsal and toe pressures  Lipids are well controlled  He remains on aspirin and atorvastatin  No medical changes are made at this time    We will continue with routine duplex surveillance and clinical monitoring      -Continue regular and progressive walking program to 30 minutes every day  -Continue with aspirin and atorvastatin  -Maintain healthy diet and weight; has Crohn's and limitations  -Consider referral to nutritionist  -Congratulated him on recently quitting smoking; continued smoking cessation  -follow-up lower extremity arterial duplex study in 1 year with office visit or sooner if needed    -Call or return sooner for increased pain in your legs; constant numbness, tingling or coldness in the legs; or if you develop wounds on your toes/feet that do not heal         Diagnoses and all orders for this visit:    Abdominal aortic atherosclerosis (HCC)    PAD (peripheral artery disease) (HCC)  -     VAS lower limb arterial duplex, complete bilateral; Future    Blue toe syndrome, right (HCC)    Class 1 obesity due to excess calories with serious comorbidity and body mass index (BMI) of 33 0 to 33 9 in adult    S/P peripheral artery angioplasty with stent placement    Elevated alkaline phosphatase level    S/P femoral-tibial bypass  -     VAS lower limb arterial duplex, complete bilateral; Future    Stage 3a chronic kidney disease ([facility], [place] [facility] )        Subjective:      Patient ID: Radha Ferreira III is a 72 y o  male     Patient is here to rv the result of STEFFEN done 8/18/21  Pt denies leg pain, claudication, swelling  No wounds or ulcers  Patient has a history of blue toe symptom  Patient is taking aspirin, atorvastatin and xarelto  Patient is a former smoker  HPI       Radha Ferreira III 14MK M with HTN, HLD, DM, CKD3, anemia, emphysema, isch CMP, Crohn's, atherosclerosis of the abdomen and lower extremities s/p R femoral to below knee popliteal bypass graft (3/26/20 ) Patient developed blue toe syndrome on the right in March 2019 which worsened  CT-scan was largely unimpressive for atherosclerotic occlusive disease or any possible proximal embolic source, apart from the right popliteal artery lesion  He has a soft atheromatous appearing plaque in the popliteal artery which is not flow limiting but possibly resulting in embolic events to the toes   He underwent popliteal artery stenting twice which occluded  Ultimately, he underwent SFA-BK pop bypass with rGSV on 3/26/20  He was also noted to have paroxysmal a fib and is on Xarelto  8/26/21: Patient presents for vascular follow up and review recent testing  Patient has no symptoms of thigh or calf claudication  No foot pain  He is walking comfortably on most days up to 2 miles  He recently quit smoking and motivated to work on healthy lifestyle changes  Although his A1c and lipids are stable he would like advice on improving diet  Due to multiple medical problems - Crohns, DM, renal dyfx, PAD, I recommended that he sit down with a nutritionist to help him with diet  He denies any medical changes and has no complaints today  We reviewed his lower extremity duplex study which shows normal ABIs  The right fem-pop bypass is patent  He has an excellent, easily palpable bypass pulse with good signals in the feet             A1c 6 6   Tg 100 H 55 L 43        STEFFEN 8/18/21:  Operative History:  2020-03-26 Right SFA to below knee popliteal bypass graft  2020-02-12 Right Pop Stent Thrombectomy (Angioplasty & 2nd Stent)  2019-06-27 Right Popliteal stent  Risk Factors  The patient has history of HLD, CKD, PAD and smoking  Clinical  Right Pressure:  123/ mm Hg, Left Pressure:  132/ mm Hg  FINDINGS:     Segment                Right       Left                                            PSV (cm/s)  PSV (cm/s)    Inflow Anastomosis            141                Mid Thigh (Graft)              97                Low Thigh (Graft)              49                Near Knee (Graft)              49                Outflow Anastomosis            59                Common Femoral Artery          81          92    Prox Profunda                  61          69    Prox SFA                       75          87    Mid SFA                        58          59    Dist SFA                                   49    Proximal Pop                               62    Distal Pop                                 49    Prox Post Tibial              100                Dist Post Tibial               66          79    Dist  Ant  Tibial              21          52    Dist Peroneal                  47          28             CONCLUSION:     Impression:  RIGHT LOWER LIMB:  Widely patent femoro-popliteal bypass graft  Ankle/Brachial index: 1 07, which is in the normal range, (Prior: 0 87)  Metatarsal pressure of 133 mmHg  Great toe pressure of 84 mmHg, within the healing range  (Prior: 80 mmHg)  PVR/ PPG tracings are normal      LEFT LOWER LIMB:  This resting evaluation shows no evidence of significant lower extremity  arterial occlusive disease  Ankle/Brachial index: 1 08, which is in the normal range  (Prior: 1 1)  Metatarsal pressure of 128 mmHg  Great toe pressure of 93 mmHg, within the healing range   (Prior: 118 mmHg)  PVR/ PPG tracings are normal      In comparison to the study of 2/15/2021, there is no significant change in the  disease process  The following portions of the patient's history were reviewed and updated as appropriate: allergies, current medications, past family history, past medical history, past social history, past surgical history and problem list     Review of Systems   Constitutional: Negative  HENT: Negative  Eyes: Negative  Respiratory: Negative  Cardiovascular: Negative  Gastrointestinal: Negative  Endocrine: Negative  Genitourinary: Negative  Musculoskeletal: Negative  Skin: Negative  Allergic/Immunologic: Negative  Neurological: Negative  Hematological: Negative  Psychiatric/Behavioral: Negative  Objective:      /80 (BP Location: Left arm, Patient Position: Sitting, Cuff Size: Standard)   Pulse 72   Temp 98 8 °F (37 1 °C) (Tympanic)   Ht 5' 10" (1 778 m)   Wt 106 kg (233 lb)   BMI 33 43 kg/m²     varicose veins L leg with mild to moderate chronic skin changes bilaterally       Physical Exam  Vitals and nursing note reviewed  Constitutional:       Appearance: He is well-developed  He is obese  HENT:      Head: Normocephalic and atraumatic  Eyes:      Pupils: Pupils are equal, round, and reactive to light  Neck:      Thyroid: No thyromegaly  Vascular: No JVD  Trachea: Trachea normal    Cardiovascular:      Rate and Rhythm: Normal rate  Rhythm irregular  Pulses: Decreased pulses  Carotid pulses are 2+ on the right side and 2+ on the left side  Radial pulses are 2+ on the right side and 2+ on the left side  Heart sounds: Normal heart sounds, S1 normal and S2 normal  No murmur heard  No friction rub  No gallop         Comments:     Excellent right popliteal bypass pulse    Biphasic PT signals and distal DP signals are present    Motor-sensory intact with capillary refill less than 2 seconds      Pulmonary:      Effort: Pulmonary effort is normal  No accessory muscle usage or respiratory distress  Breath sounds: Normal breath sounds  No wheezing or rales  Abdominal:      General: Bowel sounds are normal  There is no distension  Palpations: Abdomen is soft  Tenderness: There is no abdominal tenderness  Comments: Obese     Musculoskeletal:         General: No deformity  Normal range of motion  Cervical back: Neck supple  Skin:     General: Skin is warm and dry  Findings: No lesion or rash  Nails: There is no clubbing  Neurological:      Mental Status: He is alert and oriented to person, place, and time  Comments: Grossly normal    Psychiatric:         Behavior: Behavior is cooperative  I have reviewed and made appropriate changes to the review of systems input by the medical assistant      Vitals:    08/26/21 0934   BP: 140/80   BP Location: Left arm   Patient Position: Sitting   Cuff Size: Standard   Pulse: 72   Temp: 98 8 °F (37 1 °C)   TempSrc: Tympanic   Weight: 106 kg (233 lb)   Height: 5' 10" (1 778 m)       Patient Active Problem List   Diagnosis    Class 1 obesity due to excess calories with serious comorbidity and body mass index (BMI) of 33 0 to 33 9 in adult    Chronic kidney disease, stage III (moderate) (HCC)    Persistent proteinuria    Microscopic hematuria    [place] cardiac risk 10-20% in next 10 years    Abdominal aortic atherosclerosis (HCC)    RBBB (right bundle branch block with left anterior fascicular block)    Dyslipidemia    Vitamin D deficiency    GERD with esophagitis    Harris's esophagus without dysplasia    Colon polyps    PAD (peripheral artery disease) (HCC)    Mass of both adrenal glands (HCC)    Blue toe syndrome, right (HCC)    Ischemic cardiomyopathy    S/P peripheral artery angioplasty with stent placement    Venous stasis    Paroxysmal atrial fibrillation (HCC)    Anemia of chronic renal failure, stage 3 (moderate) (HCC)    Other emphysema (HCC)    Adrenal mass (HCC)    Crohn's disease of small intestine with complication (HCC)    SVT (supraventricular tachycardia) (HCC)    Positive QuantiFERON-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    Terminal ileitis of small intestine (HCC)    S/P femoral-tibial bypass    Elevated alkaline phosphatase level    Vitamin B12 deficiency    Type 2 diabetes mellitus with diabetic peripheral angiopathy without gangrene [facility])       Past Surgical History:   Procedure Laterality Date    COLONOSCOPY  2019    HERNIA REPAIR      IR AORTAGRAM WITH RUN-OFF  6/27/2019    IR AORTAGRAM WITH RUN-OFF  2/12/2020    POPLITEAL ARTERY STENT Right     6/2019    VA SLCTV CATHJ 3RD+ ORD SLCTV ABDL PEL/LXTR [facility] Right 6/27/2019    Procedure: leg ANGIOGRAM WITH STENT, BALLOON ANGIOPLASTY, LEFT GROIN ACCESS;  Surgeon: Mickie Crum MD;  [facility]: [facility];  Service: Vascular    VA VEIN BYPASS GRAFT,FEM-POP Right 3/26/2020    Procedure: BYPASS FEMORAL-POPLITEAL; Right lower extremity bypass, fem-bk pop with GSV;  Surgeon: Mickie Crum MD;  Location[facility];  Service: Vascular       Family History   Problem Relation Age of Onset    Heart disease Mother     Hyperlipidemia Mother     Hypertension Mother     Hypertension Father     Heart disease Father     Stroke Father     Hyperlipidemia Father     Diabetes Brother     No Known Problems Maternal Grandmother     Dementia Neg Hx     Drug abuse Neg Hx     Mental illness Neg Hx     Substance Abuse Neg Hx     Alcohol abuse Neg Hx     Depression Neg Hx     Colon cancer Neg Hx        Social History     Socioeconomic History    Marital status: /Civil Union     Spouse name: Not on file    Number of children: 2    Years of education: Not on file    Highest education level: Not on file   Occupational History    Occupation: retired   Tobacco Use    Smoking status: Current Every Day Smoker     Packs/day: 0 50     Years: 30 00     Pack years: 15 00     Types: Cigarettes    Smokeless tobacco: Never Used    Tobacco comment: " I will do it on my own"   Vaping Use    Vaping Use: Never used   Substance and Sexual Activity    Alcohol use: Yes     Alcohol/week: 1 0 standard drinks     Types: 1 Standard drinks or equivalent per week     Comment: social drinker when out dining    Drug use: Never    Sexual activity: Not Currently   Other Topics Concern    Not on file   Social History Narrative    Drinks coffee     Social Determinants of Health     Financial Resource Strain:     Difficulty of Paying Living Expenses:    Food Insecurity:     Worried About Running Out of Food in the Last Year:     [address] in the Last Year:    Transportation Needs:     Lack of Transportation (Medical):      Lack of Transportation (Non-Medical):    Physical Activity:     Days of Exercise per Week:     Minutes of Exercise per Session:    Stress:     Feeling of Stress :    Social Connections:     Frequency of Communication with Friends and Family:     Frequency of Social Gatherings with Friends and Family:     Attends Synagogue Services:     Active Member of Clubs or Organizations:     Attends Club or Organization Meetings:     Marital Status:    Intimate Partner Violence:     Fear of Current or Ex-Partner:     Emotionally Abused:     Physically Abused:     Sexually Abused:        No Known Allergies      Current Outpatient Medications:     aspirin 81 MG tablet, Take 81 mg by mouth daily, Disp: , Rfl:     atorvastatin (LIPITOR) 20 mg tablet, Take 1 tablet (20 mg total) by mouth daily, Disp: 90 tablet, Rfl: 1    Cholecalciferol (VITAMIN D) 2000 units CAPS, Take 2,000 Units by mouth daily , Disp: , Rfl:     folic acid (FOLVITE) 1 mg tablet, TAKE 1 TABLET(1000 MCG) BY MOUTH DAILY, Disp: 90 tablet, Rfl: 1    glimepiride (AMARYL) 1 mg tablet, Take 1 tablet (1 mg total) by mouth daily with breakfast, Disp: 90 tablet, Rfl: 1    Magnesium 500 MG TABS, Take 1 tablet by mouth daily , Disp: , Rfl:     metoprolol succinate (TOPROL-XL) 100 mg 24 hr tablet, Take 0 5 tablets (50 mg total) by mouth daily Reduction in medication as of 10/7/2020, Disp: 90 tablet, Rfl: 4    metoprolol succinate (TOPROL-XL) 25 mg 24 hr tablet, TAKE 1 TABLET(25 MG) BY MOUTH DAILY, Disp: 90 tablet, Rfl: 3    omeprazole (PriLOSEC) 40 MG capsule, TAKE 1 CAPSULE(40 MG) BY MOUTH DAILY, Disp: 90 capsule, Rfl: 1    rivaroxaban (XARELTO) 20 mg tablet, Take 1 tablet (20 mg total) by mouth daily with breakfast, Disp: 90 tablet, Rfl: 4    vitamin B-12 (VITAMIN B-12) 1,000 mcg tablet, Take 1 tablet (1,000 mcg total) by mouth daily, Disp: 90 tablet, Rfl: 1

## 2021-08-26 NOTE — PATIENT INSTRUCTIONS
Peripheral Arterial Disease  S/P right femoral to below-the-knee popliteal bypass 03/26/2020    -doing well; no leg, thigh or calf claudication  -walking almost 2 miles on most days      -continue with regular walking, recommend progressing to 30 minutes every day; stop as needed  -Follow good, heart healthy, diabetic diet  -Maintain healthy weight   -continue with smoking cessation; congratulations on quitting smoking  -we reviewed his lipid panel profile which is stable; continue with atorvastatin  -follow-up lower extremity arterial duplex study in 1 year with office visit or sooner if needed    -Call or return sooner for increased pain in your legs; constant numbness, tingling or coldness in the legs; or if you develop wounds on your toes/feet that do not heal      -Consider referral to nutritionist

## 2021-09-08 ENCOUNTER — REMOTE DEVICE CLINIC VISIT (OUTPATIENT)
Dept: CARDIOLOGY CLINIC | Facility: CLINIC | Age: 71
End: 2021-09-08
Payer: MEDICARE

## 2021-09-08 DIAGNOSIS — Z95.818 PRESENCE OF OTHER CARDIAC IMPLANTS AND GRAFTS: Primary | ICD-10-CM

## 2021-09-08 PROCEDURE — G2066 INTER DEVC REMOTE 30D: HCPCS | Performed by: INTERNAL MEDICINE

## 2021-09-08 PROCEDURE — 93298 REM INTERROG DEV EVAL SCRMS: CPT | Performed by: INTERNAL MEDICINE

## 2021-09-08 NOTE — PROGRESS NOTES
MDT LOOP   CARELINK TRANSMISSION: LOOP RECORDER  PRESENTING RHYTHM NSR W/ PACs @ 88 BPM  BATTERY STATUS "OK " 2 TACHY EPISODES W/ EGRAMS SHOWING SVT @ 188 BPM FOR 11 SECS AND OVERSENSING  [place] [address] 3-5 SEC PAUSES WHILE PT WAS SLEEPING  [facility] SB @ 30-63 BPM WHILE PT WAS SLEEPING OR ASYMPOTMATIC  3 AF EPISODES W/ EGRAMS SHOWING SR W/ PACs vs AF  AF BURDEN = 0%, HX OF PAF  PT TAKES METOPROLOL SUCC AND XARELTO  NO PATIENT ACTIVATED EPISODES  NORMAL DEVICE FUNCTION   DL

## 2021-09-13 ENCOUNTER — APPOINTMENT (OUTPATIENT)
Dept: LAB | Facility: CLINIC | Age: 71
End: 2021-09-13
Payer: MEDICARE

## 2021-09-13 DIAGNOSIS — E55.9 VITAMIN D DEFICIENCY: ICD-10-CM

## 2021-09-13 DIAGNOSIS — R80.1 PERSISTENT PROTEINURIA: ICD-10-CM

## 2021-09-13 DIAGNOSIS — E78.5 DYSLIPIDEMIA: ICD-10-CM

## 2021-09-13 DIAGNOSIS — I95.9 HYPOTENSION: ICD-10-CM

## 2021-09-13 DIAGNOSIS — D63.1 ANEMIA OF CHRONIC RENAL FAILURE, STAGE 3A (HCC): ICD-10-CM

## 2021-09-13 DIAGNOSIS — N18.31 ANEMIA OF CHRONIC RENAL FAILURE, STAGE 3A (HCC): ICD-10-CM

## 2021-09-13 DIAGNOSIS — R74.8 ELEVATED ALKALINE PHOSPHATASE LEVEL: ICD-10-CM

## 2021-09-13 DIAGNOSIS — N18.31 STAGE 3A CHRONIC KIDNEY DISEASE (HCC): ICD-10-CM

## 2021-09-13 DIAGNOSIS — R31.29 MICROSCOPIC HEMATURIA: ICD-10-CM

## 2021-09-13 LAB
ALBUMIN SERPL BCP-MCNC: 3.2 G/DL (ref 3.5–5)
ALP SERPL-CCNC: 211 U/L (ref 46–116)
ALT SERPL W P-5'-P-CCNC: 16 U/L (ref 12–78)
ANION GAP SERPL CALCULATED.3IONS-SCNC: 5 MMOL/L (ref 4–13)
AST SERPL W P-5'-P-CCNC: 11 U/L (ref 5–45)
BILIRUB SERPL-MCNC: 0.45 MG/DL (ref 0.2–1)
BUN SERPL-MCNC: 17 MG/DL (ref 5–25)
CALCIUM ALBUM COR SERPL-MCNC: 9.2 MG/DL (ref 8.3–10.1)
CALCIUM SERPL-MCNC: 8.6 MG/DL (ref 8.3–10.1)
CHLORIDE SERPL-SCNC: 108 MMOL/L (ref 100–108)
CK SERPL-CCNC: 71 U/L (ref 39–308)
CO2 SERPL-SCNC: 28 MMOL/L (ref 21–32)
CREAT SERPL-MCNC: 1.31 MG/DL (ref 0.6–1.3)
CREAT UR-MCNC: 120 MG/DL
ERYTHROCYTE [DISTWIDTH] IN BLOOD BY AUTOMATED COUNT: 15.1 % (ref 11.6–15.1)
GFR SERPL CREATININE-BSD FRML MDRD: 55 ML/MIN/1.73SQ M
GLUCOSE P FAST SERPL-MCNC: 126 MG/DL (ref 65–99)
HCT VFR BLD AUTO: 51.5 % (ref 36.5–49.3)
HGB BLD-MCNC: 15.9 G/DL (ref 12–17)
MAGNESIUM SERPL-MCNC: 1.9 MG/DL (ref 1.6–2.6)
MCH RBC QN AUTO: 28.3 PG (ref 26.8–34.3)
MCHC RBC AUTO-ENTMCNC: 30.9 G/DL (ref 31.4–37.4)
MCV RBC AUTO: 92 FL (ref 82–98)
PLATELET # BLD AUTO: 335 THOUSANDS/UL (ref 149–390)
PMV BLD AUTO: 10.2 FL (ref 8.9–12.7)
POTASSIUM SERPL-SCNC: 5.2 MMOL/L (ref 3.5–5.3)
PROT SERPL-MCNC: 7.4 G/DL (ref 6.4–8.2)
PROT UR-MCNC: 19 MG/DL
PROT/CREAT UR: 0.16 MG/G{CREAT} (ref 0–0.1)
RBC # BLD AUTO: 5.61 MILLION/UL (ref 3.88–5.62)
SODIUM SERPL-SCNC: 141 MMOL/L (ref 136–145)
WBC # BLD AUTO: 11.87 THOUSAND/UL (ref 4.31–10.16)

## 2021-09-13 PROCEDURE — 82570 ASSAY OF URINE CREATININE: CPT

## 2021-09-13 PROCEDURE — 83735 ASSAY OF MAGNESIUM: CPT

## 2021-09-13 PROCEDURE — 80053 COMPREHEN METABOLIC PANEL: CPT

## 2021-09-13 PROCEDURE — 84156 ASSAY OF PROTEIN URINE: CPT

## 2021-09-13 PROCEDURE — 36415 COLL VENOUS BLD VENIPUNCTURE: CPT

## 2021-09-13 PROCEDURE — 82550 ASSAY OF CK (CPK): CPT

## 2021-09-13 PROCEDURE — 85027 COMPLETE CBC AUTOMATED: CPT

## 2021-09-14 ENCOUNTER — HOSPITAL ENCOUNTER (OUTPATIENT)
Dept: INFUSION CENTER | Facility: CLINIC | Age: 71
Discharge: HOME | End: 2021-09-14
Payer: MEDICARE

## 2021-09-14 VITALS
SYSTOLIC BLOOD PRESSURE: 155 MMHG | HEART RATE: 64 BPM | RESPIRATION RATE: 18 BRPM | DIASTOLIC BLOOD PRESSURE: 88 MMHG | OXYGEN SATURATION: 93 % | TEMPERATURE: 96.5 F

## 2021-09-14 DIAGNOSIS — K50.012 CROHN'S DISEASE OF ILEUM WITH INTESTINAL OBSTRUCTION (HCC): Primary | ICD-10-CM

## 2021-09-14 PROCEDURE — 96365 THER/PROPH/DIAG IV INF INIT: CPT

## 2021-09-14 RX ORDER — SODIUM CHLORIDE 9 MG/ML
20 INJECTION, SOLUTION INTRAVENOUS ONCE
Status: CANCELLED | OUTPATIENT
Start: 2021-10-12

## 2021-09-14 RX ORDER — SODIUM CHLORIDE 9 MG/ML
20 INJECTION, SOLUTION INTRAVENOUS ONCE
Status: COMPLETED | OUTPATIENT
Start: 2021-09-14 | End: 2021-09-14

## 2021-09-14 RX ADMIN — VEDOLIZUMAB 300 MG: 300 INJECTION, POWDER, LYOPHILIZED, FOR SOLUTION INTRAVENOUS at 08:41

## 2021-09-14 RX ADMIN — SODIUM CHLORIDE 20 ML/HR: 0.9 INJECTION, SOLUTION INTRAVENOUS at 08:32

## 2021-09-14 NOTE — PROGRESS NOTES
Patient here for [facility] today  He offers no complaints, denies and recent infections, ABX use  Vital signs stable, IV placed without issue  Will continue to monitor

## 2021-09-14 NOTE — PROGRESS NOTES
Patient tolerated treatment without issue  Patient declined AVS, he is aware to make next appointments on his way out today

## 2021-10-04 DIAGNOSIS — I73.9 PAD (PERIPHERAL ARTERY DISEASE) (HCC): ICD-10-CM

## 2021-10-04 RX ORDER — ATORVASTATIN CALCIUM 20 MG/1
TABLET, FILM COATED ORAL
Qty: 90 TABLET | Refills: 1 | Status: SHIPPED | OUTPATIENT
Start: 2021-10-04 | End: 2022-01-02 | Stop reason: SDUPTHER

## 2021-10-07 ENCOUNTER — CLINICAL SUPPORT (OUTPATIENT)
Dept: INTERNAL MEDICINE CLINIC | Facility: CLINIC | Age: 71
End: 2021-10-07
Payer: MEDICARE

## 2021-10-07 VITALS — TEMPERATURE: 98.1 F

## 2021-10-07 DIAGNOSIS — Z23 NEED FOR IMMUNIZATION AGAINST INFLUENZA: Primary | ICD-10-CM

## 2021-10-07 PROCEDURE — 90662 IIV NO PRSV INCREASED AG IM: CPT

## 2021-10-07 PROCEDURE — G0008 ADMIN INFLUENZA VIRUS VAC: HCPCS

## 2021-10-08 ENCOUNTER — TELEPHONE (OUTPATIENT)
Dept: ADMINISTRATIVE | Facility: OTHER | Age: 71
End: 2021-10-08

## 2021-10-11 ENCOUNTER — OFFICE VISIT (OUTPATIENT)
Dept: GASTROENTEROLOGY | Facility: MEDICAL CENTER | Age: 71
End: 2021-10-11
Payer: MEDICARE

## 2021-10-11 ENCOUNTER — APPOINTMENT (OUTPATIENT)
Dept: LAB | Facility: MEDICAL CENTER | Age: 71
End: 2021-10-11
Attending: INTERNAL MEDICINE
Payer: MEDICARE

## 2021-10-11 ENCOUNTER — TELEPHONE (OUTPATIENT)
Dept: INTERNAL MEDICINE CLINIC | Facility: CLINIC | Age: 71
End: 2021-10-11

## 2021-10-11 VITALS
TEMPERATURE: 98 F | DIASTOLIC BLOOD PRESSURE: 70 MMHG | WEIGHT: 235.8 LBS | SYSTOLIC BLOOD PRESSURE: 132 MMHG | BODY MASS INDEX: 33.83 KG/M2 | HEART RATE: 67 BPM

## 2021-10-11 DIAGNOSIS — K50.019 CROHN'S DISEASE OF SMALL INTESTINE WITH COMPLICATION (HCC): ICD-10-CM

## 2021-10-11 DIAGNOSIS — K22.70 BARRETT'S ESOPHAGUS WITHOUT DYSPLASIA: ICD-10-CM

## 2021-10-11 DIAGNOSIS — K50.012 CROHN'S DISEASE OF ILEUM WITH INTESTINAL OBSTRUCTION (HCC): ICD-10-CM

## 2021-10-11 DIAGNOSIS — D47.3 ESSENTIAL (HEMORRHAGIC) THROMBOCYTHEMIA (HCC): ICD-10-CM

## 2021-10-11 DIAGNOSIS — E66.09 CLASS 1 OBESITY DUE TO EXCESS CALORIES WITH SERIOUS COMORBIDITY AND BODY MASS INDEX (BMI) OF 33.0 TO 33.9 IN ADULT: ICD-10-CM

## 2021-10-11 DIAGNOSIS — I87.8 VENOUS STASIS: ICD-10-CM

## 2021-10-11 DIAGNOSIS — E83.52 HYPERCALCEMIA: Primary | ICD-10-CM

## 2021-10-11 DIAGNOSIS — K21.00 GASTROESOPHAGEAL REFLUX DISEASE WITH ESOPHAGITIS WITHOUT HEMORRHAGE: ICD-10-CM

## 2021-10-11 DIAGNOSIS — E53.8 VITAMIN B12 DEFICIENCY: ICD-10-CM

## 2021-10-11 DIAGNOSIS — K52.9 INFLAMMATORY BOWEL DISEASE: ICD-10-CM

## 2021-10-11 DIAGNOSIS — I48.0 PAROXYSMAL ATRIAL FIBRILLATION (HCC): ICD-10-CM

## 2021-10-11 DIAGNOSIS — D84.9 IMMUNOCOMPROMISED PATIENT (HCC): ICD-10-CM

## 2021-10-11 DIAGNOSIS — K50.012 CROHN'S DISEASE OF ILEUM WITH INTESTINAL OBSTRUCTION (HCC): Primary | ICD-10-CM

## 2021-10-11 LAB
ALBUMIN SERPL BCP-MCNC: 3.2 G/DL (ref 3.5–5)
ALP SERPL-CCNC: 203 U/L (ref 46–116)
ALT SERPL W P-5'-P-CCNC: 26 U/L (ref 12–78)
ANION GAP SERPL CALCULATED.3IONS-SCNC: 2 MMOL/L (ref 4–13)
AST SERPL W P-5'-P-CCNC: 15 U/L (ref 5–45)
BASOPHILS # BLD AUTO: 0.07 THOUSANDS/ΜL (ref 0–0.1)
BASOPHILS NFR BLD AUTO: 1 % (ref 0–1)
BILIRUB SERPL-MCNC: 0.7 MG/DL (ref 0.2–1)
BUN SERPL-MCNC: 14 MG/DL (ref 5–25)
CALCIUM ALBUM COR SERPL-MCNC: 10.5 MG/DL (ref 8.3–10.1)
CALCIUM SERPL-MCNC: 9.9 MG/DL (ref 8.3–10.1)
CHLORIDE SERPL-SCNC: 109 MMOL/L (ref 100–108)
CO2 SERPL-SCNC: 29 MMOL/L (ref 21–32)
CREAT SERPL-MCNC: 1.28 MG/DL (ref 0.6–1.3)
CRP SERPL QL: 11.6 MG/L
EOSINOPHIL # BLD AUTO: 0.17 THOUSAND/ΜL (ref 0–0.61)
EOSINOPHIL NFR BLD AUTO: 2 % (ref 0–6)
ERYTHROCYTE [DISTWIDTH] IN BLOOD BY AUTOMATED COUNT: 15.3 % (ref 11.6–15.1)
EST. AVERAGE GLUCOSE BLD GHB EST-MCNC: 143 MG/DL
GFR SERPL CREATININE-BSD FRML MDRD: 56 ML/MIN/1.73SQ M
GLUCOSE P FAST SERPL-MCNC: 91 MG/DL (ref 65–99)
HBA1C MFR BLD: 6.6 %
HCT VFR BLD AUTO: 52.5 % (ref 36.5–49.3)
HGB BLD-MCNC: 16.1 G/DL (ref 12–17)
IMM GRANULOCYTES # BLD AUTO: 0.04 THOUSAND/UL (ref 0–0.2)
IMM GRANULOCYTES NFR BLD AUTO: 0 % (ref 0–2)
LYMPHOCYTES # BLD AUTO: 2.61 THOUSANDS/ΜL (ref 0.6–4.47)
LYMPHOCYTES NFR BLD AUTO: 25 % (ref 14–44)
MCH RBC QN AUTO: 28.2 PG (ref 26.8–34.3)
MCHC RBC AUTO-ENTMCNC: 30.7 G/DL (ref 31.4–37.4)
MCV RBC AUTO: 92 FL (ref 82–98)
MONOCYTES # BLD AUTO: 0.99 THOUSAND/ΜL (ref 0.17–1.22)
MONOCYTES NFR BLD AUTO: 9 % (ref 4–12)
NEUTROPHILS # BLD AUTO: 6.69 THOUSANDS/ΜL (ref 1.85–7.62)
NEUTS SEG NFR BLD AUTO: 63 % (ref 43–75)
NRBC BLD AUTO-RTO: 0 /100 WBCS
PLATELET # BLD AUTO: 322 THOUSANDS/UL (ref 149–390)
PMV BLD AUTO: 11 FL (ref 8.9–12.7)
POTASSIUM SERPL-SCNC: 4.8 MMOL/L (ref 3.5–5.3)
PROT SERPL-MCNC: 7.6 G/DL (ref 6.4–8.2)
RBC # BLD AUTO: 5.7 MILLION/UL (ref 3.88–5.62)
SODIUM SERPL-SCNC: 140 MMOL/L (ref 136–145)
WBC # BLD AUTO: 10.57 THOUSAND/UL (ref 4.31–10.16)

## 2021-10-11 PROCEDURE — 83036 HEMOGLOBIN GLYCOSYLATED A1C: CPT | Performed by: INTERNAL MEDICINE

## 2021-10-11 PROCEDURE — 80053 COMPREHEN METABOLIC PANEL: CPT | Performed by: INTERNAL MEDICINE

## 2021-10-11 PROCEDURE — 86140 C-REACTIVE PROTEIN: CPT

## 2021-10-11 PROCEDURE — 80280 DRUG ASSAY VEDOLIZUMAB: CPT

## 2021-10-11 PROCEDURE — 82397 CHEMILUMINESCENT ASSAY: CPT

## 2021-10-11 PROCEDURE — 36415 COLL VENOUS BLD VENIPUNCTURE: CPT | Performed by: INTERNAL MEDICINE

## 2021-10-11 PROCEDURE — 85025 COMPLETE CBC W/AUTO DIFF WBC: CPT | Performed by: INTERNAL MEDICINE

## 2021-10-11 PROCEDURE — 99214 OFFICE O/P EST MOD 30 MIN: CPT | Performed by: INTERNAL MEDICINE

## 2021-10-12 ENCOUNTER — HOSPITAL ENCOUNTER (OUTPATIENT)
Dept: INFUSION CENTER | Facility: CLINIC | Age: 71
Discharge: HOME | End: 2021-10-12
Payer: MEDICARE

## 2021-10-12 VITALS
HEART RATE: 77 BPM | SYSTOLIC BLOOD PRESSURE: 135 MMHG | TEMPERATURE: 97.3 F | RESPIRATION RATE: 18 BRPM | DIASTOLIC BLOOD PRESSURE: 79 MMHG

## 2021-10-12 DIAGNOSIS — K50.012 CROHN'S DISEASE OF ILEUM WITH INTESTINAL OBSTRUCTION (HCC): Primary | ICD-10-CM

## 2021-10-12 PROCEDURE — 96365 THER/PROPH/DIAG IV INF INIT: CPT

## 2021-10-12 RX ORDER — SODIUM CHLORIDE 9 MG/ML
20 INJECTION, SOLUTION INTRAVENOUS ONCE
Status: COMPLETED | OUTPATIENT
Start: 2021-10-12 | End: 2021-10-12

## 2021-10-12 RX ORDER — SODIUM CHLORIDE 9 MG/ML
20 INJECTION, SOLUTION INTRAVENOUS ONCE
Status: CANCELLED | OUTPATIENT
Start: 2021-11-09

## 2021-10-12 RX ADMIN — SODIUM CHLORIDE 20 ML/HR: 0.9 INJECTION, SOLUTION INTRAVENOUS at 08:15

## 2021-10-12 RX ADMIN — VEDOLIZUMAB 300 MG: 300 INJECTION, POWDER, LYOPHILIZED, FOR SOLUTION INTRAVENOUS at 09:17

## 2021-10-13 RX ORDER — METOPROLOL SUCCINATE 50 MG/1
50 TABLET, EXTENDED RELEASE ORAL DAILY
Qty: 90 TABLET | Refills: 4 | OUTPATIENT
Start: 2021-10-13 | End: 2022-01-02 | Stop reason: SDUPTHER

## 2021-10-19 LAB — MISCELLANEOUS LAB TEST RESULT: NORMAL

## 2021-10-23 ENCOUNTER — IMMUNIZATIONS (OUTPATIENT)
Dept: FAMILY MEDICINE CLINIC | Facility: HOSPITAL | Age: 71
End: 2021-10-23

## 2021-10-23 DIAGNOSIS — Z23 ENCOUNTER FOR IMMUNIZATION: Primary | ICD-10-CM

## 2021-10-23 PROCEDURE — 91300 COVID-19 PFIZER VACC 0.3 ML: CPT

## 2021-10-23 PROCEDURE — 0001A COVID-19 PFIZER VACC 0.3 ML: CPT

## 2021-10-25 ENCOUNTER — OFFICE VISIT (OUTPATIENT)
Dept: NEPHROLOGY | Facility: CLINIC | Age: 71
End: 2021-10-25
Payer: MEDICARE

## 2021-10-25 VITALS — HEIGHT: 70 IN | WEIGHT: 236 LBS | BODY MASS INDEX: 33.79 KG/M2

## 2021-10-25 DIAGNOSIS — N18.31 STAGE 3A CHRONIC KIDNEY DISEASE (HCC): Primary | ICD-10-CM

## 2021-10-25 DIAGNOSIS — R74.8 ELEVATED ALKALINE PHOSPHATASE LEVEL: ICD-10-CM

## 2021-10-25 DIAGNOSIS — E55.9 VITAMIN D DEFICIENCY: ICD-10-CM

## 2021-10-25 DIAGNOSIS — E78.5 DYSLIPIDEMIA: ICD-10-CM

## 2021-10-25 PROCEDURE — 99214 OFFICE O/P EST MOD 30 MIN: CPT | Performed by: INTERNAL MEDICINE

## 2021-10-31 ENCOUNTER — DOCUMENTATION (OUTPATIENT)
Dept: GASTROENTEROLOGY | Facility: CLINIC | Age: 71
End: 2021-10-31

## 2021-11-09 ENCOUNTER — HOSPITAL ENCOUNTER (OUTPATIENT)
Dept: INFUSION CENTER | Facility: CLINIC | Age: 71
Discharge: HOME | End: 2021-11-09
Payer: MEDICARE

## 2021-11-09 VITALS
DIASTOLIC BLOOD PRESSURE: 88 MMHG | HEART RATE: 70 BPM | SYSTOLIC BLOOD PRESSURE: 141 MMHG | OXYGEN SATURATION: 92 % | TEMPERATURE: 97.4 F | RESPIRATION RATE: 18 BRPM

## 2021-11-09 DIAGNOSIS — K50.012 CROHN'S DISEASE OF ILEUM WITH INTESTINAL OBSTRUCTION (HCC): Primary | ICD-10-CM

## 2021-11-09 PROCEDURE — 96365 THER/PROPH/DIAG IV INF INIT: CPT

## 2021-11-09 RX ORDER — SODIUM CHLORIDE 9 MG/ML
20 INJECTION, SOLUTION INTRAVENOUS ONCE
Status: COMPLETED | OUTPATIENT
Start: 2021-11-09 | End: 2021-11-09

## 2021-11-09 RX ORDER — SODIUM CHLORIDE 9 MG/ML
20 INJECTION, SOLUTION INTRAVENOUS ONCE
Status: CANCELLED | OUTPATIENT
Start: 2021-12-07

## 2021-11-09 RX ADMIN — VEDOLIZUMAB 300 MG: 300 INJECTION, POWDER, LYOPHILIZED, FOR SOLUTION INTRAVENOUS at 08:26

## 2021-11-09 RX ADMIN — SODIUM CHLORIDE 20 ML/HR: 0.9 INJECTION, SOLUTION INTRAVENOUS at 08:04

## 2021-11-18 ENCOUNTER — OFFICE VISIT (OUTPATIENT)
Dept: INTERNAL MEDICINE CLINIC | Facility: CLINIC | Age: 71
End: 2021-11-18
Payer: MEDICARE

## 2021-11-18 VITALS
HEIGHT: 70 IN | DIASTOLIC BLOOD PRESSURE: 76 MMHG | SYSTOLIC BLOOD PRESSURE: 110 MMHG | TEMPERATURE: 97.2 F | BODY MASS INDEX: 33.64 KG/M2 | OXYGEN SATURATION: 94 % | WEIGHT: 235 LBS | HEART RATE: 57 BPM

## 2021-11-18 DIAGNOSIS — E66.09 CLASS 1 OBESITY DUE TO EXCESS CALORIES WITH SERIOUS COMORBIDITY AND BODY MASS INDEX (BMI) OF 33.0 TO 33.9 IN ADULT: ICD-10-CM

## 2021-11-18 DIAGNOSIS — I48.0 PAROXYSMAL ATRIAL FIBRILLATION (HCC): ICD-10-CM

## 2021-11-18 DIAGNOSIS — Z00.00 HEALTH MAINTENANCE EXAMINATION: ICD-10-CM

## 2021-11-18 DIAGNOSIS — E11.51 TYPE 2 DIABETES MELLITUS WITH DIABETIC PERIPHERAL ANGIOPATHY WITHOUT GANGRENE, WITHOUT LONG-TERM CURRENT USE OF INSULIN (HCC): ICD-10-CM

## 2021-11-18 DIAGNOSIS — K50.019 CROHN'S DISEASE OF SMALL INTESTINE WITH COMPLICATION (HCC): ICD-10-CM

## 2021-11-18 DIAGNOSIS — E53.8 VITAMIN B12 DEFICIENCY: ICD-10-CM

## 2021-11-18 DIAGNOSIS — E78.5 DYSLIPIDEMIA: ICD-10-CM

## 2021-11-18 DIAGNOSIS — E27.8 ADRENAL MASS (HCC): ICD-10-CM

## 2021-11-18 DIAGNOSIS — I73.9 PAD (PERIPHERAL ARTERY DISEASE) (HCC): ICD-10-CM

## 2021-11-18 DIAGNOSIS — N18.31 STAGE 3A CHRONIC KIDNEY DISEASE (HCC): Primary | ICD-10-CM

## 2021-11-18 DIAGNOSIS — K22.70 BARRETT'S ESOPHAGUS WITHOUT DYSPLASIA: ICD-10-CM

## 2021-11-18 PROCEDURE — G0439 PPPS, SUBSEQ VISIT: HCPCS | Performed by: INTERNAL MEDICINE

## 2021-11-18 PROCEDURE — 1123F ACP DISCUSS/DSCN MKR DOCD: CPT | Performed by: INTERNAL MEDICINE

## 2021-11-18 PROCEDURE — 99214 OFFICE O/P EST MOD 30 MIN: CPT | Performed by: INTERNAL MEDICINE

## 2021-12-07 ENCOUNTER — HOSPITAL ENCOUNTER (OUTPATIENT)
Dept: INFUSION CENTER | Facility: CLINIC | Age: 71
Discharge: HOME | End: 2021-12-07
Payer: MEDICARE

## 2021-12-07 VITALS
HEART RATE: 80 BPM | RESPIRATION RATE: 18 BRPM | DIASTOLIC BLOOD PRESSURE: 77 MMHG | TEMPERATURE: 96.8 F | OXYGEN SATURATION: 93 % | SYSTOLIC BLOOD PRESSURE: 141 MMHG

## 2021-12-07 DIAGNOSIS — K50.012 CROHN'S DISEASE OF ILEUM WITH INTESTINAL OBSTRUCTION (HCC): Primary | ICD-10-CM

## 2021-12-07 PROCEDURE — 96365 THER/PROPH/DIAG IV INF INIT: CPT

## 2021-12-07 RX ORDER — SODIUM CHLORIDE 9 MG/ML
20 INJECTION, SOLUTION INTRAVENOUS ONCE
Status: COMPLETED | OUTPATIENT
Start: 2021-12-07 | End: 2021-12-07

## 2021-12-07 RX ORDER — SODIUM CHLORIDE 9 MG/ML
20 INJECTION, SOLUTION INTRAVENOUS ONCE
Status: CANCELLED | OUTPATIENT
Start: 2022-01-04

## 2021-12-07 RX ADMIN — SODIUM CHLORIDE 20 ML/HR: 0.9 INJECTION, SOLUTION INTRAVENOUS at 14:30

## 2021-12-07 RX ADMIN — VEDOLIZUMAB 300 MG: 300 INJECTION, POWDER, LYOPHILIZED, FOR SOLUTION INTRAVENOUS at 14:47

## 2021-12-08 ENCOUNTER — REMOTE DEVICE CLINIC VISIT (OUTPATIENT)
Dept: CARDIOLOGY CLINIC | Facility: CLINIC | Age: 71
End: 2021-12-08
Payer: MEDICARE

## 2021-12-08 DIAGNOSIS — Z95.818 PRESENCE OF OTHER CARDIAC IMPLANTS AND GRAFTS: Primary | ICD-10-CM

## 2021-12-08 PROCEDURE — G2066 INTER DEVC REMOTE 30D: HCPCS | Performed by: INTERNAL MEDICINE

## 2021-12-08 PROCEDURE — 93298 REM INTERROG DEV EVAL SCRMS: CPT | Performed by: INTERNAL MEDICINE

## 2021-12-28 ENCOUNTER — TELEPHONE (OUTPATIENT)
Dept: SURGICAL ONCOLOGY | Facility: CLINIC | Age: 71
End: 2021-12-28

## 2021-12-29 DIAGNOSIS — K50.012 CROHN'S DISEASE OF ILEUM WITH INTESTINAL OBSTRUCTION (HCC): Primary | ICD-10-CM

## 2022-01-02 DIAGNOSIS — E11.51 TYPE 2 DIABETES MELLITUS WITH DIABETIC PERIPHERAL ANGIOPATHY WITHOUT GANGRENE, WITHOUT LONG-TERM CURRENT USE OF INSULIN (HCC): ICD-10-CM

## 2022-01-02 DIAGNOSIS — K21.00 GERD WITH ESOPHAGITIS: ICD-10-CM

## 2022-01-02 DIAGNOSIS — K52.9 INFLAMMATORY BOWEL DISEASE: ICD-10-CM

## 2022-01-03 RX ORDER — OMEPRAZOLE 40 MG/1
CAPSULE, DELAYED RELEASE ORAL
Qty: 90 CAPSULE | Refills: 1 | Status: SHIPPED | OUTPATIENT
Start: 2022-01-03 | End: 2022-01-04 | Stop reason: SDUPTHER

## 2022-01-03 RX ORDER — FOLIC ACID 1 MG/1
TABLET ORAL
Qty: 90 TABLET | Refills: 1 | Status: SHIPPED | OUTPATIENT
Start: 2022-01-03 | End: 2022-01-04 | Stop reason: SDUPTHER

## 2022-01-03 RX ORDER — GLIMEPIRIDE 1 MG/1
TABLET ORAL
Qty: 90 TABLET | Refills: 1 | Status: SHIPPED | OUTPATIENT
Start: 2022-01-03 | End: 2022-01-04 | Stop reason: SDUPTHER

## 2022-01-04 ENCOUNTER — HOSPITAL ENCOUNTER (OUTPATIENT)
Dept: INFUSION CENTER | Facility: CLINIC | Age: 72
Discharge: HOME | End: 2022-01-04
Payer: MEDICARE

## 2022-01-04 VITALS
RESPIRATION RATE: 18 BRPM | SYSTOLIC BLOOD PRESSURE: 152 MMHG | DIASTOLIC BLOOD PRESSURE: 88 MMHG | HEART RATE: 85 BPM | TEMPERATURE: 96.9 F

## 2022-01-04 DIAGNOSIS — K50.012 CROHN'S DISEASE OF ILEUM WITH INTESTINAL OBSTRUCTION (HCC): Primary | ICD-10-CM

## 2022-01-04 DIAGNOSIS — K52.9 INFLAMMATORY BOWEL DISEASE: ICD-10-CM

## 2022-01-04 PROCEDURE — 96365 THER/PROPH/DIAG IV INF INIT: CPT

## 2022-01-04 RX ORDER — FOLIC ACID 1 MG/1
1 TABLET ORAL DAILY
Qty: 90 TABLET | Refills: 3 | Status: SHIPPED | OUTPATIENT
Start: 2022-01-04

## 2022-01-04 RX ORDER — SODIUM CHLORIDE 9 MG/ML
20 INJECTION, SOLUTION INTRAVENOUS ONCE
Status: CANCELLED | OUTPATIENT
Start: 2022-02-01

## 2022-01-04 RX ORDER — SODIUM CHLORIDE 9 MG/ML
20 INJECTION, SOLUTION INTRAVENOUS ONCE
Status: COMPLETED | OUTPATIENT
Start: 2022-01-04 | End: 2022-01-04

## 2022-01-04 RX ADMIN — VEDOLIZUMAB 300 MG: 300 INJECTION, POWDER, LYOPHILIZED, FOR SOLUTION INTRAVENOUS at 08:56

## 2022-01-04 RX ADMIN — SODIUM CHLORIDE 20 ML/HR: 0.9 INJECTION, SOLUTION INTRAVENOUS at 08:35

## 2022-01-13 ENCOUNTER — HOSPITAL ENCOUNTER (OUTPATIENT)
Dept: CT IMAGING | Facility: HOSPITAL | Age: 72
Discharge: HOME | End: 2022-01-13
Attending: SURGERY
Payer: MEDICARE

## 2022-01-13 DIAGNOSIS — E27.8 ADRENAL MASS (HCC): ICD-10-CM

## 2022-01-13 PROCEDURE — 74170 CT ABD WO CNTRST FLWD CNTRST: CPT

## 2022-01-13 PROCEDURE — G1004 CDSM NDSC: HCPCS

## 2022-01-13 RX ADMIN — IODIXANOL 98 ML: 320 INJECTION, SOLUTION INTRAVASCULAR at 08:16

## 2022-01-16 DIAGNOSIS — R03.0 ELEVATED BLOOD PRESSURE READING: ICD-10-CM

## 2022-01-17 RX ORDER — METOPROLOL SUCCINATE 25 MG/1
25 TABLET, EXTENDED RELEASE ORAL DAILY
Qty: 90 TABLET | Refills: 0 | Status: SHIPPED | OUTPATIENT
Start: 2022-01-17 | End: 2022-07-06

## 2022-01-18 ENCOUNTER — APPOINTMENT (OUTPATIENT)
Dept: LAB | Facility: CLINIC | Age: 72
End: 2022-01-18
Payer: MEDICARE

## 2022-01-18 DIAGNOSIS — E53.8 VITAMIN B12 DEFICIENCY: ICD-10-CM

## 2022-01-18 DIAGNOSIS — N18.31 STAGE 3A CHRONIC KIDNEY DISEASE (HCC): ICD-10-CM

## 2022-01-18 DIAGNOSIS — E78.5 DYSLIPIDEMIA: ICD-10-CM

## 2022-01-18 DIAGNOSIS — E55.9 VITAMIN D DEFICIENCY: ICD-10-CM

## 2022-01-18 DIAGNOSIS — E11.51 TYPE 2 DIABETES MELLITUS WITH DIABETIC PERIPHERAL ANGIOPATHY WITHOUT GANGRENE, WITHOUT LONG-TERM CURRENT USE OF INSULIN (HCC): ICD-10-CM

## 2022-01-18 DIAGNOSIS — R74.8 ELEVATED ALKALINE PHOSPHATASE LEVEL: ICD-10-CM

## 2022-01-18 DIAGNOSIS — E83.52 HYPERCALCEMIA: ICD-10-CM

## 2022-01-18 LAB
ANION GAP SERPL CALCULATED.3IONS-SCNC: 9 MMOL/L (ref 4–13)
BUN SERPL-MCNC: 15 MG/DL (ref 5–25)
CALCIUM SERPL-MCNC: 8.7 MG/DL (ref 8.3–10.1)
CHLORIDE SERPL-SCNC: 103 MMOL/L (ref 100–108)
CO2 SERPL-SCNC: 28 MMOL/L (ref 21–32)
CREAT SERPL-MCNC: 1.32 MG/DL (ref 0.6–1.3)
EST. AVERAGE GLUCOSE BLD GHB EST-MCNC: 140 MG/DL
GFR SERPL CREATININE-BSD FRML MDRD: 54 ML/MIN/1.73SQ M
GLUCOSE P FAST SERPL-MCNC: 118 MG/DL (ref 65–99)
HBA1C MFR BLD: 6.5 %
MAGNESIUM SERPL-MCNC: 2 MG/DL (ref 1.6–2.6)
POTASSIUM SERPL-SCNC: 4.6 MMOL/L (ref 3.5–5.3)
SODIUM SERPL-SCNC: 140 MMOL/L (ref 136–145)
TSH SERPL DL<=0.05 MIU/L-ACNC: 2.81 UIU/ML (ref 0.36–3.74)
VIT B12 SERPL-MCNC: 1005 PG/ML (ref 100–900)

## 2022-01-18 PROCEDURE — 80048 BASIC METABOLIC PNL TOTAL CA: CPT

## 2022-01-18 PROCEDURE — 36415 COLL VENOUS BLD VENIPUNCTURE: CPT

## 2022-01-18 PROCEDURE — 82306 VITAMIN D 25 HYDROXY: CPT

## 2022-01-18 PROCEDURE — 84443 ASSAY THYROID STIM HORMONE: CPT

## 2022-01-18 PROCEDURE — 83036 HEMOGLOBIN GLYCOSYLATED A1C: CPT

## 2022-01-18 PROCEDURE — 83735 ASSAY OF MAGNESIUM: CPT

## 2022-01-18 PROCEDURE — 82607 VITAMIN B-12: CPT

## 2022-01-19 LAB — 25(OH)D3 SERPL-MCNC: 46.9 NG/ML (ref 30–100)

## 2022-01-20 ENCOUNTER — TELEPHONE (OUTPATIENT)
Dept: INTERNAL MEDICINE CLINIC | Facility: CLINIC | Age: 72
End: 2022-01-20

## 2022-01-20 NOTE — TELEPHONE ENCOUNTER
Lab results:    Sugars and kidney function stable  If you are taking a B12 supplement, you can take it 3 days a week only  If not, no worries      The rest of your labs were normal

## 2022-02-01 ENCOUNTER — HOSPITAL ENCOUNTER (OUTPATIENT)
Dept: INFUSION CENTER | Facility: CLINIC | Age: 72
Discharge: HOME | End: 2022-02-01
Payer: MEDICARE

## 2022-02-01 VITALS
DIASTOLIC BLOOD PRESSURE: 84 MMHG | TEMPERATURE: 96 F | RESPIRATION RATE: 18 BRPM | HEART RATE: 66 BPM | OXYGEN SATURATION: 95 % | SYSTOLIC BLOOD PRESSURE: 132 MMHG

## 2022-02-01 DIAGNOSIS — K50.012 CROHN'S DISEASE OF ILEUM WITH INTESTINAL OBSTRUCTION (HCC): Primary | ICD-10-CM

## 2022-02-01 PROCEDURE — 96365 THER/PROPH/DIAG IV INF INIT: CPT

## 2022-02-01 RX ORDER — SODIUM CHLORIDE 9 MG/ML
20 INJECTION, SOLUTION INTRAVENOUS ONCE
Status: COMPLETED | OUTPATIENT
Start: 2022-02-01 | End: 2022-02-01

## 2022-02-01 RX ORDER — SODIUM CHLORIDE 9 MG/ML
20 INJECTION, SOLUTION INTRAVENOUS ONCE
Status: CANCELLED | OUTPATIENT
Start: 2022-03-01

## 2022-02-01 RX ADMIN — VEDOLIZUMAB 300 MG: 300 INJECTION, POWDER, LYOPHILIZED, FOR SOLUTION INTRAVENOUS at 10:14

## 2022-02-01 RX ADMIN — SODIUM CHLORIDE 20 ML/HR: 0.9 INJECTION, SOLUTION INTRAVENOUS at 09:48

## 2022-02-01 NOTE — PROGRESS NOTES
Patient arrives to infusion center for Entyvio infusion today  Patient offers no acute complaints  Denies recent S/S infection/illness, denies recent antibiotic use  VSS  PIV inserted without issue  Patient resting on recliner chair, call bell within reach

## 2022-02-01 NOTE — PROGRESS NOTES
Patient completed treatment today without incident  PIV removed intact, coband wrap in place  Patient aware of next appt, declines AVS  Patient AAox4 and ambulatory upon DC without gait disturbance noted

## 2022-02-09 ENCOUNTER — OFFICE VISIT (OUTPATIENT)
Dept: SURGICAL ONCOLOGY | Facility: CLINIC | Age: 72
End: 2022-02-09
Payer: MEDICARE

## 2022-02-09 VITALS
HEART RATE: 65 BPM | BODY MASS INDEX: 35.1 KG/M2 | RESPIRATION RATE: 16 BRPM | OXYGEN SATURATION: 99 % | WEIGHT: 237 LBS | HEIGHT: 69 IN | TEMPERATURE: 96.8 F | SYSTOLIC BLOOD PRESSURE: 126 MMHG | DIASTOLIC BLOOD PRESSURE: 70 MMHG

## 2022-02-09 DIAGNOSIS — E27.8 ADRENAL MASS (HCC): Primary | ICD-10-CM

## 2022-02-09 PROCEDURE — 99213 OFFICE O/P EST LOW 20 MIN: CPT | Performed by: SURGERY

## 2022-02-09 NOTE — PROGRESS NOTES
Surgical Oncology Follow Up       50 [facility] [facility]  [facility]  [address] [place] [address]    Missy Borregozinger III  1950  [id number]  [address] [place] [address],[address] [place] [address]  [facility] [place]  [address] [address]    Chief Complaint   Patient presents with    Follow-up     1 year f/u       Assessment/Plan:    No problem-specific Assessment & Plan notes found for this encounter  Diagnoses and all orders for this visit:    Adrenal mass [facility])  -     Basic metabolic panel; Future  -     CT abdomen w wo contrast; Future        Advance Care Planning/Advance Directives:  Discussed disease status, cancer treatment plans and/or cancer treatment goals with the patient  Oncology History    No history exists  History of Present Illness:  Patient is a 52-year-old man who we have been following for incidentally found adrenal nodules bilaterally  He has been worked up and these were noted to be nonfunctioning lipid rich adenomas   -Interval History:  He had a recent scan in anticipation of today's visit  Review of Systems:  Review of Systems   Constitutional: Negative  HENT: Negative  Eyes: Negative  Respiratory: Negative  Cardiovascular: Negative  Gastrointestinal: Negative  Endocrine: Negative  Genitourinary: Negative  Musculoskeletal: Negative  Skin: Negative  Allergic/Immunologic: Negative  Neurological: Negative  Hematological: Negative  Psychiatric/Behavioral: Negative          Patient Active Problem List   Diagnosis    Class 1 obesity due to excess calories with serious comorbidity and body mass index (BMI) of 33 0 to 33 9 in adult    Chronic kidney disease, stage III (moderate) (HCC)    Persistent proteinuria    Microscopic hematuria    [place] cardiac risk 10-20% in next 10 years    Abdominal aortic atherosclerosis ([address] )    RBBB (right bundle branch block with left anterior fascicular block)    Dyslipidemia    Vitamin D deficiency    GERD with esophagitis    Harris's esophagus without dysplasia    Colon polyps    PAD (peripheral artery disease) (HCC)    Mass of both adrenal glands (HCC)    Blue toe syndrome, right (HCC)    Ischemic cardiomyopathy    S/P peripheral artery angioplasty with stent placement    Venous stasis    Paroxysmal atrial fibrillation (HCC)    Anemia of chronic renal failure, stage 3 (moderate) (HCC)    Other emphysema (HCC)    Adrenal mass (HCC)    Crohn's disease of small intestine with complication (HCC)    SVT (supraventricular tachycardia) (HCC)    Positive QuantiFERON-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    Terminal ileitis of small intestine (HCC)    S/P femoral-tibial bypass    Elevated alkaline phosphatase level    Vitamin B12 deficiency    Type 2 diabetes mellitus with diabetic peripheral angiopathy without gangrene (HCC)     Past Medical History:   Diagnosis Date    Blue toe syndrome (HCC)     Chronic kidney disease     Colon polyps     GERD (gastroesophageal reflux disease)     Hematuria     History of rheumatic fever     Hypolipidemia     Hypotension     Ischemic cardiomyopathy     PAD (peripheral artery disease) (HCC)     Pulmonary emphysema (HCC)      Past Surgical History:   Procedure Laterality Date    COLONOSCOPY  2019    HERNIA REPAIR      IR AORTAGRAM WITH RUN-OFF  6/27/2019    IR AORTAGRAM WITH RUN-OFF  2/12/2020    POPLITEAL ARTERY STENT Right     6/2019    NM SLCTV CATHJ 3RD+ ORD SLCTV ABDL PEL/LXTR BRNCH Right 6/27/2019    Procedure: leg ANGIOGRAM WITH STENT, BALLOON ANGIOPLASTY, LEFT GROIN ACCESS;  Surgeon: Kristen Samson MD;  [facility];  Service: Vascular    NM VEIN BYPASS GRAFT,FEM-POP Right 3/26/2020    Procedure: BYPASS FEMORAL-POPLITEAL; Right lower extremity bypass, fem-bk pop with GSV;  Surgeon: Kristen Samson MD;  [facility];  Service: Vascular     Family History Problem Relation Age of Onset    Heart disease Mother     Hyperlipidemia Mother     Hypertension Mother     Hypertension Father     Heart disease Father     Stroke Father     Hyperlipidemia Father     Diabetes Brother     No Known Problems Maternal Grandmother     Dementia Neg Hx     Drug abuse Neg Hx     Mental illness Neg Hx     Substance Abuse Neg Hx     Alcohol abuse Neg Hx     Depression Neg Hx     Colon cancer Neg Hx      Social History     Socioeconomic History    Marital status: /Civil Union     Spouse name: Not on file    Number of children: 2    Years of education: Not on file    Highest education level: Not on file   Occupational History    Occupation: retired   Tobacco Use    Smoking status: Current Some Day Smoker     Packs/day: 0 50     Years: 30 00     Pack years: 15 00     Types: Cigarettes    Smokeless tobacco: Never Used    Tobacco comment: " I will do it on my own"   Vaping Use    Vaping Use: Never used   Substance and Sexual Activity    Alcohol use:  Yes     Alcohol/week: 1 0 standard drink     Types: 1 Standard drinks or equivalent per week     Comment: social drinker when out dining    Drug use: Never    Sexual activity: Not Currently   Other Topics Concern    Not on file   Social History Narrative    Drinks coffee     Social Determinants of Health     Financial Resource Strain: Not on file   Food Insecurity: Not on file   Transportation Needs: Not on file   Physical Activity: Not on file   Stress: Not on file   Social Connections: Not on file   Intimate Partner Violence: Not on file   Housing Stability: Not on file       Current Outpatient Medications:     aspirin 81 MG tablet, Take 81 mg by mouth daily, Disp: , Rfl:     atorvastatin (LIPITOR) 20 mg tablet, Take 1 tablet (20 mg total) by mouth daily, Disp: 90 tablet, Rfl: 1    folic acid (FOLVITE) 1 mg tablet, Take 1 tablet (1 mg total) by mouth daily, Disp: 90 tablet, Rfl: 3    glimepiride (AMARYL) 1 mg tablet, Take 1 tablet (1 mg total) by mouth daily with breakfast, Disp: 90 tablet, Rfl: 1    Magnesium 500 MG TABS, Take 1 tablet by mouth daily , Disp: , Rfl:     metoprolol succinate (TOPROL-XL) 25 mg 24 hr tablet, Take 1 tablet (25 mg total) by mouth daily, Disp: 90 tablet, Rfl: 0    metoprolol succinate (TOPROL-XL) 50 mg 24 hr tablet, Take 1 tablet (50 mg total) by mouth daily, Disp: 90 tablet, Rfl: 4    omeprazole (PriLOSEC) 40 MG capsule, Take 1 capsule (40 mg total) by mouth daily, Disp: 90 capsule, Rfl: 1    rivaroxaban (XARELTO) 20 mg tablet, Take 1 tablet (20 mg total) by mouth daily with breakfast, Disp: 90 tablet, Rfl: 4    vitamin B-12 (VITAMIN B-12) 1,000 mcg tablet, Take 1 tablet (1,000 mcg total) by mouth daily, Disp: 90 tablet, Rfl: 1  No Known Allergies  Vitals:    02/09/22 1059   BP: 126/70   Pulse: 65   Resp: 16   Temp: (!) 96 8 °F (36 °C)   SpO2: 99%       Physical Exam  Constitutional:       Appearance: Normal appearance  HENT:      Head: Normocephalic and atraumatic  Nose: Nose normal    Eyes:      Extraocular Movements: Extraocular movements intact  Pupils: Pupils are equal, round, and reactive to light  Cardiovascular:      Rate and Rhythm: Normal rate and regular rhythm  Pulmonary:      Effort: Pulmonary effort is normal       Breath sounds: Normal breath sounds  Abdominal:      General: Abdomen is flat  There is no distension  Palpations: Abdomen is soft  There is no mass  Tenderness: There is no abdominal tenderness  There is no guarding or rebound  Hernia: No hernia is present  Musculoskeletal:         General: Normal range of motion  Cervical back: Normal range of motion and neck supple  Skin:     General: Skin is warm  Neurological:      General: No focal deficit present  Mental Status: He is alert and oriented to person, place, and time     Psychiatric:         Mood and Affect: Mood normal          Behavior: Behavior normal  Thought Content: Thought content normal            Results:  Labs:  Lab Results   Component Value Date    SODIUM 140 01/18/2022    K 4 6 01/18/2022     01/18/2022    CO2 28 01/18/2022    BUN 15 01/18/2022    CREATININE 1 32 (H) 01/18/2022    GLUC 84 01/07/2021    CALCIUM 8 7 01/18/2022         Imaging  CT abdomen w wo contrast    Result Date: 1/13/2022  Narrative: CT - ABDOMEN WITHOUT AND WITH IV CONTRAST INDICATION:   E27 8: Other specified disorders of adrenal gland  COMPARISON: CT 1/8/21 TECHNIQUE: CT examination of the abdomen was performed  Reformatted images were created in axial, sagittal, and coronal planes  Radiation dose length product (DLP) for this visit:  2458 mGy-cm   This examination, like all CT scans performed in the [facility], was performed utilizing techniques to minimize radiation dose exposure, including the use of iterative reconstruction and automated exposure control  IV Contrast:  98 mL of iodixanol (VISIPAQUE) Enteric Contrast:  Enteric contrast was not given  FINDINGS: ABDOMEN LOWER CHEST:  No clinically significant abnormality identified in the visualized lower chest  LIVER/BILIARY TREE:  Unremarkable  GALLBLADDER:  Contains small gallstone SPLEEN:  Unremarkable  PANCREAS: Unremarkable  ADRENAL GLANDS:  2 7 x 4 7 cm nodule, unchanged from the prior study when measured in the same fashion  This lesion has the following Hounsfield units: Unenhanced: 0 Portal venous: 42 15 minute delay: -10 Absolute washout: ( 52/(42) = 124% (>60 = benign) 2 1 x 1 3 cm left adrenal nodule, unchanged from the prior study when measured in the same fashion  This lesion has the following Hounsfield units: Unenhanced: -6 Portal venous: 39 15 minute delay: 13 Absolute washout: ( 46/(45) = 102% (>60 = benign) KIDNEYS/URETERS:  Unremarkable  No hydronephrosis   VISUALIZED STOMACH AND BOWEL:  Colonic diverticulosis without diverticulitis VISUALIZED ABDOMINAL CAVITY:  No pathologically enlarged periportal, mesenteric or upper retroperitoneal lymph nodes  No ascites or free intraperitoneal air  No fluid collection  VISUALIZED BODY WALL:  Unremarkable  VISUALIZED ABDOMINAL VESSELS: No aneurysm  OSSEOUS STRUCTURES:  Stable T12 compression Degenerative changes of the spine with DISH of the thoracic spine     Impression: Bilateral adrenal nodules measuring > 4 cm on the right, consistent with lipid rich adenomas, similar to prior  Workstation performed: [id number]     I reviewed the above laboratory and imaging data  Discussion/Summary:  10year-old man, history of bilateral adrenal nodules, with right-sided nodule greater than 4 cm  Likely lipid Ridge adenomas  Will continue to follow closely given bilaterality and size of right-sided nodule  Plan of follow-up in 2 years

## 2022-02-09 NOTE — LETTER
February 9, 2022     Sheron Santana MD  [address]  [id number] [place] [address] Road [address]    Patient: Maureen Escobar III   YOB: 1950   Date of Visit: 2/9/2022       Dear Dr Nikolas Mccartney: Thank you for referring Gilson Lemus to me for evaluation  Below are my notes for this consultation  If you have questions, please do not hesitate to call me  I look forward to following your patient along with you  Sincerely,        Abdulkadir Tsang MD        CC: MD Val Gibson MD Michalene Lily, MD Leonarda Buttner, MD Wille Ny, MD Abdulkadir Young MD  2/9/2022 11:19 AM  Sign when Signing Visit     Surgical Oncology Follow Up       [facility]  2005 [facility] 9 [facility]  1950  [id number]  [address] [facility],[address]  [facility] [place]  [address] [facility] [id number]    Chief Complaint   Patient presents with    Follow-up     1 year f/u       Assessment/Plan:    No problem-specific Assessment & Plan notes found for this encounter  Diagnoses and all orders for this visit:    Adrenal mass [facility])  -     Basic metabolic panel; Future  -     CT abdomen w wo contrast; Future        Advance Care Planning/Advance Directives:  Discussed disease status, cancer treatment plans and/or cancer treatment goals with the patient  Oncology History    No history exists  History of Present Illness:  Patient is a 81-year-old man who we have been following for incidentally found adrenal nodules bilaterally  He has been worked up and these were noted to be nonfunctioning lipid rich adenomas   -Interval History:  He had a recent scan in anticipation of today's visit  Review of Systems:  Review of Systems   Constitutional: Negative  HENT: Negative  Eyes: Negative  Respiratory: Negative  Cardiovascular: Negative  Gastrointestinal: Negative  Endocrine: Negative  Genitourinary: Negative  Musculoskeletal: Negative  Skin: Negative  Allergic/Immunologic: Negative  Neurological: Negative  Hematological: Negative  Psychiatric/Behavioral: Negative          Patient Active Problem List   Diagnosis    Class 1 obesity due to excess calories with serious comorbidity and body mass index (BMI) of 33 0 to 33 9 in adult    Chronic kidney disease, stage III (moderate) (HCC)    Persistent proteinuria    Microscopic hematuria    [place] cardiac risk 10-20% in next 10 years    Abdominal aortic atherosclerosis ([facility] )    RBBB (right bundle branch block with left anterior fascicular block)    Dyslipidemia    Vitamin D deficiency    GERD with esophagitis    Harris's esophagus without dysplasia    Colon polyps    PAD (peripheral artery disease) ([facility] )    Mass of both adrenal glands ([facility] )    Blue toe syndrome, right ([facility] )    Ischemic cardiomyopathy    S/P peripheral artery angioplasty with stent placement    Venous stasis    Paroxysmal atrial fibrillation (HCC)    Anemia of chronic renal failure, stage 3 (moderate) (HCC)    Other emphysema (HCC)    Adrenal mass ([facility] )    Crohn's disease of small intestine with complication (HCC)    SVT (supra[facility]ricular tachycardia) ([facility])    Positive QuantiFERON-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    Terminal ileitis of small intestine ([facility] )    S/P femoral-tibial bypass    Elevated alkaline phosphatase level    Vitamin B12 deficiency    Type 2 diabetes mellitus with diabetic peripheral angiopathy without gangrene ([facility] )     Past Medical History:   Diagnosis Date    Blue toe syndrome (HCC)     Chronic kidney disease     Colon polyps     GERD (gastroesophageal reflux disease)     Hematuria     History of rheumatic fever     Hypolipidemia     Hypotension     Ischemic cardiomyopathy     PAD (peripheral artery disease) ([facility] )     Pulmonary emphysema [facility])      Past Surgical History:   Procedure Laterality Date    COLONOSCOPY  2019    HERNIA REPAIR      IR AORTAGRAM WITH RUN-OFF  6/27/2019    IR AORTAGRAM WITH RUN-OFF  2/12/2020    POPLITEAL ARTERY STENT Right     6/2019    GA SLCTV CATHJ 3RD+ ORD SLCTV ABDL PEL/LXTR [facility] Right 6/27/2019    Procedure: leg ANGIOGRAM WITH STENT, BALLOON ANGIOPLASTY, LEFT GROIN ACCESS;  Surgeon: Adam John MD;  [facility];  Service: Vascular    GA VEIN BYPASS GRAFT,FEM-POP Right 3/26/2020    Procedure: BYPASS FEMORAL-POPLITEAL; Right lower extremity bypass, fem-bk pop with GSV;  Surgeon: Adam John MD;  [facility];  Service: Vascular     Family History   Problem Relation Age of Onset    Heart disease Mother     Hyperlipidemia Mother     Hypertension Mother     Hypertension Father     Heart disease Father     Stroke Father     Hyperlipidemia Father     Diabetes Brother     No Known Problems Maternal Grandmother     Dementia Neg Hx     Drug abuse Neg Hx     Mental illness Neg Hx     Substance Abuse Neg Hx     Alcohol abuse Neg Hx     Depression Neg Hx     Colon cancer Neg Hx      Social History     Socioeconomic History    Marital status: /Civil Union     Spouse name: Not on file    Number of children: 2    Years of education: Not on file    Highest education level: Not on file   Occupational History    Occupation: retired   Tobacco Use    Smoking status: Current Some Day Smoker     Packs/day: 0 50     Years: 30 00     Pack years: 15 00     Types: Cigarettes    Smokeless tobacco: Never Used    Tobacco comment: " I will do it on my own"   Vaping Use    Vaping Use: Never used   Substance and Sexual Activity    Alcohol use:  Yes     Alcohol/week: 1 0 standard drink     Types: 1 Standard drinks or equivalent per week     Comment: social drinker when out dining    Drug use: Never    Sexual activity: Not Currently   Other Topics Concern    Not on file   Social History Narrative    Drinks coffee     Social Determinants of Health     Financial Resource Strain: Not on file   Food Insecurity: Not on file   Transportation Needs: Not on file   Physical Activity: Not on file   Stress: Not on file   Social Connections: Not on file   Intimate Partner Violence: Not on file   Housing Stability: Not on file       Current Outpatient Medications:     aspirin 81 MG tablet, Take 81 mg by mouth daily, Disp: , Rfl:     atorvastatin (LIPITOR) 20 mg tablet, Take 1 tablet (20 mg total) by mouth daily, Disp: 90 tablet, Rfl: 1    folic acid (FOLVITE) 1 mg tablet, Take 1 tablet (1 mg total) by mouth daily, Disp: 90 tablet, Rfl: 3    glimepiride (AMARYL) 1 mg tablet, Take 1 tablet (1 mg total) by mouth daily with breakfast, Disp: 90 tablet, Rfl: 1    Magnesium 500 MG TABS, Take 1 tablet by mouth daily , Disp: , Rfl:     metoprolol succinate (TOPROL-XL) 25 mg 24 hr tablet, Take 1 tablet (25 mg total) by mouth daily, Disp: 90 tablet, Rfl: 0    metoprolol succinate (TOPROL-XL) 50 mg 24 hr tablet, Take 1 tablet (50 mg total) by mouth daily, Disp: 90 tablet, Rfl: 4    omeprazole (PriLOSEC) 40 MG capsule, Take 1 capsule (40 mg total) by mouth daily, Disp: 90 capsule, Rfl: 1    rivaroxaban (XARELTO) 20 mg tablet, Take 1 tablet (20 mg total) by mouth daily with breakfast, Disp: 90 tablet, Rfl: 4    vitamin B-12 (VITAMIN B-12) 1,000 mcg tablet, Take 1 tablet (1,000 mcg total) by mouth daily, Disp: 90 tablet, Rfl: 1  No Known Allergies  Vitals:    02/09/22 1059   BP: 126/70   Pulse: 65   Resp: 16   Temp: (!) 96 8 °F (36 °C)   SpO2: 99%       Physical Exam  Constitutional:       Appearance: Normal appearance  HENT:      Head: Normocephalic and atraumatic  Nose: Nose normal    Eyes:      Extraocular Movements: Extraocular movements intact  Pupils: Pupils are equal, round, and reactive to light  Cardiovascular:      Rate and Rhythm: Normal rate and regular rhythm     Pulmonary: Effort: Pulmonary effort is normal       Breath sounds: Normal breath sounds  Abdominal:      General: Abdomen is flat  There is no distension  Palpations: Abdomen is soft  There is no mass  Tenderness: There is no abdominal tenderness  There is no guarding or rebound  Hernia: No hernia is present  Musculoskeletal:         General: Normal range of motion  Cervical back: Normal range of motion and neck supple  Skin:     General: Skin is warm  Neurological:      General: No focal deficit present  Mental Status: He is alert and oriented to person, place, and time  Psychiatric:         Mood and Affect: Mood normal          Behavior: Behavior normal          Thought Content: Thought content normal            Results:  Labs:  Lab Results   Component Value Date    SODIUM 140 01/18/2022    K 4 6 01/18/2022     01/18/2022    CO2 28 01/18/2022    BUN 15 01/18/2022    CREATININE 1 32 (H) 01/18/2022    GLUC 84 01/07/2021    CALCIUM 8 7 01/18/2022         Imaging  CT abdomen w wo contrast    Result Date: 1/13/2022  Narrative: CT - ABDOMEN WITHOUT AND WITH IV CONTRAST INDICATION:   E27 8: Other specified disorders of adrenal gland  COMPARISON: CT 1/8/21 TECHNIQUE: CT examination of the abdomen was performed  Reformatted images were created in axial, sagittal, and coronal planes  Radiation dose length product (DLP) for this visit:  2458 mGy-cm   This examination, like all CT scans performed in the [facility], was performed utilizing techniques to minimize radiation dose exposure, including the use of iterative reconstruction and automated exposure control  IV Contrast:  98 mL of iodixanol (VISIPAQUE) Enteric Contrast:  Enteric contrast was not given  FINDINGS: ABDOMEN LOWER CHEST:  No clinically significant abnormality identified in the visualized lower chest  LIVER/BILIARY TREE:  Unremarkable  GALLBLADDER:  Contains small gallstone SPLEEN:  Unremarkable   PANCREAS: Unremarkable  ADRENAL GLANDS:  2 7 x 4 7 cm nodule, unchanged from the prior study when measured in the same fashion  This lesion has the following Hounsfield units: Unenhanced: 0 Portal venous: 42 15 minute delay: -10 Absolute washout: ( 52/(42) = 124% (>60 = benign) 2 1 x 1 3 cm left adrenal nodule, unchanged from the prior study when measured in the same fashion  This lesion has the following Hounsfield units: Unenhanced: -6 Portal venous: 39 15 minute delay: 13 Absolute washout: ( 46/(45) = 102% (>60 = benign) KIDNEYS/URETERS:  Unremarkable  No hydronephrosis  VISUALIZED STOMACH AND BOWEL:  Colonic diverticulosis without diverticulitis VISUALIZED ABDOMINAL CAVITY:  No pathologically enlarged periportal, mesenteric or upper retroperitoneal lymph nodes  No ascites or free intraperitoneal air  No fluid collection  VISUALIZED BODY WALL:  Unremarkable  VISUALIZED ABDOMINAL VESSELS: No aneurysm  OSSEOUS STRUCTURES:  Stable T12 compression Degenerative changes of the spine with DISH of the thoracic spine     Impression: Bilateral adrenal nodules measuring > 4 cm on the right, consistent with lipid rich adenomas, similar to prior  Workstation performed: [id number]     I reviewed the above laboratory and imaging data  Discussion/Summary:  10year-old man, history of bilateral adrenal nodules, with right-sided nodule greater than 4 cm  Likely lipid [place] adenomas  Will continue to follow closely given bilaterality and size of right-sided nodule  Plan of follow-up in 2 years

## 2022-03-01 ENCOUNTER — HOSPITAL ENCOUNTER (OUTPATIENT)
Dept: INFUSION CENTER | Facility: CLINIC | Age: 72
Discharge: HOME | End: 2022-03-01
Payer: MEDICARE

## 2022-03-01 VITALS
RESPIRATION RATE: 20 BRPM | TEMPERATURE: 97.1 F | HEART RATE: 76 BPM | DIASTOLIC BLOOD PRESSURE: 84 MMHG | OXYGEN SATURATION: 95 % | SYSTOLIC BLOOD PRESSURE: 142 MMHG

## 2022-03-01 DIAGNOSIS — K50.012 CROHN'S DISEASE OF ILEUM WITH INTESTINAL OBSTRUCTION (HCC): Primary | ICD-10-CM

## 2022-03-01 PROCEDURE — 96365 THER/PROPH/DIAG IV INF INIT: CPT

## 2022-03-01 RX ORDER — SODIUM CHLORIDE 9 MG/ML
20 INJECTION, SOLUTION INTRAVENOUS ONCE
Status: COMPLETED | OUTPATIENT
Start: 2022-03-01 | End: 2022-03-01

## 2022-03-01 RX ORDER — SODIUM CHLORIDE 9 MG/ML
20 INJECTION, SOLUTION INTRAVENOUS ONCE
Status: CANCELLED | OUTPATIENT
Start: 2022-03-29

## 2022-03-01 RX ADMIN — SODIUM CHLORIDE 20 ML/HR: 9 INJECTION, SOLUTION INTRAVENOUS at 09:39

## 2022-03-01 RX ADMIN — VEDOLIZUMAB 300 MG: 300 INJECTION, POWDER, LYOPHILIZED, FOR SOLUTION INTRAVENOUS at 09:39

## 2022-03-01 NOTE — PROGRESS NOTES
Pt here for Josephine Chicas stable  [place] within reach; will continue to monitor 
Pt tolerated treatment well  Pt declined AVS; aware to make next follow up infusion on his way out 
yes

## 2022-03-09 ENCOUNTER — REMOTE DEVICE CLINIC VISIT (OUTPATIENT)
Dept: CARDIOLOGY CLINIC | Facility: CLINIC | Age: 72
End: 2022-03-09
Payer: MEDICARE

## 2022-03-09 DIAGNOSIS — Z95.818 PRESENCE OF OTHER CARDIAC IMPLANTS AND GRAFTS: Primary | ICD-10-CM

## 2022-03-09 PROCEDURE — 93298 REM INTERROG DEV EVAL SCRMS: CPT | Performed by: INTERNAL MEDICINE

## 2022-03-09 PROCEDURE — G2066 INTER DEVC REMOTE 30D: HCPCS | Performed by: INTERNAL MEDICINE

## 2022-03-09 NOTE — INTERVAL H&P NOTE
H&P reviewed  After examining the patient I find no changes in the patients condition since the H&P had been written      Vitals:    03/26/20 0651   BP: 123/92   Pulse: 82   Resp: 20   Temp: 97 5 °F (36 4 °C)   SpO2: 94%     Plan: Right fem-BK-pop bypass with GSV no

## 2022-03-09 NOTE — PROGRESS NOTES
MDT LOOP   CARELINK TRANSMISSION: LOOP RECORDER  PRESENTING RHYTHM NSR W/ PACs @ 68 BPM  BATTERY STATUS "OK"  2 PAUSE EPISODES W/ EGRAMS SHOWING 3 SEC PAUSES WHILE PT IS SLEEPING  2 MYRIAM EPISODES W/ EGRAMS SHOWING SB @ 30-36 BPM WHILE PT WAS SLEEPING  16 DEVICE CLASSIFIED AF EPISODES AVAILABLE STRIPS DEMONSTRATE SR W/ PACs AND PROB AF  AF BURDEN IS 0 2%  HX OF PAF  PT TAKES METORPOLOL SUCC, XARELTO AND ASA 81  NO PATIENT  ACTIVATED EPISODES  NORMAL DEVICE FUNCTION   DL

## 2022-03-10 ENCOUNTER — OFFICE VISIT (OUTPATIENT)
Dept: INTERNAL MEDICINE CLINIC | Facility: CLINIC | Age: 72
End: 2022-03-10
Payer: MEDICARE

## 2022-03-10 VITALS
BODY MASS INDEX: 35.25 KG/M2 | WEIGHT: 238 LBS | DIASTOLIC BLOOD PRESSURE: 84 MMHG | HEIGHT: 69 IN | HEART RATE: 91 BPM | SYSTOLIC BLOOD PRESSURE: 122 MMHG | OXYGEN SATURATION: 94 % | TEMPERATURE: 96.9 F

## 2022-03-10 DIAGNOSIS — K22.70 BARRETT'S ESOPHAGUS WITHOUT DYSPLASIA: ICD-10-CM

## 2022-03-10 DIAGNOSIS — N18.31 STAGE 3A CHRONIC KIDNEY DISEASE (HCC): ICD-10-CM

## 2022-03-10 DIAGNOSIS — E78.5 DYSLIPIDEMIA: Primary | ICD-10-CM

## 2022-03-10 DIAGNOSIS — E11.51 TYPE 2 DIABETES MELLITUS WITH DIABETIC PERIPHERAL ANGIOPATHY WITHOUT GANGRENE, WITHOUT LONG-TERM CURRENT USE OF INSULIN (HCC): ICD-10-CM

## 2022-03-10 DIAGNOSIS — E27.8 ADRENAL MASS (HCC): ICD-10-CM

## 2022-03-10 DIAGNOSIS — E66.09 CLASS 1 OBESITY DUE TO EXCESS CALORIES WITH SERIOUS COMORBIDITY AND BODY MASS INDEX (BMI) OF 33.0 TO 33.9 IN ADULT: ICD-10-CM

## 2022-03-10 DIAGNOSIS — E53.8 VITAMIN B12 DEFICIENCY: ICD-10-CM

## 2022-03-10 DIAGNOSIS — I48.0 PAROXYSMAL ATRIAL FIBRILLATION (HCC): ICD-10-CM

## 2022-03-10 DIAGNOSIS — K50.019 CROHN'S DISEASE OF SMALL INTESTINE WITH COMPLICATION (HCC): ICD-10-CM

## 2022-03-10 PROCEDURE — 99214 OFFICE O/P EST MOD 30 MIN: CPT | Performed by: INTERNAL MEDICINE

## 2022-03-10 NOTE — ASSESSMENT & PLAN NOTE
Lab Results   Component Value Date    HGBA1C 6 5 (H) 01/18/2022     A1c stable, on low dose glimepiride

## 2022-03-10 NOTE — ASSESSMENT & PLAN NOTE
Lab Results   Component Value Date    EGFR 54 01/18/2022    EGFR 56 10/11/2021    EGFR 55 09/13/2021    CREATININE 1 32 (H) 01/18/2022    CREATININE 1 28 10/11/2021    CREATININE 1 31 (H) 09/13/2021     Stable  Followed by nephrology

## 2022-03-10 NOTE — PROGRESS NOTES
Diabetic Foot Exam    Patient's shoes and socks removed  Right Foot/Ankle   Right Foot Inspection  Skin Exam: skin normal and skin intact  No dry skin, no warmth, no callus, no erythema, no maceration, no abnormal color, no pre-ulcer, no ulcer and no callus  Toe Exam: ROM and strength within normal limits  Sensory   Monofilament testing: intact    Vascular  The right DP pulse is 1+  Left Foot/Ankle  Left Foot Inspection  Skin Exam: skin normal and skin intact  No dry skin, no warmth, no erythema, no maceration, normal color, no pre-ulcer, no ulcer and no callus  Toe Exam: ROM and strength within normal limits  Sensory   Monofilament testing: intact    Vascular  The left DP pulse is 2+       Assign Risk Category  No deformity present  No loss of protective sensation  Weak pulses  Risk: 0

## 2022-03-10 NOTE — PROGRESS NOTES
Assessment/Plan:    Adrenal mass ([facility] )  Stable, repeat CT 2024  Crohn's disease of small intestine with complication (HCC)  Stable, on Entyvio  Schedule follow up with GI     PAD (peripheral artery disease) ([facility] )  Ultrasound scheduled  On ASA, statin  Paroxysmal atrial fibrillation (HCC)  No symptoms  On Xarelto, metoprolol  Type 2 diabetes mellitus with diabetic peripheral angiopathy without gangrene ([facility] )    Lab Results   Component Value Date    HGBA1C 6 5 (H) 01/18/2022     A1c stable, on low dose glimepiride  Chronic kidney disease, stage III (moderate) (HCC)  Lab Results   Component Value Date    EGFR 54 01/18/2022    EGFR 56 10/11/2021    EGFR 55 09/13/2021    CREATININE 1 32 (H) 01/18/2022    CREATININE 1 28 10/11/2021    CREATININE 1 31 (H) 09/13/2021     Stable  Followed by nephrology  Harris's esophagus without dysplasia  On daily PPI  Class 1 obesity due to excess calories with serious comorbidity and body mass index (BMI) of 33 0 to 33 9 in adult  Weight stable  Vitamin B12 deficiency  Decrease B12 to 3 days a week  Dyslipidemia  Lipids due, on statin  Diagnoses and all orders for this visit:    Dyslipidemia  -     Cancel: Lipid panel; Future  -     Lipid panel    Type 2 diabetes mellitus with diabetic peripheral angiopathy without gangrene, without long-term current use of insulin (HCC)  -     Hemoglobin A1C; Future    Paroxysmal atrial fibrillation (HCC)    Stage 3a chronic kidney disease (HCC)  -     CBC and differential; Future  -     Comprehensive metabolic panel; Future    Adrenal mass (HCC)    Crohn's disease of small intestine with complication (HCC)    Class 1 obesity due to excess calories with serious comorbidity and body mass index (BMI) of 33 0 to 33 9 in adult    Harris's esophagus without dysplasia    Vitamin B12 deficiency      Follow up in 5 months or as needed        Subjective:      Patient ID: Martha Ragland III is a 80 y o  male here for a follow up  He feels well, no complaints  He and his wife returned to the gym this month  They did not go all of last month since wife had COVID  He had remained active and busy at home  Denies any chest pain, palpitations or other symptoms  He continues to move his bowels 3 to 4 times a day, he says it mostly depends on what he eats  He reports no bleeding  No open wounds  The following portions of the patient's history were reviewed and updated as appropriate: allergies, current medications, past medical history, past social history and problem list     Review of Systems   Constitutional: Negative for activity change, appetite change and fatigue  HENT: Negative for congestion, hearing loss and postnasal drip  Eyes: Negative  Respiratory: Negative for cough, chest tightness and shortness of breath  Cardiovascular: Negative for chest pain, palpitations and leg swelling  Gastrointestinal: Negative for abdominal pain, constipation, nausea and vomiting  Genitourinary: Negative for dysuria, frequency, hematuria and urgency  Musculoskeletal: Negative for arthralgias  Skin: Negative for rash and wound  Neurological: Negative for dizziness, numbness and headaches  Hematological: Bruises/bleeds easily  Psychiatric/Behavioral: Negative for sleep disturbance  The patient is not nervous/anxious  Objective:      /84   Pulse 91   Temp (!) 96 9 °F (36 1 °C)   Ht 5' 9" (1 753 m)   Wt 108 kg (238 lb)   SpO2 94%   BMI 35 15 kg/m²          Physical Exam  Vitals and nursing note reviewed  Constitutional:       Appearance: He is well-developed  HENT:      Head: Normocephalic and atraumatic  Eyes:      Conjunctiva/sclera: Conjunctivae normal       Pupils: Pupils are equal, round, and reactive to light  Cardiovascular:      Rate and Rhythm: Normal rate and regular rhythm  Heart sounds: Normal heart sounds     Pulmonary:      Effort: Pulmonary effort is normal  Breath sounds: No wheezing or rhonchi  Abdominal:      General: Abdomen is protuberant  Bowel sounds are normal       Palpations: Abdomen is soft  Tenderness: There is no abdominal tenderness  Musculoskeletal:         General: No swelling  Cervical back: Neck supple  Right lower leg: No edema  Left lower leg: No edema  Skin:     General: Skin is warm  Findings: No rash  Neurological:      General: No focal deficit present  Mental Status: He is alert and oriented to person, place, and time  Psychiatric:         Mood and Affect: Mood and affect normal          Behavior: Behavior normal            Labs & imaging results reviewed with patient

## 2022-03-22 ENCOUNTER — APPOINTMENT (OUTPATIENT)
Dept: LAB | Facility: CLINIC | Age: 72
End: 2022-03-22
Payer: MEDICARE

## 2022-03-22 LAB
CHOLEST SERPL-MCNC: 117 MG/DL
HDLC SERPL-MCNC: 48 MG/DL
LDLC SERPL CALC-MCNC: 48 MG/DL (ref 0–100)
NONHDLC SERPL-MCNC: 69 MG/DL
TRIGL SERPL-MCNC: 107 MG/DL

## 2022-03-22 PROCEDURE — 80061 LIPID PANEL: CPT | Performed by: INTERNAL MEDICINE

## 2022-03-22 PROCEDURE — 36415 COLL VENOUS BLD VENIPUNCTURE: CPT | Performed by: INTERNAL MEDICINE

## 2022-03-25 ENCOUNTER — TELEPHONE (OUTPATIENT)
Dept: NEPHROLOGY | Facility: CLINIC | Age: 72
End: 2022-03-25

## 2022-03-25 DIAGNOSIS — E55.9 VITAMIN D DEFICIENCY: ICD-10-CM

## 2022-03-25 DIAGNOSIS — R74.8 ELEVATED ALKALINE PHOSPHATASE LEVEL: ICD-10-CM

## 2022-03-25 DIAGNOSIS — E78.5 DYSLIPIDEMIA: ICD-10-CM

## 2022-03-25 DIAGNOSIS — N18.31 STAGE 3A CHRONIC KIDNEY DISEASE (HCC): Primary | ICD-10-CM

## 2022-03-25 PROBLEM — I95.89 CHRONIC HYPOTENSION: Status: ACTIVE | Noted: 2022-03-25

## 2022-03-25 NOTE — PROGRESS NOTES
RENAL FOLLOW UP NOTE: td     ASSESSMENT AND PLAN:  1   CKD stage 3 :  · Etiology:  Cardiorenal syndrome/arteriolar nephrosclerosis/?  Atheroemboli/prior prerenal associated mild tachycardia and relative hypotension in the past  · Baseline creatinine:  1 25-1 41  · Current creatinine:  At baseline at 1 27  · Urine protein creatinine ratio:  0 25 g at goal  Recommendations:  · Treat hypertension-please see below  · Treat dyslipidemia-please see below  · Maintain proteinuria less than 1 g or as low as possible  · Avoid nephrotoxic agents such as NSAIDs, patient counseled as such  2   Volume:  Euvolemic  3   Hypotension:  Workup was compatible with low ejection fraction 48% associated severe hypokinesis of the apical anterior and mid anterior septal in mild in for septal and apical inferior and apical septal and apical walls   No pericardial effusion  Cortisol/TSH were unremarkable    · Currently blood pressure:   A m :  115/88  P m :  115/73  Heart rate 60-80 range  · Medication changes today:  Add amlodipine 2 5 mg daily at evening time to help with the morning blood pressures and recheck in about 4-6 weeks    4   Electrolytes:  All acceptable  5   Mineral bone disorder:  · Calcium/magnesium/phosphorus:  All acceptable including normal magnesium  · PTH intact:   44 6 which is normal  · Vitamin-D:  44 at goal from prior evaluation  6   Dyslipidemia:  · Goal LDL:  Less than 70 given PAD  · Current lipid profile:  LDL 48/HDL 48/triglycerides 107 as of 03/22/2022  Recommendations:  At goal so no changes  7   Anemia:               Recommendations:  · Hemoglobin stable at  16 3  · Multiple myeloma evaluation with SPEP/UPEP and light chains:  Negative  · Stool for occult blood x3:  NEGATIVE X1  · In March had EGD colonoscopy and recent sigmoidoscopy:  Harris's esophagus/diverticulosis/colonic polyps   Patient will be seeing Dr Cheri Tillman  8   Other problems:  · Chronic intermittent mild leukocytosis:  Stable   · Gross hematuria/microscopic hematuria:  This has resolved   Patient seen by Urology for gross hematuria   Cystoscopy was negative and recent PSA negative  No further investigation by follow-up in 1 year by Urology  · Abnormal echocardiogram/cardiomyopathy:  Followed in the past by Dr Almanza  · Paroxysmal atrial fibrillation followed by Dr Fermin Monzon: Bernie Arredondo on Xarelto  · Recent admission in May for signs of small-bowel obstruction associated with abdominal pain and diarrhea   Felt to have Crohn's disease  · Bilateral adrenal gland abnormalities being followed by surgical oncology   24 hour urine for Dopamine epinephrine all unremarkable   Aldosterone was unremarkable   Cortisol was okay at 17 5  To be followed by Dr Cordova  · PAD:  Status post stent for the right popliteal artery/revision 02/12/2020  · Abnormal alkaline phosphatase followed by GI  · Diabetes mellitus type 2 not really a candidate for SGLT2 inhibitors with PAD  · ABNORMAL LUNG EXAM:  RECOMMEND CHEST X-RAY ALTHOUGH ASYMPTOMATIC     GI health maintenance:  2020    PATIENT INSTRUCTIONS:    Patient Instructions   1  Medication changes today:   Please begin amlodipine 2 5 mg at bedtime for your blood pressure watch for swelling, dizziness or lightheadedness and call if you developed any        2   Please take 1 week a blood pressure readings  4 weeks after making the above medication change     AS FOLLOWS  MORNING AND EVENING, SITTING AND STANDING as follows:  · TAKE THE MORNING READINGS BEFORE ANY MEDICATIONS AND WHEN YOU ARE RELAXED FOR SEVERAL MINUTES  · TAKE THE EVENING READINGS:  BETWEEN 7-10 P M ; PRIOR TO ANY MEDICATIONS; AT LEAST IN OUR  FROM DINNER; AND CERTAINLY AFTER RELAXING FOR A FEW MINUTES  · PLEASE INCLUDE HEART RATE WITH YOUR BLOOD PRESSURE READINGS  · When taking standing readings, keep your arm supported at heart level and not dangling  · Make sure you are sitting with your back supported and feet on the ground and do not cross your legs or feet  · Make sure you have not taken any coffee or caffeine products or exercised or smoke cigarettes at least 30 minutes before taking your blood pressure  Then please mail these readings into the office    3  In 3 months:   Please go for nonfasting lab work but in the morning   Please take 1 week a blood pressure readings as outlined above and mail those into the office     4  Follow-up in 6  months   Please bring in 1 week a blood pressure readings morning evening, sitting and standing is outlined above   Please go for fasting lab work 1-2 weeks prior to your appointment      5  General instructions:   AVOID SALT BUT NOT ADDING AN READING LABELS TO MAKE SURE THERE IS LOW-SALT IN THE FOOD THAT YOU ARE EATING  o Goal is less than 2 g of sodium intake or less than 5 g of sodium chloride intake per day     Avoid nonsteroidal anti-inflammatory drugs such as Naprosyn, ibuprofen, Aleve, Advil, Celebrex, Meloxicam (Mobic) etc   You can use Tylenol as needed if you do not have any liver condition to be concerned about     Avoid medications such as Sudafed or decongestants and antihistamines that contained the D component which is the decongestant  You can take antihistamines without the decongestant or D component   Try to avoid medications such as pantoprazole or  Protonix/Nexium or Esomeprazole)/Prilosec or omeprazole/Prevacid or lansoprazole/AcipHex or Rabeprazole  If you are able to, use Pepcid as this is safer for your kidneys   Try to exercise at least 30 minutes 3 days a week to begin with with an ultimate goal of 5 days a week for at least 30 minutes     Try to lose 5-10 lb by your next visit     Please do not drink more than 2 glasses of alcohol/wine on a daily basis as this may contribute to your high blood pressure  6  PLEASE GO FOR A CHEST X-RAY AT THIS TIME AT YOUR CONVENIENCE BECAUSE OF A SLIGHTLY ABNORMAL LUNG EXAM ON TODAY'S VISIT         Subjective:    There has been no hospitalizations or acute illnesses since last visit  The patient overall is feeling well  No fevers, chills, or cough or colds    Good appetite and good energy  No hematuria, dysuria, voiding symptoms or foamy urine  No gastrointestinal symptoms  No cardiovascular symptoms including swelling of the legs  No headaches, dizziness or lightheadedness  Blood pressure medications:   Toprol XL 25 mg daily in the morning and 50 mg in the evening      ROS:  See HPI, otherwise review of systems as completely reviewed with the patient are negative    Past Medical History:   Diagnosis Date    [facility]     Chronic kidney disease     Colon polyps     GERD (gastroesophageal reflux disease)     Hematuria     History of rheumatic fever     Hypolipidemia     Hypotension     Ischemic cardiomyopathy     [facility]     Pulmonary emphysema ([facility] )      Past Surgical History:   Procedure Laterality Date    COLONOSCOPY  2019    HERNIA REPAIR      IR AORTAGRAM WITH RUN-OFF  6/27/2019    IR AORTAGRAM WITH RUN-OFF  2/12/2020    POPLITEAL ARTERY STENT Right     6/2019    PA SLCTV CATHJ 3RD+ ORD SLCTV ABDL PEL/LXTR BRNCH Right 6/27/2019    Procedure: leg ANGIOGRAM WITH STENT, BALLOON ANGIOPLASTY, LEFT GROIN ACCESS;  Surgeon: Jolie Flowers MD;  [facility]: [facility];  Service: Vascular    PA VEIN BYPASS GRAFT,FEM-POP Right 3/26/2020    Procedure: BYPASS FEMORAL-POPLITEAL; Right lower extremity bypass, fem-bk pop with GSV;  Surgeon: Jolie Flowers MD;  Location: [facility];  Service: Vascular     Family History   Problem Relation Age of Onset    Heart disease Mother     Hyperlipidemia Mother     Hypertension Mother     Hypertension Father     Heart disease Father     Stroke Father     Hyperlipidemia Father     Diabetes Brother     No Known Problems Maternal Grandmother     Dementia Neg Hx     Drug abuse Neg Hx     Mental illness Neg Hx     Substance Abuse Neg Hx     Alcohol abuse Neg Hx     Depression Neg Hx     Colon cancer Neg Hx       reports that he has been smoking cigarettes  He has a 15 00 pack-year smoking history  He has never used smokeless tobacco  He reports current alcohol use of about 1 0 standard drink of alcohol per week  He reports that he does not use drugs  I COMPLETELY REVIEWED THE PAST MEDICAL HISTORY/PAST SURGICAL HISTORY/SOCIAL HISTORY/FAMILY HISTORY/AND MEDICATIONS  AND UPDATED ALL    Objective:     Vitals:   BP sitting left:  118/72 WITH A HEART RATE OF 76 AND IRREGULAR  BP standing right:  120/78 WITH A HEART RATE OF 72 AND IRREGULAR    Weight (last 2 days)     Date/Time Weight    04/01/22 1430 109 (241)        Wt Readings from Last 3 Encounters:   04/01/22 109 kg (241 lb)   03/10/22 108 kg (238 lb)   02/09/22 108 kg (237 lb)       Body mass index is 35 59 kg/m²      Physical Exam: General:  No acute distress/OBESE  Skin:  No acute rash  Eyes:  No scleral icterus, noninjected, no discharge from eyes  ENT:  Moist mucous membranes  Neck:  Supple, no jugular venous distention, trachea is midline, no lymphadenopathy and no thyromegaly  Back   No CVAT  Chest:  Bilateral crackles at both bases, no wheezing or rhonchi, no dullness to percussion, good respiratory effort  CVS:  Regular rate and rhythm without a rub, or gallops or murmurs  Abdomen:  OBESESoft and nontender with normal bowel sounds  Extremities:  No cyanosis and no edema, no arthritic changes, normal range of motion  Neuro:  Grossly intact  Psych:  Alert, oriented x3 and appropriate      Medications:    Current Outpatient Medications:     aspirin 81 MG tablet, Take 81 mg by mouth daily, Disp: , Rfl:     atorvastatin (LIPITOR) 20 mg tablet, Take 1 tablet (20 mg total) by mouth daily, Disp: 90 tablet, Rfl: 1    folic acid (FOLVITE) 1 mg tablet, Take 1 tablet (1 mg total) by mouth daily, Disp: 90 tablet, Rfl: 3    glimepiride (AMARYL) 1 mg tablet, Take 1 tablet (1 mg total) by mouth daily with breakfast, Disp: 90 tablet, Rfl: 1    Magnesium 500 MG TABS, Take 1 tablet by mouth daily , Disp: , Rfl:     metoprolol succinate (TOPROL-XL) 25 mg 24 hr tablet, Take 1 tablet (25 mg total) by mouth daily, Disp: 90 tablet, Rfl: 0    metoprolol succinate (TOPROL-XL) 50 mg 24 hr tablet, Take 1 tablet (50 mg total) by mouth daily, Disp: 90 tablet, Rfl: 4    omeprazole (PriLOSEC) 40 MG capsule, Take 1 capsule (40 mg total) by mouth daily, Disp: 90 capsule, Rfl: 1    rivaroxaban (XARELTO) 20 mg tablet, Take 1 tablet (20 mg total) by mouth daily with breakfast, Disp: 90 tablet, Rfl: 4    vitamin B-12 (VITAMIN B-12) 1,000 mcg tablet, Take 1 tablet (1,000 mcg total) by mouth daily (Patient taking differently: Take 1,000 mcg by mouth 3 (three) times a week  ), Disp: 90 tablet, Rfl: 1    amLODIPine (NORVASC) 2 5 mg tablet, Take 1 tablet (2 5 mg total) by mouth daily, Disp: 30 tablet, Rfl: 5    Lab, Imaging and other studies: I have personally reviewed pertinent labs    Laboratory Results:  Results for orders placed or performed in visit on 03/28/22   CK   Result Value Ref Range    Total CK 65 39 - 308 U/L   Comprehensive metabolic panel   Result Value Ref Range    Sodium 138 136 - 145 mmol/L    Potassium 4 6 3 5 - 5 3 mmol/L    Chloride 104 100 - 108 mmol/L    CO2 30 21 - 32 mmol/L    ANION GAP 4 4 - 13 mmol/L    BUN 14 5 - 25 mg/dL    Creatinine 1 27 0 60 - 1 30 mg/dL    Glucose, Fasting 156 (H) 65 - 99 mg/dL    Calcium 8 8 8 3 - 10 1 mg/dL    Corrected Calcium 9 6 8 3 - 10 1 mg/dL    AST 18 5 - 45 U/L    ALT 30 12 - 78 U/L    Alkaline Phosphatase 185 (H) 46 - 116 U/L    Total Protein 7 4 6 4 - 8 2 g/dL    Albumin 3 0 (L) 3 5 - 5 0 g/dL    Total Bilirubin 0 61 0 20 - 1 00 mg/dL    eGFR 56 ml/min/1 73sq m   CBC and differential   Result Value Ref Range    WBC 9 55 4 31 - 10 16 Thousand/uL    RBC 5 74 (H) 3 88 - 5 62 Million/uL    Hemoglobin 16 3 12 0 - 17 0 g/dL    Hematocrit 51 3 (H) 36 5 - 49 3 %    MCV 89 82 - 98 fL    MCH 28 4 26 8 - 34 3 pg    MCHC 31 8 31 4 - 37 4 g/dL    RDW 15 3 (H) 11 6 - 15 1 %    MPV 10 2 8 9 - 12 7 fL    Platelets 380 028 - 208 Thousands/uL    nRBC 0 /100 WBCs    Neutrophils Relative 66 43 - 75 %    Immat GRANS % 1 0 - 2 %    Lymphocytes Relative 23 14 - 44 %    Monocytes Relative 8 4 - 12 %    Eosinophils Relative 2 0 - 6 %    Basophils Relative 0 0 - 1 %    Neutrophils Absolute 6 34 1 85 - 7 62 Thousands/µL    Immature Grans Absolute 0 05 0 00 - 0 20 Thousand/uL    Lymphocytes Absolute 2 22 0 60 - 4 47 Thousands/µL    Monocytes Absolute 0 76 0 17 - 1 22 Thousand/µL    Eosinophils Absolute 0 14 0 00 - 0 61 Thousand/µL    Basophils Absolute 0 04 0 00 - 0 10 Thousands/µL   Magnesium   Result Value Ref Range    Magnesium 2 1 1 6 - 2 6 mg/dL   Phosphorus   Result Value Ref Range    Phosphorus 3 6 2 3 - 4 1 mg/dL   Protein / creatinine ratio, urine   Result Value Ref Range    Creatinine, Ur 32 0 mg/dL    Protein Urine Random 8 mg/dL    Prot/Creat Ratio, Ur 0 25 (H) 0 00 - 0 10   PTH, intact   Result Value Ref Range    PTH 44 6 18 4 - 80 1 pg/mL       Results from last 7 days   Lab Units 03/28/22  0816   WBC Thousand/uL 9 55   HEMOGLOBIN g/dL 16 3   HEMATOCRIT % 51 3*   PLATELETS Thousands/uL 307   POTASSIUM mmol/L 4 6   CHLORIDE mmol/L 104   CO2 mmol/L 30   BUN mg/dL 14   CREATININE mg/dL 1 27   CALCIUM mg/dL 8 8   MAGNESIUM mg/dL 2 1   PHOSPHORUS mg/dL 3 6         Radiology review:   chest X-ray    Ultrasound      Portions of the record may have been created with voice recognition software  Occasional wrong word or "sound a like" substitutions may have occurred due to the inherent limitations of voice recognition software  Read the chart carefully and recognize, using context, where substitutions have occurred

## 2022-03-25 NOTE — TELEPHONE ENCOUNTER
Spoke with patient about needing to go for lab work before his appt on 4/1  He said he went on Monday but because they were dated for 4/25 they wouldn't do them, reordered labs so he could have them done on Monday

## 2022-03-28 ENCOUNTER — APPOINTMENT (OUTPATIENT)
Dept: LAB | Facility: CLINIC | Age: 72
End: 2022-03-28
Payer: MEDICARE

## 2022-03-28 DIAGNOSIS — R74.8 ELEVATED ALKALINE PHOSPHATASE LEVEL: ICD-10-CM

## 2022-03-28 DIAGNOSIS — N18.31 STAGE 3A CHRONIC KIDNEY DISEASE (HCC): ICD-10-CM

## 2022-03-28 DIAGNOSIS — E78.5 DYSLIPIDEMIA: ICD-10-CM

## 2022-03-28 DIAGNOSIS — E55.9 VITAMIN D DEFICIENCY: ICD-10-CM

## 2022-03-28 LAB
ALBUMIN SERPL BCP-MCNC: 3 G/DL (ref 3.5–5)
ALP SERPL-CCNC: 185 U/L (ref 46–116)
ALT SERPL W P-5'-P-CCNC: 30 U/L (ref 12–78)
ANION GAP SERPL CALCULATED.3IONS-SCNC: 4 MMOL/L (ref 4–13)
AST SERPL W P-5'-P-CCNC: 18 U/L (ref 5–45)
BASOPHILS # BLD AUTO: 0.04 THOUSANDS/ΜL (ref 0–0.1)
BASOPHILS NFR BLD AUTO: 0 % (ref 0–1)
BILIRUB SERPL-MCNC: 0.61 MG/DL (ref 0.2–1)
BUN SERPL-MCNC: 14 MG/DL (ref 5–25)
CALCIUM ALBUM COR SERPL-MCNC: 9.6 MG/DL (ref 8.3–10.1)
CALCIUM SERPL-MCNC: 8.8 MG/DL (ref 8.3–10.1)
CHLORIDE SERPL-SCNC: 104 MMOL/L (ref 100–108)
CK SERPL-CCNC: 65 U/L (ref 39–308)
CO2 SERPL-SCNC: 30 MMOL/L (ref 21–32)
CREAT SERPL-MCNC: 1.27 MG/DL (ref 0.6–1.3)
CREAT UR-MCNC: 32 MG/DL
EOSINOPHIL # BLD AUTO: 0.14 THOUSAND/ΜL (ref 0–0.61)
EOSINOPHIL NFR BLD AUTO: 2 % (ref 0–6)
ERYTHROCYTE [DISTWIDTH] IN BLOOD BY AUTOMATED COUNT: 15.3 % (ref 11.6–15.1)
GFR SERPL CREATININE-BSD FRML MDRD: 56 ML/MIN/1.73SQ M
GLUCOSE P FAST SERPL-MCNC: 156 MG/DL (ref 65–99)
HCT VFR BLD AUTO: 51.3 % (ref 36.5–49.3)
HGB BLD-MCNC: 16.3 G/DL (ref 12–17)
IMM GRANULOCYTES # BLD AUTO: 0.05 THOUSAND/UL (ref 0–0.2)
IMM GRANULOCYTES NFR BLD AUTO: 1 % (ref 0–2)
LYMPHOCYTES # BLD AUTO: 2.22 THOUSANDS/ΜL (ref 0.6–4.47)
LYMPHOCYTES NFR BLD AUTO: 23 % (ref 14–44)
MAGNESIUM SERPL-MCNC: 2.1 MG/DL (ref 1.6–2.6)
MCH RBC QN AUTO: 28.4 PG (ref 26.8–34.3)
MCHC RBC AUTO-ENTMCNC: 31.8 G/DL (ref 31.4–37.4)
MCV RBC AUTO: 89 FL (ref 82–98)
MONOCYTES # BLD AUTO: 0.76 THOUSAND/ΜL (ref 0.17–1.22)
MONOCYTES NFR BLD AUTO: 8 % (ref 4–12)
NEUTROPHILS # BLD AUTO: 6.34 THOUSANDS/ΜL (ref 1.85–7.62)
NEUTS SEG NFR BLD AUTO: 66 % (ref 43–75)
NRBC BLD AUTO-RTO: 0 /100 WBCS
PHOSPHATE SERPL-MCNC: 3.6 MG/DL (ref 2.3–4.1)
PLATELET # BLD AUTO: 307 THOUSANDS/UL (ref 149–390)
PMV BLD AUTO: 10.2 FL (ref 8.9–12.7)
POTASSIUM SERPL-SCNC: 4.6 MMOL/L (ref 3.5–5.3)
PROT SERPL-MCNC: 7.4 G/DL (ref 6.4–8.2)
PROT UR-MCNC: 8 MG/DL
PROT/CREAT UR: 0.25 MG/G{CREAT} (ref 0–0.1)
PTH-INTACT SERPL-MCNC: 44.6 PG/ML (ref 18.4–80.1)
RBC # BLD AUTO: 5.74 MILLION/UL (ref 3.88–5.62)
SODIUM SERPL-SCNC: 138 MMOL/L (ref 136–145)
WBC # BLD AUTO: 9.55 THOUSAND/UL (ref 4.31–10.16)

## 2022-03-28 PROCEDURE — 80053 COMPREHEN METABOLIC PANEL: CPT

## 2022-03-28 PROCEDURE — 82570 ASSAY OF URINE CREATININE: CPT

## 2022-03-28 PROCEDURE — 82306 VITAMIN D 25 HYDROXY: CPT

## 2022-03-28 PROCEDURE — 84100 ASSAY OF PHOSPHORUS: CPT

## 2022-03-28 PROCEDURE — 84156 ASSAY OF PROTEIN URINE: CPT

## 2022-03-28 PROCEDURE — 85025 COMPLETE CBC W/AUTO DIFF WBC: CPT

## 2022-03-28 PROCEDURE — 83970 ASSAY OF PARATHORMONE: CPT

## 2022-03-28 PROCEDURE — 36415 COLL VENOUS BLD VENIPUNCTURE: CPT

## 2022-03-28 PROCEDURE — 82550 ASSAY OF CK (CPK): CPT

## 2022-03-28 PROCEDURE — 83735 ASSAY OF MAGNESIUM: CPT

## 2022-03-29 ENCOUNTER — HOSPITAL ENCOUNTER (OUTPATIENT)
Dept: INFUSION CENTER | Facility: CLINIC | Age: 72
Discharge: HOME | End: 2022-03-29
Payer: MEDICARE

## 2022-03-29 VITALS
OXYGEN SATURATION: 94 % | DIASTOLIC BLOOD PRESSURE: 84 MMHG | RESPIRATION RATE: 20 BRPM | SYSTOLIC BLOOD PRESSURE: 137 MMHG | TEMPERATURE: 96.8 F | HEART RATE: 77 BPM

## 2022-03-29 DIAGNOSIS — K50.012 CROHN'S DISEASE OF ILEUM WITH INTESTINAL OBSTRUCTION (HCC): Primary | ICD-10-CM

## 2022-03-29 PROCEDURE — 96365 THER/PROPH/DIAG IV INF INIT: CPT

## 2022-03-29 RX ORDER — SODIUM CHLORIDE 9 MG/ML
20 INJECTION, SOLUTION INTRAVENOUS ONCE
Status: CANCELLED | OUTPATIENT
Start: 2022-04-26

## 2022-03-29 RX ORDER — SODIUM CHLORIDE 9 MG/ML
20 INJECTION, SOLUTION INTRAVENOUS ONCE
Status: COMPLETED | OUTPATIENT
Start: 2022-03-29 | End: 2022-03-29

## 2022-03-29 RX ADMIN — SODIUM CHLORIDE 20 ML/HR: 0.9 INJECTION, SOLUTION INTRAVENOUS at 11:23

## 2022-03-29 RX ADMIN — VEDOLIZUMAB 300 MG: 300 INJECTION, POWDER, LYOPHILIZED, FOR SOLUTION INTRAVENOUS at 11:35

## 2022-03-29 NOTE — PROGRESS NOTES
Patient to infusion for [facility] today  He offers no complaints  Vital signs stable, IV placed without issue  Call bell within reach, will continue to monitor,

## 2022-03-29 NOTE — PROGRESS NOTES
Patient tolerated monthly entyvio dosing without incident and was discharged post, no c/o offered  He is aware he needs to schedule his next dose on discharge today

## 2022-03-31 ENCOUNTER — TELEPHONE (OUTPATIENT)
Dept: NEPHROLOGY | Facility: CLINIC | Age: 72
End: 2022-03-31

## 2022-03-31 NOTE — TELEPHONE ENCOUNTER
Appointment Confirmation   Person confirmed appointment with  If not patient, name of the person Spouse    Date and time of appointment 4/1    2:30    Patient acknowledged and will be at appointment? yes    Did you advise the patient that they will need a urine sample if they are a new patient?  N/A    Did you advise the patient to bring their current medications for verification? (including any OTC) Yes    Additional Information

## 2022-04-01 ENCOUNTER — OFFICE VISIT (OUTPATIENT)
Dept: NEPHROLOGY | Facility: CLINIC | Age: 72
End: 2022-04-01
Payer: MEDICARE

## 2022-04-01 VITALS — HEIGHT: 69 IN | BODY MASS INDEX: 35.7 KG/M2 | WEIGHT: 241 LBS

## 2022-04-01 DIAGNOSIS — R91.8 LUNG FIELD ABNORMAL FINDING ON EXAMINATION: ICD-10-CM

## 2022-04-01 DIAGNOSIS — I95.89 CHRONIC HYPOTENSION: ICD-10-CM

## 2022-04-01 DIAGNOSIS — D63.1 ANEMIA OF CHRONIC RENAL FAILURE, STAGE 3A (HCC): ICD-10-CM

## 2022-04-01 DIAGNOSIS — E55.9 VITAMIN D DEFICIENCY: ICD-10-CM

## 2022-04-01 DIAGNOSIS — E78.5 DYSLIPIDEMIA: ICD-10-CM

## 2022-04-01 DIAGNOSIS — N18.31 STAGE 3A CHRONIC KIDNEY DISEASE (HCC): Primary | ICD-10-CM

## 2022-04-01 DIAGNOSIS — R80.1 PERSISTENT PROTEINURIA: ICD-10-CM

## 2022-04-01 DIAGNOSIS — N18.31 ANEMIA OF CHRONIC RENAL FAILURE, STAGE 3A (HCC): ICD-10-CM

## 2022-04-01 PROCEDURE — 99214 OFFICE O/P EST MOD 30 MIN: CPT | Performed by: INTERNAL MEDICINE

## 2022-04-01 RX ORDER — AMLODIPINE BESYLATE 2.5 MG/1
2.5 TABLET ORAL DAILY
Qty: 30 TABLET | Refills: 5 | Status: SHIPPED | OUTPATIENT
Start: 2022-04-01 | End: 2022-07-06

## 2022-04-01 NOTE — PATIENT INSTRUCTIONS
1  Medication changes today:   Please begin amlodipine 2 5 mg at bedtime for your blood pressure watch for swelling, dizziness or lightheadedness and call if you developed any        2  Please take 1 week a blood pressure readings  4 weeks after making the above medication change     AS FOLLOWS  MORNING AND EVENING, SITTING AND STANDING as follows:  · TAKE THE MORNING READINGS BEFORE ANY MEDICATIONS AND WHEN YOU ARE RELAXED FOR SEVERAL MINUTES  · TAKE THE EVENING READINGS:  BETWEEN 7-10 P M ; PRIOR TO ANY MEDICATIONS; AT LEAST IN OUR  FROM DINNER; AND CERTAINLY AFTER RELAXING FOR A FEW MINUTES  · PLEASE INCLUDE HEART RATE WITH YOUR BLOOD PRESSURE READINGS  · When taking standing readings, keep your arm supported at heart level and not dangling  · Make sure you are sitting with your back supported and feet on the ground and do not cross your legs or feet  · Make sure you have not taken any coffee or caffeine products or exercised or smoke cigarettes at least 30 minutes before taking your blood pressure  Then please mail these readings into the office    3  In 3 months:   Please go for nonfasting lab work but in the morning   Please take 1 week a blood pressure readings as outlined above and mail those into the office     4  Follow-up in 6  months   Please bring in 1 week a blood pressure readings morning evening, sitting and standing is outlined above   Please go for fasting lab work 1-2 weeks prior to your appointment      5   General instructions:   AVOID SALT BUT NOT ADDING AN READING LABELS TO MAKE SURE THERE IS LOW-SALT IN THE FOOD THAT YOU ARE EATING  o Goal is less than 2 g of sodium intake or less than 5 g of sodium chloride intake per day     Avoid nonsteroidal anti-inflammatory drugs such as Naprosyn, ibuprofen, Aleve, Advil, Celebrex, Meloxicam (Mobic) etc   You can use Tylenol as needed if you do not have any liver condition to be concerned about     Avoid medications such as Sudafed or decongestants and antihistamines that contained the D component which is the decongestant  You can take antihistamines without the decongestant or D component   Try to avoid medications such as pantoprazole or  Protonix/Nexium or Esomeprazole)/Prilosec or omeprazole/Prevacid or lansoprazole/AcipHex or Rabeprazole  If you are able to, use Pepcid as this is safer for your kidneys   Try to exercise at least 30 minutes 3 days a week to begin with with an ultimate goal of 5 days a week for at least 30 minutes     Try to lose 5-10 lb by your next visit     Please do not drink more than 2 glasses of alcohol/wine on a daily basis as this may contribute to your high blood pressure          6  PLEASE GO FOR A CHEST X-RAY AT THIS TIME AT YOUR CONVENIENCE BECAUSE OF A SLIGHTLY ABNORMAL LUNG EXAM ON TODAY'S VISIT

## 2022-04-01 NOTE — LETTER
April 1, 2022     Ean Brown MD  [address]  [address] [place] [address]    Patient: Sandi Zamorano III   YOB: 1950   Date of Visit: 4/1/2022       Dear Dr Valentine Washington: Thank you for referring David Medrano to me for evaluation  Below are my notes for this consultation  If you have questions, please do not hesitate to call me  I look forward to following your patient along with you  Sincerely,        Rowan Bojorquez MD        CC: MD Roman Guajardo MD Bevelyn Peak, MD Liane Register, MD  4/1/2022  3:14 PM  Sign when Signing Visit  RENAL FOLLOW UP NOTE: td     ASSESSMENT AND PLAN:  1  Gayla Claros :  · Etiology:  Cardiorenal syndrome/arteriolar nephrosclerosis/?  Atheroemboli/prior prerenal associated mild tachycardia and relative hypotension in the past  · Baseline creatinine:  1 25-1 41  · Current creatinine:  At baseline at 1 27  · Urine protein creatinine ratio:  0 25 g at goal  Recommendations:  · Treat hypertension-please see below  · Treat dyslipidemia-please see below  · Maintain proteinuria less than 1 g or as low as possible  · Avoid nephrotoxic agents such as NSAIDs, patient counseled as such  2   Volume:  Euvolemic  3   Hypotension:  Workup was compatible with low ejection fraction 48% associated severe hypokinesis of the apical anterior and mid anterior septal in mild in for septal and apical inferior and apical septal and apical walls   No pericardial effusion  Cortisol/TSH were unremarkable    · Currently blood pressure:   A m :  115/88  P m :  115/73  Heart rate 60-80 range  · Medication changes today:  Add amlodipine 2 5 mg daily at evening time to help with the morning blood pressures and recheck in about 4-6 weeks    4   Electrolytes:  All acceptable  5   Mineral bone disorder:  · Calcium/magnesium/phosphorus:  All acceptable including normal magnesium  · PTH intact:   44 6 which is normal  · Vitamin-D:  44 at goal from prior evaluation  6   Dyslipidemia:  · Goal LDL:  Less than 70 given PAD  · Current lipid profile:  LDL 48/HDL 48/triglycerides 107 as of 03/22/2022  Recommendations:  At goal so no changes  7   Anemia:               Recommendations:  · Hemoglobin stable at  16 3  · Multiple myeloma evaluation with SPEP/UPEP and light chains:  Negative  · Stool for occult blood x3:  NEGATIVE X1  · In March had EGD colonoscopy and recent sigmoidoscopy:  Harris's esophagus/diverticulosis/colonic polyps   Patient will be seeing Dr Brayan Bonilla  8   Other problems:  · Chronic intermittent mild leukocytosis:  Stable   · Gross hematuria/microscopic hematuria:  This has resolved   Patient seen by Urology for gross hematuria   Cystoscopy was negative and recent PSA negative  No further investigation by follow-up in 1 year by Urology  · Abnormal echocardiogram/cardiomyopathy:  Followed in the past by Dr Almanza  · Paroxysmal atrial fibrillation followed by Dr Merrill Holjeanne: Candace Iasac on Xarelto  · Recent admission in May for signs of small-bowel obstruction associated with abdominal pain and diarrhea   Felt to have Crohn's disease  · Bilateral adrenal gland abnormalities being followed by surgical oncology   24 hour urine for Dopamine epinephrine all unremarkable   Aldosterone was unremarkable   Cortisol was okay at 17 5  To be followed by Dr Cordova  · PAD:  Status post stent for the right popliteal artery/revision 02/12/2020  · Abnormal alkaline phosphatase followed by GI  · Diabetes mellitus type 2 not really a candidate for SGLT2 inhibitors with PAD  · ABNORMAL LUNG EXAM:  RECOMMEND CHEST X-RAY ALTHOUGH ASYMPTOMATIC     GI health maintenance:  2020    PATIENT INSTRUCTIONS:    Patient Instructions   1  Medication changes today:   Please begin amlodipine 2 5 mg at bedtime for your blood pressure watch for swelling, dizziness or lightheadedness and call if you developed any        2   Please take 1 week a blood pressure readings  4 weeks after making the above medication change     AS FOLLOWS  MORNING AND EVENING, SITTING AND STANDING as follows:  · TAKE THE MORNING READINGS BEFORE ANY MEDICATIONS AND WHEN YOU ARE RELAXED FOR SEVERAL MINUTES  · TAKE THE EVENING READINGS:  BETWEEN 7-10 P M ; PRIOR TO ANY MEDICATIONS; AT LEAST IN OUR  FROM DINNER; AND CERTAINLY AFTER RELAXING FOR A FEW MINUTES  · PLEASE INCLUDE HEART RATE WITH YOUR BLOOD PRESSURE READINGS  · When taking standing readings, keep your arm supported at heart level and not dangling  · Make sure you are sitting with your back supported and feet on the ground and do not cross your legs or feet  · Make sure you have not taken any coffee or caffeine products or exercised or smoke cigarettes at least 30 minutes before taking your blood pressure  Then please mail these readings into the office    3  In 3 months:   Please go for nonfasting lab work but in the morning   Please take 1 week a blood pressure readings as outlined above and mail those into the office     4  Follow-up in 6  months   Please bring in 1 week a blood pressure readings morning evening, sitting and standing is outlined above   Please go for fasting lab work 1-2 weeks prior to your appointment      5  General instructions:   AVOID SALT BUT NOT ADDING AN READING LABELS TO MAKE SURE THERE IS LOW-SALT IN THE FOOD THAT YOU ARE EATING  o Goal is less than 2 g of sodium intake or less than 5 g of sodium chloride intake per day     Avoid nonsteroidal anti-inflammatory drugs such as Naprosyn, ibuprofen, Aleve, Advil, Celebrex, Meloxicam (Mobic) etc   You can use Tylenol as needed if you do not have any liver condition to be concerned about     Avoid medications such as Sudafed or decongestants and antihistamines that contained the D component which is the decongestant  You can take antihistamines without the decongestant or D component       Try to avoid medications such as pantoprazole or  Protonix/Nexium or Esomeprazole)/Prilosec or omeprazole/Prevacid or lansoprazole/AcipHex or Rabeprazole  If you are able to, use Pepcid as this is safer for your kidneys   Try to exercise at least 30 minutes 3 days a week to begin with with an ultimate goal of 5 days a week for at least 30 minutes     Try to lose 5-10 lb by your next visit     Please do not drink more than 2 glasses of alcohol/wine on a daily basis as this may contribute to your high blood pressure  6  PLEASE GO FOR A CHEST X-RAY AT THIS TIME AT YOUR CONVENIENCE BECAUSE OF A SLIGHTLY ABNORMAL LUNG EXAM ON TODAY'S VISIT         Subjective: There has been no hospitalizations or acute illnesses since last visit  The patient overall is feeling well  No fevers, chills, or cough or colds    Good appetite and good energy  No hematuria, dysuria, voiding symptoms or foamy urine  No gastrointestinal symptoms  No cardiovascular symptoms including swelling of the legs  No headaches, dizziness or lightheadedness  Blood pressure medications:   Toprol XL 25 mg daily in the morning and 50 mg in the evening      ROS:  See HPI, otherwise review of systems as completely reviewed with the patient are negative    Past Medical History:   Diagnosis Date    [facility] syndrome ([facility] [address] [facility] )     Chronic kidney disease     Colon polyps     GERD (gastroesophageal reflux disease)     Hematuria     History of rheumatic fever     Hypolipidemia     Hypotension     Ischemic cardiomyopathy     [facility]     [facility]sema [facility]      Past Surgical History:   Procedure Laterality Date    COLONOSCOPY  2019    HERNIA REPAIR      IR AORTAGRAM WITH RUN-OFF  6/27/2019    IR AORTAGRAM WITH RUN-OFF  2/12/2020    POPLITEAL ARTERY STENT Right     6/2019    CA SLCTV CATHJ 3RD+ ORD SLCTV ABDL PEL/LXTR BRNCH Right 6/27/2019    Procedure: leg ANGIOGRAM WITH STENT, BALLOON ANGIOPLASTY, LEFT GROIN ACCESS;  Surgeon: Ivon Jorge MD;  [facility]: [facility]; Service: Vascular    WA VEIN BYPASS GRAFT,FEM-POP Right 3/26/2020    Procedure: BYPASS FEMORAL-POPLITEAL; Right lower extremity bypass, fem-bk pop with GSV;  Surgeon: Razia Oswald MD;  Location: [facility];  Service: Vascular     Family History   Problem Relation Age of Onset    Heart disease Mother     Hyperlipidemia Mother     Hypertension Mother     Hypertension Father     Heart disease Father     Stroke Father     Hyperlipidemia Father     Diabetes Brother     No Known Problems Maternal Grandmother     Dementia Neg Hx     Drug abuse Neg Hx     Mental illness Neg Hx     Substance Abuse Neg Hx     Alcohol abuse Neg Hx     Depression Neg Hx     Colon cancer Neg Hx       reports that he has been smoking cigarettes  He has a 15 00 pack-year smoking history  He has never used smokeless tobacco  He reports current alcohol use of about 1 0 standard drink of alcohol per week  He reports that he does not use drugs  I COMPLETELY REVIEWED THE PAST MEDICAL HISTORY/PAST SURGICAL HISTORY/SOCIAL HISTORY/FAMILY HISTORY/AND MEDICATIONS  AND UPDATED ALL    Objective:     Vitals:   BP sitting left:  118/72 WITH A HEART RATE OF 76 AND IRREGULAR  BP standing right:  120/78 WITH A HEART RATE OF 72 AND IRREGULAR    Weight (last 2 days)     Date/Time Weight    04/01/22 1430 109 (241)        Wt Readings from Last 3 Encounters:   04/01/22 109 kg (241 lb)   03/10/22 108 kg (238 lb)   02/09/22 108 kg (237 lb)       Body mass index is 35 59 kg/m²      Physical Exam: General:  No acute distress/OBESE  Skin:  No acute rash  Eyes:  No scleral icterus, noninjected, no discharge from eyes  ENT:  Moist mucous membranes  Neck:  Supple, no jugular venous distention, trachea is midline, no lymphadenopathy and no thyromegaly  Back   No CVAT  Chest:  Bilateral crackles at both bases, no wheezing or rhonchi, no dullness to percussion, good respiratory effort  CVS:  Regular rate and rhythm without a rub, or gallops or murmurs  Abdomen:  OBESESoft and nontender with normal bowel sounds  Extremities:  No cyanosis and no edema, no arthritic changes, normal range of motion  Neuro:  Grossly intact  Psych:  Alert, oriented x3 and appropriate      Medications:    Current Outpatient Medications:     aspirin 81 MG tablet, Take 81 mg by mouth daily, Disp: , Rfl:     atorvastatin (LIPITOR) 20 mg tablet, Take 1 tablet (20 mg total) by mouth daily, Disp: 90 tablet, Rfl: 1    folic acid (FOLVITE) 1 mg tablet, Take 1 tablet (1 mg total) by mouth daily, Disp: 90 tablet, Rfl: 3    glimepiride (AMARYL) 1 mg tablet, Take 1 tablet (1 mg total) by mouth daily with breakfast, Disp: 90 tablet, Rfl: 1    Magnesium 500 MG TABS, Take 1 tablet by mouth daily , Disp: , Rfl:     metoprolol succinate (TOPROL-XL) 25 mg 24 hr tablet, Take 1 tablet (25 mg total) by mouth daily, Disp: 90 tablet, Rfl: 0    metoprolol succinate (TOPROL-XL) 50 mg 24 hr tablet, Take 1 tablet (50 mg total) by mouth daily, Disp: 90 tablet, Rfl: 4    omeprazole (PriLOSEC) 40 MG capsule, Take 1 capsule (40 mg total) by mouth daily, Disp: 90 capsule, Rfl: 1    rivaroxaban (XARELTO) 20 mg tablet, Take 1 tablet (20 mg total) by mouth daily with breakfast, Disp: 90 tablet, Rfl: 4    vitamin B-12 (VITAMIN B-12) 1,000 mcg tablet, Take 1 tablet (1,000 mcg total) by mouth daily (Patient taking differently: Take 1,000 mcg by mouth 3 (three) times a week  ), Disp: 90 tablet, Rfl: 1    amLODIPine (NORVASC) 2 5 mg tablet, Take 1 tablet (2 5 mg total) by mouth daily, Disp: 30 tablet, Rfl: 5    Lab, Imaging and other studies: I have personally reviewed pertinent labs    Laboratory Results:  Results for orders placed or performed in visit on 03/28/22   CK   Result Value Ref Range    Total CK 65 39 - 308 U/L   Comprehensive metabolic panel   Result Value Ref Range    Sodium 138 136 - 145 mmol/L    Potassium 4 6 3 5 - 5 3 mmol/L    Chloride 104 100 - 108 mmol/L    CO2 30 21 - 32 mmol/L    ANION GAP 4 4 - 13 mmol/L    BUN 14 5 - 25 mg/dL    Creatinine 1 27 0 60 - 1 30 mg/dL    Glucose, Fasting 156 (H) 65 - 99 mg/dL    Calcium 8 8 8 3 - 10 1 mg/dL    Corrected Calcium 9 6 8 3 - 10 1 mg/dL    AST 18 5 - 45 U/L    ALT 30 12 - 78 U/L    Alkaline Phosphatase 185 (H) 46 - 116 U/L    Total Protein 7 4 6 4 - 8 2 g/dL    Albumin 3 0 (L) 3 5 - 5 0 g/dL    Total Bilirubin 0 61 0 20 - 1 00 mg/dL    eGFR 56 ml/min/1 73sq m   CBC and differential   Result Value Ref Range    WBC 9 55 4 31 - 10 16 Thousand/uL    RBC 5 74 (H) 3 88 - 5 62 Million/uL    Hemoglobin 16 3 12 0 - 17 0 g/dL    Hematocrit 51 3 (H) 36 5 - 49 3 %    MCV 89 82 - 98 fL    MCH 28 4 26 8 - 34 3 pg    MCHC 31 8 31 4 - 37 4 g/dL    RDW 15 3 (H) 11 6 - 15 1 %    MPV 10 2 8 9 - 12 7 fL    Platelets 063 579 - 296 Thousands/uL    nRBC 0 /100 WBCs    Neutrophils Relative 66 43 - 75 %    Immat GRANS % 1 0 - 2 %    Lymphocytes Relative 23 14 - 44 %    Monocytes Relative 8 4 - 12 %    Eosinophils Relative 2 0 - 6 %    Basophils Relative 0 0 - 1 %    Neutrophils Absolute 6 34 1 85 - 7 62 Thousands/µL    Immature Grans Absolute 0 05 0 00 - 0 20 Thousand/uL    Lymphocytes Absolute 2 22 0 60 - 4 47 Thousands/µL    Monocytes Absolute 0 76 0 17 - 1 22 Thousand/µL    Eosinophils Absolute 0 14 0 00 - 0 61 Thousand/µL    Basophils Absolute 0 04 0 00 - 0 10 Thousands/µL   Magnesium   Result Value Ref Range    Magnesium 2 1 1 6 - 2 6 mg/dL   Phosphorus   Result Value Ref Range    Phosphorus 3 6 2 3 - 4 1 mg/dL   Protein / creatinine ratio, urine   Result Value Ref Range    Creatinine, Ur 32 0 mg/dL    Protein Urine Random 8 mg/dL    Prot/Creat Ratio, Ur 0 25 (H) 0 00 - 0 10   PTH, intact   Result Value Ref Range    PTH 44 6 18 4 - 80 1 pg/mL       Results from last 7 days   Lab Units 03/28/22  0816   WBC Thousand/uL 9 55   HEMOGLOBIN g/dL 16 3   HEMATOCRIT % 51 3*   PLATELETS Thousands/uL 307   POTASSIUM mmol/L 4 6   CHLORIDE mmol/L 104   CO2 mmol/L 30   BUN mg/dL 14 CREATININE mg/dL 1 27   CALCIUM mg/dL 8 8   MAGNESIUM mg/dL 2 1   PHOSPHORUS mg/dL 3 6         Radiology review:   chest X-ray    Ultrasound      Portions of the record may have been created with voice recognition software  Occasional wrong word or "sound a like" substitutions may have occurred due to the inherent limitations of voice recognition software  Read the chart carefully and recognize, using context, where substitutions have occurred

## 2022-04-02 LAB
25(OH)D2 SERPL-MCNC: 2.7 NG/ML
25(OH)D3 SERPL-MCNC: 32 NG/ML
25(OH)D3+25(OH)D2 SERPL-MCNC: 35 NG/ML

## 2022-04-27 ENCOUNTER — HOSPITAL ENCOUNTER (OUTPATIENT)
Dept: INFUSION CENTER | Facility: CLINIC | Age: 72
Discharge: HOME | End: 2022-04-27
Payer: MEDICARE

## 2022-04-27 VITALS
TEMPERATURE: 97 F | HEART RATE: 65 BPM | SYSTOLIC BLOOD PRESSURE: 116 MMHG | OXYGEN SATURATION: 92 % | DIASTOLIC BLOOD PRESSURE: 81 MMHG | RESPIRATION RATE: 20 BRPM

## 2022-04-27 DIAGNOSIS — K50.012 CROHN'S DISEASE OF ILEUM WITH INTESTINAL OBSTRUCTION (HCC): Primary | ICD-10-CM

## 2022-04-27 PROCEDURE — 96365 THER/PROPH/DIAG IV INF INIT: CPT

## 2022-04-27 RX ORDER — SODIUM CHLORIDE 9 MG/ML
20 INJECTION, SOLUTION INTRAVENOUS ONCE
Status: CANCELLED | OUTPATIENT
Start: 2022-05-25

## 2022-04-27 RX ORDER — SODIUM CHLORIDE 9 MG/ML
20 INJECTION, SOLUTION INTRAVENOUS ONCE
Status: COMPLETED | OUTPATIENT
Start: 2022-04-27 | End: 2022-04-27

## 2022-04-27 RX ADMIN — SODIUM CHLORIDE 20 ML/HR: 0.9 INJECTION, SOLUTION INTRAVENOUS at 08:32

## 2022-04-27 RX ADMIN — VEDOLIZUMAB 300 MG: 300 INJECTION, POWDER, LYOPHILIZED, FOR SOLUTION INTRAVENOUS at 09:12

## 2022-04-27 NOTE — PROGRESS NOTES
Patient tolerated treatment without incident and was discharged post, no c/o offered  He will RTO 5/25/22 for his next dosing

## 2022-04-27 NOTE — PATIENT INSTRUCTIONS
April 2022 Sunday Monday Tuesday Wednesday Thursday Friday Saturday                            1    FOLLOW UP PG   2:15 PM   (30 min )   Luly Diaz MD   [facility] [place] 2                3     4     5     6     7     8     9                10     11     12     13     14     15     16                17     18     19     20     21     22     23                24     25     26     27    INF THERAPY PLAN   8:30 AM   (120 min )   [facility] [address] 28     29     30          Cycle 1, Treatment 21           Treatment Details       4/27/2022 - Cycle 1, Treatment 21      Supportive Care: VEDOLIZUMAB IVPB        May 2022      Eze Monday Tuesday Wednesday Thursday Friday Saturday   1     2     3     4     5     6     7                8     9     10     11    FOLLOW UP PG   1:25 PM   (20 min )   Hilda Contreras MD   [facility] [place] 12     13     14                15     16     17     18     19     20     21                22     23     24     25    INF THERAPY PLAN   8:00 AM   (120 min )   [facility] INF CHAIR [address] 26     27     28                29     30     31

## 2022-04-27 NOTE — PROGRESS NOTES
Patient here for entyvio and is doing well, no changes or c/o, he denies recent illness with use of antibiotic  First IV attempt RH with initial success, once cathlon advanced site infiltrated, cathlon d/c and intact, pressure dressing to site CDI   2nd attempt LFA without incident

## 2022-05-12 NOTE — PROGRESS NOTES
Keegan Aponte [facility] - Outpatient Follow-up Note  Eusebio Bishop III 80 y o  male MRN: [id number]  Encounter: [id number]          ASSESSMENT AND PLAN:    Eusebio Bishop III is a 80 y o  male who presents with complaint of CKD, peripheral vascular disease, AFib on anticoagulation, small bowel Crohn's with prior small bowel obstructions currently on Entyvio and had been doing well clinically  He overall is in clinical remission on Entyvio  His biggest complaint is weight gain  CT from January with bilateral adrenal nodules more than 4 cm on the right  Most recent CMP with elevated glucose and alkaline phosphatase as well as albumin 3, but otherwise normal   Vitamin-D normal   Normal white blood cell count, hemoglobin, platelets  Entyvio level from October 25  Prior CRP 11 6  Prior MR enterography with chronic changes of IBD in the distal ileum and digestion of fiber stenotic stricturing with minimal active inflammation  1  Crohn's disease of small intestine with complication ([address] )    2  Harris's esophagus without dysplasia    3  Crohn's disease of ileum with intestinal obstruction ([facility] [address] )    4  Gastroesophageal reflux disease with esophagitis without hemorrhage    5  Polyp of colon, unspecified part of colon, unspecified type    6  Vitamin D deficiency    7  Vitamin B12 deficiency    8  Positive QuantiFERON-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    9  Elevated alkaline phosphatase level    10  Obesity, morbid ([address] )    11  Essential (hemorrhagic) thrombocythemia ([address] )    12  Encounter for screening for other viral diseases     13   Immunocompromised patient ([address] )        Orders Placed This Encounter   Procedures    Quantiferon TB Gold Plus    Chronic Hepatitis Panel    C-reactive protein    Gamma GT    Alkaline phosphatase, isoenzymes     Continue Entyvio every 4 weeks  Repeat blood work including QuantiFERON gold, CRP, GGT  Repeat colonoscopy and endoscopy to be considered at next visit  Continue omeprazole for Harris's  Continue vitamin-D B12 supplementation  Smoking cessation counseling  Avoid live virus vaccine  Yearly flu shot, COVID vaccine and booster, pneumonia vaccines  Derm, eye and dental exams  ______________________________________________________________________    SUBJECTIVE:    Jolie Alcantara III is a 80 y o  male who presents with complaint of Crohn's  He feels like the Crohn's is okay  He has weight gain  He goes to the gym  3 BMs per day on average, formed, without blood or black stools  No abdominal pain or rectal pain  No rashes, mouth sores  No joint pains  No heartburn, dysphagia, odynophagia, nausea, vomiting, diarrhea, constipation, BRBPR, melena, abdominal pain, weight loss         Answers for HPI/ROS submitted by the patient on 5/10/2022  What form(s) of tobacco have you used?: cigarettes  During the last year, how many days have you missed work or school because of your inflammatory bowel disease?: 0  During the last year, how many days have you been hospitalized because of your inflammatory bowel disease?: 0  During the last year, how many days have you visited a hospital emergency department because of your inflammatory bowel disease?: 0  During the last month, have you taken narcotic pain medications (such as Percocet, oxycodone, Oxycontin, morphine, Vicodin, Dilaudid, MS Contin) for your inflammatory bowel disease?: No  Have you awoken at night because you needed to move your bowels during the last month? : No  Have you had leakage of stool while sleeping during the last month?: No  Have you had leakage of stool while you were awake during the last month?: No  In the last 6 months, have you unintentionally lost weight?: No  Fever: No  Eye irritation: No  Mouth sores: No  Sore throat: No  Chest pain: No  Shortness of breath: No  Numbness or tingling in your hands or feet: No  Skin rash: No  Pain or swelling in your joints: No  Bruising or bleeding: No  Felt depressed or blue: No  When you are not experiencing symptoms of your inflammatory bowel disease, how many bowel movements do you typically have each day?: 3  What is the average (typical) number of bowel movements that you had in a single day during the last week?: 3  Over the last 3 days, have you had any bowel movements where you passed blood without stool?: No  Since your last visit, have you received any vaccinations?: No  Since the last visit, have you had an infection?: No  In the past three months, have you used tobacco in any form?: Yes        REVIEW OF SYSTEMS IS OTHERWISE NEGATIVE    10 point ROS reviewed and negative, except as above      Historical Information   Past Medical History:   Diagnosis Date    Blue toe syndrome (HCC)     Chronic kidney disease     Colon polyps     GERD (gastroesophageal reflux disease)     Hematuria     History of rheumatic fever     Hypolipidemia     Hypotension     Ischemic cardiomyopathy     PAD (peripheral artery disease) (HCC)     Pulmonary emphysema (HCC)      Past Surgical History:   Procedure Laterality Date    COLONOSCOPY  2019    HERNIA REPAIR      IR AORTAGRAM WITH RUN-OFF  6/27/2019    IR AORTAGRAM WITH RUN-OFF  2/12/2020    POPLITEAL ARTERY STENT Right     6/2019    ND SLCTV CATHJ 3RD+ ORD SLCTV ABDL PEL/LXTR [address] Right 6/27/2019    Procedure: leg ANGIOGRAM WITH STENT, BALLOON ANGIOPLASTY, LEFT GROIN ACCESS;  Surgeon: Wei Iraheta MD;  Location: [facility];  Service: Vascular    ND VEIN BYPASS GRAFT,FEM-POP Right 3/26/2020    Procedure: BYPASS FEMORAL-POPLITEAL; Right lower extremity bypass, fem-bk pop with GSV;  Surgeon: Wei Iraheta MD;  Location: [facility];  Service: Vascular     Social History   Social History     Substance and Sexual Activity   Alcohol Use Yes    Alcohol/week: 1 0 standard drink    Types: 1 Standard drinks or equivalent per week    Comment: social drinker when out dining     Social History     Substance and Sexual Activity   Drug Use Never     Social History     Tobacco Use   Smoking Status Current Some Day Smoker    Packs/day: 0 50    Years: 30 00    Pack years: 15 00    Types: Cigarettes   Smokeless Tobacco Never Used   Tobacco Comment    " I will do it on my own"     Family History   Problem Relation Age of Onset    Heart disease Mother     Hyperlipidemia Mother     Hypertension Mother     Hypertension Father     Heart disease Father     Stroke Father     Hyperlipidemia Father     Diabetes Brother     No Known Problems Maternal Grandmother     Dementia Neg Hx     Drug abuse Neg Hx     Mental illness Neg Hx     Substance Abuse Neg Hx     Alcohol abuse Neg Hx     Depression Neg Hx     Colon cancer Neg Hx        Meds/Allergies       Current Outpatient Medications:     amLODIPine (NORVASC) 2 5 mg tablet    aspirin 81 MG tablet    atorvastatin (LIPITOR) 20 mg tablet    folic acid (FOLVITE) 1 mg tablet    glimepiride (AMARYL) 1 mg tablet    Magnesium 500 MG TABS    metoprolol succinate (TOPROL-XL) 25 mg 24 hr tablet    metoprolol succinate (TOPROL-XL) 50 mg 24 hr tablet    omeprazole (PriLOSEC) 40 MG capsule    rivaroxaban (XARELTO) 20 mg tablet    vitamin B-12 (VITAMIN B-12) 1,000 mcg tablet    No Known Allergies        Objective     Blood pressure 126/80, pulse 80, height 5' 9" (1 753 m), weight 108 kg (237 lb 12 8 oz), SpO2 97 %  Body mass index is 35 12 kg/m²  Vancefariba Acosta yaz      Lab Results:   No visits with results within 1 Day(s) from this visit     Latest known visit with results is:   Appointment on 03/28/2022   Component Date Value    Total CK 03/28/2022 65     Sodium 03/28/2022 138     Potassium 03/28/2022 4 6     Chloride 03/28/2022 104     CO2 03/28/2022 30     ANION GAP 03/28/2022 4     BUN 03/28/2022 14     Creatinine 03/28/2022 1 27     Glucose, Fasting 03/28/2022 156 (A)    Calcium 03/28/2022 8 8     Corrected Calcium 03/28/2022 9 6     AST 03/28/2022 18     ALT 03/28/2022 30     Alkaline Phosphatase 03/28/2022 185 (A)    Total Protein 03/28/2022 7 4     Albumin 03/28/2022 3 0 (A)    Total Bilirubin 03/28/2022 0 61     eGFR 03/28/2022 56     WBC 03/28/2022 9 55     RBC 03/28/2022 5 74 (A)    Hemoglobin 03/28/2022 16 3     Hematocrit 03/28/2022 51 3 (A)    MCV 03/28/2022 89     MCH 03/28/2022 28 4     MCHC 03/28/2022 31 8     RDW 03/28/2022 15 3 (A)    MPV 03/28/2022 10 2     Platelets 61/86/4985 307     nRBC 03/28/2022 0     Neutrophils Relative 03/28/2022 66     Immat GRANS % 03/28/2022 1     Lymphocytes Relative 03/28/2022 23     Monocytes Relative 03/28/2022 8     Eosinophils Relative 03/28/2022 2     Basophils Relative 03/28/2022 0     Neutrophils Absolute 03/28/2022 6 34     Immature Grans Absolute 03/28/2022 0 05     Lymphocytes Absolute 03/28/2022 2 22     Monocytes Absolute 03/28/2022 0 76     Eosinophils Absolute 03/28/2022 0 14     Basophils Absolute 03/28/2022 0 04     Magnesium 03/28/2022 2 1     Phosphorus 03/28/2022 3 6     Creatinine, Ur 03/28/2022 32 0     Protein Urine Random 03/28/2022 8     Prot/Creat Ratio, Ur 03/28/2022 0 25 (A)    PTH 03/28/2022 44 6     25-HYDROXY VIT D 03/28/2022 35     25-Hydroxy D2 03/28/2022 2 7     25-HYDROXY VIT D3 03/28/2022 32        Lab Results   Component Value Date    WBC 9 55 03/28/2022    HGB 16 3 03/28/2022    HCT 51 3 (H) 03/28/2022    MCV 89 03/28/2022     03/28/2022       Lab Results   Component Value Date    SODIUM 138 03/28/2022    K 4 6 03/28/2022     03/28/2022    CO2 30 03/28/2022    AGAP 4 03/28/2022    BUN 14 03/28/2022    CREATININE 1 27 03/28/2022    GLUC 84 01/07/2021    GLUF 156 (H) 03/28/2022    CALCIUM 8 8 03/28/2022    AST 18 03/28/2022    ALT 30 03/28/2022    ALKPHOS 185 (H) 03/28/2022    TP 7 4 03/28/2022    TBILI 0 61 03/28/2022    EGFR 56 03/28/2022       Lab Results   Component Value Date    CRP 11 6 (H) 10/11/2021       Lab Results   Component Value Date    RTG9LIRVIYJB 2 806 01/18/2022       Lab Results   Component Value Date    IRON 70 02/10/2021    TIBC 324 02/10/2021    FERRITIN 22 02/10/2021       Radiology Results:   Cardiac EP device report    Result Date: 4/29/2022  Narrative: MDT LOOP NON-BILLABLE  CARELINK TRANSMISSION: ALERT FOR 1 PAUSE EPISODE W/ EGRAM SHOWING 3 SEC PAUSE WHILE PT WAS SLEEPING  DL     Cardiac EP device report    Result Date: 4/27/2022  Narrative: MDT LOOP NON-BILLABLE  CARELINK TRANSMISSION: ALERT FOR 1 MYRIAM EPISODE W/ EGRAM SHOWING SB @ 38 BPM WHILE PT WAS SLEEPING  DL     Cardiac EP device report    Result Date: 4/19/2022  Narrative: MDT LOOP NON-BILLABLE  CARELINK TRANSMISSION: ALERT FOR 1 MYRIAM EPISODES W/ EGRAM SHOWING SB @ 31 BPM WHILE PT WAS SLEEPING   DL

## 2022-05-12 NOTE — ASSESSMENT & PLAN NOTE
Kandice Rudolph III is a 80 y o  male who presents with complaint of CKD, peripheral vascular disease, AFib on anticoagulation, small bowel Crohn's with prior small bowel obstructions currently on Entyvio and had been doing well clinically  CT from January with bilateral adrenal nodules more than 4 cm on the right  Most recent CMP with elevated glucose and alkaline phosphatase as well as albumin 3, but otherwise normal   Vitamin-D normal   Normal white blood cell count, hemoglobin, platelets  Entyvio level from October 25  Prior CRP 11 6  Prior MR enterography with chronic changes of IBD in the distal ileum and digestion of fiber stenotic stricturing with minimal active inflammation  No diagnosis found  No orders of the defined types were placed in this encounter      Continue Entyvio every 4 weeks  Repeat blood work including QuantiFERON gold, CRP, GGT  Obtain fecal calprotectin  Repeat colonoscopy and endoscopy  Continue omeprazole for Harris's  Continue vitamin-D B12 supplementation  Smoking cessation  Avoid live virus vaccine  Yearly flu shot, COVID vaccine and booster, pneumonia vaccines  Derm, eye and dental exams

## 2022-05-13 ENCOUNTER — OFFICE VISIT (OUTPATIENT)
Dept: GASTROENTEROLOGY | Facility: MEDICAL CENTER | Age: 72
End: 2022-05-13
Payer: MEDICARE

## 2022-05-13 VITALS
HEIGHT: 69 IN | WEIGHT: 237.8 LBS | HEART RATE: 80 BPM | OXYGEN SATURATION: 97 % | BODY MASS INDEX: 35.22 KG/M2 | SYSTOLIC BLOOD PRESSURE: 126 MMHG | DIASTOLIC BLOOD PRESSURE: 80 MMHG

## 2022-05-13 DIAGNOSIS — K50.019 CROHN'S DISEASE OF SMALL INTESTINE WITH COMPLICATION (HCC): Primary | ICD-10-CM

## 2022-05-13 DIAGNOSIS — D84.9 IMMUNOCOMPROMISED PATIENT (HCC): ICD-10-CM

## 2022-05-13 DIAGNOSIS — K21.00 GASTROESOPHAGEAL REFLUX DISEASE WITH ESOPHAGITIS WITHOUT HEMORRHAGE: ICD-10-CM

## 2022-05-13 DIAGNOSIS — E66.01 OBESITY, MORBID (HCC): ICD-10-CM

## 2022-05-13 DIAGNOSIS — K63.5 POLYP OF COLON, UNSPECIFIED PART OF COLON, UNSPECIFIED TYPE: ICD-10-CM

## 2022-05-13 DIAGNOSIS — E55.9 VITAMIN D DEFICIENCY: ICD-10-CM

## 2022-05-13 DIAGNOSIS — K50.012 CROHN'S DISEASE OF ILEUM WITH INTESTINAL OBSTRUCTION (HCC): ICD-10-CM

## 2022-05-13 DIAGNOSIS — R74.8 ELEVATED ALKALINE PHOSPHATASE LEVEL: ICD-10-CM

## 2022-05-13 DIAGNOSIS — R76.12 POSITIVE QUANTIFERON-TB GOLD TEST: ICD-10-CM

## 2022-05-13 DIAGNOSIS — Z11.59 ENCOUNTER FOR SCREENING FOR OTHER VIRAL DISEASES: ICD-10-CM

## 2022-05-13 DIAGNOSIS — E53.8 VITAMIN B12 DEFICIENCY: ICD-10-CM

## 2022-05-13 DIAGNOSIS — K22.70 BARRETT'S ESOPHAGUS WITHOUT DYSPLASIA: ICD-10-CM

## 2022-05-13 DIAGNOSIS — D47.3 ESSENTIAL (HEMORRHAGIC) THROMBOCYTHEMIA (HCC): ICD-10-CM

## 2022-05-13 PROCEDURE — 99214 OFFICE O/P EST MOD 30 MIN: CPT | Performed by: INTERNAL MEDICINE

## 2022-05-25 ENCOUNTER — HOSPITAL ENCOUNTER (OUTPATIENT)
Dept: INFUSION CENTER | Facility: CLINIC | Age: 72
Discharge: HOME | End: 2022-05-25
Payer: MEDICARE

## 2022-05-25 VITALS
HEART RATE: 74 BPM | DIASTOLIC BLOOD PRESSURE: 93 MMHG | SYSTOLIC BLOOD PRESSURE: 157 MMHG | TEMPERATURE: 96.4 F | RESPIRATION RATE: 18 BRPM | OXYGEN SATURATION: 96 %

## 2022-05-25 DIAGNOSIS — K50.012 CROHN'S DISEASE OF ILEUM WITH INTESTINAL OBSTRUCTION (HCC): Primary | ICD-10-CM

## 2022-05-25 PROCEDURE — 96365 THER/PROPH/DIAG IV INF INIT: CPT

## 2022-05-25 RX ORDER — SODIUM CHLORIDE 9 MG/ML
20 INJECTION, SOLUTION INTRAVENOUS ONCE
Status: COMPLETED | OUTPATIENT
Start: 2022-05-25 | End: 2022-05-25

## 2022-05-25 RX ORDER — SODIUM CHLORIDE 9 MG/ML
20 INJECTION, SOLUTION INTRAVENOUS ONCE
Status: CANCELLED | OUTPATIENT
Start: 2022-06-22

## 2022-05-25 RX ADMIN — SODIUM CHLORIDE 20 ML/HR: 0.9 INJECTION, SOLUTION INTRAVENOUS at 08:26

## 2022-05-25 RX ADMIN — VEDOLIZUMAB 300 MG: 300 INJECTION, POWDER, LYOPHILIZED, FOR SOLUTION INTRAVENOUS at 08:26

## 2022-05-25 NOTE — PROGRESS NOTES
Patient arrives to infusion center for [facility]  Patient offers no complaints  Denies S/S illness/infection/recent antibiotic use  VSS - BP mildly elevated  PIV inserted without issue by AG RN  Patient resting on recliner chair, call bell within reach

## 2022-05-27 ENCOUNTER — HOSPITAL ENCOUNTER (OUTPATIENT)
Dept: RADIOLOGY | Facility: HOSPITAL | Age: 72
Discharge: HOME | End: 2022-05-27
Attending: INTERNAL MEDICINE
Payer: MEDICARE

## 2022-05-27 ENCOUNTER — APPOINTMENT (OUTPATIENT)
Dept: LAB | Facility: CLINIC | Age: 72
End: 2022-05-27
Payer: MEDICARE

## 2022-05-27 DIAGNOSIS — K50.019 CROHN'S DISEASE OF SMALL INTESTINE WITH COMPLICATION (HCC): ICD-10-CM

## 2022-05-27 DIAGNOSIS — R91.8 LUNG FIELD ABNORMAL FINDING ON EXAMINATION: ICD-10-CM

## 2022-05-27 DIAGNOSIS — Z11.59 ENCOUNTER FOR SCREENING FOR OTHER VIRAL DISEASES: ICD-10-CM

## 2022-05-27 DIAGNOSIS — R74.8 ELEVATED ALKALINE PHOSPHATASE LEVEL: ICD-10-CM

## 2022-05-27 LAB
CRP SERPL QL: 14.1 MG/L
GGT SERPL-CCNC: 50 U/L (ref 5–85)
HBV CORE AB SER QL: NORMAL
HBV CORE IGM SER QL: NORMAL
HBV SURFACE AG SER QL: NORMAL
HCV AB SER QL: NORMAL

## 2022-05-27 PROCEDURE — 71046 X-RAY EXAM CHEST 2 VIEWS: CPT

## 2022-05-27 PROCEDURE — 86480 TB TEST CELL IMMUN MEASURE: CPT

## 2022-05-27 PROCEDURE — 84075 ASSAY ALKALINE PHOSPHATASE: CPT

## 2022-05-27 PROCEDURE — 86140 C-REACTIVE PROTEIN: CPT

## 2022-05-27 PROCEDURE — 86704 HEP B CORE ANTIBODY TOTAL: CPT

## 2022-05-27 PROCEDURE — 84080 ASSAY ALKALINE PHOSPHATASES: CPT

## 2022-05-27 PROCEDURE — 86803 HEPATITIS C AB TEST: CPT

## 2022-05-27 PROCEDURE — 86705 HEP B CORE ANTIBODY IGM: CPT

## 2022-05-27 PROCEDURE — 87340 HEPATITIS B SURFACE AG IA: CPT

## 2022-05-27 PROCEDURE — 36415 COLL VENOUS BLD VENIPUNCTURE: CPT

## 2022-05-27 PROCEDURE — 82977 ASSAY OF GGT: CPT

## 2022-05-31 ENCOUNTER — TELEPHONE (OUTPATIENT)
Dept: UROLOGY | Facility: HOSPITAL | Age: 72
End: 2022-05-31

## 2022-05-31 ENCOUNTER — TELEPHONE (OUTPATIENT)
Dept: NEPHROLOGY | Facility: CLINIC | Age: 72
End: 2022-05-31

## 2022-05-31 ENCOUNTER — DOCUMENTATION (OUTPATIENT)
Dept: NEPHROLOGY | Facility: CLINIC | Age: 72
End: 2022-05-31

## 2022-05-31 DIAGNOSIS — Z12.5 SCREENING FOR PROSTATE CANCER: Primary | ICD-10-CM

## 2022-05-31 LAB
GAMMA INTERFERON BACKGROUND BLD IA-ACNC: 0.04 IU/ML
M TB IFN-G BLD-IMP: NEGATIVE
M TB IFN-G CD4+ BCKGRND COR BLD-ACNC: -0.02 IU/ML
M TB IFN-G CD4+ BCKGRND COR BLD-ACNC: -0.02 IU/ML
MITOGEN IGNF BCKGRD COR BLD-ACNC: >10 IU/ML

## 2022-05-31 NOTE — TELEPHONE ENCOUNTER
Spoke with patient about the following, he expressed understanding and thanked us for the call:    Please let the patient know his chest x-ray was normal

## 2022-05-31 NOTE — TELEPHONE ENCOUNTER
----- Message from Lita Covington MD sent at 5/31/2022  8:54 AM EDT -----  Please let the patient know his chest x-ray was normal

## 2022-05-31 NOTE — PROGRESS NOTES
Home blood pressure readings:  -a m :  113/80, no orthostatic changes   -p  m :  101/67, no orthostatic changes   Heart rate 70-80 range      Blood pressures are better, he is on the beta-blocker for rate control of paroxysmal atrial fibrillation  The p m  dose of amlodipine is helping his morning readings    Therefore, at this point I would make no changes as long as he is tolerating the medications without any dizziness or lightheadedness    Thank you

## 2022-06-01 ENCOUNTER — APPOINTMENT (OUTPATIENT)
Dept: LAB | Facility: CLINIC | Age: 72
End: 2022-06-01
Payer: MEDICARE

## 2022-06-01 DIAGNOSIS — Z12.5 SCREENING FOR PROSTATE CANCER: ICD-10-CM

## 2022-06-01 LAB — PSA SERPL-MCNC: 0.5 NG/ML (ref 0–4)

## 2022-06-01 PROCEDURE — G0103 PSA SCREENING: HCPCS

## 2022-06-01 PROCEDURE — 36415 COLL VENOUS BLD VENIPUNCTURE: CPT

## 2022-06-01 NOTE — PROGRESS NOTES
Spoke with patient about the following, he expressed understanding and thanked us for the call:    Blood pressures are better, he is on the beta-blocker for rate control of paroxysmal atrial fibrillation  The p m  dose of amlodipine is helping his morning readings    Therefore, at this point I would make no changes as long as he is tolerating the medications without any dizziness or lightheadedness

## 2022-06-02 ENCOUNTER — OFFICE VISIT (OUTPATIENT)
Dept: UROLOGY | Facility: AMBULATORY SURGERY CENTER | Age: 72
End: 2022-06-02
Payer: MEDICARE

## 2022-06-02 VITALS
BODY MASS INDEX: 35.25 KG/M2 | DIASTOLIC BLOOD PRESSURE: 78 MMHG | HEIGHT: 69 IN | HEART RATE: 71 BPM | WEIGHT: 238 LBS | SYSTOLIC BLOOD PRESSURE: 118 MMHG

## 2022-06-02 DIAGNOSIS — Z12.5 SCREENING FOR PROSTATE CANCER: Primary | ICD-10-CM

## 2022-06-02 LAB
ALP BONE CFR SERPL: 24 % (ref 12–68)
ALP INTEST CFR SERPL: 0 % (ref 0–18)
ALP LIVER CFR SERPL: 76 % (ref 13–88)
ALP SERPL-CCNC: 197 IU/L (ref 44–121)

## 2022-06-02 PROCEDURE — 99213 OFFICE O/P EST LOW 20 MIN: CPT | Performed by: NURSE PRACTITIONER

## 2022-06-02 NOTE — PROGRESS NOTES
06/02/22    [facility] Fatzinger III   1950   [id number]     Assessment  1 Microscopic hematuria s/p negative workup (5/2019)  2 Prostate cancer screening     Discussion/Plan  1 Microscopic hematuria s/p negative workup (5/2019)              2021 UA negative   2 Prostate cancer screening              6/1/22 PSA 0 5    Discussed discontinuation of prostate cancer screening per AUA guidelines     Follow up in 1 year with PSA  Patient is comfortable discontinuing screening in 1 year   All questions answered, patient verbalized understanding, and instructed to call with any issues    Subjective  HPI   Namita Clinton a 72 y  o  male with a history of microscopic hematuria and prostate cancer screening  Patient is status post negative hematuria workup with imaging and cystoscopy in May 2019  Humberto Chan likely secondary to his use of Xarelto  His UA last year was negative for microscopic blood  His most recent PSA is 0 5    He denies  family history of prostate cancer  He hydrates with five 12 oz bottles of water daily as well as iced tea  He is satisfied with his urinary pattern with no complaints  He denies any lower urinary tract symptoms, gross hematuria, or dysuria   No changes to his overall health       Component      Latest Ref Rng & Units 2/28/2018 5/6/2020 5/24/2021 6/1/2022           6:22 AM  7:57 AM  6:42 AM  7:24 AM   PSA, Total      0 0 - 4 0 ng/mL 0 8 0 4 1 1 0 5     Component      Latest Ref Rng & Units 1/17/2020 5/6/2020 5/27/2021           5:24 PM  7:57 AM  9:32 AM   RBC, UA      None Seen, 2-4 /hpf 4-10 (A) 0-1 (A) None Seen     Review of Systems - History obtained from chart review and the patient  General ROS: negative  Psychological ROS: negative  Endocrine ROS: negative  Respiratory ROS: no cough, shortness of breath, or wheezing  Cardiovascular ROS: no chest pain or dyspnea on exertion  Gastrointestinal ROS: no abdominal pain, change in bowel habits, or black or bloody stools  Genito-Urinary ROS: no dysuria, trouble voiding, or hematuria  Musculoskeletal ROS: negative  Neurological ROS: no TIA or stroke symptoms  Dermatological ROS: negative       Objective  Physical Exam  Vitals and nursing note reviewed  Constitutional:       General: He is awake  He is not in acute distress  Appearance: Normal appearance  He is well-developed, well-groomed and normal weight  He is not ill-appearing, toxic-appearing or diaphoretic  Pulmonary:      Effort: Pulmonary effort is normal    Abdominal:      Tenderness: There is no right CVA tenderness or left CVA tenderness  Musculoskeletal:         General: Normal range of motion  Cervical back: Normal range of motion and neck supple  Skin:     General: Skin is warm  Neurological:      General: No focal deficit present  Mental Status: He is alert and oriented to person, place, and time  Mental status is at baseline  Psychiatric:         Attention and Perception: Attention normal          Mood and Affect: Mood normal          Speech: Speech normal          Behavior: Behavior normal  Behavior is cooperative  Thought Content:  Thought content normal          Cognition and Memory: Cognition normal          Judgment: Judgment normal        AUA SYMPTOM SCORE    Flowsheet Row Most Recent Value   AUA SYMPTOM SCORE    How often have you had a sensation of not emptying your bladder completely after you finished urinating? 0 (P)     How often have you had to urinate again less than two hours after you finished urinating? 1 (P)     How often have you found you stopped and started again several times when you urinate? 0 (P)     How often have you found it difficult to postpone urination? 1 (P)     How often have you had a weak urinary stream? 0 (P)     How often have you had to push or strain to begin urination? 0 (P)     How many times did you most typically get up to urinate from the time you went to bed at night until the time you got up in the morning? 1 (P) Quality of Life: If you were to spend the rest of your life with your urinary condition just the way it is now, how would you feel about that? 0 (P)     AUA SYMPTOM SCORE 3 (P)               LENORA Calderon     I have spent 15 minutes with Patient and family today in which greater than 50% of this time was spent in counseling/coordination of care regarding Intructions for management

## 2022-06-08 ENCOUNTER — REMOTE DEVICE CLINIC VISIT (OUTPATIENT)
Dept: CARDIOLOGY CLINIC | Facility: CLINIC | Age: 72
End: 2022-06-08
Payer: MEDICARE

## 2022-06-08 DIAGNOSIS — Z95.818 PRESENCE OF OTHER CARDIAC IMPLANTS AND GRAFTS: Primary | ICD-10-CM

## 2022-06-08 PROCEDURE — 93298 REM INTERROG DEV EVAL SCRMS: CPT | Performed by: INTERNAL MEDICINE

## 2022-06-08 PROCEDURE — G2066 INTER DEVC REMOTE 30D: HCPCS | Performed by: INTERNAL MEDICINE

## 2022-06-08 NOTE — PROGRESS NOTES
MDT LOOP   CARELINK TRANSMISSION: LOOP RECORDER  PRESENTING RHYTHM NSR @ 67 BPM  BATTERY STATUS "OK " 6 PAUSE EPISODES W/ EGRAMS SHOWING 3-4 SEC PAUSES WHILE SLEEPING  13 MYRIAM EPISODES W/ EGRAMS SHOWING SB 2 34-39 BPM WHILE SLEEPING  24 DEVICE CLASSIFIED AF EPISODES, AVAILABLE STRIPS DEMONSTRATE SR W/ PACs , RVR AND  ATRIAL FIBRILLATION  AF BURDEN IS 0 4%  AF BURDEN MAYBE OVERESTIMATED DUE TO INAPPROPRIATE CLASSIFICATION OF AF  SOME STRIPS MAY NOT BE INTERPRETABLE OR AVAILABLE, CANNOT DEFINITIVELY RULE OUT ATRIAL FIBRILLATION  HX OF PAF  PT TAKES METOPROLOL SUCC AND XARELTO  NO PATIENT ACTIVATED EPISODES  NORMAL DEVICE FUNCTION   DL

## 2022-06-22 ENCOUNTER — HOSPITAL ENCOUNTER (OUTPATIENT)
Dept: INFUSION CENTER | Facility: CLINIC | Age: 72
Discharge: HOME | End: 2022-06-22
Payer: MEDICARE

## 2022-06-22 VITALS
DIASTOLIC BLOOD PRESSURE: 73 MMHG | OXYGEN SATURATION: 97 % | HEART RATE: 85 BPM | TEMPERATURE: 96.9 F | SYSTOLIC BLOOD PRESSURE: 133 MMHG | RESPIRATION RATE: 18 BRPM

## 2022-06-22 DIAGNOSIS — K50.012 CROHN'S DISEASE OF ILEUM WITH INTESTINAL OBSTRUCTION (HCC): Primary | ICD-10-CM

## 2022-06-22 PROCEDURE — 96365 THER/PROPH/DIAG IV INF INIT: CPT

## 2022-06-22 RX ORDER — SODIUM CHLORIDE 9 MG/ML
20 INJECTION, SOLUTION INTRAVENOUS ONCE
Status: COMPLETED | OUTPATIENT
Start: 2022-06-22 | End: 2022-06-22

## 2022-06-22 RX ORDER — SODIUM CHLORIDE 9 MG/ML
20 INJECTION, SOLUTION INTRAVENOUS ONCE
Status: CANCELLED | OUTPATIENT
Start: 2022-07-20

## 2022-06-22 RX ADMIN — VEDOLIZUMAB 300 MG: 300 INJECTION, POWDER, LYOPHILIZED, FOR SOLUTION INTRAVENOUS at 08:20

## 2022-06-22 RX ADMIN — SODIUM CHLORIDE 20 ML/HR: 0.9 INJECTION, SOLUTION INTRAVENOUS at 08:13

## 2022-06-22 NOTE — PROGRESS NOTES
Patient to infusion for [facility], He offers no complaints, denies any recent illness or antibiotic use  IV placed without issue  VSS Call bell within reach

## 2022-06-23 ENCOUNTER — OFFICE VISIT (OUTPATIENT)
Dept: CARDIOLOGY CLINIC | Facility: CLINIC | Age: 72
End: 2022-06-23
Payer: MEDICARE

## 2022-06-23 VITALS
SYSTOLIC BLOOD PRESSURE: 118 MMHG | DIASTOLIC BLOOD PRESSURE: 70 MMHG | WEIGHT: 240 LBS | HEIGHT: 69 IN | BODY MASS INDEX: 35.55 KG/M2 | HEART RATE: 70 BPM

## 2022-06-23 DIAGNOSIS — I48.0 PAROXYSMAL ATRIAL FIBRILLATION (HCC): ICD-10-CM

## 2022-06-23 DIAGNOSIS — I42.9 CARDIOMYOPATHY, UNSPECIFIED TYPE (HCC): Primary | ICD-10-CM

## 2022-06-23 DIAGNOSIS — I73.9 PAD (PERIPHERAL ARTERY DISEASE) (HCC): ICD-10-CM

## 2022-06-23 DIAGNOSIS — Z72.0 TOBACCO ABUSE: ICD-10-CM

## 2022-06-23 DIAGNOSIS — E78.5 DYSLIPIDEMIA: ICD-10-CM

## 2022-06-23 PROCEDURE — 99214 OFFICE O/P EST MOD 30 MIN: CPT | Performed by: INTERNAL MEDICINE

## 2022-06-23 NOTE — PROGRESS NOTES
Cardiology Follow Up    Madeleine Borregozinger III  1950  [id number]  [facility] [address] [id number] [facility]  [phone]    1  Cardiomyopathy, unspecified type ([facility] )  Echo complete w/ contrast if indicated   2  Paroxysmal atrial fibrillation (HCC)  rivaroxaban (XARELTO) 20 mg tablet   3  PAD (peripheral artery disease) ([place] [address] )     4  Dyslipidemia     5  Tobacco abuse           Discussion/Summary: All of his assessed cardiac problems are stable  He has known PVD with EF of 50% by DENIS in 2019, likely ischemic  I will repeat an echo and if EF is low, I will repeat a stress test   I have reviewed his medications and made no changes  RTO 1 year  Interval History: He has not had any cardiac problems since his last office visit  He says that he is active and denies CP, SOB, claudication  He has severe PVD with prior revascularization  He smokes 5 cigs a day  ILR shows PAF 0 4 % of the time  He is on [facility]  Nuclear stress test 5/2018 - no ischemia  DENIS 6/2019 - EF 50%  His weight is up from 230 to 240 lbs      Creatinine 1 27  LDL 48    Patient Active Problem List   Diagnosis    Obesity, morbid ([place] [address] )    Chronic kidney disease, stage III (moderate) (HCC)    Persistent proteinuria    Microscopic hematuria    [place] cardiac risk 10-20% in next 10 years    Abdominal aortic atherosclerosis ([place] [address] )    RBBB (right bundle branch block with left anterior fascicular block)    Tobacco abuse    Dyslipidemia    Vitamin D deficiency    GERD with esophagitis    Harris's esophagus without dysplasia    Colon polyps    PAD (peripheral artery disease) (HCC)    Mass of both adrenal glands (HCC)    Blue toe syndrome, right (HCC)    Ischemic cardiomyopathy    S/P peripheral artery angioplasty with stent placement    Venous stasis    Paroxysmal atrial fibrillation (HCC)    Anemia of chronic renal failure, stage 3 (moderate) (HCC)    Essential (hemorrhagic) thrombocythemia (HCC)    Other emphysema ([facility] )    Adrenal mass ([facility] )    Crohn's disease of small intestine with complication (HCC)    SVT (supraventricular tachycardia) (HCC)    Positive QuantiFERON-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    Terminal ileitis of small intestine (HCC)    S/P femoral-tibial bypass    Elevated alkaline phosphatase level    Vitamin B12 deficiency    Type 2 diabetes mellitus with diabetic peripheral angiopathy without gangrene (HCC)    Chronic hypotension     Past Medical History:   Diagnosis Date    Blue toe syndrome (HCC)     Chronic kidney disease     Colon polyps     GERD (gastroesophageal reflux disease)     Hematuria     History of rheumatic fever     Hypolipidemia     Hypotension     Ischemic cardiomyopathy     PAD (peripheral artery disease) (HCC)     Pulmonary emphysema (HCC)      Social History     Socioeconomic History    Marital status: /Civil Union     Spouse name: Not on file    Number of children: 2    Years of education: Not on file    Highest education level: Not on file   Occupational History    Occupation: retired   Tobacco Use    Smoking status: Current Some Day Smoker     Packs/day: 0 50     Years: 30 00     Pack years: 15 00     Types: Cigarettes    Smokeless tobacco: Never Used    Tobacco comment: " I will do it on my own"   Vaping Use    Vaping Use: Never used   Substance and Sexual Activity    Alcohol use:  Yes     Alcohol/week: 1 0 standard drink     Types: 1 Standard drinks or equivalent per week     Comment: social drinker when out dining    Drug use: Never    Sexual activity: Not Currently   Other Topics Concern    Not on file   Social History Narrative    Drinks coffee     Social Determinants of Health     Financial Resource Strain: Not on file   Food Insecurity: Not on file   Transportation Needs: Not on file   Physical Activity: Not on file   Stress: Not on file   Social Connections: Not on file   Intimate Partner Violence: Not on file   Housing Stability: Not on file      Family History   Problem Relation Age of Onset    Heart disease Mother     Hyperlipidemia Mother     Hypertension Mother     Hypertension Father     Heart disease Father     Stroke Father     Hyperlipidemia Father     Diabetes Brother     No Known Problems Maternal Grandmother     Dementia Neg Hx     Drug abuse Neg Hx     Mental illness Neg Hx     Substance Abuse Neg Hx     Alcohol abuse Neg Hx     Depression Neg Hx     Colon cancer Neg Hx      Past Surgical History:   Procedure Laterality Date    COLONOSCOPY  2019    HERNIA REPAIR      IR AORTAGRAM WITH RUN-OFF  6/27/2019    IR AORTAGRAM WITH RUN-OFF  2/12/2020    POPLITEAL ARTERY STENT Right     6/2019    NC SLCTV CATHJ 3RD+ ORD SLCTV ABDL PEL/LXTR [facility] Right 6/27/2019    Procedure: leg ANGIOGRAM WITH STENT, BALLOON ANGIOPLASTY, LEFT GROIN ACCESS;  Surgeon: Jose Lynn MD;  [facility]: [facility];  Service: Vascular    NC VEIN BYPASS GRAFT,FEM-POP Right 3/26/2020    Procedure: BYPASS FEMORAL-POPLITEAL; Right lower extremity bypass, fem-bk pop with GSV;  Surgeon: Jose Lynn MD;  Location: [facility];  Service: Vascular       Current Outpatient Medications:     amLODIPine (NORVASC) 2 5 mg tablet, Take 1 tablet (2 5 mg total) by mouth daily, Disp: 30 tablet, Rfl: 5    aspirin 81 MG tablet, Take 81 mg by mouth daily, Disp: , Rfl:     atorvastatin (LIPITOR) 20 mg tablet, Take 1 tablet (20 mg total) by mouth daily, Disp: 90 tablet, Rfl: 1    folic acid (FOLVITE) 1 mg tablet, Take 1 tablet (1 mg total) by mouth daily, Disp: 90 tablet, Rfl: 3    glimepiride (AMARYL) 1 mg tablet, Take 1 tablet (1 mg total) by mouth daily with breakfast, Disp: 90 tablet, Rfl: 1    Magnesium 500 MG TABS, Take 1 tablet by mouth daily , Disp: , Rfl:     metoprolol succinate (TOPROL-XL) 25 mg 24 hr tablet, Take 1 tablet (25 mg total) by mouth daily (Patient taking differently: Take 25 mg by mouth daily at bedtime Take 25 mg in evening (and 50 in morning)), Disp: 90 tablet, Rfl: 0    metoprolol succinate (TOPROL-XL) 50 mg 24 hr tablet, Take 1 tablet (50 mg total) by mouth daily, Disp: 90 tablet, Rfl: 4    omeprazole (PriLOSEC) 40 MG capsule, Take 1 capsule (40 mg total) by mouth daily, Disp: 90 capsule, Rfl: 1    rivaroxaban (XARELTO) 20 mg tablet, Take 1 tablet (20 mg total) by mouth daily with breakfast, Disp: 84 tablet, Rfl: 0    vitamin B-12 (VITAMIN B-12) 1,000 mcg tablet, Take 1 tablet (1,000 mcg total) by mouth daily (Patient taking differently: Take 1,000 mcg by mouth 3 (three) times a week), Disp: 90 tablet, Rfl: 1  No Known Allergies  Vitals:    06/23/22 1124   BP: 118/70   BP Location: Right arm   Patient Position: Sitting   Cuff Size: Large   Pulse: 70   Weight: 109 kg (240 lb)   Height: 5' 9" (1 753 m)     Weight (last 2 days)     Date/Time Weight    06/23/22 1124 109 (240)         Blood pressure 118/70, pulse 70, height 5' 9" (1 753 m), weight 109 kg (240 lb)  , Body mass index is 35 44 kg/m²      Labs:  Appointment on 06/01/2022   Component Date Value    PSA 06/01/2022 0 5    Appointment on 05/27/2022   Component Date Value    QFT Nil 05/27/2022 0 04     QFT TB1-NIL 05/27/2022 -0 02     QFT TB2-NIL 05/27/2022 -0 02     QFT Mitogen-NIL 05/27/2022 >10 00     QFT Final Interpretation 05/27/2022 Negative     Hepatitis B Surface Ag 05/27/2022 Non-reactive     Hepatitis C Ab 05/27/2022 Non-reactive     Hep B C IgM 05/27/2022 Non-reactive     Hep B Core Total Ab 05/27/2022 Non-reactive     CRP 05/27/2022 14 1 (A)    GGT 05/27/2022 50     Alk Phos Isoenzymes 05/27/2022 197 (A)    Alk Phos Liver Fract 05/27/2022 76     Alk Phos Bone Fract 05/27/2022 24     Alk Phos Intestine Fract 05/27/2022 0    Appointment on 03/28/2022   Component Date Value    Total CK 03/28/2022 65     Sodium 03/28/2022 138     Potassium 03/28/2022 4 6     Chloride 03/28/2022 104     CO2 03/28/2022 30     ANION GAP 03/28/2022 4     BUN 03/28/2022 14     Creatinine 03/28/2022 1 27     Glucose, Fasting 03/28/2022 156 (A)    Calcium 03/28/2022 8 8     Corrected Calcium 03/28/2022 9 6     AST 03/28/2022 18     ALT 03/28/2022 30     Alkaline Phosphatase 03/28/2022 185 (A)    Total Protein 03/28/2022 7 4     Albumin 03/28/2022 3 0 (A)    Total Bilirubin 03/28/2022 0 61     eGFR 03/28/2022 56     WBC 03/28/2022 9 55     RBC 03/28/2022 5 74 (A)    Hemoglobin 03/28/2022 16 3     Hematocrit 03/28/2022 51 3 (A)    MCV 03/28/2022 89     MCH 03/28/2022 28 4     MCHC 03/28/2022 31 8     RDW 03/28/2022 15 3 (A)    MPV 03/28/2022 10 2     Platelets 90/20/0804 307     nRBC 03/28/2022 0     Neutrophils Relative 03/28/2022 66     Immat GRANS % 03/28/2022 1     Lymphocytes Relative 03/28/2022 23     Monocytes Relative 03/28/2022 8     Eosinophils Relative 03/28/2022 2     Basophils Relative 03/28/2022 0     Neutrophils Absolute 03/28/2022 6 34     Immature Grans Absolute 03/28/2022 0 05     Lymphocytes Absolute 03/28/2022 2 22     Monocytes Absolute 03/28/2022 0 76     Eosinophils Absolute 03/28/2022 0 14     Basophils Absolute 03/28/2022 0 04     Magnesium 03/28/2022 2 1     Phosphorus 03/28/2022 3 6     Creatinine, Ur 03/28/2022 32 0     Protein Urine Random 03/28/2022 8     Prot/Creat Ratio, Ur 03/28/2022 0 25 (A)    PTH 03/28/2022 44 6     25-HYDROXY VIT D 03/28/2022 35     25-Hydroxy D2 03/28/2022 2 7     25-HYDROXY VIT D3 03/28/2022 32    Office Visit on 03/10/2022   Component Date Value    Cholesterol 03/22/2022 117     Triglycerides 03/22/2022 107     HDL, Direct 03/22/2022 48     LDL Calculated 03/22/2022 48     Non-HDL-Chol (CHOL-HDL) 03/22/2022 69    Appointment on 01/18/2022   Component Date Value    Sodium 01/18/2022 140     Potassium 01/18/2022 4 6     Chloride 01/18/2022 103     CO2 01/18/2022 28     ANION GAP 01/18/2022 9     BUN 01/18/2022 15     Creatinine 01/18/2022 1 32 (A)    Glucose, Fasting 01/18/2022 118 (A)    Calcium 01/18/2022 8 7     eGFR 01/18/2022 54     Magnesium 01/18/2022 2 0     Hemoglobin A1C 01/18/2022 6 5 (A)    EAG 01/18/2022 140     Vitamin B-12 01/18/2022 1,005 (A)    Vit D, 25-Hydroxy 01/18/2022 46 9     TSH 3RD GENERATON 01/18/2022 2 806      Imaging: XR chest pa & lateral    Result Date: 5/29/2022  Narrative: CHEST INDICATION:   R91 8: Other nonspecific abnormal finding of lung field  COMPARISON:  Chest x-ray 1/17/2020 EXAM PERFORMED/VIEWS:  XR CHEST PA & LATERAL  The frontal view was performed utilizing dual energy radiographic technique  Images: 4 FINDINGS:  Cardiac loop recorder is present  Cardiomediastinal silhouette appears unremarkable  The lungs are clear  No pneumothorax or pleural effusion  Osseous structures appear within normal limits for patient age  Impression: No acute cardiopulmonary disease  Workstation performed: [id number]     Cardiac EP device report    Result Date: 6/17/2022  Narrative: MDT LOOP NON-BILLABLE  CARELINK TRANSMISSION: ALERT FOR 1 MYRIAM EPISODE W/ EGRAM SHOWING SB @ 43 BPM FOR 11 SECs WHILE PT WAS SLEEPING  DL     Cardiac EP device report    Result Date: 6/14/2022  Narrative: MDT LOOP NON-BILLABLE  CARELINK TRANSMISSION: ALERT FOR 1 MYRIAM EPISODE W/ EGRAM SHOWING SB @ 31 BPM FOR 8 SEC WHILE PT WAS SLEEPING  DL     Cardiac EP device report    Result Date: 6/11/2022  Narrative: MDT LOOP NON-BILLABLE  CARELINK TRANSMISSION: ALERT FOR 1 PAUSE EPISODE W/ EGRAM SHOWING 4 SEC PAUSE DURING SLEEP HOURS  DL     Cardiac EP device report    Result Date: 6/8/2022  Narrative: MDT LOOP CARELINK TRANSMISSION: LOOP RECORDER  PRESENTING RHYTHM NSR @ 67 BPM  BATTERY STATUS "OK " 6 PAUSE EPISODES W/ EGRAMS SHOWING 3-4 SEC PAUSES WHILE SLEEPING  13 MYRIAM EPISODES W/ EGRAMS SHOWING SB 2 34-39 BPM WHILE SLEEPING   24 DEVICE CLASSIFIED AF EPISODES, AVAILABLE STRIPS DEMONSTRATE SR W/ PACs , RVR AND  ATRIAL FIBRILLATION  AF BURDEN IS 0 4%  AF BURDEN MAYBE OVERESTIMATED DUE TO INAPPROPRIATE CLASSIFICATION OF AF  SOME STRIPS MAY NOT BE INTERPRETABLE OR AVAILABLE, CANNOT DEFINITIVELY RULE OUT ATRIAL FIBRILLATION  HX OF PAF  PT TAKES METOPROLOL SUCC AND XARELTO  NO PATIENT ACTIVATED EPISODES  NORMAL DEVICE FUNCTION  DL       Review of Systems:  Review of Systems   Constitutional: Negative for diaphoresis, fatigue, fever and unexpected weight change  HENT: Negative  Respiratory: Negative for cough, shortness of breath and wheezing  Cardiovascular: Negative for chest pain, palpitations and leg swelling  Gastrointestinal: Negative for abdominal pain, diarrhea and nausea  Musculoskeletal: Negative for gait problem and myalgias  Skin: Negative for rash  Neurological: Negative for dizziness and numbness  Psychiatric/Behavioral: Negative  Physical Exam:  Physical Exam  Constitutional:       Appearance: He is well-developed  HENT:      Head: Normocephalic and atraumatic  Eyes:      Pupils: Pupils are equal, round, and reactive to light  Neck:      Vascular: No JVD  Cardiovascular:      Rate and Rhythm: Regular rhythm  Pulses: Normal pulses  Carotid pulses are 2+ on the right side and 2+ on the left side  Heart sounds: S1 normal and S2 normal    Pulmonary:      Effort: Pulmonary effort is normal       Breath sounds: Normal breath sounds  No wheezing or rales  Abdominal:      General: Bowel sounds are normal       Palpations: Abdomen is soft  Tenderness: There is no abdominal tenderness  Musculoskeletal:         General: No tenderness  Normal range of motion  Cervical back: Normal range of motion and neck supple  Skin:     General: Skin is warm  Neurological:      Mental Status: He is alert and oriented to person, place, and time  Cranial Nerves: No cranial nerve deficit        Deep Tendon Reflexes: Reflexes are normal and symmetric

## 2022-06-29 ENCOUNTER — HOSPITAL ENCOUNTER (OUTPATIENT)
Dept: NON INVASIVE DIAGNOSTICS | Facility: CLINIC | Age: 72
Discharge: HOME | End: 2022-06-29
Payer: MEDICARE

## 2022-06-29 VITALS
DIASTOLIC BLOOD PRESSURE: 70 MMHG | HEIGHT: 69 IN | BODY MASS INDEX: 35.55 KG/M2 | SYSTOLIC BLOOD PRESSURE: 118 MMHG | HEART RATE: 70 BPM | WEIGHT: 240 LBS

## 2022-06-29 DIAGNOSIS — I42.9 CARDIOMYOPATHY, UNSPECIFIED TYPE (HCC): ICD-10-CM

## 2022-06-29 LAB
AORTIC ROOT: 3.5 CM
APICAL FOUR CHAMBER EJECTION FRACTION: 55 %
ASCENDING AORTA: 3.4 CM
E WAVE DECELERATION TIME: 188 MS
FRACTIONAL SHORTENING: 29 % (ref 28–44)
INTERVENTRICULAR SEPTUM IN DIASTOLE (PARASTERNAL SHORT AXIS VIEW): 1.3 CM
INTERVENTRICULAR SEPTUM: 1.3 CM (ref 0.6–1.1)
LAAS-AP2: 22 CM2
LAAS-AP4: 20.3 CM2
LEFT ATRIUM SIZE: 4 CM
LEFT INTERNAL DIMENSION IN SYSTOLE: 4 CM (ref 2.1–4)
LEFT VENTRICULAR INTERNAL DIMENSION IN DIASTOLE: 5.6 CM (ref 3.5–6)
LEFT VENTRICULAR POSTERIOR WALL IN END DIASTOLE: 1.1 CM
LEFT VENTRICULAR STROKE VOLUME: 85 ML
LVSV (TEICH): 85 ML
MV PEAK A VEL: 0.69 M/S
MV PEAK E VEL: 58 CM/S
MV STENOSIS PRESSURE HALF TIME: 54 MS
MV VALVE AREA P 1/2 METHOD: 4.07 CM2
RIGHT ATRIUM AREA SYSTOLE A4C: 13.9 CM2
SL CV LEFT ATRIUM LENGTH A2C: 6.3 CM
SL CV LV EF: 55
SL CV PED ECHO LEFT VENTRICLE DIASTOLIC VOLUME (MOD BIPLANE) 2D: 154 ML
SL CV PED ECHO LEFT VENTRICLE SYSTOLIC VOLUME (MOD BIPLANE) 2D: 69 ML
TR MAX PG: 31 MMHG
TR PEAK VELOCITY: 2.8 M/S
TRICUSPID VALVE PEAK REGURGITATION VELOCITY: 3.07 M/S

## 2022-06-29 PROCEDURE — 93306 TTE W/DOPPLER COMPLETE: CPT

## 2022-06-29 PROCEDURE — 93306 TTE W/DOPPLER COMPLETE: CPT | Performed by: INTERNAL MEDICINE

## 2022-07-02 DIAGNOSIS — E11.51 TYPE 2 DIABETES MELLITUS WITH DIABETIC PERIPHERAL ANGIOPATHY WITHOUT GANGRENE, WITHOUT LONG-TERM CURRENT USE OF INSULIN (HCC): ICD-10-CM

## 2022-07-02 DIAGNOSIS — K21.00 GERD WITH ESOPHAGITIS: ICD-10-CM

## 2022-07-05 RX ORDER — GLIMEPIRIDE 1 MG/1
TABLET ORAL
Qty: 90 TABLET | Refills: 1 | Status: SHIPPED | OUTPATIENT
Start: 2022-07-05 | End: 2022-10-17

## 2022-07-05 RX ORDER — OMEPRAZOLE 40 MG/1
40 CAPSULE, DELAYED RELEASE ORAL DAILY
Qty: 90 CAPSULE | Refills: 1 | Status: SHIPPED | OUTPATIENT
Start: 2022-07-05 | End: 2022-10-17

## 2022-07-06 DIAGNOSIS — E55.9 VITAMIN D DEFICIENCY: ICD-10-CM

## 2022-07-06 DIAGNOSIS — N18.31 STAGE 3A CHRONIC KIDNEY DISEASE (HCC): ICD-10-CM

## 2022-07-06 DIAGNOSIS — D63.1 ANEMIA OF CHRONIC RENAL FAILURE, STAGE 3A (HCC): ICD-10-CM

## 2022-07-06 DIAGNOSIS — I95.89 CHRONIC HYPOTENSION: ICD-10-CM

## 2022-07-06 DIAGNOSIS — R03.0 ELEVATED BLOOD PRESSURE READING: ICD-10-CM

## 2022-07-06 DIAGNOSIS — R91.8 LUNG FIELD ABNORMAL FINDING ON EXAMINATION: ICD-10-CM

## 2022-07-06 DIAGNOSIS — N18.31 ANEMIA OF CHRONIC RENAL FAILURE, STAGE 3A (HCC): ICD-10-CM

## 2022-07-06 DIAGNOSIS — E78.5 DYSLIPIDEMIA: ICD-10-CM

## 2022-07-06 DIAGNOSIS — R80.1 PERSISTENT PROTEINURIA: ICD-10-CM

## 2022-07-06 DIAGNOSIS — I73.9 PAD (PERIPHERAL ARTERY DISEASE) (HCC): ICD-10-CM

## 2022-07-06 RX ORDER — AMLODIPINE BESYLATE 2.5 MG/1
TABLET ORAL
Qty: 90 TABLET | Refills: 1 | Status: SHIPPED | OUTPATIENT
Start: 2022-07-06 | End: 2022-08-12 | Stop reason: ALTCHOICE

## 2022-07-06 RX ORDER — METOPROLOL SUCCINATE 25 MG/1
25 TABLET, EXTENDED RELEASE ORAL DAILY
Qty: 90 TABLET | Refills: 0 | Status: SHIPPED | OUTPATIENT
Start: 2022-07-06 | End: 2022-10-03

## 2022-07-06 RX ORDER — ATORVASTATIN CALCIUM 20 MG/1
TABLET, FILM COATED ORAL
Qty: 90 TABLET | Refills: 1 | Status: SHIPPED | OUTPATIENT
Start: 2022-07-06

## 2022-07-08 ENCOUNTER — RA CDI HCC (OUTPATIENT)
Dept: OTHER | Facility: HOSPITAL | Age: 72
End: 2022-07-08

## 2022-07-08 NOTE — PROGRESS NOTES
E11 22  [facility] 75  coding opportunities          Chart Reviewed number of suggestions sent to Provider: 1     Patients Insurance     Medicare Insurance: [employer]

## 2022-07-12 ENCOUNTER — LAB (OUTPATIENT)
Dept: LAB | Facility: CLINIC | Age: 72
End: 2022-07-12
Payer: MEDICARE

## 2022-07-12 DIAGNOSIS — D63.1 ANEMIA OF CHRONIC RENAL FAILURE, STAGE 3A (HCC): ICD-10-CM

## 2022-07-12 DIAGNOSIS — N18.31 STAGE 3A CHRONIC KIDNEY DISEASE (HCC): ICD-10-CM

## 2022-07-12 DIAGNOSIS — R80.1 PERSISTENT PROTEINURIA: ICD-10-CM

## 2022-07-12 DIAGNOSIS — N18.31 ANEMIA OF CHRONIC RENAL FAILURE, STAGE 3A (HCC): ICD-10-CM

## 2022-07-12 DIAGNOSIS — E11.51 TYPE 2 DIABETES MELLITUS WITH DIABETIC PERIPHERAL ANGIOPATHY WITHOUT GANGRENE, WITHOUT LONG-TERM CURRENT USE OF INSULIN (HCC): ICD-10-CM

## 2022-07-12 DIAGNOSIS — E55.9 VITAMIN D DEFICIENCY: ICD-10-CM

## 2022-07-12 DIAGNOSIS — E78.5 DYSLIPIDEMIA: ICD-10-CM

## 2022-07-12 DIAGNOSIS — I95.89 CHRONIC HYPOTENSION: ICD-10-CM

## 2022-07-12 LAB
ALBUMIN SERPL BCP-MCNC: 3.5 G/DL (ref 3.5–5)
ALP SERPL-CCNC: 171 U/L (ref 34–104)
ALT SERPL W P-5'-P-CCNC: 17 U/L (ref 7–52)
ANION GAP SERPL CALCULATED.3IONS-SCNC: 7 MMOL/L (ref 4–13)
AST SERPL W P-5'-P-CCNC: 15 U/L (ref 13–39)
BASOPHILS # BLD AUTO: 0.05 THOUSANDS/ΜL (ref 0–0.1)
BASOPHILS NFR BLD AUTO: 0 % (ref 0–1)
BILIRUB SERPL-MCNC: 0.75 MG/DL (ref 0.2–1)
BUN SERPL-MCNC: 17 MG/DL (ref 5–25)
CALCIUM SERPL-MCNC: 8.9 MG/DL (ref 8.4–10.2)
CHLORIDE SERPL-SCNC: 106 MMOL/L (ref 96–108)
CO2 SERPL-SCNC: 27 MMOL/L (ref 21–32)
CREAT SERPL-MCNC: 1.07 MG/DL (ref 0.6–1.3)
CREAT UR-MCNC: 154.3 MG/DL
EOSINOPHIL # BLD AUTO: 0.34 THOUSAND/ΜL (ref 0–0.61)
EOSINOPHIL NFR BLD AUTO: 3 % (ref 0–6)
ERYTHROCYTE [DISTWIDTH] IN BLOOD BY AUTOMATED COUNT: 15.6 % (ref 11.6–15.1)
EST. AVERAGE GLUCOSE BLD GHB EST-MCNC: 154 MG/DL
GFR SERPL CREATININE-BSD FRML MDRD: 69 ML/MIN/1.73SQ M
GLUCOSE P FAST SERPL-MCNC: 124 MG/DL (ref 65–99)
HBA1C MFR BLD: 7 %
HCT VFR BLD AUTO: 51.4 % (ref 36.5–49.3)
HGB BLD-MCNC: 16.4 G/DL (ref 12–17)
IMM GRANULOCYTES # BLD AUTO: 0.09 THOUSAND/UL (ref 0–0.2)
IMM GRANULOCYTES NFR BLD AUTO: 1 % (ref 0–2)
LYMPHOCYTES # BLD AUTO: 2.66 THOUSANDS/ΜL (ref 0.6–4.47)
LYMPHOCYTES NFR BLD AUTO: 22 % (ref 14–44)
MCH RBC QN AUTO: 28.7 PG (ref 26.8–34.3)
MCHC RBC AUTO-ENTMCNC: 31.9 G/DL (ref 31.4–37.4)
MCV RBC AUTO: 90 FL (ref 82–98)
MONOCYTES # BLD AUTO: 0.99 THOUSAND/ΜL (ref 0.17–1.22)
MONOCYTES NFR BLD AUTO: 8 % (ref 4–12)
NEUTROPHILS # BLD AUTO: 7.85 THOUSANDS/ΜL (ref 1.85–7.62)
NEUTS SEG NFR BLD AUTO: 66 % (ref 43–75)
NRBC BLD AUTO-RTO: 0 /100 WBCS
PLATELET # BLD AUTO: 313 THOUSANDS/UL (ref 149–390)
PMV BLD AUTO: 10.2 FL (ref 8.9–12.7)
POTASSIUM SERPL-SCNC: 4.6 MMOL/L (ref 3.5–5.3)
PROT SERPL-MCNC: 6.8 G/DL (ref 6.4–8.4)
PROT UR-MCNC: 23 MG/DL
PROT/CREAT UR: 0.15 MG/G{CREAT} (ref 0–0.1)
RBC # BLD AUTO: 5.71 MILLION/UL (ref 3.88–5.62)
SODIUM SERPL-SCNC: 140 MMOL/L (ref 135–147)
WBC # BLD AUTO: 11.98 THOUSAND/UL (ref 4.31–10.16)

## 2022-07-12 PROCEDURE — 82570 ASSAY OF URINE CREATININE: CPT

## 2022-07-12 PROCEDURE — 36415 COLL VENOUS BLD VENIPUNCTURE: CPT

## 2022-07-12 PROCEDURE — 85025 COMPLETE CBC W/AUTO DIFF WBC: CPT

## 2022-07-12 PROCEDURE — 84156 ASSAY OF PROTEIN URINE: CPT

## 2022-07-12 PROCEDURE — 80053 COMPREHEN METABOLIC PANEL: CPT

## 2022-07-12 PROCEDURE — 83036 HEMOGLOBIN GLYCOSYLATED A1C: CPT

## 2022-07-20 ENCOUNTER — HOSPITAL ENCOUNTER (OUTPATIENT)
Dept: INFUSION CENTER | Facility: CLINIC | Age: 72
Discharge: HOME | End: 2022-07-20
Payer: MEDICARE

## 2022-07-20 VITALS
SYSTOLIC BLOOD PRESSURE: 115 MMHG | HEART RATE: 71 BPM | TEMPERATURE: 97.2 F | DIASTOLIC BLOOD PRESSURE: 77 MMHG | OXYGEN SATURATION: 92 % | RESPIRATION RATE: 18 BRPM

## 2022-07-20 DIAGNOSIS — K50.012 CROHN'S DISEASE OF ILEUM WITH INTESTINAL OBSTRUCTION (HCC): Primary | ICD-10-CM

## 2022-07-20 PROCEDURE — 96365 THER/PROPH/DIAG IV INF INIT: CPT

## 2022-07-20 RX ORDER — SODIUM CHLORIDE 9 MG/ML
20 INJECTION, SOLUTION INTRAVENOUS ONCE
Status: COMPLETED | OUTPATIENT
Start: 2022-07-20 | End: 2022-07-20

## 2022-07-20 RX ORDER — SODIUM CHLORIDE 9 MG/ML
20 INJECTION, SOLUTION INTRAVENOUS ONCE
Status: CANCELLED | OUTPATIENT
Start: 2022-08-17

## 2022-07-20 RX ADMIN — SODIUM CHLORIDE 20 ML/HR: 0.9 INJECTION, SOLUTION INTRAVENOUS at 08:30

## 2022-07-20 RX ADMIN — VEDOLIZUMAB 300 MG: 300 INJECTION, POWDER, LYOPHILIZED, FOR SOLUTION INTRAVENOUS at 08:29

## 2022-07-20 NOTE — PROGRESS NOTES
Patient tolerated treatment today without issues  Patient aware to schedule next appointment at  before leaving today  AVS declined

## 2022-08-10 ENCOUNTER — DOCUMENTATION (OUTPATIENT)
Dept: NEPHROLOGY | Facility: CLINIC | Age: 72
End: 2022-08-10

## 2022-08-10 NOTE — PROGRESS NOTES
Home blood pressure readings:  A m :  107/80, standing 116/86   P m :  98/70, standing 103/71  Heart rate:  70-80 range    Given low blood pressures I would stop amlodipine  I would recheck blood pressures in about 4 weeks  Thank you

## 2022-08-10 NOTE — PROGRESS NOTES
Spoke with patient about the following, he expressed understanding and thanked us for the call:    Given low blood pressures I would stop amlodipine  I would recheck blood pressures in about 4 weeks

## 2022-08-12 ENCOUNTER — OFFICE VISIT (OUTPATIENT)
Dept: INTERNAL MEDICINE CLINIC | Facility: CLINIC | Age: 72
End: 2022-08-12
Payer: MEDICARE

## 2022-08-12 VITALS
HEIGHT: 69 IN | BODY MASS INDEX: 35.1 KG/M2 | HEART RATE: 70 BPM | OXYGEN SATURATION: 92 % | TEMPERATURE: 97.5 F | WEIGHT: 237 LBS | SYSTOLIC BLOOD PRESSURE: 112 MMHG | DIASTOLIC BLOOD PRESSURE: 80 MMHG

## 2022-08-12 DIAGNOSIS — E78.5 DYSLIPIDEMIA: ICD-10-CM

## 2022-08-12 DIAGNOSIS — E11.51 TYPE 2 DIABETES MELLITUS WITH DIABETIC PERIPHERAL ANGIOPATHY WITHOUT GANGRENE, WITHOUT LONG-TERM CURRENT USE OF INSULIN (HCC): ICD-10-CM

## 2022-08-12 DIAGNOSIS — E66.01 OBESITY, MORBID (HCC): ICD-10-CM

## 2022-08-12 DIAGNOSIS — I48.0 PAROXYSMAL ATRIAL FIBRILLATION (HCC): ICD-10-CM

## 2022-08-12 DIAGNOSIS — K22.70 BARRETT'S ESOPHAGUS WITHOUT DYSPLASIA: ICD-10-CM

## 2022-08-12 DIAGNOSIS — K50.019 CROHN'S DISEASE OF SMALL INTESTINE WITH COMPLICATION (HCC): Primary | ICD-10-CM

## 2022-08-12 DIAGNOSIS — E53.8 VITAMIN B12 DEFICIENCY: ICD-10-CM

## 2022-08-12 DIAGNOSIS — N18.31 STAGE 3A CHRONIC KIDNEY DISEASE (HCC): ICD-10-CM

## 2022-08-12 DIAGNOSIS — E55.9 VITAMIN D DEFICIENCY: ICD-10-CM

## 2022-08-12 DIAGNOSIS — I73.9 PAD (PERIPHERAL ARTERY DISEASE) (HCC): ICD-10-CM

## 2022-08-12 PROCEDURE — 99214 OFFICE O/P EST MOD 30 MIN: CPT | Performed by: INTERNAL MEDICINE

## 2022-08-12 NOTE — ASSESSMENT & PLAN NOTE
Lab Results   Component Value Date    EGFR 69 07/12/2022    EGFR 56 03/28/2022    EGFR 54 01/18/2022    CREATININE 1 07 07/12/2022    CREATININE 1 27 03/28/2022    CREATININE 1 32 (H) 01/18/2022     Stable  BP stable, stopped amlodipine a few days ago, per nephrology

## 2022-08-12 NOTE — ASSESSMENT & PLAN NOTE
Lab Results   Component Value Date    HGBA1C 7 0 (H) 07/12/2022     A1c worse  Discussed diet  Taking glimepiride daily  Discussed adjusting glimepiride or starting GLP-1 if A1c does not improve by next visit

## 2022-08-17 ENCOUNTER — HOSPITAL ENCOUNTER (OUTPATIENT)
Dept: INFUSION CENTER | Facility: CLINIC | Age: 72
Discharge: HOME | End: 2022-08-17
Payer: MEDICARE

## 2022-08-17 VITALS
TEMPERATURE: 96.4 F | RESPIRATION RATE: 20 BRPM | HEART RATE: 78 BPM | DIASTOLIC BLOOD PRESSURE: 89 MMHG | SYSTOLIC BLOOD PRESSURE: 133 MMHG | OXYGEN SATURATION: 92 %

## 2022-08-17 DIAGNOSIS — K50.012 CROHN'S DISEASE OF ILEUM WITH INTESTINAL OBSTRUCTION (HCC): Primary | ICD-10-CM

## 2022-08-17 PROCEDURE — 96365 THER/PROPH/DIAG IV INF INIT: CPT

## 2022-08-17 RX ORDER — SODIUM CHLORIDE 9 MG/ML
20 INJECTION, SOLUTION INTRAVENOUS ONCE
Status: CANCELLED | OUTPATIENT
Start: 2022-09-15

## 2022-08-17 RX ORDER — SODIUM CHLORIDE 9 MG/ML
20 INJECTION, SOLUTION INTRAVENOUS ONCE
Status: COMPLETED | OUTPATIENT
Start: 2022-08-17 | End: 2022-08-17

## 2022-08-17 RX ADMIN — VEDOLIZUMAB 300 MG: 300 INJECTION, POWDER, LYOPHILIZED, FOR SOLUTION INTRAVENOUS at 08:19

## 2022-08-17 RX ADMIN — SODIUM CHLORIDE 20 ML/HR: 0.9 INJECTION, SOLUTION INTRAVENOUS at 08:19

## 2022-08-17 NOTE — PROGRESS NOTES
Pt to clinic for entyvio infusion, pt denies any recent illness or antibiotic use, pt offers no complaints at this time, will continue to monitor

## 2022-08-18 ENCOUNTER — HOSPITAL ENCOUNTER (OUTPATIENT)
Dept: NON INVASIVE DIAGNOSTICS | Facility: CLINIC | Age: 72
Discharge: HOME | End: 2022-08-18
Payer: MEDICARE

## 2022-08-18 DIAGNOSIS — I73.9 PAD (PERIPHERAL ARTERY DISEASE) (HCC): ICD-10-CM

## 2022-08-18 DIAGNOSIS — Z98.890 S/P FEMORAL-TIBIAL BYPASS: ICD-10-CM

## 2022-08-18 PROCEDURE — 93925 LOWER EXTREMITY STUDY: CPT

## 2022-08-18 PROCEDURE — 93923 UPR/LXTR ART STDY 3+ LVLS: CPT

## 2022-08-18 PROCEDURE — 93925 LOWER EXTREMITY STUDY: CPT | Performed by: SURGERY

## 2022-08-18 PROCEDURE — 93922 UPR/L XTREMITY ART 2 LEVELS: CPT | Performed by: SURGERY

## 2022-08-28 NOTE — PATIENT INSTRUCTIONS
Peripheral Arterial Disease  S/P right femoral to below-the-knee popliteal bypass 03/26/2020     -continue with progressive walking program 30-45" at least 4 days weekly  -work on improved glucose control  -continue with good blood pressure and cholesterol mangement  -continue with asa/statin therapy  -will follow up STEFFEN in one year with office visit, but discussed reasons to be seen sooner             Current medications include: aspirin, atorvastatin 20 and rivaroxaban 20 (AF)      STEFFEN 8/18/22: Indications:  Yearly surveillance for progression of disease  Patient has no complaints of  claudication at this time  Operative History:  2020-03-26 Right SFA to below knee popliteal bypass graft  2020-02-12 Right Pop Stent Thrombectomy (Angioplasty & 2nd Stent)  2019-06-27 Right Popliteal stent  Risk Factors  The patient has history of Hyperlipidemia, CKD, PAD and smoking  Clinical  Right Pressure:  126/ mm Hg, Left Pressure:  124/ mm Hg  FINDINGS:     Segment                Right       Left                                            PSV (cm/s)  PSV (cm/s)    Inflow Anastomosis             71                Mid Thigh (Graft)              76                Low Thigh (Graft)              54                Near Knee (Graft)              37                Outflow Anastomosis            51                Common Femoral Artery          67          89    Prox Profunda                  52          89    Prox SFA                       65          68    Mid SFA                                    64    Dist SFA                                   56    Proximal Pop                               52    Distal Pop                                 49    Tibioperoneal                  35          50    Prox Post Tibial               26                Dist Post Tibial               68          62    Dist  Ant   Tibial              41          39    Prox Peroneal                  40                Dist Peroneal                  30 33             CONCLUSION:  Impression:  RIGHT LOWER LIMB:  Widely patent femoro-popliteal bypass graft  Ankle/Brachial index: 1 09, which is in the normal range, (Prior: 1 07)  Metatarsal pressure of 102 mm Hg  Great toe pressure of 66 mm Hg, within the healing range  (Prior: 84 mm Hg)  PVR/ PPG tracings are normal      LEFT LOWER LIMB:  This resting evaluation shows no evidence of significant lower extremity  arterial occlusive disease  Ankle/Brachial index: 1 20, which is in the normal range  (Prior: 1 08)  Metatarsal pressure of 174 mm Hg  Great toe pressure of 102 mm Hg, within the healing range  (Prior: 93 mm Hg)  PVR/ PPG tracings are normal      In comparison to the study of 8/18/2021, there is no significant change in the  disease process

## 2022-08-29 ENCOUNTER — OFFICE VISIT (OUTPATIENT)
Dept: VASCULAR SURGERY | Facility: CLINIC | Age: 72
End: 2022-08-29
Payer: MEDICARE

## 2022-08-29 VITALS
BODY MASS INDEX: 35.1 KG/M2 | WEIGHT: 237 LBS | HEART RATE: 72 BPM | DIASTOLIC BLOOD PRESSURE: 84 MMHG | TEMPERATURE: 96.7 F | SYSTOLIC BLOOD PRESSURE: 140 MMHG | HEIGHT: 69 IN

## 2022-08-29 DIAGNOSIS — E11.51 TYPE 2 DIABETES MELLITUS WITH DIABETIC PERIPHERAL ANGIOPATHY WITHOUT GANGRENE, WITHOUT LONG-TERM CURRENT USE OF INSULIN (HCC): ICD-10-CM

## 2022-08-29 DIAGNOSIS — I73.9 PAD (PERIPHERAL ARTERY DISEASE) (HCC): Primary | ICD-10-CM

## 2022-08-29 DIAGNOSIS — E78.5 DYSLIPIDEMIA: ICD-10-CM

## 2022-08-29 DIAGNOSIS — N18.31 STAGE 3A CHRONIC KIDNEY DISEASE (HCC): ICD-10-CM

## 2022-08-29 DIAGNOSIS — I48.0 PAROXYSMAL ATRIAL FIBRILLATION (HCC): ICD-10-CM

## 2022-08-29 DIAGNOSIS — I83.892 VARICOSE VEINS OF LEFT LEG WITH EDEMA: ICD-10-CM

## 2022-08-29 DIAGNOSIS — Z95.828 S/P FEMORAL-POPLITEAL BYPASS SURGERY: ICD-10-CM

## 2022-08-29 DIAGNOSIS — E66.01 OBESITY, MORBID (HCC): ICD-10-CM

## 2022-08-29 DIAGNOSIS — Z72.0 TOBACCO ABUSE: ICD-10-CM

## 2022-08-29 PROBLEM — I87.2 VENOUS INSUFFICIENCY: Status: ACTIVE | Noted: 2022-08-29

## 2022-08-29 PROCEDURE — 99214 OFFICE O/P EST MOD 30 MIN: CPT | Performed by: PHYSICIAN ASSISTANT

## 2022-08-29 NOTE — PROGRESS NOTES
Assessment/Plan:    PAD (peripheral artery disease) ([facility])  S/P right femoral to below-the-knee popliteal bypass 03/26/2020  -     VAS lower limb arterial duplex, complete bilateral; Future    -Hx embolic event due to R popliteal lesion which ultimately required femoral to BK popliteal bypass rGSV 3/26/2020  -No claudication, rest pain or tissue loss  -Remains active   -R LE bypass pulse present    -STEFFEN 8/18/22:    R 1 09/102/66, patent fem-popliteal bypass    L 1 20/174/102, no significant LE arterial occlusive disease     Plan:  Patient remains stable after right lower extremity bypass  We reviewed lower extremity arterial duplex study which shows patent femoral popliteal bypass  He has no symptoms of claudication  He walks as much as he wants to walk without leg pain  Recommend continued regular walking program, optimal medical therapy and therapeutic lifestyle changes     -continue with progressive walking program 30-45" at least 4 days weekly  -work on improved glucose control  -continue with good blood pressure and cholesterol mangement  -continue with aspirin, atorvastatin 20 and rivaroxaban 20 (AF)  -will follow up STEFFEN in one year with office visit, but discussed reasons to be seen sooner         Venous insufficiency  -moderate venous stasis, edema L > R  -we discussed conservative measures including compression for slowing progression of venous disease      Paroxysmal atrial fibrillation (HCC)    Stage 3a chronic kidney disease ([address] )    Dyslipidemia    Obesity, morbid ([address] )    Type 2 diabetes mellitus with diabetic peripheral angiopathy without gangrene, without long-term current use of insulin (HCC)        Subjective:      Patient ID: Shani Cole III is a 80 y o  male  Patient presents today for 1 year f/u visit and review STEFFEN s/p R femoral-BK pop bypass, 3/26/20 Brandan Garcia)  Patient denies any leg pain or claudication symptoms  States that he walks 2 miles a few times a week w/o any problems  HPI   Monalisa Mccartney III 68yo M with HTN, HLD, DM, CKD3, anemia, emphysema, isch CMP, Crohn's, atherosclerosis of the abdomen and lower extremities s/p R femoral to below knee popliteal bypass graft (3/26/20 ) Patient developed blue toe syndrome on the right in March 2019 which worsened  CT-scan was largely unimpressive for atherosclerotic occlusive disease or any possible proximal embolic source, apart from the right popliteal artery lesion  There was noted, soft atheromatous appearing plaque in the popliteal artery which is not flow limiting but possibly resulting in embolic events to the toes  He underwent popliteal artery stenting twice which occluded  Ultimately, he underwent SFA-BK pop bypass with rGSV on 3/26/20  He was also noted to have paroxysmal afib and is on Xarelto  8/29/22:  Patient returns for annual vascular follow-up and to review recent testing  Patient with mixed arterial and venous disease  The patient and his wife report that they continue to walk about 2 miles on most days every week  He is had no buttock/thigh or calf claudication  Has no foot pain or ischemic rest pain  On physical examination, he has moderate lower extremity venous stasis changes with varicosities and mild edema  He is no tired, heavy or aching legs  Occasionally he wears compression stockings  I saw him last year, he had quit cigarette smoking  He reports that he continues to smoke but very occasionally  He is maintained on aspirin, rivaroxaban 20 and atorvastatin 20  We reviewed his recent labs and testing  His cholesterol remains stable on current lipid lowering agent with LDL 48  His hemoglobin A1c has increased for which he is following up with primary care  His renal function remained stable  We reviewed his lower extremity duplex study which shows normal ABIs    The right femoral to popliteal bypass remains patent and there is an easily palpable bypass pulse just below the level of the knee  A1c 7 <<-6 5  LDL48  BUN/creat 17/1 07    -No problems with Xarelto  -Occasional smoker  STEFFEN 8/18/22: Indications:  Yearly surveillance for progression of disease  Patient has no complaints of  claudication at this time  Operative History:  2020-03-26 Right SFA to below knee popliteal bypass graft  2020-02-12 Right Pop Stent Thrombectomy (Angioplasty & 2nd Stent)  2019-06-27 Right Popliteal stent  Risk Factors  The patient has history of Hyperlipidemia, CKD, PAD and smoking  Clinical  Right Pressure:  126/ mm Hg, Left Pressure:  124/ mm Hg  FINDINGS:     Segment                Right       Left                                            PSV (cm/s)  PSV (cm/s)    Inflow Anastomosis             71                Mid Thigh (Graft)              76                Low Thigh (Graft)              54                Near Knee (Graft)              37                Outflow Anastomosis            51                Common Femoral Artery          67          89    Prox Profunda                  52          89    Prox SFA                       65          68    Mid SFA                                    64    Dist SFA                                   56    Proximal Pop                               52    Distal Pop                                 49    Tibioperoneal                  35          50    Prox Post Tibial               26                Dist Post Tibial               68          62    Dist  Ant  Tibial              41          39    Prox Peroneal                  40                Dist Peroneal                  30          33             CONCLUSION:  Impression:  RIGHT LOWER LIMB:  Widely patent femoro-popliteal bypass graft  Ankle/Brachial index: 1 09, which is in the normal range, (Prior: 1 07)  Metatarsal pressure of 102 mm Hg  Great toe pressure of 66 mm Hg, within the healing range   (Prior: 84 mm Hg)  PVR/ PPG tracings are normal      LEFT LOWER LIMB:  This resting evaluation shows no evidence of significant lower extremity  arterial occlusive disease  Ankle/Brachial index: 1 20, which is in the normal range  (Prior: 1 08)  Metatarsal pressure of 174 mm Hg  Great toe pressure of 102 mm Hg, within the healing range  (Prior: 93 mm Hg)  PVR/ PPG tracings are normal      In comparison to the study of 8/18/2021, there is no significant change in the  disease process  The following portions of the patient's history were reviewed and updated as appropriate: allergies, current medications, past family history, past medical history, past social history, past surgical history and problem list     Review of Systems   Constitutional: Negative  HENT: Negative  Eyes: Negative  Respiratory: Negative  Cardiovascular: Negative  Gastrointestinal: Negative  Endocrine: Negative  Genitourinary: Negative  Musculoskeletal: Negative  Skin: Negative  Allergic/Immunologic: Negative  Neurological: Negative  Hematological: Negative  Psychiatric/Behavioral: Negative  Objective:    /84 (BP Location: Right arm, Patient Position: Sitting)   Pulse 72   Temp (!) 96 7 °F (35 9 °C) (Tympanic)   Ht 5' 9" (1 753 m)   Wt 108 kg (237 lb)   BMI 35 00 kg/m²     Obese    Bulging varicose veins L > R with chronic venous stasis changes  No wounds  + Bypass pulse  R  1+ DP;  L 1+ DP pulses       Physical Exam  Vitals and nursing note reviewed  Constitutional:       Appearance: He is well-developed  HENT:      Head: Normocephalic and atraumatic  Eyes:      Pupils: Pupils are equal, round, and reactive to light  Neck:      Thyroid: No thyromegaly  Vascular: No JVD  Trachea: Trachea normal    Cardiovascular:      Rate and Rhythm: Normal rate and regular rhythm  Pulses:           Carotid pulses are 2+ on the right side and 2+ on the left side  Radial pulses are 2+ on the right side and 2+ on the left side          Dorsalis pedis pulses are 1+ on the right side and 1+ on the left side  Heart sounds: Normal heart sounds, S1 normal and S2 normal  No murmur heard  No friction rub  No gallop  Pulmonary:      Effort: Pulmonary effort is normal  No accessory muscle usage or respiratory distress  Breath sounds: Normal breath sounds  No wheezing or rales  Abdominal:      General: Bowel sounds are normal  There is no distension  Palpations: Abdomen is soft  Tenderness: There is no abdominal tenderness  Musculoskeletal:         General: No deformity  Normal range of motion  Cervical back: Neck supple  Left lower le+ Edema present  Skin:     General: Skin is warm and dry  Findings: No lesion or rash  Nails: There is no clubbing  Neurological:      Mental Status: He is alert and oriented to person, place, and time  Comments: Grossly normal    Psychiatric:         Behavior: Behavior is cooperative  I have reviewed and made appropriate changes to the review of systems input by the medical assistant      Vitals:    22 0928   BP: 140/84   BP Location: Right arm   Patient Position: Sitting   Pulse: 72   Temp: (!) 96 7 °F (35 9 °C)   TempSrc: Tympanic   Weight: 108 kg (237 lb)   Height: 5' 9" (1 753 m)       Patient Active Problem List   Diagnosis    Obesity, morbid (HCC)    Chronic kidney disease, stage III (moderate) (HCC)    Persistent proteinuria    Microscopic hematuria    [place] cardiac risk 10-20% in next 10 years    Abdominal aortic atherosclerosis ([address] )    RBBB (right bundle branch block with left anterior fascicular block)    Tobacco abuse    Dyslipidemia    Vitamin D deficiency    GERD with esophagitis    Harris's esophagus without dysplasia    Colon polyps    PAD (peripheral artery disease) (HCC)    Mass of both adrenal glands (HCC)    Blue toe syndrome, right (HCC)    Ischemic cardiomyopathy    S/P peripheral artery angioplasty with stent placement    Venous stasis    Paroxysmal atrial fibrillation (HCC)    Anemia of chronic renal failure, stage 3 (moderate) (HCC)    Essential (hemorrhagic) thrombocythemia (HCC)    Other emphysema (HCC)    Adrenal mass (HCC)    Crohn's disease of small intestine with complication (HCC)    SVT (supraventricular tachycardia) (HCC)    Positive QuantiFERON-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    Terminal ileitis of small intestine (HCC)    S/P femoral-tibial bypass    Elevated alkaline phosphatase level    Vitamin B12 deficiency    Type 2 diabetes mellitus with diabetic peripheral angiopathy without gangrene ([address] )    Chronic hypotension       Past Surgical History:   Procedure Laterality Date    COLONOSCOPY  2019    HERNIA REPAIR      IR AORTAGRAM WITH RUN-OFF  6/27/2019    IR AORTAGRAM WITH RUN-OFF  2/12/2020    POPLITEAL ARTERY STENT Right     6/2019    NM SLCTV CATHJ 3RD+ ORD SLCTV ABDL PEL/LXTR BRNCH Right 6/27/2019    Procedure: leg ANGIOGRAM WITH STENT, BALLOON ANGIOPLASTY, LEFT GROIN ACCESS;  Surgeon: Moni Rodríguez MD;  [facility];  Service: Vascular    NM VEIN BYPASS GRAFT,FEM-POP Right 3/26/2020    Procedure: BYPASS FEMORAL-POPLITEAL; Right lower extremity bypass, fem-bk pop with GSV;  Surgeon: Moni Rodríguez MD;  [facility];  Service: Vascular       Family History   Problem Relation Age of Onset    Heart disease Mother     Hyperlipidemia Mother     Hypertension Mother     Hypertension Father     Heart disease Father     Stroke Father     Hyperlipidemia Father     Diabetes Brother     No Known Problems Maternal Grandmother     Dementia Neg Hx     Drug abuse Neg Hx     Mental illness Neg Hx     Substance Abuse Neg Hx     Alcohol abuse Neg Hx     Depression Neg Hx     Colon cancer Neg Hx        Social History     Socioeconomic History    Marital status: /Civil Union     Spouse name: Not on file    Number of children: 2    Years of education: Not on file   [employer] education level: Not on file   Occupational History    Occupation: retired   Tobacco Use    Smoking status: Current Some Day Smoker     Packs/day: 0 25     Years: 30 00     Pack years: 7 50     Types: Cigarettes    Smokeless tobacco: Never Used    Tobacco comment: " I will do it on my own"   Vaping Use    Vaping Use: Never used   Substance and Sexual Activity    Alcohol use: Yes     Alcohol/week: 1 0 standard drink     Types: 1 Standard drinks or equivalent per week     Comment: social drinker when out dining    Drug use: Never    Sexual activity: Not Currently   Other Topics Concern    Not on file   Social History Narrative    Drinks coffee     Social Determinants of Health     Financial Resource Strain: Not on file   Food Insecurity: Not on file   Transportation Needs: Not on file   Physical Activity: Not on file   Stress: Not on file   Social Connections: Not on file   Intimate Partner Violence: Not on file   Housing Stability: Not on file       No Known Allergies      Current Outpatient Medications:     aspirin 81 MG tablet, Take 81 mg by mouth daily, Disp: , Rfl:     atorvastatin (LIPITOR) 20 mg tablet, TAKE 1 TABLET BY MOUTH EVERY DAY, Disp: 90 tablet, Rfl: 1    folic acid (FOLVITE) 1 mg tablet, Take 1 tablet (1 mg total) by mouth daily, Disp: 90 tablet, Rfl: 3    glimepiride (AMARYL) 1 mg tablet, TAKE 1 TABLET BY MOUTH DAILY WITH BREAKFAST , Disp: 90 tablet, Rfl: 1    Magnesium 500 MG TABS, Take 1 tablet by mouth daily , Disp: , Rfl:     metoprolol succinate (TOPROL-XL) 25 mg 24 hr tablet, TAKE 1 TABLET (25 MG TOTAL) BY MOUTH DAILY  , Disp: 90 tablet, Rfl: 0    metoprolol succinate (TOPROL-XL) 50 mg 24 hr tablet, Take 1 tablet (50 mg total) by mouth daily, Disp: 90 tablet, Rfl: 4    omeprazole (PriLOSEC) 40 MG capsule, TAKE 1 CAPSULE (40 MG TOTAL) BY MOUTH DAILY  , Disp: 90 capsule, Rfl: 1    rivaroxaban (XARELTO) 20 mg tablet, Take 1 tablet (20 mg total) by mouth daily with breakfast, Disp: 84 tablet, Rfl: 0    vitamin B-12 (VITAMIN B-12) 1,000 mcg tablet, Take 1 tablet (1,000 mcg total) by mouth daily (Patient taking differently: Take 1,000 mcg by mouth 3 (three) times a week), Disp: 90 tablet, Rfl: 1

## 2022-09-06 ENCOUNTER — REMOTE DEVICE CLINIC VISIT (OUTPATIENT)
Dept: CARDIOLOGY CLINIC | Facility: CLINIC | Age: 72
End: 2022-09-06
Payer: MEDICARE

## 2022-09-06 DIAGNOSIS — Z95.818 PRESENCE OF OTHER CARDIAC IMPLANTS AND GRAFTS: Primary | ICD-10-CM

## 2022-09-06 PROCEDURE — 93298 REM INTERROG DEV EVAL SCRMS: CPT | Performed by: INTERNAL MEDICINE

## 2022-09-06 PROCEDURE — G2066 INTER DEVC REMOTE 30D: HCPCS | Performed by: INTERNAL MEDICINE

## 2022-09-06 NOTE — PROGRESS NOTES
MDT LOOP   CARELINK TRANSMISSION (ILR): BATTERY STATUS "OK " PRESENTING RHYTHM NSR, PAC'S @ 66 BPM  NO PATIENT ACTIVATED EPISODES  21 PAUSE EPISODES W/ECG'S SHOWING 3-4 SEC PAUSES WHILE SLEEPING  [address], MOST RECENT EPISODE DURATION, 12 SECS @ AVG 30 BPM DURING SLEEP  4 DEVICE CLASSIFIED AF EPISODES, AVAILABLE STRIPS DEMONSTRATE SR W/ PAC'S, RVR, ATRIAL FIBRILLATION  MAX EPISODE DURATION 10:00 MINS; AF BURDEN IS 0 1%  AF BURDEN MAYBE OVERESTIMATED DUE TO INAPPROPRIATE CLASSIFICATION OF AF  SOME STRIPS MAY NOT BE INTERPRETABLE OR AVAILABLE, CANNOT DEFINITIVELY RULE OUT ATRIAL FIBRILLATION  HX OF PAF & PT TAKES XARELTOK METOPROLOL SUCC   NORMAL DEVICE FUNCTION    ES

## 2022-09-15 ENCOUNTER — HOSPITAL ENCOUNTER (OUTPATIENT)
Dept: INFUSION CENTER | Facility: CLINIC | Age: 72
Discharge: HOME | End: 2022-09-15
Payer: MEDICARE

## 2022-09-15 VITALS
HEART RATE: 75 BPM | OXYGEN SATURATION: 92 % | TEMPERATURE: 97.5 F | DIASTOLIC BLOOD PRESSURE: 80 MMHG | RESPIRATION RATE: 18 BRPM | SYSTOLIC BLOOD PRESSURE: 111 MMHG

## 2022-09-15 DIAGNOSIS — K50.012 CROHN'S DISEASE OF ILEUM WITH INTESTINAL OBSTRUCTION (HCC): Primary | ICD-10-CM

## 2022-09-15 PROCEDURE — 96365 THER/PROPH/DIAG IV INF INIT: CPT

## 2022-09-15 RX ORDER — SODIUM CHLORIDE 9 MG/ML
20 INJECTION, SOLUTION INTRAVENOUS ONCE
Status: CANCELLED | OUTPATIENT
Start: 2022-10-17

## 2022-09-15 RX ORDER — SODIUM CHLORIDE 9 MG/ML
20 INJECTION, SOLUTION INTRAVENOUS ONCE
Status: COMPLETED | OUTPATIENT
Start: 2022-09-15 | End: 2022-09-15

## 2022-09-15 RX ADMIN — SODIUM CHLORIDE 20 ML/HR: 0.9 INJECTION, SOLUTION INTRAVENOUS at 08:36

## 2022-09-15 RX ADMIN — VEDOLIZUMAB 300 MG: 300 INJECTION, POWDER, LYOPHILIZED, FOR SOLUTION INTRAVENOUS at 08:50

## 2022-09-28 ENCOUNTER — TELEPHONE (OUTPATIENT)
Dept: NEPHROLOGY | Facility: CLINIC | Age: 72
End: 2022-09-28

## 2022-09-28 NOTE — TELEPHONE ENCOUNTER
Spoke with patient reminding him to go for lab work before his appt on 10/4  He expressed understanding and thanked us for the reminder call

## 2022-09-29 ENCOUNTER — APPOINTMENT (OUTPATIENT)
Dept: LAB | Facility: CLINIC | Age: 72
End: 2022-09-29
Payer: MEDICARE

## 2022-09-29 DIAGNOSIS — E55.9 VITAMIN D DEFICIENCY: ICD-10-CM

## 2022-09-29 DIAGNOSIS — N18.31 ANEMIA OF CHRONIC RENAL FAILURE, STAGE 3A (HCC): ICD-10-CM

## 2022-09-29 DIAGNOSIS — I95.89 CHRONIC HYPOTENSION: ICD-10-CM

## 2022-09-29 DIAGNOSIS — E78.5 DYSLIPIDEMIA: ICD-10-CM

## 2022-09-29 DIAGNOSIS — D63.1 ANEMIA OF CHRONIC RENAL FAILURE, STAGE 3A (HCC): ICD-10-CM

## 2022-09-29 DIAGNOSIS — N18.31 STAGE 3A CHRONIC KIDNEY DISEASE (HCC): ICD-10-CM

## 2022-09-29 DIAGNOSIS — R80.1 PERSISTENT PROTEINURIA: ICD-10-CM

## 2022-09-29 LAB
ALBUMIN SERPL BCP-MCNC: 3.4 G/DL (ref 3.5–5)
ALP SERPL-CCNC: 177 U/L (ref 34–104)
ALT SERPL W P-5'-P-CCNC: 16 U/L (ref 7–52)
ANION GAP SERPL CALCULATED.3IONS-SCNC: 4 MMOL/L (ref 4–13)
AST SERPL W P-5'-P-CCNC: 12 U/L (ref 13–39)
BILIRUB SERPL-MCNC: 0.75 MG/DL (ref 0.2–1)
BUN SERPL-MCNC: 14 MG/DL (ref 5–25)
CALCIUM ALBUM COR SERPL-MCNC: 9.1 MG/DL (ref 8.3–10.1)
CALCIUM SERPL-MCNC: 8.6 MG/DL (ref 8.4–10.2)
CHLORIDE SERPL-SCNC: 107 MMOL/L (ref 96–108)
CHOLEST SERPL-MCNC: 98 MG/DL
CK SERPL-CCNC: 58 U/L (ref 39–308)
CO2 SERPL-SCNC: 29 MMOL/L (ref 21–32)
CREAT SERPL-MCNC: 1.2 MG/DL (ref 0.6–1.3)
CREAT UR-MCNC: 139.4 MG/DL
ERYTHROCYTE [DISTWIDTH] IN BLOOD BY AUTOMATED COUNT: 15.4 % (ref 11.6–15.1)
GFR SERPL CREATININE-BSD FRML MDRD: 60 ML/MIN/1.73SQ M
GLUCOSE P FAST SERPL-MCNC: 117 MG/DL (ref 65–99)
HCT VFR BLD AUTO: 51.9 % (ref 36.5–49.3)
HDLC SERPL-MCNC: 40 MG/DL
HGB BLD-MCNC: 16.8 G/DL (ref 12–17)
LDLC SERPL CALC-MCNC: 38 MG/DL (ref 0–100)
MAGNESIUM SERPL-MCNC: 2.1 MG/DL (ref 1.9–2.7)
MCH RBC QN AUTO: 29.2 PG (ref 26.8–34.3)
MCHC RBC AUTO-ENTMCNC: 32.4 G/DL (ref 31.4–37.4)
MCV RBC AUTO: 90 FL (ref 82–98)
PLATELET # BLD AUTO: 324 THOUSANDS/UL (ref 149–390)
PMV BLD AUTO: 10.3 FL (ref 8.9–12.7)
POTASSIUM SERPL-SCNC: 4.4 MMOL/L (ref 3.5–5.3)
PROT SERPL-MCNC: 6.8 G/DL (ref 6.4–8.4)
PROT UR-MCNC: 24 MG/DL
PROT/CREAT UR: 0.17 MG/G{CREAT} (ref 0–0.1)
RBC # BLD AUTO: 5.76 MILLION/UL (ref 3.88–5.62)
SODIUM SERPL-SCNC: 140 MMOL/L (ref 135–147)
TRIGL SERPL-MCNC: 98 MG/DL
WBC # BLD AUTO: 10.87 THOUSAND/UL (ref 4.31–10.16)

## 2022-09-29 PROCEDURE — 82550 ASSAY OF CK (CPK): CPT

## 2022-09-29 PROCEDURE — 83735 ASSAY OF MAGNESIUM: CPT

## 2022-09-29 PROCEDURE — 80061 LIPID PANEL: CPT

## 2022-09-29 PROCEDURE — 36415 COLL VENOUS BLD VENIPUNCTURE: CPT

## 2022-09-29 PROCEDURE — 82306 VITAMIN D 25 HYDROXY: CPT

## 2022-09-29 PROCEDURE — 82570 ASSAY OF URINE CREATININE: CPT

## 2022-09-29 PROCEDURE — 84156 ASSAY OF PROTEIN URINE: CPT

## 2022-09-29 PROCEDURE — 80053 COMPREHEN METABOLIC PANEL: CPT

## 2022-09-29 PROCEDURE — 85027 COMPLETE CBC AUTOMATED: CPT

## 2022-10-01 DIAGNOSIS — R03.0 ELEVATED BLOOD PRESSURE READING: ICD-10-CM

## 2022-10-03 RX ORDER — METOPROLOL SUCCINATE 25 MG/1
25 TABLET, EXTENDED RELEASE ORAL DAILY
Qty: 90 TABLET | Refills: 0 | Status: SHIPPED | OUTPATIENT
Start: 2022-10-03 | End: 2022-10-17

## 2022-10-03 NOTE — PROGRESS NOTES
Assessment and Plan:  Chronic kidney disease IIIA:    -Etiology:  Suspect cardiorenal syndrome, arteriolar nephrosclerosis, possible prior atheroemboli  -Baseline creatinine 1 2-1 4  -Follows with Dr Josephine Larsen  -recent creatinine 1 20, GFR 60, at baseline and stable  -UPCr 0 17, stable and at goal  -electrolytes and acid-base stable  Hgb stable at 16 8  -avoid nephrotoxins, NSAIDs, hypotension and IV contrast if possible  -stay well hydrated  -follow-up with Dr Josephine Larsen in 6 months with repeat blood and urine studies  Hypertension/HypotensionVolume status:  -BP well controlled  -issues with relative hypotension on prior regimen and amlodipine discontinued in Aug  '22  -volume status euvolemic  -continue current medications:  Metoprolol 25 mg in am and 50 mg in pm (related to PAF treatment)  -Avoid hypotension or fluctuations in blood pressure    -low sodium (2 gm) diet  Encourage regular exercise  -continue to monitor BP at home    Bone Mineral Disease of CKD:  -phosphorus 3 6 in March '22, at goal  -PTH 44 6 in March '22, at goal  -Vitamin D 25 pending  -continue to monitor and repeat labs prior to next appt    DM II:  -stable  -HgbA1C 7 0  -continue to optimize glycemic control to slow progression of chronic kidney disease  -management per primary team    Dyslipidemia:  -stable  -lipid panel: at goal  -continue statin  -low-cholesterol and low-fat diet, aerobic exercise  -management per PCP    PAF:  -stable, rate controlled  -on beta-blockers and Xarelto   -continue current medical regimen with medication modifications per Cardiology  -management and follow-up per Cardiology    Nicotine dependence:  -discussed the pathophysiology and relationship of smoking and renal disease, including CKD/nephritis and renal cell carcinoma  -encourage smoking cessation    Hx gross hematuria/microscopic hematuria:  -Resolved    1 prior episode  - seen by Urology and cystoscopy negative   -Follow up with Urology as directed    Age related screening: Your primary caregiver may do yearly screening for colorectal cancer  It is recommended in all men and women over 48years old  You may have screening earlier if you have colon disease or a family history of colorectal cancer        Diagnoses and all orders for this visit:    Stage 3a chronic kidney disease ([facility] )    Type 2 diabetes mellitus with diabetic peripheral angiopathy without gangrene, without long-term current use of insulin (HCC)    Chronic hypotension    Dyslipidemia    Tobacco abuse    Paroxysmal atrial fibrillation ([facility] )    Microscopic hematuria         Follow up with Dr Yaritza Donnelly in 6 months with repeat blood and urine studies  Please call the office with any questions or concerns  Reason for Visit: No chief complaint on file  HPI: Rusty Lock is a 71 y o  male smoker with CKD 3A with baseline creat 1 2-1 4, DM2, HLD, PAF on Xarelto, PAD, s/p right fem-BK pop 3/'20, GERD, Crohns disease who presents for follow up of CKD  Patient followed by Dr Yaritza Donnelly, last seen 4/1/22  Most recent creatinine 1 20 at stable, GFR 60  UPCr 0 17  Patient denies recent hospitalizations or ER visits  Patient denies NSAID use  Patient denies nausea, vomiting, diarrhea, dyspnea, orthopnea, edema, hematuria or foamy urine       ROS: A complete review of systems was performed and was negative unless otherwise noted in the history of present illness  Allergies:   Patient has no known allergies      Medications:     Current Outpatient Medications:     aspirin 81 MG tablet, Take 81 mg by mouth daily, Disp: , Rfl:     atorvastatin (LIPITOR) 20 mg tablet, TAKE 1 TABLET BY MOUTH EVERY DAY, Disp: 90 tablet, Rfl: 1    folic acid (FOLVITE) 1 mg tablet, Take 1 tablet (1 mg total) by mouth daily, Disp: 90 tablet, Rfl: 3    glimepiride (AMARYL) 1 mg tablet, TAKE 1 TABLET BY MOUTH DAILY WITH BREAKFAST , Disp: 90 tablet, Rfl: 1    Magnesium 500 MG TABS, Take 1 tablet by mouth daily , Disp: , Rfl:     metoprolol succinate (TOPROL-XL) 25 mg 24 hr tablet, TAKE 1 TABLET (25 MG TOTAL) BY MOUTH DAILY  , Disp: 90 tablet, Rfl: 0    metoprolol succinate (TOPROL-XL) 50 mg 24 hr tablet, Take 1 tablet (50 mg total) by mouth daily, Disp: 90 tablet, Rfl: 4    omeprazole (PriLOSEC) 40 MG capsule, TAKE 1 CAPSULE (40 MG TOTAL) BY MOUTH DAILY  , Disp: 90 capsule, Rfl: 1    rivaroxaban (XARELTO) 20 mg tablet, Take 1 tablet (20 mg total) by mouth daily with breakfast, Disp: 84 tablet, Rfl: 0    vitamin B-12 (VITAMIN B-12) 1,000 mcg tablet, Take 1 tablet (1,000 mcg total) by mouth daily (Patient taking differently: Take 1,000 mcg by mouth 3 (three) times a week), Disp: 90 tablet, Rfl: 1    Past Medical History:   Diagnosis Date    Blue toe syndrome (HCC)     Chronic kidney disease     Colon polyps     GERD (gastroesophageal reflux disease)     Hematuria     History of rheumatic fever     Hypolipidemia     Hypotension     Ischemic cardiomyopathy     PAD (peripheral artery disease) (HCC)     Pulmonary emphysema (HCC)      Past Surgical History:   Procedure Laterality Date    COLONOSCOPY  2019    HERNIA REPAIR      IR AORTAGRAM WITH RUN-OFF  6/27/2019    IR AORTAGRAM WITH RUN-OFF  2/12/2020    POPLITEAL ARTERY STENT Right     6/2019    ID SLCTV CATHJ 3RD+ ORD SLCTV ABDL PEL/LXTR BRNCH Right 6/27/2019    Procedure: leg ANGIOGRAM WITH STENT, BALLOON ANGIOPLASTY, LEFT GROIN ACCESS;  Surgeon: Dandy Pope MD;  [facility];  Service: Vascular    ID VEIN BYPASS GRAFT,FEM-POP Right 3/26/2020    Procedure: BYPASS FEMORAL-POPLITEAL; Right lower extremity bypass, fem-bk pop with GSV;  Surgeon: Dandy Pope MD;  [facility];  Service: Vascular     Family History   Problem Relation Age of Onset    Heart disease Mother     Hyperlipidemia Mother     Hypertension Mother     Hypertension Father     Heart disease Father     Stroke Father     Hyperlipidemia Father    Amauri Delgado Diabetes Brother     No Known Problems Maternal Grandmother     Dementia Neg Hx     Drug abuse Neg Hx     Mental illness Neg Hx     Substance Abuse Neg Hx     Alcohol abuse Neg Hx     Depression Neg Hx     Colon cancer Neg Hx       reports that he has been smoking cigarettes  He has a 7 50 pack-year smoking history  He has never used smokeless tobacco  He reports current alcohol use of about 1 0 standard drink of alcohol per week  He reports that he does not use drugs  Physical Exam:   There were no vitals filed for this visit  There is no height or weight on file to calculate BMI  General:  Awake, alert, appears comfortable and in no acute distress  Nontoxic  Skin:  No rash, warm, good skin turgor   Eyes:  PERRL, EOMI, sclerae nonicteric   no periorbital edema   ENT:  Moist mucous membranes  Neck:  Trachea midline, symmetric  No JVD  No carotid bruits  Chest:  Clear to auscultation bilaterally without wheezes, crackles or rhonchi  CVS:  Regular rate and rhythm without murmur, gallop or rub  S1 and S2 identified and normal   No S3, S4    Abdomen:  Soft, nontender, nondistended without masses  Normal bowel sounds x 4 quadrants  No bruit  Extremities:  Warm, pink, motor and sensory intact and well perfused  No cyanosis, pallor  trace BLE edema  Neuro:  Awake, alert, oriented x3  Grossly intact  Psych:  Appropriate affect  Mentating appropriately  Normal mental status exam      Procedure:  No results found for this or any previous visit      Lab Results   Component Value Date    CALCIUM 8 6 09/29/2022    K 4 4 09/29/2022    CO2 29 09/29/2022     09/29/2022    BUN 14 09/29/2022    CREATININE 1 20 09/29/2022       Results from last 7 days   Lab Units 09/29/22  0705   WBC Thousand/uL 10 87*   HEMOGLOBIN g/dL 16 8   HEMATOCRIT % 51 9*   PLATELETS Thousands/uL 324   POTASSIUM mmol/L 4 4   CHLORIDE mmol/L 107   CO2 mmol/L 29   BUN mg/dL 14   CREATININE mg/dL 1 20   CALCIUM mg/dL 8 6 MAGNESIUM mg/dL 2 1       EMR, including Epic, Care Everywhere and outside scanned documents reviewed  I have personally reviewed the blood work as stated above and in my note     I have personally reviewed PCP, consultants and prior nephrology notes

## 2022-10-03 NOTE — PATIENT INSTRUCTIONS
Your kidney function remains stable  Most recent creatinine 1 20  We will continue routine surveillance as outlined below  Avoid all NSAIDs to include ibuprofen, Motrin, Aleve, Advil, Naproxen, Celebrex, Indomethacin, Toradol  Stay well hydrated  Continue all prescribed medications  Call if blood pressure consistently more than 140/90 or less than 110/50s  High and low blood pressures may affect your kidney function  Recommend low salt diet (2 gm sodium diet)  Please let us know if you are scheduled for any studies with IV contrast (ex: CT scan, arteriogram or cardiac catheterization)   Follow up in 6 months with Dr Dietz Earing with repeat labs prior to appointment  Please contact the office with new symptoms or concerns

## 2022-10-04 ENCOUNTER — OFFICE VISIT (OUTPATIENT)
Dept: NEPHROLOGY | Facility: CLINIC | Age: 72
End: 2022-10-04
Payer: MEDICARE

## 2022-10-04 VITALS — WEIGHT: 241 LBS | HEIGHT: 69 IN | BODY MASS INDEX: 35.7 KG/M2

## 2022-10-04 DIAGNOSIS — I48.0 PAROXYSMAL ATRIAL FIBRILLATION (HCC): ICD-10-CM

## 2022-10-04 DIAGNOSIS — N18.31 STAGE 3A CHRONIC KIDNEY DISEASE (HCC): Primary | ICD-10-CM

## 2022-10-04 DIAGNOSIS — I95.89 CHRONIC HYPOTENSION: ICD-10-CM

## 2022-10-04 DIAGNOSIS — Z72.0 TOBACCO ABUSE: ICD-10-CM

## 2022-10-04 DIAGNOSIS — E78.5 DYSLIPIDEMIA: ICD-10-CM

## 2022-10-04 DIAGNOSIS — R31.29 MICROSCOPIC HEMATURIA: ICD-10-CM

## 2022-10-04 DIAGNOSIS — E11.51 TYPE 2 DIABETES MELLITUS WITH DIABETIC PERIPHERAL ANGIOPATHY WITHOUT GANGRENE, WITHOUT LONG-TERM CURRENT USE OF INSULIN (HCC): ICD-10-CM

## 2022-10-04 PROCEDURE — 99214 OFFICE O/P EST MOD 30 MIN: CPT | Performed by: PHYSICIAN ASSISTANT

## 2022-10-06 LAB
25(OH)D2 SERPL-MCNC: 2 NG/ML
25(OH)D3 SERPL-MCNC: 35 NG/ML
25(OH)D3+25(OH)D2 SERPL-MCNC: 37 NG/ML

## 2022-10-08 ENCOUNTER — CLINICAL SUPPORT (OUTPATIENT)
Dept: INTERNAL MEDICINE CLINIC | Facility: CLINIC | Age: 72
End: 2022-10-08
Payer: MEDICARE

## 2022-10-08 DIAGNOSIS — Z23 ENCOUNTER FOR IMMUNIZATION: Primary | ICD-10-CM

## 2022-10-08 PROCEDURE — 90662 IIV NO PRSV INCREASED AG IM: CPT

## 2022-10-08 PROCEDURE — G0008 ADMIN INFLUENZA VIRUS VAC: HCPCS

## 2022-10-10 ENCOUNTER — TELEPHONE (OUTPATIENT)
Dept: NEPHROLOGY | Facility: CLINIC | Age: 72
End: 2022-10-10

## 2022-10-16 DIAGNOSIS — K21.00 GERD WITH ESOPHAGITIS: ICD-10-CM

## 2022-10-16 DIAGNOSIS — E11.51 TYPE 2 DIABETES MELLITUS WITH DIABETIC PERIPHERAL ANGIOPATHY WITHOUT GANGRENE, WITHOUT LONG-TERM CURRENT USE OF INSULIN (HCC): ICD-10-CM

## 2022-10-16 DIAGNOSIS — R03.0 ELEVATED BLOOD PRESSURE READING: ICD-10-CM

## 2022-10-17 ENCOUNTER — HOSPITAL ENCOUNTER (OUTPATIENT)
Dept: INFUSION CENTER | Facility: CLINIC | Age: 72
Discharge: HOME | End: 2022-10-17
Payer: MEDICARE

## 2022-10-17 VITALS
HEART RATE: 70 BPM | RESPIRATION RATE: 18 BRPM | TEMPERATURE: 97.8 F | OXYGEN SATURATION: 94 % | SYSTOLIC BLOOD PRESSURE: 119 MMHG | DIASTOLIC BLOOD PRESSURE: 88 MMHG

## 2022-10-17 DIAGNOSIS — I48.0 PAROXYSMAL ATRIAL FIBRILLATION (HCC): ICD-10-CM

## 2022-10-17 DIAGNOSIS — K50.012 CROHN'S DISEASE OF ILEUM WITH INTESTINAL OBSTRUCTION (HCC): Primary | ICD-10-CM

## 2022-10-17 PROCEDURE — 96365 THER/PROPH/DIAG IV INF INIT: CPT

## 2022-10-17 RX ORDER — GLIMEPIRIDE 1 MG/1
TABLET ORAL
Qty: 90 TABLET | Refills: 1 | Status: SHIPPED | OUTPATIENT
Start: 2022-10-17

## 2022-10-17 RX ORDER — METOPROLOL SUCCINATE 25 MG/1
25 TABLET, EXTENDED RELEASE ORAL DAILY
Qty: 90 TABLET | Refills: 3 | Status: SHIPPED | OUTPATIENT
Start: 2022-10-17

## 2022-10-17 RX ORDER — SODIUM CHLORIDE 9 MG/ML
20 INJECTION, SOLUTION INTRAVENOUS ONCE
OUTPATIENT
Start: 2022-11-10

## 2022-10-17 RX ORDER — OMEPRAZOLE 40 MG/1
40 CAPSULE, DELAYED RELEASE ORAL DAILY
Qty: 90 CAPSULE | Refills: 1 | Status: SHIPPED | OUTPATIENT
Start: 2022-10-17

## 2022-10-17 RX ORDER — SODIUM CHLORIDE 9 MG/ML
20 INJECTION, SOLUTION INTRAVENOUS ONCE
Status: COMPLETED | OUTPATIENT
Start: 2022-10-17 | End: 2022-10-17

## 2022-10-17 RX ADMIN — SODIUM CHLORIDE 20 ML/HR: 0.9 INJECTION, SOLUTION INTRAVENOUS at 09:40

## 2022-10-17 RX ADMIN — VEDOLIZUMAB 300 MG: 300 INJECTION, POWDER, LYOPHILIZED, FOR SOLUTION INTRAVENOUS at 09:53

## 2022-10-17 NOTE — TELEPHONE ENCOUNTER
Georgia is a 34 year old year old female here for an OB check.  She is      .  Gestational Age 23w4d.      She denies bleeding.  She denies cramping  She reports fetal movement     See Prenatal Flowsheet for additional comments.    Denies known Latex allergy or symptoms of Latex sensitivity.  Medications verified, no changes.         Requested medication(s) are due for refill today: Yes  Patient has already received a courtesy refill: No  Other reason request has been forwarded to provider:

## 2022-11-10 DIAGNOSIS — I48.0 PAROXYSMAL ATRIAL FIBRILLATION (HCC): ICD-10-CM

## 2022-11-14 ENCOUNTER — TELEPHONE (OUTPATIENT)
Dept: GASTROENTEROLOGY | Facility: CLINIC | Age: 72
End: 2022-11-14

## 2022-11-14 ENCOUNTER — OFFICE VISIT (OUTPATIENT)
Dept: GASTROENTEROLOGY | Facility: CLINIC | Age: 72
End: 2022-11-14

## 2022-11-14 VITALS
TEMPERATURE: 98 F | HEART RATE: 81 BPM | BODY MASS INDEX: 35.58 KG/M2 | DIASTOLIC BLOOD PRESSURE: 89 MMHG | WEIGHT: 240.2 LBS | HEIGHT: 69 IN | OXYGEN SATURATION: 92 % | SYSTOLIC BLOOD PRESSURE: 145 MMHG

## 2022-11-14 DIAGNOSIS — K22.70 BARRETT'S ESOPHAGUS WITHOUT DYSPLASIA: ICD-10-CM

## 2022-11-14 DIAGNOSIS — D84.9 IMMUNOCOMPROMISED PATIENT (HCC): ICD-10-CM

## 2022-11-14 DIAGNOSIS — K50.019 CROHN'S DISEASE OF SMALL INTESTINE WITH COMPLICATION (HCC): Primary | ICD-10-CM

## 2022-11-14 DIAGNOSIS — K21.00 GASTROESOPHAGEAL REFLUX DISEASE WITH ESOPHAGITIS WITHOUT HEMORRHAGE: ICD-10-CM

## 2022-11-14 DIAGNOSIS — E53.8 VITAMIN B12 DEFICIENCY: ICD-10-CM

## 2022-11-14 DIAGNOSIS — E55.9 VITAMIN D DEFICIENCY: ICD-10-CM

## 2022-11-14 DIAGNOSIS — Z11.59 ENCOUNTER FOR SCREENING FOR OTHER VIRAL DISEASES: ICD-10-CM

## 2022-11-14 NOTE — TELEPHONE ENCOUNTER
BLOOD THINNER CLEARANCE    Our mutual patient is scheduled for procedure: Colonoscopy and EGD     On: 1/30/2022     With: Dr Jana Bauer    He is taking the following blood thinner: Xarelto                                               Can this be stopped  2 days prior to the procedure?        Physician Approving clearance: ________________________

## 2022-11-14 NOTE — PROGRESS NOTES
[facility] - Outpatient Follow-up Note  Rosa Mccoy III 71 y o  male MRN: [id number]  Encounter: [id number]          ASSESSMENT AND PLAN:    Rosa Mccoy III is a 71 y o  male with CKD, peripheral vascular disease, atrial fibrillation on anticoagulation, small bowel Crohn's disease with prior small bowel obstructions currently on Entyvio who presents for follow-up  At his last visit he noted that he was in clinical remission  Since then he has continued to feel well and he denies any significant complaints  He continues to remain in clinical remission  CT scan from January with bilateral adrenal nodules greater than 4 cm consistent with lipid rich adenoma similar to prior  Prior prior MR enterography with chronic changes of IBD in the distal ileum and fiber stenotic stricturing disease with minimal active inflammation  Endoscopy and colonoscopy from January 2020 with abnormal mucosa in the antrum, possible paraesophageal hernia, ulcerated ileitis, polyp in the sigmoid, pan colonic diverticula  Biopsies with relatively benign duodenum, chronic inactive gastritis without H pylori or intestinal metaplasia, focal acute inflammation in the terminal ileum without evidence of chronic enteritis, relatively benign colonic mucosa but inflammatory polyp in the sigmoid  Most recent blood work notable for CMP with alkaline phosphatase of 177, albumin 3 4, glucose 117 but otherwise relatively normal   Prior GGT 50  Vitamin-D level normal   Prior alkaline phosphatase isoenzymes relatively within normal   White blood cell count 10 87 with hemoglobin 16 8, normal MCV, normal platelets  Hemoglobin A1c 7  CRP last checked 14 1  Negative hepatitis profile in May  Negative QuantiFERON gold in May  1  Crohn's disease of small intestine with complication ([facility] [address] )    2  Harris's esophagus without dysplasia    3  Gastroesophageal reflux disease with esophagitis without hemorrhage    4  Vitamin D deficiency    5  Vitamin B12 deficiency    6  Immunocompromised patient ([address] )    7  Encounter for screening for other viral diseases         Orders Placed This Encounter   Procedures   • MRI enterography w wo   • CBC and differential   • Comprehensive metabolic panel   • C-reactive protein   • Quantiferon TB Gold Plus   • Chronic Hepatitis Panel   • Gamma GT   • Alkaline phosphatase, isoenzymes   • Vitamin B12   • Vitamin D 25 hydroxy   • Colonoscopy   • EGD     Continue Entyvio every 4 weeks  Repeat CMP, CRP, CBC (blood work)  Can order repeat endoscopy and colonoscopy at this time (3 years after prior)  Vitamin-D supplementation  Avoid smoking  Repeat MR enterography    Avoid live virus vaccines  Yearly flu shot  COVID vaccine and booster  Pneumonia vaccine  Shingrix  Routine skin exams with the dermatologist,    Continue omeprazole daily  Gaviscon or Tums as needed  Things that can help improved reflux include: avoidance of trigger foods (potential foods include coffee, caffeine, chocolate, mint, tomato-based products, spicy foods, fatty foods), avoid tight fitting clothing, elevated head of bed 30 degrees, avoid eating 2-3 hours prior to bedtime, weight loss, avoid alcohol, avoid tobacco use  Repeat alkaline phosphatase isoenzymes in GGT  Consider MRCP in the future  QuantiFERON gold and hepatitis profile May 2023  ______________________________________________________________________    SUBJECTIVE:    Beni Zarate III is a 71 y o  male who presents with complaint of Crohn's and GERD  He feels well  No issues  3 BMs per day, usually formed  No bright red blood per rectum/rectal bleeding, no melena  No urgency, no nocturnal BMs, No incontinence  No abdominal pain  No rashes, no mouth sores, no joint pains  No heartburn (he avoids peppers), dysphagia, odynophagia, nausea, vomiting  No weight loss         Answers for HPI/ROS submitted by the patient on 11/10/2022  What form(s) of tobacco have you used?: cigarettes  During the last year, how many days have you missed work or school because of your inflammatory bowel disease?: 0  During the last year, how many days have you been hospitalized because of your inflammatory bowel disease?: 0  During the last year, how many days have you visited a hospital emergency department because of your inflammatory bowel disease?: 0  During the last month, have you taken narcotic pain medications (such as Percocet, oxycodone, Oxycontin, morphine, Vicodin, Dilaudid, MS Contin) for your inflammatory bowel disease?: No  Have you awoken at night because you needed to move your bowels during the last month? : Yes  Have you had leakage of stool while sleeping during the last month?: No  Have you had leakage of stool while you were awake during the last month?: No  In the last 6 months, have you unintentionally lost weight?: No  Fever: No  Eye irritation: No  Mouth sores: No  Sore throat: No  Chest pain: No  Shortness of breath: No  Numbness or tingling in your hands or feet: No  Skin rash: No  Pain or swelling in your joints: No  Bruising or bleeding: No  Felt depressed or blue: No  When you are not experiencing symptoms of your inflammatory bowel disease, how many bowel movements do you typically have each day?: 3  What is the average (typical) number of bowel movements that you had in a single day during the last week?: 3  Over the last 3 days, have you had any bowel movements where you passed blood without stool?: No  Since your last visit, have you received any vaccinations?: Yes  Since the last visit, have you had an infection?: No  In the past three months, have you used tobacco in any form?: Yes        REVIEW OF SYSTEMS IS OTHERWISE NEGATIVE    10 point ROS reviewed and negative, except as above      Historical Information   Past Medical History:   Diagnosis Date   • Blue toe syndrome (HCC)    • Chronic kidney disease    • Colon polyps    • GERD (gastroesophageal reflux disease)    • Hematuria    • History of rheumatic fever    • Hypolipidemia    • Hypotension    • Ischemic cardiomyopathy    • PAD (peripheral artery disease) (HCC)    • Pulmonary emphysema (HCC)      Past Surgical History:   Procedure Laterality Date   • COLONOSCOPY  2019   • HERNIA REPAIR     • IR AORTAGRAM WITH RUN-OFF  6/27/2019   • IR AORTAGRAM WITH RUN-OFF  2/12/2020   • POPLITEAL ARTERY STENT Right     6/2019   • IN SLCTV CATHJ 3RD+ ORD SLCTV ABDL PEL/LXTR [facility] Right 6/27/2019    Procedure: leg ANGIOGRAM WITH STENT, BALLOON ANGIOPLASTY, LEFT GROIN ACCESS;  Surgeon: Anna Cevallos MD;  Location: [facility];  Service: Vascular   • IN VEIN BYPASS GRAFT,FEM-POP Right 3/26/2020    Procedure: BYPASS FEMORAL-POPLITEAL; Right lower extremity bypass, fem-bk pop with GSV;  Surgeon: Anna Cevallos MD;  Location: [facility];  Service: Vascular     Social History   Social History     Substance and Sexual Activity   Alcohol Use Yes   • Alcohol/week: 1 0 standard drink   • Types: 1 Standard drinks or equivalent per week    Comment: social drinker when out dining     Social History     Substance and Sexual Activity   Drug Use Never     Social History     Tobacco Use   Smoking Status Current Some Day Smoker   • Packs/day: 0 25   • Years: 30 00   • Pack years: 7 50   • Types: Cigarettes   Smokeless Tobacco Never Used   Tobacco Comment    " I will do it on my own"     Family History   Problem Relation Age of Onset   • Heart disease Mother    • Hyperlipidemia Mother    • Hypertension Mother    • Hypertension Father    • Heart disease Father    • Stroke Father    • Hyperlipidemia Father    • Diabetes Brother    • No Known Problems Maternal Grandmother    • Dementia Neg Hx    • Drug abuse Neg Hx    • Mental illness Neg Hx    • Substance Abuse Neg Hx    • Alcohol abuse Neg Hx    • Depression Neg Hx    • Colon cancer Neg Hx        Meds/Allergies       Current Outpatient Medications:   •  aspirin 81 MG tablet  • atorvastatin (LIPITOR) 20 mg tablet  •  bisacodyl (DULCOLAX) 5 mg EC tablet  •  folic acid (FOLVITE) 1 mg tablet  •  glimepiride (AMARYL) 1 mg tablet  •  Magnesium 500 MG TABS  •  metoprolol succinate (TOPROL-XL) 25 mg 24 hr tablet  •  metoprolol succinate (TOPROL-XL) 50 mg 24 hr tablet  •  omeprazole (PriLOSEC) 40 MG capsule  •  polyethylene glycol (GOLYTELY) 4000 mL solution  •  rivaroxaban (XARELTO) 20 mg tablet  •  vitamin B-12 (VITAMIN B-12) 1,000 mcg tablet    No Known Allergies        Objective     Blood pressure 145/89, pulse 81, temperature 98 °F (36 7 °C), temperature source Tympanic, height 5' 9" (1 753 m), weight 109 kg (240 lb 3 2 oz), SpO2 92 %  Body mass index is 35 47 kg/m²  PHYSICAL EXAMINATION:    General Appearance:   Alert, cooperative, no distress   HEENT:  Normocephalic, atraumatic, anicteric  Neck supple, symmetrical, trachea midline  Lungs:   Equal chest rise and unlabored breathing, normal effort, no coughing  Cardiovascular:   No visualized JVD  Abdomen:   No abdominal distension  Skin:   No jaundice, rashes, or lesions  Musculoskeletal:   Normal range of motion visualized  Psych:  Normal affect and normal insight  Neuro:  Alert and appropriate  Lab Results:   No visits with results within 1 Day(s) from this visit     Latest known visit with results is:   Appointment on 09/29/2022   Component Date Value   • Sodium 09/29/2022 140    • Potassium 09/29/2022 4 4    • Chloride 09/29/2022 107    • CO2 09/29/2022 29    • ANION GAP 09/29/2022 4    • BUN 09/29/2022 14    • Creatinine 09/29/2022 1 20    • Glucose, Fasting 09/29/2022 117 (A)   • Calcium 09/29/2022 8 6    • Corrected Calcium 09/29/2022 9 1    • AST 09/29/2022 12 (A)   • ALT 09/29/2022 16    • Alkaline Phosphatase 09/29/2022 177 (A)   • Total Protein 09/29/2022 6 8    • Albumin 09/29/2022 3 4 (A)   • Total Bilirubin 09/29/2022 0 75    • eGFR 09/29/2022 60    • WBC 09/29/2022 10 87 (A)   • RBC 09/29/2022 5 76 (A) • Hemoglobin 09/29/2022 16 8    • Hematocrit 09/29/2022 51 9 (A)   • MCV 09/29/2022 90    • MCH 09/29/2022 29 2    • MCHC 09/29/2022 32 4    • RDW 09/29/2022 15 4 (A)   • Platelets 66/58/0423 324    • MPV 09/29/2022 10 3    • Total CK 09/29/2022 58    • Cholesterol 09/29/2022 98    • Triglycerides 09/29/2022 98    • HDL, Direct 09/29/2022 40    • LDL Calculated 09/29/2022 38    • Magnesium 09/29/2022 2 1    • Creatinine, Ur 09/29/2022 139 4    • Protein Urine Random 09/29/2022 24    • Prot/Creat Ratio, Ur 09/29/2022 0 17 (A)   • 25-HYDROXY VIT D 09/29/2022 37    • 25-Hydroxy D2 09/29/2022 2 0    • 25-HYDROXY VIT D3 09/29/2022 35        Lab Results   Component Value Date    WBC 10 87 (H) 09/29/2022    HGB 16 8 09/29/2022    HCT 51 9 (H) 09/29/2022    MCV 90 09/29/2022     09/29/2022       Lab Results   Component Value Date    SODIUM 140 09/29/2022    K 4 4 09/29/2022     09/29/2022    CO2 29 09/29/2022    AGAP 4 09/29/2022    BUN 14 09/29/2022    CREATININE 1 20 09/29/2022    GLUC 84 01/07/2021    GLUF 117 (H) 09/29/2022    CALCIUM 8 6 09/29/2022    AST 12 (L) 09/29/2022    ALT 16 09/29/2022    ALKPHOS 177 (H) 09/29/2022    TP 6 8 09/29/2022    TBILI 0 75 09/29/2022    EGFR 60 09/29/2022       Lab Results   Component Value Date    CRP 14 1 (H) 05/27/2022       Lab Results   Component Value Date    [id number] 2 806 01/18/2022       Lab Results   Component Value Date    IRON 70 02/10/2021    TIBC 324 02/10/2021    FERRITIN 22 02/10/2021       Radiology Results:   Cardiac EP device report    Result Date: 11/4/2022  Narrative: MDT LOOP NON-BILLABLE  CARELINK TRANSMISSION: ALERT FOR 1 PAUSE EPISODE W/ EGRAM SHOWING 4 SEC PAUSE WHILE PT WAS SLEEPING  DL     Cardiac EP device report    Result Date: 10/26/2022  Narrative: MDT LOOP NON-BILLABLE  CARELINK TRANSMISSION: ALERT FOR 1 PAUSE EPISODE W/ EGRAM SHOWING 6 SEC PAUSE WHILE PT WAS SLEEPING   DL     Cardiac EP device report    Result Date: 10/21/2022  Narrative: MDT LOOP NON-BILLABLE  CARELINK TRANSMISSION: ALERT FOR 1 PAUSE EPISODE W/ EGRAM SHOWING 4 SEC PAUSE WHILE PT WAS SLEEPING   DL

## 2022-11-15 ENCOUNTER — HOSPITAL ENCOUNTER (OUTPATIENT)
Dept: INFUSION CENTER | Facility: CLINIC | Age: 72
Discharge: HOME | End: 2022-11-15

## 2022-11-15 VITALS
RESPIRATION RATE: 18 BRPM | TEMPERATURE: 96.9 F | HEART RATE: 54 BPM | DIASTOLIC BLOOD PRESSURE: 85 MMHG | SYSTOLIC BLOOD PRESSURE: 133 MMHG | OXYGEN SATURATION: 96 %

## 2022-11-15 DIAGNOSIS — K50.012 CROHN'S DISEASE OF ILEUM WITH INTESTINAL OBSTRUCTION (HCC): Primary | ICD-10-CM

## 2022-11-15 RX ORDER — SODIUM CHLORIDE 9 MG/ML
20 INJECTION, SOLUTION INTRAVENOUS ONCE
OUTPATIENT
Start: 2022-12-12

## 2022-11-15 RX ORDER — SODIUM CHLORIDE 9 MG/ML
20 INJECTION, SOLUTION INTRAVENOUS ONCE
Status: COMPLETED | OUTPATIENT
Start: 2022-11-15 | End: 2022-11-15

## 2022-11-15 RX ADMIN — SODIUM CHLORIDE 20 ML/HR: 0.9 INJECTION, SOLUTION INTRAVENOUS at 09:05

## 2022-11-15 RX ADMIN — VEDOLIZUMAB 300 MG: 300 INJECTION, POWDER, LYOPHILIZED, FOR SOLUTION INTRAVENOUS at 09:05

## 2022-11-15 NOTE — PROGRESS NOTES
Patient to infusion for Entyvio  He offers no complaints  He denies any recent infection or antibiotic use  VSS  Call bell within reach

## 2022-12-06 ENCOUNTER — REMOTE DEVICE CLINIC VISIT (OUTPATIENT)
Dept: CARDIOLOGY CLINIC | Facility: CLINIC | Age: 72
End: 2022-12-06

## 2022-12-06 DIAGNOSIS — Z95.818 PRESENCE OF OTHER CARDIAC IMPLANTS AND GRAFTS: Primary | ICD-10-CM

## 2022-12-06 NOTE — PROGRESS NOTES
MDT LOOP   CARELINK TRANSMISSION: LOOP RECORDER  PRESENTING RHYTHM NSR W/ PACs @ 66 BPM  BATTERY RRT SINCE 11/12/22  TASK SENT TO [facility]K FOR POSSIBLE EXPLANT  1 TACHY EPISODE W/ EGRAM SHOWING PAT @ 194 BPM FOR 9 SECs  [address] 3-6 SEC PAUSE WHILE TO WAS SLEEPING  [address]  12 DEVICE CLASSIFIED AF EPISODES, AVAILABLE STRIPS DEMONSTRATE SR W/ PACS AND PROB ATRIAL FIBRILLATION   AF BURDEN IS 0 2%  AF BURDEN MAYBE OVERESTIMATED DUE TO INAPPROPRIATE CLASSIFICATION OF AF  SOME STRIPS MAY NOT BE INTERPRETABLE OR AVAILABLE, CANNOT DEFINITIVELY RULE OUT ATRIAL FIBRILLATION  HX O FPAF  PT TAKES XARELTO AND METOPROLOL SUCC  NO PATIENT ACTIVATED EPISODES  NORMAL DEVICE FUNCTION   DL

## 2022-12-13 ENCOUNTER — HOSPITAL ENCOUNTER (OUTPATIENT)
Dept: INFUSION CENTER | Facility: CLINIC | Age: 72
Discharge: HOME | End: 2022-12-13

## 2022-12-13 VITALS
DIASTOLIC BLOOD PRESSURE: 88 MMHG | RESPIRATION RATE: 18 BRPM | OXYGEN SATURATION: 97 % | TEMPERATURE: 96 F | SYSTOLIC BLOOD PRESSURE: 130 MMHG | HEART RATE: 56 BPM

## 2022-12-13 DIAGNOSIS — K50.012 CROHN'S DISEASE OF ILEUM WITH INTESTINAL OBSTRUCTION (HCC): Primary | ICD-10-CM

## 2022-12-13 RX ORDER — SODIUM CHLORIDE 9 MG/ML
20 INJECTION, SOLUTION INTRAVENOUS ONCE
Status: COMPLETED | OUTPATIENT
Start: 2022-12-13 | End: 2022-12-13

## 2022-12-13 RX ORDER — SODIUM CHLORIDE 9 MG/ML
20 INJECTION, SOLUTION INTRAVENOUS ONCE
OUTPATIENT
Start: 2023-01-10

## 2022-12-13 RX ADMIN — VEDOLIZUMAB 300 MG: 300 INJECTION, POWDER, LYOPHILIZED, FOR SOLUTION INTRAVENOUS at 11:25

## 2022-12-13 RX ADMIN — SODIUM CHLORIDE 20 ML/HR: 0.9 INJECTION, SOLUTION INTRAVENOUS at 11:05

## 2022-12-13 NOTE — PROGRESS NOTES
Pt to clinic for entyvio infusion, pt denies any recent illness or antibiotic use   Pot tolerated infusion without complications, aware of next appointment, declined avs

## 2023-01-04 ENCOUNTER — LAB (OUTPATIENT)
Dept: LAB | Facility: CLINIC | Age: 73
End: 2023-01-04

## 2023-01-04 DIAGNOSIS — N18.31 STAGE 3A CHRONIC KIDNEY DISEASE (HCC): ICD-10-CM

## 2023-01-04 DIAGNOSIS — I48.0 PAROXYSMAL ATRIAL FIBRILLATION (HCC): ICD-10-CM

## 2023-01-04 DIAGNOSIS — E11.51 TYPE 2 DIABETES MELLITUS WITH DIABETIC PERIPHERAL ANGIOPATHY WITHOUT GANGRENE, WITHOUT LONG-TERM CURRENT USE OF INSULIN (HCC): ICD-10-CM

## 2023-01-04 DIAGNOSIS — E55.9 VITAMIN D DEFICIENCY: ICD-10-CM

## 2023-01-04 DIAGNOSIS — E53.8 VITAMIN B12 DEFICIENCY: ICD-10-CM

## 2023-01-04 LAB
25(OH)D3 SERPL-MCNC: 32.8 NG/ML (ref 30–100)
ANION GAP SERPL CALCULATED.3IONS-SCNC: 4 MMOL/L (ref 4–13)
BUN SERPL-MCNC: 15 MG/DL (ref 5–25)
CALCIUM SERPL-MCNC: 8.8 MG/DL (ref 8.4–10.2)
CHLORIDE SERPL-SCNC: 105 MMOL/L (ref 96–108)
CO2 SERPL-SCNC: 30 MMOL/L (ref 21–32)
CREAT SERPL-MCNC: 1.05 MG/DL (ref 0.6–1.3)
GFR SERPL CREATININE-BSD FRML MDRD: 71 ML/MIN/1.73SQ M
GLUCOSE P FAST SERPL-MCNC: 173 MG/DL (ref 65–99)
POTASSIUM SERPL-SCNC: 4.4 MMOL/L (ref 3.5–5.3)
SODIUM SERPL-SCNC: 139 MMOL/L (ref 135–147)
TSH SERPL DL<=0.05 MIU/L-ACNC: 1.74 UIU/ML (ref 0.45–4.5)
VIT B12 SERPL-MCNC: 565 PG/ML (ref 100–900)

## 2023-01-05 ENCOUNTER — RA CDI HCC (OUTPATIENT)
Dept: OTHER | Facility: HOSPITAL | Age: 73
End: 2023-01-05

## 2023-01-06 ENCOUNTER — HOSPITAL ENCOUNTER (OUTPATIENT)
Dept: MRI IMAGING | Facility: HOSPITAL | Age: 73
Discharge: HOME | End: 2023-01-06
Attending: INTERNAL MEDICINE

## 2023-01-06 DIAGNOSIS — K22.70 BARRETT'S ESOPHAGUS WITHOUT DYSPLASIA: ICD-10-CM

## 2023-01-06 DIAGNOSIS — E55.9 VITAMIN D DEFICIENCY: ICD-10-CM

## 2023-01-06 DIAGNOSIS — K21.00 GASTROESOPHAGEAL REFLUX DISEASE WITH ESOPHAGITIS WITHOUT HEMORRHAGE: ICD-10-CM

## 2023-01-06 DIAGNOSIS — K50.019 CROHN'S DISEASE OF SMALL INTESTINE WITH COMPLICATION (HCC): ICD-10-CM

## 2023-01-06 DIAGNOSIS — E53.8 VITAMIN B12 DEFICIENCY: ICD-10-CM

## 2023-01-06 LAB
EST. AVERAGE GLUCOSE BLD GHB EST-MCNC: 249 MG/DL
HBA1C MFR BLD: 10.3 %

## 2023-01-06 RX ADMIN — GADOBUTROL 10 ML: 604.72 INJECTION INTRAVENOUS at 08:55

## 2023-01-09 DIAGNOSIS — I48.0 PAROXYSMAL ATRIAL FIBRILLATION (HCC): ICD-10-CM

## 2023-01-10 ENCOUNTER — HOSPITAL ENCOUNTER (OUTPATIENT)
Dept: INFUSION CENTER | Facility: CLINIC | Age: 73
Discharge: HOME | End: 2023-01-10

## 2023-01-10 VITALS
TEMPERATURE: 96.7 F | HEART RATE: 86 BPM | OXYGEN SATURATION: 96 % | RESPIRATION RATE: 18 BRPM | SYSTOLIC BLOOD PRESSURE: 136 MMHG | DIASTOLIC BLOOD PRESSURE: 85 MMHG

## 2023-01-10 DIAGNOSIS — K50.012 CROHN'S DISEASE OF ILEUM WITH INTESTINAL OBSTRUCTION (HCC): Primary | ICD-10-CM

## 2023-01-10 RX ORDER — SODIUM CHLORIDE 9 MG/ML
20 INJECTION, SOLUTION INTRAVENOUS ONCE
OUTPATIENT
Start: 2023-02-07

## 2023-01-10 RX ORDER — SODIUM CHLORIDE 9 MG/ML
20 INJECTION, SOLUTION INTRAVENOUS ONCE
Status: COMPLETED | OUTPATIENT
Start: 2023-01-10 | End: 2023-01-10

## 2023-01-10 RX ADMIN — VEDOLIZUMAB 300 MG: 300 INJECTION, POWDER, LYOPHILIZED, FOR SOLUTION INTRAVENOUS at 09:18

## 2023-01-10 RX ADMIN — SODIUM CHLORIDE 20 ML/HR: 0.9 INJECTION, SOLUTION INTRAVENOUS at 08:22

## 2023-01-10 NOTE — PROGRESS NOTES
Patient is here for entyvio  He offers no complaints   He denies any s/s of infection or being on antibiotics

## 2023-01-10 NOTE — PROGRESS NOTES
Patient tolerated his treatment without any adverse reactions   Next appointment confirmed and avs given

## 2023-01-12 ENCOUNTER — OFFICE VISIT (OUTPATIENT)
Dept: INTERNAL MEDICINE CLINIC | Facility: CLINIC | Age: 73
End: 2023-01-12

## 2023-01-12 VITALS
WEIGHT: 238 LBS | HEIGHT: 69 IN | SYSTOLIC BLOOD PRESSURE: 130 MMHG | BODY MASS INDEX: 35.25 KG/M2 | DIASTOLIC BLOOD PRESSURE: 90 MMHG | OXYGEN SATURATION: 94 % | HEART RATE: 81 BPM | TEMPERATURE: 96.7 F

## 2023-01-12 DIAGNOSIS — K50.019 CROHN'S DISEASE OF SMALL INTESTINE WITH COMPLICATION (HCC): ICD-10-CM

## 2023-01-12 DIAGNOSIS — Z00.00 HEALTH MAINTENANCE EXAMINATION: ICD-10-CM

## 2023-01-12 DIAGNOSIS — E27.8 ADRENAL MASS (HCC): ICD-10-CM

## 2023-01-12 DIAGNOSIS — K22.70 BARRETT'S ESOPHAGUS WITHOUT DYSPLASIA: ICD-10-CM

## 2023-01-12 DIAGNOSIS — I73.9 PAD (PERIPHERAL ARTERY DISEASE) (HCC): ICD-10-CM

## 2023-01-12 DIAGNOSIS — N18.31 ANEMIA OF CHRONIC RENAL FAILURE, STAGE 3A (HCC): ICD-10-CM

## 2023-01-12 DIAGNOSIS — N18.31 STAGE 3A CHRONIC KIDNEY DISEASE (HCC): ICD-10-CM

## 2023-01-12 DIAGNOSIS — I48.0 PAROXYSMAL ATRIAL FIBRILLATION (HCC): ICD-10-CM

## 2023-01-12 DIAGNOSIS — E11.51 TYPE 2 DIABETES MELLITUS WITH DIABETIC PERIPHERAL ANGIOPATHY WITHOUT GANGRENE, WITHOUT LONG-TERM CURRENT USE OF INSULIN (HCC): Primary | ICD-10-CM

## 2023-01-12 DIAGNOSIS — E78.5 DYSLIPIDEMIA: ICD-10-CM

## 2023-01-12 DIAGNOSIS — E66.01 OBESITY, MORBID (HCC): ICD-10-CM

## 2023-01-12 DIAGNOSIS — D63.1 ANEMIA OF CHRONIC RENAL FAILURE, STAGE 3A (HCC): ICD-10-CM

## 2023-01-12 PROBLEM — J43.8 OTHER EMPHYSEMA (HCC): Status: RESOLVED | Noted: 2019-11-06 | Resolved: 2023-01-12

## 2023-01-12 PROBLEM — I75.021 BLUE TOE SYNDROME, RIGHT (HCC): Status: RESOLVED | Noted: 2019-05-22 | Resolved: 2023-01-12

## 2023-01-12 PROBLEM — K21.00 GERD WITH ESOPHAGITIS: Status: RESOLVED | Noted: 2019-04-05 | Resolved: 2023-01-12

## 2023-01-12 PROBLEM — I95.89 CHRONIC HYPOTENSION: Status: RESOLVED | Noted: 2022-03-25 | Resolved: 2023-01-12

## 2023-01-12 LAB — SL AMB POCT HEMOGLOBIN AIC: 8.3 (ref ?–6.5)

## 2023-01-12 RX ORDER — GLIMEPIRIDE 2 MG/1
2 TABLET ORAL
Qty: 90 TABLET | Refills: 0 | Status: SHIPPED | OUTPATIENT
Start: 2023-01-12

## 2023-01-12 NOTE — ASSESSMENT & PLAN NOTE
Lab Results   Component Value Date    HGBA1C 8 3 (A) 01/12/2023     A1c in the office lower than 10 3% a week ago  Increase glimepiride to 2 mg daily  Discussed adding GLP-1 if A1c remains elevated, discussed benefits of weight loss  Reviewed diet

## 2023-01-12 NOTE — PROGRESS NOTES
Assessment and Plan:     Problem List Items Addressed This Visit        Digestive    Harris's esophagus without dysplasia     On daily PPI  Crohn's disease of small intestine with complication ([facility] )     Maintain on monthly Entyvio  Reviewed recent MRI  Per GI  Endocrine    Type 2 diabetes mellitus with diabetic peripheral angiopathy without gangrene ([place] [address] ) - Primary       Lab Results   Component Value Date    HGBA1C 8 3 (A) 01/12/2023     A1c in the office lower than 10 3% a week ago  Increase glimepiride to 2 mg daily  Discussed adding GLP-1 if A1c remains elevated, discussed benefits of weight loss  Reviewed diet  Relevant Medications    glimepiride (AMARYL) 2 mg tablet    Other Relevant Orders    POCT hemoglobin A1c (Completed)    Hemoglobin A1C       Cardiovascular and Mediastinum    PAD (peripheral artery disease) ([place] [address] )     Ultrasound scheduled  On ASA, statin  Paroxysmal atrial fibrillation (HCC)     No symptoms  On metoprolol and Xarelto  Genitourinary    Anemia of chronic renal failure, stage 3 (moderate) (HCC)    Chronic kidney disease, stage III (moderate) (HCC)     Lab Results   Component Value Date    EGFR 71 01/04/2023    EGFR 60 09/29/2022    EGFR 69 07/12/2022    CREATININE 1 05 01/04/2023    CREATININE 1 20 09/29/2022    CREATININE 1 07 07/12/2022     Stable  Relevant Orders    CBC and differential    Comprehensive metabolic panel       Other    Adrenal mass ([place] [address] )     Repeat CT 2024  Dyslipidemia     At goal, on statin  Relevant Orders    Lipid panel    Obesity, morbid ([place] [address] )     Stable weight  Resumed regular exercise  Other Visit Diagnoses     Health maintenance examination        Updated  BMI Counseling: Body mass index is 35 15 kg/m²   The BMI is above normal  Nutrition recommendations include decreasing portion sizes, encouraging healthy choices of fruits and vegetables, consuming healthier snacks and moderation in carbohydrate intake  Exercise recommendations include exercising 3-5 times per week and strength training exercises  Rationale for BMI follow-up plan is due to patient being overweight or obese  Preventive health issues were discussed with patient, and age appropriate screening tests were ordered as noted in patient's After Visit Summary  Personalized health advice and appropriate referrals for health education or preventive services given if needed, as noted in patient's After Visit Summary  History of Present Illness:     Patient presents for a Medicare Wellness Visit and follow up visit  He reports he had a cold after [place]  He said it was a head cold with a runny nose, cough and sneezing  No fevers  He tested negative for COVID  He took a cough medication for about a week then had his blood work afterwards  He reports bad diarrhea for 2 days after he had his MRI, blames the contrast  He usually moves his bowels at least 3 times a day  No nausea, vomiting or abdominal pain  He denies any chest pain, shortness of breath, palpitations or other symptoms  He and his wife has returned to the gym this new year  He continues to walk regularly  Patient Care Team:  Kiah Clements MD as PCP - General (Internal Medicine)  Brian Leong MD (Nephrology)  Kartik Rajput MD (Urology)  Nghia Armenta MD (Cardiology)  Laurel Naik MD (Gastroenterology)  aMtilde Schmitt MD as Surgeon (Surgical Oncology)  Kristen Samson MD (Vascular Surgery)     Review of Systems:     Review of Systems   Constitutional: Negative for appetite change, fatigue and fever  HENT: Negative for congestion, hearing loss and postnasal drip  Eyes: Negative  Respiratory: Negative for cough, chest tightness and shortness of breath  Cardiovascular: Negative for chest pain, palpitations and leg swelling  Gastrointestinal: Negative for abdominal pain and constipation     Genitourinary: Negative for dysuria and frequency  Musculoskeletal: Negative for arthralgias  Skin: Negative for rash and wound  Neurological: Negative for dizziness, numbness and headaches  Psychiatric/Behavioral: Negative for sleep disturbance  The patient is not nervous/anxious           Problem List:     Patient Active Problem List   Diagnosis   • Obesity, morbid ([place] [address] )   • Chronic kidney disease, stage III (moderate) ([facility])   • Persistent proteinuria   • Microscopic hematuria   • [place] cardiac risk 10-20% in next 10 years   • Abdominal aortic atherosclerosis ([facility])   • RBBB (right bundle branch block with left anterior fascicular block)   • Tobacco abuse   • Dyslipidemia   • Vitamin D deficiency   • Harris's esophagus without dysplasia   • Colon polyps   • PAD (peripheral artery disease) ([facility])   • Mass of both adrenal glands ([place] [address] )   • Ischemic cardiomyopathy   • S/P peripheral artery angioplasty with stent placement   • Venous stasis   • Paroxysmal atrial fibrillation ([facility])   • Anemia of chronic renal failure, stage 3 (moderate) ([facility])   • Essential (hemorrhagic) thrombocythemia ([facility])   • Adrenal mass ([place] [address] )   • Crohn's disease of small intestine with complication ([place] [address] )   • SVT (supraventricular tachycardia) ([place] [address] )   • Positive QuantiFERON-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   • Terminal ileitis of small intestine ([facility])   • S/P femoral-popliteal bypass surgery   • Elevated alkaline phosphatase level   • Vitamin B12 deficiency   • Type 2 diabetes mellitus with diabetic peripheral angiopathy without gangrene ([place] [address] )   • Venous insufficiency      Past Medical and Surgical History:     Past Medical History:   Diagnosis Date   • Blue toe syndrome ([place] [address] )    • Chronic kidney disease    • Colon polyps    • GERD (gastroesophageal reflux disease)    • Hematuria    • History of rheumatic fever    • Hypolipidemia    • Hypotension    • Ischemic cardiomyopathy    • PAD (peripheral artery disease) ([facility])    • Pulmonary emphysema ([facility])      Past Surgical History:   Procedure Laterality Date   • COLONOSCOPY  2019   • HERNIA REPAIR     • IR AORTAGRAM WITH RUN-OFF  6/27/2019   • IR AORTAGRAM WITH RUN-OFF  2/12/2020   • POPLITEAL ARTERY STENT Right     6/2019   • SC BYPASS W/VEIN FEMORAL-POPLITEAL Right 3/26/2020    Procedure: BYPASS FEMORAL-POPLITEAL; Right lower extremity bypass, fem-bk pop with GSV;  Surgeon: Sherren Reams, MD;  [facility];  Service: Vascular   • SC SLCTV CATHJ 3RD+ ORD SLCTV ABDL PEL/LXTR [facility] Right 6/27/2019    Procedure: leg ANGIOGRAM WITH STENT, BALLOON ANGIOPLASTY, LEFT GROIN ACCESS;  Surgeon: Sherren Reams, MD;  [facility];  Service: Vascular      Family History:     Family History   Problem Relation Age of Onset   • Heart disease Mother    • Hyperlipidemia Mother    • Hypertension Mother    • Hypertension Father    • Heart disease Father    • Stroke Father    • Hyperlipidemia Father    • Diabetes Brother    • No Known Problems Maternal Grandmother    • Dementia Neg Hx    • Drug abuse Neg Hx    • Mental illness Neg Hx    • Substance Abuse Neg Hx    • Alcohol abuse Neg Hx    • Depression Neg Hx    • Colon cancer Neg Hx       Social History:     Social History     Socioeconomic History   • Marital status: /Civil Union     Spouse name: None   • Number of children: 2   • Years of education: None   • Highest education level: None   Occupational History   • Occupation: retired   Tobacco Use   • Smoking status: Some Days     Packs/day: 0 25     Years: 30 00     Pack years: 7 50     Types: Cigarettes   • Smokeless tobacco: Never   • Tobacco comments:     " I will do it on my own"   Vaping Use   • Vaping Use: Never used   Substance and Sexual Activity   • Alcohol use:  Yes     Alcohol/week: 1 0 standard drink     Types: 1 Standard drinks or equivalent per week     Comment: social drinker when out dining   • Drug use: Never   • Sexual activity: Not Currently   Other Topics Concern   • None   Social History Narrative Drinks coffee     Social Determinants of Health     Financial Resource Strain: Low Risk    • Difficulty of Paying Living Expenses: Not very hard   Food Insecurity: Not on file   Transportation Needs: No Transportation Needs   • Lack of Transportation (Medical): No   • Lack of Transportation (Non-Medical): No   Physical Activity: Not on file   Stress: Not on file   Social Connections: Not on file   Intimate Partner Violence: Not on file   Housing Stability: Not on file      Medications and Allergies:     Current Outpatient Medications   Medication Sig Dispense Refill   • glimepiride (AMARYL) 2 mg tablet Take 1 tablet (2 mg total) by mouth daily with breakfast 90 tablet 0   • aspirin 81 MG tablet Take 81 mg by mouth daily     • atorvastatin (LIPITOR) 20 mg tablet TAKE 1 TABLET BY MOUTH EVERY DAY 90 tablet 1   • bisacodyl (DULCOLAX) 5 mg EC tablet As per colonoscopy prep instructions  2 tablet 0   • folic acid (FOLVITE) 1 mg tablet Take 1 tablet (1 mg total) by mouth daily 90 tablet 3   • Magnesium 500 MG TABS Take 1 tablet by mouth daily      • metoprolol succinate (TOPROL-XL) 25 mg 24 hr tablet TAKE 1 TABLET (25 MG TOTAL) BY MOUTH DAILY  90 tablet 3   • metoprolol succinate (TOPROL-XL) 50 mg 24 hr tablet Take 1 tablet (50 mg total) by mouth daily (Patient taking differently: Take 50 mg by mouth daily In the evening) 90 tablet 4   • omeprazole (PriLOSEC) 40 MG capsule TAKE 1 CAPSULE (40 MG TOTAL) BY MOUTH DAILY  90 capsule 1   • polyethylene glycol (GOLYTELY) 4000 mL solution As per colonoscopy prep instructions  4000 mL 0   • rivaroxaban (XARELTO) 20 mg tablet Take 1 tablet (20 mg total) by mouth daily with breakfast 90 tablet 3   • vitamin B-12 (VITAMIN B-12) 1,000 mcg tablet Take 1 tablet (1,000 mcg total) by mouth daily (Patient taking differently: Take 1,000 mcg by mouth 3 (three) times a week) 90 tablet 1     No current facility-administered medications for this visit       No Known Allergies   Immunizations: Immunization History   Administered Date(s) Administered   • COVID-19 PFIZER VACCINE 0 3 ML IM 02/19/2021, 03/10/2021, 10/23/2021   • COVID-19 Pfizer Vac BIVALENT João-sucrose 12 Yr+ IM (BOOSTER ONLY) 11/09/2022   • Influenza, high dose seasonal 0 7 mL 10/19/2018, 10/14/2019, 10/10/2020, 10/07/2021, 10/08/2022   • Pneumococcal Conjugate 13-Valent 02/27/2018   • Pneumococcal Polysaccharide PPV23 05/09/2019   • Tuberculin Skin Test-PPD Intradermal 01/20/2020   • Zoster Vaccine Recombinant 07/30/2018, 03/31/2019      Health Maintenance:         Topic Date Due   • Colorectal Cancer Screening  01/15/2021   • Hepatitis C Screening  Completed         Topic Date Due   • COVID-19 Vaccine (4 - Booster for Jeramy Silveira series) 12/18/2021      Medicare Screening Tests and Risk Assessments:     Gabriel Mckenzie is here for his Subsequent Wellness visit  Last Medicare Wellness visit information reviewed, patient interviewed and updates made to the record today  Health Risk Assessment:   Patient rates overall health as good  Patient feels that their physical health rating is same  Patient is satisfied with their life  Eyesight was rated as same  Hearing was rated as same  Patient feels that their emotional and mental health rating is same  Patients states they are never, rarely angry  Patient states they are never, rarely unusually tired/fatigued  Pain experienced in the last 7 days has been none  Patient states that he has experienced no weight loss or gain in last 6 months  Fall Risk Screening: In the past year, patient has experienced: no history of falling in past year      Home Safety:  Patient does not have trouble with stairs inside or outside of their home  Patient has working smoke alarms and has working carbon monoxide detector  Home safety hazards include: none  Nutrition:   Current diet is Regular  Medications:   Patient is currently taking over-the-counter supplements   OTC medications include: see medication list  Patient is able to manage medications  Activities of Daily Living (ADLs)/Instrumental Activities of Daily Living (IADLs):   Walk and transfer into and out of bed and chair?: Yes  Dress and groom yourself?: Yes    Bathe or shower yourself?: Yes    Feed yourself? Yes  Do your laundry/housekeeping?: Yes  Manage your money, pay your bills and track your expenses?: Yes  Make your own meals?: Yes    Do your own shopping?: Yes    Previous Hospitalizations:   Any hospitalizations or ED visits within the last 12 months?: No      Advance Care Planning:   Living will: Yes    Durable POA for healthcare: Yes    Advanced directive: Yes    End of Life Decisions reviewed with patient: Yes      Cognitive Screening:   Provider or family/friend/caregiver concerned regarding cognition?: No    PREVENTIVE SCREENINGS      Cardiovascular Screening:    General: Screening Current      Diabetes Screening:     General: History Diabetes and Screening Current      Colorectal Cancer Screening:     General: Screening Current      Prostate Cancer Screening:    General: Screening Current      Osteoporosis Screening:    General: Screening Not Indicated      Abdominal Aortic Aneurysm (AAA) Screening:    Risk factors include: age between 73-67 yo and tobacco use        General: Screening Current      Lung Cancer Screening:     General: Screening Not Indicated      Hepatitis C Screening:    General: Screening Current    Screening, Brief Intervention, and Referral to Treatment (SBIRT)    Screening  Typical number of drinks in a day: 0  Typical number of drinks in a week: 0  Interpretation: Low risk drinking behavior  Single Item Drug Screening:  How often have you used an illegal drug (including marijuana) or a prescription medication for non-medical reasons in the past year? never    Single Item Drug Screen Score: 0  Interpretation: Negative screen for possible drug use disorder    Other Counseling Topics:   Regular weightbearing exercise  No results found  Physical Exam:     /90   Pulse 81   Temp (!) 96 7 °F (35 9 °C)   Ht 5' 9" (1 753 m)   Wt 108 kg (238 lb)   SpO2 94%   BMI 35 15 kg/m²     Physical Exam  Vitals and nursing note reviewed  Constitutional:       General: He is not in acute distress  Appearance: He is well-developed  HENT:      Head: Normocephalic and atraumatic  Eyes:      Conjunctiva/sclera: Conjunctivae normal    Cardiovascular:      Rate and Rhythm: Normal rate and regular rhythm  Heart sounds: No murmur heard  Comments: (+) varicose veins L>R  Pulmonary:      Effort: Pulmonary effort is normal  No respiratory distress  Breath sounds: Normal breath sounds  Abdominal:      Palpations: Abdomen is soft  Tenderness: There is no abdominal tenderness  Musculoskeletal:         General: No swelling  Cervical back: Neck supple  Right lower leg: No edema  Left lower leg: No edema  Skin:     General: Skin is warm and dry  Neurological:      General: No focal deficit present  Mental Status: He is alert and oriented to person, place, and time  Psychiatric:         Mood and Affect: Mood normal          Behavior: Behavior normal           Carrie Harris MD     Labs & imaging results reviewed with patient

## 2023-01-30 ENCOUNTER — ANESTHESIA EVENT (OUTPATIENT)
Dept: GASTROENTEROLOGY | Facility: HOSPITAL | Age: 73
End: 2023-01-30

## 2023-01-30 ENCOUNTER — HOSPITAL ENCOUNTER (OUTPATIENT)
Dept: GASTROENTEROLOGY | Facility: HOSPITAL | Age: 73
Setting detail: OUTPATIENT SURGERY
Discharge: HOME | End: 2023-01-30
Attending: INTERNAL MEDICINE | Admitting: INTERNAL MEDICINE

## 2023-01-30 ENCOUNTER — ANESTHESIA (OUTPATIENT)
Dept: GASTROENTEROLOGY | Facility: HOSPITAL | Age: 73
End: 2023-01-30

## 2023-01-30 VITALS
SYSTOLIC BLOOD PRESSURE: 129 MMHG | DIASTOLIC BLOOD PRESSURE: 81 MMHG | OXYGEN SATURATION: 93 % | BODY MASS INDEX: 33.07 KG/M2 | RESPIRATION RATE: 17 BRPM | HEART RATE: 100 BPM | WEIGHT: 231 LBS | HEIGHT: 70 IN | TEMPERATURE: 97.3 F

## 2023-01-30 DIAGNOSIS — D84.9 IMMUNOCOMPROMISED PATIENT (HCC): ICD-10-CM

## 2023-01-30 DIAGNOSIS — K50.019 CROHN'S DISEASE OF SMALL INTESTINE WITH COMPLICATION (HCC): ICD-10-CM

## 2023-01-30 DIAGNOSIS — K22.70 BARRETT'S ESOPHAGUS WITHOUT DYSPLASIA: ICD-10-CM

## 2023-01-30 DIAGNOSIS — K21.00 GASTROESOPHAGEAL REFLUX DISEASE WITH ESOPHAGITIS WITHOUT HEMORRHAGE: ICD-10-CM

## 2023-01-30 LAB — GLUCOSE SERPL-MCNC: 119 MG/DL (ref 65–140)

## 2023-01-30 RX ORDER — PROPOFOL 10 MG/ML
INJECTION, EMULSION INTRAVENOUS AS NEEDED
Status: DISCONTINUED | OUTPATIENT
Start: 2023-01-30 | End: 2023-01-30

## 2023-01-30 RX ORDER — SODIUM CHLORIDE, SODIUM LACTATE, POTASSIUM CHLORIDE, CALCIUM CHLORIDE 600; 310; 30; 20 MG/100ML; MG/100ML; MG/100ML; MG/100ML
INJECTION, SOLUTION INTRAVENOUS CONTINUOUS PRN
Status: DISCONTINUED | OUTPATIENT
Start: 2023-01-30 | End: 2023-01-30

## 2023-01-30 RX ORDER — PROPOFOL 10 MG/ML
INJECTION, EMULSION INTRAVENOUS CONTINUOUS PRN
Status: DISCONTINUED | OUTPATIENT
Start: 2023-01-30 | End: 2023-01-30

## 2023-01-30 RX ADMIN — SODIUM CHLORIDE, SODIUM LACTATE, POTASSIUM CHLORIDE, AND CALCIUM CHLORIDE: .6; .31; .03; .02 INJECTION, SOLUTION INTRAVENOUS at 08:02

## 2023-01-30 RX ADMIN — PROPOFOL 100 MG: 10 INJECTION, EMULSION INTRAVENOUS at 08:03

## 2023-01-30 RX ADMIN — PROPOFOL 120 MCG/KG/MIN: 10 INJECTION, EMULSION INTRAVENOUS at 08:03

## 2023-01-30 RX ADMIN — SODIUM CHLORIDE, SODIUM LACTATE, POTASSIUM CHLORIDE, AND CALCIUM CHLORIDE: .6; .31; .03; .02 INJECTION, SOLUTION INTRAVENOUS at 08:36

## 2023-01-30 NOTE — ANESTHESIA PREPROCEDURE EVALUATION
Procedure:  COLONOSCOPY  EGD    Relevant Problems   CARDIO   (+) Abdominal aortic atherosclerosis (HCC)   (+) PAD (peripheral artery disease) (HCC)   (+) Paroxysmal atrial fibrillation (HCC)   (+) RBBB (right bundle branch block with left anterior fascicular block)   (+) SVT (supraventricular tachycardia) (HCC)   (+) Type 2 diabetes mellitus with diabetic peripheral angiopathy without gangrene ([address] )      ENDO   (+) Type 2 diabetes mellitus with diabetic peripheral angiopathy without gangrene (HCC)      /RENAL   (+) Anemia of chronic renal failure, stage 3 (moderate) (HCC)   (+) Chronic kidney disease, stage III (moderate) (HCC)      HEMATOLOGY   (+) Anemia of chronic renal failure, stage 3 (moderate) (HCC)      Cardiovascular and Mediastinum   (+) Ischemic cardiomyopathy      Digestive   (+) Harris's esophagus without dysplasia   (+) Crohn's disease of small intestine with complication ([address] )      Other   (+) Obesity, morbid ([address] )   (+) S/P peripheral artery angioplasty with stent placement   (+) Tobacco abuse      Loop recorder in place  EF ok on last echo  xarelto held  Physical Exam    Airway    Mallampati score: IV  TM Distance: <3 FB  Neck ROM: full     Dental   Comment: Poor dentition overall,     Cardiovascular  Cardiovascular exam normal    Pulmonary  Pulmonary exam normal     Other Findings  Portions of exam deferred due to low yield and/or unknown COVID status      Anesthesia Plan  ASA Score- 3     Anesthesia Type- IV sedation with anesthesia with ASA Monitors  Additional Monitors:   Airway Plan:           Plan Factors-Exercise tolerance (METS): >4 METS  Chart reviewed  Existing labs reviewed  Patient summary reviewed  Patient is a current smoker  Induction- intravenous  Postoperative Plan-     Informed Consent- Anesthetic plan and risks discussed with patient  I personally reviewed this patient with the CRNA   Discussed and agreed on the Anesthesia Plan with the DANIEL Lan

## 2023-01-30 NOTE — ANESTHESIA POSTPROCEDURE EVALUATION
Post-Op Assessment Note    CV Status:  Stable  Pain Score: 0    Pain management: adequate     Mental Status:  Sleepy   Hydration Status:  Euvolemic   PONV Controlled:  Controlled   Airway Patency:  Patent      Post Op Vitals Reviewed: Yes      Staff: Anesthesiologist, CRNA         No notable events documented      /84 (01/30/23 0837)    Temp     Pulse (!) 110 (01/30/23 0837)   Resp 12 (01/30/23 0837)    SpO2 92 % (01/30/23 0837)

## 2023-01-30 NOTE — H&P
History and Physical -  [facility]  Julia Padron III 71 y o  male MRN: [id number]                  HPI: Julia Padron III is a 71y o  year old male who presents for Crohn's disease, plasencia's esophagus  REVIEW OF SYSTEMS: Per the HPI, and otherwise unremarkable      Historical Information   Past Medical History:   Diagnosis Date   • Blue toe syndrome (HCC)    • Chronic kidney disease    • Colon polyps    • GERD (gastroesophageal reflux disease)    • Hematuria    • History of rheumatic fever    • Hypolipidemia    • Hypotension    • Ischemic cardiomyopathy    • PAD (peripheral artery disease) ([facility]    • Pulmonary emphysema (HCC)    • PVD (peripheral vascular disease) ([facility] )      Past Surgical History:   Procedure Laterality Date   • COLONOSCOPY  2019   • HERNIA REPAIR     • IR AORTAGRAM WITH RUN-OFF  6/27/2019   • IR AORTAGRAM WITH RUN-OFF  2/12/2020   • POPLITEAL ARTERY STENT Right     6/2019   • [place] BYPASS W/VEIN FEMORAL-POPLITEAL Right 3/26/2020    Procedure: BYPASS FEMORAL-POPLITEAL; Right lower extremity bypass, fem-bk pop with GSV;  Surgeon: Regina Nj MD;  [facility];  Service: Vascular   • [place] SLCTV CATHJ 3RD+ ORD SLCTV ABDL PEL/LXTR [facility] Right 6/27/2019    Procedure: leg ANGIOGRAM WITH STENT, BALLOON ANGIOPLASTY, LEFT GROIN ACCESS;  Surgeon: Regina Nj MD;  [facility];  Service: Vascular     Social History   Social History     Substance and Sexual Activity   Alcohol Use Yes   • Alcohol/week: 1 0 standard drink   • Types: 1 Standard drinks or equivalent per week    Comment: 1 per week     Social History     Substance and Sexual Activity   Drug Use Never     Social History     Tobacco Use   Smoking Status Some Days   • Packs/day: 0 25   • Years: 30 00   • Pack years: 7 50   • Types: Cigarettes   Smokeless Tobacco Never     Family History   Problem Relation Age of Onset   • Heart disease Mother    • Hyperlipidemia Mother    • Hypertension Mother • Hypertension Father    • Heart disease Father    • Stroke Father    • Hyperlipidemia Father    • Diabetes Brother    • No Known Problems Maternal Grandmother    • Dementia Neg Hx    • Drug abuse Neg Hx    • Mental illness Neg Hx    • Substance Abuse Neg Hx    • Alcohol abuse Neg Hx    • Depression Neg Hx    • Colon cancer Neg Hx        Meds/Allergies       Current Outpatient Medications:   •  atorvastatin (LIPITOR) 20 mg tablet  •  folic acid (FOLVITE) 1 mg tablet  •  glimepiride (AMARYL) 2 mg tablet  •  Magnesium 500 MG TABS  •  metoprolol succinate (TOPROL-XL) 25 mg 24 hr tablet  •  omeprazole (PriLOSEC) 40 MG capsule  •  aspirin 81 MG tablet  •  metoprolol succinate (TOPROL-XL) 50 mg 24 hr tablet  •  rivaroxaban (XARELTO) 20 mg tablet  •  vitamin B-12 (VITAMIN B-12) 1,000 mcg tablet    No Known Allergies    Objective     /98   Pulse (!) 108   Temp (!) 97 1 °F (36 2 °C) (Temporal)   Resp 18   Ht 5' 10" (1 778 m)   Wt 105 kg (231 lb)   SpO2 94%   BMI 33 15 kg/m²       PHYSICAL EXAM    Gen: NAD  Head: NCAT  CV: RRR  CHEST: Clear  ABD: soft, NT/ND  EXT: no edema      ASSESSMENT/PLAN:  This is a 71y o  year old male here for EGD & colonoscopy, and he is stable and optimized for his procedure

## 2023-02-07 ENCOUNTER — HOSPITAL ENCOUNTER (OUTPATIENT)
Dept: INFUSION CENTER | Facility: CLINIC | Age: 73
Discharge: HOME | End: 2023-02-07

## 2023-02-07 VITALS
DIASTOLIC BLOOD PRESSURE: 77 MMHG | OXYGEN SATURATION: 92 % | HEART RATE: 77 BPM | SYSTOLIC BLOOD PRESSURE: 113 MMHG | RESPIRATION RATE: 18 BRPM | TEMPERATURE: 97.1 F

## 2023-02-07 DIAGNOSIS — K50.012 CROHN'S DISEASE OF ILEUM WITH INTESTINAL OBSTRUCTION (HCC): Primary | ICD-10-CM

## 2023-02-07 RX ORDER — SODIUM CHLORIDE 9 MG/ML
20 INJECTION, SOLUTION INTRAVENOUS ONCE
Status: COMPLETED | OUTPATIENT
Start: 2023-02-07 | End: 2023-02-07

## 2023-02-07 RX ORDER — SODIUM CHLORIDE 9 MG/ML
20 INJECTION, SOLUTION INTRAVENOUS ONCE
OUTPATIENT
Start: 2023-03-07

## 2023-02-07 RX ADMIN — SODIUM CHLORIDE 20 ML/HR: 0.9 INJECTION, SOLUTION INTRAVENOUS at 09:00

## 2023-02-07 RX ADMIN — VEDOLIZUMAB 300 MG: 300 INJECTION, POWDER, LYOPHILIZED, FOR SOLUTION INTRAVENOUS at 09:36

## 2023-02-07 NOTE — PROGRESS NOTES
Patient here for entyvio infusion  Patient denies antibiotic use or illness  Patient resting with no complaints  Vitals stable  Call bell within reach of patient

## 2023-03-07 ENCOUNTER — REMOTE DEVICE CLINIC VISIT (OUTPATIENT)
Dept: CARDIOLOGY CLINIC | Facility: CLINIC | Age: 73
End: 2023-03-07

## 2023-03-07 ENCOUNTER — HOSPITAL ENCOUNTER (OUTPATIENT)
Dept: INFUSION CENTER | Facility: CLINIC | Age: 73
Discharge: HOME | End: 2023-03-07

## 2023-03-07 VITALS
DIASTOLIC BLOOD PRESSURE: 81 MMHG | HEART RATE: 79 BPM | TEMPERATURE: 96.8 F | OXYGEN SATURATION: 92 % | RESPIRATION RATE: 18 BRPM | SYSTOLIC BLOOD PRESSURE: 114 MMHG

## 2023-03-07 DIAGNOSIS — Z95.818 PRESENCE OF OTHER CARDIAC IMPLANTS AND GRAFTS: Primary | ICD-10-CM

## 2023-03-07 DIAGNOSIS — K50.012 CROHN'S DISEASE OF ILEUM WITH INTESTINAL OBSTRUCTION (HCC): Primary | ICD-10-CM

## 2023-03-07 RX ORDER — SODIUM CHLORIDE 9 MG/ML
20 INJECTION, SOLUTION INTRAVENOUS ONCE
Status: COMPLETED | OUTPATIENT
Start: 2023-03-07 | End: 2023-03-07

## 2023-03-07 RX ORDER — SODIUM CHLORIDE 9 MG/ML
20 INJECTION, SOLUTION INTRAVENOUS ONCE
OUTPATIENT
Start: 2023-04-04

## 2023-03-07 RX ADMIN — VEDOLIZUMAB 300 MG: 300 INJECTION, POWDER, LYOPHILIZED, FOR SOLUTION INTRAVENOUS at 08:35

## 2023-03-07 RX ADMIN — SODIUM CHLORIDE 20 ML/HR: 0.9 INJECTION, SOLUTION INTRAVENOUS at 08:14

## 2023-03-07 NOTE — PROGRESS NOTES
MDT LOOP   CARELINK TRANSMISSION: LOOP RECORDER  PRESENTING RHYTHM NSR @ 99 BPM  BATTERY RRT SINCE 11/12/22 AND HAS BEEN ADDRESSED  5 PAUSE EPISODES W/ EGRAMS SHOWING 3-4 SEC PAUSES WHILE SLEEPING  5 MYRIAM EPISODES W/ EGRAMS SHOWING SB WHILE SLEEPING  1 DEVICE CLASSIFIED AF EPISODE PREVIOUSLY ADDRESSED IN PRIOR REMOTE  NO PATIENT ACTIVATED EPISODES  NORMAL DEVICE FUNCTION   DL

## 2023-03-19 DIAGNOSIS — I73.9 PAD (PERIPHERAL ARTERY DISEASE) (HCC): ICD-10-CM

## 2023-03-19 RX ORDER — ATORVASTATIN CALCIUM 20 MG/1
TABLET, FILM COATED ORAL
Qty: 90 TABLET | Refills: 1 | Status: SHIPPED | OUTPATIENT
Start: 2023-03-19 | End: 2023-03-27 | Stop reason: SDUPTHER

## 2023-03-27 DIAGNOSIS — E11.51 TYPE 2 DIABETES MELLITUS WITH DIABETIC PERIPHERAL ANGIOPATHY WITHOUT GANGRENE, WITHOUT LONG-TERM CURRENT USE OF INSULIN (HCC): ICD-10-CM

## 2023-03-27 DIAGNOSIS — I48.0 PAROXYSMAL ATRIAL FIBRILLATION (HCC): ICD-10-CM

## 2023-03-27 DIAGNOSIS — I73.9 PAD (PERIPHERAL ARTERY DISEASE) (HCC): ICD-10-CM

## 2023-03-27 RX ORDER — ATORVASTATIN CALCIUM 20 MG/1
20 TABLET, FILM COATED ORAL DAILY
Qty: 90 TABLET | Refills: 0 | Status: SHIPPED | OUTPATIENT
Start: 2023-03-27

## 2023-03-27 RX ORDER — GLIMEPIRIDE 2 MG/1
2 TABLET ORAL
Qty: 90 TABLET | Refills: 0 | Status: SHIPPED | OUTPATIENT
Start: 2023-03-27

## 2023-03-28 NOTE — PROGRESS NOTES
RENAL FOLLOW UP NOTE: td    ASSESSMENT AND PLAN:  1   CKD stage 3 :  Etiology:  Cardiorenal syndrome/arteriolar nephrosclerosis/?  Atheroemboli/prior prerenal associated mild tachycardia and relative hypotension in the past  Baseline creatinine:  1 25-1 41  Current creatinine:  At baseline at 1 09  Urine protein creatinine ratio:  0 19 g at goal  Recommendations:  Treat hypertension-please see below  Treat dyslipidemia-please see below  Maintain proteinuria less than 1 g or as low as possible  Avoid nephrotoxic agents such as NSAIDs, patient counseled as such  2   Volume:  Euvolemic  3   Hypotension:  Workup was compatible with low ejection fraction 48% associated severe hypokinesis of the apical anterior and mid anterior septal in mild in for septal and apical inferior and apical septal and apical walls   No pericardial effusion  Cortisol/TSH were unremarkable  Currently blood pressure:   A m :   120/87, standing 113/79  P m : 107/74, standing 110/80  Heart rate 80-90 range  Medication changes today:   Patient has had a history of hypotension therefore I am a little reluctant to place him on an antihypertensive agent although we will do so if it persist for now I am strongly reinforcing weight loss as he is well above target weight    He will do so avoid salt and exercise is much as he can  Repeat blood pressures 6 to 8 weeks     4   Electrolytes:  All acceptable  5   Mineral bone disorder:  Calcium/magnesium/phosphorus:  All acceptable including normal magnesium  PTH intact:   84 6 which is slightly above goal just monitor for now to avoid overtreatment   Vitamin-D:32 8 from January 2023  6   Dyslipidemia:  Goal LDL:  Less than 70 given PAD  Current lipid profile:  LDL 41/HDL 37/triglycerides 147  Recommendations:  At goal so no changes  7   Anemia:               Recommendations:  Hemoglobin stable at 16 2  Multiple myeloma evaluation with SPEP/UPEP and light chains:  Negative  Stool for occult blood x3: Nay Mckeon  In March had EGD colonoscopy and recent sigmoidoscopy:  Harris's esophagus/diverticulosis/colonic polyps   Patient will be seeing Dr Julio Jasmine  8   Other problems:  Chronic intermittent mild leukocytosis:  Stable   Gross hematuria/microscopic hematuria:  This has resolved   Patient seen by Urology for gross hematuria   Cystoscopy was negative and recent PSA negative  No further investigation by follow-up in 1 year by Urology  Abnormal echocardiogram/cardiomyopathy:  Followed in the past by Dr Almanza  Paroxysmal atrial fibrillation followed by Dr Mariza Higginbotham: Ricky Vásquez on Xarelto  Recent admission in May for signs of small-bowel obstruction associated with abdominal pain and diarrhea   Felt to have Crohn's disease  Bilateral adrenal gland abnormalities being followed by surgical oncology   24 hour urine for Dopamine epinephrine all unremarkable   Aldosterone was unremarkable   Cortisol was okay at 17 5  To be followed by Dr Cordova  PAD:  Status post stent for the right popliteal artery/revision 02/12/2020  Abnormal alkaline phosphatase followed by GI  Diabetes mellitus type 2 not really a candidate for SGLT2 inhibitors with PAD  ABNORMAL LUNG EXAM: Chest x-ray was normal May 2022     GI health maintenance:   November 2022    PATIENT INSTRUCTIONS:    Patient Instructions   1  Medication changes today:  No medication changes today  Continue to avoid salt  Continue to push weight loss        2    Please take 1 week a blood pressure readings 6-8 weeks after pushing weight loss and exercise    AS FOLLOWS  MORNING AND EVENING, SITTING AND STANDING as follows:  TAKE THE MORNING READINGS BEFORE ANY MEDICATIONS AND WHEN YOU ARE RELAXED FOR SEVERAL MINUTES  TAKE THE EVENING READINGS:  BETWEEN 7-10 P M ; PRIOR TO ANY MEDICATIONS; AT LEAST IN OUR  FROM DINNER; AND CERTAINLY AFTER RELAXING FOR A FEW MINUTES  PLEASE INCLUDE HEART RATE WITH YOUR BLOOD PRESSURE READINGS  When taking standing readings, keep your arm supported at heart level and not dangling  Make sure you are sitting with your back supported and feet on the ground and do not cross your legs or feet  Make sure you have not taken any coffee or caffeine products or exercised or smoke cigarettes at least 30 minutes before taking your blood pressure  Then please mail these readings into the office    3  Follow-up in 6-8 months: Please go for lab work in 6 months and please send in a week of blood pressures in 6 months  Please bring in 1 week a blood pressure readings morning evening, sitting and standing is outlined above  PLEASE BRING AN YOUR BLOOD PRESSURE MACHINE TO CORRELATE WITH THE OFFICE MACHINE AT THIS NEXT SCHEDULED VISIT  Please go for fasting lab work 1-2 weeks prior to your appointment      4  General instructions:  AVOID SALT BUT NOT ADDING AN READING LABELS TO MAKE SURE THERE IS LOW-SALT IN THE FOOD THAT YOU ARE EATING  Goal is less than 2 g of sodium intake or less than 5 g of sodium chloride intake per day    Avoid nonsteroidal anti-inflammatory drugs such as Naprosyn, ibuprofen, Aleve, Advil, Celebrex, Meloxicam (Mobic) etc   You can use Tylenol as needed if you do not have any liver condition to be concerned about    Avoid medications such as Sudafed or decongestants and antihistamines that contained the D component which is the decongestant  You can take antihistamines without the decongestant or D component  Try to avoid medications such as pantoprazole or  Protonix/Nexium or Esomeprazole)/Prilosec or omeprazole/Prevacid or lansoprazole/AcipHex or Rabeprazole  If you are able to, use Pepcid as this is safer for your kidneys  Try to exercise at least 30 minutes 3 days a week to begin with with an ultimate goal of 5 days a week for at least 30 minutes    Try to lose 15-20 lb by your next visit    Please do not drink more than 2 glasses of alcohol/wine on a daily basis as this may contribute to your high blood pressure  Subjective: There has been no hospitalizations or acute illnesses since last visit  The patient overall is feeling well  No fevers, chills, or cough or colds    Good appetite and good energy  No hematuria, dysuria, voiding symptoms or foamy urine  No gastrointestinal symptoms to my: Slight loose stools couple days ago resolved  No cardiovascular symptoms including swelling of the legs  No headaches, dizziness or lightheadedness  Blood pressure medications:  Toprol-XL 50 mg daily in the evening/Toprol-XL 25 mg daily in the morning      ROS:  See HPI, otherwise review of systems as completely reviewed with the patient are negative    Past Medical History:   Diagnosis Date   • Blue toe syndrome ([facility] )    • Chronic kidney disease    • Colon polyps    • GERD (gastroesophageal reflux disease)    • Hematuria    • History of rheumatic fever    • Hypolipidemia    • Hypotension    • Ischemic cardiomyopathy    • PAD (peripheral artery disease) ([facility])    • Pulmonary emphysema ([facility])    • PVD (peripheral vascular disease) ([place] [id number] )      Past Surgical History:   Procedure Laterality Date   • COLONOSCOPY  2019   • HERNIA REPAIR     • IR AORTAGRAM WITH RUN-OFF  6/27/2019   • IR AORTAGRAM WITH RUN-OFF  2/12/2020   • POPLITEAL ARTERY STENT Right     6/2019   • CA BYPASS W/VEIN FEMORAL-POPLITEAL Right 3/26/2020    Procedure: BYPASS FEMORAL-POPLITEAL; Right lower extremity bypass, fem-bk pop with GSV;  Surgeon: Moshe Hopper MD;  [facility]: [facility];  Service: Vascular   • CA SLCTV CATHJ 3RD+ ORD SLCTV ABDL PEL/[facility] Right 6/27/2019    Procedure: leg ANGIOGRAM WITH STENT, BALLOON ANGIOPLASTY, LEFT GROIN ACCESS;  Surgeon: Moshe Hopper MD;  Location: [facility];  Service: Vascular     Family History   Problem Relation Age of Onset   • Heart disease Mother    • Hyperlipidemia Mother    • Hypertension Mother    • Hypertension Father    • Heart disease Father    • Stroke Father    • Hyperlipidemia Father    • Diabetes Brother    • No Known Problems Maternal Grandmother    • Dementia Neg Hx    • Drug abuse Neg Hx    • Mental illness Neg Hx    • Substance Abuse Neg Hx    • Alcohol abuse Neg Hx    • Depression Neg Hx    • Colon cancer Neg Hx       reports that he has been smoking cigarettes  He has a 7 50 pack-year smoking history  He has never used smokeless tobacco  He reports current alcohol use of about 1 0 standard drink per week  He reports that he does not use drugs  I COMPLETELY REVIEWED THE PAST MEDICAL HISTORY/PAST SURGICAL HISTORY/SOCIAL HISTORY/FAMILY HISTORY/AND MEDICATIONS  AND UPDATED ALL    Objective:     Vitals:   PE sitting on left: 116/70 with a heart rate of 76 slightly irregular  BP standing on left: 122/78 with a heart rate of 80 and regular    Weight (last 2 days)     Date/Time Weight    04/06/23 1058 108 (237)        Wt Readings from Last 3 Encounters:   04/06/23 108 kg (237 lb)   01/30/23 105 kg (231 lb)   01/12/23 108 kg (238 lb)       Body mass index is 34 01 kg/m²      Physical Exam: General:  No acute distress/obese  Skin:  No acute rash  Eyes:  No scleral icterus, noninjected, no discharge from eyes  ENT:  Moist mucous membranes  Neck:  Supple, no jugular venous distention, trachea is midline, no lymphadenopathy and no thyromegaly  Back   No CVAT  Chest:  Clear to auscultation and percussion, good respiratory effort  CVS: Slightly irregular rhythm without a rub, or gallops or murmurs  Abdomen:  Obese, Soft and nontender with normal bowel sounds  Extremities:  No cyanosis and no edema, no arthritic changes, normal range of motion  Neuro:  Grossly intact  Psych:  Alert, oriented x3 and appropriate      Medications:    Current Outpatient Medications:   •  aspirin 81 MG tablet, Take 81 mg by mouth daily, Disp: , Rfl:   •  atorvastatin (LIPITOR) 20 mg tablet, Take 1 tablet (20 mg total) by mouth daily, Disp: 90 tablet, Rfl: 0  •  folic acid (FOLVITE) 1 mg tablet, Take 1 tablet (1 mg total) by mouth daily, Disp: 90 tablet, Rfl: 3  •  glimepiride (AMARYL) 2 mg tablet, Take 1 tablet (2 mg total) by mouth daily with breakfast, Disp: 90 tablet, Rfl: 0  •  Magnesium 500 MG TABS, Take 1 tablet by mouth daily , Disp: , Rfl:   •  metoprolol succinate (TOPROL-XL) 25 mg 24 hr tablet, Take 1 tablet (25 mg total) by mouth daily, Disp: 90 tablet, Rfl: 0  •  metoprolol succinate (TOPROL-XL) 50 mg 24 hr tablet, Take 1 tablet (50 mg total) by mouth daily (Patient taking differently: Take 50 mg by mouth daily In the evening), Disp: 90 tablet, Rfl: 4  •  omeprazole (PriLOSEC) 40 MG capsule, Take 1 capsule (40 mg total) by mouth daily, Disp: 90 capsule, Rfl: 0  •  rivaroxaban (XARELTO) 20 mg tablet, Take 1 tablet (20 mg total) by mouth daily with breakfast, Disp: 90 tablet, Rfl: 0  •  vitamin B-12 (VITAMIN B-12) 1,000 mcg tablet, Take 1 tablet (1,000 mcg total) by mouth daily (Patient taking differently: Take 1,000 mcg by mouth 3 (three) times a week), Disp: 90 tablet, Rfl: 1    Lab, Imaging and other studies: I have personally reviewed pertinent labs    Laboratory Results:  Results for orders placed or performed in visit on 04/03/23   Lipid Panel with Direct LDL reflex   Result Value Ref Range    Cholesterol 107 See Comment mg/dL    Triglycerides 147 See Comment mg/dL    HDL, Direct 37 (L) >=40 mg/dL    LDL Calculated 41 0 - 100 mg/dL   Comprehensive metabolic panel   Result Value Ref Range    Sodium 140 135 - 147 mmol/L    Potassium 4 4 3 5 - 5 3 mmol/L    Chloride 108 96 - 108 mmol/L    CO2 26 21 - 32 mmol/L    ANION GAP 6 4 - 13 mmol/L    BUN 16 5 - 25 mg/dL    Creatinine 1 09 0 60 - 1 30 mg/dL    Glucose, Fasting 170 (H) 65 - 99 mg/dL    Calcium 8 7 8 4 - 10 2 mg/dL    Corrected Calcium 9 2 8 3 - 10 1 mg/dL    AST 12 (L) 13 - 39 U/L    ALT 14 7 - 52 U/L    Alkaline Phosphatase 183 (H) 34 - 104 U/L    Total Protein 6 9 6 4 - 8 4 g/dL    Albumin 3 4 (L) 3 5 - 5 0 g/dL    Total Bilirubin 0 50 0 20 - 1 00 mg/dL    eGFR 67 ml/min/1 73sq "m   Magnesium   Result Value Ref Range    Magnesium 2 0 1 9 - 2 7 mg/dL   PTH, intact   Result Value Ref Range    PTH 84 6 (H) 18 4 - 80 1 pg/mL   Protein / creatinine ratio, urine   Result Value Ref Range    Creatinine, Ur 190 3 mg/dL    Protein Urine Random 37 mg/dL    Prot/Creat Ratio, Ur 0 19 (H) 0 00 - 0 10   Phosphorus   Result Value Ref Range    Phosphorus 2 9 2 3 - 4 1 mg/dL   CBC and differential   Result Value Ref Range    WBC 11 45 (H) 4 31 - 10 16 Thousand/uL    RBC 5 68 (H) 3 88 - 5 62 Million/uL    Hemoglobin 16 2 12 0 - 17 0 g/dL    Hematocrit 51 4 (H) 36 5 - 49 3 %    MCV 91 82 - 98 fL    MCH 28 5 26 8 - 34 3 pg    MCHC 31 5 31 4 - 37 4 g/dL    RDW 15 2 (H) 11 6 - 15 1 %    MPV 9 8 8 9 - 12 7 fL    Platelets 944 302 - 799 Thousands/uL    nRBC 0 /100 WBCs    Neutrophils Relative 68 43 - 75 %    Immat GRANS % 1 0 - 2 %    Lymphocytes Relative 21 14 - 44 %    Monocytes Relative 7 4 - 12 %    Eosinophils Relative 2 0 - 6 %    Basophils Relative 1 0 - 1 %    Neutrophils Absolute 7 86 (H) 1 85 - 7 62 Thousands/µL    Immature Grans Absolute 0 08 0 00 - 0 20 Thousand/uL    Lymphocytes Absolute 2 38 0 60 - 4 47 Thousands/µL    Monocytes Absolute 0 81 0 17 - 1 22 Thousand/µL    Eosinophils Absolute 0 24 0 00 - 0 61 Thousand/µL    Basophils Absolute 0 08 0 00 - 0 10 Thousands/µL       Results from last 7 days   Lab Units 04/03/23  0703   WBC Thousand/uL 11 45*   HEMOGLOBIN g/dL 16 2   HEMATOCRIT % 51 4*   PLATELETS Thousands/uL 339   POTASSIUM mmol/L 4 4   CHLORIDE mmol/L 108   CO2 mmol/L 26   BUN mg/dL 16   CREATININE mg/dL 1 09   CALCIUM mg/dL 8 7   MAGNESIUM mg/dL 2 0   PHOSPHORUS mg/dL 2 9           Radiology review:   chest X-ray    Ultrasound      Portions of the record may have been created with voice recognition software  Occasional wrong word or \"sound a like\" substitutions may have occurred due to the inherent limitations of voice recognition software    Read the chart carefully and recognize, using " context, where substitutions have occurred

## 2023-03-31 ENCOUNTER — TELEPHONE (OUTPATIENT)
Dept: NEPHROLOGY | Facility: CLINIC | Age: 73
End: 2023-03-31

## 2023-03-31 NOTE — TELEPHONE ENCOUNTER
Spoke with patient reminding him to go for lab work before his appt on 4/6  He expressed understanding and thanked us for the call back

## 2023-04-03 ENCOUNTER — LAB (OUTPATIENT)
Dept: LAB | Facility: CLINIC | Age: 73
End: 2023-04-03

## 2023-04-03 DIAGNOSIS — E53.8 VITAMIN B12 DEFICIENCY: ICD-10-CM

## 2023-04-03 DIAGNOSIS — R91.8 LUNG FIELD ABNORMAL FINDING ON EXAMINATION: ICD-10-CM

## 2023-04-03 DIAGNOSIS — K21.00 GASTROESOPHAGEAL REFLUX DISEASE WITH ESOPHAGITIS WITHOUT HEMORRHAGE: ICD-10-CM

## 2023-04-03 DIAGNOSIS — K22.70 BARRETT'S ESOPHAGUS WITHOUT DYSPLASIA: ICD-10-CM

## 2023-04-03 DIAGNOSIS — N18.31 STAGE 3A CHRONIC KIDNEY DISEASE (HCC): ICD-10-CM

## 2023-04-03 DIAGNOSIS — N18.31 ANEMIA OF CHRONIC RENAL FAILURE, STAGE 3A (HCC): ICD-10-CM

## 2023-04-03 DIAGNOSIS — E78.5 DYSLIPIDEMIA: ICD-10-CM

## 2023-04-03 DIAGNOSIS — E11.51 TYPE 2 DIABETES MELLITUS WITH DIABETIC PERIPHERAL ANGIOPATHY WITHOUT GANGRENE, WITHOUT LONG-TERM CURRENT USE OF INSULIN (HCC): ICD-10-CM

## 2023-04-03 DIAGNOSIS — D63.1 ANEMIA OF CHRONIC RENAL FAILURE, STAGE 3A (HCC): ICD-10-CM

## 2023-04-03 DIAGNOSIS — R31.29 MICROSCOPIC HEMATURIA: ICD-10-CM

## 2023-04-03 DIAGNOSIS — R80.1 PERSISTENT PROTEINURIA: ICD-10-CM

## 2023-04-03 DIAGNOSIS — I95.89 CHRONIC HYPOTENSION: ICD-10-CM

## 2023-04-03 DIAGNOSIS — K50.019 CROHN'S DISEASE OF SMALL INTESTINE WITH COMPLICATION (HCC): ICD-10-CM

## 2023-04-03 DIAGNOSIS — E55.9 VITAMIN D DEFICIENCY: ICD-10-CM

## 2023-04-03 LAB
ALBUMIN SERPL BCP-MCNC: 3.4 G/DL (ref 3.5–5)
ALP SERPL-CCNC: 183 U/L (ref 34–104)
ALT SERPL W P-5'-P-CCNC: 14 U/L (ref 7–52)
ANION GAP SERPL CALCULATED.3IONS-SCNC: 6 MMOL/L (ref 4–13)
AST SERPL W P-5'-P-CCNC: 12 U/L (ref 13–39)
BASOPHILS # BLD AUTO: 0.08 THOUSANDS/ÂΜL (ref 0–0.1)
BASOPHILS NFR BLD AUTO: 1 % (ref 0–1)
BILIRUB SERPL-MCNC: 0.5 MG/DL (ref 0.2–1)
BUN SERPL-MCNC: 16 MG/DL (ref 5–25)
CALCIUM ALBUM COR SERPL-MCNC: 9.2 MG/DL (ref 8.3–10.1)
CALCIUM SERPL-MCNC: 8.7 MG/DL (ref 8.4–10.2)
CHLORIDE SERPL-SCNC: 108 MMOL/L (ref 96–108)
CHOLEST SERPL-MCNC: 107 MG/DL
CO2 SERPL-SCNC: 26 MMOL/L (ref 21–32)
CREAT SERPL-MCNC: 1.09 MG/DL (ref 0.6–1.3)
CREAT UR-MCNC: 190.3 MG/DL
EOSINOPHIL # BLD AUTO: 0.24 THOUSAND/ÂΜL (ref 0–0.61)
EOSINOPHIL NFR BLD AUTO: 2 % (ref 0–6)
ERYTHROCYTE [DISTWIDTH] IN BLOOD BY AUTOMATED COUNT: 15.2 % (ref 11.6–15.1)
GFR SERPL CREATININE-BSD FRML MDRD: 67 ML/MIN/1.73SQ M
GLUCOSE P FAST SERPL-MCNC: 170 MG/DL (ref 65–99)
HCT VFR BLD AUTO: 51.4 % (ref 36.5–49.3)
HDLC SERPL-MCNC: 37 MG/DL
HGB BLD-MCNC: 16.2 G/DL (ref 12–17)
IMM GRANULOCYTES # BLD AUTO: 0.08 THOUSAND/UL (ref 0–0.2)
IMM GRANULOCYTES NFR BLD AUTO: 1 % (ref 0–2)
LDLC SERPL CALC-MCNC: 41 MG/DL (ref 0–100)
LYMPHOCYTES # BLD AUTO: 2.38 THOUSANDS/ÂΜL (ref 0.6–4.47)
LYMPHOCYTES NFR BLD AUTO: 21 % (ref 14–44)
MAGNESIUM SERPL-MCNC: 2 MG/DL (ref 1.9–2.7)
MCH RBC QN AUTO: 28.5 PG (ref 26.8–34.3)
MCHC RBC AUTO-ENTMCNC: 31.5 G/DL (ref 31.4–37.4)
MCV RBC AUTO: 91 FL (ref 82–98)
MONOCYTES # BLD AUTO: 0.81 THOUSAND/ÂΜL (ref 0.17–1.22)
MONOCYTES NFR BLD AUTO: 7 % (ref 4–12)
NEUTROPHILS # BLD AUTO: 7.86 THOUSANDS/ÂΜL (ref 1.85–7.62)
NEUTS SEG NFR BLD AUTO: 68 % (ref 43–75)
NRBC BLD AUTO-RTO: 0 /100 WBCS
PHOSPHATE SERPL-MCNC: 2.9 MG/DL (ref 2.3–4.1)
PLATELET # BLD AUTO: 339 THOUSANDS/UL (ref 149–390)
PMV BLD AUTO: 9.8 FL (ref 8.9–12.7)
POTASSIUM SERPL-SCNC: 4.4 MMOL/L (ref 3.5–5.3)
PROT SERPL-MCNC: 6.9 G/DL (ref 6.4–8.4)
PROT UR-MCNC: 37 MG/DL
PROT/CREAT UR: 0.19 MG/G{CREAT} (ref 0–0.1)
PTH-INTACT SERPL-MCNC: 84.6 PG/ML (ref 18.4–80.1)
RBC # BLD AUTO: 5.68 MILLION/UL (ref 3.88–5.62)
SODIUM SERPL-SCNC: 140 MMOL/L (ref 135–147)
TRIGL SERPL-MCNC: 147 MG/DL
WBC # BLD AUTO: 11.45 THOUSAND/UL (ref 4.31–10.16)

## 2023-04-03 NOTE — RESULT ENCOUNTER NOTE
Labs reviewed and stable  Renal function stable  Results to be reviewed at scheduled follow-up appointment with Dr Love Aase this week

## 2023-04-04 DIAGNOSIS — R03.0 ELEVATED BLOOD PRESSURE READING: ICD-10-CM

## 2023-04-04 DIAGNOSIS — K21.00 GERD WITH ESOPHAGITIS: ICD-10-CM

## 2023-04-04 RX ORDER — OMEPRAZOLE 40 MG/1
40 CAPSULE, DELAYED RELEASE ORAL DAILY
Qty: 90 CAPSULE | Refills: 0 | Status: SHIPPED | OUTPATIENT
Start: 2023-04-04

## 2023-04-04 RX ORDER — METOPROLOL SUCCINATE 25 MG/1
25 TABLET, EXTENDED RELEASE ORAL DAILY
Qty: 90 TABLET | Refills: 0 | Status: SHIPPED | OUTPATIENT
Start: 2023-04-04

## 2023-04-05 ENCOUNTER — TELEPHONE (OUTPATIENT)
Dept: GASTROENTEROLOGY | Facility: CLINIC | Age: 73
End: 2023-04-05

## 2023-04-05 NOTE — TELEPHONE ENCOUNTER
Patients GI provider:  Dr Dumont [facility]    Number to return call: [phone]    Reason for call: Pt's wife Lavelle Sandy calling requesting pt  Referral to see a hepatologist     Scheduled procedure/appointment date if applicable: N/A

## 2023-04-06 ENCOUNTER — OFFICE VISIT (OUTPATIENT)
Dept: NEPHROLOGY | Facility: CLINIC | Age: 73
End: 2023-04-06

## 2023-04-06 VITALS — HEIGHT: 70 IN | WEIGHT: 237 LBS | BODY MASS INDEX: 33.93 KG/M2

## 2023-04-06 DIAGNOSIS — E55.9 VITAMIN D DEFICIENCY: ICD-10-CM

## 2023-04-06 DIAGNOSIS — N18.31 STAGE 3A CHRONIC KIDNEY DISEASE (HCC): Primary | ICD-10-CM

## 2023-04-06 DIAGNOSIS — R31.29 MICROSCOPIC HEMATURIA: ICD-10-CM

## 2023-04-06 DIAGNOSIS — D63.1 ANEMIA OF CHRONIC RENAL FAILURE, STAGE 3A (HCC): ICD-10-CM

## 2023-04-06 DIAGNOSIS — I42.9 CARDIOMYOPATHY, UNSPECIFIED TYPE (HCC): ICD-10-CM

## 2023-04-06 DIAGNOSIS — E11.51 TYPE 2 DIABETES MELLITUS WITH DIABETIC PERIPHERAL ANGIOPATHY WITHOUT GANGRENE, WITHOUT LONG-TERM CURRENT USE OF INSULIN (HCC): ICD-10-CM

## 2023-04-06 DIAGNOSIS — R80.1 PERSISTENT PROTEINURIA: ICD-10-CM

## 2023-04-06 DIAGNOSIS — R91.8 LUNG FIELD ABNORMAL FINDING ON EXAMINATION: ICD-10-CM

## 2023-04-06 DIAGNOSIS — I95.89 CHRONIC HYPOTENSION: ICD-10-CM

## 2023-04-06 DIAGNOSIS — E78.5 DYSLIPIDEMIA: ICD-10-CM

## 2023-04-06 DIAGNOSIS — N18.31 ANEMIA OF CHRONIC RENAL FAILURE, STAGE 3A (HCC): ICD-10-CM

## 2023-04-06 NOTE — LETTER
April 6, 2023     Barb Fregoso MD  [address]  [id number] [place] [address]    Patient: Dhara López III   YOB: 1950   Date of Visit: 4/6/2023       Dear Dr Rosio Huerta: Thank you for referring Casimiro Nino to me for evaluation  Below are my notes for this consultation  If you have questions, please do not hesitate to call me  I look forward to following your patient along with you  Sincerely,        Carmita Leahy MD        CC: MD Carmita Jacques MD  4/6/2023 11:23 AM  Sign when Signing Visit  RENAL FOLLOW UP NOTE: td    • ASSESSMENT AND PLAN:  1   CKD stage 3 :  • Etiology:  Cardiorenal syndrome/arteriolar nephrosclerosis/?  Atheroemboli/prior prerenal associated mild tachycardia and relative hypotension in the past  • Baseline creatinine:  1 25-1 41  • Current creatinine:  At baseline at 1 09  • Urine protein creatinine ratio:  0 19 g at goal  Recommendations:  • Treat hypertension-please see below  • Treat dyslipidemia-please see below  • Maintain proteinuria less than 1 g or as low as possible  • Avoid nephrotoxic agents such as NSAIDs, patient counseled as such  2   Volume:  Euvolemic  3   Hypotension:  Workup was compatible with low ejection fraction 48% associated severe hypokinesis of the apical anterior and mid anterior septal in mild in for septal and apical inferior and apical septal and apical walls   No pericardial effusion  Cortisol/TSH were unremarkable  • Currently blood pressure:   A m :   120/87, standing 113/79  P m : 107/74, standing 110/80  Heart rate 80-90 range  • Medication changes today:   • Patient has had a history of hypotension therefore I am a little reluctant to place him on an antihypertensive agent although we will do so if it persist for now I am strongly reinforcing weight loss as he is well above target weight    He will do so avoid salt and exercise is much as he can  • Repeat blood pressures 6 to 8 weeks     4   Electrolytes:  All acceptable  5   Mineral bone disorder:  • Calcium/magnesium/phosphorus:  All acceptable including normal magnesium  • PTH intact:   84 6 which is slightly above goal just monitor for now to avoid overtreatment   • Vitamin-D:32 8 from January 2023  6   Dyslipidemia:  • Goal LDL:  Less than 70 given PAD  • Current lipid profile:  LDL 41/HDL 37/triglycerides 147  Recommendations:  At goal so no changes  7   Anemia:               Recommendations:  • Hemoglobin stable at 16 2  • Multiple myeloma evaluation with SPEP/UPEP and light chains:  Negative  • Stool for occult blood x3:  NEGATIVE X1  • In March had EGD colonoscopy and recent sigmoidoscopy:  Harris's esophagus/diverticulosis/colonic polyps   Patient will be seeing Dr Dobson Orn  8   Other problems:  • Chronic intermittent mild leukocytosis:  Stable   • Gross hematuria/microscopic hematuria:  This has resolved   Patient seen by Urology for gross hematuria   Cystoscopy was negative and recent PSA negative  No further investigation by follow-up in 1 year by Urology  • Abnormal echocardiogram/cardiomyopathy:  Followed in the past by Dr Almanza  • Paroxysmal atrial fibrillation followed by Dr Wisam Maldonado: Anisa Osorio on Xarelto  • Recent admission in May for signs of small-bowel obstruction associated with abdominal pain and diarrhea   Felt to have Crohn's disease  • Bilateral adrenal gland abnormalities being followed by surgical oncology   24 hour urine for Dopamine epinephrine all unremarkable   Aldosterone was unremarkable   Cortisol was okay at 17 5  To be followed by Dr Cordova  • PAD:  Status post stent for the right popliteal artery/revision 02/12/2020  • Abnormal alkaline phosphatase followed by GI  • Diabetes mellitus type 2 not really a candidate for SGLT2 inhibitors with PAD  • ABNORMAL LUNG EXAM: Chest x-ray was normal May 2022     GI health maintenance:   November 2022    PATIENT INSTRUCTIONS:    Patient Instructions   1  Medication changes today:  • No medication changes today  • Continue to avoid salt  • Continue to push weight loss        2  Please take 1 week a blood pressure readings 6-8 weeks after pushing weight loss and exercise    AS FOLLOWS  MORNING AND EVENING, SITTING AND STANDING as follows:  · TAKE THE MORNING READINGS BEFORE ANY MEDICATIONS AND WHEN YOU ARE RELAXED FOR SEVERAL MINUTES  · TAKE THE EVENING READINGS:  BETWEEN 7-10 P M ; PRIOR TO ANY MEDICATIONS; AT LEAST IN OUR  FROM DINNER; AND CERTAINLY AFTER RELAXING FOR A FEW MINUTES  · PLEASE INCLUDE HEART RATE WITH YOUR BLOOD PRESSURE READINGS  · When taking standing readings, keep your arm supported at heart level and not dangling  · Make sure you are sitting with your back supported and feet on the ground and do not cross your legs or feet  · Make sure you have not taken any coffee or caffeine products or exercised or smoke cigarettes at least 30 minutes before taking your blood pressure  Then please mail these readings into the office    3  Follow-up in 6-8 months: Please go for lab work in 6 months and please send in a week of blood pressures in 6 months  • Please bring in 1 week a blood pressure readings morning evening, sitting and standing is outlined above  • PLEASE BRING AN YOUR BLOOD PRESSURE MACHINE TO CORRELATE WITH THE OFFICE MACHINE AT THIS NEXT SCHEDULED VISIT  • Please go for fasting lab work 1-2 weeks prior to your appointment      4    General instructions:  • AVOID SALT BUT NOT ADDING AN READING LABELS TO MAKE SURE THERE IS LOW-SALT IN THE FOOD THAT YOU ARE EATING  o Goal is less than 2 g of sodium intake or less than 5 g of sodium chloride intake per day    • Avoid nonsteroidal anti-inflammatory drugs such as Naprosyn, ibuprofen, Aleve, Advil, Celebrex, Meloxicam (Mobic) etc   You can use Tylenol as needed if you do not have any liver condition to be concerned about    • Avoid medications such as Sudafed or decongestants and antihistamines that contained the D component which is the decongestant  You can take antihistamines without the decongestant or D component  • Try to avoid medications such as pantoprazole or  Protonix/Nexium or Esomeprazole)/Prilosec or omeprazole/Prevacid or lansoprazole/AcipHex or Rabeprazole  If you are able to, use Pepcid as this is safer for your kidneys  • Try to exercise at least 30 minutes 3 days a week to begin with with an ultimate goal of 5 days a week for at least 30 minutes    • Try to lose 15-20 lb by your next visit    • Please do not drink more than 2 glasses of alcohol/wine on a daily basis as this may contribute to your high blood pressure  Subjective: There has been no hospitalizations or acute illnesses since last visit  The patient overall is feeling well  No fevers, chills, or cough or colds    Good appetite and good energy  No hematuria, dysuria, voiding symptoms or foamy urine  No gastrointestinal symptoms to my: Slight loose stools couple days ago resolved  No cardiovascular symptoms including swelling of the legs  No headaches, dizziness or lightheadedness  Blood pressure medications:  • Toprol-XL 50 mg daily in the evening/Toprol-XL 25 mg daily in the morning      ROS:  See HPI, otherwise review of systems as completely reviewed with the patient are negative    Past Medical History:   Diagnosis Date   • Blue toe syndrome ([facility] )    • Chronic kidney disease    • Colon polyps    • GERD (gastroesophageal reflux disease)    • Hematuria    • History of rheumatic fever    • Hypolipidemia    • Hypotension    • Ischemic cardiomyopathy    • PAD (peripheral artery disease) ([facility] )    • Pulmonary emphysema (H[facility])    • PVD (peripheral vascular disease) ([facility] )      Past Surgical History:   Procedure Laterality Date   • COLONOSCOPY  2019   • HERNIA REPAIR     • IR AORTAGRAM WITH RUN-OFF  6/27/2019   • IR AORTAGRAM WITH RUN-OFF  2/12/2020   • POPLITEAL ARTERY STENT Right     6/2019 • IL BYPASS W/VEIN FEMORAL-POPLITEAL Right 3/26/2020    Procedure: BYPASS FEMORAL-POPLITEAL; Right lower extremity bypass, fem-bk pop with GSV;  Surgeon: Norma Cruz MD;  [facility];  Service: Vascular   • IL SLCTV CATHJ 3RD+ ORD SLCTV ABDL PEL/LXTR [facility] Right 6/27/2019    Procedure: leg ANGIOGRAM WITH STENT, BALLOON ANGIOPLASTY, LEFT GROIN ACCESS;  Surgeon: Norma Cruz MD;  Location[facility];  Service: Vascular     Family History   Problem Relation Age of Onset   • Heart disease Mother    • Hyperlipidemia Mother    • Hypertension Mother    • Hypertension Father    • Heart disease Father    • Stroke Father    • Hyperlipidemia Father    • Diabetes Brother    • No Known Problems Maternal Grandmother    • Dementia Neg Hx    • Drug abuse Neg Hx    • Mental illness Neg Hx    • Substance Abuse Neg Hx    • Alcohol abuse Neg Hx    • Depression Neg Hx    • Colon cancer Neg Hx       reports that he has been smoking cigarettes  He has a 7 50 pack-year smoking history  He has never used smokeless tobacco  He reports current alcohol use of about 1 0 standard drink per week  He reports that he does not use drugs  I COMPLETELY REVIEWED THE PAST MEDICAL HISTORY/PAST SURGICAL HISTORY/SOCIAL HISTORY/FAMILY HISTORY/AND MEDICATIONS  AND UPDATED ALL    Objective:     Vitals:   PE sitting on left: 116/70 with a heart rate of 76 slightly irregular  BP standing on left: 122/78 with a heart rate of 80 and regular    Weight (last 2 days)     Date/Time Weight    04/06/23 1058 108 (237)        Wt Readings from Last 3 Encounters:   04/06/23 108 kg (237 lb)   01/30/23 105 kg (231 lb)   01/12/23 108 kg (238 lb)       Body mass index is 34 01 kg/m²      Physical Exam: General:  No acute distress/obese  Skin:  No acute rash  Eyes:  No scleral icterus, noninjected, no discharge from eyes  ENT:  Moist mucous membranes  Neck:  Supple, no jugular venous distention, trachea is midline, no lymphadenopathy and no thyromegaly  Back   No CVAT  Chest:  Clear to auscultation and percussion, good respiratory effort  CVS: Slightly irregular rhythm without a rub, or gallops or murmurs  Abdomen:  Obese, Soft and nontender with normal bowel sounds  Extremities:  No cyanosis and no edema, no arthritic changes, normal range of motion  Neuro:  Grossly intact  Psych:  Alert, oriented x3 and appropriate      Medications:    Current Outpatient Medications:   •  aspirin 81 MG tablet, Take 81 mg by mouth daily, Disp: , Rfl:   •  atorvastatin (LIPITOR) 20 mg tablet, Take 1 tablet (20 mg total) by mouth daily, Disp: 90 tablet, Rfl: 0  •  folic acid (FOLVITE) 1 mg tablet, Take 1 tablet (1 mg total) by mouth daily, Disp: 90 tablet, Rfl: 3  •  glimepiride (AMARYL) 2 mg tablet, Take 1 tablet (2 mg total) by mouth daily with breakfast, Disp: 90 tablet, Rfl: 0  •  Magnesium 500 MG TABS, Take 1 tablet by mouth daily , Disp: , Rfl:   •  metoprolol succinate (TOPROL-XL) 25 mg 24 hr tablet, Take 1 tablet (25 mg total) by mouth daily, Disp: 90 tablet, Rfl: 0  •  metoprolol succinate (TOPROL-XL) 50 mg 24 hr tablet, Take 1 tablet (50 mg total) by mouth daily (Patient taking differently: Take 50 mg by mouth daily In the evening), Disp: 90 tablet, Rfl: 4  •  omeprazole (PriLOSEC) 40 MG capsule, Take 1 capsule (40 mg total) by mouth daily, Disp: 90 capsule, Rfl: 0  •  rivaroxaban (XARELTO) 20 mg tablet, Take 1 tablet (20 mg total) by mouth daily with breakfast, Disp: 90 tablet, Rfl: 0  •  vitamin B-12 (VITAMIN B-12) 1,000 mcg tablet, Take 1 tablet (1,000 mcg total) by mouth daily (Patient taking differently: Take 1,000 mcg by mouth 3 (three) times a week), Disp: 90 tablet, Rfl: 1    Lab, Imaging and other studies: I have personally reviewed pertinent labs    Laboratory Results:  Results for orders placed or performed in visit on 04/03/23   Lipid Panel with Direct LDL reflex   Result Value Ref Range    Cholesterol 107 See Comment mg/dL    Triglycerides 147 See Comment mg/dL    HDL, Direct 37 (L) >=40 mg/dL    LDL Calculated 41 0 - 100 mg/dL   Comprehensive metabolic panel   Result Value Ref Range    Sodium 140 135 - 147 mmol/L    Potassium 4 4 3 5 - 5 3 mmol/L    Chloride 108 96 - 108 mmol/L    CO2 26 21 - 32 mmol/L    ANION GAP 6 4 - 13 mmol/L    BUN 16 5 - 25 mg/dL    Creatinine 1 09 0 60 - 1 30 mg/dL    Glucose, Fasting 170 (H) 65 - 99 mg/dL    Calcium 8 7 8 4 - 10 2 mg/dL    Corrected Calcium 9 2 8 3 - 10 1 mg/dL    AST 12 (L) 13 - 39 U/L    ALT 14 7 - 52 U/L    Alkaline Phosphatase 183 (H) 34 - 104 U/L    Total Protein 6 9 6 4 - 8 4 g/dL    Albumin 3 4 (L) 3 5 - 5 0 g/dL    Total Bilirubin 0 50 0 20 - 1 00 mg/dL    eGFR 67 ml/min/1 73sq m   Magnesium   Result Value Ref Range    Magnesium 2 0 1 9 - 2 7 mg/dL   PTH, intact   Result Value Ref Range    PTH 84 6 (H) 18 4 - 80 1 pg/mL   Protein / creatinine ratio, urine   Result Value Ref Range    Creatinine, Ur 190 3 mg/dL    Protein Urine Random 37 mg/dL    Prot/Creat Ratio, Ur 0 19 (H) 0 00 - 0 10   Phosphorus   Result Value Ref Range    Phosphorus 2 9 2 3 - 4 1 mg/dL   CBC and differential   Result Value Ref Range    WBC 11 45 (H) 4 31 - 10 16 Thousand/uL    RBC 5 68 (H) 3 88 - 5 62 Million/uL    Hemoglobin 16 2 12 0 - 17 0 g/dL    Hematocrit 51 4 (H) 36 5 - 49 3 %    MCV 91 82 - 98 fL    MCH 28 5 26 8 - 34 3 pg    MCHC 31 5 31 4 - 37 4 g/dL    RDW 15 2 (H) 11 6 - 15 1 %    MPV 9 8 8 9 - 12 7 fL    Platelets 625 528 - 154 Thousands/uL    nRBC 0 /100 WBCs    Neutrophils Relative 68 43 - 75 %    Immat GRANS % 1 0 - 2 %    Lymphocytes Relative 21 14 - 44 %    Monocytes Relative 7 4 - 12 %    Eosinophils Relative 2 0 - 6 %    Basophils Relative 1 0 - 1 %    Neutrophils Absolute 7 86 (H) 1 85 - 7 62 Thousands/µL    Immature Grans Absolute 0 08 0 00 - 0 20 Thousand/uL    Lymphocytes Absolute 2 38 0 60 - 4 47 Thousands/µL    Monocytes Absolute 0 81 0 17 - 1 22 Thousand/µL    Eosinophils Absolute 0 24 0 00 - 0 61 Thousand/µL "   Basophils Absolute 0 08 0 00 - 0 10 Thousands/µL       Results from last 7 days   Lab Units 04/03/23  0703   WBC Thousand/uL 11 45*   HEMOGLOBIN g/dL 16 2   HEMATOCRIT % 51 4*   PLATELETS Thousands/uL 339   POTASSIUM mmol/L 4 4   CHLORIDE mmol/L 108   CO2 mmol/L 26   BUN mg/dL 16   CREATININE mg/dL 1 09   CALCIUM mg/dL 8 7   MAGNESIUM mg/dL 2 0   PHOSPHORUS mg/dL 2 9         Radiology review:   chest X-ray    Ultrasound      Portions of the record may have been created with voice recognition software  Occasional wrong word or \"sound a like\" substitutions may have occurred due to the inherent limitations of voice recognition software  Read the chart carefully and recognize, using context, where substitutions have occurred                        "

## 2023-04-06 NOTE — PATIENT INSTRUCTIONS
1  Medication changes today:  No medication changes today  Continue to avoid salt  Continue to push weight loss        2  Please take 1 week a blood pressure readings 6-8 weeks after pushing weight loss and exercise    AS FOLLOWS  MORNING AND EVENING, SITTING AND STANDING as follows:  TAKE THE MORNING READINGS BEFORE ANY MEDICATIONS AND WHEN YOU ARE RELAXED FOR SEVERAL MINUTES  TAKE THE EVENING READINGS:  BETWEEN 7-10 P M ; PRIOR TO ANY MEDICATIONS; AT LEAST IN OUR  FROM DINNER; AND CERTAINLY AFTER RELAXING FOR A FEW MINUTES  PLEASE INCLUDE HEART RATE WITH YOUR BLOOD PRESSURE READINGS  When taking standing readings, keep your arm supported at heart level and not dangling  Make sure you are sitting with your back supported and feet on the ground and do not cross your legs or feet  Make sure you have not taken any coffee or caffeine products or exercised or smoke cigarettes at least 30 minutes before taking your blood pressure  Then please mail these readings into the office    3  Follow-up in 6-8 months: Please go for lab work in 6 months and please send in a week of blood pressures in 6 months  Please bring in 1 week a blood pressure readings morning evening, sitting and standing is outlined above  PLEASE BRING AN YOUR BLOOD PRESSURE MACHINE TO CORRELATE WITH THE OFFICE MACHINE AT THIS NEXT SCHEDULED VISIT  Please go for fasting lab work 1-2 weeks prior to your appointment      4    General instructions:  AVOID SALT BUT NOT ADDING AN READING LABELS TO MAKE SURE THERE IS LOW-SALT IN THE FOOD THAT YOU ARE EATING  Goal is less than 2 g of sodium intake or less than 5 g of sodium chloride intake per day    Avoid nonsteroidal anti-inflammatory drugs such as Naprosyn, ibuprofen, Aleve, Advil, Celebrex, Meloxicam (Mobic) etc   You can use Tylenol as needed if you do not have any liver condition to be concerned about    Avoid medications such as Sudafed or decongestants and antihistamines that contained the D component which is the decongestant  You can take antihistamines without the decongestant or D component  Try to avoid medications such as pantoprazole or  Protonix/Nexium or Esomeprazole)/Prilosec or omeprazole/Prevacid or lansoprazole/AcipHex or Rabeprazole  If you are able to, use Pepcid as this is safer for your kidneys  Try to exercise at least 30 minutes 3 days a week to begin with with an ultimate goal of 5 days a week for at least 30 minutes    Try to lose 15-20 lb by your next visit    Please do not drink more than 2 glasses of alcohol/wine on a daily basis as this may contribute to your high blood pressure

## 2023-04-07 ENCOUNTER — HOSPITAL ENCOUNTER (OUTPATIENT)
Dept: INFUSION CENTER | Facility: CLINIC | Age: 73
End: 2023-04-07

## 2023-04-07 VITALS
HEART RATE: 69 BPM | RESPIRATION RATE: 18 BRPM | DIASTOLIC BLOOD PRESSURE: 82 MMHG | OXYGEN SATURATION: 90 % | TEMPERATURE: 97.3 F | SYSTOLIC BLOOD PRESSURE: 121 MMHG

## 2023-04-07 DIAGNOSIS — K50.012 CROHN'S DISEASE OF ILEUM WITH INTESTINAL OBSTRUCTION (HCC): Primary | ICD-10-CM

## 2023-04-07 RX ORDER — SODIUM CHLORIDE 9 MG/ML
20 INJECTION, SOLUTION INTRAVENOUS ONCE
OUTPATIENT
Start: 2023-05-02

## 2023-04-07 RX ORDER — SODIUM CHLORIDE 9 MG/ML
20 INJECTION, SOLUTION INTRAVENOUS ONCE
Status: COMPLETED | OUTPATIENT
Start: 2023-04-07 | End: 2023-04-07

## 2023-04-07 RX ADMIN — SODIUM CHLORIDE 20 ML/HR: 0.9 INJECTION, SOLUTION INTRAVENOUS at 08:25

## 2023-04-07 RX ADMIN — VEDOLIZUMAB 300 MG: 300 INJECTION, POWDER, LYOPHILIZED, FOR SOLUTION INTRAVENOUS at 09:35

## 2023-04-07 NOTE — PROGRESS NOTES
Patient to [facility] for [facility]: Offers no complaints at present time: Lab work ( 04/03/23 ) reviewed:  Within parameters to treat: Right Hand PIV initiated without incident

## 2023-05-02 ENCOUNTER — HOSPITAL ENCOUNTER (OUTPATIENT)
Dept: INFUSION CENTER | Facility: CLINIC | Age: 73
Discharge: HOME | End: 2023-05-02

## 2023-05-02 VITALS
TEMPERATURE: 96.7 F | RESPIRATION RATE: 18 BRPM | HEART RATE: 70 BPM | OXYGEN SATURATION: 92 % | SYSTOLIC BLOOD PRESSURE: 143 MMHG | DIASTOLIC BLOOD PRESSURE: 82 MMHG

## 2023-05-02 DIAGNOSIS — K50.012 CROHN'S DISEASE OF ILEUM WITH INTESTINAL OBSTRUCTION (HCC): Primary | ICD-10-CM

## 2023-05-02 RX ORDER — SODIUM CHLORIDE 9 MG/ML
20 INJECTION, SOLUTION INTRAVENOUS ONCE
OUTPATIENT
Start: 2023-05-05

## 2023-05-02 RX ORDER — SODIUM CHLORIDE 9 MG/ML
20 INJECTION, SOLUTION INTRAVENOUS ONCE
Status: COMPLETED | OUTPATIENT
Start: 2023-05-02 | End: 2023-05-02

## 2023-05-02 RX ADMIN — VEDOLIZUMAB 300 MG: 300 INJECTION, POWDER, LYOPHILIZED, FOR SOLUTION INTRAVENOUS at 09:10

## 2023-05-02 RX ADMIN — SODIUM CHLORIDE 20 ML/HR: 0.9 INJECTION, SOLUTION INTRAVENOUS at 08:47

## 2023-05-02 NOTE — PROGRESS NOTES
Patient to infusion for Entyvio  He offers no complaints  He tolerated treatment without adverse reaction  He is aware to make next appointment before he leaves today    He declined AVS

## 2023-06-02 ENCOUNTER — HOSPITAL ENCOUNTER (OUTPATIENT)
Dept: INFUSION CENTER | Facility: CLINIC | Age: 73
End: 2023-06-02
Payer: MEDICARE

## 2023-06-02 VITALS
SYSTOLIC BLOOD PRESSURE: 138 MMHG | DIASTOLIC BLOOD PRESSURE: 96 MMHG | TEMPERATURE: 97.4 F | OXYGEN SATURATION: 91 % | RESPIRATION RATE: 18 BRPM | HEART RATE: 74 BPM

## 2023-06-02 DIAGNOSIS — K50.012 CROHN'S DISEASE OF ILEUM WITH INTESTINAL OBSTRUCTION (HCC): Primary | ICD-10-CM

## 2023-06-02 PROCEDURE — 96365 THER/PROPH/DIAG IV INF INIT: CPT

## 2023-06-02 RX ORDER — SODIUM CHLORIDE 9 MG/ML
20 INJECTION, SOLUTION INTRAVENOUS ONCE
Status: COMPLETED | OUTPATIENT
Start: 2023-06-02 | End: 2023-06-02

## 2023-06-02 RX ORDER — SODIUM CHLORIDE 9 MG/ML
20 INJECTION, SOLUTION INTRAVENOUS ONCE
OUTPATIENT
Start: 2023-06-27

## 2023-06-02 RX ADMIN — SODIUM CHLORIDE 20 ML/HR: 0.9 INJECTION, SOLUTION INTRAVENOUS at 09:03

## 2023-06-02 RX ADMIN — VEDOLIZUMAB 300 MG: 300 INJECTION, POWDER, LYOPHILIZED, FOR SOLUTION INTRAVENOUS at 09:16

## 2023-06-02 NOTE — PROGRESS NOTES
Patient arrived for next dose of Entyvio  Offers no complaints  Patient denies any recent infections or antibiotics

## 2023-06-02 NOTE — PROGRESS NOTES
Patient tolerated entyvio infusion today without complications   Patient verified upcoming appointment and declined AVS

## 2023-06-06 ENCOUNTER — TELEPHONE (OUTPATIENT)
Dept: CARDIOLOGY CLINIC | Facility: CLINIC | Age: 73
End: 2023-06-06

## 2023-06-06 ENCOUNTER — DOCUMENTATION (OUTPATIENT)
Dept: NEPHROLOGY | Facility: CLINIC | Age: 73
End: 2023-06-06

## 2023-06-06 NOTE — TELEPHONE ENCOUNTER
Called patient to inform him we currently do not have any openings for loop explant at [facility]  I offered [place] or [facility] and he states he would like to wait for [facility]       Patient placed on wait list

## 2023-06-06 NOTE — TELEPHONE ENCOUNTER
----- Message from Alina Julien sent at 6/5/2023 11:36 AM EDT -----  Regarding: Loop explant  Please schedule pt for loop explant per RK via TT   Thanks

## 2023-06-06 NOTE — PROGRESS NOTES
Home blood pressure readings:  115/84, no orthostatic changes  P m : 97/69, no orthostatic changes  Heart rate 70-80 range    Only recommendation is to switch Toprol-XL to 75 mg all at once at bedtime to help with the morning readings and allow the evening readings increase    Have him recheck blood pressures 4 to 6 weeks  Thank you

## 2023-06-06 NOTE — LETTER
6/6/2023      MRN: [id number]        Laura Cantu [address] [place] [id number]      You have been referred to our Cardiology department to be scheduled for a LOOP Recorder Explant procedure  We do not have any openings available at the [facility] to schedule this procedure but can schedule it at [facility] or [facility]  If these do not accommodate your need then I can add you to the [address] [place] [address] waitlist until an opening becomes available  Please call me at  or [phone] to let me know which campus you prefer to schedule your procedure         Thank you,   Michelle Mckee  Surgery Coordinator  [facility]   [address] [place] [address]  [place], [address] [place]  Ph: [phone]

## 2023-06-06 NOTE — PROGRESS NOTES
Called and spoke to the patient to relay the message above  Patient expressed understanding and had no further questions or concerns at this time  Med list updated

## 2023-06-12 ENCOUNTER — OFFICE VISIT (OUTPATIENT)
Dept: CARDIOLOGY CLINIC | Facility: CLINIC | Age: 73
End: 2023-06-12
Payer: MEDICARE

## 2023-06-12 VITALS
SYSTOLIC BLOOD PRESSURE: 108 MMHG | OXYGEN SATURATION: 95 % | BODY MASS INDEX: 32.86 KG/M2 | DIASTOLIC BLOOD PRESSURE: 70 MMHG | WEIGHT: 229 LBS | HEART RATE: 76 BPM

## 2023-06-12 DIAGNOSIS — I25.5 ISCHEMIC CARDIOMYOPATHY: Primary | ICD-10-CM

## 2023-06-12 DIAGNOSIS — I47.1 SVT (SUPRAVENTRICULAR TACHYCARDIA) (HCC): ICD-10-CM

## 2023-06-12 DIAGNOSIS — Z72.0 TOBACCO ABUSE: ICD-10-CM

## 2023-06-12 DIAGNOSIS — Z95.828 S/P FEMORAL-POPLITEAL BYPASS SURGERY: ICD-10-CM

## 2023-06-12 DIAGNOSIS — I48.0 PAROXYSMAL ATRIAL FIBRILLATION (HCC): ICD-10-CM

## 2023-06-12 DIAGNOSIS — I45.2 RBBB (RIGHT BUNDLE BRANCH BLOCK WITH LEFT ANTERIOR FASCICULAR BLOCK): ICD-10-CM

## 2023-06-12 DIAGNOSIS — E78.5 DYSLIPIDEMIA: ICD-10-CM

## 2023-06-12 PROBLEM — I42.9 CARDIOMYOPATHY, UNSPECIFIED TYPE (HCC): Status: RESOLVED | Noted: 2023-04-06 | Resolved: 2023-06-12

## 2023-06-12 PROCEDURE — 93000 ELECTROCARDIOGRAM COMPLETE: CPT | Performed by: INTERNAL MEDICINE

## 2023-06-12 PROCEDURE — 99213 OFFICE O/P EST LOW 20 MIN: CPT | Performed by: INTERNAL MEDICINE

## 2023-06-12 NOTE — PROGRESS NOTES
Cardiology Follow Up    Frank Borregozinger III  1950 [id number]  [facility] [address]  [address]  [place] [id number] [phone]    1  Ischemic cardiomyopathy  POCT ECG      2  Paroxysmal atrial fibrillation (HCC)  POCT ECG      3  RBBB (right bundle branch block with left anterior fascicular block)        4  SVT (supraventricular tachycardia) ([address] )        5  Dyslipidemia        6  S/P femoral-popliteal bypass surgery        7  Tobacco abuse              Discussion/Summary: All of his assessed cardiac problems are stable  I have reviewed his medications and made no changes  No cardiac testing is ordered  His ILR has reached EOL and will be explanted soon  I stressed to him that he must stop smoking  RTO 1 year  Interval History: He has not had any cardiac problems since his last OV  He is walking 2 1/2 miles regularly  He denies CP, SOB, palpitations, LE edema  He continues to smoke a few cigs a day  He has severe PVD and ICM with EF around 50%  He has PAF and is on [facility]  His weight is down from 240 to 229 lbs      Patient Active Problem List   Diagnosis   • Obesity, morbid ([address] )   • Chronic kidney disease, stage III (moderate) (HCC)   • Persistent proteinuria   • Microscopic hematuria   • [place] cardiac risk 10-20% in next 10 years   • Abdominal aortic atherosclerosis (HCC)   • RBBB (right bundle branch block with left anterior fascicular block)   • Tobacco abuse   • Dyslipidemia   • Vitamin D deficiency   • Harris's esophagus without dysplasia   • Colon polyps   • PAD (peripheral artery disease) (HCC)   • Mass of both adrenal glands (HCC)   • Ischemic cardiomyopathy   • S/P peripheral artery angioplasty with stent placement   • Venous stasis   • Paroxysmal atrial fibrillation (HCC)   • Anemia of chronic renal failure, stage 3 (moderate) ([address] )   • Essential (hemorrhagic) thrombocythemia (HCC)   • Adrenal mass (HCC)   • Crohn's disease of small intestine with complication (HCC)   • SVT (supraventricular tachycardia) (HCC)   • Positive QuantiFERON-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   • Terminal ileitis of small intestine (HCC)   • S/P femoral-popliteal bypass surgery   • Elevated alkaline phosphatase level   • Vitamin B12 deficiency   • Type 2 diabetes mellitus with diabetic peripheral angiopathy without gangrene (HCC)   • Venous insufficiency     Past Medical History:   Diagnosis Date   • Blue toe syndrome (HCC)    • Chronic kidney disease    • Colon polyps    • GERD (gastroesophageal reflux disease)    • Hematuria    • History of rheumatic fever    • Hypolipidemia    • Hypotension    • Ischemic cardiomyopathy    • PAD (peripheral artery disease) (HCC)    • Pulmonary emphysema (HCC)    • PVD (peripheral vascular disease) ([facility] )      Social History     Socioeconomic History   • Marital status: /Civil Union     Spouse name: Not on file   • Number of children: 2   • Years of education: Not on file   • Highest education level: Not on file   Occupational History   • Occupation: retired   Tobacco Use   • Smoking status: Some Days     Packs/day: 0 25     Years: 30 00     Total pack years: 7 50     Types: Cigarettes   • Smokeless tobacco: Never   Vaping Use   • Vaping Use: Never used   Substance and Sexual Activity   • Alcohol use:  Yes     Alcohol/week: 1 0 standard drink of alcohol     Types: 1 Standard drinks or equivalent per week     Comment: 1 per week   • Drug use: Never   • Sexual activity: Not Currently   Other Topics Concern   • Not on file   Social History Narrative    Drinks coffee     Social Determinants of Health     Financial Resource Strain: Low Risk  (1/12/2023)    Overall Financial Resource Strain (CARDIA)    • Difficulty of Paying Living Expenses: Not very hard   Food Insecurity: Not on file   Transportation Needs: No Transportation Needs (1/12/2023)    PRAPARE - Transportation    • Lack of Transportation (Medical): No    • Lack of Transportation (Non-Medical):  No   Physical Activity: Not on file   Stress: Not on file   Social Connections: Not on file   Intimate Partner Violence: Not on file   Housing Stability: Not on file      Family History   Problem Relation Age of Onset   • Heart disease Mother    • Hyperlipidemia Mother    • Hypertension Mother    • Hypertension Father    • Heart disease Father    • Stroke Father    • Hyperlipidemia Father    • Diabetes Brother    • No Known Problems Maternal Grandmother    • Dementia Neg Hx    • Drug abuse Neg Hx    • Mental illness Neg Hx    • Substance Abuse Neg Hx    • Alcohol abuse Neg Hx    • Depression Neg Hx    • Colon cancer Neg Hx      Past Surgical History:   Procedure Laterality Date   • COLONOSCOPY  2019   • HERNIA REPAIR     • IR AORTAGRAM WITH RUN-OFF  6/27/2019   • IR AORTAGRAM WITH RUN-OFF  2/12/2020   • POPLITEAL ARTERY STENT Right     6/2019   • [place] BYPASS W/VEIN FEMORAL-POPLITEAL Right 3/26/2020    Procedure: BYPASS FEMORAL-POPLITEAL; Right lower extremity bypass, fem-bk pop with GSV;  Surgeon: Benedict Carnes MD;  Location: [facility];  Service: Vascular   • [place] SLCTV CATHJ 3RD+ ORD SLCTV ABDL PEL/LXTR [facility] Right 6/27/2019    Procedure: leg ANGIOGRAM WITH STENT, BALLOON ANGIOPLASTY, LEFT GROIN ACCESS;  Surgeon: Benedict Carnes MD;  Location: [facility];  Service: Vascular       Current Outpatient Medications:   •  aspirin 81 MG tablet, Take 81 mg by mouth daily, Disp: , Rfl:   •  atorvastatin (LIPITOR) 20 mg tablet, Take 1 tablet (20 mg total) by mouth daily, Disp: 90 tablet, Rfl: 0  •  folic acid (FOLVITE) 1 mg tablet, Take 1 tablet (1 mg total) by mouth daily, Disp: 90 tablet, Rfl: 3  •  glimepiride (AMARYL) 2 mg tablet, Take 1 tablet (2 mg total) by mouth daily with breakfast, Disp: 90 tablet, Rfl: 0  •  Magnesium 500 MG TABS, Take 1 tablet by mouth daily , Disp: , Rfl:   •  metoprolol succinate (TOPROL-XL) 25 mg 24 hr tablet, Take 1 tablet (25 mg total) by mouth daily (Patient taking differently: Take 25 mg by mouth daily Taken with the 50mg Toprol for 75mg in the evening), Disp: 90 tablet, Rfl: 0  •  metoprolol succinate (TOPROL-XL) 50 mg 24 hr tablet, Take 1 tablet (50 mg total) by mouth daily (Patient taking differently: Take 50 mg by mouth daily Taken with the 25mg Toprol for 75mg in the evening), Disp: 90 tablet, Rfl: 4  •  omeprazole (PriLOSEC) 40 MG capsule, Take 1 capsule (40 mg total) by mouth daily, Disp: 90 capsule, Rfl: 0  •  rivaroxaban (XARELTO) 20 mg tablet, Take 1 tablet (20 mg total) by mouth daily with breakfast, Disp: 90 tablet, Rfl: 0  •  vitamin B-12 (VITAMIN B-12) 1,000 mcg tablet, Take 1 tablet (1,000 mcg total) by mouth daily (Patient taking differently: Take 1,000 mcg by mouth 3 (three) times a week), Disp: 90 tablet, Rfl: 1  No Known Allergies  Vitals:    06/12/23 0859   BP: 108/70   BP Location: Left arm   Patient Position: Sitting   Cuff Size: Standard   Pulse: 76   SpO2: 95%   Weight: 104 kg (229 lb)     Weight (last 2 days)     Date/Time Weight    06/12/23 0859 104 (229)         Blood pressure 108/70, pulse 76, weight 104 kg (229 lb), SpO2 95 %  , Body mass index is 32 86 kg/m²      Labs:  Lab on 04/03/2023   Component Date Value   • Cholesterol 04/03/2023 107    • Triglycerides 04/03/2023 147    • HDL, Direct 04/03/2023 37 (L)    • LDL Calculated 04/03/2023 41    • Sodium 04/03/2023 140    • Potassium 04/03/2023 4 4    • Chloride 04/03/2023 108    • CO2 04/03/2023 26    • ANION GAP 04/03/2023 6    • BUN 04/03/2023 16    • Creatinine 04/03/2023 1 09    • Glucose, Fasting 04/03/2023 170 (H)    • Calcium 04/03/2023 8 7    • Corrected Calcium 04/03/2023 9 2    • AST 04/03/2023 12 (L)    • ALT 04/03/2023 14    • Alkaline Phosphatase 04/03/2023 183 (H)    • Total Protein 04/03/2023 6 9    • Albumin 04/03/2023 3 4 (L)    • Total Bilirubin 04/03/2023 0 50    • eGFR 04/03/2023 67    • Magnesium 04/03/2023 2 0    • PTH 04/03/2023 84 6 (H) • Creatinine, Ur 04/03/2023 190 3    • Protein Urine Random 04/03/2023 37    • Prot/Creat Ratio, Ur 04/03/2023 0 19 (H)    • Phosphorus 04/03/2023 2 9    • WBC 04/03/2023 11 45 (H)    • RBC 04/03/2023 5 68 (H)    • Hemoglobin 04/03/2023 16 2    • Hematocrit 04/03/2023 51 4 (H)    • MCV 04/03/2023 91    • MCH 04/03/2023 28 5    • MCHC 04/03/2023 31 5    • RDW 04/03/2023 15 2 (H)    • MPV 04/03/2023 9 8    • Platelets 42/39/7603 339    • nRBC 04/03/2023 0    • Neutrophils Relative 04/03/2023 68    • Immat GRANS % 04/03/2023 1    • Lymphocytes Relative 04/03/2023 21    • Monocytes Relative 04/03/2023 7    • Eosinophils Relative 04/03/2023 2    • Basophils Relative 04/03/2023 1    • Neutrophils Absolute 04/03/2023 7 86 (H)    • Immature Grans Absolute 04/03/2023 0 08    • Lymphocytes Absolute 04/03/2023 2 38    • Monocytes Absolute 04/03/2023 0 81    • Eosinophils Absolute 04/03/2023 0 24    • Basophils Absolute 04/03/2023 0 08    Hospital Outpatient Visit on 01/30/2023   Component Date Value   • POC Glucose 01/30/2023 119    • Case Report 01/30/2023                      Value:Surgical Pathology Report                         Case: [id number]                                   Authorizing Provider:  Tray Domingo MD          Collected:           01/30/2023 0808              Ordering Location:     [address] [place] [address]        Received:            01/30/2023 [address] [place] [address] Endoscopy                                                           Pathologist:           Melanie Sorto MD                                                                  Specimens:   A) - Stomach, gastric biopsy r/o h pylori                                                           B) - Esophagus, esophagus biopsy @42cm barretts                                                     C) - Terminal Ileum, terminal ileum biopsy h/o crohns                                               D) - Colon, ascending colon biopsy h/o crohns                                                       E) - Colon, transverse colon biopsy h/o crohns                                                                                F) - Colon, descending colon biopsy h/o crohns                                                      G) - Colon, sigmoid colon biopsy h/o crohns                                                         H) - Polyp, Colorectal, rectal polyp cold snare                                                     I) - Colon, rectal biopsy h/o crohns                                                      • Final Diagnosis 01/30/2023                      Value: This result contains rich text formatting which cannot be displayed here  • Additional Information 01/30/2023                      Value: This result contains rich text formatting which cannot be displayed here  • Synoptic Checklist 01/30/2023                      Value:                            COLON/RECTUM POLYP FORM - GI - All Specimens                                                                                     :    Adenoma(s)     • Gross Description 01/30/2023                      Value: This result contains rich text formatting which cannot be displayed here  Office Visit on 01/12/2023   Component Date Value   • Hemoglobin A1C 01/12/2023 8 3 (A)    Lab on 01/04/2023   Component Date Value   • Hemoglobin A1C 01/04/2023 10 3 (H)    • EAG 01/04/2023 249    • Sodium 01/04/2023 139    • Potassium 01/04/2023 4 4    • Chloride 01/04/2023 105    • CO2 01/04/2023 30    • ANION GAP 01/04/2023 4    • BUN 01/04/2023 15    • Creatinine 01/04/2023 1 05    • Glucose, Fasting 01/04/2023 173 (H)    • Calcium 01/04/2023 8 8    • eGFR 01/04/2023 71    • TSH 3RD GENERATON 01/04/2023 1 744    • Vitamin B-12 01/04/2023 565    • Vit D, 25-Hydroxy 01/04/2023 32 8      Imaging: No results found      Review of Systems:  Review of Systems   Constitutional: Negative for diaphoresis, fatigue, fever and unexpected weight change  HENT: Negative  Respiratory: Negative for cough, shortness of breath and wheezing  Cardiovascular: Negative for chest pain, palpitations and leg swelling  Gastrointestinal: Negative for abdominal pain, diarrhea and nausea  Musculoskeletal: Negative for gait problem and myalgias  Skin: Negative for rash  Neurological: Negative for dizziness and numbness  Psychiatric/Behavioral: Negative  Physical Exam:  Physical Exam  Constitutional:       Appearance: He is well-developed  HENT:      Head: Normocephalic and atraumatic  Eyes:      Pupils: Pupils are equal, round, and reactive to light  Neck:      Vascular: No JVD  Cardiovascular:      Rate and Rhythm: Regular rhythm  Pulses: Normal pulses  Carotid pulses are 2+ on the right side and 2+ on the left side  Heart sounds: S1 normal and S2 normal    Pulmonary:      Effort: Pulmonary effort is normal       Breath sounds: Normal breath sounds  No wheezing or rales  Abdominal:      General: Bowel sounds are normal       Palpations: Abdomen is soft  Tenderness: There is no abdominal tenderness  Musculoskeletal:         General: No tenderness  Normal range of motion  Cervical back: Normal range of motion and neck supple  Skin:     General: Skin is warm  Neurological:      Mental Status: He is alert and oriented to person, place, and time  Cranial Nerves: No cranial nerve deficit  Deep Tendon Reflexes: Reflexes are normal and symmetric

## 2023-06-14 DIAGNOSIS — E11.51 TYPE 2 DIABETES MELLITUS WITH DIABETIC PERIPHERAL ANGIOPATHY WITHOUT GANGRENE, WITHOUT LONG-TERM CURRENT USE OF INSULIN (HCC): ICD-10-CM

## 2023-06-14 DIAGNOSIS — I73.9 PAD (PERIPHERAL ARTERY DISEASE) (HCC): ICD-10-CM

## 2023-06-14 DIAGNOSIS — R03.0 ELEVATED BLOOD PRESSURE READING: ICD-10-CM

## 2023-06-14 DIAGNOSIS — K21.00 GERD WITH ESOPHAGITIS: ICD-10-CM

## 2023-06-14 RX ORDER — GLIMEPIRIDE 2 MG/1
2 TABLET ORAL
Qty: 90 TABLET | Refills: 0 | Status: SHIPPED | OUTPATIENT
Start: 2023-06-14

## 2023-06-14 RX ORDER — OMEPRAZOLE 40 MG/1
40 CAPSULE, DELAYED RELEASE ORAL DAILY
Qty: 90 CAPSULE | Refills: 0 | Status: SHIPPED | OUTPATIENT
Start: 2023-06-14

## 2023-06-14 RX ORDER — ATORVASTATIN CALCIUM 20 MG/1
20 TABLET, FILM COATED ORAL DAILY
Qty: 90 TABLET | Refills: 0 | Status: SHIPPED | OUTPATIENT
Start: 2023-06-14

## 2023-06-15 RX ORDER — METOPROLOL SUCCINATE 25 MG/1
25 TABLET, EXTENDED RELEASE ORAL DAILY
Qty: 90 TABLET | Refills: 2 | Status: SHIPPED | OUTPATIENT
Start: 2023-06-15 | End: 2023-06-16 | Stop reason: SDUPTHER

## 2023-06-16 ENCOUNTER — TELEPHONE (OUTPATIENT)
Dept: OTHER | Facility: HOSPITAL | Age: 73
End: 2023-06-16

## 2023-06-16 DIAGNOSIS — I48.0 PAROXYSMAL ATRIAL FIBRILLATION (HCC): ICD-10-CM

## 2023-06-16 DIAGNOSIS — R03.0 ELEVATED BLOOD PRESSURE READING: ICD-10-CM

## 2023-06-16 RX ORDER — METOPROLOL SUCCINATE 25 MG/1
25 TABLET, EXTENDED RELEASE ORAL DAILY
Qty: 90 TABLET | Refills: 3 | Status: SHIPPED | OUTPATIENT
Start: 2023-06-16

## 2023-06-16 RX ORDER — METOPROLOL SUCCINATE 50 MG/1
50 TABLET, EXTENDED RELEASE ORAL DAILY
Qty: 90 TABLET | Refills: 3 | Status: SHIPPED | OUTPATIENT
Start: 2023-06-16

## 2023-06-16 NOTE — TELEPHONE ENCOUNTER
Reordered Toprol-XL  The current dose is 75 mg in the evening therefore we needed to order the 50 and 25 mg tablet to equal 75 mg  I talked to his wife about it

## 2023-06-19 ENCOUNTER — LAB (OUTPATIENT)
Dept: LAB | Facility: CLINIC | Age: 73
End: 2023-06-19
Payer: MEDICARE

## 2023-06-19 DIAGNOSIS — R31.29 MICROSCOPIC HEMATURIA: ICD-10-CM

## 2023-06-19 DIAGNOSIS — N18.31 STAGE 3A CHRONIC KIDNEY DISEASE (HCC): ICD-10-CM

## 2023-06-19 DIAGNOSIS — E11.51 TYPE 2 DIABETES MELLITUS WITH DIABETIC PERIPHERAL ANGIOPATHY WITHOUT GANGRENE, WITHOUT LONG-TERM CURRENT USE OF INSULIN (HCC): ICD-10-CM

## 2023-06-19 DIAGNOSIS — R80.1 PERSISTENT PROTEINURIA: ICD-10-CM

## 2023-06-19 DIAGNOSIS — E78.5 DYSLIPIDEMIA: ICD-10-CM

## 2023-06-19 DIAGNOSIS — E53.8 VITAMIN B12 DEFICIENCY: ICD-10-CM

## 2023-06-19 DIAGNOSIS — I95.89 CHRONIC HYPOTENSION: ICD-10-CM

## 2023-06-19 DIAGNOSIS — D63.1 ANEMIA OF CHRONIC RENAL FAILURE, STAGE 3A (HCC): ICD-10-CM

## 2023-06-19 DIAGNOSIS — R91.8 LUNG FIELD ABNORMAL FINDING ON EXAMINATION: ICD-10-CM

## 2023-06-19 DIAGNOSIS — N18.31 ANEMIA OF CHRONIC RENAL FAILURE, STAGE 3A (HCC): ICD-10-CM

## 2023-06-19 DIAGNOSIS — E55.9 VITAMIN D DEFICIENCY: ICD-10-CM

## 2023-06-19 LAB
ALBUMIN SERPL BCP-MCNC: 3.5 G/DL (ref 3.5–5)
ALP SERPL-CCNC: 175 U/L (ref 34–104)
ALT SERPL W P-5'-P-CCNC: 15 U/L (ref 7–52)
ANION GAP SERPL CALCULATED.3IONS-SCNC: 5 MMOL/L (ref 4–13)
AST SERPL W P-5'-P-CCNC: 11 U/L (ref 13–39)
BASOPHILS # BLD AUTO: 0.05 THOUSANDS/ÂΜL (ref 0–0.1)
BASOPHILS NFR BLD AUTO: 0 % (ref 0–1)
BILIRUB SERPL-MCNC: 0.4 MG/DL (ref 0.2–1)
BUN SERPL-MCNC: 19 MG/DL (ref 5–25)
CALCIUM SERPL-MCNC: 8.6 MG/DL (ref 8.4–10.2)
CHLORIDE SERPL-SCNC: 105 MMOL/L (ref 96–108)
CHOLEST SERPL-MCNC: 113 MG/DL
CO2 SERPL-SCNC: 29 MMOL/L (ref 21–32)
CREAT SERPL-MCNC: 1.12 MG/DL (ref 0.6–1.3)
EOSINOPHIL # BLD AUTO: 0.21 THOUSAND/ÂΜL (ref 0–0.61)
EOSINOPHIL NFR BLD AUTO: 2 % (ref 0–6)
ERYTHROCYTE [DISTWIDTH] IN BLOOD BY AUTOMATED COUNT: 14.8 % (ref 11.6–15.1)
EST. AVERAGE GLUCOSE BLD GHB EST-MCNC: 163 MG/DL
GFR SERPL CREATININE-BSD FRML MDRD: 65 ML/MIN/1.73SQ M
GLUCOSE P FAST SERPL-MCNC: 155 MG/DL (ref 65–99)
HBA1C MFR BLD: 7.3 %
HCT VFR BLD AUTO: 52 % (ref 36.5–49.3)
HDLC SERPL-MCNC: 40 MG/DL
HGB BLD-MCNC: 16.5 G/DL (ref 12–17)
IMM GRANULOCYTES # BLD AUTO: 0.08 THOUSAND/UL (ref 0–0.2)
IMM GRANULOCYTES NFR BLD AUTO: 1 % (ref 0–2)
LDLC SERPL CALC-MCNC: 37 MG/DL (ref 0–100)
LYMPHOCYTES # BLD AUTO: 2.42 THOUSANDS/ÂΜL (ref 0.6–4.47)
LYMPHOCYTES NFR BLD AUTO: 21 % (ref 14–44)
MCH RBC QN AUTO: 29 PG (ref 26.8–34.3)
MCHC RBC AUTO-ENTMCNC: 31.7 G/DL (ref 31.4–37.4)
MCV RBC AUTO: 91 FL (ref 82–98)
MONOCYTES # BLD AUTO: 0.91 THOUSAND/ÂΜL (ref 0.17–1.22)
MONOCYTES NFR BLD AUTO: 8 % (ref 4–12)
NEUTROPHILS # BLD AUTO: 7.86 THOUSANDS/ÂΜL (ref 1.85–7.62)
NEUTS SEG NFR BLD AUTO: 68 % (ref 43–75)
NONHDLC SERPL-MCNC: 73 MG/DL
NRBC BLD AUTO-RTO: 0 /100 WBCS
PLATELET # BLD AUTO: 322 THOUSANDS/UL (ref 149–390)
PMV BLD AUTO: 9.7 FL (ref 8.9–12.7)
POTASSIUM SERPL-SCNC: 4.3 MMOL/L (ref 3.5–5.3)
PROT SERPL-MCNC: 7 G/DL (ref 6.4–8.4)
RBC # BLD AUTO: 5.69 MILLION/UL (ref 3.88–5.62)
SODIUM SERPL-SCNC: 139 MMOL/L (ref 135–147)
TRIGL SERPL-MCNC: 181 MG/DL
WBC # BLD AUTO: 11.53 THOUSAND/UL (ref 4.31–10.16)

## 2023-06-19 PROCEDURE — 80061 LIPID PANEL: CPT

## 2023-06-19 PROCEDURE — 80053 COMPREHEN METABOLIC PANEL: CPT

## 2023-06-19 PROCEDURE — 83036 HEMOGLOBIN GLYCOSYLATED A1C: CPT

## 2023-06-19 PROCEDURE — 36415 COLL VENOUS BLD VENIPUNCTURE: CPT

## 2023-06-19 PROCEDURE — 85025 COMPLETE CBC W/AUTO DIFF WBC: CPT

## 2023-06-26 ENCOUNTER — RA CDI HCC (OUTPATIENT)
Dept: OTHER | Facility: HOSPITAL | Age: 73
End: 2023-06-26

## 2023-06-26 NOTE — PROGRESS NOTES
[address]11 22  [facility]  coding opportunities          Chart Reviewed number of suggestions sent to Provider: 1     Patients Insurance     Medicare Insurance: [employer]

## 2023-07-05 ENCOUNTER — OFFICE VISIT (OUTPATIENT)
Dept: INTERNAL MEDICINE CLINIC | Facility: CLINIC | Age: 73
End: 2023-07-05
Payer: MEDICARE

## 2023-07-05 ENCOUNTER — TELEPHONE (OUTPATIENT)
Dept: ADMINISTRATIVE | Facility: OTHER | Age: 73
End: 2023-07-05

## 2023-07-05 VITALS
DIASTOLIC BLOOD PRESSURE: 82 MMHG | HEART RATE: 69 BPM | BODY MASS INDEX: 34.21 KG/M2 | OXYGEN SATURATION: 93 % | WEIGHT: 231 LBS | HEIGHT: 69 IN | TEMPERATURE: 98 F | SYSTOLIC BLOOD PRESSURE: 122 MMHG

## 2023-07-05 DIAGNOSIS — K22.70 BARRETT'S ESOPHAGUS WITHOUT DYSPLASIA: ICD-10-CM

## 2023-07-05 DIAGNOSIS — I73.9 PAD (PERIPHERAL ARTERY DISEASE) (HCC): ICD-10-CM

## 2023-07-05 DIAGNOSIS — E55.9 VITAMIN D DEFICIENCY: ICD-10-CM

## 2023-07-05 DIAGNOSIS — I48.0 PAROXYSMAL ATRIAL FIBRILLATION (HCC): ICD-10-CM

## 2023-07-05 DIAGNOSIS — E53.8 VITAMIN B12 DEFICIENCY: ICD-10-CM

## 2023-07-05 DIAGNOSIS — E11.51 TYPE 2 DIABETES MELLITUS WITH DIABETIC PERIPHERAL ANGIOPATHY WITHOUT GANGRENE, WITHOUT LONG-TERM CURRENT USE OF INSULIN (HCC): ICD-10-CM

## 2023-07-05 DIAGNOSIS — N18.31 STAGE 3A CHRONIC KIDNEY DISEASE (HCC): ICD-10-CM

## 2023-07-05 DIAGNOSIS — E27.8 ADRENAL MASS (HCC): ICD-10-CM

## 2023-07-05 DIAGNOSIS — K50.019 CROHN'S DISEASE OF SMALL INTESTINE WITH COMPLICATION (HCC): Primary | ICD-10-CM

## 2023-07-05 DIAGNOSIS — E66.09 CLASS 1 OBESITY DUE TO EXCESS CALORIES WITH SERIOUS COMORBIDITY AND BODY MASS INDEX (BMI) OF 34.0 TO 34.9 IN ADULT: ICD-10-CM

## 2023-07-05 DIAGNOSIS — E78.5 DYSLIPIDEMIA: ICD-10-CM

## 2023-07-05 PROBLEM — D63.1 ANEMIA OF CHRONIC RENAL FAILURE, STAGE 3 (MODERATE) (HCC): Status: RESOLVED | Noted: 2019-10-09 | Resolved: 2023-07-05

## 2023-07-05 PROBLEM — N18.30 ANEMIA OF CHRONIC RENAL FAILURE, STAGE 3 (MODERATE) (HCC): Status: RESOLVED | Noted: 2019-10-09 | Resolved: 2023-07-05

## 2023-07-05 PROBLEM — Z72.0 TOBACCO ABUSE: Status: RESOLVED | Noted: 2018-05-11 | Resolved: 2023-07-05

## 2023-07-05 PROCEDURE — 99214 OFFICE O/P EST MOD 30 MIN: CPT | Performed by: INTERNAL MEDICINE

## 2023-07-05 NOTE — LETTER
Diabetic Eye Exam Form    Date Requested: 23  Patient: Rere Willis III  Patient : 1950   Referring Provider: Adelaide Cooley MD      DIABETIC Eye Exam Date _______________________________      Type of Exam MUST be documented for Diabetic Eye Exams. Please CHECK ONE. Retinal Exam       Dilated Retinal Exam       OCT       Optomap-Iris Exam      Fundus Photography       Left Eye - Please check Retinopathy or No Retinopathy        Exam did show retinopathy    Exam did not show retinopathy       Right Eye - Please check Retinopathy or No Retinopathy       Exam did show retinopathy    Exam did not show retinopathy       Comments __________________________________________________________    Practice Providing Exam ______________________________________________    Exam Performed By (print name) _______________________________________      Provider Signature ___________________________________________________      These reports are needed for  compliance. Please fax this completed form and a copy of the Diabetic Eye Exam report to our office located at [address] [place] [address] as soon as possible via Fax [phone] attention Hanna Guzmán: Phone [phone]  We thank you for your assistance in treating our mutual patient.

## 2023-07-05 NOTE — TELEPHONE ENCOUNTER
Upon review of the In Basket request and the patient's chart, initial outreach has been made via fax to facility. Please see Contacts section for details.      Thank you  [employer]

## 2023-07-05 NOTE — ASSESSMENT & PLAN NOTE
Lab Results   Component Value Date    HGBA1C 7.3 (H) 06/19/2023     A1c improved. On glimepiride 2 mg daily. Reviewed diet.

## 2023-07-05 NOTE — ASSESSMENT & PLAN NOTE
Lab Results   Component Value Date    EGFR 65 06/19/2023    EGFR 67 04/03/2023    EGFR 71 01/04/2023    CREATININE 1.12 06/19/2023    CREATININE 1.09 04/03/2023    CREATININE 1.05 01/04/2023     Stable. No NSAIDs. On PPI. Followed by nephrology.

## 2023-07-05 NOTE — PROGRESS NOTES
Assessment/Plan:    Crohn's disease of small intestine with complication (HCC)  Stable, on Entyvio. Per GI. Harris's esophagus without dysplasia  On daily PPI. He will check with GI if we can decrease omeprazole to 20 mg. Type 2 diabetes mellitus with diabetic peripheral angiopathy without gangrene ([address])    Lab Results   Component Value Date    HGBA1C 7.3 (H) 06/19/2023     A1c improved. On glimepiride 2 mg daily. Reviewed diet. Dyslipidemia  At goal, on atorvastatin 20 mg. Paroxysmal atrial fibrillation (HCC)  Denies any symptoms. On metoprolol and Xarelto. PAD (peripheral artery disease) (HCC)  On ASA, statin. Followed by vascular. Adrenal mass ([address])  Schedule CT. Chronic kidney disease, stage III (moderate) (HCC)  Lab Results   Component Value Date    EGFR 65 06/19/2023    EGFR 67 04/03/2023    EGFR 71 01/04/2023    CREATININE 1.12 06/19/2023    CREATININE 1.09 04/03/2023    CREATININE 1.05 01/04/2023     Stable. No NSAIDs. On PPI. Followed by nephrology. Class 1 obesity due to excess calories with serious comorbidity and body mass index (BMI) of 34.0 to 34.9 in adult  Weight loss 7 lbs since last visit. Diagnoses and all orders for this visit:    Crohn's disease of small intestine with complication ([address])    Stage 3a chronic kidney disease ([address])    Harris's esophagus without dysplasia    Adrenal mass (HCC)    Dyslipidemia  -     TSH, 3rd generation; Future    Paroxysmal atrial fibrillation (HCC)    PAD (peripheral artery disease) (HCC)    Vitamin B12 deficiency    Type 2 diabetes mellitus with diabetic peripheral angiopathy without gangrene, without long-term current use of insulin (HCC)  -     Hemoglobin A1C; Future    Vitamin D deficiency    Class 1 obesity due to excess calories with serious comorbidity and body mass index (BMI) of 34.0 to 34.9 in adult      Follow up in 6 months or as needed.       Subjective:      Patient ID: Goldie Zimmer III is a 71 y.o. male here for a follow up. He feels well, no complaints. He just returned from a week in [facility]. He noticed frequent bruising in his left arm, cannot remember a specific injury. No open wounds. He denies any chest pain, shortness of breath, palpitations or leg swelling. He moves his bowels at least 3 times a day, formed and non bloody. No abdominal pain. He continues to exercise regularly. He reports losing 12 lbs but gained some back during vacation. The following portions of the patient's history were reviewed and updated as appropriate: allergies, current medications, past medical history, past social history and problem list.    Review of Systems   Constitutional: Negative for activity change, appetite change and fatigue. HENT: Negative for congestion, hearing loss and postnasal drip. Eyes: Negative. Negative for visual disturbance. Respiratory: Negative for cough, chest tightness and shortness of breath. Cardiovascular: Negative for chest pain, palpitations and leg swelling. Gastrointestinal: Negative for abdominal pain and constipation. Genitourinary: Negative for dysuria, frequency and urgency. Musculoskeletal: Negative for arthralgias. Skin: Negative for rash and wound. Neurological: Negative for dizziness, numbness and headaches. Hematological: Negative for adenopathy. Bruises/bleeds easily. Psychiatric/Behavioral: Negative for sleep disturbance. The patient is not nervous/anxious. Objective:      /82   Pulse 69   Temp 98 °F (36.7 °C)   Ht 5' 9" (1.753 m)   Wt 105 kg (231 lb)   SpO2 93%   BMI 34.11 kg/m²          Physical Exam  Vitals and nursing note reviewed. Constitutional:       Appearance: He is well-developed. HENT:      Head: Normocephalic and atraumatic. Eyes:      Conjunctiva/sclera: Conjunctivae normal.      Pupils: Pupils are equal, round, and reactive to light. Cardiovascular:      Rate and Rhythm: Normal rate and regular rhythm.       Heart sounds: Normal heart sounds. Pulmonary:      Effort: Pulmonary effort is normal.      Breath sounds: No wheezing or rhonchi. Abdominal:      General: Bowel sounds are normal.      Palpations: Abdomen is soft. Musculoskeletal:         General: No swelling. Cervical back: Neck supple. Right lower leg: No edema. Left lower leg: No edema. Skin:     General: Skin is warm. Findings: Bruising present. No rash. Comments: Healing bruises left forearm   Neurological:      General: No focal deficit present. Mental Status: He is alert and oriented to person, place, and time. Psychiatric:         Mood and Affect: Mood and affect normal.         Behavior: Behavior normal.           Labs & imaging results reviewed with patient.

## 2023-07-05 NOTE — PROGRESS NOTES
Diabetic Foot Exam    Patient's shoes and socks removed. Right Foot/Ankle   Right Foot Inspection  Skin Exam: skin normal and skin intact. No dry skin, no warmth, no callus, no erythema, no maceration, no abnormal color, no pre-ulcer, no ulcer and no callus. Toe Exam: ROM and strength within normal limits. Sensory   Monofilament testing: intact    Vascular  The right DP pulse is 1+. Left Foot/Ankle  Left Foot Inspection  Skin Exam: skin normal and skin intact. No dry skin, no warmth, no erythema, no maceration, normal color, no pre-ulcer, no ulcer and no callus. Toe Exam: ROM and strength within normal limits. Sensory   Monofilament testing: intact    Vascular  The left DP pulse is 1+.      Assign Risk Category  No deformity present  No loss of protective sensation  No weak pulses  Risk: 0

## 2023-07-05 NOTE — LETTER
Diabetic Eye Exam Form 2nd Request!    Date Requested: 23  Patient: Clementine Michel III  Patient : 1950   Referring Provider: Delvis Monreal MD      DIABETIC Eye Exam Date _______________________________      Type of Exam MUST be documented for Diabetic Eye Exams. Please CHECK ONE. Retinal Exam       Dilated Retinal Exam       OCT       Optomap-Iris Exam      Fundus Photography       Left Eye - Please check Retinopathy or No Retinopathy        Exam did show retinopathy    Exam did not show retinopathy       Right Eye - Please check Retinopathy or No Retinopathy       Exam did show retinopathy    Exam did not show retinopathy       Comments __________________________________________________________    Practice Providing Exam ______________________________________________    Exam Performed By (print name) _______________________________________      Provider Signature ___________________________________________________      These reports are needed for  compliance. Please fax this completed form and a copy of the Diabetic Eye Exam report to our office located at [address] [place] [address] as soon as possible via Fax [phone] attention Blake Gaming: Phone [phone]  We thank you for your assistance in treating our mutual patient.

## 2023-07-05 NOTE — TELEPHONE ENCOUNTER
----- Message from Kashmir Chris sent at 7/5/2023 10:07 AM EDT -----  Regarding: care gap request  07/05/23 10:07 AM    Hello, our patient attached above has had Diabetic Eye Exam completed/performed. Please assist in updating the patient chart by making an External outreach to [facility] facility located in [facility]. The date of service is 2022/ most recent.      Thank you,  Kashmir Chris  [facility] INTERNAL MED

## 2023-07-06 ENCOUNTER — HOSPITAL ENCOUNTER (OUTPATIENT)
Dept: INFUSION CENTER | Facility: CLINIC | Age: 73
Discharge: HOME | End: 2023-07-06
Payer: MEDICARE

## 2023-07-06 VITALS
RESPIRATION RATE: 18 BRPM | OXYGEN SATURATION: 96 % | HEART RATE: 66 BPM | TEMPERATURE: 97.6 F | DIASTOLIC BLOOD PRESSURE: 70 MMHG | SYSTOLIC BLOOD PRESSURE: 110 MMHG

## 2023-07-06 DIAGNOSIS — K50.012 CROHN'S DISEASE OF ILEUM WITH INTESTINAL OBSTRUCTION (HCC): Primary | ICD-10-CM

## 2023-07-06 PROCEDURE — 96365 THER/PROPH/DIAG IV INF INIT: CPT

## 2023-07-06 RX ORDER — SODIUM CHLORIDE 9 MG/ML
20 INJECTION, SOLUTION INTRAVENOUS ONCE
Status: COMPLETED | OUTPATIENT
Start: 2023-07-06 | End: 2023-07-06

## 2023-07-06 RX ORDER — SODIUM CHLORIDE 9 MG/ML
20 INJECTION, SOLUTION INTRAVENOUS ONCE
OUTPATIENT
Start: 2023-07-28

## 2023-07-06 RX ADMIN — SODIUM CHLORIDE 20 ML/HR: 0.9 INJECTION, SOLUTION INTRAVENOUS at 08:05

## 2023-07-06 RX ADMIN — VEDOLIZUMAB 300 MG: 300 INJECTION, POWDER, LYOPHILIZED, FOR SOLUTION INTRAVENOUS at 08:55

## 2023-07-06 NOTE — PROGRESS NOTES
Patient to [address] [facility] for [facility]: Offers no complaints at present time: Lab work ( 06/19/23 ) reviewed:  Within parameters to treat: Denies any recent infection / illness: Left Hand PIV initiated without incident

## 2023-07-12 NOTE — TELEPHONE ENCOUNTER
As a follow-up, a second attempt has been made for outreach via fax to facility and telephone call to facility. Please see Contacts section for details.     Thank you  Concha Lea

## 2023-07-14 NOTE — TELEPHONE ENCOUNTER
Upon review of the In Basket request we have found as a result of outreach that patient did not have the requested item(s) completed. As per office, the patient was last seen on 7/26/21. Same letter came back to day with 7/26/21. Any additional questions or concerns should be emailed to the Practice Liaisons via the appropriate education email address, please do not reply via In Basket.     Thank you  Josh Lee

## 2023-07-21 ENCOUNTER — TELEPHONE (OUTPATIENT)
Dept: GASTROENTEROLOGY | Facility: CLINIC | Age: 73
End: 2023-07-21

## 2023-07-21 ENCOUNTER — OFFICE VISIT (OUTPATIENT)
Dept: GASTROENTEROLOGY | Facility: CLINIC | Age: 73
End: 2023-07-21
Payer: MEDICARE

## 2023-07-21 VITALS
OXYGEN SATURATION: 92 % | BODY MASS INDEX: 34.07 KG/M2 | WEIGHT: 230 LBS | HEART RATE: 64 BPM | DIASTOLIC BLOOD PRESSURE: 82 MMHG | TEMPERATURE: 97.4 F | HEIGHT: 69 IN | SYSTOLIC BLOOD PRESSURE: 148 MMHG

## 2023-07-21 DIAGNOSIS — I25.5 ISCHEMIC CARDIOMYOPATHY: ICD-10-CM

## 2023-07-21 DIAGNOSIS — K21.00 GASTROESOPHAGEAL REFLUX DISEASE WITH ESOPHAGITIS WITHOUT HEMORRHAGE: ICD-10-CM

## 2023-07-21 DIAGNOSIS — I73.9 PAD (PERIPHERAL ARTERY DISEASE) (HCC): ICD-10-CM

## 2023-07-21 DIAGNOSIS — K50.019 CROHN'S DISEASE OF SMALL INTESTINE WITH COMPLICATION (HCC): Primary | ICD-10-CM

## 2023-07-21 DIAGNOSIS — N18.31 STAGE 3A CHRONIC KIDNEY DISEASE (HCC): ICD-10-CM

## 2023-07-21 DIAGNOSIS — Z11.59 ENCOUNTER FOR SCREENING FOR OTHER VIRAL DISEASES: ICD-10-CM

## 2023-07-21 DIAGNOSIS — D84.9 IMMUNOCOMPROMISED PATIENT (HCC): ICD-10-CM

## 2023-07-21 DIAGNOSIS — K50.012 CROHN'S DISEASE OF ILEUM WITH INTESTINAL OBSTRUCTION (HCC): ICD-10-CM

## 2023-07-21 DIAGNOSIS — R74.8 ELEVATED ALKALINE PHOSPHATASE LEVEL: ICD-10-CM

## 2023-07-21 DIAGNOSIS — K22.70 BARRETT'S ESOPHAGUS WITHOUT DYSPLASIA: ICD-10-CM

## 2023-07-21 DIAGNOSIS — K63.5 POLYP OF COLON, UNSPECIFIED PART OF COLON, UNSPECIFIED TYPE: ICD-10-CM

## 2023-07-21 PROCEDURE — 99214 OFFICE O/P EST MOD 30 MIN: CPT | Performed by: INTERNAL MEDICINE

## 2023-07-21 RX ORDER — OMEPRAZOLE 20 MG/1
20 CAPSULE, DELAYED RELEASE ORAL
Qty: 30 CAPSULE | Refills: 5 | Status: SHIPPED | OUTPATIENT
Start: 2023-07-21

## 2023-07-21 NOTE — PROGRESS NOTES
[facility] - Outpatient Follow-up Note  Haylee Johnson III 71 y.o. male MRN: [id number]  Encounter: [id number]          ASSESSMENT AND PLAN:    Haylee Johnson III is a 71 y.o. male with CKD, peripheral vascular disease, atrial fibrillation on anticoagulation, small bowel Crohn's disease with prior small bowel obstructions who is on Entyvio and clinically has felt well despite active disease, who now presents for follow-up. Patient reports doing well clinically. He recently saw a dermatologist and had a mole on his back that was frozen. He denies any symptoms today. Patient has also quit smoking. Endoscopy and colonoscopy from January 2023 notable for small hiatal hernia, antral erythema, nodular mucosa in the stomach, erosion and erythema in the terminal ileum, relatively normal colon aside from patchy erosions in the rectosigmoid and rectum and polyp in the rectum, also with pancolonic diverticula and small internal hemorrhoids; biopsies with mild chronic inactive gastritis negative for H. pylori, evidence of Harris's esophagus, focal acute inflammation of the terminal ileum, adenomatous mucosa of the rectum, chronic quiescent colitis in the rectum. MR enterography from January 2023 with active inflammatory bowel disease with moderate bowel wall thickening involving a long segment of proximal ileum and inflammatory stricturing with prestenotic dilatation up to 6 cm as well as background changes of chronic inflammatory bowel disease involving a very long estimated 50 cm of distalmost terminal ileum but no mesenteric edema, abscess, fistula. Most recent CMP notable for glucose 155, alk phos 175, otherwise relatively normal.  Prior GGT from May 2022 normal.  Prior vitamin D level normal.  Prior B12 normal.  Most recent CBC with white count of 11.53, hemoglobin 16.5, normal MCV and normal platelets. Hemoglobin A1c 7.3. Elevated parathyroid hormone.   Prior CRP 14.1.    Prior echocardiogram from June 2022 with EF of 50 to 74% with systolic function low normal and no diagnostic regional wall motion abnormality identified and mild diastolic dysfunction. 1. Crohn's disease of small intestine with complication ([address])    2. Immunocompromised patient ([address])    3. Elevated alkaline phosphatase level    4. Encounter for screening for other viral diseases    5. Harris's esophagus without dysplasia    6. Polyp of colon, unspecified part of colon, unspecified type    7. Crohn's disease of ileum with intestinal obstruction ([address])    8. Ischemic cardiomyopathy    9. PAD (peripheral artery disease) ([address])    10. Stage 3a chronic kidney disease ([address])        Orders Placed This Encounter   Procedures   • MRI abdomen w wo contrast and mrcp   • CBC and differential   • Comprehensive metabolic panel   • C-reactive protein   • Gamma GT   • Quantiferon TB Gold Plus   • Chronic Hepatitis Panel     He is currently on Entyvio every 4 weeks but his disease does not appear well controlled on imaging. At this time would recommend switching to [facility], [facility] or [facility]. Risks and benefits discussed with the patient and decided to switch to [facility], [facility].   Next blood work due now  Next quant gold and hepatitis panel due now  Next colonoscopy early 2025  Next MR enterography January 2024 or spring 2024  Next endoscopy in 2026      For his GERD we will reduce the dose of omeprazole from 40 to 20mg and he can continue this daily  Gaviscon or Tums as needed for breakthrough symptoms    Recommend MRCP for elevated alkaline phosphatase, check GGT  Commend avoiding all alcohol    · Avoid live virus vaccines  · Yearly flu shot  · COVID vaccine and booster  · Pneumonia vaccine  · Shingrix  · Routine skin exams with the dermatologist    ______________________________________________________________________    SUBJECTIVE:    Melanie Celis III is a 71 y.o. male who presents with complaint of crohn's disease    He is reporting 2-3 BMs per day, all formed There is no bright red blood per rectum/rectal bleeding, no melena, no urgency, no nocturnal BMs, no incontinence. He denies abdominal pain, rashes,mouth sores, or joint pains. He has no heartburn, dysphagia, odynophagia, nausea, vomiting. He lost 12 lbs intentionally but then gained 2 lbs during a recent vacation to [facility].      Answers for HPI/ROS submitted by the patient on 7/18/2023  What form(s) of tobacco have you used?: cigarettes  During the last year, how many days have you missed work or school because of your inflammatory bowel disease?: 0  During the last year, how many days have you been hospitalized because of your inflammatory bowel disease?: 0  During the last year, how many days have you visited a hospital emergency department because of your inflammatory bowel disease?: 0  During the last month, have you taken narcotic pain medications (such as Percocet, oxycodone, Oxycontin, morphine, Vicodin, Dilaudid, MS Contin) for your inflammatory bowel disease?: No  Have you awoken at night because you needed to move your bowels during the last month? : No  Have you had leakage of stool while sleeping during the last month?: No  Have you had leakage of stool while you were awake during the last month?: No  In the last 6 months, have you unintentionally lost weight?: No  Fever: No  Eye irritation: No  Mouth sores: No  Sore throat: No  Chest pain: No  Shortness of breath: No  Numbness or tingling in your hands or feet: No  Skin rash: No  Pain or swelling in your joints: No  Bruising or bleeding: Yes  Felt depressed or blue: No  When you are not experiencing symptoms of your inflammatory bowel disease, how many bowel movements do you typically have each day?: 3  What is the average (typical) number of bowel movements that you had in a single day during the last week?: 3  Over the last 3 days, have you had any bowel movements where you passed blood without stool?: No  Since your last visit, have you received any vaccinations?: No  Since the last visit, have you had an infection?: No  In the past three months, have you used tobacco in any form?: Yes        REVIEW OF SYSTEMS IS OTHERWISE NEGATIVE.   10 point ROS reviewed and negative, except as above      Historical Information   Past Medical History:   Diagnosis Date   • Harris esophagus    • Blue toe syndrome (HCC)    • Chronic kidney disease    • Colon polyps    • Crohn's disease ([address]) 1-2020   • GERD (gastroesophageal reflux disease)    • Hematuria    • History of rheumatic fever    • Hypolipidemia    • Hypotension    • Ischemic cardiomyopathy    • PAD (peripheral artery disease) (HCC)    • Pulmonary emphysema (HCC)    • PVD (peripheral vascular disease) ([address])      Past Surgical History:   Procedure Laterality Date   • COLONOSCOPY  2019   • HERNIA REPAIR     • IR AORTAGRAM WITH RUN-OFF  6/27/2019   • IR AORTAGRAM WITH RUN-OFF  2/12/2020   • POPLITEAL ARTERY STENT Right     6/2019   • [place] BYPASS W/VEIN FEMORAL-POPLITEAL Right 3/26/2020    Procedure: BYPASS FEMORAL-POPLITEAL; Right lower extremity bypass, fem-bk pop with GSV;  Surgeon: Karis Collazo MD;  [facility];  Service: Vascular   • [place] SLCTV CATHJ 3RD+ ORD SLCTV ABDL PEL/LXTR [facility] Right 6/27/2019    Procedure: leg ANGIOGRAM WITH STENT, BALLOON ANGIOPLASTY, LEFT GROIN ACCESS;  Surgeon: Karis Collazo MD;  [facility];  Service: Vascular     Social History   Social History     Substance and Sexual Activity   Alcohol Use Yes   • Alcohol/week: 1.0 standard drink of alcohol   • Types: 1 Standard drinks or equivalent per week    Comment: social drinker when out dining     Social History     Substance and Sexual Activity   Drug Use Never     Social History     Tobacco Use   Smoking Status Some Days   • Packs/day: 0.25   • Years: 30.00   • Total pack years: 7.50   • Types: Cigarettes   Smokeless Tobacco Never     Family History   Problem Relation Age of Onset   • Heart disease Mother    • Hyperlipidemia Mother    • Hypertension Mother    • Hypertension Father    • Heart disease Father    • Stroke Father    • Hyperlipidemia Father    • Diabetes Brother    • No Known Problems Maternal Grandmother    • Dementia Neg Hx    • Drug abuse Neg Hx    • Mental illness Neg Hx    • Substance Abuse Neg Hx    • Alcohol abuse Neg Hx    • Depression Neg Hx    • Colon cancer Neg Hx        Meds/Allergies       Current Outpatient Medications:   •  aspirin 81 MG tablet  •  atorvastatin (LIPITOR) 20 mg tablet  •  folic acid (FOLVITE) 1 mg tablet  •  glimepiride (AMARYL) 2 mg tablet  •  Magnesium 500 MG TABS  •  metoprolol succinate (TOPROL-XL) 25 mg 24 hr tablet  •  metoprolol succinate (TOPROL-XL) 50 mg 24 hr tablet  •  rivaroxaban (XARELTO) 20 mg tablet  •  vitamin B-12 (VITAMIN B-12) 1,000 mcg tablet  •  omeprazole (PriLOSEC) 20 mg delayed release capsule    No Known Allergies        Objective     Blood pressure 148/82, pulse 64, temperature (!) 97.4 °F (36.3 °C), temperature source Tympanic, height 5' 9" (1.753 m), weight 104 kg (230 lb), SpO2 92 %. Body mass index is 33.97 kg/m². PHYSICAL EXAM:      General Appearance:   Alert, cooperative, no distress   HEENT:   Normocephalic, atraumatic, anicteric. Neck:  Supple, symmetrical, trachea midline   Lungs:   Clear to auscultation bilaterally; no rales, rhonchi or wheezing; respirations unlabored    Heart[de-identified]   Regular rate and rhythm; no murmur, rub, or gallop. Abdomen:   Soft, non-tender, non-distended; normal bowel sounds; no masses, no organomegaly    Genitalia:   Deferred    Rectal:   Deferred    Extremities:  No cyanosis, clubbing or edema    Pulses:  2+ and symmetric    Skin:  No jaundice, rashes, or lesions    Lymph nodes:  No palpable cervical lymphadenopathy        Lab Results:   No visits with results within 1 Day(s) from this visit.    Latest known visit with results is:   Lab on 06/19/2023   Component Date Value   • Hemoglobin A1C 06/19/2023 7.3 (H)    • EAG 06/19/2023 163    • WBC 06/19/2023 11.53 (H)    • RBC 06/19/2023 5.69 (H)    • Hemoglobin 06/19/2023 16.5    • Hematocrit 06/19/2023 52.0 (H)    • MCV 06/19/2023 91    • MCH 06/19/2023 29.0    • MCHC 06/19/2023 31.7    • RDW 06/19/2023 14.8    • MPV 06/19/2023 9.7    • Platelets 22/06/4580 322    • nRBC 06/19/2023 0    • Neutrophils Relative 06/19/2023 68    • Immat GRANS % 06/19/2023 1    • Lymphocytes Relative 06/19/2023 21    • Monocytes Relative 06/19/2023 8    • Eosinophils Relative 06/19/2023 2    • Basophils Relative 06/19/2023 0    • Neutrophils Absolute 06/19/2023 7.86 (H)    • Immature Grans Absolute 06/19/2023 0.08    • Lymphocytes Absolute 06/19/2023 2.42    • Monocytes Absolute 06/19/2023 0.91    • Eosinophils Absolute 06/19/2023 0.21    • Basophils Absolute 06/19/2023 0.05    • Sodium 06/19/2023 139    • Potassium 06/19/2023 4.3    • Chloride 06/19/2023 105    • CO2 06/19/2023 29    • ANION GAP 06/19/2023 5    • BUN 06/19/2023 19    • Creatinine 06/19/2023 1.12    • Glucose, Fasting 06/19/2023 155 (H)    • Calcium 06/19/2023 8.6    • AST 06/19/2023 11 (L)    • ALT 06/19/2023 15    • Alkaline Phosphatase 06/19/2023 175 (H)    • Total Protein 06/19/2023 7.0    • Albumin 06/19/2023 3.5    • Total Bilirubin 06/19/2023 0.40    • eGFR 06/19/2023 65    • Cholesterol 06/19/2023 113    • Triglycerides 06/19/2023 181 (H)    • HDL, Direct 06/19/2023 40    • LDL Calculated 06/19/2023 37    • Non-HDL-Chol (CHOL-HDL) 06/19/2023 73        Lab Results   Component Value Date    WBC 11.53 (H) 06/19/2023    HGB 16.5 06/19/2023    HCT 52.0 (H) 06/19/2023    MCV 91 06/19/2023     06/19/2023       Lab Results   Component Value Date    SODIUM 139 06/19/2023    K 4.3 06/19/2023     06/19/2023    CO2 29 06/19/2023    AGAP 5 06/19/2023    BUN 19 06/19/2023    CREATININE 1.12 06/19/2023    GLUC 84 01/07/2021    GLUF 155 (H) 06/19/2023    CALCIUM 8.6 06/19/2023    AST 11 (L) 06/19/2023    ALT 15 06/19/2023    ALKPHOS 175 (H) 06/19/2023    TP 7.0 06/19/2023    TBILI 0.40 06/19/2023    EGFR 65 06/19/2023       Lab Results   Component Value Date    CRP 14.1 (H) 05/27/2022       Lab Results   Component Value Date    JWL8XQACFQZM 1.744 01/04/2023       Lab Results   Component Value Date    IRON 70 02/10/2021    TIBC 324 02/10/2021    FERRITIN 22 02/10/2021       Radiology Results:   No results found.

## 2023-07-21 NOTE — TELEPHONE ENCOUNTER
Medicare is Primary - no authorization is required for Moniquei IV    Secondary is a Medicare Supplement Plan G, which will  remaining 20% of the cost Medicare does not cover

## 2023-07-24 DIAGNOSIS — I48.0 PAROXYSMAL ATRIAL FIBRILLATION (HCC): ICD-10-CM

## 2023-07-24 RX ORDER — RIVAROXABAN 20 MG/1
20 TABLET, FILM COATED ORAL
Qty: 90 TABLET | Refills: 3 | Status: SHIPPED | OUTPATIENT
Start: 2023-07-24

## 2023-07-25 NOTE — TELEPHONE ENCOUNTER
Connected with the patient via phone and provided education on Picoto. Patient is already scheduled at [facility] on 8/3 and 8/31. I asked him to make the 3rd infusion appointment at the next infusion visit. Patient will complete TB and Hep bw tomorrow, he is aware that it needs to be completed prior to 8/3 infusion and resulted. Relayed Ada's prior documentation. 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 Ada: Would you please assist with getting Picoto order over to [facility] infusion center. I dc'd Entyvio beacon order. Thank you!

## 2023-07-26 ENCOUNTER — LAB (OUTPATIENT)
Dept: LAB | Facility: CLINIC | Age: 73
End: 2023-07-26
Payer: MEDICARE

## 2023-07-26 DIAGNOSIS — Z11.59 ENCOUNTER FOR SCREENING FOR OTHER VIRAL DISEASES: ICD-10-CM

## 2023-07-26 DIAGNOSIS — R74.8 ELEVATED ALKALINE PHOSPHATASE LEVEL: ICD-10-CM

## 2023-07-26 DIAGNOSIS — K50.019 CROHN'S DISEASE OF SMALL INTESTINE WITH COMPLICATION (HCC): ICD-10-CM

## 2023-07-26 DIAGNOSIS — D84.9 IMMUNOCOMPROMISED PATIENT (HCC): ICD-10-CM

## 2023-07-26 LAB
25(OH)D3 SERPL-MCNC: 38.8 NG/ML (ref 30–100)
ALBUMIN SERPL BCP-MCNC: 3.6 G/DL (ref 3.5–5)
ALP SERPL-CCNC: 170 U/L (ref 34–104)
ALT SERPL W P-5'-P-CCNC: 17 U/L (ref 7–52)
ANION GAP SERPL CALCULATED.3IONS-SCNC: 5 MMOL/L
AST SERPL W P-5'-P-CCNC: 14 U/L (ref 13–39)
BASOPHILS # BLD AUTO: 0.07 THOUSANDS/ÂΜL (ref 0–0.1)
BASOPHILS NFR BLD AUTO: 1 % (ref 0–1)
BILIRUB SERPL-MCNC: 0.8 MG/DL (ref 0.2–1)
BUN SERPL-MCNC: 17 MG/DL (ref 5–25)
CALCIUM SERPL-MCNC: 9 MG/DL (ref 8.4–10.2)
CHLORIDE SERPL-SCNC: 104 MMOL/L (ref 96–108)
CO2 SERPL-SCNC: 30 MMOL/L (ref 21–32)
CREAT SERPL-MCNC: 1.07 MG/DL (ref 0.6–1.3)
CRP SERPL QL: 14 MG/L
EOSINOPHIL # BLD AUTO: 0.19 THOUSAND/ÂΜL (ref 0–0.61)
EOSINOPHIL NFR BLD AUTO: 2 % (ref 0–6)
ERYTHROCYTE [DISTWIDTH] IN BLOOD BY AUTOMATED COUNT: 14.8 % (ref 11.6–15.1)
GFR SERPL CREATININE-BSD FRML MDRD: 69 ML/MIN/1.73SQ M
GGT SERPL-CCNC: 71 U/L (ref 5–85)
GLUCOSE SERPL-MCNC: 152 MG/DL (ref 65–140)
HCT VFR BLD AUTO: 51.5 % (ref 36.5–49.3)
HGB BLD-MCNC: 16.1 G/DL (ref 12–17)
IMM GRANULOCYTES # BLD AUTO: 0.09 THOUSAND/UL (ref 0–0.2)
IMM GRANULOCYTES NFR BLD AUTO: 1 % (ref 0–2)
LYMPHOCYTES # BLD AUTO: 2.53 THOUSANDS/ÂΜL (ref 0.6–4.47)
LYMPHOCYTES NFR BLD AUTO: 22 % (ref 14–44)
MCH RBC QN AUTO: 28.2 PG (ref 26.8–34.3)
MCHC RBC AUTO-ENTMCNC: 31.3 G/DL (ref 31.4–37.4)
MCV RBC AUTO: 90 FL (ref 82–98)
MONOCYTES # BLD AUTO: 0.94 THOUSAND/ÂΜL (ref 0.17–1.22)
MONOCYTES NFR BLD AUTO: 8 % (ref 4–12)
NEUTROPHILS # BLD AUTO: 7.93 THOUSANDS/ÂΜL (ref 1.85–7.62)
NEUTS SEG NFR BLD AUTO: 66 % (ref 43–75)
NRBC BLD AUTO-RTO: 0 /100 WBCS
PLATELET # BLD AUTO: 335 THOUSANDS/UL (ref 149–390)
PMV BLD AUTO: 10.2 FL (ref 8.9–12.7)
POTASSIUM SERPL-SCNC: 4.5 MMOL/L (ref 3.5–5.3)
PROT SERPL-MCNC: 7.1 G/DL (ref 6.4–8.4)
RBC # BLD AUTO: 5.7 MILLION/UL (ref 3.88–5.62)
SODIUM SERPL-SCNC: 139 MMOL/L (ref 135–147)
WBC # BLD AUTO: 11.75 THOUSAND/UL (ref 4.31–10.16)

## 2023-07-26 PROCEDURE — 82306 VITAMIN D 25 HYDROXY: CPT

## 2023-07-26 PROCEDURE — 86140 C-REACTIVE PROTEIN: CPT

## 2023-07-26 PROCEDURE — 86705 HEP B CORE ANTIBODY IGM: CPT

## 2023-07-26 PROCEDURE — 87340 HEPATITIS B SURFACE AG IA: CPT

## 2023-07-26 PROCEDURE — 80053 COMPREHEN METABOLIC PANEL: CPT

## 2023-07-26 PROCEDURE — 82977 ASSAY OF GGT: CPT

## 2023-07-26 PROCEDURE — 85025 COMPLETE CBC W/AUTO DIFF WBC: CPT

## 2023-07-26 PROCEDURE — 86803 HEPATITIS C AB TEST: CPT

## 2023-07-26 PROCEDURE — 86704 HEP B CORE ANTIBODY TOTAL: CPT

## 2023-07-26 PROCEDURE — 86480 TB TEST CELL IMMUN MEASURE: CPT

## 2023-07-27 LAB
HBV CORE AB SER QL: ABNORMAL
HBV CORE IGM SER QL: REACTIVE
HBV SURFACE AG SER QL: ABNORMAL
HCV AB SER QL: ABNORMAL

## 2023-07-27 NOTE — RESULT ENCOUNTER NOTE
Hi,    Can you please let him know is hepatitis labs came back and it seemed that there was a false positive in one of the labs. Can you see if he would be okay repeating it and I can order some additional labs to confirm? Thank you!

## 2023-07-28 ENCOUNTER — TELEPHONE (OUTPATIENT)
Dept: GASTROENTEROLOGY | Facility: CLINIC | Age: 73
End: 2023-07-28

## 2023-07-28 DIAGNOSIS — R80.1 PERSISTENT PROTEINURIA: ICD-10-CM

## 2023-07-28 DIAGNOSIS — Z11.59 ENCOUNTER FOR SCREENING FOR OTHER VIRAL DISEASES: ICD-10-CM

## 2023-07-28 DIAGNOSIS — K50.019 CROHN'S DISEASE OF SMALL INTESTINE WITH COMPLICATION (HCC): Primary | ICD-10-CM

## 2023-07-28 DIAGNOSIS — N18.31 STAGE 3A CHRONIC KIDNEY DISEASE (HCC): Primary | ICD-10-CM

## 2023-07-28 DIAGNOSIS — D84.9 IMMUNOCOMPROMISED PATIENT (HCC): ICD-10-CM

## 2023-07-28 NOTE — TELEPHONE ENCOUNTER
LM for patient asking him to call back to let us know what pharmacy he would like the medication to go to:    ----- Message from Crista Alba MD sent at 7/28/2023  9:21 AM EDT -----  Morning readings slightly high in the diastolic range as it is the p.m. Recommend  1. Amlodipine 2.5 mg in the morning watch for swelling dizziness lightheadedness  2.   Repeat blood pressures 4 to 6 weeks  ----- Message -----  From: Candice Rodrigues  Sent: 7/28/2023   9:16 AM EDT  To: Crista Alba MD    AM sit:  118/91, 73 stand:  117/78, 82  PM sit:  126/91, 76 stand:  116/83, 97

## 2023-07-28 NOTE — TELEPHONE ENCOUNTER
----- Message from Rasheeda Seth MD sent at 7/27/2023  3:34 PM EDT -----  Hi,    Can you please let him know is hepatitis labs came back and it seemed that there was a false positive in one of the labs. Can you see if he would be okay repeating it and I can order some additional labs to confirm? Thank you!

## 2023-07-30 LAB
GAMMA INTERFERON BACKGROUND BLD IA-ACNC: 0.02 IU/ML
M TB IFN-G BLD-IMP: NEGATIVE
M TB IFN-G CD4+ BCKGRND COR BLD-ACNC: 0 IU/ML
M TB IFN-G CD4+ BCKGRND COR BLD-ACNC: 0 IU/ML
MITOGEN IGNF BCKGRD COR BLD-ACNC: >10 IU/ML

## 2023-07-31 ENCOUNTER — APPOINTMENT (OUTPATIENT)
Dept: LAB | Facility: CLINIC | Age: 73
End: 2023-07-31
Payer: MEDICARE

## 2023-07-31 DIAGNOSIS — Z11.59 ENCOUNTER FOR SCREENING FOR OTHER VIRAL DISEASES: ICD-10-CM

## 2023-07-31 DIAGNOSIS — D84.9 IMMUNOCOMPROMISED PATIENT (HCC): ICD-10-CM

## 2023-07-31 DIAGNOSIS — K50.019 CROHN'S DISEASE OF SMALL INTESTINE WITH COMPLICATION (HCC): ICD-10-CM

## 2023-07-31 LAB
HBV CORE AB SER QL: NORMAL
HBV CORE IGM SER QL: REACTIVE
HBV SURFACE AB SER-ACNC: <3 MIU/ML
HBV SURFACE AG SER QL: NORMAL

## 2023-07-31 PROCEDURE — 87517 HEPATITIS B DNA QUANT: CPT

## 2023-07-31 PROCEDURE — 86705 HEP B CORE ANTIBODY IGM: CPT

## 2023-07-31 PROCEDURE — 86704 HEP B CORE ANTIBODY TOTAL: CPT

## 2023-07-31 PROCEDURE — 87340 HEPATITIS B SURFACE AG IA: CPT

## 2023-07-31 PROCEDURE — 86706 HEP B SURFACE ANTIBODY: CPT

## 2023-07-31 PROCEDURE — 36415 COLL VENOUS BLD VENIPUNCTURE: CPT

## 2023-07-31 RX ORDER — AMLODIPINE BESYLATE 2.5 MG/1
2.5 TABLET ORAL DAILY
Qty: 90 TABLET | Refills: 3 | Status: SHIPPED | OUTPATIENT
Start: 2023-07-31

## 2023-08-01 LAB — HBV CORE AB SER QL: NORMAL

## 2023-08-02 LAB — HBV DNA SERPL NAA+PROBE-ACNC: NOT DETECTED [IU]/ML

## 2023-08-03 ENCOUNTER — HOSPITAL ENCOUNTER (OUTPATIENT)
Dept: INFUSION CENTER | Facility: CLINIC | Age: 73
Discharge: HOME | End: 2023-08-03
Payer: MEDICARE

## 2023-08-03 VITALS
TEMPERATURE: 96.8 F | DIASTOLIC BLOOD PRESSURE: 92 MMHG | SYSTOLIC BLOOD PRESSURE: 149 MMHG | OXYGEN SATURATION: 94 % | HEART RATE: 62 BPM | RESPIRATION RATE: 18 BRPM

## 2023-08-03 PROCEDURE — 96365 THER/PROPH/DIAG IV INF INIT: CPT

## 2023-08-03 RX ORDER — DEXTROSE MONOHYDRATE 50 MG/ML
20 INJECTION, SOLUTION INTRAVENOUS CONTINUOUS
Status: DISCONTINUED | OUTPATIENT
Start: 2023-08-03 | End: 2023-08-06 | Stop reason: HOSPADM

## 2023-08-03 RX ADMIN — DEXTROSE 600 MG: 5 SOLUTION INTRAVENOUS at 09:15

## 2023-08-03 RX ADMIN — DEXTROSE 20 ML/HR: 5 SOLUTION INTRAVENOUS at 08:45

## 2023-08-03 NOTE — PROGRESS NOTES
Patient tolerated treatment without complications. Patient given AVS. Reviewed upcoming appointments with patient.

## 2023-08-20 ENCOUNTER — HOSPITAL ENCOUNTER (OUTPATIENT)
Dept: MRI IMAGING | Facility: HOSPITAL | Age: 73
Discharge: HOME | End: 2023-08-20
Attending: INTERNAL MEDICINE
Payer: MEDICARE

## 2023-08-20 DIAGNOSIS — K50.019 CROHN'S DISEASE OF SMALL INTESTINE WITH COMPLICATION (HCC): ICD-10-CM

## 2023-08-20 DIAGNOSIS — R74.8 ELEVATED ALKALINE PHOSPHATASE LEVEL: ICD-10-CM

## 2023-08-20 DIAGNOSIS — D84.9 IMMUNOCOMPROMISED PATIENT (HCC): ICD-10-CM

## 2023-08-20 PROCEDURE — G1004 CDSM NDSC: HCPCS

## 2023-08-20 PROCEDURE — A9585 GADOBUTROL INJECTION: HCPCS | Performed by: INTERNAL MEDICINE

## 2023-08-20 PROCEDURE — 74183 MRI ABD W/O CNTR FLWD CNTR: CPT

## 2023-08-20 RX ORDER — GADOBUTROL 604.72 MG/ML
10 INJECTION INTRAVENOUS
Status: COMPLETED | OUTPATIENT
Start: 2023-08-20 | End: 2023-08-20

## 2023-08-20 RX ADMIN — GADOBUTROL 10 ML: 604.72 INJECTION INTRAVENOUS at 14:27

## 2023-08-21 ENCOUNTER — TELEPHONE (OUTPATIENT)
Dept: CARDIOLOGY CLINIC | Facility: CLINIC | Age: 73
End: 2023-08-21

## 2023-08-21 ENCOUNTER — HOSPITAL ENCOUNTER (OUTPATIENT)
Dept: NON INVASIVE DIAGNOSTICS | Facility: CLINIC | Age: 73
Discharge: HOME | End: 2023-08-21
Payer: MEDICARE

## 2023-08-21 DIAGNOSIS — I73.9 PAD (PERIPHERAL ARTERY DISEASE) (HCC): ICD-10-CM

## 2023-08-21 DIAGNOSIS — I48.0 PAROXYSMAL ATRIAL FIBRILLATION (HCC): ICD-10-CM

## 2023-08-21 PROCEDURE — 93923 UPR/LXTR ART STDY 3+ LVLS: CPT | Performed by: SURGERY

## 2023-08-21 PROCEDURE — 93925 LOWER EXTREMITY STUDY: CPT | Performed by: SURGERY

## 2023-08-21 PROCEDURE — 93925 LOWER EXTREMITY STUDY: CPT

## 2023-08-21 PROCEDURE — 93923 UPR/LXTR ART STDY 3+ LVLS: CPT

## 2023-08-25 ENCOUNTER — TELEPHONE (OUTPATIENT)
Dept: UROLOGY | Facility: CLINIC | Age: 73
End: 2023-08-25

## 2023-08-25 DIAGNOSIS — Z12.5 SCREENING FOR PROSTATE CANCER: Primary | ICD-10-CM

## 2023-08-25 NOTE — TELEPHONE ENCOUNTER
Tried calling pt and pt's wife sabi I stated to her that he does have an appt with us oin Monday to go over his PSA results. This is just a reminder for him to get his PSA completed and order was put into the chart for them. Pt wife stated that they will try to have it done by their appt time.

## 2023-08-26 ENCOUNTER — APPOINTMENT (OUTPATIENT)
Dept: LAB | Facility: CLINIC | Age: 73
End: 2023-08-26
Payer: MEDICARE

## 2023-08-26 DIAGNOSIS — Z12.5 SCREENING FOR PROSTATE CANCER: ICD-10-CM

## 2023-08-26 LAB — PSA SERPL-MCNC: 0.62 NG/ML (ref 0–4)

## 2023-08-26 PROCEDURE — G0103 PSA SCREENING: HCPCS

## 2023-08-26 PROCEDURE — 36415 COLL VENOUS BLD VENIPUNCTURE: CPT

## 2023-08-28 ENCOUNTER — OFFICE VISIT (OUTPATIENT)
Dept: UROLOGY | Facility: AMBULATORY SURGERY CENTER | Age: 73
End: 2023-08-28
Payer: MEDICARE

## 2023-08-28 VITALS
RESPIRATION RATE: 18 BRPM | SYSTOLIC BLOOD PRESSURE: 116 MMHG | DIASTOLIC BLOOD PRESSURE: 78 MMHG | HEART RATE: 132 BPM | HEIGHT: 69 IN | OXYGEN SATURATION: 92 % | BODY MASS INDEX: 34.21 KG/M2 | WEIGHT: 231 LBS

## 2023-08-28 DIAGNOSIS — Z12.5 SCREENING FOR PROSTATE CANCER: Primary | ICD-10-CM

## 2023-08-28 PROCEDURE — 99214 OFFICE O/P EST MOD 30 MIN: CPT | Performed by: NURSE PRACTITIONER

## 2023-08-28 RX ORDER — DEXTROSE MONOHYDRATE 50 MG/ML
20 INJECTION, SOLUTION INTRAVENOUS CONTINUOUS
Status: DISCONTINUED | OUTPATIENT
Start: 2023-08-31 | End: 2023-09-03 | Stop reason: HOSPADM

## 2023-08-28 NOTE — PROGRESS NOTES
8/28/2023      Chief Complaint   Patient presents with   • screening for prostate cancer     Assessment and Plan    71 y.o. male managed by our office. 1.  Prostate cancer screening  · PSA performed 8/26/2023 resulted 0.62  · DANIEL- small smooth prostate with no nodules  · Repeat PSA and DANIEL in 1 year  · Pt will follow up in the office on an as needed basis otherwise he will continue to follow up with his PCP      2. Microscopic hematuria  · Status post microscopic hematuria work-up with negative results    History of Present Illness  Isabel Anguiano III is a 71 y.o. male here for follow up evaluation of prostate cancer screening. Patient with a history of microscopic hematuria and is status post negative microscopic hematuria work-up. On evaluation in the office today he denies all lower urinary tract symptoms. He reports getting up 0 times at night to urinate. He reports sensation of complete parameter with urination. He denies changes to his general health since his last office evaluation. PSA, Total   Latest Ref Rng 0.00 - 4.00 ng/mL   5/6/2020 0.4    5/24/2021 1.1    6/1/2022 0.5    8/26/2023 0.62          Review of Systems   Constitutional: Negative for chills and fever. Respiratory: Negative for cough and shortness of breath. Cardiovascular: Negative for chest pain. Gastrointestinal: Negative for abdominal distention, abdominal pain, blood in stool, nausea and vomiting. Genitourinary: Negative for difficulty urinating, dysuria, enuresis, flank pain, frequency, hematuria and urgency. Skin: Negative for rash.            AUA SYMPTOM SCORE    Flowsheet Row Most Recent Value   AUA SYMPTOM SCORE    How often have you had a sensation of not emptying your bladder completely after you finished urinating? 0 (P)     How often have you had to urinate again less than two hours after you finished urinating? 1 (P)     How often have you found you stopped and started again several times when you urinate? 0 (P)     How often have you found it difficult to postpone urination? 0 (P)     How often have you had a weak urinary stream? 0 (P)     How often have you had to push or strain to begin urination? 0 (P)     How many times did you most typically get up to urinate from the time you went to bed at night until the time you got up in the morning? 0 (P)     Quality of Life: If you were to spend the rest of your life with your urinary condition just the way it is now, how would you feel about that? 0 (P)     AUA SYMPTOM SCORE 1 (P)              Past Medical History  Past Medical History:   Diagnosis Date   • Harris esophagus    • Blue toe syndrome ([address])    • Chronic kidney disease    • Colon polyps    • Crohn's disease ([address]) 1-2020   • GERD (gastroesophageal reflux disease)    • Hematuria    • History of rheumatic fever    • Hypolipidemia    • Hypotension    • Ischemic cardiomyopathy    • PAD (peripheral artery disease) ([address])    • Pulmonary emphysema ([address])    • PVD (peripheral vascular disease) ([address])        Past Social History  Past Surgical History:   Procedure Laterality Date   • COLONOSCOPY  2019   • HERNIA REPAIR     • IR AORTAGRAM WITH RUN-OFF  6/27/2019   • IR AORTAGRAM WITH RUN-OFF  2/12/2020   • POPLITEAL ARTERY STENT Right     6/2019   • CT BYPASS W/VEIN FEMORAL-POPLITEAL Right 3/26/2020    Procedure: BYPASS FEMORAL-POPLITEAL; Right lower extremity bypass, fem-bk pop with GSV;  Surgeon: Elana Hernandez MD;  [facility];  Service: Vascular   • CT SLCTV CATHJ 3RD+ ORD SLCTV ABDL PEL/LXTR [facility] Right 6/27/2019    Procedure: leg ANGIOGRAM WITH STENT, BALLOON ANGIOPLASTY, LEFT GROIN ACCESS;  Surgeon: Elana Hernandez MD;  [facility];  Service: Vascular     Social History     Tobacco Use   Smoking Status Some Days   • Packs/day: 0.25   • Years: 30.00   • Total pack years: 7.50   • Types: Cigarettes   Smokeless Tobacco Never       Past Family History  Family History   Problem Relation Age of Onset   • Heart disease Mother    • Hyperlipidemia Mother    • Hypertension Mother    • Hypertension Father    • Heart disease Father    • Stroke Father    • Hyperlipidemia Father    • Diabetes Brother    • No Known Problems Maternal Grandmother    • Dementia Neg Hx    • Drug abuse Neg Hx    • Mental illness Neg Hx    • Substance Abuse Neg Hx    • Alcohol abuse Neg Hx    • Depression Neg Hx    • Colon cancer Neg Hx        Past Social history  Social History     Socioeconomic History   • Marital status: /Civil Union     Spouse name: Not on file   • Number of children: 2   • Years of education: Not on file   • Highest education level: Not on file   Occupational History   • Occupation: retired   Tobacco Use   • Smoking status: Some Days     Packs/day: 0.25     Years: 30.00     Total pack years: 7.50     Types: Cigarettes   • Smokeless tobacco: Never   Vaping Use   • Vaping Use: Never used   Substance and Sexual Activity   • Alcohol use: Yes     Alcohol/week: 1.0 standard drink of alcohol     Types: 1 Standard drinks or equivalent per week     Comment: social drinker when out dining   • Drug use: Never   • Sexual activity: Not Currently     Birth control/protection: None   Other Topics Concern   • Not on file   Social History Narrative    Drinks coffee     Social Determinants of Health     Financial Resource Strain: Low Risk  (1/12/2023)    Overall Financial Resource Strain (CARDIA)    • Difficulty of Paying Living Expenses: Not very hard   Food Insecurity: Not on file   Transportation Needs: No Transportation Needs (1/12/2023)    PRAPARE - Transportation    • Lack of Transportation (Medical): No    • Lack of Transportation (Non-Medical):  No   Physical Activity: Not on file   Stress: Not on file   Social Connections: Not on file   Intimate Partner Violence: Not on file   Housing Stability: Not on file       Current Medications  Current Outpatient Medications   Medication Sig Dispense Refill   • amLODIPine (NORVASC) 2.5 mg tablet Take 1 tablet (2.5 mg total) by mouth daily 90 tablet 3   • aspirin 81 MG tablet Take 81 mg by mouth daily     • atorvastatin (LIPITOR) 20 mg tablet Take 1 tablet (20 mg total) by mouth daily 90 tablet 0   • folic acid (FOLVITE) 1 mg tablet Take 1 tablet (1 mg total) by mouth daily 90 tablet 3   • glimepiride (AMARYL) 2 mg tablet Take 1 tablet (2 mg total) by mouth daily with breakfast 90 tablet 0   • Magnesium 500 MG TABS Take 1 tablet by mouth daily      • metoprolol succinate (TOPROL-XL) 25 mg 24 hr tablet Take 1 tablet (25 mg total) by mouth daily 90 tablet 3   • metoprolol succinate (TOPROL-XL) 50 mg 24 hr tablet Take 1 tablet (50 mg total) by mouth daily 90 tablet 3   • omeprazole (PriLOSEC) 20 mg delayed release capsule Take 1 capsule (20 mg total) by mouth daily before breakfast 30 capsule 5   • vitamin B-12 (VITAMIN B-12) 1,000 mcg tablet Take 1 tablet (1,000 mcg total) by mouth daily (Patient taking differently: Take 1,000 mcg by mouth 3 (three) times a week) 90 tablet 1   • Xarelto 20 MG tablet TAKE 1 TABLET BY MOUTH DAILY WITH BREAKFAST 90 tablet 3     No current facility-administered medications for this visit. Allergies  No Known Allergies      The following portions of the patient's history were reviewed and updated as appropriate: allergies, current medications, past medical history, past social history, past surgical history and problem list.      Vitals  Vitals:    08/28/23 1041   BP: 116/78   BP Location: Left arm   Patient Position: Sitting   Cuff Size: Standard   Pulse: (!) 132   Resp: 18   SpO2: 92%   Weight: 105 kg (231 lb)   Height: 5' 9" (1.753 m)           Physical Exam  Physical Exam  Vitals reviewed. Constitutional:       General: He is not in acute distress. Appearance: Normal appearance. He is normal weight. HENT:      Head: Normocephalic. Pulmonary:      Effort: No respiratory distress. Breath sounds: Normal breath sounds.    Genitourinary: Comments: DANIEL-prostate 35 g with no nodules. Smooth and symmetrical.  Nontender. Skin:     General: Skin is warm and dry. Neurological:      General: No focal deficit present. Mental Status: He is alert and oriented to person, place, and time. Psychiatric:         Mood and Affect: Mood normal.         Behavior: Behavior normal.           Results  No results found for this or any previous visit (from the past 1 hour(s)).]  Lab Results   Component Value Date    PSA 0.62 08/26/2023    PSA 0.5 06/01/2022    PSA 1.1 05/24/2021     Lab Results   Component Value Date    CALCIUM 9.0 07/26/2023    K 4.5 07/26/2023    CO2 30 07/26/2023     07/26/2023    BUN 17 07/26/2023    CREATININE 1.07 07/26/2023     Lab Results   Component Value Date    WBC 11.75 (H) 07/26/2023    HGB 16.1 07/26/2023    HCT 51.5 (H) 07/26/2023    MCV 90 07/26/2023     07/26/2023           Orders  Orders Placed This Encounter   Procedures   • PSA, Total Screen     This is a patient instruction: This test is non-fasting. Please drink two glasses of water morning of bloodwork.         Standing Status:   Future     Standing Expiration Date:   2/28/2025       LENORA English

## 2023-08-29 NOTE — PROGRESS NOTES
Assessment/Plan:    PAD (peripheral artery disease) ([address])  71-year-old male with pmhx T2 DM, RBBB, PAD, A-fib, SVT, venous insufficiency, Obesity s/p R femoral-BK pop bypass, 3/26/20 Fransisca Medeiros. Reviewed LEAD from 8/21/23  Right: Widely patent femoro-popliteal bypass graft. DAVID:  1.19  (Prior 1.09)/ MP of 118 mm Hg/ GTP 60 mm Hg  Left: DAVID:  1.13 (Prior 1.20) /  mm Hg/ GTP  81 mm Hg    -Educated patient on pathophysiology of peripheral arterial disease, available treatment options, and indications for treatment. No vascular intervention indicated at this time. Discussed will continue to monitor with non-invasive imaging. Recommendations  -Complete LEAD in one year and return to the office for review.  -Continue to take Aspirin 81 mg and Xarelto daily.  -Continue to take atorvastatin daily as per PCP. -Call the office for any new or worsening symptoms such as increased leg pain, discoloration, new wounds/ tissue loss. Type 2 diabetes mellitus with diabetic peripheral angiopathy without gangrene ([address])    Lab Results   Component Value Date    HGBA1C 7.3 (H) 06/19/2023   -Reviewed most recent HgA1c with pt.  -Stable/decreasing blood sugars. Reviewed the importance of maintaining an optimized blood sugar for progression of vascular disease.  -management per PCP      Dyslipidemia  -stable  -continue statin medication  -continue routine follow-up with family doctor         Diagnoses and all orders for this visit:    PAD (peripheral artery disease) ([address])  -     VAS lower limb arterial duplex, complete bilateral; Future    S/P femoral-popliteal bypass surgery  -     VAS lower limb arterial duplex, complete bilateral; Future          Subjective:      Patient ID: Lilibeth Rios III is a 71 y.o. male. Patient presents today to review STEFFEN done 8/21/23. H/o R SFA-BK pop bypass and R popliteal stenting.      71-year-old male with [address] [place] plasencia's esophagus, T2 DM, RBBB, PAD, cardiomyopathy, P-A-fib, SVT, CKD stage 3, Obesity, venous stasis. He is s/p R fem-BP bypass graft by  3/23/20. Pt presents in the office today with his wife for review of non-invasive studies. -Walks 2 miles every other day for 3 times a week. -Denies claudication, denies rest pain, denies wounds/ tissue loss. -States he has L leg swelling that has been there for 30 years and denies use of compression, states he has compression at home but does not like to use it. -Reports that he was losing weight and they adjusted his Skyrizi and his weight went up again. He has another injection again tomorrow.   -Currently taking aspirin, xarelto and atorvastatin daily.   -Denies smoking. The following portions of the patient's history were reviewed and updated as appropriate: allergies, current medications, past family history, past medical history, past social history, past surgical history and problem list.    Review of Systems   Constitutional: Negative. HENT: Negative. Eyes: Negative. Respiratory: Negative. Negative for shortness of breath. Cardiovascular: Positive for leg swelling. Negative for chest pain. Gastrointestinal: Negative. Endocrine: Negative. Genitourinary: Negative. Musculoskeletal: Negative. Skin: Negative. Allergic/Immunologic: Negative. Neurological: Negative. Hematological: Negative. Psychiatric/Behavioral: Negative. Objective:      /72 (BP Location: Right arm, Patient Position: Sitting)   Pulse 76   Ht 5' 9" (1.753 m)   Wt 106 kg (233 lb)   BMI 34.41 kg/m²          Physical Exam  Vitals and nursing note reviewed. Constitutional:       Appearance: Normal appearance. He is obese. HENT:      Head: Normocephalic and atraumatic. Neck:      Vascular: No carotid bruit. Cardiovascular:      Rate and Rhythm: Regular rhythm. Tachycardia present. Pulses:           Radial pulses are 0 on the right side and 1+ on the left side.         Femoral pulses are 1+ on the right side and 1+ on the left side. Popliteal pulses are 1+ on the right side and 1+ on the left side. Dorsalis pedis pulses are 0 on the right side and 0 on the left side. Posterior tibial pulses are 2+ on the right side and 2+ on the left side. Heart sounds: Normal heart sounds. Comments: +2 pulse at R BPG site  Pulmonary:      Effort: Pulmonary effort is normal.   Musculoskeletal:         General: No tenderness. Cervical back: Normal range of motion. Right lower leg: No edema. Left lower le+ Edema present. Skin:     General: Skin is warm and dry. Capillary Refill: Capillary refill takes less than 2 seconds. Neurological:      General: No focal deficit present. Mental Status: He is alert and oriented to person, place, and time. Cranial Nerves: No cranial nerve deficit. Sensory: No sensory deficit. Psychiatric:         Mood and Affect: Mood normal.         Behavior: Behavior normal.           I have reviewed and made appropriate changes to the review of systems input by the medical assistant.     Vitals:    23 0954   BP: 110/72   BP Location: Right arm   Patient Position: Sitting   Pulse: 76   Weight: 106 kg (233 lb)   Height: 5' 9" (1.753 m)       Patient Active Problem List   Diagnosis   • Class 1 obesity due to excess calories with serious comorbidity and body mass index (BMI) of 34.0 to 34.9 in adult   • Chronic kidney disease, stage III (moderate) ([facility])   • Persistent proteinuria   • Microscopic hematuria   • Bridger cardiac risk 10-20% in next 10 years   • Abdominal aortic atherosclerosis ([facility])   • RBBB (right bundle branch block with left anterior fascicular block)   • Dyslipidemia   • Vitamin D deficiency   • Harris's esophagus without dysplasia   • Colon polyps   • PAD (peripheral artery disease) ([facility])   • Ischemic cardiomyopathy   • S/P peripheral artery angioplasty with stent placement   • Venous stasis   • Paroxysmal atrial fibrillation (HCC)   • Adrenal mass (HCC)   • Crohn's disease of small intestine with complication (HCC)   • SVT (supraventricular tachycardia) (HCC)   • Positive QuantiFERON-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   • Terminal ileitis of small intestine (HCC)   • S/P femoral-popliteal bypass surgery   • Elevated alkaline phosphatase level   • Vitamin B12 deficiency   • Type 2 diabetes mellitus with diabetic peripheral angiopathy without gangrene ([address])   • Venous insufficiency       Past Surgical History:   Procedure Laterality Date   • COLONOSCOPY  2019   • HERNIA REPAIR     • IR AORTAGRAM WITH RUN-OFF  6/27/2019   • IR AORTAGRAM WITH RUN-OFF  2/12/2020   • POPLITEAL ARTERY STENT Right     6/2019   • IA BYPASS W/VEIN FEMORAL-POPLITEAL Right 3/26/2020    Procedure: BYPASS FEMORAL-POPLITEAL; Right lower extremity bypass, fem-bk pop with GSV;  Surgeon: Tasha Kaiser MD;  Location: [facility];  Service: Vascular   • IA SLCTV CATHJ 3RD+ ORD SLCTV ABDL PEL/LXTR [facility] Right 6/27/2019    Procedure: leg ANGIOGRAM WITH STENT, BALLOON ANGIOPLASTY, LEFT GROIN ACCESS;  Surgeon: Tasha Kaiser MD;  Location: [facility];  Service: Vascular       Family History   Problem Relation Age of Onset   • Heart disease Mother    • Hyperlipidemia Mother    • Hypertension Mother    • Hypertension Father    • Heart disease Father    • Stroke Father    • Hyperlipidemia Father    • Diabetes Brother    • No Known Problems Maternal Grandmother    • Dementia Neg Hx    • Drug abuse Neg Hx    • Mental illness Neg Hx    • Substance Abuse Neg Hx    • Alcohol abuse Neg Hx    • Depression Neg Hx    • Colon cancer Neg Hx        Social History     Socioeconomic History   • Marital status: /Civil Union     Spouse name: Not on file   • Number of children: 2   • Years of education: Not on file   • Highest education level: Not on file   Occupational History   • Occupation: retired   Tobacco Use   • Smoking status: Some Days     Packs/day: 0.25     Years: 30.00     Total pack years: 7.50     Types: Cigarettes   • Smokeless tobacco: Never   Vaping Use   • Vaping Use: Never used   Substance and Sexual Activity   • Alcohol use: Yes     Alcohol/week: 1.0 standard drink of alcohol     Types: 1 Standard drinks or equivalent per week     Comment: social drinker when out dining   • Drug use: Never   • Sexual activity: Not Currently     Birth control/protection: None   Other Topics Concern   • Not on file   Social History Narrative    Drinks coffee     Social Determinants of Health     Financial Resource Strain: Low Risk  (1/12/2023)    Overall Financial Resource Strain (CARDIA)    • Difficulty of Paying Living Expenses: Not very hard   Food Insecurity: Not on file   Transportation Needs: No Transportation Needs (1/12/2023)    PRAPARE - Transportation    • Lack of Transportation (Medical): No    • Lack of Transportation (Non-Medical):  No   Physical Activity: Not on file   Stress: Not on file   Social Connections: Not on file   Intimate Partner Violence: Not on file   Housing Stability: Not on file       No Known Allergies      Current Outpatient Medications:   •  amLODIPine (NORVASC) 2.5 mg tablet, Take 1 tablet (2.5 mg total) by mouth daily, Disp: 90 tablet, Rfl: 3  •  aspirin 81 MG tablet, Take 81 mg by mouth daily, Disp: , Rfl:   •  atorvastatin (LIPITOR) 20 mg tablet, Take 1 tablet (20 mg total) by mouth daily, Disp: 90 tablet, Rfl: 0  •  folic acid (FOLVITE) 1 mg tablet, Take 1 tablet (1 mg total) by mouth daily, Disp: 90 tablet, Rfl: 3  •  glimepiride (AMARYL) 2 mg tablet, Take 1 tablet (2 mg total) by mouth daily with breakfast, Disp: 90 tablet, Rfl: 0  •  Magnesium 500 MG TABS, Take 1 tablet by mouth daily , Disp: , Rfl:   •  metoprolol succinate (TOPROL-XL) 25 mg 24 hr tablet, Take 1 tablet (25 mg total) by mouth daily, Disp: 90 tablet, Rfl: 3  •  metoprolol succinate (TOPROL-XL) 50 mg 24 hr tablet, Take 1 tablet (50 mg total) by mouth daily, Disp: 90 tablet, Rfl: 3  •  omeprazole (PriLOSEC) 20 mg delayed release capsule, Take 1 capsule (20 mg total) by mouth daily before breakfast, Disp: 30 capsule, Rfl: 5  •  vitamin B-12 (VITAMIN B-12) 1,000 mcg tablet, Take 1 tablet (1,000 mcg total) by mouth daily (Patient taking differently: Take 1,000 mcg by mouth 3 (three) times a week), Disp: 90 tablet, Rfl: 1  •  Xarelto 20 MG tablet, TAKE 1 TABLET BY MOUTH DAILY WITH BREAKFAST, Disp: 90 tablet, Rfl: 3  No current facility-administered medications for this visit. Facility-Administered Medications Ordered in Other Visits:   •  [START ON 8/31/2023] dextrose 5 % infusion, 20 mL/hr, Intravenous, Continuous, Asha Vickers MD  •  [START ON 8/31/2023] risankizumab-rzaa (SKYRIZI) 600 mg in dextrose 5 % 250 mL IVPB, 600 mg, Intravenous, Once, Asha Vickers MD  I have spent a total time of 40 minutes on 08/30/23 in caring for this patient including Diagnostic results, Risks and benefits of tx options, Instructions for management, Importance of tx compliance, Risk factor reductions, Impressions, Documenting in the medical record and Reviewing / ordering tests, medicine, procedures  .

## 2023-08-30 ENCOUNTER — OFFICE VISIT (OUTPATIENT)
Dept: VASCULAR SURGERY | Facility: CLINIC | Age: 73
End: 2023-08-30

## 2023-08-30 VITALS
BODY MASS INDEX: 34.51 KG/M2 | HEART RATE: 76 BPM | HEIGHT: 69 IN | WEIGHT: 233 LBS | DIASTOLIC BLOOD PRESSURE: 72 MMHG | SYSTOLIC BLOOD PRESSURE: 110 MMHG

## 2023-08-30 DIAGNOSIS — I73.9 PAD (PERIPHERAL ARTERY DISEASE) (HCC): Primary | ICD-10-CM

## 2023-08-30 DIAGNOSIS — Z95.828 S/P FEMORAL-POPLITEAL BYPASS SURGERY: ICD-10-CM

## 2023-08-30 NOTE — ASSESSMENT & PLAN NOTE
27-year-old male with pmhx T2 DM, RBBB, PAD, A-fib, SVT, venous insufficiency, Obesity s/p R femoral-BK pop bypass, 3/26/20 Padmini Gonzalez). Reviewed LEAD from 8/21/23  Right: Widely patent femoro-popliteal bypass graft. DAVID:  1.19  (Prior 1.09)/ MP of 118 mm Hg/ GTP 60 mm Hg  Left: ADVID:  1.13 (Prior 1.20) /  mm Hg/ GTP  81 mm Hg    -Educated patient on pathophysiology of peripheral arterial disease, available treatment options, and indications for treatment. No vascular intervention indicated at this time. Discussed will continue to monitor with non-invasive imaging. Recommendations  -Complete LEAD in one year and return to the office for review.  -Continue to take Aspirin 81 mg and Xarelto daily.  -Continue to take atorvastatin daily as per PCP. -Call the office for any new or worsening symptoms such as increased leg pain, discoloration, new wounds/ tissue loss.

## 2023-08-30 NOTE — PATIENT INSTRUCTIONS
-Complete LEAD in one year and return to the office for review. - Call the office if you experience any changes to your legs or feet such as new pain, redness,swelling or a new wound/ open area. - Continue to take Aspirin, xarelto and atorvastatin daily as ordered by your Family doctor. - Do daily foot checks, wear properly fitted footwear. - Stay active. Exercise everyday. Walking is the recommended exercise, keep a log if possible. - Keep a heart healthy diet.

## 2023-08-30 NOTE — ASSESSMENT & PLAN NOTE
Lab Results   Component Value Date    HGBA1C 7.3 (H) 06/19/2023   -Reviewed most recent HgA1c with pt.  -Stable/decreasing blood sugars.  Reviewed the importance of maintaining an optimized blood sugar for progression of vascular disease.  -management per PCP

## 2023-08-31 ENCOUNTER — HOSPITAL ENCOUNTER (OUTPATIENT)
Dept: INFUSION CENTER | Facility: CLINIC | Age: 73
Discharge: HOME | End: 2023-08-31
Payer: MEDICARE

## 2023-08-31 VITALS
OXYGEN SATURATION: 94 % | DIASTOLIC BLOOD PRESSURE: 92 MMHG | TEMPERATURE: 96.5 F | SYSTOLIC BLOOD PRESSURE: 121 MMHG | RESPIRATION RATE: 18 BRPM | HEART RATE: 134 BPM

## 2023-08-31 PROCEDURE — 96365 THER/PROPH/DIAG IV INF INIT: CPT

## 2023-08-31 RX ADMIN — DEXTROSE 20 ML/HR: 5 SOLUTION INTRAVENOUS at 08:45

## 2023-08-31 RX ADMIN — DEXTROSE 600 MG: 5 SOLUTION INTRAVENOUS at 08:42

## 2023-08-31 NOTE — PROGRESS NOTES
Patient presents today for Skyrizi infusion. Patient offers no complaints. VSS. Peripheral IV inserted without incident.

## 2023-08-31 NOTE — PROGRESS NOTES
Patient tolerated treatment without incident. Peripheral IV removed. Next appointment confirmed. AVS offered and declined.

## 2023-09-11 DIAGNOSIS — I73.9 PAD (PERIPHERAL ARTERY DISEASE) (HCC): ICD-10-CM

## 2023-09-11 DIAGNOSIS — E11.51 TYPE 2 DIABETES MELLITUS WITH DIABETIC PERIPHERAL ANGIOPATHY WITHOUT GANGRENE, WITHOUT LONG-TERM CURRENT USE OF INSULIN (HCC): ICD-10-CM

## 2023-09-11 RX ORDER — GLIMEPIRIDE 2 MG/1
2 TABLET ORAL
Qty: 90 TABLET | Refills: 0 | Status: SHIPPED | OUTPATIENT
Start: 2023-09-11

## 2023-09-11 RX ORDER — ATORVASTATIN CALCIUM 20 MG/1
20 TABLET, FILM COATED ORAL DAILY
Qty: 90 TABLET | Refills: 0 | Status: SHIPPED | OUTPATIENT
Start: 2023-09-11

## 2023-09-25 RX ORDER — DEXTROSE MONOHYDRATE 50 MG/ML
20 INJECTION, SOLUTION INTRAVENOUS CONTINUOUS
Status: DISCONTINUED | OUTPATIENT
Start: 2023-09-28 | End: 2023-10-01 | Stop reason: HOSPADM

## 2023-09-28 ENCOUNTER — HOSPITAL ENCOUNTER (OUTPATIENT)
Dept: INFUSION CENTER | Facility: CLINIC | Age: 73
Discharge: HOME | End: 2023-09-28
Payer: MEDICARE

## 2023-09-28 VITALS
OXYGEN SATURATION: 94 % | WEIGHT: 230.5 LBS | DIASTOLIC BLOOD PRESSURE: 88 MMHG | SYSTOLIC BLOOD PRESSURE: 121 MMHG | RESPIRATION RATE: 18 BRPM | HEART RATE: 132 BPM | TEMPERATURE: 96.9 F | BODY MASS INDEX: 34.04 KG/M2

## 2023-09-28 PROCEDURE — 96365 THER/PROPH/DIAG IV INF INIT: CPT

## 2023-09-28 RX ADMIN — DEXTROSE 600 MG: 5 SOLUTION INTRAVENOUS at 08:43

## 2023-09-28 RX ADMIN — DEXTROSE 20 ML/HR: 5 SOLUTION INTRAVENOUS at 08:27

## 2023-09-28 NOTE — PLAN OF CARE
Problem: Potential for Falls  Goal: Patient will remain free of falls  Description: INTERVENTIONS:  - Educate patient/family on patient safety including physical limitations  - Instruct patient to call for assistance with activity   - Consult OT/PT to assist with strengthening/mobility   - Keep Call bell within reach  - Keep bed low and locked with side rails adjusted as appropriate  - Keep care items and personal belongings within reach  - Initiate and maintain comfort rounds  - Make Fall Risk Sign visible to staff  - Apply yellow socks and bracelet for high fall risk patients  - Consider moving patient to room near nurses station  Outcome: Completed     Problem: Knowledge Deficit  Goal: Patient/family/caregiver demonstrates understanding of disease process, treatment plan, medications, and discharge instructions  Description: Complete learning assessment and assess knowledge base.   Interventions:  - Provide teaching at level of understanding  - Provide teaching via preferred learning methods  Outcome: Completed

## 2023-09-28 NOTE — PROGRESS NOTES
Pt to clinic for last dose of skyrizi infusion, pt tolerated without complications, no further appointments scheduled with infusion at this time.

## 2023-09-29 DIAGNOSIS — K21.00 GASTROESOPHAGEAL REFLUX DISEASE WITH ESOPHAGITIS WITHOUT HEMORRHAGE: ICD-10-CM

## 2023-09-29 RX ORDER — OMEPRAZOLE 20 MG/1
20 CAPSULE, DELAYED RELEASE ORAL
Qty: 90 CAPSULE | Refills: 1 | Status: SHIPPED | OUTPATIENT
Start: 2023-09-29

## 2023-09-29 NOTE — TELEPHONE ENCOUNTER
Reason for call:   [x] Refill   [] Prior Auth  [] Other:     Office:   [x] PCP/Provider -   [] Speciality/Provider -     Medication: omeprazle  Dose/Frequency: 20mg daily  Quantity: 90    Pharmacy: [facility]    Does the patient have enough for 3 days? [x] Yes   [] No - Send as HP to POD      Wants a 90 day instead of a 30, its cheaper. Just had a refill but wants a 90day now.

## 2023-10-05 DIAGNOSIS — K50.019 CROHN'S DISEASE OF SMALL INTESTINE WITH COMPLICATION (HCC): ICD-10-CM

## 2023-10-05 RX ORDER — RISANKIZUMAB-RZAA 360 MG/2.4
WEARABLE INJECTOR SUBCUTANEOUS
Qty: 2.4 ML | Refills: 5 | Status: SHIPPED | OUTPATIENT
Start: 2023-10-05

## 2023-10-06 ENCOUNTER — TELEPHONE (OUTPATIENT)
Age: 73
End: 2023-10-06

## 2023-10-06 NOTE — TELEPHONE ENCOUNTER
Patients GI provider:  Dr. Bere Moseley    Number to return call: [phone]    Reason for call: Pt's wife Loc Mo calling wanting a call re: needing help with the [employer] application. Loc Mo can be reached at the above number.     Scheduled procedure/appointment date if applicable: N/A

## 2023-10-09 NOTE — TELEPHONE ENCOUNTER
Called and spoke with patients wife daniela. She completed the free drug application. She will try to send it via [email or web address], if not, she will mail application.

## 2023-10-11 NOTE — TELEPHONE ENCOUNTER
I spoke with the patient's wife. Patient completed application however unable to upload on [address] [place] [address]. Wife then applied online on [employer] website. She spoke with Rah Dawn and they confirmed all information completed on patient's end and for our office to contact to complete the provider portion. Jaspreet phone number [phone].

## 2023-10-14 ENCOUNTER — IMMUNIZATIONS (OUTPATIENT)
Dept: INTERNAL MEDICINE CLINIC | Facility: CLINIC | Age: 73
End: 2023-10-14
Payer: MEDICARE

## 2023-10-14 DIAGNOSIS — Z23 ENCOUNTER FOR IMMUNIZATION: Primary | ICD-10-CM

## 2023-10-14 PROCEDURE — 90662 IIV NO PRSV INCREASED AG IM: CPT

## 2023-10-14 PROCEDURE — G0008 ADMIN INFLUENZA VIRUS VAC: HCPCS

## 2023-10-31 ENCOUNTER — APPOINTMENT (OUTPATIENT)
Dept: LAB | Facility: CLINIC | Age: 73
End: 2023-10-31
Payer: MEDICARE

## 2023-10-31 DIAGNOSIS — E78.5 DYSLIPIDEMIA: ICD-10-CM

## 2023-10-31 DIAGNOSIS — E11.51 TYPE 2 DIABETES MELLITUS WITH DIABETIC PERIPHERAL ANGIOPATHY WITHOUT GANGRENE, WITHOUT LONG-TERM CURRENT USE OF INSULIN (HCC): ICD-10-CM

## 2023-10-31 LAB
ALBUMIN SERPL BCP-MCNC: 3.5 G/DL (ref 3.5–5)
ALP SERPL-CCNC: 202 U/L (ref 34–104)
ALT SERPL W P-5'-P-CCNC: 15 U/L (ref 7–52)
ANION GAP SERPL CALCULATED.3IONS-SCNC: 5 MMOL/L
AST SERPL W P-5'-P-CCNC: 14 U/L (ref 13–39)
BILIRUB SERPL-MCNC: 0.87 MG/DL (ref 0.2–1)
BUN SERPL-MCNC: 16 MG/DL (ref 5–25)
CALCIUM SERPL-MCNC: 8.5 MG/DL (ref 8.4–10.2)
CHLORIDE SERPL-SCNC: 106 MMOL/L (ref 96–108)
CHOLEST SERPL-MCNC: 94 MG/DL
CO2 SERPL-SCNC: 26 MMOL/L (ref 21–32)
CREAT SERPL-MCNC: 0.99 MG/DL (ref 0.6–1.3)
CREAT UR-MCNC: 165.2 MG/DL
ERYTHROCYTE [DISTWIDTH] IN BLOOD BY AUTOMATED COUNT: 15.5 % (ref 11.6–15.1)
EST. AVERAGE GLUCOSE BLD GHB EST-MCNC: 174 MG/DL
GFR SERPL CREATININE-BSD FRML MDRD: 75 ML/MIN/1.73SQ M
GLUCOSE P FAST SERPL-MCNC: 149 MG/DL (ref 65–99)
HBA1C MFR BLD: 7.7 %
HCT VFR BLD AUTO: 50 % (ref 36.5–49.3)
HDLC SERPL-MCNC: 37 MG/DL
HGB BLD-MCNC: 16 G/DL (ref 12–17)
LDLC SERPL CALC-MCNC: 40 MG/DL (ref 0–100)
MAGNESIUM SERPL-MCNC: 1.9 MG/DL (ref 1.9–2.7)
MCH RBC QN AUTO: 28.7 PG (ref 26.8–34.3)
MCHC RBC AUTO-ENTMCNC: 32 G/DL (ref 31.4–37.4)
MCV RBC AUTO: 90 FL (ref 82–98)
PLATELET # BLD AUTO: 324 THOUSANDS/UL (ref 149–390)
PMV BLD AUTO: 9.6 FL (ref 8.9–12.7)
POTASSIUM SERPL-SCNC: 4.3 MMOL/L (ref 3.5–5.3)
PROT SERPL-MCNC: 7 G/DL (ref 6.4–8.4)
PROT UR-MCNC: 46 MG/DL
PROT/CREAT UR: 0.28 MG/G{CREAT} (ref 0–0.1)
RBC # BLD AUTO: 5.57 MILLION/UL (ref 3.88–5.62)
SODIUM SERPL-SCNC: 137 MMOL/L (ref 135–147)
TRIGL SERPL-MCNC: 84 MG/DL
TSH SERPL DL<=0.05 MIU/L-ACNC: 1.96 UIU/ML (ref 0.45–4.5)
WBC # BLD AUTO: 11.2 THOUSAND/UL (ref 4.31–10.16)

## 2023-10-31 PROCEDURE — 84443 ASSAY THYROID STIM HORMONE: CPT

## 2023-10-31 PROCEDURE — 83036 HEMOGLOBIN GLYCOSYLATED A1C: CPT

## 2023-11-10 NOTE — PROGRESS NOTES
RENAL FOLLOW UP NOTE:.td    ASSESSMENT AND PLAN:  1.  CKD stage 3.:  Etiology:  Cardiorenal syndrome/arteriolar nephrosclerosis/? Atheroemboli/prior prerenal associated mild tachycardia and relative hypotension in the past  Baseline creatinine:  1.25-1.41  Current creatinine: At baseline at 0.99  Urine protein creatinine ratio:  0.28 g at goal  Recommendations:  Treat hypertension-please see below  Treat dyslipidemia-please see below  Maintain proteinuria less than 1 g or as low as possible  Avoid nephrotoxic agents such as NSAIDs, patient counseled as such  2.  Volume:  Euvolemic  3. Hypotension:  Workup was compatible with low ejection fraction 48% associated severe hypokinesis of the apical anterior and mid anterior septal in mild in for septal and apical inferior and apical septal and apical walls. No pericardial effusion. Cortisol/TSH were unremarkable. Currently blood pressure:   A.m.: 104/82, no orthostatic changes  P.m.: 100/78, no orthostatic changes  Heart rate 130 approximately  Medication changes today:   Given increased heart rate I will stop the amlodipine and increase Toprol-XL to 75 mg in the morning 50 mg in the evening and recheck blood pressures and heart rate in about 2 weeks  Also contact Dr. Shamar Andrews the cardiologist immediately (Dr. Shamar Andrews is aware and will evaluate)     4. Electrolytes: All acceptable  5. Mineral bone disorder:  Calcium/magnesium/phosphorus:  All acceptable including normal magnesium  PTH intact:   84.6 which is slightly above goal just monitor for now to avoid overtreatment   Vitamin-D:32.8 from January 2023  6. Dyslipidemia:  Goal LDL:  Less than 70 given PAD  Current lipid profile:  LDL 40/HDL 37/triglycerides 84  Recommendations: At goal so no changes  7.   Anemia:               Recommendations:  Hemoglobin stable at 16.0  Multiple myeloma evaluation with SPEP/UPEP and light chains:  Negative  Stool for occult blood x3:  NEGATIVE X1  In March had EGD colonoscopy and recent sigmoidoscopy:  Harris's esophagus/diverticulosis/colonic polyps. Patient will be seeing Dr. Ethan Wilson  8.  Other problems:  Chronic intermittent mild leukocytosis:  Stable   Gross hematuria/microscopic hematuria: This has resolved. Patient seen by Urology for gross hematuria. Cystoscopy was negative and recent PSA negative. No further investigation by follow-up in 1 year by Urology. Abnormal echocardiogram/cardiomyopathy:  Followed in the past by Dr. Jasper Osman  Paroxysmal atrial fibrillation followed by Dr. Hutchinson Im:  Currently on Xarelto: Please see above regarding heart rate  Recent admission in May for signs of small-bowel obstruction associated with abdominal pain and diarrhea. Felt to have Crohn's disease. Bilateral adrenal gland abnormalities being followed by surgical oncology. 24 hour urine for Dopamine epinephrine all unremarkable. Aldosterone was unremarkable. Cortisol was okay at 17.5. To be followed by   PAD:  Status post stent for the right popliteal artery/revision 02/12/2020  Abnormal alkaline phosphatase followed by GI  Diabetes mellitus type 2 not really a candidate for SGLT2 inhibitors with PAD  ABNORMAL LUNG EXAM: Chest x-ray was normal May 2022     GI health maintenance:   November 2022 recommended follow-up in 2 years which would be November 2024 per GI    PATIENT INSTRUCTIONS:    Patient Instructions   Visit summary:  - Overall labs look good including kidney function  - Your blood pressure is okay however heart rate very fast related to possible atrial fibrillation. I am making medication adjustments but please make sure to contact and follow-up with Dr. Jasper Osman    Should you develop any symptoms related to the heart chest pain pressure palpitations dizziness lightheadedness please go to the emergency room and contact Dr. Jasper Osman    Please call Dr. Enrique Simms office either later today or tomorrow to make sure you follow-up with him soon        1.  Medication changes today:  Please stop amlodipine now  Please increase Toprol-XL: Continue to take 75 mg in the morning but add 50 mg in the evening I did send a prescription to your drugstore (if you are taking the 75 mg at night please take 50 mg of the Toprol in the morning and begin now!)        2. Please take 1 week a blood pressure readings 2 weeks after making the above medication change    AS FOLLOWS  MORNING AND EVENING, SITTING AND STANDING as follows:  TAKE THE MORNING READINGS BEFORE ANY MEDICATIONS AND WHEN YOU ARE RELAXED FOR SEVERAL MINUTES  TAKE THE EVENING READINGS:  BETWEEN 7-10 P.M.; PRIOR TO ANY MEDICATIONS; AT LEAST IN OUR  FROM DINNER; AND CERTAINLY AFTER RELAXING FOR A FEW MINUTES  PLEASE INCLUDE HEART RATE WITH YOUR BLOOD PRESSURE READINGS  When taking standing readings, keep your arm supported at heart level and not dangling  Make sure you are sitting with your back supported and feet on the ground and do not cross your legs or feet  Make sure you have not taken any coffee or caffeine products or exercised or smoke cigarettes at least 30 minutes before taking your blood pressure  Then please mail these readings into the office    3. Follow-up in 6 months  Please bring in 1 week a blood pressure readings morning evening, sitting and standing is outlined above  PLEASE BRING AN YOUR BLOOD PRESSURE MACHINE TO CORRELATE WITH THE OFFICE MACHINE AT THIS NEXT SCHEDULED VISIT  Please go for fasting lab work 1-2 weeks prior to your appointment      4.   General instructions:  AVOID SALT BUT NOT ADDING AN READING LABELS TO MAKE SURE THERE IS LOW-SALT IN THE FOOD THAT YOU ARE EATING  Goal is less than 2 g of sodium intake or less than 5 g of sodium chloride intake per day    Avoid nonsteroidal anti-inflammatory drugs such as Naprosyn, ibuprofen, Aleve, Advil, Celebrex, Meloxicam (Mobic) etc.  You can use Tylenol as needed if you do not have any liver condition to be concerned about    Avoid medications such as Sudafed or decongestants and antihistamines that contained the D component which is the decongestant. You can take antihistamines without the decongestant or D component. Try to avoid medications such as pantoprazole or  Protonix/Nexium or Esomeprazole)/Prilosec or omeprazole/Prevacid or lansoprazole/AcipHex or Rabeprazole. If you are able to, use Pepcid as this is safer for your kidneys. Try to exercise at least 30 minutes 3 days a week to begin with with an ultimate goal of 5 days a week for at least 30 minutes    Try to lose 10-15 lb by your next visit    Please do not drink more than 2 glasses of alcohol/wine on a daily basis as this may contribute to your high blood pressure. Subjective: There has been no hospitalizations or acute illnesses since last visit. The patient overall is feeling well. No fevers, chills, or cough or colds.   Good appetite and good energy  No hematuria, dysuria, voiding symptoms or foamy urine  No gastrointestinal symptoms  No cardiovascular symptoms including swelling of the legs, no palpitations as well  No headaches, dizziness or lightheadedness  Blood pressure medications:  Toprol-XL 75 mg daily in the morning   Amlodipine 2.5 mg daily in the morning    Renal pertinent medications:  Prilosec 20 mg daily  Magnesium 500 mg daily  Atorvastatin 20 mg daily  Aspirin 81 mg daily    ROS:  See HPI, otherwise review of systems as completely reviewed with the patient are negative    Past Medical History:   Diagnosis Date    Harris esophagus     Blue toe syndrome (HCC)     Chronic kidney disease     Colon polyps     Crohn's disease ([address]) 1-2020    GERD (gastroesophageal reflux disease)     Hematuria     History of rheumatic fever     Hypolipidemia     Hypotension     Ischemic cardiomyopathy     PAD (peripheral artery disease) ([address])     Pulmonary emphysema (HCC)     PVD (peripheral vascular disease) ([address])      Past Surgical History:   Procedure Laterality Date COLONOSCOPY  2019    HERNIA REPAIR      IR AORTAGRAM WITH RUN-OFF  6/27/2019    IR AORTAGRAM WITH RUN-OFF  2/12/2020    POPLITEAL ARTERY STENT Right     6/2019    FL BYPASS W/VEIN FEMORAL-POPLITEAL Right 3/26/2020    Procedure: BYPASS FEMORAL-POPLITEAL; Right lower extremity bypass, fem-bk pop with GSV;  Surgeon: Scott Licea MD;  Location: [facility];  Service: Vascular    FL SLCTV CATHJ 3RD+ ORD SLCTV ABDL PEL/LXTR [facility] Right 6/27/2019    Procedure: leg ANGIOGRAM WITH STENT, BALLOON ANGIOPLASTY, LEFT GROIN ACCESS;  Surgeon: Scott Licea MD;  Location: [facility];  Service: Vascular     Family History   Problem Relation Age of Onset    Heart disease Mother     Hyperlipidemia Mother     Hypertension Mother     Hypertension Father     Heart disease Father     Stroke Father     Hyperlipidemia Father     Diabetes Brother     No Known Problems Maternal Grandmother     Dementia Neg Hx     Drug abuse Neg Hx     Mental illness Neg Hx     Substance Abuse Neg Hx     Alcohol abuse Neg Hx     Depression Neg Hx     Colon cancer Neg Hx       reports that he has been smoking cigarettes. He has a 7.50 pack-year smoking history. He has never used smokeless tobacco. He reports current alcohol use of about 1.0 standard drink of alcohol per week. He reports that he does not use drugs. I COMPLETELY REVIEWED THE PAST MEDICAL HISTORY/PAST SURGICAL HISTORY/SOCIAL HISTORY/FAMILY HISTORY/AND MEDICATIONS  AND UPDATED ALL    Objective:     Vitals:   BP sitting on left: 100/78 with a heart rate of 128? Regular  BP standing on left: 102/88 with a heart rate of 132 and? Regular    AOBP: 105/77 with a heart rate of 128    Weight (last 2 days)       Date/Time Weight    11/20/23 0825 104 (229)          Wt Readings from Last 3 Encounters:   11/20/23 104 kg (229 lb)   09/28/23 105 kg (230 lb 8 oz)   08/30/23 106 kg (233 lb)       Body mass index is 33.82 kg/m².     Physical Exam: General:  No acute distress/obese  Skin:  No acute rash  Eyes:  No scleral icterus, noninjected, no discharge from eyes  ENT:  Moist mucous membranes  Neck:  Supple, no jugular venous distention, trachea is midline, no lymphadenopathy and no thyromegaly  Back   No CVAT  Chest:  Clear to auscultation except for very scant crackles at the right base but no dullness to  percussion, good respiratory effort  CVS: Increased and irregular heart rate without a rub, or gallops or murmurs  Abdomen:  obese,Soft and nontender with normal bowel sounds  Extremities:  No cyanosis and no edema, no arthritic changes, normal range of motion  Neuro:  Grossly intact  Psych:  Alert, oriented x3 and appropriate      Medications:    Current Outpatient Medications:     aspirin 81 MG tablet, Take 81 mg by mouth daily, Disp: , Rfl:     atorvastatin (LIPITOR) 20 mg tablet, take 1 tablet by mouth once daily, Disp: 90 tablet, Rfl: 0    folic acid (FOLVITE) 1 mg tablet, Take 1 tablet (1 mg total) by mouth daily, Disp: 90 tablet, Rfl: 3    glimepiride (AMARYL) 2 mg tablet, take 1 tablet by mouth once daily WITH BREAKFAST, Disp: 90 tablet, Rfl: 0    Magnesium 500 MG TABS, Take 1 tablet by mouth daily , Disp: , Rfl:     metoprolol succinate (TOPROL-XL) 25 mg 24 hr tablet, Take 1 tablet (25 mg total) by mouth daily, Disp: 90 tablet, Rfl: 3    metoprolol succinate (TOPROL-XL) 50 mg 24 hr tablet, Take 1 tablet (50 mg total) by mouth 2 (two) times a day, Disp: 180 tablet, Rfl: 3    omeprazole (PriLOSEC) 20 mg delayed release capsule, Take 1 capsule (20 mg total) by mouth daily before breakfast, Disp: 90 capsule, Rfl: 1    risankizumab-rzaa (Skyrizi) 360 MG/2.4ML SOCT, Take first on body injector (OBI) under skin on week 12 then every 8 weeks there after, Disp: 2.4 mL, Rfl: 5    vitamin B-12 (VITAMIN B-12) 1,000 mcg tablet, Take 1 tablet (1,000 mcg total) by mouth daily (Patient taking differently: Take 1,000 mcg by mouth 3 (three) times a week), Disp: 90 tablet, Rfl: 1    Xarelto 20 MG tablet, TAKE 1 TABLET BY MOUTH DAILY WITH BREAKFAST, Disp: 90 tablet, Rfl: 3    Lab, Imaging and other studies: I have personally reviewed pertinent labs. Laboratory Results:  Results for orders placed or performed in visit on 10/31/23   Hemoglobin A1C   Result Value Ref Range    Hemoglobin A1C 7.7 (H) Normal 4.0-5.6%; PreDiabetic 5.7-6.4%; Diabetic >=6.5%; Glycemic control for adults with diabetes <7.0% %     mg/dl   TSH, 3rd generation   Result Value Ref Range    TSH 3RD GENERATON 1.958 0.450 - 4.500 uIU/mL             Invalid input(s): "ALBUMIN"      Radiology review:   chest X-ray    Ultrasound      Portions of the record may have been created with voice recognition software. Occasional wrong word or "sound a like" substitutions may have occurred due to the inherent limitations of voice recognition software. Read the chart carefully and recognize, using context, where substitutions have occurred.

## 2023-11-20 ENCOUNTER — OFFICE VISIT (OUTPATIENT)
Dept: NEPHROLOGY | Facility: CLINIC | Age: 73
End: 2023-11-20
Payer: MEDICARE

## 2023-11-20 VITALS
HEART RATE: 128 BPM | WEIGHT: 229 LBS | SYSTOLIC BLOOD PRESSURE: 105 MMHG | BODY MASS INDEX: 33.92 KG/M2 | DIASTOLIC BLOOD PRESSURE: 77 MMHG | HEIGHT: 69 IN

## 2023-11-20 DIAGNOSIS — R74.8 ELEVATED ALKALINE PHOSPHATASE LEVEL: ICD-10-CM

## 2023-11-20 DIAGNOSIS — E55.9 VITAMIN D DEFICIENCY: ICD-10-CM

## 2023-11-20 DIAGNOSIS — R80.1 PERSISTENT PROTEINURIA: ICD-10-CM

## 2023-11-20 DIAGNOSIS — N18.31 STAGE 3A CHRONIC KIDNEY DISEASE (HCC): Primary | ICD-10-CM

## 2023-11-20 DIAGNOSIS — E78.5 DYSLIPIDEMIA: ICD-10-CM

## 2023-11-20 DIAGNOSIS — I48.0 PAROXYSMAL ATRIAL FIBRILLATION (HCC): ICD-10-CM

## 2023-11-20 PROCEDURE — 99214 OFFICE O/P EST MOD 30 MIN: CPT | Performed by: INTERNAL MEDICINE

## 2023-11-20 RX ORDER — METOPROLOL SUCCINATE 50 MG/1
50 TABLET, EXTENDED RELEASE ORAL 2 TIMES DAILY
Qty: 180 TABLET | Refills: 3 | Status: SHIPPED | OUTPATIENT
Start: 2023-11-20

## 2023-11-20 NOTE — PATIENT INSTRUCTIONS
Visit summary:  - Overall labs look good including kidney function  - Your blood pressure is okay however heart rate very fast related to possible atrial fibrillation. I am making medication adjustments but please make sure to contact and follow-up with Dr. Cherylene Buddy    Should you develop any symptoms related to the heart chest pain pressure palpitations dizziness lightheadedness please go to the emergency room and contact Dr. Cherylene Buddy    Please call Dr. Huertas Jose Miguel office either later today or tomorrow to make sure you follow-up with him soon        1. Medication changes today:  Please stop amlodipine now  Please increase Toprol-XL: Continue to take 75 mg in the morning but add 50 mg in the evening I did send a prescription to your drugstore (if you are taking the 75 mg at night please take 50 mg of the Toprol in the morning and begin now!)        2. Please take 1 week a blood pressure readings 2 weeks after making the above medication change    AS FOLLOWS  MORNING AND EVENING, SITTING AND STANDING as follows:  TAKE THE MORNING READINGS BEFORE ANY MEDICATIONS AND WHEN YOU ARE RELAXED FOR SEVERAL MINUTES  TAKE THE EVENING READINGS:  BETWEEN 7-10 P.M.; PRIOR TO ANY MEDICATIONS; AT LEAST IN OUR  FROM DINNER; AND CERTAINLY AFTER RELAXING FOR A FEW MINUTES  PLEASE INCLUDE HEART RATE WITH YOUR BLOOD PRESSURE READINGS  When taking standing readings, keep your arm supported at heart level and not dangling  Make sure you are sitting with your back supported and feet on the ground and do not cross your legs or feet  Make sure you have not taken any coffee or caffeine products or exercised or smoke cigarettes at least 30 minutes before taking your blood pressure  Then please mail these readings into the office    3.   Follow-up in 6 months  Please bring in 1 week a blood pressure readings morning evening, sitting and standing is outlined above  PLEASE BRING AN YOUR BLOOD PRESSURE MACHINE TO CORRELATE WITH THE OFFICE MACHINE AT THIS NEXT SCHEDULED VISIT  Please go for fasting lab work 1-2 weeks prior to your appointment      4. General instructions:  AVOID SALT BUT NOT ADDING AN READING LABELS TO MAKE SURE THERE IS LOW-SALT IN THE FOOD THAT YOU ARE EATING  Goal is less than 2 g of sodium intake or less than 5 g of sodium chloride intake per day    Avoid nonsteroidal anti-inflammatory drugs such as Naprosyn, ibuprofen, Aleve, Advil, Celebrex, Meloxicam (Mobic) etc.  You can use Tylenol as needed if you do not have any liver condition to be concerned about    Avoid medications such as Sudafed or decongestants and antihistamines that contained the D component which is the decongestant. You can take antihistamines without the decongestant or D component. Try to avoid medications such as pantoprazole or  Protonix/Nexium or Esomeprazole)/Prilosec or omeprazole/Prevacid or lansoprazole/AcipHex or Rabeprazole. If you are able to, use Pepcid as this is safer for your kidneys. Try to exercise at least 30 minutes 3 days a week to begin with with an ultimate goal of 5 days a week for at least 30 minutes    Try to lose 10-15 lb by your next visit    Please do not drink more than 2 glasses of alcohol/wine on a daily basis as this may contribute to your high blood pressure.

## 2023-11-22 ENCOUNTER — TELEPHONE (OUTPATIENT)
Dept: CARDIOLOGY CLINIC | Facility: CLINIC | Age: 73
End: 2023-11-22

## 2023-11-22 NOTE — TELEPHONE ENCOUNTER
Pt following up with you after the visit with Dr Goran Adam where meds were adjusted. Amlodipine was stopped and Toprol 50 mg BID was started.   He denies any palpitations or fast heart rate    Dr Goran Adam stated that he did reach out to you and pt was to call the office    You do not have any openings until next year

## 2023-11-28 ENCOUNTER — PREP FOR PROCEDURE (OUTPATIENT)
Dept: CARDIOLOGY CLINIC | Facility: CLINIC | Age: 73
End: 2023-11-28

## 2023-11-28 DIAGNOSIS — I48.0 PAROXYSMAL ATRIAL FIBRILLATION (HCC): Primary | ICD-10-CM

## 2023-12-09 DIAGNOSIS — E11.51 TYPE 2 DIABETES MELLITUS WITH DIABETIC PERIPHERAL ANGIOPATHY WITHOUT GANGRENE, WITHOUT LONG-TERM CURRENT USE OF INSULIN (HCC): ICD-10-CM

## 2023-12-09 DIAGNOSIS — I73.9 PAD (PERIPHERAL ARTERY DISEASE) (HCC): ICD-10-CM

## 2023-12-11 RX ORDER — GLIMEPIRIDE 2 MG/1
2 TABLET ORAL
Qty: 90 TABLET | Refills: 0 | Status: SHIPPED | OUTPATIENT
Start: 2023-12-11

## 2023-12-11 RX ORDER — ATORVASTATIN CALCIUM 20 MG/1
20 TABLET, FILM COATED ORAL DAILY
Qty: 90 TABLET | Refills: 0 | Status: SHIPPED | OUTPATIENT
Start: 2023-12-11

## 2023-12-19 ENCOUNTER — TELEPHONE (OUTPATIENT)
Dept: CARDIOLOGY CLINIC | Facility: CLINIC | Age: 73
End: 2023-12-19

## 2023-12-19 NOTE — TELEPHONE ENCOUNTER
Patient called to request Xarelto samples. He will be provided with 4 sample bottles (1 month supply) of Xarelto 20 mg.  Lot#: [id number]  Exp:

## 2024-01-10 ENCOUNTER — HOSPITAL ENCOUNTER (OUTPATIENT)
Facility: HOSPITAL | Age: 74
Setting detail: OUTPATIENT SURGERY
Discharge: HOME | End: 2024-01-10
Attending: INTERNAL MEDICINE | Admitting: INTERNAL MEDICINE
Payer: MEDICARE

## 2024-01-10 VITALS
HEART RATE: 134 BPM | WEIGHT: 221 LBS | SYSTOLIC BLOOD PRESSURE: 129 MMHG | HEIGHT: 70 IN | TEMPERATURE: 97.8 F | DIASTOLIC BLOOD PRESSURE: 90 MMHG | BODY MASS INDEX: 31.64 KG/M2 | OXYGEN SATURATION: 92 % | RESPIRATION RATE: 18 BRPM

## 2024-01-10 DIAGNOSIS — I48.0 PAROXYSMAL ATRIAL FIBRILLATION (HCC): ICD-10-CM

## 2024-01-10 PROCEDURE — 33286 RMVL SUBQ CAR RHYTHM MNTR: CPT | Performed by: PHYSICIAN ASSISTANT

## 2024-01-10 PROCEDURE — 33286 RMVL SUBQ CAR RHYTHM MNTR: CPT | Performed by: INTERNAL MEDICINE

## 2024-01-10 PROCEDURE — NC001 PR NO CHARGE: Performed by: PHYSICIAN ASSISTANT

## 2024-01-10 RX ORDER — LIDOCAINE HYDROCHLORIDE 10 MG/ML
INJECTION, SOLUTION EPIDURAL; INFILTRATION; INTRACAUDAL; PERINEURAL CODE/TRAUMA/SEDATION MEDICATION
Status: DISCONTINUED | OUTPATIENT
Start: 2024-01-10 | End: 2024-01-10 | Stop reason: HOSPADM

## 2024-01-10 NOTE — H&P
Patient presents to [facility] stay [place] for loop explantation.  Patient had loop recorder placed in 2019 secondary to peripheral artery disease with concern for embolic etiology.  The past injury to [place] this time for explantation.  No indication for reimplantation of loop.

## 2024-01-10 NOTE — DISCHARGE INSTR - AVS FIRST PAGE
LOOP RECORDER INSTRUCTIONS:  - Keep loop recorder incision dry for one week. Do not use lotions, powders, creams, or ointments on incision.     - Remove outer bandage 24-48 hours after procedure. There is waterproof glue present over the incision. This should keep the incision dry. Avoid picking at the glue or peeling it off. The glue will come off in its own time.     - Because the glue is waterproof, it is safe to shower if the glue remains on over the incision. It is ok to let the soap and water gently run over the incision. Avoid direct exposure to the stream of water. Do not soak the incision. Do not scrub. Pat dry.    - If the glue comes off in less that 7 days, place a waterproof bandage over the incision before showering.    - If present, leave underlying steri-strips in place. They will either fall off on their own or will be removed at the 2 week follow up appointment. If present, leave stitches in place. They will be removed at the 2 week follow up appointment.    - Please call the office if you notice redness, swelling, bleeding, or drainage from incision or if you develop fevers.      Wound care: Keep loop recorder incision dry for one week. Do not use lotions, powders, creams, or ointments on incision. Remove outer bandage 24-48 hours after procedure. There is waterproof glue present over the incision. This should keep the incision dry. Avoid picking at the glue or peeling it off. The glue will come off in its own time. Because the glue is waterproof, it is safe to shower if the glue remains on over the incision. It is ok to let the soap and water gently run over the incision. Avoid direct exposure to the stream of water. Do not soak the incision. Do not scrub. Pat dry.If the glue comes off in less that 7 days, place a waterproof bandage over the incision before showering. If present, leave underlying steri-strips in place. They will either fall off on their own or will be removed at the 2 week follow  up appointment. If present, leave stitches in place. They will be removed at the 2 week follow up appointment.    Please call the office if you notice redness, swelling, bleeding, or drainage from incision or if you develop fevers.    Return to activity: If you received anesthesia, you will not be able to drive for 24 hours. Otherwise, most people can return to normal activities soon after the procedure. Your cardiologist may want to know if your work involves electrical current or high-voltage equipment. Ask about other electrical items that could interfere with your cardiac loop recorder.      Contact your cardiologist if:   You have a fever or chills.    Your wound is red, swollen, or draining pus.    You have questions or concerns about your condition or care.    Seek care immediately or call 911 if:   You feel weak, dizzy, or faint.    You lose consciousness.      © 2014 [employer]. Information is for End User's use only and may not be sold, redistributed or otherwise used for commercial purposes. All illustrations and images included in CareNotes® are the copyrighted property of [employer] or [employer].    The above information is an  only. It is not intended as medical advice for individual conditions or treatments. Talk to your doctor, nurse or pharmacist before following any medical regimen to see if it is safe and effective for you.

## 2024-01-16 NOTE — ASSESSMENT & PLAN NOTE
Elevated HR, in the 130's. Metoprolol already recently increased by nephrology.  Instructed to monitor BP and HR at home, discussed increasing metoprolol to 75 mg bid.  On Xarelto.

## 2024-01-16 NOTE — ASSESSMENT & PLAN NOTE
Lab Results   Component Value Date    HGBA1C 7.7 (H) 10/31/2023     A1c worse. Discussed diet.  On glimepiride 2 mg daily.

## 2024-01-16 NOTE — ASSESSMENT & PLAN NOTE
Lab Results   Component Value Date    EGFR 75 10/31/2023    EGFR 69 07/26/2023    EGFR 65 06/19/2023    CREATININE 0.99 10/31/2023    CREATININE 1.07 07/26/2023    CREATININE 1.12 06/19/2023     Stable.  Avoid NSAIDs.  Sees nephrology.

## 2024-01-17 ENCOUNTER — TELEPHONE (OUTPATIENT)
Dept: ADMINISTRATIVE | Facility: OTHER | Age: 74
End: 2024-01-17

## 2024-01-17 ENCOUNTER — OFFICE VISIT (OUTPATIENT)
Dept: INTERNAL MEDICINE CLINIC | Facility: CLINIC | Age: 74
End: 2024-01-17
Payer: MEDICARE

## 2024-01-17 VITALS
OXYGEN SATURATION: 95 % | HEART RATE: 89 BPM | DIASTOLIC BLOOD PRESSURE: 86 MMHG | TEMPERATURE: 96.1 F | WEIGHT: 233 LBS | HEIGHT: 70 IN | BODY MASS INDEX: 33.36 KG/M2 | SYSTOLIC BLOOD PRESSURE: 126 MMHG

## 2024-01-17 DIAGNOSIS — E11.51 TYPE 2 DIABETES MELLITUS WITH DIABETIC PERIPHERAL ANGIOPATHY WITHOUT GANGRENE, WITHOUT LONG-TERM CURRENT USE OF INSULIN (HCC): ICD-10-CM

## 2024-01-17 DIAGNOSIS — N18.31 STAGE 3A CHRONIC KIDNEY DISEASE (HCC): ICD-10-CM

## 2024-01-17 DIAGNOSIS — E55.9 VITAMIN D DEFICIENCY: ICD-10-CM

## 2024-01-17 DIAGNOSIS — Z00.00 HEALTH MAINTENANCE EXAMINATION: ICD-10-CM

## 2024-01-17 DIAGNOSIS — E27.8 ADRENAL MASS (HCC): ICD-10-CM

## 2024-01-17 DIAGNOSIS — E66.09 CLASS 1 OBESITY DUE TO EXCESS CALORIES WITH SERIOUS COMORBIDITY AND BODY MASS INDEX (BMI) OF 33.0 TO 33.9 IN ADULT: ICD-10-CM

## 2024-01-17 DIAGNOSIS — K50.019 CROHN'S DISEASE OF SMALL INTESTINE WITH COMPLICATION (HCC): ICD-10-CM

## 2024-01-17 DIAGNOSIS — E78.5 DYSLIPIDEMIA: ICD-10-CM

## 2024-01-17 DIAGNOSIS — K22.70 BARRETT'S ESOPHAGUS WITHOUT DYSPLASIA: ICD-10-CM

## 2024-01-17 DIAGNOSIS — I73.9 PAD (PERIPHERAL ARTERY DISEASE) (HCC): ICD-10-CM

## 2024-01-17 DIAGNOSIS — E53.8 VITAMIN B12 DEFICIENCY: ICD-10-CM

## 2024-01-17 DIAGNOSIS — R05.1 ACUTE COUGH: Primary | ICD-10-CM

## 2024-01-17 DIAGNOSIS — D84.9 IMMUNOCOMPROMISED PATIENT (HCC): ICD-10-CM

## 2024-01-17 DIAGNOSIS — I48.0 PAROXYSMAL ATRIAL FIBRILLATION (HCC): ICD-10-CM

## 2024-01-17 PROCEDURE — 99214 OFFICE O/P EST MOD 30 MIN: CPT | Performed by: INTERNAL MEDICINE

## 2024-01-17 PROCEDURE — G0439 PPPS, SUBSEQ VISIT: HCPCS | Performed by: INTERNAL MEDICINE

## 2024-01-17 RX ORDER — FLUTICASONE PROPIONATE 50 MCG
1 SPRAY, SUSPENSION (ML) NASAL DAILY
Qty: 16 G | Refills: 1 | Status: SHIPPED | OUTPATIENT
Start: 2024-01-17

## 2024-01-17 RX ORDER — ALBUTEROL SULFATE 90 UG/1
2 AEROSOL, METERED RESPIRATORY (INHALATION) EVERY 6 HOURS PRN
Qty: 8 G | Refills: 1 | Status: SHIPPED | OUTPATIENT
Start: 2024-01-17

## 2024-01-17 NOTE — PROGRESS NOTES
Assessment and Plan:     Problem List Items Addressed This Visit        Digestive    Harris's esophagus without dysplasia     Taking PPI daily.         Crohn's disease of small intestine with complication (HCC)     Entyvio changed to Skyrizi.  Monitor cough.  Per GI.            Endocrine    Type 2 diabetes mellitus with diabetic peripheral angiopathy without gangrene (HCC)       Lab Results   Component Value Date    HGBA1C 7.7 (H) 10/31/2023     A1c worse. Discussed diet.  On glimepiride 2 mg daily.         Relevant Orders    Comprehensive metabolic panel    Hemoglobin A1C    Hemoglobin A1C       Cardiovascular and Mediastinum    PAD (peripheral artery disease) (HCC)     On ASA, statin.  Repeat ultrasound scheduled.         Paroxysmal atrial fibrillation (HCC)     Elevated HR, in the 130's. Metoprolol already recently increased by nephrology.  Instructed to monitor BP and HR at home, discussed increasing metoprolol to 75 mg bid.  On Xarelto.            Genitourinary    Chronic kidney disease, stage III (moderate) (HCC)     Lab Results   Component Value Date    EGFR 75 10/31/2023    EGFR 69 07/26/2023    EGFR 65 06/19/2023    CREATININE 0.99 10/31/2023    CREATININE 1.07 07/26/2023    CREATININE 1.12 06/19/2023     Stable.  Avoid NSAIDs.  Sees nephrology.            Other    Adrenal mass (HCC)     Stable on recent CT.         Class 1 obesity due to excess calories with serious comorbidity and body mass index (BMI) of 33.0 to 33.9 in adult     Weight stable.         Dyslipidemia     Lipids at goal, on atorvastatin 20 mg.         Relevant Orders    TSH, 3rd generation with Free T4 reflex    Lipid panel    Immunocompromised patient (HCC)    Vitamin B12 deficiency    Relevant Orders    CBC and differential    Vitamin B12    Vitamin D deficiency    Relevant Orders    Vitamin D 25 hydroxy   Other Visit Diagnoses     Acute cough    -  Primary    Relevant Medications    albuterol (Ventolin HFA) 90 mcg/act inhaler     fluticasone (FLONASE) 50 mcg/act nasal spray    Health maintenance examination               Preventive health issues were discussed with patient, and age appropriate screening tests were ordered as noted in patient's After Visit Summary.  Personalized health advice and appropriate referrals for health education or preventive services given if needed, as noted in patient's After Visit Summary.     History of Present Illness:     Patient presents for a Medicare Wellness Visit and follow up visit.    He complains of a cough since about 2 weeks ago.  Denies any chest pain or shortness of breath, does notice that he is experiencing more frequent wheezing especially when outdoors in the cold.  He did not have any fever or chills..  He has minimal nasal congestion, no sore throat.  He only took Benadryl, no other over-the-counter medications for the congestion and cough.  He tested negative for COVID last week.    He is asking if his symptoms is related to Skyrizi which he has had 2 doses off.  He reports no abdominal pain, bowel movements are about the same.    He had the loop recorder removed recently.  He has not returned to the gym because of this.  He has suture removal scheduled next week.    He saw his kidney doctor, was found to have a high heart rate.  His metoprolol was adjusted.  He found that if he took metoprolol at bedtime he felt very weak and could not sleep.  When he moved it up to 9 PM, he was able to sleep better.  He takes the morning dose between 6 to 8 AM.  He has not been checking his blood pressure or heart rate at home since the medication change because of the recent surgery.       Patient Care Team:  Virgie Luz MD as PCP - General (Internal Medicine)  Erik Ramos MD (Nephrology)  Willem Coppola MD (Urology)  Tarun Almanza MD (Cardiology)  Amado Castano MD (Gastroenterology)  Fady Cordova MD as Surgeon (Surgical Oncology)  Wyatt Shell MD (Vascular Surgery)      Review of Systems:     Review of Systems   Constitutional:  Negative for appetite change, fatigue and fever.   HENT:  Positive for congestion and rhinorrhea. Negative for hearing loss, postnasal drip, sinus pressure, sinus pain and sore throat.    Eyes: Negative.    Respiratory:  Positive for cough. Negative for chest tightness, shortness of breath and wheezing.    Cardiovascular:  Negative for chest pain, palpitations and leg swelling.   Gastrointestinal:  Negative for abdominal pain and constipation.   Genitourinary:  Negative for dysuria, frequency and urgency.   Musculoskeletal:  Negative for arthralgias.   Skin:  Negative for rash and wound.   Neurological:  Negative for dizziness, numbness and headaches.   Hematological:  Negative for adenopathy. Does not bruise/bleed easily.   Psychiatric/Behavioral:  Positive for sleep disturbance. The patient is not nervous/anxious.         Problem List:     Patient Active Problem List   Diagnosis   • Class 1 obesity due to excess calories with serious comorbidity and body mass index (BMI) of 33.0 to 33.9 in adult   • Chronic kidney disease, stage III (moderate) ([facility])   • Persistent proteinuria   • Microscopic hematuria   • Bruning cardiac risk 10-20% in next 10 years   • Abdominal aortic atherosclerosis ([facility])   • RBBB (right bundle branch block with left anterior fascicular block)   • Dyslipidemia   • Vitamin D deficiency   • Harris's esophagus without dysplasia   • Colon polyps   • PAD (peripheral artery disease) ([facility])   • Ischemic cardiomyopathy   • S/P peripheral artery angioplasty with stent placement   • Venous stasis   • Paroxysmal atrial fibrillation ([facility])   • Adrenal mass ([facility])   • Crohn's disease of small intestine with complication ([facility])   • SVT (supraventricular tachycardia)   • Positive QuantiFERON-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   • Terminal ileitis of small intestine ([facility])   • S/P femoral-popliteal bypass surgery   • Elevated alkaline phosphatase level   • Vitamin B12  deficiency   • Type 2 diabetes mellitus with diabetic peripheral angiopathy without gangrene (HCC)   • Venous insufficiency   • Immunocompromised patient (HCC)      Past Medical and Surgical History:     Past Medical History:   Diagnosis Date   • Harris esophagus    • Blue toe syndrome (HCC)    • Chronic kidney disease    • Colon polyps    • Crohn's disease (HCC) 1-2020   • GERD (gastroesophageal reflux disease)    • Hematuria    • History of rheumatic fever    • Hypolipidemia    • Hypotension    • Ischemic cardiomyopathy    • PAD (peripheral artery disease) ([facility])    • Pulmonary emphysema (HCC)    • PVD (peripheral vascular disease) ([facility])      Past Surgical History:   Procedure Laterality Date   • CARDIAC ELECTROPHYSIOLOGY PROCEDURE N/A 1/10/2024    Procedure: Cardiac loop recorder explant;  Surgeon: Jason Cortez MD;  Location: [facility];  Service: Cardiology   • COLONOSCOPY  2019   • HERNIA REPAIR     • IR AORTAGRAM WITH RUN-OFF  6/27/2019   • IR AORTAGRAM WITH RUN-OFF  2/12/2020   • POPLITEAL ARTERY STENT Right     6/2019   • DE BYPASS W/VEIN FEMORAL-POPLITEAL Right 3/26/2020    Procedure: BYPASS FEMORAL-POPLITEAL; Right lower extremity bypass, fem-bk pop with GSV;  Surgeon: Wyatt Shell MD;  Location: [facility] MAIN [facility];  Service: Vascular   • DE SLCTV CATHJ 3RD+ ORD SLCTV ABDL PEL/LXTR BRNCH Right 6/27/2019    Procedure: leg ANGIOGRAM WITH STENT, BALLOON ANGIOPLASTY, LEFT GROIN ACCESS;  Surgeon: Wyatt Shell MD;  Location: [facility];  Service: Vascular      Family History:     Family History   Problem Relation Age of Onset   • Heart disease Mother    • Hyperlipidemia Mother    • Hypertension Mother    • Hypertension Father    • Heart disease Father    • Stroke Father    • Hyperlipidemia Father    • Diabetes Brother    • No Known Problems Maternal Grandmother    • Dementia Neg Hx    • Drug abuse Neg Hx    • Mental illness Neg Hx    • Substance Abuse Neg Hx    • Alcohol abuse Neg Hx    • Depression Neg  Hx    • Colon cancer Neg Hx       Social History:     Social History     Socioeconomic History   • Marital status: /Civil Union     Spouse name: None   • Number of children: 2   • Years of education: None   • Highest education level: None   Occupational History   • Occupation: retired   Tobacco Use   • Smoking status: Some Days     Current packs/day: 0.25     Average packs/day: 0.3 packs/day for 30.0 years (7.5 ttl pk-yrs)     Types: Cigarettes   • Smokeless tobacco: Never   Vaping Use   • Vaping status: Never Used   Substance and Sexual Activity   • Alcohol use: Yes     Alcohol/week: 1.0 standard drink of alcohol     Types: 1 Standard drinks or equivalent per week     Comment: social drinker when out dining   • Drug use: Never   • Sexual activity: Not Currently     Birth control/protection: None   Other Topics Concern   • None   Social History Narrative    Drinks coffee     Social Determinants of Health     Financial Resource Strain: Low Risk  (1/14/2024)    Overall Financial Resource Strain (CARDIA)    • Difficulty of Paying Living Expenses: Not hard at all   Food Insecurity: Not on file   Transportation Needs: No Transportation Needs (1/14/2024)    PRAPARE - Transportation    • Lack of Transportation (Medical): No    • Lack of Transportation (Non-Medical): No   Physical Activity: Not on file   Stress: Not on file   Social Connections: Not on file   Intimate Partner Violence: Not on file   Housing Stability: Not on file      Medications and Allergies:     Current Outpatient Medications   Medication Sig Dispense Refill   • albuterol (Ventolin HFA) 90 mcg/act inhaler Inhale 2 puffs every 6 (six) hours as needed for wheezing 8 g 1   • fluticasone (FLONASE) 50 mcg/act nasal spray 1 spray into each nostril daily 16 g 1   • aspirin 81 MG tablet Take 81 mg by mouth daily     • atorvastatin (LIPITOR) 20 mg tablet take 1 tablet by mouth once daily 90 tablet 0   • folic acid (FOLVITE) 1 mg tablet Take 1 tablet (1 mg  total) by mouth daily 90 tablet 3   • glimepiride (AMARYL) 2 mg tablet take 1 tablet by mouth once daily WITH BREAKFAST 90 tablet 0   • Magnesium 500 MG TABS Take 1 tablet by mouth daily      • metoprolol succinate (TOPROL-XL) 25 mg 24 hr tablet Take 1 tablet (25 mg total) by mouth daily (Patient taking differently: Take 25 mg by mouth daily Total of 75mg) 90 tablet 3   • metoprolol succinate (TOPROL-XL) 50 mg 24 hr tablet Take 1 tablet (50 mg total) by mouth 2 (two) times a day 180 tablet 3   • omeprazole (PriLOSEC) 20 mg delayed release capsule Take 1 capsule (20 mg total) by mouth daily before breakfast 90 capsule 1   • risankizumab-rzaa (Skyrizi) 360 MG/2.4ML SOCT Take first on body injector (OBI) under skin on week 12 then every 8 weeks there after 2.4 mL 5   • vitamin B-12 (VITAMIN B-12) 1,000 mcg tablet Take 1 tablet (1,000 mcg total) by mouth daily (Patient taking differently: Take 1,000 mcg by mouth 3 (three) times a week) 90 tablet 1   • Xarelto 20 MG tablet TAKE 1 TABLET BY MOUTH DAILY WITH BREAKFAST 90 tablet 3     No current facility-administered medications for this visit.     No Known Allergies   Immunizations:     Immunization History   Administered Date(s) Administered   • COVID-19 PFIZER VACCINE 0.3 ML IM 02/19/2021, 03/10/2021, 10/23/2021   • COVID-19 Pfizer Vac BIVALENT João-sucrose 12 Yr+ IM 11/09/2022   • Influenza, high dose seasonal 0.7 mL 10/19/2018, 10/14/2019, 10/10/2020, 10/07/2021, 10/08/2022, 10/14/2023   • Pneumococcal Conjugate 13-Valent 02/27/2018   • Pneumococcal Polysaccharide PPV23 05/09/2019   • Tuberculin Skin Test-PPD Intradermal 01/20/2020   • Zoster Vaccine Recombinant 07/30/2018, 03/31/2019      Health Maintenance:         Topic Date Due   • Colorectal Cancer Screening  01/29/2025   • Hepatitis C Screening  Completed         Topic Date Due   • COVID-19 Vaccine (5 - 2023-24 season) 09/01/2023      Medicare Screening Tests and Risk Assessments:     South is here for his  Subsequent Wellness visit. Last Medicare Wellness visit information reviewed, patient interviewed and updates made to the record today.      Health Risk Assessment:   Patient rates overall health as good. Patient feels that their physical health rating is same. Patient is very satisfied with their life. Eyesight was rated as same. Hearing was rated as same. Patient feels that their emotional and mental health rating is same. Patients states they are never, rarely angry. Patient states they are sometimes unusually tired/fatigued. Pain experienced in the last 7 days has been none. Patient states that he has experienced no weight loss or gain in last 6 months.     Depression Screening:   PHQ-2 Score: 0      Fall Risk Screening:   In the past year, patient has experienced: no history of falling in past year      Home Safety:  Patient does not have trouble with stairs inside or outside of their home. Patient has working smoke alarms and has no working carbon monoxide detector. Home safety hazards include: none.     Nutrition:   Current diet is Regular.     Medications:   Patient is currently taking over-the-counter supplements. OTC medications include: see medication list. Patient is able to manage medications.     Activities of Daily Living (ADLs)/Instrumental Activities of Daily Living (IADLs):   Walk and transfer into and out of bed and chair?: Yes  Dress and groom yourself?: Yes    Bathe or shower yourself?: Yes    Feed yourself? Yes  Do your laundry/housekeeping?: Yes  Manage your money, pay your bills and track your expenses?: Yes  Make your own meals?: Yes    Do your own shopping?: Yes    Previous Hospitalizations:   Any hospitalizations or ED visits within the last 12 months?: No      Advance Care Planning:   Living will: Yes    Durable POA for healthcare: Yes    Advanced directive: Yes      PREVENTIVE SCREENINGS      Cardiovascular Screening:    General: Screening Current      Diabetes Screening:     General:  "Screening Not Indicated and History Diabetes      Colorectal Cancer Screening:     General: Screening Current      Prostate Cancer Screening:    General: Screening Current      Abdominal Aortic Aneurysm (AAA) Screening:    Risk factors include: age between 65-74 yo and tobacco use        Lung Cancer Screening:     General: Screening Not Indicated      Hepatitis C Screening:    General: Screening Current    Screening, Brief Intervention, and Referral to Treatment (SBIRT)    Screening  Typical number of drinks in a day: 0  Typical number of drinks in a week: 0  Interpretation: Low risk drinking behavior.    AUDIT-C Screenin) How often did you have a drink containing alcohol in the past year? monthly or less  2) How many drinks did you have on a typical day when you were drinking in the past year? 1 to 2  3) How often did you have 6 or more drinks on one occasion in the past year? never    AUDIT-C Score: 1  Interpretation: Score 0-3 (male): Negative screen for alcohol misuse    Single Item Drug Screening:  How often have you used an illegal drug (including marijuana) or a prescription medication for non-medical reasons in the past year? never    Single Item Drug Screen Score: 0  Interpretation: Negative screen for possible drug use disorder    No results found.     Physical Exam:     /86   Pulse 89   Temp (!) 96.1 °F (35.6 °C)   Ht 5' 10\" (1.778 m)   Wt 106 kg (233 lb)   SpO2 95%   BMI 33.43 kg/m²     Physical Exam  Vitals and nursing note reviewed.   Constitutional:       General: He is not in acute distress.     Appearance: He is well-developed.   HENT:      Head: Normocephalic and atraumatic.      Right Ear: Tympanic membrane, ear canal and external ear normal.      Left Ear: Tympanic membrane, ear canal and external ear normal.      Nose: Rhinorrhea present. No congestion.      Mouth/Throat:      Mouth: Mucous membranes are moist.   Eyes:      Conjunctiva/sclera: Conjunctivae normal. "   Cardiovascular:      Rate and Rhythm: Normal rate and regular rhythm.      Heart sounds: No murmur heard.  Pulmonary:      Effort: Pulmonary effort is normal. No respiratory distress.      Breath sounds: Normal breath sounds. No decreased breath sounds or wheezing.   Chest:       Abdominal:      Palpations: Abdomen is soft.      Tenderness: There is no abdominal tenderness.   Musculoskeletal:         General: No swelling.      Cervical back: Neck supple.   Skin:     General: Skin is warm and dry.   Neurological:      General: No focal deficit present.      Mental Status: He is alert and oriented to person, place, and time.   Psychiatric:         Mood and Affect: Mood normal.         Behavior: Behavior normal.          Virgie Luz MD    Labs & imaging results reviewed with patient.

## 2024-01-17 NOTE — TELEPHONE ENCOUNTER
----- Message from Karlene Hunt sent at 1/17/2024  8:56 AM EST -----  Regarding: care gap request  01/17/24 8:56 AM    Hello, our patient attached above has had Diabetic Eye Exam completed/performed. Please assist in updating the patient chart by making an External outreach to [facility] located in [facility]. The date of service is December 2023.    Thank you,  Karlene Hunt  [facility]

## 2024-01-17 NOTE — TELEPHONE ENCOUNTER
Upon review of the In Basket request and the patient's chart, initial outreach has been made via fax to facility. Please see Contacts section for details.     Thank you  Jong Jane MA

## 2024-01-17 NOTE — LETTER
Diabetic Eye Exam Form    Date Requested: 24  Patient: South Humphrey III  Patient : 1950   Referring Provider: Virgie Luz MD      DIABETIC Eye Exam Date _______________________________      Type of Exam MUST be documented for Diabetic Eye Exams. Please CHECK ONE.     Retinal Exam       Dilated Retinal Exam       OCT       Optomap-Iris Exam      Fundus Photography       Left Eye - Please check Retinopathy or No Retinopathy        Exam did show retinopathy    Exam did not show retinopathy       Right Eye - Please check Retinopathy or No Retinopathy       Exam did show retinopathy    Exam did not show retinopathy       Comments __________________________________________________________    Practice Providing Exam ______________________________________________    Exam Performed By (print name) _______________________________________      Provider Signature ___________________________________________________      These reports are needed for  compliance.  Please fax this completed form and a copy of the Diabetic Eye Exam report to our office located at [address] [place] [address] as soon as possible via Fax [phone] kelley Sunshine: Phone [phone]  We thank you for your assistance in treating our mutual patient.

## 2024-01-17 NOTE — LETTER
Diabetic Eye Exam Form    Date Requested: 24  Patient: South Humphrey III  Patient : 1950   Referring Provider: Virgie Luz MD      DIABETIC Eye Exam Date _______________________________      Type of Exam MUST be documented for Diabetic Eye Exams. Please CHECK ONE.     Retinal Exam       Dilated Retinal Exam       OCT       Optomap-Iris Exam      Fundus Photography       Left Eye - Please check Retinopathy or No Retinopathy        Exam did show retinopathy    Exam did not show retinopathy       Right Eye - Please check Retinopathy or No Retinopathy       Exam did show retinopathy    Exam did not show retinopathy       Comments __________________________________________________________    Practice Providing Exam ______________________________________________    Exam Performed By (print name) _______________________________________      Provider Signature ___________________________________________________      These reports are needed for  compliance.  Please fax this completed form and a copy of the Diabetic Eye Exam report to our office located at [address] [place] [address] as soon as possible via Fax [phone] kelley Sunshine: Phone [phone]  We thank you for your assistance in treating our mutual patient.

## 2024-01-22 ENCOUNTER — TELEPHONE (OUTPATIENT)
Dept: CARDIOLOGY CLINIC | Facility: CLINIC | Age: 74
End: 2024-01-22

## 2024-01-22 ENCOUNTER — IN-CLINIC DEVICE VISIT (OUTPATIENT)
Dept: CARDIOLOGY CLINIC | Facility: CLINIC | Age: 74
End: 2024-01-22

## 2024-01-22 DIAGNOSIS — Z95.818 PRESENCE OF CARDIAC DEVICE: Primary | ICD-10-CM

## 2024-01-22 PROCEDURE — RECHECK

## 2024-01-22 NOTE — PROGRESS NOTES
Results for orders placed or performed in visit on 01/22/24   Cardiac EP device report    Narrative    MDT LOOP  PT EVALUATED IN THE [facility] S/P LOOP EXPLANT. WOUND CHECK: INCISION CLEAN AND DRY WITH EDGES APPROXIMATED; SUTURES REMOVED; WOUND CARE AND RESTRICTIONS REVIEWED WITH PATIENT.  PDF ATTACHED.  DISCHARGED FROM DEVICE CLINIC. PAS/ES

## 2024-01-22 NOTE — TELEPHONE ENCOUNTER
Patient was provided with 4 sample bottles (2 week supply) of Xarelto 10 mg. Instructions to take 2 tablets daily as the office does not have any 20 mg tablets available.  Lot#: [id number]   Exp: 09/2024

## 2024-01-31 ENCOUNTER — APPOINTMENT (OUTPATIENT)
Dept: LAB | Facility: CLINIC | Age: 74
End: 2024-01-31
Payer: MEDICARE

## 2024-01-31 ENCOUNTER — TELEPHONE (OUTPATIENT)
Dept: INTERNAL MEDICINE CLINIC | Facility: CLINIC | Age: 74
End: 2024-01-31

## 2024-01-31 DIAGNOSIS — E27.8 ADRENAL MASS (HCC): ICD-10-CM

## 2024-01-31 DIAGNOSIS — E11.51 TYPE 2 DIABETES MELLITUS WITH DIABETIC PERIPHERAL ANGIOPATHY WITHOUT GANGRENE, WITHOUT LONG-TERM CURRENT USE OF INSULIN (HCC): ICD-10-CM

## 2024-01-31 DIAGNOSIS — N18.31 STAGE 3A CHRONIC KIDNEY DISEASE (HCC): Primary | ICD-10-CM

## 2024-01-31 LAB
25(OH)D3 SERPL-MCNC: 36.9 NG/ML (ref 30–100)
ALBUMIN SERPL BCP-MCNC: 3.3 G/DL (ref 3.5–5)
ALP SERPL-CCNC: 291 U/L (ref 34–104)
ALT SERPL W P-5'-P-CCNC: 39 U/L (ref 7–52)
ANION GAP SERPL CALCULATED.3IONS-SCNC: 8 MMOL/L
AST SERPL W P-5'-P-CCNC: 31 U/L (ref 13–39)
BASOPHILS # BLD AUTO: 0.06 THOUSANDS/ÂΜL (ref 0–0.1)
BASOPHILS NFR BLD AUTO: 1 % (ref 0–1)
BILIRUB SERPL-MCNC: 1.23 MG/DL (ref 0.2–1)
BUN SERPL-MCNC: 21 MG/DL (ref 5–25)
CALCIUM ALBUM COR SERPL-MCNC: 9 MG/DL (ref 8.3–10.1)
CALCIUM SERPL-MCNC: 8.4 MG/DL (ref 8.4–10.2)
CHLORIDE SERPL-SCNC: 109 MMOL/L (ref 96–108)
CO2 SERPL-SCNC: 26 MMOL/L (ref 21–32)
CREAT SERPL-MCNC: 1.53 MG/DL (ref 0.6–1.3)
EOSINOPHIL # BLD AUTO: 0.14 THOUSAND/ÂΜL (ref 0–0.61)
EOSINOPHIL NFR BLD AUTO: 2 % (ref 0–6)
ERYTHROCYTE [DISTWIDTH] IN BLOOD BY AUTOMATED COUNT: 18.5 % (ref 11.6–15.1)
EST. AVERAGE GLUCOSE BLD GHB EST-MCNC: 189 MG/DL
GFR SERPL CREATININE-BSD FRML MDRD: 44 ML/MIN/1.73SQ M
GLUCOSE P FAST SERPL-MCNC: 111 MG/DL (ref 65–99)
HBA1C MFR BLD: 8.2 %
HCT VFR BLD AUTO: 50.1 % (ref 36.5–49.3)
HGB BLD-MCNC: 15.4 G/DL (ref 12–17)
IMM GRANULOCYTES # BLD AUTO: 0.04 THOUSAND/UL (ref 0–0.2)
IMM GRANULOCYTES NFR BLD AUTO: 0 % (ref 0–2)
LYMPHOCYTES # BLD AUTO: 2.18 THOUSANDS/ÂΜL (ref 0.6–4.47)
LYMPHOCYTES NFR BLD AUTO: 25 % (ref 14–44)
MCH RBC QN AUTO: 27.5 PG (ref 26.8–34.3)
MCHC RBC AUTO-ENTMCNC: 30.7 G/DL (ref 31.4–37.4)
MCV RBC AUTO: 90 FL (ref 82–98)
MONOCYTES # BLD AUTO: 0.88 THOUSAND/ÂΜL (ref 0.17–1.22)
MONOCYTES NFR BLD AUTO: 10 % (ref 4–12)
NEUTROPHILS # BLD AUTO: 5.6 THOUSANDS/ÂΜL (ref 1.85–7.62)
NEUTS SEG NFR BLD AUTO: 62 % (ref 43–75)
NRBC BLD AUTO-RTO: 0 /100 WBCS
PLATELET # BLD AUTO: 257 THOUSANDS/UL (ref 149–390)
PMV BLD AUTO: 10.3 FL (ref 8.9–12.7)
POTASSIUM SERPL-SCNC: 4.3 MMOL/L (ref 3.5–5.3)
PROT SERPL-MCNC: 6.6 G/DL (ref 6.4–8.4)
RBC # BLD AUTO: 5.59 MILLION/UL (ref 3.88–5.62)
SODIUM SERPL-SCNC: 143 MMOL/L (ref 135–147)
VIT B12 SERPL-MCNC: 605 PG/ML (ref 180–914)
WBC # BLD AUTO: 8.9 THOUSAND/UL (ref 4.31–10.16)

## 2024-01-31 RX ORDER — GLIMEPIRIDE 2 MG/1
2 TABLET ORAL 2 TIMES DAILY WITH MEALS
Qty: 180 TABLET | Refills: 0 | Status: SHIPPED | OUTPATIENT
Start: 2024-01-31

## 2024-01-31 NOTE — TELEPHONE ENCOUNTER
117/81- 01/29, pulse 52  119/73-01/30, pulse 68  109/76- 01/31, pulse 68    Wife states he is doing good and after another week will send readings to PCP and Dr. Ramos

## 2024-01-31 NOTE — TELEPHONE ENCOUNTER
Lab results:    Your kidney function is much worse. I would like to repeat this in 2 weeks to see if it will improve.    Your sugars are worse also, A1c now 8.2%. Please increase glimepiride to twice a day.    The rest of your labs were normal.

## 2024-02-02 NOTE — TELEPHONE ENCOUNTER
As a follow-up, a second attempt has been made for outreach via fax to facility. Please see Contacts section for details.    Thank you  Jong Jane MA

## 2024-02-08 NOTE — TELEPHONE ENCOUNTER
As a final attempt, a third outreach has been made via telephone call to facility. Please see Contacts section for details. This encounter will be closed and completed by end of day. Should we receive the requested information because of previous outreach attempts, the requested patient's chart will be updated appropriately.     Thank you  Jong Jane MA

## 2024-02-12 ENCOUNTER — HOSPITAL ENCOUNTER (INPATIENT)
Facility: HOSPITAL | Age: 74
LOS: 7 days | Discharge: HOME | DRG: 291 | End: 2024-02-20
Attending: EMERGENCY MEDICINE | Admitting: INTERNAL MEDICINE
Payer: MEDICARE

## 2024-02-12 ENCOUNTER — NURSE TRIAGE (OUTPATIENT)
Age: 74
End: 2024-02-12

## 2024-02-12 ENCOUNTER — TELEPHONE (OUTPATIENT)
Age: 74
End: 2024-02-12

## 2024-02-12 ENCOUNTER — APPOINTMENT (EMERGENCY)
Dept: RADIOLOGY | Facility: HOSPITAL | Age: 74
DRG: 291 | End: 2024-02-12
Payer: MEDICARE

## 2024-02-12 DIAGNOSIS — I50.23 ACUTE ON CHRONIC SYSTOLIC HEART FAILURE (HCC): ICD-10-CM

## 2024-02-12 DIAGNOSIS — I42.8 OTHER CARDIOMYOPATHY (HCC): ICD-10-CM

## 2024-02-12 DIAGNOSIS — E53.8 VITAMIN B12 DEFICIENCY: ICD-10-CM

## 2024-02-12 DIAGNOSIS — E16.2 HYPOGLYCEMIA: ICD-10-CM

## 2024-02-12 DIAGNOSIS — R03.0 ELEVATED BLOOD PRESSURE READING: ICD-10-CM

## 2024-02-12 DIAGNOSIS — I48.0 PAROXYSMAL ATRIAL FIBRILLATION (HCC): ICD-10-CM

## 2024-02-12 DIAGNOSIS — E11.51 TYPE 2 DIABETES MELLITUS WITH DIABETIC PERIPHERAL ANGIOPATHY WITHOUT GANGRENE, WITHOUT LONG-TERM CURRENT USE OF INSULIN (HCC): ICD-10-CM

## 2024-02-12 DIAGNOSIS — I48.91 ATRIAL FIBRILLATION WITH RVR (HCC): Primary | ICD-10-CM

## 2024-02-12 DIAGNOSIS — R06.02 SHORTNESS OF BREATH: Primary | ICD-10-CM

## 2024-02-12 DIAGNOSIS — I50.9 ACUTE EXACERBATION OF CHF (CONGESTIVE HEART FAILURE) (HCC): ICD-10-CM

## 2024-02-12 DIAGNOSIS — N17.9 AKI (ACUTE KIDNEY INJURY) (HCC): ICD-10-CM

## 2024-02-12 PROBLEM — N18.30 CHRONIC KIDNEY DISEASE, STAGE III (MODERATE) (HCC): Status: RESOLVED | Noted: 2018-03-22 | Resolved: 2024-02-12

## 2024-02-12 PROBLEM — N18.9 ACUTE KIDNEY INJURY SUPERIMPOSED ON CHRONIC KIDNEY DISEASE: Status: RESOLVED | Noted: 2020-01-13 | Resolved: 2024-02-12

## 2024-02-12 PROBLEM — N18.9 ACUTE KIDNEY INJURY SUPERIMPOSED ON CHRONIC KIDNEY DISEASE: Status: ACTIVE | Noted: 2020-01-13

## 2024-02-12 LAB
2HR DELTA HS TROPONIN: -1 NG/L
4HR DELTA HS TROPONIN: -6 NG/L
ALBUMIN SERPL BCP-MCNC: 3.4 G/DL (ref 3.5–5)
ALP SERPL-CCNC: 302 U/L (ref 34–104)
ALT SERPL W P-5'-P-CCNC: 34 U/L (ref 7–52)
ANION GAP SERPL CALCULATED.3IONS-SCNC: 8 MMOL/L
AST SERPL W P-5'-P-CCNC: 38 U/L (ref 13–39)
BASOPHILS # BLD AUTO: 0.03 THOUSANDS/ÂΜL (ref 0–0.1)
BASOPHILS NFR BLD AUTO: 0 % (ref 0–1)
BILIRUB SERPL-MCNC: 1.94 MG/DL (ref 0.2–1)
BNP SERPL-MCNC: 653 PG/ML (ref 0–100)
BUN SERPL-MCNC: 34 MG/DL (ref 5–25)
CALCIUM ALBUM COR SERPL-MCNC: 9.1 MG/DL (ref 8.3–10.1)
CALCIUM SERPL-MCNC: 8.6 MG/DL (ref 8.4–10.2)
CARDIAC TROPONIN I PNL SERPL HS: 15 NG/L
CARDIAC TROPONIN I PNL SERPL HS: 20 NG/L
CARDIAC TROPONIN I PNL SERPL HS: 21 NG/L
CHLORIDE SERPL-SCNC: 109 MMOL/L (ref 96–108)
CO2 SERPL-SCNC: 25 MMOL/L (ref 21–32)
CREAT SERPL-MCNC: 1.76 MG/DL (ref 0.6–1.3)
EOSINOPHIL # BLD AUTO: 0.06 THOUSAND/ÂΜL (ref 0–0.61)
EOSINOPHIL NFR BLD AUTO: 1 % (ref 0–6)
ERYTHROCYTE [DISTWIDTH] IN BLOOD BY AUTOMATED COUNT: 19.2 % (ref 11.6–15.1)
FLUAV RNA RESP QL NAA+PROBE: NEGATIVE
FLUBV RNA RESP QL NAA+PROBE: NEGATIVE
GFR SERPL CREATININE-BSD FRML MDRD: 37 ML/MIN/1.73SQ M
GLUCOSE SERPL-MCNC: 143 MG/DL (ref 65–140)
HCT VFR BLD AUTO: 49.6 % (ref 36.5–49.3)
HGB BLD-MCNC: 15.2 G/DL (ref 12–17)
IMM GRANULOCYTES # BLD AUTO: 0.03 THOUSAND/UL (ref 0–0.2)
IMM GRANULOCYTES NFR BLD AUTO: 0 % (ref 0–2)
LYMPHOCYTES # BLD AUTO: 2.47 THOUSANDS/ÂΜL (ref 0.6–4.47)
LYMPHOCYTES NFR BLD AUTO: 29 % (ref 14–44)
MAGNESIUM SERPL-MCNC: 2.1 MG/DL (ref 1.9–2.7)
MCH RBC QN AUTO: 27 PG (ref 26.8–34.3)
MCHC RBC AUTO-ENTMCNC: 30.6 G/DL (ref 31.4–37.4)
MCV RBC AUTO: 88 FL (ref 82–98)
MONOCYTES # BLD AUTO: 0.89 THOUSAND/ÂΜL (ref 0.17–1.22)
MONOCYTES NFR BLD AUTO: 11 % (ref 4–12)
NEUTROPHILS # BLD AUTO: 4.94 THOUSANDS/ÂΜL (ref 1.85–7.62)
NEUTS SEG NFR BLD AUTO: 59 % (ref 43–75)
NRBC BLD AUTO-RTO: 0 /100 WBCS
PLATELET # BLD AUTO: 311 THOUSANDS/UL (ref 149–390)
PMV BLD AUTO: 11.1 FL (ref 8.9–12.7)
POTASSIUM SERPL-SCNC: 4.6 MMOL/L (ref 3.5–5.3)
PROT SERPL-MCNC: 6.6 G/DL (ref 6.4–8.4)
RBC # BLD AUTO: 5.62 MILLION/UL (ref 3.88–5.62)
RSV RNA RESP QL NAA+PROBE: NEGATIVE
SARS-COV-2 RNA RESP QL NAA+PROBE: NEGATIVE
SODIUM SERPL-SCNC: 142 MMOL/L (ref 135–147)
TSH SERPL DL<=0.05 MIU/L-ACNC: 1.98 UIU/ML (ref 0.45–4.5)
WBC # BLD AUTO: 8.42 THOUSAND/UL (ref 4.31–10.16)

## 2024-02-12 PROCEDURE — 0241U HB NFCT DS VIR RESP RNA 4 TRGT: CPT | Performed by: NURSE PRACTITIONER

## 2024-02-12 PROCEDURE — 5A2204Z RESTORATION OF CARDIAC RHYTHM, SINGLE: ICD-10-PCS | Performed by: INTERNAL MEDICINE

## 2024-02-12 PROCEDURE — 84484 ASSAY OF TROPONIN QUANT: CPT | Performed by: EMERGENCY MEDICINE

## 2024-02-12 PROCEDURE — 96368 THER/DIAG CONCURRENT INF: CPT

## 2024-02-12 PROCEDURE — 99285 EMERGENCY DEPT VISIT HI MDM: CPT | Performed by: EMERGENCY MEDICINE

## 2024-02-12 PROCEDURE — 83880 ASSAY OF NATRIURETIC PEPTIDE: CPT | Performed by: EMERGENCY MEDICINE

## 2024-02-12 PROCEDURE — 96365 THER/PROPH/DIAG IV INF INIT: CPT

## 2024-02-12 PROCEDURE — 99285 EMERGENCY DEPT VISIT HI MDM: CPT

## 2024-02-12 PROCEDURE — 83735 ASSAY OF MAGNESIUM: CPT | Performed by: EMERGENCY MEDICINE

## 2024-02-12 PROCEDURE — 71045 X-RAY EXAM CHEST 1 VIEW: CPT

## 2024-02-12 PROCEDURE — 93005 ELECTROCARDIOGRAM TRACING: CPT

## 2024-02-12 PROCEDURE — 80053 COMPREHEN METABOLIC PANEL: CPT | Performed by: EMERGENCY MEDICINE

## 2024-02-12 PROCEDURE — 96367 TX/PROPH/DG ADDL SEQ IV INF: CPT

## 2024-02-12 PROCEDURE — 96366 THER/PROPH/DIAG IV INF ADDON: CPT

## 2024-02-12 PROCEDURE — 36415 COLL VENOUS BLD VENIPUNCTURE: CPT

## 2024-02-12 PROCEDURE — 99222 1ST HOSP IP/OBS MODERATE 55: CPT | Performed by: NURSE PRACTITIONER

## 2024-02-12 PROCEDURE — 96376 TX/PRO/DX INJ SAME DRUG ADON: CPT

## 2024-02-12 PROCEDURE — 85025 COMPLETE CBC W/AUTO DIFF WBC: CPT | Performed by: EMERGENCY MEDICINE

## 2024-02-12 PROCEDURE — 84443 ASSAY THYROID STIM HORMONE: CPT | Performed by: EMERGENCY MEDICINE

## 2024-02-12 RX ORDER — ATORVASTATIN CALCIUM 20 MG/1
20 TABLET, FILM COATED ORAL DAILY
Status: DISCONTINUED | OUTPATIENT
Start: 2024-02-13 | End: 2024-02-20 | Stop reason: HOSPADM

## 2024-02-12 RX ORDER — FOLIC ACID 1 MG/1
1 TABLET ORAL DAILY
Status: DISCONTINUED | OUTPATIENT
Start: 2024-02-13 | End: 2024-02-20 | Stop reason: HOSPADM

## 2024-02-12 RX ORDER — METOPROLOL SUCCINATE 25 MG/1
25 TABLET, EXTENDED RELEASE ORAL DAILY
Status: DISCONTINUED | OUTPATIENT
Start: 2024-02-13 | End: 2024-02-12

## 2024-02-12 RX ORDER — DILTIAZEM HYDROCHLORIDE 5 MG/ML
0.25 INJECTION INTRAVENOUS ONCE
Status: COMPLETED | OUTPATIENT
Start: 2024-02-12 | End: 2024-02-12

## 2024-02-12 RX ORDER — FUROSEMIDE 10 MG/ML
40 INJECTION INTRAMUSCULAR; INTRAVENOUS 2 TIMES DAILY
Status: DISCONTINUED | OUTPATIENT
Start: 2024-02-12 | End: 2024-02-18

## 2024-02-12 RX ORDER — MAGNESIUM SULFATE HEPTAHYDRATE 40 MG/ML
2 INJECTION, SOLUTION INTRAVENOUS ONCE
Status: COMPLETED | OUTPATIENT
Start: 2024-02-12 | End: 2024-02-12

## 2024-02-12 RX ORDER — LANOLIN ALCOHOL/MO/W.PET/CERES
400 CREAM (GRAM) TOPICAL DAILY
Status: DISCONTINUED | OUTPATIENT
Start: 2024-02-13 | End: 2024-02-16

## 2024-02-12 RX ORDER — METOPROLOL SUCCINATE 50 MG/1
50 TABLET, EXTENDED RELEASE ORAL DAILY
Status: DISCONTINUED | OUTPATIENT
Start: 2024-02-13 | End: 2024-02-13

## 2024-02-12 RX ORDER — METOPROLOL SUCCINATE 25 MG/1
25 TABLET, EXTENDED RELEASE ORAL
Status: DISCONTINUED | OUTPATIENT
Start: 2024-02-12 | End: 2024-02-13

## 2024-02-12 RX ORDER — ALBUTEROL SULFATE 90 UG/1
2 AEROSOL, METERED RESPIRATORY (INHALATION) EVERY 6 HOURS PRN
Status: DISCONTINUED | OUTPATIENT
Start: 2024-02-12 | End: 2024-02-20 | Stop reason: HOSPADM

## 2024-02-12 RX ORDER — PANTOPRAZOLE SODIUM 40 MG/1
40 TABLET, DELAYED RELEASE ORAL
Status: DISCONTINUED | OUTPATIENT
Start: 2024-02-13 | End: 2024-02-20 | Stop reason: HOSPADM

## 2024-02-12 RX ORDER — FLUTICASONE PROPIONATE 50 MCG
1 SPRAY, SUSPENSION (ML) NASAL DAILY
Status: DISCONTINUED | OUTPATIENT
Start: 2024-02-13 | End: 2024-02-20 | Stop reason: HOSPADM

## 2024-02-12 RX ADMIN — METOPROLOL SUCCINATE 25 MG: 25 TABLET, EXTENDED RELEASE ORAL at 22:19

## 2024-02-12 RX ADMIN — AMIODARONE HYDROCHLORIDE 150 MG: 50 INJECTION, SOLUTION INTRAVENOUS at 18:40

## 2024-02-12 RX ADMIN — AMIODARONE HYDROCHLORIDE 1 MG/MIN: 50 INJECTION, SOLUTION INTRAVENOUS at 19:10

## 2024-02-12 RX ADMIN — CYANOCOBALAMIN TAB 500 MCG 1000 MCG: 500 TAB at 22:19

## 2024-02-12 RX ADMIN — MAGNESIUM SULFATE HEPTAHYDRATE 2 G: 40 INJECTION, SOLUTION INTRAVENOUS at 19:09

## 2024-02-12 RX ADMIN — DILTIAZEM HYDROCHLORIDE 5 MG/HR: 5 INJECTION INTRAVENOUS at 16:32

## 2024-02-12 RX ADMIN — FUROSEMIDE 40 MG: 10 INJECTION, SOLUTION INTRAMUSCULAR; INTRAVENOUS at 22:19

## 2024-02-12 RX ADMIN — DILTIAZEM HYDROCHLORIDE 30 MG: 5 INJECTION INTRAVENOUS at 15:44

## 2024-02-12 NOTE — LETTER
formerly [facility]  FLOOR MED SURG UNIT  [address] [facility]  [place] [address]  Dept: [phone]    February 19, 2024     Patient: South Humphrey III   YOB: 1950   Date of Visit: 2/12/2024       To Whom it May Concern:    South Humphrey is under my professional care. He was admitted to hospital on 2/12/2024 till date due to congestive heart failure, nonischemic cardiomyopathy and atrial fibrillation. We advice him to avoid any air and sea travel for the next 3-4 months until he is cleared by his outpatient cardiologist.     If you have any questions or concerns, please don't hesitate to call.         Sincerely,          Katherine May MD

## 2024-02-12 NOTE — ED PROVIDER NOTES
History  Chief Complaint   Patient presents with    Shortness of Breath     Reports worsening SOB last couple weeks, started getting skyrizzi recently, reports after 2nd shot started with symptoms. +Wheezing, Phlegm, shakes     73-year-old male with ischemic cardiomyopathy, SVT, A-fib, RBBB, status post right femoral popliteal bypass surgery, DVT on and compliant with Xarelto presented to ED for evaluation of shortness of breath x 2 months.  Patient reports he was walking in the grocery store today, could barely walk 5 feet before becoming short of breath prompting visit.  Reports paroxysmal nocturnal dyspnea, unintentional weight gain, bilateral lower extremity swelling which is new for him.  Upon arrival to the ED, heart rate in the 200s, A-fib with RVR on the monitor.  Denies chest pain, abdominal pain, urinary symptoms, URI symptoms, fever, chills, dizziness, vision changes, syncope or presyncope.    MDM:   Differentials including but limited to: ACS, arrhythmia, heart failure exacerbation, A-fib, SVT. Doubt PE as pt is compliant with anticoagulation.    Initial heart rate in the 200s, A-fib with RVR on monitor.  Pt immediately placed on cardiac pads. Patient was given 30 mg Cardizem bolus which lowered heart rate to the 150s-160s.  Patient was then started at a Cardizem drip given initial good response to the bolus. Cardizem gtt initially started at 5 mg/hour which was then increased to 12.5 mg/hr, however patient's heart rate remained in the 150s -160s.  At that time, case was then discussed with cardiology on-call for refractory afib who recommended to avoid digoxin given PHUONG, recommended amiodarone bolus followed by infusion which was ordered and given.  Patient's heart rate was in the 120s after amiodarone.  IV Mg also given. Case was then discussed with internal medicine on-call, patient admitted on their service.        Wt Readings from Last 3 Encounters:   02/12/24 118 kg (259 lb 4.2 oz)   01/17/24 106  kg (233 lb)   01/10/24 100 kg (221 lb)         Prior to Admission Medications   Prescriptions Last Dose Informant Patient Reported? Taking?   Magnesium 500 MG TABS  Self Yes No   Sig: Take 1 tablet by mouth daily    Xarelto 20 MG tablet  Self No No   Sig: TAKE 1 TABLET BY MOUTH DAILY WITH BREAKFAST   albuterol (Ventolin HFA) 90 mcg/act inhaler   No No   Sig: Inhale 2 puffs every 6 (six) hours as needed for wheezing   aspirin 81 MG tablet  Self Yes No   Sig: Take 81 mg by mouth daily   atorvastatin (LIPITOR) 20 mg tablet   No No   Sig: take 1 tablet by mouth once daily   fluticasone (FLONASE) 50 mcg/act nasal spray   No No   Si spray into each nostril daily   folic acid (FOLVITE) 1 mg tablet  Self No No   Sig: Take 1 tablet (1 mg total) by mouth daily   glimepiride (AMARYL) 2 mg tablet   No No   Sig: Take 1 tablet (2 mg total) by mouth 2 (two) times a day with meals   metoprolol succinate (TOPROL-XL) 25 mg 24 hr tablet  Self No No   Sig: Take 1 tablet (25 mg total) by mouth daily   Patient taking differently: Take 25 mg by mouth daily Total of 75mg   metoprolol succinate (TOPROL-XL) 50 mg 24 hr tablet   No No   Sig: Take 1 tablet (50 mg total) by mouth 2 (two) times a day   omeprazole (PriLOSEC) 20 mg delayed release capsule   No No   Sig: Take 1 capsule (20 mg total) by mouth daily before breakfast   risankizumab-rzaa (Skyrizi) 360 MG/2.4ML SOCT   No No   Sig: Take first on body injector (OBI) under skin on week 12 then every 8 weeks there after   vitamin B-12 (VITAMIN B-12) 1,000 mcg tablet  Self No No   Sig: Take 1 tablet (1,000 mcg total) by mouth daily   Patient taking differently: Take 1,000 mcg by mouth 3 (three) times a week      Facility-Administered Medications: None       Past Medical History:   Diagnosis Date    Harris esophagus     Blue toe syndrome (HCC)     Chronic kidney disease     Colon polyps     Crohn's disease (HCC) -    GERD (gastroesophageal reflux disease)     Hematuria     History  of rheumatic fever     Hypolipidemia     Hypotension     Ischemic cardiomyopathy     PAD (peripheral artery disease) (HCC)     Pulmonary emphysema (HCC)     PVD (peripheral vascular disease) (HCC)        Past Surgical History:   Procedure Laterality Date    CARDIAC ELECTROPHYSIOLOGY PROCEDURE N/A 1/10/2024    Procedure: Cardiac loop recorder explant;  Surgeon: Jason Cortez MD;  Location: [facility];  Service: Cardiology    COLONOSCOPY  2019    HERNIA REPAIR      IR AORTAGRAM WITH RUN-OFF  6/27/2019    IR AORTAGRAM WITH RUN-OFF  2/12/2020    POPLITEAL ARTERY STENT Right     6/2019    ME BYPASS W/VEIN FEMORAL-POPLITEAL Right 3/26/2020    Procedure: BYPASS FEMORAL-POPLITEAL; Right lower extremity bypass, fem-bk pop with GSV;  Surgeon: Wyatt Shell MD;  Location: [facility];  Service: Vascular    ME SLCTV CATHJ 3RD+ ORD SLCTV ABDL PEL/LXTR BRNCH Right 6/27/2019    Procedure: leg ANGIOGRAM WITH STENT, BALLOON ANGIOPLASTY, LEFT GROIN ACCESS;  Surgeon: Wyatt Shell MD;  Location: [facility];  Service: Vascular       Family History   Problem Relation Age of Onset    Heart disease Mother     Hyperlipidemia Mother     Hypertension Mother     Hypertension Father     Heart disease Father     Stroke Father     Hyperlipidemia Father     Diabetes Brother     No Known Problems Maternal Grandmother     Dementia Neg Hx     Drug abuse Neg Hx     Mental illness Neg Hx     Substance Abuse Neg Hx     Alcohol abuse Neg Hx     Depression Neg Hx     Colon cancer Neg Hx      I have reviewed and agree with the history as documented.    E-Cigarette/Vaping    E-Cigarette Use Never User      E-Cigarette/Vaping Substances    Nicotine No     THC No     CBD No     Flavoring No     Other No     Unknown No      Social History     Tobacco Use    Smoking status: Some Days     Current packs/day: 0.25     Average packs/day: 0.3 packs/day for 30.0 years (7.5 ttl pk-yrs)     Types: Cigarettes    Smokeless tobacco: Never   Vaping Use     Vaping status: Never Used   Substance Use Topics    Alcohol use: Yes     Alcohol/week: 1.0 standard drink of alcohol     Types: 1 Standard drinks or equivalent per week     Comment: social drinker when out dining    Drug use: Never        Review of Systems   Constitutional:  Negative for chills and fever.        Unintentional weight gain   HENT:  Negative for ear pain and sore throat.    Eyes:  Negative for pain and visual disturbance.   Respiratory:  Positive for shortness of breath. Negative for cough.         Paroxysmal nocturnal dyspnea   Cardiovascular:  Positive for palpitations and leg swelling. Negative for chest pain.   Gastrointestinal:  Negative for abdominal pain and vomiting.   Genitourinary:  Negative for dysuria and hematuria.   Musculoskeletal:  Negative for arthralgias and back pain.   Skin:  Negative for color change and rash.   Neurological:  Negative for seizures and syncope.   All other systems reviewed and are negative.      Physical Exam  ED Triage Vitals   Temperature Pulse Respirations Blood Pressure SpO2   02/12/24 1915 02/12/24 1515 02/12/24 1515 02/12/24 1515 02/12/24 1515   97.6 °F (36.4 °C) (!) 200 (!) 24 129/93 95 %      Temp Source Heart Rate Source Patient Position - Orthostatic VS BP Location FiO2 (%)   02/12/24 1915 02/12/24 1515 02/12/24 1645 02/12/24 1645 --   Oral Monitor Sitting Right arm       Pain Score       --                    Orthostatic Vital Signs  Vitals:    02/12/24 1845 02/12/24 1915 02/12/24 1930 02/12/24 2000   BP: 121/79 115/92 116/92 113/85   Pulse: (!) 151 (!) 128 (!) 126 (!) 126   Patient Position - Orthostatic VS: Sitting          Physical Exam  Vitals and nursing note reviewed.   Constitutional:       General: He is in acute distress.      Appearance: He is well-developed. He is obese.   HENT:      Head: Normocephalic and atraumatic.   Eyes:      Conjunctiva/sclera: Conjunctivae normal.   Cardiovascular:      Rate and Rhythm: Regular rhythm. Tachycardia  present.      Pulses: Normal pulses.           Radial pulses are 2+ on the right side and 2+ on the left side.      Heart sounds: Normal heart sounds, S1 normal and S2 normal. No murmur heard.     Comments: , afib with RVR on monitor  Pulmonary:      Effort: Pulmonary effort is normal. Tachypnea present. No respiratory distress.      Breath sounds: Examination of the right-middle field reveals rales. Examination of the right-lower field reveals rales. Examination of the left-lower field reveals rales. Decreased breath sounds and rales present.   Abdominal:      Palpations: Abdomen is soft.      Tenderness: There is no abdominal tenderness.   Musculoskeletal:         General: No swelling.      Cervical back: Neck supple.      Right lower le+ Edema present.      Left lower le+ Edema present.   Skin:     General: Skin is warm and dry.      Capillary Refill: Capillary refill takes less than 2 seconds.   Neurological:      Mental Status: He is alert and oriented to person, place, and time.      GCS: GCS eye subscore is 4. GCS verbal subscore is 5. GCS motor subscore is 6.   Psychiatric:         Mood and Affect: Mood normal.         ED Medications  Medications   amiodarone (CORDARONE) 900 mg in dextrose 5 % 500 mL infusion (1 mg/min Intravenous New Bag 24)     Followed by   amiodarone (CORDARONE) 900 mg in dextrose 5 % 500 mL infusion (has no administration in time range)   diltiazem (CARDIZEM) injection 30 mg (30 mg Intravenous Given 24 1544)   amiodarone 150 mg in dextrose 5 % 100 mL IV bolus (0 mg Intravenous Stopped 24)   magnesium sulfate 2 g/50 mL IVPB (premix) 2 g (0 g Intravenous Stopped 24)       Diagnostic Studies  Results Reviewed       Procedure Component Value Units Date/Time    HS Troponin I 4hr [[id number]]  (Normal) Collected: 24    Lab Status: Final result Specimen: Blood from Arm, Right Updated: 24     hs TnI 4hr 15 ng/L      Delta 4hr  hsTnI -6 ng/L     COVID/FLU/RSV [[id number]]     Lab Status: No result Specimen: Nares from Nose     HS Troponin I 2hr [[id number]]  (Normal) Collected: 02/12/24 1812    Lab Status: Final result Specimen: Blood from Arm, Right Updated: 02/12/24 1846     hs TnI 2hr 20 ng/L      Delta 2hr hsTnI -1 ng/L     Magnesium [[id number]]  (Normal) Collected: 02/12/24 1524    Lab Status: Final result Specimen: Blood from Arm, Left Updated: 02/12/24 1756     Magnesium 2.1 mg/dL     TSH, 3rd generation with Free T4 reflex [[id number]]  (Normal) Collected: 02/12/24 1524    Lab Status: Final result Specimen: Blood from Arm, Left Updated: 02/12/24 1756     TSH 3RD GENERATON 1.977 uIU/mL     B-Type Natriuretic Peptide(BNP) [[id number]]  (Abnormal) Collected: 02/12/24 1524    Lab Status: Final result Specimen: Blood from Arm, Left Updated: 02/12/24 1603      pg/mL     HS Troponin 0hr (reflex protocol) [[id number]]  (Normal) Collected: 02/12/24 1524    Lab Status: Final result Specimen: Blood from Arm, Left Updated: 02/12/24 1556     hs TnI 0hr 21 ng/L     Comprehensive metabolic panel [[id number]]  (Abnormal) Collected: 02/12/24 1524    Lab Status: Final result Specimen: Blood from Arm, Left Updated: 02/12/24 1549     Sodium 142 mmol/L      Potassium 4.6 mmol/L      Chloride 109 mmol/L      CO2 25 mmol/L      ANION GAP 8 mmol/L      BUN 34 mg/dL      Creatinine 1.76 mg/dL      Glucose 143 mg/dL      Calcium 8.6 mg/dL      Corrected Calcium 9.1 mg/dL      AST 38 U/L      ALT 34 U/L      Alkaline Phosphatase 302 U/L      Total Protein 6.6 g/dL      Albumin 3.4 g/dL      Total Bilirubin 1.94 mg/dL      eGFR 37 ml/min/1.73sq m     Narrative:      National [employer] guidelines for Chronic Kidney Disease (CKD):     Stage 1 with normal or high GFR (GFR > 90 mL/min/1.73 square meters)    Stage 2 Mild CKD (GFR = 60-89 mL/min/1.73 square meters)    Stage 3A Moderate CKD (GFR = 45-59 mL/min/1.73 square meters)    Stage 3B  Moderate CKD (GFR = 30-44 mL/min/1.73 square meters)    Stage 4 Severe CKD (GFR = 15-29 mL/min/1.73 square meters)    Stage 5 End Stage CKD (GFR <15 mL/min/1.73 square meters)  Note: GFR calculation is accurate only with a steady state creatinine    CBC and differential [[id number]]  (Abnormal) Collected: 02/12/24 1524    Lab Status: Final result Specimen: Blood from Arm, Left Updated: 02/12/24 1534     WBC 8.42 Thousand/uL      RBC 5.62 Million/uL      Hemoglobin 15.2 g/dL      Hematocrit 49.6 %      MCV 88 fL      MCH 27.0 pg      MCHC 30.6 g/dL      RDW 19.2 %      MPV 11.1 fL      Platelets 311 Thousands/uL      nRBC 0 /100 WBCs      Neutrophils Relative 59 %      Immat GRANS % 0 %      Lymphocytes Relative 29 %      Monocytes Relative 11 %      Eosinophils Relative 1 %      Basophils Relative 0 %      Neutrophils Absolute 4.94 Thousands/µL      Immature Grans Absolute 0.03 Thousand/uL      Lymphocytes Absolute 2.47 Thousands/µL      Monocytes Absolute 0.89 Thousand/µL      Eosinophils Absolute 0.06 Thousand/µL      Basophils Absolute 0.03 Thousands/µL                    XR chest 1 view portable    (Results Pending)         Procedures  ECG 12 Lead Documentation Only    Date/Time: 2/12/2024 5:14 PM    Performed by: Monse Garcia MD  Authorized by: Monse Garcia MD    Patient location:  ED  Previous ECG:     Previous ECG:  Compared to current    Comparison ECG info:  January 18, 2020    Similarity:  Changes noted (A-fib with RVR now present)  Interpretation:     Interpretation: abnormal    Rate:     ECG rate:  193    ECG rate assessment: tachycardic    Rhythm:     Rhythm: atrial fibrillation    QRS:     QRS axis:  Right    QRS intervals:  Wide  Conduction:     Conduction: abnormal      Abnormal conduction: complete RBBB, LAFB and bifascicular block    ST segments:     ST segments:  Normal        ED Course  ED Course as of 02/12/24 2056   Mon Feb 12, 2024   1604 BNP(!): 653   1605 hs TnI 0hr: 21   1732 Patient  initially arrived to ED tachycardic in the 200s, was given Cardizem 30 mg bolus which lowered heart rate to the 150s-160s.  Patient was then started on a Cardizem drip at 5 mg/h, which was then increased to 7.5 mg/h and then 12.5mg/hr however patient's heart rate remains in the 150s to 160s at this time.  Cardiology on-call (Dr. Amina Mendez) texted for recommendations given refractory A-fib.  Recommends to avoid digoxin given PHUONG, continue anticoagulation and give amiodarone bolus followed by drip   1759 Patient reevaluation: Updated about labs and discussion with cardiology. Would like to get pt's HR down more prior to admitting to [facility]   1918 Patient was given amiodarone bolus followed by amiodarone drip, heart rate 128 bpm   1919 [facility] texted for admission              HEART Risk Score      Flowsheet Row Most Recent Value   Heart Score Risk Calculator    History 1 Filed at: 02/12/2024 2048   ECG 1 Filed at: 02/12/2024 2048   Age 2 Filed at: 02/12/2024 2048   Risk Factors 2 Filed at: 02/12/2024 2048   Troponin 1 Filed at: 02/12/2024 2048   HEART Score 7 Filed at: 02/12/2024 2048                                  Medical Decision Making  Amount and/or Complexity of Data Reviewed  External Data Reviewed: labs, radiology, ECG and notes.  Labs: ordered. Decision-making details documented in ED Course.  Radiology: ordered.  ECG/medicine tests: ordered and independent interpretation performed.    Risk  Prescription drug management.  Drug therapy requiring intensive monitoring for toxicity.  Decision regarding hospitalization.          Disposition  Final diagnoses:   Atrial fibrillation with RVR (HCC)   Acute exacerbation of CHF (congestive heart failure) (HCC)   PHUONG (acute kidney injury) (HCC)     Time reflects when diagnosis was documented in both MDM as applicable and the Disposition within this note       Time User Action Codes Description Comment    2/12/2024  7:44 PM Monse Garcia Add [I48.91] Atrial fibrillation  with RVR ([facility])     2/12/2024  7:44 PM OliverdaMonse hancock Add [I50.9] Acute exacerbation of CHF (congestive heart failure) ([facility])     2/12/2024  7:44 PM RendaMonse hancock Add [N17.9] PHUONG (acute kidney injury) ([facility])           ED Disposition       ED Disposition   Admit    Condition   Stable    Date/Time   Mon Feb 12, 2024 1944    Comment   Case was discussed with NUNU and the patient's admission status was agreed to be Admission Status: inpatient status to the service of Dr. Rivera .               Follow-up Information    None         Current Discharge Medication List        CONTINUE these medications which have NOT CHANGED    Details   albuterol (Ventolin HFA) 90 mcg/act inhaler Inhale 2 puffs every 6 (six) hours as needed for wheezing  Qty: 8 g, Refills: 1    Comments: Substitution to a formulary equivalent within the same pharmaceutical class is authorized.  Associated Diagnoses: Acute cough      aspirin 81 MG tablet Take 81 mg by mouth daily      atorvastatin (LIPITOR) 20 mg tablet take 1 tablet by mouth once daily  Qty: 90 tablet, Refills: 0    Associated Diagnoses: PAD (peripheral artery disease) ([facility])      fluticasone (FLONASE) 50 mcg/act nasal spray 1 spray into each nostril daily  Qty: 16 g, Refills: 1    Associated Diagnoses: Acute cough      folic acid (FOLVITE) 1 mg tablet Take 1 tablet (1 mg total) by mouth daily  Qty: 90 tablet, Refills: 3    Associated Diagnoses: Inflammatory bowel disease      glimepiride (AMARYL) 2 mg tablet Take 1 tablet (2 mg total) by mouth 2 (two) times a day with meals  Qty: 180 tablet, Refills: 0    Comments: New frequency  Associated Diagnoses: Type 2 diabetes mellitus with diabetic peripheral angiopathy without gangrene, without long-term current use of insulin ([facility])      Magnesium 500 MG TABS Take 1 tablet by mouth daily       !! metoprolol succinate (TOPROL-XL) 25 mg 24 hr tablet Take 1 tablet (25 mg total) by mouth daily  Qty: 90 tablet, Refills: 3    Associated Diagnoses:  Elevated blood pressure reading      !! metoprolol succinate (TOPROL-XL) 50 mg 24 hr tablet Take 1 tablet (50 mg total) by mouth 2 (two) times a day  Qty: 180 tablet, Refills: 3    Associated Diagnoses: Paroxysmal atrial fibrillation (HCC)      omeprazole (PriLOSEC) 20 mg delayed release capsule Take 1 capsule (20 mg total) by mouth daily before breakfast  Qty: 90 capsule, Refills: 1    Associated Diagnoses: Gastroesophageal reflux disease with esophagitis without hemorrhage      risankizumab-rzaa (Skyrizi) 360 MG/2.4ML SOCT Take first on body injector (OBI) under skin on week 12 then every 8 weeks there after  Qty: 2.4 mL, Refills: 5    Associated Diagnoses: Crohn's disease of small intestine with complication (HCC)      vitamin B-12 (VITAMIN B-12) 1,000 mcg tablet Take 1 tablet (1,000 mcg total) by mouth daily  Qty: 90 tablet, Refills: 1    Associated Diagnoses: Vitamin B12 deficiency      Xarelto 20 MG tablet TAKE 1 TABLET BY MOUTH DAILY WITH BREAKFAST  Qty: 90 tablet, Refills: 3    Comments: Lot [id number], exp 2/25  Associated Diagnoses: Paroxysmal atrial fibrillation (HCC)       !! - Potential duplicate medications found. Please discuss with provider.        No discharge procedures on file.    PDMP Review       None             ED Provider  Attending physically available and evaluated South Humphrey III. I managed the patient along with the ED Attending.    Electronically Signed by           Monse Garcia MD  02/12/24 4582

## 2024-02-12 NOTE — TELEPHONE ENCOUNTER
Called and spoke with patient and his wife.Patient reports having swelling in his lower extremities.He pulse ox is 78,HR 72.He reports when he is lying down he is wheezing.I advised patient to hold his Skyrizi until test results are back.Patient is going to the [place] ER.

## 2024-02-12 NOTE — TELEPHONE ENCOUNTER
"Pt. Wife called and states since December he has increased SOB and cough which is getting worse, pt. Went out shopping today and became extremely winded when bringing in groceries and was dizzy, has a productive cough, wheezing at times, corey feels SOB while at rest, seen by PCP mid January and stated lungs were clear and has no hx of lung disease, asked to watch HR and BP due to change In metoprolol, pt. Also did a COVID test which was negative, pt. Has been feeling sick since starting Skyrizi infusion and injection, pt. Is due to have another injection this Thursday and wife is unsure if he should continue, please advise         Reason for Disposition   MODERATE longstanding difficulty breathing (e.g., speaks in phrases, SOB even at rest, pulse 100-120) and SAME as normal    Answer Assessment - Initial Assessment Questions  1. RESPIRATORY STATUS: \"Describe your breathing?\" (e.g., wheezing, shortness of breath, unable to speak, severe coughing)       Wheezing, SOB, productive cough  2. ONSET: \"When did this breathing problem begin?\"       December 2023  3. PATTERN \"Does the difficult breathing come and go, or has it been constant since it started?\"       Constant   4. SEVERITY: \"How bad is your breathing?\" (e.g., mild, moderate, severe)     - MILD: No SOB at rest, mild SOB with walking, speaks normally in sentences, can lay down, no retractions, pulse < 100.     - MODERATE: SOB at rest, SOB with minimal exertion and prefers to sit, cannot lie down flat, speaks in phrases, mild retractions, audible wheezing, pulse 100-120.     - SEVERE: Very SOB at rest, speaks in single words, struggling to breathe, sitting hunched forward, retractions, pulse > 120       Moderate   5. RECURRENT SYMPTOM: \"Have you had difficulty breathing before?\" If Yes, ask: \"When was the last time?\" and \"What happened that time?\"       No prior history   6. CARDIAC HISTORY: \"Do you have any history of heart disease?\" (e.g., heart attack, angina, " "bypass surgery, angioplasty)       Denies   7. LUNG HISTORY: \"Do you have any history of lung disease?\"  (e.g., pulmonary embolus, asthma, emphysema)      Denies   8. CAUSE: \"What do you think is causing the breathing problem?\"       Started on Skyrizi   9. OTHER SYMPTOMS: \"Do you have any other symptoms? (e.g., dizziness, runny nose, cough, chest pain, fever)      Cough and dizziness   11. TRAVEL: \"Have you traveled out of the country in the last month?\" (e.g., travel history, exposures)        Denies    Protocols used: Breathing Difficulty-ADULT-[place]    "

## 2024-02-12 NOTE — LETTER
[facility] [address]  FLOOR MED SURG UNIT  [address] [facility] [address]  [place] [address]  Dept: [phone]    February 16, 2024     Patient: South Humphrey III   YOB: 1950   Date of Visit: 2/12/2024       To Whom it May Concern:    South Humphrey is under my professional care. He was admitted to hospital on 2/12/2024 till date. He  is currently under my care in the hospital . He should take rest and avoid heavy exercises in the next few weeks.     If you have any questions or concerns, please don't hesitate to call.         Sincerely,          Katherine May MD

## 2024-02-12 NOTE — TELEPHONE ENCOUNTER
Pt's wife calling re: he is on skyrizi and lately pt cannot catch his breath and is coughing up flem. I warm transfer to Lluvia mackey nurse.

## 2024-02-12 NOTE — TELEPHONE ENCOUNTER
Hi,    Can they check his HR and do they have a pulse ox?  Any swelling of his lower extremities?  If the shortness of breath feels like it is getting worse I would suggest going to the ER to make sure there isn't something else going on (such as a clot of something with his heart).   I would like him to see his cardiologist  I also ordered a stat CT chest (to look at the lungs and make sure there are no blood clots), as well as a stat Echo (TTE) that I would like him to get as soon as possible    In the meantime we can plan to hold the Skyrizi until these tests return. I am not sure yet if that is the cause of his symptoms.     Thank you

## 2024-02-12 NOTE — TELEPHONE ENCOUNTER
Called and spoke with patient's wife.She states she is on the other line and would like a call back in 5 minutes.

## 2024-02-13 ENCOUNTER — APPOINTMENT (OUTPATIENT)
Dept: NON INVASIVE DIAGNOSTICS | Facility: HOSPITAL | Age: 74
DRG: 291 | End: 2024-02-13
Payer: MEDICARE

## 2024-02-13 ENCOUNTER — APPOINTMENT (INPATIENT)
Dept: ULTRASOUND IMAGING | Facility: HOSPITAL | Age: 74
DRG: 291 | End: 2024-02-13
Payer: MEDICARE

## 2024-02-13 PROBLEM — R79.89 ABNORMAL LFTS: Status: ACTIVE | Noted: 2024-02-13

## 2024-02-13 LAB
ALBUMIN SERPL BCP-MCNC: 3.3 G/DL (ref 3.5–5)
ALP SERPL-CCNC: 306 U/L (ref 34–104)
ALT SERPL W P-5'-P-CCNC: 32 U/L (ref 7–52)
ANION GAP SERPL CALCULATED.3IONS-SCNC: 10 MMOL/L
AORTIC ROOT: 4.1 CM
APICAL FOUR CHAMBER EJECTION FRACTION: 17 %
ASCENDING AORTA: 3.3 CM
AST SERPL W P-5'-P-CCNC: 39 U/L (ref 13–39)
ATRIAL RATE: 197 BPM
AV LVOT MEAN GRADIENT: 1 MMHG
AV LVOT PEAK GRADIENT: 1 MMHG
BASOPHILS # BLD AUTO: 0.05 THOUSANDS/ÂΜL (ref 0–0.1)
BASOPHILS NFR BLD AUTO: 1 % (ref 0–1)
BILIRUB SERPL-MCNC: 2.28 MG/DL (ref 0.2–1)
BSA FOR ECHO PROCEDURE: 2.3 M2
BUN SERPL-MCNC: 28 MG/DL (ref 5–25)
CALCIUM ALBUM COR SERPL-MCNC: 8.8 MG/DL (ref 8.3–10.1)
CALCIUM SERPL-MCNC: 8.2 MG/DL (ref 8.4–10.2)
CHLORIDE SERPL-SCNC: 109 MMOL/L (ref 96–108)
CO2 SERPL-SCNC: 22 MMOL/L (ref 21–32)
CREAT SERPL-MCNC: 1.45 MG/DL (ref 0.6–1.3)
DOP CALC LVOT PEAK VEL VTI: 6.37 CM
DOP CALC LVOT PEAK VEL: 0.51 M/S
EOSINOPHIL # BLD AUTO: 0.12 THOUSAND/ÂΜL (ref 0–0.61)
EOSINOPHIL NFR BLD AUTO: 1 % (ref 0–6)
ERYTHROCYTE [DISTWIDTH] IN BLOOD BY AUTOMATED COUNT: 18.6 % (ref 11.6–15.1)
FRACTIONAL SHORTENING: 11 (ref 28–44)
GFR SERPL CREATININE-BSD FRML MDRD: 47 ML/MIN/1.73SQ M
GLUCOSE P FAST SERPL-MCNC: 44 MG/DL (ref 65–99)
GLUCOSE SERPL-MCNC: 133 MG/DL (ref 65–140)
GLUCOSE SERPL-MCNC: 44 MG/DL (ref 65–140)
GLUCOSE SERPL-MCNC: 55 MG/DL (ref 65–140)
GLUCOSE SERPL-MCNC: 58 MG/DL (ref 65–140)
GLUCOSE SERPL-MCNC: 66 MG/DL (ref 65–140)
GLUCOSE SERPL-MCNC: 67 MG/DL (ref 65–140)
GLUCOSE SERPL-MCNC: 86 MG/DL (ref 65–140)
HCT VFR BLD AUTO: 47.6 % (ref 36.5–49.3)
HGB BLD-MCNC: 14.7 G/DL (ref 12–17)
IMM GRANULOCYTES # BLD AUTO: 0.04 THOUSAND/UL (ref 0–0.2)
IMM GRANULOCYTES NFR BLD AUTO: 0 % (ref 0–2)
INTERVENTRICULAR SEPTUM IN DIASTOLE (PARASTERNAL SHORT AXIS VIEW): 1.3 CM
INTERVENTRICULAR SEPTUM: 1.3 CM (ref 0.6–1.1)
LAAS-AP2: 19.2 CM2
LAAS-AP4: 18.8 CM2
LEFT ATRIUM SIZE: 4.1 CM
LEFT ATRIUM VOLUME (MOD BIPLANE): 55 ML
LEFT ATRIUM VOLUME INDEX (MOD BIPLANE): 23.9 ML/M2
LEFT INTERNAL DIMENSION IN SYSTOLE: 4.7 CM (ref 2.1–4)
LEFT VENTRICLE DIASTOLIC VOLUME (MOD BIPLANE): 135 ML
LEFT VENTRICLE DIASTOLIC VOLUME INDEX (MOD BIPLANE): 58.7 ML/M2
LEFT VENTRICLE SYSTOLIC VOLUME (MOD BIPLANE): 109 ML
LEFT VENTRICLE SYSTOLIC VOLUME INDEX (MOD BIPLANE): 47.4 ML/M2
LEFT VENTRICULAR INTERNAL DIMENSION IN DIASTOLE: 5.3 CM (ref 3.5–6)
LEFT VENTRICULAR POSTERIOR WALL IN END DIASTOLE: 1.3 CM
LEFT VENTRICULAR STROKE VOLUME: 36 ML
LV EF: 20 %
LVSV (TEICH): 36 ML
LYMPHOCYTES # BLD AUTO: 2.23 THOUSANDS/ÂΜL (ref 0.6–4.47)
LYMPHOCYTES NFR BLD AUTO: 22 % (ref 14–44)
MAGNESIUM SERPL-MCNC: 2.2 MG/DL (ref 1.9–2.7)
MCH RBC QN AUTO: 27.1 PG (ref 26.8–34.3)
MCHC RBC AUTO-ENTMCNC: 30.9 G/DL (ref 31.4–37.4)
MCV RBC AUTO: 88 FL (ref 82–98)
MONOCYTES # BLD AUTO: 1.06 THOUSAND/ÂΜL (ref 0.17–1.22)
MONOCYTES NFR BLD AUTO: 10 % (ref 4–12)
NEUTROPHILS # BLD AUTO: 6.71 THOUSANDS/ÂΜL (ref 1.85–7.62)
NEUTS SEG NFR BLD AUTO: 66 % (ref 43–75)
NRBC BLD AUTO-RTO: 0 /100 WBCS
PLATELET # BLD AUTO: 307 THOUSANDS/UL (ref 149–390)
PMV BLD AUTO: 11.1 FL (ref 8.9–12.7)
POTASSIUM SERPL-SCNC: 3.7 MMOL/L (ref 3.5–5.3)
PROT SERPL-MCNC: 6.3 G/DL (ref 6.4–8.4)
QRS AXIS: -56 DEGREES
QRSD INTERVAL: 120 MS
QT INTERVAL: 230 MS
QTC INTERVAL: 412 MS
RA PRESSURE ESTIMATED: 3 MMHG
RBC # BLD AUTO: 5.42 MILLION/UL (ref 3.88–5.62)
RIGHT ATRIUM AREA SYSTOLE A4C: 21.9 CM2
RIGHT VENTRICLE ID DIMENSION: 5 CM
RV PSP: 25 MMHG
SL CV LEFT ATRIUM LENGTH A2C: 5.3 CM
SL CV LV EF: 20
SL CV PED ECHO LEFT VENTRICLE DIASTOLIC VOLUME (MOD BIPLANE) 2D: 136 ML
SL CV PED ECHO LEFT VENTRICLE SYSTOLIC VOLUME (MOD BIPLANE) 2D: 100 ML
SODIUM SERPL-SCNC: 141 MMOL/L (ref 135–147)
T WAVE AXIS: 146 DEGREES
TR MAX PG: 22 MMHG
TR PEAK VELOCITY: 2.3 M/S
TRICUSPID ANNULAR PLANE SYSTOLIC EXCURSION: 1.3 CM
TRICUSPID VALVE PEAK REGURGITATION VELOCITY: 2.32 M/S
VENTRICULAR RATE: 193 BPM
WBC # BLD AUTO: 10.21 THOUSAND/UL (ref 4.31–10.16)

## 2024-02-13 PROCEDURE — 80053 COMPREHEN METABOLIC PANEL: CPT | Performed by: NURSE PRACTITIONER

## 2024-02-13 PROCEDURE — 82948 REAGENT STRIP/BLOOD GLUCOSE: CPT

## 2024-02-13 PROCEDURE — 99232 SBSQ HOSP IP/OBS MODERATE 35: CPT | Performed by: INTERNAL MEDICINE

## 2024-02-13 PROCEDURE — 93010 ELECTROCARDIOGRAM REPORT: CPT | Performed by: INTERNAL MEDICINE

## 2024-02-13 PROCEDURE — C8929 TTE W OR WO FOL WCON,DOPPLER: HCPCS

## 2024-02-13 PROCEDURE — 76705 ECHO EXAM OF ABDOMEN: CPT

## 2024-02-13 PROCEDURE — 99223 1ST HOSP IP/OBS HIGH 75: CPT | Performed by: INTERNAL MEDICINE

## 2024-02-13 PROCEDURE — 83735 ASSAY OF MAGNESIUM: CPT | Performed by: NURSE PRACTITIONER

## 2024-02-13 PROCEDURE — 93306 TTE W/DOPPLER COMPLETE: CPT | Performed by: INTERNAL MEDICINE

## 2024-02-13 PROCEDURE — 85025 COMPLETE CBC W/AUTO DIFF WBC: CPT | Performed by: NURSE PRACTITIONER

## 2024-02-13 RX ORDER — DIGOXIN 0.25 MG/ML
250 INJECTION INTRAMUSCULAR; INTRAVENOUS ONCE
Status: COMPLETED | OUTPATIENT
Start: 2024-02-13 | End: 2024-02-13

## 2024-02-13 RX ORDER — INSULIN LISPRO 100 [IU]/ML
1-6 INJECTION, SOLUTION INTRAVENOUS; SUBCUTANEOUS EVERY 6 HOURS SCHEDULED
Status: DISCONTINUED | OUTPATIENT
Start: 2024-02-13 | End: 2024-02-13

## 2024-02-13 RX ORDER — POTASSIUM CHLORIDE 20 MEQ/1
20 TABLET, EXTENDED RELEASE ORAL ONCE
Status: COMPLETED | OUTPATIENT
Start: 2024-02-13 | End: 2024-02-13

## 2024-02-13 RX ORDER — METOPROLOL TARTRATE 1 MG/ML
5 INJECTION, SOLUTION INTRAVENOUS ONCE
Status: COMPLETED | OUTPATIENT
Start: 2024-02-13 | End: 2024-02-13

## 2024-02-13 RX ORDER — INSULIN LISPRO 100 [IU]/ML
1-5 INJECTION, SOLUTION INTRAVENOUS; SUBCUTANEOUS
Status: DISCONTINUED | OUTPATIENT
Start: 2024-02-13 | End: 2024-02-20 | Stop reason: HOSPADM

## 2024-02-13 RX ORDER — DEXTROSE MONOHYDRATE 25 G/50ML
INJECTION, SOLUTION INTRAVENOUS
Status: COMPLETED
Start: 2024-02-13 | End: 2024-02-13

## 2024-02-13 RX ORDER — INSULIN LISPRO 100 [IU]/ML
2-12 INJECTION, SOLUTION INTRAVENOUS; SUBCUTANEOUS
Status: DISCONTINUED | OUTPATIENT
Start: 2024-02-13 | End: 2024-02-20 | Stop reason: HOSPADM

## 2024-02-13 RX ORDER — DIGOXIN 0.25 MG/ML
250 INJECTION INTRAMUSCULAR; INTRAVENOUS EVERY 6 HOURS
Status: COMPLETED | OUTPATIENT
Start: 2024-02-13 | End: 2024-02-14

## 2024-02-13 RX ADMIN — PERFLUTREN 0.6 ML/MIN: 6.52 INJECTION, SUSPENSION INTRAVENOUS at 11:00

## 2024-02-13 RX ADMIN — AMIODARONE HYDROCHLORIDE 0.5 MG/MIN: 50 INJECTION, SOLUTION INTRAVENOUS at 21:41

## 2024-02-13 RX ADMIN — FUROSEMIDE 40 MG: 10 INJECTION, SOLUTION INTRAMUSCULAR; INTRAVENOUS at 08:59

## 2024-02-13 RX ADMIN — FUROSEMIDE 40 MG: 10 INJECTION, SOLUTION INTRAMUSCULAR; INTRAVENOUS at 17:55

## 2024-02-13 RX ADMIN — ASPIRIN 81 MG: 81 TABLET ORAL at 09:00

## 2024-02-13 RX ADMIN — DEXTROSE MONOHYDRATE 50 ML: 25 INJECTION, SOLUTION INTRAVENOUS at 20:18

## 2024-02-13 RX ADMIN — ATORVASTATIN CALCIUM 20 MG: 20 TABLET, FILM COATED ORAL at 09:00

## 2024-02-13 RX ADMIN — DIGOXIN 250 MCG: 0.25 INJECTION INTRAMUSCULAR; INTRAVENOUS at 12:34

## 2024-02-13 RX ADMIN — METOPROLOL TARTRATE 25 MG: 25 TABLET, FILM COATED ORAL at 17:55

## 2024-02-13 RX ADMIN — METOPROLOL TARTRATE 25 MG: 25 TABLET, FILM COATED ORAL at 09:00

## 2024-02-13 RX ADMIN — Medication 400 MG: at 08:59

## 2024-02-13 RX ADMIN — POTASSIUM CHLORIDE 20 MEQ: 1500 TABLET, EXTENDED RELEASE ORAL at 05:28

## 2024-02-13 RX ADMIN — METOPROLOL TARTRATE 25 MG: 25 TABLET, FILM COATED ORAL at 21:40

## 2024-02-13 RX ADMIN — DEXTROSE MONOHYDRATE 25 ML: 25 INJECTION, SOLUTION INTRAVENOUS at 05:28

## 2024-02-13 RX ADMIN — FLUTICASONE PROPIONATE 1 SPRAY: 50 SPRAY, METERED NASAL at 09:06

## 2024-02-13 RX ADMIN — PANTOPRAZOLE SODIUM 40 MG: 40 TABLET, DELAYED RELEASE ORAL at 05:28

## 2024-02-13 RX ADMIN — METOROPROLOL TARTRATE 5 MG: 5 INJECTION, SOLUTION INTRAVENOUS at 04:04

## 2024-02-13 RX ADMIN — FOLIC ACID 1 MG: 1 TABLET ORAL at 09:00

## 2024-02-13 RX ADMIN — DIGOXIN 250 MCG: 0.25 INJECTION INTRAMUSCULAR; INTRAVENOUS at 21:40

## 2024-02-13 RX ADMIN — AMIODARONE HYDROCHLORIDE 0.5 MG/MIN: 50 INJECTION, SOLUTION INTRAVENOUS at 01:53

## 2024-02-13 RX ADMIN — RIVAROXABAN 20 MG: 20 TABLET, FILM COATED ORAL at 08:30

## 2024-02-13 NOTE — NURSING NOTE
Pts HR sustaining 160s-180s. SLIM provider notified and reached out to cardiology. No new orders at this time.

## 2024-02-13 NOTE — ASSESSMENT & PLAN NOTE
Echo 6/2022: EF 50-55%, grade I diastolic dysfunction.  Trace mitral regurgitation.  Mild pulmonic regurgitation.  BNP: 653  Diuresis: lasix 40 mg IV BID  Beta-blocker: toprol XL 50 mg AM, 25 mg HS  Monitor intake and output, daily weights.  Diet: Fluid restriction and 2 g Sodium.  Consult Cardiology.

## 2024-02-13 NOTE — ASSESSMENT & PLAN NOTE
On Skyrizi for crohn's disease.  Received 2 injections.  Next due this week but is being held per GI

## 2024-02-13 NOTE — PROGRESS NOTES
[facility]  Progress Note  Name: South Humphrey III I  MRN: [id number]  Unit/Bed#: S -01 I Date of Admission: 2/12/2024   Date of Service: 2/13/2024 I Hospital Day: 1    Assessment/Plan   * Atrial fibrillation with RVR ([facility])  Assessment & Plan  Hx PAF since 2019.  Presented with worsening exertional dyspnea, weight gain.  Rates 200s on arrival.    Monitor and replete electrolytes as needed.  Rate/Rhythm Control: no improvement with cardizem drip.  Now on amiodarone drip  Continue home dose metoprolol   [id number]   Antiplatelet/Anticoagulation: xarelto  Troponin: 21>20>15       Plan:  Monitor on telemetry.  Cardiology consultation.  Continue Amiodarone gtt  NPO for possible electrical cardioversion.    Immunocompromised patient ([facility])  Assessment & Plan  On Skyrizi for crohn's disease.  Received 2 injections.  Next due this week but is being held per GI    Type 2 diabetes mellitus with diabetic peripheral angiopathy without gangrene ([facility])  Assessment & Plan  Lab Results   Component Value Date    HGBA1C 8.2 (H) 01/31/2024     Resume glimepiride on dc  Accucheck, SSI while inpatient     Ischemic cardiomyopathy  Assessment & Plan  Echo 6/2022: EF 50-55%, grade I diastolic dysfunction.  Trace mitral regurgitation.  Mild pulmonic regurgitation.  BNP: 653  Diuresis: lasix 40 mg IV BID  Beta-blocker: toprol XL 50 mg AM, 25 mg HS  Monitor intake and output, daily weights.  Diet: Fluid restriction and 2 g Sodium.  Consult Cardiology.      CKD (chronic kidney disease), stage III ([facility])  Assessment & Plan  Creatinine 1.76 on admission. Baseline 1.25-1.4.  follows with nephrology as op  Avoid hypotension, nephrotoxins    Class 1 obesity due to excess calories with serious comorbidity and body mass index (BMI) of 33.0 to 33.9 in adult  Assessment & Plan  BMI 37.2  Outpatient follow up with PCP             VTE Pharmacologic Prophylaxis: VTE Score: 3 Moderate Risk (Score 3-4) -  Pharmacological DVT Prophylaxis Ordered: rivaroxaban (Xarelto).    Mobility:      HLM Goal achieved. Continue to encourage appropriate mobility.    Patient Centered Rounds: I performed bedside rounds with nursing staff today.  Discussions with Specialists or Other Care Team Provider: Cardiology    Education and Discussions with Family / Patient: Patient declined call to .     Current Length of Stay: 1 day(s)  Current Patient Status: Observation   Discharge Plan: Anticipate discharge in 24-48 hrs to home.    Code Status: Level 1 - Full Code    Subjective:   Patient evaluated at bedside. He said he ambulated to the bathroom without any shortness of breath but does have palpitations. He denies chest pain, back pain, abdominal pain, headaches, blurry vision. He does have bilateral pedal edema. Currently on 2 L of oxygen but does not have any home oxygen requirements.    Objective:     Vitals:   Temp (24hrs), Av.6 °F (36.4 °C), Min:97.6 °F (36.4 °C), Max:97.6 °F (36.4 °C)    Temp:  [97.6 °F (36.4 °C)] 97.6 °F (36.4 °C)  HR:  [126-200] 161  Resp:  [19-24] 19  BP: (109-136)/() 136/100  SpO2:  [93 %-97 %] 97 %  Body mass index is 36.12 kg/m².     Input and Output Summary (last 24 hours):     Intake/Output Summary (Last 24 hours) at 2024 0859  Last data filed at 2024 0432  Gross per 24 hour   Intake 123.83 ml   Output 1425 ml   Net -1301.17 ml       Physical Exam:   Physical Exam  Vitals and nursing note reviewed.   Constitutional:       General: He is not in acute distress.     Appearance: He is well-developed.   HENT:      Head: Normocephalic and atraumatic.   Eyes:      Conjunctiva/sclera: Conjunctivae normal.   Cardiovascular:      Rate and Rhythm: Normal rate and regular rhythm.      Heart sounds: No murmur heard.  Pulmonary:      Effort: Pulmonary effort is normal. No respiratory distress.      Breath sounds: Wheezing present.   Abdominal:      General: There is no distension.       Palpations: Abdomen is soft.      Tenderness: There is no abdominal tenderness.   Musculoskeletal:         General: Swelling present.      Cervical back: Neck supple.      Right lower leg: Edema present.      Left lower leg: Edema present.   Skin:     General: Skin is warm and dry.      Capillary Refill: Capillary refill takes less than 2 seconds.   Neurological:      Mental Status: He is alert and oriented to person, place, and time.   Psychiatric:         Mood and Affect: Mood normal.          Additional Data:     Labs:  Results from last 7 days   Lab Units 02/13/24  0429   WBC Thousand/uL 10.21*   HEMOGLOBIN g/dL 14.7   HEMATOCRIT % 47.6   PLATELETS Thousands/uL 307   NEUTROS PCT % 66   LYMPHS PCT % 22   MONOS PCT % 10   EOS PCT % 1     Results from last 7 days   Lab Units 02/13/24  0429   SODIUM mmol/L 141   POTASSIUM mmol/L 3.7   CHLORIDE mmol/L 109*   CO2 mmol/L 22   BUN mg/dL 28*   CREATININE mg/dL 1.45*   ANION GAP mmol/L 10   CALCIUM mg/dL 8.2*   ALBUMIN g/dL 3.3*   TOTAL BILIRUBIN mg/dL 2.28*   ALK PHOS U/L 306*   ALT U/L 32   AST U/L 39   GLUCOSE RANDOM mg/dL 44*         Results from last 7 days   Lab Units 02/13/24  0848 02/13/24  0604   POC GLUCOSE mg/dl 55* 86               Lines/Drains:  Invasive Devices       Peripheral Intravenous Line  Duration             Peripheral IV 02/12/24 Dorsal (posterior);Right Forearm <1 day    Peripheral IV 02/12/24 Left Antecubital <1 day                      Telemetry:  Telemetry Orders (From admission, onward)               24 Hour Telemetry Monitoring  Continuous x 24 Hours (Telem)        Question:  Reason for 24 Hour Telemetry  Answer:  Arrhythmias requiring acute medical intervention / PPM or ICD malfunction                     Telemetry Reviewed: Atrial fibrillation. HR averaging 160  Indication for Continued Telemetry Use: Arrthymias requiring medical therapy             Imaging: No pertinent imaging reviewed.    Recent Cultures (last 7 days):         Last 24  Hours Medication List:   Current Facility-Administered Medications   Medication Dose Route Frequency Provider Last Rate    albuterol  2 puff Inhalation Q6H PRN LENORA Noe      amiodarone (CORDARONE) 900 mg in dextrose 5 % 500 mL infusion  0.5 mg/min Intravenous Continuous Monse Garcia MD 0.5 mg/min (02/13/24 0153)    aspirin  81 mg Oral Daily LENORA Noe      atorvastatin  20 mg Oral Daily LENORA Noe      vitamin B-12  1,000 mcg Oral Once per day on Monday Wednesday Friday LENORA Noe      fluticasone  1 spray Nasal Daily LENORA Noe      folic acid  1 mg Oral Daily LENORA Noe      furosemide  40 mg Intravenous BID LENORA Noe      insulin lispro  1-6 Units Subcutaneous Q6H [facility] LENORA Noe      magnesium Oxide  400 mg Oral Daily LENORA Noe      metoprolol tartrate  25 mg Oral Q6H LENORA Davila      nicotine  1 patch Transdermal Daily LENORA Noe      pantoprazole  40 mg Oral Early Morning LENORA Noe      rivaroxaban  20 mg Oral Daily With Breakfast LENORA Noe          Today, Patient Was Seen By: Katherine May MD    **Please Note: This note may have been constructed using a voice recognition system.**

## 2024-02-13 NOTE — ASSESSMENT & PLAN NOTE
Lab Results   Component Value Date    HGBA1C 8.2 (H) 01/31/2024     Resume glimepiride on dc  Accucheck, SSI while inpatient

## 2024-02-13 NOTE — H&P
[facility]  [facility]  Name: [place] Humphrey III 73 y.o. male I MRN: [id number]  Unit/Bed#: S -01 I Date of Admission: 2/12/2024   Date of Service: 2/12/2024 I Hospital Day: 1  Addendum 2/12 at 2330  Sustained rates 160s.  Discussed with cardiology.  Continue the plan as outlined.      Assessment/Plan   * Atrial fibrillation with RVR ([facility])  Assessment & Plan  Hx PAF.  Presented with worsening exertional dyspnea, weight gain.  Rates 200s on arrival.    Monitor and replete electrolytes as needed.  Rate/Rhythm Control: no improvement with cardizem drip.  Now on amiodarone drip  Continue home dose metoprolol   [id number]   Antiplatelet/Anticoagulation: xarelto  Initial troponin: 20, trend    Monitor on telemetry.  Cardiology consultation.    Ischemic cardiomyopathy  Assessment & Plan  Echo 6/2022: EF 50-55%, grade I diastolic dysfunction.  Trace mitral regurgitation.  Mild pulmonic regurgitation.  BNP: 653  Diuresis: lasix 40 mg IV BID  Beta-blocker: toprol XL 50 mg AM, 25 mg HS  Monitor intake and output, daily weights.  Diet: Fluid restriction and 2 g Sodium.  Consult Cardiology.      Immunocompromised patient ([facility])  Assessment & Plan  On Skyrizi for crohn's disease.  Received 2 injections.  Next due this week but is being held per GI    Type 2 diabetes mellitus with diabetic peripheral angiopathy without gangrene ([facility])  Assessment & Plan  Lab Results   Component Value Date    HGBA1C 8.2 (H) 01/31/2024     Resume glimepiride on dc  Accucheck, SSI while inpatient     CKD (chronic kidney disease), stage III ([facility])  Assessment & Plan  Creatinine 1.76 on admission. Baseline 1.25-1.4.  follows with nephrology as op  Avoid hypotension, nephrotoxins    Class 1 obesity due to excess calories with serious comorbidity and body mass index (BMI) of 33.0 to 33.9 in adult  Assessment & Plan  BMI 37.2  Outpatient follow up with PCP           VTE Pharmacologic Prophylaxis: VTE Score: 3 Moderate  Risk (Score 3-4) - Pharmacological DVT Prophylaxis Ordered: rivaroxaban (Xarelto).  Code Status: Level 1 - Full Code full  Discussion with family: Patient declined call to .     Anticipated Length of Stay: Patient will be admitted on an observation basis with an anticipated length of stay of less than 2 midnights secondary to afib rvr.    Total Time Spent on Date of Encounter in care of patient:  mins. This time was spent on one or more of the following: performing physical exam; counseling and coordination of care; obtaining or reviewing history; documenting in the medical record; reviewing/ordering tests, medications or procedures; communicating with other healthcare professionals and discussing with patient's family/caregivers.    Chief Complaint: shortness of breath    History of Present Illness:  South Humphrey III is a 73 y.o. male with a PMH of PAF on xarelto, CKD, GERD, ischemic cardiomyopathy, pulmonary emphysema, PAD s/p right popliteal artery revision, adrenal gland abnormalities, DM 2, HLD, crohn's disease who presents with shortness of breath and heart pounding sensation x 1 month.  He tested negative for COVID at home at the onset of his symptoms.  Shortness of breath and exercise intolerance are now so severe he can only manage taking a few steps.  He also notes 12 lb weight gain in the past month.  On arrival to ED, patient found to be in rapid afib.  Rates did not improve with diltiazem IV and was placed on amiodarone.  He is on Xarelto as op.      Review of Systems:  Review of Systems   Constitutional:  Positive for unexpected weight change. Negative for appetite change, chills and fever.   Respiratory:  Positive for shortness of breath. Negative for cough, chest tightness and wheezing.    Cardiovascular:  Positive for palpitations and leg swelling. Negative for chest pain.   All other systems reviewed and are negative.      Past Medical and Surgical History:   Past Medical  History:   Diagnosis Date    Harris esophagus     Blue toe syndrome (HCC)     Chronic kidney disease     Colon polyps     Crohn's disease (HCC) 1-2020    GERD (gastroesophageal reflux disease)     Hematuria     History of rheumatic fever     Hypolipidemia     Hypotension     Ischemic cardiomyopathy     PAD (peripheral artery disease) (HCC)     Pulmonary emphysema (HCC)     PVD (peripheral vascular disease) (HCC)        Past Surgical History:   Procedure Laterality Date    CARDIAC ELECTROPHYSIOLOGY PROCEDURE N/A 1/10/2024    Procedure: Cardiac loop recorder explant;  Surgeon: Jason Cortez MD;  Location: [facility];  Service: Cardiology    COLONOSCOPY  2019    HERNIA REPAIR      IR AORTAGRAM WITH RUN-OFF  6/27/2019    IR AORTAGRAM WITH RUN-OFF  2/12/2020    POPLITEAL ARTERY STENT Right     6/2019    PA BYPASS W/VEIN FEMORAL-POPLITEAL Right 3/26/2020    Procedure: BYPASS FEMORAL-POPLITEAL; Right lower extremity bypass, fem-bk pop with GSV;  Surgeon: Wyatt Shell MD;  Location: [facility] MAIN [facility];  Service: Vascular    PA SLCTV CATHJ 3RD+ ORD SLCTV ABDL PEL/LXTR BRNCH Right 6/27/2019    Procedure: leg ANGIOGRAM WITH STENT, BALLOON ANGIOPLASTY, LEFT GROIN ACCESS;  Surgeon: Wyatt Shell MD;  Location: [facility];  Service: Vascular       Meds/Allergies:  Prior to Admission medications    Medication Sig Start Date End Date Taking? Authorizing Provider   albuterol (Ventolin HFA) 90 mcg/act inhaler Inhale 2 puffs every 6 (six) hours as needed for wheezing 1/17/24   Virgie Luz MD   aspirin 81 MG tablet Take 81 mg by mouth daily    Historical Provider, MD   atorvastatin (LIPITOR) 20 mg tablet take 1 tablet by mouth once daily 12/11/23   Virgie Luz MD   fluticasone (FLONASE) 50 mcg/act nasal spray 1 spray into each nostril daily 1/17/24   Virgie Luz MD   folic acid (FOLVITE) 1 mg tablet Take 1 tablet (1 mg total) by mouth daily 1/4/22   Christa Ziegler PA-C   glimepiride (AMARYL) 2 mg  tablet Take 1 tablet (2 mg total) by mouth 2 (two) times a day with meals 1/31/24   Virgie Luz MD   Magnesium 500 MG TABS Take 1 tablet by mouth daily     Historical Provider, MD   metoprolol succinate (TOPROL-XL) 25 mg 24 hr tablet Take 1 tablet (25 mg total) by mouth daily  Patient taking differently: Take 25 mg by mouth daily Total of 75mg 6/16/23   LENORA Pittman   metoprolol succinate (TOPROL-XL) 50 mg 24 hr tablet Take 1 tablet (50 mg total) by mouth 2 (two) times a day 11/20/23   Erik Ramos MD   omeprazole (PriLOSEC) 20 mg delayed release capsule Take 1 capsule (20 mg total) by mouth daily before breakfast 9/29/23   Kandice Clemens MD   risankizumab-rzaa (Skyrizi) 360 MG/2.4ML SOCT Take first on body injector (OBI) under skin on week 12 then every 8 weeks there after 10/5/23   Kandice Clemens MD   vitamin B-12 (VITAMIN B-12) 1,000 mcg tablet Take 1 tablet (1,000 mcg total) by mouth daily  Patient taking differently: Take 1,000 mcg by mouth 3 (three) times a week 3/17/21   Virgie Luz MD   Xarelto 20 MG tablet TAKE 1 TABLET BY MOUTH DAILY WITH BREAKFAST 7/24/23   Tarun Almanza MD     I have reviewed home medications with a medical source (PCP, Pharmacy, other).    Allergies: No Known Allergies    Social History:  Marital Status: /Civil Union   Occupation:   Patient Pre-hospital Living Situation: Home  Patient Pre-hospital Level of Mobility: unable to be assessed at time of evaluation  Patient Pre-hospital Diet Restrictions:   Substance Use History:   Social History     Substance and Sexual Activity   Alcohol Use Yes    Alcohol/week: 1.0 standard drink of alcohol    Types: 1 Standard drinks or equivalent per week    Comment: social drinker when out dining     Social History     Tobacco Use   Smoking Status Some Days    Current packs/day: 0.25    Average packs/day: 0.3 packs/day for 30.0 years (7.5 ttl pk-yrs)    Types: Cigarettes   Smokeless Tobacco Never      Social History     Substance and Sexual Activity   Drug Use Never       Family History:  Family History   Problem Relation Age of Onset    Heart disease Mother     Hyperlipidemia Mother     Hypertension Mother     Hypertension Father     Heart disease Father     Stroke Father     Hyperlipidemia Father     Diabetes Brother     No Known Problems Maternal Grandmother     Dementia Neg Hx     Drug abuse Neg Hx     Mental illness Neg Hx     Substance Abuse Neg Hx     Alcohol abuse Neg Hx     Depression Neg Hx     Colon cancer Neg Hx        Physical Exam:     Vitals:   Blood Pressure: 114/80 (02/12/24 2058)  Pulse: (!) 126 (02/12/24 2058)  Temperature: 97.6 °F (36.4 °C) (02/12/24 2058)  Temp Source: Oral (02/12/24 2058)  Respirations: (!) 23 (02/12/24 2058)  Weight - Scale: 118 kg (259 lb 4.2 oz) (02/12/24 1539)  SpO2: 97 % (02/12/24 2058)    Physical Exam  Constitutional:       General: He is not in acute distress.     Appearance: Normal appearance. He is obese. He is ill-appearing. He is not toxic-appearing.   HENT:      Head: Normocephalic and atraumatic.      Right Ear: External ear normal.      Left Ear: External ear normal.      Nose: Nose normal.      Mouth/Throat:      Pharynx: Oropharynx is clear.   Eyes:      Extraocular Movements: Extraocular movements intact.      Conjunctiva/sclera: Conjunctivae normal.   Cardiovascular:      Rate and Rhythm: Regular rhythm. Tachycardia present.      Pulses: Normal pulses.      Heart sounds: Normal heart sounds.   Pulmonary:      Effort: Respiratory distress present.      Breath sounds: Wheezing present. No rhonchi or rales.   Abdominal:      General: Bowel sounds are normal. There is no distension.      Palpations: Abdomen is soft.      Tenderness: There is no abdominal tenderness. There is no guarding or rebound.   Musculoskeletal:         General: Normal range of motion.      Cervical back: Normal range of motion.      Right lower leg: Edema present.      Left lower  leg: Edema present.   Skin:     General: Skin is warm.      Capillary Refill: Capillary refill takes less than 2 seconds.   Neurological:      General: No focal deficit present.      Mental Status: He is alert and oriented to person, place, and time.   Psychiatric:         Mood and Affect: Mood normal.         Behavior: Behavior normal.          Additional Data:     Lab Results:  Results from last 7 days   Lab Units 02/12/24  1524   WBC Thousand/uL 8.42   HEMOGLOBIN g/dL 15.2   HEMATOCRIT % 49.6*   PLATELETS Thousands/uL 311   NEUTROS PCT % 59   LYMPHS PCT % 29   MONOS PCT % 11   EOS PCT % 1     Results from last 7 days   Lab Units 02/12/24  1524   SODIUM mmol/L 142   POTASSIUM mmol/L 4.6   CHLORIDE mmol/L 109*   CO2 mmol/L 25   BUN mg/dL 34*   CREATININE mg/dL 1.76*   ANION GAP mmol/L 8   CALCIUM mg/dL 8.6   ALBUMIN g/dL 3.4*   TOTAL BILIRUBIN mg/dL 1.94*   ALK PHOS U/L 302*   ALT U/L 34   AST U/L 38   GLUCOSE RANDOM mg/dL 143*                       Lines/Drains:  Invasive Devices       Peripheral Intravenous Line  Duration             Peripheral IV 02/12/24 Dorsal (posterior);Right Forearm <1 day    Peripheral IV 02/12/24 Left Antecubital <1 day                        Imaging: Reviewed radiology reports from this admission including: chest xray  XR chest 1 view portable    (Results Pending)       EKG and Other Studies Reviewed on Admission:   EKG: Atrial fibrillation. .    ** Please Note: This note has been constructed using a voice recognition system. **

## 2024-02-13 NOTE — PLAN OF CARE
Problem: CARDIOVASCULAR - ADULT  Goal: Maintains optimal cardiac output and hemodynamic stability  Description: INTERVENTIONS:  - Monitor I/O, vital signs and rhythm  - Monitor for S/S and trends of decreased cardiac output  - Administer and titrate ordered vasoactive medications to optimize hemodynamic stability  - Assess quality of pulses, skin color and temperature  - Assess for signs of decreased coronary artery perfusion  - Instruct patient to report change in severity of symptoms  Outcome: Progressing  Goal: Absence of cardiac dysrhythmias or at baseline rhythm  Description: INTERVENTIONS:  - Continuous cardiac monitoring, vital signs, obtain 12 lead EKG if ordered  - Administer antiarrhythmic and heart rate control medications as ordered  - Monitor electrolytes and administer replacement therapy as ordered  Outcome: Progressing     Problem: PAIN - ADULT  Goal: Verbalizes/displays adequate comfort level or baseline comfort level  Description: Interventions:  - Encourage patient to monitor pain and request assistance  - Assess pain using appropriate pain scale  - Administer analgesics based on type and severity of pain and evaluate response  - Implement non-pharmacological measures as appropriate and evaluate response  - Consider cultural and social influences on pain and pain management  - Notify physician/advanced practitioner if interventions unsuccessful or patient reports new pain  Outcome: Progressing     Problem: INFECTION - ADULT  Goal: Absence or prevention of progression during hospitalization  Description: INTERVENTIONS:  - Assess and monitor for signs and symptoms of infection  - Monitor lab/diagnostic results  - Monitor all insertion sites, i.e. indwelling lines, tubes, and drains  - Monitor endotracheal if appropriate and nasal secretions for changes in amount and color  - [place] appropriate cooling/warming therapies per order  - Administer medications as ordered  - Instruct and encourage  patient and family to use good hand hygiene technique  - Identify and instruct in appropriate isolation precautions for identified infection/condition  Outcome: Progressing  Goal: Absence of fever/infection during neutropenic period  Description: INTERVENTIONS:  - Monitor WBC    Outcome: Progressing     Problem: SAFETY ADULT  Goal: Patient will remain free of falls  Description: INTERVENTIONS:  - Educate patient/family on patient safety including physical limitations  - Instruct patient to call for assistance with activity   - Consult OT/PT to assist with strengthening/mobility   - Keep Call bell within reach  - Keep bed low and locked with side rails adjusted as appropriate  - Keep care items and personal belongings within reach  - Initiate and maintain comfort rounds  - Make Fall Risk Sign visible to staff  - Offer Toileting every  Hours, in advance of need  - Initiate/Maintain alarm  - Obtain necessary fall risk management equipment:   - Apply yellow socks and bracelet for high fall risk patients  - Consider moving patient to room near nurses station  Outcome: Progressing  Goal: Maintain or return to baseline ADL function  Description: INTERVENTIONS:  -  Assess patient's ability to carry out ADLs; assess patient's baseline for ADL function and identify physical deficits which impact ability to perform ADLs (bathing, care of mouth/teeth, toileting, grooming, dressing, etc.)  - Assess/evaluate cause of self-care deficits   - Assess range of motion  - Assess patient's mobility; develop plan if impaired  - Assess patient's need for assistive devices and provide as appropriate  - Encourage maximum independence but intervene and supervise when necessary  - Involve family in performance of ADLs  - Assess for home care needs following discharge   - Consider OT consult to assist with ADL evaluation and planning for discharge  - Provide patient education as appropriate  Outcome: Progressing  Goal: Maintains/Returns to pre  admission functional level  Description: INTERVENTIONS:  - Perform AM-PAC 6 Click Basic Mobility/ Daily Activity assessment daily.  - Set and communicate daily mobility goal to care team and patient/family/caregiver.   - Collaborate with rehabilitation services on mobility goals if consulted  - Perform Range of Motion  times a day.  - Reposition patient every  hours.  - Dangle patient  times a day  - Stand patient  times a day  - Ambulate patient  times a day  - Out of bed to chair  times a day   - Out of bed for meals times a day  - Out of bed for toileting  - Record patient progress and toleration of activity level   Outcome: Progressing     Problem: DISCHARGE PLANNING  Goal: Discharge to home or other facility with appropriate resources  Description: INTERVENTIONS:  - Identify barriers to discharge w/patient and caregiver  - Arrange for needed discharge resources and transportation as appropriate  - Identify discharge learning needs (meds, wound care, etc.)  - Arrange for interpretive services to assist at discharge as needed  - Refer to Case Management Department for coordinating discharge planning if the patient needs post-hospital services based on physician/advanced practitioner order or complex needs related to functional status, cognitive ability, or social support system  Outcome: Progressing     Problem: Knowledge Deficit  Goal: Patient/family/caregiver demonstrates understanding of disease process, treatment plan, medications, and discharge instructions  Description: Complete learning assessment and assess knowledge base.  Interventions:  - Provide teaching at level of understanding  - Provide teaching via preferred learning methods  Outcome: Progressing

## 2024-02-13 NOTE — ASSESSMENT & PLAN NOTE
Creatinine 1.76 on admission. Baseline 1.25-1.4.  follows with nephrology as op  Avoid hypotension, nephrotoxins

## 2024-02-13 NOTE — ASSESSMENT & PLAN NOTE
Hx PAF since 2019.  Presented with worsening exertional dyspnea, weight gain.  Rates 200s on arrival.    Monitor and replete electrolytes as needed.  Rate/Rhythm Control: no improvement with cardizem drip.  Now on amiodarone drip  Continue home dose metoprolol   [id number]   Antiplatelet/Anticoagulation: xarelto  Troponin: 21>20>15       Plan:  Monitor on telemetry.  Cardiology consultation.  Continue Amiodarone gtt  NPO for possible electrical cardioversion.

## 2024-02-13 NOTE — ASSESSMENT & PLAN NOTE
Hx PAF.  Presented with worsening exertional dyspnea, weight gain.  Rates 200s on arrival.    Monitor and replete electrolytes as needed.  Rate/Rhythm Control: no improvement with cardizem drip.  Now on amiodarone drip  Continue home dose metoprolol   [id number]   Antiplatelet/Anticoagulation: xarelto  Initial troponin: 20, trend    Monitor on telemetry.  Cardiology consultation.

## 2024-02-13 NOTE — CONSULTS
Consultation - Cardiology Team 1  [place] III 73 y.o. male MRN: [id number]  Unit/Bed#: S -01 Encounter: [id number]  02/13/24  8:27 AM    Assessment/ Plan:  1. Paroxysmal atrial fibrillation with RVR  - s/p loop w/ explantation (1/10/2024) - initially implanted secondary to PAD with concern for embolic etiology  - s/p Cardizem 30 mg IV x 1, Lopressor 5 mg IV x 1, amio bolus + gtt  - presenting ECG - atrial fibrillation with RVR, bifascicular block  - telemetry review - atrial flutter/fib, -150s  - echo (6/29/2022) - LVEF 50-55%, RWMA cannot be excluded on the basis of the study, grade 1 DD, mild pulmonic valve regurgitation; new echo ordered and pending  - TSH 1.97  - [id number] - chronically anticoagulated on Xarelto 20 mg daily for stroke prevention; continue  - outpatient rate/rhythm control regimen - Toprol XL 50 mg a.m., 25 mg p.m.  - start Lopressor 25 mg every 6 hours for rate control + amio gtt  - would opt for rate control strategy presently given volume status - hopeful improvement heart rate/rhythm with continued IV diuresis  - can consider future cardioversion if necessary to restore NSR once volume status is optimized  - recommend outpatient sleep study to rule out ANUSHA given patient's body habitus  - continue to monitor on telemetry    2. CM with LVEF 50-55%  3. Acute HFpEF  - documented as likely ischemic cardiomyopathy, but no invasive ischemic evaluation in the past  - likely secondary to #1  - echo as noted above - new echo pending  -   - CXR - mild pulmonary edema  - last outpatient ischemic evaluation (pharmacologic stress test, 2018) - normal study  - outpatient diuretic/GDMT regimen - Toprol-XL; not on diuretics at baseline  - weight on admission 259 lb via bed scale - 251 lb this morning; last outpatient weight at 233 lb (1/17/2024)  - certainly volume overloaded on PE as evidenced by lower extremity edema, abdominal distention, oxygen requirements, + coarse  breath sounds  - continue Lasix 40 mg IV twice daily for volume overload + Lopressor for GDMT  - daily weights, strict I/Os, salt/fluid restriction    4. Acute respiratory failure with hypoxia  - likely secondary to #3  - currently on 2 L NC saturating 93% and above - wean as tolerable for SpO2 goal > 90%  - does not wear O2 at baseline    5. Dyslipidemia  - lipid panel (10/31/2023) - TC 94/triglycerides 84/HDL 37/LDL 40  - maintained on atorvastatin 20 mg daily outpatient; continue    6. PHUONG on CKD stage 3  - creatinine on arrival 1.76 -1.45 today s/p Lasix 40 mg IV x 1 in ED  - likely secondary to hypervolemia  - baseline 0.9-1.1  - continue to monitor with daily BMPs    7. History of bifascicular block/SVT  8. PAD s/p femoral popliteal bypass surgery  9. Tobacco abuse    History of Present Illness   Physician Requesting Consult: Sylvester Porras*    Reason for Consult / Principal Problem: Atrial fibrillation with RVR    HPI: [place] III is a 73 y.o. male with past medical history of tobacco abuse, PAD s/p bypass surgery, history of bifascicular block and SVT, dyslipidemia, ischemic cardiomyopathy, and paroxysmal atrial fibrillation on Xarelto who presents to the ED yesterday with complaints of shortness of breath and palpitations.  Upon arrival to the ED, BNP was elevated at 653 and chest x-ray demonstrated mild pulmonary edema suggestive for decompensated heart failure for which he received Lasix 40 mg IV x 1 and started on twice daily dosing per primary team.  Presenting ECG demonstrated atrial fibrillation with RVR for which the patient received 30 mg of IV Cardizem, 5 mg of IV Lopressor, and amiodarone bolus, and subsequently started on an amio drip.  Cardiology was consulted for further recommendations and management in the setting of the patient's atrial fibrillation and decompensated heart failure.    Upon examination, patient reports onset of symptoms approximately 2 months ago.  Notes  that he was recently started on Skyrizi, at which point he developed shortness of breath which he initially contributed to a viral illness.  Because of this, patient reports drinking more than usual in an attempt to stay hydrated. He continued to be short of breath, aggravated with activity, and developed intermittent palpitations.  In addition, patient reports weight gain, abdominal bloating, and lower extremity edema.  He denies any chest pain, lightheadedness, dizziness, syncope, weakness, fatigue, fever, or chills.  Patient does not necessarily complain of orthopnea, but does report wheezing when laying on his right side.  He denies any drug or alcohol abuse.  He is still an everyday smoker, smoking about a quarter of a pack per day.  Patient denies any excessive caffeine intake or stimulant use.  He denies a diagnosis of sleep apnea as he has never had a sleep study outpatient.  Patient reports compliance with anticoagulation. He follows with Dr. Almanza outpatient.    Inpatient consult to Cardiology  Consult performed by: LENORA Davila  Consult ordered by: LENORA Noe    EKG: atrial fibrillation with RVR, bifascicular block    Review of Systems   Constitutional:  Negative for chills, diaphoresis, fatigue and fever.   HENT: Negative.     Eyes: Negative.    Respiratory:  Positive for shortness of breath.    Cardiovascular:  Positive for palpitations and leg swelling. Negative for chest pain.   Gastrointestinal:  Positive for abdominal distention. Negative for abdominal pain, nausea and vomiting.   Endocrine: Negative.    Genitourinary: Negative.    Musculoskeletal: Negative.    Allergic/Immunologic: Negative.    Neurological:  Negative for dizziness, syncope, weakness and light-headedness.   Hematological: Negative.    Psychiatric/Behavioral: Negative.       Historical Information   Past Medical History:   Diagnosis Date    Harris esophagus     Blue toe syndrome (HCC)     Chronic kidney disease      Colon polyps     Crohn's disease (HCC) 1-2020    GERD (gastroesophageal reflux disease)     Hematuria     History of rheumatic fever     Hypolipidemia     Hypotension     Ischemic cardiomyopathy     PAD (peripheral artery disease) (HCC)     Pulmonary emphysema (HCC)     PVD (peripheral vascular disease) (HCC)      Past Surgical History:   Procedure Laterality Date    CARDIAC ELECTROPHYSIOLOGY PROCEDURE N/A 1/10/2024    Procedure: Cardiac loop recorder explant;  Surgeon: Jason Cortez MD;  Location: [facility];  Service: Cardiology    COLONOSCOPY  2019    HERNIA REPAIR      IR AORTAGRAM WITH RUN-OFF  6/27/2019    IR AORTAGRAM WITH RUN-OFF  2/12/2020    POPLITEAL ARTERY STENT Right     6/2019    MN BYPASS W/VEIN FEMORAL-POPLITEAL Right 3/26/2020    Procedure: BYPASS FEMORAL-POPLITEAL; Right lower extremity bypass, fem-bk pop with GSV;  Surgeon: Wyatt Shell MD;  Location: [facility];  Service: Vascular    MN SLCTV CATHJ 3RD+ ORD SLCTV ABDL PEL/LXTR BRNCH Right 6/27/2019    Procedure: leg ANGIOGRAM WITH STENT, BALLOON ANGIOPLASTY, LEFT GROIN ACCESS;  Surgeon: Wyatt Shell MD;  Location: [facility];  Service: Vascular     Social History     Substance and Sexual Activity   Alcohol Use Yes    Alcohol/week: 1.0 standard drink of alcohol    Types: 1 Standard drinks or equivalent per week    Comment: social drinker when out dining     Social History     Substance and Sexual Activity   Drug Use Never     Social History     Tobacco Use   Smoking Status Some Days    Current packs/day: 0.25    Average packs/day: 0.3 packs/day for 30.0 years (7.5 ttl pk-yrs)    Types: Cigarettes   Smokeless Tobacco Never     Family History:   Family History   Problem Relation Age of Onset    Heart disease Mother     Hyperlipidemia Mother     Hypertension Mother     Hypertension Father     Heart disease Father     Stroke Father     Hyperlipidemia Father     Diabetes Brother     No Known Problems Maternal Grandmother     Dementia  Neg Hx     Drug abuse Neg Hx     Mental illness Neg Hx     Substance Abuse Neg Hx     Alcohol abuse Neg Hx     Depression Neg Hx     Colon cancer Neg Hx      Meds/Allergies   all current active meds have been reviewed  No Known Allergies    Objective   Vitals: Blood pressure 136/100, pulse (!) 161, temperature 97.6 °F (36.4 °C), temperature source Oral, resp. rate 19, weight 114 kg (251 lb 12.3 oz), SpO2 97%., Body mass index is 36.12 kg/m².,   Orthostatic Blood Pressures      Flowsheet Row Most Recent Value   Blood Pressure 136/100 filed at 2024 0712   Patient Position - Orthostatic VS Sitting filed at 2024 0207          Systolic (24hrs), Av , Min:109 , Max:136     Diastolic (24hrs), Av, Min:76, Max:100      Intake/Output Summary (Last 24 hours) at 2024 0827  Last data filed at 2024 0432  Gross per 24 hour   Intake 123.83 ml   Output 1425 ml   Net -1301.17 ml     Invasive Devices       Peripheral Intravenous Line  Duration             Peripheral IV 24 Dorsal (posterior);Right Forearm <1 day    Peripheral IV 24 Left Antecubital <1 day                    Physical Exam  Constitutional:       General: He is not in acute distress.     Appearance: He is obese.   HENT:      Head: Normocephalic.      Nose: Nose normal.      Mouth/Throat:      Pharynx: Oropharynx is clear.   Eyes:      Pupils: Pupils are equal, round, and reactive to light.   Cardiovascular:      Rate and Rhythm: Tachycardia present. Rhythm irregular.      Pulses: Normal pulses.      Heart sounds: No murmur heard.  Pulmonary:      Effort: Pulmonary effort is normal. No respiratory distress.      Breath sounds: Rhonchi present.      Comments: On 2 L NC  Abdominal:      General: There is distension.      Tenderness: There is no abdominal tenderness.   Musculoskeletal:         General: Normal range of motion.      Cervical back: Normal range of motion.      Right lower leg: Edema present.      Left lower leg: Edema  present.      Comments: +2 bilateral lower extremity edema   Skin:     General: Skin is warm and dry.      Capillary Refill: Capillary refill takes less than 2 seconds.   Neurological:      General: No focal deficit present.      Mental Status: He is alert and oriented to person, place, and time.   Psychiatric:         Mood and Affect: Mood normal.         Behavior: Behavior normal.     Lab Results:   Troponins:     CBC with diff:   Results from last 7 days   Lab Units 02/13/24  0429 02/12/24  1524   WBC Thousand/uL 10.21* 8.42   HEMOGLOBIN g/dL 14.7 15.2   HEMATOCRIT % 47.6 49.6*   MCV fL 88 88   PLATELETS Thousands/uL 307 311   RBC Million/uL 5.42 5.62   MCH pg 27.1 27.0   MCHC g/dL 30.9* 30.6*   RDW % 18.6* 19.2*   MPV fL 11.1 11.1   NRBC AUTO /100 WBCs 0 0     CMP:   Results from last 7 days   Lab Units 02/13/24  0429 02/12/24  1524   POTASSIUM mmol/L 3.7 4.6   CHLORIDE mmol/L 109* 109*   CO2 mmol/L 22 25   BUN mg/dL 28* 34*   CREATININE mg/dL 1.45* 1.76*   CALCIUM mg/dL 8.2* 8.6   AST U/L 39 38   ALT U/L 32 34   ALK PHOS U/L 306* 302*   EGFR ml/min/1.73sq m 47 37

## 2024-02-13 NOTE — ASSESSMENT & PLAN NOTE
Creatinine 1.76 on admission. Baseline closer to 1.   Suspected due to fluid overload  Monitor with diuresis  Avoid hypotension, nephrotoxins

## 2024-02-14 ENCOUNTER — DOCUMENTATION (OUTPATIENT)
Dept: NEPHROLOGY | Facility: CLINIC | Age: 74
End: 2024-02-14

## 2024-02-14 ENCOUNTER — TELEPHONE (OUTPATIENT)
Age: 74
End: 2024-02-14

## 2024-02-14 PROBLEM — E87.8 ELECTROLYTE ABNORMALITY: Status: ACTIVE | Noted: 2024-02-14

## 2024-02-14 PROBLEM — I50.9 ACUTE ON CHRONIC HEART FAILURE (HCC): Status: ACTIVE | Noted: 2024-02-14

## 2024-02-14 LAB
ALBUMIN SERPL BCP-MCNC: 3.2 G/DL (ref 3.5–5)
ALP SERPL-CCNC: 270 U/L (ref 34–104)
ALT SERPL W P-5'-P-CCNC: 29 U/L (ref 7–52)
ANION GAP SERPL CALCULATED.3IONS-SCNC: 5 MMOL/L
AST SERPL W P-5'-P-CCNC: 37 U/L (ref 13–39)
BILIRUB DIRECT SERPL-MCNC: 0.8 MG/DL (ref 0–0.2)
BILIRUB SERPL-MCNC: 1.98 MG/DL (ref 0.2–1)
BUN SERPL-MCNC: 27 MG/DL (ref 5–25)
CALCIUM SERPL-MCNC: 7.8 MG/DL (ref 8.4–10.2)
CHLORIDE SERPL-SCNC: 107 MMOL/L (ref 96–108)
CO2 SERPL-SCNC: 27 MMOL/L (ref 21–32)
CREAT SERPL-MCNC: 1.55 MG/DL (ref 0.6–1.3)
ERYTHROCYTE [DISTWIDTH] IN BLOOD BY AUTOMATED COUNT: 18.4 % (ref 11.6–15.1)
GFR SERPL CREATININE-BSD FRML MDRD: 44 ML/MIN/1.73SQ M
GLUCOSE SERPL-MCNC: 100 MG/DL (ref 65–140)
GLUCOSE SERPL-MCNC: 104 MG/DL (ref 65–140)
GLUCOSE SERPL-MCNC: 112 MG/DL (ref 65–140)
GLUCOSE SERPL-MCNC: 38 MG/DL (ref 65–140)
GLUCOSE SERPL-MCNC: 41 MG/DL (ref 65–140)
GLUCOSE SERPL-MCNC: 53 MG/DL (ref 65–140)
GLUCOSE SERPL-MCNC: 55 MG/DL (ref 65–140)
GLUCOSE SERPL-MCNC: 67 MG/DL (ref 65–140)
GLUCOSE SERPL-MCNC: 77 MG/DL (ref 65–140)
GLUCOSE SERPL-MCNC: 89 MG/DL (ref 65–140)
GLUCOSE SERPL-MCNC: 92 MG/DL (ref 65–140)
HCT VFR BLD AUTO: 46.5 % (ref 36.5–49.3)
HGB BLD-MCNC: 14.6 G/DL (ref 12–17)
MAGNESIUM SERPL-MCNC: 2 MG/DL (ref 1.9–2.7)
MCH RBC QN AUTO: 27.5 PG (ref 26.8–34.3)
MCHC RBC AUTO-ENTMCNC: 31.4 G/DL (ref 31.4–37.4)
MCV RBC AUTO: 88 FL (ref 82–98)
PLATELET # BLD AUTO: 268 THOUSANDS/UL (ref 149–390)
PMV BLD AUTO: 10.6 FL (ref 8.9–12.7)
POTASSIUM SERPL-SCNC: 3.3 MMOL/L (ref 3.5–5.3)
PROT SERPL-MCNC: 6.1 G/DL (ref 6.4–8.4)
RBC # BLD AUTO: 5.31 MILLION/UL (ref 3.88–5.62)
SODIUM SERPL-SCNC: 139 MMOL/L (ref 135–147)
WBC # BLD AUTO: 9.24 THOUSAND/UL (ref 4.31–10.16)

## 2024-02-14 PROCEDURE — 85027 COMPLETE CBC AUTOMATED: CPT

## 2024-02-14 PROCEDURE — 80076 HEPATIC FUNCTION PANEL: CPT | Performed by: INTERNAL MEDICINE

## 2024-02-14 PROCEDURE — 82948 REAGENT STRIP/BLOOD GLUCOSE: CPT

## 2024-02-14 PROCEDURE — 99232 SBSQ HOSP IP/OBS MODERATE 35: CPT | Performed by: INTERNAL MEDICINE

## 2024-02-14 PROCEDURE — 80048 BASIC METABOLIC PNL TOTAL CA: CPT

## 2024-02-14 PROCEDURE — 99223 1ST HOSP IP/OBS HIGH 75: CPT | Performed by: INTERNAL MEDICINE

## 2024-02-14 PROCEDURE — 83735 ASSAY OF MAGNESIUM: CPT | Performed by: NURSE PRACTITIONER

## 2024-02-14 RX ORDER — DIGOXIN 125 MCG
125 TABLET ORAL DAILY
Status: DISCONTINUED | OUTPATIENT
Start: 2024-02-14 | End: 2024-02-19

## 2024-02-14 RX ORDER — POTASSIUM CHLORIDE 20 MEQ/1
40 TABLET, EXTENDED RELEASE ORAL ONCE
Status: COMPLETED | OUTPATIENT
Start: 2024-02-14 | End: 2024-02-14

## 2024-02-14 RX ORDER — METOPROLOL TARTRATE 50 MG/1
50 TABLET, FILM COATED ORAL EVERY 6 HOURS
Status: DISCONTINUED | OUTPATIENT
Start: 2024-02-14 | End: 2024-02-20 | Stop reason: HOSPADM

## 2024-02-14 RX ADMIN — FOLIC ACID 1 MG: 1 TABLET ORAL at 07:37

## 2024-02-14 RX ADMIN — ASPIRIN 81 MG: 81 TABLET ORAL at 07:37

## 2024-02-14 RX ADMIN — CYANOCOBALAMIN TAB 500 MCG 1000 MCG: 500 TAB at 07:40

## 2024-02-14 RX ADMIN — FUROSEMIDE 40 MG: 10 INJECTION, SOLUTION INTRAMUSCULAR; INTRAVENOUS at 07:33

## 2024-02-14 RX ADMIN — DIGOXIN 250 MCG: 0.25 INJECTION INTRAMUSCULAR; INTRAVENOUS at 02:33

## 2024-02-14 RX ADMIN — METOPROLOL TARTRATE 25 MG: 25 TABLET, FILM COATED ORAL at 02:33

## 2024-02-14 RX ADMIN — ATORVASTATIN CALCIUM 20 MG: 20 TABLET, FILM COATED ORAL at 07:37

## 2024-02-14 RX ADMIN — DIGOXIN 250 MCG: 0.25 INJECTION INTRAMUSCULAR; INTRAVENOUS at 08:21

## 2024-02-14 RX ADMIN — METOPROLOL TARTRATE 50 MG: 50 TABLET, FILM COATED ORAL at 09:08

## 2024-02-14 RX ADMIN — Medication 400 MG: at 07:37

## 2024-02-14 RX ADMIN — DIGOXIN 125 MCG: 125 TABLET ORAL at 09:11

## 2024-02-14 RX ADMIN — FUROSEMIDE 40 MG: 10 INJECTION, SOLUTION INTRAMUSCULAR; INTRAVENOUS at 17:27

## 2024-02-14 RX ADMIN — METOPROLOL TARTRATE 50 MG: 50 TABLET, FILM COATED ORAL at 16:47

## 2024-02-14 RX ADMIN — POTASSIUM CHLORIDE 40 MEQ: 1500 TABLET, EXTENDED RELEASE ORAL at 08:24

## 2024-02-14 RX ADMIN — PANTOPRAZOLE SODIUM 40 MG: 40 TABLET, DELAYED RELEASE ORAL at 04:43

## 2024-02-14 RX ADMIN — RIVAROXABAN 20 MG: 20 TABLET, FILM COATED ORAL at 07:37

## 2024-02-14 RX ADMIN — METOPROLOL TARTRATE 50 MG: 50 TABLET, FILM COATED ORAL at 21:11

## 2024-02-14 NOTE — TELEPHONE ENCOUNTER
Patient wanted the doctor to know he is in the hospital and was unable to get his labs done today as promised.

## 2024-02-14 NOTE — PROGRESS NOTES
Home BP readings:  AM: 102/70 Mendez orthostatic changes  PM: 102/78, no orthostatic changes  Heart rate: 70-90 range    Recommendations:  1.  Patient only on Toprol-XL for atrial fibrillation/cardiac purposes no changes

## 2024-02-14 NOTE — PROGRESS NOTES
LM for patient about the following, asked him to call back if he has any questions:    Recommendations:  1.  Patient only on Toprol-XL for atrial fibrillation/cardiac purposes no changes

## 2024-02-14 NOTE — ASSESSMENT & PLAN NOTE
Wt Readings from Last 3 Encounters:   02/16/24 104 kg (229 lb 0.9 oz)   01/17/24 106 kg (233 lb)   01/10/24 100 kg (221 lb)     Echo EF 20%. Systolic function is severely reduced. Severe global hypokinesis with regional variation. RV dilated. Mild MV and TV regurgitation.  Weight 1 month back was 233 lb. On  lbs. Weight trending down in response to lasix.  BNP: 653  Diuresis: lasix 40 mg IV BID.   Beta-blocker: Lopressor in setting of Afib rvr.  Monitor intake and output, daily weights.  Diet: Fluid restriction and 2 g Sodium.  Cardiology on board.

## 2024-02-14 NOTE — ASSESSMENT & PLAN NOTE
Hx PAF since 2019.  Presented with worsening exertional dyspnea, weight gain.  Rates 200s on arrival.    Monitor and replete electrolytes as needed.  Rate/Rhythm Control: no improvement with cardizem drip.  Now on amiodarone drip  Continue home dose metoprolol   [id number]   Antiplatelet/Anticoagulation: xarelto  Troponin: 21>20>15       Plan:  Rate control: Lopressor to 50 mg q6 and digoxin 125 mcg OD.  Rhythm control: Switch from amio gtt. to p.o. amiodarone 200 mg TID, loading dose.   Anticoagulation: Xarelto 20 mg daily  Monitor on telemetry.  Cardiology consulted.  Avoid diltiazem due to EF 20%.  Possible cardioversion on Monday or Tuesday depending on patient's volume status.  Continue diuresing.  Outpatient sleep study recommended for possible ANUSHA.

## 2024-02-14 NOTE — PROGRESS NOTES
[facility]  Progress Note  Name: [place] Humphrey III I  MRN: [id number]  Unit/Bed#: S -01 I Date of Admission: 2/12/2024   Date of Service: 2/14/2024 I Hospital Day: 2    Assessment/Plan   Atrial fibrillation with RVR (HCC)  Assessment & Plan  Hx PAF since 2019.  Presented with worsening exertional dyspnea, weight gain.  Rates 200s on arrival.    Monitor and replete electrolytes as needed.  Rate/Rhythm Control: no improvement with cardizem drip.  Now on amiodarone drip  Continue home dose metoprolol   [id number]   Antiplatelet/Anticoagulation: xarelto  Troponin: 21>20>15       Plan:  Rate control: Increased Lopressor to 50 mg q6 and amio gtt.   In last 24 hours received digoxin 250 mcg x4 for better rate control.  Added po Digoxin 125 mcg daily since heart rates were in 170s.  Monitor on telemetry.  Cardiology consulted.  Avoid diltiazem due to EF 20%.  Hold off of on cardioversion in the setting of volume overload.  Outpatient sleep study recommended for possible ANUSHA.    Abnormal LFTs  Assessment & Plan  Elevated t.bili and lak phos, without any jaundice.  No history of liver disease.  Right upper quadrant ultrasound.    Immunocompromised patient ([facility])  Assessment & Plan  On Skyrizi for crohn's disease.  Received 2 injections.  Next due this week but is being held per GI    Type 2 diabetes mellitus with diabetic peripheral angiopathy without gangrene ([facility])  Assessment & Plan  Lab Results   Component Value Date    HGBA1C 8.2 (H) 01/31/2024     Lab Results   Component Value Date    GLUC 44 (L) 02/13/2024    GLUC 143 (H) 02/12/2024      Resume glimepiride on dc  Accucheck, SSI while inpatient   Patient is taking oral intake but hypoglcemic. Goal glucose above 60 due to increase in beta blocker can mask hypoglycemic symptoms. 1 amp dextrose if glucose <60.    Ischemic cardiomyopathy  Assessment & Plan  Echo EF 20%. Systolic function is severely reduced. Severe global  hypokinesis with regional variation. RV dilated. Mild MV and TV regurgitation.  BNP: 653  Beta-blocker: PTA toprol XL 50 mg AM, 25 mg HS. Switched to Lopressor in setting of Afib rvr.    CKD (chronic kidney disease), stage III ([facility])  Assessment & Plan  Creatinine 1.76 on admission. Baseline 1.25-1.4.  follows with nephrology as op  Avoid hypotension, nephrotoxins    Class 1 obesity due to excess calories with serious comorbidity and body mass index (BMI) of 33.0 to 33.9 in adult  Assessment & Plan  BMI 37.2  Outpatient follow up with PCP    * Acute on chronic heart failure (HCC)  Assessment & Plan  Wt Readings from Last 3 Encounters:   02/14/24 112 kg (246 lb 9.6 oz)   01/17/24 106 kg (233 lb)   01/10/24 100 kg (221 lb)     Echo EF 20%. Systolic function is severely reduced. Severe global hypokinesis with regional variation. RV dilated. Mild MV and TV regurgitation.  Weight 1 month back was 233 lb. On  lbs.  BNP: 653  Diuresis: lasix 40 mg IV BID. Goal weight 233 lbs  Beta-blocker: Lopressor in setting of Afib rvr.  Monitor intake and output, daily weights.  Diet: Fluid restriction and 2 g Sodium.  Cardiology on board.             VTE Pharmacologic Prophylaxis: VTE Score: 3 Moderate Risk (Score 3-4) - Pharmacological DVT Prophylaxis Ordered: rivaroxaban (Xarelto).    Mobility:   Basic Mobility Inpatient Raw Score: 22  JH-HLM Goal: 7: Walk 25 feet or more  JH-HLM Achieved: 7: Walk 25 feet or more  HLM Goal achieved. Continue to encourage appropriate mobility.    Patient Centered Rounds: I performed bedside rounds with nursing staff today.  Discussions with Specialists or Other Care Team Provider: Cardiology    Education and Discussions with Family / Patient: Patient declined call to .     Current Length of Stay: 2 day(s)  Current Patient Status: Inpatient   Discharge Plan: Anticipate discharge in 24-48 hrs to home.    Code Status: Level 1 - Full Code    Subjective:   Patient evaluated at bedside.  He denies any acute complaints. Denies any shortness of breath. Currently on 2 L of oxygen but does not have any home oxygen requirements.    Objective:     Vitals:   Temp (24hrs), Av.2 °F (36.8 °C), Min:98 °F (36.7 °C), Max:98.4 °F (36.9 °C)    Temp:  [98 °F (36.7 °C)-98.4 °F (36.9 °C)] 98.4 °F (36.9 °C)  HR:  [110-151] 151  Resp:  [18-19] 18  BP: (105-136)/() 121/86  SpO2:  [93 %-96 %] 93 %  Body mass index is 35.38 kg/m².     Input and Output Summary (last 24 hours):     Intake/Output Summary (Last 24 hours) at 2024 0917  Last data filed at 2024 0910  Gross per 24 hour   Intake 240 ml   Output 3275 ml   Net -3035 ml       Physical Exam:   Physical Exam  Vitals and nursing note reviewed.   Constitutional:       General: He is not in acute distress.     Appearance: He is well-developed.   HENT:      Head: Normocephalic and atraumatic.   Eyes:      Conjunctiva/sclera: Conjunctivae normal.   Cardiovascular:      Rate and Rhythm: Normal rate and regular rhythm.      Heart sounds: No murmur heard.  Pulmonary:      Effort: Pulmonary effort is normal. No respiratory distress.      Breath sounds: Wheezing present.   Abdominal:      General: There is no distension.      Palpations: Abdomen is soft.      Tenderness: There is no abdominal tenderness.   Musculoskeletal:         General: Swelling present.      Cervical back: Neck supple.      Right lower leg: Edema (2+) present.      Left lower leg: Edema (2+) present.   Skin:     General: Skin is warm and dry.      Capillary Refill: Capillary refill takes less than 2 seconds.   Neurological:      Mental Status: He is alert and oriented to person, place, and time.   Psychiatric:         Mood and Affect: Mood normal.          Additional Data:     Labs:  Results from last 7 days   Lab Units 24  0437 24  0429   WBC Thousand/uL 9.24 10.21*   HEMOGLOBIN g/dL 14.6 14.7   HEMATOCRIT % 46.5 47.6   PLATELETS Thousands/uL 268 307   NEUTROS PCT %  --  66    LYMPHS PCT %  --  22   MONOS PCT %  --  10   EOS PCT %  --  1     Results from last 7 days   Lab Units 02/14/24  0437 02/13/24  0429   SODIUM mmol/L 139 141   POTASSIUM mmol/L 3.3* 3.7   CHLORIDE mmol/L 107 109*   CO2 mmol/L 27 22   BUN mg/dL 27* 28*   CREATININE mg/dL 1.55* 1.45*   ANION GAP mmol/L 5 10   CALCIUM mg/dL 7.8* 8.2*   ALBUMIN g/dL  --  3.3*   TOTAL BILIRUBIN mg/dL  --  2.28*   ALK PHOS U/L  --  306*   ALT U/L  --  32   AST U/L  --  39   GLUCOSE RANDOM mg/dL 38* 44*         Results from last 7 days   Lab Units 02/14/24  0857 02/14/24  0635 02/14/24  0527 02/14/24  0502 02/14/24  0442 02/13/24  2139 02/13/24  2002 02/13/24  1432 02/13/24  1143 02/13/24  0848 02/13/24  0604   POC GLUCOSE mg/dl 89 104 112 67 41* 133 58* 67 66 55* 86               Lines/Drains:  Invasive Devices       Peripheral Intravenous Line  Duration             Peripheral IV 02/12/24 Dorsal (posterior);Right Forearm 1 day                      Telemetry:  Telemetry Orders (From admission, onward)               24 Hour Telemetry Monitoring  Continuous x 24 Hours (Telem)        Question:  Reason for 24 Hour Telemetry  Answer:  Arrhythmias requiring acute medical intervention / PPM or ICD malfunction                     Telemetry Reviewed: Atrial fibrillation. HR averaging 160  Indication for Continued Telemetry Use: Arrthymias requiring medical therapy             Imaging: No pertinent imaging reviewed.    Recent Cultures (last 7 days):         Last 24 Hours Medication List:   Current Facility-Administered Medications   Medication Dose Route Frequency Provider Last Rate    albuterol  2 puff Inhalation Q6H PRN LENORA Noe      amiodarone (CORDARONE) 900 mg in dextrose 5 % 500 mL infusion  0.5 mg/min Intravenous Continuous Monse Garcia MD 0.5 mg/min (02/13/24 2141)    aspirin  81 mg Oral Daily LENORA Noe      atorvastatin  20 mg Oral Daily LENORA Noe      vitamin B-12  1,000 mcg Oral Once per day on Monday Wednesday  Friday LENORA Noe      digoxin  125 mcg Oral Daily Moisés Estrella DO      fluticasone  1 spray Nasal Daily LENORA Noe      folic acid  1 mg Oral Daily LENORA Noe      furosemide  40 mg Intravenous BID LENORA Noe      insulin lispro  1-5 Units Subcutaneous HS Hanane Platt MD      insulin lispro  2-12 Units Subcutaneous TID AC Hanane Platt MD      magnesium Oxide  400 mg Oral Daily LENORA Noe      metoprolol tartrate  50 mg Oral Q6H Moisés Estrella DO      nicotine  1 patch Transdermal Daily LENORA Noe      pantoprazole  40 mg Oral Early Morning LENORA Noe      rivaroxaban  20 mg Oral Daily With Breakfast LENORA Noe          Today, Patient Was Seen By: Katherine May MD    **Please Note: This note may have been constructed using a voice recognition system.**

## 2024-02-14 NOTE — PROGRESS NOTES
Cardiology Progress Note - Team 1  [place] Humphrey III 73 y.o. male MRN: [id number]  Unit/Bed#: S -01 Encounter: [id number]      Assessment/Plan:  1. Paroxysmal atrial fibrillation with RVR  - s/p loop w/ explantation (1/10/2024) - initially implanted secondary to PAD with concern for embolic etiology  - presenting ECG - atrial fibrillation with RVR, bifascicular block  - telemetry review - atrial flutter/fib, -150s  - TSH 1.97  - [id number] - chronically anticoagulated on Xarelto 20 mg daily for stroke prevention; continue  - s/p 250 mcg digoxin IV x 4  - lopressor increased to 50 mg every 6 hours + started on oral digoxin 125 mcg daily assist with rate control  - continue amio gtt for rhythm management  - hopeful improvement heart rate/rhythm with continued IV diuresis  - can consider future cardioversion if necessary to restore NSR once optimized from a volume standpoint  - recommend outpatient sleep study to rule out ANUSHA given patient's body habitus  - continue to monitor on telemetry     2. CM with LVEF 20%  3. Acute HFpEF  - likely in the setting of #1  - documented as likely ischemic cardiomyopathy, but no invasive ischemic evaluation in the past (normal stress test, 2018)  - echo (2/13/2024) - LVEF 20%, mild concentric hypertrophy, severe global hypokinesis, mildly dilated RV, mild MR/TR  - outpatient diuretic/GDMT regimen - Toprol-XL; not on diuretics at baseline  - weight on admission 259 lb via bed scale - 246 lb this morning via standing scale; last outpatient weight at 233 lb (1/17/2024)  - 2.7 L UO / 24 hours - no intake recorded  - remains volume overloaded on PE - continue Lasix 40 mg IV BID  - continue lopressor for GDMT - consider ACE/ARB/ARNI for further optimization; will defer to heart failure team  - possible LifeVest at discharge  - daily weights, strict I/Os, salt/fluid restriction     4. Acute respiratory failure with hypoxia  - likely secondary to #3  - remains on 2 L NC  "saturating 93% and above - wean as tolerable for SpO2 goal > 90%  - does not wear O2 at baseline     5. Dyslipidemia  - lipid panel (10/31/2023) - TC 94/triglycerides 84/HDL 37/LDL 40  - maintained on atorvastatin 20 mg daily outpatient; continue     6. PHUONG on CKD stage 3  - creatinine on arrival 1.76 -1.55   - likely secondary to hypervolemia  - baseline 0.9-1.1  - continue to monitor with daily BMPs     7. History of bifascicular block/SVT  8. PAD s/p femoral popliteal bypass surgery  9. Tobacco abuse  10. Type 2 DM    Subjective:   Patient seen and examined.  No significant events overnight.  Patient is still experiencing intermittent palpitations.  Denies any worsening shortness of breath. No CP.    Objective:   Vitals: Blood pressure 121/86, pulse (!) 151, temperature 98.4 °F (36.9 °C), temperature source Oral, resp. rate 18, height 5' 10\" (1.778 m), weight 112 kg (246 lb 9.6 oz), SpO2 93%., Body mass index is 35.38 kg/m².,   Orthostatic Blood Pressures      Flowsheet Row Most Recent Value   Blood Pressure 121/86 filed at 02/14/2024 0910   Patient Position - Orthostatic VS Sitting filed at 02/14/2024 0910            Intake/Output Summary (Last 24 hours) at 2/14/2024 0913  Last data filed at 2/14/2024 0910  Gross per 24 hour   Intake 240 ml   Output 3275 ml   Net -3035 ml     Physical Exam  Constitutional:       Appearance: He is obese.   HENT:      Head: Normocephalic and atraumatic.      Nose: Nose normal.      Mouth/Throat:      Pharynx: Oropharynx is clear.   Eyes:      Pupils: Pupils are equal, round, and reactive to light.   Cardiovascular:      Rate and Rhythm: Tachycardia present. Rhythm irregular.      Pulses: Normal pulses.      Heart sounds: Normal heart sounds.   Pulmonary:      Effort: Pulmonary effort is normal. No respiratory distress.      Comments: Coarse breath sounds bilateral bases; on 2 L NC  Abdominal:      General: Bowel sounds are normal. There is no distension.      Palpations: Abdomen " is soft.   Musculoskeletal:         General: Normal range of motion.      Cervical back: Normal range of motion.      Right lower leg: Edema present.      Left lower leg: Edema present.      Comments: +2 B/L LE edema   Skin:     General: Skin is warm and dry.      Capillary Refill: Capillary refill takes less than 2 seconds.   Neurological:      General: No focal deficit present.      Mental Status: He is alert and oriented to person, place, and time.   Psychiatric:         Mood and Affect: Mood normal.         Behavior: Behavior normal.     Medications:    Current Facility-Administered Medications:     albuterol (PROVENTIL HFA,VENTOLIN HFA) inhaler 2 puff, 2 puff, Inhalation, Q6H PRN, LENORA Noe    [] amiodarone (CORDARONE) 900 mg in dextrose 5 % 500 mL infusion, 1 mg/min, Intravenous, Continuous, Stopped at 24 0153 **FOLLOWED BY** amiodarone (CORDARONE) 900 mg in dextrose 5 % 500 mL infusion, 0.5 mg/min, Intravenous, Continuous, Monse Garcia MD, Last Rate: 16.7 mL/hr at 24, 0.5 mg/min at 24    aspirin (ECOTRIN LOW STRENGTH) EC tablet 81 mg, 81 mg, Oral, Daily, LENORA Noe, 81 mg at 24 0737    atorvastatin (LIPITOR) tablet 20 mg, 20 mg, Oral, Daily, LENORA Noe, 20 mg at 24 0737    cyanocobalamin (VITAMIN B-12) tablet 1,000 mcg, 1,000 mcg, Oral, Once per day on , LENORA Noe, 1,000 mcg at 24 0740    digoxin (LANOXIN) tablet 125 mcg, 125 mcg, Oral, Daily, Moisés Estrella DO, 125 mcg at 24 0911    fluticasone (FLONASE) 50 mcg/act nasal spray 1 spray, 1 spray, Nasal, Daily, LENORA Noe, 1 spray at 24 0906    folic acid (FOLVITE) tablet 1 mg, 1 mg, Oral, Daily, LENORA Noe, 1 mg at 24 0737    furosemide (LASIX) injection 40 mg, 40 mg, Intravenous, BID, LENORA Noe, 40 mg at 24 0733    insulin lispro (HumaLOG) 100 units/mL subcutaneous injection 1-5 Units, 1-5 Units, Subcutaneous, HS,  Hanane Platt MD    insulin lispro (HumaLOG) 100 units/mL subcutaneous injection 2-12 Units, 2-12 Units, Subcutaneous, TID AC **AND** Fingerstick Glucose (POCT), , , TID AC, Hanane Platt MD    magnesium Oxide (MAG-OX) tablet 400 mg, 400 mg, Oral, Daily, LENORA Noe, 400 mg at 02/14/24 0737    metoprolol tartrate (LOPRESSOR) tablet 50 mg, 50 mg, Oral, Q6H, Moisés Estrella DO, 50 mg at 02/14/24 0908    nicotine (NICODERM CQ) 7 mg/24hr TD 24 hr patch 1 patch, 1 patch, Transdermal, Daily, LENORA Noe    pantoprazole (PROTONIX) EC tablet 40 mg, 40 mg, Oral, Early Morning, LENORA Noe, 40 mg at 02/14/24 0443    rivaroxaban (XARELTO) tablet 20 mg, 20 mg, Oral, Daily With Breakfast, LENORA Noe, 20 mg at 02/14/24 0737     Labs & Results:      Results from last 7 days   Lab Units 02/14/24 0437 02/13/24  0429 02/12/24  1524   WBC Thousand/uL 9.24 10.21* 8.42   HEMOGLOBIN g/dL 14.6 14.7 15.2   HEMATOCRIT % 46.5 47.6 49.6*   PLATELETS Thousands/uL 268 307 311         Results from last 7 days   Lab Units 02/14/24  0437 02/13/24  0429 02/12/24  1524   POTASSIUM mmol/L 3.3* 3.7 4.6   CHLORIDE mmol/L 107 109* 109*   CO2 mmol/L 27 22 25   BUN mg/dL 27* 28* 34*   CREATININE mg/dL 1.55* 1.45* 1.76*   CALCIUM mg/dL 7.8* 8.2* 8.6   ALK PHOS U/L  --  306* 302*   ALT U/L  --  32 34   AST U/L  --  39 38         Results from last 7 days   Lab Units 02/14/24 0437 02/13/24  0429 02/12/24  1524   MAGNESIUM mg/dL 2.0 2.2 2.1

## 2024-02-14 NOTE — PLAN OF CARE
Problem: CARDIOVASCULAR - ADULT  Goal: Maintains optimal cardiac output and hemodynamic stability  Description: INTERVENTIONS:  - Monitor I/O, vital signs and rhythm  - Monitor for S/S and trends of decreased cardiac output  - Administer and titrate ordered vasoactive medications to optimize hemodynamic stability  - Assess quality of pulses, skin color and temperature  - Assess for signs of decreased coronary artery perfusion  - Instruct patient to report change in severity of symptoms  Outcome: Progressing     Problem: CARDIOVASCULAR - ADULT  Goal: Absence of cardiac dysrhythmias or at baseline rhythm  Description: INTERVENTIONS:  - Continuous cardiac monitoring, vital signs, obtain 12 lead EKG if ordered  - Administer antiarrhythmic and heart rate control medications as ordered  - Monitor electrolytes and administer replacement therapy as ordered  Outcome: Progressing

## 2024-02-14 NOTE — QUICK NOTE
Patient continues to be in afib with rates sustaining in the 170's. On amio gtt with lopressor 25mg q6h. Contacted on call cardiologist and will attempt to add Digoxin 250mcg q6h for three doses.

## 2024-02-14 NOTE — ASSESSMENT & PLAN NOTE
Echo EF 20%. Systolic function is severely reduced. Severe global hypokinesis with regional variation. RV dilated. Mild MV and TV regurgitation.  BNP: 653  Beta-blocker: PTA toprol XL 50 mg AM, 25 mg HS. Switched to Lopressor in setting of Afib rvr.

## 2024-02-14 NOTE — ASSESSMENT & PLAN NOTE
Elevated t.bili and lak phos, without any jaundice.  No history of liver disease.  Right upper quadrant ultrasound shows fatty infiltration of liver with trace perihepatic fluid. Patent portal vein with pulsatile flow possibly secondary to cardiac etiology.

## 2024-02-14 NOTE — CONSULTS
Heart Failure/ Pulmonary Hypertension Progress Note - South Borregozinger III 73 y.o. male MRN: [id number]    Unit/Bed#: S -01 Encounter: [id number]      Assessment:    Principal Problem:    Atrial fibrillation with RVR ([facility])  Active Problems:    Ischemic cardiomyopathy    Class 1 obesity due to excess calories with serious comorbidity and body mass index (BMI) of 33.0 to 33.9 in adult    CKD (chronic kidney disease), stage III ([facility])    Type 2 diabetes mellitus with diabetic peripheral angiopathy without gangrene ([facility])    Immunocompromised patient ([facility])    Abnormal LFTs      # Acute BiV HFrEF, Stage C  Etiology: tachy myopathy    Weight: 246 lbs  BNP    Studies- personally reviewed by me  EKG- afib with RVR, LAFB, RBBB    Echo 2/13/24  LVEF:  20%  LVIDd: 5.3 cm  RV: dilated, moderately reduced  MR: mild  PASP: 25 mmHg    Echo 6/29/22:  LVEF: 50-55%  LVEDd: 5.6 cm  RV: normal    NM stress 5/30/18: no defects    Neurohormonal Blockade:  --Beta-Blocker:  --ACEi, ARB or ARNi:    (or SVR reduction)  --Aldosterone Receptor Blocker:  --SGLT2-I:   --Diuretic:    Sudden Cardiac Death Risk Reduction:  --ICD:     Electrical Resynchronization:  Native QRS: RBBB with LAFB    Advanced Therapies (If appropriate):  --Inotrope:  --LVAD/Transplant Candidacy:    # PAF with RVR  AC: Xarelto 20 mg daily  Rate: metoprolol tartrate 25 mg Q6, three doses IV dig  Rhythm: amiodarone gtt   Hx of loop recorder- explanted 1/10/24    # dyslipidemia- atorvastatin 20 mg daily  10/31/23: LDL 40, HDL 37    # PHUONG on CKD3  Baseline around 1.1, 1.76 on admit  # hx of bifascicular block/ SVT  # PAD s/ femoral popliteal bypass surgery  # tobacco abuse  # CKD 3, baseline 1.25-1.4  # crohn's dz, hx of SBO    Plan:  Up titrate metoprolol tartrate, continue dig in oral form  Continue amio gtt  Continue furosemide 40 mg IV BID    HPI:     72 yo male with hx of tobacco use, PAD s/p peripheral bypass, SVT, dyslipidemia, PAF on Xarelto admitted with Afib  "with RVR after presenting with increasing SANCHEZ. CXR showing pulmonary edema. Echo showing biventricular dysfunction with EF down to 20% and RV down.   Pt was on Toprol as outpt and getting 75 mg in AM, 50 mg in evening.     Review of Systems   Constitutional:  Negative for activity change, appetite change, fatigue and unexpected weight change.   HENT:  Negative for congestion and nosebleeds.    Eyes: Negative.    Respiratory:  Positive for shortness of breath. Negative for cough and chest tightness.    Cardiovascular:  Positive for palpitations and leg swelling. Negative for chest pain.   Gastrointestinal:  Negative for abdominal distention.   Endocrine: Negative.    Genitourinary: Negative.    Musculoskeletal: Negative.    Skin: Negative.    Neurological:  Negative for dizziness, syncope and weakness.   Hematological: Negative.    Psychiatric/Behavioral: Negative.          Central Line (day, reason):  Snyder catheter (day, reason):    Vitals: Blood pressure 111/88, pulse (!) 131, temperature 98.4 °F (36.9 °C), temperature source Oral, resp. rate 18, height 5' 10\" (1.778 m), weight 112 kg (246 lb 9.6 oz), SpO2 93%., Body mass index is 35.38 kg/m²., I/O last 3 completed shifts:  In: 100 [IV Piggyback:100]  Out: 4200 [Urine:4200]  I/O this shift:  In: 240 [P.O.:240]  Out: 200 [Urine:200]  Wt Readings from Last 3 Encounters:   02/14/24 112 kg (246 lb 9.6 oz)   01/17/24 106 kg (233 lb)   01/10/24 100 kg (221 lb)       Intake/Output Summary (Last 24 hours) at 2/14/2024 0810  Last data filed at 2/14/2024 0742  Gross per 24 hour   Intake 240 ml   Output 2975 ml   Net -2735 ml     I/O last 3 completed shifts:  In: 100 [IV Piggyback:100]  Out: 4200 [Urine:4200]    No significant arrhythmias seen on telemetry review.       Physical Exam:  Vitals:    02/14/24 0507 02/14/24 0600 02/14/24 0700 02/14/24 0748   BP:   111/88    BP Location:   Left arm    Pulse:   (!) 110 (!) 131   Resp:   18    Temp:   98.4 °F (36.9 °C)    TempSrc:   " Oral    SpO2:   93%    Weight: 112 kg (246 lb 9.6 oz) 112 kg (246 lb 9.6 oz)     Height:           GEN: [place] III appears well, alert and oriented x 3, pleasant and cooperative   HEENT: pupils equal, round, and reactive to light; extraocular muscles intact  NECK: supple, no carotid bruits   HEART: irregularly irregular rhythm, normal S1 and S2, no murmurs, clicks, gallops or rubs, JVP is elevated   LUNGS: clear to auscultation bilaterally; no wheezes, rales, or rhonchi   ABDOMEN: normal bowel sounds, soft, no tenderness, no distention  EXTREMITIES: peripheral pulses normal; no clubbing, cyanosis, 3+  edema  NEURO: no focal findings   SKIN: normal without suspicious lesions on exposed skin      Current Facility-Administered Medications:     albuterol (PROVENTIL HFA,VENTOLIN HFA) inhaler 2 puff, 2 puff, Inhalation, Q6H PRN, LENORA Noe    [] amiodarone (CORDARONE) 900 mg in dextrose 5 % 500 mL infusion, 1 mg/min, Intravenous, Continuous, Stopped at 24 0153 **FOLLOWED BY** amiodarone (CORDARONE) 900 mg in dextrose 5 % 500 mL infusion, 0.5 mg/min, Intravenous, Continuous, Monse Garcia MD, Last Rate: 16.7 mL/hr at 24, 0.5 mg/min at 24    aspirin (ECOTRIN LOW STRENGTH) EC tablet 81 mg, 81 mg, Oral, Daily, LENORA Noe, 81 mg at 24 0737    atorvastatin (LIPITOR) tablet 20 mg, 20 mg, Oral, Daily, LENROA Noe, 20 mg at 24 0737    cyanocobalamin (VITAMIN B-12) tablet 1,000 mcg, 1,000 mcg, Oral, Once per day on , LENORA Noe, 1,000 mcg at 24 0740    digoxin (LANOXIN) injection 250 mcg, 250 mcg, Intravenous, Q6H, Lili Lee DO, 250 mcg at 24 0233    fluticasone (FLONASE) 50 mcg/act nasal spray 1 spray, 1 spray, Nasal, Daily, LENORA Noe, 1 spray at 24 0906    folic acid (FOLVITE) tablet 1 mg, 1 mg, Oral, Daily, LENORA Noe, 1 mg at 24 0737    furosemide (LASIX) injection 40 mg, 40  mg, Intravenous, BID, LENORA Noe, 40 mg at 02/14/24 0733    insulin lispro (HumaLOG) 100 units/mL subcutaneous injection 1-5 Units, 1-5 Units, Subcutaneous, HS, Hanane Platt MD    insulin lispro (HumaLOG) 100 units/mL subcutaneous injection 2-12 Units, 2-12 Units, Subcutaneous, TID AC **AND** Fingerstick Glucose (POCT), , , TID AC, Hanane Platt MD    magnesium Oxide (MAG-OX) tablet 400 mg, 400 mg, Oral, Daily, LENORA Noe, 400 mg at 02/14/24 0737    metoprolol tartrate (LOPRESSOR) tablet 25 mg, 25 mg, Oral, Q6H, LENORA Davila, 25 mg at 02/14/24 0233    nicotine (NICODERM CQ) 7 mg/24hr TD 24 hr patch 1 patch, 1 patch, Transdermal, Daily, LENORA Noe    pantoprazole (PROTONIX) EC tablet 40 mg, 40 mg, Oral, Early Morning, LENORA Noe, 40 mg at 02/14/24 0443    potassium chloride (Klor-Con M20) CR tablet 40 mEq, 40 mEq, Oral, Once, LENORA Davila    rivaroxaban (XARELTO) tablet 20 mg, 20 mg, Oral, Daily With Breakfast, LENORA Noe, 20 mg at 02/14/24 0737      Labs & Results:        Results from last 7 days   Lab Units 02/14/24 0437 02/13/24  0429 02/12/24  1524   WBC Thousand/uL 9.24 10.21* 8.42   HEMOGLOBIN g/dL 14.6 14.7 15.2   HEMATOCRIT % 46.5 47.6 49.6*   PLATELETS Thousands/uL 268 307 311         Results from last 7 days   Lab Units 02/14/24 0437 02/13/24  0429 02/12/24  1524   POTASSIUM mmol/L 3.3* 3.7 4.6   CHLORIDE mmol/L 107 109* 109*   CO2 mmol/L 27 22 25   BUN mg/dL 27* 28* 34*   CREATININE mg/dL 1.55* 1.45* 1.76*   CALCIUM mg/dL 7.8* 8.2* 8.6   ALK PHOS U/L  --  306* 302*   ALT U/L  --  32 34   AST U/L  --  39 38           Counseling / Coordination of Care  Total floor / unit time spent today 40 minutes.  Greater than 50% of total time was spent with the patient and / or family counseling and / or coordination of care.  A description of the counseling / coordination of care: 25.      Thank you for the opportunity to participate in the care of this  patient.  YVETTE ZUNIGA D.O.  DIRECTOR OF HEART FAILURE/ PULMONARY HYPERTENSION  MEDICAL DIRECTOR OF LVAD PROGRAM  [facility]

## 2024-02-14 NOTE — CASE MANAGEMENT
Case Management Assessment & Discharge Planning Note    Patient name South Humphrey III  Location S /S -01 MRN [id number]  : 1950 Date 2024       Current Admission Date: 2024  Current Admission Diagnosis:Acute on chronic heart failure (HCC)   Patient Active Problem List    Diagnosis Date Noted    Acute on chronic heart failure (HCC) 2024    Electrolyte abnormality 2024    Abnormal LFTs 2024    Atrial fibrillation with RVR (HCC) 2024    Immunocompromised patient (HCC) 2024    Venous insufficiency 2022    Type 2 diabetes mellitus with diabetic peripheral angiopathy without gangrene (HCC) 2021    Vitamin B12 deficiency 2020    Elevated alkaline phosphatase level 2020    S/P femoral-popliteal bypass surgery 2020    Terminal ileitis of small intestine ([facility]) 2020    Positive QuantiFERON-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 2020    Volume overload 2020    SVT (supraventricular tachycardia) 2020    Crohn's disease of small intestine with complication ([facility]) 2020    Adrenal mass (HCC) 2020    Paroxysmal atrial fibrillation ([facility]) 2019    Venous stasis 2019    S/P peripheral artery angioplasty with stent placement 2019    Ischemic cardiomyopathy 2019    PAD (peripheral artery disease) ([facility]) 2019    Harris's esophagus without dysplasia 2019    Colon polyps 2019    Dyslipidemia 2018    Vitamin D deficiency 2018    RBBB (right bundle branch block with left anterior fascicular block) 2018    [place] cardiac risk 10-20% in next 10 years 2018    Abdominal aortic atherosclerosis (HCC) 2018    CKD (chronic kidney disease), stage III (HCC) 2018    Persistent proteinuria 2018    Microscopic hematuria 2018    Class 1 obesity due to excess calories with serious comorbidity and body mass index (BMI) of 33.0 to 33.9 in adult       LOS  (days): 2  Geometric Mean LOS (GMLOS) (days):   Days to GMLOS:     OBJECTIVE:    Risk of Unplanned Readmission Score: 15.57         Current admission status: Inpatient       Preferred Pharmacy:   [id number] - [place] - [address]  [address] [address]  Phone: [phone] Fax: [phone]    Primary Care Provider: Virgie Luz MD    Primary Insurance: MEDICARE  Secondary Insurance: [place] [employer]RI[employer]ETA HAAS    ASSESSMENT:  Active Health Care Proxies    There are no active Health Care Proxies on file.                 Readmission Root Cause  30 Day Readmission: No    Patient Information  Admitted from:: Home  Mental Status: Alert  During Assessment patient was accompanied by: Not accompanied during assessment  Assessment information provided by:: Patient  Primary Caregiver: Self  Support Systems: Spouse/significant other, Family members, Children  County of Residence: [place]  What city do you live in?: [place]  Home entry access options. Select all that apply.: Stairs  Number of steps to enter home.: 3  Type of Current Residence: [facility]  Living Arrangements: Lives w/ Spouse/significant other  Is patient a ?: No    Activities of Daily Living Prior to Admission  Functional Status: Independent  Completes ADLs independently?: Yes  Ambulates independently?: Yes  Does patient use assisted devices?: No  Does patient currently own DME?: Yes  What DME does the patient currently own?: Walker, Straight Cane, Wheelchair  Does patient have a history of Outpatient Therapy (PT/OT)?: No  Does the patient have a history of Short-Term Rehab?: No  Does patient have a history of HHC?: No  Does patient currently have HHC?: No         Patient Information Continued  Income Source: Pension/prison  Does patient have prescription coverage?: Yes  Does patient receive dialysis treatments?: No  Does patient have a history of substance abuse?: No  Does patient have a history of Mental Health  Diagnosis?: No    PHQ 2/9 Screening   Reviewed PHQ 2/9 Depression Screening Score?: No    Means of Transportation  Means of Transport to Appts:: Drives Self      Social Determinants of Health (SDOH)      Flowsheet Row Most Recent Value   Housing Stability    In the last 12 months, was there a time when you were not able to pay the mortgage or rent on time? N   In the last 12 months, how many places have you lived? 1   In the last 12 months, was there a time when you did not have a steady place to sleep or slept in a shelter (including now)? N   Transportation Needs    In the past 12 months, has lack of transportation kept you from medical appointments or from getting medications? no   In the past 12 months, has lack of transportation kept you from meetings, work, or from getting things needed for daily living? No   Food Insecurity    Within the past 12 months, you worried that your food would run out before you got the money to buy more. Never true   Within the past 12 months, the food you bought just didn't last and you didn't have money to get more. Never true   Utilities    In the past 12 months has the electric, gas, oil, or water company threatened to shut off services in your home? No            DISCHARGE DETAILS:    Discharge planning discussed with:: Patient  Freedom of Choice: Yes  Comments - Freedom of Choice: CM will continue to follow for formal recs  CM contacted family/caregiver?: Yes  Were Treatment Team discharge recommendations reviewed with patient/caregiver?: Yes  Did patient/caregiver verbalize understanding of patient care needs?: Yes  Were patient/caregiver advised of the risks associated with not following Treatment Team discharge recommendations?: Yes    Contacts  Patient Contacts: True  Relationship to Patient:: Other (Comment) (Self)  Contact Method: In Person  Reason/Outcome: Continuity of Care, Discharge Planning    Requested Home Health Care         Is the patient interested in C at  discharge?: No    DME Referral Provided  Referral made for DME?: No    Other Referral/Resources/Interventions Provided:  Interventions: Other (Specify)  Referral Comments: CM spoke with pt at bedside, introduced self and role with discharge planning. Pt reported he lives with his spouse in a ranch style home with 3 ASHA. Pt reported he was fully independent with ADLs and functional mobility without the use of AD. Pt reported he has a RW, cane and w/c. Pt reported he is not on O2 at baseline. Pt reported he was driving self to appointments. Pt reported having a supportive family. Pt aware CM will continue to follow for discharge coordination.    Would you like to participate in our [facility] service program?  : No - Declined    Treatment Team Recommendation: Other (TBD)  Discharge Destination Plan:: Other (TBD)    CM sent in basket message for outpatient SW referral as pt has a diagnosis of CHF.

## 2024-02-14 NOTE — ASSESSMENT & PLAN NOTE
Lab Results   Component Value Date    HGBA1C 8.2 (H) 01/31/2024     Lab Results   Component Value Date    GLUC 87 02/16/2024    GLUC 85 02/15/2024      Resume glimepiride on dc  Accucheck, SSI while inpatient   Patient is taking oral intake but hypoglcemic. Goal glucose above 60 due to increase in beta blocker can mask hypoglycemic symptoms. 1 amp dextrose if glucose <60.  Hypoglycemia  was likely in the setting of remnant glyburide in body in the setting of acute kidney dysfunction. Now resolving.

## 2024-02-15 LAB
ANION GAP SERPL CALCULATED.3IONS-SCNC: 6 MMOL/L
BUN SERPL-MCNC: 20 MG/DL (ref 5–25)
CALCIUM SERPL-MCNC: 7.7 MG/DL (ref 8.4–10.2)
CHLORIDE SERPL-SCNC: 103 MMOL/L (ref 96–108)
CO2 SERPL-SCNC: 32 MMOL/L (ref 21–32)
CREAT SERPL-MCNC: 1.31 MG/DL (ref 0.6–1.3)
ERYTHROCYTE [DISTWIDTH] IN BLOOD BY AUTOMATED COUNT: 18 % (ref 11.6–15.1)
GFR SERPL CREATININE-BSD FRML MDRD: 54 ML/MIN/1.73SQ M
GLUCOSE SERPL-MCNC: 120 MG/DL (ref 65–140)
GLUCOSE SERPL-MCNC: 144 MG/DL (ref 65–140)
GLUCOSE SERPL-MCNC: 202 MG/DL (ref 65–140)
GLUCOSE SERPL-MCNC: 83 MG/DL (ref 65–140)
GLUCOSE SERPL-MCNC: 85 MG/DL (ref 65–140)
HCT VFR BLD AUTO: 46.5 % (ref 36.5–49.3)
HGB BLD-MCNC: 14.5 G/DL (ref 12–17)
MCH RBC QN AUTO: 27.3 PG (ref 26.8–34.3)
MCHC RBC AUTO-ENTMCNC: 31.2 G/DL (ref 31.4–37.4)
MCV RBC AUTO: 88 FL (ref 82–98)
PLATELET # BLD AUTO: 279 THOUSANDS/UL (ref 149–390)
PMV BLD AUTO: 10.7 FL (ref 8.9–12.7)
POTASSIUM SERPL-SCNC: 3.9 MMOL/L (ref 3.5–5.3)
RBC # BLD AUTO: 5.31 MILLION/UL (ref 3.88–5.62)
SODIUM SERPL-SCNC: 141 MMOL/L (ref 135–147)
WBC # BLD AUTO: 8.83 THOUSAND/UL (ref 4.31–10.16)

## 2024-02-15 PROCEDURE — 99232 SBSQ HOSP IP/OBS MODERATE 35: CPT | Performed by: INTERNAL MEDICINE

## 2024-02-15 PROCEDURE — 99233 SBSQ HOSP IP/OBS HIGH 50: CPT | Performed by: INTERNAL MEDICINE

## 2024-02-15 PROCEDURE — 82948 REAGENT STRIP/BLOOD GLUCOSE: CPT

## 2024-02-15 PROCEDURE — 85027 COMPLETE CBC AUTOMATED: CPT | Performed by: NURSE PRACTITIONER

## 2024-02-15 PROCEDURE — 80048 BASIC METABOLIC PNL TOTAL CA: CPT | Performed by: NURSE PRACTITIONER

## 2024-02-15 RX ADMIN — FLUTICASONE PROPIONATE 1 SPRAY: 50 SPRAY, METERED NASAL at 09:39

## 2024-02-15 RX ADMIN — METOPROLOL TARTRATE 50 MG: 50 TABLET, FILM COATED ORAL at 21:17

## 2024-02-15 RX ADMIN — ASPIRIN 81 MG: 81 TABLET ORAL at 09:38

## 2024-02-15 RX ADMIN — METOPROLOL TARTRATE 50 MG: 50 TABLET, FILM COATED ORAL at 09:38

## 2024-02-15 RX ADMIN — FUROSEMIDE 40 MG: 10 INJECTION, SOLUTION INTRAMUSCULAR; INTRAVENOUS at 09:38

## 2024-02-15 RX ADMIN — DIGOXIN 125 MCG: 125 TABLET ORAL at 09:38

## 2024-02-15 RX ADMIN — Medication 400 MG: at 09:38

## 2024-02-15 RX ADMIN — INSULIN LISPRO 4 UNITS: 100 INJECTION, SOLUTION INTRAVENOUS; SUBCUTANEOUS at 11:43

## 2024-02-15 RX ADMIN — RIVAROXABAN 20 MG: 20 TABLET, FILM COATED ORAL at 08:29

## 2024-02-15 RX ADMIN — FUROSEMIDE 40 MG: 10 INJECTION, SOLUTION INTRAMUSCULAR; INTRAVENOUS at 17:05

## 2024-02-15 RX ADMIN — FOLIC ACID 1 MG: 1 TABLET ORAL at 09:39

## 2024-02-15 RX ADMIN — ATORVASTATIN CALCIUM 20 MG: 20 TABLET, FILM COATED ORAL at 09:38

## 2024-02-15 RX ADMIN — METOPROLOL TARTRATE 50 MG: 50 TABLET, FILM COATED ORAL at 15:49

## 2024-02-15 RX ADMIN — METOPROLOL TARTRATE 50 MG: 50 TABLET, FILM COATED ORAL at 04:00

## 2024-02-15 RX ADMIN — PANTOPRAZOLE SODIUM 40 MG: 40 TABLET, DELAYED RELEASE ORAL at 05:19

## 2024-02-15 RX ADMIN — AMIODARONE HYDROCHLORIDE 0.5 MG/MIN: 50 INJECTION, SOLUTION INTRAVENOUS at 04:00

## 2024-02-15 NOTE — PROGRESS NOTES
Heart Failure/ Pulmonary Hypertension Progress Note - South Borregozinger III 73 y.o. male MRN: [id number]    Unit/Bed#: S -01 Encounter: [id number]      Assessment:    Principal Problem:    Acute on chronic heart failure (HCC)  Active Problems:    Ischemic cardiomyopathy    Class 1 obesity due to excess calories with serious comorbidity and body mass index (BMI) of 33.0 to 33.9 in adult    CKD (chronic kidney disease), stage III ([facility])    Type 2 diabetes mellitus with diabetic peripheral angiopathy without gangrene ([facility])    Immunocompromised patient ([facility])    Atrial fibrillation with RVR ([facility])    Abnormal LFTs    Electrolyte abnormality      Interim:  Ins/outs: 1150/5150: -4000  Weight: 234 lbs  Tele: afib 120's   MAPs:  90's mmHg  K 3.9  Cr 1.3    # Acute BiV HFrEF, Stage C  Etiology: tachy myopathy    Weight: 246 lbs  BNP    Studies- personally reviewed by me  EKG- afib with RVR, LAFB, RBBB    Echo 2/13/24  LVEF:  20%  LVIDd: 5.3 cm  RV: dilated, moderately reduced  MR: mild  PASP: 25 mmHg    Echo 6/29/22:  LVEF: 50-55%  LVEDd: 5.6 cm  RV: normal    NM stress 5/30/18: no defects    Neurohormonal Blockade:  --Beta-Blocker: metoprolol tartrate 50 mg Q6  --ACEi, ARB or ARNi:    (or SVR reduction)  --Aldosterone Receptor Blocker:  --SGLT2-I:   --Diuretic:    Sudden Cardiac Death Risk Reduction:  --ICD:     Electrical Resynchronization:  Native QRS: RBBB with LAFB    Advanced Therapies (If appropriate):  --Inotrope:  --LVAD/Transplant Candidacy:    # PAF with RVR  AC: Xarelto 20 mg daily  Rate: metoprolol tartrate 50 mg Q6, digoxin 125 mcg daily  Rhythm: amiodarone gtt   Hx of loop recorder- explanted 1/10/24    # dyslipidemia- atorvastatin 20 mg daily  10/31/23: LDL 40, HDL 37    # PHUONG on CKD3  Baseline around 1.1, 1.76 on admit  # hx of bifascicular block/ SVT  # PAD s/ femoral popliteal bypass surgery  # tobacco abuse  # CKD 3, baseline 1.25-1.4  # crohn's dz, hx of SBO    Plan:  Continue metoprolol tartrate,  "continue dig in oral form  Continue amio gtt  Continue furosemide 40 mg IV BID- responding well  Do not want to cardiovert while filling pressures are high as will increase likelihood  Of going back into afib      Review of Systems   Constitutional:  Negative for activity change, appetite change, fatigue and unexpected weight change.   HENT:  Negative for congestion and nosebleeds.    Eyes: Negative.    Respiratory:  Positive for shortness of breath. Negative for cough and chest tightness.    Cardiovascular:  Positive for palpitations and leg swelling. Negative for chest pain.   Gastrointestinal:  Negative for abdominal distention.   Endocrine: Negative.    Genitourinary: Negative.    Musculoskeletal: Negative.    Skin: Negative.    Neurological:  Negative for dizziness, syncope and weakness.   Hematological: Negative.    Psychiatric/Behavioral: Negative.          Central Line (day, reason):  Snyder catheter (day, reason):    Vitals: Blood pressure 122/88, pulse (!) 123, temperature 97.9 °F (36.6 °C), temperature source Oral, resp. rate 18, height 5' 10\" (1.778 m), weight 107 kg (234 lb 12.6 oz), SpO2 92%., Body mass index is 33.69 kg/m²., I/O last 3 completed shifts:  In: 1150 [P.O.:1150]  Out: 6800 [Urine:6800]  I/O this shift:  In: -   Out: 120 [Urine:120]  Wt Readings from Last 3 Encounters:   02/15/24 107 kg (234 lb 12.6 oz)   01/17/24 106 kg (233 lb)   01/10/24 100 kg (221 lb)       Intake/Output Summary (Last 24 hours) at 2/15/2024 0818  Last data filed at 2/15/2024 0701  Gross per 24 hour   Intake 910 ml   Output 5070 ml   Net -4160 ml     I/O last 3 completed shifts:  In: 1150 [P.O.:1150]  Out: 6800 [Urine:6800]    No significant arrhythmias seen on telemetry review.       Physical Exam:  Vitals:    02/14/24 2033 02/15/24 0400 02/15/24 0700 02/15/24 0752   BP: 111/81 102/68 122/88    BP Location: Right arm  Right arm    Pulse: (!) 125  (!) 123    Resp: 18  18    Temp:   97.9 °F (36.6 °C)    TempSrc:   Oral  "   SpO2: 94%  92%    Weight:    107 kg (234 lb 12.6 oz)   Height:           GEN: [place]oz[place] III appears well, alert and oriented x 3, pleasant and cooperative   HEENT: pupils equal, round, and reactive to light; extraocular muscles intact  NECK: supple, no carotid bruits   HEART: irregularly irregular rhythm, normal S1 and S2, no murmurs, clicks, gallops or rubs, JVP is elevated   LUNGS: clear to auscultation bilaterally; no wheezes, rales, or rhonchi   ABDOMEN: normal bowel sounds, soft, no tenderness, no distention  EXTREMITIES: peripheral pulses normal; no clubbing, cyanosis, 3+  edema  NEURO: no focal findings   SKIN: normal without suspicious lesions on exposed skin      Current Facility-Administered Medications:     albuterol (PROVENTIL HFA,VENTOLIN HFA) inhaler 2 puff, 2 puff, Inhalation, Q6H PRN, LENORA Noe    [] amiodarone (CORDARONE) 900 mg in dextrose 5 % 500 mL infusion, 1 mg/min, Intravenous, Continuous, Stopped at 24 0153 **FOLLOWED BY** amiodarone (CORDARONE) 900 mg in dextrose 5 % 500 mL infusion, 0.5 mg/min, Intravenous, Continuous, Monse Garcia MD, Last Rate: 16.7 mL/hr at 02/15/24 0400, 0.5 mg/min at 02/15/24 0400    aspirin (ECOTRIN LOW STRENGTH) EC tablet 81 mg, 81 mg, Oral, Daily, LENORA Noe, 81 mg at 24 0737    atorvastatin (LIPITOR) tablet 20 mg, 20 mg, Oral, Daily, LENORA Noe, 20 mg at 24 0737    cyanocobalamin (VITAMIN B-12) tablet 1,000 mcg, 1,000 mcg, Oral, Once per day on , LENORA Noe, 1,000 mcg at 24 0740    digoxin (LANOXIN) tablet 125 mcg, 125 mcg, Oral, Daily, Moisés Estrella DO, 125 mcg at 24 0911    fluticasone (FLONASE) 50 mcg/act nasal spray 1 spray, 1 spray, Nasal, Daily, LENORA Noe, 1 spray at 24 0906    folic acid (FOLVITE) tablet 1 mg, 1 mg, Oral, Daily, LENORA Noe, 1 mg at 24 0737    furosemide (LASIX) injection 40 mg, 40 mg, Intravenous, BID, LENORA Noe,  40 mg at 02/14/24 1727    insulin lispro (HumaLOG) 100 units/mL subcutaneous injection 1-5 Units, 1-5 Units, Subcutaneous, HS, Hanane Platt MD    insulin lispro (HumaLOG) 100 units/mL subcutaneous injection 2-12 Units, 2-12 Units, Subcutaneous, TID AC **AND** Fingerstick Glucose (POCT), , , TID AC, Hanane Platt MD    magnesium Oxide (MAG-OX) tablet 400 mg, 400 mg, Oral, Daily, LENORA Noe, 400 mg at 02/14/24 0737    metoprolol tartrate (LOPRESSOR) tablet 50 mg, 50 mg, Oral, Q6H, Moisés Zuniga DO, 50 mg at 02/15/24 0400    nicotine (NICODERM CQ) 7 mg/24hr TD 24 hr patch 1 patch, 1 patch, Transdermal, Daily, LENORA Noe    pantoprazole (PROTONIX) EC tablet 40 mg, 40 mg, Oral, Early Morning, LENORA Noe, 40 mg at 02/15/24 0519    rivaroxaban (XARELTO) tablet 20 mg, 20 mg, Oral, Daily With Breakfast, LENORA Noe, 20 mg at 02/14/24 0737      Labs & Results:        Results from last 7 days   Lab Units 02/15/24  0000 02/14/24  0437 02/13/24  0429   WBC Thousand/uL 8.83 9.24 10.21*   HEMOGLOBIN g/dL 14.5 14.6 14.7   HEMATOCRIT % 46.5 46.5 47.6   PLATELETS Thousands/uL 279 268 307         Results from last 7 days   Lab Units 02/15/24  0350 02/14/24  0437 02/13/24  0429 02/12/24  1524   POTASSIUM mmol/L 3.9 3.3* 3.7 4.6   CHLORIDE mmol/L 103 107 109* 109*   CO2 mmol/L 32 27 22 25   BUN mg/dL 20 27* 28* 34*   CREATININE mg/dL 1.31* 1.55* 1.45* 1.76*   CALCIUM mg/dL 7.7* 7.8* 8.2* 8.6   ALK PHOS U/L  --  270* 306* 302*   ALT U/L  --  29 32 34   AST U/L  --  37 39 38           Counseling / Coordination of Care  Total floor / unit time spent today 40 minutes.  Greater than 50% of total time was spent with the patient and / or family counseling and / or coordination of care.  A description of the counseling / coordination of care: 25.      Thank you for the opportunity to participate in the care of this patient.  MOISÉS ZUNIGA D.O.  DIRECTOR OF HEART FAILURE/ PULMONARY HYPERTENSION  MEDICAL DIRECTOR OF LVAD  PROGRAM  [facility]

## 2024-02-15 NOTE — PROGRESS NOTES
[facility]  Progress Note  Name: [place] Humphrey III I  MRN: [id number]  Unit/Bed#: S -01 I Date of Admission: 2/12/2024   Date of Service: 2/15/2024 I Hospital Day: 3    Assessment/Plan   Atrial fibrillation with RVR (HCC)  Assessment & Plan  Hx PAF since 2019.  Presented with worsening exertional dyspnea, weight gain.  Rates 200s on arrival.    Monitor and replete electrolytes as needed.  Rate/Rhythm Control: no improvement with cardizem drip.  Now on amiodarone drip  Continue home dose metoprolol   [id number]   Antiplatelet/Anticoagulation: xarelto  Troponin: 21>20>15       Plan:  Rate control: Lopressor to 50 mg q6 and digoxin 125 mcg OD.  Rhythm control: amio gtt   Monitor on telemetry.  Cardiology consulted.  Avoid diltiazem due to EF 20%.  Hold off of on cardioversion in the setting of volume overload.  Outpatient sleep study recommended for possible ANUSHA.    Abnormal LFTs  Assessment & Plan  Elevated t.bili and lak phos, without any jaundice.  No history of liver disease.  Right upper quadrant ultrasound shows fatty infiltration of liver with trace perihepatic fluid. Patent portal vein with pulsatile flow possibly secondary to cardiac etiology.    Immunocompromised patient (HCC)  Assessment & Plan  On Skyrizi for crohn's disease.  Received 2 injections.  Next due this week but is being held per GI    Type 2 diabetes mellitus with diabetic peripheral angiopathy without gangrene (HCC)  Assessment & Plan  Lab Results   Component Value Date    HGBA1C 8.2 (H) 01/31/2024     Lab Results   Component Value Date    GLUC 85 02/15/2024    GLUC 38 (LL) 02/14/2024      Resume glimepiride on dc  Accucheck, SSI while inpatient   Patient is taking oral intake but hypoglcemic. Goal glucose above 60 due to increase in beta blocker can mask hypoglycemic symptoms. 1 amp dextrose if glucose <60.]  Hypoglycemia likely in the setting of remnant glyburide in body in the setting of acute  kidney dysfunction.    Ischemic cardiomyopathy  Assessment & Plan  Echo EF 20%. Systolic function is severely reduced. Severe global hypokinesis with regional variation. RV dilated. Mild MV and TV regurgitation.  BNP: 653  Beta-blocker: PTA toprol XL 50 mg AM, 25 mg HS. Switched to Lopressor in setting of Afib rvr.    CKD (chronic kidney disease), stage III ([facility])  Assessment & Plan  Creatinine 1.76 on admission. Baseline 1.25-1.4.  follows with nephrology as op  Avoid hypotension, nephrotoxins    Class 1 obesity due to excess calories with serious comorbidity and body mass index (BMI) of 33.0 to 33.9 in adult  Assessment & Plan  BMI 37.2  Outpatient follow up with PCP    * Acute on chronic heart failure (HCC)  Assessment & Plan  Wt Readings from Last 3 Encounters:   02/14/24 112 kg (246 lb 9.6 oz)   01/17/24 106 kg (233 lb)   01/10/24 100 kg (221 lb)     Echo EF 20%. Systolic function is severely reduced. Severe global hypokinesis with regional variation. RV dilated. Mild MV and TV regurgitation.  Weight 1 month back was 233 lb. On  lbs.  BNP: 653  Diuresis: lasix 40 mg IV BID. Goal weight 230 lbs  Beta-blocker: Lopressor in setting of Afib rvr.  Monitor intake and output, daily weights.  Diet: Fluid restriction and 2 g Sodium.  Cardiology on board.             VTE Pharmacologic Prophylaxis: VTE Score: 3 Moderate Risk (Score 3-4) - Pharmacological DVT Prophylaxis Ordered: rivaroxaban (Xarelto).    Mobility:   Basic Mobility Inpatient Raw Score: 24  JH-HLM Goal: 8: Walk 250 feet or more  JH-HLM Achieved: 8: Walk 250 feet ot more  HLM Goal achieved. Continue to encourage appropriate mobility.    Patient Centered Rounds: I performed bedside rounds with nursing staff today.  Discussions with Specialists or Other Care Team Provider: Cardiology    Education and Discussions with Family / Patient: Patient declined call to .     Current Length of Stay: 3 day(s)  Current Patient Status: Inpatient    Discharge Plan: Anticipate discharge in 24-48 hrs to home.    Code Status: Level 1 - Full Code    Subjective:   Patient evaluated at bedside. He endorses only mild sob on exertion.    Objective:     Vitals:   Temp (24hrs), Av.8 °F (36.6 °C), Min:97.6 °F (36.4 °C), Max:97.9 °F (36.6 °C)    Temp:  [97.6 °F (36.4 °C)-97.9 °F (36.6 °C)] 97.9 °F (36.6 °C)  HR:  [123-132] 123  Resp:  [18] 18  BP: (102-126)/(68-93) 122/88  SpO2:  [92 %-94 %] 92 %  Body mass index is 33.69 kg/m².     Input and Output Summary (last 24 hours):     Intake/Output Summary (Last 24 hours) at 2/15/2024 1527  Last data filed at 2/15/2024 1301  Gross per 24 hour   Intake 1090 ml   Output 4645 ml   Net -3555 ml       Physical Exam:   Physical Exam  Vitals and nursing note reviewed.   Constitutional:       General: He is not in acute distress.     Appearance: He is well-developed.   HENT:      Head: Normocephalic and atraumatic.   Eyes:      Conjunctiva/sclera: Conjunctivae normal.   Cardiovascular:      Rate and Rhythm: Normal rate and regular rhythm.      Heart sounds: No murmur heard.  Pulmonary:      Effort: Pulmonary effort is normal. No respiratory distress.      Breath sounds: Rales present. No wheezing or rhonchi.   Abdominal:      General: There is no distension.      Palpations: Abdomen is soft.      Tenderness: There is no abdominal tenderness.   Musculoskeletal:         General: Swelling present.      Cervical back: Neck supple.      Right lower leg: Edema (2+) present.      Left lower leg: Edema (2+) present.   Skin:     General: Skin is warm and dry.      Capillary Refill: Capillary refill takes less than 2 seconds.   Neurological:      Mental Status: He is alert and oriented to person, place, and time.   Psychiatric:         Mood and Affect: Mood normal.          Additional Data:     Labs:  Results from last 7 days   Lab Units 02/15/24  0000 24  0437 24  0429   WBC Thousand/uL 8.83   < > 10.21*   HEMOGLOBIN g/dL 14.5    < > 14.7   HEMATOCRIT % 46.5   < > 47.6   PLATELETS Thousands/uL 279   < > 307   NEUTROS PCT %  --   --  66   LYMPHS PCT %  --   --  22   MONOS PCT %  --   --  10   EOS PCT %  --   --  1    < > = values in this interval not displayed.     Results from last 7 days   Lab Units 02/15/24  0350 02/14/24  0437   SODIUM mmol/L 141 139   POTASSIUM mmol/L 3.9 3.3*   CHLORIDE mmol/L 103 107   CO2 mmol/L 32 27   BUN mg/dL 20 27*   CREATININE mg/dL 1.31* 1.55*   ANION GAP mmol/L 6 5   CALCIUM mg/dL 7.7* 7.8*   ALBUMIN g/dL  --  3.2*   TOTAL BILIRUBIN mg/dL  --  1.98*   ALK PHOS U/L  --  270*   ALT U/L  --  29   AST U/L  --  37   GLUCOSE RANDOM mg/dL 85 38*         Results from last 7 days   Lab Units 02/15/24  1111 02/15/24  0659 02/14/24  2034 02/14/24  1602 02/14/24  1159 02/14/24  1136 02/14/24  1135 02/14/24  0857 02/14/24  0635 02/14/24  0527 02/14/24  0502 02/14/24  0442   POC GLUCOSE mg/dl 202* 83 92 77 100 53* 55* 89 104 112 67 41*               Lines/Drains:  Invasive Devices       Peripheral Intravenous Line  Duration             Peripheral IV 02/12/24 Dorsal (posterior);Right Forearm 2 days                      Telemetry:  Telemetry Orders (From admission, onward)               24 Hour Telemetry Monitoring  Continuous x 24 Hours (Telem)        Question:  Reason for 24 Hour Telemetry  Answer:  Arrhythmias requiring acute medical intervention / PPM or ICD malfunction                     Telemetry Reviewed: Atrial fibrillation. HR averaging 160  Indication for Continued Telemetry Use: Arrthymias requiring medical therapy             Imaging: No pertinent imaging reviewed.    Recent Cultures (last 7 days):         Last 24 Hours Medication List:   Current Facility-Administered Medications   Medication Dose Route Frequency Provider Last Rate    albuterol  2 puff Inhalation Q6H PRN LENORA Noe      amiodarone (CORDARONE) 900 mg in dextrose 5 % 500 mL infusion  0.5 mg/min Intravenous Continuous Monse Garcia MD 0.5  mg/min (02/15/24 0400)    aspirin  81 mg Oral Daily LENORA Noe      atorvastatin  20 mg Oral Daily LENORA Noe      vitamin B-12  1,000 mcg Oral Once per day on Monday Wednesday Friday LENORA Noe      digoxin  125 mcg Oral Daily Moisés Estrella DO      fluticasone  1 spray Nasal Daily LENORA Noe      folic acid  1 mg Oral Daily LENORA Noe      furosemide  40 mg Intravenous BID LENORA Noe      insulin lispro  1-5 Units Subcutaneous HS Hanane Platt MD      insulin lispro  2-12 Units Subcutaneous TID AC Hanane Platt MD      magnesium Oxide  400 mg Oral Daily LENORA Noe      metoprolol tartrate  50 mg Oral Q6H Moisés Estrella DO      nicotine  1 patch Transdermal Daily LENORA Noe      pantoprazole  40 mg Oral Early Morning LENORA Noe      rivaroxaban  20 mg Oral Daily With Breakfast LENORA Noe          Today, Patient Was Seen By: Katherine May MD    **Please Note: This note may have been constructed using a voice recognition system.**

## 2024-02-15 NOTE — PROGRESS NOTES
Cardiology Progress Note - Team 1  [place] Humphrey III 73 y.o. male MRN: [id number]  Unit/Bed#: S -01 Encounter: [id number]      Assessment/Plan:  1. Paroxysmal atrial fibrillation with RVR  - s/p loop w/ explantation (1/10/2024) - initially implanted secondary to PAD with concern for embolic etiology  - presenting ECG - atrial fibrillation with RVR, bifascicular block  - telemetry review - atrial flutter/fib, -130s  - continue Lopressor 50 mg every 6 hours + digoxin 125 mcg p.o. daily for rate control  - continue amiodarone gtt at 0.5 mg/minute for rhythm management  - hopeful improvement heart rate/rhythm with continued IV diuresis  - can consider future cardioversion if necessary to restore NSR once optimized from a volume standpoint  - continue to monitor on telemetry     2. CM with LVEF 20%  3. Acute HFpEF  - likely in the setting of #1  - documented as likely ischemic cardiomyopathy, but no invasive ischemic evaluation in the past (normal stress test, 2018)  - echo (2/13/2024) - LVEF 20%, mild concentric hypertrophy, severe global hypokinesis, mildly dilated RV, mild MR/TR  - weight on admission 259 lb via bed scale - 234 lb this morning via standing scale; last outpatient weight at 233 lb (1/17/2024)  - net -4 L / 24 hours with 5 L UO / 24 hours   - remains volume overloaded on PE - continue Lasix 40 mg IV BID   - continue lopressor for GDMT - consider ACE/ARB/ARNI for further optimization; will defer to heart failure team  - possible LifeVest at discharge  - daily weights, strict I/Os, salt/fluid restriction     4. Acute respiratory failure with hypoxia  - likely secondary to #3  - remains on 2 L NC saturating 93% and above - wean as tolerable for SpO2 goal > 90%  - does not wear O2 at baseline     5. Dyslipidemia  - lipid panel (10/31/2023) - TC 94/triglycerides 84/HDL 37/LDL 40  - maintained on atorvastatin 20 mg daily outpatient; continue     6. PHUONG on CKD stage 3  - creatinine on arrival  "1.76 -1.31 - continues to improve with IV diuresis  - likely secondary to hypervolemia  - baseline 0.9-1.1  - continue to monitor with daily BMPs     7. History of bifascicular block/SVT  8. PAD s/p femoral popliteal bypass surgery  9. Tobacco abuse  10. Type 2 DM    Subjective:   Patient seen and examined.  No significant events overnight. Patient feels well this morning. Reports significant output and improvement in breathing.  Denies any chest pain.    Objective:   Vitals: Blood pressure 122/88, pulse (!) 123, temperature 97.9 °F (36.6 °C), temperature source Oral, resp. rate 18, height 5' 10\" (1.778 m), weight 107 kg (234 lb 12.6 oz), SpO2 92%., Body mass index is 33.69 kg/m².,   Orthostatic Blood Pressures      Flowsheet Row Most Recent Value   Blood Pressure 122/88 filed at 02/15/2024 0700   Patient Position - Orthostatic VS Lying filed at 02/15/2024 0700            Intake/Output Summary (Last 24 hours) at 2/15/2024 0847  Last data filed at 2/15/2024 0846  Gross per 24 hour   Intake 1090 ml   Output 4845 ml   Net -3755 ml     Physical Exam  Constitutional:       General: He is not in acute distress.     Appearance: He is obese. He is not ill-appearing.   HENT:      Head: Normocephalic.      Nose: Nose normal.      Mouth/Throat:      Pharynx: Oropharynx is clear.   Eyes:      Pupils: Pupils are equal, round, and reactive to light.   Cardiovascular:      Rate and Rhythm: Tachycardia present. Rhythm irregular.      Pulses: Normal pulses.      Heart sounds: Normal heart sounds.   Pulmonary:      Effort: Pulmonary effort is normal.      Comments: Coarse breath sounds throughout; on 2 L NC  Abdominal:      General: There is distension.   Musculoskeletal:      Cervical back: Normal range of motion.      Right lower leg: Edema present.      Left lower leg: Edema present.      Comments: +2 bilateral lower extremity edema   Skin:     General: Skin is warm and dry.      Capillary Refill: Capillary refill takes less than " 2 seconds.   Neurological:      General: No focal deficit present.      Mental Status: He is alert and oriented to person, place, and time.   Psychiatric:         Mood and Affect: Mood normal.         Behavior: Behavior normal.     Medications:    Current Facility-Administered Medications:     albuterol (PROVENTIL HFA,VENTOLIN HFA) inhaler 2 puff, 2 puff, Inhalation, Q6H PRN, LENORA Noe    [] amiodarone (CORDARONE) 900 mg in dextrose 5 % 500 mL infusion, 1 mg/min, Intravenous, Continuous, Stopped at 24 0153 **FOLLOWED BY** amiodarone (CORDARONE) 900 mg in dextrose 5 % 500 mL infusion, 0.5 mg/min, Intravenous, Continuous, Monse Garcia MD, Last Rate: 16.7 mL/hr at 02/15/24 0400, 0.5 mg/min at 02/15/24 0400    aspirin (ECOTRIN LOW STRENGTH) EC tablet 81 mg, 81 mg, Oral, Daily, LENORA Noe, 81 mg at 24 0737    atorvastatin (LIPITOR) tablet 20 mg, 20 mg, Oral, Daily, LENORA Noe, 20 mg at 24 0737    cyanocobalamin (VITAMIN B-12) tablet 1,000 mcg, 1,000 mcg, Oral, Once per day on , LENORA Neo, 1,000 mcg at 24 0740    digoxin (LANOXIN) tablet 125 mcg, 125 mcg, Oral, Daily, Moisés Estrella DO, 125 mcg at 24 0911    fluticasone (FLONASE) 50 mcg/act nasal spray 1 spray, 1 spray, Nasal, Daily, LENORA Noe, 1 spray at 24 0906    folic acid (FOLVITE) tablet 1 mg, 1 mg, Oral, Daily, LENORA Noe, 1 mg at 24 0737    furosemide (LASIX) injection 40 mg, 40 mg, Intravenous, BID, LENORA Noe, 40 mg at 24 1727    insulin lispro (HumaLOG) 100 units/mL subcutaneous injection 1-5 Units, 1-5 Units, Subcutaneous, HS, Hanane Platt MD    insulin lispro (HumaLOG) 100 units/mL subcutaneous injection 2-12 Units, 2-12 Units, Subcutaneous, TID AC **AND** Fingerstick Glucose (POCT), , , TID AC, Hanane Platt MD    magnesium Oxide (MAG-OX) tablet 400 mg, 400 mg, Oral, Daily, LENORA Noe, 400 mg at 24 0737    metoprolol  tartrate (LOPRESSOR) tablet 50 mg, 50 mg, Oral, Q6H, Moisés Estrella DO, 50 mg at 02/15/24 0400    nicotine (NICODERM CQ) 7 mg/24hr TD 24 hr patch 1 patch, 1 patch, Transdermal, Daily, LENORA Noe    pantoprazole (PROTONIX) EC tablet 40 mg, 40 mg, Oral, Early Morning, LENORA Noe, 40 mg at 02/15/24 0519    rivaroxaban (XARELTO) tablet 20 mg, 20 mg, Oral, Daily With Breakfast, LENORA Noe, 20 mg at 02/14/24 0737     Labs & Results:      Results from last 7 days   Lab Units 02/15/24  0000 02/14/24 0437 02/13/24 0429   WBC Thousand/uL 8.83 9.24 10.21*   HEMOGLOBIN g/dL 14.5 14.6 14.7   HEMATOCRIT % 46.5 46.5 47.6   PLATELETS Thousands/uL 279 268 307         Results from last 7 days   Lab Units 02/15/24  0350 02/14/24  0437 02/13/24  0429 02/12/24  1524   POTASSIUM mmol/L 3.9 3.3* 3.7 4.6   CHLORIDE mmol/L 103 107 109* 109*   CO2 mmol/L 32 27 22 25   BUN mg/dL 20 27* 28* 34*   CREATININE mg/dL 1.31* 1.55* 1.45* 1.76*   CALCIUM mg/dL 7.7* 7.8* 8.2* 8.6   ALK PHOS U/L  --  270* 306* 302*   ALT U/L  --  29 32 34   AST U/L  --  37 39 38         Results from last 7 days   Lab Units 02/14/24  0437 02/13/24  0429 02/12/24  1524   MAGNESIUM mg/dL 2.0 2.2 2.1

## 2024-02-16 LAB
ALBUMIN SERPL BCP-MCNC: 3.1 G/DL (ref 3.5–5)
ALP SERPL-CCNC: 304 U/L (ref 34–104)
ALT SERPL W P-5'-P-CCNC: 31 U/L (ref 7–52)
ANION GAP SERPL CALCULATED.3IONS-SCNC: 7 MMOL/L
AST SERPL W P-5'-P-CCNC: 35 U/L (ref 13–39)
BILIRUB SERPL-MCNC: 2.27 MG/DL (ref 0.2–1)
BUN SERPL-MCNC: 17 MG/DL (ref 5–25)
CALCIUM ALBUM COR SERPL-MCNC: 8.8 MG/DL (ref 8.3–10.1)
CALCIUM SERPL-MCNC: 8.1 MG/DL (ref 8.4–10.2)
CHLORIDE SERPL-SCNC: 98 MMOL/L (ref 96–108)
CO2 SERPL-SCNC: 35 MMOL/L (ref 21–32)
CREAT SERPL-MCNC: 1.12 MG/DL (ref 0.6–1.3)
GFR SERPL CREATININE-BSD FRML MDRD: 65 ML/MIN/1.73SQ M
GLUCOSE SERPL-MCNC: 129 MG/DL (ref 65–140)
GLUCOSE SERPL-MCNC: 138 MG/DL (ref 65–140)
GLUCOSE SERPL-MCNC: 143 MG/DL (ref 65–140)
GLUCOSE SERPL-MCNC: 83 MG/DL (ref 65–140)
GLUCOSE SERPL-MCNC: 87 MG/DL (ref 65–140)
MAGNESIUM SERPL-MCNC: 1.7 MG/DL (ref 1.9–2.7)
POTASSIUM SERPL-SCNC: 3.1 MMOL/L (ref 3.5–5.3)
PROT SERPL-MCNC: 6.1 G/DL (ref 6.4–8.4)
SODIUM SERPL-SCNC: 140 MMOL/L (ref 135–147)

## 2024-02-16 PROCEDURE — 82948 REAGENT STRIP/BLOOD GLUCOSE: CPT

## 2024-02-16 PROCEDURE — 99233 SBSQ HOSP IP/OBS HIGH 50: CPT | Performed by: INTERNAL MEDICINE

## 2024-02-16 PROCEDURE — 99232 SBSQ HOSP IP/OBS MODERATE 35: CPT | Performed by: INTERNAL MEDICINE

## 2024-02-16 PROCEDURE — 80053 COMPREHEN METABOLIC PANEL: CPT | Performed by: INTERNAL MEDICINE

## 2024-02-16 PROCEDURE — 83735 ASSAY OF MAGNESIUM: CPT | Performed by: INTERNAL MEDICINE

## 2024-02-16 RX ORDER — POTASSIUM CHLORIDE 20 MEQ/1
40 TABLET, EXTENDED RELEASE ORAL ONCE
Status: COMPLETED | OUTPATIENT
Start: 2024-02-16 | End: 2024-02-16

## 2024-02-16 RX ORDER — MAGNESIUM SULFATE HEPTAHYDRATE 40 MG/ML
2 INJECTION, SOLUTION INTRAVENOUS ONCE
Status: DISCONTINUED | OUTPATIENT
Start: 2024-02-16 | End: 2024-02-16

## 2024-02-16 RX ORDER — LANOLIN ALCOHOL/MO/W.PET/CERES
400 CREAM (GRAM) TOPICAL 2 TIMES DAILY
Status: DISCONTINUED | OUTPATIENT
Start: 2024-02-16 | End: 2024-02-20 | Stop reason: HOSPADM

## 2024-02-16 RX ADMIN — DIGOXIN 125 MCG: 125 TABLET ORAL at 08:02

## 2024-02-16 RX ADMIN — Medication 400 MG: at 08:02

## 2024-02-16 RX ADMIN — ASPIRIN 81 MG: 81 TABLET ORAL at 08:02

## 2024-02-16 RX ADMIN — ATORVASTATIN CALCIUM 20 MG: 20 TABLET, FILM COATED ORAL at 08:03

## 2024-02-16 RX ADMIN — FLUTICASONE PROPIONATE 1 SPRAY: 50 SPRAY, METERED NASAL at 08:04

## 2024-02-16 RX ADMIN — AMIODARONE HYDROCHLORIDE 0.5 MG/MIN: 50 INJECTION, SOLUTION INTRAVENOUS at 09:52

## 2024-02-16 RX ADMIN — FUROSEMIDE 40 MG: 10 INJECTION, SOLUTION INTRAMUSCULAR; INTRAVENOUS at 17:09

## 2024-02-16 RX ADMIN — RIVAROXABAN 20 MG: 20 TABLET, FILM COATED ORAL at 07:02

## 2024-02-16 RX ADMIN — PANTOPRAZOLE SODIUM 40 MG: 40 TABLET, DELAYED RELEASE ORAL at 04:33

## 2024-02-16 RX ADMIN — Medication 400 MG: at 15:48

## 2024-02-16 RX ADMIN — METOPROLOL TARTRATE 50 MG: 50 TABLET, FILM COATED ORAL at 10:54

## 2024-02-16 RX ADMIN — POTASSIUM CHLORIDE 40 MEQ: 1500 TABLET, EXTENDED RELEASE ORAL at 07:02

## 2024-02-16 RX ADMIN — FUROSEMIDE 40 MG: 10 INJECTION, SOLUTION INTRAMUSCULAR; INTRAVENOUS at 08:03

## 2024-02-16 RX ADMIN — METOPROLOL TARTRATE 50 MG: 50 TABLET, FILM COATED ORAL at 07:02

## 2024-02-16 RX ADMIN — METOPROLOL TARTRATE 50 MG: 50 TABLET, FILM COATED ORAL at 15:48

## 2024-02-16 RX ADMIN — CYANOCOBALAMIN TAB 500 MCG 1000 MCG: 500 TAB at 08:02

## 2024-02-16 RX ADMIN — FOLIC ACID 1 MG: 1 TABLET ORAL at 08:03

## 2024-02-16 NOTE — PROGRESS NOTES
[facility]  Progress Note  Name: South Humphrey III I  MRN: [id number]  Unit/Bed#: S -01 I Date of Admission: 2/12/2024   Date of Service: 2/16/2024 I Hospital Day: 4    Assessment/Plan   Atrial fibrillation with RVR (HCC)  Assessment & Plan  Hx PAF since 2019.  Presented with worsening exertional dyspnea, weight gain.  Rates 200s on arrival.    Monitor and replete electrolytes as needed.  Rate/Rhythm Control: no improvement with cardizem drip.  Now on amiodarone drip  Continue home dose metoprolol   [id number]   Antiplatelet/Anticoagulation: xarelto  Troponin: 21>20>15       Plan:  Rate control: Lopressor to 50 mg q6 and digoxin 125 mcg OD.  Rhythm control: amio gtt. Likely switch to oral amiodarone 400 mg twice daily tomorrow.  Monitor on telemetry.  Cardiology consulted.  Avoid diltiazem due to EF 20%.  Hold off of on cardioversion in the setting of volume overload.  Outpatient sleep study recommended for possible ANUSHA.  Continue Xarelto 20 mg daily.    * Acute on chronic heart failure (HCC)  Assessment & Plan  Wt Readings from Last 3 Encounters:   02/16/24 104 kg (229 lb 0.9 oz)   01/17/24 106 kg (233 lb)   01/10/24 100 kg (221 lb)     Echo EF 20%. Systolic function is severely reduced. Severe global hypokinesis with regional variation. RV dilated. Mild MV and TV regurgitation.  Weight 1 month back was 233 lb. On  lbs. Weight trending down in response to lasix.  BNP: 653  Diuresis: lasix 40 mg IV BID.   Beta-blocker: Lopressor in setting of Afib rvr.  Monitor intake and output, daily weights.  Diet: Fluid restriction and 2 g Sodium.  Cardiology on board.    Abnormal LFTs  Assessment & Plan  Elevated t.bili and lak phos, without any jaundice.  No history of liver disease.  Right upper quadrant ultrasound shows fatty infiltration of liver with trace perihepatic fluid. Patent portal vein with pulsatile flow possibly secondary to cardiac  etiology.    Immunocompromised patient ([facility])  Assessment & Plan  On Skyrizi for crohn's disease.  Received 2 injections.  Next due this week but is being held per GI    Type 2 diabetes mellitus with diabetic peripheral angiopathy without gangrene ([facility])  Assessment & Plan  Lab Results   Component Value Date    HGBA1C 8.2 (H) 01/31/2024     Lab Results   Component Value Date    GLUC 87 02/16/2024    GLUC 85 02/15/2024      Resume glimepiride on dc  Accucheck, SSI while inpatient   Patient is taking oral intake but hypoglcemic. Goal glucose above 60 due to increase in beta blocker can mask hypoglycemic symptoms. 1 amp dextrose if glucose <60.  Hypoglycemia  was likely in the setting of remnant glyburide in body in the setting of acute kidney dysfunction. Now resolving.    Ischemic cardiomyopathy  Assessment & Plan  Echo EF 20%. Systolic function is severely reduced. Severe global hypokinesis with regional variation. RV dilated. Mild MV and TV regurgitation.  BNP: 653  Beta-blocker: PTA toprol XL 50 mg AM, 25 mg HS. Switched to Lopressor in setting of Afib rvr.    CKD (chronic kidney disease), stage III ([facility])  Assessment & Plan  Creatinine 1.76 on admission. Baseline 1.25-1.4.  follows with nephrology as op  Avoid hypotension, nephrotoxins    Class 1 obesity due to excess calories with serious comorbidity and body mass index (BMI) of 33.0 to 33.9 in adult  Assessment & Plan  BMI 37.2  Outpatient follow up with PCP             VTE Pharmacologic Prophylaxis: VTE Score: 3 Moderate Risk (Score 3-4) - Pharmacological DVT Prophylaxis Ordered: rivaroxaban (Xarelto).    Mobility:   Basic Mobility Inpatient Raw Score: 24  JH-HLM Goal: 8: Walk 250 feet or more  JH-HLM Achieved: 8: Walk 250 feet ot more  HLM Goal achieved. Continue to encourage appropriate mobility.    Patient Centered Rounds: I performed bedside rounds with nursing staff today.  Discussions with Specialists or Other Care Team Provider: Cardiology    Education and  Discussions with Family / Patient: Patient declined call to .     Current Length of Stay: 4 day(s)  Current Patient Status: Inpatient   Discharge Plan: Anticipate discharge in 24-48 hrs to home.    Code Status: Level 1 - Full Code    Subjective:   Patient evaluated at bedside. He is having normal appetite and sleep.  Denies cough, shortness of breath or dyspnea on exertion, cough, wheezing, and orthopnea. He continues to have a good urine output.    Objective:     Vitals:   No data recorded.    HR:  [] 126  BP: ()/(69-90) 121/72  Body mass index is 32.87 kg/m².     Input and Output Summary (last 24 hours):     Intake/Output Summary (Last 24 hours) at 2/16/2024 1348  Last data filed at 2/16/2024 1053  Gross per 24 hour   Intake 2284.12 ml   Output 4900 ml   Net -2615.88 ml       Physical Exam:   Physical Exam  Vitals and nursing note reviewed.   Constitutional:       General: He is not in acute distress.     Appearance: He is well-developed.   HENT:      Head: Normocephalic and atraumatic.   Eyes:      Conjunctiva/sclera: Conjunctivae normal.   Cardiovascular:      Rate and Rhythm: Normal rate and regular rhythm.      Heart sounds: No murmur heard.  Pulmonary:      Effort: Pulmonary effort is normal. No respiratory distress.      Breath sounds: No wheezing, rhonchi or rales.   Abdominal:      General: There is no distension.      Palpations: Abdomen is soft.      Tenderness: There is no abdominal tenderness.   Musculoskeletal:         General: Swelling present.      Cervical back: Neck supple.      Right lower leg: Edema (2+) present.      Left lower leg: Edema (2+) present.   Skin:     General: Skin is warm and dry.      Capillary Refill: Capillary refill takes less than 2 seconds.   Neurological:      Mental Status: He is alert and oriented to person, place, and time.   Psychiatric:         Mood and Affect: Mood normal.          Additional Data:     Labs:  Results from last 7 days   Lab  Units 02/15/24  0000 02/14/24  0437 02/13/24  0429   WBC Thousand/uL 8.83   < > 10.21*   HEMOGLOBIN g/dL 14.5   < > 14.7   HEMATOCRIT % 46.5   < > 47.6   PLATELETS Thousands/uL 279   < > 307   NEUTROS PCT %  --   --  66   LYMPHS PCT %  --   --  22   MONOS PCT %  --   --  10   EOS PCT %  --   --  1    < > = values in this interval not displayed.     Results from last 7 days   Lab Units 02/16/24  0427   SODIUM mmol/L 140   POTASSIUM mmol/L 3.1*   CHLORIDE mmol/L 98   CO2 mmol/L 35*   BUN mg/dL 17   CREATININE mg/dL 1.12   ANION GAP mmol/L 7   CALCIUM mg/dL 8.1*   ALBUMIN g/dL 3.1*   TOTAL BILIRUBIN mg/dL 2.27*   ALK PHOS U/L 304*   ALT U/L 31   AST U/L 35   GLUCOSE RANDOM mg/dL 87         Results from last 7 days   Lab Units 02/16/24  1134 02/16/24  0708 02/15/24  2119 02/15/24  1553 02/15/24  1111 02/15/24  0659 02/14/24  2034 02/14/24  1602 02/14/24  1159 02/14/24  1136 02/14/24  1135 02/14/24  0857   POC GLUCOSE mg/dl 143* 83 120 144* 202* 83 92 77 100 53* 55* 89               Lines/Drains:  Invasive Devices       Peripheral Intravenous Line  Duration             Peripheral IV 02/15/24 Distal;Left;Ventral (anterior) Forearm <1 day                      Telemetry:  Telemetry Orders (From admission, onward)               24 Hour Telemetry Monitoring  Continuous x 24 Hours (Telem)        Question:  Reason for 24 Hour Telemetry  Answer:  Arrhythmias requiring acute medical intervention / PPM or ICD malfunction                     Telemetry Reviewed: Atrial fibrillation. HR averaging 160  Indication for Continued Telemetry Use: Arrthymias requiring medical therapy             Imaging: No pertinent imaging reviewed.    Recent Cultures (last 7 days):         Last 24 Hours Medication List:   Current Facility-Administered Medications   Medication Dose Route Frequency Provider Last Rate    albuterol  2 puff Inhalation Q6H PRN LENORA oNe      amiodarone (CORDARONE) 900 mg in dextrose 5 % 500 mL infusion  0.5 mg/min  Intravenous Continuous Monse Garcia MD 0.5 mg/min (02/16/24 0952)    aspirin  81 mg Oral Daily LENORA Noe      atorvastatin  20 mg Oral Daily LENORA Noe      vitamin B-12  1,000 mcg Oral Once per day on Monday Wednesday Friday LENORA Noe      digoxin  125 mcg Oral Daily Moisés Estrella DO      fluticasone  1 spray Nasal Daily LENORA Noe      folic acid  1 mg Oral Daily LENORA Noe      furosemide  40 mg Intravenous BID LENORA Noe      insulin lispro  1-5 Units Subcutaneous HS Hanane Platt MD      insulin lispro  2-12 Units Subcutaneous TID AC Hanane Platt MD      magnesium Oxide  400 mg Oral BID Tu Valle MD      metoprolol tartrate  50 mg Oral Q6H Moisés Estrella DO      nicotine  1 patch Transdermal Daily LENORA Noe      pantoprazole  40 mg Oral Early Morning LENORA Noe      rivaroxaban  20 mg Oral Daily With Breakfast LENORA Noe          Today, Patient Was Seen By: Katherine May MD    **Please Note: This note may have been constructed using a voice recognition system.**

## 2024-02-16 NOTE — PROGRESS NOTES
Cardiology Progress Note - Team 1  South Humphrey III 73 y.o. male MRN: [id number]  Unit/Bed#: S -01 Encounter: [id number]      Assessment/Plan:  1. Paroxysmal atrial fibrillation with RVR  - s/p loop w/ explantation (1/10/2024) - initially implanted secondary to PAD with concern for embolic etiology  - presenting ECG - atrial fibrillation with RVR, bifascicular block  - telemetry review - atrial flutter, HR 120s  - continue Lopressor 50 mg every 6 hours + digoxin 125 mcg p.o. daily for rate control  - continue amiodarone gtt at 0.5 mg/minute for rhythm management - can likely transition to 400 mg twice daily tomorrow  - hopeful improvement heart rate/rhythm with continued IV diuresis  - plan for cardioversion on Monday if he does not spontaneously convert over the weekend  - continue Xarelto for stroke prevention  - continue to monitor on telemetry     2. CM with LVEF 20%  3. Acute HFpEF  - likely in the setting of #1  - documented as likely ischemic cardiomyopathy, but no invasive ischemic evaluation in the past (normal stress test, 2018)  - echo (2/13/2024) - LVEF 20%, mild concentric hypertrophy, severe global hypokinesis, mildly dilated RV, mild MR/TR  - weight on admission 259 lb via bed scale - 229 lb this morning via standing scale; last outpatient weight at 233 lb (1/17/2024)  - net -4.1 L / 24 hours w/ 4.9 L UO documented  - remains volume overloaded on PE - continue Lasix 40 mg IV BID   - continue lopressor for GDMT - consider ACE/ARB/ARNI for further optimization; will defer to heart failure team  - possible LifeVest at discharge  - daily weights, strict I/Os, salt/fluid restriction     4. Acute respiratory failure with hypoxia  - likely secondary to #3  - remains on 2 L NC saturating 92% and above - wean as tolerable for SpO2 goal > 90%  - does not wear O2 at baseline     5. Dyslipidemia  - lipid panel (10/31/2023) - TC 94/triglycerides 84/HDL 37/LDL 40  - maintained on atorvastatin 20 mg daily  "outpatient; continue     6. PHUONG on CKD stage 3  - creatinine on arrival 1.76 -1.12; resolved w/ IV diuresis  - likely secondary to hypervolemia  - baseline 0.9-1.1  - continue to monitor with daily BMPs     7. History of bifascicular block/SVT  8. PAD s/p femoral popliteal bypass surgery  9. Tobacco abuse  10. Type 2 DM    Subjective:   Patient seen and examined.  No significant events overnight.  Patient offers no complaints this morning.  Denies any significant shortness of breath or chest pain.    Objective:   Vitals: Blood pressure 135/90, pulse (!) 135, temperature 97.9 °F (36.6 °C), temperature source Oral, resp. rate 18, height 5' 10\" (1.778 m), weight 104 kg (229 lb 0.9 oz), SpO2 92%., Body mass index is 32.87 kg/m².,   Orthostatic Blood Pressures      Flowsheet Row Most Recent Value   Blood Pressure 135/90 filed at 02/16/2024 0702   Patient Position - Orthostatic VS Lying filed at 02/15/2024 0700            Intake/Output Summary (Last 24 hours) at 2/16/2024 1046  Last data filed at 2/16/2024 1009  Gross per 24 hour   Intake 2404.12 ml   Output 5625 ml   Net -3220.88 ml     Physical Exam  Constitutional:       Appearance: He is obese.   HENT:      Head: Normocephalic.      Nose: Nose normal.      Mouth/Throat:      Pharynx: Oropharynx is clear.   Eyes:      Pupils: Pupils are equal, round, and reactive to light.   Cardiovascular:      Rate and Rhythm: Tachycardia present. Rhythm irregular.      Pulses: Normal pulses.      Heart sounds: Normal heart sounds.   Pulmonary:      Effort: Pulmonary effort is normal. No respiratory distress.      Breath sounds: No wheezing or rales.      Comments: Diminished/coarse breath sounds bilateral bases; on 2 L NC  Abdominal:      General: There is distension.   Musculoskeletal:         General: Normal range of motion.      Cervical back: Normal range of motion.      Right lower leg: Edema present.      Left lower leg: Edema present.      Comments: +2 bilateral lower " extremity edema   Skin:     General: Skin is warm and dry.      Capillary Refill: Capillary refill takes less than 2 seconds.   Neurological:      General: No focal deficit present.      Mental Status: He is alert and oriented to person, place, and time.   Psychiatric:         Mood and Affect: Mood normal.         Behavior: Behavior normal.     Medications:    Current Facility-Administered Medications:     albuterol (PROVENTIL HFA,VENTOLIN HFA) inhaler 2 puff, 2 puff, Inhalation, Q6H PRN, LENORA Noe    [] amiodarone (CORDARONE) 900 mg in dextrose 5 % 500 mL infusion, 1 mg/min, Intravenous, Continuous, Stopped at 24 0153 **FOLLOWED BY** amiodarone (CORDARONE) 900 mg in dextrose 5 % 500 mL infusion, 0.5 mg/min, Intravenous, Continuous, Monse Garcia MD, Last Rate: 16.7 mL/hr at 24, 0.5 mg/min at 24 09    aspirin (ECOTRIN LOW STRENGTH) EC tablet 81 mg, 81 mg, Oral, Daily, LENORA Noe, 81 mg at 24    atorvastatin (LIPITOR) tablet 20 mg, 20 mg, Oral, Daily, LENORA Noe, 20 mg at 24    cyanocobalamin (VITAMIN B-12) tablet 1,000 mcg, 1,000 mcg, Oral, Once per day on , LENORA Noe, 1,000 mcg at 24    digoxin (LANOXIN) tablet 125 mcg, 125 mcg, Oral, Daily, Moisés Estrella DO, 125 mcg at 24 08    fluticasone (FLONASE) 50 mcg/act nasal spray 1 spray, 1 spray, Nasal, Daily, LENORA Noe, 1 spray at 24 08    folic acid (FOLVITE) tablet 1 mg, 1 mg, Oral, Daily, LENORA Noe, 1 mg at 24    furosemide (LASIX) injection 40 mg, 40 mg, Intravenous, BID, LENORA Noe, 40 mg at 24    insulin lispro (HumaLOG) 100 units/mL subcutaneous injection 1-5 Units, 1-5 Units, Subcutaneous, HS, Hanane Platt MD    insulin lispro (HumaLOG) 100 units/mL subcutaneous injection 2-12 Units, 2-12 Units, Subcutaneous, TID AC, 4 Units at 02/15/24 1143 **AND** Fingerstick Glucose (POCT), , , TID AC,  Hanane Platt MD    magnesium Oxide (MAG-OX) tablet 400 mg, 400 mg, Oral, BID, Tu Valle MD, 400 mg at 02/16/24 0802    metoprolol tartrate (LOPRESSOR) tablet 50 mg, 50 mg, Oral, Q6H, Moisés Estrella DO, 50 mg at 02/16/24 0702    nicotine (NICODERM CQ) 7 mg/24hr TD 24 hr patch 1 patch, 1 patch, Transdermal, Daily, LENORA Noe    pantoprazole (PROTONIX) EC tablet 40 mg, 40 mg, Oral, Early Morning, LENORA Noe, 40 mg at 02/16/24 0433    rivaroxaban (XARELTO) tablet 20 mg, 20 mg, Oral, Daily With Breakfast, LENORA Noe, 20 mg at 02/16/24 0702     Labs & Results:      Results from last 7 days   Lab Units 02/15/24  0000 02/14/24 0437 02/13/24  0429   WBC Thousand/uL 8.83 9.24 10.21*   HEMOGLOBIN g/dL 14.5 14.6 14.7   HEMATOCRIT % 46.5 46.5 47.6   PLATELETS Thousands/uL 279 268 307         Results from last 7 days   Lab Units 02/16/24 0427 02/15/24  0350 02/14/24  0437 02/13/24  0429   POTASSIUM mmol/L 3.1* 3.9 3.3* 3.7   CHLORIDE mmol/L 98 103 107 109*   CO2 mmol/L 35* 32 27 22   BUN mg/dL 17 20 27* 28*   CREATININE mg/dL 1.12 1.31* 1.55* 1.45*   CALCIUM mg/dL 8.1* 7.7* 7.8* 8.2*   ALK PHOS U/L 304*  --  270* 306*   ALT U/L 31  --  29 32   AST U/L 35  --  37 39         Results from last 7 days   Lab Units 02/16/24 0427 02/14/24  0437 02/13/24  0429   MAGNESIUM mg/dL 1.7* 2.0 2.2

## 2024-02-16 NOTE — DISCHARGE INSTR - AVS FIRST PAGE
Dear South Humphrey III,     It was our pleasure to care for you here at [facility].  It is our hope that we were always able to exceed the expected standards for your care during your stay.  You were hospitalized due to atrial fibrillation and congestive heart failure.  You were cared for on the second floor by Katherine May MD under the service of Sylvester Thompson with the [facility] who covers for your primary care physician (PCP), Virgie Luz MD, while you were hospitalized.  If you have any questions or concerns related to this hospitalization, you may contact us at .  For follow up as well as any medication refills, we recommend that you follow up with your primary care physician.  A registered nurse will reach out to you by phone within a few days after your discharge to answer any additional questions that you may have after going home.  However, at this time we provide for you here, the most important instructions / recommendations at discharge:     Notable Medication Adjustments -   Please start taking Lasix 40 mg (1 tablet) by mouth daily. First dose tomorrow (2/21/23) morning.  Please start taking amiodarone 200 mg (1 tablet) 3 times with meals by mouth daily for 5 days through 2/24/2024. Followed by amiodarone 200 mg (1 tablet) by mouth once daily till your cardiologist makes any changes.  Change how you take metoprolol. Please start taking metoprolol 75 mg (3 tablets) twice daily starting today at 7pm.  Testing Required after Discharge -   None  Important follow up information -   Please follow-up with your primary care physician in 1 week.  Please keep your appointment with Dr. Ike Butts (heart failure specialist) on February 22, 2024 at 11:20 pm  Other Instructions -   You are advised to restrict sodium intake to 2gm daily and fluid restriction to 1800 mL daily.  You may require a zio patch outpatient to  monitor your heart rhythm.  You will undergo echocardiogram in 3 months.   Please review this entire after visit summary as additional general instructions including medication list, appointments, activity, diet, any pertinent wound care, and other additional recommendations from your care team that may be provided for you.      Sincerely,     Katherine May MD

## 2024-02-16 NOTE — PROGRESS NOTES
Heart Failure/ Pulmonary Hypertension Progress Note - [place] III 73 y.o. male MRN: [id number]    Unit/Bed#: S -01 Encounter: [id number]      Assessment:    Principal Problem:    Acute on chronic heart failure (HCC)  Active Problems:    Ischemic cardiomyopathy    Class 1 obesity due to excess calories with serious comorbidity and body mass index (BMI) of 33.0 to 33.9 in adult    CKD (chronic kidney disease), stage III (HCC)    Type 2 diabetes mellitus with diabetic peripheral angiopathy without gangrene (HCC)    Immunocompromised patient (HCC)    Atrial fibrillation with RVR (HCC)    Abnormal LFTs    Electrolyte abnormality      Interim:  Ins/outs: 780/4920: -4140  Weight: 229 lbs (246 lbs on 2/14)  Tele: afib 120's   MAPs:  79-90's mmHg  Oxygen: 2 liters  K 3.9  Cr 1.3    # Acute BiV HFrEF, Stage C  Etiology: tachy myopathy    Weight: 246 lbs--> 229 lbs  BNP    Studies- personally reviewed by me  EKG- afib with RVR, LAFB, RBBB    Echo 2/13/24  LVEF:  20%  LVIDd: 5.3 cm  RV: dilated, moderately reduced  MR: mild  PASP: 25 mmHg    Echo 6/29/22:  LVEF: 50-55%  LVEDd: 5.6 cm  RV: normal    NM stress 5/30/18: no defects    Neurohormonal Blockade:  --Beta-Blocker: metoprolol tartrate 50 mg Q6  --ACEi, ARB or ARNi:    (or SVR reduction)  --Aldosterone Receptor Blocker:  --SGLT2-I:   --Diuretic: lasix 40 mg IV BID    Sudden Cardiac Death Risk Reduction:  --ICD:     Electrical Resynchronization:  Native QRS: RBBB with LAFB    Advanced Therapies (If appropriate):  --Inotrope:  --LVAD/Transplant Candidacy:    # PAF/ flutter with RVR  AC: Xarelto 20 mg daily  Rate: metoprolol tartrate 50 mg Q6, digoxin 125 mcg daily  Rhythm: amiodarone gtt   Hx of loop recorder- explanted 1/10/24    # dyslipidemia- atorvastatin 20 mg daily  10/31/23: LDL 40, HDL 37    # PHUONG on CKD3  Baseline around 1.1, 1.76 on admit- now down to 1.1  # hx of bifascicular block/ SVT  # PAD s/ femoral popliteal bypass surgery  # tobacco  "abuse  # CKD 3, baseline 1.25-1.4  # crohn's dz, hx of SBO    Plan:  Continue metoprolol tartrate, continue dig in oral form  Continue amio gtt- can prob transition to 400 mg BID tomorrow  Continue furosemide 40 mg IV BID- responding well  Replace K  Do not want to cardiovert while filling pressures are high as will increase likelihood  Of going back into afib  Given that it is Friday, most likely plan is Monday unless he converts on his own as he is decongested      Review of Systems   Constitutional:  Negative for activity change, appetite change, fatigue and unexpected weight change.   HENT:  Negative for congestion and nosebleeds.    Eyes: Negative.    Respiratory:  Positive for shortness of breath. Negative for cough and chest tightness.    Cardiovascular:  Positive for palpitations and leg swelling. Negative for chest pain.   Gastrointestinal:  Negative for abdominal distention.   Endocrine: Negative.    Genitourinary: Negative.    Musculoskeletal: Negative.    Skin: Negative.    Neurological:  Negative for dizziness, syncope and weakness.   Hematological: Negative.    Psychiatric/Behavioral: Negative.          Central Line (day, reason):  Snyder catheter (day, reason):    Vitals: Blood pressure 135/90, pulse (!) 135, temperature 97.9 °F (36.6 °C), temperature source Oral, resp. rate 18, height 5' 10\" (1.778 m), weight 104 kg (229 lb 0.9 oz), SpO2 92%., Body mass index is 32.87 kg/m²., I/O last 3 completed shifts:  In: 1000 [P.O.:1000]  Out: 6770 [Urine:6770]  No intake/output data recorded.  Wt Readings from Last 3 Encounters:   02/16/24 104 kg (229 lb 0.9 oz)   01/17/24 106 kg (233 lb)   01/10/24 100 kg (221 lb)       Intake/Output Summary (Last 24 hours) at 2/16/2024 0802  Last data filed at 2/16/2024 0432  Gross per 24 hour   Intake 780 ml   Output 4800 ml   Net -4020 ml     I/O last 3 completed shifts:  In: 1000 [P.O.:1000]  Out: 6770 [Urine:6770]    No significant arrhythmias seen on telemetry review. "       Physical Exam:  Vitals:    02/15/24 2117 24 0433 24 0600 24 0702   BP: 106/77 98/69  135/90   BP Location:       Pulse: (!) 128 85  (!) 135   Resp:       Temp:       TempSrc:       SpO2:       Weight:   104 kg (229 lb 0.9 oz)    Height:           GEN: [place] III appears well, alert and oriented x 3, pleasant and cooperative   HEENT: pupils equal, round, and reactive to light; extraocular muscles intact  NECK: supple, no carotid bruits   HEART: irregularly irregular rhythm, normal S1 and S2, no murmurs, clicks, gallops or rubs, JVP is elevated   LUNGS: clear to auscultation bilaterally; no wheezes, rales, or rhonchi   ABDOMEN: normal bowel sounds, soft, no tenderness, no distention  EXTREMITIES: peripheral pulses normal; no clubbing, cyanosis, 3+  edema  NEURO: no focal findings   SKIN: normal without suspicious lesions on exposed skin      Current Facility-Administered Medications:     albuterol (PROVENTIL HFA,VENTOLIN HFA) inhaler 2 puff, 2 puff, Inhalation, Q6H PRN, LENORA Noe    [] amiodarone (CORDARONE) 900 mg in dextrose 5 % 500 mL infusion, 1 mg/min, Intravenous, Continuous, Stopped at 24 0153 **FOLLOWED BY** amiodarone (CORDARONE) 900 mg in dextrose 5 % 500 mL infusion, 0.5 mg/min, Intravenous, Continuous, Monse Garcia MD, Last Rate: 16.7 mL/hr at 02/15/24 0400, 0.5 mg/min at 02/15/24 0400    aspirin (ECOTRIN LOW STRENGTH) EC tablet 81 mg, 81 mg, Oral, Daily, LENORA Noe, 81 mg at 02/15/24 0938    atorvastatin (LIPITOR) tablet 20 mg, 20 mg, Oral, Daily, LENORA Noe, 20 mg at 02/15/24 0938    cyanocobalamin (VITAMIN B-12) tablet 1,000 mcg, 1,000 mcg, Oral, Once per day on , LENORA Noe, 1,000 mcg at 24 0740    digoxin (LANOXIN) tablet 125 mcg, 125 mcg, Oral, Daily, Moisés Estrella DO, 125 mcg at 02/15/24 0938    fluticasone (FLONASE) 50 mcg/act nasal spray 1 spray, 1 spray, Nasal, Daily, LENORA Noe, 1  spray at 02/15/24 0939    folic acid (FOLVITE) tablet 1 mg, 1 mg, Oral, Daily, LENORA Noe, 1 mg at 02/15/24 0939    furosemide (LASIX) injection 40 mg, 40 mg, Intravenous, BID, LENORA Noe, 40 mg at 02/15/24 1705    insulin lispro (HumaLOG) 100 units/mL subcutaneous injection 1-5 Units, 1-5 Units, Subcutaneous, HS, Hanane Platt MD    insulin lispro (HumaLOG) 100 units/mL subcutaneous injection 2-12 Units, 2-12 Units, Subcutaneous, TID AC, 4 Units at 02/15/24 1143 **AND** Fingerstick Glucose (POCT), , , TID AC, Hanane Platt MD    magnesium Oxide (MAG-OX) tablet 400 mg, 400 mg, Oral, BID, Tu Valle MD    metoprolol tartrate (LOPRESSOR) tablet 50 mg, 50 mg, Oral, Q6H, Moisés Estrella DO, 50 mg at 02/16/24 0702    nicotine (NICODERM CQ) 7 mg/24hr TD 24 hr patch 1 patch, 1 patch, Transdermal, Daily, LENORA Noe    pantoprazole (PROTONIX) EC tablet 40 mg, 40 mg, Oral, Early Morning, LENORA Noe, 40 mg at 02/16/24 0433    rivaroxaban (XARELTO) tablet 20 mg, 20 mg, Oral, Daily With Breakfast, LENORA Noe, 20 mg at 02/16/24 0702      Labs & Results:        Results from last 7 days   Lab Units 02/15/24  0000 02/14/24  0437 02/13/24  0429   WBC Thousand/uL 8.83 9.24 10.21*   HEMOGLOBIN g/dL 14.5 14.6 14.7   HEMATOCRIT % 46.5 46.5 47.6   PLATELETS Thousands/uL 279 268 307         Results from last 7 days   Lab Units 02/16/24  0427 02/15/24  0350 02/14/24  0437 02/13/24  0429   POTASSIUM mmol/L 3.1* 3.9 3.3* 3.7   CHLORIDE mmol/L 98 103 107 109*   CO2 mmol/L 35* 32 27 22   BUN mg/dL 17 20 27* 28*   CREATININE mg/dL 1.12 1.31* 1.55* 1.45*   CALCIUM mg/dL 8.1* 7.7* 7.8* 8.2*   ALK PHOS U/L 304*  --  270* 306*   ALT U/L 31  --  29 32   AST U/L 35  --  37 39           Counseling / Coordination of Care  Total floor / unit time spent today 40 minutes.  Greater than 50% of total time was spent with the patient and / or family counseling and / or coordination of care.  A description of the counseling /  coordination of care: 25.      Thank you for the opportunity to participate in the care of this patient.  YVETTE ZUNIGA D.O.  DIRECTOR OF HEART FAILURE/ PULMONARY HYPERTENSION  MEDICAL DIRECTOR OF LVAD PROGRAM  [facility]

## 2024-02-16 NOTE — PLAN OF CARE
Problem: CARDIOVASCULAR - ADULT  Goal: Maintains optimal cardiac output and hemodynamic stability  Description: INTERVENTIONS:  - Monitor I/O, vital signs and rhythm  - Monitor for S/S and trends of decreased cardiac output  - Administer and titrate ordered vasoactive medications to optimize hemodynamic stability  - Assess quality of pulses, skin color and temperature  - Assess for signs of decreased coronary artery perfusion  - Instruct patient to report change in severity of symptoms  Outcome: Progressing  Goal: Absence of cardiac dysrhythmias or at baseline rhythm  Description: INTERVENTIONS:  - Continuous cardiac monitoring, vital signs, obtain 12 lead EKG if ordered  - Administer antiarrhythmic and heart rate control medications as ordered  - Monitor electrolytes and administer replacement therapy as ordered  Outcome: Progressing     Problem: PAIN - ADULT  Goal: Verbalizes/displays adequate comfort level or baseline comfort level  Description: Interventions:  - Encourage patient to monitor pain and request assistance  - Assess pain using appropriate pain scale  - Administer analgesics based on type and severity of pain and evaluate response  - Implement non-pharmacological measures as appropriate and evaluate response  - Consider cultural and social influences on pain and pain management  - Notify physician/advanced practitioner if interventions unsuccessful or patient reports new pain  Outcome: Progressing     Problem: INFECTION - ADULT  Goal: Absence or prevention of progression during hospitalization  Description: INTERVENTIONS:  - Assess and monitor for signs and symptoms of infection  - Monitor lab/diagnostic results  - Monitor all insertion sites, i.e. indwelling lines, tubes, and drains  - Monitor endotracheal if appropriate and nasal secretions for changes in amount and color  - [place] appropriate cooling/warming therapies per order  - Administer medications as ordered  - Instruct and encourage  patient and family to use good hand hygiene technique  - Identify and instruct in appropriate isolation precautions for identified infection/condition  Outcome: Progressing  Goal: Absence of fever/infection during neutropenic period  Description: INTERVENTIONS:  - Monitor WBC    Outcome: Progressing     Problem: SAFETY ADULT  Goal: Patient will remain free of falls  Description: INTERVENTIONS:  - Educate patient/family on patient safety including physical limitations  - Instruct patient to call for assistance with activity   - Consult OT/PT to assist with strengthening/mobility   - Keep Call bell within reach  - Keep bed low and locked with side rails adjusted as appropriate  - Keep care items and personal belongings within reach  - Initiate and maintain comfort rounds  - Make Fall Risk Sign visible to staff  - Offer Toileting every 2 Hours, in advance of need  - Initiate/Maintain alarm  - Obtain necessary fall risk management equipment  - Apply yellow socks and bracelet for high fall risk patients  - Consider moving patient to room near nurses station  Outcome: Progressing  Goal: Maintain or return to baseline ADL function  Description: INTERVENTIONS:  -  Assess patient's ability to carry out ADLs; assess patient's baseline for ADL function and identify physical deficits which impact ability to perform ADLs (bathing, care of mouth/teeth, toileting, grooming, dressing, etc.)  - Assess/evaluate cause of self-care deficits   - Assess range of motion  - Assess patient's mobility; develop plan if impaired  - Assess patient's need for assistive devices and provide as appropriate  - Encourage maximum independence but intervene and supervise when necessary  - Involve family in performance of ADLs  - Assess for home care needs following discharge   - Consider OT consult to assist with ADL evaluation and planning for discharge  - Provide patient education as appropriate  Outcome: Progressing  Goal: Maintains/Returns to pre  admission functional level  Description: INTERVENTIONS:  - Perform AM-PAC 6 Click Basic Mobility/ Daily Activity assessment daily.  - Set and communicate daily mobility goal to care team and patient/family/caregiver.   - Collaborate with rehabilitation services on mobility goals if consulted  - Perform Range of Motion   - Reposition patient   - Dangle patient   - Stand patient   - Ambulate patient  - Out of bed to chair   - Out of bed for meals   - Out of bed for toileting  - Record patient progress and toleration of activity level   Outcome: Progressing     Problem: DISCHARGE PLANNING  Goal: Discharge to home or other facility with appropriate resources  Description: INTERVENTIONS:  - Identify barriers to discharge w/patient and caregiver  - Arrange for needed discharge resources and transportation as appropriate  - Identify discharge learning needs (meds, wound care, etc.)  - Arrange for interpretive services to assist at discharge as needed  - Refer to Case Management Department for coordinating discharge planning if the patient needs post-hospital services based on physician/advanced practitioner order or complex needs related to functional status, cognitive ability, or social support system  Outcome: Progressing     Problem: Knowledge Deficit  Goal: Patient/family/caregiver demonstrates understanding of disease process, treatment plan, medications, and discharge instructions  Description: Complete learning assessment and assess knowledge base.  Interventions:  - Provide teaching at level of understanding  - Provide teaching via preferred learning methods  Outcome: Progressing

## 2024-02-17 LAB
ANION GAP SERPL CALCULATED.3IONS-SCNC: 5 MMOL/L
BUN SERPL-MCNC: 17 MG/DL (ref 5–25)
CALCIUM SERPL-MCNC: 8.5 MG/DL (ref 8.4–10.2)
CHLORIDE SERPL-SCNC: 97 MMOL/L (ref 96–108)
CO2 SERPL-SCNC: 37 MMOL/L (ref 21–32)
CREAT SERPL-MCNC: 1.08 MG/DL (ref 0.6–1.3)
ERYTHROCYTE [DISTWIDTH] IN BLOOD BY AUTOMATED COUNT: 17.9 % (ref 11.6–15.1)
GFR SERPL CREATININE-BSD FRML MDRD: 68 ML/MIN/1.73SQ M
GLUCOSE SERPL-MCNC: 103 MG/DL (ref 65–140)
GLUCOSE SERPL-MCNC: 130 MG/DL (ref 65–140)
GLUCOSE SERPL-MCNC: 160 MG/DL (ref 65–140)
GLUCOSE SERPL-MCNC: 169 MG/DL (ref 65–140)
GLUCOSE SERPL-MCNC: 98 MG/DL (ref 65–140)
HCT VFR BLD AUTO: 47.6 % (ref 36.5–49.3)
HGB BLD-MCNC: 15 G/DL (ref 12–17)
MAGNESIUM SERPL-MCNC: 1.7 MG/DL (ref 1.9–2.7)
MCH RBC QN AUTO: 27.3 PG (ref 26.8–34.3)
MCHC RBC AUTO-ENTMCNC: 31.5 G/DL (ref 31.4–37.4)
MCV RBC AUTO: 87 FL (ref 82–98)
PLATELET # BLD AUTO: 267 THOUSANDS/UL (ref 149–390)
PMV BLD AUTO: 10.2 FL (ref 8.9–12.7)
POTASSIUM SERPL-SCNC: 3.7 MMOL/L (ref 3.5–5.3)
RBC # BLD AUTO: 5.5 MILLION/UL (ref 3.88–5.62)
SODIUM SERPL-SCNC: 139 MMOL/L (ref 135–147)
WBC # BLD AUTO: 7.27 THOUSAND/UL (ref 4.31–10.16)

## 2024-02-17 PROCEDURE — 99232 SBSQ HOSP IP/OBS MODERATE 35: CPT | Performed by: INTERNAL MEDICINE

## 2024-02-17 PROCEDURE — 83735 ASSAY OF MAGNESIUM: CPT | Performed by: INTERNAL MEDICINE

## 2024-02-17 PROCEDURE — 80048 BASIC METABOLIC PNL TOTAL CA: CPT | Performed by: INTERNAL MEDICINE

## 2024-02-17 PROCEDURE — 82948 REAGENT STRIP/BLOOD GLUCOSE: CPT

## 2024-02-17 PROCEDURE — 85027 COMPLETE CBC AUTOMATED: CPT | Performed by: INTERNAL MEDICINE

## 2024-02-17 RX ORDER — AMIODARONE HYDROCHLORIDE 200 MG/1
200 TABLET ORAL
Status: DISCONTINUED | OUTPATIENT
Start: 2024-02-17 | End: 2024-02-20 | Stop reason: HOSPADM

## 2024-02-17 RX ORDER — MAGNESIUM SULFATE HEPTAHYDRATE 40 MG/ML
2 INJECTION, SOLUTION INTRAVENOUS ONCE
Qty: 50 ML | Refills: 0 | Status: COMPLETED | OUTPATIENT
Start: 2024-02-17 | End: 2024-02-17

## 2024-02-17 RX ADMIN — PANTOPRAZOLE SODIUM 40 MG: 40 TABLET, DELAYED RELEASE ORAL at 05:19

## 2024-02-17 RX ADMIN — ATORVASTATIN CALCIUM 20 MG: 20 TABLET, FILM COATED ORAL at 08:36

## 2024-02-17 RX ADMIN — RIVAROXABAN 20 MG: 20 TABLET, FILM COATED ORAL at 08:37

## 2024-02-17 RX ADMIN — ASPIRIN 81 MG: 81 TABLET ORAL at 08:36

## 2024-02-17 RX ADMIN — FOLIC ACID 1 MG: 1 TABLET ORAL at 08:37

## 2024-02-17 RX ADMIN — AMIODARONE HYDROCHLORIDE 200 MG: 200 TABLET ORAL at 08:37

## 2024-02-17 RX ADMIN — Medication 400 MG: at 17:49

## 2024-02-17 RX ADMIN — DIGOXIN 125 MCG: 125 TABLET ORAL at 08:36

## 2024-02-17 RX ADMIN — METOPROLOL TARTRATE 50 MG: 50 TABLET, FILM COATED ORAL at 21:53

## 2024-02-17 RX ADMIN — FUROSEMIDE 40 MG: 10 INJECTION, SOLUTION INTRAMUSCULAR; INTRAVENOUS at 17:50

## 2024-02-17 RX ADMIN — AMIODARONE HYDROCHLORIDE 200 MG: 200 TABLET ORAL at 12:29

## 2024-02-17 RX ADMIN — INSULIN LISPRO 1 UNITS: 100 INJECTION, SOLUTION INTRAVENOUS; SUBCUTANEOUS at 21:53

## 2024-02-17 RX ADMIN — METOPROLOL TARTRATE 50 MG: 50 TABLET, FILM COATED ORAL at 16:33

## 2024-02-17 RX ADMIN — FUROSEMIDE 40 MG: 10 INJECTION, SOLUTION INTRAMUSCULAR; INTRAVENOUS at 08:37

## 2024-02-17 RX ADMIN — METOPROLOL TARTRATE 50 MG: 50 TABLET, FILM COATED ORAL at 02:36

## 2024-02-17 RX ADMIN — METOPROLOL TARTRATE 50 MG: 50 TABLET, FILM COATED ORAL at 08:37

## 2024-02-17 RX ADMIN — AMIODARONE HYDROCHLORIDE 200 MG: 200 TABLET ORAL at 16:33

## 2024-02-17 RX ADMIN — Medication 400 MG: at 08:37

## 2024-02-17 RX ADMIN — MAGNESIUM SULFATE HEPTAHYDRATE 2 G: 40 INJECTION, SOLUTION INTRAVENOUS at 08:38

## 2024-02-17 NOTE — PLAN OF CARE
Problem: CARDIOVASCULAR - ADULT  Goal: Maintains optimal cardiac output and hemodynamic stability  Description: INTERVENTIONS:  - Monitor I/O, vital signs and rhythm  - Monitor for S/S and trends of decreased cardiac output  - Administer and titrate ordered vasoactive medications to optimize hemodynamic stability  - Assess quality of pulses, skin color and temperature  - Assess for signs of decreased coronary artery perfusion  - Instruct patient to report change in severity of symptoms  Outcome: Progressing  Goal: Absence of cardiac dysrhythmias or at baseline rhythm  Description: INTERVENTIONS:  - Continuous cardiac monitoring, vital signs, obtain 12 lead EKG if ordered  - Administer antiarrhythmic and heart rate control medications as ordered  - Monitor electrolytes and administer replacement therapy as ordered  Outcome: Progressing     Problem: PAIN - ADULT  Goal: Verbalizes/displays adequate comfort level or baseline comfort level  Description: Interventions:  - Encourage patient to monitor pain and request assistance  - Assess pain using appropriate pain scale  - Administer analgesics based on type and severity of pain and evaluate response  - Implement non-pharmacological measures as appropriate and evaluate response  - Consider cultural and social influences on pain and pain management  - Notify physician/advanced practitioner if interventions unsuccessful or patient reports new pain  Outcome: Progressing     Problem: INFECTION - ADULT  Goal: Absence or prevention of progression during hospitalization  Description: INTERVENTIONS:  - Assess and monitor for signs and symptoms of infection  - Monitor lab/diagnostic results  - Monitor all insertion sites, i.e. indwelling lines, tubes, and drains  - Monitor endotracheal if appropriate and nasal secretions for changes in amount and color  - [place] appropriate cooling/warming therapies per order  - Administer medications as ordered  - Instruct and encourage  patient and family to use good hand hygiene technique  - Identify and instruct in appropriate isolation precautions for identified infection/condition  Outcome: Progressing  Goal: Absence of fever/infection during neutropenic period  Description: INTERVENTIONS:  - Monitor WBC    Outcome: Progressing     Problem: SAFETY ADULT  Goal: Patient will remain free of falls  Description: INTERVENTIONS:  - Educate patient/family on patient safety including physical limitations  - Instruct patient to call for assistance with activity   - Consult OT/PT to assist with strengthening/mobility   - Keep Call bell within reach  - Keep bed low and locked with side rails adjusted as appropriate  - Keep care items and personal belongings within reach  - Initiate and maintain comfort rounds  - Make Fall Risk Sign visible to staff  - Offer Toileting every 2 Hours, in advance of need  - Initiate/Maintain bed alarm  - Apply yellow socks and bracelet for high fall risk patients  - Consider moving patient to room near nurses station  Outcome: Progressing  Goal: Maintain or return to baseline ADL function  Description: INTERVENTIONS:  -  Assess patient's ability to carry out ADLs; assess patient's baseline for ADL function and identify physical deficits which impact ability to perform ADLs (bathing, care of mouth/teeth, toileting, grooming, dressing, etc.)  - Assess/evaluate cause of self-care deficits   - Assess range of motion  - Assess patient's mobility; develop plan if impaired  - Assess patient's need for assistive devices and provide as appropriate  - Encourage maximum independence but intervene and supervise when necessary  - Involve family in performance of ADLs  - Assess for home care needs following discharge   - Consider OT consult to assist with ADL evaluation and planning for discharge  - Provide patient education as appropriate  Outcome: Progressing  Goal: Maintains/Returns to pre admission functional level  Description:  INTERVENTIONS:  - Perform AM-PAC 6 Click Basic Mobility/ Daily Activity assessment daily.  - Set and communicate daily mobility goal to care team and patient/family/caregiver.   - Collaborate with rehabilitation services on mobility goals if consulted  - Perform Range of Motion 3 times a day.  - Reposition patient every 2 hours.  - Dangle patient 3 times a day  - Stand patient 3 times a day  - Ambulate patient 3 times a day  - Out of bed to chair 3 times a day   - Out of bed for meals 3 times a day  - Out of bed for toileting  - Record patient progress and toleration of activity level   Outcome: Progressing     Problem: DISCHARGE PLANNING  Goal: Discharge to home or other facility with appropriate resources  Description: INTERVENTIONS:  - Identify barriers to discharge w/patient and caregiver  - Arrange for needed discharge resources and transportation as appropriate  - Identify discharge learning needs (meds, wound care, etc.)  - Arrange for interpretive services to assist at discharge as needed  - Refer to Case Management Department for coordinating discharge planning if the patient needs post-hospital services based on physician/advanced practitioner order or complex needs related to functional status, cognitive ability, or social support system  Outcome: Progressing     Problem: Knowledge Deficit  Goal: Patient/family/caregiver demonstrates understanding of disease process, treatment plan, medications, and discharge instructions  Description: Complete learning assessment and assess knowledge base.  Interventions:  - Provide teaching at level of understanding  - Provide teaching via preferred learning methods  Outcome: Progressing

## 2024-02-17 NOTE — PLAN OF CARE
Problem: CARDIOVASCULAR - ADULT  Goal: Maintains optimal cardiac output and hemodynamic stability  Description: INTERVENTIONS:  - Monitor I/O, vital signs and rhythm  - Monitor for S/S and trends of decreased cardiac output  - Administer and titrate ordered vasoactive medications to optimize hemodynamic stability  - Assess quality of pulses, skin color and temperature  - Assess for signs of decreased coronary artery perfusion  - Instruct patient to report change in severity of symptoms  Outcome: Progressing  Goal: Absence of cardiac dysrhythmias or at baseline rhythm  Description: INTERVENTIONS:  - Continuous cardiac monitoring, vital signs, obtain 12 lead EKG if ordered  - Administer antiarrhythmic and heart rate control medications as ordered  - Monitor electrolytes and administer replacement therapy as ordered  Outcome: Progressing     Problem: PAIN - ADULT  Goal: Verbalizes/displays adequate comfort level or baseline comfort level  Description: Interventions:  - Encourage patient to monitor pain and request assistance  - Assess pain using appropriate pain scale  - Administer analgesics based on type and severity of pain and evaluate response  - Implement non-pharmacological measures as appropriate and evaluate response  - Consider cultural and social influences on pain and pain management  - Notify physician/advanced practitioner if interventions unsuccessful or patient reports new pain  Outcome: Progressing     Problem: INFECTION - ADULT  Goal: Absence or prevention of progression during hospitalization  Description: INTERVENTIONS:  - Assess and monitor for signs and symptoms of infection  - Monitor lab/diagnostic results  - Monitor all insertion sites, i.e. indwelling lines, tubes, and drains  - Monitor endotracheal if appropriate and nasal secretions for changes in amount and color  - [place] appropriate cooling/warming therapies per order  - Administer medications as ordered  - Instruct and encourage  patient and family to use good hand hygiene technique  - Identify and instruct in appropriate isolation precautions for identified infection/condition  Outcome: Progressing  Goal: Absence of fever/infection during neutropenic period  Description: INTERVENTIONS:  - Monitor WBC    Outcome: Progressing     Problem: SAFETY ADULT  Goal: Patient will remain free of falls  Description: INTERVENTIONS:  - Educate patient/family on patient safety including physical limitations  - Instruct patient to call for assistance with activity   - Consult OT/PT to assist with strengthening/mobility   - Keep Call bell within reach  - Keep bed low and locked with side rails adjusted as appropriate  - Keep care items and personal belongings within reach  - Initiate and maintain comfort rounds  - Make Fall Risk Sign visible to staff  - Offer Toileting every  Hours, in advance of need  - Initiate/Maintain alarm  - Obtain necessary fall risk management equipment:   - Apply yellow socks and bracelet for high fall risk patients  - Consider moving patient to room near nurses station  Outcome: Progressing  Goal: Maintain or return to baseline ADL function  Description: INTERVENTIONS:  -  Assess patient's ability to carry out ADLs; assess patient's baseline for ADL function and identify physical deficits which impact ability to perform ADLs (bathing, care of mouth/teeth, toileting, grooming, dressing, etc.)  - Assess/evaluate cause of self-care deficits   - Assess range of motion  - Assess patient's mobility; develop plan if impaired  - Assess patient's need for assistive devices and provide as appropriate  - Encourage maximum independence but intervene and supervise when necessary  - Involve family in performance of ADLs  - Assess for home care needs following discharge   - Consider OT consult to assist with ADL evaluation and planning for discharge  - Provide patient education as appropriate  Outcome: Progressing  Goal: Maintains/Returns to pre  admission functional level  Description: INTERVENTIONS:  - Perform AM-PAC 6 Click Basic Mobility/ Daily Activity assessment daily.  - Set and communicate daily mobility goal to care team and patient/family/caregiver.   - Collaborate with rehabilitation services on mobility goals if consulted  - Perform Range of Motion  times a day.  - Reposition patient every  hours.  - Dangle patient  times a day  - Stand patient  times a day  - Ambulate patient  times a day  - Out of bed to chair  times a day   - Out of bed for meals  times a day  - Out of bed for toileting  - Record patient progress and toleration of activity level   Outcome: Progressing

## 2024-02-17 NOTE — PROGRESS NOTES
[facility]  Progress Note  Name: [place] III I  MRN: [id number]  Unit/Bed#: S -01 I Date of Admission: 2/12/2024   Date of Service: 2/17/2024 I Hospital Day: 5    Assessment/Plan   Atrial fibrillation with RVR (HCC)  Assessment & Plan  Hx PAF since 2019.  Presented with worsening exertional dyspnea, weight gain.  Rates 200s on arrival.    Monitor and replete electrolytes as needed.  Rate/Rhythm Control: no improvement with cardizem drip.  Now on amiodarone drip  Continue home dose metoprolol   [id number]   Antiplatelet/Anticoagulation: xarelto  Troponin: 21>20>15       Plan:  Rate control: Lopressor to 50 mg q6 and digoxin 125 mcg OD.  Rhythm control: Switch from amio gtt. to p.o. amiodarone 200 mg TID, loading dose.   Anticoagulation: Xarelto 20 mg daily  Monitor on telemetry.  Cardiology consulted.  Avoid diltiazem due to EF 20%.  Possible cardioversion on Monday or Tuesday depending on patient's volume status.  Continue diuresing.  Outpatient sleep study recommended for possible ANUSHA.    * Acute on chronic heart failure (HCC)  Assessment & Plan  Wt Readings from Last 3 Encounters:   02/16/24 104 kg (229 lb 0.9 oz)   01/17/24 106 kg (233 lb)   01/10/24 100 kg (221 lb)     Echo EF 20%. Systolic function is severely reduced. Severe global hypokinesis with regional variation. RV dilated. Mild MV and TV regurgitation.  Weight 1 month back was 233 lb. On  lbs. Weight trending down in response to lasix.  BNP: 653  Diuresis: lasix 40 mg IV BID.   Beta-blocker: Lopressor in setting of Afib rvr.  Monitor intake and output, daily weights.  Diet: Fluid restriction and 2 g Sodium.  Cardiology on board.    Abnormal LFTs  Assessment & Plan  Elevated t.bili and lak phos, without any jaundice.  No history of liver disease.  Right upper quadrant ultrasound shows fatty infiltration of liver with trace perihepatic fluid. Patent portal vein with pulsatile flow possibly  secondary to cardiac etiology.    Immunocompromised patient ([facility])  Assessment & Plan  On Skyrizi for crohn's disease.  Received 2 injections.  Next due this week but is being held per GI    Type 2 diabetes mellitus with diabetic peripheral angiopathy without gangrene ([facility])  Assessment & Plan  Lab Results   Component Value Date    HGBA1C 8.2 (H) 01/31/2024     Lab Results   Component Value Date    GLUC 87 02/16/2024    GLUC 85 02/15/2024      Resume glimepiride on dc  Accucheck, SSI while inpatient   Patient is taking oral intake but hypoglcemic. Goal glucose above 60 due to increase in beta blocker can mask hypoglycemic symptoms. 1 amp dextrose if glucose <60.  Hypoglycemia  was likely in the setting of remnant glyburide in body in the setting of acute kidney dysfunction. Now resolving.    Ischemic cardiomyopathy  Assessment & Plan  Echo EF 20%. Systolic function is severely reduced. Severe global hypokinesis with regional variation. RV dilated. Mild MV and TV regurgitation.  BNP: 653  Beta-blocker: PTA toprol XL 50 mg AM, 25 mg HS. Switched to Lopressor in setting of Afib rvr.    CKD (chronic kidney disease), stage III ([facility])  Assessment & Plan  Creatinine 1.76 on admission. Baseline 1.25-1.4.  follows with nephrology as op  Avoid hypotension, nephrotoxins    Class 1 obesity due to excess calories with serious comorbidity and body mass index (BMI) of 33.0 to 33.9 in adult  Assessment & Plan  BMI 37.2  Outpatient follow up with PCP             VTE Pharmacologic Prophylaxis: VTE Score: 3 Moderate Risk (Score 3-4) - Pharmacological DVT Prophylaxis Ordered: rivaroxaban (Xarelto).    Mobility:   Basic Mobility Inpatient Raw Score: 24  JH-HLM Goal: 8: Walk 250 feet or more  JH-HLM Achieved: 7: Walk 25 feet or more  HLM Goal NOT achieved. Continue with multidisciplinary rounding and encourage appropriate mobility to improve upon HLM goals.    Patient Centered Rounds: I performed bedside rounds with nursing staff  today.  Discussions with Specialists or Other Care Team Provider: Cardiology    Education and Discussions with Family / Patient: Patient declined call to .     Current Length of Stay: 5 day(s)  Current Patient Status: Inpatient   Discharge Plan: Anticipate discharge in 48 hrs to home.    Code Status: Level 1 - Full Code    Subjective:   Patient was evaluated at bedside. Well appearing and in no acute distress A+Ox4. Patient had a coughing episode last night around 2 AM.  He stated that he felt like he choked on his own phlegm.  He was coughing violently for a long time.  I noted that the telemetry was also showing heart rate of 190-200 right after his coughing episode.  Today in the morning he denies cough, shortness of breath or dyspnea on exertion, wheezing, shortness of breath, orthopnea, and hemoptysis. Currently breathing on supplemental O2 2L/min via nasal canula.      Objective:     Vitals:   Temp (24hrs), Av.7 °F (36.5 °C), Min:97.6 °F (36.4 °C), Max:97.7 °F (36.5 °C)    Temp:  [97.6 °F (36.4 °C)-97.7 °F (36.5 °C)] 97.7 °F (36.5 °C)  HR:  [125-140] 140  Resp:  [18-19] 19  BP: ()/(49-90) 118/87  SpO2:  [93 %-95 %] 95 %  Body mass index is 32.01 kg/m².     Input and Output Summary (last 24 hours):     Intake/Output Summary (Last 24 hours) at 2024 0918  Last data filed at 2024 0853  Gross per 24 hour   Intake 2044.12 ml   Output 4900 ml   Net -2855.88 ml       Physical Exam:   Physical Exam  Vitals and nursing note reviewed.   Constitutional:       General: He is not in acute distress.     Appearance: He is well-developed.   HENT:      Head: Normocephalic and atraumatic.   Eyes:      Conjunctiva/sclera: Conjunctivae normal.   Cardiovascular:      Rate and Rhythm: Normal rate and regular rhythm.      Heart sounds: No murmur heard.  Pulmonary:      Effort: Pulmonary effort is normal. No respiratory distress.      Breath sounds: No wheezing, rhonchi or rales.   Abdominal:       General: There is no distension.      Palpations: Abdomen is soft.      Tenderness: There is no abdominal tenderness.   Musculoskeletal:         General: Swelling present.      Cervical back: Neck supple.      Right lower leg: Edema (2+, reduced from yesterday) present.      Left lower leg: Edema (2+, reduced from yesterday) present.   Skin:     General: Skin is warm and dry.      Capillary Refill: Capillary refill takes less than 2 seconds.   Neurological:      Mental Status: He is alert and oriented to person, place, and time.   Psychiatric:         Mood and Affect: Mood normal.          Additional Data:     Labs:  Results from last 7 days   Lab Units 02/17/24  0332 02/14/24  0437 02/13/24  0429   WBC Thousand/uL 7.27   < > 10.21*   HEMOGLOBIN g/dL 15.0   < > 14.7   HEMATOCRIT % 47.6   < > 47.6   PLATELETS Thousands/uL 267   < > 307   NEUTROS PCT %  --   --  66   LYMPHS PCT %  --   --  22   MONOS PCT %  --   --  10   EOS PCT %  --   --  1    < > = values in this interval not displayed.     Results from last 7 days   Lab Units 02/17/24  0332 02/16/24  0427   SODIUM mmol/L 139 140   POTASSIUM mmol/L 3.7 3.1*   CHLORIDE mmol/L 97 98   CO2 mmol/L 37* 35*   BUN mg/dL 17 17   CREATININE mg/dL 1.08 1.12   ANION GAP mmol/L 5 7   CALCIUM mg/dL 8.5 8.1*   ALBUMIN g/dL  --  3.1*   TOTAL BILIRUBIN mg/dL  --  2.27*   ALK PHOS U/L  --  304*   ALT U/L  --  31   AST U/L  --  35   GLUCOSE RANDOM mg/dL 103 87         Results from last 7 days   Lab Units 02/17/24  0732 02/16/24  2054 02/16/24  1649 02/16/24  1134 02/16/24  0708 02/15/24  2119 02/15/24  1553 02/15/24  1111 02/15/24  0659 02/14/24  2034 02/14/24  1602 02/14/24  1159   POC GLUCOSE mg/dl 98 129 138 143* 83 120 144* 202* 83 92 77 100               Lines/Drains:  Invasive Devices       Peripheral Intravenous Line  Duration             Peripheral IV 02/15/24 Distal;Left;Ventral (anterior) Forearm 1 day                      Telemetry:  Telemetry Orders (From admission,  onward)               24 Hour Telemetry Monitoring  Continuous x 24 Hours (Telem)        Question:  Reason for 24 Hour Telemetry  Answer:  Arrhythmias requiring acute medical intervention / PPM or ICD malfunction                     Telemetry Reviewed: Atrial fibrillation. HR averaging 128  Indication for Continued Telemetry Use: Arrthymias requiring medical therapy             Imaging: No pertinent imaging reviewed.    Recent Cultures (last 7 days):         Last 24 Hours Medication List:   Current Facility-Administered Medications   Medication Dose Route Frequency Provider Last Rate    albuterol  2 puff Inhalation Q6H PRN LENORA Noe      amiodarone  200 mg Oral TID With Meals Eliecer Barker,       aspirin  81 mg Oral Daily LENORA Noe      atorvastatin  20 mg Oral Daily LENORA Noe      vitamin B-12  1,000 mcg Oral Once per day on Monday Wednesday Friday LENORA Noe      digoxin  125 mcg Oral Daily Moisés Estrella DO      fluticasone  1 spray Nasal Daily LENORA Noe      folic acid  1 mg Oral Daily LENORA Noe      furosemide  40 mg Intravenous BID LENORA Noe      insulin lispro  1-5 Units Subcutaneous HS Hanane Platt MD      insulin lispro  2-12 Units Subcutaneous TID AC Hanane Platt MD      magnesium Oxide  400 mg Oral BID Tu Valle MD      magnesium sulfate  2 g Intravenous Once Katherien May MD 2 g (02/17/24 0838)    metoprolol tartrate  50 mg Oral Q6H Moisés Estrella DO      nicotine  1 patch Transdermal Daily LENORA Noe      pantoprazole  40 mg Oral Early Morning LENORA Noe      rivaroxaban  20 mg Oral Daily With Breakfast LENORA Noe          Today, Patient Was Seen By: Katherine May MD    **Please Note: This note may have been constructed using a voice recognition system.**

## 2024-02-17 NOTE — PROGRESS NOTES
"Cardiology Progress Note - [place] III 73 y.o. male MRN: [id number]    Unit/Bed#: S -01 Encounter: [id number]    Assessment and plan  #1 persistent atrial fibrillation/flutter  #2 acute heart failure with reduced ejection fraction  #3 cardiomyopathy ejection fraction 20% likely tachycardic induced  #4 acute hypoxemic respiratory failure  #5 acute kidney injury on CKD 3  #6 PAD  #7 tobacco abuse  #8 type 2 diabetes mellitus  #9 hyperlipidemia    Recommendations: Weights continue to come down he is diuresing well.  Renal function continues to improve.  Continue volume removal plan possible cardioversion Monday or Tuesday.  Discontinue IV amiodarone start oral 200 mg 3 times daily he has been on uninterrupted anticoagulation for greater than 30 days.  Continue loading precardioversion.      Subjective:    No significant events overnight.  Patient feels much better breathing is easier denies any chest pain, lightheadedness, dizziness, or syncope.    ROS    Objective:   Vitals: Blood pressure 118/87, pulse (!) 140, temperature 97.7 °F (36.5 °C), resp. rate 19, height 5' 10\" (1.778 m), weight 101 kg (223 lb 1.7 oz), SpO2 95%., Body mass index is 32.01 kg/m².,   Orthostatic Blood Pressures      Flowsheet Row Most Recent Value   Blood Pressure 118/87 filed at 2024 0734   Patient Position - Orthostatic VS Lying filed at 02/15/2024 0700           Systolic (24hrs), Av , Min:99 , Max:134     Diastolic (24hrs), Av, Min:49, Max:90      Intake/Output Summary (Last 24 hours) at 2024 0821  Last data filed at 2024 0241  Gross per 24 hour   Intake 2284.12 ml   Output 4600 ml   Net -2315.88 ml     Weight (last 2 days)       Date/Time Weight    24 0600 101 (223.11)    24 0300 101 (223.11)    24 0600 104 (229.06)    02/15/24 0752 107 (234.79)              Telemetry Review: No significant arrhythmias seen on telemetry review.   EKG personally reviewed by Eliecer Barker DO. "     Physical Exam  Vitals and nursing note reviewed.   Constitutional:       General: He is not in acute distress.     Appearance: He is well-developed.   HENT:      Head: Normocephalic and atraumatic.   Eyes:      Conjunctiva/sclera: Conjunctivae normal.      Pupils: Pupils are equal, round, and reactive to light.   Cardiovascular:      Rate and Rhythm: Regular rhythm. Tachycardia present.      Heart sounds: Normal heart sounds. No murmur heard.     No friction rub.   Pulmonary:      Effort: Pulmonary effort is normal. No respiratory distress.      Breath sounds: Normal breath sounds. No wheezing or rales.   Abdominal:      General: Bowel sounds are normal. There is no distension.      Palpations: Abdomen is soft.      Tenderness: There is no abdominal tenderness. There is no rebound.   Musculoskeletal:         General: No tenderness or deformity. Normal range of motion.      Cervical back: Neck supple.      Right lower leg: Edema present.      Left lower leg: Edema present.   Skin:     General: Skin is warm and dry.      Findings: No erythema.   Neurological:      Mental Status: He is alert and oriented to person, place, and time.      Cranial Nerves: No cranial nerve deficit.           Laboratory Results:        CBC with diff:   Results from last 7 days   Lab Units 02/17/24  0332 02/15/24  0000 02/14/24  0437 02/13/24  0429 02/12/24  1524   WBC Thousand/uL 7.27 8.83 9.24 10.21* 8.42   HEMOGLOBIN g/dL 15.0 14.5 14.6 14.7 15.2   HEMATOCRIT % 47.6 46.5 46.5 47.6 49.6*   MCV fL 87 88 88 88 88   PLATELETS Thousands/uL 267 279 268 307 311   RBC Million/uL 5.50 5.31 5.31 5.42 5.62   MCH pg 27.3 27.3 27.5 27.1 27.0   MCHC g/dL 31.5 31.2* 31.4 30.9* 30.6*   RDW % 17.9* 18.0* 18.4* 18.6* 19.2*   MPV fL 10.2 10.7 10.6 11.1 11.1   NRBC AUTO /100 WBCs  --   --   --  0 0         CMP:  Results from last 7 days   Lab Units 02/17/24  0332 02/16/24  0427 02/15/24  0350 02/14/24  0437 02/13/24  0429 02/12/24  1524   POTASSIUM  "mmol/L 3.7 3.1* 3.9 3.3* 3.7 4.6   CHLORIDE mmol/L 97 98 103 107 109* 109*   CO2 mmol/L 37* 35* 32 27 22 25   BUN mg/dL 17 17 20 27* 28* 34*   CREATININE mg/dL 1.08 1.12 1.31* 1.55* 1.45* 1.76*   CALCIUM mg/dL 8.5 8.1* 7.7* 7.8* 8.2* 8.6   AST U/L  --  35  --  37 39 38   ALT U/L  --  31  --  29 32 34   ALK PHOS U/L  --  304*  --  270* 306* 302*   EGFR ml/min/1.73sq m 68 65 54 44 47 37         BMP:  Results from last 7 days   Lab Units 02/17/24  0332 02/16/24  0427 02/15/24  0350 02/14/24  0437 02/13/24  0429 02/12/24  1524   POTASSIUM mmol/L 3.7 3.1* 3.9 3.3* 3.7 4.6   CHLORIDE mmol/L 97 98 103 107 109* 109*   CO2 mmol/L 37* 35* 32 27 22 25   BUN mg/dL 17 17 20 27* 28* 34*   CREATININE mg/dL 1.08 1.12 1.31* 1.55* 1.45* 1.76*   CALCIUM mg/dL 8.5 8.1* 7.7* 7.8* 8.2* 8.6       BNP: No results for input(s): \"BNP\" in the last 72 hours.    Magnesium:   Results from last 7 days   Lab Units 24  1524   MAGNESIUM mg/dL 1.7* 1.7* 2.0 2.2 2.1       Coags:       TSH:        Hemoglobin A1C       Lipid Profile:       Cardiac testing:   Results for orders placed during the hospital encounter of 18    Echo complete with contrast if indicated    [facility]  [address]  [place] [address] [phone]    Transthoracic Echocardiogram  2D, M-mode, Doppler, and Color Doppler    Study date:  27-Mar-2018    Patient: CARIDAD ORTIZ  MR number: [id number]  Account number: [id number]  : 1950  Age: 67 years  Gender: Male  Status: Outpatient  Location: [facility]  Height: 70 in  Weight: 203.5 lb  BP: 124/ 80 mmHg    Indications: Assess left ventricular function.    Diagnoses: R00.0 - Tachycardia, unspecified    Sonographer:  VIRGILIO Lanier  Primary Physician:  Lyndon Martínez DO  Referring Physician:  Erik Ramos MD  Group:  [facility]  Interpreting Physician:  Austin Cunha, " MD    SUMMARY    LEFT VENTRICLE:  Systolic function was mildly reduced. Ejection fraction was estimated to be 48 %.  There was severe hypokinesis of the apical anterior, mid anteroseptal, mid inferoseptal, apical inferior, apical septal, and apical wall(s).  Wall thickness was mildly increased.  Doppler parameters were consistent with abnormal left ventricular relaxation (grade 1 diastolic dysfunction).    MITRAL VALVE:  There was mild regurgitation.    TRICUSPID VALVE:  There was mild regurgitation.  Pulmonary artery systolic pressure was within the normal range.    HISTORY: PRIOR HISTORY: Tachycardia, Hypotension, Smoker, CKD    PROCEDURE: The study was performed in the [facility]. This was a routine study. The transthoracic approach was used. The study included complete 2D imaging, M-mode, complete spectral Doppler, and color Doppler. The  heart rate was 81 bpm, at the start of the study. Images were obtained from the parasternal, apical, subcostal, and suprasternal notch acoustic windows. Image quality was adequate.    LEFT VENTRICLE: Size was normal. Systolic function was mildly reduced. Ejection fraction was estimated to be 48 %. There was severe hypokinesis of the apical anterior, mid anteroseptal, mid inferoseptal, apical inferior, apical septal, and  apical wall(s). Wall thickness was mildly increased. DOPPLER: Doppler parameters were consistent with abnormal left ventricular relaxation (grade 1 diastolic dysfunction).    RIGHT VENTRICLE: The size was normal. Systolic function was normal. Wall thickness was normal.    LEFT ATRIUM: Size was normal.    RIGHT ATRIUM: Size was normal.    MITRAL VALVE: Valve structure was normal. There was normal leaflet separation. DOPPLER: The transmitral velocity was within the normal range. There was no evidence for stenosis. There was mild regurgitation.    AORTIC VALVE: The valve was trileaflet. Leaflets exhibited normal thickness and normal cuspal  separation. DOPPLER: Transaortic velocity was within the normal range. There was no evidence for stenosis. There was no significant  regurgitation.    TRICUSPID VALVE: The valve structure was normal. There was normal leaflet separation. DOPPLER: The transtricuspid velocity was within the normal range. There was no evidence for stenosis. There was mild regurgitation. Pulmonary artery  systolic pressure was within the normal range.    PULMONIC VALVE: Leaflets exhibited normal thickness, no calcification, and normal cuspal separation. DOPPLER: The transpulmonic velocity was within the normal range. There was mild regurgitation.    PERICARDIUM: There was no pericardial effusion. The pericardium was normal in appearance.    AORTA: The root exhibited normal size.    SYSTEMIC VEINS: IVC: The inferior vena cava was normal in size.    SYSTEM MEASUREMENT TABLES    2D  %FS: 25.28 %  AV Diam: 4.1 cm  EDV(Teich): 81.89 ml  EF(Cube): 58.29 %  EF(Teich): 50.23 %  ESV(Cube): 32.56 ml  ESV(Teich): 40.75 ml  IVSd: 1.27 cm  LA Area: 19.19 cm2  LA Diam: 3.81 cm  LVEDV MOD A4C: 91.16 ml  LVEF MOD A4C: 48.82 %  LVESV MOD A4C: 46.66 ml  LVIDd: 4.27 cm  LVIDs: 3.19 cm  LVLd A4C: 8.15 cm  LVLs A4C: 7.29 cm  LVPWd: 1.3 cm  RA Area: 15.96 cm2  RV Diam: 2.76 cm  SI(Cube): 21.57 ml/m2  SI(Teich): 19.49 ml/m2  SV MOD A4C: 44.5 ml  SV(Cube): 45.51 ml  SV(Teich): 41.13 ml    CW  TR MaxP mmHg  TR Vmax: 2.29 m/s    MM  TAPSE: 2.09 cm    PW  AVC: 374.23 ms  E': 0.05 m/s  E/E': 11.02  MV A Joaquin: 0.8 m/s  MV Dec Turner: 3.02 m/s2  MV DecT: 179.89 ms  MV E Joaquin: 0.54 m/s  MV E/A Ratio: 0.68    [employer] Commission Accredited Echocardiography Laboratory    Prepared and electronically signed by    Austin Cunha MD  Signed 27-Mar-2018 16:46:43    Results for orders placed during the hospital encounter of 19    DENIS    Narrative  [address] [place] [address] [phone]    Transesophageal  Echocardiogram  Limited 2D and Color Doppler    Study date:  2019    Patient: CARIDAD ORTIZ  MR number: [id number]  Account number: [id number]  : 1950  Age: 68 years  Gender: Male  Status: Outpatient  Location: Echo lab  Height: 70 in  Weight: 183 lb  BP: 93/ 55 mmHg    Indications: Rule out LV thrombus; going for a loop recorder device.    Diagnoses: I23.6 - Thrombosis of atrium, auricular appendage, and ventricle as current complications following acute my    Sonographer:  Mala North RDCS  Primary Physician:  Lyndon Martínez DO  Referring Physician:  Viky Grigsby MD  Group:  [facility]  RN:  Maria Teresa Still RN  RN:  Jennifer Seay RN  Interpreting Physician:  Viky Grigsby MD    SUMMARY    LEFT VENTRICLE:  Systolic function was at the lower limits of normal. Ejection fraction was estimated to be 50 %.  This study was inadequate for the evaluation of regional wall motion.    MITRAL VALVE:  There was mild regurgitation.    TRICUSPID VALVE:  There was mild regurgitation.    HISTORY: PRIOR HISTORY: CKD3. RBBB. PAD. Ischemic cardiomyopathy. Dyslipidemia. History of rheumatic fever.    PROCEDURE: The procedure was performed in the echo lab. This was a routine study. The risks and alternatives of the procedure were explained to the patient and informed consent was obtained. The transesophageal approach was used. The study  included limited 2D imaging, color Doppler, and probe insertion (without interpretation). The heart rate was 110 bpm, at the start of the study. An adult omniplane probe was inserted by the attending cardiologist. Intubated with ease. One  intubation attempt(s). There was no blood detected on the probe. Echocardiographic views were limited due to poor acoustic window availability and lung interference. This was a technically difficult study. There were no complications  during the procedure. MEDICATIONS: Anesthesia administered by anesthesia  team.    LEFT VENTRICLE: Size was normal. Systolic function was at the lower limits of normal. Ejection fraction was estimated to be 50 %. This study was inadequate for the evaluation of regional wall motion.    RIGHT VENTRICLE: The size was normal. Systolic function was normal.    LEFT ATRIUM: Size was normal. No thrombus was identified. APPENDAGE: The size was normal. No thrombus was identified.    RIGHT ATRIUM: Size was normal. No thrombus was identified.    MITRAL VALVE: There was normal leaflet separation. DOPPLER: There was mild regurgitation.    AORTIC VALVE: The valve was trileaflet. Leaflets exhibited normal cuspal separation. DOPPLER: There was no significant regurgitation.    TRICUSPID VALVE: There was normal leaflet separation. DOPPLER: There was mild regurgitation.    PULMONIC VALVE: Leaflets exhibited normal cuspal separation. DOPPLER: There was trace regurgitation.    AORTA: The root exhibited normal size. There was no atheroma. There was no evidence for dissection.    [facility] Accredited Echocardiography Laboratory    Prepared and electronically signed by    Viky Grigsby MD  Signed 2019 14:47:57    No results found for this or any previous visit.    Results for orders placed during the hospital encounter of 18    NM myocardial perfusion spect (stress and/or rest)    [address] [place], [place] [id number] [phone]    Rest/Stress Gated SPECT Myocardial Perfusion Imaging After Exercise    Patient: CARIDAD ORTIZ  MR number: [id number]  Account number: [id number]  : 1950  Age: 67 years  Gender: Male  Status: Outpatient  Location: Stress lab  Height: 69 in  Weight: 199 lb  BP: 122/ 86 mmHg    Allergies: NO KNOWN ALLERGIES    Diagnosis: R94.31 - Abnormal electrocardiogram [ECG] [EKG]    Primary Physician:  Lyndon Martínez DO  RN:  Taylor Stroud RN  Referring Physician:  Tarun Almanza MD  Group:  [facility]  Report Prepared By::  Taylor Stroud RN  Technician:  Maria De Jesus Del Angel  Interpreting Physician:  Viky Grigsby MD    INDICATIONS: Evaluation of known coronary artery disease.    HISTORY: The patient is a 67 year old  male. Chest pain status: no chest pain. Coronary artery disease risk factors: smoking. Cardiovascular history: coronary artery disease, arrhythmia-RBBB/LAFB, and ischemic  cardiomyopathy.ischemic cardiomyopathy Co-morbidity: obesity. Medications: no cardiac drugs and aspirin.    PHYSICAL EXAM: Baseline physical exam screening: no wheezes audible.    REST ECG: Normal sinus rhythm. The ECG showed right bundle branch block.    PROCEDURE: The study was performed in the stress lab. Treadmill exercise testing was performed, using the Mitesh protocol. Gated SPECT myocardial perfusion imaging was performed after stress. Systolic blood pressure was 122 mmHg, at the  start of the study. Diastolic blood pressure was 86 mmHg, at the start of the study. The heart rate was 104 bpm, at the start of the study. IV double checked.    MITESH PROTOCOL:  HR bpm SBP mmHg DBP mmHg Symptoms Rhythm/conduct  Baseline 104 122 86 none --  Stage 1 130 124 80 mild dyspnea --  Stage 2 151 162 90 moderate dyspnea, moderate fatigue rare PAC's  Recovery 1 146 170 90 subsiding rare PAC's  Recovery 2 121 150 90 none occasional PAC's  Recovery 3 116 -- -- -- --  No medications or fluids given.    STRESS SUMMARY: Duration of exercise was 6 min and 1 sec. The patient exercised to protocol stage 2. Maximal work rate was 7 METs. Functional capacity was normal. Maximal heart rate during stress was 151 bpm ( 98 % of maximal predicted  heart rate). The heart rate response to stress was normal. There was normal resting blood pressure with an appropriate response to stress. The rate-pressure product for the peak heart rate and blood pressure was 06738. There was no chest  pain during stress. The stress test was terminated due  to moderate dyspnea and moderate fatigue. Pre oxygen saturation: 96 %. Peak oxygen saturation: 97 %. The stress ECG was negative for ischemia and normal. Arrhythmia during stress:  isolated atrial premature beats.    ISOTOPE ADMINISTRATION:  Resting isotope administration Stress isotope administration  Agent Tetrofosmin Tetrofosmin  Dose 10.4 mCi 31.5 mCi  Date 05/30/2018 05/30/2018  Injection time 12:36 13:49  Injection-image interval 30 min 31 min    The radiopharmaceutical was injected one minute before the end of exercise.    MYOCARDIAL PERFUSION IMAGING:  The image quality was good. Left ventricular size was normal. The TID ratio was 1.14.    PERFUSION DEFECTS:  -  There were no perfusion defects.    GATED SPECT:  The calculated left ventricular ejection fraction was 54 %. Left ventricular ejection fraction was within normal limits by visual estimate. There was no left ventricular regional abnormality.    SUMMARY:  -  Stress results: Duration of exercise was 6 min and 1 sec. Target heart rate was achieved. There was no chest pain during stress.  -  ECG conclusions: The stress ECG was negative for ischemia and normal.  -  Perfusion imaging: There were no perfusion defects.  -  Gated SPECT: The calculated left ventricular ejection fraction was 54 %. Left ventricular ejection fraction was within normal limits by visual estimate. There was no left ventricular regional abnormality.    IMPRESSIONS: Normal study after maximal exercise. Myocardial perfusion imaging was normal at rest and with stress. Left ventricular systolic function was normal.    Prepared and signed by    Viky Grigsby MD  Signed 05/30/2018 15:22:08      Meds/Allergies   all current active meds have been reviewed  Medications Prior to Admission   Medication    albuterol (Ventolin HFA) 90 mcg/act inhaler    aspirin 81 MG tablet    atorvastatin (LIPITOR) 20 mg tablet    fluticasone (FLONASE) 50 mcg/act nasal spray    folic acid (FOLVITE) 1 mg  tablet    glimepiride (AMARYL) 2 mg tablet    Magnesium 500 MG TABS    metoprolol succinate (TOPROL-XL) 25 mg 24 hr tablet    metoprolol succinate (TOPROL-XL) 50 mg 24 hr tablet    omeprazole (PriLOSEC) 20 mg delayed release capsule    risankizumab-rzaa (Skyrizi) 360 MG/2.4ML SOCT    vitamin B-12 (VITAMIN B-12) 1,000 mcg tablet       amiodarone (CORDARONE) 900 mg in dextrose 5 % 500 mL infusion, 0.5 mg/min, Last Rate: 0.5 mg/min (02/16/24 0952)      Assessment:  Principal Problem:    Acute on chronic heart failure (HCC)  Active Problems:    Class 1 obesity due to excess calories with serious comorbidity and body mass index (BMI) of 33.0 to 33.9 in adult    CKD (chronic kidney disease), stage III (HCC)    Ischemic cardiomyopathy    Type 2 diabetes mellitus with diabetic peripheral angiopathy without gangrene (HCC)    Immunocompromised patient (HCC)    Atrial fibrillation with RVR (HCC)    Abnormal LFTs    Electrolyte abnormality            Counseling / Coordination of Care  Total floor / unit time spent today 25 minutes.  Greater than 50% of total time was spent with the patient and / or family counseling and / or coordination of care.  A description of the counseling / coordination of care: .

## 2024-02-18 LAB
ANION GAP SERPL CALCULATED.3IONS-SCNC: 5 MMOL/L
BUN SERPL-MCNC: 16 MG/DL (ref 5–25)
CALCIUM SERPL-MCNC: 8.6 MG/DL (ref 8.4–10.2)
CHLORIDE SERPL-SCNC: 97 MMOL/L (ref 96–108)
CO2 SERPL-SCNC: 38 MMOL/L (ref 21–32)
CREAT SERPL-MCNC: 1.16 MG/DL (ref 0.6–1.3)
GFR SERPL CREATININE-BSD FRML MDRD: 62 ML/MIN/1.73SQ M
GLUCOSE SERPL-MCNC: 103 MG/DL (ref 65–140)
GLUCOSE SERPL-MCNC: 150 MG/DL (ref 65–140)
GLUCOSE SERPL-MCNC: 157 MG/DL (ref 65–140)
GLUCOSE SERPL-MCNC: 181 MG/DL (ref 65–140)
GLUCOSE SERPL-MCNC: 93 MG/DL (ref 65–140)
MAGNESIUM SERPL-MCNC: 1.9 MG/DL (ref 1.9–2.7)
POTASSIUM SERPL-SCNC: 3.4 MMOL/L (ref 3.5–5.3)
SODIUM SERPL-SCNC: 140 MMOL/L (ref 135–147)

## 2024-02-18 PROCEDURE — 99232 SBSQ HOSP IP/OBS MODERATE 35: CPT | Performed by: INTERNAL MEDICINE

## 2024-02-18 PROCEDURE — 82948 REAGENT STRIP/BLOOD GLUCOSE: CPT

## 2024-02-18 PROCEDURE — 80048 BASIC METABOLIC PNL TOTAL CA: CPT

## 2024-02-18 PROCEDURE — 83735 ASSAY OF MAGNESIUM: CPT

## 2024-02-18 RX ORDER — POTASSIUM CHLORIDE 20 MEQ/1
40 TABLET, EXTENDED RELEASE ORAL ONCE
Status: COMPLETED | OUTPATIENT
Start: 2024-02-18 | End: 2024-02-18

## 2024-02-18 RX ORDER — FUROSEMIDE 10 MG/ML
40 INJECTION INTRAMUSCULAR; INTRAVENOUS DAILY
Status: DISCONTINUED | OUTPATIENT
Start: 2024-02-19 | End: 2024-02-20

## 2024-02-18 RX ADMIN — METOPROLOL TARTRATE 50 MG: 50 TABLET, FILM COATED ORAL at 22:12

## 2024-02-18 RX ADMIN — METOPROLOL TARTRATE 50 MG: 50 TABLET, FILM COATED ORAL at 04:29

## 2024-02-18 RX ADMIN — AMIODARONE HYDROCHLORIDE 200 MG: 200 TABLET ORAL at 13:13

## 2024-02-18 RX ADMIN — Medication 400 MG: at 16:57

## 2024-02-18 RX ADMIN — FLUTICASONE PROPIONATE 1 SPRAY: 50 SPRAY, METERED NASAL at 08:15

## 2024-02-18 RX ADMIN — METOPROLOL TARTRATE 50 MG: 50 TABLET, FILM COATED ORAL at 16:57

## 2024-02-18 RX ADMIN — INSULIN LISPRO 2 UNITS: 100 INJECTION, SOLUTION INTRAVENOUS; SUBCUTANEOUS at 16:57

## 2024-02-18 RX ADMIN — METOPROLOL TARTRATE 50 MG: 50 TABLET, FILM COATED ORAL at 10:21

## 2024-02-18 RX ADMIN — RIVAROXABAN 20 MG: 20 TABLET, FILM COATED ORAL at 08:19

## 2024-02-18 RX ADMIN — INSULIN LISPRO 2 UNITS: 100 INJECTION, SOLUTION INTRAVENOUS; SUBCUTANEOUS at 12:10

## 2024-02-18 RX ADMIN — FUROSEMIDE 40 MG: 10 INJECTION, SOLUTION INTRAMUSCULAR; INTRAVENOUS at 08:14

## 2024-02-18 RX ADMIN — ATORVASTATIN CALCIUM 20 MG: 20 TABLET, FILM COATED ORAL at 08:13

## 2024-02-18 RX ADMIN — ASPIRIN 81 MG: 81 TABLET ORAL at 08:13

## 2024-02-18 RX ADMIN — AMIODARONE HYDROCHLORIDE 200 MG: 200 TABLET ORAL at 16:57

## 2024-02-18 RX ADMIN — INSULIN LISPRO 1 UNITS: 100 INJECTION, SOLUTION INTRAVENOUS; SUBCUTANEOUS at 22:11

## 2024-02-18 RX ADMIN — FOLIC ACID 1 MG: 1 TABLET ORAL at 08:13

## 2024-02-18 RX ADMIN — AMIODARONE HYDROCHLORIDE 200 MG: 200 TABLET ORAL at 08:13

## 2024-02-18 RX ADMIN — DIGOXIN 125 MCG: 125 TABLET ORAL at 08:13

## 2024-02-18 RX ADMIN — Medication 400 MG: at 08:13

## 2024-02-18 RX ADMIN — POTASSIUM CHLORIDE 40 MEQ: 1500 TABLET, EXTENDED RELEASE ORAL at 08:19

## 2024-02-18 RX ADMIN — PANTOPRAZOLE SODIUM 40 MG: 40 TABLET, DELAYED RELEASE ORAL at 04:31

## 2024-02-18 NOTE — ASSESSMENT & PLAN NOTE
Wt Readings from Last 3 Encounters:   02/18/24 98.9 kg (218 lb 0.6 oz)   01/17/24 106 kg (233 lb)   01/10/24 100 kg (221 lb)     Echo 2/12/23: EF 20%. Systolic function is severely reduced. Severe global hypokinesis with regional variation. RV dilated. Mild MV and TV regurgitation.  Elevated BNP on admission.  Weight 1 month back was 233 lb. On  lbs. Weight trending down with diuresis.  Urine output decreased from 4.5 L to 2.5 L over the last 24 hours, creatinine has slightly increased.      Cardiology on board: decrease IV lasix to 40 mg QD.  Monitor intake and output, daily weights.  Diet: Cardiac with 1800 fluid restriction and 2 g Sodium.

## 2024-02-18 NOTE — ASSESSMENT & PLAN NOTE
Creatinine 1.76 on admission. Baseline 1.25-1.4.  follows with nephrology as op  Improving with diuresis.    Continue to monitor  Avoid hypotension, nephrotoxins

## 2024-02-18 NOTE — PROGRESS NOTES
"Cardiology Progress Note - [place] 73 y.o. male MRN: [id number]    Unit/Bed#: S -01 Encounter: [id number]    Assessment and plan  #1 persistent atrial fibrillation/flutter  #2 acute heart failure with reduced ejection fraction  #3 cardiomyopathy ejection fraction 20% likely tachycardic induced  #4 acute hypoxemic respiratory failure  #5 acute kidney injury on CKD 3  #6 PAD  #7 tobacco abuse  #8 type 2 diabetes mellitus  #9 hyperlipidemia    Recommendations: Decrease lasix to 40mg IV daily.  Heart rate stable at 120 in 2:1 flutter.  Weights have come down significantly.  CHF team eval in the am to decide timing of  DCCV.  Will keep NPO after midnight in case they feel he is ready tomorrow.  Keep K> 4.0, Continue xarelto.  Continue oral amio loading.  DC digoxin post cardioversion.      Subjective:    No significant events overnight.  Feels well, No CP, SOB has improved significantly.      ROS    Objective:   Vitals: Blood pressure 110/72, pulse (!) 125, temperature 97.5 °F (36.4 °C), resp. rate 22, height 5' 10\" (1.778 m), weight 98.9 kg (218 lb 0.6 oz), SpO2 91%., Body mass index is 31.28 kg/m².,   Orthostatic Blood Pressures      Flowsheet Row Most Recent Value   Blood Pressure 110/72 filed at 2024 0700   Patient Position - Orthostatic VS Sitting filed at 2024 2244           Systolic (24hrs), Av , Min:109 , Max:120     Diastolic (24hrs), Av, Min:72, Max:89      Intake/Output Summary (Last 24 hours) at 2024 0924  Last data filed at 2024 0801  Gross per 24 hour   Intake 530 ml   Output 2500 ml   Net -1970 ml     Weight (last 2 days)       Date/Time Weight    24 0545 98.9 (218.04)    24 0600 101 (223.11)    24 0300 101 (223.11)    24 0600 104 (229.06)              Telemetry Review: No significant arrhythmias seen on telemetry review.   EKG personally reviewed by Christopher Cutitta, DO.     Physical Exam:  Vital signs reviewed  General:  Alert " and cooperative, appears stated age, no acute distress  HEENT:  PERRLA, EOMI, no scleral icterus, no conjunctival pallor  Neck:  No lymphadenopathy, no thyromegaly, no carotid bruits, no elevated JVP  Heart:  Regular rate and rhythm, normal S1/S2, no S3/S4, no murmur, rubs or gallops.  PMI nondisplaced  Lungs:  Clear to auscultation bilaterally, no wheezes rales or rhonchi  Abdomen:  Soft, non-tender, positive bowel sounds, no rebound or guarding,   no organomegaly   Extremities:  Normal range of motion.  No clubbing, cyanosis or edema   Vascular:  2+ pedal pulses  Skin:  No rashes or lesions on exposed skin  Neurologic:  Cranial nerves II-XII grossly intact without focal deficits  Psych:  Normal mood and affect        Laboratory Results:        CBC with diff:   Results from last 7 days   Lab Units 02/17/24  0332 02/15/24  0000 02/14/24 0437 02/13/24  0429 02/12/24  1524   WBC Thousand/uL 7.27 8.83 9.24 10.21* 8.42   HEMOGLOBIN g/dL 15.0 14.5 14.6 14.7 15.2   HEMATOCRIT % 47.6 46.5 46.5 47.6 49.6*   MCV fL 87 88 88 88 88   PLATELETS Thousands/uL 267 279 268 307 311   RBC Million/uL 5.50 5.31 5.31 5.42 5.62   MCH pg 27.3 27.3 27.5 27.1 27.0   MCHC g/dL 31.5 31.2* 31.4 30.9* 30.6*   RDW % 17.9* 18.0* 18.4* 18.6* 19.2*   MPV fL 10.2 10.7 10.6 11.1 11.1   NRBC AUTO /100 WBCs  --   --   --  0 0         CMP:  Results from last 7 days   Lab Units 02/18/24  0428 02/17/24  0332 02/16/24  0427 02/15/24  0350 02/14/24  0437 02/13/24  0429 02/12/24  1524   POTASSIUM mmol/L 3.4* 3.7 3.1* 3.9 3.3* 3.7 4.6   CHLORIDE mmol/L 97 97 98 103 107 109* 109*   CO2 mmol/L 38* 37* 35* 32 27 22 25   BUN mg/dL 16 17 17 20 27* 28* 34*   CREATININE mg/dL 1.16 1.08 1.12 1.31* 1.55* 1.45* 1.76*   CALCIUM mg/dL 8.6 8.5 8.1* 7.7* 7.8* 8.2* 8.6   AST U/L  --   --  35  --  37 39 38   ALT U/L  --   --  31  --  29 32 34   ALK PHOS U/L  --   --  304*  --  270* 306* 302*   EGFR ml/min/1.73sq m 62 68 65 54 44 47 37         BMP:  Results from last 7 days  "  Lab Units 24  0428 24  0332 24  0427 02/15/24  0350 24  0437 24  04224  1524   POTASSIUM mmol/L 3.4* 3.7 3.1* 3.9 3.3* 3.7 4.6   CHLORIDE mmol/L 97 97 98 103 107 109* 109*   CO2 mmol/L 38* 37* 35* 32 27 22 25   BUN mg/dL 16 17 17 20 27* 28* 34*   CREATININE mg/dL 1.16 1.08 1.12 1.31* 1.55* 1.45* 1.76*   CALCIUM mg/dL 8.6 8.5 8.1* 7.7* 7.8* 8.2* 8.6       BNP: No results for input(s): \"BNP\" in the last 72 hours.    Magnesium:   Results from last 7 days   Lab Units 24  0428 24  0332 24  04224  0437 24  04224  1524   MAGNESIUM mg/dL 1.9 1.7* 1.7* 2.0 2.2 2.1       Coags:       TSH:        Hemoglobin A1C       Lipid Profile:       Cardiac testing:   Results for orders placed during the hospital encounter of 18    Echo complete with contrast if indicated    [id number] [place], [place] [id number] [phone]    Transthoracic Echocardiogram  2D, M-mode, Doppler, and Color Doppler    Study date:  27-Mar-2018    Patient: CARIDAD ORTIZ  MR number: [id number]  Account number: [id number]  : 1950  Age: 67 years  Gender: Male  Status: Outpatient  Location: [facility]  Height: 70 in  Weight: 203.5 lb  BP: 124/ 80 mmHg    Indications: Assess left ventricular function.    Diagnoses: R00.0 - Tachycardia, unspecified    Sonographer:  VIRGILIO Lanier  Primary Physician:  Lyndon Martínez DO  Referring Physician:  Erik Ramos MD  Group:  [facility]  Interpreting Physician:  Austin Cunha MD    SUMMARY    LEFT VENTRICLE:  Systolic function was mildly reduced. Ejection fraction was estimated to be 48 %.  There was severe hypokinesis of the apical anterior, mid anteroseptal, mid inferoseptal, apical inferior, apical septal, and apical wall(s).  Wall thickness was mildly increased.  Doppler parameters were consistent with abnormal left ventricular " relaxation (grade 1 diastolic dysfunction).    MITRAL VALVE:  There was mild regurgitation.    TRICUSPID VALVE:  There was mild regurgitation.  Pulmonary artery systolic pressure was within the normal range.    HISTORY: PRIOR HISTORY: Tachycardia, Hypotension, Smoker, CKD    PROCEDURE: The study was performed in the [facility]. This was a routine study. The transthoracic approach was used. The study included complete 2D imaging, M-mode, complete spectral Doppler, and color Doppler. The  heart rate was 81 bpm, at the start of the study. Images were obtained from the parasternal, apical, subcostal, and suprasternal notch acoustic windows. Image quality was adequate.    LEFT VENTRICLE: Size was normal. Systolic function was mildly reduced. Ejection fraction was estimated to be 48 %. There was severe hypokinesis of the apical anterior, mid anteroseptal, mid inferoseptal, apical inferior, apical septal, and  apical wall(s). Wall thickness was mildly increased. DOPPLER: Doppler parameters were consistent with abnormal left ventricular relaxation (grade 1 diastolic dysfunction).    RIGHT VENTRICLE: The size was normal. Systolic function was normal. Wall thickness was normal.    LEFT ATRIUM: Size was normal.    RIGHT ATRIUM: Size was normal.    MITRAL VALVE: Valve structure was normal. There was normal leaflet separation. DOPPLER: The transmitral velocity was within the normal range. There was no evidence for stenosis. There was mild regurgitation.    AORTIC VALVE: The valve was trileaflet. Leaflets exhibited normal thickness and normal cuspal separation. DOPPLER: Transaortic velocity was within the normal range. There was no evidence for stenosis. There was no significant  regurgitation.    TRICUSPID VALVE: The valve structure was normal. There was normal leaflet separation. DOPPLER: The transtricuspid velocity was within the normal range. There was no evidence for stenosis. There was mild  regurgitation. Pulmonary artery  systolic pressure was within the normal range.    PULMONIC VALVE: Leaflets exhibited normal thickness, no calcification, and normal cuspal separation. DOPPLER: The transpulmonic velocity was within the normal range. There was mild regurgitation.    PERICARDIUM: There was no pericardial effusion. The pericardium was normal in appearance.    AORTA: The root exhibited normal size.    SYSTEMIC VEINS: IVC: The inferior vena cava was normal in size.    SYSTEM MEASUREMENT TABLES    2D  %FS: 25.28 %  AV Diam: 4.1 cm  EDV(Teich): 81.89 ml  EF(Cube): 58.29 %  EF(Teich): 50.23 %  ESV(Cube): 32.56 ml  ESV(Teich): 40.75 ml  IVSd: 1.27 cm  LA Area: 19.19 cm2  LA Diam: 3.81 cm  LVEDV MOD A4C: 91.16 ml  LVEF MOD A4C: 48.82 %  LVESV MOD A4C: 46.66 ml  LVIDd: 4.27 cm  LVIDs: 3.19 cm  LVLd A4C: 8.15 cm  LVLs A4C: 7.29 cm  LVPWd: 1.3 cm  RA Area: 15.96 cm2  RV Diam: 2.76 cm  SI(Cube): 21.57 ml/m2  SI(Teich): 19.49 ml/m2  SV MOD A4C: 44.5 ml  SV(Cube): 45.51 ml  SV(Teich): 41.13 ml    CW  TR MaxP mmHg  TR Vmax: 2.29 m/s    MM  TAPSE: 2.09 cm    PW  AVC: 374.23 ms  E': 0.05 m/s  E/E': 11.02  MV A Joaquin: 0.8 m/s  MV Dec Rincon: 3.02 m/s2  MV DecT: 179.89 ms  MV E Joaquin: 0.54 m/s  MV E/A Ratio: 0.68    [employer] Accredited Echocardiography Laboratory    Prepared and electronically signed by    Austin Cunha MD  Signed 27-Mar-2018 16:46:43    Results for orders placed during the hospital encounter of 19    DENIS    Narrative  [id number] [phone]    Transesophageal Echocardiogram  Limited 2D and Color Doppler    Study date:  2019    Patient: CARIDAD ORTIZ  MR number: [id number]  Account number: [id number]  : 1950  Age: 68 years  Gender: Male  Status: Outpatient  Location: Echo lab  Height: 70 in  Weight: 183 lb  BP: 93/ 55 mmHg    Indications: Rule out LV thrombus; going for a loop recorder  device.    Diagnoses: I23.6 - Thrombosis of atrium, auricular appendage, and ventricle as current complications following acute my    Sonographer:  Mala North RDCS  Primary Physician:  Lyndon Martínez DO  Referring Physician:  Viky Grigsby MD  Group:  [facility]  RN:  Maria Teresa Still RN  RN:  Jennifer Seay RN  Interpreting Physician:  Viky Grigsby MD    SUMMARY    LEFT VENTRICLE:  Systolic function was at the lower limits of normal. Ejection fraction was estimated to be 50 %.  This study was inadequate for the evaluation of regional wall motion.    MITRAL VALVE:  There was mild regurgitation.    TRICUSPID VALVE:  There was mild regurgitation.    HISTORY: PRIOR HISTORY: CKD3. RBBB. PAD. Ischemic cardiomyopathy. Dyslipidemia. History of rheumatic fever.    PROCEDURE: The procedure was performed in the echo lab. This was a routine study. The risks and alternatives of the procedure were explained to the patient and informed consent was obtained. The transesophageal approach was used. The study  included limited 2D imaging, color Doppler, and probe insertion (without interpretation). The heart rate was 110 bpm, at the start of the study. An adult omniplane probe was inserted by the attending cardiologist. Intubated with ease. One  intubation attempt(s). There was no blood detected on the probe. Echocardiographic views were limited due to poor acoustic window availability and lung interference. This was a technically difficult study. There were no complications  during the procedure. MEDICATIONS: Anesthesia administered by anesthesia team.    LEFT VENTRICLE: Size was normal. Systolic function was at the lower limits of normal. Ejection fraction was estimated to be 50 %. This study was inadequate for the evaluation of regional wall motion.    RIGHT VENTRICLE: The size was normal. Systolic function was normal.    LEFT ATRIUM: Size was normal. No thrombus was identified. APPENDAGE: The size was  normal. No thrombus was identified.    RIGHT ATRIUM: Size was normal. No thrombus was identified.    MITRAL VALVE: There was normal leaflet separation. DOPPLER: There was mild regurgitation.    AORTIC VALVE: The valve was trileaflet. Leaflets exhibited normal cuspal separation. DOPPLER: There was no significant regurgitation.    TRICUSPID VALVE: There was normal leaflet separation. DOPPLER: There was mild regurgitation.    PULMONIC VALVE: Leaflets exhibited normal cuspal separation. DOPPLER: There was trace regurgitation.    AORTA: The root exhibited normal size. There was no atheroma. There was no evidence for dissection.    [facility] Accredited Echocardiography Laboratory    Prepared and electronically signed by    Viky Grigsby MD  Signed 2019 14:47:57    No results found for this or any previous visit.    Results for orders placed during the hospital encounter of 18    NM myocardial perfusion spect (stress and/or rest)    [address] [id number] [phone]    Rest/Stress Gated SPECT Myocardial Perfusion Imaging After Exercise    Patient: CARIDAD ORTIZ  MR number: [id number]  Account number: [id number]  : 1950  Age: 67 years  Gender: Male  Status: Outpatient  Location: Stress lab  Height: 69 in  Weight: 199 lb  BP: 122/ 86 mmHg    Allergies: NO KNOWN ALLERGIES    Diagnosis: R94.31 - Abnormal electrocardiogram [ECG] [EKG]    Primary Physician:  Lyndon Martínez DO  RN:  Taylor Stroud RN  Referring Physician:  Tarun Almanza MD  Group:  [facility]  Report Prepared By::  Taylor Stroud RN  Technician:  Maria De Jesus Del Angel  Interpreting Physician:  Viky Grigsby MD    INDICATIONS: Evaluation of known coronary artery disease.    HISTORY: The patient is a 67 year old  male. Chest pain status: no chest pain. Coronary artery disease risk factors: smoking. Cardiovascular history: coronary  artery disease, arrhythmia-RBBB/LAFB, and ischemic  cardiomyopathy.ischemic cardiomyopathy Co-morbidity: obesity. Medications: no cardiac drugs and aspirin.    PHYSICAL EXAM: Baseline physical exam screening: no wheezes audible.    REST ECG: Normal sinus rhythm. The ECG showed right bundle branch block.    PROCEDURE: The study was performed in the stress lab. Treadmill exercise testing was performed, using the Zia protocol. Gated SPECT myocardial perfusion imaging was performed after stress. Systolic blood pressure was 122 mmHg, at the  start of the study. Diastolic blood pressure was 86 mmHg, at the start of the study. The heart rate was 104 bpm, at the start of the study. IV double checked.    ZIA PROTOCOL:  HR bpm SBP mmHg DBP mmHg Symptoms Rhythm/conduct  Baseline 104 122 86 none --  Stage 1 130 124 80 mild dyspnea --  Stage 2 151 162 90 moderate dyspnea, moderate fatigue rare PAC's  Recovery 1 146 170 90 subsiding rare PAC's  Recovery 2 121 150 90 none occasional PAC's  Recovery 3 116 -- -- -- --  No medications or fluids given.    STRESS SUMMARY: Duration of exercise was 6 min and 1 sec. The patient exercised to protocol stage 2. Maximal work rate was 7 METs. Functional capacity was normal. Maximal heart rate during stress was 151 bpm ( 98 % of maximal predicted  heart rate). The heart rate response to stress was normal. There was normal resting blood pressure with an appropriate response to stress. The rate-pressure product for the peak heart rate and blood pressure was 45475. There was no chest  pain during stress. The stress test was terminated due to moderate dyspnea and moderate fatigue. Pre oxygen saturation: 96 %. Peak oxygen saturation: 97 %. The stress ECG was negative for ischemia and normal. Arrhythmia during stress:  isolated atrial premature beats.    ISOTOPE ADMINISTRATION:  Resting isotope administration Stress isotope administration  Agent Tetrofosmin Tetrofosmin  Dose 10.4 mCi 31.5  mCi  Date 05/30/2018 05/30/2018  Injection time 12:36 13:49  Injection-image interval 30 min 31 min    The radiopharmaceutical was injected one minute before the end of exercise.    MYOCARDIAL PERFUSION IMAGING:  The image quality was good. Left ventricular size was normal. The TID ratio was 1.14.    PERFUSION DEFECTS:  -  There were no perfusion defects.    GATED SPECT:  The calculated left ventricular ejection fraction was 54 %. Left ventricular ejection fraction was within normal limits by visual estimate. There was no left ventricular regional abnormality.    SUMMARY:  -  Stress results: Duration of exercise was 6 min and 1 sec. Target heart rate was achieved. There was no chest pain during stress.  -  ECG conclusions: The stress ECG was negative for ischemia and normal.  -  Perfusion imaging: There were no perfusion defects.  -  Gated SPECT: The calculated left ventricular ejection fraction was 54 %. Left ventricular ejection fraction was within normal limits by visual estimate. There was no left ventricular regional abnormality.    IMPRESSIONS: Normal study after maximal exercise. Myocardial perfusion imaging was normal at rest and with stress. Left ventricular systolic function was normal.    Prepared and signed by    Viky Grigsby MD  Signed 05/30/2018 15:22:08      Meds/Allergies   all current active meds have been reviewed  Medications Prior to Admission   Medication    albuterol (Ventolin HFA) 90 mcg/act inhaler    aspirin 81 MG tablet    atorvastatin (LIPITOR) 20 mg tablet    fluticasone (FLONASE) 50 mcg/act nasal spray    folic acid (FOLVITE) 1 mg tablet    glimepiride (AMARYL) 2 mg tablet    Magnesium 500 MG TABS    metoprolol succinate (TOPROL-XL) 25 mg 24 hr tablet    metoprolol succinate (TOPROL-XL) 50 mg 24 hr tablet    omeprazole (PriLOSEC) 20 mg delayed release capsule    risankizumab-rzaa (Skyrizi) 360 MG/2.4ML SOCT    vitamin B-12 (VITAMIN B-12) 1,000 mcg tablet             Assessment:  Principal Problem:    Acute on chronic heart failure (HCC)  Active Problems:    Class 1 obesity due to excess calories with serious comorbidity and body mass index (BMI) of 33.0 to 33.9 in adult    CKD (chronic kidney disease), stage III (HCC)    Ischemic cardiomyopathy    Type 2 diabetes mellitus with diabetic peripheral angiopathy without gangrene (HCC)    Immunocompromised patient (HCC)    Atrial fibrillation with RVR (HCC)    Abnormal LFTs    Electrolyte abnormality            Counseling / Coordination of Care  Total floor / unit time spent today 25 minutes.  Greater than 50% of total time was spent with the patient and / or family counseling and / or coordination of care.  A description of the counseling / coordination of care: .

## 2024-02-18 NOTE — PROGRESS NOTES
[facility]  Progress Note  Name: [place] III I  MRN: [id number]  Unit/Bed#: S -01 I Date of Admission: 2/12/2024   Date of Service: 2/18/2024 I Hospital Day: 6    Assessment/Plan   * Acute on chronic heart failure (HCC)  Assessment & Plan  Wt Readings from Last 3 Encounters:   02/18/24 98.9 kg (218 lb 0.6 oz)   01/17/24 106 kg (233 lb)   01/10/24 100 kg (221 lb)     Echo 2/12/23: EF 20%. Systolic function is severely reduced. Severe global hypokinesis with regional variation. RV dilated. Mild MV and TV regurgitation.  Elevated BNP on admission.  Weight 1 month back was 233 lb. On  lbs. Weight trending down with diuresis.  Urine output decreased from 4.5 L to 2.5 L over the last 24 hours, creatinine has slightly increased.      Cardiology on board: decrease IV lasix to 40 mg QD.  Monitor intake and output, daily weights.  Diet: Cardiac with 1800 fluid restriction and 2 g Sodium.    Atrial fibrillation with RVR ([facility])  Assessment & Plan  Hx PAF since 2019.  Presented with worsening exertional dyspnea, weight gain.    Rates 200s on arrival. Improving with rate and rhythm control  [id number]: 4 on Xarelto    Plan:  Cardiology consulted: Possible cardioversion tomorrow. NPO past midnight. Continue diuresing Lasix 40 IV QD.  Rate control: Lopressor to 50 mg q6 and digoxin 125 mcg OD (to be discontinued post cardioversion).  Rhythm control: Continue amiodarone 200 mg TID, loading dose.   Anticoagulation: Xarelto 20 mg daily  Monitor on telemetry.  Avoid diltiazem due to EF 20%.  Outpatient sleep study recommended for possible ANUSHA.    CKD (chronic kidney disease), stage III ([facility])  Assessment & Plan  Creatinine 1.76 on admission. Baseline 1.25-1.4.  follows with nephrology as op  Improving with diuresis.    Continue to monitor  Avoid hypotension, nephrotoxins    Ischemic cardiomyopathy  Assessment & Plan  Echo EF 20%. Systolic function is severely reduced. Severe global  hypokinesis with regional variation. RV dilated. Mild MV and TV regurgitation.  BNP elevated on admission.    Continue GDMT with lopressor, statin and Aspirin.    Type 2 diabetes mellitus with diabetic peripheral angiopathy without gangrene (HCC)  Assessment & Plan  Lab Results   Component Value Date    HGBA1C 8.2 (H) 01/31/2024     Lab Results   Component Value Date    GLUC 93 02/18/2024    GLUC 103 02/17/2024    Home regimen glimepiride    Accucheck, SSI while inpatient   Patient is taking oral intake but hypoglcemic. Goal glucose above 60 due to increase in beta blocker can mask hypoglycemic symptoms. 1 amp dextrose if glucose <60.  Hypoglycemia  was likely in the setting of remnant glyburide in body in the setting of acute kidney dysfunction. Now resolving.    Continue monitoring BG premeal and QHS  Hypoglycemia protocol  Insulin sliding scale    Immunocompromised patient (HCC)  Assessment & Plan  On Skyrizi for crohn's disease.  Received 2 injections.  Next due this week but is being held per GI    Abnormal LFTs  Assessment & Plan  Elevated t.bili and lak phos, without any jaundice.  No history of liver disease.  Right upper quadrant ultrasound shows fatty infiltration of liver with trace perihepatic fluid. Patent portal vein with pulsatile flow possibly secondary to cardiac etiology.    Class 1 obesity due to excess calories with serious comorbidity and body mass index (BMI) of 33.0 to 33.9 in adult  Assessment & Plan  BMI 37.2  Outpatient follow up with PCP    Electrolyte abnormality  Assessment & Plan  Secondary to duresis    Continue to monitor and replace as indicated.           VTE Pharmacologic Prophylaxis: VTE Score: 3 Moderate Risk (Score 3-4) - Pharmacological DVT Prophylaxis Ordered: rivaroxaban (Xarelto).    Mobility:   Basic Mobility Inpatient Raw Score: 24  JH-HLM Goal: 8: Walk 250 feet or more  JH-HLM Achieved: 8: Walk 250 feet ot more  HLM Goal achieved. Continue to encourage appropriate  mobility.    Patient Centered Rounds: I performed bedside rounds with nursing staff today.  Discussions with Specialists or Other Care Team Provider: Cardiology    Education and Discussions with Family / Patient: Attempted to update  (wife) via phone. Left voicemail.     Current Length of Stay: 6 day(s)  Current Patient Status: Inpatient   Discharge Plan: Anticipate discharge in 48-72 hrs to home.    Code Status: Level 1 - Full Code    Subjective:   Patient evaluated at bedside, he was sitting comfortably on the recliner, endorses no issues overnight. He states he has been walking around the unit 3 times without any shortness of breath.     Objective:     Vitals:   Temp (24hrs), Av.5 °F (36.4 °C), Min:97.5 °F (36.4 °C), Max:97.6 °F (36.4 °C)    Temp:  [97.5 °F (36.4 °C)-97.6 °F (36.4 °C)] 97.5 °F (36.4 °C)  HR:  [125-131] 125  Resp:  [18-22] 22  BP: (109-120)/(72-89) 113/83  SpO2:  [90 %-91 %] 91 %  Body mass index is 31.28 kg/m².     Input and Output Summary (last 24 hours):     Intake/Output Summary (Last 24 hours) at 2024 1044  Last data filed at 2024 0801  Gross per 24 hour   Intake 530 ml   Output 2250 ml   Net -1720 ml       Physical Exam:   Physical Exam  Vitals reviewed.   Constitutional:       General: He is not in acute distress.     Appearance: Normal appearance. He is well-developed. He is obese. He is not ill-appearing.   HENT:      Head: Normocephalic and atraumatic.      Mouth/Throat:      Mouth: Mucous membranes are moist.      Pharynx: Oropharynx is clear.   Eyes:      General: No scleral icterus.     Extraocular Movements: Extraocular movements intact.      Conjunctiva/sclera: Conjunctivae normal.   Cardiovascular:      Rate and Rhythm: Normal rate and regular rhythm.      Heart sounds: No murmur heard.  Pulmonary:      Effort: Pulmonary effort is normal. No respiratory distress.      Breath sounds: No wheezing, rhonchi or rales.   Abdominal:      General: There is no  distension.      Palpations: Abdomen is soft.      Tenderness: There is no abdominal tenderness.   Musculoskeletal:         General: Swelling present. No tenderness.      Cervical back: Neck supple.      Right lower leg: Edema (2+) present.      Left lower leg: Edema (2+) present.   Skin:     General: Skin is warm and dry.      Capillary Refill: Capillary refill takes less than 2 seconds.   Neurological:      Mental Status: He is alert and oriented to person, place, and time.   Psychiatric:         Mood and Affect: Mood normal.          Additional Data:     Labs:  Results from last 7 days   Lab Units 02/17/24  0332 02/14/24  0437 02/13/24  0429   WBC Thousand/uL 7.27   < > 10.21*   HEMOGLOBIN g/dL 15.0   < > 14.7   HEMATOCRIT % 47.6   < > 47.6   PLATELETS Thousands/uL 267   < > 307   NEUTROS PCT %  --   --  66   LYMPHS PCT %  --   --  22   MONOS PCT %  --   --  10   EOS PCT %  --   --  1    < > = values in this interval not displayed.     Results from last 7 days   Lab Units 02/18/24  0428 02/17/24  0332 02/16/24  0427   SODIUM mmol/L 140   < > 140   POTASSIUM mmol/L 3.4*   < > 3.1*   CHLORIDE mmol/L 97   < > 98   CO2 mmol/L 38*   < > 35*   BUN mg/dL 16   < > 17   CREATININE mg/dL 1.16   < > 1.12   ANION GAP mmol/L 5   < > 7   CALCIUM mg/dL 8.6   < > 8.1*   ALBUMIN g/dL  --   --  3.1*   TOTAL BILIRUBIN mg/dL  --   --  2.27*   ALK PHOS U/L  --   --  304*   ALT U/L  --   --  31   AST U/L  --   --  35   GLUCOSE RANDOM mg/dL 93   < > 87    < > = values in this interval not displayed.         Results from last 7 days   Lab Units 02/18/24  0715 02/17/24  2100 02/17/24  1603 02/17/24  1219 02/17/24  0732 02/16/24  2054 02/16/24  1649 02/16/24  1134 02/16/24  0708 02/15/24  2119 02/15/24  1553 02/15/24  1111   POC GLUCOSE mg/dl 103 160* 130 169* 98 129 138 143* 83 120 144* 202*               Lines/Drains:  Invasive Devices       Peripheral Intravenous Line  Duration             Peripheral IV 02/15/24 Distal;Left;Ventral  (anterior) Forearm 2 days                      Telemetry:  Telemetry Orders (From admission, onward)               24 Hour Telemetry Monitoring  Continuous x 24 Hours (Telem)        Expiring   Question:  Reason for 24 Hour Telemetry  Answer:  Arrhythmias requiring acute medical intervention / PPM or ICD malfunction                     Telemetry Reviewed: Atrial flutter. HR averaging 120  Indication for Continued Telemetry Use: Arrthymias requiring medical therapy             Imaging: Reviewed radiology reports from this admission including: chest xray, ultrasound(s), and ECHO    Recent Cultures (last 7 days):         Last 24 Hours Medication List:   Current Facility-Administered Medications   Medication Dose Route Frequency Provider Last Rate    albuterol  2 puff Inhalation Q6H PRN LENORA Noe      amiodarone  200 mg Oral TID With Meals Eliecer Barker DO      aspirin  81 mg Oral Daily LENORA Noe      atorvastatin  20 mg Oral Daily LENORA Noe      vitamin B-12  1,000 mcg Oral Once per day on Monday Wednesday Friday LENORA Noe      digoxin  125 mcg Oral Daily Moisés Estrella DO      fluticasone  1 spray Nasal Daily LENORA Noe      folic acid  1 mg Oral Daily LENORA Noe      [START ON 2/19/2024] furosemide  40 mg Intravenous Daily Eliecer Barker DO      insulin lispro  1-5 Units Subcutaneous HS Hanane Platt MD      insulin lispro  2-12 Units Subcutaneous TID AC Hanane Platt MD      magnesium Oxide  400 mg Oral BID Tu Valle MD      metoprolol tartrate  50 mg Oral Q6H Moisés Estrella DO      nicotine  1 patch Transdermal Daily LENORA Noe      pantoprazole  40 mg Oral Early Morning LENORA Noe      rivaroxaban  20 mg Oral Daily With Breakfast LENORA Noe          Today, Patient Was Seen By: Tu Valle MD    **Please Note: This note may have been constructed using a voice recognition system.**

## 2024-02-18 NOTE — ASSESSMENT & PLAN NOTE
Hx PAF since 2019.  Presented with worsening exertional dyspnea, weight gain.    Rates 200s on arrival. Improving with rate and rhythm control  [id number]: 4 on Xarelto    Plan:  Cardiology consulted: Possible cardioversion tomorrow. NPO past midnight. Continue diuresing Lasix 40 IV QD.  Rate control: Lopressor to 50 mg q6 and digoxin 125 mcg OD (to be discontinued post cardioversion).  Rhythm control: Continue amiodarone 200 mg TID, loading dose.   Anticoagulation: Xarelto 20 mg daily  Monitor on telemetry.  Avoid diltiazem due to EF 20%.  Outpatient sleep study recommended for possible ANUSHA.

## 2024-02-18 NOTE — ASSESSMENT & PLAN NOTE
Lab Results   Component Value Date    HGBA1C 8.2 (H) 01/31/2024     Lab Results   Component Value Date    GLUC 93 02/18/2024    GLUC 103 02/17/2024    Home regimen glimepiride    Accucheck, SSI while inpatient   Patient is taking oral intake but hypoglcemic. Goal glucose above 60 due to increase in beta blocker can mask hypoglycemic symptoms. 1 amp dextrose if glucose <60.  Hypoglycemia  was likely in the setting of remnant glyburide in body in the setting of acute kidney dysfunction. Now resolving.    Continue monitoring BG premeal and QHS  Hypoglycemia protocol  Insulin sliding scale

## 2024-02-18 NOTE — ASSESSMENT & PLAN NOTE
Elevated t.bili and lak phos, without any jaundice.  No history of liver disease.  Right upper quadrant ultrasound shows fatty infiltration of liver with trace perihepatic fluid. Patent portal vein with pulsatile flow possibly secondary to cardiac etiology.   Never

## 2024-02-18 NOTE — ASSESSMENT & PLAN NOTE
Echo EF 20%. Systolic function is severely reduced. Severe global hypokinesis with regional variation. RV dilated. Mild MV and TV regurgitation.  BNP elevated on admission.    Continue GDMT with lopressor, statin and Aspirin.

## 2024-02-19 ENCOUNTER — ANESTHESIA EVENT (INPATIENT)
Dept: NON INVASIVE DIAGNOSTICS | Facility: HOSPITAL | Age: 74
DRG: 291 | End: 2024-02-19
Payer: MEDICARE

## 2024-02-19 ENCOUNTER — HOSPITAL ENCOUNTER (OUTPATIENT)
Dept: NON INVASIVE DIAGNOSTICS | Facility: HOSPITAL | Age: 74
Discharge: HOME | DRG: 291 | End: 2024-02-19
Payer: MEDICARE

## 2024-02-19 LAB
ANION GAP SERPL CALCULATED.3IONS-SCNC: 5 MMOL/L
BUN SERPL-MCNC: 19 MG/DL (ref 5–25)
CALCIUM SERPL-MCNC: 9 MG/DL (ref 8.4–10.2)
CHLORIDE SERPL-SCNC: 100 MMOL/L (ref 96–108)
CO2 SERPL-SCNC: 35 MMOL/L (ref 21–32)
CREAT SERPL-MCNC: 1.18 MG/DL (ref 0.6–1.3)
GFR SERPL CREATININE-BSD FRML MDRD: 61 ML/MIN/1.73SQ M
GLUCOSE SERPL-MCNC: 108 MG/DL (ref 65–140)
GLUCOSE SERPL-MCNC: 108 MG/DL (ref 65–140)
GLUCOSE SERPL-MCNC: 115 MG/DL (ref 65–140)
GLUCOSE SERPL-MCNC: 133 MG/DL (ref 65–140)
GLUCOSE SERPL-MCNC: 155 MG/DL (ref 65–140)
MAGNESIUM SERPL-MCNC: 1.8 MG/DL (ref 1.9–2.7)
POTASSIUM SERPL-SCNC: 3.8 MMOL/L (ref 3.5–5.3)
SODIUM SERPL-SCNC: 140 MMOL/L (ref 135–147)

## 2024-02-19 PROCEDURE — 80048 BASIC METABOLIC PNL TOTAL CA: CPT | Performed by: INTERNAL MEDICINE

## 2024-02-19 PROCEDURE — 92960 CARDIOVERSION ELECTRIC EXT: CPT

## 2024-02-19 PROCEDURE — 93312 ECHO TRANSESOPHAGEAL: CPT

## 2024-02-19 PROCEDURE — 83735 ASSAY OF MAGNESIUM: CPT | Performed by: INTERNAL MEDICINE

## 2024-02-19 PROCEDURE — 93005 ELECTROCARDIOGRAM TRACING: CPT

## 2024-02-19 PROCEDURE — 82948 REAGENT STRIP/BLOOD GLUCOSE: CPT

## 2024-02-19 PROCEDURE — 99232 SBSQ HOSP IP/OBS MODERATE 35: CPT | Performed by: INTERNAL MEDICINE

## 2024-02-19 PROCEDURE — 92960 CARDIOVERSION ELECTRIC EXT: CPT | Performed by: INTERNAL MEDICINE

## 2024-02-19 PROCEDURE — 99232 SBSQ HOSP IP/OBS MODERATE 35: CPT | Performed by: STUDENT IN AN ORGANIZED HEALTH CARE EDUCATION/TRAINING PROGRAM

## 2024-02-19 RX ORDER — MAGNESIUM SULFATE HEPTAHYDRATE 40 MG/ML
2 INJECTION, SOLUTION INTRAVENOUS ONCE
Status: COMPLETED | OUTPATIENT
Start: 2024-02-19 | End: 2024-02-19

## 2024-02-19 RX ORDER — SODIUM CHLORIDE 9 MG/ML
INJECTION, SOLUTION INTRAVENOUS CONTINUOUS PRN
Status: DISCONTINUED | OUTPATIENT
Start: 2024-02-19 | End: 2024-02-19

## 2024-02-19 RX ORDER — SODIUM CHLORIDE 9 MG/ML
50 INJECTION, SOLUTION INTRAVENOUS CONTINUOUS
Status: CANCELLED | OUTPATIENT
Start: 2024-02-19

## 2024-02-19 RX ORDER — LIDOCAINE HYDROCHLORIDE 20 MG/ML
INJECTION, SOLUTION EPIDURAL; INFILTRATION; INTRACAUDAL; PERINEURAL AS NEEDED
Status: DISCONTINUED | OUTPATIENT
Start: 2024-02-19 | End: 2024-02-19

## 2024-02-19 RX ORDER — PROPOFOL 10 MG/ML
INJECTION, EMULSION INTRAVENOUS AS NEEDED
Status: DISCONTINUED | OUTPATIENT
Start: 2024-02-19 | End: 2024-02-19

## 2024-02-19 RX ADMIN — DIGOXIN 125 MCG: 125 TABLET ORAL at 08:38

## 2024-02-19 RX ADMIN — PROPOFOL 30 MG: 10 INJECTION, EMULSION INTRAVENOUS at 12:59

## 2024-02-19 RX ADMIN — SODIUM CHLORIDE: 0.9 INJECTION, SOLUTION INTRAVENOUS at 12:41

## 2024-02-19 RX ADMIN — PROPOFOL 40 MG: 10 INJECTION, EMULSION INTRAVENOUS at 12:55

## 2024-02-19 RX ADMIN — ATORVASTATIN CALCIUM 20 MG: 20 TABLET, FILM COATED ORAL at 08:38

## 2024-02-19 RX ADMIN — PROPOFOL 20 MG: 10 INJECTION, EMULSION INTRAVENOUS at 13:03

## 2024-02-19 RX ADMIN — AMIODARONE HYDROCHLORIDE 200 MG: 200 TABLET ORAL at 08:38

## 2024-02-19 RX ADMIN — LIDOCAINE HYDROCHLORIDE 100 MG: 20 INJECTION, SOLUTION EPIDURAL; INFILTRATION; INTRACAUDAL; PERINEURAL at 12:53

## 2024-02-19 RX ADMIN — PROPOFOL 30 MG: 10 INJECTION, EMULSION INTRAVENOUS at 12:57

## 2024-02-19 RX ADMIN — NOREPINEPHRINE BITARTRATE 16 MCG: 1 INJECTION, SOLUTION, CONCENTRATE INTRAVENOUS at 12:53

## 2024-02-19 RX ADMIN — NOREPINEPHRINE BITARTRATE 16 MCG: 1 INJECTION, SOLUTION, CONCENTRATE INTRAVENOUS at 12:57

## 2024-02-19 RX ADMIN — Medication 400 MG: at 17:34

## 2024-02-19 RX ADMIN — NOREPINEPHRINE BITARTRATE 16 MCG: 1 INJECTION, SOLUTION, CONCENTRATE INTRAVENOUS at 12:59

## 2024-02-19 RX ADMIN — CYANOCOBALAMIN TAB 500 MCG 1000 MCG: 500 TAB at 08:38

## 2024-02-19 RX ADMIN — RIVAROXABAN 20 MG: 20 TABLET, FILM COATED ORAL at 08:38

## 2024-02-19 RX ADMIN — Medication 400 MG: at 08:38

## 2024-02-19 RX ADMIN — AMIODARONE HYDROCHLORIDE 200 MG: 200 TABLET ORAL at 16:52

## 2024-02-19 RX ADMIN — NOREPINEPHRINE BITARTRATE 16 MCG: 1 INJECTION, SOLUTION, CONCENTRATE INTRAVENOUS at 13:04

## 2024-02-19 RX ADMIN — PROPOFOL 30 MG: 10 INJECTION, EMULSION INTRAVENOUS at 13:02

## 2024-02-19 RX ADMIN — INSULIN LISPRO 2 UNITS: 100 INJECTION, SOLUTION INTRAVENOUS; SUBCUTANEOUS at 16:52

## 2024-02-19 RX ADMIN — NOREPINEPHRINE BITARTRATE 16 MCG: 1 INJECTION, SOLUTION, CONCENTRATE INTRAVENOUS at 12:55

## 2024-02-19 RX ADMIN — PANTOPRAZOLE SODIUM 40 MG: 40 TABLET, DELAYED RELEASE ORAL at 04:09

## 2024-02-19 RX ADMIN — METOPROLOL TARTRATE 50 MG: 50 TABLET, FILM COATED ORAL at 08:38

## 2024-02-19 RX ADMIN — PROPOFOL 120 MG: 10 INJECTION, EMULSION INTRAVENOUS at 12:53

## 2024-02-19 RX ADMIN — METOPROLOL TARTRATE 50 MG: 50 TABLET, FILM COATED ORAL at 04:09

## 2024-02-19 RX ADMIN — METOPROLOL TARTRATE 50 MG: 50 TABLET, FILM COATED ORAL at 22:44

## 2024-02-19 RX ADMIN — METOPROLOL TARTRATE 50 MG: 50 TABLET, FILM COATED ORAL at 15:20

## 2024-02-19 RX ADMIN — MAGNESIUM SULFATE HEPTAHYDRATE 2 G: 40 INJECTION, SOLUTION INTRAVENOUS at 09:24

## 2024-02-19 RX ADMIN — FLUTICASONE PROPIONATE 1 SPRAY: 50 SPRAY, METERED NASAL at 08:40

## 2024-02-19 RX ADMIN — FUROSEMIDE 40 MG: 10 INJECTION, SOLUTION INTRAMUSCULAR; INTRAVENOUS at 08:39

## 2024-02-19 RX ADMIN — FOLIC ACID 1 MG: 1 TABLET ORAL at 08:38

## 2024-02-19 RX ADMIN — ASPIRIN 81 MG: 81 TABLET ORAL at 08:38

## 2024-02-19 NOTE — ASSESSMENT & PLAN NOTE
Wt Readings from Last 3 Encounters:   02/19/24 97.9 kg (215 lb 13.3 oz)   01/17/24 106 kg (233 lb)   01/10/24 100 kg (221 lb)     Echo 2/12/23: EF 20%. Systolic function is severely reduced. Severe global hypokinesis with regional variation. RV dilated. Mild MV and TV regurgitation.  Elevated BNP on admission.  Weight 1 month back was 233 lb. On  lbs. Weight trending down with diuresis.  Urine output decreased, and creatinine uptrending.    Plan:  Lasix 40 mg p.o. daily  Cardiology will follow up outpatient.  1800 fluid restriction and 2 g Sodium.  Echo in 3 months

## 2024-02-19 NOTE — PROGRESS NOTES
[facility]  Progress Note  Name: [place] III I  MRN: [id number]  Unit/Bed#: S -01 I Date of Admission: 2/12/2024   Date of Service: 2/19/2024 I Hospital Day: 7    Assessment/Plan   Atrial fibrillation with RVR (HCC)  Assessment & Plan  Hx PAF since 2019.  Presented with worsening exertional dyspnea, weight gain.    Rates 200s on arrival. Improving with rate and rhythm control  [id number]: 4 on Xarelto    Plan:  Cardiology consulted: Cardioversion today. Decrease Lasix to 40 IV daily.  Rate control: Lopressor to 50 mg q6 and digoxin 125 mcg OD (to be discontinued post cardioversion).  Rhythm control: Continue amiodarone 200 mg TID, loading dose.   Anticoagulation: Xarelto 20 mg daily  Monitor on telemetry.  Avoid diltiazem due to EF 20%.  Outpatient sleep study recommended for possible ANUSHA.    * Acute on chronic heart failure (HCC)  Assessment & Plan  Wt Readings from Last 3 Encounters:   02/19/24 97.9 kg (215 lb 13.3 oz)   01/17/24 106 kg (233 lb)   01/10/24 100 kg (221 lb)     Echo 2/12/23: EF 20%. Systolic function is severely reduced. Severe global hypokinesis with regional variation. RV dilated. Mild MV and TV regurgitation.  Elevated BNP on admission.  Weight 1 month back was 233 lb. On  lbs. Weight trending down with diuresis.  Urine output decreased, and creatinine uptrending.    Cardiology on board: decrease IV lasix to 40 mg daily.  Monitor intake and output, daily weights.  Diet: Cardiac with 1800 fluid restriction and 2 g Sodium.    Electrolyte abnormality  Assessment & Plan  Secondary to duresis    Continue to monitor and replace as indicated.    Abnormal LFTs  Assessment & Plan  Elevated t.bili and lak phos, without any jaundice.  No history of liver disease.  Right upper quadrant ultrasound shows fatty infiltration of liver with trace perihepatic fluid. Patent portal vein with pulsatile flow possibly secondary to cardiac etiology.    Immunocompromised  patient ([facility])  Assessment & Plan  On Skyrizi for crohn's disease.  Received 2 injections.  Next due this week but is being held per GI    Type 2 diabetes mellitus with diabetic peripheral angiopathy without gangrene ([facility])  Assessment & Plan  Lab Results   Component Value Date    HGBA1C 8.2 (H) 01/31/2024     Lab Results   Component Value Date    GLUC 108 02/19/2024    GLUC 93 02/18/2024    Home regimen glimepiride    Accucheck, SSI while inpatient   Patient is taking oral intake but hypoglcemic. Goal glucose above 60 due to increase in beta blocker can mask hypoglycemic symptoms. 1 amp dextrose if glucose <60.  Hypoglycemia  was likely in the setting of remnant glyburide in body in the setting of acute kidney dysfunction. Now resolving.    Continue monitoring BG premeal and QHS  Hypoglycemia protocol  Insulin sliding scale    Ischemic cardiomyopathy  Assessment & Plan  Echo EF 20%. Systolic function is severely reduced. Severe global hypokinesis with regional variation. RV dilated. Mild MV and TV regurgitation.  BNP elevated on admission.    Continue maximum GDMT with lopressor, statin and Aspirin.    CKD (chronic kidney disease), stage III ([facility])  Assessment & Plan  Creatinine 1.76 on admission. Baseline 1.25-1.4.  follows with nephrology as op  Improving with diuresis.    Continue to monitor  Avoid hypotension, nephrotoxins    Class 1 obesity due to excess calories with serious comorbidity and body mass index (BMI) of 33.0 to 33.9 in adult  Assessment & Plan  BMI 37.2  Outpatient follow up with PCP    Acute kidney injury superimposed on chronic kidney disease -resolved as of 2/12/2024  Assessment & Plan  Lab Results   Component Value Date    EGFR 61 02/19/2024    EGFR 62 02/18/2024    EGFR 68 02/17/2024    CREATININE 1.18 02/19/2024    CREATININE 1.16 02/18/2024    CREATININE 1.08 02/17/2024                VTE Pharmacologic Prophylaxis: VTE Score: 3 Moderate Risk (Score 3-4) - Pharmacological DVT Prophylaxis  Ordered: rivaroxaban (Xarelto).    Mobility:   Basic Mobility Inpatient Raw Score: 24  JH-HLM Goal: 8: Walk 250 feet or more  JH-HLM Achieved: 6: Walk 10 steps or more  HLM Goal NOT achieved. Continue with multidisciplinary rounding and encourage appropriate mobility to improve upon HLM goals.    Patient Centered Rounds: I performed bedside rounds with nursing staff today.  Discussions with Specialists or Other Care Team Provider: Cardiology    Education and Discussions with Family / Patient: Updated  (wife) at bedside.    Current Length of Stay: 7 day(s)  Current Patient Status: Inpatient   Discharge Plan: Anticipate discharge in 24-48 hrs to home.    Code Status: Level 1 - Full Code    Subjective:   Patient was evaluated at bedside.  Patient upset about no progress in his symptoms even after explaining him to regarding the management.  Patient states he will leave tomorrow at 12 PM regardless.  Patient denies any chest pain, shortness of breath and states that he has been ambulating around the unit without any difficulty.  Patient is upset about being n.p.o. and wants to have a meal.    Objective:     Vitals:   Temp (24hrs), Av.7 °F (36.5 °C), Min:97.3 °F (36.3 °C), Max:98.2 °F (36.8 °C)    Temp:  [97.3 °F (36.3 °C)-98.2 °F (36.8 °C)] 97.6 °F (36.4 °C)  HR:  [] 77  Resp:  [18-22] 19  BP: ()/(57-88) 121/75  SpO2:  [91 %-96 %] 95 %  Body mass index is 30.97 kg/m².     Input and Output Summary (last 24 hours):     Intake/Output Summary (Last 24 hours) at 2024 1649  Last data filed at 2024 1306  Gross per 24 hour   Intake 640 ml   Output 500 ml   Net 140 ml       Physical Exam:   Physical Exam  Vitals and nursing note reviewed.   Constitutional:       General: He is not in acute distress.     Appearance: He is well-developed.   HENT:      Head: Normocephalic and atraumatic.   Eyes:      Conjunctiva/sclera: Conjunctivae normal.   Cardiovascular:      Rate and Rhythm: Normal rate  and regular rhythm.      Heart sounds: No murmur heard.     Comments: Patient is status post cardioversion.  Heart rate improved.  Pulmonary:      Effort: Pulmonary effort is normal. No respiratory distress.      Breath sounds: No wheezing, rhonchi or rales.   Abdominal:      General: There is no distension.      Palpations: Abdomen is soft.      Tenderness: There is no abdominal tenderness.   Musculoskeletal:         General: Swelling (Bilateral pedal edema is improving.) present.      Cervical back: Neck supple.      Right lower leg: Edema (1+) present.      Left lower leg: Edema (1+) present.   Skin:     General: Skin is warm and dry.      Capillary Refill: Capillary refill takes less than 2 seconds.   Neurological:      Mental Status: He is alert and oriented to person, place, and time.   Psychiatric:         Mood and Affect: Mood normal.          Additional Data:     Labs:  Results from last 7 days   Lab Units 02/17/24  0332 02/14/24  0437 02/13/24  0429   WBC Thousand/uL 7.27   < > 10.21*   HEMOGLOBIN g/dL 15.0   < > 14.7   HEMATOCRIT % 47.6   < > 47.6   PLATELETS Thousands/uL 267   < > 307   NEUTROS PCT %  --   --  66   LYMPHS PCT %  --   --  22   MONOS PCT %  --   --  10   EOS PCT %  --   --  1    < > = values in this interval not displayed.     Results from last 7 days   Lab Units 02/19/24  0537 02/17/24  0332 02/16/24  0427   SODIUM mmol/L 140   < > 140   POTASSIUM mmol/L 3.8   < > 3.1*   CHLORIDE mmol/L 100   < > 98   CO2 mmol/L 35*   < > 35*   BUN mg/dL 19   < > 17   CREATININE mg/dL 1.18   < > 1.12   ANION GAP mmol/L 5   < > 7   CALCIUM mg/dL 9.0   < > 8.1*   ALBUMIN g/dL  --   --  3.1*   TOTAL BILIRUBIN mg/dL  --   --  2.27*   ALK PHOS U/L  --   --  304*   ALT U/L  --   --  31   AST U/L  --   --  35   GLUCOSE RANDOM mg/dL 108   < > 87    < > = values in this interval not displayed.         Results from last 7 days   Lab Units 02/19/24  1131 02/19/24  0703 02/18/24  2150 02/18/24  1622 02/18/24  1044  02/18/24  0715 02/17/24  2100 02/17/24  1603 02/17/24  1219 02/17/24  0732 02/16/24  2054 02/16/24  1649   POC GLUCOSE mg/dl 115 108 150* 181* 157* 103 160* 130 169* 98 129 138               Lines/Drains:  Invasive Devices       Peripheral Intravenous Line  Duration             Peripheral IV 02/15/24 Distal;Left;Ventral (anterior) Forearm 3 days    Peripheral IV 02/19/24 Dorsal (posterior);Right Hand <1 day                      Telemetry:  Telemetry Orders (From admission, onward)               24 Hour Telemetry Monitoring  Continuous x 24 Hours (Telem)        Question:  Reason for 24 Hour Telemetry  Answer:  Arrhythmias requiring acute medical intervention / PPM or ICD malfunction                     Telemetry Reviewed: Atrial fibrillation. HR averaging 72    Indication for Continued Telemetry Use: Arrthymias requiring medical therapy             Imaging: No pertinent imaging reviewed.    Recent Cultures (last 7 days):         Last 24 Hours Medication List:   Current Facility-Administered Medications   Medication Dose Route Frequency Provider Last Rate    albuterol  2 puff Inhalation Q6H PRN LENORA Noe      amiodarone  200 mg Oral TID With Meals Eliecer Barker DO      aspirin  81 mg Oral Daily LENORA Noe      atorvastatin  20 mg Oral Daily LENORA Noe      vitamin B-12  1,000 mcg Oral Once per day on Monday Wednesday Friday LENORA Noe      digoxin  125 mcg Oral Daily Moisés Estrella DO      fluticasone  1 spray Nasal Daily LENORA Noe      folic acid  1 mg Oral Daily LENORA Noe      furosemide  40 mg Intravenous Daily Eliecer Barker DO      insulin lispro  1-5 Units Subcutaneous HS Hanane Platt MD      insulin lispro  2-12 Units Subcutaneous TID AC Hanane Platt MD      magnesium Oxide  400 mg Oral BID Tu Valle MD      metoprolol tartrate  50 mg Oral Q6H Moisés Estrella DO      nicotine  1 patch Transdermal Daily LENORA Noe      pantoprazole  40 mg Oral Early  Morning LENORA Noe      rivaroxaban  20 mg Oral Daily With Breakfast LENORA Noe          Today, Patient Was Seen By: Katherine May MD    **Please Note: This note may have been constructed using a voice recognition system.**

## 2024-02-19 NOTE — ASSESSMENT & PLAN NOTE
Lab Results   Component Value Date    HGBA1C 8.2 (H) 01/31/2024     Lab Results   Component Value Date    GLUC 108 02/19/2024    GLUC 93 02/18/2024    Home regimen glimepiride    Accucheck, SSI while inpatient   Patient is taking oral intake but hypoglcemic. Goal glucose above 60 due to increase in beta blocker can mask hypoglycemic symptoms. 1 amp dextrose if glucose <60.  Hypoglycemia  was likely in the setting of remnant glyburide in body in the setting of acute kidney dysfunction. Now resolving.    Plan:  Continue home dose glimepiride.

## 2024-02-19 NOTE — ANESTHESIA POSTPROCEDURE EVALUATION
Post-Op Assessment Note    CV Status:  Stable  Pain Score: 0    Pain management: adequate       Mental Status:  Arousable and sleepy   Hydration Status:  Stable   PONV Controlled:  Controlled   Airway Patency:  Patent     Post Op Vitals Reviewed: Yes    No anethesia notable event occurred.    Staff: CRNA               BP   91/61   Temp 97.6   Pulse 75   Resp 16   SpO2 95

## 2024-02-19 NOTE — ASSESSMENT & PLAN NOTE
Creatinine 1.76 on admission. Baseline 1.25-1.4.  follows with nephrology as op  Improving with diuresis.  Follows with outpatient nephrology.

## 2024-02-19 NOTE — PROGRESS NOTES
"Advanced Heart Failure/Pulmonary Hypertension Service Note - [place] III 73 y.o. male MRN: [id number]    Unit/Bed#: S -01 Encounter: [id number]    Assessment:  73 y.o. male PMH and acute problems listed at end of note (a partial list may also be included within the assessment section) p/w ADHF in setting of rapid Afib/AFL. New drop in EF. New skyrizi for Crohn's recently started.    The patient's primary cardiac team is general cardiology, follows Dr. Almanza  Historically HFpEF>new low EF with LVEF 20%, biv failure, nondilated LV  2018 NST: no ischemia or scar  # PAF/ flutter with RVR  Hx of loop recorder- explanted 1/10/24  # dyslipidemia  # PHUONG on CKD3  # hx of bifascicular block/ SVT, some nighttime vero events on LINQ  # PAD s/ femoral popliteal bypass surgery  # tobacco abuse  # crohn's dz, hx of SBO  DM    I reviewed all pertinent labs/imaging/data including but not limited to:    Temp:  [97.5 °F (36.4 °C)-98.2 °F (36.8 °C)] 98.1 °F (36.7 °C)  HR:  [126-130] 128  Resp:  [18-19] 19  BP: (106-119)/(74-88) 117/86     Weight (last 2 days)       Date/Time Weight    02/19/24 0538 97.9 (215.83)    02/19/24 04:08:40 97.9 (215.83)    02/18/24 0545 98.9 (218.04)    02/17/24 0600 101 (223.11)    02/17/24 0300 101 (223.11)             Intake/Output Summary (Last 24 hours) at 2/19/2024 0915  Last data filed at 2/18/2024 2216  Gross per 24 hour   Intake 240 ml   Output 1700 ml   Net -1460 ml       Results from last 7 days   Lab Units 02/19/24  0537 02/18/24  0428 02/17/24  0332   CREATININE mg/dL 1.18 1.16 1.08     Lab Results   Component Value Date    K 3.8 02/19/2024     Lab Results   Component Value Date    HGBA1C 8.2 (H) 01/31/2024     Lab Results   Component Value Date    [id number] 1.977 02/12/2024     Lab Results   Component Value Date    LDLCALC 40 10/31/2023     Lab Results   Component Value Date     (H) 02/12/2024      No results found for: \"NTBNP\"              Drips:        Plan:  I am " "meeting patient for the first time today  Warm, near euvolemic after diuresis  He feels much better    Pre-existing plan for DENIS/DCCV 2/19 - agree with proceeding today  We discussed utility and chance of efficacy of this procedure    Past vero events noted on LINQ - nighttime pauses    Cw current rate control regimen now, no changes now  Reassess after DCCV    Fu future echo after rhythm control and max gdmt, for EF  Could consider ischemic eval in future, pending Sx and LVEF trend    Cw AC    Gdmt future    Studies:  I have reviewed all pertinent patient data/labs/imaging where available, including but not limited to the below studies. Selected results may be displayed here but comprehensive listing is omitted for note clarity and can be found in the epic chart.    ECG.    Echo.    Stress.    Cath.    Subjective and Interval Events:   Patient seen and examined.  No new complaints.  Unhappy about delays in DCCV plan    ROS:  10 point ROS negative except as specified above.    Tele: reviewed    Objective:     Physical Exam:  /86 (BP Location: Left arm)   Pulse (!) 128   Temp 98.1 °F (36.7 °C) (Oral)   Resp 19   Ht 5' 10\" (1.778 m)   Wt 97.9 kg (215 lb 13.3 oz)   SpO2 96%   BMI 30.97 kg/m²   Ranges:  Temp:  [97.5 °F (36.4 °C)-98.2 °F (36.8 °C)] 98.1 °F (36.7 °C)  HR:  [126-130] 128  Resp:  [18-19] 19  BP: (106-119)/(74-88) 117/86    Weight (last 2 days)       Date/Time Weight    02/19/24 0538 97.9 (215.83)    02/19/24 04:08:40 97.9 (215.83)    02/18/24 0545 98.9 (218.04)    02/17/24 0600 101 (223.11)    02/17/24 0300 101 (223.11)             Intake/Output Summary (Last 24 hours) at 2/19/2024 0915  Last data filed at 2/18/2024 2216  Gross per 24 hour   Intake 240 ml   Output 1700 ml   Net -1460 ml     Constitutional: NAD, non toxic  Ears/nose/mouth/throat: atraumatic  CV: irreg, JVP just above clavicle  Resp: Decreased breath sounds BL  GI: Soft, NTND  MSK: no swollen joints in exposed areas  Extr: +1 LE " "edema (chronic on RLE), warm LE  Pysche: Normal affect  Neuro: appropriate in conversation  Skin: dry and intact in exposed areas    Data:   Results from last 7 days   Lab Units 02/17/24  0332 02/15/24  0000 02/14/24  0437 02/13/24  0429 02/12/24  1524   WBC Thousand/uL 7.27 8.83 9.24 10.21* 8.42   HEMOGLOBIN g/dL 15.0 14.5 14.6 14.7 15.2   HEMATOCRIT % 47.6 46.5 46.5 47.6 49.6*   PLATELETS Thousands/uL 267 279 268 307 311   NEUTROS PCT %  --   --   --  66 59   MONOS PCT %  --   --   --  10 11   EOS PCT %  --   --   --  1 1      Results from last 7 days   Lab Units 02/19/24  0537 02/18/24  0428 02/17/24  0332 02/16/24  0427 02/15/24  0350 02/14/24  0437 02/13/24  0429 02/12/24  1524   SODIUM mmol/L 140 140 139 140 141 139 141 142   POTASSIUM mmol/L 3.8 3.4* 3.7 3.1* 3.9 3.3* 3.7 4.6   CHLORIDE mmol/L 100 97 97 98 103 107 109* 109*   CO2 mmol/L 35* 38* 37* 35* 32 27 22 25   ANION GAP mmol/L 5 5 5 7 6 5 10 8   BUN mg/dL 19 16 17 17 20 27* 28* 34*   CREATININE mg/dL 1.18 1.16 1.08 1.12 1.31* 1.55* 1.45* 1.76*   CALCIUM mg/dL 9.0 8.6 8.5 8.1* 7.7* 7.8* 8.2* 8.6   GLUCOSE RANDOM mg/dL 108 93 103 87 85 38* 44* 143*   ALT U/L  --   --   --  31  --  29 32 34   AST U/L  --   --   --  35  --  37 39 38   ALK PHOS U/L  --   --   --  304*  --  270* 306* 302*   ALBUMIN g/dL  --   --   --  3.1*  --  3.2* 3.3* 3.4*   TOTAL BILIRUBIN mg/dL  --   --   --  2.27*  --  1.98* 2.28* 1.94*      No results found for: \"LDH\"       Current Facility-Administered Medications   Medication Dose Route Frequency Provider Last Rate    albuterol  2 puff Inhalation Q6H PRN LENORA Noe      amiodarone  200 mg Oral TID With Meals lEiecer Barker,       aspirin  81 mg Oral Daily LENORA Noe      atorvastatin  20 mg Oral Daily LENORA Noe      vitamin B-12  1,000 mcg Oral Once per day on Monday Wednesday Friday LENORA Noe      digoxin  125 mcg Oral Daily Moisés Estrella,       fluticasone  1 spray Nasal Daily LENORA Noe      " folic acid  1 mg Oral Daily LENORA Noe      furosemide  40 mg Intravenous Daily Eliecer Barker DO      insulin lispro  1-5 Units Subcutaneous HS Hanane Platt MD      insulin lispro  2-12 Units Subcutaneous TID AC Hanane Platt MD      magnesium Oxide  400 mg Oral BID Tu Valle MD      magnesium sulfate  2 g Intravenous Once Katherine May MD      metoprolol tartrate  50 mg Oral Q6H Moisés Estrella DO      nicotine  1 patch Transdermal Daily LENORA Noe      pantoprazole  40 mg Oral Early Morning LENORA Noe      rivaroxaban  20 mg Oral Daily With Breakfast LENORA Noe         Past Medical History:   Diagnosis Date    Harris esophagus     Blue toe syndrome (HCC)     Chronic kidney disease     Colon polyps     Crohn's disease (HCC) 1-2020    GERD (gastroesophageal reflux disease)     Hematuria     History of rheumatic fever     Hypolipidemia     Hypotension     Ischemic cardiomyopathy     PAD (peripheral artery disease) (HCC)     Pulmonary emphysema (HCC)     PVD (peripheral vascular disease) (HCC)      Patient Active Problem List   Diagnosis    Class 1 obesity due to excess calories with serious comorbidity and body mass index (BMI) of 33.0 to 33.9 in adult    CKD (chronic kidney disease), stage III (HCC)    Persistent proteinuria    Microscopic hematuria    Hazel cardiac risk 10-20% in next 10 years    Abdominal aortic atherosclerosis (HCC)    RBBB (right bundle branch block with left anterior fascicular block)    Dyslipidemia    Vitamin D deficiency    Harris's esophagus without dysplasia    Colon polyps    PAD (peripheral artery disease) (HCC)    Ischemic cardiomyopathy    S/P peripheral artery angioplasty with stent placement    Venous stasis    Paroxysmal atrial fibrillation (HCC)    Adrenal mass (HCC)    Crohn's disease of small intestine with complication (HCC)    SVT (supraventricular tachycardia)    Volume overload    Positive QuantiFERON-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    Terminal ileitis  of small intestine (HCC)    S/P femoral-popliteal bypass surgery    Elevated alkaline phosphatase level    Vitamin B12 deficiency    Type 2 diabetes mellitus with diabetic peripheral angiopathy without gangrene (HCC)    Venous insufficiency    Immunocompromised patient (HCC)    Atrial fibrillation with RVR ([facility])    Abnormal LFTs    Acute on chronic heart failure (HCC)    Electrolyte abnormality       Counseling / Coordination of Care:  Total floor / unit time spent today 30 minutes.  Greater than 50% of total time was spent with the patient and / or family counseling and / or coordination of care.  A description of the counseling / coordination of care: we discussed diagnoses, recent as well as older studies, labs, and all changes in cardiac treatment plan. All patient questions were answered.     Thank you for the opportunity to participate in the care of this patient.    Ike Butts MD  Attending Physician  Advanced Heart Failure and Transplant Cardiology  [facility]

## 2024-02-19 NOTE — PLAN OF CARE
Problem: CARDIOVASCULAR - ADULT  Goal: Maintains optimal cardiac output and hemodynamic stability  Description: INTERVENTIONS:  - Monitor I/O, vital signs and rhythm  - Monitor for S/S and trends of decreased cardiac output  - Administer and titrate ordered vasoactive medications to optimize hemodynamic stability  - Assess quality of pulses, skin color and temperature  - Assess for signs of decreased coronary artery perfusion  - Instruct patient to report change in severity of symptoms  Outcome: Progressing  Goal: Absence of cardiac dysrhythmias or at baseline rhythm  Description: INTERVENTIONS:  - Continuous cardiac monitoring, vital signs, obtain 12 lead EKG if ordered  - Administer antiarrhythmic and heart rate control medications as ordered  - Monitor electrolytes and administer replacement therapy as ordered  Outcome: Progressing     Problem: PAIN - ADULT  Goal: Verbalizes/displays adequate comfort level or baseline comfort level  Description: Interventions:  - Encourage patient to monitor pain and request assistance  - Assess pain using appropriate pain scale  - Administer analgesics based on type and severity of pain and evaluate response  - Implement non-pharmacological measures as appropriate and evaluate response  - Consider cultural and social influences on pain and pain management  - Notify physician/advanced practitioner if interventions unsuccessful or patient reports new pain  Outcome: Progressing     Problem: INFECTION - ADULT  Goal: Absence or prevention of progression during hospitalization  Description: INTERVENTIONS:  - Assess and monitor for signs and symptoms of infection  - Monitor lab/diagnostic results  - Monitor all insertion sites, i.e. indwelling lines, tubes, and drains  - Monitor endotracheal if appropriate and nasal secretions for changes in amount and color  - [place] appropriate cooling/warming therapies per order  - Administer medications as ordered  - Instruct and encourage  patient and family to use good hand hygiene technique  - Identify and instruct in appropriate isolation precautions for identified infection/condition  Outcome: Progressing  Goal: Absence of fever/infection during neutropenic period  Description: INTERVENTIONS:  - Monitor WBC    Outcome: Progressing     Problem: SAFETY ADULT  Goal: Patient will remain free of falls  Description: INTERVENTIONS:  - Educate patient/family on patient safety including physical limitations  - Instruct patient to call for assistance with activity   - Consult OT/PT to assist with strengthening/mobility   - Keep Call bell within reach  - Keep bed low and locked with side rails adjusted as appropriate  - Keep care items and personal belongings within reach  - Initiate and maintain comfort rounds  - Make Fall Risk Sign visible to staff  - Offer Toileting every 2 Hours, in advance of need  - Initiate/Maintain bed alarm  - Obtain necessary fall risk management equipment: bed alarm  - Apply yellow socks and bracelet for high fall risk patients  - Consider moving patient to room near nurses station  Outcome: Progressing  Goal: Maintain or return to baseline ADL function  Description: INTERVENTIONS:  -  Assess patient's ability to carry out ADLs; assess patient's baseline for ADL function and identify physical deficits which impact ability to perform ADLs (bathing, care of mouth/teeth, toileting, grooming, dressing, etc.)  - Assess/evaluate cause of self-care deficits   - Assess range of motion  - Assess patient's mobility; develop plan if impaired  - Assess patient's need for assistive devices and provide as appropriate  - Encourage maximum independence but intervene and supervise when necessary  - Involve family in performance of ADLs  - Assess for home care needs following discharge   - Consider OT consult to assist with ADL evaluation and planning for discharge  - Provide patient education as appropriate  Outcome: Progressing  Goal:  Maintains/Returns to pre admission functional level  Description: INTERVENTIONS:  - Perform AM-PAC 6 Click Basic Mobility/ Daily Activity assessment daily.  - Set and communicate daily mobility goal to care team and patient/family/caregiver.   - Collaborate with rehabilitation services on mobility goals if consulted  - Perform Range of Motion 2 times a day.  - Reposition patient every 2 hours.  - Dangle patient 2 times a day  - Stand patient 2 times a day  - Ambulate patient 3 times a day  - Out of bed to chair 3 times a day   - Out of bed for meals 3 times a day  - Out of bed for toileting  - Record patient progress and toleration of activity level   Outcome: Progressing     Problem: DISCHARGE PLANNING  Goal: Discharge to home or other facility with appropriate resources  Description: INTERVENTIONS:  - Identify barriers to discharge w/patient and caregiver  - Arrange for needed discharge resources and transportation as appropriate  - Identify discharge learning needs (meds, wound care, etc.)  - Arrange for interpretive services to assist at discharge as needed  - Refer to Case Management Department for coordinating discharge planning if the patient needs post-hospital services based on physician/advanced practitioner order or complex needs related to functional status, cognitive ability, or social support system  Outcome: Progressing     Problem: Knowledge Deficit  Goal: Patient/family/caregiver demonstrates understanding of disease process, treatment plan, medications, and discharge instructions  Description: Complete learning assessment and assess knowledge base.  Interventions:  - Provide teaching at level of understanding  - Provide teaching via preferred learning methods  Outcome: Progressing

## 2024-02-19 NOTE — ASSESSMENT & PLAN NOTE
Lab Results   Component Value Date    EGFR 61 02/19/2024    EGFR 62 02/18/2024    EGFR 68 02/17/2024    CREATININE 1.18 02/19/2024    CREATININE 1.16 02/18/2024    CREATININE 1.08 02/17/2024

## 2024-02-19 NOTE — ANESTHESIA PREPROCEDURE EVALUATION
Procedure:  DENIS  Expand All Collapse All    Cardiology Progress Note - [place] III 73 y.o. male MRN: [id number]     Unit/Bed#: S -01 Encounter: [id number]     Assessment and plan  #1 persistent atrial fibrillation/flutter  #2 acute heart failure with reduced ejection fraction  #3 cardiomyopathy ejection fraction 20% likely tachycardic induced  #4 acute hypoxemic respiratory failure  #5 acute kidney injury on CKD 3  #6 PAD  #7 tobacco abuse  #8 type 2 diabetes mellitus  #9 hyperlipidemia     Recommendations: Decrease lasix to 40mg IV daily.  Heart rate stable at 120 in 2:1 flutter.  Weights have come down significantly.  CHF team eval in the am to decide timing of  DCCV.  Will keep NPO after midnight in case they feel he is ready tomorrow.  Keep K> 4.0, Continue xarelto.  Continue oral amio loading.  DC digoxin post cardioversion.             Relevant Problems   CARDIO   (+) Abdominal aortic atherosclerosis (HCC)   (+) Atrial fibrillation with RVR (HCC)   (+) Paroxysmal atrial fibrillation (HCC)   (+) RBBB (right bundle branch block with left anterior fascicular block)   (+) SVT (supraventricular tachycardia)   (+) Type 2 diabetes mellitus with diabetic peripheral angiopathy without gangrene (HCC)      ENDO   (+) Type 2 diabetes mellitus with diabetic peripheral angiopathy without gangrene (HCC)      /RENAL   (+) CKD (chronic kidney disease), stage III (HCC)      HEMATOLOGY   (+) Immunocompromised patient (HCC)      Atrial fibrillation with RVR (HCC)  Assessment & Plan  Hx PAF since 2019.  Presented with worsening exertional dyspnea, weight gain.    Rates 200s on arrival. Improving with rate and rhythm control  [id number]: 4 on Xarelto     Plan:  Cardiology consulted: Possible cardioversion tomorrow. NPO past midnight. Continue diuresing Lasix 40 IV QD.  Rate control: Lopressor to 50 mg q6 and digoxin 125 mcg OD (to be discontinued post cardioversion).  Rhythm control: Continue amiodarone 200 mg  TID, loading dose.   Anticoagulation: Xarelto 20 mg daily  Monitor on telemetry.  Avoid diltiazem due to EF 20%.  Outpatient sleep study recommended for possible ANUSHA.     * Acute on chronic heart failure (HCC)  Assessment & Plan      Wt Readings from Last 3 Encounters:   02/19/24 97.9 kg (215 lb 13.3 oz)   01/17/24 106 kg (233 lb)   01/10/24 100 kg (221 lb)      Echo 2/12/23: EF 20%. Systolic function is severely reduced. Severe global hypokinesis with regional variation. RV dilated. Mild MV and TV regurgitation.  Elevated BNP on admission.  Weight 1 month back was 233 lb. On  lbs. Weight trending down with diuresis.  Urine output decreased from 4.5 L to 2.5 L over the last 24 hours, creatinine has slightly increased.        Cardiology on board: decrease IV lasix to 40 mg QD.  Monitor intake and output, daily weights.  Diet: Cardiac with 1800 fluid restriction and 2 g Sodium.     Electrolyte abnormality  Assessment & Plan  Secondary to duresis     Continue to monitor and replace as indicated.     Abnormal LFTs  Assessment & Plan  Elevated t.bili and lak phos, without any jaundice.  No history of liver disease.  Right upper quadrant ultrasound shows fatty infiltration of liver with trace perihepatic fluid. Patent portal vein with pulsatile flow possibly secondary to cardiac etiology.     Immunocompromised patient (HCC)  Assessment & Plan  On Skyrizi for crohn's disease.  Received 2 injections.  Next due this week but is being held per GI     Type 2 diabetes mellitus with diabetic peripheral angiopathy without gangrene ([facility])  Assessment & Plan        Lab Results   Component Value Date     HGBA1C 8.2 (H) 01/31/2024            Lab Results   Component Value Date     GLUC 108 02/19/2024     GLUC 93 02/18/2024    Home regimen glimepiride     Accucheck, SSI while inpatient   Patient is taking oral intake but hypoglcemic. Goal glucose above 60 due to increase in beta blocker can mask hypoglycemic symptoms. 1 amp  dextrose if glucose <60.  Hypoglycemia  was likely in the setting of remnant glyburide in body in the setting of acute kidney dysfunction. Now resolving.     Continue monitoring BG premeal and QHS  Hypoglycemia protocol  Insulin sliding scale     Ischemic cardiomyopathy  Assessment & Plan  Echo EF 20%. Systolic function is severely reduced. Severe global hypokinesis with regional variation. RV dilated. Mild MV and TV regurgitation.  BNP elevated on admission.     Continue GDMT with lopressor, statin and Aspirin.     CKD (chronic kidney disease), stage III (HCC)  Assessment & Plan  Creatinine 1.76 on admission. Baseline 1.25-1.4.  follows with nephrology as op  Improving with diuresis.     Continue to monitor  Avoid hypotension, nephrotoxins     Class 1 obesity due to excess calories with serious comorbidity and body mass index (BMI) of 33.0 to 33.9 in adult  Assessment & Plan  BMI 37.2  Outpatient follow up with PCP     Acute kidney injury superimposed on chronic kidney disease -resolved as of 2/12/2024  Assessment & Plan        Lab Results   Component Value Date     EGFR 61 02/19/2024     EGFR 62 02/18/2024     EGFR 68 02/17/2024     CREATININE 1.18 02/19/2024     CREATININE 1.16 02/18/2024     CREATININE 1.08 02/17/2024               ECHO Interpretation Summary 2/13/2024         Left Ventricle: Left ventricular cavity size is normal. Wall thickness is mildly increased. There is mild concentric hypertrophy. The left ventricular ejection fraction is 20%. Systolic function is severely reduced. There is severe global hypokinesis with regional variation.    Right Ventricle: Right ventricular cavity size is dilated. Systolic function is moderately reduced.    Mitral Valve: There is mild regurgitation.    Tricuspid Valve: There is mild regurgitation.        Physical Exam    Airway    Mallampati score: III  TM Distance: >3 FB  Neck ROM: full     Dental       Cardiovascular  Cardiovascular exam  normal    Pulmonary  Pulmonary exam normal     Other Findings        Anesthesia Plan  ASA Score- 4     Anesthesia Type- IV sedation with anesthesia with ASA Monitors.         Additional Monitors:     Airway Plan:            Plan Factors-Exercise tolerance (METS): <4 METS.    Chart reviewed. EKG reviewed. Imaging results reviewed. Existing labs reviewed. Patient summary reviewed.    Patient is a current smoker.              Induction- intravenous.    Postoperative Plan-     Informed Consent- Anesthetic plan and risks discussed with patient.  I personally reviewed this patient with the CRNA. Discussed and agreed on the Anesthesia Plan with the CRNA..

## 2024-02-19 NOTE — ASSESSMENT & PLAN NOTE
Hx PAF since 2019.  Presented with worsening exertional dyspnea, weight gain.    Rates 200s on arrival. Improving with rate and rhythm control  [id number]: 4 on Xarelto    Plan:  Status post DC cardioversion.   Toprol XL 75 mg BID  Amiodarone 200 mg TID, loading dose for 5 days.   Continue home dose Xarelto 20 mg daily

## 2024-02-19 NOTE — ASSESSMENT & PLAN NOTE
Echo EF 20%. Systolic function is severely reduced. Severe global hypokinesis with regional variation. RV dilated. Mild MV and TV regurgitation.  BNP elevated on admission.    Continue maximum GDMT with lopressor, statin and Aspirin.

## 2024-02-20 VITALS
TEMPERATURE: 98.6 F | HEART RATE: 53 BPM | RESPIRATION RATE: 18 BRPM | SYSTOLIC BLOOD PRESSURE: 111 MMHG | OXYGEN SATURATION: 93 % | DIASTOLIC BLOOD PRESSURE: 64 MMHG | HEIGHT: 70 IN | WEIGHT: 217.15 LBS | BODY MASS INDEX: 31.09 KG/M2

## 2024-02-20 DIAGNOSIS — Z71.89 COMPLEX CARE COORDINATION: Primary | ICD-10-CM

## 2024-02-20 LAB
ALBUMIN SERPL BCP-MCNC: 3.3 G/DL (ref 3.5–5)
ALP SERPL-CCNC: 317 U/L (ref 34–104)
ALT SERPL W P-5'-P-CCNC: 38 U/L (ref 7–52)
ANION GAP SERPL CALCULATED.3IONS-SCNC: 8 MMOL/L
AST SERPL W P-5'-P-CCNC: 36 U/L (ref 13–39)
ATRIAL RATE: 73 BPM
BILIRUB SERPL-MCNC: 2.31 MG/DL (ref 0.2–1)
BUN SERPL-MCNC: 22 MG/DL (ref 5–25)
CALCIUM ALBUM COR SERPL-MCNC: 9.6 MG/DL (ref 8.3–10.1)
CALCIUM SERPL-MCNC: 9 MG/DL (ref 8.4–10.2)
CHLORIDE SERPL-SCNC: 98 MMOL/L (ref 96–108)
CO2 SERPL-SCNC: 34 MMOL/L (ref 21–32)
CREAT SERPL-MCNC: 1.16 MG/DL (ref 0.6–1.3)
ERYTHROCYTE [DISTWIDTH] IN BLOOD BY AUTOMATED COUNT: 17.2 % (ref 11.6–15.1)
GFR SERPL CREATININE-BSD FRML MDRD: 62 ML/MIN/1.73SQ M
GLUCOSE SERPL-MCNC: 104 MG/DL (ref 65–140)
GLUCOSE SERPL-MCNC: 118 MG/DL (ref 65–140)
GLUCOSE SERPL-MCNC: 192 MG/DL (ref 65–140)
HCT VFR BLD AUTO: 45.9 % (ref 36.5–49.3)
HGB BLD-MCNC: 14.2 G/DL (ref 12–17)
MAGNESIUM SERPL-MCNC: 2 MG/DL (ref 1.9–2.7)
MCH RBC QN AUTO: 27.3 PG (ref 26.8–34.3)
MCHC RBC AUTO-ENTMCNC: 30.9 G/DL (ref 31.4–37.4)
MCV RBC AUTO: 88 FL (ref 82–98)
P AXIS: 90 DEGREES
PLATELET # BLD AUTO: 252 THOUSANDS/UL (ref 149–390)
PMV BLD AUTO: 10.2 FL (ref 8.9–12.7)
POTASSIUM SERPL-SCNC: 3.8 MMOL/L (ref 3.5–5.3)
PR INTERVAL: 168 MS
PROT SERPL-MCNC: 6.3 G/DL (ref 6.4–8.4)
QRS AXIS: -53 DEGREES
QRSD INTERVAL: 142 MS
QT INTERVAL: 448 MS
QTC INTERVAL: 493 MS
RBC # BLD AUTO: 5.2 MILLION/UL (ref 3.88–5.62)
SL CV LV EF: 25
SODIUM SERPL-SCNC: 140 MMOL/L (ref 135–147)
T WAVE AXIS: -87 DEGREES
VENTRICULAR RATE: 73 BPM
WBC # BLD AUTO: 7.57 THOUSAND/UL (ref 4.31–10.16)

## 2024-02-20 PROCEDURE — 80053 COMPREHEN METABOLIC PANEL: CPT | Performed by: INTERNAL MEDICINE

## 2024-02-20 PROCEDURE — 83735 ASSAY OF MAGNESIUM: CPT

## 2024-02-20 PROCEDURE — 82948 REAGENT STRIP/BLOOD GLUCOSE: CPT

## 2024-02-20 PROCEDURE — 93325 DOPPLER ECHO COLOR FLOW MAPG: CPT | Performed by: INTERNAL MEDICINE

## 2024-02-20 PROCEDURE — 99239 HOSP IP/OBS DSCHRG MGMT >30: CPT | Performed by: INTERNAL MEDICINE

## 2024-02-20 PROCEDURE — 93010 ELECTROCARDIOGRAM REPORT: CPT | Performed by: INTERNAL MEDICINE

## 2024-02-20 PROCEDURE — 99232 SBSQ HOSP IP/OBS MODERATE 35: CPT | Performed by: STUDENT IN AN ORGANIZED HEALTH CARE EDUCATION/TRAINING PROGRAM

## 2024-02-20 PROCEDURE — 93320 DOPPLER ECHO COMPLETE: CPT | Performed by: INTERNAL MEDICINE

## 2024-02-20 PROCEDURE — 85027 COMPLETE CBC AUTOMATED: CPT

## 2024-02-20 PROCEDURE — 93312 ECHO TRANSESOPHAGEAL: CPT | Performed by: INTERNAL MEDICINE

## 2024-02-20 RX ORDER — FUROSEMIDE 40 MG/1
40 TABLET ORAL DAILY
Status: DISCONTINUED | OUTPATIENT
Start: 2024-02-21 | End: 2024-02-20 | Stop reason: HOSPADM

## 2024-02-20 RX ORDER — AMIODARONE HYDROCHLORIDE 200 MG/1
TABLET ORAL
Qty: 45 TABLET | Refills: 0 | Status: SHIPPED | OUTPATIENT
Start: 2024-02-20 | End: 2024-02-27 | Stop reason: ALTCHOICE

## 2024-02-20 RX ORDER — METOPROLOL SUCCINATE 25 MG/1
75 TABLET, EXTENDED RELEASE ORAL DAILY
Qty: 90 TABLET | Refills: 0 | Status: SHIPPED | OUTPATIENT
Start: 2024-02-20 | End: 2024-02-22 | Stop reason: SDUPTHER

## 2024-02-20 RX ORDER — METOPROLOL TARTRATE 100 MG/1
100 TABLET ORAL EVERY 12 HOURS SCHEDULED
Qty: 30 TABLET | Refills: 0 | Status: CANCELLED | OUTPATIENT
Start: 2024-02-20

## 2024-02-20 RX ORDER — FUROSEMIDE 40 MG/1
40 TABLET ORAL DAILY
Qty: 30 TABLET | Refills: 0 | Status: SHIPPED | OUTPATIENT
Start: 2024-02-21 | End: 2024-03-22

## 2024-02-20 RX ORDER — METOPROLOL SUCCINATE 25 MG/1
75 TABLET, EXTENDED RELEASE ORAL 2 TIMES DAILY
Qty: 180 TABLET | Refills: 0 | Status: CANCELLED | OUTPATIENT
Start: 2024-02-20 | End: 2024-03-21

## 2024-02-20 RX ORDER — LANOLIN ALCOHOL/MO/W.PET/CERES
1000 CREAM (GRAM) TOPICAL 3 TIMES WEEKLY
Start: 2024-02-21

## 2024-02-20 RX ADMIN — ASPIRIN 81 MG: 81 TABLET ORAL at 08:20

## 2024-02-20 RX ADMIN — FOLIC ACID 1 MG: 1 TABLET ORAL at 08:20

## 2024-02-20 RX ADMIN — METOPROLOL TARTRATE 50 MG: 50 TABLET, FILM COATED ORAL at 05:13

## 2024-02-20 RX ADMIN — RIVAROXABAN 20 MG: 20 TABLET, FILM COATED ORAL at 08:20

## 2024-02-20 RX ADMIN — INSULIN LISPRO 2 UNITS: 100 INJECTION, SOLUTION INTRAVENOUS; SUBCUTANEOUS at 11:42

## 2024-02-20 RX ADMIN — AMIODARONE HYDROCHLORIDE 200 MG: 200 TABLET ORAL at 11:42

## 2024-02-20 RX ADMIN — ATORVASTATIN CALCIUM 20 MG: 20 TABLET, FILM COATED ORAL at 08:20

## 2024-02-20 RX ADMIN — AMIODARONE HYDROCHLORIDE 200 MG: 200 TABLET ORAL at 08:20

## 2024-02-20 RX ADMIN — METOPROLOL TARTRATE 50 MG: 50 TABLET, FILM COATED ORAL at 11:42

## 2024-02-20 RX ADMIN — PANTOPRAZOLE SODIUM 40 MG: 40 TABLET, DELAYED RELEASE ORAL at 05:12

## 2024-02-20 RX ADMIN — FUROSEMIDE 40 MG: 10 INJECTION, SOLUTION INTRAMUSCULAR; INTRAVENOUS at 08:20

## 2024-02-20 RX ADMIN — Medication 400 MG: at 08:20

## 2024-02-20 NOTE — CASE MANAGEMENT
Case Management Discharge Planning Note    Patient name South Humphrey III  Location S /S -01 MRN [id number]  : 1950 Date 2024       Current Admission Date: 2024  Current Admission Diagnosis:Acute on chronic heart failure (HCC)   Patient Active Problem List    Diagnosis Date Noted    Acute on chronic heart failure (HCC) 2024    Electrolyte abnormality 2024    Abnormal LFTs 2024    Atrial fibrillation with RVR (HCC) 2024    Immunocompromised patient ([facility]) 2024    Venous insufficiency 2022    Type 2 diabetes mellitus with diabetic peripheral angiopathy without gangrene ([facility]) 2021    Vitamin B12 deficiency 2020    Elevated alkaline phosphatase level 2020    S/P femoral-popliteal bypass surgery 2020    Terminal ileitis of small intestine ([facility]) 2020    Positive QuantiFERON-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 2020    Volume overload 2020    SVT (supraventricular tachycardia) 2020    Crohn's disease of small intestine with complication ([facility]) 2020    Adrenal mass ([facility]) 2020    Paroxysmal atrial fibrillation ([facility]) 2019    Venous stasis 2019    S/P peripheral artery angioplasty with stent placement 2019    Ischemic cardiomyopathy 2019    PAD (peripheral artery disease) ([facility]) 2019    Harris's esophagus without dysplasia 2019    Colon polyps 2019    Dyslipidemia 2018    Vitamin D deficiency 2018    RBBB (right bundle branch block with left anterior fascicular block) 2018    [place] cardiac risk 10-20% in next 10 years 2018    Abdominal aortic atherosclerosis (HCC) 2018    CKD (chronic kidney disease), stage III ([facility]) 2018    Persistent proteinuria 2018    Microscopic hematuria 2018    Class 1 obesity due to excess calories with serious comorbidity and body mass index (BMI) of 33.0 to 33.9 in adult       LOS (days): 8  Geometric  Mean LOS (GMLOS) (days): 3.9  Days to GMLOS:-3     OBJECTIVE:  Risk of Unplanned Readmission Score: 11.71         Current admission status: Inpatient   Preferred Pharmacy:   [id number] - [place] - [address]  [address] [place] [address]  Phone: [phone] Fax: [phone]    Primary Care Provider: Virgie Luz MD    Primary Insurance: MEDICARE  Secondary Insurance: [facility]    DISCHARGE DETAILS:                                                                                     IMM reviewed with patient and caregiver, patient and caregiver agrees with discharge determination.  IMM Given (Date):: 02/20/24  IMM Given to:: Patient  Family notified:: Patient and pt's wife Tova at bedside

## 2024-02-20 NOTE — DISCHARGE SUMMARY
[facility]  Discharge- South Humphrey III 1950, 73 y.o. male MRN: [id number]  Unit/Bed#: S -01 Encounter: [id number]  Primary Care Provider: Virgie Luz MD   Date and time admitted to hospital: 2/12/2024  3:26 PM    Atrial fibrillation with RVR (HCC)  Assessment & Plan  Hx PAF since 2019.  Presented with worsening exertional dyspnea, weight gain.    Rates 200s on arrival. Improving with rate and rhythm control  [id number]: 4 on Xarelto    Plan:  Status post DC cardioversion.   Toprol XL 75 mg BID  Amiodarone 200 mg TID, loading dose for 5 days.   Continue home dose Xarelto 20 mg daily    * Acute on chronic heart failure (HCC)  Assessment & Plan  Wt Readings from Last 3 Encounters:   02/19/24 97.9 kg (215 lb 13.3 oz)   01/17/24 106 kg (233 lb)   01/10/24 100 kg (221 lb)     Echo 2/12/23: EF 20%. Systolic function is severely reduced. Severe global hypokinesis with regional variation. RV dilated. Mild MV and TV regurgitation.  Elevated BNP on admission.  Weight 1 month back was 233 lb. On  lbs. Weight trending down with diuresis.  Urine output decreased, and creatinine uptrending.    Plan:  Lasix 40 mg p.o. daily  Cardiology will follow up outpatient.  1800 fluid restriction and 2 g Sodium.  Echo in 3 months    Electrolyte abnormality  Assessment & Plan  Secondary to duresis  Serial monitoring outpatient    Abnormal LFTs  Assessment & Plan  Elevated t.bili and lak phos, without any jaundice.  No history of liver disease.  Right upper quadrant ultrasound shows fatty infiltration of liver with trace perihepatic fluid. Patent portal vein with pulsatile flow possibly secondary to cardiac etiology.    Immunocompromised patient (HCC)  Assessment & Plan  On Skyrizi for crohn's disease.  Received 2 injections.  Next due this week but is being held per GI    Type 2 diabetes mellitus with diabetic peripheral angiopathy without gangrene (HCC)  Assessment & Plan  Lab  Results   Component Value Date    HGBA1C 8.2 (H) 01/31/2024     Lab Results   Component Value Date    GLUC 108 02/19/2024    GLUC 93 02/18/2024    Home regimen glimepiride    Ej, IMANI while inpatient   Patient is taking oral intake but hypoglcemic. Goal glucose above 60 due to increase in beta blocker can mask hypoglycemic symptoms. 1 amp dextrose if glucose <60.  Hypoglycemia  was likely in the setting of remnant glyburide in body in the setting of acute kidney dysfunction. Now resolving.    Plan:  Continue home dose glimepiride.    Ischemic cardiomyopathy  Assessment & Plan  Echo EF 20%. Systolic function is severely reduced. Severe global hypokinesis with regional variation. RV dilated. Mild MV and TV regurgitation.  BNP elevated on admission.    Continue maximum GDMT with lopressor, statin and Aspirin.    CKD (chronic kidney disease), stage III (HCC)  Assessment & Plan  Creatinine 1.76 on admission. Baseline 1.25-1.4.  follows with nephrology as op  Improving with diuresis.  Follows with outpatient nephrology.    Class 1 obesity due to excess calories with serious comorbidity and body mass index (BMI) of 33.0 to 33.9 in adult  Assessment & Plan  BMI 37.2  Outpatient follow up with PCP    Acute kidney injury superimposed on chronic kidney disease -resolved as of 2/12/2024  Assessment & Plan  Lab Results   Component Value Date    EGFR 61 02/19/2024    EGFR 62 02/18/2024    EGFR 68 02/17/2024    CREATININE 1.18 02/19/2024    CREATININE 1.16 02/18/2024    CREATININE 1.08 02/17/2024         Medical Problems       Resolved Problems  Date Reviewed: 2/19/2024            Resolved    Acute kidney injury superimposed on chronic kidney disease  2/12/2024     Resolved by  LENORA Noe        Discharging Resident: Katherine May MD  Discharging Attending: Sarita Shane MD  PCP: Virgie Luz MD  Admission Date:   Admission Orders (From admission, onward)       Ordered        02/13/24 1512  Inpatient  Admission  Once            02/12/24 2015  Place in Observation  Once            02/12/24 1945  INPATIENT ADMISSION  Once,   Status:  Canceled                          Discharge Date: 02/20/24    Consultations During Hospital Stay:  Cardiology    Procedures Performed:   DCCV    Significant Findings / Test Results:   Cardioversion    Result Date: 2/19/2024  Impression:   Successful DENIS guided DC cardioversion - converting from atrial flutter to sinus rhythm after 1 attempt.       Echocardiogram    Result Date: 2/13/2024  Impression:  Left Ventricle: Left ventricular cavity size is normal. Wall thickness is mildly increased. There is mild concentric hypertrophy. The left ventricular ejection fraction is 20%. Systolic function is severely reduced. There is severe global hypokinesis with regional variation.  Right Ventricle: Right ventricular cavity size is dilated. Systolic function is moderately reduced.  Mitral Valve: There is mild regurgitation.  Tricuspid Valve: There is mild regurgitation.    Incidental Findings:   None    Test Results Pending at Discharge (will require follow up):  None     Outpatient Tests Requested:  Echocardiogram in 3 months  Zio patch monitoring    Complications:  None    Reason for Admission: Atrial fibrillation with RVR    Hospital Course:   South Humphrey III is a 73 y.o. male patient who originally presented to the hospital on 2/12/2024 due to shortness of breath and palpitations since 1 month.  Patient having shortness of breath and was intolerant to exercise to the point he was unable to take few steps.  He also had noted 12 pound weight gain in the past month.  In the ED patient was found to be in rapid atrial fibrillation.  Heart rate did not improve with IV diltiazem and he was placed on amiodarone drip.  He was continued for anticoagulation.  Cardiology was consulted.  Echocardiogram showed an EF of 20% with systolic and diastolic dysfunction.  Patient was diuresed with IV Lasix  "from 259 to 217 pounds once he was euvolemic patient event DC cardioversion after which she was in normal sinus rhythm heart rates in 70s.  Patient was also found to have some bradycardic events after cardioversion and cardiology recommends that patient undergo/monitoring as outpatient.  Cardiology will follow with him within 1 week after discharge.  Today patient is breathing on room air and denies any complaints.  He is stable for discharge.    Please see above list of diagnoses and related plan for additional information.     Condition at Discharge: stable    Discharge Day Visit / Exam:   Subjective:  Patient evaluated at bedside. He denies any chest pain, SOB, dizziness, abdominal pain. His appetite is normal. He says he is feeling fine. He is anxious to go home.  Vitals: Blood Pressure: 127/76 (02/20/24 0700)  Pulse: (!) 53 (02/20/24 0700)  Temperature: 98.6 °F (37 °C) (02/20/24 0700)  Temp Source: Oral (02/19/24 2154)  Respirations: 18 (02/20/24 0700)  Height: 5' 10\" (177.8 cm) (02/19/24 1300)  Weight - Scale: 98.5 kg (217 lb 2.5 oz) (02/20/24 0600)  SpO2: 93 % (02/20/24 0700)  Exam:   Physical Exam  Vitals and nursing note reviewed.   Constitutional:       General: He is not in acute distress.     Appearance: He is well-developed.   HENT:      Head: Normocephalic and atraumatic.   Eyes:      Conjunctiva/sclera: Conjunctivae normal.   Cardiovascular:      Rate and Rhythm: Normal rate and regular rhythm.      Heart sounds: No murmur heard.  Pulmonary:      Effort: Pulmonary effort is normal. No respiratory distress.      Breath sounds: Normal breath sounds.      Comments: Breathing on Room air  Abdominal:      Palpations: Abdomen is soft.      Tenderness: There is no abdominal tenderness.   Musculoskeletal:         General: No swelling.      Cervical back: Neck supple.      Right lower leg: Edema present.      Left lower leg: Edema present.      Comments: Improving pedal edema   Skin:     General: Skin is warm " and dry.      Capillary Refill: Capillary refill takes less than 2 seconds.      Findings: Erythema (Right foot) present.   Neurological:      General: No focal deficit present.      Mental Status: He is alert and oriented to person, place, and time. Mental status is at baseline.      Motor: No weakness.      Gait: Gait normal.      Comments: Ambulating around the unit x 3   Psychiatric:         Mood and Affect: Mood normal.          Discussion with Family: Attempted to update  (wife) via phone. Left voicemail.     Discharge instructions/Information to patient and family:   See after visit summary for information provided to patient and family.      Provisions for Follow-Up Care:  See after visit summary for information related to follow-up care and any pertinent home health orders.      Mobility at time of Discharge:   Basic Mobility Inpatient Raw Score: 24  JH-HLM Goal: 8: Walk 250 feet or more  JH-HLM Achieved: 7: Walk 25 feet or more  HLM Goal NOT achieved. Continue to encourage mobility in post discharge setting.     Disposition:   Home    Planned Readmission: None    Discharge Medications:  See after visit summary for reconciled discharge medications provided to patient and/or family.      **Please Note: This note may have been constructed using a voice recognition system**

## 2024-02-20 NOTE — PROGRESS NOTES
"Advanced Heart Failure/Pulmonary Hypertension Service Note - South Humphrey III 73 y.o. male MRN: [id number]    Unit/Bed#: S -01 Encounter: [id number]    Assessment:  73 y.o. male PMH and acute problems listed at end of note (a partial list may also be included within the assessment section) p/w ADHF in setting of rapid Afib/AFL. New drop in EF. New skyrizi for Crohn's recently started.    The patient's primary cardiac team is general cardiology, follows Dr. Almanza  Historically HFpEF>new low EF with LVEF 20%, biv failure, nondilated LV  2018 NST: no ischemia or scar  # PAF and aflutter with RVR  Hx of loop recorder- explanted 1/10/24  RBBB  # dyslipidemia  # PHUONG on CKD3  # hx of bifascicular block/ SVT, some nighttime vero events on LINQ  # PAD s/ femoral popliteal bypass surgery  # tobacco abuse  # crohn's dz, hx of SBO  DM    I reviewed all pertinent labs/imaging/data including but not limited to:    Temp:  [97.3 °F (36.3 °C)-98.6 °F (37 °C)] 98.6 °F (37 °C)  HR:  [] 53  Resp:  [18-22] 18  BP: ()/(57-86) 127/76     Weight (last 2 days)       Date/Time Weight    02/20/24 0600 98.5 (217.15)    02/19/24 1300 97.9 (215.83)    02/19/24 0538 97.9 (215.83)    02/19/24 04:08:40 97.9 (215.83)    02/18/24 0545 98.9 (218.04)             Intake/Output Summary (Last 24 hours) at 2/20/2024 0937  Last data filed at 2/20/2024 0900  Gross per 24 hour   Intake 1360 ml   Output 2100 ml   Net -740 ml       Results from last 7 days   Lab Units 02/20/24  0442 02/19/24  0537 02/18/24  0428   CREATININE mg/dL 1.16 1.18 1.16     Lab Results   Component Value Date    K 3.8 02/20/2024     Lab Results   Component Value Date    HGBA1C 8.2 (H) 01/31/2024     Lab Results   Component Value Date    [id number] 1.977 02/12/2024     Lab Results   Component Value Date    LDLCALC 40 10/31/2023     Lab Results   Component Value Date     (H) 02/12/2024      No results found for: \"NTBNP\"              Drips:    " "    Plan:  Warm, near euvolemic  S/p 2/19/24 DENIS/DCCV with conversion to NSR  No overt tele events since, remains NSR, some SB to 50s, some APCs and JPCs on tele    Past vero events noted on LINQ (now explanted) - nighttime pauses    Switch lopressor 50 q6h to toprol xl 75 bid - next toprol xl dose today at 7pm  Cw amio load then 200 qd  Reassess in clinic near term  Cw AC  Will consider zio to monitor for vero events    He is still on IV diuretic today  Can switch to lasix 40 po qd now    Fu future echo x 3 months after rhythm control and max gdmt, for EF  Could consider ischemic eval in future, pending Sx and LVEF trend    Cw AC    Gdmt near future  As outpt since primary team planning DC today 2/20/24 and has communicated this to the pt  Mild MR  RBBB    Warrants cardiology clinic follow up within 1 week of discharge  I have contacted cardiology staff to help schedule.    Studies:  I have reviewed all pertinent patient data/labs/imaging where available, including but not limited to the below studies. Selected results may be displayed here but comprehensive listing is omitted for note clarity and can be found in the epic chart.    ECG.    Echo.    Stress.    Cath.    Subjective and Interval Events:   Patient seen and examined.  No new complaints.  \"I am going home\"    ROS:  10 point ROS negative except as specified above.    Tele: reviewed    Objective:     Physical Exam:  /76   Pulse (!) 53   Temp 98.6 °F (37 °C)   Resp 18   Ht 5' 10\" (1.778 m)   Wt 98.5 kg (217 lb 2.5 oz)   SpO2 93%   BMI 31.16 kg/m²   Ranges:  Temp:  [97.3 °F (36.3 °C)-98.6 °F (37 °C)] 98.6 °F (37 °C)  HR:  [] 53  Resp:  [18-22] 18  BP: ()/(57-86) 127/76    Weight (last 2 days)       Date/Time Weight    02/20/24 0600 98.5 (217.15)    02/19/24 1300 97.9 (215.83)    02/19/24 0538 97.9 (215.83)    02/19/24 04:08:40 97.9 (215.83)    02/18/24 0545 98.9 (218.04)             Intake/Output Summary (Last 24 hours) at 2/20/2024 " "0937  Last data filed at 2/20/2024 0900  Gross per 24 hour   Intake 1360 ml   Output 2100 ml   Net -740 ml     Constitutional: NAD, non toxic  Ears/nose/mouth/throat: atraumatic  CV: RRR, no JVD  Resp: Decreased breath sounds BL  GI: Soft, NTND  MSK: no swollen joints in exposed areas  Extr: +1 LE edema (chronic on RLE), warm LE  Pysche: Normal affect  Neuro: appropriate in conversation  Skin: dry and intact in exposed areas    Data:   Results from last 7 days   Lab Units 02/20/24  0442 02/17/24  0332 02/15/24  0000 02/14/24  0437   WBC Thousand/uL 7.57 7.27 8.83 9.24   HEMOGLOBIN g/dL 14.2 15.0 14.5 14.6   HEMATOCRIT % 45.9 47.6 46.5 46.5   PLATELETS Thousands/uL 252 267 279 268      Results from last 7 days   Lab Units 02/20/24  0442 02/19/24  0537 02/18/24  0428 02/17/24  0332 02/16/24  0427 02/15/24  0350 02/14/24  0437   SODIUM mmol/L 140 140 140 139 140 141 139   POTASSIUM mmol/L 3.8 3.8 3.4* 3.7 3.1* 3.9 3.3*   CHLORIDE mmol/L 98 100 97 97 98 103 107   CO2 mmol/L 34* 35* 38* 37* 35* 32 27   ANION GAP mmol/L 8 5 5 5 7 6 5   BUN mg/dL 22 19 16 17 17 20 27*   CREATININE mg/dL 1.16 1.18 1.16 1.08 1.12 1.31* 1.55*   CALCIUM mg/dL 9.0 9.0 8.6 8.5 8.1* 7.7* 7.8*   GLUCOSE RANDOM mg/dL 104 108 93 103 87 85 38*   ALT U/L 38  --   --   --  31  --  29   AST U/L 36  --   --   --  35  --  37   ALK PHOS U/L 317*  --   --   --  304*  --  270*   ALBUMIN g/dL 3.3*  --   --   --  3.1*  --  3.2*   TOTAL BILIRUBIN mg/dL 2.31*  --   --   --  2.27*  --  1.98*      No results found for: \"LDH\"       Current Facility-Administered Medications   Medication Dose Route Frequency Provider Last Rate    albuterol  2 puff Inhalation Q6H PRN LENORA Noe      amiodarone  200 mg Oral TID With Meals Eliecer Barker DO      aspirin  81 mg Oral Daily LENORA Noe      atorvastatin  20 mg Oral Daily LENORA Noe      vitamin B-12  1,000 mcg Oral Once per day on Monday Wednesday Friday LENORA Noe      fluticasone  1 spray " Nasal Daily LENORA Noe      folic acid  1 mg Oral Daily LENORA Noe      furosemide  40 mg Intravenous Daily Eliecer Barker DO      insulin lispro  1-5 Units Subcutaneous HS Hanane Platt MD      insulin lispro  2-12 Units Subcutaneous TID AC Hanane Platt MD      magnesium Oxide  400 mg Oral BID Tu Valle MD      metoprolol tartrate  50 mg Oral Q6H Moisés Estrella DO      nicotine  1 patch Transdermal Daily LENORA Noe      pantoprazole  40 mg Oral Early Morning LENORA Noe      rivaroxaban  20 mg Oral Daily With Breakfast LENORA Noe         Past Medical History:   Diagnosis Date    Harris esophagus     Blue toe syndrome (HCC)     Chronic kidney disease     Colon polyps     Crohn's disease (HCC) 1-2020    GERD (gastroesophageal reflux disease)     Hematuria     History of rheumatic fever     Hypolipidemia     Hypotension     Ischemic cardiomyopathy     PAD (peripheral artery disease) (HCC)     Pulmonary emphysema (HCC)     PVD (peripheral vascular disease) (HCC)      Patient Active Problem List   Diagnosis    Class 1 obesity due to excess calories with serious comorbidity and body mass index (BMI) of 33.0 to 33.9 in adult    CKD (chronic kidney disease), stage III (HCC)    Persistent proteinuria    Microscopic hematuria    [place] cardiac risk 10-20% in next 10 years    Abdominal aortic atherosclerosis (HCC)    RBBB (right bundle branch block with left anterior fascicular block)    Dyslipidemia    Vitamin D deficiency    Harris's esophagus without dysplasia    Colon polyps    PAD (peripheral artery disease) (HCC)    Ischemic cardiomyopathy    S/P peripheral artery angioplasty with stent placement    Venous stasis    Paroxysmal atrial fibrillation (HCC)    Adrenal mass (HCC)    Crohn's disease of small intestine with complication (HCC)    SVT (supraventricular tachycardia)    Volume overload    Positive QuantiFERON-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    Terminal ileitis of small intestine (HCC)     S/P femoral-popliteal bypass surgery    Elevated alkaline phosphatase level    Vitamin B12 deficiency    Type 2 diabetes mellitus with diabetic peripheral angiopathy without gangrene (HCC)    Venous insufficiency    Immunocompromised patient (HCC)    Atrial fibrillation with RVR (HCC)    Abnormal LFTs    Acute on chronic heart failure (HCC)    Electrolyte abnormality       Counseling / Coordination of Care:  Total floor / unit time spent today 31 minutes.  Greater than 50% of total time was spent with the patient and / or family counseling and / or coordination of care.  A description of the counseling / coordination of care: we discussed diagnoses, recent as well as older studies, labs, and all changes in cardiac treatment plan. All patient questions were answered.     Thank you for the opportunity to participate in the care of this patient.    Ike Butts MD  Attending Physician  Advanced Heart Failure and Transplant Cardiology  [facility]

## 2024-02-20 NOTE — PROGRESS NOTES
02/20/24 1100   Clinical Encounter Type   Visited With Patient   Routine Visit Introduction   Continue Visiting No      on routine visit. Adult male 74 yo, hospitalized for seven days. At the time of the visit, patient sitting on sofa watching tv. Pt was alert, coherent, and receptive to the 's intervention. Pt share feels good and hopes to be discharged soon. He share did not attendance any Rastaf[employer], but his wife belong to the [employer], regularly attendance.On conversation Pt share about his united family and strong support for him and wife.

## 2024-02-21 ENCOUNTER — TRANSITIONAL CARE MANAGEMENT (OUTPATIENT)
Dept: INTERNAL MEDICINE CLINIC | Facility: CLINIC | Age: 74
End: 2024-02-21

## 2024-02-21 ENCOUNTER — PATIENT OUTREACH (OUTPATIENT)
Dept: INTERNAL MEDICINE CLINIC | Facility: CLINIC | Age: 74
End: 2024-02-21

## 2024-02-21 DIAGNOSIS — I50.9 HEART FAILURE, UNSPECIFIED HF CHRONICITY, UNSPECIFIED HEART FAILURE TYPE (HCC): Primary | ICD-10-CM

## 2024-02-21 DIAGNOSIS — Z59.9 INADEQUATE COMMUNITY RESOURCES: ICD-10-CM

## 2024-02-21 SDOH — ECONOMIC STABILITY - INCOME SECURITY: PROBLEM RELATED TO HOUSING AND ECONOMIC CIRCUMSTANCES, UNSPECIFIED: Z59.9

## 2024-02-21 NOTE — PROGRESS NOTES
HF referral received via IB. Patient referred by [facility] for HF program. Chart reviewed.    This RNCM called patient and spoke with both him and his wife Tova over speaker phone. I introduced self and explained the role of the RNCM, CCM services and the HF program. Patient says he is doing okay since he got home. Patient denies any CP, chest tightness, SOB, coughing, wheezing, lightheadedness and dizziness. Patient said he had swelling but it has gone away. He states that HF is new to him. He says his s/s started about 4 weeks ago where he was unable to walk to get the mail without getting SOB.     Patient and his wife reviewed patients medications and AVS over the phone. Patient was able to get the new medications ordered and he has started taking them. All questions answered regarding Lasix, Amiodarone dosing and change in Lopressor dose. Patient states he just started weighing himself yesterday. Today he weighed 211 lbs. We discussed how he needs to watch for a weight gain of 3 lbs in 1 day or 5 lbs in 5 days and if this were to happen he was instructed to call his Cardiologist. He verbally states understanding.     HF Zone tool and s/s to look for and when to call his Cardiologist were reviewed and they state understanding.     Patient and wife states they are adhering to a low salt diet and limiting his fluid intake to 60 oz. Low Na diet and fluid restriction reinforced.     Patient has a Cardiology apt scheduled for tomorrow 2/22 at 1120 and a PCP [facility] apt on 2/27. Patient will transport himself to his apt's.     Patient is agreeable to CCM services and [facility] follow up for home visit for HF. Patient is aware [facility] will be reaching out tomorrow.     Referral placed to [facility].    Note routed to Jacqui Lewis, PCP and Cardiology Dr Ramires.    RNCM to follow.

## 2024-02-21 NOTE — PROGRESS NOTES
"Advanced Heart Failure/Pulmonary Hypertension Outpatient Note - South Humphrey III 73 y.o. male MRN: [id number]    @ Encounter: [id number]    Assessment:  73 y.o. male PMH and acute problems listed later in note (a partial list may also be included within the assessment section) p/w HF fu. I first met South Humphrey III during 2/2024 admission for HF and AFL where he p/w ADHF in setting of rapid Afib/AFL. New drop in EF. Note New skyrizi for Crohn's recently started.    The patient's primary cardiac team is general cardiology, follows Dr. Almanza  Historically HFpEF>new low EF 2/2024 with LVEF 20%, biv failure, nondilated LV  2018 NST: no ischemia or scar  Etiology may be TIC  # PAF and aflutter with RVR, on AC; S/p 2/19/24 DENIS/DCCV with conversion to NSR  Hx of loop recorder- explanted 1/10/24  RBBB  # hx of bifascicular block/ SVT, some nighttime vero events on LINQ  # dyslipidemia  CKD  # PAD s/ femoral popliteal bypass surgery  # tobacco abuse  # crohn's dz, hx of SBO  DM      I have reviewed all pertinent patient data including but not limited to:        Lab Units 02/20/24  0442 02/19/24  0537 02/18/24  0428   CREATININE mg/dL 1.16 1.18 1.16     Results from last 7 days   Lab Units 02/20/24  0442 02/19/24  0537 02/18/24  0428   CREATININE mg/dL 1.16 1.18 1.16     Lab Results   Component Value Date    K 3.8 02/20/2024     Lab Results   Component Value Date    HGBA1C 8.2 (H) 01/31/2024     Lab Results   Component Value Date    ERZ2AZWDCKHT 1.977 02/12/2024     Lab Results   Component Value Date    LDLCALC 40 10/31/2023     Lab Results   Component Value Date     (H) 02/12/2024      No results found for: \"NTBNP\"       Our last encounter (full visit/chart update/med refill):  Visit date not found  TODAY'S PLAN:     02/22/24  Warm, euvolemic  No new cardiac complaints, feels generally well, much better than prior  Weight down  Taking all rx at home since DC  NSR today on ecg    Cw amio load then " maintenance for now as already scheduled    Was taking equivalent of toprol xl 200 qd as inpt (in form of lopressor) however he was DC'ed on toprol xl 75 qd  Increase back up to toprol xl 75 bid now    Zio patch near future in case vero events  +BL conduction dz  Past nighttime vero events on LINQ  No overt issues as inpt on tele, reassuring    Cw AC    SW referral today for Rx cost    No very strong features of cardiac amyloidosis  Remains in differential     Gdmt near future  Limited by BP  Attempt sglt2i today  jardiance 10 qd; discussed risks/benefits/red flags/when to call clinic; no overt contraindications  Mild MR  RBBB+likely LAFB    Fu future echo x 3 months in 5/2024 after rhythm control and max gdmt, for EF  Could consider ischemic eval in future, pending Sx and LVEF trend  No chest pain    On ASA and statin    No strong connection between skyrizi and HF/arrhythmia I am aware of  Fu GI team    Neurohormonal Blockade/GDMT:  --Beta-Blocker: toprol xl 75 qd>bid  --ACEi, ARB, ARNi: future  --MRA: future  --SGLT2i: jardiance 10 qd; discussed risks/benefits/red flags/when to call clinic; no overt contraindications  --Hydralazine/nitrates:    --Diuretic: lasix 40 qd    Other HF pharmaco-invasive therapy (if applicable):   PPM,  ICD , CRT (if applicable):  Interrogation:  Advanced Therapies (if applicable):   --Inotrope:  --LVAD/Transplant Candidacy:    Studies:  I have reviewed all pertinent patient data/labs/imaging where available, including but not limited to the below studies. Selected results may be displayed here but comprehensive listing is omitted for note clarity and can be found in the epic chart.    ECG.    Echo.    Stress.    Cath.    HPI:   73 y.o. male PMH and acute problems listed later in note (a partial list may also be included within the assessment section) p/w HF fu. I first met South Humphrey III during 2/2024 admission for HF and AFL where he p/w ADHF in setting of rapid Afib/AFL. New  drop in EF. Note New skyrizi for Crohn's recently started.  No new CP/SOB/dizziness/palpitations/syncope.  No new fatigue.  No new unintentional weight changes.  No new leg swelling, PND, pillow orthopnea.  No new fevers, chills, cough, nausea, vomiting, diarrhea, dysuria.      Interval History:   As noted in 'plan' section above and prior epic chart notes.    Past Medical History:   Diagnosis Date    Harris esophagus     Blue toe syndrome ([facility])     Chronic kidney disease     Colon polyps     Crohn's disease ([facility]) 1-2020    GERD (gastroesophageal reflux disease)     Hematuria     History of rheumatic fever     Hypolipidemia     Hypotension     Ischemic cardiomyopathy     PAD (peripheral artery disease) ([facility])     Pulmonary emphysema ([facility])     PVD (peripheral vascular disease) ([facility])      Patient Active Problem List    Diagnosis Date Noted    Obesity, morbid ([facility]) 02/22/2024    Acute on chronic heart failure ([facility]) 02/14/2024    Electrolyte abnormality 02/14/2024    Abnormal LFTs 02/13/2024    Atrial fibrillation with RVR ([facility]) 02/12/2024    Immunocompromised patient ([facility]) 01/17/2024    Venous insufficiency 08/29/2022    Type 2 diabetes mellitus with diabetic peripheral angiopathy without gangrene ([facility]) 03/16/2021    Vitamin B12 deficiency 09/16/2020    Elevated alkaline phosphatase level 05/14/2020    S/P femoral-popliteal bypass surgery 04/09/2020    Terminal ileitis of small intestine ([facility]) 02/18/2020    Positive QuantiFERON-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 01/20/2020    Volume overload 01/19/2020    SVT (supraventricular tachycardia) 01/18/2020    Crohn's disease of small intestine with complication ([facility]) 01/13/2020    Adrenal mass ([facility]) 01/07/2020    Paroxysmal atrial fibrillation ([facility]) 09/12/2019    Venous stasis 07/19/2019    S/P peripheral artery angioplasty with stent placement 06/28/2019    Ischemic cardiomyopathy 06/03/2019    PAD (peripheral artery disease) ([facility]) 04/24/2019    Harris's esophagus without dysplasia 04/05/2019     Colon polyps 04/05/2019    Dyslipidemia 05/16/2018    Vitamin D deficiency 05/16/2018    RBBB (right bundle branch block with left anterior fascicular block) 05/11/2018    Bandera cardiac risk 10-20% in next 10 years 05/04/2018    Abdominal aortic atherosclerosis (HCC) 05/04/2018    CKD (chronic kidney disease), stage III (HCC) 03/22/2018    Persistent proteinuria 03/22/2018    Microscopic hematuria 03/22/2018    Class 1 obesity due to excess calories with serious comorbidity and body mass index (BMI) of 33.0 to 33.9 in adult        ROS:  10 point ROS negative except as specified in HPI/interval history    No Known Allergies  Current Outpatient Medications   Medication Instructions    albuterol (Ventolin HFA) 90 mcg/act inhaler 2 puffs, Inhalation, Every 6 hours PRN    amiodarone 200 mg tablet Take 1 tablet (200 mg total) by mouth 3 (three) times a day with meals for 5 days, THEN 1 tablet (200 mg total) daily.    amiodarone 200 mg, Oral, Daily, Start this once a day maintenance dose once you complete the initial load which is 3 times daily.    aspirin 81 mg, Oral, Daily    atorvastatin (LIPITOR) 20 mg, Oral, Daily    Empagliflozin (JARDIANCE) 10 mg, Oral, Every morning    fluticasone (FLONASE) 50 mcg/act nasal spray 1 spray, Nasal, Daily    folic acid (FOLVITE) 1 mg, Oral, Daily    furosemide (LASIX) 40 mg, Oral, Daily    glimepiride (AMARYL) 2 mg, Oral, 2 times daily with meals    Magnesium 500 MG TABS 1 tablet, Oral, Daily    metoprolol succinate (TOPROL-XL) 75 mg, Oral, 2 times daily, Combine a 50mg tablet with a 25mg tablet to take a total of 75mg in AM and 75mg in PM    metoprolol succinate (TOPROL-XL) 75 mg, Oral, 2 times daily, Combine a 50mg tablet with a 25mg tablet to take a total of 75mg in AM and 75mg in PM    omeprazole (PRILOSEC) 20 mg, Oral, Daily before breakfast    risankizumab-rzaa (Skyrizi) 360 MG/2.4ML SOCT Take first on body injector (OBI) under skin on week 12 then every 8 weeks there  after    rivaroxaban (XARELTO) 20 mg, Oral, Daily with breakfast    vitamin B-12 (VITAMIN B-12) 1,000 mcg, Oral, 3 times weekly      Social History     Socioeconomic History    Marital status: /Civil Union     Spouse name: Not on file    Number of children: 2    Years of education: Not on file    Highest education level: Not on file   Occupational History    Occupation: retired   Tobacco Use    Smoking status: Some Days     Current packs/day: 0.25     Average packs/day: 0.3 packs/day for 30.0 years (7.5 ttl pk-yrs)     Types: Cigarettes    Smokeless tobacco: Never   Vaping Use    Vaping status: Never Used   Substance and Sexual Activity    Alcohol use: Yes     Alcohol/week: 1.0 standard drink of alcohol     Types: 1 Standard drinks or equivalent per week     Comment: social drinker when out dining    Drug use: Never    Sexual activity: Not Currently     Birth control/protection: None   Other Topics Concern    Not on file   Social History Narrative    Drinks coffee     Social Determinants of Health     Financial Resource Strain: Low Risk  (1/14/2024)    Overall Financial Resource Strain (CARDIA)     Difficulty of Paying Living Expenses: Not hard at all   Food Insecurity: No Food Insecurity (2/14/2024)    Hunger Vital Sign     Worried About Running Out of Food in the Last Year: Never true     Ran Out of Food in the Last Year: Never true   Transportation Needs: No Transportation Needs (2/14/2024)    PRAPARE - Transportation     Lack of Transportation (Medical): No     Lack of Transportation (Non-Medical): No   Physical Activity: Not on file   Stress: Not on file   Social Connections: Not on file   Intimate Partner Violence: Not on file   Housing Stability: Low Risk  (2/14/2024)    Housing Stability Vital Sign     Unable to Pay for Housing in the Last Year: No     Number of Places Lived in the Last Year: 1     Unstable Housing in the Last Year: No     Family History   Problem Relation Age of Onset    Heart  "disease Mother     Hyperlipidemia Mother     Hypertension Mother     Hypertension Father     Heart disease Father     Stroke Father     Hyperlipidemia Father     Diabetes Brother     No Known Problems Maternal Grandmother     Dementia Neg Hx     Drug abuse Neg Hx     Mental illness Neg Hx     Substance Abuse Neg Hx     Alcohol abuse Neg Hx     Depression Neg Hx     Colon cancer Neg Hx        Physical Exam:  Vitals:    02/22/24 1126   BP: 100/62   BP Location: Left arm   Patient Position: Sitting   Cuff Size: Large   Pulse: 77   SpO2: 98%   Weight: 97.2 kg (214 lb 3.2 oz)   Height: 5' 10\" (1.778 m)     Constitutional: NAD, non toxic  Ears/nose/mouth/throat: atraumatic  CV: RRR, nl S1S2, no murmurs/rubs/gallups, no JVD, no HJR  Resp: CTABL  GI: Soft, NTND  MSK: no swollen joints in exposed areas  Extr: trace LE edema, warm LE  Pysche: Normal affect  Neuro: appropriate in conversation  Skin: dry and intact in exposed areas    Labs & Results:  Lab Results   Component Value Date    SODIUM 140 02/20/2024    K 3.8 02/20/2024    CL 98 02/20/2024    CO2 34 (H) 02/20/2024    BUN 22 02/20/2024    CREATININE 1.16 02/20/2024    GLUC 104 02/20/2024    CALCIUM 9.0 02/20/2024     Lab Results   Component Value Date    WBC 7.57 02/20/2024    HGB 14.2 02/20/2024    HCT 45.9 02/20/2024    MCV 88 02/20/2024     02/20/2024       Counseling / Coordination of Care  Time spent today 27 minutes.  Greater than 50% of total time was spent with the patient and / or family counseling and / or coordination of care.  We discussed diagnoses, most recent studies, tests and any changes in treatment plan.    Thank you for the opportunity to participate in the care of this patient.    Ike Butts MD  Attending Physician  Advanced Heart Failure and Transplant Cardiology  [facility]  "

## 2024-02-22 ENCOUNTER — NURSE TRIAGE (OUTPATIENT)
Age: 74
End: 2024-02-22

## 2024-02-22 ENCOUNTER — OFFICE VISIT (OUTPATIENT)
Dept: CARDIOLOGY CLINIC | Facility: CLINIC | Age: 74
End: 2024-02-22
Payer: MEDICARE

## 2024-02-22 VITALS
BODY MASS INDEX: 30.67 KG/M2 | HEIGHT: 70 IN | DIASTOLIC BLOOD PRESSURE: 62 MMHG | SYSTOLIC BLOOD PRESSURE: 100 MMHG | WEIGHT: 214.2 LBS | OXYGEN SATURATION: 98 % | HEART RATE: 77 BPM

## 2024-02-22 DIAGNOSIS — R03.0 ELEVATED BLOOD PRESSURE READING: ICD-10-CM

## 2024-02-22 DIAGNOSIS — I42.8 OTHER CARDIOMYOPATHY (HCC): Primary | ICD-10-CM

## 2024-02-22 DIAGNOSIS — I48.91 ATRIAL FIBRILLATION WITH RVR (HCC): ICD-10-CM

## 2024-02-22 DIAGNOSIS — E66.01 OBESITY, MORBID (HCC): ICD-10-CM

## 2024-02-22 PROCEDURE — 99214 OFFICE O/P EST MOD 30 MIN: CPT | Performed by: STUDENT IN AN ORGANIZED HEALTH CARE EDUCATION/TRAINING PROGRAM

## 2024-02-22 PROCEDURE — 93000 ELECTROCARDIOGRAM COMPLETE: CPT | Performed by: STUDENT IN AN ORGANIZED HEALTH CARE EDUCATION/TRAINING PROGRAM

## 2024-02-22 RX ORDER — METOPROLOL SUCCINATE 50 MG/1
75 TABLET, EXTENDED RELEASE ORAL 2 TIMES DAILY
Qty: 270 TABLET | Refills: 5 | Status: SHIPPED | OUTPATIENT
Start: 2024-02-22 | End: 2025-08-15

## 2024-02-22 RX ORDER — AMIODARONE HYDROCHLORIDE 200 MG/1
200 TABLET ORAL DAILY
Qty: 30 TABLET | Refills: 2 | Status: SHIPPED | OUTPATIENT
Start: 2024-02-22 | End: 2024-05-22

## 2024-02-22 RX ORDER — METOPROLOL SUCCINATE 25 MG/1
75 TABLET, EXTENDED RELEASE ORAL 2 TIMES DAILY
Qty: 540 TABLET | Refills: 5 | Status: SHIPPED | OUTPATIENT
Start: 2024-02-22 | End: 2025-08-15

## 2024-02-22 NOTE — TELEPHONE ENCOUNTER
I spoke with the patient. Relayed Dr. Clemens's message ok to restart Skryzi. Pt to administer Skyrizi OBI today as patient was originally due for OBI on 2/15. Pt to reach out to our office with any questions or concerns after injection.

## 2024-02-22 NOTE — TELEPHONE ENCOUNTER
"Patient recently admitted to [facility] 2/12/24-2/20/24.  Noted on discharge:  Immunocompromised patient (HCC)  Assessment & Plan  On Skyrizi for crohn's disease.  Received 2 injections.  Next due this week but is being held per GI    Forwarding for IBD to advise.      Answer Assessment - Initial Assessment Questions  1. REASON FOR CALL or QUESTION: \"What is your reason for calling today?\" or \"How can I best help you?\" or \"What question do you have that I can help answer?\"      Patient has medication question.    Protocols used: Information Only Call - No Triage-ADULT-[place]    "

## 2024-02-22 NOTE — TELEPHONE ENCOUNTER
Regarding: starting medication  ----- Message from Liza Marte sent at 2/22/2024  9:27 AM EST -----  Pt and wife called in regard to pt restarting his Skyrizi after hospitalization.

## 2024-02-23 ENCOUNTER — PATIENT OUTREACH (OUTPATIENT)
Dept: INTERNAL MEDICINE CLINIC | Facility: CLINIC | Age: 74
End: 2024-02-23

## 2024-02-23 NOTE — PROGRESS NOTES
Chart Review and Outgoing Call:  02/23/2024    CMOC received patient referral from [facility] Lyons.    CMOC completed a chart review.  Per chart review, patient was discharged from hospital on 02/20/2024 with new HF diagnosis. Patient told OPCM RN that his s/s started about 4 weeks ago. Patient has started weighing himself. HF zone tool and s/s reviewed with patient. Patient is adhering to a low salt diet and limiting fluid intake to 60 oz. Patient is agreed for home visit with CMOC.    CMOC called patient, introduced myself and explained my role with HF education. We discussed the referral that was received and patient accepts CMOC's services.     Patient was aware that CMOC would call to schedule home visit. Patient told CMOC that wife will be present at home visit. Patient explained that when CMOC arrives next week that if no one answers front door to try the back door.    Home Visit:  9:30 am  02/28/2024  [address] [place] [id number]    CMOC provided contact information in case home visit needs to be rescheduled.    Next scheduled outreach is 02/28/2024.

## 2024-02-27 ENCOUNTER — OFFICE VISIT (OUTPATIENT)
Dept: INTERNAL MEDICINE CLINIC | Facility: CLINIC | Age: 74
End: 2024-02-27
Payer: MEDICARE

## 2024-02-27 VITALS
HEART RATE: 69 BPM | OXYGEN SATURATION: 95 % | DIASTOLIC BLOOD PRESSURE: 78 MMHG | TEMPERATURE: 94.3 F | WEIGHT: 210 LBS | HEIGHT: 70 IN | BODY MASS INDEX: 30.06 KG/M2 | SYSTOLIC BLOOD PRESSURE: 104 MMHG

## 2024-02-27 DIAGNOSIS — E66.09 CLASS 1 OBESITY DUE TO EXCESS CALORIES WITH SERIOUS COMORBIDITY AND BODY MASS INDEX (BMI) OF 33.0 TO 33.9 IN ADULT: ICD-10-CM

## 2024-02-27 DIAGNOSIS — I50.23 ACUTE ON CHRONIC SYSTOLIC HEART FAILURE (HCC): ICD-10-CM

## 2024-02-27 DIAGNOSIS — N18.31 STAGE 3A CHRONIC KIDNEY DISEASE (HCC): ICD-10-CM

## 2024-02-27 DIAGNOSIS — E11.51 TYPE 2 DIABETES MELLITUS WITH DIABETIC PERIPHERAL ANGIOPATHY WITHOUT GANGRENE, WITHOUT LONG-TERM CURRENT USE OF INSULIN (HCC): ICD-10-CM

## 2024-02-27 DIAGNOSIS — R74.8 ELEVATED ALKALINE PHOSPHATASE LEVEL: ICD-10-CM

## 2024-02-27 DIAGNOSIS — I48.0 PAROXYSMAL ATRIAL FIBRILLATION (HCC): Primary | ICD-10-CM

## 2024-02-27 DIAGNOSIS — K50.019 CROHN'S DISEASE OF SMALL INTESTINE WITH COMPLICATION (HCC): ICD-10-CM

## 2024-02-27 PROCEDURE — 99495 TRANSJ CARE MGMT MOD F2F 14D: CPT | Performed by: INTERNAL MEDICINE

## 2024-02-27 NOTE — ASSESSMENT & PLAN NOTE
S/p cardioversion.  Now on amiodarone.  On metoprolol succinate 75 mg bid, Xarelto.  Has not started Jardiance due to cost.  Follow up with cardiology as scheduled.

## 2024-02-27 NOTE — PROGRESS NOTES
Assessment & Plan     1. Paroxysmal atrial fibrillation (HCC)  Assessment & Plan:  S/p cardioversion.  Now on amiodarone.  On metoprolol succinate 75 mg bid, Xarelto.  Has not started Jardiance due to cost.  Follow up with cardiology as scheduled.      2. Acute on chronic systolic heart failure (HCC)  Assessment & Plan:  Wt Readings from Last 3 Encounters:   02/27/24 95.3 kg (210 lb)   02/22/24 97.2 kg (214 lb 3.2 oz)   02/20/24 98.5 kg (217 lb 2.5 oz)     Reviewed recent echo, EF down to 20%.  Now on furosemide 40 mg daily, did not start Jardiance.  Continue daily weights, fluid restriction. Reviewed low salt diet.  Will return to the gym, no lifting.      3. Stage 3a chronic kidney disease (HCC)  Assessment & Plan:  Lab Results   Component Value Date    EGFR 62 02/20/2024    EGFR 61 02/19/2024    EGFR 62 02/18/2024    CREATININE 1.16 02/20/2024    CREATININE 1.18 02/19/2024    CREATININE 1.16 02/18/2024     Stable.      4. Type 2 diabetes mellitus with diabetic peripheral angiopathy without gangrene, without long-term current use of insulin (HCC)  Assessment & Plan:    Lab Results   Component Value Date    HGBA1C 8.2 (H) 01/31/2024     No medication changes. On glimepiride 2 mg bid.      5. Elevated alkaline phosphatase level  Assessment & Plan:  Still elevated, will monitor.      6. Crohn's disease of small intestine with complication (HCC)  Assessment & Plan:  On Skyrizi, on hold.  Follow up with GI.      7. Class 1 obesity due to excess calories with serious comorbidity and body mass index (BMI) of 33.0 to 33.9 in adult  Assessment & Plan:  Lost 23 lbs since last month.      Follow up as scheduled or as needed.       Subjective     Transitional Care Management Review:   South Humphrey III is a 73 y.o. male here for TCM follow up.     During the TCM phone call patient stated:  TCM Call     Date and time call was made  2/21/2024  8:46 AM    Hospital care reviewed  Records reviewed    Patient was hospitialized  at  [facility]    Date of Admission  02/12/24    Date of discharge  02/20/24    Diagnosis  Acute on chronic heart failure    Disposition  Home    Were the patients medications reviewed and updated  Yes    Current Symptoms  None      TCM Call     Post hospital issues  None    Should patient be enrolled in anticoag monitoring?  No    Scheduled for follow up?  Yes    Did you obtain your prescribed medications  Yes    Do you need help managing your prescriptions or medications  No    Is transportation to your appointment needed  Yes    I have advised the patient to call PCP with any new or worsening symptoms  amadou    Living Arrangements  Alone    Counseling  Patient        He is here today with his wife.  He reports feeling well since his hospital discharge.  He reports shortness of breath has improved.  He takes the water pill every morning, reports frequent urination slows down by mid afternoon.  He has been monitoring his fluid intake to 64 ounces.  He is weighing himself daily.  He has not started Jardiance due to cost.  He will discuss this with the cardiologist.  Wife reports that he is now taking amiodarone once a day.    No chest pain, palpitations, headaches or dizziness.  No abdominal pain or constipation.  He reports having a cough last month, progressed to feeling very short of breath a few weeks ago.      Review of Systems   Constitutional:  Positive for activity change and appetite change. Negative for fatigue.   HENT:  Negative for congestion.    Eyes: Negative.  Negative for visual disturbance.   Respiratory:  Negative for cough, chest tightness, shortness of breath and wheezing.    Cardiovascular:  Negative for chest pain, palpitations and leg swelling.   Gastrointestinal:  Negative for abdominal pain and constipation.   Genitourinary:  Negative for dysuria and urgency.   Musculoskeletal:  Negative for arthralgias.   Skin:  Negative for rash.   Neurological:  Negative for dizziness.  "  Hematological:  Does not bruise/bleed easily.       Objective     /78   Pulse 69   Temp (!) 94.3 °F (34.6 °C)   Ht 5' 10\" (1.778 m)   Wt 95.3 kg (210 lb)   SpO2 95%   BMI 30.13 kg/m²      Physical Exam  Vitals and nursing note reviewed.   Constitutional:       General: He is not in acute distress.     Appearance: He is well-developed.   HENT:      Head: Normocephalic and atraumatic.      Mouth/Throat:      Mouth: Mucous membranes are moist.   Eyes:      Conjunctiva/sclera: Conjunctivae normal.   Cardiovascular:      Rate and Rhythm: Normal rate and regular rhythm.      Heart sounds: No murmur heard.  Pulmonary:      Effort: Pulmonary effort is normal. No respiratory distress.      Breath sounds: Normal breath sounds.   Abdominal:      Palpations: Abdomen is soft.      Tenderness: There is no abdominal tenderness.   Musculoskeletal:         General: No swelling.      Cervical back: Neck supple.   Skin:     General: Skin is warm and dry.   Neurological:      General: No focal deficit present.      Mental Status: He is alert and oriented to person, place, and time.   Psychiatric:         Mood and Affect: Mood normal.         Behavior: Behavior normal.       Medications have been reviewed by provider in current encounter    Virgie Luz MD     Labs & imaging results reviewed with patient.      "

## 2024-02-27 NOTE — ASSESSMENT & PLAN NOTE
Lab Results   Component Value Date    EGFR 62 02/20/2024    EGFR 61 02/19/2024    EGFR 62 02/18/2024    CREATININE 1.16 02/20/2024    CREATININE 1.18 02/19/2024    CREATININE 1.16 02/18/2024     Stable.

## 2024-02-27 NOTE — ASSESSMENT & PLAN NOTE
Wt Readings from Last 3 Encounters:   02/27/24 95.3 kg (210 lb)   02/22/24 97.2 kg (214 lb 3.2 oz)   02/20/24 98.5 kg (217 lb 2.5 oz)     Reviewed recent echo, EF down to 20%.  Now on furosemide 40 mg daily, did not start Jardiance.  Continue daily weights, fluid restriction. Reviewed low salt diet.  Will return to the gym, no lifting.

## 2024-02-27 NOTE — ASSESSMENT & PLAN NOTE
Reviewed recent echo, EF down to 20%.  Now on furosemide 40 mg daily, started on Jardiance 10 mg.  Continue daily weights, fluid restriction.

## 2024-02-27 NOTE — ASSESSMENT & PLAN NOTE
Lab Results   Component Value Date    HGBA1C 8.2 (H) 01/31/2024     No medication changes. On glimepiride 2 mg bid.

## 2024-02-28 ENCOUNTER — PATIENT OUTREACH (OUTPATIENT)
Dept: INTERNAL MEDICINE CLINIC | Facility: CLINIC | Age: 74
End: 2024-02-28

## 2024-02-28 ENCOUNTER — TELEPHONE (OUTPATIENT)
Dept: SURGICAL ONCOLOGY | Facility: CLINIC | Age: 74
End: 2024-02-28

## 2024-02-28 ENCOUNTER — PATIENT OUTREACH (OUTPATIENT)
Dept: CASE MANAGEMENT | Facility: OTHER | Age: 74
End: 2024-02-28

## 2024-02-28 ENCOUNTER — PATIENT OUTREACH (OUTPATIENT)
Dept: CARDIOLOGY CLINIC | Facility: CLINIC | Age: 74
End: 2024-02-28

## 2024-02-28 DIAGNOSIS — E27.8 ADRENAL NODULE (HCC): Primary | ICD-10-CM

## 2024-02-28 NOTE — PROGRESS NOTES
OP Advanced Heart Failure LCSW received new referral from Dr. Ike Butts that pt unable to afford Jardiance and Xarelto.     LCSW reviewed electronic chart and placed call to patient. Introduced self and explained reason for referral. Pt added spouse to phone call.     Completed SOC Psychosocial Assessment and Care Plan.   Patient/spouse stated that Jardiance cost $432/month and Xarelto cost $583/month.    LCSW Intervention:    Educated pt/spouse about [employer] and their income is under the max limit. Provided [employer] phone contact and pt going to apply.   Asked pt to please update this LCSW once he is approved and LCSW would like to know new cost of medications post approval. Patient agreed.

## 2024-02-28 NOTE — PROGRESS NOTES
"Home Visit:  02/28/2024    CMOC completed home visit with patient. Patient's wife was also present. Patient's wife wrote notes as CMOC was talking.    CMOC went through heart failure zone tool with patient and patient's wife. Patient's cardiologist's number was written down on tool.    CMOC went through checklist:  HANDOUTS    \"Important activities I can do at home to manage my heart failure\" provided and reviewed with the patient: Yes   Does the patient have their AVS and CHF booklet?  Patient has a AVS. However, patient did not have a HF education booklet from hospital.  Does the patient have access to watch the Heart Failure video?  Yes, CMOC went over how to access the videos.    SIGNS AND SYMPTOMS    Is the patient having any of the following symptoms:   Swelling \"edema\" in legs, feet, ankles, hands, or abdomen/stomach: No   Clothing, shoes, or jewelry tighter: No    Increase in weight (3 lbs overnight or 5 lbs in a week): No    Increase in weakness: No    Increase in tiredness: No    New cough or unusual cough: No    Dizziness: No    Harder to breath lying down or need to sleep in a chair or require more pillows to sleep: No    Chest pain: No   CMOC reported symptoms to the RN care manager or the provider's office clinical staff: No, patient did not have any s/s. There was nothing to report.    FLUIDS    Fluid restrictions reviewed: Yes   Patient knows their fluid restrictions:  Yes   Fluid restrictions are: 64 oz  How does the patient keep track of their fluid intake? Patient drinks two 12 oz bottles of water a day. Patient said he measured a bottle in his house that he knows is 12 oz. Patient drinks other liquids but uses an 8 oz cup. Patient knows not to go over 64 oz.  Patient demonstrated how to measure/track their fluids for the day: Patient already using a system that works for him. CMOC did tell patient to be aware of \"hidden liquids\" like ice cream, pudding, jell-o.    DIET    Who does the food " shopping and where do they get their food? Patient and patient's wife go food shopping together at [facility].  Do they need any food resources? No    If yes, was FindHelp used or a  needed? No  Who does the cooking? Patient and patient's wife do the cooking. Both cook with no salt and use salt free seasonings. Patient told CMOC that they have a garden. Patient likes to make as much of his own food from his garden as possible. Patient stated he does this so he knows exactly what is in the food product.  Sodium restrictions reviewed: Yes   Patient knows their sodium restrictions: Yes   Sodium restrictions are: 1500 mg  Reviewed food label reading: Yes   Patient could identify foods that are high in salt in their pantry/refrigerator/freezer: Yes     REVIEW HOW TO CHECK WEIGHT    Do they have a working scale? Yes   Is the scale on a flat, hard surface? Yes   Reviewed to weigh themselves in the morning after getting up and using the bathroom, before eating and drinking, and to wear the same amount of clothing? Yes   Does the patient keep a log of weights with the date & time? Yes   Reviewed with patient to notify Cardiology office/PCP office with increase in weight of 3 lbs overnight or 5 lbs in a week. Yes   Patient was able to demonstrate how to weigh themselves independently: Yes     MEDICATION    Patient has their medication list: Yes   Did the patient fill their medications after discharge? Yes   How does the patient manage their medication? Patient uses a pillbox to organize and manage medications. Patient wrote down his medication in a list that separates each medication when he takes them during the day. Patient also writes an M/N/S (morning/night/supper) on the pill bottles so patient knows when medications are taking throughout the day.  [facility] was able to review medications with RN care manager: Yes     APPOINTMENTS    Do they have a PCP appointment? No, patient already had follow up PCP appointment  since being out of the hospital.  Do they have a cardiology appointment? Yes, describe: 03/05/2024  Do you have any upcoming lab work, testing, etc...? Yes, describe: Sometime in May  How do you get to your appointments, testing, labs, etc...? Patient drives himself   If assistance is needed in getting lab work, did you refer to mobile labs? No     Patient reported to [facility] that he tries to walk around 3 miles/day and weight trains. However, he is not allowed to do so at this time. Patient did tell [facility] that his doctors are slowly starting to let him walk again.    OC did not have any goals to give patient. Patient is doing what he needs to since having HF diagnosis. Patient is aware that CMOC or OPCM RN can be called if needed. CMOC told patient that case will be closed and no follow up needed.    CMOC will close CHW referral and take name off care team.    No further outreach is needed.

## 2024-02-29 NOTE — PROGRESS NOTES
CMOC, Jacqui HARTMAN, made a teams video call with the patient during her home visit. All of the patient's meds were reviewed. He sets up pillboxes weekly. The only medications he does not have & is not taking are the albuterol inhaler & Flonase. He stated he was given these for an acute respiratory illness in the past & no longer needs them.    See Jacqui's note for summary of their visit. There were no concerns noted at home.     He will follow up with cardiology again on Monday. He saw his PCP on 2/27.    Patient has this RN's number should he have any questions or needs before his RN care manager returns.

## 2024-03-03 DIAGNOSIS — I73.9 PAD (PERIPHERAL ARTERY DISEASE) (HCC): ICD-10-CM

## 2024-03-03 RX ORDER — ATORVASTATIN CALCIUM 20 MG/1
20 TABLET, FILM COATED ORAL DAILY
Qty: 90 TABLET | Refills: 0 | Status: SHIPPED | OUTPATIENT
Start: 2024-03-03 | End: 2024-03-12 | Stop reason: SDUPTHER

## 2024-03-04 NOTE — PROGRESS NOTES
"Advanced Heart Failure/Pulmonary Hypertension Outpatient Note - South Humphrey III 73 y.o. male MRN: [id number]    @ Encounter: [id number]    Assessment:  73 y.o. male PMH and acute problems listed later in note (a partial list may also be included within the assessment section) p/w HF fu. I first met South Humphrey III during 2/2024 admission for HF and AFL where he p/w ADHF in setting of rapid Afib/AFL. New drop in EF. Note New skyrizi for Crohn's recently started.    The patient's primary cardiac team is general cardiology, follows Dr. Almanza  Historically HFpEF, NICM>new low EF 2/2024 with LVEF 20%, biv failure, nondilated LV  2018 NST: no ischemia or scar  Etiology may be TIC, EF drop in setting of rapid AF and AFL  # PAF and aflutter with RVR, on AC; S/p 2/19/24 DENIS/DCCV with conversion to NSR  Hx of loop recorder- explanted 1/10/24  RBBB  # hx of bifascicular block/ SVT, some nighttime vero events on LINQ  # dyslipidemia  CKD  # PAD s/ femoral popliteal bypass surgery  # tobacco abuse  # crohn's dz, hx of SBO  DM      I have reviewed all pertinent patient data including but not limited to:        Lab Units 02/20/24  0442 02/19/24  0537 02/18/24  0428   CREATININE mg/dL 1.16 1.18 1.16           Lab Results   Component Value Date    K 3.8 02/20/2024     Lab Results   Component Value Date    HGBA1C 8.2 (H) 01/31/2024     Lab Results   Component Value Date    RVP5BAJXTJYG 1.977 02/12/2024     Lab Results   Component Value Date    LDLCALC 40 10/31/2023     Lab Results   Component Value Date     (H) 02/12/2024      No results found for: \"NTBNP\"     Home scale dry weight may be 209lbs    Our last encounter (full visit/chart update/med refill):  Visit date not found  TODAY'S PLAN:     03/05/24  Warm, euvolemic  Home weight stable  No new cardiac complaints, feels generally well  HF SW helping w drug cost now; able to afford jardiance, picking up today  RRR on exam    Gdmt below  BP somewhat " limiting  Newly able to afford jardiance, start today, then wait 1 week, then start entresto if can afford, then fu BMP in 1 week  Mild MR  RBBB+likely LAFB    Fu future echo x 3 months around 5/2024 after rhythm control and max gdmt, for EF  Could consider ischemic eval in future, pending Sx and LVEF trend  No chest pain  Neg NST 2018    Reducing lasix now on sglt2i    Cw AC    Cw amio for now     On ASA and statin    No very strong features of cardiac amyloidosis  Remains in differential    Possible Zio patch near future in case vero events  +BL conduction dz  Past nighttime vero events on LINQ  No overt issues as inpt on tele, reassuring  Asymptomatic now    No strong connection between skyrizi and HF/arrhythmia I am aware of  Fu GI team    Neurohormonal Blockade/GDMT:  --Beta-Blocker: toprol xl 75 bid  --ACEi, ARB, ARNi: entresto 24/26 bid  --MRA: future  --SGLT2i: jardiance 10 qd; discussed risks/benefits/red flags/when to call clinic; no overt contraindications  --Hydralazine/nitrates:    --Diuretic: lasix 40 qd>q48hr    Other HF pharmaco-invasive therapy (if applicable):   PPM,  ICD , CRT (if applicable):  Interrogation:  Advanced Therapies (if applicable):   --Inotrope:  --LVAD/Transplant Candidacy:    Studies:  I have reviewed all pertinent patient data/labs/imaging where available, including but not limited to the below studies. Selected results may be displayed here but comprehensive listing is omitted for note clarity and can be found in the epic chart.    ECG.    Echo.    Stress.    Cath.    HPI:   73 y.o. male PMH and acute problems listed later in note (a partial list may also be included within the assessment section) p/w HF fu. I first met South Humphrey III during 2/2024 admission for HF and AFL where he p/w ADHF in setting of rapid Afib/AFL. New drop in EF. Note New skyrizi for Crohn's recently started.  No new CP/SOB/dizziness/palpitations/syncope.  No new fatigue.  No new unintentional  weight changes.  No new leg swelling, PND, pillow orthopnea.  No new fevers, chills, cough, nausea, vomiting, diarrhea, dysuria.    Interval History:   No new CP/SOB/dizziness/palpitations/syncope.  No new fatigue.  No new unintentional weight changes.  No new leg swelling, PND, pillow orthopnea.  No new fevers, chills, cough, nausea, vomiting, diarrhea, dysuria.    Past Medical History:   Diagnosis Date    Harris esophagus     Blue toe syndrome ([facility])     Chronic kidney disease     Colon polyps     Crohn's disease ([facility]) 1-2020    GERD (gastroesophageal reflux disease)     Hematuria     History of rheumatic fever     Hypolipidemia     Hypotension     Ischemic cardiomyopathy     PAD (peripheral artery disease) ([facility])     Pulmonary emphysema ([facility])     PVD (peripheral vascular disease) ([facility])      Patient Active Problem List    Diagnosis Date Noted    Obesity, morbid ([facility]) 02/22/2024    Acute on chronic heart failure ([facility]) 02/14/2024    Electrolyte abnormality 02/14/2024    Abnormal LFTs 02/13/2024    Atrial fibrillation with RVR ([facility]) 02/12/2024    Immunocompromised patient ([facility]) 01/17/2024    Venous insufficiency 08/29/2022    Type 2 diabetes mellitus with diabetic peripheral angiopathy without gangrene ([facility]) 03/16/2021    Vitamin B12 deficiency 09/16/2020    Elevated alkaline phosphatase level 05/14/2020    S/P femoral-popliteal bypass surgery 04/09/2020    Terminal ileitis of small intestine ([facility]) 02/18/2020    Positive QuantiFERON-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 01/20/2020    Volume overload 01/19/2020    SVT (supraventricular tachycardia) 01/18/2020    Crohn's disease of small intestine with complication ([facility]) 01/13/2020    Adrenal mass ([facility]) 01/07/2020    Paroxysmal atrial fibrillation ([facility]) 09/12/2019    Venous stasis 07/19/2019    S/P peripheral artery angioplasty with stent placement 06/28/2019    Ischemic cardiomyopathy 06/03/2019    PAD (peripheral artery disease) ([facility]) 04/24/2019    Harris's esophagus without dysplasia  04/05/2019    Colon polyps 04/05/2019    Dyslipidemia 05/16/2018    Vitamin D deficiency 05/16/2018    RBBB (right bundle branch block with left anterior fascicular block) 05/11/2018    [place] cardiac risk 10-20% in next 10 years 05/04/2018    Abdominal aortic atherosclerosis (HCC) 05/04/2018    CKD (chronic kidney disease), stage III (HCC) 03/22/2018    Persistent proteinuria 03/22/2018    Microscopic hematuria 03/22/2018    Class 1 obesity due to excess calories with serious comorbidity and body mass index (BMI) of 33.0 to 33.9 in adult        ROS:  10 point ROS negative except as specified in HPI/interval history    No Known Allergies  Current Outpatient Medications   Medication Instructions    albuterol (Ventolin HFA) 90 mcg/act inhaler 2 puffs, Inhalation, Every 6 hours PRN    amiodarone 200 mg, Oral, Daily, Start this once a day maintenance dose once you complete the initial load which is 3 times daily.    aspirin 81 mg, Oral, Daily    atorvastatin (LIPITOR) 20 mg, Oral, Daily    Empagliflozin (JARDIANCE) 10 mg, Oral, Every morning    fluticasone (FLONASE) 50 mcg/act nasal spray 1 spray, Nasal, Daily    folic acid (FOLVITE) 1 mg, Oral, Daily    furosemide (LASIX) 40 mg, Oral, Every 48 hours    glimepiride (AMARYL) 2 mg, Oral, 2 times daily with meals    Magnesium 500 MG TABS 1 tablet, Oral, Daily    metoprolol succinate (TOPROL-XL) 75 mg, Oral, 2 times daily, Combine a 50mg tablet with a 25mg tablet to take a total of 75mg in AM and 75mg in PM    metoprolol succinate (TOPROL-XL) 75 mg, Oral, 2 times daily, Combine a 50mg tablet with a 25mg tablet to take a total of 75mg in AM and 75mg in PM    omeprazole (PRILOSEC) 20 mg, Oral, Daily before breakfast    risankizumab-rzaa (Skyrizi) 360 MG/2.4ML SOCT Take first on body injector (OBI) under skin on week 12 then every 8 weeks there after    rivaroxaban (XARELTO) 20 mg, Oral, Daily with breakfast    sacubitril-valsartan (Entresto) 24-26 MG TABS 1 tablet, Oral,  2 times daily    vitamin B-12 (VITAMIN B-12) 1,000 mcg, Oral, 3 times weekly      Social History     Socioeconomic History    Marital status: /Civil Union     Spouse name: Not on file    Number of children: 2    Years of education: Not on file    Highest education level: Not on file   Occupational History    Occupation: retired   Tobacco Use    Smoking status: Some Days     Current packs/day: 0.25     Average packs/day: 0.3 packs/day for 30.0 years (7.5 ttl pk-yrs)     Types: Cigarettes    Smokeless tobacco: Never   Vaping Use    Vaping status: Never Used   Substance and Sexual Activity    Alcohol use: Yes     Alcohol/week: 1.0 standard drink of alcohol     Types: 1 Standard drinks or equivalent per week     Comment: social drinker when out dining    Drug use: Never    Sexual activity: Not Currently     Birth control/protection: None   Other Topics Concern    Not on file   Social History Narrative    Drinks coffee     Social Determinants of Health     Financial Resource Strain: Low Risk  (1/14/2024)    Overall Financial Resource Strain (CARDIA)     Difficulty of Paying Living Expenses: Not hard at all   Food Insecurity: No Food Insecurity (2/14/2024)    Hunger Vital Sign     Worried About Running Out of Food in the Last Year: Never true     Ran Out of Food in the Last Year: Never true   Transportation Needs: No Transportation Needs (2/14/2024)    PRAPARE - Transportation     Lack of Transportation (Medical): No     Lack of Transportation (Non-Medical): No   Physical Activity: Not on file   Stress: Not on file   Social Connections: Not on file   Intimate Partner Violence: Not on file   Housing Stability: Low Risk  (2/14/2024)    Housing Stability Vital Sign     Unable to Pay for Housing in the Last Year: No     Number of Places Lived in the Last Year: 1     Unstable Housing in the Last Year: No     Family History   Problem Relation Age of Onset    Heart disease Mother     Hyperlipidemia Mother     Hypertension  "Mother     Hypertension Father     Heart disease Father     Stroke Father     Hyperlipidemia Father     Diabetes Brother     No Known Problems Maternal Grandmother     Dementia Neg Hx     Drug abuse Neg Hx     Mental illness Neg Hx     Substance Abuse Neg Hx     Alcohol abuse Neg Hx     Depression Neg Hx     Colon cancer Neg Hx      Physical Exam:  Vitals:    03/05/24 1142   BP: 102/60   BP Location: Left arm   Patient Position: Sitting   Cuff Size: Standard   Pulse: 68   SpO2: 98%   Weight: 95.1 kg (209 lb 9.6 oz)   Height: 5' 10\" (1.778 m)     Constitutional: NAD, non toxic  Ears/nose/mouth/throat: atraumatic  CV: RRR, nl S1S2, no murmurs/rubs/gallups, no JVD, no HJR  Resp: CTABL  GI: Soft, NTND  MSK: no swollen joints in exposed areas  Extr: trace LE edema, warm LE  Pysche: Normal affect  Neuro: appropriate in conversation  Skin: dry and intact in exposed areas    Labs & Results:  Lab Results   Component Value Date    SODIUM 140 02/20/2024    K 3.8 02/20/2024    CL 98 02/20/2024    CO2 34 (H) 02/20/2024    BUN 22 02/20/2024    CREATININE 1.16 02/20/2024    GLUC 104 02/20/2024    CALCIUM 9.0 02/20/2024     Lab Results   Component Value Date    WBC 7.57 02/20/2024    HGB 14.2 02/20/2024    HCT 45.9 02/20/2024    MCV 88 02/20/2024     02/20/2024       Counseling / Coordination of Care  Time spent today 28 minutes.  Greater than 50% of total time was spent with the patient and / or family counseling and / or coordination of care.  We discussed diagnoses, most recent studies, tests and any changes in treatment plan.    Thank you for the opportunity to participate in the care of this patient.    Ike Butts MD  Attending Physician  Advanced Heart Failure and Transplant Cardiology  [facility]  "

## 2024-03-05 ENCOUNTER — OFFICE VISIT (OUTPATIENT)
Dept: CARDIOLOGY CLINIC | Facility: CLINIC | Age: 74
End: 2024-03-05
Payer: MEDICARE

## 2024-03-05 VITALS
OXYGEN SATURATION: 98 % | HEART RATE: 68 BPM | BODY MASS INDEX: 30.01 KG/M2 | WEIGHT: 209.6 LBS | HEIGHT: 70 IN | DIASTOLIC BLOOD PRESSURE: 60 MMHG | SYSTOLIC BLOOD PRESSURE: 102 MMHG

## 2024-03-05 DIAGNOSIS — I48.0 PAROXYSMAL ATRIAL FIBRILLATION (HCC): ICD-10-CM

## 2024-03-05 DIAGNOSIS — I42.8 OTHER CARDIOMYOPATHY (HCC): ICD-10-CM

## 2024-03-05 DIAGNOSIS — N17.9 AKI (ACUTE KIDNEY INJURY) (HCC): ICD-10-CM

## 2024-03-05 DIAGNOSIS — I48.91 ATRIAL FIBRILLATION WITH RVR (HCC): ICD-10-CM

## 2024-03-05 DIAGNOSIS — I50.23 ACUTE ON CHRONIC SYSTOLIC HEART FAILURE (HCC): Primary | ICD-10-CM

## 2024-03-05 PROCEDURE — 99214 OFFICE O/P EST MOD 30 MIN: CPT | Performed by: STUDENT IN AN ORGANIZED HEALTH CARE EDUCATION/TRAINING PROGRAM

## 2024-03-05 RX ORDER — SACUBITRIL AND VALSARTAN 24; 26 MG/1; MG/1
1 TABLET, FILM COATED ORAL 2 TIMES DAILY
Qty: 180 TABLET | Refills: 5 | Status: SHIPPED | OUTPATIENT
Start: 2024-03-05 | End: 2025-08-27

## 2024-03-05 RX ORDER — FUROSEMIDE 40 MG/1
40 TABLET ORAL
Qty: 45 TABLET | Refills: 5 | Status: SHIPPED | OUTPATIENT
Start: 2024-03-05 | End: 2025-08-27

## 2024-03-05 NOTE — PATIENT INSTRUCTIONS
Start jardiance today    Switch lasix to every other day    Wait 1 week    Then start entresto. If too expensive do not get it. Call me.    Wait 1 week    Get labs 1 week after starting entresto

## 2024-03-06 ENCOUNTER — PATIENT OUTREACH (OUTPATIENT)
Dept: INTERNAL MEDICINE CLINIC | Facility: CLINIC | Age: 74
End: 2024-03-06

## 2024-03-06 ENCOUNTER — TELEPHONE (OUTPATIENT)
Age: 74
End: 2024-03-06

## 2024-03-06 NOTE — PROGRESS NOTES
HF follow up:    Chart reviewed and this RNCM called patient for follow up. I spoke with both patient and his wife Tova over speaker phone. Patient states he is feeling well. He denies any CP, chest tightness, SOB, wheezing, lightheadedness, dizziness or weakness. Patient denies any swelling. He states his legs are back to normal which he is very pleased about.Patient states his appetite is good and he is eating 3 meals per day. A Low Na diet was reinforced. Patient states understanding and says he adheres to a low Na diet. Patient and his wife continue to share in the cooking. Patient continues to weigh himself daily. Today he weighed 206.3 lbs.Patient denies any weight gain. Patient had a Cardiology apt yesterday 3/5 and it went well. Patient started Entresto 24-26 mg BID and Furosemide changed to 40 mg every other day.  Patient states he has all of his medications. He states he takes all of his medications as directed and he knows to call for refills before he is down to less than 1 week. Patient and wife deny any questions or concerns. Patient states he is adhering to fluid restriction and does not exceed 64 oz per day.    HF Zone Tool reviewed and s/s to look for and when to call his Cardiologist reviewed with both patient and his wife. They state understanding. They are agreeable to a follow up call next week.     Note routed to Jacqui Lewis CMOC and PCP Dr Luz.    RNCM to follow.

## 2024-03-06 NOTE — TELEPHONE ENCOUNTER
South's spouse Tova called in stating South was seen by the cardiologist yesterday and he was put on two new medications: Jardiance and Entresto. The cardiologist wanted them to check with Dr. Luz if he can still continue taking the Glimepiride with the new medications or if it would need to be changed. Please advise.

## 2024-03-09 DIAGNOSIS — R05.1 ACUTE COUGH: ICD-10-CM

## 2024-03-10 RX ORDER — FLUTICASONE PROPIONATE 50 MCG
SPRAY, SUSPENSION (ML) NASAL
Qty: 24 ML | Refills: 2 | Status: SHIPPED | OUTPATIENT
Start: 2024-03-10

## 2024-03-12 DIAGNOSIS — I50.23 ACUTE ON CHRONIC SYSTOLIC HEART FAILURE (HCC): ICD-10-CM

## 2024-03-12 DIAGNOSIS — I73.9 PAD (PERIPHERAL ARTERY DISEASE) (HCC): ICD-10-CM

## 2024-03-12 RX ORDER — FUROSEMIDE 40 MG/1
40 TABLET ORAL
Qty: 45 TABLET | Refills: 5 | OUTPATIENT
Start: 2024-03-12 | End: 2025-09-03

## 2024-03-12 RX ORDER — ATORVASTATIN CALCIUM 20 MG/1
20 TABLET, FILM COATED ORAL DAILY
Qty: 90 TABLET | Refills: 0 | Status: SHIPPED | OUTPATIENT
Start: 2024-03-12

## 2024-03-12 NOTE — TELEPHONE ENCOUNTER
Not a duplicate, per patient spouse they no longer used [facility] medications should be filled at [facility].  Chart shows Lasix filled at [facility] [address], by atorvastatin was sent to [facility] resending [facility]atin to [facility]

## 2024-03-12 NOTE — TELEPHONE ENCOUNTER
Patients spouse called because [facility] called and said the patient had a prescription ready and she said they do not use that pharmacy anymore they use the [facility] [address].  She said the patient needs refills for atorvastatin (LIPITOR) 20 mg tablet and furosemide (LASIX) 40 mg tablet sent to the [facility].

## 2024-03-14 ENCOUNTER — PATIENT OUTREACH (OUTPATIENT)
Dept: INTERNAL MEDICINE CLINIC | Facility: CLINIC | Age: 74
End: 2024-03-14

## 2024-03-14 NOTE — PROGRESS NOTES
HF Program follow up:    Chart reviewed and this RNCM called patient and spoke with patient and his wife Tova over the phone. She says patient is doing very well. They are going for 1 1/2-2 mile walks 2-3 times per week and patient is tolerating well. Patients breathing is really good. Patient denies any CP, chest tightness, SOB, coughing, wheezing, lightheadedness and dizziness. No swelling. Patient is wearing his compression socks periodically throughout the day.     Patient continues to weigh himself daily and today he weighed 207 lbs. Tova says patient has been steady at this all week. We discussed how he needs to watch for a weight gain of 3 lbs in 1 day or 5 lbs in 5 days and if this were to happen he was instructed to call his Cardiologist. He verbally states understanding.      Patient and wife states they are adhering to a low salt diet and limiting his fluid intake to 64 oz. Low Na diet and fluid restriction reinforced.     Patients wife tova says they have all of patients medications and he is taking them as directed. They deny any questions or concerns regarding. I provided them with my contact number and encouraged them to call with any questions or concerns.   [facility] Hospital Medicine  Progress Note    Patient Name: Luisa Colon  MRN: [id number]  Patient Class: IP- Inpatient   Admission Date: 1/27/2019  Length of Stay: 1 days  Attending Physician: Mckay Sommer MD  Primary Care Provider: Jan Oconnor MD        Subjective:     Principal Problem:Acute respiratory failure with hypoxia and hypercapnia    HPI:  Ms. Colon is a 70yo female with a PMHx of asthma/chronic bronchitis, HTN, and 30 pack-year h/o tobacco smoking (quit 14 years ago), who presented to the ED with c/o SOB that has progressively worsened over the past 1 day.  Associated nonproductive cough, congestion, rhinorrhea, fever (TMax 100.6), and malaise.  No aggravating or alleviating factors.  Denies any CP, palpitations, orthopnea, PND, edema, ABD pain, N/V/D, dysuria, back pain, HA, lightheadedness, dizziness, syncope, sore throat, weakness, or chills.  Initial work-up resulted normal WBC, influenza negative, CXR unremarkable, CTA of chest negative for acute process.  ABG showed pH 7.35, pCO2 51.3, pO2 43, HCO3 28.3, SaO2 75 on RA.  Patient received Duonebs x 3 and IV Solumedrol 125mg, and placed on 5L NC in ED.  Patient reports SOB improved, and O2 sat remained stable.  Hospital Medicine was called for admission.        Hospital Course:  Luisa Colon is a 69 year old female admitted for Asthma exacerbation. Pt currently receiving IV steroids, inhaled medications and PO Azithromycin. BBS are improved, slight scattered wheezing noted posteriorly. Pt currently on 2LPM. Denies SOB. Reports SOB much improved. Will attempt to wean O2. If unsuccessful, will need to obtain home oxygen evaluation prior to discharge. Hx of tobacco abuse. Quit smoking in 2005. Smoked over 40 years. Will refer to pulmonologist upon discharge for PFTs for possible underlying COPD and TREVOR workup.       Review of Systems   Constitutional: Positive for activity change. Negative for chills, diaphoresis,  fatigue, fever and unexpected weight change.   HENT: Positive for congestion, rhinorrhea, sinus pressure and sinus pain. Negative for sore throat and trouble swallowing.    Eyes: Negative for visual disturbance.   Respiratory: Positive for cough and shortness of breath. Negative for chest tightness and wheezing.    Cardiovascular: Negative for chest pain, palpitations and leg swelling.   Gastrointestinal: Negative for abdominal distention, abdominal pain, blood in stool, constipation, diarrhea, nausea and vomiting.   Genitourinary: Negative for dysuria and hematuria.   Musculoskeletal: Negative for arthralgias, back pain and myalgias.   Skin: Negative for pallor, rash and wound.   Neurological: Negative for dizziness, syncope, weakness, light-headedness, numbness and headaches.   Psychiatric/Behavioral: Negative for confusion. The patient is not nervous/anxious.    All other systems reviewed and are negative.    Objective:     Vital Signs (Most Recent):  Temp: 97.9 °F (36.6 °C) (01/28/19 1557)  Pulse: 74 (01/28/19 1557)  Resp: 18 (01/28/19 1557)  BP: 132/75 (01/28/19 1557)  SpO2: (!) 90 % (01/28/19 1557) Vital Signs (24h Range):  Temp:  [97.8 °F (36.6 °C)-98.7 °F (37.1 °C)] 97.9 °F (36.6 °C)  Pulse:  [68-88] 74  Resp:  [17-24] 18  SpO2:  [85 %-94 %] 90 %  BP: (124-183)/(65-84) 132/75     Weight: 120 kg (264 lb 8.8 oz)  Body mass index is 45.41 kg/m².    Intake/Output Summary (Last 24 hours) at 1/28/2019 1626  Last data filed at 1/28/2019 0323  Gross per 24 hour   Intake 240 ml   Output --   Net 240 ml      Physical Exam   Constitutional: She is oriented to person, place, and time. She appears well-developed and well-nourished. No distress.   Obese   HENT:   Head: Normocephalic and atraumatic.   Nose: Right sinus exhibits frontal sinus tenderness. Left sinus exhibits frontal sinus tenderness.   Eyes: Conjunctivae are normal.   PERRL; EOM intact.   Neck: Normal range of motion. Neck supple. No JVD present.    Cardiovascular: Normal rate, regular rhythm, S1 normal, S2 normal and intact distal pulses.  No extrasystoles are present. Exam reveals no gallop and no friction rub.   No murmur heard.  Pulses:       Radial pulses are 2+ on the right side, and 2+ on the left side.        Dorsalis pedis pulses are 2+ on the right side, and 2+ on the left side.        Posterior tibial pulses are 2+ on the right side, and 2+ on the left side.   Pulmonary/Chest: Effort normal. No accessory muscle usage. Tachypnea noted. No respiratory distress. She has wheezes (mild scattered wheezing posteriorly). She has no rhonchi. She has no rales.   Abdominal: Soft. Bowel sounds are normal. She exhibits no distension. There is no tenderness. There is no rebound, no guarding and no CVA tenderness.   Genitourinary:   Genitourinary Comments: Deferred   Musculoskeletal: Normal range of motion. She exhibits no edema, tenderness or deformity.   Neurological: She is alert and oriented to person, place, and time. No cranial nerve deficit or sensory deficit.   Skin: Skin is warm, dry and intact. Capillary refill takes less than 2 seconds. No rash noted. She is not diaphoretic. No cyanosis or erythema.   Psychiatric: She has a normal mood and affect. Her speech is normal and behavior is normal. Cognition and memory are normal.   Nursing note and vitals reviewed.      Significant Labs: All pertinent labs within the past 24 hours have been reviewed.    Significant Imaging: I have reviewed all pertinent imaging results/findings within the past 24 hours.    Assessment/Plan:      * Acute respiratory failure with hypoxia and hypercapnia    - Secondary to asthma excerbation.  Patient with probable undiagnosed COPD. Hx of tobacco abuse, quit in 2005. Smoked > 40 years  - Inhaled medications  - Supplemental oxygen, keep O2 sats > 90% -- will attempt to wean. If unable to wean from O2, obtain home oxygen evaluation  - Will need OP referral to Pulmonology -- PFTs  for possible underlying COPD and TREVOR  - Repeat ABG in AM -- improved     Asthma with acute exacerbation    - O2 sat stable on NC supplementation, wean as tolerated  - Continue bronchodilators  - Continue Singulair  - Solumedrol 80mg Q8  - Empiric PO azithromycin     Essential hypertension    - BP stable  - Resume home antihypertensives including Atenolol        VTE Risk Mitigation (From admission, onward)        Ordered     Place sequential compression device  Until discontinued      01/28/19 0353     enoxaparin injection 40 mg  Daily      01/27/19 2137     IP VTE LOW RISK PATIENT  Once      01/27/19 2137              CECE Dailey  Department of Hospital Medicine   [facility]

## 2024-03-18 ENCOUNTER — TELEPHONE (OUTPATIENT)
Dept: CARDIOLOGY CLINIC | Facility: CLINIC | Age: 74
End: 2024-03-18

## 2024-03-18 DIAGNOSIS — K21.00 GASTROESOPHAGEAL REFLUX DISEASE WITH ESOPHAGITIS WITHOUT HEMORRHAGE: ICD-10-CM

## 2024-03-18 RX ORDER — OMEPRAZOLE 20 MG/1
20 CAPSULE, DELAYED RELEASE ORAL
Qty: 90 CAPSULE | Refills: 1 | Status: SHIPPED | OUTPATIENT
Start: 2024-03-18

## 2024-03-18 NOTE — TELEPHONE ENCOUNTER
Patient called to let you know that since starting the Entresto 24-26 BID, he has been feeling dizzy when going from sitting to standing on occasion.  He said it only happens once in a while and only lasts for a few seconds and then he is fine.  He started the Entresto last week.    I asked him to check his BP while I was on the phone with him first while sitting down and it was 100/69, HR 67.  He then stood up and BP was 98/68, HR 73.      Patient states he has been walking 2 miles 3 times a week.  Weight is 210 lbs today and said average weight is 207 lbs.  He has no s/s CHF.     He will be having the BMP drawn on Weds.     Please advise.

## 2024-03-20 ENCOUNTER — HOSPITAL ENCOUNTER (OUTPATIENT)
Dept: CT IMAGING | Facility: HOSPITAL | Age: 74
Discharge: HOME | End: 2024-03-20
Attending: SURGERY
Payer: MEDICARE

## 2024-03-20 ENCOUNTER — LAB (OUTPATIENT)
Dept: LAB | Facility: CLINIC | Age: 74
End: 2024-03-20
Payer: MEDICARE

## 2024-03-20 DIAGNOSIS — I50.23 ACUTE ON CHRONIC SYSTOLIC HEART FAILURE (HCC): ICD-10-CM

## 2024-03-20 DIAGNOSIS — E27.8 ADRENAL NODULE (HCC): ICD-10-CM

## 2024-03-20 LAB
ANION GAP SERPL CALCULATED.3IONS-SCNC: 6 MMOL/L (ref 4–13)
BUN SERPL-MCNC: 19 MG/DL (ref 5–25)
CALCIUM SERPL-MCNC: 8.6 MG/DL (ref 8.4–10.2)
CHLORIDE SERPL-SCNC: 104 MMOL/L (ref 96–108)
CO2 SERPL-SCNC: 27 MMOL/L (ref 21–32)
CREAT SERPL-MCNC: 1.09 MG/DL (ref 0.6–1.3)
GFR SERPL CREATININE-BSD FRML MDRD: 67 ML/MIN/1.73SQ M
GLUCOSE P FAST SERPL-MCNC: 143 MG/DL (ref 65–99)
POTASSIUM SERPL-SCNC: 4.8 MMOL/L (ref 3.5–5.3)
SODIUM SERPL-SCNC: 137 MMOL/L (ref 135–147)

## 2024-03-20 PROCEDURE — 80048 BASIC METABOLIC PNL TOTAL CA: CPT

## 2024-03-20 PROCEDURE — 36415 COLL VENOUS BLD VENIPUNCTURE: CPT

## 2024-03-20 PROCEDURE — 74170 CT ABD WO CNTRST FLWD CNTRST: CPT

## 2024-03-20 RX ADMIN — IOHEXOL 100 ML: 350 INJECTION, SOLUTION INTRAVENOUS at 09:38

## 2024-03-21 NOTE — RESULT ENCOUNTER NOTE
Please verify he is now taking entresto and jardiance in addition to his other cardiac meds.    Pls let him know his BMP was good.      Thanks!    - Ike

## 2024-03-22 ENCOUNTER — TELEPHONE (OUTPATIENT)
Dept: CARDIOLOGY CLINIC | Facility: CLINIC | Age: 74
End: 2024-03-22

## 2024-03-22 NOTE — TELEPHONE ENCOUNTER
Called patient and gave results. Patient is taking Entresto and Jardiance and his other cardiac meds.       ----- Message from Ike Butts MD sent at 3/21/2024 10:32 AM EDT -----  Please verify he is now taking entresto and jardiance in addition to his other cardiac meds.    Pls let him know his BMP was good.      Thanks!    - Ike

## 2024-03-25 ENCOUNTER — PATIENT OUTREACH (OUTPATIENT)
Dept: INTERNAL MEDICINE CLINIC | Facility: CLINIC | Age: 74
End: 2024-03-25

## 2024-03-25 NOTE — PROGRESS NOTES
Chart reviewed. This RNCM called patient and spoke with both patient and his wife tova over speaker phone. Patient says he is feeling well. He says his breathing is good. He denies any CP, chest tightness or SOB. He says he does have some dizziness/ lightheadedness occasionally when he goes from sitting to standing. On 3/18 his BP was 100/69 while sitting and 98/68 when he stood up. He says he spoke with his cardiologist office last week regarding this and they told him to continue taking all of his medications and did not change anything. He denies any dizziness today. Patient says he does not have any swelling.     Patient says he is concerned about his weight because it is up. When I spoke with him on 3/14 he weighed 207 lbs. Today he weighs 210 lbs. He says that his weights have been going up gradually.-last wt's: [phone] lbs. Patient instructed to continue monitoring closely. He has no SOB or swelling. He says he sees his weight going back down the day after he takes his water pill-Lasix. He takes Lasix every other day.  He says he feels well. He does not feel weak or off at all. I told him to call his Cardiologist if he were to get SOB or have swelling. Also if her were to gain 3 lbs in 1 day or 5 lbs in 5 days. He states understanding. I told him I would check back with him  in a few days and he can call me as well with any questions or concerns.    Patient says he has been very active too. He says that he went on a 2 mile walk today and he and his wife are now doing it 3 times per week. He says he wants to start going back to the gym. He says his Cardiologist told him to wait a few weeks when he saw him in the beginning of the month. He says he is going to start back up after Easter. He works out at the [facility].     Patient states he is adhering to a low salt diet. He and his wife Tova are still sharing the cooking and are not adding salt. He says he is adhering to  the 64 oz per day fluid restriction discussed.     HF Zone tool and s/s to look for and when to call his Cardiologist were reviewed and he states understanding.     An IB was sent to patients cardiologist Dr Butts and his office staff regarding wt gain and patient wanting to resume going back to the gym.    Note routed to PCP.

## 2024-03-26 ENCOUNTER — PATIENT OUTREACH (OUTPATIENT)
Dept: INTERNAL MEDICINE CLINIC | Facility: CLINIC | Age: 74
End: 2024-03-26

## 2024-03-26 NOTE — PROGRESS NOTES
IB message received from Dr Butts patients Cardiologist stating that patients weight fluctuation is acceptable and he can resume going to the gym and participate in low, moderate intensity exercises now.     This RNCM called patient and discussed the above. He states understanding and was very appreciative of the call.    RNCM to follow up next week.     Note routed to PCP.

## 2024-03-28 ENCOUNTER — OFFICE VISIT (OUTPATIENT)
Dept: SURGICAL ONCOLOGY | Facility: CLINIC | Age: 74
End: 2024-03-28
Payer: MEDICARE

## 2024-03-28 VITALS
RESPIRATION RATE: 16 BRPM | HEART RATE: 68 BPM | WEIGHT: 214 LBS | HEIGHT: 70 IN | DIASTOLIC BLOOD PRESSURE: 78 MMHG | BODY MASS INDEX: 30.64 KG/M2 | SYSTOLIC BLOOD PRESSURE: 106 MMHG | TEMPERATURE: 96.8 F | OXYGEN SATURATION: 96 %

## 2024-03-28 DIAGNOSIS — E27.8 ADRENAL NODULE (HCC): Primary | ICD-10-CM

## 2024-03-28 PROCEDURE — 99213 OFFICE O/P EST LOW 20 MIN: CPT

## 2024-03-28 NOTE — LETTER
March 28, 2024     Fady Cordova MD  [address] [facility]  2nd Floor  [facility] [address]    Patient: South Humphrey III   YOB: 1950   Date of Visit: 3/28/2024       Dear Dr. Cordova:    Thank you for referring oSuth Humphrey to me for evaluation. Below are my notes for this consultation.    If you have questions, please do not hesitate to call me. I look forward to following your patient along with you.         Sincerely,        LENORA Barrios        CC: No Recipients    LENORA Barrios  3/28/2024 10:52 AM  Sign when Signing Visit               Surgical Oncology Follow Up       [address] [place] [address] SURGICAL ONCOLOGY [address] [id number]    South Humphrey III  1950  [id number]  [address] [place] [address] SURGICAL ONCOLOGY [address] [id number]    Diagnoses and all orders for this visit:    Adrenal nodule (HCC)  -     Aldosterone/Renin Ratio; Future  -     Catecholamines, fractionated, plasma; Future  -     Catecholamines, fractionated, urine, 24 hour; Future  -     Metanephrine, Fractionated Plasma Free; Future  -     Metanephrines Fractionated, urine, 24 hour; Future        Chief Complaint   Patient presents with   • Follow-up       Return for Labs - See Treatment Plan.        History of Present Illness: The patient returns to the office today in follow-up for bilateral adrenal masses. These were initially discovered in May 2019 during an unrelated hospital stay.  Biochemical workup showed elevated plasma catecholemines, but the remainder of the lab studies were negative.  Serial imaging suggests that these are benign lipid-rich adenomas. He denies any issues with his blood pressure but does appear to be on several medications for hypertension, including a 3rd one that was just added.  He denies any new abdominal pain or systemic complaints aside from multiple cardiac  issues.  CT was performed on March 20.  I have reviewed these results myself and discussed them with the patient and his wife today.      Review of Systems   Constitutional: Negative.  Negative for activity change, appetite change and unexpected weight change.   HENT: Negative.     Respiratory: Negative.     Gastrointestinal: Negative.  Negative for abdominal pain, nausea and vomiting.   Musculoskeletal: Negative.    Skin: Negative.    Neurological: Negative.  Negative for dizziness and headaches.   Hematological: Negative.  Negative for adenopathy.   Psychiatric/Behavioral: Negative.               Patient Active Problem List   Diagnosis   • Class 1 obesity due to excess calories with serious comorbidity and body mass index (BMI) of 33.0 to 33.9 in adult   • CKD (chronic kidney disease), stage III (HCC)   • Persistent proteinuria   • Microscopic hematuria   • Dudley cardiac risk 10-20% in next 10 years   • Abdominal aortic atherosclerosis ([facility])   • RBBB (right bundle branch block with left anterior fascicular block)   • Dyslipidemia   • Vitamin D deficiency   • Harris's esophagus without dysplasia   • Colon polyps   • PAD (peripheral artery disease) ([facility])   • Ischemic cardiomyopathy   • S/P peripheral artery angioplasty with stent placement   • Venous stasis   • Paroxysmal atrial fibrillation ([facility])   • Adrenal mass ([facility])   • Crohn's disease of small intestine with complication ([facility])   • SVT (supraventricular tachycardia)   • Volume overload   • Positive QuantiFERON-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   • Terminal ileitis of small intestine ([facility])   • S/P femoral-popliteal bypass surgery   • Elevated alkaline phosphatase level   • Vitamin B12 deficiency   • Type 2 diabetes mellitus with diabetic peripheral angiopathy without gangrene ([facility])   • Venous insufficiency   • Immunocompromised patient ([facility])   • Atrial fibrillation with RVR ([facility])   • Abnormal LFTs   • Acute on chronic heart failure (HCC)   • Electrolyte abnormality   • Obesity,  morbid (HCC)     Past Medical History:   Diagnosis Date   • Harris esophagus    • Blue toe syndrome (HCC)    • Chronic kidney disease    • Colon polyps    • Crohn's disease (HCC) 1-2020   • GERD (gastroesophageal reflux disease)    • Hematuria    • History of rheumatic fever    • Hypolipidemia    • Hypotension    • Ischemic cardiomyopathy    • PAD (peripheral artery disease) (HCC)    • Pulmonary emphysema (HCC)    • PVD (peripheral vascular disease) (HCC)      Past Surgical History:   Procedure Laterality Date   • CARDIAC ELECTROPHYSIOLOGY PROCEDURE N/A 1/10/2024    Procedure: Cardiac loop recorder explant;  Surgeon: Jason Cortez MD;  Location: [facility];  Service: Cardiology   • COLONOSCOPY  2019   • HERNIA REPAIR     • IR AORTAGRAM WITH RUN-OFF  6/27/2019   • IR AORTAGRAM WITH RUN-OFF  2/12/2020   • POPLITEAL ARTERY STENT Right     6/2019   • DE BYPASS W/VEIN FEMORAL-POPLITEAL Right 3/26/2020    Procedure: BYPASS FEMORAL-POPLITEAL; Right lower extremity bypass, fem-bk pop with GSV;  Surgeon: Wyatt Shell MD;  Location: [facility] MA[facility];  Service: Vascular   • DE SLCTV CATHJ 3RD+ ORD SLCTV ABDL PEL/LXTR BRNCH Right 6/27/2019    Procedure: leg ANGIOGRAM WITH STENT, BALLOON ANGIOPLASTY, LEFT GROIN ACCESS;  Surgeon: Wyatt Shell MD;  Location: [facility];  Service: Vascular     Family History   Problem Relation Age of Onset   • Heart disease Mother    • Hyperlipidemia Mother    • Hypertension Mother    • Hypertension Father    • Heart disease Father    • Stroke Father    • Hyperlipidemia Father    • Diabetes Brother    • No Known Problems Maternal Grandmother    • Dementia Neg Hx    • Drug abuse Neg Hx    • Mental illness Neg Hx    • Substance Abuse Neg Hx    • Alcohol abuse Neg Hx    • Depression Neg Hx    • Colon cancer Neg Hx      Social History     Socioeconomic History   • Marital status: /Civil Union     Spouse name: Not on file   • Number of children: 2   • Years of education: Not on file    • Highest education level: Not on file   Occupational History   • Occupation: retired   Tobacco Use   • Smoking status: Some Days     Current packs/day: 0.25     Average packs/day: 0.3 packs/day for 30.0 years (7.5 ttl pk-yrs)     Types: Cigarettes   • Smokeless tobacco: Never   Vaping Use   • Vaping status: Never Used   Substance and Sexual Activity   • Alcohol use: Yes     Alcohol/week: 1.0 standard drink of alcohol     Types: 1 Standard drinks or equivalent per week     Comment: social drinker when out dining   • Drug use: Never   • Sexual activity: Not Currently     Birth control/protection: None   Other Topics Concern   • Not on file   Social History Narrative    Drinks coffee     Social Determinants of Health     Financial Resource Strain: Low Risk  (1/14/2024)    Overall Financial Resource Strain (CARDIA)    • Difficulty of Paying Living Expenses: Not hard at all   Food Insecurity: No Food Insecurity (2/14/2024)    Hunger Vital Sign    • Worried About Running Out of Food in the Last Year: Never true    • Ran Out of Food in the Last Year: Never true   Transportation Needs: No Transportation Needs (2/14/2024)    PRAPARE - Transportation    • Lack of Transportation (Medical): No    • Lack of Transportation (Non-Medical): No   Physical Activity: Not on file   Stress: Not on file   Social Connections: Not on file   Intimate Partner Violence: Not on file   Housing Stability: Low Risk  (2/14/2024)    Housing Stability Vital Sign    • Unable to Pay for Housing in the Last Year: No    • Number of Places Lived in the Last Year: 1    • Unstable Housing in the Last Year: No       Current Outpatient Medications:   •  amiodarone 200 mg tablet, Take 1 tablet (200 mg total) by mouth daily Start this once a day maintenance dose once you complete the initial load which is 3 times daily., Disp: 30 tablet, Rfl: 2  •  aspirin 81 MG tablet, Take 81 mg by mouth daily, Disp: , Rfl:   •  atorvastatin (LIPITOR) 20 mg tablet, Take 1  tablet (20 mg total) by mouth daily, Disp: 90 tablet, Rfl: 0  •  Empagliflozin (Jardiance) 10 MG TABS tablet, Take 1 tablet (10 mg total) by mouth every morning, Disp: 90 tablet, Rfl: 5  •  folic acid (FOLVITE) 1 mg tablet, Take 1 tablet (1 mg total) by mouth daily, Disp: 90 tablet, Rfl: 3  •  furosemide (LASIX) 40 mg tablet, Take 1 tablet (40 mg total) by mouth every other day, Disp: 45 tablet, Rfl: 5  •  glimepiride (AMARYL) 2 mg tablet, Take 1 tablet (2 mg total) by mouth 2 (two) times a day with meals, Disp: 180 tablet, Rfl: 0  •  Magnesium 500 MG TABS, Take 1 tablet by mouth daily , Disp: , Rfl:   •  metoprolol succinate (TOPROL-XL) 25 mg 24 hr tablet, Take 3 tablets (75 mg total) by mouth 2 (two) times a day Combine a 50mg tablet with a 25mg tablet to take a total of 75mg in AM and 75mg in PM, Disp: 540 tablet, Rfl: 5  •  metoprolol succinate (TOPROL-XL) 50 mg 24 hr tablet, Take 1.5 tablets (75 mg total) by mouth 2 (two) times a day Combine a 50mg tablet with a 25mg tablet to take a total of 75mg in AM and 75mg in PM, Disp: 270 tablet, Rfl: 5  •  omeprazole (PriLOSEC) 20 mg delayed release capsule, take 1 capsule by mouth daily BEFORE BREAKFAST, Disp: 90 capsule, Rfl: 1  •  risankizumab-rzaa (Skyrizi) 360 MG/2.4ML SOCT, Take first on body injector (OBI) under skin on week 12 then every 8 weeks there after, Disp: 2.4 mL, Rfl: 5  •  rivaroxaban (Xarelto) 20 mg tablet, Take 1 tablet (20 mg total) by mouth daily with breakfast, Disp: 90 tablet, Rfl: 3  •  sacubitril-valsartan (Entresto) 24-26 MG TABS, Take 1 tablet by mouth 2 (two) times a day, Disp: 180 tablet, Rfl: 5  •  vitamin B-12 (VITAMIN B-12) 1,000 mcg tablet, Take 1 tablet (1,000 mcg total) by mouth 3 (three) times a week, Disp: , Rfl:   •  albuterol (Ventolin HFA) 90 mcg/act inhaler, Inhale 2 puffs every 6 (six) hours as needed for wheezing (Patient not taking: Reported on 2/29/2024), Disp: 8 g, Rfl: 1  •  fluticasone (FLONASE) 50 mcg/act nasal spray,  SPRAY 1 SPRAY INTO EACH NOSTRIL EVERY DAY (Patient not taking: Reported on 3/28/2024), Disp: 24 mL, Rfl: 2  No Known Allergies  Vitals:    03/28/24 0907   BP: 106/78   Pulse: 68   Resp: 16   Temp: (!) 96.8 °F (36 °C)   SpO2: 96%       Physical Exam  Vitals reviewed.   Constitutional:       General: He is not in acute distress.     Appearance: Normal appearance. He is not ill-appearing or toxic-appearing.   HENT:      Head: Normocephalic and atraumatic.      Nose: Nose normal.      Mouth/Throat:      Mouth: Mucous membranes are moist.   Eyes:      General: No scleral icterus.     Conjunctiva/sclera: Conjunctivae normal.   Cardiovascular:      Rate and Rhythm: Normal rate.   Pulmonary:      Effort: Pulmonary effort is normal.   Abdominal:      General: There is no distension.      Palpations: Abdomen is soft. There is no mass.      Tenderness: There is no abdominal tenderness.   Musculoskeletal:         General: Normal range of motion.      Cervical back: Normal range of motion and neck supple.   Skin:     General: Skin is warm and dry.   Neurological:      General: No focal deficit present.      Mental Status: He is alert and oriented to person, place, and time.   Psychiatric:         Mood and Affect: Mood normal.         Behavior: Behavior normal.         Thought Content: Thought content normal.         Judgment: Judgment normal.               Imaging  CT abdomen w wo contrast    Result Date: 3/27/2024  Narrative: CT -  ABDOMEN WITHOUT AND WITH IV CONTRAST INDICATION: E27.8: Other specified disorders of adrenal gland. COMPARISON: Compared with previous study of 1/13/2022 and previous MRI of 8/20/2023 TECHNIQUE: Initial CT of the abdomen and pelvis was performed without intravenous contrast. Subsequent dynamic CT evaluation of the abdomen was performed after the administration of intravenous contrast in both nephrographic and delayed phases after the administration of intravenous contrast. Multiplanar 2D reformatted  images were created from the source data. This examination, like all CT scans performed in the [facility], was performed utilizing techniques to minimize radiation dose exposure, including the use of iterative reconstruction and automated exposure control. Radiation dose length product (DLP) for this visit: 1814 mGy-cm IV Contrast: 100 mL of iohexol (OMNIPAQUE) Enteric Contrast: Not administered. FINDINGS: LOWER CHEST: No clinically significant abnormality in the visualized lower chest. LIVER/BILIARY TREE: Unremarkable. GALLBLADDER: Contracted gallbladder with gallstone. SPLEEN: Unremarkable. PANCREAS: Unremarkable. ADRENAL GLANDS: Right adrenal gland measuring 5.0 x 2.6 x 3.4 cm compared to 4.5 x 2.3 cm on MRI but unchanged from prior CT study. Left adrenal nodule measuring 3.2 x 2.6 x 1.6 cm compared to 2.1 x 1.4 cm but unchanged from prior CT study.. Its imaging features are diagnostic of benign adrenal adenoma, and is not worrisome for malignancy. However, please note that for adenomas > 1 cm, biochemical evaluation is suggested to rule out functioning adenoma, if not performed already. Adrenal recommendation based on institutional consensus and Journal of American College of Radiology 2017;14:3090-5094. KIDNEYS/VISUALIZED URETERS: Left kidney midpole 2.2 mm calculus.. No hydronephrosis. STOMACH AND VISUALIZED BOWEL: Prominent gas-filled distended small bowel loops seen partially. Few decompressed small bowel loops also seen.. ABDOMINAL CAVITY: No ascites. No pneumoperitoneum. No lymphadenopathy. VESSELS: Unremarkable for patient's age. ABDOMINAL WALL: Unremarkable. BONES: No acute fracture or suspicious osseous lesion.     Impression: Bilateral adrenal nodules unchanged from prior study suggesting adenomas. Prominent dilated small bowel loops in the midabdomen anteriorly with few decompressed small bowel loops seen partially. Ileus or partial obstruction in the differential. Follow-up as  indicated clinically. The study was marked in EPIC for immediate notification. Workstation performed: [id number]     I personally reviewed and interpreted the above imaging data.    Discussion/Summary: This is a 72 y/o male who presents today for adrenal mass follow-up.  CT shows little change from the prior CT. However there does appear to be long-term growth.  The right adrenal mass now measures 5.0cm, increasing from 3.0cm in 2019.  The left adrenal mass now measures 3.2cm, signficantly larger than in 2019 as well.  While these do not appear to be malignant, I did reiterate that we generally recommend consideration of surgery once the mass is over 4cm in size.  Since it has been 5 years since his last bloodwork, I will repeat biochemical workup now to ensure they are nonfunctioning.  Assuming these are normal, we will repeat CT in 2 years.  If they are elevated, I will defer to Dr Cordova for further recommendations.  All questions were answered today. The patient and his wife are agreeable to the plan.

## 2024-03-28 NOTE — PROGRESS NOTES
Surgical Oncology Follow Up       [facility] SURGICAL ONCOLOGY [place]  [address]  [place] [address]    [place] III  1950  [id number]  [facility] SURGICAL ONCOLOGY [place]  [address] [facility] [address]    Diagnoses and all orders for this visit:    Adrenal nodule (HCC)  -     Aldosterone/Renin Ratio; Future  -     Catecholamines, fractionated, plasma; Future  -     Catecholamines, fractionated, urine, 24 hour; Future  -     Metanephrine, Fractionated Plasma Free; Future  -     Metanephrines Fractionated, urine, 24 hour; Future        Chief Complaint   Patient presents with    Follow-up       Return for Labs - See Treatment Plan.        History of Present Illness: The patient returns to the office today in follow-up for bilateral adrenal masses. These were initially discovered in May 2019 during an unrelated hospital stay.  Biochemical workup showed elevated plasma catecholemines, but the remainder of the lab studies were negative.  Serial imaging suggests that these are benign lipid-rich adenomas. He denies any issues with his blood pressure but does appear to be on several medications for hypertension, including a 3rd one that was just added.  He denies any new abdominal pain or systemic complaints aside from multiple cardiac issues.  CT was performed on March 20.  I have reviewed these results myself and discussed them with the patient and his wife today.      Review of Systems   Constitutional: Negative.  Negative for activity change, appetite change and unexpected weight change.   HENT: Negative.     Respiratory: Negative.     Gastrointestinal: Negative.  Negative for abdominal pain, nausea and vomiting.   Musculoskeletal: Negative.    Skin: Negative.    Neurological: Negative.  Negative for dizziness and headaches.   Hematological: Negative.  Negative for adenopathy.    Psychiatric/Behavioral: Negative.               Patient Active Problem List   Diagnosis    Class 1 obesity due to excess calories with serious comorbidity and body mass index (BMI) of 33.0 to 33.9 in adult    CKD (chronic kidney disease), stage III (HCC)    Persistent proteinuria    Microscopic hematuria    Goodells cardiac risk 10-20% in next 10 years    Abdominal aortic atherosclerosis (HCC)    RBBB (right bundle branch block with left anterior fascicular block)    Dyslipidemia    Vitamin D deficiency    Harris's esophagus without dysplasia    Colon polyps    PAD (peripheral artery disease) (HCC)    Ischemic cardiomyopathy    S/P peripheral artery angioplasty with stent placement    Venous stasis    Paroxysmal atrial fibrillation (HCC)    Adrenal mass (HCC)    Crohn's disease of small intestine with complication (HCC)    SVT (supraventricular tachycardia)    Volume overload    Positive QuantiFERON-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    Terminal ileitis of small intestine (HCC)    S/P femoral-popliteal bypass surgery    Elevated alkaline phosphatase level    Vitamin B12 deficiency    Type 2 diabetes mellitus with diabetic peripheral angiopathy without gangrene (HCC)    Venous insufficiency    Immunocompromised patient (HCC)    Atrial fibrillation with RVR (HCC)    Abnormal LFTs    Acute on chronic heart failure (HCC)    Electrolyte abnormality    Obesity, morbid (HCC)     Past Medical History:   Diagnosis Date    Harris esophagus     Blue toe syndrome (HCC)     Chronic kidney disease     Colon polyps     Crohn's disease (HCC) 1-2020    GERD (gastroesophageal reflux disease)     Hematuria     History of rheumatic fever     Hypolipidemia     Hypotension     Ischemic cardiomyopathy     PAD (peripheral artery disease) (HCC)     Pulmonary emphysema (HCC)     PVD (peripheral vascular disease) (HCC)      Past Surgical History:   Procedure Laterality Date    CARDIAC ELECTROPHYSIOLOGY PROCEDURE N/A 1/10/2024    Procedure: Cardiac loop  recorder explant;  Surgeon: Jason Cortez MD;  Location: [facility];  Service: Cardiology    COLONOSCOPY  2019    HERNIA REPAIR      IR AORTAGRAM WITH RUN-OFF  6/27/2019    IR AORTAGRAM WITH RUN-OFF  2/12/2020    POPLITEAL ARTERY STENT Right     6/2019    NC BYPASS W/VEIN FEMORAL-POPLITEAL Right 3/26/2020    Procedure: BYPASS FEMORAL-POPLITEAL; Right lower extremity bypass, fem-bk pop with GSV;  Surgeon: Wyatt Shell MD;  Location: [facility];  Service: Vascular    NC SLCTV CATHJ 3RD+ ORD SLCTV ABDL PEL/LXTR BRNCH Right 6/27/2019    Procedure: leg ANGIOGRAM WITH STENT, BALLOON ANGIOPLASTY, LEFT GROIN ACCESS;  Surgeon: Wyatt Shell MD;  Location: [facility];  Service: Vascular     Family History   Problem Relation Age of Onset    Heart disease Mother     Hyperlipidemia Mother     Hypertension Mother     Hypertension Father     Heart disease Father     Stroke Father     Hyperlipidemia Father     Diabetes Brother     No Known Problems Maternal Grandmother     Dementia Neg Hx     Drug abuse Neg Hx     Mental illness Neg Hx     Substance Abuse Neg Hx     Alcohol abuse Neg Hx     Depression Neg Hx     Colon cancer Neg Hx      Social History     Socioeconomic History    Marital status: /Civil Union     Spouse name: Not on file    Number of children: 2    Years of education: Not on file    Highest education level: Not on file   Occupational History    Occupation: retired   Tobacco Use    Smoking status: Some Days     Current packs/day: 0.25     Average packs/day: 0.3 packs/day for 30.0 years (7.5 ttl pk-yrs)     Types: Cigarettes    Smokeless tobacco: Never   Vaping Use    Vaping status: Never Used   Substance and Sexual Activity    Alcohol use: Yes     Alcohol/week: 1.0 standard drink of alcohol     Types: 1 Standard drinks or equivalent per week     Comment: social drinker when out dining    Drug use: Never    Sexual activity: Not Currently     Birth control/protection: None   Other Topics Concern     Not on file   Social History Narrative    Drinks coffee     Social Determinants of Health     Financial Resource Strain: Low Risk  (1/14/2024)    Overall Financial Resource Strain (CARDIA)     Difficulty of Paying Living Expenses: Not hard at all   Food Insecurity: No Food Insecurity (2/14/2024)    Hunger Vital Sign     Worried About Running Out of Food in the Last Year: Never true     Ran Out of Food in the Last Year: Never true   Transportation Needs: No Transportation Needs (2/14/2024)    PRAPARE - Transportation     Lack of Transportation (Medical): No     Lack of Transportation (Non-Medical): No   Physical Activity: Not on file   Stress: Not on file   Social Connections: Not on file   Intimate Partner Violence: Not on file   Housing Stability: Low Risk  (2/14/2024)    Housing Stability Vital Sign     Unable to Pay for Housing in the Last Year: No     Number of Places Lived in the Last Year: 1     Unstable Housing in the Last Year: No       Current Outpatient Medications:     amiodarone 200 mg tablet, Take 1 tablet (200 mg total) by mouth daily Start this once a day maintenance dose once you complete the initial load which is 3 times daily., Disp: 30 tablet, Rfl: 2    aspirin 81 MG tablet, Take 81 mg by mouth daily, Disp: , Rfl:     atorvastatin (LIPITOR) 20 mg tablet, Take 1 tablet (20 mg total) by mouth daily, Disp: 90 tablet, Rfl: 0    Empagliflozin (Jardiance) 10 MG TABS tablet, Take 1 tablet (10 mg total) by mouth every morning, Disp: 90 tablet, Rfl: 5    folic acid (FOLVITE) 1 mg tablet, Take 1 tablet (1 mg total) by mouth daily, Disp: 90 tablet, Rfl: 3    furosemide (LASIX) 40 mg tablet, Take 1 tablet (40 mg total) by mouth every other day, Disp: 45 tablet, Rfl: 5    glimepiride (AMARYL) 2 mg tablet, Take 1 tablet (2 mg total) by mouth 2 (two) times a day with meals, Disp: 180 tablet, Rfl: 0    Magnesium 500 MG TABS, Take 1 tablet by mouth daily , Disp: , Rfl:     metoprolol succinate (TOPROL-XL) 25 mg  24 hr tablet, Take 3 tablets (75 mg total) by mouth 2 (two) times a day Combine a 50mg tablet with a 25mg tablet to take a total of 75mg in AM and 75mg in PM, Disp: 540 tablet, Rfl: 5    metoprolol succinate (TOPROL-XL) 50 mg 24 hr tablet, Take 1.5 tablets (75 mg total) by mouth 2 (two) times a day Combine a 50mg tablet with a 25mg tablet to take a total of 75mg in AM and 75mg in PM, Disp: 270 tablet, Rfl: 5    omeprazole (PriLOSEC) 20 mg delayed release capsule, take 1 capsule by mouth daily BEFORE BREAKFAST, Disp: 90 capsule, Rfl: 1    risankizumab-rzaa (Skyrizi) 360 MG/2.4ML SOCT, Take first on body injector (OBI) under skin on week 12 then every 8 weeks there after, Disp: 2.4 mL, Rfl: 5    rivaroxaban (Xarelto) 20 mg tablet, Take 1 tablet (20 mg total) by mouth daily with breakfast, Disp: 90 tablet, Rfl: 3    sacubitril-valsartan (Entresto) 24-26 MG TABS, Take 1 tablet by mouth 2 (two) times a day, Disp: 180 tablet, Rfl: 5    vitamin B-12 (VITAMIN B-12) 1,000 mcg tablet, Take 1 tablet (1,000 mcg total) by mouth 3 (three) times a week, Disp: , Rfl:     albuterol (Ventolin HFA) 90 mcg/act inhaler, Inhale 2 puffs every 6 (six) hours as needed for wheezing (Patient not taking: Reported on 2/29/2024), Disp: 8 g, Rfl: 1    fluticasone (FLONASE) 50 mcg/act nasal spray, SPRAY 1 SPRAY INTO EACH NOSTRIL EVERY DAY (Patient not taking: Reported on 3/28/2024), Disp: 24 mL, Rfl: 2  No Known Allergies  Vitals:    03/28/24 0907   BP: 106/78   Pulse: 68   Resp: 16   Temp: (!) 96.8 °F (36 °C)   SpO2: 96%       Physical Exam  Vitals reviewed.   Constitutional:       General: He is not in acute distress.     Appearance: Normal appearance. He is not ill-appearing or toxic-appearing.   HENT:      Head: Normocephalic and atraumatic.      Nose: Nose normal.      Mouth/Throat:      Mouth: Mucous membranes are moist.   Eyes:      General: No scleral icterus.     Conjunctiva/sclera: Conjunctivae normal.   Cardiovascular:      Rate and  Rhythm: Normal rate.   Pulmonary:      Effort: Pulmonary effort is normal.   Abdominal:      General: There is no distension.      Palpations: Abdomen is soft. There is no mass.      Tenderness: There is no abdominal tenderness.   Musculoskeletal:         General: Normal range of motion.      Cervical back: Normal range of motion and neck supple.   Skin:     General: Skin is warm and dry.   Neurological:      General: No focal deficit present.      Mental Status: He is alert and oriented to person, place, and time.   Psychiatric:         Mood and Affect: Mood normal.         Behavior: Behavior normal.         Thought Content: Thought content normal.         Judgment: Judgment normal.               Imaging  CT abdomen w wo contrast    Result Date: 3/27/2024  Narrative: CT -  ABDOMEN WITHOUT AND WITH IV CONTRAST INDICATION: E27.8: Other specified disorders of adrenal gland. COMPARISON: Compared with previous study of 1/13/2022 and previous MRI of 8/20/2023 TECHNIQUE: Initial CT of the abdomen and pelvis was performed without intravenous contrast. Subsequent dynamic CT evaluation of the abdomen was performed after the administration of intravenous contrast in both nephrographic and delayed phases after the administration of intravenous contrast. Multiplanar 2D reformatted images were created from the source data. This examination, like all CT scans performed in the [facility], was performed utilizing techniques to minimize radiation dose exposure, including the use of iterative reconstruction and automated exposure control. Radiation dose length product (DLP) for this visit: 1814 mGy-cm IV Contrast: 100 mL of iohexol (OMNIPAQUE) Enteric Contrast: Not administered. FINDINGS: LOWER CHEST: No clinically significant abnormality in the visualized lower chest. LIVER/BILIARY TREE: Unremarkable. GALLBLADDER: Contracted gallbladder with gallstone. SPLEEN: Unremarkable. PANCREAS: Unremarkable. ADRENAL GLANDS:  Right adrenal gland measuring 5.0 x 2.6 x 3.4 cm compared to 4.5 x 2.3 cm on MRI but unchanged from prior CT study. Left adrenal nodule measuring 3.2 x 2.6 x 1.6 cm compared to 2.1 x 1.4 cm but unchanged from prior CT study.. Its imaging features are diagnostic of benign adrenal adenoma, and is not worrisome for malignancy. However, please note that for adenomas > 1 cm, biochemical evaluation is suggested to rule out functioning adenoma, if not performed already. Adrenal recommendation based on institutional consensus and Journal of American College of Radiology 2017;14:5937-9696. KIDNEYS/VISUALIZED URETERS: Left kidney midpole 2.2 mm calculus.. No hydronephrosis. STOMACH AND VISUALIZED BOWEL: Prominent gas-filled distended small bowel loops seen partially. Few decompressed small bowel loops also seen.. ABDOMINAL CAVITY: No ascites. No pneumoperitoneum. No lymphadenopathy. VESSELS: Unremarkable for patient's age. ABDOMINAL WALL: Unremarkable. BONES: No acute fracture or suspicious osseous lesion.     Impression: Bilateral adrenal nodules unchanged from prior study suggesting adenomas. Prominent dilated small bowel loops in the midabdomen anteriorly with few decompressed small bowel loops seen partially. Ileus or partial obstruction in the differential. Follow-up as indicated clinically. The study was marked in EPIC for immediate notification. Workstation performed: [id number]     I personally reviewed and interpreted the above imaging data.    Discussion/Summary: This is a 74 y/o male who presents today for adrenal mass follow-up.  CT shows little change from the prior CT. However there does appear to be long-term growth.  The right adrenal mass now measures 5.0cm, increasing from 3.0cm in 2019.  The left adrenal mass now measures 3.2cm, signficantly larger than in 2019 as well.  While these do not appear to be malignant, I did reiterate that we generally recommend consideration of surgery once the mass is over 4cm  in size.  Since it has been 5 years since his last bloodwork, I will repeat biochemical workup now to ensure they are nonfunctioning.  Assuming these are normal, we will repeat CT in 2 years.  If they are elevated, I will defer to Dr Cordova for further recommendations.  All questions were answered today. The patient and his wife are agreeable to the plan.

## 2024-04-02 ENCOUNTER — PATIENT OUTREACH (OUTPATIENT)
Dept: INTERNAL MEDICINE CLINIC | Facility: CLINIC | Age: 74
End: 2024-04-02

## 2024-04-02 DIAGNOSIS — K21.00 GASTROESOPHAGEAL REFLUX DISEASE WITH ESOPHAGITIS WITHOUT HEMORRHAGE: ICD-10-CM

## 2024-04-02 RX ORDER — OMEPRAZOLE 20 MG/1
20 CAPSULE, DELAYED RELEASE ORAL
Qty: 90 CAPSULE | Refills: 1 | Status: SHIPPED | OUTPATIENT
Start: 2024-04-02

## 2024-04-02 NOTE — TELEPHONE ENCOUNTER
Reason for call:   [x] Refill   [] Prior Auth  [] Other:     Office:   [] PCP/Provider -   [x] Specialty/Provider - GASTRO    Medication:       Does the patient have enough for 3 days?   [] Yes   [x] No - Send as HP to POD

## 2024-04-02 NOTE — PROGRESS NOTES
Heart Failure referral/follow up:    IB message received for follow up from last outreach. Patient was given the go ahead from cardiology to start back at the gym last week. This RNCM called patient and discussed the following:    Primary Contact: self    Post Hospital Apt's:   Cardiology Apt: 2/22  PCP Apt: 2/27    Future Apt's:  Cardiology Apt: not scheduled  PCP: 7/17 (6 month f/u)    Transportation: self/wife    Medications: Patient states he is taking all of his medications as directed. Today was his day to take his water pill and he took it. He states he has not missed any doses. He says he does not needs any refills. He denies any questions or concerns regarding.    Diet: Low NA 2000 mg/day-patient states he has not been adhering to this over the weekend due to the holiday. He was eating a lot of ham and other foods he wouldn't normally eat. Advised patient against doing this.     Fluids: 64 oz/day-patient says he has been adhering to this    Weight: Patient states he weighed 214 lbs today, yesterday 213 lbs. Patient weighed 210 lbs on 3/25 when we spoke last. The importance of daily weights and dietary compliance were discussed at length. Patient denies any breathing issues. He denies any CP, chest tightness, SOB or coughing.  Patient is aware to call the Cardiologist if he were to experience a weight gain of 3 lbs in 1 day or 5 lbs in 5 days.    BP: SBP has been >100 over the weekend. He states he has been wearing his compression stocking. He denies any issues with lightheadedness or dizziness over the weekend.    Swelling: denies    Breathing/HF symptoms: good    HF education/review: HF Zone tool and s/s to look for and when to call patients Cardiologist were reviewed     Activity/ADL's: Patient has been very active. He is walking 2 miles 3 times a week now and he started working out at the gym yesterday. He states he feels good and had no issues with the exercise, his breathing was good.    VNA:  no    Started back at the gym yesterday.     RNCM to follow up tomorrow to check on weights.    An IB sent to patients cardiologist Dr Butts.    Note routed to PCP and Susy NEWMAN.    New CP's for HF established low Na diet. Not on track

## 2024-04-03 ENCOUNTER — PATIENT OUTREACH (OUTPATIENT)
Dept: INTERNAL MEDICINE CLINIC | Facility: CLINIC | Age: 74
End: 2024-04-03

## 2024-04-03 NOTE — PROGRESS NOTES
HF referral/follow up:    This RNCM received an IB message back from Cardiologist Dr Ike Butts stating if patients weight were to go up again today he should take his Lasix X 4 days and then go back to how he was taking it prior (every other day).     This RNCM called patient for follow up. Patient says he is feeling great. He says he weighs 214 lbs, the same as yesterday. He says his breathing is great and he has no swelling. He states his MP was 100/70 today. He denies any lightheadedness or dizziness.     Patient is agreeable to a follow up call in the morning for weight check. I confirmed patient is weighing self every day on the same scale, at the same time when he first wakes up in the morning.     RNCM to follow up in the am.

## 2024-04-04 ENCOUNTER — PATIENT OUTREACH (OUTPATIENT)
Dept: INTERNAL MEDICINE CLINIC | Facility: CLINIC | Age: 74
End: 2024-04-04

## 2024-04-04 NOTE — PROGRESS NOTES
Heart failure referral/follow up:    Chart reviewed and this RNCM called patient for follow up from yesterdays outreach. Patient states he feels great. He says he was out in his garden pulling weeds. Patient says he weighed himself today and he weighed 214 lbs, the same as yesterday. Patient has not needed to take any additional water pills. He is taking Lasix 40 mg every other day as ordered. He took his dose today. Patient states his BP was low today when he got up, it was 96/68. He says he had a moment of lightheadedness which didn't last very long and he hasn't had any since. We discussed using caution when going from sitting to standing and bending over. Patient is on Xarelto and we discussed the risks of bleeding. He verbally states understanding and that he would need to go to the ER if he were to fall. I advised him to use extreme caution if he were to garden and to make sure he has someone near by. He states understanding.     Note routed to PCP.    An IB message sent to cardiology regarding BP and weights.    RNCM to follow.

## 2024-04-05 ENCOUNTER — PATIENT OUTREACH (OUTPATIENT)
Dept: INTERNAL MEDICINE CLINIC | Facility: CLINIC | Age: 74
End: 2024-04-05

## 2024-04-05 NOTE — PROGRESS NOTES
Heart failure referral/follow up:     Chart reviewed and this RNCM called patient for follow up. Patient says he is feeling well. He states he weighed 213.3 lbs this morning. His BP was 96/68 and he is asymptomatic. He denies any dizziness or lightheadedness. Patient states his breathing is good. He is aware to call his cardiologist with a weight gain of 3 lbs in 1 day or 5 lbs in 5 days. He is agreeable to a f/u call next week.    RNCM to follow.    Note routed to PCP

## 2024-04-09 ENCOUNTER — PATIENT OUTREACH (OUTPATIENT)
Dept: INTERNAL MEDICINE CLINIC | Facility: CLINIC | Age: 74
End: 2024-04-09

## 2024-04-09 NOTE — PROGRESS NOTES
Heart Failure referral/follow up:     IB message received for follow up from last outreach. Chart reviewed and this RNCM called patient and discussed the following:     Primary Contact: self     Post Hospital Apt's:   Cardiology Apt: 2/22  PCP Apt: 2/27     Future Apt's:    Cardiology ( Dr Butts) Apt: not scheduled-Patient states the office is supposed to call him to schedule. He says he is going for BW next week and once his Cardiologist gets the results they will call and schedule.     PCP: 7/17 (6 month f/u)     Transportation: self/wife     Medications: Patient states he is taking all of his medications as directed. He took his water pill yesterday. He states he has not missed any doses. He says he does not needs any refills. He denies any questions or concerns regarding.     Diet: Low NA 2000 mg/day-patient states he is adhering to diet. Low na diet reinforced      Fluids: 64 oz/day-patient says he has been adhering to this-encouraged him to make sure he is drinking due to him working out side     Weight: Patient states he weighed 214 lbs today, yesterday 213 lbs. Patient weighed 210 lbs on 3/25 when we spoke last. The importance of daily weights and dietary compliance were discussed at length. Patient denies any breathing issues. He denies any CP, chest tightness, SOB or coughing.  Patient is aware to call the Cardiologist if he were to experience a weight gain of 3 lbs in 1 day or 5 lbs in 5 days.     BP: SBP has been running in the 90's. He states he has been wearing his compression stocking. He denies any issues with lightheadedness or dizziness except when he gets up too fast. Reinforced caution with falling due to Xarelto     Swelling: denies     Breathing/HF symptoms: good     HF education/review: HF Zone tool and s/s to look for and when to call patients Cardiologist were reviewed      Activity/ADL's: Patient has been very active. Patient states he walked 2 miles yesterday and did a light work out at the  gym yesterday. He says he also mowed his lawn. He states he stopped and took rests and drank some water. We discussed the importance of exercise but also listening to his body and not over doing it. He says he did some weeding in his garden today. He states he feels good and has no issues with the exercise. He denies any SOB and says his breathing is good.     VNA: no     Note routed to PCP and Susy NEWMAN.     Patient is agreeable to a f/u call next week.

## 2024-04-16 ENCOUNTER — PATIENT OUTREACH (OUTPATIENT)
Dept: INTERNAL MEDICINE CLINIC | Facility: CLINIC | Age: 74
End: 2024-04-16

## 2024-04-16 NOTE — PROGRESS NOTES
Heart Failure referral/follow up:     IB message received for follow up from last outreach. Chart reviewed and this RNCM called patient and discussed the following:     Primary Contact: self     Post Hospital Apt's:   Cardiology Apt: 2/22  PCP Apt: 2/27     Future Apt's:     Cardiology ( Dr Butts) Apt:  6/4  PCP: 7/17 (6 month f/u)     Transportation: self/wife     Medications: Patient states he is taking all of his medications as directed. He says he does not needs any refills. He denies any questions or concerns regarding.     Diet: Low NA 2000 mg/day-patient states he is adhering to diet. Low na diet reinforced      Fluids: 64 oz/day-patient says he has been adhering to this-encouraged him to make sure he is drinking due to him working out side     Weight: Patient states he weighed 215 lbs today. The importance of daily weights and dietary compliance were discussed at length. Patient denies any breathing issues. He denies any CP, chest tightness, SOB or coughing.  Patient is aware to call the Cardiologist if he were to experience a weight gain of 3 lbs in 1 day or 5 lbs in 5 days.     Swelling: denies     Breathing/HF symptoms: good     HF education/review: HF Zone tool and s/s to look for and when to call patients Cardiologist were reviewed      Activity/ADL's: Patient has been very active. Patient states he is still walking a couple of miles every few days with his wife and working out at the gym M-W-F.. He denies any SOB and says his breathing is good during exercise.     Patient is doing very well and declines the need for further CCM services or follow up. I confirmed with patient that he has my contact information and encouraged him to call with any questions or concerns. He was very appreciative of the follow up.    CCM episode closed and this RNCM removed self from care team.     Note routed to PCP.

## 2024-04-22 ENCOUNTER — LAB (OUTPATIENT)
Dept: LAB | Facility: CLINIC | Age: 74
End: 2024-04-22
Payer: MEDICARE

## 2024-04-22 ENCOUNTER — TELEPHONE (OUTPATIENT)
Dept: HEMATOLOGY ONCOLOGY | Facility: CLINIC | Age: 74
End: 2024-04-22

## 2024-04-22 DIAGNOSIS — R80.1 PERSISTENT PROTEINURIA: ICD-10-CM

## 2024-04-22 DIAGNOSIS — E11.51 TYPE 2 DIABETES MELLITUS WITH DIABETIC PERIPHERAL ANGIOPATHY WITHOUT GANGRENE, WITHOUT LONG-TERM CURRENT USE OF INSULIN (HCC): ICD-10-CM

## 2024-04-22 DIAGNOSIS — R74.8 ELEVATED ALKALINE PHOSPHATASE LEVEL: ICD-10-CM

## 2024-04-22 DIAGNOSIS — E27.8 ABNORMALITY OF CORTISOL-BINDING GLOBULIN (HCC): ICD-10-CM

## 2024-04-22 DIAGNOSIS — I48.0 PAROXYSMAL ATRIAL FIBRILLATION (HCC): ICD-10-CM

## 2024-04-22 DIAGNOSIS — Z12.5 SCREENING FOR PROSTATE CANCER: ICD-10-CM

## 2024-04-22 DIAGNOSIS — E78.5 DYSLIPIDEMIA: ICD-10-CM

## 2024-04-22 DIAGNOSIS — E55.9 VITAMIN D DEFICIENCY: ICD-10-CM

## 2024-04-22 DIAGNOSIS — N18.31 STAGE 3A CHRONIC KIDNEY DISEASE (HCC): ICD-10-CM

## 2024-04-22 DIAGNOSIS — E27.8 ADRENAL NODULE (HCC): ICD-10-CM

## 2024-04-22 LAB
ALBUMIN SERPL BCP-MCNC: 3.5 G/DL (ref 3.5–5)
ALP SERPL-CCNC: 198 U/L (ref 34–104)
ALT SERPL W P-5'-P-CCNC: 20 U/L (ref 7–52)
ANION GAP SERPL CALCULATED.3IONS-SCNC: 5 MMOL/L (ref 4–13)
AST SERPL W P-5'-P-CCNC: 18 U/L (ref 13–39)
BILIRUB SERPL-MCNC: 0.53 MG/DL (ref 0.2–1)
BUN SERPL-MCNC: 18 MG/DL (ref 5–25)
CALCIUM SERPL-MCNC: 8.7 MG/DL (ref 8.4–10.2)
CHLORIDE SERPL-SCNC: 110 MMOL/L (ref 96–108)
CHOLEST SERPL-MCNC: 137 MG/DL
CO2 SERPL-SCNC: 27 MMOL/L (ref 21–32)
CREAT SERPL-MCNC: 1.14 MG/DL (ref 0.6–1.3)
CREAT UR-MCNC: 108.6 MG/DL
ERYTHROCYTE [DISTWIDTH] IN BLOOD BY AUTOMATED COUNT: 21.9 % (ref 11.6–15.1)
EST. AVERAGE GLUCOSE BLD GHB EST-MCNC: 174 MG/DL
GFR SERPL CREATININE-BSD FRML MDRD: 63 ML/MIN/1.73SQ M
GLUCOSE P FAST SERPL-MCNC: 116 MG/DL (ref 65–99)
HBA1C MFR BLD: 7.7 %
HCT VFR BLD AUTO: 49.9 % (ref 36.5–49.3)
HDLC SERPL-MCNC: 59 MG/DL
HGB BLD-MCNC: 15.6 G/DL (ref 12–17)
LDLC SERPL CALC-MCNC: 55 MG/DL (ref 0–100)
MAGNESIUM SERPL-MCNC: 2 MG/DL (ref 1.9–2.7)
MCH RBC QN AUTO: 28.9 PG (ref 26.8–34.3)
MCHC RBC AUTO-ENTMCNC: 31.3 G/DL (ref 31.4–37.4)
MCV RBC AUTO: 93 FL (ref 82–98)
NONHDLC SERPL-MCNC: 78 MG/DL
PLATELET # BLD AUTO: 276 THOUSANDS/UL (ref 149–390)
PMV BLD AUTO: 10.6 FL (ref 8.9–12.7)
POTASSIUM SERPL-SCNC: 4.5 MMOL/L (ref 3.5–5.3)
PROT SERPL-MCNC: 6.3 G/DL (ref 6.4–8.4)
PROT UR-MCNC: 29 MG/DL
PROT/CREAT UR: 0.27 MG/G{CREAT} (ref 0–0.1)
RBC # BLD AUTO: 5.39 MILLION/UL (ref 3.88–5.62)
SODIUM SERPL-SCNC: 142 MMOL/L (ref 135–147)
TRIGL SERPL-MCNC: 113 MG/DL
TSH SERPL DL<=0.05 MIU/L-ACNC: 4.01 UIU/ML (ref 0.45–4.5)
WBC # BLD AUTO: 11.09 THOUSAND/UL (ref 4.31–10.16)

## 2024-04-22 PROCEDURE — 83835 ASSAY OF METANEPHRINES: CPT

## 2024-04-22 PROCEDURE — 36415 COLL VENOUS BLD VENIPUNCTURE: CPT

## 2024-04-22 PROCEDURE — 82088 ASSAY OF ALDOSTERONE: CPT

## 2024-04-22 PROCEDURE — 80053 COMPREHEN METABOLIC PANEL: CPT

## 2024-04-22 PROCEDURE — 83735 ASSAY OF MAGNESIUM: CPT

## 2024-04-22 PROCEDURE — 84156 ASSAY OF PROTEIN URINE: CPT

## 2024-04-22 PROCEDURE — 84244 ASSAY OF RENIN: CPT

## 2024-04-22 PROCEDURE — 80061 LIPID PANEL: CPT

## 2024-04-22 PROCEDURE — 85027 COMPLETE CBC AUTOMATED: CPT

## 2024-04-22 PROCEDURE — 83036 HEMOGLOBIN GLYCOSYLATED A1C: CPT

## 2024-04-22 PROCEDURE — 84443 ASSAY THYROID STIM HORMONE: CPT

## 2024-04-22 PROCEDURE — 82570 ASSAY OF URINE CREATININE: CPT

## 2024-04-22 PROCEDURE — 82384 ASSAY THREE CATECHOLAMINES: CPT

## 2024-04-22 NOTE — TELEPHONE ENCOUNTER
Lab Inquiry   Who are you speaking with? Patient     If it is not the patient, are they listed on an active communication consent form? Yes   Name of ordering provider LENORA Purdy   What is being requested? Lab orders need to be entered   Lab draw location [facility]   What is the best call back number? [phone]   If patient at the lab, Was a live attempts to contact the team made? Yes Joyce submitted  labs

## 2024-04-22 NOTE — TELEPHONE ENCOUNTER
Spoke to patient, and he states that the lab told him the order for the 24 hour urine test was not in, but I can see that it is. He has the bottle to fill and will return it to the lab. As far as I can tell, he is ok Patient verbalized understanding and thanks.

## 2024-04-23 ENCOUNTER — TELEPHONE (OUTPATIENT)
Dept: NEPHROLOGY | Facility: CLINIC | Age: 74
End: 2024-04-23

## 2024-04-23 NOTE — TELEPHONE ENCOUNTER
----- Message from Erik Ramos MD sent at 4/23/2024  6:56 AM EDT -----  As long as patient feels well no signs or symptoms of an infection nothing further labs in general look good except for chronically mild elevated WBC count  ----- Message -----  From: Lab, Background User  Sent: 4/22/2024   7:36 AM EDT  To: Erik Ramos MD

## 2024-04-24 ENCOUNTER — APPOINTMENT (OUTPATIENT)
Dept: LAB | Facility: CLINIC | Age: 74
End: 2024-04-24
Payer: MEDICARE

## 2024-04-24 PROCEDURE — 83835 ASSAY OF METANEPHRINES: CPT

## 2024-04-24 PROCEDURE — 82384 ASSAY THREE CATECHOLAMINES: CPT

## 2024-04-26 LAB
DOPAMINE 24H UR-MRATE: <30 PG/ML (ref 0–48)
EPINEPH PLAS-MCNC: 42 PG/ML (ref 0–62)
NOREPINEPH PLAS-MCNC: 812 PG/ML (ref 0–874)

## 2024-04-27 LAB
ALDOST SERPL-MCNC: 4.9 NG/DL (ref 0–30)
ALDOST/RENIN PLAS-RTO: 12.8 {RATIO} (ref 0–30)
RENIN PLAS-CCNC: 0.38 NG/ML/HR (ref 0.17–5.38)

## 2024-04-28 DIAGNOSIS — E11.51 TYPE 2 DIABETES MELLITUS WITH DIABETIC PERIPHERAL ANGIOPATHY WITHOUT GANGRENE, WITHOUT LONG-TERM CURRENT USE OF INSULIN (HCC): ICD-10-CM

## 2024-04-28 RX ORDER — GLIMEPIRIDE 2 MG/1
TABLET ORAL
Qty: 180 TABLET | Refills: 0 | Status: SHIPPED | OUTPATIENT
Start: 2024-04-28

## 2024-04-30 LAB
DOPAMINE 24H UR-MRATE: 223 UG/24 HR (ref 0–510)
DOPAMINE UR-MCNC: 159 UG/L
EPINEPH 24H UR-MRATE: <4 UG/24 HR (ref 0–20)
EPINEPH UR-MCNC: <3 UG/L
METANEPH 24H UR-MRATE: 129 UG/24 HR (ref 58–276)
METANEPHS 24H UR-MCNC: 92 UG/L
NOREPINEPH 24H UR-MRATE: 62 UG/24 HR (ref 0–135)
NOREPINEPH UR-MCNC: 44 UG/L
NORMETANEPHRINE 24H UR-MCNC: 381 UG/L
NORMETANEPHRINE 24H UR-MRATE: 533 UG/24 HR (ref 156–729)

## 2024-05-10 LAB
METANEPH FREE SERPL-MCNC: 31.4 PG/ML (ref 0–88)
NORMETANEPHRINE SERPL-MCNC: 144.5 PG/ML (ref 0–285.2)

## 2024-05-12 PROBLEM — I50.40 COMBINED SYSTOLIC AND DIASTOLIC HEART FAILURE (HCC): Status: ACTIVE | Noted: 2024-05-12

## 2024-05-12 PROBLEM — I50.9 ACUTE ON CHRONIC HEART FAILURE (HCC): Status: RESOLVED | Noted: 2024-02-14 | Resolved: 2024-05-12

## 2024-05-12 NOTE — PROGRESS NOTES
RENAL FOLLOW UP NOTE:.td    ASSESSMENT AND PLAN:  1.  CKD stage 3 a:  Etiology:  Cardiorenal syndrome/arteriolar nephrosclerosis/?  Atheroemboli/prior prerenal associated mild tachycardia and relative hypotension in the past  Baseline creatinine:  1.25-1.41  Current creatinine:  At baseline at 1.14  Urine protein creatinine ratio:  0.27 g at goal  Recommendations:  Treat hypertension-please see below  Treat dyslipidemia-please see below  Maintain proteinuria less than 1 g or as low as possible  Avoid nephrotoxic agents such as NSAIDs, patient counseled as such    2.  Volume:   HFrEF: 20% EF February 2024 in the setting of PAF  Current status euvolemic  Current treatment continue furosemide 40 mg every other day.      3.  Hypotension:  Workup was compatible with low ejection fraction 48% associated severe hypokinesis of the apical anterior and mid anterior septal in mild in for septal and apical inferior and apical septal and apical walls.  No pericardial effusion.  Cortisol/TSH were unremarkable.  Normal plasma free metanephrines   Normal TSH  Aldosterone/renin 12.8  Currently blood pressure: None today      Medication changes today:   Decrease Toprol-XL 50 mg twice a day  Recheck blood pressures and heart rate in 1 week potentially adjust Entresto     4.  Electrolytes:  All acceptable    5.  Mineral bone disorder:  Calcium/magnesium/phosphorus:  All acceptable including normal magnesium  PTH intact:   84.6 which is slightly above goal just monitor for now to avoid overtreatment   Vitamin-D:32.8 from January 2023    6.  Dyslipidemia:  Goal LDL:  Less than 70 given PAD  Current lipid profile:  /HDL 59/   Recommendations:  At goal so no changes      7.  Anemia:               Recommendations:  Hemoglobin stable at 15.6   Multiple myeloma evaluation with SPEP/UPEP and light chains:  Negative  Stool for occult blood x3:  NEGATIVE X1  In March had EGD colonoscopy and recent sigmoidoscopy:  Harris's  esophagus/diverticulosis/colonic polyps.  Patient will be seeing Dr. Rodriguez    8.  Other problems:  Chronic intermittent mild leukocytosis:  Stable   Gross hematuria/microscopic hematuria:  This has resolved.  Patient seen by Urology for gross hematuria.  Cystoscopy was negative and recent PSA negative. No further investigation by follow-up in 1 year by Urology.  Abnormal echocardiogram/cardiomyopathy:  Followed in the past by Dr. Almanza  Paroxysmal atrial fibrillation followed by Dr. Almanza:  Currently on Xarelto: Please see above regarding heart rate  Bilateral adrenal gland abnormalities being followed by surgical oncology.  24 hour urine for Dopamine epinephrine all unremarkable.  Aldosterone was unremarkable.  Cortisol was okay at 17.5. To be followed by   PAD:  Status post stent for the right popliteal artery/revision 02/12/2020  Abnormal alkaline phosphatase followed by GI  Diabetes mellitus type 2 not really a candidate for SGLT2 inhibitors with PAD  ABNORMAL LUNG EXAM: Chest x-ray was normal May 2022    Admission 2/12/24 Rapid Afib EF 20% biv HF and CHF   -CHF diuresed 42 lbs?  -cardioversion   -EF 20%     GI health maintenance:   January 2023 recommended follow-up in 2 years which would be January 2025 + per GI       I did speak with Dr. Butts cardiologist he recommended the following  1.  Agreed with decreasing Toprol-XL to 50 mg twice a day  2.  He felt he probably can stop the furosemide every 48 hours  3.  He would like to maintain Entresto but if his blood pressure does not improve potentially could stop it.  Will relay these instructions to the patient and    PATIENT INSTRUCTIONS:    Patient Instructions   Visit summary:  - Labs and kidney function are great!  - Your blood pressure is very low in the mid 70 range.  I am going to adjust her medications and will discuss with Dr. Butts        1. Medication changes today:  Please decrease Toprol-XL to 50 mg twice a day    2.  General  instructions:  Continue to avoid salt  Continue to try to exercise is much as you can 3 days a week would be great for at least 30 minutes dedicated aerobic  Try to lose weight I know difficult so please try to lose 15 pounds in 6 months        3.  Please take 1 week a blood pressure readings 1 week after making the above changes    AS FOLLOWS  MORNING AND EVENING, SITTING AND STANDING as follows:  TAKE THE MORNING READINGS BEFORE ANY MEDICATIONS AND WHEN YOU ARE RELAXED FOR SEVERAL MINUTES  TAKE THE EVENING READINGS:  BETWEEN 7-10 P.M.; PRIOR TO ANY MEDICATIONS; AT LEAST IN OUR  FROM DINNER; AND CERTAINLY AFTER RELAXING FOR A FEW MINUTES  PLEASE INCLUDE HEART RATE WITH YOUR BLOOD PRESSURE READINGS  When taking standing readings, keep your arm supported at heart level and not dangling  Make sure you are sitting with your back supported and feet on the ground and do not cross your legs or feet  Make sure you have not taken any coffee or caffeine products or exercised or smoke cigarettes at least 30 minutes before taking your blood pressure  Then please mail these readings into the office      4.  Follow-up in 6 months  Please bring in 1 week a blood pressure readings morning evening, sitting and standing is outlined above  PLEASE BRING AN YOUR BLOOD PRESSURE MACHINE TO CORRELATE WITH THE OFFICE MACHINE AT THIS NEXT SCHEDULED VISIT  Please go for fasting lab work 1-2 weeks prior to your appointment      5.  General non medical recommendations:  AVOID SALT BUT NOT ADDING AN READING LABELS TO MAKE SURE THERE IS LOW-SALT IN THE FOOD THAT YOU ARE EATING  Goal is less than 2 g of sodium intake or less than 5 g of sodium chloride intake per day    Avoid nonsteroidal anti-inflammatory drugs such as Naprosyn, ibuprofen, Aleve, Advil, Celebrex, Meloxicam (Mobic) etc.  You can use Tylenol as needed if you do not have any liver condition to be concerned about    Avoid medications such as Sudafed or decongestants and  antihistamines that contained the D component which is the decongestant.  You can take antihistamines without the decongestant or D component.    Try to avoid medications such as pantoprazole or  Protonix/Nexium or Esomeprazole)/Prilosec or omeprazole/Prevacid or lansoprazole/AcipHex or Rabeprazole.  If you are able to, use Pepcid as this is safer for your kidneys.    Try to exercise at least 30 minutes 3 days a week to begin with with an ultimate goal of 5 days a week for at least 30 minutes    Please do not drink more than 2 glasses of alcohol/wine on a daily basis as this may contribute to your high blood pressure.            Subjective:   Hospitalized please see above  The patient overall is feeling well.  No fevers, chills, or cough or colds.  Good appetite and good energy  No hematuria, dysuria, voiding symptoms or foamy urine  No gastrointestinal symptoms  No cardiovascular symptoms including swelling of the legs  No headaches, occasional dizziness or lightheadedness when standing up from a seated position unchanged  Blood pressure medications:  Entresto 24-26 mg twice a day  Toprol-XL 75 mg twice a day  Furosemide 40 mg every other day    Renal pertinent medications:  Omeprazole 20 mg daily  Magnesium 500 mg daily  Jardiance 10 mg daily  Atorvastatin 20 mg daily  Aspirin 81 mg daily    ROS:  See HPI, otherwise review of systems as completely reviewed with the patient are negative    Past Medical History:   Diagnosis Date    Harris esophagus     Blue toe syndrome (HCC)     Chronic kidney disease     Colon polyps     Crohn's disease (HCC) 1-2020    GERD (gastroesophageal reflux disease)     Hematuria     History of rheumatic fever     Hypolipidemia     Hypotension     Ischemic cardiomyopathy     PAD (peripheral artery disease) (HCC)     Pulmonary emphysema (HCC)     PVD (peripheral vascular disease) (formerly [facility])      Past Surgical History:   Procedure Laterality Date    CARDIAC ELECTROPHYSIOLOGY PROCEDURE N/A  1/10/2024    Procedure: Cardiac loop recorder explant;  Surgeon: Jason Cortez MD;  Location: [facility];  Service: Cardiology    COLONOSCOPY  2019    HERNIA REPAIR      IR AORTAGRAM WITH RUN-OFF  6/27/2019    IR AORTAGRAM WITH RUN-OFF  2/12/2020    POPLITEAL ARTERY STENT Right     6/2019    MD BYPASS W/VEIN FEMORAL-POPLITEAL Right 3/26/2020    Procedure: BYPASS FEMORAL-POPLITEAL; Right lower extremity bypass, fem-bk pop with GSV;  Surgeon: Wyatt Shell MD;  Location: [facility];  Service: Vascular    MD SLCTV CATHJ 3RD+ ORD SLCTV ABDL PEL/LXTR BRNCH Right 6/27/2019    Procedure: leg ANGIOGRAM WITH STENT, BALLOON ANGIOPLASTY, LEFT GROIN ACCESS;  Surgeon: Wyatt Shell MD;  Location: [facility];  Service: Vascular     Family History   Problem Relation Age of Onset    Heart disease Mother     Hyperlipidemia Mother     Hypertension Mother     Hypertension Father     Heart disease Father     Stroke Father     Hyperlipidemia Father     Diabetes Brother     No Known Problems Maternal Grandmother     Dementia Neg Hx     Drug abuse Neg Hx     Mental illness Neg Hx     Substance Abuse Neg Hx     Alcohol abuse Neg Hx     Depression Neg Hx     Colon cancer Neg Hx       reports that he has been smoking cigarettes. He has a 7.5 pack-year smoking history. He has never used smokeless tobacco. He reports current alcohol use of about 1.0 standard drink of alcohol per week. He reports that he does not use drugs.    I COMPLETELY REVIEWED THE PAST MEDICAL HISTORY/PAST SURGICAL HISTORY/SOCIAL HISTORY/FAMILY HISTORY/AND MEDICATIONS  AND UPDATED ALL    Objective:     Vitals:   BP sitting on left: 78/52 with a heart rate of 52 and irregular  BP standing on left: 74/52 with a heart rate of 56 and slightly irregular  Vitals:    05/22/24 1256   BP: 98/64   Pulse: (!) 54   SpO2: 97%       Weight (last 2 days)       Date/Time Weight    05/22/24 1256 102 (223.8)          Wt Readings from Last 3 Encounters:   05/22/24 102 kg (223  lb 12.8 oz)   03/28/24 97.1 kg (214 lb)   03/05/24 95.1 kg (209 lb 9.6 oz)       Body mass index is 32.11 kg/m².    Physical Exam: General:  No acute distress/obese  Skin:  No acute rash  Eyes:  No scleral icterus, noninjected, no discharge from eyes  ENT:  Moist mucous membranes  Neck:  Supple, no jugular venous distention, trachea is midline, no lymphadenopathy and no thyromegaly  Back   No CVAT  Chest: Crackles at both bases, otherwise clear no rhonchi or wheezes and no dullness to percussion, good respiratory effort no use of accessory respiratory muscles  CVS: Slightly irregular rhythm without a rub, or gallops; difficult perhaps grade 1/6 systolic ejection type murmur heard best at the left lower sternal border  Abdomen:  obese,Soft and nontender with normal bowel sounds  Extremities:  No cyanosis and no edema, moderate arthritic changes, normal range of motion; varicose veins bilaterally left greater than right at the distal lower extremities  Neuro:  Grossly intact  Psych:  Alert, oriented x3 and appropriate      Medications:    Current Outpatient Medications:     amiodarone 200 mg tablet, Take 1 tablet (200 mg total) by mouth daily Start this once a day maintenance dose once you complete the initial load which is 3 times daily., Disp: 30 tablet, Rfl: 2    aspirin 81 MG tablet, Take 81 mg by mouth daily, Disp: , Rfl:     atorvastatin (LIPITOR) 20 mg tablet, Take 1 tablet (20 mg total) by mouth daily, Disp: 90 tablet, Rfl: 0    Empagliflozin (Jardiance) 10 MG TABS tablet, Take 1 tablet (10 mg total) by mouth every morning, Disp: 90 tablet, Rfl: 5    folic acid (FOLVITE) 1 mg tablet, Take 1 tablet (1 mg total) by mouth daily, Disp: 90 tablet, Rfl: 3    furosemide (LASIX) 40 mg tablet, Take 1 tablet (40 mg total) by mouth every other day, Disp: 45 tablet, Rfl: 5    glimepiride (AMARYL) 2 mg tablet, TAKE 1 TABLET BY MOUTH 2 TIMES A DAY WITH MEALS. (Patient taking differently: 2 mg daily with breakfast), Disp:  180 tablet, Rfl: 0    Magnesium 500 MG TABS, Take 1 tablet by mouth daily , Disp: , Rfl:     metoprolol succinate (TOPROL-XL) 25 mg 24 hr tablet, Take 3 tablets (75 mg total) by mouth 2 (two) times a day Combine a 50mg tablet with a 25mg tablet to take a total of 75mg in AM and 75mg in PM, Disp: 540 tablet, Rfl: 5    metoprolol succinate (TOPROL-XL) 50 mg 24 hr tablet, Take 1.5 tablets (75 mg total) by mouth 2 (two) times a day Combine a 50mg tablet with a 25mg tablet to take a total of 75mg in AM and 75mg in PM, Disp: 270 tablet, Rfl: 5    omeprazole (PriLOSEC) 20 mg delayed release capsule, Take 1 capsule (20 mg total) by mouth daily before breakfast, Disp: 90 capsule, Rfl: 1    risankizumab-rzaa (Skyrizi) 360 MG/2.4ML SOCT, Take first on body injector (OBI) under skin on week 12 then every 8 weeks there after, Disp: 2.4 mL, Rfl: 5    rivaroxaban (Xarelto) 20 mg tablet, Take 1 tablet (20 mg total) by mouth daily with breakfast, Disp: 90 tablet, Rfl: 3    sacubitril-valsartan (Entresto) 24-26 MG TABS, Take 1 tablet by mouth 2 (two) times a day, Disp: 180 tablet, Rfl: 5    vitamin B-12 (VITAMIN B-12) 1,000 mcg tablet, Take 1 tablet (1,000 mcg total) by mouth 3 (three) times a week, Disp: , Rfl:     albuterol (Ventolin HFA) 90 mcg/act inhaler, Inhale 2 puffs every 6 (six) hours as needed for wheezing (Patient not taking: Reported on 2/29/2024), Disp: 8 g, Rfl: 1    fluticasone (FLONASE) 50 mcg/act nasal spray, SPRAY 1 SPRAY INTO EACH NOSTRIL EVERY DAY (Patient not taking: Reported on 3/28/2024), Disp: 24 mL, Rfl: 2    Lab, Imaging and other studies: I have personally reviewed pertinent labs.  Laboratory Results:  Results for orders placed or performed in visit on 04/22/24   Comprehensive metabolic panel   Result Value Ref Range    Sodium 142 135 - 147 mmol/L    Potassium 4.5 3.5 - 5.3 mmol/L    Chloride 110 (H) 96 - 108 mmol/L    CO2 27 21 - 32 mmol/L    ANION GAP 5 4 - 13 mmol/L    BUN 18 5 - 25 mg/dL    Creatinine  1.14 0.60 - 1.30 mg/dL    Glucose, Fasting 116 (H) 65 - 99 mg/dL    Calcium 8.7 8.4 - 10.2 mg/dL    AST 18 13 - 39 U/L    ALT 20 7 - 52 U/L    Alkaline Phosphatase 198 (H) 34 - 104 U/L    Total Protein 6.3 (L) 6.4 - 8.4 g/dL    Albumin 3.5 3.5 - 5.0 g/dL    Total Bilirubin 0.53 0.20 - 1.00 mg/dL    eGFR 63 ml/min/1.73sq m   CBC   Result Value Ref Range    WBC 11.09 (H) 4.31 - 10.16 Thousand/uL    RBC 5.39 3.88 - 5.62 Million/uL    Hemoglobin 15.6 12.0 - 17.0 g/dL    Hematocrit 49.9 (H) 36.5 - 49.3 %    MCV 93 82 - 98 fL    MCH 28.9 26.8 - 34.3 pg    MCHC 31.3 (L) 31.4 - 37.4 g/dL    RDW 21.9 (H) 11.6 - 15.1 %    Platelets 276 149 - 390 Thousands/uL    MPV 10.6 8.9 - 12.7 fL   Magnesium   Result Value Ref Range    Magnesium 2.0 1.9 - 2.7 mg/dL   Protein / creatinine ratio, urine   Result Value Ref Range    Creatinine, Ur 108.6 Reference range not established. mg/dL    Protein Urine Random 29 Reference range not established. mg/dL    Prot/Creat Ratio, Ur 0.27 (H) 0.00 - 0.10   TSH, 3rd generation with Free T4 reflex   Result Value Ref Range    TSH 3RD GENERATON 4.009 0.450 - 4.500 uIU/mL   Lipid panel   Result Value Ref Range    Cholesterol 137 See Comment mg/dL    Triglycerides 113 See Comment mg/dL    HDL, Direct 59 >=40 mg/dL    LDL Calculated 55 0 - 100 mg/dL    Non-HDL-Chol (CHOL-HDL) 78 mg/dl   Hemoglobin A1C   Result Value Ref Range    Hemoglobin A1C 7.7 (H) Normal 4.0-5.6%; PreDiabetic 5.7-6.4%; Diabetic >=6.5%; Glycemic control for adults with diabetes <7.0% %     mg/dl   Aldosterone/Renin Ratio   Result Value Ref Range    Renin 0.384 0.167 - 5.380 ng/mL/hr    Aldosterone 4.9 0.0 - 30.0 ng/dL    Aldos/Renin Ratio 12.8 0.0 - 30.0   Catecholamines, fractionated, plasma   Result Value Ref Range    Norepinephrine Plasma 812 0 - 874 pg/mL    Epinephrine Plasma 42 0 - 62 pg/mL    Dopamine Plasma <30 0 - 48 pg/mL   Metanephrine, Fractionated Plasma Free   Result Value Ref Range    Normetanephrine, Free 144.5  "0.0 - 285.2 pg/mL    Metanephrine, Free 31.4 0.0 - 88.0 pg/mL   Catecholamines, fractionated, urine, 24 hour   Result Value Ref Range    Epinephrine, 24H Ur <4 0 - 20 ug/24 hr    Norepinephrine, 24H Ur 62 0 - 135 ug/24 hr    Dopamine , 24H Ur 223 0 - 510 ug/24 hr    Epinephrine, Rand Ur <3 Undefined ug/L    Norepinephrine, Rand Ur 44 Undefined ug/L    Dopamine, Rand Ur 159 Undefined ug/L   Metanephrines Fractionated, urine, 24 hour   Result Value Ref Range    Metaneph, Total, 24H Ur 129 58 - 276 ug/24 hr    Metanephrines, Ur 92 Undefined ug/L    Normetanephrine, Ur 381 Undefined ug/L    Normetanephrine, 24H Ur 533 156 - 729 ug/24 hr             Invalid input(s): \"ALBUMIN\"      Radiology review:   chest X-ray    Ultrasound      Portions of the record may have been created with voice recognition software.  Occasional wrong word or \"sound a like\" substitutions may have occurred due to the inherent limitations of voice recognition software.  Read the chart carefully and recognize, using context, where substitutions have occurred.                    "

## 2024-05-22 ENCOUNTER — OFFICE VISIT (OUTPATIENT)
Dept: NEPHROLOGY | Facility: CLINIC | Age: 74
End: 2024-05-22
Payer: MEDICARE

## 2024-05-22 VITALS
SYSTOLIC BLOOD PRESSURE: 98 MMHG | BODY MASS INDEX: 32.04 KG/M2 | DIASTOLIC BLOOD PRESSURE: 64 MMHG | WEIGHT: 223.8 LBS | HEART RATE: 54 BPM | HEIGHT: 70 IN | OXYGEN SATURATION: 97 %

## 2024-05-22 DIAGNOSIS — E55.9 VITAMIN D DEFICIENCY: ICD-10-CM

## 2024-05-22 DIAGNOSIS — R80.1 PERSISTENT PROTEINURIA: ICD-10-CM

## 2024-05-22 DIAGNOSIS — E66.09 CLASS 1 OBESITY DUE TO EXCESS CALORIES WITH SERIOUS COMORBIDITY AND BODY MASS INDEX (BMI) OF 33.0 TO 33.9 IN ADULT: ICD-10-CM

## 2024-05-22 DIAGNOSIS — E78.5 DYSLIPIDEMIA: ICD-10-CM

## 2024-05-22 DIAGNOSIS — R74.8 ELEVATED ALKALINE PHOSPHATASE LEVEL: ICD-10-CM

## 2024-05-22 DIAGNOSIS — N18.31 STAGE 3A CHRONIC KIDNEY DISEASE (HCC): Primary | ICD-10-CM

## 2024-05-22 DIAGNOSIS — I50.42 CHRONIC COMBINED SYSTOLIC AND DIASTOLIC HEART FAILURE (HCC): ICD-10-CM

## 2024-05-22 PROCEDURE — G2211 COMPLEX E/M VISIT ADD ON: HCPCS | Performed by: INTERNAL MEDICINE

## 2024-05-22 PROCEDURE — 99214 OFFICE O/P EST MOD 30 MIN: CPT | Performed by: INTERNAL MEDICINE

## 2024-05-22 NOTE — PATIENT INSTRUCTIONS
Visit summary:  - Labs and kidney function are great!  - Your blood pressure is very low in the mid 70 range.  I am going to adjust her medications and will discuss with Dr. Butts        1. Medication changes today:  Please decrease Toprol-XL to 50 mg twice a day    2.  General instructions:  Continue to avoid salt  Continue to try to exercise is much as you can 3 days a week would be great for at least 30 minutes dedicated aerobic  Try to lose weight I know difficult so please try to lose 15 pounds in 6 months        3.  Please take 1 week a blood pressure readings 1 week after making the above changes    AS FOLLOWS  MORNING AND EVENING, SITTING AND STANDING as follows:  TAKE THE MORNING READINGS BEFORE ANY MEDICATIONS AND WHEN YOU ARE RELAXED FOR SEVERAL MINUTES  TAKE THE EVENING READINGS:  BETWEEN 7-10 P.M.; PRIOR TO ANY MEDICATIONS; AT LEAST IN OUR  FROM DINNER; AND CERTAINLY AFTER RELAXING FOR A FEW MINUTES  PLEASE INCLUDE HEART RATE WITH YOUR BLOOD PRESSURE READINGS  When taking standing readings, keep your arm supported at heart level and not dangling  Make sure you are sitting with your back supported and feet on the ground and do not cross your legs or feet  Make sure you have not taken any coffee or caffeine products or exercised or smoke cigarettes at least 30 minutes before taking your blood pressure  Then please mail these readings into the office      4.  Follow-up in 6 months  Please bring in 1 week a blood pressure readings morning evening, sitting and standing is outlined above  PLEASE BRING AN YOUR BLOOD PRESSURE MACHINE TO CORRELATE WITH THE OFFICE MACHINE AT THIS NEXT SCHEDULED VISIT  Please go for fasting lab work 1-2 weeks prior to your appointment      5.  General non medical recommendations:  AVOID SALT BUT NOT ADDING AN READING LABELS TO MAKE SURE THERE IS LOW-SALT IN THE FOOD THAT YOU ARE EATING  Goal is less than 2 g of sodium intake or less than 5 g of sodium chloride intake per  day    Avoid nonsteroidal anti-inflammatory drugs such as Naprosyn, ibuprofen, Aleve, Advil, Celebrex, Meloxicam (Mobic) etc.  You can use Tylenol as needed if you do not have any liver condition to be concerned about    Avoid medications such as Sudafed or decongestants and antihistamines that contained the D component which is the decongestant.  You can take antihistamines without the decongestant or D component.    Try to avoid medications such as pantoprazole or  Protonix/Nexium or Esomeprazole)/Prilosec or omeprazole/Prevacid or lansoprazole/AcipHex or Rabeprazole.  If you are able to, use Pepcid as this is safer for your kidneys.    Try to exercise at least 30 minutes 3 days a week to begin with with an ultimate goal of 5 days a week for at least 30 minutes    Please do not drink more than 2 glasses of alcohol/wine on a daily basis as this may contribute to your high blood pressure.

## 2024-05-23 ENCOUNTER — TELEPHONE (OUTPATIENT)
Dept: NEPHROLOGY | Facility: CLINIC | Age: 74
End: 2024-05-23

## 2024-05-23 ENCOUNTER — DOCUMENTATION (OUTPATIENT)
Dept: NEPHROLOGY | Facility: CLINIC | Age: 74
End: 2024-05-23

## 2024-05-23 NOTE — TELEPHONE ENCOUNTER
Pt advised that next colonoscopy is due  Early 2025.      ----- Message from Erik Ramos MD sent at 5/22/2024  2:56 PM EDT -----  Awesome I will let him know    For my staff can you please let him know he is not due until the beginning of 2025!  ----- Message -----  From: Kandice Clemens MD  Sent: 5/22/2024   2:40 PM EDT  To: Erik Ramos MD    Hi,    Thank you for reaching out. His last procedure was actually 1/2023 and so he is not due until 1/2025.     Thank you!  ----- Message -----  From: Erik Ramos MD  Sent: 5/22/2024   1:05 PM EDT  To: Kandice Clemens MD    Good afternoon!  I believe this gentleman is due for colonoscopy this year his last one was November 2022 looks like he recommended a follow-up study in 2 years.  If you could reach out to him at your convenience and schedule sometime this year that would be great!  I did review this with him as well  Regards  Reed

## 2024-05-23 NOTE — TELEPHONE ENCOUNTER
Reviewed Dr. Ramos message in detail with the patient.  Decrease Toprol XL to   50 mg. BID, stop Furosemide.  Call if any S.O.B. swelling, weight gain.  Check Bps one week after these changes and send in for Dr. Ramos to review.      ----- Message from Erik Ramos MD sent at 5/22/2024  3:25 PM EDT -----  I discussed the patient's care with Dr. Butts  He agreed with decreasing Toprol-XL to 50 mg twice a day because of hypotension  He also said he could stop the furosemide every other day for now but watch swelling shortness of breath and weight gain and call us if he develops any    I would have him check blood pressures as I discussed with him earlier 1 week after the adjustment of the Toprol-XL and stopping the furosemide    He would like to maintain the patient on Entresto but if his blood pressure does not improve potentially we could decrease or stop it        Anabelle I just wanted to keep you in the loop!

## 2024-06-03 NOTE — PROGRESS NOTES
"Advanced Heart Failure/Pulmonary Hypertension Outpatient Note - South Humphrey III 73 y.o. male MRN: [id number]    @ Encounter: [id number]    Assessment:  73 y.o. male PMH and acute problems listed later in note (a partial list may also be included within the assessment section) p/w HF fu.   I first met South Humphrey III during 2/2024 admission for HF and AFL where he p/w ADHF in setting of rapid Afib/AFL. New drop in EF. Note New skyrizi for Crohn's recently started.    The patient's primary cardiac team is general cardiology, follows Dr. Almanza  Historically HFpEF, NICM>new low EF 2/2024 with LVEF 20%, biv failure, nondilated LV  2018 NST: no ischemia or scar  Etiology may be TIC, EF drop in setting of rapid AF and AFL  # PAF and aflutter with RVR, on AC; S/p 2/19/24 DENIS/DCCV with conversion to NSR  Hx of loop recorder- explanted 1/10/24  RBBB  # hx of bifascicular block/ SVT, some nighttime vero events on LINQ  # dyslipidemia  CKD  # PAD s/ femoral popliteal bypass surgery  # tobacco abuse  # crohn's dz, hx of SBO  DM      I have reviewed all pertinent patient data including but not limited to:        Lab Units 04/22/24  0727 03/20/24  1019 02/20/24  0442   CREATININE mg/dL 1.14 1.09 1.16           Lab Results   Component Value Date    K 4.5 04/22/2024     Lab Results   Component Value Date    HGBA1C 7.7 (H) 04/22/2024     Lab Results   Component Value Date    ASR3VKRQITCP 4.009 04/22/2024     Lab Results   Component Value Date    LDLCALC 55 04/22/2024     Lab Results   Component Value Date     (H) 02/12/2024      No results found for: \"NTBNP\"     Home scale dry weight may be 209lbs    TODAY'S PLAN:     06/04/24  Warm, euvolemic  No new cardiac complaints, feels generally well  Active, goes to gym, feels much better than prior. No swelling, sob, dizziness  Tolerating gdmt well, after reduction of metop recently due to dizziness    Echo today for LVEF  We discussed options pending LVEF " progression, may need CAD evaluation as well as ICD consideration  Note his vero Hx as well and Rx limitations because of this    No Rx changes today    Gdmt below  Vero and BP limiting    Cw AC    Cw amio for now     On ASA and statin    Key info from my prior notes:    Fu future echo x 3 months around 5/2024 after rhythm control and max gdmt, for EF  Could consider ischemic eval in future, pending Sx and LVEF trend  No chest pain  Neg NST 2018    No very strong features of cardiac amyloidosis  Remains in differential    Possible Zio patch near future in case vero events  +BL conduction dz  Past nighttime vero events on LINQ  No overt issues as inpt on tele, reassuring  Asymptomatic now    No strong connection between skyrizi and HF/arrhythmia I am aware of  Fu GI team    Neurohormonal Blockade/GDMT:  --Beta-Blocker: toprol xl 75 bid > 50 bid  --ACEi, ARB, ARNi: entresto 24/26 bid  --MRA: not on 2/2 hypotension  --SGLT2i: jardiance 10 qd; discussed risks/benefits/red flags/when to call clinic; no overt contraindications  --Hydralazine/nitrates:    --Diuretic: lasix 40 q48hr    Other HF pharmaco-invasive therapy (if applicable):   PPM,  ICD , CRT (if applicable):  Interrogation:  Advanced Therapies (if applicable):   --Inotrope:  --LVAD/Transplant Candidacy:    Studies:  I have reviewed all pertinent patient data/labs/imaging where available, including but not limited to the below studies. Selected results may be displayed here but comprehensive listing is omitted for note clarity and can be found in the epic chart.    ECG.    Echo.    Stress.    Cath.    HPI:   73 y.o. male PMH and acute problems listed later in note (a partial list may also be included within the assessment section) p/w HF fu. I first met South Humphrey III during 2/2024 admission for HF and AFL where he p/w ADHF in setting of rapid Afib/AFL. New drop in EF. Note New skyrizi for Crohn's recently started.  No new  CP/SOB/dizziness/palpitations/syncope.  No new fatigue.  No new unintentional weight changes.  No new leg swelling, PND, pillow orthopnea.  No new fevers, chills, cough, nausea, vomiting, diarrhea, dysuria.    Interval History:   No new CP/SOB/dizziness/palpitations/syncope.  No new fatigue.  No new unintentional weight changes.  No new leg swelling, PND, pillow orthopnea.    Past Medical History:   Diagnosis Date    Harris esophagus     Blue toe syndrome ([facility])     Chronic kidney disease     Colon polyps     Crohn's disease ([facility]) 1-2020    GERD (gastroesophageal reflux disease)     Hematuria     History of rheumatic fever     Hypolipidemia     Hypotension     Ischemic cardiomyopathy     PAD (peripheral artery disease) ([facility])     Pulmonary emphysema ([facility])     PVD (peripheral vascular disease) ([facility])      Patient Active Problem List    Diagnosis Date Noted    Combined systolic and diastolic heart failure ([facility]) 05/12/2024    Obesity, morbid ([facility]) 02/22/2024    Electrolyte abnormality 02/14/2024    Abnormal LFTs 02/13/2024    Atrial fibrillation with RVR ([facility]) 02/12/2024    Immunocompromised patient ([facility]) 01/17/2024    Venous insufficiency 08/29/2022    Type 2 diabetes mellitus with diabetic peripheral angiopathy without gangrene ([facility]) 03/16/2021    Vitamin B12 deficiency 09/16/2020    Elevated alkaline phosphatase level 05/14/2020    S/P femoral-popliteal bypass surgery 04/09/2020    Terminal ileitis of small intestine ([facility]) 02/18/2020    Positive QuantiFERON-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 01/20/2020    Volume overload 01/19/2020    SVT (supraventricular tachycardia) 01/18/2020    Crohn's disease of small intestine with complication ([facility]) 01/13/2020    Adrenal mass ([facility]) 01/07/2020    Paroxysmal atrial fibrillation ([facility]) 09/12/2019    Venous stasis 07/19/2019    S/P peripheral artery angioplasty with stent placement 06/28/2019    Ischemic cardiomyopathy 06/03/2019    PAD (peripheral artery disease) ([facility]) 04/24/2019    Harris's esophagus  without dysplasia 04/05/2019    Colon polyps 04/05/2019    Dyslipidemia 05/16/2018    Vitamin D deficiency 05/16/2018    RBBB (right bundle branch block with left anterior fascicular block) 05/11/2018    [place] cardiac risk 10-20% in next 10 years 05/04/2018    Abdominal aortic atherosclerosis (HCC) 05/04/2018    CKD (chronic kidney disease), stage III (HCC) 03/22/2018    Persistent proteinuria 03/22/2018    Microscopic hematuria 03/22/2018    Class 1 obesity due to excess calories with serious comorbidity and body mass index (BMI) of 33.0 to 33.9 in adult        ROS:  10 point ROS negative except as specified in HPI/interval history    No Known Allergies  Current Outpatient Medications   Medication Instructions    albuterol (Ventolin HFA) 90 mcg/act inhaler 2 puffs, Inhalation, Every 6 hours PRN    amiodarone 200 mg, Oral, Daily, Start this once a day maintenance dose once you complete the initial load which is 3 times daily.    aspirin 81 mg, Oral, Daily    atorvastatin (LIPITOR) 20 mg, Oral, Daily    Empagliflozin (JARDIANCE) 10 mg, Oral, Every morning    fluticasone (FLONASE) 50 mcg/act nasal spray SPRAY 1 SPRAY INTO EACH NOSTRIL EVERY DAY    folic acid (FOLVITE) 1 mg, Oral, Daily    glimepiride (AMARYL) 2 mg tablet TAKE 1 TABLET BY MOUTH 2 TIMES A DAY WITH MEALS.    Magnesium 500 MG TABS 1 tablet, Oral, Daily    metoprolol succinate (TOPROL-XL) 75 mg, Oral, 2 times daily, Combine a 50mg tablet with a 25mg tablet to take a total of 75mg in AM and 75mg in PM    metoprolol succinate (TOPROL-XL) 75 mg, Oral, 2 times daily, Combine a 50mg tablet with a 25mg tablet to take a total of 75mg in AM and 75mg in PM    omeprazole (PRILOSEC) 20 mg, Oral, Daily before breakfast    risankizumab-rzaa (Skyrizi) 360 MG/2.4ML SOCT Take first on body injector (OBI) under skin on week 12 then every 8 weeks there after    rivaroxaban (XARELTO) 20 mg, Oral, Daily with breakfast    sacubitril-valsartan (Entresto) 24-26 MG TABS 1  tablet, Oral, 2 times daily    vitamin B-12 (VITAMIN B-12) 1,000 mcg, Oral, 3 times weekly      Social History     Socioeconomic History    Marital status: /Civil Union     Spouse name: Not on file    Number of children: 2    Years of education: Not on file    Highest education level: Not on file   Occupational History    Occupation: retired   Tobacco Use    Smoking status: Some Days     Current packs/day: 0.25     Average packs/day: 0.3 packs/day for 30.0 years (7.5 ttl pk-yrs)     Types: Cigarettes    Smokeless tobacco: Never   Vaping Use    Vaping status: Never Used   Substance and Sexual Activity    Alcohol use: Yes     Alcohol/week: 1.0 standard drink of alcohol     Types: 1 Standard drinks or equivalent per week     Comment: social drinker when out dining    Drug use: Never    Sexual activity: Not Currently     Birth control/protection: None   Other Topics Concern    Not on file   Social History Narrative    Drinks coffee     Social Determinants of Health     Financial Resource Strain: Low Risk  (1/14/2024)    Overall Financial Resource Strain (CARDIA)     Difficulty of Paying Living Expenses: Not hard at all   Food Insecurity: No Food Insecurity (2/14/2024)    Hunger Vital Sign     Worried About Running Out of Food in the Last Year: Never true     Ran Out of Food in the Last Year: Never true   Transportation Needs: No Transportation Needs (2/14/2024)    PRAPARE - Transportation     Lack of Transportation (Medical): No     Lack of Transportation (Non-Medical): No   Physical Activity: Not on file   Stress: Not on file   Social Connections: Not on file   Intimate Partner Violence: Not on file   Housing Stability: Low Risk  (2/14/2024)    Housing Stability Vital Sign     Unable to Pay for Housing in the Last Year: No     Number of Places Lived in the Last Year: 1     Unstable Housing in the Last Year: No     Family History   Problem Relation Age of Onset    Heart disease Mother     Hyperlipidemia Mother   "   Hypertension Mother     Hypertension Father     Heart disease Father     Stroke Father     Hyperlipidemia Father     Diabetes Brother     No Known Problems Maternal Grandmother     Dementia Neg Hx     Drug abuse Neg Hx     Mental illness Neg Hx     Substance Abuse Neg Hx     Alcohol abuse Neg Hx     Depression Neg Hx     Colon cancer Neg Hx      Physical Exam:  Vitals:    06/04/24 1038   BP: 98/60   BP Location: Left arm   Patient Position: Sitting   Cuff Size: Standard   Pulse: (!) 52   SpO2: 97%   Weight: 103 kg (227 lb)   Height: 5' 10\" (1.778 m)       Constitutional: NAD, non toxic  Ears/nose/mouth/throat: atraumatic  CV: vero, nl S1S2, no murmurs/rubs/gallups, no JVD, no HJR  Resp: CTABL  GI: Soft, NTND  MSK: no swollen joints in exposed areas  Extr: trace LE edema, warm LE  Pysche: Normal affect  Neuro: appropriate in conversation  Skin: dry and intact in exposed areas    Labs & Results:  Lab Results   Component Value Date    SODIUM 142 04/22/2024    K 4.5 04/22/2024     (H) 04/22/2024    CO2 27 04/22/2024    BUN 18 04/22/2024    CREATININE 1.14 04/22/2024    GLUC 104 02/20/2024    CALCIUM 8.7 04/22/2024     Lab Results   Component Value Date    WBC 11.09 (H) 04/22/2024    HGB 15.6 04/22/2024    HCT 49.9 (H) 04/22/2024    MCV 93 04/22/2024     04/22/2024       Counseling / Coordination of Care  Time spent today 29 minutes.  Greater than 50% of total time was spent with the patient and / or family counseling and / or coordination of care.  We discussed diagnoses, most recent studies, tests and any changes in treatment plan.    Thank you for the opportunity to participate in the care of this patient.    Ike Butts MD  Attending Physician  Advanced Heart Failure and Transplant Cardiology  [facility]  "

## 2024-06-04 ENCOUNTER — OFFICE VISIT (OUTPATIENT)
Dept: CARDIOLOGY CLINIC | Facility: CLINIC | Age: 74
End: 2024-06-04
Payer: MEDICARE

## 2024-06-04 VITALS
HEART RATE: 52 BPM | OXYGEN SATURATION: 97 % | DIASTOLIC BLOOD PRESSURE: 60 MMHG | SYSTOLIC BLOOD PRESSURE: 98 MMHG | HEIGHT: 70 IN | WEIGHT: 227 LBS | BODY MASS INDEX: 32.5 KG/M2

## 2024-06-04 DIAGNOSIS — E11.51 TYPE 2 DIABETES MELLITUS WITH DIABETIC PERIPHERAL ANGIOPATHY WITHOUT GANGRENE, WITHOUT LONG-TERM CURRENT USE OF INSULIN (HCC): ICD-10-CM

## 2024-06-04 DIAGNOSIS — R03.0 ELEVATED BLOOD PRESSURE READING: ICD-10-CM

## 2024-06-04 DIAGNOSIS — I50.23 ACUTE ON CHRONIC SYSTOLIC HEART FAILURE (HCC): Primary | ICD-10-CM

## 2024-06-04 DIAGNOSIS — I42.8 OTHER CARDIOMYOPATHY (HCC): ICD-10-CM

## 2024-06-04 DIAGNOSIS — I48.91 ATRIAL FIBRILLATION WITH RVR (HCC): ICD-10-CM

## 2024-06-04 PROCEDURE — 99214 OFFICE O/P EST MOD 30 MIN: CPT | Performed by: STUDENT IN AN ORGANIZED HEALTH CARE EDUCATION/TRAINING PROGRAM

## 2024-06-04 PROCEDURE — G2211 COMPLEX E/M VISIT ADD ON: HCPCS | Performed by: STUDENT IN AN ORGANIZED HEALTH CARE EDUCATION/TRAINING PROGRAM

## 2024-06-08 DIAGNOSIS — I73.9 PAD (PERIPHERAL ARTERY DISEASE) (HCC): ICD-10-CM

## 2024-06-08 RX ORDER — ATORVASTATIN CALCIUM 20 MG/1
20 TABLET, FILM COATED ORAL DAILY
Qty: 90 TABLET | Refills: 0 | Status: SHIPPED | OUTPATIENT
Start: 2024-06-08

## 2024-06-12 ENCOUNTER — TELEPHONE (OUTPATIENT)
Dept: CARDIOLOGY CLINIC | Facility: CLINIC | Age: 74
End: 2024-06-12

## 2024-06-12 ENCOUNTER — HOSPITAL ENCOUNTER (OUTPATIENT)
Dept: NON INVASIVE DIAGNOSTICS | Facility: CLINIC | Age: 74
Discharge: HOME | End: 2024-06-12
Payer: MEDICARE

## 2024-06-12 VITALS
BODY MASS INDEX: 32.5 KG/M2 | DIASTOLIC BLOOD PRESSURE: 60 MMHG | HEART RATE: 55 BPM | HEIGHT: 70 IN | WEIGHT: 227 LBS | SYSTOLIC BLOOD PRESSURE: 98 MMHG

## 2024-06-12 DIAGNOSIS — I50.23 ACUTE ON CHRONIC SYSTOLIC HEART FAILURE (HCC): ICD-10-CM

## 2024-06-12 DIAGNOSIS — I42.8 OTHER CARDIOMYOPATHY (HCC): ICD-10-CM

## 2024-06-12 LAB
AORTIC ROOT: 4.4 CM
APICAL FOUR CHAMBER EJECTION FRACTION: 62 %
ASCENDING AORTA: 3.5 CM
BSA FOR ECHO PROCEDURE: 2.2 M2
E WAVE DECELERATION TIME: 193 MS
E/A RATIO: 0.9
FRACTIONAL SHORTENING: 26 (ref 28–44)
INTERVENTRICULAR SEPTUM IN DIASTOLE (PARASTERNAL SHORT AXIS VIEW): 1.6 CM
INTERVENTRICULAR SEPTUM: 1.6 CM (ref 0.6–1.1)
LEFT ATRIUM SIZE: 3.3 CM
LEFT INTERNAL DIMENSION IN SYSTOLE: 3.4 CM (ref 2.1–4)
LEFT VENTRICULAR INTERNAL DIMENSION IN DIASTOLE: 4.6 CM (ref 3.5–6)
LEFT VENTRICULAR POSTERIOR WALL IN END DIASTOLE: 1.5 CM
LEFT VENTRICULAR STROKE VOLUME: 52 ML
LVSV (TEICH): 52 ML
MV E'TISSUE VEL-SEP: 6 CM/S
MV PEAK A VEL: 0.94 M/S
MV PEAK E VEL: 85 CM/S
MV STENOSIS PRESSURE HALF TIME: 56 MS
MV VALVE AREA P 1/2 METHOD: 3.93
SL CV LV EF: 65
SL CV PED ECHO LEFT VENTRICLE DIASTOLIC VOLUME (MOD BIPLANE) 2D: 99 ML
SL CV PED ECHO LEFT VENTRICLE SYSTOLIC VOLUME (MOD BIPLANE) 2D: 47 ML
TR MAX PG: 27 MMHG
TR PEAK VELOCITY: 2.6 M/S
TRICUSPID ANNULAR PLANE SYSTOLIC EXCURSION: 1.8 CM
TRICUSPID VALVE PEAK REGURGITATION VELOCITY: 2.57 M/S

## 2024-06-12 PROCEDURE — 93325 DOPPLER ECHO COLOR FLOW MAPG: CPT

## 2024-06-12 PROCEDURE — 93308 TTE F-UP OR LMTD: CPT

## 2024-06-12 PROCEDURE — 93321 DOPPLER ECHO F-UP/LMTD STD: CPT

## 2024-06-12 PROCEDURE — 93321 DOPPLER ECHO F-UP/LMTD STD: CPT | Performed by: INTERNAL MEDICINE

## 2024-06-12 PROCEDURE — 93325 DOPPLER ECHO COLOR FLOW MAPG: CPT | Performed by: INTERNAL MEDICINE

## 2024-06-12 PROCEDURE — 93308 TTE F-UP OR LMTD: CPT | Performed by: INTERNAL MEDICINE

## 2024-06-12 RX ORDER — AMIODARONE HYDROCHLORIDE 200 MG/1
200 TABLET ORAL DAILY
Qty: 90 TABLET | Refills: 1 | Status: SHIPPED | OUTPATIENT
Start: 2024-06-12 | End: 2024-09-10

## 2024-06-12 NOTE — TELEPHONE ENCOUNTER
Called patient and left message his echo is much better, his LVEF has improved to the normal range. Good news. No new changes to plan.      ----- Message from Ike Butts MD sent at 6/12/2024  3:46 PM EDT -----  Please notify pt his echo is much better, his LVEF has improved to the normal range. Good news. No new changes to plan.    I do not believe he ever had a lifevest but if so he can send it back now.      Thanks    - Ike

## 2024-06-12 NOTE — TELEPHONE ENCOUNTER
Reason for call:   [x] Refill   [] Prior Auth  [] Other:     Office:   [] PCP/Provider -   [x] Specialty/Provider - Ike Butts MD  Cardiology    Medication:     amiodarone 200 mg tablet - 1 tab daily # 90         Pharmacy: [facility]/pharmacy #[id number] - [place]UE[place] - [address]      Does the patient have enough for 3 days?   [] Yes   [x] No - Send as HP to POD

## 2024-06-12 NOTE — RESULT ENCOUNTER NOTE
Please notify pt his echo is much better, his LVEF has improved to the normal range. Good news. No new changes to plan.    I do not believe he ever had a lifevest but if so he can send it back now.      Thanks    - Ike

## 2024-06-13 ENCOUNTER — TELEPHONE (OUTPATIENT)
Dept: NEPHROLOGY | Facility: CLINIC | Age: 74
End: 2024-06-13

## 2024-06-13 NOTE — TELEPHONE ENCOUNTER
----- Message from Erik Ramos MD sent at 6/12/2024  1:59 PM EDT -----  Given that most of his meds are for cardiac purposes I would probably leave all alone as long as he is not dizzy or lightheaded  ----- Message -----  From: Susy Rincon  Sent: 6/12/2024   1:51 PM EDT  To: Erik Ramos MD    AM sit:  106/77, 57 stand:  109/76, 61  PM sit:  101/68, 65 stand:  110/73, 68

## 2024-07-17 ENCOUNTER — OFFICE VISIT (OUTPATIENT)
Dept: INTERNAL MEDICINE CLINIC | Facility: CLINIC | Age: 74
End: 2024-07-17
Payer: MEDICARE

## 2024-07-17 VITALS
WEIGHT: 232.2 LBS | DIASTOLIC BLOOD PRESSURE: 60 MMHG | TEMPERATURE: 96.5 F | HEIGHT: 70 IN | OXYGEN SATURATION: 96 % | SYSTOLIC BLOOD PRESSURE: 102 MMHG | BODY MASS INDEX: 33.24 KG/M2 | HEART RATE: 59 BPM

## 2024-07-17 DIAGNOSIS — K22.70 BARRETT'S ESOPHAGUS WITHOUT DYSPLASIA: ICD-10-CM

## 2024-07-17 DIAGNOSIS — I48.0 PAROXYSMAL ATRIAL FIBRILLATION (HCC): ICD-10-CM

## 2024-07-17 DIAGNOSIS — I50.42 CHRONIC COMBINED SYSTOLIC AND DIASTOLIC HEART FAILURE (HCC): Primary | ICD-10-CM

## 2024-07-17 DIAGNOSIS — E78.5 DYSLIPIDEMIA: ICD-10-CM

## 2024-07-17 DIAGNOSIS — E53.8 VITAMIN B12 DEFICIENCY: ICD-10-CM

## 2024-07-17 DIAGNOSIS — K50.019 CROHN'S DISEASE OF SMALL INTESTINE WITH COMPLICATION (HCC): ICD-10-CM

## 2024-07-17 DIAGNOSIS — E11.51 TYPE 2 DIABETES MELLITUS WITH DIABETIC PERIPHERAL ANGIOPATHY WITHOUT GANGRENE, WITHOUT LONG-TERM CURRENT USE OF INSULIN (HCC): ICD-10-CM

## 2024-07-17 DIAGNOSIS — E27.8 ADRENAL MASS (HCC): ICD-10-CM

## 2024-07-17 DIAGNOSIS — I73.9 PAD (PERIPHERAL ARTERY DISEASE) (HCC): ICD-10-CM

## 2024-07-17 DIAGNOSIS — N18.31 STAGE 3A CHRONIC KIDNEY DISEASE (HCC): ICD-10-CM

## 2024-07-17 PROBLEM — I50.40 COMBINED SYSTOLIC AND DIASTOLIC HEART FAILURE (HCC): Status: ACTIVE | Noted: 2024-02-14

## 2024-07-17 PROCEDURE — 99214 OFFICE O/P EST MOD 30 MIN: CPT | Performed by: INTERNAL MEDICINE

## 2024-07-17 PROCEDURE — G2211 COMPLEX E/M VISIT ADD ON: HCPCS | Performed by: INTERNAL MEDICINE

## 2024-07-17 NOTE — PROGRESS NOTES
Diabetic Foot Exam    Patient's shoes and socks removed.    Right Foot/Ankle   Right Foot Inspection  Skin Exam: skin normal and skin intact. No dry skin, no warmth, no callus, no erythema, no maceration, no abnormal color, no pre-ulcer, no ulcer and no callus.     Toe Exam: ROM and strength within normal limits.     Sensory   Monofilament testing: intact    Vascular  Capillary refills: < 3 seconds  The right DP pulse is 1+.     Left Foot/Ankle  Left Foot Inspection  Skin Exam: skin normal and skin intact. No dry skin, no warmth, no erythema, no maceration, normal color, no pre-ulcer, no ulcer and no callus.     Toe Exam: ROM and strength within normal limits.     Sensory   Monofilament testing: intact    Vascular  Capillary refills: < 3 seconds  The left DP pulse is 1+.     Assign Risk Category  No deformity present  No loss of protective sensation  Weak pulses  Risk: 0      Ambulatory Visit  Name: South Humphrey III      : 1950      MRN: [id number]  Encounter Provider: Virgie Luz MD  Encounter Date: 2024   Encounter department: [facility] INTERNAL MEDICINE    Assessment & Plan   1. Chronic combined systolic and diastolic heart failure (HCC)  Assessment & Plan:  Wt Readings from Last 3 Encounters:   24 105 kg (232 lb 3.2 oz)   24 103 kg (227 lb)   24 103 kg (227 lb)     Weight gain of over 20 lbs since hospital discharge over 4 months ago. No symptoms, increased abdominal girth.  Instructed to restart furosemide 20 mg x 3 days. BP precautions. Monitor weight.  Adherent to low salt diet and fluid restriction.    Taking Entresto and Jardiance.  2. Paroxysmal atrial fibrillation (HCC)  Assessment & Plan:  No recent symptoms.  On amiodarone, metoprolol 50 mg and Xarelto.  3. Stage 3a chronic kidney disease (HCC)  Assessment & Plan:  Lab Results   Component Value Date    EGFR 63 2024    EGFR 67 2024    EGFR 62 2024    CREATININE 1.14 2024     CREATININE 1.09 03/20/2024    CREATININE 1.16 02/20/2024     Stable.  Orders:  -     Comprehensive metabolic panel; Future; Expected date: 08/01/2024  -     CBC and differential; Future; Expected date: 08/01/2024  4. Type 2 diabetes mellitus with diabetic peripheral angiopathy without gangrene, without long-term current use of insulin (HCC)  Assessment & Plan:    Lab Results   Component Value Date    HGBA1C 7.7 (H) 04/22/2024     A1c improved.  On glimepiride 2 mg daily and Jardiance.  Orders:  -     Hemoglobin A1C; Future; Expected date: 08/01/2024  5. Crohn's disease of small intestine with complication (HCC)  Assessment & Plan:  On Skyrizi.  Per GI.  6. Harris's esophagus without dysplasia  Assessment & Plan:  On daily PPI.  7. Dyslipidemia  Assessment & Plan:  On atorvastatin 20 mg.  8. PAD (peripheral artery disease) ([facility])  Assessment & Plan:  On ASA, statin.  Sees vascular.  9. Vitamin B12 deficiency  Assessment & Plan:  Taking B12.  10. Adrenal mass (HCC)  Assessment & Plan:  Stable, on CT.    Follow up in 6 months or as needed.       History of Present Illness     He is here today with his wife.  He feels well, no complaints. He is back at the gym, lifting weights and doing some cardio. He reports checking his weight daily (228 lb at home today), drinking about 64 oz of fluids daily.  He does not add salt to his food, mostly eating low salt foods, rarely eats out.  Denies any chest pain, shortness of breath, palpitations, headaches or dizziness.  He had gained a lot of weight in the last few months since his hospital discharge.  He feels his abdomen is much bigger.  Denies any nausea, vomiting, abdominal pain or cramping.  No constipation.  No issues with urination.      Review of Systems   Constitutional:  Negative for activity change, appetite change and fatigue.   HENT:  Negative for congestion and postnasal drip.    Eyes: Negative.  Negative for visual disturbance.   Respiratory:  Negative for cough,  "chest tightness and shortness of breath.    Cardiovascular:  Negative for chest pain, palpitations and leg swelling.   Gastrointestinal:  Negative for abdominal pain and constipation.   Genitourinary:  Negative for dysuria, frequency and urgency.   Musculoskeletal:  Negative for arthralgias.   Skin:  Negative for rash and wound.   Neurological:  Negative for dizziness, numbness and headaches.   Hematological:  Does not bruise/bleed easily.   Psychiatric/Behavioral:  Negative for sleep disturbance. The patient is not nervous/anxious.        Objective     /60   Pulse 59   Temp (!) 96.5 °F (35.8 °C)   Ht 5' 10\" (1.778 m)   Wt 105 kg (232 lb 3.2 oz)   SpO2 96%   BMI 33.32 kg/m²     Physical Exam  Vitals and nursing note reviewed.   Constitutional:       General: He is not in acute distress.     Appearance: He is well-developed.   HENT:      Head: Normocephalic and atraumatic.   Eyes:      Conjunctiva/sclera: Conjunctivae normal.   Cardiovascular:      Rate and Rhythm: Normal rate and regular rhythm.      Pulses: Pulses are weak.           Dorsalis pedis pulses are 1+ on the right side and 1+ on the left side.      Heart sounds: No murmur heard.  Pulmonary:      Effort: Pulmonary effort is normal. No respiratory distress.      Breath sounds: Normal breath sounds. No wheezing.   Abdominal:      Palpations: Abdomen is soft.      Tenderness: There is no abdominal tenderness.   Musculoskeletal:         General: No swelling.      Cervical back: Neck supple.      Right lower leg: No edema.      Left lower leg: No edema.   Feet:      Right foot:      Skin integrity: No ulcer, skin breakdown, erythema, warmth, callus or dry skin.      Left foot:      Skin integrity: No ulcer, skin breakdown, erythema, warmth, callus or dry skin.   Skin:     General: Skin is warm and dry.   Neurological:      Mental Status: He is alert.   Psychiatric:         Mood and Affect: Mood normal.       Administrative Statements       Labs & " imaging results reviewed with patient.

## 2024-07-17 NOTE — ASSESSMENT & PLAN NOTE
Lab Results   Component Value Date    HGBA1C 7.7 (H) 04/22/2024     A1c improved.  On glimepiride 2 mg daily and Jardiance.

## 2024-07-17 NOTE — ASSESSMENT & PLAN NOTE
Wt Readings from Last 3 Encounters:   07/17/24 105 kg (232 lb 3.2 oz)   06/12/24 103 kg (227 lb)   06/04/24 103 kg (227 lb)     Weight gain of over 20 lbs since hospital discharge over 4 months ago. No symptoms, increased abdominal girth.  Instructed to restart furosemide 20 mg x 3 days. BP precautions. Monitor weight.  Adherent to low salt diet and fluid restriction.    Taking Entresto and Jardiance.

## 2024-07-17 NOTE — ASSESSMENT & PLAN NOTE
Lab Results   Component Value Date    EGFR 63 04/22/2024    EGFR 67 03/20/2024    EGFR 62 02/20/2024    CREATININE 1.14 04/22/2024    CREATININE 1.09 03/20/2024    CREATININE 1.16 02/20/2024     Stable.

## 2024-07-17 NOTE — ASSESSMENT & PLAN NOTE
Wt Readings from Last 3 Encounters:   06/12/24 103 kg (227 lb)   06/04/24 103 kg (227 lb)   05/22/24 102 kg (223 lb 12.8 oz)     EF improved to 65%.  On Entresto, Jardiance.

## 2024-07-22 ENCOUNTER — TELEPHONE (OUTPATIENT)
Dept: INTERNAL MEDICINE CLINIC | Facility: CLINIC | Age: 74
End: 2024-07-22

## 2024-07-22 NOTE — TELEPHONE ENCOUNTER
Please continue diuretic once a day for a week, stop on Sunday.  Do labs as scheduled later next week.

## 2024-07-22 NOTE — TELEPHONE ENCOUNTER
Please call patient.  Ask what his weight is today, since taking diuretic for the last 3 days.  (232 lb in the office last week)

## 2024-07-22 NOTE — TELEPHONE ENCOUNTER
PT's wife, Tova, called to report that PT's weight went from 228 over the weekend to 225 today. His BP is 102/71. She is requesting a phone call back to advise if to continue the diuretic. I was unable to reach the office.    Please advise    Thank You

## 2024-08-01 DIAGNOSIS — E11.51 TYPE 2 DIABETES MELLITUS WITH DIABETIC PERIPHERAL ANGIOPATHY WITHOUT GANGRENE, WITHOUT LONG-TERM CURRENT USE OF INSULIN (HCC): ICD-10-CM

## 2024-08-01 RX ORDER — GLIMEPIRIDE 2 MG/1
TABLET ORAL
Qty: 180 TABLET | Refills: 1 | Status: SHIPPED | OUTPATIENT
Start: 2024-08-01

## 2024-08-06 ENCOUNTER — APPOINTMENT (OUTPATIENT)
Dept: LAB | Facility: CLINIC | Age: 74
End: 2024-08-06
Payer: MEDICARE

## 2024-08-06 DIAGNOSIS — E11.51 TYPE 2 DIABETES MELLITUS WITH DIABETIC PERIPHERAL ANGIOPATHY WITHOUT GANGRENE, WITHOUT LONG-TERM CURRENT USE OF INSULIN (HCC): ICD-10-CM

## 2024-08-06 DIAGNOSIS — E55.9 VITAMIN D DEFICIENCY: ICD-10-CM

## 2024-08-06 DIAGNOSIS — N18.31 STAGE 3A CHRONIC KIDNEY DISEASE (HCC): ICD-10-CM

## 2024-08-06 DIAGNOSIS — E78.5 DYSLIPIDEMIA: ICD-10-CM

## 2024-08-06 DIAGNOSIS — R74.8 ELEVATED ALKALINE PHOSPHATASE LEVEL: ICD-10-CM

## 2024-08-06 DIAGNOSIS — R80.1 PERSISTENT PROTEINURIA: ICD-10-CM

## 2024-08-06 DIAGNOSIS — I50.42 CHRONIC COMBINED SYSTOLIC AND DIASTOLIC HEART FAILURE (HCC): ICD-10-CM

## 2024-08-06 DIAGNOSIS — E66.09 CLASS 1 OBESITY DUE TO EXCESS CALORIES WITH SERIOUS COMORBIDITY AND BODY MASS INDEX (BMI) OF 33.0 TO 33.9 IN ADULT: ICD-10-CM

## 2024-08-06 LAB
ALBUMIN SERPL BCG-MCNC: 3.4 G/DL (ref 3.5–5)
ALP SERPL-CCNC: 142 U/L (ref 34–104)
ALT SERPL W P-5'-P-CCNC: 12 U/L (ref 7–52)
ANION GAP SERPL CALCULATED.3IONS-SCNC: 6 MMOL/L (ref 4–13)
AST SERPL W P-5'-P-CCNC: 12 U/L (ref 13–39)
BASOPHILS # BLD AUTO: 0.05 THOUSANDS/ÂΜL (ref 0–0.1)
BASOPHILS NFR BLD AUTO: 1 % (ref 0–1)
BILIRUB SERPL-MCNC: 0.48 MG/DL (ref 0.2–1)
BUN SERPL-MCNC: 19 MG/DL (ref 5–25)
CALCIUM ALBUM COR SERPL-MCNC: 9 MG/DL (ref 8.3–10.1)
CALCIUM SERPL-MCNC: 8.5 MG/DL (ref 8.4–10.2)
CHLORIDE SERPL-SCNC: 110 MMOL/L (ref 96–108)
CO2 SERPL-SCNC: 28 MMOL/L (ref 21–32)
CREAT SERPL-MCNC: 1.34 MG/DL (ref 0.6–1.3)
EOSINOPHIL # BLD AUTO: 0.18 THOUSAND/ÂΜL (ref 0–0.61)
EOSINOPHIL NFR BLD AUTO: 2 % (ref 0–6)
ERYTHROCYTE [DISTWIDTH] IN BLOOD BY AUTOMATED COUNT: 15.4 % (ref 11.6–15.1)
EST. AVERAGE GLUCOSE BLD GHB EST-MCNC: 157 MG/DL
GFR SERPL CREATININE-BSD FRML MDRD: 52 ML/MIN/1.73SQ M
GLUCOSE P FAST SERPL-MCNC: 97 MG/DL (ref 65–99)
HBA1C MFR BLD: 7.1 %
HCT VFR BLD AUTO: 48.9 % (ref 36.5–49.3)
HGB BLD-MCNC: 15.3 G/DL (ref 12–17)
IMM GRANULOCYTES # BLD AUTO: 0.1 THOUSAND/UL (ref 0–0.2)
IMM GRANULOCYTES NFR BLD AUTO: 1 % (ref 0–2)
LYMPHOCYTES # BLD AUTO: 2.25 THOUSANDS/ÂΜL (ref 0.6–4.47)
LYMPHOCYTES NFR BLD AUTO: 21 % (ref 14–44)
MCH RBC QN AUTO: 29.5 PG (ref 26.8–34.3)
MCHC RBC AUTO-ENTMCNC: 31.3 G/DL (ref 31.4–37.4)
MCV RBC AUTO: 94 FL (ref 82–98)
MONOCYTES # BLD AUTO: 1.04 THOUSAND/ÂΜL (ref 0.17–1.22)
MONOCYTES NFR BLD AUTO: 10 % (ref 4–12)
NEUTROPHILS # BLD AUTO: 6.96 THOUSANDS/ÂΜL (ref 1.85–7.62)
NEUTS SEG NFR BLD AUTO: 65 % (ref 43–75)
NRBC BLD AUTO-RTO: 0 /100 WBCS
PLATELET # BLD AUTO: 286 THOUSANDS/UL (ref 149–390)
PMV BLD AUTO: 10.3 FL (ref 8.9–12.7)
POTASSIUM SERPL-SCNC: 4.3 MMOL/L (ref 3.5–5.3)
PROT SERPL-MCNC: 6 G/DL (ref 6.4–8.4)
RBC # BLD AUTO: 5.18 MILLION/UL (ref 3.88–5.62)
SODIUM SERPL-SCNC: 144 MMOL/L (ref 135–147)
WBC # BLD AUTO: 10.58 THOUSAND/UL (ref 4.31–10.16)

## 2024-08-06 PROCEDURE — 83036 HEMOGLOBIN GLYCOSYLATED A1C: CPT

## 2024-08-06 PROCEDURE — 85025 COMPLETE CBC W/AUTO DIFF WBC: CPT

## 2024-08-06 PROCEDURE — 80053 COMPREHEN METABOLIC PANEL: CPT

## 2024-08-06 PROCEDURE — 36415 COLL VENOUS BLD VENIPUNCTURE: CPT

## 2024-08-08 NOTE — TELEPHONE ENCOUNTER
Please call patient.  Ask how weight is.    Lab results: kidney function slightly worse.  Sugars improved to 7.1% (from 7.7%).  Your protein is low, are you eating protein with every meal?

## 2024-08-13 ENCOUNTER — TELEPHONE (OUTPATIENT)
Dept: CARDIOLOGY CLINIC | Facility: CLINIC | Age: 74
End: 2024-08-13

## 2024-08-13 NOTE — TELEPHONE ENCOUNTER
Patient's wife called in reporting that their PACE assistance had  and they are currently on a back log for reinitiating. As such several medications are prohibitively expensive for them.    Patient's wife will be stopping by the office to  patient assistance paperwork for Xarelto, Jardiance, and Entresto.    She will be looking into the copay card offered by [employer] and will also be provided with 4 boxes of Jardiance 10 mg samples for the patient.    Lot#: [id number]  Exp: 2026

## 2024-08-13 NOTE — TELEPHONE ENCOUNTER
Caller: Tova, Spouse    Doctor:  Dr. Almanza    Reason for call: Xarelto samples.  See prior message regarding PACE.  Patient is inquiring if the [address] office has any Xarelto samples to fill in the gap.    Call back#: [phone]

## 2024-08-15 DIAGNOSIS — I48.0 PAROXYSMAL ATRIAL FIBRILLATION (HCC): ICD-10-CM

## 2024-08-27 ENCOUNTER — HOSPITAL ENCOUNTER (OUTPATIENT)
Dept: NON INVASIVE DIAGNOSTICS | Facility: CLINIC | Age: 74
Discharge: HOME | End: 2024-08-27
Payer: MEDICARE

## 2024-08-27 DIAGNOSIS — Z95.828 S/P FEMORAL-POPLITEAL BYPASS SURGERY: ICD-10-CM

## 2024-08-27 DIAGNOSIS — I73.9 PAD (PERIPHERAL ARTERY DISEASE) (HCC): ICD-10-CM

## 2024-08-27 PROCEDURE — 93922 UPR/L XTREMITY ART 2 LEVELS: CPT | Performed by: SURGERY

## 2024-08-27 PROCEDURE — 93925 LOWER EXTREMITY STUDY: CPT

## 2024-08-27 PROCEDURE — 93925 LOWER EXTREMITY STUDY: CPT | Performed by: SURGERY

## 2024-08-27 PROCEDURE — 93923 UPR/LXTR ART STDY 3+ LVLS: CPT

## 2024-08-28 ENCOUNTER — TELEPHONE (OUTPATIENT)
Dept: GASTROENTEROLOGY | Facility: CLINIC | Age: 74
End: 2024-08-28

## 2024-08-28 DIAGNOSIS — K50.019 CROHN'S DISEASE OF SMALL INTESTINE WITH COMPLICATION (HCC): ICD-10-CM

## 2024-08-28 RX ORDER — RISANKIZUMAB-RZAA 360 MG/2.4
WEARABLE INJECTOR SUBCUTANEOUS
Qty: 2.4 ML | Refills: 0 | Status: SHIPPED | OUTPATIENT
Start: 2024-08-28

## 2024-08-28 NOTE — TELEPHONE ENCOUNTER
Pt stating the doctor calls in to [facility] and they deliver    Reason for call:   [x] Refill   [] Prior Auth  [] Other:     Office:   [x] PCP/Provider -   [] Specialty/Provider -     Medication: risankizumab-rzaa (Skyrizi) 360 MG/2.4ML SOCT Take first on body injector (OBI) under skin on week 12 then every 8 weeks there after,       Pharmacy: [facility] pt doesn't know what the name of the pharmacy is    Does the patient have enough for 3 days?   [x] Yes   [] No - Send as HP to POD

## 2024-08-28 NOTE — PROGRESS NOTES
"Advanced Heart Failure/Pulmonary Hypertension Outpatient Note - South Humphrey III 73 y.o. male MRN: [id number]    @ Encounter: [id number]    Assessment:  73 y.o. male PMH and acute problems listed later in note (a partial list may also be included within the assessment section) p/w HF fu.   I first met [place] Humphrey III during 2/2024 admission for HF and AFL where he p/w ADHF in setting of rapid Afib/AFL. New drop in EF. Note New skyrizi for Crohn's recently started.    The patient's primary cardiac team is general cardiology, follows Dr. Archie MATTHEW, HFimpEF, new low EF 2/2024 to LVEF 20%>gdmt and rhythm control>65% 6/2024 TTE  2018 NST: no ischemia or scar  Etiology may be TIC, EF drop in setting of rapid AF and AFL  # PAF and aflutter with RVR, on AC; S/p 2/19/24 DENIS/DCCV with conversion to NSR  Hx of loop recorder- explanted 1/10/24  RBBB  # hx of bifascicular block/ SVT, some nighttime vero events on LINQ  # dyslipidemia  CKD  # PAD s/ femoral popliteal bypass surgery  # tobacco abuse  # crohn's dz, hx of SBO  DM  Ao aneurysm , root 4.4cm 6/2024 TTE      I have reviewed all pertinent patient data including but not limited to:        Lab Units 08/06/24  0645 04/22/24  0727 03/20/24  1019   CREATININE mg/dL 1.34* 1.14 1.09           Lab Results   Component Value Date    K 4.3 08/06/2024     Lab Results   Component Value Date    HGBA1C 7.1 (H) 08/06/2024     Lab Results   Component Value Date    PRN6UTVQVCEM 4.009 04/22/2024     Lab Results   Component Value Date    LDLCALC 55 04/22/2024     Lab Results   Component Value Date     (H) 02/12/2024      No results found for: \"NTBNP\"     Home scale dry weight may be 209lbs    TODAY'S PLAN:     09/05/24  Warm, euvolemic  No new cardiac complaints, feels generally well  Very active, chores, gym, goes fairly hard he says - feels well, no cardiac symptoms  EF has normalized    He has new cost issues for jardiance and entresto  SW referral " given  Paperwork to be completed taht he brought to office today  RTC 3 weeks fro resolution/plan, may need to switch Rx    Long discussion risks vs benefits of amio cessation now  Ultimately he elects to DC amio now, avoid repeat LINQ for now, close monitoring of Sx  Will advance metop if HR increases    Gdmt below  Vero and BP limiting    Cw AC     On ASA and statin    Advised cw TLC, exercise, diet, weight loss    Sleep med referral given today    Follow up:  With me in 3 weeks or sooner if symptoms evolve.  In addition to follow up with their other medical providers    Key info from my prior notes:    Fu future echo x 3 months around 5/2024 after rhythm control and max gdmt, for EF  Could consider ischemic eval in future, pending Sx and LVEF trend  No chest pain  Neg NST 2018    No very strong features of cardiac amyloidosis  Remains in differential    Possible Zio patch near future in case vero events  +BL conduction dz  Past nighttime vero events on LINQ  No overt issues as inpt on tele, reassuring  Asymptomatic now    No strong connection between skyrizi and HF/arrhythmia I am aware of  Fu GI team    Neurohormonal Blockade/GDMT:  --Beta-Blocker: toprol xl 50 bid  --ACEi, ARB, ARNi: entresto 24/26 bid  --MRA: not on 2/2 hypotension  --SGLT2i: jardiance 10 qd; discussed risks/benefits/red flags/when to call clinic; no overt contraindications  --Hydralazine/nitrates:    --Diuretic: lasix 40 q48hr > as needed    Other HF pharmaco-invasive therapy (if applicable):   PPM,  ICD , CRT (if applicable):  Interrogation:  Advanced Therapies (if applicable):   --Inotrope:  --LVAD/Transplant Candidacy:    Studies:  I have reviewed all pertinent patient data/labs/imaging where available, including but not limited to the below studies. Selected results may be displayed here but comprehensive listing is omitted for note clarity and can be found in the epic chart.    ECG.    Echo.    Stress.    Cath.    HPI:   73 y.o. male  PMH and acute problems listed later in note (a partial list may also be included within the assessment section) p/w HF fu. I first met [place] Humphrey III during 2/2024 admission for HF and AFL where he p/w ADHF in setting of rapid Afib/AFL. New drop in EF. Note New skyrizi for Crohn's recently started.  No new CP/SOB/dizziness/palpitations/syncope.  No new fatigue.  No new unintentional weight changes.  No new leg swelling, PND, pillow orthopnea.  No new fevers, chills, cough, nausea, vomiting, diarrhea, dysuria.    Interval History:  As noted in 'plan' section above and prior epic chart notes.    No new CP/SOB/dizziness/palpitations/syncope.  No new fatigue.  No new unintentional weight changes.  No new leg swelling, PND, pillow orthopnea.  No new fevers, chills, cough, nausea, vomiting, diarrhea, dysuria.    Past Medical History:   Diagnosis Date    Harris esophagus     Blue toe syndrome ([facility])     Chronic kidney disease     Colon polyps     Crohn's disease ([facility]) 1-2020    GERD (gastroesophageal reflux disease)     Hematuria     History of rheumatic fever     Hypolipidemia     Hypotension     Ischemic cardiomyopathy     PAD (peripheral artery disease) ([facility])     Pulmonary emphysema ([facility])     PVD (peripheral vascular disease) ([facility])      Patient Active Problem List    Diagnosis Date Noted    Obesity, morbid ([facility]) 02/22/2024    Electrolyte abnormality 02/14/2024    Combined systolic and diastolic heart failure ([facility]) 02/14/2024    Abnormal LFTs 02/13/2024    Atrial fibrillation with RVR ([facility]) 02/12/2024    Immunocompromised patient ([facility]) 01/17/2024    Venous insufficiency 08/29/2022    Type 2 diabetes mellitus with diabetic peripheral angiopathy without gangrene ([facility]) 03/16/2021    Vitamin B12 deficiency 09/16/2020    Elevated alkaline phosphatase level 05/14/2020    S/P femoral-popliteal bypass surgery 04/09/2020    Terminal ileitis of small intestine ([facility]) 02/18/2020    Positive QuantiFERON-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  01/20/2020    Volume overload 01/19/2020    SVT (supraventricular tachycardia) 01/18/2020    Crohn's disease of small intestine with complication (HCC) 01/13/2020    Adrenal mass (HCC) 01/07/2020    Paroxysmal atrial fibrillation (HCC) 09/12/2019    Venous stasis 07/19/2019    S/P peripheral artery angioplasty with stent placement 06/28/2019    Ischemic cardiomyopathy 06/03/2019    PAD (peripheral artery disease) (HCC) 04/24/2019    Harris's esophagus without dysplasia 04/05/2019    Colon polyps 04/05/2019    Dyslipidemia 05/16/2018    Vitamin D deficiency 05/16/2018    RBBB (right bundle branch block with left anterior fascicular block) 05/11/2018    [place] cardiac risk 10-20% in next 10 years 05/04/2018    Abdominal aortic atherosclerosis (HCC) 05/04/2018    CKD (chronic kidney disease), stage III (HCC) 03/22/2018    Persistent proteinuria 03/22/2018    Microscopic hematuria 03/22/2018    Class 1 obesity due to excess calories with serious comorbidity and body mass index (BMI) of 33.0 to 33.9 in adult        ROS:  10 point ROS negative except as specified in HPI/interval history    No Known Allergies  Current Outpatient Medications   Medication Instructions    aspirin 81 mg, Oral, Daily    atorvastatin (LIPITOR) 20 mg, Oral, Daily    Empagliflozin (JARDIANCE) 10 mg, Oral, Every morning    folic acid (FOLVITE) 1 mg, Oral, Daily    glimepiride (AMARYL) 2 mg tablet TAKE 1 TABLET BY MOUTH 2 TIMES A DAY WITH MEALS.    Magnesium 500 MG TABS 1 tablet, Oral, Daily    metoprolol succinate (TOPROL-XL) 50 mg, Oral, 2 times daily, Combine a 50mg tablet with a 25mg tablet to take a total of 75mg in AM and 75mg in PM    omeprazole (PRILOSEC) 20 mg, Oral, Daily before breakfast    risankizumab-rzaa (Skyrizi) 360 MG/2.4ML SOCT Take first on body injector (OBI) under skin every 8 weeks    rivaroxaban (XARELTO) 20 mg, Oral, Daily with breakfast    sacubitril-valsartan (Entresto) 24-26 MG TABS 1 tablet, Oral, 2 times daily     vitamin B-12 (VITAMIN B-12) 1,000 mcg, Oral, 3 times weekly      Social History     Socioeconomic History    Marital status: /Civil Union     Spouse name: Not on file    Number of children: 2    Years of education: Not on file    Highest education level: Not on file   Occupational History    Occupation: retired   Tobacco Use    Smoking status: Some Days     Current packs/day: 0.25     Average packs/day: 0.3 packs/day for 30.0 years (7.5 ttl pk-yrs)     Types: Cigarettes    Smokeless tobacco: Never   Vaping Use    Vaping status: Never Used   Substance and Sexual Activity    Alcohol use: Yes     Alcohol/week: 1.0 standard drink of alcohol     Types: 1 Standard drinks or equivalent per week     Comment: social drinker when out dining    Drug use: Never    Sexual activity: Not Currently     Birth control/protection: None   Other Topics Concern    Not on file   Social History Narrative    Drinks coffee     Social Determinants of Health     Financial Resource Strain: Low Risk  (1/14/2024)    Overall Financial Resource Strain (CARDIA)     Difficulty of Paying Living Expenses: Not hard at all   Food Insecurity: No Food Insecurity (2/14/2024)    Hunger Vital Sign     Worried About Running Out of Food in the Last Year: Never true     Ran Out of Food in the Last Year: Never true   Transportation Needs: No Transportation Needs (2/14/2024)    PRAPARE - Transportation     Lack of Transportation (Medical): No     Lack of Transportation (Non-Medical): No   Physical Activity: Not on file   Stress: Not on file   Social Connections: Not on file   Intimate Partner Violence: Not on file   Housing Stability: Low Risk  (2/14/2024)    Housing Stability Vital Sign     Unable to Pay for Housing in the Last Year: No     Number of Times Moved in the Last Year: 1     Homeless in the Last Year: No     Family History   Problem Relation Age of Onset    Heart disease Mother     Hyperlipidemia Mother     Hypertension Mother     Hypertension  "Father     Heart disease Father     Stroke Father     Hyperlipidemia Father     Diabetes Brother     No Known Problems Maternal Grandmother     Dementia Neg Hx     Drug abuse Neg Hx     Mental illness Neg Hx     Substance Abuse Neg Hx     Alcohol abuse Neg Hx     Depression Neg Hx     Colon cancer Neg Hx      Physical Exam:  Vitals:    09/05/24 1037   BP: 98/60   BP Location: Left arm   Patient Position: Sitting   Cuff Size: Large   Pulse: (!) 50   SpO2: 98%   Weight: 108 kg (239 lb)   Height: 5' 10\" (1.778 m)     Constitutional: NAD, non toxic  Ears/nose/mouth/throat: atraumatic  CV: reg, vero, nl S1S2, no murmurs/rubs/gallups, no JVD, no HJR  Resp: CTABL  GI: Soft, NTND  MSK: no swollen joints in exposed areas  Extr: trace LE edema, warm LE  Pysche: Normal affect  Neuro: appropriate in conversation  Skin: dry and intact in exposed areas    Labs & Results:  Lab Results   Component Value Date    SODIUM 144 08/06/2024    K 4.3 08/06/2024     (H) 08/06/2024    CO2 28 08/06/2024    BUN 19 08/06/2024    CREATININE 1.34 (H) 08/06/2024    GLUC 104 02/20/2024    CALCIUM 8.5 08/06/2024     Lab Results   Component Value Date    WBC 10.58 (H) 08/06/2024    HGB 15.3 08/06/2024    HCT 48.9 08/06/2024    MCV 94 08/06/2024     08/06/2024       Counseling / Coordination of Care  Greater than 50% of total time was spent with the patient and / or family counseling and / or coordination of care.  We discussed diagnoses, most recent studies, tests and any changes in treatment plan.    Thank you for the opportunity to participate in the care of this patient.    Ike Butts MD  Attending Physician  Advanced Heart Failure and Transplant Cardiology  [facility]  "

## 2024-08-29 NOTE — TELEPHONE ENCOUNTER
I spoke with pt and have informed him that his refill has been approved and it  has been sent to pharmacy,pt is aware and no questions at this time.

## 2024-08-30 NOTE — TELEPHONE ENCOUNTER
I spoke with the patient and patient's wife. Relayed our auth team will clarify which pharmacy for Skyrizi to send prescription to and for patient to not accept skyrizi deliveries from [facility] until our office provides an update.

## 2024-08-30 NOTE — TELEPHONE ENCOUNTER
"Renetta from [facility] calling to clarify Skyriizi Rx sent 8/28/24. Maintenace dose every 8 wks. However Rx states \"Take first on body injector (OBI) under skin on week 12 then every 8 weeks there after\"    Phone [phone] fax [phone]  "

## 2024-09-03 DIAGNOSIS — I73.9 PAD (PERIPHERAL ARTERY DISEASE) (HCC): ICD-10-CM

## 2024-09-03 LAB
LEFT EYE DIABETIC RETINOPATHY: NORMAL
RIGHT EYE DIABETIC RETINOPATHY: NORMAL

## 2024-09-03 RX ORDER — RISANKIZUMAB-RZAA 360 MG/2.4
WEARABLE INJECTOR SUBCUTANEOUS
Qty: 2.4 ML | Refills: 5 | Status: SHIPPED | OUTPATIENT
Start: 2024-09-03

## 2024-09-03 NOTE — TELEPHONE ENCOUNTER
Called patient, reached voicemail, left message to call back to relay the pharmacy his medication has been sent to.

## 2024-09-04 RX ORDER — ATORVASTATIN CALCIUM 20 MG/1
20 TABLET, FILM COATED ORAL DAILY
Qty: 90 TABLET | Refills: 1 | Status: SHIPPED | OUTPATIENT
Start: 2024-09-04

## 2024-09-04 NOTE — TELEPHONE ENCOUNTER
I called and spoke with the patient's wife. Patient currently with the patient. Wife made aware Skyrizi OBI has been sent to [facility] pharmacy, phone number provided for pt to call and set up delivery. Pt wife reports pt due for next Skyrizi OBI in October.

## 2024-09-05 ENCOUNTER — OFFICE VISIT (OUTPATIENT)
Dept: CARDIOLOGY CLINIC | Facility: CLINIC | Age: 74
End: 2024-09-05
Payer: MEDICARE

## 2024-09-05 VITALS
WEIGHT: 239 LBS | HEART RATE: 50 BPM | DIASTOLIC BLOOD PRESSURE: 60 MMHG | OXYGEN SATURATION: 98 % | HEIGHT: 70 IN | SYSTOLIC BLOOD PRESSURE: 98 MMHG | BODY MASS INDEX: 34.22 KG/M2

## 2024-09-05 DIAGNOSIS — I48.91 ATRIAL FIBRILLATION WITH RVR (HCC): ICD-10-CM

## 2024-09-05 DIAGNOSIS — I42.8 OTHER CARDIOMYOPATHY (HCC): Primary | ICD-10-CM

## 2024-09-05 DIAGNOSIS — I48.0 PAROXYSMAL ATRIAL FIBRILLATION (HCC): ICD-10-CM

## 2024-09-05 DIAGNOSIS — N17.9 AKI (ACUTE KIDNEY INJURY) (HCC): ICD-10-CM

## 2024-09-05 PROCEDURE — G2211 COMPLEX E/M VISIT ADD ON: HCPCS | Performed by: STUDENT IN AN ORGANIZED HEALTH CARE EDUCATION/TRAINING PROGRAM

## 2024-09-05 PROCEDURE — 99214 OFFICE O/P EST MOD 30 MIN: CPT | Performed by: STUDENT IN AN ORGANIZED HEALTH CARE EDUCATION/TRAINING PROGRAM

## 2024-09-05 RX ORDER — METOPROLOL SUCCINATE 50 MG/1
50 TABLET, EXTENDED RELEASE ORAL 2 TIMES DAILY
Qty: 180 TABLET | Refills: 5 | Status: SHIPPED | OUTPATIENT
Start: 2024-09-05 | End: 2026-02-27

## 2024-09-06 ENCOUNTER — TELEPHONE (OUTPATIENT)
Age: 74
End: 2024-09-06

## 2024-09-06 NOTE — TELEPHONE ENCOUNTER
Pages 2,4,and 6 are missing from prescription assistance program forms that were sent out yesterday. Please resend.    Please call pt's wife Tova at [phone]

## 2024-09-09 DIAGNOSIS — I48.0 PAROXYSMAL ATRIAL FIBRILLATION (HCC): ICD-10-CM

## 2024-09-09 DIAGNOSIS — K21.00 GASTROESOPHAGEAL REFLUX DISEASE WITH ESOPHAGITIS WITHOUT HEMORRHAGE: ICD-10-CM

## 2024-09-10 ENCOUNTER — OFFICE VISIT (OUTPATIENT)
Dept: SLEEP CENTER | Facility: CLINIC | Age: 74
End: 2024-09-10
Payer: MEDICARE

## 2024-09-10 ENCOUNTER — PATIENT OUTREACH (OUTPATIENT)
Dept: CARDIOLOGY CLINIC | Facility: CLINIC | Age: 74
End: 2024-09-10

## 2024-09-10 VITALS
HEART RATE: 53 BPM | BODY MASS INDEX: 33.7 KG/M2 | OXYGEN SATURATION: 94 % | DIASTOLIC BLOOD PRESSURE: 78 MMHG | WEIGHT: 235.4 LBS | SYSTOLIC BLOOD PRESSURE: 118 MMHG | HEIGHT: 70 IN

## 2024-09-10 DIAGNOSIS — I50.40 COMBINED SYSTOLIC AND DIASTOLIC HEART FAILURE, UNSPECIFIED HF CHRONICITY (HCC): ICD-10-CM

## 2024-09-10 DIAGNOSIS — I48.91 ATRIAL FIBRILLATION WITH RVR (HCC): ICD-10-CM

## 2024-09-10 DIAGNOSIS — G47.39 SLEEP APNEA-LIKE BEHAVIOR: Primary | ICD-10-CM

## 2024-09-10 PROCEDURE — 99204 OFFICE O/P NEW MOD 45 MIN: CPT | Performed by: NURSE PRACTITIONER

## 2024-09-10 PROCEDURE — 99214 OFFICE O/P EST MOD 30 MIN: CPT | Performed by: NURSE PRACTITIONER

## 2024-09-10 NOTE — PROGRESS NOTES
Received new social work referral from Dr. Ike Butts for assistance with cost of Entresto and Jardiance.    OP Advanced Heart Failure LCSW reviewed electronic chart.  Previous OP Social Work referral was in February 2024. SOC Psychosocial Assessment and Care Plan were completed at that time and pt was going to apply for PACENET.     LCSW placed Outreach Call to patient.  Spouse answered phone and stated that they were approved for PACENET in February 2024 but then received a letter from [employer] in June 2024 that they were over income and approval was terminated.     Spouse said that they have [employer] for prescription coverage and met deductible but still have to pay $218 /3 month supply for EACH of Entresto, Jardiance and Xarelto. They are unable to afford this.  Spouse added that an  visits their home every October to help them choose a plan that covers their medication.    Spouse reported that Dr. Butts gave them PAP applications for Xarelto, Entresto and Jardiance. Spouse said she completed them all and delivered to OP CARD Office last week.   Spouse stated that [employer] spoke to her yesterday and there are missing pages. Telephone note entered on 9/6/24 from Anisa Fernandez noted that pages 2,4, and 6 need to be resent.     OP Advanced Heart Failure LCSW offered to call each PAP for an updated status as of today. Spouse was very thankful.   LCSW also wished patient a Happy Birthday. Spouse stated that they also have an Anniversary next week.   Updated Care Plan. LCSW will continue to assist.      11:45am Called [employer] and spoke with Vicki who reported the missing pages #2,4,6 were received. Application is pending. A letter will be sent to patient and a form will be faxed to OP CARD Office upon approval. Patient will also receive a welcome call from [facility] after approval.     Called [employer] to check status of Entresto PAP. Spoke with Kirsty who reported pt is approved  and can call them to place his first order.     Called [facility] to check status of Jardiance PAP. Hold time was longer than 10 minutes so LCSW requested call back.   Missed incoming [facility] call.  12:50pm Returned call to [facility]; on hold for over 27 minutes. Requested return call directly to patient's phone number.     1:20pm LCSW called patient. Pt/spouse identified self on voice mail so left detailed message regarding update for each PAP. Informed pt that he will be receiving a call from [facility] and to call [employer] to arrange first shipment of Entresto.     Routed note to OP HF RN, Kristy Arrington.     1:31pm Spouse returned call. LCSW provided update on the above. She will call [employer] to arrange 1st shipment of Entresto.  She will also Call [facility] to see if application is still pending.  LCSW explained that patient will get a letter in the mail once approved for the PAPs.

## 2024-09-10 NOTE — PATIENT INSTRUCTIONS
Schedule diagnostic sleep study  Schedule follow up visit to review results and plan of treatment      Nursing Support:  When:  Monday through Friday 7:00am-5:00pm, except holidays  Where:  Direct phone line is [phone], option 3  If you are having a true emergency, please call 911.  In the event that the line is busy or it is after hours, please leave a voice mail message and we will return your call.  Please speak clearly, leaving your full name, birth date, best number to reach you and the reason for your call.  Medication refills:  We will need the name of the medication, the dosage, the ordering provider, whether you get a 30 day or 90 day refill and the pharmacy name and address.  Medications will be ordered by the providers only.  Nurses cannot call in prescriptions.    To reach the [facility] office staff:  Call [phone], option 1, followed by option 3

## 2024-09-10 NOTE — PROGRESS NOTES
Consultation - [facility]  Sleep Disorders Center  South Humphrey III, 1950, MRN: [id number]    9/10/2024        Reason for Consult / Principal Problem:    Evaluation of possible Obstructive Sleep Apnea  Paroxysmal atrial fibrillation  Combined systolic and diastolic heart failure    CONSULTING PROVIDER:  Ike Butts MD  [address] [place] [id number]    ASSESSMENT/PLAN:    1. Sleep apnea-like behavior  -     Diagnostic Sleep Study; Future; Expected date: 09/11/2024  2. Atrial fibrillation with RVR (HCC)  -     Ambulatory Referral to Sleep Medicine  -     Diagnostic Sleep Study; Future; Expected date: 09/11/2024  3. Combined systolic and diastolic heart failure, unspecified HF chronicity (HCC)  -     Diagnostic Sleep Study; Future; Expected date: 09/11/2024       Thank you for the opportunity of participating in the evaluation and care of this patient in the Sleep Clinic at [facility].  If there are any questions regarding this evaluation, please feel free to reach out.   ________________________________________________________________________________________________    HPI: South Humphrey III is a 73 y.o. male with PMHx of obesity, paroxysmal atrial fibrillation, combined systolic and diastolic heart failure, ischemic cardiomyopathy - improved, presents with his wife for evaluation of possible obstructive sleep apnea.  He is not sure why he is here.    Past medical history reviewed.  He has a history of paroxysmal atrial fibrillation, dating back to at least 2019 and was taking Xarelto.  He developed fluid overload and was hospitalized with exacerbation of atrial fibrillation, combined systolic and diastolic heart failure and ischemic cardiomyopathy in February 2024.  EF was 20%.  Cardioversion was done for the a-fib and returned to sinus rhythm.  Repeat ECHO in June 2024 indicates EF improved to 65%.    Regarding sleep, he feels he  "sleeps well.  He reports that he snored heavily in the past.  He states that he had a \"blockage in his throat that was cured with medication\" and snoring improved significantly.  His wife agrees that snoring has lessened, however, she also notes pauses in breathing during sleep.      Prior Sleep Studies:     No prior sleep study    [place] Sleepiness Scale: Total score: 5/24  Greater or equal to 10 is positive for excessive daytime sleepiness      Sleep-Wake Schedule:  He is self-described as having no morning/night preference.  Bedtime: midnight-12:30am  Wake Time: 6:30am without the use of an alarm  Difficulty Falling Asleep: No  Avg Number of Awakenings: one time per night between 3-4:00am  Cause of Awakenings: for urination  Weekend Sleep Schedule: same  Naps: he does not intentionally take naps, dozes while watching TV, reports dozing for a few minutes at a time 3/7 days per week    If He does take a nap, naps are not refreshing in quality    Avg TST per 24 hours: 6-7 hours    Sleep-Related Details:  Preferred Sleep Position: supine and side(s)  SDB Symptoms: snoring and witnessed apneas  Bruxism: Yes, he tried using an oral appliance but was unable to tolerate  Nocturnal GERD: No  Nocturnal Palpitations: No  Nocturnal Anxiety or Rumination: No  Sleep-Related Hallucinations: No   Sleep Paralysis: No   Cataplexy: No       He occasionally talks in his sleep.      Wake-Related Details  Daytime Sleepiness: No - denies  Drowsy Driving: No  Employment:  currently retired.  He was an  in the past during daytime hours.   CDL license:  No    Caffeine:  2 cups of coffee daily in the am, occasional iced tea - all caffeine before 6:00pm    Tobacco:   reports that he has been smoking cigarettes. He has a 7.5 pack-year smoking history. He has never used smokeless tobacco.   He smokes before bedtime    E-cig/Vaping:    E-Cigarette/Vaping    E-Cigarette Use Never User       E-Cigarette/Vaping Substances "    Nicotine No     THC No     CBD No     Flavoring No     Other No     Unknown No       Alcohol:   reports current alcohol use of about 1.0 standard drink of alcohol per week. occasional   Drugs:   reports no history of drug use.      Weight Change:   unintentional weight gain of 20 lbs over the past 6    He does not have difficulty with memory or concentration.      Other Relevant Labs and Studies:  Labs: I have personally reviewed pertinent lab results.    Elevated HCT in April 2024  Past history of elevated CO2 levels until 2/2024    Lab Results   Component Value Date    WBC 10.58 (H) 08/06/2024    HGB 15.3 08/06/2024    HCT 48.9 08/06/2024    MCV 94 08/06/2024     08/06/2024      Lab Results   Component Value Date    CALCIUM 8.5 08/06/2024    K 4.3 08/06/2024    CO2 28 08/06/2024     (H) 08/06/2024    BUN 19 08/06/2024    CREATININE 1.34 (H) 08/06/2024     Lab Results   Component Value Date    IRON 70 02/10/2021    TIBC 324 02/10/2021    FERRITIN 22 02/10/2021     Lab Results   Component Value Date    KINFQSXD68 605 01/31/2024     Lab Results   Component Value Date    FOLATE >20.0 (H) 06/12/2020         Past Medical History:   Diagnosis Date    Harris esophagus     Blue toe syndrome (HCC)     Chronic kidney disease     Colon polyps     Crohn's disease (HCC) 1-2020    GERD (gastroesophageal reflux disease)     Hematuria     History of rheumatic fever     Hypolipidemia     Hypotension     Ischemic cardiomyopathy     PAD (peripheral artery disease) (HCC)     Pulmonary emphysema (HCC)     PVD (peripheral vascular disease) (HCC)      Past Surgical History:   Procedure Laterality Date    CARDIAC ELECTROPHYSIOLOGY PROCEDURE N/A 1/10/2024    Procedure: Cardiac loop recorder explant;  Surgeon: Jason Cortez MD;  Location: [facility];  Service: Cardiology    COLONOSCOPY  2019    HERNIA REPAIR      IR AORTAGRAM WITH RUN-OFF  6/27/2019    IR AORTAGRAM WITH RUN-OFF  2/12/2020    POPLITEAL ARTERY STENT  Right     6/2019    NM BYPASS W/VEIN FEMORAL-POPLITEAL Right 3/26/2020    Procedure: BYPASS FEMORAL-POPLITEAL; Right lower extremity bypass, fem-bk pop with GSV;  Surgeon: Wyatt Shell MD;  Location: [facility] MAIN OR;  Service: Vascular    NM SLCTV CATHJ 3RD+ ORD SLCTV ABDL PEL/LXTR BRNCH Right 6/27/2019    Procedure: leg ANGIOGRAM WITH STENT, BALLOON ANGIOPLASTY, LEFT GROIN ACCESS;  Surgeon: Wyatt Shell MD;  Location: [facility];  Service: Vascular     Patient Active Problem List   Diagnosis    Class 1 obesity due to excess calories with serious comorbidity and body mass index (BMI) of 33.0 to 33.9 in adult    CKD (chronic kidney disease), stage III (HCC)    Persistent proteinuria    Microscopic hematuria    [place] cardiac risk 10-20% in next 10 years    Abdominal aortic atherosclerosis (HCC)    RBBB (right bundle branch block with left anterior fascicular block)    Dyslipidemia    Vitamin D deficiency    Harris's esophagus without dysplasia    Colon polyps    PAD (peripheral artery disease) (HCC)    Ischemic cardiomyopathy    S/P peripheral artery angioplasty with stent placement    Venous stasis    Paroxysmal atrial fibrillation (HCC)    Adrenal mass (HCC)    Crohn's disease of small intestine with complication (HCC)    SVT (supraventricular tachycardia)    Volume overload    Positive QuantiFERON-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    Terminal ileitis of small intestine (HCC)    S/P femoral-popliteal bypass surgery    Elevated alkaline phosphatase level    Vitamin B12 deficiency    Type 2 diabetes mellitus with diabetic peripheral angiopathy without gangrene (HCC)    Venous insufficiency    Immunocompromised patient (HCC)    Atrial fibrillation with RVR (HCC)    Abnormal LFTs    Electrolyte abnormality    Obesity, morbid (HCC)    Combined systolic and diastolic heart failure (HCC)     Allergies as of 09/10/2024    (No Known Allergies)     REVIEW OF SYSTEMS:  Review of Systems  10-point system review completed, all of which are  negative except as mentioned above.       CURRENT MEDICATIONS:  Current Outpatient Medications   Medication Instructions    aspirin 81 mg, Oral, Daily    atorvastatin (LIPITOR) 20 mg, Oral, Daily    Empagliflozin (JARDIANCE) 10 mg, Oral, Every morning    folic acid (FOLVITE) 1 mg, Oral, Daily    glimepiride (AMARYL) 2 mg tablet TAKE 1 TABLET BY MOUTH 2 TIMES A DAY WITH MEALS.    Magnesium 500 MG TABS 1 tablet, Oral, Daily    metoprolol succinate (TOPROL-XL) 50 mg, Oral, 2 times daily, Combine a 50mg tablet with a 25mg tablet to take a total of 75mg in AM and 75mg in PM    omeprazole (PRILOSEC) 20 mg, Oral, Daily before breakfast    risankizumab-rzaa (Skyrizi) 360 MG/2.4ML SOCT Take first on body injector (OBI) under skin every 8 weeks    rivaroxaban (XARELTO) 20 mg, Oral, Daily with breakfast    sacubitril-valsartan (Entresto) 24-26 MG TABS 1 tablet, Oral, 2 times daily    vitamin B-12 (VITAMIN B-12) 1,000 mcg, Oral, 3 times weekly     SOCIAL HISTORY:  Social History     Tobacco Use    Smoking status: Some Days     Current packs/day: 0.25     Average packs/day: 0.3 packs/day for 30.0 years (7.5 ttl pk-yrs)     Types: Cigarettes    Smokeless tobacco: Never   Vaping Use    Vaping status: Never Used   Substance Use Topics    Alcohol use: Yes     Alcohol/week: 1.0 standard drink of alcohol     Types: 1 Standard drinks or equivalent per week     Comment: social drinker when out dining    Drug use: Never     FAMILY HISTORY:  Family History   Problem Relation Age of Onset    Heart disease Mother     Hyperlipidemia Mother     Hypertension Mother     Hypertension Father     Heart disease Father     Stroke Father     Hyperlipidemia Father     Diabetes Brother     No Known Problems Maternal Grandmother     Dementia Neg Hx     Drug abuse Neg Hx     Mental illness Neg Hx     Substance Abuse Neg Hx     Alcohol abuse Neg Hx     Depression Neg Hx     Colon cancer Neg Hx      Family History of Sleep Disorders: none  "known    Objective:  Vital Signs:  /78   Pulse (!) 53   Ht 5' 10\" (1.778 m)   Wt 107 kg (235 lb 6.4 oz)   SpO2 94%   BMI 33.78 kg/m²   Body mass index is 33.78 kg/m².  Neck Circumference: Neck Circumference: 19 inches      PHYSICAL EXAMINATION:    Constitutional: Alert, cooperative, no distress, appears stated age, obese  Skin: Warm, dry, no rashes noted  Eyes: Conjunctiva/corneas clear  ENT: Nasal congestion absent, nares normal, septum midline, mucosa normal  Posterior Airspace:   [place] Tongue Position: 4  Retrognathia: absent  Overbite: absent  High Arched Palate: absent  Tongue Scalloping/Ridging: absent  Tonsils:  not visualized  Uvula:  not visualized  Lungs: Clear to auscultation bilaterally, respirations unlabored  Heart: bradycardic rate and regular rhythm, S1/2 normal, no murmur noted, no rub or gallop  Extremities: Normal, no digital clubbing, no pedal edema  Neuro: No focal deficits noted      The review of systems and following portions of the patient's history were reviewed and updated as appropriate: allergies, current medications, past family history, past medical history, past social history, past surgical history and problem list.    ________________________________________________________________________________________________      I have spent a total time of 55 minutes on 09/10/24 in caring for this patient including Risks and benefits of tx options, Instructions for management, Patient and family education, Importance of tx compliance, Risk factor reductions, Impressions, Counseling / Coordination of care, Documenting in the medical record, Reviewing / ordering tests, medicine, procedures  , and Obtaining or reviewing history  .    Today we discussed the anatomy and physiology of the upper airway.  I pointed out how changes in this region can result in both snoring and abnormal breathing events including apneas and hypopneas.  I explained the most common co-morbidities of " "untreated sleep apnea.  After this we talked about some forms of treatment including application of positive airway pressure, mandibular advancement devices and surgery.  He has reservations regarding use of CPAP equipment.  He does not feel he would be able to tolerate it, due to claustrophobia.     In order to evaluate the possibility of Obstructive Sleep Apnea as a cause of the patient's symptoms, a diagnostic sleep study will be completed to identify the presence or absence of abnormal nocturnal breathing.  Following testing, the patient will return to the Sleep Disorders Center for review of study results.    The following instructions have been given to the patient today:    Patient Instructions    Schedule diagnostic sleep study  Schedule follow up visit to review results and plan of treatment      Nursing Support:  When:  Monday through Friday 7:00am-5:00pm, except holidays  Where:  Direct phone line is [phone], option 3  If you are having a true emergency, please call 911.  In the event that the line is busy or it is after hours, please leave a voice mail message and we will return your call.  Please speak clearly, leaving your full name, birth date, best number to reach you and the reason for your call.  Medication refills:  We will need the name of the medication, the dosage, the ordering provider, whether you get a 30 day or 90 day refill and the pharmacy name and address.  Medications will be ordered by the providers only.  Nurses cannot call in prescriptions.    To reach the Sleep Disorders Center office staff:  Call [phone], option 1, followed by option 3    LENORA Barnard  [facility]    Portions of the record may have been created with voice recognition software. Occasional wrong word or \"sound a like\" substitutions may have occurred due to the inherent limitations of voice recognition software. Please read the chart carefully and recognize, using context, where " substitutions have occurred.

## 2024-09-18 ENCOUNTER — PATIENT OUTREACH (OUTPATIENT)
Dept: CARDIOLOGY CLINIC | Facility: CLINIC | Age: 74
End: 2024-09-18

## 2024-09-18 NOTE — PROGRESS NOTES
Received voice message from pt's spouse that Xarelto PAP was denied due to 3rd party payer - [employer] and Dr. Butts can write letter to appeal this.     Returned call to pt's spouse.  Spouse, Tova said the letter stated that the office can send a hardship letter to [employer] on letter head.   LCSW inquired what needs to be included in the hardship letter however spouse did not know.   Inquired how much Xarelto would cost patient and spouse said that it costs $218.60 for a 3 month supply.   LCSW offered to call [employer] for more details about hardship letter. Spouse consented.    [facility] and spoke with Autumn who clarified that [employer] did not deny the Xarelto but rather referred patient to the Copay Based Discount Program named 'Xarelto with Me.' Cost of the medication with this discount is $80-$85 per month.   Autumn stated that if pt unable to afford this, the hardship letter just needs to state that pt unable to afford it. Autumn reported that patient can hand write the hardship letter and fax to [employer] OR anyone at the OP CARD Office can write letter on letterhead and fax to [employer] at fax# [phone].    [facility] called spouse to report the above info. [facility] the hardship [facility] on letterhead and fax to [facility] tomorrow. Spouse expressed her appreciation.

## 2024-09-19 ENCOUNTER — PATIENT OUTREACH (OUTPATIENT)
Dept: CARDIOLOGY CLINIC | Facility: CLINIC | Age: 74
End: 2024-09-19

## 2024-09-19 NOTE — LETTER
September 19, 2024         Patient: South Humphrey III   YOB: 1950   Date of Visit: 9/19/2024       Attention Fadi:    Mr. South Humphrey is under the care of [facility] and has informed us that he is unable to afford the Xarelto With Me Discount Copay of $80 per month.     Mr. Humphrey would like to proceed with the Xarelto Patient Assistance Application process.        Sincerely,      Wanda Pringle LCSW  Licensed Clinical   [facility]  [phone]

## 2024-09-19 NOTE — PROGRESS NOTES
Typed hardship letter as requested by patient in MyChart.   Faxed to [employer].

## 2024-09-25 NOTE — PROGRESS NOTES
"Advanced Heart Failure/Pulmonary Hypertension Outpatient Note - South Humphrey III 74 y.o. male MRN: [id number]    @ Encounter: [id number]    Assessment:  74 y.o. male PMH and acute problems listed later in note (a partial list may also be included within the assessment section) p/w HF fu.   I first met South Humphrey III during 2/2024 admission for HF and AFL where he p/w ADHF in setting of rapid Afib/AFL. New drop in EF. Note New skyrizi for Crohn's recently started.    The patient's primary cardiac team is general cardiology, follows Dr. Archie MATTHEW, HFimpEF, new low EF 2/2024 to LVEF 20%>gdmt and rhythm control>65% 6/2024 TTE  2018 NST: no ischemia or scar  Etiology may be TIC, EF drop in setting of rapid AF and AFL  # PAF and aflutter with RVR, on AC; S/p 2/19/24 DENIS/DCCV with conversion to NSR  Hx of loop recorder- explanted 1/10/24  RBBB  # hx of bifascicular block/ SVT, some nighttime vero events on LINQ  # dyslipidemia  CKD  # PAD s/ femoral popliteal bypass surgery  # tobacco abuse  # crohn's dz, hx of SBO  DM  Ao aneurysm , root 4.4cm 6/2024 TTE      I have reviewed all pertinent patient data including but not limited to:        Lab Units 08/06/24  0645 04/22/24  0727 03/20/24  1019   CREATININE mg/dL 1.34* 1.14 1.09           Lab Results   Component Value Date    K 4.3 08/06/2024     Lab Results   Component Value Date    HGBA1C 7.1 (H) 08/06/2024     Lab Results   Component Value Date    YRC7ERBONYCA 4.009 04/22/2024     Lab Results   Component Value Date    LDLCALC 55 04/22/2024     Lab Results   Component Value Date     (H) 02/12/2024      No results found for: \"NTBNP\"     Home scale dry weight may be 209lbs    TODAY'S PLAN:     09/26/24  Warm, euvolemic  No new cardiac complaints, feels generally well  Active, mows lawn/does chores, asymptomatic  Has returned to smoking again > strongly advised cessation  RRR on exam, feels well, no palpitations, now off amio x few weeks    Discussed " risks vs benefits of ablation  He declines now but will consider future pending course  Prefers watchful waiting for now    Gdmt below  Vero and BP somewhat limiting  Gently up metop today    Cw AC     On ASA and statin    Advised cw TLC, exercise, diet, weight loss    Sleep med referral given in past    Follow up:  With me in 3 mo or sooner if symptoms evolve.  In addition to follow up with their other medical providers    Key info from my prior notes:    Long discussion risks vs benefits of amio cessation now  Ultimately he elects to DC amio now, avoid repeat LINQ for now, close monitoring of Sx  Will advance metop if HR increases    He has new cost issues for jardiance and entresto  SW referral given  Paperwork to be completed that he brought to office today  RTC 3 weeks fro resolution/plan, may need to switch Rx    Fu future echo x 3 months around 5/2024 after rhythm control and max gdmt, for EF  Could consider ischemic eval in future, pending Sx and LVEF trend  No chest pain  Neg NST 2018    No very strong features of cardiac amyloidosis  Remains in differential    Possible Zio patch near future in case vero events  +BL conduction dz  Past nighttime vero events on LINQ  No overt issues as inpt on tele, reassuring  Asymptomatic now    No strong connection between skyrizi and HF/arrhythmia I am aware of  Fu GI team    Neurohormonal Blockade/GDMT:  --Beta-Blocker: toprol xl 50 bid > 75AM and 50PM  --ACEi, ARB, ARNi: entresto 24/26 bid  --MRA: not on 2/2 hypotension  --SGLT2i: jardiance 10 qd; discussed risks/benefits/red flags/when to call clinic; no overt contraindications  --Hydralazine/nitrates:    --Diuretic: lasix 40 as needed  Long discussion about evidence of worsening HF, when to self uptitrate home diuretic and call cardiology office    Other HF pharmaco-invasive therapy (if applicable):   PPM,  ICD , CRT (if applicable):  Interrogation:  Advanced Therapies (if applicable):    --Inotrope:  --LVAD/Transplant Candidacy:    Studies:  I have reviewed all pertinent patient data/labs/imaging where available, including but not limited to the below studies. Selected results may be displayed here but comprehensive listing is omitted for note clarity and can be found in the epic chart.    ECG.    Echo.    Stress.    Cath.    HPI:   73 y.o. male PMH and acute problems listed later in note (a partial list may also be included within the assessment section) p/w HF fu. I first met South Humphrey III during 2/2024 admission for HF and AFL where he p/w ADHF in setting of rapid Afib/AFL. New drop in EF. Note New skyrizi for Crohn's recently started.  No new CP/SOB/dizziness/palpitations/syncope.  No new fatigue.  No new unintentional weight changes.  No new leg swelling, PND, pillow orthopnea.  No new fevers, chills, cough, nausea, vomiting, diarrhea, dysuria.    Interval History:  As noted in 'plan' section above and prior epic chart notes.    No new CP/SOB/dizziness/palpitations/syncope.  No new fatigue.  No new unintentional weight changes.  No new leg swelling, PND, pillow orthopnea.  No new fevers, chills, cough, nausea, vomiting, diarrhea, dysuria.    Past Medical History:   Diagnosis Date    Harris esophagus     Blue toe syndrome ([facility])     Chronic kidney disease     Colon polyps     Crohn's disease ([facility]) 1-2020    GERD (gastroesophageal reflux disease)     Hematuria     History of rheumatic fever     Hypolipidemia     Hypotension     Ischemic cardiomyopathy     PAD (peripheral artery disease) ([facility])     Pulmonary emphysema ([facility])     PVD (peripheral vascular disease) ([facility])      Patient Active Problem List    Diagnosis Date Noted    Obesity, morbid ([facility]) 02/22/2024    Electrolyte abnormality 02/14/2024    Combined systolic and diastolic heart failure ([facility]) 02/14/2024    Abnormal LFTs 02/13/2024    Atrial fibrillation with RVR ([facility]) 02/12/2024    Immunocompromised patient ([facility]) 01/17/2024     Venous insufficiency 08/29/2022    Type 2 diabetes mellitus with diabetic peripheral angiopathy without gangrene (HCC) 03/16/2021    Vitamin B12 deficiency 09/16/2020    Elevated alkaline phosphatase level 05/14/2020    S/P femoral-popliteal bypass surgery 04/09/2020    Terminal ileitis of small intestine (HCC) 02/18/2020    Positive QuantiFERON-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 01/20/2020    Volume overload 01/19/2020    SVT (supraventricular tachycardia) 01/18/2020    Crohn's disease of small intestine with complication (HCC) 01/13/2020    Adrenal mass (HCC) 01/07/2020    Paroxysmal atrial fibrillation ([facility]) 09/12/2019    Venous stasis 07/19/2019    S/P peripheral artery angioplasty with stent placement 06/28/2019    Ischemic cardiomyopathy 06/03/2019    PAD (peripheral artery disease) ([facility]) 04/24/2019    Harris's esophagus without dysplasia 04/05/2019    Colon polyps 04/05/2019    Dyslipidemia 05/16/2018    Vitamin D deficiency 05/16/2018    RBBB (right bundle branch block with left anterior fascicular block) 05/11/2018    Tobias cardiac risk 10-20% in next 10 years 05/04/2018    Abdominal aortic atherosclerosis (HCC) 05/04/2018    CKD (chronic kidney disease), stage III ([facility]) 03/22/2018    Persistent proteinuria 03/22/2018    Microscopic hematuria 03/22/2018    Class 1 obesity due to excess calories with serious comorbidity and body mass index (BMI) of 33.0 to 33.9 in adult        ROS:  10 point ROS negative except as specified in HPI/interval history    No Known Allergies  Current Outpatient Medications   Medication Instructions    aspirin 81 mg, Oral, Daily    atorvastatin (LIPITOR) 20 mg, Oral, Daily    Empagliflozin (JARDIANCE) 10 mg, Oral, Every morning    folic acid (FOLVITE) 1 mg, Oral, Daily    glimepiride (AMARYL) 2 mg tablet TAKE 1 TABLET BY MOUTH 2 TIMES A DAY WITH MEALS.    Magnesium 500 MG TABS 1 tablet, Oral, Daily    metoprolol succinate (TOPROL-XL) 50 mg, Oral, 2 times daily, Combine a 50mg tablet with a 25mg  tablet to take a total of 75mg in AM and 75mg in PM    metoprolol succinate (TOPROL-XL) 25 mg, Oral, Daily, Take 75mg in AM and 50mg in PM    omeprazole (PRILOSEC) 20 mg, Oral, Daily before breakfast    risankizumab-rzaa (Skyrizi) 360 MG/2.4ML SOCT Take first on body injector (OBI) under skin every 8 weeks    rivaroxaban (XARELTO) 20 mg, Oral, Daily with breakfast    sacubitril-valsartan (Entresto) 24-26 MG TABS 1 tablet, Oral, 2 times daily    vitamin B-12 (VITAMIN B-12) 1,000 mcg, Oral, 3 times weekly      Social History     Socioeconomic History    Marital status: /Civil Union     Spouse name: Not on file    Number of children: 2    Years of education: Not on file    Highest education level: Not on file   Occupational History    Occupation: retired   Tobacco Use    Smoking status: Some Days     Current packs/day: 0.25     Average packs/day: 0.3 packs/day for 30.0 years (7.5 ttl pk-yrs)     Types: Cigarettes    Smokeless tobacco: Never   Vaping Use    Vaping status: Never Used   Substance and Sexual Activity    Alcohol use: Yes     Alcohol/week: 1.0 standard drink of alcohol     Types: 1 Standard drinks or equivalent per week     Comment: social drinker when out dining    Drug use: Never    Sexual activity: Not Currently     Birth control/protection: None   Other Topics Concern    Not on file   Social History Narrative    Drinks coffee     Social Determinants of Health     Financial Resource Strain: Low Risk  (1/14/2024)    Overall Financial Resource Strain (CARDIA)     Difficulty of Paying Living Expenses: Not hard at all   Food Insecurity: No Food Insecurity (2/14/2024)    Hunger Vital Sign     Worried About Running Out of Food in the Last Year: Never true     Ran Out of Food in the Last Year: Never true   Transportation Needs: No Transportation Needs (2/14/2024)    PRAPARE - Transportation     Lack of Transportation (Medical): No     Lack of Transportation (Non-Medical): No   Physical Activity: Not on  "file   Stress: Not on file   Social Connections: Not on file   Intimate Partner Violence: Not on file   Housing Stability: Low Risk  (2/14/2024)    Housing Stability Vital Sign     Unable to Pay for Housing in the Last Year: No     Number of Times Moved in the Last Year: 1     Homeless in the Last Year: No     Family History   Problem Relation Age of Onset    Heart disease Mother     Hyperlipidemia Mother     Hypertension Mother     Hypertension Father     Heart disease Father     Stroke Father     Hyperlipidemia Father     Diabetes Brother     No Known Problems Maternal Grandmother     Dementia Neg Hx     Drug abuse Neg Hx     Mental illness Neg Hx     Substance Abuse Neg Hx     Alcohol abuse Neg Hx     Depression Neg Hx     Colon cancer Neg Hx      Physical Exam:  Vitals:    09/26/24 0819   BP: 110/70   BP Location: Left arm   Patient Position: Sitting   Cuff Size: Large   Pulse: 67   SpO2: 99%   Weight: 108 kg (237 lb 3.2 oz)   Height: 5' 10\" (1.778 m)     Constitutional: NAD, non toxic, obese  Ears/nose/mouth/throat: atraumatic  CV: RRR, nl S1S2, no murmurs/rubs/gallups, no JVD, no HJR  Resp: CTABL  GI: Soft, NTND  MSK: no swollen joints in exposed areas  Extr: trace LE edema, warm LE  Pysche: Normal affect  Neuro: appropriate in conversation  Skin: dry and intact in exposed areas    Labs & Results:  Lab Results   Component Value Date    SODIUM 144 08/06/2024    K 4.3 08/06/2024     (H) 08/06/2024    CO2 28 08/06/2024    BUN 19 08/06/2024    CREATININE 1.34 (H) 08/06/2024    GLUC 104 02/20/2024    CALCIUM 8.5 08/06/2024     Lab Results   Component Value Date    WBC 10.58 (H) 08/06/2024    HGB 15.3 08/06/2024    HCT 48.9 08/06/2024    MCV 94 08/06/2024     08/06/2024       Counseling / Coordination of Care  Greater than 50% of total time was spent with the patient and / or family counseling and / or coordination of care.  We discussed diagnoses, most recent studies, tests and any changes in treatment " plan.    Thank you for the opportunity to participate in the care of this patient.    Ike Butts MD  Attending Physician  Advanced Heart Failure and Transplant Cardiology  [facility]

## 2024-09-26 ENCOUNTER — OFFICE VISIT (OUTPATIENT)
Dept: CARDIOLOGY CLINIC | Facility: CLINIC | Age: 74
End: 2024-09-26
Payer: MEDICARE

## 2024-09-26 VITALS
WEIGHT: 237.2 LBS | OXYGEN SATURATION: 99 % | SYSTOLIC BLOOD PRESSURE: 110 MMHG | HEART RATE: 67 BPM | HEIGHT: 70 IN | BODY MASS INDEX: 33.96 KG/M2 | DIASTOLIC BLOOD PRESSURE: 70 MMHG

## 2024-09-26 DIAGNOSIS — I48.91 ATRIAL FIBRILLATION WITH RVR (HCC): ICD-10-CM

## 2024-09-26 DIAGNOSIS — N17.9 AKI (ACUTE KIDNEY INJURY) (HCC): ICD-10-CM

## 2024-09-26 DIAGNOSIS — E11.51 TYPE 2 DIABETES MELLITUS WITH DIABETIC PERIPHERAL ANGIOPATHY WITHOUT GANGRENE, WITHOUT LONG-TERM CURRENT USE OF INSULIN (HCC): ICD-10-CM

## 2024-09-26 DIAGNOSIS — I50.23 ACUTE ON CHRONIC SYSTOLIC CONGESTIVE HEART FAILURE (HCC): Primary | ICD-10-CM

## 2024-09-26 PROCEDURE — 99214 OFFICE O/P EST MOD 30 MIN: CPT | Performed by: STUDENT IN AN ORGANIZED HEALTH CARE EDUCATION/TRAINING PROGRAM

## 2024-09-26 PROCEDURE — G2211 COMPLEX E/M VISIT ADD ON: HCPCS | Performed by: STUDENT IN AN ORGANIZED HEALTH CARE EDUCATION/TRAINING PROGRAM

## 2024-09-26 RX ORDER — METOPROLOL SUCCINATE 25 MG/1
25 TABLET, EXTENDED RELEASE ORAL DAILY
Qty: 30 TABLET | Refills: 17 | Status: SHIPPED | OUTPATIENT
Start: 2024-09-26 | End: 2026-03-20

## 2024-09-30 ENCOUNTER — IMMUNIZATIONS (OUTPATIENT)
Dept: INTERNAL MEDICINE CLINIC | Facility: CLINIC | Age: 74
End: 2024-09-30
Payer: MEDICARE

## 2024-09-30 DIAGNOSIS — Z23 NEED FOR COVID-19 VACCINE: ICD-10-CM

## 2024-09-30 DIAGNOSIS — Z23 ENCOUNTER FOR IMMUNIZATION: Primary | ICD-10-CM

## 2024-09-30 PROCEDURE — 91320 SARSCV2 VAC 30MCG TRS-SUC IM: CPT

## 2024-09-30 PROCEDURE — G0008 ADMIN INFLUENZA VIRUS VAC: HCPCS

## 2024-09-30 PROCEDURE — 90480 ADMN SARSCOV2 VAC 1/ONLY CMP: CPT

## 2024-09-30 PROCEDURE — 90662 IIV NO PRSV INCREASED AG IM: CPT

## 2024-10-18 NOTE — TELEPHONE ENCOUNTER
Pt spouse returning missed call  Caller decided to cancel requesting because pt will be going to her dr instead  no

## 2024-10-20 DIAGNOSIS — I48.91 ATRIAL FIBRILLATION WITH RVR (HCC): ICD-10-CM

## 2024-10-21 ENCOUNTER — TELEPHONE (OUTPATIENT)
Age: 74
End: 2024-10-21

## 2024-10-21 ENCOUNTER — OFFICE VISIT (OUTPATIENT)
Age: 74
End: 2024-10-21
Payer: MEDICARE

## 2024-10-21 VITALS
HEIGHT: 70 IN | SYSTOLIC BLOOD PRESSURE: 108 MMHG | WEIGHT: 240.6 LBS | TEMPERATURE: 97.6 F | HEART RATE: 127 BPM | BODY MASS INDEX: 34.44 KG/M2 | DIASTOLIC BLOOD PRESSURE: 70 MMHG | OXYGEN SATURATION: 92 %

## 2024-10-21 DIAGNOSIS — K21.00 GASTROESOPHAGEAL REFLUX DISEASE WITH ESOPHAGITIS WITHOUT HEMORRHAGE: ICD-10-CM

## 2024-10-21 DIAGNOSIS — Z11.59 ENCOUNTER FOR SCREENING FOR OTHER VIRAL DISEASES: ICD-10-CM

## 2024-10-21 DIAGNOSIS — K22.70 BARRETT'S ESOPHAGUS WITHOUT DYSPLASIA: ICD-10-CM

## 2024-10-21 DIAGNOSIS — K50.019 CROHN'S DISEASE OF SMALL INTESTINE WITH COMPLICATION (HCC): Primary | ICD-10-CM

## 2024-10-21 PROCEDURE — 99214 OFFICE O/P EST MOD 30 MIN: CPT | Performed by: DIETITIAN, REGISTERED

## 2024-10-21 RX ORDER — METOPROLOL SUCCINATE 25 MG/1
25 TABLET, EXTENDED RELEASE ORAL DAILY
Qty: 90 TABLET | Refills: 1 | Status: SHIPPED | OUTPATIENT
Start: 2024-10-21 | End: 2026-04-14

## 2024-10-21 NOTE — ASSESSMENT & PLAN NOTE
EGD 1/2023 notable for small hiatal hernia, antral erythema, nodular mucosa in the stomach.  Biopsies with mild chronic inactive gastritis negative for H. pylori, evidence of Harris's esophagus.      -Continue omeprazole 20 mg once daily  -Next EGD due in 2026

## 2024-10-21 NOTE — PROGRESS NOTES
Ambulatory Visit  Name: South Humphrey III      : 1950      MRN: [id number]  Encounter Provider: Shailesh Joseph PA-C  Encounter Date: 10/21/2024   Encounter department: [facility]    Assessment & Plan  Crohn's disease of small intestine with complication (HCC)  74 y.o. male with CKD, peripheral vascular disease, atrial fibrillation on anticoagulation, small bowel Crohn's disease with prior small bowel obstructions who is on Skyrizi (previously on Entyvio), feeling well clinically.  MR enterography and colonoscopy in  showed active disease despite clinical remission, so he was changed from Entyvio to Skyrizi.  Patient continues to feel well with no concerns.  Orders:    MRI enterography w wo; Future    Comprehensive metabolic panel; Future    CBC; Future    C-reactive protein; Future    Iron Panel (Includes Ferritin, Iron Sat%, Iron, and TIBC); Future    Vitamin B12; Future    Vitamin D 25 hydroxy; Future    Folate; Future    Quantiferon TB Gold Plus Assay; Future    Chronic Hepatitis Panel; Future    Colonoscopy; Future  -Continue risankizumab 360 mg every 8 weeks.  -Next labwork, quant gold, and hepatitis panel ordered/due now.  -Schedule MR enterography.  -Next colonoscopy early  with MiraLAX/Dulcolax bowel prep.  I encouraged patient to discuss with his nephrologist whether it is OK to use sugar-free Gatorade with prep.  I discussed informed consent with the patient. The risks/benefits/alternatives of the procedure were discussed with the patient. Risks included, but not limited to, infection, bleeding, perforation, injury to organs in the abdomen, missed lesion and incomplete procedure were discussed. Patient was agreeable.   -Patient is up to date with vaccinations and sees dermatology routinely.    Harris's esophagus without dysplasia  EGD 2023 notable for small hiatal hernia, antral erythema, nodular mucosa in the stomach.  Biopsies with  mild chronic inactive gastritis negative for H. pylori, evidence of Harris's esophagus.      -Continue omeprazole 20 mg once daily  -Next EGD due in 2026  Gastroesophageal reflux disease with esophagitis without hemorrhage  See above  Orders:    omeprazole (PriLOSEC) 20 mg delayed release capsule; Take 1 capsule (20 mg total) by mouth daily before breakfast    Encounter for screening for other viral diseases    Orders:    Chronic Hepatitis Panel; Future      Follow up 6 months.      History of Present Illness     South Humphrey III is a 74 y.o. male with CKD, peripheral vascular disease, atrial fibrillation on anticoagulation, type 2 diabetes, small bowel Crohn's disease with prior small bowel obstructions who is on Skyrizi (previously on Entyvio) and clinically has felt well despite active disease who presents for follow-up of Crohn's.  Last office visit 7/2023, at which time patient was recommended to switch from Entyvio to Skyrizi.    Patient feels well with no acute complaints/concerns.  He denies any abdominal pain, heartburn, acid reflux, irregular bowel habits, blood in the stool, unintentional weight loss.  He is struggling to lose weight despite healthy diet and exercise.  Sometimes right knee hurts but otherwise no joint pains.  He sees dermatology routinely.  He has received his COVID and flu vaccines this year, and has already gotten his Shingles, PNA, and RSV vaccines.      CBC 8/2024 reviewed, white count 10.58, MCHC 31.3, RDW 15.4, otherwise normal.  CMP 8/2024 noted Cl 110, Cr 1.34, AST 12, , total protein 6.0, albumin 3.4.  GGT 7/2023 normal.    MRI abdomen with MRCP 8/2023 normal other than hepatic steatosis, stable bilateral adrenal adenomas, and abnormalities related to the ileum identified on prior MRI incompletely visualized on this exam     MR enterography from January 2023 with active inflammatory bowel disease with moderate bowel wall thickening involving a long segment of  "proximal ileum and inflammatory stricturing with prestenotic dilatation up to 6 cm as well as background changes of chronic inflammatory bowel disease involving a very long estimated 50 cm of distalmost terminal ileum but no mesenteric edema, abscess, fistula.     EGD 1/2023 notable for small hiatal hernia, antral erythema, nodular mucosa in the stomach.  Biopsies with mild chronic inactive gastritis negative for H. pylori, evidence of Harris's esophagus.     Colonoscopy 1/2023 showed erosion and erythema in the terminal ileum, relatively normal colon aside from patchy erosions in the rectosigmoid and rectum and polyp in the rectum, also with pancolonic diverticula and small internal hemorrhoids.  Biopsies showed focal acute inflammation of the terminal ileum, adenomatous mucosa of the rectum, chronic quiescent colitis in the rectum.       Review of Systems   All other systems reviewed and are negative.          Objective     /70 (BP Location: Left arm, Patient Position: Sitting, Cuff Size: Standard)   Pulse (!) 127   Temp 97.6 °F (36.4 °C) (Tympanic)   Ht 5' 10\" (1.778 m)   Wt 109 kg (240 lb 9.6 oz)   SpO2 92%   BMI 34.52 kg/m²     Physical Exam  Vitals reviewed.   Constitutional:       General: He is not in acute distress.     Appearance: He is well-developed.   HENT:      Head: Normocephalic and atraumatic.   Eyes:      Conjunctiva/sclera: Conjunctivae normal.   Cardiovascular:      Rate and Rhythm: Normal rate and regular rhythm.      Heart sounds: No murmur heard.  Pulmonary:      Effort: Pulmonary effort is normal. No respiratory distress.      Breath sounds: Normal breath sounds.   Abdominal:      General: Bowel sounds are normal. There is distension.      Palpations: Abdomen is soft.      Tenderness: There is no abdominal tenderness.   Musculoskeletal:         General: No swelling.      Cervical back: Neck supple.   Skin:     General: Skin is warm and dry.   Neurological:      Mental Status: He " is alert.   Psychiatric:         Mood and Affect: Mood normal.

## 2024-10-21 NOTE — TELEPHONE ENCOUNTER
Procedure: colon  Date: April 10th  Physician performing: Dr. Beverly  Location of procedure:  [facility]  Instructions given to patient: MIralax  Diabetic: amaryl and Jardiance - instructions provided  Clearances: mariaelena - Dr Beckie SLAUGHTER Cardio

## 2024-10-21 NOTE — ASSESSMENT & PLAN NOTE
74 y.o. male with CKD, peripheral vascular disease, atrial fibrillation on anticoagulation, small bowel Crohn's disease with prior small bowel obstructions who is on Skyrizi (previously on Entyvio), feeling well clinically.  MR enterography and colonoscopy in 2023 showed active disease despite clinical remission, so he was changed from Entyvio to Skyrizi.  Patient continues to feel well with no concerns.  Orders:    MRI enterography w wo; Future    Comprehensive metabolic panel; Future    CBC; Future    C-reactive protein; Future    Iron Panel (Includes Ferritin, Iron Sat%, Iron, and TIBC); Future    Vitamin B12; Future    Vitamin D 25 hydroxy; Future    Folate; Future    Quantiferon TB Gold Plus Assay; Future    Chronic Hepatitis Panel; Future    Colonoscopy; Future  -Continue risankizumab 360 mg every 8 weeks.  -Next labwork, quant gold, and hepatitis panel ordered/due now.  -Schedule MR enterography.  -Next colonoscopy early 2025 with MiraLAX/Dulcolax bowel prep.  I encouraged patient to discuss with his nephrologist whether it is OK to use sugar-free Gatorade with prep.  I discussed informed consent with the patient. The risks/benefits/alternatives of the procedure were discussed with the patient. Risks included, but not limited to, infection, bleeding, perforation, injury to organs in the abdomen, missed lesion and incomplete procedure were discussed. Patient was agreeable.   -Patient is up to date with vaccinations and sees dermatology routinely.

## 2024-10-22 ENCOUNTER — TELEPHONE (OUTPATIENT)
Age: 74
End: 2024-10-22

## 2024-10-28 NOTE — TELEPHONE ENCOUNTER
Pt is approved for [employer] patient assistance through 12/31/2025.    Approval letter in media

## 2024-11-12 ENCOUNTER — PATIENT OUTREACH (OUTPATIENT)
Dept: CARDIOLOGY CLINIC | Facility: CLINIC | Age: 74
End: 2024-11-12

## 2024-11-12 NOTE — PROGRESS NOTES
Patient Outreach Due per Social Work Report.    Outpatient Advanced Heart Failure LCSW reviewed electronic chart.  Placed Outreach Call to patient.     Spouse answered phone.   She confirmed that [employer] approved Xarelto through the calendar year.   Spouse was unsure if patient was receiving Entresto and Jardiance through the mail via PAP or at his pharmacy. She spoke with patient and he stated that he has about 3 weeks left of pills.    LCSW encouraged spouse to contact [facility] for Entresto refills and [facility] for Jardiance refills.   Spouse already began 2025 renewal process for Entresto and dropped off paperwork at OP CARD Office for nursing staff.   LCSW informed spouse that I will send reminder message to HF RN that each PAP will require renewal for 2025. But also encouraged spouse to call OP CARD RN in the future to request updated prescriptions for renewal process and to vangie her calendar when next renewal is due.    LCSW reviewed role of outpatient .  Spouse declined any other social work needs at this time for patient.  Discussed closing social work referral. Encouraged spouse to call this LCSW with any questions about resources or inform her physician if anything changes with patient at home. Spouse agreed.  Updated Care Plan and closed SW Episode. Removed self from care team.  Sent reminder message to OP HF Clinical Support Staff that the above 3 medications will need PAP renewals and updated prescriptions for 2025.

## 2024-11-25 DIAGNOSIS — I42.8 OTHER CARDIOMYOPATHY (HCC): ICD-10-CM

## 2024-11-26 ENCOUNTER — APPOINTMENT (OUTPATIENT)
Dept: LAB | Facility: CLINIC | Age: 74
End: 2024-11-26
Payer: MEDICARE

## 2024-11-26 ENCOUNTER — DOCUMENTATION (OUTPATIENT)
Dept: CARDIOLOGY CLINIC | Facility: CLINIC | Age: 74
End: 2024-11-26

## 2024-11-26 ENCOUNTER — TELEPHONE (OUTPATIENT)
Dept: CARDIOLOGY CLINIC | Facility: CLINIC | Age: 74
End: 2024-11-26

## 2024-11-26 DIAGNOSIS — K50.019 CROHN'S DISEASE OF SMALL INTESTINE WITH COMPLICATION (HCC): ICD-10-CM

## 2024-11-26 DIAGNOSIS — N18.31 STAGE 3A CHRONIC KIDNEY DISEASE (HCC): ICD-10-CM

## 2024-11-26 DIAGNOSIS — I50.42 CHRONIC COMBINED SYSTOLIC AND DIASTOLIC HEART FAILURE (HCC): ICD-10-CM

## 2024-11-26 DIAGNOSIS — R74.8 ELEVATED ALKALINE PHOSPHATASE LEVEL: ICD-10-CM

## 2024-11-26 DIAGNOSIS — R80.1 PERSISTENT PROTEINURIA: ICD-10-CM

## 2024-11-26 DIAGNOSIS — E55.9 VITAMIN D DEFICIENCY: ICD-10-CM

## 2024-11-26 DIAGNOSIS — E66.09 CLASS 1 OBESITY DUE TO EXCESS CALORIES WITH SERIOUS COMORBIDITY AND BODY MASS INDEX (BMI) OF 33.0 TO 33.9 IN ADULT: ICD-10-CM

## 2024-11-26 DIAGNOSIS — Z11.59 ENCOUNTER FOR SCREENING FOR OTHER VIRAL DISEASES: ICD-10-CM

## 2024-11-26 DIAGNOSIS — E66.811 CLASS 1 OBESITY DUE TO EXCESS CALORIES WITH SERIOUS COMORBIDITY AND BODY MASS INDEX (BMI) OF 33.0 TO 33.9 IN ADULT: ICD-10-CM

## 2024-11-26 DIAGNOSIS — E78.5 DYSLIPIDEMIA: ICD-10-CM

## 2024-11-26 LAB
ALBUMIN SERPL BCG-MCNC: 3.7 G/DL (ref 3.5–5)
ALP SERPL-CCNC: 152 U/L (ref 34–104)
ALT SERPL W P-5'-P-CCNC: 12 U/L (ref 7–52)
ANION GAP SERPL CALCULATED.3IONS-SCNC: 7 MMOL/L (ref 4–13)
AST SERPL W P-5'-P-CCNC: 12 U/L (ref 13–39)
BILIRUB SERPL-MCNC: 1.05 MG/DL (ref 0.2–1)
BUN SERPL-MCNC: 20 MG/DL (ref 5–25)
CALCIUM SERPL-MCNC: 9.2 MG/DL (ref 8.4–10.2)
CHLORIDE SERPL-SCNC: 107 MMOL/L (ref 96–108)
CO2 SERPL-SCNC: 28 MMOL/L (ref 21–32)
CREAT SERPL-MCNC: 1.23 MG/DL (ref 0.6–1.3)
CREAT UR-MCNC: 121 MG/DL
ERYTHROCYTE [DISTWIDTH] IN BLOOD BY AUTOMATED COUNT: 16.4 % (ref 11.6–15.1)
GFR SERPL CREATININE-BSD FRML MDRD: 57 ML/MIN/1.73SQ M
GLUCOSE P FAST SERPL-MCNC: 90 MG/DL (ref 65–99)
HCT VFR BLD AUTO: 50.5 % (ref 36.5–49.3)
HGB BLD-MCNC: 15.7 G/DL (ref 12–17)
MAGNESIUM SERPL-MCNC: 2.2 MG/DL (ref 1.9–2.7)
MCH RBC QN AUTO: 27.6 PG (ref 26.8–34.3)
MCHC RBC AUTO-ENTMCNC: 31.1 G/DL (ref 31.4–37.4)
MCV RBC AUTO: 89 FL (ref 82–98)
PLATELET # BLD AUTO: 335 THOUSANDS/UL (ref 149–390)
PMV BLD AUTO: 10.4 FL (ref 8.9–12.7)
POTASSIUM SERPL-SCNC: 4.4 MMOL/L (ref 3.5–5.3)
PROT SERPL-MCNC: 6.5 G/DL (ref 6.4–8.4)
PROT UR-MCNC: 35.4 MG/DL
PROT/CREAT UR: 0.3 MG/G{CREAT} (ref 0–0.1)
RBC # BLD AUTO: 5.69 MILLION/UL (ref 3.88–5.62)
SODIUM SERPL-SCNC: 142 MMOL/L (ref 135–147)
WBC # BLD AUTO: 10.36 THOUSAND/UL (ref 4.31–10.16)

## 2024-11-26 PROCEDURE — 82570 ASSAY OF URINE CREATININE: CPT

## 2024-11-26 PROCEDURE — 36415 COLL VENOUS BLD VENIPUNCTURE: CPT

## 2024-11-26 PROCEDURE — 83735 ASSAY OF MAGNESIUM: CPT

## 2024-11-26 PROCEDURE — 80053 COMPREHEN METABOLIC PANEL: CPT

## 2024-11-26 PROCEDURE — 84156 ASSAY OF PROTEIN URINE: CPT

## 2024-11-26 NOTE — TELEPHONE ENCOUNTER
Can you please run a PA for patient's Entresto before we submit application for assistance.  I did not see this in EPIC.  If I missed it, please let me know.  Thanks.

## 2024-11-26 NOTE — TELEPHONE ENCOUNTER
Spoke with patient's wife.  Provided [phone] number to enroll in extra help as we received notice of incomplete application for assistance program, as an extra help denial letter is needed.      Patient's wife stated she will call and then notify us when they receive the denial letter so our office can submit for year 2025.

## 2024-11-26 NOTE — TELEPHONE ENCOUNTER
PA for Entresto 24-26 mg  NOT REQUIRED     Reason (screenshot if applicable)          Patient advised by          [] MyChart Message  [] Phone call   []LMOM  []L/M to call office as no active Communication consent on file  []Unable to leave detailed message as VM not approved on Communication consent       Pharmacy advised by    []Fax  []Phone call

## 2024-11-26 NOTE — TELEPHONE ENCOUNTER
PA for Entresto 24-26 mg SUBMITTED to [email or web address] [facility]     via    [x]CMM-KEY: [id number]  []Surescripts-Case ID #   []Availity-Auth ID # NDC #   []Faxed to plan   []Other website   []Phone call Case ID #     []PA sent as URGENT    All office notes, labs and other pertaining documents and studies sent. Clinical questions answered. Awaiting determination from insurance company.     Turnaround time for your insurance to make a decision on your Prior Authorization can take 7-21 business days.

## 2024-12-01 PROBLEM — I12.9 PARENCHYMAL RENAL HYPERTENSION: Status: ACTIVE | Noted: 2024-12-01

## 2024-12-01 NOTE — PROGRESS NOTES
RENAL FOLLOW UP NOTE:.td    ASSESSMENT AND PLAN:  1.  CKD stage 3 a:  Etiology:  Cardiorenal syndrome/arteriolar nephrosclerosis/?  Atheroemboli/prior prerenal associated mild tachycardia and relative hypotension in the past  Baseline creatinine:  1.25-1.41  Current creatinine:  At baseline at 1.23 from 11/26/2024  Urine protein creatinine ratio:  0.30 g at goal  Recommendations:  Treat hypertension-please see below  Treat dyslipidemia-please see below  Maintain proteinuria less than 1 g or as low as possible  Avoid nephrotoxic agents such as NSAIDs, patient counseled as such     2.  Volume:   HFrEF: 20% EF February 2024 in the setting of PAF  Current status euvolemic  No diuretics!        3.  Hypotension:  Workup was compatible with low ejection fraction 48% associated severe hypokinesis of the apical anterior and mid anterior septal in mild in for septal and apical inferior and apical septal and apical walls.  No pericardial effusion.  Cortisol/TSH were unremarkable.  Normal plasma free metanephrines   Normal TSH  Aldosterone/renin 12.8  Currently blood pressure:   AM: 91/64, no orthostatic changes  PM: 90/68 no orthostatic change  Heart rate: 110-120 range        Medication changes today:  I will discuss with Dr. Butts regarding any adjustments on the Entresto although he is asymptomatic but his heart rate is up potential increase in beta-blocker decrease Entresto     4.  Electrolytes:  All acceptable     5.  Mineral bone disorder:  Calcium/magnesium/phosphorus:  All acceptable including normal magnesium  PTH intact:   84.6 which is slightly above goal just monitor for now to avoid overtreatment   Vitamin-D:32.8 from January 2023     6.  Dyslipidemia:  Goal LDL:  Less than 70 given PAD  Current lipid profile:  LDL 45/HDL 59/   Recommendations:  At goal so no changes        7.  Anemia:               Recommendations:  Hemoglobin stable at 15.7  Multiple myeloma evaluation with SPEP/UPEP and light chains:   Negative  Stool for occult blood x3:  NEGATIVE X1  In March had EGD colonoscopy and recent sigmoidoscopy:  Harris's esophagus/diverticulosis/colonic polyps.  Patient will be seeing Dr. Rodriguez     8.  Other problems:  Chronic intermittent mild leukocytosis:  Stable   Gross hematuria/microscopic hematuria:  This has resolved.  Patient seen by Urology for gross hematuria.  Cystoscopy was negative and recent PSA negative. No further investigation by follow-up in 1 year by Urology.  Abnormal echocardiogram/cardiomyopathy:  Followed in the past by Dr. Almanza  Paroxysmal atrial fibrillation followed by Dr. Almanza:  Currently on Xarelto: Please see above regarding heart rate  Bilateral adrenal gland abnormalities being followed by surgical oncology.  24 hour urine for Dopamine epinephrine all unremarkable.  Aldosterone was unremarkable.  Cortisol was okay at 17.5. To be followed by   PAD:  Status post stent for the right popliteal artery/revision 02/12/2020  Abnormal alkaline phosphatase followed by GI  Diabetes mellitus type 2 not really a candidate for SGLT2 inhibitors with PAD  ABNORMAL LUNG EXAM: Chest x-ray was normal May 2022  URI symptoms as of 12/10/2024 no significant fevers, chronically abnormal lung exam patient feeling slightly better will call family physician if it persists or worsens     Admission 2/12/24 Rapid Afib EF 20% biv HF and CHF   -CHF diuresed 42 lbs?  -cardioversion   -EF 20%     GI health maintenance:   January 2023 recommended follow-up in 2 years which would be January 2025 + per GI     PATIENT INSTRUCTIONS:    Patient Instructions   Visit summary:  - Overall kidney labs look great  - Your blood pressure is running low normal.  I have reached out to Dr. Butts to see whether or not he would want to adjust your Toprol given high heart rate with the atrial fibrillation possibly decrease Entresto but I will defer to his judgment since you are feeling pretty well    - If your symptoms of  your cold get worse please contact your family physician exclamation        1. Medication changes today:  None today    2.  General instructions:  Continue to avoid salt  Continue to lose weight as you are doing  Try to exercise as you are able to 3 days a week for 30 minutes if possible the more the better 5 days a week for 30 minutes ultimately as the goal        3.  Please take 1 week a blood pressure readings prior to your next appointment    AS FOLLOWS  MORNING AND EVENING, SITTING AND STANDING as follows:  TAKE THE MORNING READINGS BEFORE ANY MEDICATIONS AND WHEN YOU ARE RELAXED FOR SEVERAL MINUTES  TAKE THE EVENING READINGS:  BETWEEN 7-10 P.M.; PRIOR TO ANY MEDICATIONS; AT LEAST IN OUR  FROM DINNER; AND CERTAINLY AFTER RELAXING FOR A FEW MINUTES  PLEASE INCLUDE HEART RATE WITH YOUR BLOOD PRESSURE READINGS  When taking standing readings, keep your arm supported at heart level and not dangling  Make sure you are sitting with your back supported and feet on the ground and do not cross your legs or feet  Make sure you have not taken any coffee or caffeine products or exercised or smoke cigarettes at least 30 minutes before taking your blood pressure  Then please mail these readings into the office            4.  Follow-up in 6 months  Please bring in 1 week a blood pressure readings morning evening, sitting and standing is outlined above  PLEASE BRING AN YOUR BLOOD PRESSURE MACHINE TO CORRELATE WITH THE OFFICE MACHINE AT THIS NEXT SCHEDULED VISIT  Please go for fasting lab work 1-2 weeks prior to your appointment      5.  General non medical recommendations:  AVOID SALT BUT NOT ADDING AN READING LABELS TO MAKE SURE THERE IS LOW-SALT IN THE FOOD THAT YOU ARE EATING  Goal is less than 2 g of sodium intake or less than 5 g of sodium chloride intake per day    Avoid nonsteroidal anti-inflammatory drugs such as Naprosyn, ibuprofen, Aleve, Advil, Celebrex, Meloxicam (Mobic) etc.  You can use Tylenol as needed  if you do not have any liver condition to be concerned about    Avoid medications such as Sudafed or decongestants and antihistamines that contained the D component which is the decongestant.  You can take antihistamines without the decongestant or D component.      Try to exercise at least 30 minutes 3 days a week to begin with with an ultimate goal of 5 days a week for at least 30 minutes    Please do not drink more than 2 glasses of alcohol/wine on a daily basis as this may contribute to your high blood pressure.            Subjective:   There has been no hospitalizations or acute illnesses since last visit.  The patient overall is feeling well.  Low-grade fever resolved, no chills, slight productive cough yellow over the last couple of days  Good appetite and good energy  No hematuria, dysuria, voiding symptoms or foamy urine (had a little bit of urgency a few days ago that resolved)  No gastrointestinal symptoms  No cardiovascular symptoms including swelling of the legs  No headaches, dizziness or lightheadedness  Blood pressure medications:  Entresto 24-26 mg twice a day  Toprol-XL 75 mg daily in the morning 50 mg in the evening    Renal pertinent medications:  Prilosec 20 mg daily  Baby aspirin 81 mg daily/Xarelto  Atorvastatin 20 mg daily  Jardiance 10 mg daily in the morning    ROS:  See HPI, otherwise review of systems as completely reviewed with the patient are negative    Past Medical History:   Diagnosis Date    Harris esophagus     Blue toe syndrome (HCC)     Chronic kidney disease     Colon polyps     Crohn's disease (HCC) 1-2020    GERD (gastroesophageal reflux disease)     Hematuria     History of rheumatic fever     Hypolipidemia     Hypotension     Ischemic cardiomyopathy     PAD (peripheral artery disease) (HCC)     Pulmonary emphysema (HCC)     PVD (peripheral vascular disease) (HCC)      Past Surgical History:   Procedure Laterality Date    CARDIAC ELECTROPHYSIOLOGY PROCEDURE N/A 1/10/2024     Procedure: Cardiac loop recorder explant;  Surgeon: Jason Cortez MD;  Location: [facility];  Service: Cardiology    COLONOSCOPY  2019    HERNIA REPAIR      IR AORTAGRAM WITH RUN-OFF  6/27/2019    IR AORTAGRAM WITH RUN-OFF  2/12/2020    POPLITEAL ARTERY STENT Right     6/2019    ND BYPASS W/VEIN FEMORAL-POPLITEAL Right 3/26/2020    Procedure: BYPASS FEMORAL-POPLITEAL; Right lower extremity bypass, fem-bk pop with GSV;  Surgeon: Wyatt Shell MD;  Location: [facility];  Service: Vascular    ND SLCTV CATHJ 3RD+ ORD SLCTV ABDL PEL/LXTR BRNCH Right 6/27/2019    Procedure: leg ANGIOGRAM WITH STENT, BALLOON ANGIOPLASTY, LEFT GROIN ACCESS;  Surgeon: Wyatt Shell MD;  Location: [facility];  Service: Vascular     Family History   Problem Relation Age of Onset    Heart disease Mother     Hyperlipidemia Mother     Hypertension Mother     Hypertension Father     Heart disease Father     Stroke Father     Hyperlipidemia Father     Diabetes Brother     No Known Problems Maternal Grandmother     Dementia Neg Hx     Drug abuse Neg Hx     Mental illness Neg Hx     Substance Abuse Neg Hx     Alcohol abuse Neg Hx     Depression Neg Hx     Colon cancer Neg Hx       reports that he has been smoking cigarettes. He has a 7.5 pack-year smoking history. He has never used smokeless tobacco. He reports current alcohol use of about 1.0 standard drink of alcohol per week. He reports that he does not use drugs.    I COMPLETELY REVIEWED THE PAST MEDICAL HISTORY/PAST SURGICAL HISTORY/SOCIAL HISTORY/FAMILY HISTORY/AND MEDICATIONS  AND UPDATED ALL    Objective:     Vitals:  BP sitting on left: 84/62 with a heart rate of 124 and regular; rechecked on the right 102/72  BP standing on left: Nonpalpable nor can I auscultate on the left however 100/70 on the right with a heart rate of 124 and irregular  Vitals:    12/10/24 0902   BP: 91/68   Pulse: (!) 129       Weight (last 2 days)       Date/Time Weight    12/10/24 0902 106 (233)           Wt Readings from Last 3 Encounters:   12/10/24 106 kg (233 lb)   10/21/24 109 kg (240 lb 9.6 oz)   09/26/24 108 kg (237 lb 3.2 oz)       Body mass index is 33.43 kg/m².    Physical Exam: General:  No acute distress/obese  Skin:  No acute rash  Eyes:  No scleral icterus, noninjected, no discharge from eyes  ENT:  Moist mucous membranes  Neck:  Supple, no jugular venous distention, trachea is midline, no lymphadenopathy and no thyromegaly  Back   No CVAT  Chest: Slight rhonchi at both bases slight crackles at the right greater than left base but no dullness to percussion, good respiratory effort  CVS:  Regular rate and rhythm without a rub, or gallops or murmurs  Abdomen:  obese,Soft and nontender with normal bowel sounds  Extremities:  No cyanosis and no edema, no arthritic changes, normal range of motion  Neuro:  Grossly intact  Psych:  Alert, oriented x3 and appropriate      Medications:    Current Outpatient Medications:     aspirin 81 MG tablet, Take 81 mg by mouth daily, Disp: , Rfl:     atorvastatin (LIPITOR) 20 mg tablet, TAKE 1 TABLET BY MOUTH EVERY DAY, Disp: 90 tablet, Rfl: 1    Empagliflozin (Jardiance) 10 MG TABS tablet, Take 1 tablet (10 mg total) by mouth every morning, Disp: 90 tablet, Rfl: 5    folic acid (FOLVITE) 1 mg tablet, Take 1 tablet (1 mg total) by mouth daily, Disp: 90 tablet, Rfl: 3    glimepiride (AMARYL) 2 mg tablet, TAKE 1 TABLET BY MOUTH 2 TIMES A DAY WITH MEALS., Disp: 180 tablet, Rfl: 1    Magnesium 500 MG TABS, Take 1 tablet by mouth daily , Disp: , Rfl:     metoprolol succinate (TOPROL-XL) 25 mg 24 hr tablet, TAKE 1 TABLET (25 MG TOTAL) BY MOUTH DAILY TAKE 75MG IN AM AND 50MG IN PM, Disp: 90 tablet, Rfl: 1    metoprolol succinate (TOPROL-XL) 50 mg 24 hr tablet, Take 1 tablet (50 mg total) by mouth 2 (two) times a day Combine a 50mg tablet with a 25mg tablet to take a total of 75mg in AM and 75mg in PM, Disp: 180 tablet, Rfl: 5    omeprazole (PriLOSEC) 20 mg delayed release  capsule, Take 1 capsule (20 mg total) by mouth daily before breakfast, Disp: 90 capsule, Rfl: 3    risankizumab-rzaa (Skyrizi) 360 MG/2.4ML SOCT, Take first on body injector (OBI) under skin every 8 weeks, Disp: 2.4 mL, Rfl: 5    rivaroxaban (Xarelto) 20 mg tablet, Take 1 tablet (20 mg total) by mouth daily with breakfast, Disp: 90 tablet, Rfl: 3    sacubitril-valsartan (Entresto) 24-26 MG TABS, Take 1 tablet by mouth 2 (two) times a day, Disp: 180 tablet, Rfl: 5    vitamin B-12 (VITAMIN B-12) 1,000 mcg tablet, Take 1 tablet (1,000 mcg total) by mouth 3 (three) times a week, Disp: , Rfl:     Lab, Imaging and other studies: I have personally reviewed pertinent labs.  Laboratory Results:  Results for orders placed or performed in visit on 11/26/24   Comprehensive metabolic panel    Collection Time: 11/26/24  6:46 AM   Result Value Ref Range    Sodium 142 135 - 147 mmol/L    Potassium 4.4 3.5 - 5.3 mmol/L    Chloride 107 96 - 108 mmol/L    CO2 28 21 - 32 mmol/L    ANION GAP 7 4 - 13 mmol/L    BUN 20 5 - 25 mg/dL    Creatinine 1.23 0.60 - 1.30 mg/dL    Glucose, Fasting 90 65 - 99 mg/dL    Calcium 9.2 8.4 - 10.2 mg/dL    AST 12 (L) 13 - 39 U/L    ALT 12 7 - 52 U/L    Alkaline Phosphatase 152 (H) 34 - 104 U/L    Total Protein 6.5 6.4 - 8.4 g/dL    Albumin 3.7 3.5 - 5.0 g/dL    Total Bilirubin 1.05 (H) 0.20 - 1.00 mg/dL    eGFR 57 ml/min/1.73sq m   Magnesium    Collection Time: 11/26/24  6:46 AM   Result Value Ref Range    Magnesium 2.2 1.9 - 2.7 mg/dL   Protein / creatinine ratio, urine    Collection Time: 11/26/24  6:46 AM   Result Value Ref Range    Creatinine, Ur 121.0 Reference range not established. mg/dL    Protein Urine Random 35.4 Reference range not established. mg/dL    Prot/Creat Ratio, Ur 0.3 (H) 0.0 - 0.1     *Note: Due to a large number of results and/or encounters for the requested time period, some results have not been displayed. A complete set of results can be found in Results Review.          "    Invalid input(s): \"ALBUMIN\"        Radiology review:   chest X-ray    Ultrasound      Portions of the record may have been created with voice recognition software.  Occasional wrong word or \"sound a like\" substitutions may have occurred due to the inherent limitations of voice recognition software.  Read the chart carefully and recognize, using context, where substitutions have occurred.                    "

## 2024-12-06 ENCOUNTER — PATIENT OUTREACH (OUTPATIENT)
Dept: CARDIOLOGY CLINIC | Facility: CLINIC | Age: 74
End: 2024-12-06

## 2024-12-06 NOTE — PROGRESS NOTES
Received incoming phone call from patient's spouse, Tova.     She stated that pt received re-enrollment reminder letter in early November. So pt completed re-enrollment forms and gave to [facility] Office. Forms were faxed to [facility].    Spouse stated then patient received another reminder letter dated in early November but she just received it.   Spouse reported that she has not been able to connect with  [facility] on the phone due to long hold times of 30 - 60 minutes or more.     [facility] see if she knew any other contact info for  [facility]. She does not and has experienced the same lengthy hold time.     Placed follow-up phone call to patient's spouse, Tova to report the above. Suggested that she try calling [employer] [facility] at 8am or 5pm/ Or contact [facility] Office to see if they received a confirmation on the fax.     Spouse thanked LCSW for the info. She and patient plan to spend Neymar at pt's mother's house and then travel to [facility] the day after Neymar to see spouse's son. Then spouse has a birthday on 12/29/24. Wished them a Merry [place] and Happy Birthday to spouse, Tova.    This has been documented as One Time Outreach.

## 2024-12-10 ENCOUNTER — OFFICE VISIT (OUTPATIENT)
Dept: NEPHROLOGY | Facility: CLINIC | Age: 74
End: 2024-12-10
Payer: MEDICARE

## 2024-12-10 VITALS
SYSTOLIC BLOOD PRESSURE: 91 MMHG | DIASTOLIC BLOOD PRESSURE: 68 MMHG | BODY MASS INDEX: 33.36 KG/M2 | HEART RATE: 129 BPM | HEIGHT: 70 IN | WEIGHT: 233 LBS

## 2024-12-10 DIAGNOSIS — E55.9 VITAMIN D DEFICIENCY: ICD-10-CM

## 2024-12-10 DIAGNOSIS — R80.1 PERSISTENT PROTEINURIA: ICD-10-CM

## 2024-12-10 DIAGNOSIS — I50.40 COMBINED SYSTOLIC AND DIASTOLIC HEART FAILURE, UNSPECIFIED HF CHRONICITY (HCC): ICD-10-CM

## 2024-12-10 DIAGNOSIS — I12.9 PARENCHYMAL RENAL HYPERTENSION, STAGE 1 THROUGH STAGE 4 OR UNSPECIFIED CHRONIC KIDNEY DISEASE: Primary | ICD-10-CM

## 2024-12-10 DIAGNOSIS — E66.01 OBESITY, MORBID (HCC): ICD-10-CM

## 2024-12-10 DIAGNOSIS — E78.5 DYSLIPIDEMIA: ICD-10-CM

## 2024-12-10 DIAGNOSIS — N18.31 STAGE 3A CHRONIC KIDNEY DISEASE (HCC): ICD-10-CM

## 2024-12-10 PROCEDURE — G2211 COMPLEX E/M VISIT ADD ON: HCPCS | Performed by: INTERNAL MEDICINE

## 2024-12-10 PROCEDURE — 99214 OFFICE O/P EST MOD 30 MIN: CPT | Performed by: INTERNAL MEDICINE

## 2024-12-10 NOTE — LETTER
December 10, 2024     Virgie Luz MD  [address] [facility]  [address]  [facility] [address]    Patient: [place] Humphrey III   YOB: 1950   Date of Visit: 12/10/2024       Dear Dr. Luz:    Thank you for referring [place] Humphrey to me for evaluation. Below are my notes for this consultation.    If you have questions, please do not hesitate to call me. I look forward to following your patient along with you.         Sincerely,        Erik Ramos MD        CC: MD Erik Kim MD  12/10/2024  9:35 AM  Sign when Signing Visit  RENAL FOLLOW UP NOTE:.td    ASSESSMENT AND PLAN:  1.  CKD stage 3 a:  Etiology:  Cardiorenal syndrome/arteriolar nephrosclerosis/?  Atheroemboli/prior prerenal associated mild tachycardia and relative hypotension in the past  Baseline creatinine:  1.25-1.41  Current creatinine:  At baseline at 1.23 from 11/26/2024  Urine protein creatinine ratio:  0.30 g at goal  Recommendations:  Treat hypertension-please see below  Treat dyslipidemia-please see below  Maintain proteinuria less than 1 g or as low as possible  Avoid nephrotoxic agents such as NSAIDs, patient counseled as such     2.  Volume:   HFrEF: 20% EF February 2024 in the setting of PAF  Current status euvolemic  No diuretics!        3.  Hypotension:  Workup was compatible with low ejection fraction 48% associated severe hypokinesis of the apical anterior and mid anterior septal in mild in for septal and apical inferior and apical septal and apical walls.  No pericardial effusion.  Cortisol/TSH were unremarkable.  Normal plasma free metanephrines   Normal TSH  Aldosterone/renin 12.8  Currently blood pressure:   AM: 91/64, no orthostatic changes  PM: 90/68 no orthostatic change  Heart rate: 110-120 range        Medication changes today:  I will discuss with Dr. Butts regarding any adjustments on the Entresto although he is asymptomatic but his heart rate is up potential increase in beta-blocker  decrease Entresto     4.  Electrolytes:  All acceptable     5.  Mineral bone disorder:  Calcium/magnesium/phosphorus:  All acceptable including normal magnesium  PTH intact:   84.6 which is slightly above goal just monitor for now to avoid overtreatment   Vitamin-D:32.8 from January 2023     6.  Dyslipidemia:  Goal LDL:  Less than 70 given PAD  Current lipid profile:  LDL 45/HDL 59/   Recommendations:  At goal so no changes        7.  Anemia:               Recommendations:  Hemoglobin stable at 15.7  Multiple myeloma evaluation with SPEP/UPEP and light chains:  Negative  Stool for occult blood x3:  NEGATIVE X1  In March had EGD colonoscopy and recent sigmoidoscopy:  Harris's esophagus/diverticulosis/colonic polyps.  Patient will be seeing Dr. Rodriguez     8.  Other problems:  Chronic intermittent mild leukocytosis:  Stable   Gross hematuria/microscopic hematuria:  This has resolved.  Patient seen by Urology for gross hematuria.  Cystoscopy was negative and recent PSA negative. No further investigation by follow-up in 1 year by Urology.  Abnormal echocardiogram/cardiomyopathy:  Followed in the past by Dr. Almanza  Paroxysmal atrial fibrillation followed by Dr. Almanza:  Currently on Xarelto: Please see above regarding heart rate  Bilateral adrenal gland abnormalities being followed by surgical oncology.  24 hour urine for Dopamine epinephrine all unremarkable.  Aldosterone was unremarkable.  Cortisol was okay at 17.5. To be followed by   PAD:  Status post stent for the right popliteal artery/revision 02/12/2020  Abnormal alkaline phosphatase followed by GI  Diabetes mellitus type 2 not really a candidate for SGLT2 inhibitors with PAD  ABNORMAL LUNG EXAM: Chest x-ray was normal May 2022  URI symptoms as of 12/10/2024 no significant fevers, chronically abnormal lung exam patient feeling slightly better will call family physician if it persists or worsens     Admission 2/12/24 Rapid Afib EF 20% biv HF and  CHF   -CHF diuresed 42 lbs?  -cardioversion   -EF 20%     GI health maintenance:   January 2023 recommended follow-up in 2 years which would be January 2025 + per GI     PATIENT INSTRUCTIONS:    Patient Instructions   Visit summary:  - Overall kidney labs look great  - Your blood pressure is running low normal.  I have reached out to Dr. Butts to see whether or not he would want to adjust your Toprol given high heart rate with the atrial fibrillation possibly decrease Entresto but I will defer to his judgment since you are feeling pretty well    - If your symptoms of your cold get worse please contact your family physician exclamation        1. Medication changes today:  None today    2.  General instructions:  Continue to avoid salt  Continue to lose weight as you are doing  Try to exercise as you are able to 3 days a week for 30 minutes if possible the more the better 5 days a week for 30 minutes ultimately as the goal        3.  Please take 1 week a blood pressure readings prior to your next appointment    AS FOLLOWS  MORNING AND EVENING, SITTING AND STANDING as follows:  TAKE THE MORNING READINGS BEFORE ANY MEDICATIONS AND WHEN YOU ARE RELAXED FOR SEVERAL MINUTES  TAKE THE EVENING READINGS:  BETWEEN 7-10 P.M.; PRIOR TO ANY MEDICATIONS; AT LEAST IN OUR  FROM DINNER; AND CERTAINLY AFTER RELAXING FOR A FEW MINUTES  PLEASE INCLUDE HEART RATE WITH YOUR BLOOD PRESSURE READINGS  When taking standing readings, keep your arm supported at heart level and not dangling  Make sure you are sitting with your back supported and feet on the ground and do not cross your legs or feet  Make sure you have not taken any coffee or caffeine products or exercised or smoke cigarettes at least 30 minutes before taking your blood pressure  Then please mail these readings into the office            4.  Follow-up in 6 months  Please bring in 1 week a blood pressure readings morning evening, sitting and standing is outlined  above  PLEASE BRING AN YOUR BLOOD PRESSURE MACHINE TO CORRELATE WITH THE OFFICE MACHINE AT THIS NEXT SCHEDULED VISIT  Please go for fasting lab work 1-2 weeks prior to your appointment      5.  General non medical recommendations:  AVOID SALT BUT NOT ADDING AN READING LABELS TO MAKE SURE THERE IS LOW-SALT IN THE FOOD THAT YOU ARE EATING  Goal is less than 2 g of sodium intake or less than 5 g of sodium chloride intake per day    Avoid nonsteroidal anti-inflammatory drugs such as Naprosyn, ibuprofen, Aleve, Advil, Celebrex, Meloxicam (Mobic) etc.  You can use Tylenol as needed if you do not have any liver condition to be concerned about    Avoid medications such as Sudafed or decongestants and antihistamines that contained the D component which is the decongestant.  You can take antihistamines without the decongestant or D component.      Try to exercise at least 30 minutes 3 days a week to begin with with an ultimate goal of 5 days a week for at least 30 minutes    Please do not drink more than 2 glasses of alcohol/wine on a daily basis as this may contribute to your high blood pressure.            Subjective:   There has been no hospitalizations or acute illnesses since last visit.  The patient overall is feeling well.  Low-grade fever resolved, no chills, slight productive cough yellow over the last couple of days  Good appetite and good energy  No hematuria, dysuria, voiding symptoms or foamy urine (had a little bit of urgency a few days ago that resolved)  No gastrointestinal symptoms  No cardiovascular symptoms including swelling of the legs  No headaches, dizziness or lightheadedness  Blood pressure medications:  Entresto 24-26 mg twice a day  Toprol-XL 75 mg daily in the morning 50 mg in the evening    Renal pertinent medications:  Prilosec 20 mg daily  Baby aspirin 81 mg daily/Xarelto  Atorvastatin 20 mg daily  Jardiance 10 mg daily in the morning    ROS:  See HPI, otherwise review of systems as completely  reviewed with the patient are negative    Past Medical History:   Diagnosis Date   • Harris esophagus    • Blue toe syndrome (HCC)    • Chronic kidney disease    • Colon polyps    • Crohn's disease (HCC) 1-2020   • GERD (gastroesophageal reflux disease)    • Hematuria    • History of rheumatic fever    • Hypolipidemia    • Hypotension    • Ischemic cardiomyopathy    • PAD (peripheral artery disease) (HCC)    • Pulmonary emphysema (HCC)    • PVD (peripheral vascular disease) (HCC)      Past Surgical History:   Procedure Laterality Date   • CARDIAC ELECTROPHYSIOLOGY PROCEDURE N/A 1/10/2024    Procedure: Cardiac loop recorder explant;  Surgeon: Jason Cortez MD;  Location: [facility];  Service: Cardiology   • COLONOSCOPY  2019   • HERNIA REPAIR     • IR AORTAGRAM WITH RUN-OFF  6/27/2019   • IR AORTAGRAM WITH RUN-OFF  2/12/2020   • POPLITEAL ARTERY STENT Right     6/2019   • NM BYPASS W/VEIN FEMORAL-POPLITEAL Right 3/26/2020    Procedure: BYPASS FEMORAL-POPLITEAL; Right lower extremity bypass, fem-bk pop with GSV;  Surgeon: Wyatt Shell MD;  Location: [facility];  Service: Vascular   • NM SLCTV CATHJ 3RD+ ORD SLCTV ABDL PEL/LXTR BRNCH Right 6/27/2019    Procedure: leg ANGIOGRAM WITH STENT, BALLOON ANGIOPLASTY, LEFT GROIN ACCESS;  Surgeon: Wyatt Shell MD;  Location: [facility];  Service: Vascular     Family History   Problem Relation Age of Onset   • Heart disease Mother    • Hyperlipidemia Mother    • Hypertension Mother    • Hypertension Father    • Heart disease Father    • Stroke Father    • Hyperlipidemia Father    • Diabetes Brother    • No Known Problems Maternal Grandmother    • Dementia Neg Hx    • Drug abuse Neg Hx    • Mental illness Neg Hx    • Substance Abuse Neg Hx    • Alcohol abuse Neg Hx    • Depression Neg Hx    • Colon cancer Neg Hx       reports that he has been smoking cigarettes. He has a 7.5 pack-year smoking history. He has never used smokeless tobacco. He reports current alcohol  use of about 1.0 standard drink of alcohol per week. He reports that he does not use drugs.    I COMPLETELY REVIEWED THE PAST MEDICAL HISTORY/PAST SURGICAL HISTORY/SOCIAL HISTORY/FAMILY HISTORY/AND MEDICATIONS  AND UPDATED ALL    Objective:     Vitals:  BP sitting on left: 84/62 with a heart rate of 124 and regular; rechecked on the right 102/72  BP standing on left: Nonpalpable nor can I auscultate on the left however 100/70 on the right with a heart rate of 124 and irregular  Vitals:    12/10/24 0902   BP: 91/68   Pulse: (!) 129       Weight (last 2 days)       Date/Time Weight    12/10/24 0902 106 (233)          Wt Readings from Last 3 Encounters:   12/10/24 106 kg (233 lb)   10/21/24 109 kg (240 lb 9.6 oz)   09/26/24 108 kg (237 lb 3.2 oz)       Body mass index is 33.43 kg/m².    Physical Exam: General:  No acute distress/obese  Skin:  No acute rash  Eyes:  No scleral icterus, noninjected, no discharge from eyes  ENT:  Moist mucous membranes  Neck:  Supple, no jugular venous distention, trachea is midline, no lymphadenopathy and no thyromegaly  Back   No CVAT  Chest: Slight rhonchi at both bases slight crackles at the right greater than left base but no dullness to percussion, good respiratory effort  CVS:  Regular rate and rhythm without a rub, or gallops or murmurs  Abdomen:  obese,Soft and nontender with normal bowel sounds  Extremities:  No cyanosis and no edema, no arthritic changes, normal range of motion  Neuro:  Grossly intact  Psych:  Alert, oriented x3 and appropriate      Medications:    Current Outpatient Medications:   •  aspirin 81 MG tablet, Take 81 mg by mouth daily, Disp: , Rfl:   •  atorvastatin (LIPITOR) 20 mg tablet, TAKE 1 TABLET BY MOUTH EVERY DAY, Disp: 90 tablet, Rfl: 1  •  Empagliflozin (Jardiance) 10 MG TABS tablet, Take 1 tablet (10 mg total) by mouth every morning, Disp: 90 tablet, Rfl: 5  •  folic acid (FOLVITE) 1 mg tablet, Take 1 tablet (1 mg total) by mouth daily, Disp: 90 tablet,  Rfl: 3  •  glimepiride (AMARYL) 2 mg tablet, TAKE 1 TABLET BY MOUTH 2 TIMES A DAY WITH MEALS., Disp: 180 tablet, Rfl: 1  •  Magnesium 500 MG TABS, Take 1 tablet by mouth daily , Disp: , Rfl:   •  metoprolol succinate (TOPROL-XL) 25 mg 24 hr tablet, TAKE 1 TABLET (25 MG TOTAL) BY MOUTH DAILY TAKE 75MG IN AM AND 50MG IN PM, Disp: 90 tablet, Rfl: 1  •  metoprolol succinate (TOPROL-XL) 50 mg 24 hr tablet, Take 1 tablet (50 mg total) by mouth 2 (two) times a day Combine a 50mg tablet with a 25mg tablet to take a total of 75mg in AM and 75mg in PM, Disp: 180 tablet, Rfl: 5  •  omeprazole (PriLOSEC) 20 mg delayed release capsule, Take 1 capsule (20 mg total) by mouth daily before breakfast, Disp: 90 capsule, Rfl: 3  •  risankizumab-rzaa (Skyrizi) 360 MG/2.4ML SOCT, Take first on body injector (OBI) under skin every 8 weeks, Disp: 2.4 mL, Rfl: 5  •  rivaroxaban (Xarelto) 20 mg tablet, Take 1 tablet (20 mg total) by mouth daily with breakfast, Disp: 90 tablet, Rfl: 3  •  sacubitril-valsartan (Entresto) 24-26 MG TABS, Take 1 tablet by mouth 2 (two) times a day, Disp: 180 tablet, Rfl: 5  •  vitamin B-12 (VITAMIN B-12) 1,000 mcg tablet, Take 1 tablet (1,000 mcg total) by mouth 3 (three) times a week, Disp: , Rfl:     Lab, Imaging and other studies: I have personally reviewed pertinent labs.  Laboratory Results:  Results for orders placed or performed in visit on 11/26/24   Comprehensive metabolic panel    Collection Time: 11/26/24  6:46 AM   Result Value Ref Range    Sodium 142 135 - 147 mmol/L    Potassium 4.4 3.5 - 5.3 mmol/L    Chloride 107 96 - 108 mmol/L    CO2 28 21 - 32 mmol/L    ANION GAP 7 4 - 13 mmol/L    BUN 20 5 - 25 mg/dL    Creatinine 1.23 0.60 - 1.30 mg/dL    Glucose, Fasting 90 65 - 99 mg/dL    Calcium 9.2 8.4 - 10.2 mg/dL    AST 12 (L) 13 - 39 U/L    ALT 12 7 - 52 U/L    Alkaline Phosphatase 152 (H) 34 - 104 U/L    Total Protein 6.5 6.4 - 8.4 g/dL    Albumin 3.7 3.5 - 5.0 g/dL    Total Bilirubin 1.05 (H) 0.20 -  "1.00 mg/dL    eGFR 57 ml/min/1.73sq m   Magnesium    Collection Time: 11/26/24  6:46 AM   Result Value Ref Range    Magnesium 2.2 1.9 - 2.7 mg/dL   Protein / creatinine ratio, urine    Collection Time: 11/26/24  6:46 AM   Result Value Ref Range    Creatinine, Ur 121.0 Reference range not established. mg/dL    Protein Urine Random 35.4 Reference range not established. mg/dL    Prot/Creat Ratio, Ur 0.3 (H) 0.0 - 0.1     *Note: Due to a large number of results and/or encounters for the requested time period, some results have not been displayed. A complete set of results can be found in Results Review.             Invalid input(s): \"ALBUMIN\"        Radiology review:   chest X-ray    Ultrasound      Portions of the record may have been created with voice recognition software.  Occasional wrong word or \"sound a like\" substitutions may have occurred due to the inherent limitations of voice recognition software.  Read the chart carefully and recognize, using context, where substitutions have occurred.                    "

## 2024-12-10 NOTE — PATIENT INSTRUCTIONS
Visit summary:  - Overall kidney labs look great  - Your blood pressure is running low normal.  I have reached out to Dr. Butts to see whether or not he would want to adjust your Toprol given high heart rate with the atrial fibrillation possibly decrease Entresto but I will defer to his judgment since you are feeling pretty well    - If your symptoms of your cold get worse please contact your family physician exclamation        1. Medication changes today:  None today    2.  General instructions:  Continue to avoid salt  Continue to lose weight as you are doing  Try to exercise as you are able to 3 days a week for 30 minutes if possible the more the better 5 days a week for 30 minutes ultimately as the goal        3.  Please take 1 week a blood pressure readings prior to your next appointment    AS FOLLOWS  MORNING AND EVENING, SITTING AND STANDING as follows:  TAKE THE MORNING READINGS BEFORE ANY MEDICATIONS AND WHEN YOU ARE RELAXED FOR SEVERAL MINUTES  TAKE THE EVENING READINGS:  BETWEEN 7-10 P.M.; PRIOR TO ANY MEDICATIONS; AT LEAST IN OUR  FROM DINNER; AND CERTAINLY AFTER RELAXING FOR A FEW MINUTES  PLEASE INCLUDE HEART RATE WITH YOUR BLOOD PRESSURE READINGS  When taking standing readings, keep your arm supported at heart level and not dangling  Make sure you are sitting with your back supported and feet on the ground and do not cross your legs or feet  Make sure you have not taken any coffee or caffeine products or exercised or smoke cigarettes at least 30 minutes before taking your blood pressure  Then please mail these readings into the office            4.  Follow-up in 6 months  Please bring in 1 week a blood pressure readings morning evening, sitting and standing is outlined above  PLEASE BRING AN YOUR BLOOD PRESSURE MACHINE TO CORRELATE WITH THE OFFICE MACHINE AT THIS NEXT SCHEDULED VISIT  Please go for fasting lab work 1-2 weeks prior to your appointment      5.  General non medical  recommendations:  AVOID SALT BUT NOT ADDING AN READING LABELS TO MAKE SURE THERE IS LOW-SALT IN THE FOOD THAT YOU ARE EATING  Goal is less than 2 g of sodium intake or less than 5 g of sodium chloride intake per day    Avoid nonsteroidal anti-inflammatory drugs such as Naprosyn, ibuprofen, Aleve, Advil, Celebrex, Meloxicam (Mobic) etc.  You can use Tylenol as needed if you do not have any liver condition to be concerned about    Avoid medications such as Sudafed or decongestants and antihistamines that contained the D component which is the decongestant.  You can take antihistamines without the decongestant or D component.      Try to exercise at least 30 minutes 3 days a week to begin with with an ultimate goal of 5 days a week for at least 30 minutes    Please do not drink more than 2 glasses of alcohol/wine on a daily basis as this may contribute to your high blood pressure.

## 2024-12-10 NOTE — PROGRESS NOTES
"Advanced Heart Failure/Pulmonary Hypertension Outpatient Note - South Humphrey III 74 y.o. male MRN: [id number]    @ Encounter: [id number]    Assessment:  74 y.o. male PMH and acute problems listed later in note (a partial list may also be included within the assessment section) p/w HF fu.   I first met South Humphrey III during 2/2024 admission for HF and AFL. New drop in EF at that time. Note New skyrizi for Crohn's recently started at time of initial HF Dx.    The patient's primary cardiac team is general cardiology, follows Dr. Archie MATTHEW, HFimpEF, low EF 2/2024 to LVEF 20%>gdmt and rhythm control>65% 6/2024 TTE  2018 NST: no ischemia or scar  Etiology may be TIC, EF drop in setting of rapid AF and AFL  # PAF and aflutter with RVR, on AC; S/p 2/19/24 DENIS/DCCV with conversion to NSR  Hx of loop recorder- explanted 1/10/24  RBBB  # hx of bifascicular block/ SVT, some nighttime vero events on LINQ  # dyslipidemia  CKD  # PAD s/ femoral popliteal bypass surgery  # tobacco abuse  # crohn's dz, hx of SBO  DM  Ao aneurysm , root 4.4cm 6/2024 TTE      I have reviewed all pertinent patient data including but not limited to:        Lab Units 11/26/24  0646 08/06/24  0645 04/22/24  0727   CREATININE mg/dL 1.23 1.34* 1.14           Lab Results   Component Value Date    K 4.4 11/26/2024     Lab Results   Component Value Date    HGBA1C 7.1 (H) 08/06/2024     Lab Results   Component Value Date    HVG2GJXONDTZ 4.009 04/22/2024     Lab Results   Component Value Date    LDLCALC 55 04/22/2024     Lab Results   Component Value Date     (H) 02/12/2024      No results found for: \"NTBNP\"     Home scale dry weight may be 209lbs    TODAY'S PLAN:     12/12/24  Warm, euvolemic  No new cardiac complaints, feels generally well  He is back in AFL today and likely recent office visits, asymptomatic; in setting of amio cessation a few months ago and had cold/cough/fevers around [facility] - feeling much better now.  No " sob/cp/palpitations/dizziness/fatigue.    Had declined ablation in past  Discussed again today and will pursue now    Up metop today    Strict RTC precautions given    Gdmt below  Vero and BP somewhat limiting historically  up metop today    Cw AC  No missed doses per pt     On ASA and statin    Advised cw TLC, exercise, diet, weight loss    Sleep med referral given in past    Follow up:  With me in 6 weeks or sooner if symptoms evolve.  In addition to follow up with their other medical providers    Key info from my prior notes:    Discussed risks vs benefits of ablation  He declines now but will consider future pending course  Prefers watchful waiting for now    Long discussion risks vs benefits of amio cessation now  Ultimately he elects to DC amio now, avoid repeat LINQ for now, close monitoring of Sx  Will advance metop if HR increases    He has new cost issues for jardiance and entresto  SW referral given  Paperwork to be completed that he brought to office today  RTC 3 weeks fro resolution/plan, may need to switch Rx    Fu future echo x 3 months around 5/2024 after rhythm control and max gdmt, for EF  Could consider ischemic eval in future, pending Sx and LVEF trend  No chest pain  Neg NST 2018    No very strong features of cardiac amyloidosis  Remains in differential    Possible Zio patch near future in case vero events  +BL conduction dz  Past nighttime vero events on LINQ  No overt issues as inpt on tele, reassuring  Asymptomatic now    No strong connection between skyrizi and HF/arrhythmia I am aware of  Fu GI team    Neurohormonal Blockade/GDMT:  --Beta-Blocker: toprol xl 50 bid > 75AM and 50PM > 75 bid and if tolerates well than up to 100 bid in 1 week  --ACEi, ARB, ARNi: entresto 24/26 bid  --MRA: not on 2/2 hypotension  --SGLT2i: jardiance 10 qd; discussed risks/benefits/red flags/when to call clinic; no overt contraindications  --Hydralazine/nitrates:    --Diuretic: lasix 40 as needed  Long  discussion about evidence of worsening HF, when to self uptitrate home diuretic and call cardiology office    Other HF pharmaco-invasive therapy (if applicable):   PPM,  ICD , CRT (if applicable):  Interrogation:  Advanced Therapies (if applicable):   --Inotrope:  --LVAD/Transplant Candidacy:    Studies:  I have reviewed all pertinent patient data/labs/imaging where available, including but not limited to the below studies. Selected results may be displayed here but comprehensive listing is omitted for note clarity and can be found in the epic chart.    ECG.    Echo.    Stress.    Cath.    HPI:   73 y.o. male PMH and acute problems listed later in note (a partial list may also be included within the assessment section) p/w HF fu. I first met South Humphrey III during 2/2024 admission for HF and AFL where he p/w ADHF in setting of rapid Afib/AFL. New drop in EF. Note New skyrizi for Crohn's recently started.  No new CP/SOB/dizziness/palpitations/syncope.  No new fatigue.  No new unintentional weight changes.  No new leg swelling, PND, pillow orthopnea.  No new fevers, chills, cough, nausea, vomiting, diarrhea, dysuria.    Interval History:  As noted in 'plan' section above and prior epic chart notes.    No new CP/SOB/dizziness/palpitations/syncope.  No new fatigue.  No new unintentional weight changes.  No new leg swelling, PND, pillow orthopnea.  No new fevers, chills, cough, nausea, vomiting, diarrhea, dysuria.    Past Medical History:   Diagnosis Date    Harris esophagus     Blue toe syndrome (HCC)     Chronic kidney disease     Colon polyps     Crohn's disease (HCC) 1-2020    GERD (gastroesophageal reflux disease)     Hematuria     History of rheumatic fever     Hypolipidemia     Hypotension     Ischemic cardiomyopathy     PAD (peripheral artery disease) (HCC)     Pulmonary emphysema (HCC)     PVD (peripheral vascular disease) ([facility])      Patient Active Problem List    Diagnosis Date Noted    Parenchymal  renal hypertension 12/01/2024    Obesity, morbid ([facility]) 02/22/2024    Electrolyte abnormality 02/14/2024    Combined systolic and diastolic heart failure ([facility]) 02/14/2024    Abnormal LFTs 02/13/2024    Atrial fibrillation with RVR ([facility]) 02/12/2024    Immunocompromised patient ([facility]) 01/17/2024    Venous insufficiency 08/29/2022    Type 2 diabetes mellitus with diabetic peripheral angiopathy without gangrene ([facility]) 03/16/2021    Vitamin B12 deficiency 09/16/2020    Elevated alkaline phosphatase level 05/14/2020    S/P femoral-popliteal bypass surgery 04/09/2020    Terminal ileitis of small intestine ([facility]) 02/18/2020    Positive QuantiFERON-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 01/20/2020    Volume overload 01/19/2020    SVT (supraventricular tachycardia) ([facility]) 01/18/2020    Crohn's disease of small intestine with complication ([facility]) 01/13/2020    Adrenal mass ([facility]) 01/07/2020    Paroxysmal atrial fibrillation ([facility]) 09/12/2019    Venous stasis 07/19/2019    S/P peripheral artery angioplasty with stent placement 06/28/2019    Ischemic cardiomyopathy 06/03/2019    PAD (peripheral artery disease) ([facility]) 04/24/2019    Harris's esophagus without dysplasia 04/05/2019    Colon polyps 04/05/2019    Dyslipidemia 05/16/2018    Vitamin D deficiency 05/16/2018    RBBB (right bundle branch block with left anterior fascicular block) 05/11/2018    [place] cardiac risk 10-20% in next 10 years 05/04/2018    Abdominal aortic atherosclerosis ([facility]) 05/04/2018    CKD (chronic kidney disease), stage III ([facility]) 03/22/2018    Persistent proteinuria 03/22/2018    Microscopic hematuria 03/22/2018    Class 1 obesity due to excess calories with serious comorbidity and body mass index (BMI) of 33.0 to 33.9 in adult        ROS:  10 point ROS negative except as specified in HPI/interval history    No Known Allergies  Current Outpatient Medications   Medication Instructions    aspirin 81 mg, Daily    atorvastatin (LIPITOR) 20 mg, Oral, Daily    Empagliflozin (JARDIANCE) 10  mg, Oral, Every morning    folic acid (FOLVITE) 1 mg, Oral, Daily    glimepiride (AMARYL) 2 mg tablet TAKE 1 TABLET BY MOUTH 2 TIMES A DAY WITH MEALS.    Magnesium 500 MG TABS 1 tablet, Daily    metoprolol succinate (TOPROL-XL) 50 mg, Oral, 2 times daily, Combine a 50mg tablet with a 25mg tablet to take a total of 75mg in AM and 75mg in PM    metoprolol succinate (TOPROL-XL) 25 mg, Oral, Daily, Take 75mg in AM and 50mg in PM    omeprazole (PRILOSEC) 20 mg, Oral, Daily before breakfast    risankizumab-rzaa (Skyrizi) 360 MG/2.4ML SOCT Take first on body injector (OBI) under skin every 8 weeks    rivaroxaban (XARELTO) 20 mg, Oral, Daily with breakfast    sacubitril-valsartan (Entresto) 24-26 MG TABS 1 tablet, Oral, 2 times daily    vitamin B-12 (VITAMIN B-12) 1,000 mcg, Oral, 3 times weekly      Social History     Socioeconomic History    Marital status: /Civil Union     Spouse name: Not on file    Number of children: 2    Years of education: Not on file    Highest education level: Not on file   Occupational History    Occupation: retired   Tobacco Use    Smoking status: Some Days     Current packs/day: 0.25     Average packs/day: 0.3 packs/day for 30.0 years (7.5 ttl pk-yrs)     Types: Cigarettes    Smokeless tobacco: Never   Vaping Use    Vaping status: Never Used   Substance and Sexual Activity    Alcohol use: Yes     Alcohol/week: 1.0 standard drink of alcohol     Types: 1 Standard drinks or equivalent per week     Comment: social drinker when out dining    Drug use: Never    Sexual activity: Not Currently     Birth control/protection: None   Other Topics Concern    Not on file   Social History Narrative    Drinks coffee     Social Drivers of Health     Financial Resource Strain: Low Risk  (1/14/2024)    Overall Financial Resource Strain (CARDIA)     Difficulty of Paying Living Expenses: Not hard at all   Food Insecurity: No Food Insecurity (2/14/2024)    Nursing - Inadequate Food Risk Classification      "Worried About Running Out of Food in the Last Year: Never true     Ran Out of Food in the Last Year: Never true     Ran Out of Food in the Last Year: Not on file   Transportation Needs: No Transportation Needs (2/14/2024)    PRAPARE - Transportation     Lack of Transportation (Medical): No     Lack of Transportation (Non-Medical): No   Physical Activity: Not on file   Stress: Not on file   Social Connections: Not on file   Intimate Partner Violence: Not on file   Housing Stability: Low Risk  (2/14/2024)    Housing Stability Vital Sign     Unable to Pay for Housing in the Last Year: No     Number of Times Moved in the Last Year: 1     Homeless in the Last Year: No     Family History   Problem Relation Age of Onset    Heart disease Mother     Hyperlipidemia Mother     Hypertension Mother     Hypertension Father     Heart disease Father     Stroke Father     Hyperlipidemia Father     Diabetes Brother     No Known Problems Maternal Grandmother     Dementia Neg Hx     Drug abuse Neg Hx     Mental illness Neg Hx     Substance Abuse Neg Hx     Alcohol abuse Neg Hx     Depression Neg Hx     Colon cancer Neg Hx      Physical Exam:  Vitals:    12/12/24 0918   BP: 100/62   BP Location: Left arm   Patient Position: Sitting   Cuff Size: Standard   Pulse: (!) 131   SpO2: 96%   Weight: 107 kg (235 lb 9.6 oz)   Height: 5' 10\" (1.778 m)       Constitutional: NAD, non toxic, obese  Ears/nose/mouth/throat: atraumatic  CV: reg, tachy, nl S1S2, no murmurs/rubs/gallups, no JVD, no HJR  Resp: CTABL  GI: Soft, NTND  MSK: no swollen joints in exposed areas  Extr: trace LE edema, warm LE  Pysche: Normal affect  Neuro: appropriate in conversation  Skin: dry and intact in exposed areas    Labs & Results:  Lab Results   Component Value Date    SODIUM 142 11/26/2024    K 4.4 11/26/2024     11/26/2024    CO2 28 11/26/2024    BUN 20 11/26/2024    CREATININE 1.23 11/26/2024    GLUC 104 02/20/2024    CALCIUM 9.2 11/26/2024     Lab Results "   Component Value Date    WBC 10.36 (H) 11/26/2024    HGB 15.7 11/26/2024    HCT 50.5 (H) 11/26/2024    MCV 89 11/26/2024     11/26/2024       Counseling / Coordination of Care  Greater than 50% of total time was spent with the patient and / or family counseling and / or coordination of care.  We discussed diagnoses, most recent studies, tests and any changes in treatment plan.    Thank you for the opportunity to participate in the care of this patient.    Ike Butts MD  Attending Physician  Advanced Heart Failure and Transplant Cardiology  [facility] [address]

## 2024-12-11 ENCOUNTER — OFFICE VISIT (OUTPATIENT)
Dept: VASCULAR SURGERY | Facility: CLINIC | Age: 74
End: 2024-12-11
Payer: MEDICARE

## 2024-12-11 VITALS
DIASTOLIC BLOOD PRESSURE: 84 MMHG | RESPIRATION RATE: 20 BRPM | BODY MASS INDEX: 33.5 KG/M2 | OXYGEN SATURATION: 94 % | HEIGHT: 70 IN | HEART RATE: 129 BPM | SYSTOLIC BLOOD PRESSURE: 134 MMHG | WEIGHT: 234 LBS

## 2024-12-11 DIAGNOSIS — I73.9 PAD (PERIPHERAL ARTERY DISEASE) (HCC): Primary | ICD-10-CM

## 2024-12-11 DIAGNOSIS — E78.5 DYSLIPIDEMIA: ICD-10-CM

## 2024-12-11 PROCEDURE — 99214 OFFICE O/P EST MOD 30 MIN: CPT | Performed by: NURSE PRACTITIONER

## 2024-12-11 NOTE — ASSESSMENT & PLAN NOTE
Lab Results   Component Value Date    HGBA1C 7.1 (H) 08/06/2024   -Reviewed most recent HgA1c with pt.  -Stable/decreasing blood sugars. Reviewed the importance of maintaining an optimized blood sugar for progression of vascular disease.  -management per PCP

## 2024-12-11 NOTE — PROGRESS NOTES
Name: South Humphrey III      : 1950      MRN: [id number]  Encounter Provider: LENORA Curry  Encounter Date: 2024   Encounter department: [facility] [place]    73yo M with obesity (BMI 33.43 k/m), HTN, A-fib (Xarelto), HLD, DM, CKD3, anemia, emphysema, Crohn's, atherosclerosis of the abdomen and lower extremities s/p R femoral to below knee popliteal bypass graft 3/26/20 ([place]). Pt returns to the office for yearly office visit and review of LEAD, denies any new issues or pain.  Assessment & Plan  PAD (peripheral artery disease) ([facility])    24 LEAD  Rt:  Widely patent SFA to BK popliteal vein bypass graft.  DAVID: 0.99 (Prior: 1.19)/ MP NC (Prior: 118 mm Hg)/ GTP 66 mm Hg (Prior: 60 mm Hg).     Lt:  This resting evaluation shows no evidence of significant lower extremity  arterial occlusive disease.  DAVID: 1.07 (Prior: 1.13)/  mm Hg, (Prior: 116 mm Hg)/ GTP  91 mm Hg (Prior: 81 mm Hg).    -Denies true claudication, denies true rest pain, no wounds/ tissue loss.   -Palpable B/L pedal pulses. Palpable R medial knee bpg.  -B/L feet are are warm and dry, no wounds.    -Reviewed lower extremity ultrasound in detail with pt and answered all questions. Educated pt on peripheral arterial disease and indication for vascular intervention. No indications for vascular intervention at this time. Recommending continued surveillance with non-invasive imagining yearly with medical management at this time. Pt verbalized understanding.    Recommendations:  -Complete LEAD in one year and return to the office for review.  -Call the office with any new or worsening leg pain, discoloration, swelling, new wounds/ tissue loss.  -Continue to take aspirin 81 mg daily.  -Xarelto for A-fib  -Continue to take statin medication daily as per PCP.  -Do daily foot checks, food protection, wear well fitted shoes.  -Increase daily walking for 20-30 min everyday.      Orders:    VAS ARTERIAL DUPLEX- LOWER  "LIMB BILATERAL; Future    Dyslipidemia  -stable  -continue statin medication  -continue routine follow-up with family doctor               History of Present Illness   HPI  South Humphrey III is a 74 y.o. male who presents with obesity (BMI 33.43 k/m), HTN, A-fib (Xarelto), HLD, DM, CKD3, anemia, emphysema, Crohn's, atherosclerosis of the abdomen and lower extremities s/p R femoral to below knee popliteal bypass graft 3/26/20 ([place]).    Denies claudication, rest pain, wounds/ tissue loss  Goes to the gym 3x a week and walks about 3 miles each time  Reports compliance with ASA, xarelto (a-fib) and statin medication.  Denies smoking  Pt here 1 yr f/u & rR LEAD 8/27/24. Pt has 1 closed wound  on r upper medial shin from 3 wks ago RLE.   History obtained from: patient    Review of Systems   Constitutional: Negative.  Negative for chills and fever.   HENT:  Positive for congestion.    Eyes: Negative.    Respiratory: Negative.  Negative for shortness of breath.    Cardiovascular: Negative.  Negative for chest pain and leg swelling.   Gastrointestinal: Negative.    Genitourinary:  Positive for difficulty urinating.   Musculoskeletal: Negative.  Negative for back pain.   Skin:  Positive for wound.   Allergic/Immunologic: Negative.    Neurological: Negative.    Hematological:  Bruises/bleeds easily.   Psychiatric/Behavioral: Negative.            Objective   /84 (BP Location: Left arm, Patient Position: Sitting)   Pulse (!) 129   Resp 20   Ht 5' 10\" (1.778 m)   Wt 106 kg (234 lb)   SpO2 94%   BMI 33.58 kg/m²      Physical Exam  Vitals and nursing note reviewed.   Constitutional:       General: He is not in acute distress.     Appearance: Normal appearance. He is obese. He is not ill-appearing.   HENT:      Head: Normocephalic and atraumatic.   Cardiovascular:      Rate and Rhythm: Tachycardia present. Rhythm irregular.      Pulses:           Radial pulses are 0 on the right side and 2+ on the left side.     "    Dorsalis pedis pulses are 1+ on the right side and 1+ on the left side.        Posterior tibial pulses are 2+ on the right side and 2+ on the left side.      Heart sounds: No murmur heard.     Comments: R palpable bypass graft at R medial knee  R radial doppler signals  Pulmonary:      Effort: Pulmonary effort is normal. No respiratory distress.      Breath sounds: Normal breath sounds.   Abdominal:      Palpations: Abdomen is soft. There is no mass.   Musculoskeletal:      Right lower leg: No edema.      Left lower leg: No edema.   Skin:     General: Skin is warm and dry.      Findings: Rash (B/L venous stasis, L>R) present. No erythema.      Comments: B/L L>R varicose veins, pt denies pain with VV   Neurological:      General: No focal deficit present.      Mental Status: He is alert and oriented to person, place, and time.      Sensory: No sensory deficit.      Motor: No weakness.      Gait: Gait normal.   Psychiatric:         Mood and Affect: Mood normal.         Behavior: Behavior normal.         Administrative Statements   I have spent a total time of 22 minutes in caring for this patient on the day of the visit/encounter including Diagnostic results, Risks and benefits of tx options, Instructions for management, Patient and family education, Importance of tx compliance, Documenting in the medical record, and Reviewing / ordering tests, medicine, procedures  .

## 2024-12-11 NOTE — ASSESSMENT & PLAN NOTE
8/27/24 LEAD  Rt:  Widely patent SFA to BK popliteal vein bypass graft.  DAVID: 0.99 (Prior: 1.19)/ MP NC (Prior: 118 mm Hg)/ GTP 66 mm Hg (Prior: 60 mm Hg).     Lt:  This resting evaluation shows no evidence of significant lower extremity  arterial occlusive disease.  DAVID: 1.07 (Prior: 1.13)/  mm Hg, (Prior: 116 mm Hg)/ GTP  91 mm Hg (Prior: 81 mm Hg).    -Denies true claudication, denies true rest pain, no wounds/ tissue loss.   -Palpable B/L pedal pulses. Palpable R medial knee bpg.  -B/L feet are are warm and dry, no wounds.    -Reviewed lower extremity ultrasound in detail with pt and answered all questions. Educated pt on peripheral arterial disease and indication for vascular intervention. No indications for vascular intervention at this time. Recommending continued surveillance with non-invasive imagining yearly with medical management at this time. Pt verbalized understanding.    Recommendations:  -Complete LEAD in one year and return to the office for review.  -Call the office with any new or worsening leg pain, discoloration, swelling, new wounds/ tissue loss.  -Continue to take aspirin 81 mg daily.  -Xarelto for A-fib  -Continue to take statin medication daily as per PCP.  -Do daily foot checks, food protection, wear well fitted shoes.  -Increase daily walking for 20-30 min everyday.      Orders:    VAS ARTERIAL DUPLEX- LOWER LIMB BILATERAL; Future

## 2024-12-12 ENCOUNTER — OFFICE VISIT (OUTPATIENT)
Dept: CARDIOLOGY CLINIC | Facility: CLINIC | Age: 74
End: 2024-12-12
Payer: MEDICARE

## 2024-12-12 ENCOUNTER — TELEPHONE (OUTPATIENT)
Age: 74
End: 2024-12-12

## 2024-12-12 VITALS
OXYGEN SATURATION: 96 % | SYSTOLIC BLOOD PRESSURE: 100 MMHG | DIASTOLIC BLOOD PRESSURE: 62 MMHG | BODY MASS INDEX: 33.73 KG/M2 | HEART RATE: 131 BPM | HEIGHT: 70 IN | WEIGHT: 235.6 LBS

## 2024-12-12 DIAGNOSIS — I42.8 OTHER CARDIOMYOPATHY (HCC): Primary | ICD-10-CM

## 2024-12-12 DIAGNOSIS — E11.51 TYPE 2 DIABETES MELLITUS WITH DIABETIC PERIPHERAL ANGIOPATHY WITHOUT GANGRENE, WITHOUT LONG-TERM CURRENT USE OF INSULIN (HCC): ICD-10-CM

## 2024-12-12 DIAGNOSIS — I48.0 PAROXYSMAL ATRIAL FIBRILLATION (HCC): ICD-10-CM

## 2024-12-12 DIAGNOSIS — I48.91 ATRIAL FIBRILLATION WITH RVR (HCC): ICD-10-CM

## 2024-12-12 PROCEDURE — 99214 OFFICE O/P EST MOD 30 MIN: CPT | Performed by: STUDENT IN AN ORGANIZED HEALTH CARE EDUCATION/TRAINING PROGRAM

## 2024-12-12 PROCEDURE — 93000 ELECTROCARDIOGRAM COMPLETE: CPT | Performed by: STUDENT IN AN ORGANIZED HEALTH CARE EDUCATION/TRAINING PROGRAM

## 2024-12-12 NOTE — TELEPHONE ENCOUNTER
"Hello,    The following message was sent via e-mail to the leadership team:     Please advise if you can help facilitate the following overbook request:    Patient Name: True Humphrey    Patient MRN: [id number]    Call back #: [phone]    Insurance: medicare/[facility]    Department:Cardiology    Speciality: EP    Reason for overbook request: STAT REFERRAL    Comments (Write \"N/a\" if no comments): Stat referral from Dr. Butts to EP    Requested doctor and location: any EP doctor in [place]    Date of current appointment: 3/3/25      Thank you.    "

## 2024-12-20 ENCOUNTER — TELEPHONE (OUTPATIENT)
Age: 74
End: 2024-12-20

## 2024-12-20 NOTE — TELEPHONE ENCOUNTER
Caller: Tova     Doctor: Dr. Butts     Reason for call: Tova ( spouse) and would like to know the status of pt's Jardiance application.     Tova would like a call back     Call back#: [phone]

## 2024-12-23 DIAGNOSIS — I73.9 PAD (PERIPHERAL ARTERY DISEASE) (HCC): ICD-10-CM

## 2024-12-24 DIAGNOSIS — I48.0 PAROXYSMAL ATRIAL FIBRILLATION (HCC): ICD-10-CM

## 2024-12-24 RX ORDER — ATORVASTATIN CALCIUM 20 MG/1
20 TABLET, FILM COATED ORAL DAILY
Qty: 90 TABLET | Refills: 1 | Status: SHIPPED | OUTPATIENT
Start: 2024-12-24

## 2024-12-24 RX ORDER — RIVAROXABAN 20 MG/1
20 TABLET, FILM COATED ORAL DAILY
Qty: 30 TABLET | Refills: 3 | Status: SHIPPED | OUTPATIENT
Start: 2024-12-24

## 2024-12-29 DIAGNOSIS — K21.00 GASTROESOPHAGEAL REFLUX DISEASE WITH ESOPHAGITIS WITHOUT HEMORRHAGE: ICD-10-CM

## 2024-12-30 ENCOUNTER — TELEPHONE (OUTPATIENT)
Dept: CARDIOLOGY CLINIC | Facility: CLINIC | Age: 74
End: 2024-12-30

## 2024-12-30 ENCOUNTER — PREP FOR PROCEDURE (OUTPATIENT)
Dept: CARDIOLOGY CLINIC | Facility: CLINIC | Age: 74
End: 2024-12-30

## 2024-12-30 ENCOUNTER — PATIENT MESSAGE (OUTPATIENT)
Dept: CARDIOLOGY CLINIC | Facility: CLINIC | Age: 74
End: 2024-12-30

## 2024-12-30 ENCOUNTER — OFFICE VISIT (OUTPATIENT)
Dept: CARDIOLOGY CLINIC | Facility: CLINIC | Age: 74
End: 2024-12-30
Payer: MEDICARE

## 2024-12-30 VITALS
WEIGHT: 232 LBS | HEART RATE: 127 BPM | BODY MASS INDEX: 33.21 KG/M2 | SYSTOLIC BLOOD PRESSURE: 110 MMHG | HEIGHT: 70 IN | DIASTOLIC BLOOD PRESSURE: 60 MMHG

## 2024-12-30 DIAGNOSIS — I48.0 PAROXYSMAL ATRIAL FIBRILLATION (HCC): Primary | ICD-10-CM

## 2024-12-30 PROCEDURE — 93000 ELECTROCARDIOGRAM COMPLETE: CPT | Performed by: INTERNAL MEDICINE

## 2024-12-30 PROCEDURE — 99204 OFFICE O/P NEW MOD 45 MIN: CPT | Performed by: INTERNAL MEDICINE

## 2024-12-30 NOTE — PROGRESS NOTES
EPS Consultation/New Patient Evaluation - South Humphrey III 74 y.o. male MRN: [id number]       Referring: Dr. Butts    CC/HPI:   It was a pleasure to see South Humphrey III in our arrhythmia clinic at [facility]. As you know he is a 74 y.o. man with nonischemic cardiomyopathy with ejection fraction 65%, paroxysmal atrial fibrillation and atrial flutter status post cardioversion in February 2024, right bundle branch block, history of bifascicular block, status post loop monitor, hyperlipidemia, CKD, peripheral artery disease status post femoral-popliteal bypass surgery, chron's disease, diabetes and aortic aneurysm who presents to discuss management of atrial fibrillation.    Patient was diagnosed with tach induced cardiomyopathy in February 2024 in setting of atrial fibrillation and atrial flutter with RVR.  He underwent cardioversion in February 2024 and ejection fraction noted in June was back to normal.  His amiodarone was discontinued.  He has now gone back into atrial flutter with RVR, noted earliest during December visit with Dr. Butts.  He now presents to discuss further options.    He currently denies feeling any shortness of breath or swelling in lower extremities.  He denies any palpitations, orthopnea, PND or syncope.  He reports compliance with Xarelto and denies any missing dosages for the past month.     Past Medical History:  Past Medical History:   Diagnosis Date    Harris esophagus     Blue toe syndrome (HCC)     Chronic kidney disease     Colon polyps     Crohn's disease (HCC) 1-2020    GERD (gastroesophageal reflux disease)     Hematuria     History of rheumatic fever     Hypolipidemia     Hypotension     Ischemic cardiomyopathy     PAD (peripheral artery disease) (HCC)     Pulmonary emphysema (HCC)     PVD (peripheral vascular disease) (Formerly [facility])        Medications:      Current Outpatient Medications:     aspirin 81 MG tablet, Take 81 mg by mouth daily, Disp: , Rfl:      atorvastatin (LIPITOR) 20 mg tablet, TAKE 1 TABLET BY MOUTH EVERY DAY, Disp: 90 tablet, Rfl: 1    Empagliflozin (Jardiance) 10 MG TABS tablet, Take 1 tablet (10 mg total) by mouth every morning, Disp: 90 tablet, Rfl: 5    folic acid (FOLVITE) 1 mg tablet, Take 1 tablet (1 mg total) by mouth daily, Disp: 90 tablet, Rfl: 3    Magnesium 500 MG TABS, Take 1 tablet by mouth daily , Disp: , Rfl:     metoprolol succinate (TOPROL-XL) 25 mg 24 hr tablet, TAKE 1 TABLET (25 MG TOTAL) BY MOUTH DAILY TAKE 75MG IN AM AND 50MG IN PM, Disp: 90 tablet, Rfl: 1    metoprolol succinate (TOPROL-XL) 50 mg 24 hr tablet, Take 1 tablet (50 mg total) by mouth 2 (two) times a day Combine a 50mg tablet with a 25mg tablet to take a total of 75mg in AM and 75mg in PM, Disp: 180 tablet, Rfl: 5    omeprazole (PriLOSEC) 20 mg delayed release capsule, Take 1 capsule (20 mg total) by mouth daily before breakfast, Disp: 90 capsule, Rfl: 3    risankizumab-rzaa (Skyrizi) 360 MG/2.4ML SOCT, Take first on body injector (OBI) under skin every 8 weeks, Disp: 2.4 mL, Rfl: 5    sacubitril-valsartan (Entresto) 24-26 MG TABS, Take 1 tablet by mouth 2 (two) times a day, Disp: 180 tablet, Rfl: 5    vitamin B-12 (VITAMIN B-12) 1,000 mcg tablet, Take 1 tablet (1,000 mcg total) by mouth 3 (three) times a week, Disp: , Rfl:     Xarelto 20 MG tablet, TAKE 1 TABLET BY MOUTH EVERY DAY, Disp: 30 tablet, Rfl: 3    glimepiride (AMARYL) 2 mg tablet, TAKE 1 TABLET BY MOUTH 2 TIMES A DAY WITH MEALS. (Patient taking differently: daily), Disp: 180 tablet, Rfl: 1     Family History   Problem Relation Age of Onset    Heart disease Mother     Hyperlipidemia Mother     Hypertension Mother     Hypertension Father     Heart disease Father     Stroke Father     Hyperlipidemia Father     Diabetes Brother     No Known Problems Maternal Grandmother     Dementia Neg Hx     Drug abuse Neg Hx     Mental illness Neg Hx     Substance Abuse Neg Hx     Alcohol abuse Neg Hx     Depression  Neg Hx     Colon cancer Neg Hx      Social History     Socioeconomic History    Marital status: /Civil Union     Spouse name: Not on file    Number of children: 2    Years of education: Not on file    Highest education level: Not on file   Occupational History    Occupation: retired   Tobacco Use    Smoking status: Some Days     Current packs/day: 0.25     Average packs/day: 0.3 packs/day for 30.0 years (7.5 ttl pk-yrs)     Types: Cigarettes    Smokeless tobacco: Never   Vaping Use    Vaping status: Never Used   Substance and Sexual Activity    Alcohol use: Yes     Alcohol/week: 1.0 standard drink of alcohol     Types: 1 Standard drinks or equivalent per week     Comment: social drinker when out dining    Drug use: Never    Sexual activity: Not Currently     Birth control/protection: None   Other Topics Concern    Not on file   Social History Narrative    Drinks coffee     Social Drivers of Health     Financial Resource Strain: Low Risk  (1/14/2024)    Overall Financial Resource Strain (CARDIA)     Difficulty of Paying Living Expenses: Not hard at all   Food Insecurity: No Food Insecurity (2/14/2024)    Nursing - Inadequate Food Risk Classification     Worried About Running Out of Food in the Last Year: Never true     Ran Out of Food in the Last Year: Never true     Ran Out of Food in the Last Year: Not on file   Transportation Needs: No Transportation Needs (2/14/2024)    PRAPARE - Transportation     Lack of Transportation (Medical): No     Lack of Transportation (Non-Medical): No   Physical Activity: Not on file   Stress: Not on file   Social Connections: Not on file   Intimate Partner Violence: Not on file   Housing Stability: Low Risk  (2/14/2024)    Housing Stability Vital Sign     Unable to Pay for Housing in the Last Year: No     Number of Times Moved in the Last Year: 1     Homeless in the Last Year: No     Social History     Tobacco Use   Smoking Status Some Days    Current packs/day: 0.25    Average  "packs/day: 0.3 packs/day for 30.0 years (7.5 ttl pk-yrs)    Types: Cigarettes   Smokeless Tobacco Never     Social History     Substance and Sexual Activity   Alcohol Use Yes    Alcohol/week: 1.0 standard drink of alcohol    Types: 1 Standard drinks or equivalent per week    Comment: social drinker when out dining       Review of Systems   Constitutional: Negative for chills and fever.   HENT: Negative.     Eyes:  Negative for blurred vision and double vision.   Cardiovascular:  Negative for chest pain, dyspnea on exertion, leg swelling, near-syncope, orthopnea, palpitations, paroxysmal nocturnal dyspnea and syncope.   Respiratory:  Negative for cough and sputum production.    Endocrine: Negative.    Skin: Negative.  Negative for rash.   Musculoskeletal: Negative.  Negative for arthritis and joint pain.   Gastrointestinal:  Negative for abdominal pain, nausea and vomiting.   Genitourinary: Negative.    Neurological: Negative.  Negative for dizziness and light-headedness.   Psychiatric/Behavioral: Negative.  The patient is not nervous/anxious.         Objective:     Vitals: Blood pressure 110/60, pulse (!) 127, height 5' 10\" (1.778 m), weight 105 kg (232 lb)., Body mass index is 33.29 kg/m².,        Physical Exam:    GEN: South Humphrey III appears well, alert and oriented x 3, pleasant and cooperative   HEENT: pupils equal, round, and reactive to light; extraocular muscles intact  NECK: supple, no carotid bruits   HEART: regular rhythm, tachycardic, normal S1 and S2, no murmurs, clicks, gallops or rubs   LUNGS: clear to auscultation bilaterally; no wheezes, rales, or rhonchi   ABDOMEN: normal bowel sounds, soft, no tenderness, no distention  EXTREMITIES: peripheral pulses normal; no clubbing, cyanosis, or edema  NEURO: no focal findings   SKIN: normal without suspicious lesions on exposed skin      Labs & Results:  Below is the patient's most recent value for Albumin, ALT, AST, BUN, Calcium, Chloride, " Cholesterol, CO2, Creatinine, GFR, Glucose, HDL, Hematocrit, Hemoglobin, Hemoglobin A1C, LDL, Magnesium, Phosphorus, Platelets, Potassium, PSA, Sodium, Triglycerides, and WBC.   Lab Results   Component Value Date    ALT 12 2024    AST 12 (L) 2024    BUN 20 2024    CALCIUM 9.2 2024     2024    CO2 28 2024    CREATININE 1.23 2024    HDL 59 2024    HCT 50.5 (H) 2024    HGB 15.7 2024    HGBA1C 7.1 (H) 2024    MG 2.2 2024    PHOS 2.9 2023     2024    K 4.4 2024    PSA 0.62 2023    TRIG 113 2024    WBC 10.36 (H) 2024     Note: for a comprehensive list of the patient's lab results, access the Results Review activity.          Cardiac testing:     I personally reviewed the ECG performed in the clinic on 24. It reveals typical atrial flutter with ventricular rate of 127 bpm.     Echocardiograms:  No results found for this or any previous visit.    Results for orders placed during the hospital encounter of 19    DENIS    Narrative  [address] [address] [phone]    Transesophageal Echocardiogram  Limited 2D and Color Doppler    Study date:  2019    Patient: CARIDAD ORTIZ  MR number: [id number]  Account number: [id number]  : 1950  Age: 68 years  Gender: Male  Status: Outpatient  Location: Echo lab  Height: 70 in  Weight: 183 lb  BP: 93/ 55 mmHg    Indications: Rule out LV thrombus; going for a loop recorder device.    Diagnoses: I23.6 - Thrombosis of atrium, auricular appendage, and ventricle as current complications following acute my    Sonographer:  Mala North RDCS  Primary Physician:  Lyndon Martínez DO  Referring Physician:  Viky Grigsby MD  Group:  [facility]  RN:  Maria Teresa Still RN  RN:  Jennifer Seay RN  Interpreting Physician:  Viky Grigsby MD    SUMMARY    LEFT  VENTRICLE:  Systolic function was at the lower limits of normal. Ejection fraction was estimated to be 50 %.  This study was inadequate for the evaluation of regional wall motion.    MITRAL VALVE:  There was mild regurgitation.    TRICUSPID VALVE:  There was mild regurgitation.    HISTORY: PRIOR HISTORY: CKD3. RBBB. PAD. Ischemic cardiomyopathy. Dyslipidemia. History of rheumatic fever.    PROCEDURE: The procedure was performed in the echo lab. This was a routine study. The risks and alternatives of the procedure were explained to the patient and informed consent was obtained. The transesophageal approach was used. The study  included limited 2D imaging, color Doppler, and probe insertion (without interpretation). The heart rate was 110 bpm, at the start of the study. An adult omniplane probe was inserted by the attending cardiologist. Intubated with ease. One  intubation attempt(s). There was no blood detected on the probe. Echocardiographic views were limited due to poor acoustic window availability and lung interference. This was a technically difficult study. There were no complications  during the procedure. MEDICATIONS: Anesthesia administered by anesthesia team.    LEFT VENTRICLE: Size was normal. Systolic function was at the lower limits of normal. Ejection fraction was estimated to be 50 %. This study was inadequate for the evaluation of regional wall motion.    RIGHT VENTRICLE: The size was normal. Systolic function was normal.    LEFT ATRIUM: Size was normal. No thrombus was identified. APPENDAGE: The size was normal. No thrombus was identified.    RIGHT ATRIUM: Size was normal. No thrombus was identified.    MITRAL VALVE: There was normal leaflet separation. DOPPLER: There was mild regurgitation.    AORTIC VALVE: The valve was trileaflet. Leaflets exhibited normal cuspal separation. DOPPLER: There was no significant regurgitation.    TRICUSPID VALVE: There was normal leaflet separation. DOPPLER: There  was mild regurgitation.    PULMONIC VALVE: Leaflets exhibited normal cuspal separation. DOPPLER: There was trace regurgitation.    AORTA: The root exhibited normal size. There was no atheroma. There was no evidence for dissection.    [employer] Commission Accredited Echocardiography Laboratory    Prepared and electronically signed by    Viky Grigsby MD  Signed 05-Jun-2019 14:47:57      Catheterizations:   No results found for this or any previous visit.      Stress Tests:  No results found for this or any previous visit.      Holter monitor -   No results found for this or any previous visit.    No results found for this or any previous visit.        ASSESSMENT/PLAN:  Paroxysmal atrial fibrillation/flutter  Patient is currently in typical atrial flutter with RVR  He was diagnosed with atrial fibrillation with RVR in February 2024 at time of congestive heart failure admission  Ejection fraction was reduced to 20% due to tachycardia  After cardioversion and remaining in normal rhythm ejection fraction improved to 65%  He discontinue amiodarone in summer 2024  Now back in atrial flutter with RVR  Unclear duration as patient is asymptomatic  To prevent reoccurrence of tachy-induced cardiomyopathy we will perform cardioversion within a week or 2  We have also discussed ablation procedure to maintain normal rhythm and he is agreeable to proceed with it  Our office will be in touch with him to set up DENIS/atrial flutter/atrial fibrillation ablation with PFA  Continue metoprolol XL 50 mg bid, anticoagulation with Xarelto  He did have a loop recorder in the past and we will reimplant loop recorder at the time of ablation since he is unaware of his atrial arrhythmias  Hx of tachy-induced cardiomyopathy  EF was 20% at time of atrial fibrillation with RVR in February 2024  It improved to 65% after restoring sinus rhythm  Currently maintained on metoprolol, Jardiance, Entresto  Currently euvolemic  Peripheral artery  disease  Status post femoral-popliteal bypass surgery  Type 2 diabetes  Last A1c was 7.1  Maintained on Jardiance, glimepiride  Hyperlipidemia  Maintained on atorvastatin 20 mg daily   CKD stage III  Last Cr was 1.23 in November     Follow-up after ablation

## 2024-12-30 NOTE — TELEPHONE ENCOUNTER
"Patient scheduled for DENIS/AFIB PFA/AFLUTTER Ablation /LOOP Recorder Implant on 2/11/25 at [facility] with Dr. Pizano.      Instructions sent to patient through "[employer]hart and in person.      Patient aware of all general instructions.    Medication holds:   Jardiance - Do not take for 4 days before and morning of procedure.     Glimepiride - Take your regular dose day/evening before procedure and do not take morning of procedure.      Entresto - Do not take night before and morning of procedure. (two (2) doses)      Blood Thinners:  Xarelto - Keep taking and do not stop.     Labs to be done on 1/28/25:  PT INR / CMP / CBC (FASTING 8 HOURS)    DENIS ordered/completed.      Thank you,  Freda \"Kimber\" Ronni    "

## 2024-12-30 NOTE — LETTER
2024               CARDIAC ABLATION INSTRUCTIONS     South Humphrey III   : 1950  MRN: [id number]  [address] [id number]    Procedure: DENIS/AFIB PFA / AFLUTTER ABLATION / LOOP RECORDER IMPLANT      Procedure Date: 25    Location: [facility]  Address: [address] [place] [address], [place] [address]        Labs to be done on 25: PT INR / CMP / CBC (FASTING 8 HOURS)    BLOOD THINNER INSTRUCTIONS (Coumadin / Warfarin / Pradaxa / Eliquis / Xarelto):     Xarelto - Keep taking and do not stop    Medication Hold:     Jardiance - Do not take for 4 days before and morning of procedure.    Glimepiride - Take your regular dose day/evening before procedure and do not take morning of procedure.     Entresto - Do not take night before and morning of procedure. (two (2) doses)      Arrival Time: The Hospital will contact you the day prior to your procedure, usually between 4PM - 6PM to instruct you on the time and place to report. If you do not hear from a [facility]  by 5PM the evening prior to your procedure, please contact the [facility] at [phone].    DO NOT eat or drink ANYTHING after midnight the night prior to your procedure including gum & candy.     You may have a SIP of WATER with your morning medications, UNLESS ADVISE OTHERWISE.     Please notify us if you have been prescribed a NEW MEDICATION prior to your procedure or admitted to the hospital within 30 days.      If you develop a cold, sore throat, fever or any other illness prior to your procedure date, notify your surgeon immediately.    Arrange for a responsible adult to drive you to and from the hospital.    DO NOT stop taking Plavix or Aspirin unless advised otherwise.     If you are currently taking Fish Oil, Krill oil and/or Vitamin E please DO NOT TAKE FOR A WEEK PRIOR TO PROCEDURE.     If you are diabetic, DO NOT take any of your diabetic pills the morning of your procedure. Oral diabetic  "medications may include Glucophage, Prandin, Glyburide, Micronase, Avandia, Glucovance, Precose, Glynase, and Starlix.     Bring a list of daily medications, vitamins, minerals, herbals and nutritional supplements you take. Please include dosages and the times you take them each day.     If you are packing an overnight bag, pack minimal clothing, you will be given hospital sleepwear.   DO NOT bring money, valuables or jewelry. The wedding band is ok.     If you use CPAP machine, bring it to the hospital.      Bring your Photo ID and Insurance cards with you.       FAILURE TO FOLLOW ANY OF THESE INSTRUCTIONS COULD RESULT IN THE CANCELLATION OF YOUR PROCEDURE      Please call [phone] if you do not hear from the  by 5:00PM the night before your procedure.    All patients enter through ENTRANCE B.  Parking is available free of charge or park on Parking Deck B.        Thank you,   Freda \"Kimber\" Ronni    [facility]   [address] [place] [id number]  Teams: [phone]             "

## 2024-12-31 ENCOUNTER — PATIENT OUTREACH (OUTPATIENT)
Dept: CARDIOLOGY CLINIC | Facility: CLINIC | Age: 74
End: 2024-12-31

## 2024-12-31 NOTE — PROGRESS NOTES
Received voice message from patient's spouse that she was unable to get in contact with [employer] about Jardiance renewal PAP because they don't answer their phones.    Outpatient Advanced Heart Failure LCSW returned phone call. Patient answered phone and stated that his spouse, Tova was able to find out that [employer] was missing the first 2 papers from their IRS return. Pt said it was copied on both sides. He also stated that his spouse just dropped off the missing pages to [employer] [facility] Office around 10am this morning so they could re-fax it.   [facility] provided pt with [facility]  for PAPs - Deedee Armijo. Encouraged pt to call Ms. Armijo with any future PAP concerns. Pt said he has enough Jardiance for 1 more month.   [facility] with an update.     This has been documented as One Time Outreach.

## 2025-01-03 ENCOUNTER — APPOINTMENT (OUTPATIENT)
Dept: LAB | Facility: CLINIC | Age: 75
End: 2025-01-03
Payer: MEDICARE

## 2025-01-03 LAB
ALBUMIN SERPL BCG-MCNC: 3.7 G/DL (ref 3.5–5)
ALP SERPL-CCNC: 190 U/L (ref 34–104)
ALT SERPL W P-5'-P-CCNC: 13 U/L (ref 7–52)
ANION GAP SERPL CALCULATED.3IONS-SCNC: 6 MMOL/L (ref 4–13)
AST SERPL W P-5'-P-CCNC: 12 U/L (ref 13–39)
BASOPHILS # BLD AUTO: 0.09 THOUSANDS/ΜL (ref 0–0.1)
BASOPHILS NFR BLD AUTO: 1 % (ref 0–1)
BILIRUB SERPL-MCNC: 0.55 MG/DL (ref 0.2–1)
BUN SERPL-MCNC: 19 MG/DL (ref 5–25)
CALCIUM SERPL-MCNC: 9.1 MG/DL (ref 8.4–10.2)
CHLORIDE SERPL-SCNC: 110 MMOL/L (ref 96–108)
CO2 SERPL-SCNC: 29 MMOL/L (ref 21–32)
CREAT SERPL-MCNC: 1.42 MG/DL (ref 0.6–1.3)
EOSINOPHIL # BLD AUTO: 0.16 THOUSAND/ΜL (ref 0–0.61)
EOSINOPHIL NFR BLD AUTO: 2 % (ref 0–6)
ERYTHROCYTE [DISTWIDTH] IN BLOOD BY AUTOMATED COUNT: 18.4 % (ref 11.6–15.1)
GFR SERPL CREATININE-BSD FRML MDRD: 48 ML/MIN/1.73SQ M
GLUCOSE P FAST SERPL-MCNC: 134 MG/DL (ref 65–99)
HCT VFR BLD AUTO: 53.7 % (ref 36.5–49.3)
HGB BLD-MCNC: 16.2 G/DL (ref 12–17)
IMM GRANULOCYTES # BLD AUTO: 0.04 THOUSAND/UL (ref 0–0.2)
IMM GRANULOCYTES NFR BLD AUTO: 0 % (ref 0–2)
INR PPP: 1.32 (ref 0.85–1.19)
LYMPHOCYTES # BLD AUTO: 2.96 THOUSANDS/ΜL (ref 0.6–4.47)
LYMPHOCYTES NFR BLD AUTO: 28 % (ref 14–44)
MCH RBC QN AUTO: 26.7 PG (ref 26.8–34.3)
MCHC RBC AUTO-ENTMCNC: 30.2 G/DL (ref 31.4–37.4)
MCV RBC AUTO: 89 FL (ref 82–98)
MONOCYTES # BLD AUTO: 1.08 THOUSAND/ΜL (ref 0.17–1.22)
MONOCYTES NFR BLD AUTO: 10 % (ref 4–12)
NEUTROPHILS # BLD AUTO: 6.41 THOUSANDS/ΜL (ref 1.85–7.62)
NEUTS SEG NFR BLD AUTO: 59 % (ref 43–75)
NRBC BLD AUTO-RTO: 0 /100 WBCS
PLATELET # BLD AUTO: 309 THOUSANDS/UL (ref 149–390)
PMV BLD AUTO: 9.7 FL (ref 8.9–12.7)
POTASSIUM SERPL-SCNC: 5.7 MMOL/L (ref 3.5–5.3)
PROT SERPL-MCNC: 7 G/DL (ref 6.4–8.4)
PROTHROMBIN TIME: 17.1 SECONDS (ref 12.3–15)
RBC # BLD AUTO: 6.07 MILLION/UL (ref 3.88–5.62)
SODIUM SERPL-SCNC: 145 MMOL/L (ref 135–147)
WBC # BLD AUTO: 10.74 THOUSAND/UL (ref 4.31–10.16)

## 2025-01-03 PROCEDURE — 85025 COMPLETE CBC W/AUTO DIFF WBC: CPT

## 2025-01-03 PROCEDURE — 85610 PROTHROMBIN TIME: CPT

## 2025-01-03 PROCEDURE — 80053 COMPREHEN METABOLIC PANEL: CPT

## 2025-01-03 PROCEDURE — 36415 COLL VENOUS BLD VENIPUNCTURE: CPT

## 2025-01-06 ENCOUNTER — RESULTS FOLLOW-UP (OUTPATIENT)
Dept: CARDIOLOGY CLINIC | Facility: CLINIC | Age: 75
End: 2025-01-06

## 2025-01-06 ENCOUNTER — ANESTHESIA EVENT (OUTPATIENT)
Dept: NON INVASIVE DIAGNOSTICS | Facility: HOSPITAL | Age: 75
End: 2025-01-06
Payer: MEDICARE

## 2025-01-06 ENCOUNTER — HOSPITAL ENCOUNTER (OUTPATIENT)
Dept: NON INVASIVE DIAGNOSTICS | Facility: HOSPITAL | Age: 75
Discharge: HOME | End: 2025-01-06
Payer: MEDICARE

## 2025-01-06 ENCOUNTER — ANESTHESIA (OUTPATIENT)
Dept: NON INVASIVE DIAGNOSTICS | Facility: HOSPITAL | Age: 75
End: 2025-01-06
Payer: MEDICARE

## 2025-01-06 VITALS — SYSTOLIC BLOOD PRESSURE: 103 MMHG | DIASTOLIC BLOOD PRESSURE: 71 MMHG | HEART RATE: 82 BPM | OXYGEN SATURATION: 93 %

## 2025-01-06 DIAGNOSIS — E87.8 ELECTROLYTE ABNORMALITY: ICD-10-CM

## 2025-01-06 DIAGNOSIS — N18.31 STAGE 3A CHRONIC KIDNEY DISEASE (HCC): ICD-10-CM

## 2025-01-06 DIAGNOSIS — E55.9 VITAMIN D DEFICIENCY: ICD-10-CM

## 2025-01-06 DIAGNOSIS — I12.9 PARENCHYMAL RENAL HYPERTENSION, STAGE 1 THROUGH STAGE 4 OR UNSPECIFIED CHRONIC KIDNEY DISEASE: Primary | ICD-10-CM

## 2025-01-06 DIAGNOSIS — R31.29 MICROSCOPIC HEMATURIA: ICD-10-CM

## 2025-01-06 DIAGNOSIS — I48.0 PAROXYSMAL ATRIAL FIBRILLATION (HCC): ICD-10-CM

## 2025-01-06 LAB
ANION GAP SERPL CALCULATED.3IONS-SCNC: 7 MMOL/L (ref 4–13)
ATRIAL RATE: 79 BPM
BUN SERPL-MCNC: 15 MG/DL (ref 5–25)
CALCIUM SERPL-MCNC: 8.9 MG/DL (ref 8.4–10.2)
CHLORIDE SERPL-SCNC: 107 MMOL/L (ref 96–108)
CO2 SERPL-SCNC: 25 MMOL/L (ref 21–32)
CREAT SERPL-MCNC: 1.03 MG/DL (ref 0.6–1.3)
GFR SERPL CREATININE-BSD FRML MDRD: 71 ML/MIN/1.73SQ M
GLUCOSE P FAST SERPL-MCNC: 148 MG/DL (ref 65–99)
GLUCOSE SERPL-MCNC: 148 MG/DL (ref 65–140)
P AXIS: 28 DEGREES
POTASSIUM SERPL-SCNC: 4.3 MMOL/L (ref 3.5–5.3)
PR INTERVAL: 174 MS
QRS AXIS: -42 DEGREES
QRS AXIS: -54 DEGREES
QRSD INTERVAL: 142 MS
QRSD INTERVAL: 184 MS
QT INTERVAL: 410 MS
QT INTERVAL: 474 MS
QTC INTERVAL: 470 MS
QTC INTERVAL: 688 MS
SODIUM SERPL-SCNC: 139 MMOL/L (ref 135–147)
T WAVE AXIS: -36 DEGREES
T WAVE AXIS: 64 DEGREES
VENTRICULAR RATE: 127 BPM
VENTRICULAR RATE: 79 BPM

## 2025-01-06 PROCEDURE — 93005 ELECTROCARDIOGRAM TRACING: CPT

## 2025-01-06 PROCEDURE — 93010 ELECTROCARDIOGRAM REPORT: CPT | Performed by: INTERNAL MEDICINE

## 2025-01-06 PROCEDURE — 92960 CARDIOVERSION ELECTRIC EXT: CPT

## 2025-01-06 PROCEDURE — 92960 CARDIOVERSION ELECTRIC EXT: CPT | Performed by: INTERNAL MEDICINE

## 2025-01-06 PROCEDURE — 80048 BASIC METABOLIC PNL TOTAL CA: CPT | Performed by: INTERNAL MEDICINE

## 2025-01-06 RX ORDER — PROPOFOL 10 MG/ML
INJECTION, EMULSION INTRAVENOUS AS NEEDED
Status: DISCONTINUED | OUTPATIENT
Start: 2025-01-06 | End: 2025-01-06

## 2025-01-06 RX ORDER — LIDOCAINE HYDROCHLORIDE 10 MG/ML
INJECTION, SOLUTION EPIDURAL; INFILTRATION; INTRACAUDAL; PERINEURAL AS NEEDED
Status: DISCONTINUED | OUTPATIENT
Start: 2025-01-06 | End: 2025-01-06

## 2025-01-06 RX ORDER — SODIUM CHLORIDE 9 MG/ML
INJECTION, SOLUTION INTRAVENOUS CONTINUOUS PRN
Status: DISCONTINUED | OUTPATIENT
Start: 2025-01-06 | End: 2025-01-06

## 2025-01-06 RX ADMIN — PROPOFOL 90 MG: 10 INJECTION, EMULSION INTRAVENOUS at 09:58

## 2025-01-06 RX ADMIN — LIDOCAINE HYDROCHLORIDE 50 MG: 10 INJECTION, SOLUTION EPIDURAL; INFILTRATION; INTRACAUDAL; PERINEURAL at 09:58

## 2025-01-06 RX ADMIN — SODIUM CHLORIDE: 9 INJECTION, SOLUTION INTRAVENOUS at 09:47

## 2025-01-06 NOTE — ANESTHESIA POSTPROCEDURE EVALUATION
Post-Op Assessment Note    CV Status:  Stable  Pain Score: 0    Pain management: adequate       Mental Status:  Alert and awake   Hydration Status:  Euvolemic   PONV Controlled:  Controlled   Airway Patency:  Patent     Post Op Vitals Reviewed: Yes    No anethesia notable event occurred.    Staff: Anesthesiologist           Last Filed PACU Vitals:  Vitals Value Taken Time   Temp     Pulse 75 01/06/25 1026   BP 91/62 01/06/25 1026   Resp     SpO2 95 % 01/06/25 1026

## 2025-01-06 NOTE — ANESTHESIA POSTPROCEDURE EVALUATION
Post-Op Assessment Note    CV Status:  Stable  Pain Score: 0    Pain management: adequate       Mental Status:  Awake and alert   Hydration Status:  Stable   PONV Controlled:  None   Airway Patency:  Patent     Post Op Vitals Reviewed: Yes    No anethesia notable event occurred.    Staff: CRNA           Last Filed PACU Vitals:  Vitals Value Taken Time   Temp     Pulse 79    BP 86/59    Resp 15    SpO2 92%

## 2025-01-06 NOTE — TELEPHONE ENCOUNTER
----- Message from Erik Ramos MD sent at 1/6/2025  8:00 AM EST -----  Thank you for the information    With cardiology's permission I am going to recommend the following for my office staff    1.  Decrease Entresto to a half a pill twice a day  2.  Low potassium diet  3.  Repeat a BMP 1 week after the above!  4.  1 week of blood pressures in the next few weeks  ----- Message -----  From: Ray Pizano MD  Sent: 1/6/2025   7:53 AM EST  To: Erik Ramos MD; Cardiology Ep Clincal    Please call the patient about lab results. Potassium is high and kidney function is slightly worse and should contact his nephrologist.     I am copying Dr. Ramos as well.     Thank you.

## 2025-01-06 NOTE — TELEPHONE ENCOUNTER
Called patient and left a voicemail stating the following information:      With cardiology's permission I am going to recommend the following for my office staff    1.  Decrease Entresto to a half a pill twice a day  2.  Low potassium diet  3.  Repeat a BMP 1 week after the above!  4.  1 week of blood pressures in the next few weeks.    I asked the patient please call back with further questions.     Labs have been added to the patients chart, low potassium diet has been mailed to the patients home address.

## 2025-01-06 NOTE — ANESTHESIA PREPROCEDURE EVALUATION
Procedure:  CARDIOVERSION    Relevant Problems   ANESTHESIA (within normal limits)      CARDIO   (+) Abdominal aortic atherosclerosis (HCC)   (+) Atrial fibrillation with RVR (HCC)   (+) PAD (peripheral artery disease) (HCC)   (+) Parenchymal renal hypertension   (+) Paroxysmal atrial fibrillation (HCC)   (+) RBBB (right bundle branch block with left anterior fascicular block)   (+) SVT (supraventricular tachycardia) (HCC)   (+) Type 2 diabetes mellitus with diabetic peripheral angiopathy without gangrene (HCC)   (+) Venous insufficiency      ENDO   (+) Type 2 diabetes mellitus with diabetic peripheral angiopathy without gangrene (HCC)      GI/HEPATIC (within normal limits)      /RENAL   (+) CKD (chronic kidney disease), stage III (HCC)   (+) Parenchymal renal hypertension      GYN (within normal limits)      HEMATOLOGY   (+) Immunocompromised patient (HCC)      MUSCULOSKELETAL (within normal limits)      NEURO/PSYCH (within normal limits)      PULMONARY (within normal limits)        Physical Exam    Airway    Mallampati score: III  TM Distance: >3 FB  Neck ROM: full     Dental   Comment: Very poor dentition, multiple missing and chipped teeth     Cardiovascular  Rhythm: irregular, Rate: abnormal    Pulmonary  Pulmonary exam normal     Other Findings        Anesthesia Plan  ASA Score- 3     Anesthesia Type- IV sedation with anesthesia with ASA Monitors.         Additional Monitors:     Airway Plan:            Plan Factors-    Chart reviewed.   Existing labs reviewed. Patient summary reviewed.                  Induction- intravenous.    Postoperative Plan-         Informed Consent- Anesthetic plan and risks discussed with patient.  I personally reviewed this patient with the CRNA. Discussed and agreed on the Anesthesia Plan with the CRNA..    Discussed IV Sedation with General Anesthesia as backup with patient including but not limited to risk of cardiac insult, pulmonary complication, stroke, reaction to  medications and death. All questions answered and consent was obtained.      Potassium rechecked due to last known elevated at 5.7. Repeat 4.3.     NPO appropriate.

## 2025-01-07 ENCOUNTER — TELEPHONE (OUTPATIENT)
Dept: NON INVASIVE DIAGNOSTICS | Facility: HOSPITAL | Age: 75
End: 2025-01-07

## 2025-01-07 NOTE — TELEPHONE ENCOUNTER
Called patient and gave message. Patient understood.       ----- Message from Ike Butts MD sent at 1/7/2025  9:02 AM EST -----  Pls let pt know to stop entresto entirely for now due his lower BP and deranged labwork. He should follow up with his nephrologist's plan for repeat BMP 1 week after entresto stoppage and BP trending, for which he is being contacted separately.    Thnx  ----- Message -----  From: Ray Pizano MD  Sent: 1/6/2025   5:06 PM EST  To: Erik Ramos MD; Ike Butts MD; #    I am fine but I will also include Dr. Ike Butts. Thanks  ----- Message -----  From: Erik Ramos MD  Sent: 1/6/2025   8:00 AM EST  To: Ray Pizano MD; #    Thank you for the information    With cardiology's permission I am going to recommend the following for my office staff    1.  Decrease Entresto to a half a pill twice a day  2.  Low potassium diet  3.  Repeat a BMP 1 week after the above!  4.  1 week of blood pressures in the next few weeks  ----- Message -----  From: Ray Pizano MD  Sent: 1/6/2025   7:53 AM EST  To: Erik Ramos MD; Cardiology [facility]    Please call the patient about lab results. Potassium is high and kidney function is slightly worse and should contact his nephrologist.     I am copying Dr. Ramos as well.     Thank you.

## 2025-01-07 NOTE — TELEPHONE ENCOUNTER
Spoke with patient about the following, he expressed understanding and thanked us for the call:    From my team please follow orders in regards to low potassium diet repeat BMP 1 week after stopping Entresto per Dr. Butts

## 2025-01-14 ENCOUNTER — RESULTS FOLLOW-UP (OUTPATIENT)
Dept: CARDIOLOGY CLINIC | Facility: CLINIC | Age: 75
End: 2025-01-14

## 2025-01-14 ENCOUNTER — APPOINTMENT (OUTPATIENT)
Dept: LAB | Facility: CLINIC | Age: 75
End: 2025-01-14
Payer: MEDICARE

## 2025-01-14 DIAGNOSIS — K50.019 CROHN'S DISEASE OF SMALL INTESTINE WITH COMPLICATION (HCC): ICD-10-CM

## 2025-01-14 DIAGNOSIS — E66.01 OBESITY, MORBID (HCC): ICD-10-CM

## 2025-01-14 DIAGNOSIS — R31.29 MICROSCOPIC HEMATURIA: ICD-10-CM

## 2025-01-14 DIAGNOSIS — I50.40 COMBINED SYSTOLIC AND DIASTOLIC HEART FAILURE, UNSPECIFIED HF CHRONICITY (HCC): ICD-10-CM

## 2025-01-14 DIAGNOSIS — E78.5 DYSLIPIDEMIA: ICD-10-CM

## 2025-01-14 DIAGNOSIS — E87.8 ELECTROLYTE ABNORMALITY: ICD-10-CM

## 2025-01-14 DIAGNOSIS — Z12.5 SCREENING FOR PROSTATE CANCER: ICD-10-CM

## 2025-01-14 DIAGNOSIS — E55.9 VITAMIN D DEFICIENCY: ICD-10-CM

## 2025-01-14 DIAGNOSIS — I12.9 PARENCHYMAL RENAL HYPERTENSION, STAGE 1 THROUGH STAGE 4 OR UNSPECIFIED CHRONIC KIDNEY DISEASE: ICD-10-CM

## 2025-01-14 DIAGNOSIS — N18.31 STAGE 3A CHRONIC KIDNEY DISEASE (HCC): ICD-10-CM

## 2025-01-14 DIAGNOSIS — R80.1 PERSISTENT PROTEINURIA: ICD-10-CM

## 2025-01-14 LAB
ANION GAP SERPL CALCULATED.3IONS-SCNC: 6 MMOL/L (ref 4–13)
BUN SERPL-MCNC: 13 MG/DL (ref 5–25)
CALCIUM SERPL-MCNC: 8.6 MG/DL (ref 8.4–10.2)
CHLORIDE SERPL-SCNC: 106 MMOL/L (ref 96–108)
CO2 SERPL-SCNC: 29 MMOL/L (ref 21–32)
CREAT SERPL-MCNC: 1.02 MG/DL (ref 0.6–1.3)
CRP SERPL QL: 9.1 MG/L
GFR SERPL CREATININE-BSD FRML MDRD: 72 ML/MIN/1.73SQ M
GLUCOSE P FAST SERPL-MCNC: 106 MG/DL (ref 65–99)
POTASSIUM SERPL-SCNC: 4 MMOL/L (ref 3.5–5.3)
PSA SERPL-MCNC: 0.54 NG/ML (ref 0–4)
SODIUM SERPL-SCNC: 141 MMOL/L (ref 135–147)
VIT B12 SERPL-MCNC: 287 PG/ML (ref 180–914)

## 2025-01-14 PROCEDURE — 36415 COLL VENOUS BLD VENIPUNCTURE: CPT

## 2025-01-14 PROCEDURE — 82607 VITAMIN B-12: CPT

## 2025-01-14 PROCEDURE — G0103 PSA SCREENING: HCPCS

## 2025-01-14 PROCEDURE — 80048 BASIC METABOLIC PNL TOTAL CA: CPT

## 2025-01-14 PROCEDURE — 86140 C-REACTIVE PROTEIN: CPT

## 2025-01-14 NOTE — TELEPHONE ENCOUNTER
----- Message from Erik Ramos MD sent at 1/14/2025  8:08 AM EST -----  Labs look good  ----- Message -----  From: Lab, Background User  Sent: 1/14/2025   7:32 AM EST  To: Erik Ramos MD

## 2025-01-15 ENCOUNTER — HOSPITAL ENCOUNTER (OUTPATIENT)
Dept: MRI IMAGING | Facility: HOSPITAL | Age: 75
Discharge: HOME | End: 2025-01-15
Payer: MEDICARE

## 2025-01-15 DIAGNOSIS — K50.019 CROHN'S DISEASE OF SMALL INTESTINE WITH COMPLICATION (HCC): ICD-10-CM

## 2025-01-15 PROCEDURE — 72197 MRI PELVIS W/O & W/DYE: CPT

## 2025-01-15 PROCEDURE — 74183 MRI ABD W/O CNTR FLWD CNTR: CPT

## 2025-01-15 PROCEDURE — A9585 GADOBUTROL INJECTION: HCPCS | Performed by: DIETITIAN, REGISTERED

## 2025-01-15 RX ORDER — GADOBUTROL 604.72 MG/ML
11 INJECTION INTRAVENOUS
Status: COMPLETED | OUTPATIENT
Start: 2025-01-15 | End: 2025-01-15

## 2025-01-15 RX ADMIN — GADOBUTROL 11 ML: 604.72 INJECTION INTRAVENOUS at 08:56

## 2025-01-16 ENCOUNTER — TELEPHONE (OUTPATIENT)
Age: 75
End: 2025-01-16

## 2025-01-16 NOTE — TELEPHONE ENCOUNTER
Our mutual patient, South Humphrey III, is scheduled for the following   procedure: Colonoscopy   On: April 10th   With: Dr. Beverly    South Humphrey III is taking the following blood thinner: Xarelto       Can this be stopped 5 days prior to the procedure?         Thank you,  Corrina Peguero [facility]

## 2025-01-17 ENCOUNTER — OFFICE VISIT (OUTPATIENT)
Dept: INTERNAL MEDICINE CLINIC | Facility: CLINIC | Age: 75
End: 2025-01-17
Payer: MEDICARE

## 2025-01-17 VITALS
SYSTOLIC BLOOD PRESSURE: 124 MMHG | OXYGEN SATURATION: 94 % | BODY MASS INDEX: 33.64 KG/M2 | TEMPERATURE: 96.6 F | HEART RATE: 62 BPM | HEIGHT: 70 IN | WEIGHT: 235 LBS | DIASTOLIC BLOOD PRESSURE: 78 MMHG

## 2025-01-17 DIAGNOSIS — N18.31 STAGE 3A CHRONIC KIDNEY DISEASE (HCC): ICD-10-CM

## 2025-01-17 DIAGNOSIS — I48.0 PAROXYSMAL ATRIAL FIBRILLATION (HCC): Primary | ICD-10-CM

## 2025-01-17 DIAGNOSIS — E53.8 VITAMIN B12 DEFICIENCY: ICD-10-CM

## 2025-01-17 DIAGNOSIS — I50.42 CHRONIC COMBINED SYSTOLIC AND DIASTOLIC HEART FAILURE (HCC): ICD-10-CM

## 2025-01-17 DIAGNOSIS — D84.9 IMMUNODEFICIENCY, UNSPECIFIED (HCC): ICD-10-CM

## 2025-01-17 DIAGNOSIS — K50.019 CROHN'S DISEASE OF SMALL INTESTINE WITH COMPLICATION (HCC): ICD-10-CM

## 2025-01-17 DIAGNOSIS — Z01.818 PRE-OP EXAMINATION: ICD-10-CM

## 2025-01-17 DIAGNOSIS — Z00.00 MEDICARE ANNUAL WELLNESS VISIT, SUBSEQUENT: ICD-10-CM

## 2025-01-17 DIAGNOSIS — I73.9 PAD (PERIPHERAL ARTERY DISEASE) (HCC): ICD-10-CM

## 2025-01-17 DIAGNOSIS — E66.09 CLASS 1 OBESITY DUE TO EXCESS CALORIES WITH SERIOUS COMORBIDITY AND BODY MASS INDEX (BMI) OF 33.0 TO 33.9 IN ADULT: ICD-10-CM

## 2025-01-17 DIAGNOSIS — K22.70 BARRETT'S ESOPHAGUS WITHOUT DYSPLASIA: ICD-10-CM

## 2025-01-17 DIAGNOSIS — E11.51 TYPE 2 DIABETES MELLITUS WITH DIABETIC PERIPHERAL ANGIOPATHY WITHOUT GANGRENE, WITHOUT LONG-TERM CURRENT USE OF INSULIN (HCC): ICD-10-CM

## 2025-01-17 DIAGNOSIS — E78.5 DYSLIPIDEMIA: ICD-10-CM

## 2025-01-17 DIAGNOSIS — E27.8 ADRENAL MASS (HCC): ICD-10-CM

## 2025-01-17 DIAGNOSIS — E66.811 CLASS 1 OBESITY DUE TO EXCESS CALORIES WITH SERIOUS COMORBIDITY AND BODY MASS INDEX (BMI) OF 33.0 TO 33.9 IN ADULT: ICD-10-CM

## 2025-01-17 PROBLEM — E66.01 OBESITY, MORBID (HCC): Status: RESOLVED | Noted: 2024-02-22 | Resolved: 2025-01-17

## 2025-01-17 LAB — SL AMB POCT HEMOGLOBIN AIC: 7.1 (ref ?–6.5)

## 2025-01-17 PROCEDURE — 99214 OFFICE O/P EST MOD 30 MIN: CPT | Performed by: INTERNAL MEDICINE

## 2025-01-17 PROCEDURE — 96372 THER/PROPH/DIAG INJ SC/IM: CPT | Performed by: INTERNAL MEDICINE

## 2025-01-17 PROCEDURE — G0439 PPPS, SUBSEQ VISIT: HCPCS | Performed by: INTERNAL MEDICINE

## 2025-01-17 PROCEDURE — G2211 COMPLEX E/M VISIT ADD ON: HCPCS | Performed by: INTERNAL MEDICINE

## 2025-01-17 PROCEDURE — 83036 HEMOGLOBIN GLYCOSYLATED A1C: CPT | Performed by: INTERNAL MEDICINE

## 2025-01-17 RX ORDER — CYANOCOBALAMIN 1000 UG/ML
1000 INJECTION, SOLUTION INTRAMUSCULAR; SUBCUTANEOUS ONCE
Status: COMPLETED | OUTPATIENT
Start: 2025-01-17 | End: 2025-01-17

## 2025-01-17 RX ADMIN — CYANOCOBALAMIN 1000 MCG: 1000 INJECTION, SOLUTION INTRAMUSCULAR; SUBCUTANEOUS at 09:20

## 2025-01-17 NOTE — PROGRESS NOTES
Name: South Humphrey III      : 1950      MRN: [id number]  Encounter Provider: Virgie Luz MD  Encounter Date: 2025   Encounter department: [facility]    Assessment & Plan  Paroxysmal atrial fibrillation (HCC)  Ablation scheduled next month, s/p cardioversion earlier this month.  On Xarelto, metoprolol 50 mg bid.       Chronic combined systolic and diastolic heart failure (HCC)  On Jardiance 10 mg, Entresto bid.       Stage 3a chronic kidney disease (HCC)  Lab Results   Component Value Date    EGFR 72 2025    EGFR 71 2025    EGFR 48 2025    CREATININE 1.02 2025    CREATININE 1.03 2025    CREATININE 1.42 (H) 2025     Stable.       Type 2 diabetes mellitus with diabetic peripheral angiopathy without gangrene, without long-term current use of insulin (HCC)  A1c remains stable at 7.1%.  On Jardiance 10 mg, glimepiride 2 mg daily.  Orders:  •  POCT hemoglobin A1c    Crohn's disease of small intestine with complication (HCC)  Stable, on Skyrizi.  MRI results pending.       PAD (peripheral artery disease) (HCC)  Stable.  On ASA, Xarelto, statin.  Sees vascular.       Harris's esophagus without dysplasia  On daily PPI.       Vitamin B12 deficiency  B12 injection today.  Continue oral supplement daily.  Orders:  •  cyanocobalamin injection 1,000 mcg    Dyslipidemia  On atorvastatin 20 mg.  Orders:  •  Lipid panel; Future    Immunodeficiency, unspecified (HCC)  No issue.       Adrenal mass (HCC)  Stable.       Class 1 obesity due to excess calories with serious comorbidity and body mass index (BMI) of 33.0 to 33.9 in adult  Stable weight.  Continue regular exercise.       Medicare annual wellness visit, subsequent         Pre-op examination  Low risk for cataract surgery.       Follow up in 6 months or as needed.      Depression Screening and Follow-up Plan: Patient was screened for depression during today's encounter. They screened  negative with a PHQ-2 score of 0.      Preventive health issues were discussed with patient, and age appropriate screening tests were ordered as noted in patient's After Visit Summary. Personalized health advice and appropriate referrals for health education or preventive services given if needed, as noted in patient's After Visit Summary.    History of Present Illness     He is here today with his wife.  He feels well, no complaints.  His potassium was high a few weeks ago, asking what foods can make it go up.he does not eat bananas, tomatoes, potatoes or leafy veggies.  He reports no chest pain, shortness of breath or palpitations.  He and his wife has returned to the gym recently.  He reports loose stools yesterday, after drinking contrast for MRI.       Patient Care Team:  Virgie Luz MD as PCP - General (Internal Medicine)  Erik Ramos MD (Nephrology)  Willem Coppola MD (Urology)  Tarun Almanza MD (Cardiology)  Amado Castano MD (Gastroenterology)  Fady Cordova MD as Surgeon (Surgical Oncology)    Review of Systems   Constitutional:  Negative for appetite change and fatigue.   HENT:  Negative for congestion, hearing loss and postnasal drip.    Eyes: Negative.  Negative for visual disturbance.   Respiratory:  Negative for cough, chest tightness and shortness of breath.    Cardiovascular:  Negative for chest pain, palpitations and leg swelling.   Gastrointestinal:  Negative for abdominal pain and constipation.   Genitourinary:  Negative for dysuria and frequency.   Musculoskeletal:  Negative for arthralgias.   Skin:  Negative for rash and wound.   Neurological:  Negative for dizziness, numbness and headaches.   Hematological:  Does not bruise/bleed easily.   Psychiatric/Behavioral:  Negative for sleep disturbance. The patient is not nervous/anxious.      Medical History Reviewed by provider this encounter:       Annual Wellness Visit Questionnaire   South is here for his Subsequent  Wellness visit. Last Medicare Wellness visit information reviewed, patient interviewed and updates made to the record today.      Health Risk Assessment:   Patient rates overall health as good. Patient feels that their physical health rating is same. Patient is satisfied with their life. Eyesight was rated as slightly worse. Hearing was rated as same. Patient feels that their emotional and mental health rating is same. Patients states they are never, rarely angry. Patient states they are sometimes unusually tired/fatigued. Pain experienced in the last 7 days has been none. Patient states that he has experienced weight loss or gain in last 6 months.     Depression Screening:   PHQ-2 Score: 0      Fall Risk Screening:   In the past year, patient has experienced: no history of falling in past year      Home Safety:  Patient does not have trouble with stairs inside or outside of their home. Patient has working smoke alarms and has no working carbon monoxide detector. Home safety hazards include: none.     Nutrition:   Current diet is Regular.     Medications:   Patient is currently taking over-the-counter supplements. OTC medications include: see medication list. Patient is able to manage medications.     Activities of Daily Living (ADLs)/Instrumental Activities of Daily Living (IADLs):   Walk and transfer into and out of bed and chair?: Yes  Dress and groom yourself?: Yes    Bathe or shower yourself?: Yes    Feed yourself? Yes  Do your laundry/housekeeping?: Yes  Manage your money, pay your bills and track your expenses?: Yes  Make your own meals?: Yes    Do your own shopping?: Yes    Previous Hospitalizations:   Any hospitalizations or ED visits within the last 12 months?: Yes    How many hospitalizations have you had in the last year?: 1-2    Hospitalization Comments: [facility] in February    Advance Care Planning:   Living will: Yes    Durable POA for healthcare: Yes    Advanced directive: Yes    End of Life Decisions  reviewed with patient: Yes      Cognitive Screening:   Provider or family/friend/caregiver concerned regarding cognition?: No    PREVENTIVE SCREENINGS      Cardiovascular Screening:    General: Screening Current      Diabetes Screening:     General: History Diabetes and Screening Current      Colorectal Cancer Screening:     General: Screening Current      Prostate Cancer Screening:    General: Screening Current      Osteoporosis Screening:    General: Screening Not Indicated      Abdominal Aortic Aneurysm (AAA) Screening:    Risk factors include: age between 65-74 yo and tobacco use        General: Screening Current      Lung Cancer Screening:     General: Screening Not Indicated      Hepatitis C Screening:    General: Screening Current    Screening, Brief Intervention, and Referral to Treatment (SBIRT)    Screening  Typical number of drinks in a day: 0  Typical number of drinks in a week: 0  Interpretation: Low risk drinking behavior.    AUDIT-C Screenin) How often did you have a drink containing alcohol in the past year? monthly or less  2) How many drinks did you have on a typical day when you were drinking in the past year? 0  3) How often did you have 6 or more drinks on one occasion in the past year? never    AUDIT-C Score: 1  Interpretation: Score 0-3 (male): Negative screen for alcohol misuse    Single Item Drug Screening:  How often have you used an illegal drug (including marijuana) or a prescription medication for non-medical reasons in the past year? never    Single Item Drug Screen Score: 0  Interpretation: Negative screen for possible drug use disorder    Other Counseling Topics:   Regular weightbearing exercise and calcium and vitamin D intake.     Social Drivers of Health     Financial Resource Strain: Low Risk  (2024)    Overall Financial Resource Strain (CARDIA)    • Difficulty of Paying Living Expenses: Not hard at all   Food Insecurity: No Food Insecurity (2025)    Hunger Vital  "Sign    • Worried About Running Out of Food in the Last Year: Never true    • Ran Out of Food in the Last Year: Never true   Transportation Needs: No Transportation Needs (1/14/2025)    PRAPARE - Transportation    • Lack of Transportation (Medical): No    • Lack of Transportation (Non-Medical): No   Housing Stability: Low Risk  (1/14/2025)    Housing Stability Vital Sign    • Unable to Pay for Housing in the Last Year: No    • Number of Times Moved in the Last Year: 0    • Homeless in the Last Year: No   Utilities: Not At Risk (1/14/2025)    [facility] Utilities    • Threatened with loss of utilities: No     No results found.    Objective   /78   Pulse 62   Temp (!) 96.6 °F (35.9 °C)   Ht 5' 10\" (1.778 m)   Wt 107 kg (235 lb)   SpO2 94%   BMI 33.72 kg/m²     Physical Exam  Vitals and nursing note reviewed.   Constitutional:       General: He is not in acute distress.     Appearance: He is well-developed.   HENT:      Head: Normocephalic and atraumatic.      Right Ear: External ear normal.      Left Ear: External ear normal.   Eyes:      Conjunctiva/sclera: Conjunctivae normal.   Cardiovascular:      Rate and Rhythm: Normal rate and regular rhythm.      Heart sounds: No murmur heard.  Pulmonary:      Effort: Pulmonary effort is normal. No respiratory distress.      Breath sounds: Normal breath sounds.   Abdominal:      Palpations: Abdomen is soft.      Tenderness: There is no abdominal tenderness.   Musculoskeletal:         General: No swelling.      Cervical back: Neck supple.   Skin:     General: Skin is warm and dry.   Neurological:      Mental Status: He is alert.   Psychiatric:         Mood and Affect: Mood normal.           Labs & imaging results reviewed with patient.    "

## 2025-01-17 NOTE — ASSESSMENT & PLAN NOTE
Lab Results   Component Value Date    EGFR 72 01/14/2025    EGFR 71 01/06/2025    EGFR 48 01/03/2025    CREATININE 1.02 01/14/2025    CREATININE 1.03 01/06/2025    CREATININE 1.42 (H) 01/03/2025     Stable.

## 2025-01-17 NOTE — ASSESSMENT & PLAN NOTE
Ablation scheduled next month, s/p cardioversion earlier this month.  On Xarelto, metoprolol 50 mg bid.

## 2025-01-17 NOTE — ASSESSMENT & PLAN NOTE
B12 injection today.  Continue oral supplement daily.  Orders:  •  cyanocobalamin injection 1,000 mcg

## 2025-01-17 NOTE — ASSESSMENT & PLAN NOTE
A1c remains stable at 7.1%.  On Jardiance 10 mg, glimepiride 2 mg daily.  Orders:  •  POCT hemoglobin A1c

## 2025-01-20 ENCOUNTER — RESULTS FOLLOW-UP (OUTPATIENT)
Dept: GASTROENTEROLOGY | Facility: CLINIC | Age: 75
End: 2025-01-20

## 2025-01-21 ENCOUNTER — TELEPHONE (OUTPATIENT)
Dept: INTERNAL MEDICINE CLINIC | Facility: CLINIC | Age: 75
End: 2025-01-21

## 2025-01-21 NOTE — TELEPHONE ENCOUNTER
Pt's wife called. Pt got the message but was told by Dr. Luz that he did not need the pre op. That she would fill it out based on his last appt. I spoke to the office and they will give her the pt's paperwork to fill out.

## 2025-01-21 NOTE — TELEPHONE ENCOUNTER
Pt.'s wife called back and said that Dr. Luz had told them that she would fill out paperwork based on pt.'s last ov.

## 2025-01-21 NOTE — TELEPHONE ENCOUNTER
I lm for pt. to call back and let us know if he is still going to have cataract surgery. We rec'd clearance form today from eye Doctor to fill out for his 2/5 and 2/19 surgery. Pt. Will have to schedule pre-op clearance appt. If he is still having surgery.

## 2025-01-23 ENCOUNTER — RESULTS FOLLOW-UP (OUTPATIENT)
Age: 75
End: 2025-01-23

## 2025-01-26 NOTE — PROGRESS NOTES
Heart Failure Outpatient Progress Note - South Humphrey III 74 y.o. 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    Electrolyte abnormality 02/14/2024    Combined systolic and diastolic heart failure (HCC) 02/14/2024    Abnormal LFTs 02/13/2024    Atrial fibrillation with RVR ([facility]) 02/12/2024    Immunocompromised patient ([facility]) 01/17/2024    Venous insufficiency 08/29/2022    Type 2 diabetes mellitus with diabetic peripheral angiopathy without gangrene ([facility]) 03/16/2021    Vitamin B12 deficiency 09/16/2020    Elevated alkaline phosphatase level 05/14/2020    S/P femoral-popliteal bypass surgery 04/09/2020    Terminal ileitis of small intestine ([facility]) 02/18/2020    Positive QuantiFERON-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 01/20/2020    Volume overload 01/19/2020    SVT (supraventricular tachycardia) ([facility]) 01/18/2020    Crohn's disease of small intestine with complication ([facility]) 01/13/2020    Adrenal mass ([facility]) 01/07/2020    Paroxysmal atrial fibrillation ([facility]) 09/12/2019    Venous stasis 07/19/2019    S/P peripheral artery angioplasty with stent placement 06/28/2019    Ischemic cardiomyopathy 06/03/2019    PAD (peripheral artery disease) ([facility]) 04/24/2019    Harris's esophagus without dysplasia 04/05/2019    Colon polyps 04/05/2019    Dyslipidemia 05/16/2018    Vitamin D deficiency 05/16/2018    RBBB (right bundle branch block with left anterior fascicular block) 05/11/2018    [place] cardiac risk 10-20% in next 10 years 05/04/2018    Abdominal aortic atherosclerosis (HCC) 05/04/2018    CKD (chronic kidney disease), stage III ([facility]) 03/22/2018    Persistent proteinuria 03/22/2018    Microscopic hematuria 03/22/2018    Class 1 obesity due to excess calories with serious comorbidity and body mass index (BMI) of 33.0 to 33.9 in adult      Chronic HFimpEF  -Etiology tach mediated, recovered s/p GDMT rhythm control but  recently back in a rate controlled Aflutter  -He is now status post DCCV with plan for atrial fibrillation next month.  Changing sinus rhythm today  -Warm and euvolemic on exam  -No change in medical therapy for now.  Could consider a trial of low-dose ACE or ARB  -weights 235 lbs, today, 235 lbs.     TTE 6/12/24:EF 65%  2018 NST: no ischemia or scar     Neurohormonal Blockade:  --Beta-Blocker: Metoprolol 75 mg BID  --ACEi, ARB or ARNi:Entresto 24/26 mg BID---stopped for PHUONG and Hyperkalemia  --Aldosterone Receptor Blocker: not on due to hypotension  --SGLT2i Jardiance 10 mg daily  --Diuretic: Lasix as needed    Sudden Cardiac Death Risk Reduction:  --ICD: EF 65%    Electrical Resynchronization:  --Candidacy for BiV device:    Advanced Therapies:  --Inotrope:  --LVAD/Transplant Candidacy:    Recurrent PAF/fl  -S/p 2/19/24 DENIS/DCCV with conversion to NSR  -in SR on 12 lead today  -Hx of loop recorder- explanted 1/10/24  -Rate Metoprolol 75 mg BID  -Rhythm previously on Amio, plan for ablation next month  -OAC Xarelto 20 mg daily  -Educated on the importance of weight loss and treatment of his sleep apnea.    Hx of bifascicular block/ SVT, some nighttime vero events on LINQ  Dyslipidemia  CKD  Lab Results   Component Value Date    SODIUM 141 01/14/2025    K 4.0 01/14/2025     01/14/2025    CO2 29 01/14/2025    BUN 13 01/14/2025    CREATININE 1.02 01/14/2025    GLUC 148 (H) 01/06/2025    CALCIUM 8.6 01/14/2025       PAD   s/p femoral popliteal bypass surgery  ASA, statin  Tobacco abuse  Crohn's dz, hx of SBO  DM  Lab Results   Component Value Date    HGBA1C 7.1 (A) 01/17/2025       Ao aneurysm , root 4.4cm 6/2024 TTE  Possible ANUSHA  -ordered not scheduled  -Patient refusing at this time as he assures me he will never wear a BiPAP or CPAP machine even if he test positive    HPI:   12/12/24:Dr Butts:  73 y.o. male PMH and acute problems listed later in note (a partial list may also be included within the assessment  section) p/w HF fu. I first met [place] III during 2/2024 admission for HF and AFL where he p/w ADHF in setting of rapid Afib/AFL. New drop in EF. Note New skyrizi for Crohn's recently started.  No new CP/SOB/dizziness/palpitations/syncope.  No new fatigue.  No new unintentional weight changes.  No new leg swelling, PND, pillow orthopnea.  No new fevers, chills, cough, nausea, vomiting, diarrhea, dysuria.    1/28/25. After last visit, saw Dr. Pizano on 12/30. At that time was still in flutter with RVR. Sent for DCCV with plan for PFA and loop recorder re-implantation. DCCV done successfully on 1/6/25. He is now scheduled for ablation on 2/11.  He feels well today with no complaints.  The gym using the treadmill and tolerating without difficulty.  Has had no breakthrough palpitations or unusual shortness of breath.  Assures me that even if he tested positive for sleep apnea he would never wear a mask.    Past Medical History:   Diagnosis Date    Harris esophagus     Blue toe syndrome (HCC)     Chronic kidney disease     Colon polyps     Crohn's disease (HCC) 1-2020    GERD (gastroesophageal reflux disease)     Hematuria     History of rheumatic fever     Hypolipidemia     Hypotension     Ischemic cardiomyopathy     PAD (peripheral artery disease) (HCC)     Pulmonary emphysema (HCC)     PVD (peripheral vascular disease) (HCC)        12 point ROS negative other than that stated in HPI    No Known Allergies  .    Current Outpatient Medications:     aspirin 81 MG tablet, Take 81 mg by mouth daily, Disp: , Rfl:     atorvastatin (LIPITOR) 20 mg tablet, TAKE 1 TABLET BY MOUTH EVERY DAY, Disp: 90 tablet, Rfl: 1    Empagliflozin (Jardiance) 10 MG TABS tablet, Take 1 tablet (10 mg total) by mouth every morning, Disp: 90 tablet, Rfl: 5    folic acid (FOLVITE) 1 mg tablet, Take 1 tablet (1 mg total) by mouth daily, Disp: 90 tablet, Rfl: 3    glimepiride (AMARYL) 2 mg tablet, TAKE 1 TABLET BY MOUTH 2 TIMES A DAY  WITH MEALS. (Patient taking differently: daily), Disp: 180 tablet, Rfl: 1    Magnesium 500 MG TABS, Take 1 tablet by mouth daily , Disp: , Rfl:     metoprolol succinate (TOPROL-XL) 25 mg 24 hr tablet, TAKE 1 TABLET (25 MG TOTAL) BY MOUTH DAILY TAKE 75MG IN AM AND 50MG IN PM, Disp: 90 tablet, Rfl: 1    metoprolol succinate (TOPROL-XL) 50 mg 24 hr tablet, Take 1 tablet (50 mg total) by mouth 2 (two) times a day Combine a 50mg tablet with a 25mg tablet to take a total of 75mg in AM and 75mg in PM, Disp: 180 tablet, Rfl: 5    omeprazole (PriLOSEC) 20 mg delayed release capsule, Take 1 capsule (20 mg total) by mouth daily before breakfast, Disp: 90 capsule, Rfl: 1    risankizumab-rzaa (Skyrizi) 360 MG/2.4ML SOCT, Take first on body injector (OBI) under skin every 8 weeks, Disp: 2.4 mL, Rfl: 5    sacubitril-valsartan (Entresto) 24-26 MG TABS, Take 1 tablet by mouth 2 (two) times a day (Patient not taking: Reported on 1/17/2025), Disp: 180 tablet, Rfl: 5    vitamin B-12 (VITAMIN B-12) 1,000 mcg tablet, Take 1 tablet (1,000 mcg total) by mouth 3 (three) times a week, Disp: , Rfl:     Xarelto 20 MG tablet, TAKE 1 TABLET BY MOUTH EVERY DAY, Disp: 30 tablet, Rfl: 3    Social History     Socioeconomic History    Marital status: /Civil Union     Spouse name: Not on file    Number of children: 2    Years of education: Not on file    Highest education level: Not on file   Occupational History    Occupation: retired   Tobacco Use    Smoking status: Former     Current packs/day: 0.25     Average packs/day: 0.3 packs/day for 30.0 years (7.5 ttl pk-yrs)     Types: Cigarettes    Smokeless tobacco: Never   Vaping Use    Vaping status: Never Used   Substance and Sexual Activity    Alcohol use: Yes     Alcohol/week: 1.0 standard drink of alcohol     Types: 1 Standard drinks or equivalent per week     Comment: social drinker when out dining    Drug use: Never    Sexual activity: Not Currently     Birth control/protection: None    Other Topics Concern    Not on file   Social History Narrative    Drinks coffee     Social Drivers of Health     Financial Resource Strain: Low Risk  (1/14/2024)    Overall Financial Resource Strain (CARDIA)     Difficulty of Paying Living Expenses: Not hard at all   Food Insecurity: No Food Insecurity (1/14/2025)    Hunger Vital Sign     Worried About Running Out of Food in the Last Year: Never true     Ran Out of Food in the Last Year: Never true   Transportation Needs: No Transportation Needs (1/14/2025)    PRAPARE - Transportation     Lack of Transportation (Medical): No     Lack of Transportation (Non-Medical): No   Physical Activity: Not on file   Stress: Not on file   Social Connections: Not on file   Intimate Partner Violence: Not on file   Housing Stability: Low Risk  (1/14/2025)    Housing Stability Vital Sign     Unable to Pay for Housing in the Last Year: No     Number of Times Moved in the Last Year: 0     Homeless in the Last Year: No       Family History   Problem Relation Age of Onset    Heart disease Mother     Hyperlipidemia Mother     Hypertension Mother     Hypertension Father     Heart disease Father     Stroke Father     Hyperlipidemia Father     Diabetes Brother     No Known Problems Maternal Grandmother     Dementia Neg Hx     Drug abuse Neg Hx     Mental illness Neg Hx     Substance Abuse Neg Hx     Alcohol abuse Neg Hx     Depression Neg Hx     Colon cancer Neg Hx        Physical Exam:    Vitals: /70 (BP Location: Left arm, Patient Position: Sitting, Cuff Size: Standard)   Pulse 66   Wt 107 kg (235 lb)   SpO2 96%   BMI 33.72 kg/m²     Wt Readings from Last 3 Encounters:   01/17/25 107 kg (235 lb)   12/30/24 105 kg (232 lb)   12/12/24 107 kg (235 lb 9.6 oz)       GEN: [place] Humph[place] III appears well, alert and oriented x 3, pleasant and cooperative   HEENT: pupils equal, round, and reactive to light; extraocular muscles intact  NECK: supple, no carotid bruits   HEART: regular  rhythm, normal S1 and S2, no murmurs, clicks, gallops or rubs, JVP is flat   LUNGS: clear to auscultation bilaterally; no wheezes, rales, or rhonchi   ABDOMEN: normal bowel sounds, soft, no tenderness, no distention  EXTREMITIES: peripheral pulses normal; no clubbing, cyanosis, or edema  NEURO: no focal findings   SKIN: normal without suspicious lesions on exposed skin    Labs & Results:  Lab Results   Component Value Date    SODIUM 141 01/14/2025    K 4.0 01/14/2025     01/14/2025    CO2 29 01/14/2025    AGAP 6 01/14/2025    BUN 13 01/14/2025    CREATININE 1.02 01/14/2025    GLUC 148 (H) 01/06/2025    GLUF 106 (H) 01/14/2025    CALCIUM 8.6 01/14/2025    AST 12 (L) 01/03/2025    ALT 13 01/03/2025    ALKPHOS 190 (H) 01/03/2025    TP 7.0 01/03/2025    TBILI 0.55 01/03/2025    EGFR 72 01/14/2025     Lab Results   Component Value Date    WBC 10.74 (H) 01/03/2025    HGB 16.2 01/03/2025    HCT 53.7 (H) 01/03/2025    MCV 89 01/03/2025     01/03/2025     Lab Results   Component Value Date     (H) 02/12/2024      Lab Results   Component Value Date    LDLCALC 55 04/22/2024     Lab Results   Component Value Date    HGBA1C 7.1 (A) 01/17/2025     Lab Results   Component Value Date    SSG3OOKCUIZI 4.009 04/22/2024         EKG personally reviewed by LENORA Do.   No results found for this visit on 01/28/25.     Counseling / Coordination of Care  Total face to face time spent with patient 15 minutes.  An additional 10 minutes was spent for chart/data review and visit preparation.       Thank you for the opportunity to participate in the care of this patient.    LENORA Do

## 2025-01-28 ENCOUNTER — OFFICE VISIT (OUTPATIENT)
Dept: CARDIOLOGY CLINIC | Facility: CLINIC | Age: 75
End: 2025-01-28
Payer: MEDICARE

## 2025-01-28 VITALS
OXYGEN SATURATION: 96 % | DIASTOLIC BLOOD PRESSURE: 70 MMHG | BODY MASS INDEX: 33.72 KG/M2 | HEART RATE: 66 BPM | SYSTOLIC BLOOD PRESSURE: 126 MMHG | WEIGHT: 235 LBS

## 2025-01-28 DIAGNOSIS — I48.92 ATRIAL FLUTTER, UNSPECIFIED TYPE (HCC): Primary | ICD-10-CM

## 2025-01-28 DIAGNOSIS — I50.23 ACUTE ON CHRONIC SYSTOLIC CONGESTIVE HEART FAILURE (HCC): ICD-10-CM

## 2025-01-28 DIAGNOSIS — I50.20 SYSTOLIC CONGESTIVE HEART FAILURE, UNSPECIFIED HF CHRONICITY (HCC): ICD-10-CM

## 2025-01-28 PROCEDURE — 99214 OFFICE O/P EST MOD 30 MIN: CPT | Performed by: NURSE PRACTITIONER

## 2025-01-28 PROCEDURE — 93000 ELECTROCARDIOGRAM COMPLETE: CPT | Performed by: NURSE PRACTITIONER

## 2025-01-28 RX ORDER — FUROSEMIDE 20 MG/1
40 TABLET ORAL AS NEEDED
Qty: 120 TABLET | Refills: 2 | Status: SHIPPED | OUTPATIENT
Start: 2025-01-28

## 2025-01-28 NOTE — PATIENT INSTRUCTIONS
Weigh yourself daily  If you gain 3 lbs in one day or 5 lbs in one week, please call the office at [phone] and ask for a nurse or the heart failure nurse  Keep your sodium intake to <2 grams, (2000 mg) per day, and fluids <2 Liters (2000 ml) per day. This is around 6-7, 8 oz glasses of fluid per day    Discuss with your PCP the role of a GLP1 drug to help with weight loss

## 2025-01-29 ENCOUNTER — APPOINTMENT (OUTPATIENT)
Dept: LAB | Facility: CLINIC | Age: 75
End: 2025-01-29
Payer: MEDICARE

## 2025-01-29 DIAGNOSIS — E78.5 DYSLIPIDEMIA: ICD-10-CM

## 2025-01-29 LAB
BASOPHILS # BLD AUTO: 0.06 THOUSANDS/ΜL (ref 0–0.1)
BASOPHILS NFR BLD AUTO: 1 % (ref 0–1)
EOSINOPHIL # BLD AUTO: 0.16 THOUSAND/ΜL (ref 0–0.61)
EOSINOPHIL NFR BLD AUTO: 2 % (ref 0–6)
ERYTHROCYTE [DISTWIDTH] IN BLOOD BY AUTOMATED COUNT: 19 % (ref 11.6–15.1)
HCT VFR BLD AUTO: 50.5 % (ref 36.5–49.3)
HGB BLD-MCNC: 15.7 G/DL (ref 12–17)
IMM GRANULOCYTES # BLD AUTO: 0.06 THOUSAND/UL (ref 0–0.2)
IMM GRANULOCYTES NFR BLD AUTO: 1 % (ref 0–2)
INR PPP: 0.97 (ref 0.85–1.19)
LYMPHOCYTES # BLD AUTO: 2.19 THOUSANDS/ΜL (ref 0.6–4.47)
LYMPHOCYTES NFR BLD AUTO: 20 % (ref 14–44)
MCH RBC QN AUTO: 27.2 PG (ref 26.8–34.3)
MCHC RBC AUTO-ENTMCNC: 31.1 G/DL (ref 31.4–37.4)
MCV RBC AUTO: 87 FL (ref 82–98)
MONOCYTES # BLD AUTO: 1.06 THOUSAND/ΜL (ref 0.17–1.22)
MONOCYTES NFR BLD AUTO: 10 % (ref 4–12)
NEUTROPHILS # BLD AUTO: 7.25 THOUSANDS/ΜL (ref 1.85–7.62)
NEUTS SEG NFR BLD AUTO: 66 % (ref 43–75)
NRBC BLD AUTO-RTO: 0 /100 WBCS
PLATELET # BLD AUTO: 339 THOUSANDS/UL (ref 149–390)
PMV BLD AUTO: 11.4 FL (ref 8.9–12.7)
PROTHROMBIN TIME: 13.5 SECONDS (ref 12.3–15)
RBC # BLD AUTO: 5.78 MILLION/UL (ref 3.88–5.62)
WBC # BLD AUTO: 10.78 THOUSAND/UL (ref 4.31–10.16)

## 2025-02-03 ENCOUNTER — TELEPHONE (OUTPATIENT)
Age: 75
End: 2025-02-03

## 2025-02-03 NOTE — TELEPHONE ENCOUNTER
[facility]     Doctor: Dr. Butts    Reason for call: Patient saw Jodi ÁNGEL on 1/28/2025.  He has cataract surgery scheduled for 2/5/2025.  Was he cleared for surgery? She needs the clearance by tomorrow.  Please call her.    Thank you    Call back#: [phone]

## 2025-02-04 ENCOUNTER — TELEPHONE (OUTPATIENT)
Age: 75
End: 2025-02-04

## 2025-02-04 ENCOUNTER — DOCUMENTATION (OUTPATIENT)
Dept: NEPHROLOGY | Facility: CLINIC | Age: 75
End: 2025-02-04

## 2025-02-04 DIAGNOSIS — I12.9 PARENCHYMAL RENAL HYPERTENSION, STAGE 1 THROUGH STAGE 4 OR UNSPECIFIED CHRONIC KIDNEY DISEASE: Primary | ICD-10-CM

## 2025-02-04 RX ORDER — AMLODIPINE BESYLATE 2.5 MG/1
2.5 TABLET ORAL DAILY
Qty: 30 TABLET | Refills: 5 | Status: SHIPPED | OUTPATIENT
Start: 2025-02-04

## 2025-02-04 NOTE — PROGRESS NOTES
Home BP readings:  AM: 132/85, no orthostatic changes  PM: 125/70, no orthostatic changes  Heart rate: 70-80 range    Recommendations:  1.  I would add amlodipine 2.5 mg at bedtime to help with the morning readings watch for leg swelling  2.  Repeat blood pressures 4 to 6 weeks

## 2025-02-04 NOTE — TELEPHONE ENCOUNTER
Caller: Monse from [facility]    Doctor/Office: Dr Butts    CB#: [phone]      What needs to be faxed: Cardiac clearance for cataract surgery tomorrow, 2/5/2025.  Needs to be faxed today.    Please see Dr. Butts's response in Telephone Encounter dated 2/3/2025.      Fax#: [phone]

## 2025-02-04 NOTE — PROGRESS NOTES
Spoke with patient about the following, he expressed understanding and thanked us for the call:    Recommendations:  1.  I would add amlodipine 2.5 mg at bedtime to help with the morning readings watch for leg swelling  2.  Repeat blood pressures 4 to 6 weeks

## 2025-02-06 ENCOUNTER — TELEPHONE (OUTPATIENT)
Age: 75
End: 2025-02-06

## 2025-02-06 NOTE — TELEPHONE ENCOUNTER
Caller: Tova Borregogabriela,spouse    Doctor: Dr. Pizano    Reason for call: He has a catheterization scheduled for 2/11/2025.  He has another eye surgery on 2/19/2025 and is going to need cardiac clearance since it is after the catheterization.  Please call him with any questions.    Thank you         Call back#: [phone]

## 2025-02-10 ENCOUNTER — ANESTHESIA EVENT (OUTPATIENT)
Dept: NON INVASIVE DIAGNOSTICS | Facility: HOSPITAL | Age: 75
End: 2025-02-10
Payer: MEDICARE

## 2025-02-10 NOTE — ANESTHESIA PREPROCEDURE EVALUATION
Procedure:  Cardiac eps/afib ablation PFA (Chest)  Cardiac eps/aflutter ablation (Chest)  Cardiac loop recorder implant (Chest)    Hx of Afib w/ RVR in Feb 2024  - EF20%, now recovered  - on metoprolol 75mg BID    Echo June 2024  EF65%, mild TR    Relevant Problems   CARDIO   (+) Abdominal aortic atherosclerosis (HCC)   (+) Acute on chronic systolic congestive heart failure (HCC)   (+) Atrial fibrillation with RVR ([facility])   (+) PAD (peripheral artery disease) ([facility])   (+) Parenchymal renal hypertension   (+) Paroxysmal atrial fibrillation ([facility])   (+) RBBB (right bundle branch block with left anterior fascicular block)   (+) SVT (supraventricular tachycardia) ([facility])   (+) Type 2 diabetes mellitus with diabetic peripheral angiopathy without gangrene ([facility])   (+) Venous insufficiency      ENDO   (+) Type 2 diabetes mellitus with diabetic peripheral angiopathy without gangrene ([facility])      /RENAL   (+) CKD (chronic kidney disease), stage III ([facility])   (+) Parenchymal renal hypertension      HEMATOLOGY   (+) Immunocompromised patient ([facility])        Physical Exam    Airway    Mallampati score: III  TM Distance: >3 FB  Neck ROM: full     Dental   No notable dental hx     Cardiovascular  Rhythm: regular, Rate: normal, Cardiovascular exam normal    Pulmonary  Pulmonary exam normal Breath sounds clear to auscultation    Other Findings        Anesthesia Plan  ASA Score- 3     Anesthesia Type- general with ASA Monitors.         Additional Monitors:     Airway Plan: ETT.    Comment: Risks/benefits and alternatives discussed including medication reaction, sore throat, aspiration, dental/oropharyngeal/ocular injuries, and/or grave/life threatening anesthetic and surgical emergencies..       Plan Factors-Exercise tolerance (METS): >4 METS.    Chart reviewed.    Patient summary reviewed.      Patient instructed to abstain from smoking on day of procedure. Patient did not smoke on day of surgery.            Induction-  intravenous.    Postoperative Plan- Plan for postoperative opioid use. Planned trial extubation    Perioperative Resuscitation Plan - Level 1 - Full Code.       Informed Consent- Anesthetic plan and risks discussed with patient.  I personally reviewed this patient with the CRNA. Discussed and agreed on the Anesthesia Plan with the CRNA..      NPO Status:  No vitals data found for the desired time range.

## 2025-02-10 NOTE — TELEPHONE ENCOUNTER
Tova pt's wife returned missed call to clarify upcoming surgery patient is having. Tova stated pt had an Cataract surgery of his right eye last week and he had to hold Jardiance and Glimepiride but did not have to hold Xarelto. This time he will have Cataract left eye surgery and will like to know if he will be ok for that surgery ?

## 2025-02-11 ENCOUNTER — ANESTHESIA (OUTPATIENT)
Dept: NON INVASIVE DIAGNOSTICS | Facility: HOSPITAL | Age: 75
End: 2025-02-11
Payer: MEDICARE

## 2025-02-11 ENCOUNTER — HOSPITAL ENCOUNTER (OUTPATIENT)
Dept: NON INVASIVE DIAGNOSTICS | Facility: HOSPITAL | Age: 75
Discharge: HOME | End: 2025-02-11
Attending: INTERNAL MEDICINE
Payer: MEDICARE

## 2025-02-11 ENCOUNTER — HOSPITAL ENCOUNTER (OUTPATIENT)
Facility: HOSPITAL | Age: 75
Setting detail: OUTPATIENT SURGERY
Discharge: HOME | End: 2025-02-12
Attending: INTERNAL MEDICINE | Admitting: INTERNAL MEDICINE
Payer: MEDICARE

## 2025-02-11 DIAGNOSIS — I48.0 PAROXYSMAL ATRIAL FIBRILLATION (HCC): ICD-10-CM

## 2025-02-11 DIAGNOSIS — I48.91 FLUTTER-FIBRILLATION (HCC): Primary | ICD-10-CM

## 2025-02-11 DIAGNOSIS — I48.92 FLUTTER-FIBRILLATION (HCC): Primary | ICD-10-CM

## 2025-02-11 PROBLEM — I42.9 CARDIOMYOPATHY (HCC): Status: ACTIVE | Noted: 2025-02-11

## 2025-02-11 LAB
ANION GAP SERPL CALCULATED.3IONS-SCNC: 8 MMOL/L (ref 4–13)
ATRIAL RATE: 86 BPM
ATRIAL RATE: 86 BPM
BASOPHILS # BLD AUTO: 0.05 THOUSANDS/ΜL (ref 0–0.1)
BASOPHILS NFR BLD AUTO: 1 % (ref 0–1)
BUN SERPL-MCNC: 13 MG/DL (ref 5–25)
CALCIUM SERPL-MCNC: 8.9 MG/DL (ref 8.4–10.2)
CHLORIDE SERPL-SCNC: 105 MMOL/L (ref 96–108)
CO2 SERPL-SCNC: 27 MMOL/L (ref 21–32)
CREAT SERPL-MCNC: 0.95 MG/DL (ref 0.6–1.3)
EOSINOPHIL # BLD AUTO: 0.17 THOUSAND/ΜL (ref 0–0.61)
EOSINOPHIL NFR BLD AUTO: 2 % (ref 0–6)
ERYTHROCYTE [DISTWIDTH] IN BLOOD BY AUTOMATED COUNT: 18.8 % (ref 11.6–15.1)
GFR SERPL CREATININE-BSD FRML MDRD: 79 ML/MIN/1.73SQ M
GLUCOSE P FAST SERPL-MCNC: 142 MG/DL (ref 65–99)
GLUCOSE SERPL-MCNC: 122 MG/DL (ref 65–140)
GLUCOSE SERPL-MCNC: 142 MG/DL (ref 65–140)
HCT VFR BLD AUTO: 51.6 % (ref 36.5–49.3)
HGB BLD-MCNC: 15.8 G/DL (ref 12–17)
IMM GRANULOCYTES # BLD AUTO: 0.03 THOUSAND/UL (ref 0–0.2)
IMM GRANULOCYTES NFR BLD AUTO: 0 % (ref 0–2)
INR PPP: 2.02 (ref 0.85–1.19)
KCT BLD-ACNC: 496 SEC (ref 89–137)
LYMPHOCYTES # BLD AUTO: 2.39 THOUSANDS/ΜL (ref 0.6–4.47)
LYMPHOCYTES NFR BLD AUTO: 22 % (ref 14–44)
MCH RBC QN AUTO: 26.8 PG (ref 26.8–34.3)
MCHC RBC AUTO-ENTMCNC: 30.6 G/DL (ref 31.4–37.4)
MCV RBC AUTO: 88 FL (ref 82–98)
MONOCYTES # BLD AUTO: 0.95 THOUSAND/ΜL (ref 0.17–1.22)
MONOCYTES NFR BLD AUTO: 9 % (ref 4–12)
NEUTROPHILS # BLD AUTO: 7.23 THOUSANDS/ΜL (ref 1.85–7.62)
NEUTS SEG NFR BLD AUTO: 66 % (ref 43–75)
NRBC BLD AUTO-RTO: 0 /100 WBCS
P AXIS: 29 DEGREES
P AXIS: 80 DEGREES
PLATELET # BLD AUTO: 295 THOUSANDS/UL (ref 149–390)
PMV BLD AUTO: 9.9 FL (ref 8.9–12.7)
POTASSIUM SERPL-SCNC: 4.8 MMOL/L (ref 3.5–5.3)
PR INTERVAL: 168 MS
PR INTERVAL: 172 MS
PROTHROMBIN TIME: 22.9 SECONDS (ref 12.3–15)
QRS AXIS: -56 DEGREES
QRS AXIS: 238 DEGREES
QRSD INTERVAL: 134 MS
QRSD INTERVAL: 142 MS
QT INTERVAL: 416 MS
QT INTERVAL: 418 MS
QTC INTERVAL: 498 MS
QTC INTERVAL: 500 MS
RBC # BLD AUTO: 5.89 MILLION/UL (ref 3.88–5.62)
SODIUM SERPL-SCNC: 140 MMOL/L (ref 135–147)
SPECIMEN SOURCE: ABNORMAL
T WAVE AXIS: -14 DEGREES
T WAVE AXIS: -40 DEGREES
VENTRICULAR RATE: 86 BPM
VENTRICULAR RATE: 86 BPM
WBC # BLD AUTO: 10.82 THOUSAND/UL (ref 4.31–10.16)

## 2025-02-11 PROCEDURE — C1766 INTRO/SHEATH,STRBLE,NON-PEEL: HCPCS | Performed by: INTERNAL MEDICINE

## 2025-02-11 PROCEDURE — 85610 PROTHROMBIN TIME: CPT | Performed by: PHYSICIAN ASSISTANT

## 2025-02-11 PROCEDURE — C1764 EVENT RECORDER, CARDIAC: HCPCS | Performed by: INTERNAL MEDICINE

## 2025-02-11 PROCEDURE — C1730 CATH, EP, 19 OR FEW ELECT: HCPCS | Performed by: INTERNAL MEDICINE

## 2025-02-11 PROCEDURE — 93010 ELECTROCARDIOGRAM REPORT: CPT | Performed by: INTERNAL MEDICINE

## 2025-02-11 PROCEDURE — C1760 CLOSURE DEV, VASC: HCPCS | Performed by: INTERNAL MEDICINE

## 2025-02-11 PROCEDURE — 85025 COMPLETE CBC W/AUTO DIFF WBC: CPT | Performed by: PHYSICIAN ASSISTANT

## 2025-02-11 PROCEDURE — C1894 INTRO/SHEATH, NON-LASER: HCPCS | Performed by: INTERNAL MEDICINE

## 2025-02-11 PROCEDURE — 93656 COMPRE EP EVAL ABLTJ ATR FIB: CPT | Performed by: INTERNAL MEDICINE

## 2025-02-11 PROCEDURE — 76937 US GUIDE VASCULAR ACCESS: CPT | Performed by: INTERNAL MEDICINE

## 2025-02-11 PROCEDURE — 93005 ELECTROCARDIOGRAM TRACING: CPT

## 2025-02-11 PROCEDURE — C1732 CATH, EP, DIAG/ABL, 3D/VECT: HCPCS | Performed by: INTERNAL MEDICINE

## 2025-02-11 PROCEDURE — 82948 REAGENT STRIP/BLOOD GLUCOSE: CPT

## 2025-02-11 PROCEDURE — C1759 CATH, INTRA ECHOCARDIOGRAPHY: HCPCS | Performed by: INTERNAL MEDICINE

## 2025-02-11 PROCEDURE — NC001 PR NO CHARGE: Performed by: PHYSICIAN ASSISTANT

## 2025-02-11 PROCEDURE — C1769 GUIDE WIRE: HCPCS | Performed by: INTERNAL MEDICINE

## 2025-02-11 PROCEDURE — 80048 BASIC METABOLIC PNL TOTAL CA: CPT | Performed by: PHYSICIAN ASSISTANT

## 2025-02-11 PROCEDURE — 93657 TX L/R ATRIAL FIB ADDL: CPT | Performed by: INTERNAL MEDICINE

## 2025-02-11 PROCEDURE — 85347 COAGULATION TIME ACTIVATED: CPT

## 2025-02-11 PROCEDURE — 33285 INSJ SUBQ CAR RHYTHM MNTR: CPT | Performed by: INTERNAL MEDICINE

## 2025-02-11 PROCEDURE — C1733 CATH, EP, OTHR THAN COOL-TIP: HCPCS | Performed by: INTERNAL MEDICINE

## 2025-02-11 PROCEDURE — 93655 ICAR CATH ABLTJ DSCRT ARRHYT: CPT | Performed by: INTERNAL MEDICINE

## 2025-02-11 DEVICE — ICM LNQ22 LINQ II PRIME US
Type: IMPLANTABLE DEVICE | Site: CHEST  WALL | Status: FUNCTIONAL
Brand: LINQ II™

## 2025-02-11 DEVICE — DVC VASC CLSR VASCADE MVP 6-12FR: Type: IMPLANTABLE DEVICE | Site: GROIN | Status: FUNCTIONAL

## 2025-02-11 DEVICE — DVC VASC CLSR VASCADE MVP XL 10-12FR: Type: IMPLANTABLE DEVICE | Site: GROIN | Status: FUNCTIONAL

## 2025-02-11 RX ORDER — LIDOCAINE HYDROCHLORIDE 10 MG/ML
INJECTION, SOLUTION EPIDURAL; INFILTRATION; INTRACAUDAL; PERINEURAL CODE/TRAUMA/SEDATION MEDICATION
Status: DISCONTINUED | OUTPATIENT
Start: 2025-02-11 | End: 2025-02-11 | Stop reason: HOSPADM

## 2025-02-11 RX ORDER — LIDOCAINE HYDROCHLORIDE 10 MG/ML
INJECTION, SOLUTION EPIDURAL; INFILTRATION; INTRACAUDAL; PERINEURAL AS NEEDED
Status: DISCONTINUED | OUTPATIENT
Start: 2025-02-11 | End: 2025-02-11

## 2025-02-11 RX ORDER — PROTAMINE SULFATE 10 MG/ML
INJECTION, SOLUTION INTRAVENOUS AS NEEDED
Status: DISCONTINUED | OUTPATIENT
Start: 2025-02-11 | End: 2025-02-11

## 2025-02-11 RX ORDER — NITROGLYCERIN 20 MG/100ML
INJECTION INTRAVENOUS CODE/TRAUMA/SEDATION MEDICATION
Status: DISCONTINUED | OUTPATIENT
Start: 2025-02-11 | End: 2025-02-11 | Stop reason: HOSPADM

## 2025-02-11 RX ORDER — HEPARIN SODIUM 1000 [USP'U]/ML
INJECTION, SOLUTION INTRAVENOUS; SUBCUTANEOUS CODE/TRAUMA/SEDATION MEDICATION
Status: DISCONTINUED | OUTPATIENT
Start: 2025-02-11 | End: 2025-02-11 | Stop reason: HOSPADM

## 2025-02-11 RX ORDER — ONDANSETRON 2 MG/ML
INJECTION INTRAMUSCULAR; INTRAVENOUS AS NEEDED
Status: DISCONTINUED | OUTPATIENT
Start: 2025-02-11 | End: 2025-02-11

## 2025-02-11 RX ORDER — PROPOFOL 10 MG/ML
INJECTION, EMULSION INTRAVENOUS AS NEEDED
Status: DISCONTINUED | OUTPATIENT
Start: 2025-02-11 | End: 2025-02-11

## 2025-02-11 RX ORDER — FENTANYL CITRATE 50 UG/ML
INJECTION, SOLUTION INTRAMUSCULAR; INTRAVENOUS AS NEEDED
Status: DISCONTINUED | OUTPATIENT
Start: 2025-02-11 | End: 2025-02-11

## 2025-02-11 RX ORDER — HYDROMORPHONE HCL/PF 1 MG/ML
0.5 SYRINGE (ML) INJECTION
Status: DISCONTINUED | OUTPATIENT
Start: 2025-02-11 | End: 2025-02-11 | Stop reason: HOSPADM

## 2025-02-11 RX ORDER — PANTOPRAZOLE SODIUM 40 MG/1
40 TABLET, DELAYED RELEASE ORAL
Status: DISCONTINUED | OUTPATIENT
Start: 2025-02-12 | End: 2025-02-12 | Stop reason: HOSPADM

## 2025-02-11 RX ORDER — GLYCOPYRROLATE 0.2 MG/ML
INJECTION INTRAMUSCULAR; INTRAVENOUS AS NEEDED
Status: DISCONTINUED | OUTPATIENT
Start: 2025-02-11 | End: 2025-02-11

## 2025-02-11 RX ORDER — FENTANYL CITRATE/PF 50 MCG/ML
50 SYRINGE (ML) INJECTION
Status: DISCONTINUED | OUTPATIENT
Start: 2025-02-11 | End: 2025-02-11 | Stop reason: HOSPADM

## 2025-02-11 RX ORDER — AMLODIPINE BESYLATE 2.5 MG/1
2.5 TABLET ORAL DAILY
Status: DISCONTINUED | OUTPATIENT
Start: 2025-02-12 | End: 2025-02-12 | Stop reason: HOSPADM

## 2025-02-11 RX ORDER — PHENYLEPHRINE HCL IN 0.9% NACL 1 MG/10 ML
SYRINGE (ML) INTRAVENOUS AS NEEDED
Status: DISCONTINUED | OUTPATIENT
Start: 2025-02-11 | End: 2025-02-11

## 2025-02-11 RX ORDER — SODIUM CHLORIDE 9 MG/ML
20 INJECTION, SOLUTION INTRAVENOUS ONCE
Status: DISCONTINUED | OUTPATIENT
Start: 2025-02-11 | End: 2025-02-11 | Stop reason: HOSPADM

## 2025-02-11 RX ORDER — ONDANSETRON 2 MG/ML
4 INJECTION INTRAMUSCULAR; INTRAVENOUS ONCE AS NEEDED
Status: DISCONTINUED | OUTPATIENT
Start: 2025-02-11 | End: 2025-02-11 | Stop reason: HOSPADM

## 2025-02-11 RX ORDER — SODIUM CHLORIDE 9 MG/ML
INJECTION, SOLUTION INTRAVENOUS CONTINUOUS PRN
Status: DISCONTINUED | OUTPATIENT
Start: 2025-02-11 | End: 2025-02-11

## 2025-02-11 RX ORDER — METOPROLOL SUCCINATE 50 MG/1
50 TABLET, EXTENDED RELEASE ORAL 2 TIMES DAILY
Status: DISCONTINUED | OUTPATIENT
Start: 2025-02-11 | End: 2025-02-12 | Stop reason: HOSPADM

## 2025-02-11 RX ORDER — HEPARIN SODIUM 10000 [USP'U]/100ML
INJECTION, SOLUTION INTRAVENOUS
Status: DISCONTINUED | OUTPATIENT
Start: 2025-02-11 | End: 2025-02-11 | Stop reason: HOSPADM

## 2025-02-11 RX ORDER — ACETAMINOPHEN 325 MG/1
650 TABLET ORAL EVERY 4 HOURS PRN
Status: DISCONTINUED | OUTPATIENT
Start: 2025-02-11 | End: 2025-02-12 | Stop reason: HOSPADM

## 2025-02-11 RX ORDER — ASPIRIN 81 MG/1
81 TABLET ORAL ONCE
Status: COMPLETED | OUTPATIENT
Start: 2025-02-11 | End: 2025-02-11

## 2025-02-11 RX ORDER — ROCURONIUM BROMIDE 10 MG/ML
INJECTION, SOLUTION INTRAVENOUS AS NEEDED
Status: DISCONTINUED | OUTPATIENT
Start: 2025-02-11 | End: 2025-02-11

## 2025-02-11 RX ORDER — MIDAZOLAM HYDROCHLORIDE 2 MG/2ML
INJECTION, SOLUTION INTRAMUSCULAR; INTRAVENOUS AS NEEDED
Status: DISCONTINUED | OUTPATIENT
Start: 2025-02-11 | End: 2025-02-11

## 2025-02-11 RX ORDER — ATORVASTATIN CALCIUM 20 MG/1
20 TABLET, FILM COATED ORAL DAILY
Status: DISCONTINUED | OUTPATIENT
Start: 2025-02-12 | End: 2025-02-12 | Stop reason: HOSPADM

## 2025-02-11 RX ADMIN — PROTAMINE SULFATE 20 MG: 10 INJECTION, SOLUTION INTRAVENOUS at 10:42

## 2025-02-11 RX ADMIN — Medication 100 MCG: at 08:45

## 2025-02-11 RX ADMIN — GLYCOPYRROLATE 0.2 MG: 0.2 INJECTION, SOLUTION INTRAMUSCULAR; INTRAVENOUS at 09:26

## 2025-02-11 RX ADMIN — MIDAZOLAM 1 MG: 1 INJECTION INTRAMUSCULAR; INTRAVENOUS at 08:38

## 2025-02-11 RX ADMIN — Medication 50 MCG: at 10:40

## 2025-02-11 RX ADMIN — MIDAZOLAM 1 MG: 1 INJECTION INTRAMUSCULAR; INTRAVENOUS at 08:42

## 2025-02-11 RX ADMIN — PROTAMINE SULFATE 10 MG: 10 INJECTION, SOLUTION INTRAVENOUS at 10:38

## 2025-02-11 RX ADMIN — PROTAMINE SULFATE 10 MG: 10 INJECTION, SOLUTION INTRAVENOUS at 10:41

## 2025-02-11 RX ADMIN — METOPROLOL SUCCINATE 50 MG: 50 TABLET, EXTENDED RELEASE ORAL at 15:14

## 2025-02-11 RX ADMIN — FENTANYL CITRATE 25 MCG: 50 INJECTION INTRAMUSCULAR; INTRAVENOUS at 11:46

## 2025-02-11 RX ADMIN — METOPROLOL SUCCINATE 50 MG: 50 TABLET, EXTENDED RELEASE ORAL at 20:16

## 2025-02-11 RX ADMIN — SUGAMMADEX 200 MG: 100 INJECTION, SOLUTION INTRAVENOUS at 11:10

## 2025-02-11 RX ADMIN — ASPIRIN 81 MG: 81 TABLET, COATED ORAL at 15:14

## 2025-02-11 RX ADMIN — PHENYLEPHRINE HYDROCHLORIDE 200 MCG: 10 INJECTION INTRAVENOUS at 10:52

## 2025-02-11 RX ADMIN — NOREPINEPHRINE BITARTRATE 2 MCG/MIN: 1 INJECTION, SOLUTION, CONCENTRATE INTRAVENOUS at 09:41

## 2025-02-11 RX ADMIN — Medication 50 MCG: at 10:42

## 2025-02-11 RX ADMIN — PROPOFOL 150 MG: 10 INJECTION, EMULSION INTRAVENOUS at 08:45

## 2025-02-11 RX ADMIN — PHENYLEPHRINE HYDROCHLORIDE 50 MCG/MIN: 10 INJECTION INTRAVENOUS at 08:54

## 2025-02-11 RX ADMIN — NOREPINEPHRINE BITARTRATE 8 MCG: 1 INJECTION, SOLUTION, CONCENTRATE INTRAVENOUS at 09:42

## 2025-02-11 RX ADMIN — Medication 150 MCG: at 09:41

## 2025-02-11 RX ADMIN — ACETAMINOPHEN 650 MG: 325 TABLET, FILM COATED ORAL at 15:14

## 2025-02-11 RX ADMIN — PROTAMINE SULFATE 20 MG: 10 INJECTION, SOLUTION INTRAVENOUS at 10:40

## 2025-02-11 RX ADMIN — Medication 50 MCG: at 10:36

## 2025-02-11 RX ADMIN — PROTAMINE SULFATE 10 MG: 10 INJECTION, SOLUTION INTRAVENOUS at 10:35

## 2025-02-11 RX ADMIN — SODIUM CHLORIDE: 0.9 INJECTION, SOLUTION INTRAVENOUS at 08:36

## 2025-02-11 RX ADMIN — SUGAMMADEX 200 MG: 100 INJECTION, SOLUTION INTRAVENOUS at 10:45

## 2025-02-11 RX ADMIN — LIDOCAINE HYDROCHLORIDE 50 MG: 10 INJECTION, SOLUTION EPIDURAL; INFILTRATION; INTRACAUDAL; PERINEURAL at 08:45

## 2025-02-11 RX ADMIN — Medication 100 MCG: at 08:58

## 2025-02-11 RX ADMIN — FENTANYL CITRATE 50 MCG: 50 INJECTION INTRAMUSCULAR; INTRAVENOUS at 08:45

## 2025-02-11 RX ADMIN — ROCURONIUM BROMIDE 50 MG: 10 INJECTION, SOLUTION INTRAVENOUS at 09:46

## 2025-02-11 RX ADMIN — ROCURONIUM BROMIDE 50 MG: 10 INJECTION, SOLUTION INTRAVENOUS at 08:46

## 2025-02-11 RX ADMIN — FENTANYL CITRATE 50 MCG: 50 INJECTION INTRAMUSCULAR; INTRAVENOUS at 09:24

## 2025-02-11 RX ADMIN — ONDANSETRON 4 MG: 2 INJECTION INTRAMUSCULAR; INTRAVENOUS at 10:44

## 2025-02-11 RX ADMIN — Medication 150 MCG: at 09:46

## 2025-02-11 NOTE — ANESTHESIA POSTPROCEDURE EVALUATION
Post-Op Assessment Note    CV Status:  Stable  Pain Score: 0    Pain management: adequate       Mental Status:  Sleepy and lethargic   Hydration Status:  Stable   PONV Controlled:  None   Airway Patency:  Patent  Airway: intubated     Post Op Vitals Reviewed: Yes    No anethesia notable event occurred.    Staff: Anesthesiologist           Last Filed PACU Vitals:  Vitals Value Taken Time   Temp 97.7    Pulse 83 02/11/25 1124   /60    Resp 18 02/11/25 1124   SpO2 95 % 02/11/25 1124   Vitals shown include unfiled device data.

## 2025-02-11 NOTE — H&P
"H&P - Critical Care/ICU   Name: South Humphrey III 74 y.o. male I MRN: [id number]  Unit/Bed#: [facility] I Date of Admission: 2/11/2025   Date of Service: 2/11/2025 I Hospital Day: 0     Assessment & Plan  Flutter-fibrillation (HCC)  -On 12/30/2024, patient was in apical a-flutter in office with Dr. Pizano.  Patient was sent for cardioversion on1/6/2025.   -currently nsr  -Patient here today for A-fib flutter ablation and loop re-implant.  Combined systolic and diastolic heart failure (HCC)  Wt Readings from Last 3 Encounters:   02/11/25 107 kg (235 lb 14.3 oz)   01/28/25 107 kg (235 lb)   01/17/25 107 kg (235 lb)   -HF team following    Cardiomyopathy (HCC)  -feb 2024 pt with tachy induced cardiomyopathy  -improved ef to 65% after cardioversion  -amio stopped over summer  -pt in aflutter in dec and DCCV jan 6  -currently nsr         History of Present Illness   South Humphrey III is a 74 y.o. male who presents with history of nonischemic cardiomyopathy.  Patient was admitted in February 2024 for with heart failure and A-fib with RVR.  EF was reduced to 20%.  After cardioversion 2/19/2024 and EF improved to 60 to 65%.  Patient discontinued his amiodarone in the summer 2024.  Patient returned to office in atrial flutter with RVR and was scheduled for cardioversion on January 6th, 2025 to avoid tachycardia induced cardiomyopathy.  Patient here today for A-fib/flutter ablation and loop reimplantation.    \"Per Dr Pizano's last visit on 12/30/2025 \"74 y.o. man with nonischemic cardiomyopathy with ejection fraction 65%, paroxysmal atrial fibrillation and atrial flutter status post cardioversion in February 2024, right bundle branch block, history of bifascicular block, status post loop monitor, hyperlipidemia, CKD, peripheral artery disease status post femoral-popliteal bypass surgery, chron's disease, diabetes and aortic aneurysm who presents to discuss management of atrial fibrillation.     Patient " "was diagnosed with tach induced cardiomyopathy in February 2024 in setting of atrial fibrillation and atrial flutter with RVR.  He underwent cardioversion in February 2024 and ejection fraction noted in June was back to normal.  His amiodarone was discontinued.  He has now gone back into atrial flutter with RVR, noted earliest during December visit with Dr. Butts.\"       Review of Systems   All other systems reviewed and are negative.    Medical History Review: I have reviewed the patient's PMH, PSH, Social History, Family History, Meds, and Allergies   Historical Information   Past Medical History:   Diagnosis Date    Harris esophagus     Blue toe syndrome (HCC)     Chronic kidney disease     Colon polyps     Crohn's disease (HCC) 1-2020    GERD (gastroesophageal reflux disease)     Hematuria     History of rheumatic fever     Hypolipidemia     Hypotension     Ischemic cardiomyopathy     PAD (peripheral artery disease) (HCC)     Pulmonary emphysema (HCC)     PVD (peripheral vascular disease) (HCC)      Past Surgical History:   Procedure Laterality Date    CARDIAC ELECTROPHYSIOLOGY PROCEDURE N/A 1/10/2024    Procedure: Cardiac loop recorder explant;  Surgeon: Jason Cortez MD;  Location: [facility];  Service: Cardiology    COLONOSCOPY  2019    HERNIA REPAIR      IR AORTAGRAM WITH RUN-OFF  6/27/2019    IR AORTAGRAM WITH RUN-OFF  2/12/2020    POPLITEAL ARTERY STENT Right     6/2019    OH BYPASS W/VEIN FEMORAL-POPLITEAL Right 3/26/2020    Procedure: BYPASS FEMORAL-POPLITEAL; Right lower extremity bypass, fem-bk pop with GSV;  Surgeon: Wyatt Shell MD;  Location: [facility];  Service: Vascular    OH SLCTV CATHJ 3RD+ ORD SLCTV ABDL PEL/LXTR BRNCH Right 6/27/2019    Procedure: leg ANGIOGRAM WITH STENT, BALLOON ANGIOPLASTY, LEFT GROIN ACCESS;  Surgeon: Wyatt Shell MD;  Location: [facility];  Service: Vascular     Social History     Tobacco Use    Smoking status: Former     Current packs/day: 0.25     " Average packs/day: 0.3 packs/day for 30.0 years (7.5 ttl pk-yrs)     Types: Cigarettes    Smokeless tobacco: Never   Vaping Use    Vaping status: Never Used   Substance and Sexual Activity    Alcohol use: Yes     Alcohol/week: 1.0 standard drink of alcohol     Types: 1 Standard drinks or equivalent per week     Comment: social drinker when out dining    Drug use: Never    Sexual activity: Not Currently     Birth control/protection: None     E-Cigarette/Vaping    E-Cigarette Use Never User      E-Cigarette/Vaping Substances    Nicotine No     THC No     CBD No     Flavoring No     Other No     Unknown No      Family history non-contributory    Objective :  Temp:  [96.9 °F (36.1 °C)] 96.9 °F (36.1 °C)  HR:  [94] 94  BP: (164)/(98) 164/98  Resp:  [22] 22  SpO2:  [93 %] 93 %  O2 Device: None (Room air)  Orthostatic Blood Pressures      Flowsheet Row Most Recent Value   Blood Pressure 164/98 filed at 02/11/2025 0706          First Weight: Weight - Scale: 107 kg (235 lb 14.3 oz) (02/11/25 0706)  Vitals:    02/11/25 0706   Weight: 107 kg (235 lb 14.3 oz)       Physical Exam  Vitals and nursing note reviewed.   Constitutional:       Appearance: Normal appearance.   HENT:      Head: Normocephalic and atraumatic.      Mouth/Throat:      Mouth: Mucous membranes are moist.   Eyes:      Pupils: Pupils are equal, round, and reactive to light.   Cardiovascular:      Rate and Rhythm: Normal rate and regular rhythm.      Pulses: Normal pulses.      Heart sounds: Normal heart sounds.   Pulmonary:      Effort: Pulmonary effort is normal.      Breath sounds: Normal breath sounds.   Abdominal:      General: There is distension.      Palpations: Abdomen is soft.   Musculoskeletal:         General: Normal range of motion.      Cervical back: Normal range of motion.   Skin:     General: Skin is warm and dry.   Neurological:      General: No focal deficit present.      Mental Status: He is alert and oriented to person, place, and time.    Psychiatric:         Mood and Affect: Mood normal.         Lab Results: I have reviewed the following results:CBC/BMP:   .     02/11/25  0650   WBC 10.82*   HGB 15.8   HCT 51.6*      SODIUM 140   K 4.8      CO2 27   BUN 13   CREATININE 0.95   GLUC 142*    , PTT/INR:  .     02/11/25  0650   INR 2.02*      Results from last 7 days   Lab Units 02/11/25  0650   WBC Thousand/uL 10.82*   HEMOGLOBIN g/dL 15.8   HEMATOCRIT % 51.6*   PLATELETS Thousands/uL 295     Results from last 7 days   Lab Units 02/11/25  0650   POTASSIUM mmol/L 4.8   CHLORIDE mmol/L 105   CO2 mmol/L 27   BUN mg/dL 13   CREATININE mg/dL 0.95   CALCIUM mg/dL 8.9     Results from last 7 days   Lab Units 02/11/25  0650   INR  2.02*     Lab Results   Component Value Date    HGBA1C 7.1 (A) 01/17/2025     Lab Results   Component Value Date    CKTOTAL 58 09/29/2022    TROPONINI <0.02 01/13/2020       Imaging Results Review: I personally reviewed the following image studies/reports in PACS and discussed pertinent findings with Radiology: Echocardiogram. My interpretation of the radiology images/reports is:  .  Other Study Results Review: EKG was reviewed.     VTE Prophylaxis: VTE covered by:    None    and Sequential compression device (Venodyne)   Pt took xarelto this am.

## 2025-02-11 NOTE — ASSESSMENT & PLAN NOTE
-feb 2024 pt with tachy induced cardiomyopathy  -improved ef to 65% after cardioversion  -amio stopped over summer  -pt in aflutter in dec and DCCV jan 6  -currently nsr

## 2025-02-11 NOTE — TELEPHONE ENCOUNTER
Called pt, unable to reach out, LVM inofrming per Dr Pizano pt does not need to hold blood thinner prior upcoming eye surgery.

## 2025-02-11 NOTE — PLAN OF CARE
Problem: PAIN - ADULT  Goal: Verbalizes/displays adequate comfort level or baseline comfort level  Description: Interventions:  - Encourage patient to monitor pain and request assistance  - Assess pain using appropriate pain scale  - Administer analgesics based on type and severity of pain and evaluate response  - Implement non-pharmacological measures as appropriate and evaluate response  - Consider cultural and social influences on pain and pain management  - Notify physician/advanced practitioner if interventions unsuccessful or patient reports new pain  Outcome: Progressing     Problem: INFECTION - ADULT  Goal: Absence or prevention of progression during hospitalization  Description: INTERVENTIONS:  - Assess and monitor for signs and symptoms of infection  - Monitor lab/diagnostic results  - Monitor all insertion sites, i.e. indwelling lines, tubes, and drains  - Monitor endotracheal if appropriate and nasal secretions for changes in amount and color  - [place] appropriate cooling/warming therapies per order  - Administer medications as ordered  - Instruct and encourage patient and family to use good hand hygiene technique  - Identify and instruct in appropriate isolation precautions for identified infection/condition  Outcome: Progressing  Goal: Absence of fever/infection during neutropenic period  Description: INTERVENTIONS:  - Monitor WBC    Outcome: Progressing     Problem: SAFETY ADULT  Goal: Patient will remain free of falls  Description: INTERVENTIONS:  - Educate patient/family on patient safety including physical limitations  - Instruct patient to call for assistance with activity   - Consult OT/PT to assist with strengthening/mobility   - Keep Call bell within reach  - Keep bed low and locked with side rails adjusted as appropriate  - Keep care items and personal belongings within reach  - Initiate and maintain comfort rounds  - Make Fall Risk Sign visible to staff  - Offer Toileting every  Hours,  in advance of need  - Initiate/Maintain alarm  - Obtain necessary fall risk management equipment:   - Apply yellow socks and bracelet for high fall risk patients  - Consider moving patient to room near nurses station  Outcome: Progressing  Goal: Maintain or return to baseline ADL function  Description: INTERVENTIONS:  -  Assess patient's ability to carry out ADLs; assess patient's baseline for ADL function and identify physical deficits which impact ability to perform ADLs (bathing, care of mouth/teeth, toileting, grooming, dressing, etc.)  - Assess/evaluate cause of self-care deficits   - Assess range of motion  - Assess patient's mobility; develop plan if impaired  - Assess patient's need for assistive devices and provide as appropriate  - Encourage maximum independence but intervene and supervise when necessary  - Involve family in performance of ADLs  - Assess for home care needs following discharge   - Consider OT consult to assist with ADL evaluation and planning for discharge  - Provide patient education as appropriate  Outcome: Progressing  Goal: Maintains/Returns to pre admission functional level  Description: INTERVENTIONS:  - Perform AM-PAC 6 Click Basic Mobility/ Daily Activity assessment daily.  - Set and communicate daily mobility goal to care team and patient/family/caregiver.   - Collaborate with rehabilitation services on mobility goals if consulted  - Perform Range of Motion times a day.  - Reposition patient every  hours.  - Dangle patient  times a day  - Stand patient  times a day  - Ambulate patient  times a day  - Out of bed to chair  times a day   - Out of bed for meals times a day  - Out of bed for toileting  - Record patient progress and toleration of activity level   Outcome: Progressing     Problem: DISCHARGE PLANNING  Goal: Discharge to home or other facility with appropriate resources  Description: INTERVENTIONS:  - Identify barriers to discharge w/patient and caregiver  - Arrange for  needed discharge resources and transportation as appropriate  - Identify discharge learning needs (meds, wound care, etc.)  - Arrange for interpretive services to assist at discharge as needed  - Refer to Case Management Department for coordinating discharge planning if the patient needs post-hospital services based on physician/advanced practitioner order or complex needs related to functional status, cognitive ability, or social support system  Outcome: Progressing     Problem: Knowledge Deficit  Goal: Patient/family/caregiver demonstrates understanding of disease process, treatment plan, medications, and discharge instructions  Description: Complete learning assessment and assess knowledge base.  Interventions:  - Provide teaching at level of understanding  - Provide teaching via preferred learning methods  Outcome: Progressing

## 2025-02-11 NOTE — ASSESSMENT & PLAN NOTE
-On 12/30/2024, patient was in apical a-flutter in office with Dr. Pizano.  Patient was sent for cardioversion on1/6/2025.   -currently nsr  -Patient here today for A-fib flutter ablation and loop re-implant.

## 2025-02-11 NOTE — ASSESSMENT & PLAN NOTE
Wt Readings from Last 3 Encounters:   02/11/25 107 kg (235 lb 14.3 oz)   01/28/25 107 kg (235 lb)   01/17/25 107 kg (235 lb)   -HF team following

## 2025-02-12 VITALS
RESPIRATION RATE: 18 BRPM | SYSTOLIC BLOOD PRESSURE: 101 MMHG | HEART RATE: 60 BPM | OXYGEN SATURATION: 94 % | WEIGHT: 235.89 LBS | BODY MASS INDEX: 33.77 KG/M2 | HEIGHT: 70 IN | TEMPERATURE: 98 F | DIASTOLIC BLOOD PRESSURE: 62 MMHG

## 2025-02-12 LAB
ANION GAP SERPL CALCULATED.3IONS-SCNC: 4 MMOL/L (ref 4–13)
BUN SERPL-MCNC: 11 MG/DL (ref 5–25)
CALCIUM SERPL-MCNC: 8.3 MG/DL (ref 8.4–10.2)
CHLORIDE SERPL-SCNC: 106 MMOL/L (ref 96–108)
CO2 SERPL-SCNC: 28 MMOL/L (ref 21–32)
CREAT SERPL-MCNC: 0.84 MG/DL (ref 0.6–1.3)
ERYTHROCYTE [DISTWIDTH] IN BLOOD BY AUTOMATED COUNT: 19.1 % (ref 11.6–15.1)
GFR SERPL CREATININE-BSD FRML MDRD: 86 ML/MIN/1.73SQ M
GLUCOSE SERPL-MCNC: 147 MG/DL (ref 65–140)
GLUCOSE SERPL-MCNC: 169 MG/DL (ref 65–140)
HCT VFR BLD AUTO: 44.3 % (ref 36.5–49.3)
HGB BLD-MCNC: 13.6 G/DL (ref 12–17)
INR PPP: 1.2 (ref 0.85–1.19)
KCT BLD-ACNC: 427 SEC (ref 89–137)
KCT BLD-ACNC: 489 SEC (ref 89–137)
MCH RBC QN AUTO: 26.9 PG (ref 26.8–34.3)
MCHC RBC AUTO-ENTMCNC: 30.7 G/DL (ref 31.4–37.4)
MCV RBC AUTO: 88 FL (ref 82–98)
PLATELET # BLD AUTO: 241 THOUSANDS/UL (ref 149–390)
PMV BLD AUTO: 10.7 FL (ref 8.9–12.7)
POTASSIUM SERPL-SCNC: 4.1 MMOL/L (ref 3.5–5.3)
PROTHROMBIN TIME: 15.4 SECONDS (ref 12.3–15)
RBC # BLD AUTO: 5.06 MILLION/UL (ref 3.88–5.62)
SODIUM SERPL-SCNC: 138 MMOL/L (ref 135–147)
SPECIMEN SOURCE: ABNORMAL
SPECIMEN SOURCE: ABNORMAL
WBC # BLD AUTO: 9.55 THOUSAND/UL (ref 4.31–10.16)

## 2025-02-12 PROCEDURE — 85027 COMPLETE CBC AUTOMATED: CPT | Performed by: PHYSICIAN ASSISTANT

## 2025-02-12 PROCEDURE — 80048 BASIC METABOLIC PNL TOTAL CA: CPT | Performed by: PHYSICIAN ASSISTANT

## 2025-02-12 PROCEDURE — 85610 PROTHROMBIN TIME: CPT | Performed by: PHYSICIAN ASSISTANT

## 2025-02-12 PROCEDURE — 82948 REAGENT STRIP/BLOOD GLUCOSE: CPT

## 2025-02-12 RX ADMIN — PANTOPRAZOLE SODIUM 40 MG: 40 TABLET, DELAYED RELEASE ORAL at 05:05

## 2025-02-12 RX ADMIN — ACETAMINOPHEN 650 MG: 325 TABLET, FILM COATED ORAL at 05:08

## 2025-02-12 RX ADMIN — AMLODIPINE BESYLATE 2.5 MG: 2.5 TABLET ORAL at 08:44

## 2025-02-12 RX ADMIN — ATORVASTATIN CALCIUM 20 MG: 20 TABLET, FILM COATED ORAL at 08:29

## 2025-02-12 RX ADMIN — RIVAROXABAN 20 MG: 20 TABLET, FILM COATED ORAL at 08:29

## 2025-02-12 NOTE — RESTORATIVE TECHNICIAN NOTE
Restorative Technician Note      Patient Name: South Humphrey III     Restorative Tech Visit Date: 02/12/25  Note Type: Mobility  Patient Position Upon Consult: Bedside chair  Mobility / Activity Provided: pt ambulating independently in hallway  Activity Performed: Ambulated  Assistive Device: Other (Comment) (none)  Patient Position at End of Consult: Bedside chair; All needs within reach

## 2025-02-12 NOTE — PLAN OF CARE
Problem: PAIN - ADULT  Goal: Verbalizes/displays adequate comfort level or baseline comfort level  Description: Interventions:  - Encourage patient to monitor pain and request assistance  - Assess pain using appropriate pain scale  - Administer analgesics based on type and severity of pain and evaluate response  - Implement non-pharmacological measures as appropriate and evaluate response  - Consider cultural and social influences on pain and pain management  - Notify physician/advanced practitioner if interventions unsuccessful or patient reports new pain  Outcome: Progressing     Problem: INFECTION - ADULT  Goal: Absence or prevention of progression during hospitalization  Description: INTERVENTIONS:  - Assess and monitor for signs and symptoms of infection  - Monitor lab/diagnostic results  - Monitor all insertion sites, i.e. indwelling lines, tubes, and drains  - Monitor endotracheal if appropriate and nasal secretions for changes in amount and color  - [place] appropriate cooling/warming therapies per order  - Administer medications as ordered  - Instruct and encourage patient and family to use good hand hygiene technique  - Identify and instruct in appropriate isolation precautions for identified infection/condition  Outcome: Progressing  Goal: Absence of fever/infection during neutropenic period  Description: INTERVENTIONS:  - Monitor WBC    Outcome: Progressing     Problem: SAFETY ADULT  Goal: Patient will remain free of falls  Description: INTERVENTIONS:  - Educate patient/family on patient safety including physical limitations  - Instruct patient to call for assistance with activity   - Consult OT/PT to assist with strengthening/mobility   - Keep Call bell within reach  - Keep bed low and locked with side rails adjusted as appropriate  - Keep care items and personal belongings within reach  - Initiate and maintain comfort rounds  - Make Fall Risk Sign visible to staff  - Apply yellow socks and bracelet  for high fall risk patients  - Consider moving patient to room near nurses station  Outcome: Progressing  Goal: Maintain or return to baseline ADL function  Description: INTERVENTIONS:  -  Assess patient's ability to carry out ADLs; assess patient's baseline for ADL function and identify physical deficits which impact ability to perform ADLs (bathing, care of mouth/teeth, toileting, grooming, dressing, etc.)  - Assess/evaluate cause of self-care deficits   - Assess range of motion  - Assess patient's mobility; develop plan if impaired  - Assess patient's need for assistive devices and provide as appropriate  - Encourage maximum independence but intervene and supervise when necessary  - Involve family in performance of ADLs  - Assess for home care needs following discharge   - Consider OT consult to assist with ADL evaluation and planning for discharge  - Provide patient education as appropriate  Outcome: Progressing  Goal: Maintains/Returns to pre admission functional level  Description: INTERVENTIONS:  - Perform AM-PAC 6 Click Basic Mobility/ Daily Activity assessment daily.  - Set and communicate daily mobility goal to care team and patient/family/caregiver.   - Collaborate with rehabilitation services on mobility goals if consulted  - Perform Range of Motion 2 times a day.  - Reposition patient every 2 hours.  - Dangle patient 2 times a day  - Stand patient 2 times a day  - Ambulate patient 2 times a day  - Out of bed to chair 2 times a day   - Out of bed for meals 2 times a day  - Out of bed for toileting  - Record patient progress and toleration of activity level   Outcome: Progressing     Problem: DISCHARGE PLANNING  Goal: Discharge to home or other facility with appropriate resources  Description: INTERVENTIONS:  - Identify barriers to discharge w/patient and caregiver  - Arrange for needed discharge resources and transportation as appropriate  - Identify discharge learning needs (meds, wound care, etc.)  -  Arrange for interpretive services to assist at discharge as needed  - Refer to Case Management Department for coordinating discharge planning if the patient needs post-hospital services based on physician/advanced practitioner order or complex needs related to functional status, cognitive ability, or social support system  Outcome: Progressing     Problem: Knowledge Deficit  Goal: Patient/family/caregiver demonstrates understanding of disease process, treatment plan, medications, and discharge instructions  Description: Complete learning assessment and assess knowledge base.  Interventions:  - Provide teaching at level of understanding  - Provide teaching via preferred learning methods  Outcome: Progressing

## 2025-02-12 NOTE — DISCHARGE SUMMARY
Discharge Summary - Cardiology   Name: South Humphrey III 74 y.o. male I MRN: [id number]  Unit/Bed#: [facility] 512-01 I Date of Admission: 2/11/2025   Date of Service: 2/12/2025 I Hospital Day: 0        Discharge Summary - South Humphrey III 74 y.o. male MRN: [id number]    Unit/Bed#: [facility] 512-01 Encounter: [id number]      Admission Date: 2/11/2025   Discharge Date: 2/12/2025    Discharge Diagnosis: PAF    Procedures Performed: eps/afib ablation and loop implant  Orders Placed This Encounter   Procedures    Cardiac ep lab eps/ablations         Consultants: none      HPI: Please refer to the initial history and physical as well as procedure notes for full details. Briefly, South Humphrey III is a 74 y.o. year old male with history of heart failure and A-fib with RVR, cardiomyopathy, EF 65%, status post cardioversion in February 2024, right bundle branch block, bifascicular block, history of loop monitor, HLD, CKD, PAD status post femoropopliteal bypass surgery, Crohn's disease, diabetes, aortic aneurysm. Patient was status post cardioversion on January 6, 2025.  He was seen by Dr. Pizano as an outpatient, and a -fib /flutter ablation and loop implantation was recommended. He presented this hospital admission to undergo this procedure.       Hospital Course: South Humphrey III presented at his baseline state of health. After the procedure was explained in detail and consent was obtained, he underwent the above procedures without complications. Please see operative notes by Dr. Pizano for full details. He tolerated the procedure well. He was then monitored overnight for further observation.    There were no acute issues or events overnight. The following morning he denied all cardiac complaints, including chest pain/pressure, dyspnea, palpitations, dizziness, lightheadedness, or syncope. His vital signs were reviewed and labs are stable. Telemetry showed sinus rhythm. His groins were soft without  significant hematoma or recurrent bleeding, and groin sutures were removed without incident.     Physical exam:  VSS  GEN: NAD, alert and oriented x 3, well appearing  SKIN: dry without significant lesions or rashes, ecchymosis b/l groins without hematoma. Pt had right groin stitch removed.  HEENT: NCAT, PERRL, EOMs intact  NECK: No JVD appreciated  CARDIOVASCULAR: RRR, normal S1, S2 without murmurs, rubs, or gallops appreciated  LUNGS: Clear to auscultation bilaterally without wheezes, rhonchi, or rales  ABDOMEN: Soft, nontender, distended  EXTREMITIES/VASCULAR: perfused without clubbing, cyanosis, or LE edema b/l  PSYCH: Normal mood and affect  NEURO: CN ll-Xll grossly intact    He was given routine post ablation discharge instructions and restrictions, and these were explained in detail. He was given a follow up appointment with Mike Burgess PA-C, and he was instructed to follow up with his primary cardiologist as previously instructed.    In terms of his medications, he will continue his home meds.    He is stable for discharge at this time with all questions answered. He was discussed in detail with Dr. Pizano who is in agreement with this discharge summary.       Discharge Medications:  See after visit summary for reconciled discharge medications provided to patient and family.      Medications Prior to Admission:     amLODIPine (NORVASC) 2.5 mg tablet    aspirin 81 MG tablet    atorvastatin (LIPITOR) 20 mg tablet    Empagliflozin (Jardiance) 10 MG TABS tablet    folic acid (FOLVITE) 1 mg tablet    glimepiride (AMARYL) 2 mg tablet    Magnesium 500 MG TABS    metoprolol succinate (TOPROL-XL) 50 mg 24 hr tablet    omeprazole (PriLOSEC) 20 mg delayed release capsule    risankizumab-rzaa (Skyrizi) 360 MG/2.4ML SOCT    vitamin B-12 (VITAMIN B-12) 1,000 mcg tablet    Xarelto 20 MG tablet    furosemide (LASIX) 20 mg tablet    metoprolol succinate (TOPROL-XL) 25 mg 24 hr tablet    sacubitril-valsartan (Entresto)  24-26 MG TABS      [facility] Labs/diagnostics:  CBC with diff:   Results from last 7 days   Lab Units 02/12/25  0459 02/11/25  0650   WBC Thousand/uL 9.55 10.82*   HEMOGLOBIN g/dL 13.6 15.8   HEMATOCRIT % 44.3 51.6*   MCV fL 88 88   PLATELETS Thousands/uL 241 295   RBC Million/uL 5.06 5.89*   MCH pg 26.9 26.8   MCHC g/dL 30.7* 30.6*   RDW % 19.1* 18.8*   MPV fL 10.7 9.9   NRBC AUTO /100 WBCs  --  0       BMP:  Results from last 7 days   Lab Units 02/12/25  0459 02/11/25  0650   POTASSIUM mmol/L 4.1 4.8   CHLORIDE mmol/L 106 105   CO2 mmol/L 28 27   BUN mg/dL 11 13   CREATININE mg/dL 0.84 0.95   CALCIUM mg/dL 8.3* 8.9       Magnesium:       Coags:   Results from last 7 days   Lab Units 02/12/25  0459 02/11/25  0650   INR  1.20* 2.02*         Complications: none    Condition at Discharge: good     Discharge instructions/Information to patient and family:   See after visit summary for information provided to patient and family.      Provisions for Follow-Up Care:  See after visit summary for information related to follow-up care and any pertinent home health orders.      Disposition: Home    Planned Readmission: No    Discharge Statement   I spent 45 minutes minutes discharging the patient. This time was spent on the day of discharge. I had direct contact with the patient on the day of discharge. Additional documentation is required if more than 30 minutes were spent on discharge. Evaluating the incision, discussing discharge instructions and restrictions, arranging follow up appointments, discussing medications

## 2025-02-12 NOTE — DISCHARGE INSTR - AVS FIRST PAGE
Medication Plan:    Take:   Xarelto 20mg daily. Do not stop blood thinner until discussion with provider at post op appointment.   Aspirin 81mg once daily for 30 days in the post ablation setting    Post Procedure Care instructions:    For 7 days:  You may shower after the first 24 hours   Allow gentle soap and water to wash over incision.  Do not scrub. Pat to dry   If chaffing with undergarments, wear bandage during the day to prevent irritation. Maximum 5 days and change bandage daily.  Do not use lotions/powders/creams    You may walk or go up/down stairs      Restrictions for 1 week:   Do not lift greater than 10lbs    Avoid running/jumping    No submerging wound in water (No bath/hot tubs/pools/etc)    Normal healing process   You may have bruising that extends into your genitalia and/or down your thigh toward the knee. This can take several weeks to resolve.  Pain may worsen over the first 48 hours as you increase your activity and then will improve   Tylenol (acetaminophen)  Ice   You may have a small pea sized lump   Your arrhythmia may reoccur in the next 3 months  This is due to inflammation/irritation from the procedure    When to call clinic:    Redness or swelling at incision site   Bleeding or expanding lump in groin    Opening and/or drainage from the incision   Temperature >100.4 F   If your arrhythmia reoccurs call if:  You are symptomatic (dizziness/lightheadedness/heart racing/etc)    If you have any questions:   [phone] 8:30am-5:30pm  [phone] After hours  [phone] Appointments   Post Procedure Care instructions:     Pain management   Tylenol (acetaminophen) and ice        First 7 days:  Bandage must remain on wound for first 48 hours then you may take it off  Do not use lotions, powders, creams, or ointments on incision  Bathing:    Place waterproof bandage over top when showering   Avoid water directly hitting bandage   Do not soak incision   After 7 days:  If glue is  present:  Avoid picking at the glue or peeling it off, the glue will come off on its own  If stiches present:   Leave in place, they will be removed at your 2 week follow up appointment      When to call clinic:    Redness or swelling at incision site   Opening and/or drainage from the incision   Temperature >100.4 F     If you have any questions:   [phone] 8:30am-5:30pm  [phone] After hours  [phone] Appointments     Information about your device:     WHAT YOU SHOULD KNOW:    A cardiac loop recorder is a device used to diagnose heart rhythm problems, such as a fast or irregular heartbeat. It is implanted in your left chest, just under the skin. The device records a pattern of your heart's rhythm, called an EKG. Your device records automatic EKGs, depending on how your caregiver programs it. You may also receive a handheld controller. You press a button on the controller when you have symptoms, such as dizziness, lightheadedness, or palpitations. The device will record an EKG at that moment. The recording can help your caregiver see if your symptoms may be caused by heart rhythm problems. Your caregiver will remove the device after it has collected enough data. You may need the device for up to 3 years. The procedure to remove the device is similar to the procedure used to implant it.

## 2025-02-17 ENCOUNTER — TELEPHONE (OUTPATIENT)
Age: 75
End: 2025-02-17

## 2025-02-17 NOTE — TELEPHONE ENCOUNTER
Caller: Monse from [facility]    Doctor/Office: Dr Pizano    CB#: [phone]       What needs to be faxed: Cardiac clearance letter dated 2/14/2025 for cataract surgery on 2/19/2025.    ATTN to: Monse    Fax#: [phone]

## 2025-02-18 ENCOUNTER — TELEPHONE (OUTPATIENT)
Age: 75
End: 2025-02-18

## 2025-02-18 NOTE — TELEPHONE ENCOUNTER
Spoke to pt Spouse Tova - informed Dr Clemens is leaving [facility] and rescheduled appt to transition care - April 30th 10am with Sawyer Bernardo asked about the Skizi - she said she got a call from the ambassador saying he needed approval for this year. I told her about the encounter in October and the letter scanned into the chart. Jennifer assistance approved for 2025.     She said this call was more recent so she wanted to double check he is all set for [facility] this year.

## 2025-02-19 ENCOUNTER — TELEPHONE (OUTPATIENT)
Dept: CARDIOLOGY CLINIC | Facility: CLINIC | Age: 75
End: 2025-02-19

## 2025-02-19 DIAGNOSIS — I50.20 SYSTOLIC CONGESTIVE HEART FAILURE, UNSPECIFIED HF CHRONICITY (HCC): ICD-10-CM

## 2025-02-19 DIAGNOSIS — I48.92 ATRIAL FLUTTER, UNSPECIFIED TYPE (HCC): ICD-10-CM

## 2025-02-19 NOTE — TELEPHONE ENCOUNTER
----- Message from Ray Pizano MD sent at 2/18/2025 10:05 PM EST -----  Regarding: RE: 5 sec pause on loop  Yes okay to reduce metoprolol    Appears vagal - RR interval prolonging. Normal conduction parameters when I tested it during ablation.     Lets continue to monitor.     He needs sleep study. EP team -can you remind him about the sleep study?    Thanks  ----- Message -----  From: Ike Butts MD  Sent: 2/18/2025   2:18 PM EST  To: Ray Pizano MD  Subject: FW: 5 sec pause on loop                          Hey. Looks like this mutual pt had a 5s pause on LINQ while in NSR. Asymptomatic from the note below. Recent af/afl ablation. Has BL conduction dz.    I will reduce his current toprol xl 75 bid.    Let me know if you think PPM or another strategy?    I am happy to discuss with him via phone/direct to one of our clinics/whatever works.    Ike Gillespie  ----- Message -----  From: Karley Horn  Sent: 2/18/2025   9:44 AM EST  To: Ike Butts MD  Subject: 5 sec pause on loop                              Please see loop alert below.      NON-BILLABLE. CARELINK TRANSMISSION: ALERT FOR 1 PAUSE EPISODE W/ EGRAM SHOWING 5 SEC PAUSE @ 0649 HOURS. SPOKE TO PT WHOM STATED HE WAS JUST GETTING UP AT THAT TIME. HE DENIES ANY SYMPTOMS. TASKED TO EJ. DL

## 2025-02-19 NOTE — TELEPHONE ENCOUNTER
Called and spoke to patient regarding Dr Pizano message. Patient verbalized understanding of med change. Patient expressed he will not get sleep study done.

## 2025-02-20 RX ORDER — FUROSEMIDE 20 MG/1
40 TABLET ORAL AS NEEDED
Qty: 180 TABLET | Refills: 2 | OUTPATIENT
Start: 2025-02-20

## 2025-02-21 ENCOUNTER — RESULTS FOLLOW-UP (OUTPATIENT)
Dept: CARDIOLOGY CLINIC | Facility: CLINIC | Age: 75
End: 2025-02-21

## 2025-02-21 NOTE — RESULT ENCOUNTER NOTE
Normal Device Function  5-second pause but without any symptom rhythm correlation  -Early in the morning when he just woke up

## 2025-02-24 ENCOUNTER — RESULTS FOLLOW-UP (OUTPATIENT)
Dept: CARDIOLOGY CLINIC | Facility: CLINIC | Age: 75
End: 2025-02-24

## 2025-02-24 ENCOUNTER — IN-CLINIC DEVICE VISIT (OUTPATIENT)
Dept: CARDIOLOGY CLINIC | Facility: CLINIC | Age: 75
End: 2025-02-24
Payer: MEDICARE

## 2025-02-24 DIAGNOSIS — I48.0 PAROXYSMAL ATRIAL FIBRILLATION (HCC): Primary | ICD-10-CM

## 2025-02-24 PROCEDURE — 93291 INTERROG DEV EVAL SCRMS IP: CPT | Performed by: INTERNAL MEDICINE

## 2025-02-24 NOTE — PROGRESS NOTES
Results for orders placed or performed in visit on 02/24/25   Cardiac EP device report    Narrative    MDT LOOP LINQII  DEVICE INTERROGATED IN THE [place] OFFICE: PRESENTING RHYTHM NSR @ 82 BPM. R WAVES MEASURE 0.13 mV. NO PATIENT OR DEVICE ACTIVATED EPISODES. NORMAL DEVICE FUNCTION. WOUND CHECK: INCISION CLEAN AND DRY WITH EDGES APPROXIMATED; SUTURES REMOVED; WOUND CARE AND RESTRICTIONS REVIEWED WITH PATIENT & FAMILY (PIC IN MEDIA). SAIMAC/ES

## 2025-02-27 DIAGNOSIS — I12.9 PARENCHYMAL RENAL HYPERTENSION, STAGE 1 THROUGH STAGE 4 OR UNSPECIFIED CHRONIC KIDNEY DISEASE: ICD-10-CM

## 2025-02-27 RX ORDER — AMLODIPINE BESYLATE 2.5 MG/1
2.5 TABLET ORAL DAILY
Qty: 90 TABLET | Refills: 1 | Status: SHIPPED | OUTPATIENT
Start: 2025-02-27

## 2025-02-28 DIAGNOSIS — E11.51 TYPE 2 DIABETES MELLITUS WITH DIABETIC PERIPHERAL ANGIOPATHY WITHOUT GANGRENE, WITHOUT LONG-TERM CURRENT USE OF INSULIN (HCC): ICD-10-CM

## 2025-02-28 RX ORDER — GLIMEPIRIDE 2 MG/1
TABLET ORAL
Qty: 180 TABLET | Refills: 1 | Status: SHIPPED | OUTPATIENT
Start: 2025-02-28

## 2025-03-11 ENCOUNTER — TELEPHONE (OUTPATIENT)
Dept: CARDIOLOGY CLINIC | Facility: CLINIC | Age: 75
End: 2025-03-11

## 2025-03-17 ENCOUNTER — OFFICE VISIT (OUTPATIENT)
Dept: CARDIOLOGY CLINIC | Facility: CLINIC | Age: 75
End: 2025-03-17
Payer: MEDICARE

## 2025-03-17 VITALS
HEIGHT: 70 IN | HEART RATE: 86 BPM | WEIGHT: 232.1 LBS | BODY MASS INDEX: 33.23 KG/M2 | SYSTOLIC BLOOD PRESSURE: 108 MMHG | DIASTOLIC BLOOD PRESSURE: 66 MMHG | OXYGEN SATURATION: 99 %

## 2025-03-17 DIAGNOSIS — I50.20 SYSTOLIC CONGESTIVE HEART FAILURE, UNSPECIFIED HF CHRONICITY (HCC): Primary | ICD-10-CM

## 2025-03-17 DIAGNOSIS — I48.92 ATRIAL FLUTTER, UNSPECIFIED TYPE (HCC): ICD-10-CM

## 2025-03-17 DIAGNOSIS — I12.9 PARENCHYMAL RENAL HYPERTENSION, STAGE 1 THROUGH STAGE 4 OR UNSPECIFIED CHRONIC KIDNEY DISEASE: ICD-10-CM

## 2025-03-17 DIAGNOSIS — I48.0 PAROXYSMAL ATRIAL FIBRILLATION (HCC): ICD-10-CM

## 2025-03-17 PROCEDURE — 99214 OFFICE O/P EST MOD 30 MIN: CPT | Performed by: STUDENT IN AN ORGANIZED HEALTH CARE EDUCATION/TRAINING PROGRAM

## 2025-03-17 NOTE — PROGRESS NOTES
"Advanced Heart Failure/Pulmonary Hypertension Outpatient Note - South Humphrey III 74 y.o. male MRN: [id number]    @ Encounter: [id number]    Assessment:  74 y.o. male PMH and acute problems listed later in note (a partial list may also be included within the assessment section) p/w HF fu.   I first met [place] Humphrey III during 2/2024 admission for HF and AFL. New drop in EF at that time. Note New skyrizi for Crohn's recently started at time of initial HF Dx.    The patient's primary cardiac team is general cardiology, follows Dr. Archie MATTHEW, HFimpEF, low EF 2/2024 to LVEF 20%>gdmt and rhythm control>65% 6/2024 TTE  2018 NST: no ischemia or scar  Etiology may be TIC, EF drop in setting of rapid AF and AFL  # PAF and aflutter with RVR, on AC;   Sp AF and AFL ablation 2/11/25 Harinder Pandya   S/p 2/19/24 DENIS/DCCV with conversion to NSR  Hx of loop recorder- explanted 1/10/24 and reimplanted 2/11/25  RBBB  # hx of bifascicular block/ SVT, some nighttime vero events on LINQ  # dyslipidemia  CKD  # PAD s/ femoral popliteal bypass surgery  # tobacco abuse  # crohn's dz, hx of SBO  DM  Ao aneurysm , root 4.4cm 6/2024 TTE      I have reviewed all pertinent patient data including but not limited to:        Lab Units 02/12/25  0459 02/11/25  0650 01/14/25  0640   CREATININE mg/dL 0.84 0.95 1.02           Lab Results   Component Value Date    K 4.1 02/12/2025     Lab Results   Component Value Date    HGBA1C 7.1 (A) 01/17/2025     Lab Results   Component Value Date    [id number] 4.009 04/22/2024     Lab Results   Component Value Date    LDLCALC 55 04/22/2024     Lab Results   Component Value Date     (H) 02/12/2024      No results found for: \"NTBNP\"     Home scale dry weight may be 209lbs    TODAY'S PLAN:     03/17/25  Warm, euvolemic  No new cardiac complaints, feels generally well  RRR on exam    Sp AF and AFL ablation 2/11/25 Harinder Pandya   Loop reimplanted  Discussed 2/2025 5s pause with EP team and " plan for no intervention now, cw monitoring  Halved metop at that time    Gdmt below  Vero and BP somewhat limiting historically  Unclear exactly why no longer taking arni, will reconsider near future  No changes today    Cw AC     On ASA and statin    Advised cw TLC, exercise, diet, weight loss    Sleep med referral given in past    Follow up:  With me in 3 mo or sooner if symptoms evolve.  In addition to follow up with their other medical providers    Key info from my prior notes:    Discussed risks vs benefits of ablation  He declines now but will consider future pending course  Prefers watchful waiting for now    Long discussion risks vs benefits of amio cessation now  Ultimately he elects to DC amio now, avoid repeat LINQ for now, close monitoring of Sx  Will advance metop if HR increases    He has new cost issues for jardiance and entresto  SW referral given  Paperwork to be completed that he brought to office today  RTC 3 weeks fro resolution/plan, may need to switch Rx    Fu future echo x 3 months around 5/2024 after rhythm control and max gdmt, for EF  Could consider ischemic eval in future, pending Sx and LVEF trend  No chest pain  Neg NST 2018    No very strong features of cardiac amyloidosis  Remains in differential    Possible Zio patch near future in case vero events  +BL conduction dz  Past nighttime vero events on LINQ  No overt issues as inpt on tele, reassuring  Asymptomatic now    No strong connection between skyrizi and HF/arrhythmia I am aware of  Fu GI team    Neurohormonal Blockade/GDMT:  --Beta-Blocker: toprol xl 50 bid > 75AM and 50PM > 75 bid and if tolerates well than up to 100 bid in 1 week > 5s pause 2-3/2025 after ablation > reduced toprol xl to 75 qd  --ACEi, ARB, ARNi: entresto 24/26 bid > not on as of 3/17/25, reconsider future  --MRA: not on 2/2 hypotension  --SGLT2i: jardiance 10 qd; discussed risks/benefits/red flags/when to call clinic; no overt  contraindications  --Hydralazine/nitrates:    --Diuretic: lasix 40 as needed  Long discussion about evidence of worsening HF, when to self uptitrate home diuretic and call cardiology office    Other HF pharmaco-invasive therapy (if applicable):   PPM,  ICD , CRT (if applicable):  Interrogation:  Linq  2/24/25  MDT LOOP LINQII   DEVICE INTERROGATED IN THE [place] OFFICE: PRESENTING RHYTHM NSR @ 82 BPM. R WAVES MEASURE 0.13 mV. NO PATIENT OR DEVICE ACTIVATED EPISODES. NORMAL DEVICE FUNCTION. WOUND CHECK: INCISION CLEAN AND DRY WITH EDGES APPROXIMATED; SUTURES REMOVED; WOUND CARE AND RESTRICTIONS REVIEWED WITH PATIENT & FAMILY (PIC IN MEDIA).   Advanced Therapies (if applicable):   --Inotrope:  --LVAD/Transplant Candidacy:    Studies:  I have reviewed all pertinent patient data/labs/imaging where available, including but not limited to the below studies. Selected results may be displayed here but comprehensive listing is omitted for note clarity and can be found in the epic chart.    ECG.    Echo.    Stress.    Cath.    HPI:   73 y.o. male PMH and acute problems listed later in note (a partial list may also be included within the assessment section) p/w HF fu. I first met [place] Humph[place] III during 2/2024 admission for HF and AFL where he p/w ADHF in setting of rapid Afib/AFL. New drop in EF. Note New skyrizi for Crohn's recently started.  No new CP/SOB/dizziness/palpitations/syncope.  No new fatigue.  No new unintentional weight changes.  No new leg swelling, PND, pillow orthopnea.  No new fevers, chills, cough, nausea, vomiting, diarrhea, dysuria.    Interval History:  As noted in 'plan' section above and prior epic chart notes.    No new CP/SOB/dizziness/palpitations/syncope.  No new fatigue.  No new unintentional weight changes.  No new leg swelling, PND, pillow orthopnea.  No new fevers, chills, cough, nausea, vomiting, diarrhea, dysuria.    Past Medical History:   Diagnosis Date    Harirs esophagus     Blue  toe syndrome (HCC)     Chronic kidney disease     Colon polyps     Crohn's disease (Formerly [facility]) 1-2020    GERD (gastroesophageal reflux disease)     Hematuria     History of rheumatic fever     Hypolipidemia     Hypotension     Ischemic cardiomyopathy     PAD (peripheral artery disease) (Former[facility])     Pulmonary emphysema (HCC)     PVD (peripheral vascular disease) (Formerly [facility])      Patient Active Problem List    Diagnosis Date Noted    Cardiomyopathy ([facility]) 02/11/2025    Flutter-fibrillation ([facility]) 02/11/2025    Acute on chronic systolic congestive heart failure ([facility]) 01/28/2025    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    Electrolyte abnormality 02/14/2024    Combined systolic and diastolic heart failure ([facility]) 02/14/2024    Abnormal LFTs 02/13/2024    Atrial fibrillation with RVR ([facility]) 02/12/2024    Immunocompromised patient (Formerly [facility]) 01/17/2024    Venous insufficiency 08/29/2022    Type 2 diabetes mellitus with diabetic peripheral angiopathy without gangrene ([facility]) 03/16/2021    Vitamin B12 deficiency 09/16/2020    Elevated alkaline phosphatase level 05/14/2020    S/P femoral-popliteal bypass surgery 04/09/2020    Terminal ileitis of small intestine ([facility]) 02/18/2020    Positive QuantiFERON-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 01/20/2020    Volume overload 01/19/2020    SVT (supraventricular tachycardia) ([facility]) 01/18/2020    Crohn's disease of small intestine with complication ([facility]) 01/13/2020    Adrenal mass ([facility]) 01/07/2020    Paroxysmal atrial fibrillation ([facility]) 09/12/2019    Venous stasis 07/19/2019    S/P peripheral artery angioplasty with stent placement 06/28/2019    Ischemic cardiomyopathy 06/03/2019    PAD (peripheral artery disease) ([facility]) 04/24/2019    Harris's esophagus without dysplasia 04/05/2019    Colon polyps 04/05/2019    Dyslipidemia 05/16/2018    Vitamin D deficiency 05/16/2018    RBBB (right bundle branch block with left anterior fascicular block) 05/11/2018    [place] cardiac risk 10-20% in next 10 years 05/04/2018    Abdominal aortic  atherosclerosis (HCC) 05/04/2018    CKD (chronic kidney disease), stage III (HCC) 03/22/2018    Persistent proteinuria 03/22/2018    Microscopic hematuria 03/22/2018    Class 1 obesity due to excess calories with serious comorbidity and body mass index (BMI) of 33.0 to 33.9 in adult        ROS:  10 point ROS negative except as specified in HPI/interval history    No Known Allergies  Current Outpatient Medications   Medication Instructions    amLODIPine (NORVASC) 2.5 mg, Oral, Daily    aspirin 81 mg, Daily    atorvastatin (LIPITOR) 20 mg, Oral, Daily    Empagliflozin (JARDIANCE) 10 mg, Oral, Every morning    folic acid (FOLVITE) 1 mg, Oral, Daily    furosemide (LASIX) 40 mg, Oral, As needed, Can take 20-40 mg    glimepiride (AMARYL) 2 mg tablet TAKE 1 TABLET BY MOUTH 2 TIMES A DAY WITH MEALS.    Magnesium 500 MG TABS 1 tablet, Daily    metoprolol succinate (TOPROL-XL) 50 mg, Oral, 2 times daily, Combine a 50mg tablet with a 25mg tablet to take a total of 75mg in AM and 75mg in PM    omeprazole (PRILOSEC) 20 mg, Oral, Daily before breakfast    risankizumab-rzaa (Skyrizi) 360 MG/2.4ML SOCT Take first on body injector (OBI) under skin every 8 weeks    vitamin B-12 (VITAMIN B-12) 1,000 mcg, Oral, 3 times weekly    Xarelto 20 mg, Oral, Daily      Social History     Socioeconomic History    Marital status: /Civil Union     Spouse name: Not on file    Number of children: 2    Years of education: Not on file    Highest education level: Not on file   Occupational History    Occupation: retired   Tobacco Use    Smoking status: Former     Current packs/day: 0.25     Average packs/day: 0.3 packs/day for 30.0 years (7.5 ttl pk-yrs)     Types: Cigarettes    Smokeless tobacco: Never   Vaping Use    Vaping status: Never Used   Substance and Sexual Activity    Alcohol use: Yes     Alcohol/week: 1.0 standard drink of alcohol     Types: 1 Standard drinks or equivalent per week     Comment: social drinker when out dining    Drug  "use: Never    Sexual activity: Not Currently     Birth control/protection: None   Other Topics Concern    Not on file   Social History Narrative    Drinks coffee     Social Drivers of Health     Financial Resource Strain: Low Risk  (1/14/2024)    Overall Financial Resource Strain (CARDIA)     Difficulty of Paying Living Expenses: Not hard at all   Food Insecurity: No Food Insecurity (1/14/2025)    Hunger Vital Sign     Worried About Running Out of Food in the Last Year: Never true     Ran Out of Food in the Last Year: Never true   Transportation Needs: No Transportation Needs (1/14/2025)    PRAPARE - Transportation     Lack of Transportation (Medical): No     Lack of Transportation (Non-Medical): No   Physical Activity: Not on file   Stress: Not on file   Social Connections: Not on file   Intimate Partner Violence: Not on file   Housing Stability: Low Risk  (1/14/2025)    Housing Stability Vital Sign     Unable to Pay for Housing in the Last Year: No     Number of Times Moved in the Last Year: 0     Homeless in the Last Year: No     Family History   Problem Relation Age of Onset    Heart disease Mother     Hyperlipidemia Mother     Hypertension Mother     Hypertension Father     Heart disease Father     Stroke Father     Hyperlipidemia Father     Diabetes Brother     No Known Problems Maternal Grandmother     Dementia Neg Hx     Drug abuse Neg Hx     Mental illness Neg Hx     Substance Abuse Neg Hx     Alcohol abuse Neg Hx     Depression Neg Hx     Colon cancer Neg Hx      Physical Exam:  Vitals:    03/17/25 1145   BP: 108/66   BP Location: Left arm   Patient Position: Sitting   Cuff Size: Standard   Pulse: 86   SpO2: 99%   Weight: 105 kg (232 lb 1.6 oz)   Height: 5' 10\" (1.778 m)     Constitutional: NAD, non toxic, obese  Ears/nose/mouth/throat: atraumatic  CV: RRR, nl S1S2, no murmurs/rubs/gallups, no JVD, no HJR  Resp: CTABL  GI: Soft, NTND  MSK: no swollen joints in exposed areas  Extr: no  LE edema, warm " LE  Pysche: Normal affect  Neuro: appropriate in conversation  Skin: dry and intact in exposed areas    Labs & Results:  Lab Results   Component Value Date    SODIUM 138 02/12/2025    K 4.1 02/12/2025     02/12/2025    CO2 28 02/12/2025    BUN 11 02/12/2025    CREATININE 0.84 02/12/2025    GLUC 147 (H) 02/12/2025    CALCIUM 8.3 (L) 02/12/2025     Lab Results   Component Value Date    WBC 9.55 02/12/2025    HGB 13.6 02/12/2025    HCT 44.3 02/12/2025    MCV 88 02/12/2025     02/12/2025       Counseling / Coordination of Care  Greater than 50% of total time was spent with the patient and / or family counseling and / or coordination of care.  We discussed diagnoses, most recent studies, tests and any changes in treatment plan.    Thank you for the opportunity to participate in the care of this patient.    Ike Butts MD  Attending Physician  Advanced Heart Failure and Transplant Cardiology  [facility]

## 2025-03-21 DIAGNOSIS — I48.0 PAROXYSMAL ATRIAL FIBRILLATION (HCC): ICD-10-CM

## 2025-03-21 RX ORDER — RIVAROXABAN 20 MG/1
20 TABLET, FILM COATED ORAL DAILY
Qty: 30 TABLET | Refills: 5 | Status: SHIPPED | OUTPATIENT
Start: 2025-03-21

## 2025-03-27 DIAGNOSIS — K50.019 CROHN'S DISEASE OF SMALL INTESTINE WITH COMPLICATION (HCC): Primary | ICD-10-CM

## 2025-03-27 RX ORDER — RISANKIZUMAB-RZAA 360 MG/2.4
WEARABLE INJECTOR SUBCUTANEOUS
Qty: 2.4 ML | Refills: 0 | Status: SHIPPED | OUTPATIENT
Start: 2025-03-27

## 2025-03-27 RX ORDER — RISANKIZUMAB-RZAA 360 MG/2.4
WEARABLE INJECTOR SUBCUTANEOUS
Qty: 2.4 ML | Refills: 5 | Status: SHIPPED | OUTPATIENT
Start: 2025-03-27

## 2025-03-27 RX ORDER — RISANKIZUMAB-RZAA 360 MG/2.4
WEARABLE INJECTOR SUBCUTANEOUS
Qty: 2.4 ML | Refills: 0 | Status: SHIPPED | OUTPATIENT
Start: 2025-03-27 | End: 2025-03-27

## 2025-03-27 NOTE — TELEPHONE ENCOUNTER
Patient states last shot did not work, he called pharmacy to refill, pharmacy told patient they have been reaching out to us for new script. Patient out of medication    Reason for call:   [x] Refill   [] Prior Auth  [] Other:     Office:   [] PCP/Provider -   [x] Specialty/Provider - Shailesh Joseph    Medication: risankizumab-rzaa (Skyrizi) 360 MG/2.4ML SOCT     Dose/Frequency: Take first on body injector (OBI) under skin every 8 weeks     Quantity: 2.4mL    Pharmacy: [facility] - [place], [place]     Local Pharmacy   Does the patient have enough for 3 days?   [] Yes   [] No - Send as HP to POD    Mail Away Pharmacy   Does the patient have enough for 10 days?   [] Yes   [x] No - Send as HP to POD

## 2025-03-27 NOTE — TELEPHONE ENCOUNTER
Pt called to say he reached out to his pharmacy again and they still do not have his script, looking in his med chart, it says Receipt confirmed by pharmacy at 1:58pm today.  He is asking if we can please reach out to the pharmacy asap to see what the problem is as he was unable to take that last dose.

## 2025-03-27 NOTE — TELEPHONE ENCOUNTER
Pt calling he states he spoke to pharmacy and they do not have the script. He is asking for office staff to clal him due to the fact that apparently the pharmacy told him something he does not understand please call pt to assist

## 2025-03-27 NOTE — TELEPHONE ENCOUNTER
I called and spoke with patient.  While administering Skyrizi OBI on 3/20 patient placed device on skin saw flashing blue light then gree blinking light and after 5 minutes received a red light.  Patient states the plunger did not move and the liquid is still in the cartridge.  Patient aware [facility]BI [facility], provided the pharmacy phone number for patient to request replacement OBI.

## 2025-03-28 NOTE — ASSESSMENT & PLAN NOTE
12/30/24 - presented to outpatient EP office in atypical flutter  1/6/25 - underwent DCCV  2/11/25 - underwent PFA afib/flutter ablation (PVI, PW, CTI) + loop implantation by Dr. Pizano  Current medication regimen:  AC: Xarelto 20mg daily ([id number] 2 (age, CHF))  AAD: None  Rate: Metoprolol succiante 75mg qAM and 50mg qPM

## 2025-03-28 NOTE — TELEPHONE ENCOUNTER
Pt calling back.  States she contacted [facility] and they told her [facility] script was not received.  Advised will contact pharmacy and get message to IBD team.    Call to pharmacy, spoke with pharmacist Marguerite.  She states they had issues with their system yesterday, but she has the script now and will work on it.   Call back to pt's wife, made aware pharmacy is working on it.

## 2025-03-28 NOTE — PROGRESS NOTES
Electrophysiology Follow Up  [facility]  [facility] [address], [place] [address]    Name: South Humphrey III  : 1950  MRN: [id number]    Assessment & Plan  Flutter-fibrillation (HCC)  24 - presented to outpatient EP office in atypical flutter  25 - underwent DCCV  25 - underwent PFA afib/flutter ablation (PVI, PW, CTI) + loop implantation by Dr. Pizano  Current medication regimen:  AC: Xarelto 20mg daily ([id number] 2 (age, CHF))  AAD: None  Rate: Metoprolol succiante 75mg qAM and 50mg qPM  Cardiomyopathy, unspecified type (HCC)  NICM w/ concern for tachy myopathy  EF improved to 65% post-cardioversion  2018 NM stress - negative for ischemia/scar  GDMT w/ Jardiance and metoprolol  Appears euvolemic in office today  Continue HF f/u  Class 1 obesity due to excess calories with serious comorbidity and body mass index (BMI) of 33.0 to 33.9 in adult  Recommend weight loss via healthy diet and exercise  Stage 3a chronic kidney disease (HCC)  Lab Results   Component Value Date    EGFR 86 2025    EGFR 79 2025    EGFR 72 2025    CREATININE 0.84 2025    CREATININE 0.95 2025    CREATININE 1.02 2025   Latest lab evaluation with renal function around baseline  Continue PCP follow-up       Discussion/Plan:    Patient recently underwent afib/flutter ablation on 25 by Dr. Pizano    Since this procedure they have been maintained on Xarelto and metoprolol as above    Since this procedure He reports he has been feeling well and currently denies acute cardiac complaint    He affirms the groin access sites have healed well    He denies any significant adverse bleeding events on Xarelto    EKG in office today showing sinus rhythm w/ RBBB, LAFB, and ventricular rate 74bpm    I interrogated his loop in office today showing no repeat afib episodes     No acute medication changes made today    Modifiable risk  factors for atrial fibrillation were discussed today including weight loss, avoiding alcohol/tobacco products, and treatment of ANUSHA/HTN/DM     To continue scheduled loop monitoring moving forward    Patient has been instructed to follow up in our EP office in 6 months or as needed. He will call our office with any questions or concerns in the meantime.    Rhythm History:   Atrial fibrillation:      Atrial flutter:      SVT:      VT/VF/PVC:     Device history:   Pacemaker:     Defibrillator:     BIV PPM:     BIV ICD:     ILR:    Interim History/HPI:   Interim history: South Humphrey III is a 74 y.o. male with a PMH of A-fib/flutter s/p ablation, cardiomyopathy, obesity, and CKD.      He presents today for routine outpatient follow up given a recent A-fib/flutter ablation on 211/25. Since this procedure He reports he has been feeling well and currently denies acute cardiac complaint. He affirms the groin access sites have healed well.  He denies any significant bleeding events whilst on Xarelto.    EKG:  sinus rhythm w/ RBBB, LAFB, and ventricular rate 74bpm    Review of Systems   Constitutional:  Negative for activity change, appetite change, chills, fatigue and fever.   HENT:  Negative for nosebleeds.    Respiratory:  Negative for chest tightness and shortness of breath.    Cardiovascular:  Negative for chest pain, palpitations and leg swelling.   Neurological:  Negative for dizziness, syncope, weakness and light-headedness.         OBJECTIVE:   Vitals:   There were no vitals taken for this visit.  There is no height or weight on file to calculate BMI.        Physical Exam:   Physical Exam  Constitutional:       General: He is not in acute distress.     Appearance: Normal appearance. He is obese. He is not toxic-appearing.   HENT:      Head: Normocephalic and atraumatic.   Eyes:      General:         Right eye: No discharge.         Left eye: No discharge.   Cardiovascular:      Rate and Rhythm: Normal rate and  regular rhythm.      Pulses: Normal pulses.   Pulmonary:      Effort: Pulmonary effort is normal.      Breath sounds: Normal breath sounds.   Musculoskeletal:      Right lower leg: No edema.      Left lower leg: No edema.   Skin:     General: Skin is warm and dry.      Capillary Refill: Capillary refill takes less than 2 seconds.   Neurological:      Mental Status: He is alert.            Medications:      Current Outpatient Medications:     amLODIPine (NORVASC) 2.5 mg tablet, TAKE 1 TABLET BY MOUTH EVERY DAY, Disp: 90 tablet, Rfl: 1    aspirin 81 MG tablet, Take 81 mg by mouth daily, Disp: , Rfl:     atorvastatin (LIPITOR) 20 mg tablet, TAKE 1 TABLET BY MOUTH EVERY DAY, Disp: 90 tablet, Rfl: 1    Empagliflozin (Jardiance) 10 MG TABS tablet, Take 1 tablet (10 mg total) by mouth every morning, Disp: 90 tablet, Rfl: 5    folic acid (FOLVITE) 1 mg tablet, Take 1 tablet (1 mg total) by mouth daily, Disp: 90 tablet, Rfl: 3    furosemide (LASIX) 20 mg tablet, Take 2 tablets (40 mg total) by mouth if needed (for weight gain of 3lbs in one day or 5lbs in one week.) Can take 20-40 mg, Disp: 120 tablet, Rfl: 2    glimepiride (AMARYL) 2 mg tablet, TAKE 1 TABLET BY MOUTH 2 TIMES A DAY WITH MEALS. (Patient taking differently: daily), Disp: 180 tablet, Rfl: 1    Magnesium 500 MG TABS, Take 1 tablet by mouth daily , Disp: , Rfl:     metoprolol succinate (TOPROL-XL) 50 mg 24 hr tablet, Take 1 tablet (50 mg total) by mouth 2 (two) times a day Combine a 50mg tablet with a 25mg tablet to take a total of 75mg in AM and 75mg in PM (Patient taking differently: Take 50 mg by mouth 2 (two) times a day Combine a 50mg tablet with a 25mg tablet to take a total of 75mg in AM), Disp: 180 tablet, Rfl: 5    omeprazole (PriLOSEC) 20 mg delayed release capsule, Take 1 capsule (20 mg total) by mouth daily before breakfast, Disp: 90 capsule, Rfl: 1    risankizumab-rzaa (Skyrizi) 360 MG/2.4ML SOCT, Take on body injector (OBI) under skin every 8 weeks.  Replacement OBI, Disp: 2.4 mL, Rfl: 0    risankizumab-rzaa (Skyrizi) 360 MG/2.4ML SOCT, Take on body injector (OBI) under skin every 8 weeks, Disp: 2.4 mL, Rfl: 5    rivaroxaban (Xarelto) 20 mg tablet, TAKE 1 TABLET BY MOUTH EVERY DAY, Disp: 30 tablet, Rfl: 5    vitamin B-12 (VITAMIN B-12) 1,000 mcg tablet, Take 1 tablet (1,000 mcg total) by mouth 3 (three) times a week (Patient taking differently: Take 1,000 mcg by mouth daily), Disp: , Rfl:        Historical Information   Past Medical History:   Diagnosis Date    Harris esophagus     Blue toe syndrome (HCC)     Chronic kidney disease     Colon polyps     Crohn's disease (HCC) 1-2020    GERD (gastroesophageal reflux disease)     Hematuria     History of rheumatic fever     Hypolipidemia     Hypotension     Ischemic cardiomyopathy     PAD (peripheral artery disease) (HCC)     Pulmonary emphysema (HCC)     PVD (peripheral vascular disease) (HCC)        Past Surgical History:   Procedure Laterality Date    CARDIAC ELECTROPHYSIOLOGY PROCEDURE N/A 1/10/2024    Procedure: Cardiac loop recorder explant;  Surgeon: Jason Cortez MD;  [facility];  Service: Cardiology    CARDIAC ELECTROPHYSIOLOGY PROCEDURE N/A 2/11/2025    Procedure: Cardiac eps/afib ablation PFA;  Surgeon: Ray Pizano MD;  [facility];  Service: Cardiology    CARDIAC ELECTROPHYSIOLOGY PROCEDURE N/A 2/11/2025    Procedure: Cardiac eps/aflutter ablation;  Surgeon: Ray Pizano MD;  [facility];  Service: Cardiology    CARDIAC ELECTROPHYSIOLOGY PROCEDURE N/A 2/11/2025    Procedure: Cardiac loop recorder implant;  Surgeon: Ray Pizano MD;  [facility];  Service: Cardiology    COLONOSCOPY  2019    HERNIA REPAIR      IR AORTAGRAM WITH RUN-OFF  6/27/2019    IR AORTAGRAM WITH RUN-OFF  2/12/2020    POPLITEAL ARTERY STENT Right     6/2019    [place] BYPASS W/VEIN FEMORAL-POPLITEAL Right 3/26/2020    Procedure: BYPASS  FEMORAL-POPLITEAL; Right lower extremity bypass, fem-bk pop with GSV;  Surgeon: Wyatt Shell MD;  [facility];  Service: Vascular    CT SLCTV CATHJ 3RD+ ORD SLCTV ABDL PEL/LXTR BRNCH Right 6/27/2019    Procedure: leg ANGIOGRAM WITH STENT, BALLOON ANGIOPLASTY, LEFT GROIN ACCESS;  Surgeon: Wyatt Shell MD;  [facility];  Service: Vascular       Social History     Substance and Sexual Activity   Alcohol Use Yes    Alcohol/week: 1.0 standard drink of alcohol    Types: 1 Standard drinks or equivalent per week    Comment: social drinker when out dining     Social History     Substance and Sexual Activity   Drug Use Never     Social History     Tobacco Use   Smoking Status Former    Current packs/day: 0.25    Average packs/day: 0.3 packs/day for 30.0 years (7.5 ttl pk-yrs)    Types: Cigarettes   Smokeless Tobacco Never       Family History   Problem Relation Age of Onset    Heart disease Mother     Hyperlipidemia Mother     Hypertension Mother     Hypertension Father     Heart disease Father     Stroke Father     Hyperlipidemia Father     Diabetes Brother     No Known Problems Maternal Grandmother     Dementia Neg Hx     Drug abuse Neg Hx     Mental illness Neg Hx     Substance Abuse Neg Hx     Alcohol abuse Neg Hx     Depression Neg Hx     Colon cancer Neg Hx          Labs & Results:  Below is the patient's most recent value for Albumin, ALT, AST, BUN, Calcium, Chloride, Cholesterol, CO2, Creatinine, GFR, Glucose, HDL, Hematocrit, Hemoglobin, Hemoglobin A1C, LDL, Magnesium, Phosphorus, Platelets, Potassium, PSA, Sodium, Triglycerides, and WBC.   Lab Results   Component Value Date    ALT 13 01/03/2025    AST 12 (L) 01/03/2025    BUN 11 02/12/2025    CALCIUM 8.3 (L) 02/12/2025     02/12/2025    CO2 28 02/12/2025    CREATININE 0.84 02/12/2025    HDL 59 04/22/2024    HCT 44.3 02/12/2025    HGB 13.6 02/12/2025    HGBA1C 7.1 (A) 01/17/2025    MG 2.2 11/26/2024    PHOS 2.9 04/03/2023      02/12/2025    K 4.1 02/12/2025    PSA 0.537 01/14/2025    TRIG 113 04/22/2024    WBC 9.55 02/12/2025     Note: for a comprehensive list of the patient's lab results, access the Results Review activity.

## 2025-03-28 NOTE — ASSESSMENT & PLAN NOTE
Lab Results   Component Value Date    EGFR 86 02/12/2025    EGFR 79 02/11/2025    EGFR 72 01/14/2025    CREATININE 0.84 02/12/2025    CREATININE 0.95 02/11/2025    CREATININE 1.02 01/14/2025   Latest lab evaluation with renal function around baseline  Continue PCP follow-up

## 2025-03-28 NOTE — ASSESSMENT & PLAN NOTE
NICM w/ concern for tachy myopathy  EF improved to 65% post-cardioversion  2018 NM stress - negative for ischemia/scar  GDMT w/ Jardiance and metoprolol  Appears euvolemic in office today  Continue HF f/u

## 2025-03-28 NOTE — TELEPHONE ENCOUNTER
Pt's wife calling to f/u on this. Transferred to [facility] in triage for further assistance.

## 2025-03-30 DIAGNOSIS — K21.00 GASTROESOPHAGEAL REFLUX DISEASE WITH ESOPHAGITIS WITHOUT HEMORRHAGE: ICD-10-CM

## 2025-04-01 RX ORDER — OMEPRAZOLE 20 MG/1
20 CAPSULE, DELAYED RELEASE ORAL
Qty: 90 CAPSULE | Refills: 1 | Status: SHIPPED | OUTPATIENT
Start: 2025-04-01

## 2025-04-02 ENCOUNTER — OFFICE VISIT (OUTPATIENT)
Dept: CARDIOLOGY CLINIC | Facility: CLINIC | Age: 75
End: 2025-04-02
Payer: MEDICARE

## 2025-04-02 ENCOUNTER — TRANSCRIBE ORDERS (OUTPATIENT)
Dept: GASTROENTEROLOGY | Facility: CLINIC | Age: 75
End: 2025-04-02

## 2025-04-02 ENCOUNTER — TELEPHONE (OUTPATIENT)
Age: 75
End: 2025-04-02

## 2025-04-02 VITALS
DIASTOLIC BLOOD PRESSURE: 68 MMHG | HEART RATE: 74 BPM | HEIGHT: 70 IN | WEIGHT: 232.5 LBS | SYSTOLIC BLOOD PRESSURE: 114 MMHG | BODY MASS INDEX: 33.28 KG/M2

## 2025-04-02 DIAGNOSIS — I48.91 FLUTTER-FIBRILLATION (HCC): Primary | ICD-10-CM

## 2025-04-02 DIAGNOSIS — E66.811 CLASS 1 OBESITY DUE TO EXCESS CALORIES WITH SERIOUS COMORBIDITY AND BODY MASS INDEX (BMI) OF 33.0 TO 33.9 IN ADULT: ICD-10-CM

## 2025-04-02 DIAGNOSIS — I42.9 CARDIOMYOPATHY, UNSPECIFIED TYPE (HCC): ICD-10-CM

## 2025-04-02 DIAGNOSIS — N18.31 STAGE 3A CHRONIC KIDNEY DISEASE (HCC): ICD-10-CM

## 2025-04-02 DIAGNOSIS — I48.92 FLUTTER-FIBRILLATION (HCC): Primary | ICD-10-CM

## 2025-04-02 DIAGNOSIS — E66.09 CLASS 1 OBESITY DUE TO EXCESS CALORIES WITH SERIOUS COMORBIDITY AND BODY MASS INDEX (BMI) OF 33.0 TO 33.9 IN ADULT: ICD-10-CM

## 2025-04-02 PROCEDURE — 99214 OFFICE O/P EST MOD 30 MIN: CPT

## 2025-04-02 PROCEDURE — 93000 ELECTROCARDIOGRAM COMPLETE: CPT

## 2025-04-02 NOTE — TELEPHONE ENCOUNTER
[facility] called in patient needs PRIOR AUTH for Skyrizi medication she provided the office with COVER MY MEDS # [id number]

## 2025-04-04 ENCOUNTER — DOCUMENTATION (OUTPATIENT)
Dept: NEPHROLOGY | Facility: CLINIC | Age: 75
End: 2025-04-04

## 2025-04-04 ENCOUNTER — TELEPHONE (OUTPATIENT)
Age: 75
End: 2025-04-04

## 2025-04-04 ENCOUNTER — TELEPHONE (OUTPATIENT)
Dept: NEPHROLOGY | Facility: CLINIC | Age: 75
End: 2025-04-04

## 2025-04-04 NOTE — TELEPHONE ENCOUNTER
Patients GI provider:  Dr. Clemens    Number to return call: [phone]    Reason for call: Pt's wife called stating that she did not receive prep instructions. She checked her My chart and said they would not open. While I was getting ready to resend them, she got them open.    Scheduled procedure/appointment date if applicable: Apt/procedure 04/10/25

## 2025-04-04 NOTE — TELEPHONE ENCOUNTER
Spoke to the patient about BP reading are low and to stop amlodipine 2.5 mg tablet .Recheck BP sitting and standing in 4 to 5 weeks since stopping amlodipine.            ----- Message from Erik Ramos MD sent at 4/4/2025  8:47 AM EDT -----  Standing blood pressures are still low I would stop amlodipine  Recheck blood pressure is 4 to 6 weeks sitting standing before meds  ----- Message -----  From: Susy Davis MA  Sent: 4/4/2025   8:14 AM EDT  To: Erik Ramos MD    AM sit:  120/81, 94 stand:  107/75, 99  PM sit:  106/68, 77 stand:  107/71, 80

## 2025-04-09 RX ORDER — SODIUM CHLORIDE, SODIUM LACTATE, POTASSIUM CHLORIDE, CALCIUM CHLORIDE 600; 310; 30; 20 MG/100ML; MG/100ML; MG/100ML; MG/100ML
125 INJECTION, SOLUTION INTRAVENOUS CONTINUOUS
Status: CANCELLED | OUTPATIENT
Start: 2025-04-09

## 2025-04-10 ENCOUNTER — ANESTHESIA (OUTPATIENT)
Dept: GASTROENTEROLOGY | Facility: HOSPITAL | Age: 75
End: 2025-04-10
Payer: MEDICARE

## 2025-04-10 ENCOUNTER — ANESTHESIA EVENT (OUTPATIENT)
Dept: GASTROENTEROLOGY | Facility: HOSPITAL | Age: 75
End: 2025-04-10
Payer: MEDICARE

## 2025-04-10 ENCOUNTER — HOSPITAL ENCOUNTER (OUTPATIENT)
Dept: GASTROENTEROLOGY | Facility: HOSPITAL | Age: 75
Setting detail: OUTPATIENT SURGERY
End: 2025-04-10
Payer: MEDICARE

## 2025-04-10 VITALS
OXYGEN SATURATION: 94 % | RESPIRATION RATE: 18 BRPM | TEMPERATURE: 97.2 F | HEIGHT: 70 IN | SYSTOLIC BLOOD PRESSURE: 109 MMHG | BODY MASS INDEX: 33.14 KG/M2 | DIASTOLIC BLOOD PRESSURE: 75 MMHG | WEIGHT: 231.48 LBS | HEART RATE: 82 BPM

## 2025-04-10 DIAGNOSIS — K50.019 CROHN'S DISEASE OF SMALL INTESTINE WITH COMPLICATION (HCC): ICD-10-CM

## 2025-04-10 LAB — GLUCOSE SERPL-MCNC: 204 MG/DL (ref 65–140)

## 2025-04-10 PROCEDURE — 88305 TISSUE EXAM BY PATHOLOGIST: CPT | Performed by: PATHOLOGY

## 2025-04-10 PROCEDURE — 45380 COLONOSCOPY AND BIOPSY: CPT | Performed by: INTERNAL MEDICINE

## 2025-04-10 PROCEDURE — 82948 REAGENT STRIP/BLOOD GLUCOSE: CPT

## 2025-04-10 RX ORDER — SODIUM CHLORIDE, SODIUM LACTATE, POTASSIUM CHLORIDE, CALCIUM CHLORIDE 600; 310; 30; 20 MG/100ML; MG/100ML; MG/100ML; MG/100ML
125 INJECTION, SOLUTION INTRAVENOUS CONTINUOUS
Status: DISCONTINUED | OUTPATIENT
Start: 2025-04-10 | End: 2025-04-14 | Stop reason: HOSPADM

## 2025-04-10 RX ORDER — LIDOCAINE HYDROCHLORIDE 10 MG/ML
INJECTION, SOLUTION EPIDURAL; INFILTRATION; INTRACAUDAL; PERINEURAL AS NEEDED
Status: DISCONTINUED | OUTPATIENT
Start: 2025-04-10 | End: 2025-04-10

## 2025-04-10 RX ORDER — SODIUM CHLORIDE, SODIUM LACTATE, POTASSIUM CHLORIDE, CALCIUM CHLORIDE 600; 310; 30; 20 MG/100ML; MG/100ML; MG/100ML; MG/100ML
INJECTION, SOLUTION INTRAVENOUS CONTINUOUS PRN
Status: DISCONTINUED | OUTPATIENT
Start: 2025-04-10 | End: 2025-04-10

## 2025-04-10 RX ORDER — PROPOFOL 10 MG/ML
INJECTION, EMULSION INTRAVENOUS CONTINUOUS PRN
Status: DISCONTINUED | OUTPATIENT
Start: 2025-04-10 | End: 2025-04-10

## 2025-04-10 RX ORDER — PROPOFOL 10 MG/ML
INJECTION, EMULSION INTRAVENOUS AS NEEDED
Status: DISCONTINUED | OUTPATIENT
Start: 2025-04-10 | End: 2025-04-10

## 2025-04-10 RX ADMIN — SODIUM CHLORIDE, SODIUM LACTATE, POTASSIUM CHLORIDE, AND CALCIUM CHLORIDE: .6; .31; .03; .02 INJECTION, SOLUTION INTRAVENOUS at 07:31

## 2025-04-10 RX ADMIN — PROPOFOL 80 MCG/KG/MIN: 10 INJECTION, EMULSION INTRAVENOUS at 07:38

## 2025-04-10 RX ADMIN — PROPOFOL 100 MG: 10 INJECTION, EMULSION INTRAVENOUS at 07:37

## 2025-04-10 RX ADMIN — LIDOCAINE HYDROCHLORIDE 100 MG: 10 INJECTION, SOLUTION EPIDURAL; INFILTRATION; INTRACAUDAL at 07:35

## 2025-04-10 NOTE — ANESTHESIA PREPROCEDURE EVALUATION
Procedure:  COLONOSCOPY    Relevant Problems   CARDIO   (+) Abdominal aortic atherosclerosis (HCC)   (+) Acute on chronic systolic congestive heart failure (HCC)   (+) Atrial fibrillation with RVR (HCC)   (+) Flutter-fibrillation (HCC)   (+) PAD (peripheral artery disease) (HCC)   (+) Parenchymal renal hypertension   (+) Paroxysmal atrial fibrillation (HCC)   (+) RBBB (right bundle branch block with left anterior fascicular block)   (+) SVT (supraventricular tachycardia) (HCC)   (+) Type 2 diabetes mellitus with diabetic peripheral angiopathy without gangrene (HCC)   (+) Venous insufficiency      ENDO   (+) Type 2 diabetes mellitus with diabetic peripheral angiopathy without gangrene (HCC)      /RENAL   (+) CKD (chronic kidney disease), stage III (HCC)   (+) Parenchymal renal hypertension      HEMATOLOGY   (+) Immunocompromised patient (HCC)        Physical Exam    Airway    Mallampati score: III  TM Distance: >3 FB  Neck ROM: full     Dental   No notable dental hx     Cardiovascular      Pulmonary      Other Findings        Anesthesia Plan  ASA Score- 3     Anesthesia Type- IV sedation with anesthesia with ASA Monitors.         Additional Monitors:     Airway Plan:            Plan Factors-Exercise tolerance (METS): >4 METS.    Chart reviewed.    Patient summary reviewed.    Patient is a current smoker.  Patient instructed to abstain from smoking on day of procedure. Patient did not smoke on day of surgery.    Obstructive sleep apnea risk education given perioperatively.        Induction- intravenous.    Postoperative Plan-     Perioperative Resuscitation Plan - Level 1 - Full Code.       Informed Consent- Anesthetic plan and risks discussed with patient.  I personally reviewed this patient with the CRNA. Discussed and agreed on the Anesthesia Plan with the CRNA..      NPO Status:  Vitals Value Taken Time   Date of last liquid 04/08/25 04/10/25 0644   Time of last liquid 1800 04/10/25 0644   Date of last solid  04/10/25 04/10/25 0644   Time of last solid 0300 04/10/25 0673

## 2025-04-10 NOTE — H&P
History and Physical -  [facility]  South Humphrey III 74 y.o. male MRN: [id number]    HPI: South Humphrey III is a 74 y.o. year old male who presents for evaluation of Crohn's.      Review of Systems    Historical Information   Past Medical History:   Diagnosis Date    Harris esophagus     Blue toe syndrome (HCC)     Chronic kidney disease     Colon polyps     Crohn's disease (HCC) 1-2020    GERD (gastroesophageal reflux disease)     Hematuria     History of rheumatic fever     Hypolipidemia     Hypotension     Ischemic cardiomyopathy     PAD (peripheral artery disease) (HCC)     Pulmonary emphysema (HCC)     PVD (peripheral vascular disease) (HCC)      Past Surgical History:   Procedure Laterality Date    CARDIAC ELECTROPHYSIOLOGY PROCEDURE N/A 1/10/2024    Procedure: Cardiac loop recorder explant;  Surgeon: Jason Cortez MD;  [facility];  Service: Cardiology    CARDIAC ELECTROPHYSIOLOGY PROCEDURE N/A 2/11/2025    Procedure: Cardiac eps/afib ablation PFA;  Surgeon: Ray Pizano MD;  [facility];  Service: Cardiology    CARDIAC ELECTROPHYSIOLOGY PROCEDURE N/A 2/11/2025    Procedure: Cardiac eps/aflutter ablation;  Surgeon: Ray Pizano MD;  [facility];  Service: Cardiology    CARDIAC ELECTROPHYSIOLOGY PROCEDURE N/A 2/11/2025    Procedure: Cardiac loop recorder implant;  Surgeon: Ray Pizano MD;  [facility];  Service: Cardiology    COLONOSCOPY  2019    HERNIA REPAIR      IR AORTAGRAM WITH RUN-OFF  6/27/2019    IR AORTAGRAM WITH RUN-OFF  2/12/2020    POPLITEAL ARTERY STENT Right     6/2019    [place] BYPASS W/VEIN FEMORAL-POPLITEAL Right 3/26/2020    Procedure: BYPASS FEMORAL-POPLITEAL; Right lower extremity bypass, fem-bk pop with GSV;  Surgeon: Wyatt Shell MD;  [facility];  Service: Vascular    SC SLCTV CATHJ 3RD+ ORD SLCTV ABDL PEL/LXTR BRNCH Right 6/27/2019    Procedure: leg ANGIOGRAM  "WITH STENT, BALLOON ANGIOPLASTY, LEFT GROIN ACCESS;  Surgeon: Wyatt Shell MD;  [facility];  Service: Vascular     Social History   Social History     Substance and Sexual Activity   Alcohol Use Yes    Alcohol/week: 1.0 standard drink of alcohol    Types: 1 Standard drinks or equivalent per week    Comment: social drinker when out dining     Social History     Substance and Sexual Activity   Drug Use Never     Social History     Tobacco Use   Smoking Status Former    Current packs/day: 0.25    Average packs/day: 0.3 packs/day for 30.0 years (7.5 ttl pk-yrs)    Types: Cigarettes   Smokeless Tobacco Never     Family History   Problem Relation Age of Onset    Heart disease Mother     Hyperlipidemia Mother     Hypertension Mother     Hypertension Father     Heart disease Father     Stroke Father     Hyperlipidemia Father     Diabetes Brother     No Known Problems Maternal Grandmother     Dementia Neg Hx     Drug abuse Neg Hx     Mental illness Neg Hx     Substance Abuse Neg Hx     Alcohol abuse Neg Hx     Depression Neg Hx     Colon cancer Neg Hx        Meds/Allergies     Not in a hospital admission.    No Known Allergies    Objective     /78   Pulse 99   Temp (!) 96.9 °F (36.1 °C) (Temporal)   Resp 18   Ht 5' 10\" (1.778 m)   Wt 105 kg (231 lb 7.7 oz)   SpO2 94%   BMI 33.21 kg/m²       PHYSICAL EXAM    Gen: NAD  CV: RRR  CHEST: Clear  ABD: soft, NT/ND  EXT: no edema  Neuro: AAO      ASSESSMENT/PLAN:  This is a 74 y.o. year old male here for evaluation of Crohn's.    PLAN:   Procedure: Colonoscopy.      "

## 2025-04-11 PROCEDURE — 88305 TISSUE EXAM BY PATHOLOGIST: CPT | Performed by: PATHOLOGY

## 2025-04-15 ENCOUNTER — RESULTS FOLLOW-UP (OUTPATIENT)
Dept: GASTROENTEROLOGY | Facility: AMBULARY SURGERY CENTER | Age: 75
End: 2025-04-15

## 2025-04-30 ENCOUNTER — OFFICE VISIT (OUTPATIENT)
Age: 75
End: 2025-04-30
Payer: MEDICARE

## 2025-04-30 VITALS
OXYGEN SATURATION: 93 % | HEART RATE: 89 BPM | SYSTOLIC BLOOD PRESSURE: 118 MMHG | DIASTOLIC BLOOD PRESSURE: 66 MMHG | WEIGHT: 233 LBS | TEMPERATURE: 98.5 F | BODY MASS INDEX: 33.36 KG/M2 | HEIGHT: 70 IN

## 2025-04-30 DIAGNOSIS — K22.70 BARRETT'S ESOPHAGUS WITHOUT DYSPLASIA: ICD-10-CM

## 2025-04-30 DIAGNOSIS — Z11.59 ENCOUNTER FOR SCREENING FOR OTHER VIRAL DISEASES: ICD-10-CM

## 2025-04-30 DIAGNOSIS — D84.9 IMMUNOCOMPROMISED PATIENT (HCC): ICD-10-CM

## 2025-04-30 DIAGNOSIS — K50.019 CROHN'S DISEASE OF SMALL INTESTINE WITH COMPLICATION (HCC): Primary | ICD-10-CM

## 2025-04-30 PROCEDURE — 99214 OFFICE O/P EST MOD 30 MIN: CPT | Performed by: DIETITIAN, REGISTERED

## 2025-04-30 NOTE — PROGRESS NOTES
Gastroenterology Outpatient Follow-up - Crohn's Disease  South Humphrey III 74 y.o. male MRN: [id number]  Encounter: [id number]    South Humphrey III is a 74 y.o. male with Crohn's disease.    Symptom onset:  2019  Diagnosis:  Crohns disease  Year of diagnosis:  2020    IBD Summary: South Humphrey III was diagnosed with Crohn's disease of the terminal ileum in 2020 after presenting with 1 year of abdominal pain and diarrhea.  He was eventually hospitalized with SBO.  He was originally treated with steroids with improvement.  Then started on vedolizumab with clinical response but active disease on endoscopy.  He was transitioned to risankizumab in 2023 and is in clinical and endoscopic remission.    CD Summary  Current Crohn's disease phenotype:  stricturing    Involved sites since disease onset   Esophagus: no   Stomach: no     Duodenum: no   Jejunum: no   Ileum: yes   Right colon: no   Transverse colon: no   Left colon: no   Rectum: no   Anus: no     History of stricture  Esophagus: no   Stomach: no   Duodenum: no   Jejunum: no   Ileum: yes   Right colon: no   Transverse colon: no   Left colon: no   Rectum: no   Anus: no     History of fistula other than perianal:  Intra-abdominal   abcess: no      Enteroenteric fistula: no      Enterocutaneous fistula: no      Enterovesical fistula: no      Other fistula: no            Surgical History  Number of IBD surgeries: 0      First IBD surgery:    Most Recent IBD surgery:    Esophageal:  0    Gastroduodenal:  0    Small bowel resection(s):  0    Ileocolonic resection(s): 0      Colonic resection(s):  0    Ileostomy or colostomy:  no previous ostomy    Complete colectomy:  No         Medications     Year last used Reason for discontinuation   Corticosteroids prior           Thiopurines   never         Methotrexate   never         Infliximab   never         Adalimumab   never         Certolizumab   never         Golimumab   never         Natalizumab   never          Mesalamine   never         Sulfasalzine   never         Vedolizumab prior     PNR clinical remission but active disease on colonoscopy   Ustekinumab   never         Tofacitinib   never         Other biologic prior     AK Currently on risankizumab       Extraintestinal Manifestations    IBD-associated arthropathy No    Uveitis No    Oral aphthous ulcers No   Erythema nodosum No    Pyoderma gangrenosum No    Primary sclerosing cholangitis No    Thrombotic complications No          Cancer / Dysplasia History  History of IBD-associated dysplasia: none    Date of diagnosis (Year):     History of colorectal cancer: No   History of cervical dysplasia: No   History of skin cancer: none         Laboratory Data   Most recent (date) Result   PPD       Quanitferon gold 7/26/2023 negative   TPMT       Hepatitis A       HBsAb 7/31/2023 negative   HBcAb 7/31/2023 postive   HBsAg 7/31/2023 negative   HCV Ab 7/26/2023 negative       Imaging / Diagnostic Procedures   Most recent (date) Findings   Colonoscopy or sigmoidoscopy #1 4/10/2025            Ulcers/Erosions  no erosions           Strictures  none           Stricture Severity  N/A           Endoscopic Score Shukla Score:  0 Rutgeert's:  N/A            Findings  1) Few medium, scattered pancolonic diverticula  2) Performed multiple forceps biopsies in the terminal ileum, cecum, ascending colon, transverse colon, descending colon, sigmoid colon and rectum for dysplasia screening. Segmental biopsies obtained for dysplasia given history of Crohn's.  3) One flat and hyperplastic polyp measuring smaller than 5 mm in the sigmoid colon; performed cold forceps biopsy with complete en bloc removal  4) Internal small hemorrhoids           Pathology  A.  Terminal ileum, biopsy:  - Benign small bowel mucosa without significant pathologic alteration.  -Negative for acute or chronic enteritis, dysplasia or carcinoma.     B.  Colon, cecum, biopsy:  - Benign colonic mucosa.  -No thickened  basement membranes or intraepithelial lymphocytosis to suggest microscopic colitis (i.e. collagenous colitis or lymphocytic colitis, respectively).  -No active or chronic colitis, dysplasia or carcinoma.     C.  Colon, ascending, biopsy:  - Benign colonic mucosa.  -No thickened basement membranes or intraepithelial lymphocytosis to suggest microscopic colitis (i.e. collagenous colitis or lymphocytic colitis, respectively).  -No active or chronic colitis, dysplasia or carcinoma.     D.  Colon, transverse, biopsy:  - Benign colonic mucosa.  -No thickened basement membranes or intraepithelial lymphocytosis to suggest microscopic colitis (i.e. collagenous colitis or lymphocytic colitis, respectively).  -No active or chronic colitis, dysplasia or carcinoma.  .  E.  Colon, descending, biopsy:  - Benign colonic mucosa.  -No thickened basement membranes or intraepithelial lymphocytosis to suggest microscopic colitis (i.e. collagenous colitis or lymphocytic colitis, respectively).  -No active or chronic colitis, dysplasia or carcinoma.     F.  Colon, sigmoid, biopsy:  - Benign colonic mucosa.  -No thickened basement membranes or intraepithelial lymphocytosis to suggest microscopic colitis (i.e. collagenous colitis or lymphocytic colitis, respectively).  -No active or chronic colitis, dysplasia or carcinoma.     G.  Colon, sigmoid polyp, biopsy:  - Polypoid portion of superficial colonic mucosa with features compatible with benign hyperplastic polyp, negative for dysplasia or carcinoma.       H.  Colon, rectum, biopsy:  - Benign colonic mucosa.  -No thickened basement membranes or intraepithelial lymphocytosis to suggest microscopic colitis (i.e. collagenous colitis or lymphocytic colitis, respectively).  -No active or chronic colitis, dysplasia or carcinoma.   Colonoscopy or sigmoidoscopy #2              Ulcers/Erosions                 Strictures                 Stricture Severity              Endoscopic Score Shukla Score:     Nasireert's:              Findings              Pathology      EGD 1/30/2023 Small hiatal hernia, antral erythema, nodular mucosa in the stomach.  Biopsies with mild chronic inactive gastritis negative for H. pylori, evidence of Harris's esophagus.   Small bowel follow-through       CT enterography       CT without enterography       MRI enterography 1/15/2025 Active inflammatory small bowel Crohn disease with luminal narrowing. Two inflamed segments of distal ileum, measuring 7.8 cm and 8 cm.  Most of the inflamed segment shows stricturing without upstream dilation.   Capsule study       DEXA scan   Lowest Z-score:      CXR (for TB)           HPI:   Interval History:  4/30/2025 Follow up  Patient continues on risankizumab every 8 weeks and is feeling well.  He reports normal bowel function with 2 formed BMs daily and no abdominal pain or blood in the stool.  He continues on omeprazole once daily for history of Harris's and denies any heartburn or acid reflux.  He is up to date with vaccines.  He follows with dermatology regularly.    Labs 2/2025 reviewed.  CBC normal other than MCHC 30.7, RDW 19.1.  BMP normal other than glucose 147, calcium 8.3.  Labs 1/2025 reviewed.  CRP 9.1.  Vitamin B12 287.    10/21/2024 Follow up  Patient feels well with no acute complaints/concerns.  He denies any abdominal pain, heartburn, acid reflux, irregular bowel habits, blood in the stool, unintentional weight loss.  He is struggling to lose weight despite healthy diet and exercise.  Sometimes right knee hurts but otherwise no joint pains.  He sees dermatology routinely.  He has received his COVID and flu vaccines this year, and has already gotten his Shingles, PNA, and RSV vaccines.       CBC 8/2024 reviewed, white count 10.58, MCHC 31.3, RDW 15.4, otherwise normal.  CMP 8/2024 noted Cl 110, Cr 1.34, AST 12, , total protein 6.0, albumin 3.4.  GGT 7/2023 normal.    MR enterography from January 2023 with active inflammatory  bowel disease with moderate bowel wall thickening involving a long segment of proximal ileum and inflammatory stricturing with prestenotic dilatation up to 6 cm as well as background changes of chronic inflammatory bowel disease involving a very long estimated 50 cm of distalmost terminal ileum but no mesenteric edema, abscess, fistula.     South reports the following symptoms over the last 7 days:    Stool Frequency normal   Average stools per day: 2   Average liquid stools per day: 0   Consistency of bowel: formed   Awakening from sleep to move bowels? No   Urgency no fecal urgency   Visible blood in stool? none   Abdominal pain? none   General Wellbeing? generally well     South also reports the following symptoms in the last month:    Leakage of stool while sleeping? No   Leakage of stool while awake? No       REVIEW OF SYSTEMS:  During the last 7 days, South experienced the following symptoms:  Unintentional Weight Loss (in the last month) No   Fever No   Eye irritation No   Mouth sores No   Sore throat No   Chest pain No   Shortness of breath No   Numbness or tingling in hands or feet No   Skin rash No   Pain or swelling in joints Yes   Bruising or bleeding No   Felt depressed or blue No   Fatigue No   Dysuria No   Please see HPI for additional pertinent review of systems; otherwise remainder of ROS was unremarkable    MEDICATIONS:    Current Outpatient Medications:   •  aspirin 81 MG tablet  •  atorvastatin (LIPITOR) 20 mg tablet  •  Empagliflozin (Jardiance) 10 MG TABS tablet  •  folic acid (FOLVITE) 1 mg tablet  •  glimepiride (AMARYL) 2 mg tablet  •  Magnesium 500 MG TABS  •  metoprolol succinate (TOPROL-XL) 50 mg 24 hr tablet  •  omeprazole (PriLOSEC) 20 mg delayed release capsule  •  risankizumab-rzaa (Skyrizi) 360 MG/2.4ML SOCT  •  risankizumab-rzaa (Skyrizi) 360 MG/2.4ML SOCT  •  rivaroxaban (Xarelto) 20 mg tablet  •  vitamin B-12 (VITAMIN B-12) 1,000 mcg tablet  •  amLODIPine (NORVASC) 2.5 mg  "tablet  •  furosemide (LASIX) 20 mg tablet    ALLERGIES:  No Known Allergies    OBJECTIVE:  /66 (BP Location: Left arm, Patient Position: Sitting, Cuff Size: Large)   Pulse 89   Temp 98.5 °F (36.9 °C) (Tympanic)   Ht 5' 10\" (1.778 m)   Wt 106 kg (233 lb)   SpO2 93%   BMI 33.43 kg/m²      PHYSICAL EXAM:    General Appearance:   Alert, cooperative, no distress   HEENT:   Normocephalic, atraumatic, anicteric.     Neck:  Supple, symmetrical, trachea midline   Lungs:   Respirations unlabored    Extremities:  No cyanosis, clubbing or edema    Skin:  No jaundice, rashes, or lesions      Lab Results   Component Value Date    WBC 9.55 02/12/2025    HGB 13.6 02/12/2025    HCT 44.3 02/12/2025    MCV 88 02/12/2025     02/12/2025     Lab Results   Component Value Date    SODIUM 138 02/12/2025    K 4.1 02/12/2025     02/12/2025    CO2 28 02/12/2025    AGAP 4 02/12/2025    BUN 11 02/12/2025    CREATININE 0.84 02/12/2025    GLUC 147 (H) 02/12/2025    GLUF 142 (H) 02/11/2025    CALCIUM 8.3 (L) 02/12/2025    AST 12 (L) 01/03/2025    ALT 13 01/03/2025    ALKPHOS 190 (H) 01/03/2025    TP 7.0 01/03/2025    TBILI 0.55 01/03/2025    EGFR 86 02/12/2025     Lab Results   Component Value Date    CRP 9.1 (H) 01/14/2025     Lab Results   Component Value Date    CPMORVWG68 287 01/14/2025     Lab Results   Component Value Date    FERRITIN 22 02/10/2021       ASSESSMENT AND PLAN:    South has a history of stricturing Crohn’s disease diagnosed in 2020 with involvement in the following areas:      Ileum,      without perianal fistula . Surgical history includes no small bowel resections, no colon resections, and no prior ostomy. Current medical therapy is with risankizumab. My global assessment is that the clinical disease activity is currently quiescent.    The 6-point Shukla score was 0 and the 9-point Shukla score was 0.  The short CDAI was 44.      South (True) has a history of stricturing Crohn's disease of the terminal " ileum who is in clinical and endoscopic remission on risankizumab every 8 weeks.  Recent MR enterography 1/2025 shows 2 active segments of inflammation with narrowing, but significantly improved from previous MR enterography in 2023.  He also has a history of Harris's esophagus and is on omeprazole once daily.  Also with history of vitamin B12 deficiency currently on PO supplements.    1.  Continue risankizumab every 8 weeks.  2.  Continue 1000 mcg vitamin B12 daily.  3.  Continue omeprazole 20 mg once daily.  4.  Check CBC, CMP, CRP.  5.  Recheck vitamin B12, vitamin D, and iron levels.  6.  Update quant gold and hep B surface antigen.  7.  Will be due for recall EGD in 2026 for history of Harris's.  8.  Stay up to date with routine vaccinations.  9.  Continue following with dermatology.    Follow up in 6 months.     Health Maintenance Recommendations:  Vaccines & Infections  COVID-19 vaccination and boosters are recommended. There is no evidence that the COVID-19 vaccine would cause an IBD flare.  Avoid live vaccines if on immunosuppressive therapy.  Yearly influenza vaccine (flu shot).  Pneumonia vaccines for patients on immunosuppression. These include Prevnar 20, followed by Pneumovax 23 at least 8 weeks later.  Shingrix vaccine (series of 2 injections) for al patients 65 and older. Patients on tofacitinib or upadacitinib should be vaccinated regardless of age.  If not immune to measles mumps or rubella, MMR vaccine is recommended. However, this is a live vaccine and should be given prior to immunosuppressive therapy.  HPV vaccination as per national guidelines.  Hepatitis A and B vaccinations if not previously vaccinated.  Testing for tuberculosis with QuantiFERON Gold blood test and/or chest xray prior to starting immunosuppressive medications, and then annually    Cancer screening  Dysplasia surveillance for colorectal cancer. Colonoscopy in all patients with extensive colitis (more than 1/3 of the colon  involved) who had disease for at least 8 or more years.  Repeat colonoscopy approximately every 12-24 months.  In patients with concurrent primary sclerosing cholangitis, history of dysplasia, or family history of colon cancer, repeat colonoscopy annually.    Females: Pap smear annually for woman on immunosuppression.  Annual dermatologic/skin exam in all patients with IBD, especially those on immunosuppression with thiopurines or RENZO inhibitors.    Miscellaneous  DEXA scan, once off steroids for 3 months  Depression screening recommended annually  Routine dental and ophthalmology examinations    Problem List Items Addressed This Visit        Digestive    Harris's esophagus without dysplasia    Crohn's disease of small intestine with complication (HCC) - Primary    Relevant Orders    CBC    Comprehensive metabolic panel    C-reactive protein    Vitamin B12    Vitamin D 25 hydroxy    Iron Panel (Includes Ferritin, Iron Sat%, Iron, and TIBC)    Quantiferon TB Gold Plus Assay    Hepatitis B surface antigen       Other    Immunocompromised patient (HCC)    Relevant Orders    Hepatitis B surface antigen   Other Visit Diagnoses       Encounter for screening for other viral diseases        Relevant Orders    Hepatitis B surface antigen            Shailesh Joseph PA-C

## 2025-05-27 ENCOUNTER — TELEPHONE (OUTPATIENT)
Dept: NEPHROLOGY | Facility: CLINIC | Age: 75
End: 2025-05-27

## 2025-05-27 ENCOUNTER — DOCUMENTATION (OUTPATIENT)
Dept: NEPHROLOGY | Facility: CLINIC | Age: 75
End: 2025-05-27

## 2025-05-27 NOTE — TELEPHONE ENCOUNTER
Spoke with patient about the following, he expressed understanding and thanked us for the call:    ----- Message from Erik Ramos MD sent at 5/27/2025 10:38 AM EDT -----  I would decrease metoprolol to succinate or Toprol-XL to 50 mg in the morning and 75 mg in the evening  ----- Message -----  From: Susy Davis MA  Sent: 5/27/2025  10:08 AM EDT  To: Erik Ramos MD    AM sit:  122/81, 86 stand:  123/80, 89PM sit:  105/70, 72 stand:  108/75, 77

## 2025-05-27 NOTE — TELEPHONE ENCOUNTER
Patient called back and he said he was only taking metoprolol 75 mg in the morning, no evening dose.

## 2025-05-30 ENCOUNTER — REMOTE DEVICE CLINIC VISIT (OUTPATIENT)
Dept: CARDIOLOGY CLINIC | Facility: CLINIC | Age: 75
End: 2025-05-30
Payer: MEDICARE

## 2025-05-30 DIAGNOSIS — R55 SYNCOPE AND COLLAPSE: Primary | ICD-10-CM

## 2025-05-30 PROCEDURE — 93298 REM INTERROG DEV EVAL SCRMS: CPT | Performed by: INTERNAL MEDICINE

## 2025-05-30 NOTE — PROGRESS NOTES
"Results for orders placed or performed in visit on 05/30/25   Cardiac EP device report    Narrative    MDT LOOP LINQII  CARELINK TRANSMISSION: BATTERY STATUS \"GOOD.\" NO NEW DEVICE DETECTED EPISODES SINCE 2/24/25 REPORT. NO PT ACTIVATED EPISODES. PRESENTING RHYTHM NSR. NORMAL DEVICE FUNCTION. GV        "

## 2025-05-31 ENCOUNTER — RESULTS FOLLOW-UP (OUTPATIENT)
Dept: CARDIOLOGY CLINIC | Facility: CLINIC | Age: 75
End: 2025-05-31

## 2025-06-06 NOTE — PROGRESS NOTES
"Advanced Heart Failure/Pulmonary Hypertension Outpatient Note - South Humphrey III 74 y.o. male MRN: [id number]    @ Encounter: [id number]    Assessment:  74 y.o. male PMH and acute problems listed later in note (a partial list may also be included within the assessment section) p/w HF fu.   I first met [place] Humphrey III during 2/2024 admission for HF and AFL.    The patient's primary cardiac team is general cardiology, follows Dr. Archie MATTHEW, HFimpEF, low EF 2/2024 to LVEF 20%>gdmt and rhythm control>65% 6/2024 TTE  2018 NST: no ischemia or scar  Etiology may be TIC, EF drop in setting of rapid AF and AFL  # PAF and aflutter with RVR, on AC;   Sp AF and AFL ablation 2/11/25 Harinder Pandya   Hx of loop recorder- explanted 1/10/24 and reimplanted 2/11/25  S/p 2/19/24 DENIS/DCCV with conversion to NSR  RBBB  # hx of bifascicular block/ SVT, some nighttime pankaj events on LINQ  # dyslipidemia  CKD  # PAD s/ femoral popliteal bypass surgery  # tobacco abuse  # crohn's dz, hx of SBO  DM  Ao aneurysm , root 4.4cm 6/2024 TTE      I have reviewed all pertinent patient data including but not limited to:        Lab Units 02/12/25  0459 02/11/25  0650 01/14/25  0640   CREATININE mg/dL 0.84 0.95 1.02           Lab Results   Component Value Date    K 4.1 02/12/2025     Lab Results   Component Value Date    HGBA1C 7.1 (A) 01/17/2025     Lab Results   Component Value Date    RYH9PDFREWKS 4.009 04/22/2024     Lab Results   Component Value Date    LDLCALC 55 04/22/2024     Lab Results   Component Value Date     (H) 02/12/2024      No results found for: \"NTBNP\"     Home scale dry weight may be 209lbs    TODAY'S PLAN:     06/09/25  Warm, euvolemic  No new cardiac complaints, feels generally well  BP acceptable    Sp AF and AFL ablation 2/11/25 Harinder Pandya   Loop reimplanted  Discussed 2/2025 5s pause with EP team and plan for no intervention now, cw monitoring  Reduced metop at that time    Gdmt below  Pankaj and BP " somewhat limiting historically  No changes today    Cw AC     On ASA and statin    Advised cw TLC, exercise, diet, weight loss    Sleep med referral given in past - pt remains adamant that he will not use cpap if ever indicated and does not plan to fu sleep med cx    Follow up:  With me in 4 mo or sooner if symptoms evolve.  In addition to follow up with their other medical providers    Key info from my prior notes:    Discussed risks vs benefits of ablation  He declines now but will consider future pending course  Prefers watchful waiting for now    Long discussion risks vs benefits of amio cessation now  Ultimately he elects to DC amio now, avoid repeat LINQ for now, close monitoring of Sx  Will advance metop if HR increases    He has new cost issues for jardiance and entresto  SW referral given  Paperwork to be completed that he brought to office today  RTC 3 weeks fro resolution/plan, may need to switch Rx    Fu future echo x 3 months around 5/2024 after rhythm control and max gdmt, for EF  Could consider ischemic eval in future, pending Sx and LVEF trend  No chest pain  Neg NST 2018    No very strong features of cardiac amyloidosis  Remains in differential    Possible Zio patch near future in case vreo events  +BL conduction dz  Past nighttime vero events on LINQ  No overt issues as inpt on tele, reassuring  Asymptomatic now    No strong connection between skyrizi and HF/arrhythmia I am aware of  Fu GI team    Neurohormonal Blockade/GDMT:  --Beta-Blocker: toprol xl 50 bid > 75AM and 50PM > 75 bid and if tolerates well than up to 100 bid in 1 week > 5s pause 2-3/2025 after ablation > reduced toprol xl to 75 qd and remains on 75 qd  --ACEi, ARB, ARNi: entresto 24/26 bid > not on as of 3/17/25, reconsider future pending course, hold now for past dizziness/hypotension  --MRA: not on 2/2 hypotension  --SGLT2i: jardiance 10 qd  --Hydralazine/nitrates:    --Diuretic: lasix 40 as needed  Long discussion about  "evidence of worsening HF, when to self uptitrate home diuretic and call cardiology office    Other HF pharmaco-invasive therapy (if applicable):   PPM,  ICD , CRT (if applicable):  Interrogation:  Linq  5/2025  MDT LOOP JOSE   CARELINK TRANSMISSION: BATTERY STATUS \"GOOD.\" NO NEW DEVICE DETECTED EPISODES SINCE 2/24/25 REPORT. NO PT ACTIVATED EPISODES. PRESENTING RHYTHM NSR. NORMAL DEVICE FUNCTION.   Advanced Therapies (if applicable):   --Inotrope:  --LVAD/Transplant Candidacy:    Studies:  I have reviewed all pertinent patient data/labs/imaging where available, including but not limited to the below studies. Selected results may be displayed here but comprehensive listing is omitted for note clarity and can be found in the epic chart.    ECG.    Echo.    Stress.    Cath.    HPI:   73 y.o. male PMH and acute problems listed later in note (a partial list may also be included within the assessment section) p/w HF fu. I first met [place] Humph[place] III during 2/2024 admission for HF and AFL where he p/w ADHF in setting of rapid Afib/AFL. New drop in EF. Note New skyrizi for Crohn's recently started.  No new CP/SOB/dizziness/palpitations/syncope.  No new fatigue.  No new unintentional weight changes.  No new leg swelling, PND, pillow orthopnea.  No new fevers, chills, cough, nausea, vomiting, diarrhea, dysuria.    Interval History:  As noted in 'plan' section above and prior epic chart notes.    No new CP/SOB/dizziness/palpitations/syncope.  No new fatigue.  No new unintentional weight changes.  No new leg swelling, PND, pillow orthopnea.  No new fevers, chills, cough, nausea, vomiting, diarrhea, dysuria.    Past Medical History:   Diagnosis Date    Harris esophagus     Blue toe syndrome (HCC)     Chronic kidney disease     Colon polyps     Crohn's disease (HCC) 1-2020    GERD (gastroesophageal reflux disease)     Hematuria     History of rheumatic fever     Hypolipidemia     Hypotension     Ischemic cardiomyopathy  "    PAD (peripheral artery disease) (HCC)     Pulmonary emphysema (HCC)     PVD (peripheral vascular disease) ([facility])      Patient Active Problem List    Diagnosis Date Noted    Cardiomyopathy ([facility]) 02/11/2025    Flutter-fibrillation (HCC) 02/11/2025    Acute on chronic systolic congestive heart failure (HCC) 01/28/2025    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    Electrolyte abnormality 02/14/2024    Combined systolic and diastolic heart failure ([facility]) 02/14/2024    Abnormal LFTs 02/13/2024    Atrial fibrillation with RVR ([facility]) 02/12/2024    Immunocompromised patient ([facility]) 01/17/2024    Venous insufficiency 08/29/2022    Type 2 diabetes mellitus with diabetic peripheral angiopathy without gangrene ([facility]) 03/16/2021    Vitamin B12 deficiency 09/16/2020    Elevated alkaline phosphatase level 05/14/2020    S/P femoral-popliteal bypass surgery 04/09/2020    Terminal ileitis of small intestine ([facility]) 02/18/2020    Positive QuantiFERON-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 01/20/2020    Volume overload 01/19/2020    SVT (supraventricular tachycardia) ([facility]) 01/18/2020    Crohn's disease of small intestine with complication ([facility]) 01/13/2020    Adrenal mass ([facility]) 01/07/2020    Paroxysmal atrial fibrillation ([facility]) 09/12/2019    Venous stasis 07/19/2019    S/P peripheral artery angioplasty with stent placement 06/28/2019    Ischemic cardiomyopathy 06/03/2019    PAD (peripheral artery disease) ([facility]) 04/24/2019    Harris's esophagus without dysplasia 04/05/2019    Colon polyps 04/05/2019    Dyslipidemia 05/16/2018    Vitamin D deficiency 05/16/2018    RBBB (right bundle branch block with left anterior fascicular block) 05/11/2018    [place] cardiac risk 10-20% in next 10 years 05/04/2018    Abdominal aortic atherosclerosis (HCC) 05/04/2018    CKD (chronic kidney disease), stage III ([facility]) 03/22/2018    Persistent proteinuria 03/22/2018    Microscopic hematuria 03/22/2018    Class 1 obesity due to excess calories with serious comorbidity and body mass  index (BMI) of 33.0 to 33.9 in adult        ROS:  10 point ROS negative except as specified in HPI/interval history    No Known Allergies  Current Outpatient Medications   Medication Instructions    aspirin 81 mg, Daily    atorvastatin (LIPITOR) 20 mg, Oral, Daily    Empagliflozin (JARDIANCE) 10 mg, Oral, Every morning    folic acid (FOLVITE) 1 mg, Oral, Daily    furosemide (LASIX) 40 mg, Oral, As needed, Can take 20-40 mg    glimepiride (AMARYL) 2 mg tablet TAKE 1 TABLET BY MOUTH 2 TIMES A DAY WITH MEALS.    Magnesium 500 MG TABS 1 tablet, Daily    metoprolol succinate (TOPROL-XL) 75 mg, Oral, Daily, 75 mg in the morning. He takes a 50mg tablet and 25mg tablet same time each morning.    omeprazole (PRILOSEC) 20 mg, Oral, Daily before breakfast    risankizumab-rzaa (Skyrizi) 360 MG/2.4ML SOCT Take on body injector (OBI) under skin every 8 weeks. Replacement OBI    risankizumab-rzaa (Skyrizi) 360 MG/2.4ML SOCT Take on body injector (OBI) under skin every 8 weeks    vitamin B-12 (VITAMIN B-12) 1,000 mcg, Oral, 3 times weekly    Xarelto 20 mg, Oral, Daily      Social History     Socioeconomic History    Marital status: /Civil Union     Spouse name: Not on file    Number of children: 2    Years of education: Not on file    Highest education level: Not on file   Occupational History    Occupation: retired   Tobacco Use    Smoking status: Former     Current packs/day: 0.25     Average packs/day: 0.3 packs/day for 30.0 years (7.5 ttl pk-yrs)     Types: Cigarettes    Smokeless tobacco: Never   Vaping Use    Vaping status: Never Used   Substance and Sexual Activity    Alcohol use: Yes     Alcohol/week: 1.0 standard drink of alcohol     Types: 1 Standard drinks or equivalent per week     Comment: social drinker when out dining    Drug use: Never    Sexual activity: Not Currently     Birth control/protection: None   Other Topics Concern    Not on file   Social History Narrative    Drinks coffee     Social Drivers of  "Health     Financial Resource Strain: Low Risk  (1/14/2024)    Overall Financial Resource Strain (CARDIA)     Difficulty of Paying Living Expenses: Not hard at all   Food Insecurity: No Food Insecurity (1/14/2025)    Nursing - Inadequate Food Risk Classification     Worried About Running Out of Food in the Last Year: Never true     Ran Out of Food in the Last Year: Never true     Ran Out of Food in the Last Year: Not on file   Transportation Needs: No Transportation Needs (1/14/2025)    PRAPARE - Transportation     Lack of Transportation (Medical): No     Lack of Transportation (Non-Medical): No   Physical Activity: Not on file   Stress: Not on file   Social Connections: Not on file   Intimate Partner Violence: Not on file   Housing Stability: Low Risk  (1/14/2025)    Housing Stability Vital Sign     Unable to Pay for Housing in the Last Year: No     Number of Times Moved in the Last Year: 0     Homeless in the Last Year: No     Family History   Problem Relation Name Age of Onset    Heart disease Mother Annie Fatzinger     Hyperlipidemia Mother Annie Fatzinger     Hypertension Mother Annie Fatzinger     Hypertension Father South     Heart disease Father [place]     Stroke Father [place]     Hyperlipidemia Father [place]     Diabetes Brother Jourdan     No Known Problems Maternal Grandmother      Dementia Neg Hx      Drug abuse Neg Hx      Mental illness Neg Hx      Substance Abuse Neg Hx      Alcohol abuse Neg Hx      Depression Neg Hx      Colon cancer Neg Hx       Physical Exam:  Vitals:    06/09/25 1143   BP: 110/68   BP Location: Left arm   Patient Position: Sitting   Cuff Size: Standard   Pulse: 87   SpO2: 97%   Weight: 105 kg (232 lb 3.2 oz)   Height: 5' 10\" (1.778 m)       Constitutional: NAD, non toxic, obese  Ears/nose/mouth/throat: atraumatic  CV: RRR, nl S1S2, no murmurs/rubs/gallups, no JVD, no HJR  Resp: CTABL  GI: Soft, NTND  MSK: no swollen joints in exposed areas  Extr: no  LE edema, warm LE  Pysche: Normal " affect  Neuro: appropriate in conversation  Skin: dry and intact in exposed areas    Labs & Results:  Lab Results   Component Value Date    SODIUM 138 02/12/2025    K 4.1 02/12/2025     02/12/2025    CO2 28 02/12/2025    BUN 11 02/12/2025    CREATININE 0.84 02/12/2025    GLUC 147 (H) 02/12/2025    CALCIUM 8.3 (L) 02/12/2025     Lab Results   Component Value Date    WBC 9.55 02/12/2025    HGB 13.6 02/12/2025    HCT 44.3 02/12/2025    MCV 88 02/12/2025     02/12/2025       Counseling / Coordination of Care  Greater than 50% of total time was spent with the patient and / or family counseling and / or coordination of care.  We discussed diagnoses, most recent studies, tests and any changes in treatment plan.    Thank you for the opportunity to participate in the care of this patient.    Ike Butts MD  Attending Physician  Advanced Heart Failure and Transplant Cardiology  [facility]

## 2025-06-09 ENCOUNTER — OFFICE VISIT (OUTPATIENT)
Dept: CARDIOLOGY CLINIC | Facility: CLINIC | Age: 75
End: 2025-06-09
Payer: MEDICARE

## 2025-06-09 VITALS
SYSTOLIC BLOOD PRESSURE: 110 MMHG | DIASTOLIC BLOOD PRESSURE: 68 MMHG | OXYGEN SATURATION: 97 % | HEIGHT: 70 IN | WEIGHT: 232.2 LBS | HEART RATE: 87 BPM | BODY MASS INDEX: 33.24 KG/M2

## 2025-06-09 DIAGNOSIS — E66.09 CLASS 1 OBESITY DUE TO EXCESS CALORIES WITH SERIOUS COMORBIDITY AND BODY MASS INDEX (BMI) OF 33.0 TO 33.9 IN ADULT: ICD-10-CM

## 2025-06-09 DIAGNOSIS — I42.8 OTHER CARDIOMYOPATHY (HCC): Primary | ICD-10-CM

## 2025-06-09 DIAGNOSIS — I48.92 ATRIAL FLUTTER, UNSPECIFIED TYPE (HCC): ICD-10-CM

## 2025-06-09 DIAGNOSIS — E66.811 CLASS 1 OBESITY DUE TO EXCESS CALORIES WITH SERIOUS COMORBIDITY AND BODY MASS INDEX (BMI) OF 33.0 TO 33.9 IN ADULT: ICD-10-CM

## 2025-06-09 DIAGNOSIS — I50.20 SYSTOLIC CONGESTIVE HEART FAILURE, UNSPECIFIED HF CHRONICITY (HCC): ICD-10-CM

## 2025-06-09 PROCEDURE — 99214 OFFICE O/P EST MOD 30 MIN: CPT | Performed by: STUDENT IN AN ORGANIZED HEALTH CARE EDUCATION/TRAINING PROGRAM

## 2025-06-09 RX ORDER — METOPROLOL SUCCINATE 50 MG/1
75 TABLET, EXTENDED RELEASE ORAL DAILY
Qty: 135 TABLET | Refills: 5 | Status: SHIPPED | OUTPATIENT
Start: 2025-06-09 | End: 2026-12-01

## 2025-06-09 NOTE — ASSESSMENT & PLAN NOTE
No SGLT2 inhibitor given PAD  Orders:    Basic metabolic panel; Future    Magnesium; Future    Comprehensive metabolic panel; Future    CBC; Future    Lipid Panel with Direct LDL reflex; Future    Protein / creatinine ratio, urine; Future    Vitamin D 25 hydroxy; Future

## 2025-06-09 NOTE — PROGRESS NOTES
Name: South Humphrey III      : 1950      MRN: [id number]  Encounter Provider: Erik Ramos MD  Encounter Date: 2025   Encounter department: [facility] [place]  :  Assessment & Plan  Type 2 diabetes mellitus with diabetic peripheral angiopathy without gangrene, unspecified whether long term insulin use (HCC)  No SGLT2 inhibitor given PAD  Orders:    Basic metabolic panel; Future    Magnesium; Future    Comprehensive metabolic panel; Future    CBC; Future    Lipid Panel with Direct LDL reflex; Future    Protein / creatinine ratio, urine; Future    Vitamin D 25 hydroxy; Future    Stage 3a chronic kidney disease (HCC)  At baseline no changes  Orders:    Basic metabolic panel; Future    Magnesium; Future    Comprehensive metabolic panel; Future    CBC; Future    Lipid Panel with Direct LDL reflex; Future    Protein / creatinine ratio, urine; Future    Vitamin D 25 hydroxy; Future    Persistent proteinuria  Will need follow-up with next visit has been well-controlled  Orders:    Basic metabolic panel; Future    Magnesium; Future    Comprehensive metabolic panel; Future    CBC; Future    Lipid Panel with Direct LDL reflex; Future    Protein / creatinine ratio, urine; Future    Vitamin D 25 hydroxy; Future    Parenchymal renal hypertension, stage 1 through stage 4 or unspecified chronic kidney disease  Low normal continue beta-blocker for cardiac purposes       Orders:    Basic metabolic panel; Future    Magnesium; Future    Comprehensive metabolic panel; Future    CBC; Future    Lipid Panel with Direct LDL reflex; Future    Protein / creatinine ratio, urine; Future    Vitamin D 25 hydroxy; Future    Acute on chronic systolic congestive heart failure (HCC)  Current status euvolemic  Current treatment: No diuretics      Orders:    Basic metabolic panel; Future    Magnesium; Future    Comprehensive metabolic panel; Future    CBC; Future    Lipid Panel with Direct LDL reflex; Future     Protein / creatinine ratio, urine; Future    Vitamin D 25 hydroxy; Future    Dyslipidemia  Has been at goal no changes  Orders:    Basic metabolic panel; Future    Magnesium; Future    Comprehensive metabolic panel; Future    CBC; Future    Lipid Panel with Direct LDL reflex; Future    Protein / creatinine ratio, urine; Future    Vitamin D 25 hydroxy; Future        History of Present Illness   HPI  South Humphrey III is a 74 y.o. male who presents regarding CKD 3  There has been no hospitalizations or acute illnesses since last visit.  The patient overall is feeling well.  Good appetite and good energy  No fevers, chills, or cough or colds.  No hematuria, dysuria, voiding symptoms or foamy urine  No gastrointestinal symptoms  No cardiovascular symptoms including swelling of the legs  No headaches, dizziness or lightheadedness  Blood pressure medications:  Toprol-XL 75 mg daily in the morning  Furosemide 20 to 40 mg as needed for 3 pound weight gain but he never really takes it at this time    Renal pertinent medications:  Aspirin 81 mg daily  Atorvastatin 20 mg daily  Jardiance 10 mg daily  Iron daily  Magnesium 500 mg daily  Omeprazole 20 mg daily  Xarelto 20 mg daily  Vitamin D 1000 units daily      Review of Systems  Please see HPI, otherwise the review of systems as completely reviewed with the patient are negative    Pertinent Medical History   1.  CKD stage 3 a:  Etiology:  Cardiorenal syndrome/arteriolar nephrosclerosis/?  Atheroemboli/prior prerenal associated mild tachycardia and relative hypotension in the past  Baseline creatinine:  1.25-1.41  Current creatinine: 1.00 from 6/10/2025 at baseline!  Urine protein creatinine ratio:  0.30 g at goal from prior visit  Recommendations:  Treat hypertension-please see below  Treat dyslipidemia-please see below  Maintain proteinuria less than 1 g or as low as possible  Avoid nephrotoxic agents such as NSAIDs, patient counseled as such     2.  Volume:   HFrEF:  20% EF February 2024 in the setting of PAF  Current status euvolemic  No diuretics!        3.  Hypotension:  Workup was compatible with low ejection fraction 48% associated severe hypokinesis of the apical anterior and mid anterior septal in mild in for septal and apical inferior and apical septal and apical walls.  No pericardial effusion.  Cortisol/TSH were unremarkable.  Normal plasma free metanephrines   Normal TSH  Aldosterone/renin 12.8  Currently blood pressure:   /81 sitting, standing 105/70  PM: 123/80 sitting, standing 108/75  Heart rate: 70-80 range        Medication changes today:  No changes were made given orthostatic changes; the low blood pressures today    4.  Electrolytes:  All acceptable including potassium 4.3     5.  Mineral bone disorder:  Calcium/magnesium/phosphorus:  All acceptable including normal magnesium  PTH intact: 45.8 at goal from 6/20/2025  Vitamin-D: 29.5 from 6/10/2025: Supplement just started     6.  Dyslipidemia:  Goal LDL:  Less than 70 given PAD  Current lipid profile:  LDL 56/HDL 45/triglycerides 106 from 6/10/2025  Recommendations:  At goal so no changes        7.  Anemia:               Recommendations:  Hemoglobin stable at 15.5  Multiple myeloma evaluation with SPEP/UPEP and light chains:  Negative  Stool for occult blood x3:  NEGATIVE X1  In March had EGD colonoscopy and recent sigmoidoscopy:  Harris's esophagus/diverticulosis/colonic polyps.  Patient will be seeing Dr. Rodriguez     8.  Other problems:  Chronic intermittent mild leukocytosis:  Stable   Gross hematuria/microscopic hematuria:  This has resolved.  Patient seen by Urology for gross hematuria.  Cystoscopy was negative and recent PSA negative. No further investigation by follow-up in 1 year by Urology.  Abnormal echocardiogram/cardiomyopathy:  Followed in the past by Dr. Almanza  Paroxysmal atrial fibrillation followed by Dr. Almanza:  Currently on Xarelto: Please see above regarding heart rate  Bilateral  adrenal gland abnormalities being followed by surgical oncology.  24 hour urine for Dopamine epinephrine all unremarkable.  Aldosterone was unremarkable.  Cortisol was okay at 17.5. To be followed by   PAD:  Status post stent for the right popliteal artery/revision 02/12/2020  Abnormal alkaline phosphatase followed by GI  Diabetes mellitus type 2 not really a candidate for SGLT2 inhibitors with PAD  ABNORMAL LUNG EXAM: Chest x-ray was normal May 2022  URI symptoms as of 12/10/2024 no significant fevers, chronically abnormal lung exam patient feeling slightly better will call family physician if it persists or worsens  Admission 2/12/24 Rapid Afib EF 20% biv HF and CHF   -CHF diuresed 42 lbs?  -cardioversion   -EF 20%     GI health maintenance: 4/10/2025 by Dr. Beverly: Follow-up in 2 years which should be 4/10/2027 because of IBS         Medical History Reviewed by provider this encounter:     .  Past Medical History   Past Medical History[1]  Past Surgical History[2]  Family History[3]   reports that he has quit smoking. His smoking use included cigarettes. He has a 7.5 pack-year smoking history. He has never used smokeless tobacco. He reports current alcohol use of about 1.0 standard drink of alcohol per week. He reports that he does not use drugs.  Current Outpatient Medications   Medication Instructions    aspirin 81 mg, Daily    atorvastatin (LIPITOR) 20 mg, Oral, Daily    Empagliflozin (JARDIANCE) 10 mg, Oral, Every morning    folic acid (FOLVITE) 1 mg, Oral, Daily    furosemide (LASIX) 40 mg, Oral, As needed, Can take 20-40 mg    glimepiride (AMARYL) 2 mg tablet TAKE 1 TABLET BY MOUTH 2 TIMES A DAY WITH MEALS.    Iron 28 mg, Daily    Magnesium 500 MG TABS 1 tablet, Daily    metoprolol succinate (TOPROL-XL) 75 mg, Oral, Daily, 75 mg in the morning. He takes a 50mg tablet and 25mg tablet same time each morning.    omeprazole (PRILOSEC) 20 mg, Oral, Daily before breakfast    risankizumab-rzaa (Skyrizi)  "360 MG/2.4ML SOCT Take on body injector (OBI) under skin every 8 weeks. Replacement OBI    risankizumab-rzaa (Skyrizi) 360 MG/2.4ML SOCT Take on body injector (OBI) under skin every 8 weeks    vitamin B-12 (VITAMIN B-12) 1,000 mcg, Oral, 3 times weekly    Vitamin D (Cholecalciferol) 1,000 Units, Daily    Xarelto 20 mg, Oral, Daily   Allergies[4]   Medications Ordered Prior to Encounter[5]   Social History[6]     Objective   Ht 5' 10\" (1.778 m)   Wt 103 kg (228 lb)   BMI 32.71 kg/m²      Physical Exam  BP sitting on left: 96/64 with a heart rate of 84 and regular  BP standing on left: 92/60 with a heart rate of 84 and right  Physical Exam: General:  No acute distress/obese  Skin:  No acute rash  Eyes:  No scleral icterus and noninjected  ENT:  Moist mucous membranes  Neck:  Supple, no jugular venous distention, trachea midline, overall appearance is normal  Chest:  Clear to auscultation  CVS:  Regular rate and rhythm, without a rub or gallops  Abdomen:  obese,Normal bowel sounds, soft and nontender and nondistended  Extremities:  No edema, and no cyanosis, no significant arthritic changes; positive varicose veins left greater than right lower extremity  Neuro:  No gross focality  Psych:  Alert and oriented and appropriate             [1]   Past Medical History:  Diagnosis Date    Harris esophagus     Blue toe syndrome (HCC)     Chronic kidney disease     Colon polyps     Crohn's disease (HCC) 1-2020    GERD (gastroesophageal reflux disease)     Hematuria     History of rheumatic fever     Hypolipidemia     Hypotension     Ischemic cardiomyopathy     PAD (peripheral artery disease) ([facility])     Pulmonary emphysema (HCC)     PVD (peripheral vascular disease) ([facility])    [2]   Past Surgical History:  Procedure Laterality Date    CARDIAC ELECTROPHYSIOLOGY PROCEDURE N/A 01/10/2024    Procedure: Cardiac loop recorder explant;  Surgeon: Jason Cortez MD;  Location: [facility];  Service: Cardiology    CARDIAC " ELECTROPHYSIOLOGY PROCEDURE N/A 02/11/2025    Procedure: Cardiac eps/afib ablation PFA;  Surgeon: Ray Pizano MD;  Location: [facility];  Service: Cardiology    CARDIAC ELECTROPHYSIOLOGY PROCEDURE N/A 02/11/2025    Procedure: Cardiac eps/aflutter ablation;  Surgeon: Ray Pizano MD;  [facility];  Service: Cardiology    CARDIAC ELECTROPHYSIOLOGY PROCEDURE N/A 02/11/2025    Procedure: Cardiac loop recorder implant;  Surgeon: Ray Pizano MD;  [facility]: [facility];  Service: Cardiology    CATARACT EXTRACTION Bilateral 03/2025    COLONOSCOPY  2019    HERNIA REPAIR      IR AORTAGRAM WITH RUN-OFF  06/27/2019    IR AORTAGRAM WITH RUN-OFF  02/12/2020    POPLITEAL ARTERY STENT Right     6/2019    AZ BYPASS W/VEIN FEMORAL-POPLITEAL Right 03/26/2020    Procedure: BYPASS FEMORAL-POPLITEAL; Right lower extremity bypass, fem-bk pop with GSV;  Surgeon: Wyatt Shell MD;  Location: [facility];  Service: Vascular    AZ SLCTV CATHJ 3RD+ ORD SLCTV ABDL PEL/LXTR BRNCH Right 06/27/2019    Procedure: leg ANGIOGRAM WITH STENT, BALLOON ANGIOPLASTY, LEFT GROIN ACCESS;  Surgeon: Wyatt Shell MD;  Location: [facility];  Service: Vascular   [3]   Family History  Problem Relation Name Age of Onset    Heart disease Mother June Fatzinger     Hyperlipidemia Mother June Fatzinger     Hypertension Mother Annie Fatzinger     Hypertension Father South     Heart disease Father South     Stroke Father South     Hyperlipidemia Father South     Diabetes Brother Jourdan     No Known Problems Maternal Grandmother      Dementia Neg Hx      Drug abuse Neg Hx      Mental illness Neg Hx      Substance Abuse Neg Hx      Alcohol abuse Neg Hx      Depression Neg Hx      Colon cancer Neg Hx     [4] No Known Allergies  [5]   Current Outpatient Medications on File Prior to Visit   Medication Sig Dispense Refill    aspirin 81 MG tablet Take 81 mg by mouth in the morning.      atorvastatin (LIPITOR) 20 mg  tablet TAKE 1 TABLET BY MOUTH EVERY DAY 90 tablet 1    Empagliflozin (Jardiance) 10 MG TABS tablet Take 1 tablet (10 mg total) by mouth every morning 90 tablet 5    Ferrous Sulfate (Iron) 28 MG TABS Take 28 mg by mouth daily      folic acid (FOLVITE) 1 mg tablet Take 1 tablet (1 mg total) by mouth daily 90 tablet 3    furosemide (LASIX) 20 mg tablet Take 2 tablets (40 mg total) by mouth if needed (for weight gain of 3lbs in one day or 5lbs in one week.) Can take 20-40 mg 120 tablet 2    glimepiride (AMARYL) 2 mg tablet TAKE 1 TABLET BY MOUTH 2 TIMES A DAY WITH MEALS. (Patient taking differently: daily) 180 tablet 1    Magnesium 500 MG TABS Take 1 tablet by mouth in the morning.      metoprolol succinate (TOPROL-XL) 50 mg 24 hr tablet Take 1.5 tablets (75 mg total) by mouth daily 75 mg in the morning. He takes a 50mg tablet and 25mg tablet same time each morning. 135 tablet 5    omeprazole (PriLOSEC) 20 mg delayed release capsule Take 1 capsule (20 mg total) by mouth daily before breakfast 90 capsule 1    risankizumab-rzaa (Skyrizi) 360 MG/2.4ML SOCT Take on body injector (OBI) under skin every 8 weeks. Replacement OBI 2.4 mL 0    risankizumab-rzaa (Skyrizi) 360 MG/2.4ML SOCT Take on body injector (OBI) under skin every 8 weeks 2.4 mL 5    rivaroxaban (Xarelto) 20 mg tablet TAKE 1 TABLET BY MOUTH EVERY DAY 30 tablet 5    vitamin B-12 (VITAMIN B-12) 1,000 mcg tablet Take 1 tablet (1,000 mcg total) by mouth 3 (three) times a week (Patient taking differently: Take 1,000 mcg by mouth daily)      Vitamin D, Cholecalciferol, 25 MCG (1000 UT) CAPS Take 1,000 Units by mouth daily       No current facility-administered medications on file prior to visit.   [6]   Social History  Tobacco Use    Smoking status: Former     Current packs/day: 0.25     Average packs/day: 0.3 packs/day for 30.0 years (7.5 ttl pk-yrs)     Types: Cigarettes    Smokeless tobacco: Never   Vaping Use    Vaping status: Never Used   Substance and Sexual  Activity    Alcohol use: Yes     Alcohol/week: 1.0 standard drink of alcohol     Types: 1 Standard drinks or equivalent per week     Comment: social drinker when out dining    Drug use: Never    Sexual activity: Not Currently     Birth control/protection: None

## 2025-06-09 NOTE — ASSESSMENT & PLAN NOTE
Will need follow-up with next visit has been well-controlled  Orders:    Basic metabolic panel; Future    Magnesium; Future    Comprehensive metabolic panel; Future    CBC; Future    Lipid Panel with Direct LDL reflex; Future    Protein / creatinine ratio, urine; Future    Vitamin D 25 hydroxy; Future

## 2025-06-09 NOTE — ASSESSMENT & PLAN NOTE
Current status euvolemic  Current treatment: No diuretics      Orders:    Basic metabolic panel; Future    Magnesium; Future    Comprehensive metabolic panel; Future    CBC; Future    Lipid Panel with Direct LDL reflex; Future    Protein / creatinine ratio, urine; Future    Vitamin D 25 hydroxy; Future

## 2025-06-09 NOTE — ASSESSMENT & PLAN NOTE
Low normal continue beta-blocker for cardiac purposes       Orders:    Basic metabolic panel; Future    Magnesium; Future    Comprehensive metabolic panel; Future    CBC; Future    Lipid Panel with Direct LDL reflex; Future    Protein / creatinine ratio, urine; Future    Vitamin D 25 hydroxy; Future

## 2025-06-09 NOTE — ASSESSMENT & PLAN NOTE
At baseline no changes  Orders:    Basic metabolic panel; Future    Magnesium; Future    Comprehensive metabolic panel; Future    CBC; Future    Lipid Panel with Direct LDL reflex; Future    Protein / creatinine ratio, urine; Future    Vitamin D 25 hydroxy; Future

## 2025-06-09 NOTE — ASSESSMENT & PLAN NOTE
Has been at goal no changes  Orders:    Basic metabolic panel; Future    Magnesium; Future    Comprehensive metabolic panel; Future    CBC; Future    Lipid Panel with Direct LDL reflex; Future    Protein / creatinine ratio, urine; Future    Vitamin D 25 hydroxy; Future

## 2025-06-10 ENCOUNTER — APPOINTMENT (OUTPATIENT)
Dept: LAB | Facility: CLINIC | Age: 75
End: 2025-06-10
Payer: MEDICARE

## 2025-06-10 DIAGNOSIS — E55.9 VITAMIN D DEFICIENCY: ICD-10-CM

## 2025-06-10 DIAGNOSIS — E78.5 DYSLIPIDEMIA: ICD-10-CM

## 2025-06-10 DIAGNOSIS — I50.40 COMBINED SYSTOLIC AND DIASTOLIC HEART FAILURE, UNSPECIFIED HF CHRONICITY (HCC): ICD-10-CM

## 2025-06-10 DIAGNOSIS — D84.9 IMMUNOCOMPROMISED PATIENT (HCC): ICD-10-CM

## 2025-06-10 DIAGNOSIS — K50.019 CROHN'S DISEASE OF SMALL INTESTINE WITH COMPLICATION (HCC): ICD-10-CM

## 2025-06-10 DIAGNOSIS — E66.01 OBESITY, MORBID (HCC): ICD-10-CM

## 2025-06-10 DIAGNOSIS — I12.9 PARENCHYMAL RENAL HYPERTENSION, STAGE 1 THROUGH STAGE 4 OR UNSPECIFIED CHRONIC KIDNEY DISEASE: ICD-10-CM

## 2025-06-10 DIAGNOSIS — N18.31 STAGE 3A CHRONIC KIDNEY DISEASE (HCC): ICD-10-CM

## 2025-06-10 DIAGNOSIS — Z11.59 ENCOUNTER FOR SCREENING FOR OTHER VIRAL DISEASES: ICD-10-CM

## 2025-06-10 DIAGNOSIS — R80.1 PERSISTENT PROTEINURIA: ICD-10-CM

## 2025-06-10 LAB
25(OH)D3 SERPL-MCNC: 29.5 NG/ML (ref 30–100)
ALBUMIN SERPL BCG-MCNC: 3.7 G/DL (ref 3.5–5)
ALP SERPL-CCNC: 174 U/L (ref 34–104)
ALT SERPL W P-5'-P-CCNC: 11 U/L (ref 7–52)
ANION GAP SERPL CALCULATED.3IONS-SCNC: 7 MMOL/L (ref 4–13)
AST SERPL W P-5'-P-CCNC: 11 U/L (ref 13–39)
BILIRUB SERPL-MCNC: 0.47 MG/DL (ref 0.2–1)
BUN SERPL-MCNC: 14 MG/DL (ref 5–25)
CALCIUM SERPL-MCNC: 8.9 MG/DL (ref 8.4–10.2)
CHLORIDE SERPL-SCNC: 108 MMOL/L (ref 96–108)
CHOLEST SERPL-MCNC: 122 MG/DL (ref ?–200)
CO2 SERPL-SCNC: 28 MMOL/L (ref 21–32)
CREAT SERPL-MCNC: 1 MG/DL (ref 0.6–1.3)
CRP SERPL QL: 10.8 MG/L
ERYTHROCYTE [DISTWIDTH] IN BLOOD BY AUTOMATED COUNT: 16.7 % (ref 11.6–15.1)
FERRITIN SERPL-MCNC: 12 NG/ML (ref 30–336)
FOLATE SERPL-MCNC: >22.3 NG/ML
GFR SERPL CREATININE-BSD FRML MDRD: 74 ML/MIN/1.73SQ M
GLUCOSE P FAST SERPL-MCNC: 135 MG/DL (ref 65–99)
HCT VFR BLD AUTO: 50.2 % (ref 36.5–49.3)
HDLC SERPL-MCNC: 45 MG/DL
HGB BLD-MCNC: 15.5 G/DL (ref 12–17)
IRON SATN MFR SERPL: 8 % (ref 15–50)
IRON SERPL-MCNC: 31 UG/DL (ref 50–212)
LDLC SERPL CALC-MCNC: 56 MG/DL (ref 0–100)
MAGNESIUM SERPL-MCNC: 2.2 MG/DL (ref 1.9–2.7)
MCH RBC QN AUTO: 27.1 PG (ref 26.8–34.3)
MCHC RBC AUTO-ENTMCNC: 30.9 G/DL (ref 31.4–37.4)
MCV RBC AUTO: 88 FL (ref 82–98)
NONHDLC SERPL-MCNC: 77 MG/DL
PLATELET # BLD AUTO: 334 THOUSANDS/UL (ref 149–390)
PMV BLD AUTO: 10.7 FL (ref 8.9–12.7)
POTASSIUM SERPL-SCNC: 4.3 MMOL/L (ref 3.5–5.3)
PROT SERPL-MCNC: 7.3 G/DL (ref 6.4–8.4)
PTH-INTACT SERPL-MCNC: 45.8 PG/ML (ref 12–88)
RBC # BLD AUTO: 5.73 MILLION/UL (ref 3.88–5.62)
SODIUM SERPL-SCNC: 143 MMOL/L (ref 135–147)
TIBC SERPL-MCNC: 393.4 UG/DL (ref 250–450)
TRANSFERRIN SERPL-MCNC: 281 MG/DL (ref 203–362)
TRIGL SERPL-MCNC: 106 MG/DL (ref ?–150)
UIBC SERPL-MCNC: 362 UG/DL (ref 155–355)
VIT B12 SERPL-MCNC: 529 PG/ML (ref 180–914)
WBC # BLD AUTO: 9.24 THOUSAND/UL (ref 4.31–10.16)

## 2025-06-10 PROCEDURE — 83735 ASSAY OF MAGNESIUM: CPT

## 2025-06-10 PROCEDURE — 83970 ASSAY OF PARATHORMONE: CPT

## 2025-06-10 PROCEDURE — 80053 COMPREHEN METABOLIC PANEL: CPT

## 2025-06-10 PROCEDURE — 82306 VITAMIN D 25 HYDROXY: CPT

## 2025-06-10 PROCEDURE — 86140 C-REACTIVE PROTEIN: CPT

## 2025-06-10 PROCEDURE — 82607 VITAMIN B-12: CPT

## 2025-06-10 PROCEDURE — 36415 COLL VENOUS BLD VENIPUNCTURE: CPT

## 2025-06-11 ENCOUNTER — RESULTS FOLLOW-UP (OUTPATIENT)
Age: 75
End: 2025-06-11

## 2025-06-11 LAB
GAMMA INTERFERON BACKGROUND BLD IA-ACNC: 0.03 IU/ML
HBV CORE AB SER QL: ABNORMAL
HBV CORE IGM SER QL: REACTIVE
HBV SURFACE AG SER QL: ABNORMAL
HCV AB SER QL: ABNORMAL
M TB IFN-G BLD-IMP: NEGATIVE
M TB IFN-G CD4+ BCKGRND COR BLD-ACNC: 0 IU/ML
M TB IFN-G CD4+ BCKGRND COR BLD-ACNC: 0 IU/ML
MITOGEN IGNF BCKGRD COR BLD-ACNC: 1.93 IU/ML

## 2025-06-17 ENCOUNTER — OFFICE VISIT (OUTPATIENT)
Dept: NEPHROLOGY | Facility: CLINIC | Age: 75
End: 2025-06-17
Payer: MEDICARE

## 2025-06-17 VITALS — WEIGHT: 228 LBS | HEIGHT: 70 IN | BODY MASS INDEX: 32.64 KG/M2

## 2025-06-17 DIAGNOSIS — E11.51 TYPE 2 DIABETES MELLITUS WITH DIABETIC PERIPHERAL ANGIOPATHY WITHOUT GANGRENE, UNSPECIFIED WHETHER LONG TERM INSULIN USE (HCC): Primary | ICD-10-CM

## 2025-06-17 DIAGNOSIS — I50.23 ACUTE ON CHRONIC SYSTOLIC CONGESTIVE HEART FAILURE (HCC): ICD-10-CM

## 2025-06-17 DIAGNOSIS — N18.31 STAGE 3A CHRONIC KIDNEY DISEASE (HCC): ICD-10-CM

## 2025-06-17 DIAGNOSIS — R80.1 PERSISTENT PROTEINURIA: ICD-10-CM

## 2025-06-17 DIAGNOSIS — I12.9 PARENCHYMAL RENAL HYPERTENSION, STAGE 1 THROUGH STAGE 4 OR UNSPECIFIED CHRONIC KIDNEY DISEASE: ICD-10-CM

## 2025-06-17 DIAGNOSIS — E78.5 DYSLIPIDEMIA: ICD-10-CM

## 2025-06-17 PROCEDURE — 99214 OFFICE O/P EST MOD 30 MIN: CPT | Performed by: INTERNAL MEDICINE

## 2025-06-17 PROCEDURE — G2211 COMPLEX E/M VISIT ADD ON: HCPCS | Performed by: INTERNAL MEDICINE

## 2025-06-17 RX ORDER — FAMOTIDINE 20 MG
1000 TABLET ORAL DAILY
COMMUNITY

## 2025-06-17 NOTE — LETTER
2025     Virgie Luz MD  [address]  [address]  [facility] [address]    Patient: South Humphrey III   YOB: 1950   Date of Visit: 2025       Dear MD Ike Goldsmith MD:    Thank you for referring South Humphrey to me for evaluation. Below are my notes for this consultation.    If you have questions, please do not hesitate to call me. I look forward to following your patient along with you.         Sincerely,        Erik Ramos MD        CC: MD Eirk Kim MD  2025  2:51 PM  Sign when Signing Visit  Name: South Humphrey III      : 1950      MRN: [id number]  Encounter Provider: Erik Ramos MD  Encounter Date: 2025   Encounter department: [facility] [place]  :  Assessment & Plan  Type 2 diabetes mellitus with diabetic peripheral angiopathy without gangrene, unspecified whether long term insulin use (HCC)  No SGLT2 inhibitor given PAD  Orders:  •  Basic metabolic panel; Future  •  Magnesium; Future  •  Comprehensive metabolic panel; Future  •  CBC; Future  •  Lipid Panel with Direct LDL reflex; Future  •  Protein / creatinine ratio, urine; Future  •  Vitamin D 25 hydroxy; Future    Stage 3a chronic kidney disease (HCC)  At baseline no changes  Orders:  •  Basic metabolic panel; Future  •  Magnesium; Future  •  Comprehensive metabolic panel; Future  •  CBC; Future  •  Lipid Panel with Direct LDL reflex; Future  •  Protein / creatinine ratio, urine; Future  •  Vitamin D 25 hydroxy; Future    Persistent proteinuria  Will need follow-up with next visit has been well-controlled  Orders:  •  Basic metabolic panel; Future  •  Magnesium; Future  •  Comprehensive metabolic panel; Future  •  CBC; Future  •  Lipid Panel with Direct LDL reflex; Future  •  Protein / creatinine ratio, urine; Future  •  Vitamin D 25 hydroxy; Future    Parenchymal renal hypertension, stage 1 through stage 4 or  unspecified chronic kidney disease  Low normal continue beta-blocker for cardiac purposes       Orders:  •  Basic metabolic panel; Future  •  Magnesium; Future  •  Comprehensive metabolic panel; Future  •  CBC; Future  •  Lipid Panel with Direct LDL reflex; Future  •  Protein / creatinine ratio, urine; Future  •  Vitamin D 25 hydroxy; Future    Acute on chronic systolic congestive heart failure (HCC)  Current status euvolemic  Current treatment: No diuretics      Orders:  •  Basic metabolic panel; Future  •  Magnesium; Future  •  Comprehensive metabolic panel; Future  •  CBC; Future  •  Lipid Panel with Direct LDL reflex; Future  •  Protein / creatinine ratio, urine; Future  •  Vitamin D 25 hydroxy; Future    Dyslipidemia  Has been at goal no changes  Orders:  •  Basic metabolic panel; Future  •  Magnesium; Future  •  Comprehensive metabolic panel; Future  •  CBC; Future  •  Lipid Panel with Direct LDL reflex; Future  •  Protein / creatinine ratio, urine; Future  •  Vitamin D 25 hydroxy; Future        History of Present Illness  HPI  South Humphrey III is a 74 y.o. male who presents regarding CKD 3  There has been no hospitalizations or acute illnesses since last visit.  The patient overall is feeling well.  Good appetite and good energy  No fevers, chills, or cough or colds.  No hematuria, dysuria, voiding symptoms or foamy urine  No gastrointestinal symptoms  No cardiovascular symptoms including swelling of the legs  No headaches, dizziness or lightheadedness  Blood pressure medications:  Toprol-XL 75 mg daily in the morning  Furosemide 20 to 40 mg as needed for 3 pound weight gain but he never really takes it at this time    Renal pertinent medications:  Aspirin 81 mg daily  Atorvastatin 20 mg daily  Jardiance 10 mg daily  Iron daily  Magnesium 500 mg daily  Omeprazole 20 mg daily  Xarelto 20 mg daily  Vitamin D 1000 units daily      Review of Systems  Please see HPI, otherwise the review of systems as  completely reviewed with the patient are negative    Pertinent Medical History  1.  CKD stage 3 a:  Etiology:  Cardiorenal syndrome/arteriolar nephrosclerosis/?  Atheroemboli/prior prerenal associated mild tachycardia and relative hypotension in the past  Baseline creatinine:  1.25-1.41  Current creatinine: 1.00 from 6/10/2025 at baseline!  Urine protein creatinine ratio:  0.30 g at goal from prior visit  Recommendations:  Treat hypertension-please see below  Treat dyslipidemia-please see below  Maintain proteinuria less than 1 g or as low as possible  Avoid nephrotoxic agents such as NSAIDs, patient counseled as such     2.  Volume:   HFrEF: 20% EF February 2024 in the setting of PAF  Current status euvolemic  No diuretics!        3.  Hypotension:  Workup was compatible with low ejection fraction 48% associated severe hypokinesis of the apical anterior and mid anterior septal in mild in for septal and apical inferior and apical septal and apical walls.  No pericardial effusion.  Cortisol/TSH were unremarkable.  Normal plasma free metanephrines   Normal TSH  Aldosterone/renin 12.8  Currently blood pressure:   /81 sitting, standing 105/70  PM: 123/80 sitting, standing 108/75  Heart rate: 70-80 range        Medication changes today:  No changes were made given orthostatic changes; the low blood pressures today    4.  Electrolytes:  All acceptable including potassium 4.3     5.  Mineral bone disorder:  Calcium/magnesium/phosphorus:  All acceptable including normal magnesium  PTH intact: 45.8 at goal from 6/20/2025  Vitamin-D: 29.5 from 6/10/2025: Supplement just started     6.  Dyslipidemia:  Goal LDL:  Less than 70 given PAD  Current lipid profile:  LDL 56/HDL 45/triglycerides 106 from 6/10/2025  Recommendations:  At goal so no changes        7.  Anemia:               Recommendations:  Hemoglobin stable at 15.5  Multiple myeloma evaluation with SPEP/UPEP and light chains:  Negative  Stool for occult blood x3:   NEGATIVE X1  In March had EGD colonoscopy and recent sigmoidoscopy:  Harris's esophagus/diverticulosis/colonic polyps.  Patient will be seeing Dr. Michael Pierre.  Other problems:  Chronic intermittent mild leukocytosis:  Stable   Gross hematuria/microscopic hematuria:  This has resolved.  Patient seen by Urology for gross hematuria.  Cystoscopy was negative and recent PSA negative. No further investigation by follow-up in 1 year by Urology.  Abnormal echocardiogram/cardiomyopathy:  Followed in the past by Dr. Almanza  Paroxysmal atrial fibrillation followed by Dr. Almanza:  Currently on Xarelto: Please see above regarding heart rate  Bilateral adrenal gland abnormalities being followed by surgical oncology.  24 hour urine for Dopamine epinephrine all unremarkable.  Aldosterone was unremarkable.  Cortisol was okay at 17.5. To be followed by   PAD:  Status post stent for the right popliteal artery/revision 02/12/2020  Abnormal alkaline phosphatase followed by GI  Diabetes mellitus type 2 not really a candidate for SGLT2 inhibitors with PAD  ABNORMAL LUNG EXAM: Chest x-ray was normal May 2022  URI symptoms as of 12/10/2024 no significant fevers, chronically abnormal lung exam patient feeling slightly better will call family physician if it persists or worsens  Admission 2/12/24 Rapid Afib EF 20% biv HF and CHF   -CHF diuresed 42 lbs?  -cardioversion   -EF 20%     GI health maintenance: 4/10/2025 by Dr. Beverly: Follow-up in 2 years which should be 4/10/2027 because of IBS         Medical History Reviewed by provider this encounter:     .  Past Medical History  Past Medical History[1]  Past Surgical History[2]  Family History[3]   reports that he has quit smoking. His smoking use included cigarettes. He has a 7.5 pack-year smoking history. He has never used smokeless tobacco. He reports current alcohol use of about 1.0 standard drink of alcohol per week. He reports that he does not use drugs.  Current Outpatient  "Medications   Medication Instructions   • aspirin 81 mg, Daily   • atorvastatin (LIPITOR) 20 mg, Oral, Daily   • Empagliflozin (JARDIANCE) 10 mg, Oral, Every morning   • folic acid (FOLVITE) 1 mg, Oral, Daily   • furosemide (LASIX) 40 mg, Oral, As needed, Can take 20-40 mg   • glimepiride (AMARYL) 2 mg tablet TAKE 1 TABLET BY MOUTH 2 TIMES A DAY WITH MEALS.   • Iron 28 mg, Daily   • Magnesium 500 MG TABS 1 tablet, Daily   • metoprolol succinate (TOPROL-XL) 75 mg, Oral, Daily, 75 mg in the morning. He takes a 50mg tablet and 25mg tablet same time each morning.   • omeprazole (PRILOSEC) 20 mg, Oral, Daily before breakfast   • risankizumab-rzaa (Skyrizi) 360 MG/2.4ML SOCT Take on body injector (OBI) under skin every 8 weeks. Replacement OBI   • risankizumab-rzaa (Skyrizi) 360 MG/2.4ML SOCT Take on body injector (OBI) under skin every 8 weeks   • vitamin B-12 (VITAMIN B-12) 1,000 mcg, Oral, 3 times weekly   • Vitamin D (Cholecalciferol) 1,000 Units, Daily   • Xarelto 20 mg, Oral, Daily   Allergies[4]   Medications Ordered Prior to Encounter[5]   Social History[6]     Objective  Ht 5' 10\" (1.778 m)   Wt 103 kg (228 lb)   BMI 32.71 kg/m²      Physical Exam  BP sitting on left: 96/64 with a heart rate of 84 and regular  BP standing on left: 92/60 with a heart rate of 84 and right  Physical Exam: General:  No acute distress/obese  Skin:  No acute rash  Eyes:  No scleral icterus and noninjected  ENT:  Moist mucous membranes  Neck:  Supple, no jugular venous distention, trachea midline, overall appearance is normal  Chest:  Clear to auscultation  CVS:  Regular rate and rhythm, without a rub or gallops  Abdomen:  obese,Normal bowel sounds, soft and nontender and nondistended  Extremities:  No edema, and no cyanosis, no significant arthritic changes; positive varicose veins left greater than right lower extremity  Neuro:  No gross focality  Psych:  Alert and oriented and appropriate             [1]   Past Medical " History:  Diagnosis Date   • Harris esophagus    • Blue toe syndrome (HCC)    • Chronic kidney disease    • Colon polyps    • Crohn's disease (HCC) 1-2020   • GERD (gastroesophageal reflux disease)    • Hematuria    • History of rheumatic fever    • Hypolipidemia    • Hypotension    • Ischemic cardiomyopathy    • PAD (peripheral artery disease) (HCC)    • Pulmonary emphysema (HCC)    • PVD (peripheral vascular disease) ([facility])    [2]   Past Surgical History:  Procedure Laterality Date   • CARDIAC ELECTROPHYSIOLOGY PROCEDURE N/A 01/10/2024    Procedure: Cardiac loop recorder explant;  Surgeon: Jason Cortez MD;  Location: [facility];  Service: Cardiology   • CARDIAC ELECTROPHYSIOLOGY PROCEDURE N/A 02/11/2025    Procedure: Cardiac eps/afib ablation PFA;  Surgeon: Ray Pizano MD;  Location: [facility];  Service: Cardiology   • CARDIAC ELECTROPHYSIOLOGY PROCEDURE N/A 02/11/2025    Procedure: Cardiac eps/aflutter ablation;  Surgeon: Ray Pizano MD;  [facility];  Service: Cardiology   • CARDIAC ELECTROPHYSIOLOGY PROCEDURE N/A 02/11/2025    Procedure: Cardiac loop recorder implant;  Surgeon: Ray Pizano MD;  [facility];  Service: Cardiology   • CATARACT EXTRACTION Bilateral 03/2025   • COLONOSCOPY  2019   • HERNIA REPAIR     • IR AORTAGRAM WITH RUN-OFF  06/27/2019   • IR AORTAGRAM WITH RUN-OFF  02/12/2020   • POPLITEAL ARTERY STENT Right     6/2019   • NM BYPASS W/VEIN FEMORAL-POPLITEAL Right 03/26/2020    Procedure: BYPASS FEMORAL-POPLITEAL; Right lower extremity bypass, fem-bk pop with GSV;  Surgeon: Wyatt Shell MD;  Location: [facility];  Service: Vascular   • NM SLCTV CATHJ 3RD+ ORD SLCTV ABDL PEL/LXTR BRNCH Right 06/27/2019    Procedure: leg ANGIOGRAM WITH STENT, BALLOON ANGIOPLASTY, LEFT GROIN ACCESS;  Surgeon: Wyatt Shell MD;  Location: [facility];  Service: Vascular   [3]   Family History  Problem Relation Name Age of Onset   • Heart  disease Mother Annie Fatzinger    • Hyperlipidemia Mother Annie Fatzinger    • Hypertension Mother Annie Fatzinger    • Hypertension Father South    • Heart disease Father [place]    • Stroke Father [place]    • Hyperlipidemia Father [place]    • Diabetes Brother Jourdan    • No Known Problems Maternal Grandmother     • Dementia Neg Hx     • Drug abuse Neg Hx     • Mental illness Neg Hx     • Substance Abuse Neg Hx     • Alcohol abuse Neg Hx     • Depression Neg Hx     • Colon cancer Neg Hx     [4] No Known Allergies  [5]   Current Outpatient Medications on File Prior to Visit   Medication Sig Dispense Refill   • aspirin 81 MG tablet Take 81 mg by mouth in the morning.     • atorvastatin (LIPITOR) 20 mg tablet TAKE 1 TABLET BY MOUTH EVERY DAY 90 tablet 1   • Empagliflozin (Jardiance) 10 MG TABS tablet Take 1 tablet (10 mg total) by mouth every morning 90 tablet 5   • Ferrous Sulfate (Iron) 28 MG TABS Take 28 mg by mouth daily     • folic acid (FOLVITE) 1 mg tablet Take 1 tablet (1 mg total) by mouth daily 90 tablet 3   • furosemide (LASIX) 20 mg tablet Take 2 tablets (40 mg total) by mouth if needed (for weight gain of 3lbs in one day or 5lbs in one week.) Can take 20-40 mg 120 tablet 2   • glimepiride (AMARYL) 2 mg tablet TAKE 1 TABLET BY MOUTH 2 TIMES A DAY WITH MEALS. (Patient taking differently: daily) 180 tablet 1   • Magnesium 500 MG TABS Take 1 tablet by mouth in the morning.     • metoprolol succinate (TOPROL-XL) 50 mg 24 hr tablet Take 1.5 tablets (75 mg total) by mouth daily 75 mg in the morning. He takes a 50mg tablet and 25mg tablet same time each morning. 135 tablet 5   • omeprazole (PriLOSEC) 20 mg delayed release capsule Take 1 capsule (20 mg total) by mouth daily before breakfast 90 capsule 1   • risankizumab-rzaa (Skyrizi) 360 MG/2.4ML SOCT Take on body injector (OBI) under skin every 8 weeks. Replacement OBI 2.4 mL 0   • risankizumab-rzaa (Skyrizi) 360 MG/2.4ML SOCT Take on body injector (OBI) under skin  every 8 weeks 2.4 mL 5   • rivaroxaban (Xarelto) 20 mg tablet TAKE 1 TABLET BY MOUTH EVERY DAY 30 tablet 5   • vitamin B-12 (VITAMIN B-12) 1,000 mcg tablet Take 1 tablet (1,000 mcg total) by mouth 3 (three) times a week (Patient taking differently: Take 1,000 mcg by mouth daily)     • Vitamin D, Cholecalciferol, 25 MCG (1000 UT) CAPS Take 1,000 Units by mouth daily       No current facility-administered medications on file prior to visit.   [6]   Social History  Tobacco Use   • Smoking status: Former     Current packs/day: 0.25     Average packs/day: 0.3 packs/day for 30.0 years (7.5 ttl pk-yrs)     Types: Cigarettes   • Smokeless tobacco: Never   Vaping Use   • Vaping status: Never Used   Substance and Sexual Activity   • Alcohol use: Yes     Alcohol/week: 1.0 standard drink of alcohol     Types: 1 Standard drinks or equivalent per week     Comment: social drinker when out dining   • Drug use: Never   • Sexual activity: Not Currently     Birth control/protection: None

## 2025-06-17 NOTE — PATIENT INSTRUCTIONS
Visit summary:  - Overall kidney labs look great  - Your blood pressure is low normal  - If you do take furosemide/Lasix for weight gain make sure to eat a high potassium diet which includes a lot of fruits and vegetables          1. Medication changes today:  None today    2.  General instructions:  Continue to avoid salt  Lose 20 pounds at least in 8 months    American heart Association recommends the following:  - Moderate aerobic type exercise such as walking or exercise bike or treadmill: A total of 150 minutes weekly (AS TOLERATED ONLY ) split up however you would like for example 30 minutes 5 days a week the more the better.  - I would begin however with 3 days a week for 30 minutes and then slowly build up to 150 minutes weekly as stated  - 2 days of toning/resistance training for example with exercise bands or light weights also recommended    Remember: Be careful and limit your self to what you can tolerate and slowly build up to the above goals as you are able to only.  If there is any questions or concerns please either ask me or your family physician if there are are any limitations!          3.  Please take 1 week a blood pressure readings prior to your next appointment in 8 months    AS FOLLOWS  MORNING AND EVENING, SITTING AND STANDING as follows:  TAKE THE MORNING READINGS BEFORE ANY MEDICATIONS AND WHEN YOU ARE RELAXED FOR SEVERAL MINUTES  TAKE THE EVENING READINGS:  BETWEEN 7-10 P.M.; PRIOR TO ANY MEDICATIONS; AT LEAST IN OUR  FROM DINNER; AND CERTAINLY AFTER RELAXING FOR A FEW MINUTES  PLEASE INCLUDE HEART RATE WITH YOUR BLOOD PRESSURE READINGS  When taking standing readings, keep your arm supported at heart level and not dangling  Make sure you are sitting with your back supported and feet on the ground and do not cross your legs or feet  Make sure you have not taken any coffee or caffeine products or exercised or smoke cigarettes at least 30 minutes before taking your blood  pressure  Then please mail these readings into the office      4.  In 4 months:  Please go for nonfasting lab work but in the morning        5.  Follow-up in 8 months  Please bring in 1 week a blood pressure readings morning evening, sitting and standing is outlined above  PLEASE BRING AN YOUR BLOOD PRESSURE MACHINE TO CORRELATE WITH THE OFFICE MACHINE AT THIS NEXT SCHEDULED VISIT  Please go for fasting lab work 1-2 weeks prior to your appointment      6.  General non medical recommendations:  AVOID SALT BUT NOT ADDING AN READING LABELS TO MAKE SURE THERE IS LOW-SALT IN THE FOOD THAT YOU ARE EATING  Goal is less than 2 g of sodium intake or less than 5 g of sodium chloride intake per day    Avoid nonsteroidal anti-inflammatory drugs such as Naprosyn, ibuprofen, Aleve, Advil, Celebrex, Meloxicam (Mobic) etc.  You can use Tylenol as needed if you do not have any liver condition to be concerned about    Avoid medications such as Sudafed or decongestants and antihistamines that contained the D component which is the decongestant.  You can take antihistamines without the decongestant or D component.    Try to avoid medications such as pantoprazole or  Protonix/Nexium or Esomeprazole)/Prilosec or omeprazole/Prevacid or lansoprazole/AcipHex or Rabeprazole.  If you are able to, use Pepcid as this is safer for your kidneys.    Please do not drink more than 2 glasses of alcohol/wine on a daily basis as this may contribute to your high blood pressure.

## 2025-07-22 PROBLEM — I48.91 ATRIAL FIBRILLATION WITH RVR (HCC): Status: RESOLVED | Noted: 2024-02-12 | Resolved: 2025-07-22

## 2025-07-22 NOTE — ASSESSMENT & PLAN NOTE
Lab Results   Component Value Date    EGFR 74 06/10/2025    EGFR 86 02/12/2025    EGFR 79 02/11/2025    CREATININE 1.00 06/10/2025    CREATININE 0.84 02/12/2025    CREATININE 0.95 02/11/2025     Stable.

## 2025-07-22 NOTE — ASSESSMENT & PLAN NOTE
Lab Results   Component Value Date    HGBA1C 7.4 (A) 07/23/2025     A1c slightly worse. No medication changes for now.  Reviewed diet.  On Jardiance 10 mg, glimepiride 2 mg daily.   Orders:  •  Diabetic foot exam; Future  •  POCT hemoglobin A1c

## 2025-07-22 NOTE — ASSESSMENT & PLAN NOTE
S/p ablation.  No symptoms.  On metoprolol 75 mg bid, Xarelto.  Orders:  •  TSH, 3rd generation with Free T4 reflex; Future

## 2025-07-23 ENCOUNTER — OFFICE VISIT (OUTPATIENT)
Dept: INTERNAL MEDICINE CLINIC | Facility: CLINIC | Age: 75
End: 2025-07-23
Payer: MEDICARE

## 2025-07-23 VITALS
DIASTOLIC BLOOD PRESSURE: 66 MMHG | TEMPERATURE: 97.6 F | BODY MASS INDEX: 32.07 KG/M2 | HEIGHT: 70 IN | WEIGHT: 224 LBS | RESPIRATION RATE: 14 BRPM | HEART RATE: 76 BPM | OXYGEN SATURATION: 96 % | SYSTOLIC BLOOD PRESSURE: 110 MMHG

## 2025-07-23 DIAGNOSIS — I73.9 PAD (PERIPHERAL ARTERY DISEASE) (HCC): ICD-10-CM

## 2025-07-23 DIAGNOSIS — N18.31 STAGE 3A CHRONIC KIDNEY DISEASE (HCC): ICD-10-CM

## 2025-07-23 DIAGNOSIS — K22.70 BARRETT'S ESOPHAGUS WITHOUT DYSPLASIA: ICD-10-CM

## 2025-07-23 DIAGNOSIS — E11.51 TYPE 2 DIABETES MELLITUS WITH DIABETIC PERIPHERAL ANGIOPATHY WITHOUT GANGRENE, WITHOUT LONG-TERM CURRENT USE OF INSULIN (HCC): Primary | ICD-10-CM

## 2025-07-23 DIAGNOSIS — I48.0 PAROXYSMAL ATRIAL FIBRILLATION (HCC): ICD-10-CM

## 2025-07-23 DIAGNOSIS — M79.89 SWELLING OF TOE OF RIGHT FOOT: ICD-10-CM

## 2025-07-23 DIAGNOSIS — E55.9 VITAMIN D DEFICIENCY: ICD-10-CM

## 2025-07-23 DIAGNOSIS — K50.019 CROHN'S DISEASE OF SMALL INTESTINE WITH COMPLICATION (HCC): ICD-10-CM

## 2025-07-23 DIAGNOSIS — I50.42 CHRONIC COMBINED SYSTOLIC AND DIASTOLIC HEART FAILURE (HCC): ICD-10-CM

## 2025-07-23 DIAGNOSIS — E66.09 CLASS 1 OBESITY DUE TO EXCESS CALORIES WITH SERIOUS COMORBIDITY AND BODY MASS INDEX (BMI) OF 33.0 TO 33.9 IN ADULT: ICD-10-CM

## 2025-07-23 DIAGNOSIS — E53.8 VITAMIN B12 DEFICIENCY: ICD-10-CM

## 2025-07-23 DIAGNOSIS — E66.811 CLASS 1 OBESITY DUE TO EXCESS CALORIES WITH SERIOUS COMORBIDITY AND BODY MASS INDEX (BMI) OF 33.0 TO 33.9 IN ADULT: ICD-10-CM

## 2025-07-23 DIAGNOSIS — E78.5 DYSLIPIDEMIA: ICD-10-CM

## 2025-07-23 LAB — SL AMB POCT HEMOGLOBIN AIC: 7.4 (ref ?–6.5)

## 2025-07-23 PROCEDURE — 99214 OFFICE O/P EST MOD 30 MIN: CPT | Performed by: INTERNAL MEDICINE

## 2025-07-23 PROCEDURE — G2211 COMPLEX E/M VISIT ADD ON: HCPCS | Performed by: INTERNAL MEDICINE

## 2025-07-23 PROCEDURE — 83036 HEMOGLOBIN GLYCOSYLATED A1C: CPT | Performed by: INTERNAL MEDICINE

## 2025-07-23 RX ORDER — METOPROLOL SUCCINATE 25 MG/1
25 TABLET, EXTENDED RELEASE ORAL DAILY
COMMUNITY
End: 2025-07-23 | Stop reason: SDUPTHER

## 2025-07-23 NOTE — PROGRESS NOTES
Diabetic Foot Exam    Patient's shoes and socks removed.    Right Foot/Ankle   Right Foot Inspection  Skin Exam: skin normal and skin intact. No dry skin, no warmth, no callus, no erythema, no maceration, no abnormal color, no pre-ulcer, no ulcer and no callus.     Toe Exam: ROM and strength within normal limits.     Sensory   Monofilament testing: intact    Vascular  Capillary refills: < 3 seconds  The right DP pulse is 2+. The right PT pulse is 2+.     Left Foot/Ankle  Left Foot Inspection  Skin Exam: skin normal and skin intact. No dry skin, no warmth, no erythema, no maceration, normal color, no pre-ulcer, no ulcer and no callus.     Toe Exam: ROM and strength within normal limits.     Sensory   Monofilament testing: intact    Vascular  Capillary refills: < 3 seconds  The left DP pulse is 2+. The left PT pulse is 2+.     Assign Risk Category  No deformity present  No loss of protective sensation  No weak pulses  Risk: 0            Name: South Humphrey III      : 1950      MRN: [id number]  Encounter Provider: Virgie Luz MD  Encounter Date: 2025   Encounter department: [facility] INTERNAL MEDICINE  :  Assessment & Plan  Type 2 diabetes mellitus with diabetic peripheral angiopathy without gangrene, without long-term current use of insulin (HCC)    Lab Results   Component Value Date    HGBA1C 7.4 (A) 2025     A1c slightly worse. No medication changes for now.  Reviewed diet.  On Jardiance 10 mg, glimepiride 2 mg daily.   Orders:  •  Diabetic foot exam; Future  •  POCT hemoglobin A1c    Paroxysmal atrial fibrillation (HCC)  S/p ablation.  No symptoms.  On metoprolol 75 mg bid, Xarelto.  Orders:  •  TSH, 3rd generation with Free T4 reflex; Future    Chronic combined systolic and diastolic heart failure (HCC)  Weight loss since last visit.  On Jardiance 10 mg  Off Entresto bid.       Crohn's disease of small intestine with complication (HCC)  Stable, on Skyrizi.       Stage 3a  chronic kidney disease (HCC)  Lab Results   Component Value Date    EGFR 74 06/10/2025    EGFR 86 02/12/2025    EGFR 79 02/11/2025    CREATININE 1.00 06/10/2025    CREATININE 0.84 02/12/2025    CREATININE 0.95 02/11/2025     Stable.       PAD (peripheral artery disease) (HCC)  On ASA, statin, Xarelto.  Ultrasound scheduled.       Harris's esophagus without dysplasia  On daily PPI.       Dyslipidemia  At goal, on atorvastatin 20 mg.  Orders:  •  TSH, 3rd generation with Free T4 reflex; Future    Class 1 obesity due to excess calories with serious comorbidity and body mass index (BMI) of 33.0 to 33.9 in adult  Lost 10 lbs since last visit.  Continue regular exercise regimen.       Vitamin B12 deficiency  Taking B12 daily.         Vitamin D deficiency  Taking D3 daily.         Swelling of toe of right foot  Differential Dx: contusion, soft tissues swelling, fracture.  Minimal pain, not affecting ambulation.  No need for imaging.       Follow up in 6 months or as needed.         History of Present Illness   He is here with his wife today.  About a month ago, he kicked a beach chair, he was wearing water shoes.  He hurts the fourth toe of his right foot doing that.  He reports it was very sore and swollen for about 2 weeks.  He walks with a slight limp.  Symptoms have since resolved.  He reports it is still looking a bit swollen but no more pain.  He did not apply or take any medication for it.    He denies any palpitations or other symptoms since the ablation.  Denies any shortness of breath or chest pains.    He had been taking antibiotics for a dental infection.  He completed this a few days ago.  He reports the antibiotics caused increased bowel movements up to 4 5 times a day.  No incontinence.  Denies any nausea or vomiting.  Since completing antibiotic, his bowel movements have returned back to normal.    He had lost some weight since his last visit.  He is watching his food intake.  He and his wife go to the gym  "regularly.    Review of Systems   Constitutional:  Negative for appetite change and fatigue.   HENT:  Negative for congestion, hearing loss and postnasal drip.    Eyes: Negative.    Respiratory:  Negative for cough, chest tightness and shortness of breath.    Cardiovascular:  Negative for chest pain, palpitations and leg swelling.   Gastrointestinal:  Negative for abdominal pain and constipation.   Genitourinary:  Negative for dysuria, frequency and urgency.   Musculoskeletal:  Positive for joint swelling. Negative for arthralgias and gait problem.   Skin:  Negative for rash and wound.   Neurological:  Negative for dizziness, numbness and headaches.   Hematological:  Does not bruise/bleed easily.   Psychiatric/Behavioral:  Negative for sleep disturbance. The patient is not nervous/anxious.        Objective   /66 (BP Location: Left arm, Patient Position: Sitting, Cuff Size: Large)   Pulse 76   Temp 97.6 °F (36.4 °C)   Resp 14   Ht 5' 10\" (1.778 m)   Wt 102 kg (224 lb)   SpO2 96%   BMI 32.14 kg/m²      Physical Exam  Vitals and nursing note reviewed.   Constitutional:       General: He is not in acute distress.     Appearance: He is well-developed.   HENT:      Head: Normocephalic and atraumatic.      Right Ear: External ear normal.      Left Ear: External ear normal.     Eyes:      Conjunctiva/sclera: Conjunctivae normal.       Cardiovascular:      Rate and Rhythm: Normal rate and regular rhythm.      Pulses: no weak pulses.           Dorsalis pedis pulses are 2+ on the right side and 2+ on the left side.        Posterior tibial pulses are 2+ on the right side and 2+ on the left side.      Heart sounds: No murmur heard.     Comments: (+) varicose veins  Pulmonary:      Effort: Pulmonary effort is normal. No respiratory distress.      Breath sounds: Normal breath sounds.   Abdominal:      Palpations: Abdomen is soft.      Tenderness: There is no abdominal tenderness.     Musculoskeletal:         General: " No swelling.      Cervical back: Neck supple.      Right lower leg: No edema.      Left lower leg: No edema.        Feet:    Feet:      Right foot:      Skin integrity: No ulcer, skin breakdown, erythema, warmth, callus or dry skin.      Left foot:      Skin integrity: No ulcer, skin breakdown, erythema, warmth, callus or dry skin.     Skin:     General: Skin is warm and dry.     Neurological:      Mental Status: He is alert.     Psychiatric:         Mood and Affect: Mood normal.         Labs & imaging results reviewed with patient.

## 2025-08-02 DIAGNOSIS — I48.0 PAROXYSMAL ATRIAL FIBRILLATION (HCC): ICD-10-CM

## 2025-08-04 RX ORDER — RIVAROXABAN 20 MG/1
20 TABLET, FILM COATED ORAL DAILY
Qty: 30 TABLET | Refills: 5 | Status: SHIPPED | OUTPATIENT
Start: 2025-08-04

## (undated) DEVICE — SUT POLYESTER TAPE D-G 8618-00

## (undated) DEVICE — SYRINGE 1ML SLIP TIP

## (undated) DEVICE — Device

## (undated) DEVICE — SOUNDSTAR ECO 8F G CATHETER: Brand: SOUNDSTAR

## (undated) DEVICE — CATH DIAG 5FR .035 65CM 6S OMMI-FLUSH

## (undated) DEVICE — Device: Brand: REFERENCE PATCH CARTO 3

## (undated) DEVICE — 1 X VERSACROSS CONNECT TRANSSEPTAL DILATOR;  1 X VERSACROSS RF WIRE (INCLUDING 1 X CONNECTOR CABLE (SINGLE USE)): Brand: VERSACROSS CONNECT ACCESS SOLUTION FOR FARADRIVE

## (undated) DEVICE — PINNACLE INTRODUCER SHEATH: Brand: PINNACLE

## (undated) DEVICE — FOGARTY - HYDRAGRIP SURGICAL - CLAMP INSERTS: Brand: FOGARTY SOFTJAW

## (undated) DEVICE — SUT SILK 3-0 18 IN A184H

## (undated) DEVICE — FOGARTY ARTERIAL EMBOLECTOMY CATHETER 3F 80CM: Brand: FOGARTY

## (undated) DEVICE — DRESSING MEPILEX BORDER 4 X 10 IN

## (undated) DEVICE — SUT MONOCRYL 3-0 SH 27 IN Y416H

## (undated) DEVICE — FLUID MANAGEMENT KIT - IR

## (undated) DEVICE — SURGICEL 4 X 8

## (undated) DEVICE — SUT PROLENE 5-0 C-1/C-1 36 IN 8720H

## (undated) DEVICE — Device: Brand: WEBSTER CS

## (undated) DEVICE — STERILE BETHLEHEM FEM POP PACK: Brand: CARDINAL HEALTH

## (undated) DEVICE — SUT SILK 0 SH 30 IN K834H

## (undated) DEVICE — 4F TEMPO AQUA .038 INCHES 125CM VERT: Brand: TEMPO AQUA

## (undated) DEVICE — 3M™ IOBAN™ 2 ANTIMICROBIAL INCISE DRAPE 6640EZ: Brand: IOBAN™ 2

## (undated) DEVICE — SURGICEL FIBRILLAR 1 X 2

## (undated) DEVICE — INFUSER BAG 500ML

## (undated) DEVICE — DRESSING MEPILEX AG BORDER 4 X 4 IN

## (undated) DEVICE — SUT PROLENE 4-0 RB-1/RB-1 36 IN 8557H

## (undated) DEVICE — SYRINGE 10ML LL

## (undated) DEVICE — 40601 PROLONGED POSITIONING SYSTEM: Brand: 40601 PROLONGED POSITIONING SYSTEM

## (undated) DEVICE — IV SET 15 DROP STERILE 0/Y GRAVITY

## (undated) DEVICE — 3M™ TEGADERM™ TRANSPARENT FILM DRESSING FRAME STYLE, 1624W, 2-3/8 IN X 2-3/4 IN (6 CM X 7 CM), 100/CT 4CT/CASE: Brand: 3M™ TEGADERM™

## (undated) DEVICE — CHLORAPREP HI-LITE 26ML ORANGE

## (undated) DEVICE — 1/2 FORCE SURGICAL SPRING CLIP: Brand: STEALTH® SPRING CLIP

## (undated) DEVICE — TRAY FOLEY 16FR URIMETER SURESTEP

## (undated) DEVICE — Device: Brand: PENTARAY NAV

## (undated) DEVICE — FOGARTY ARTERIAL EMBOLECTOMY CATHETER 2F 60CM: Brand: FOGARTY

## (undated) DEVICE — RADIFOCUS GLIDEWIRE ADVANTAGE GUIDEWIRE: Brand: GLIDEWIRE ADVANTAGE

## (undated) DEVICE — LIGACLIP MCA MULTIPLE CLIP APPLIERS, 20 SMALL CLIPS: Brand: LIGACLIP

## (undated) DEVICE — FLEXCIL HIGH PRESSURE CONTRAST INJECTION LINE: Brand: NAMIC

## (undated) DEVICE — GAUZE SPONGES,USP TYPE VII GAUZE, 12 PLY: Brand: CURITY

## (undated) DEVICE — PVC URETHRAL CATHETER: Brand: DOVER

## (undated) DEVICE — IV CATH INTROCAN 18G X 1 1/4 SAFETY

## (undated) DEVICE — PINNACLE R/O II INTRODUCER SHEATH WITH RADIOPAQUE MARKER: Brand: PINNACLE

## (undated) DEVICE — 3000CC GUARDIAN II: Brand: GUARDIAN

## (undated) DEVICE — SUT SILK 2-0 18 IN A185H

## (undated) DEVICE — RADIFOCUS TORQUE DEVICE MULTI-TORQUE VISE: Brand: RADIFOCUS TORQUE DEVICE

## (undated) DEVICE — DESTINATION PERIPHERAL GUIDING SHEATH: Brand: DESTINATION

## (undated) DEVICE — PROXIMATE PLUS MD MULTI-DIRECTIONAL RELEASE SKIN STAPLERS CONTAINS 35 STAINLESS STEEL STAPLES APPROXIMATE CLOSED DIMENSIONS: 6.9MM X 3.9MM WIDE: Brand: PROXIMATE

## (undated) DEVICE — PETRI DISH STERILE

## (undated) DEVICE — GLOVE SRG BIOGEL ECLIPSE 7

## (undated) DEVICE — DRESSING MEPILEX AG BORDER 4 X 8 IN

## (undated) DEVICE — CATH ABLATION FARAWAVE PULSED FIELD 31MM

## (undated) DEVICE — SYRINGE 3ML LL

## (undated) DEVICE — VESSEL LOOPS X-RAY DETECTABLE: Brand: DEROYAL

## (undated) DEVICE — ELECTRODE BLADE E-Z CLEAN 4IN -0014A

## (undated) DEVICE — SUT PROLENE 6-0 BV130 30 IN 8709H

## (undated) DEVICE — BAG DECANTER

## (undated) DEVICE — SUT MONOCRYL 2-0 CT-1 36 IN Y945H

## (undated) DEVICE — SUT SILK 4-0 18 IN A183H

## (undated) DEVICE — INTENDED FOR TISSUE SEPARATION, AND OTHER PROCEDURES THAT REQUIRE A SHARP SURGICAL BLADE TO PUNCTURE OR CUT.: Brand: BARD-PARKER ® CARBON RIB-BACK BLADES

## (undated) DEVICE — SYRINGE KIT,PACKAGED,,150FT,MK 7(ANGIO-ARTERION, 150ML SYR KIT W/QFT,MC)(60729385): Brand: MEDRAD® MARK 7 ARTERION DISPOSABLE SYRINGE 150 ML WITH QUICK FILL TUBE

## (undated) DEVICE — MICROPUNCTURE 501

## (undated) DEVICE — GUIDEWIRE WITH ICE™ HYDROPHILIC COATING: Brand: V-18™ CONTROL WIRE™

## (undated) DEVICE — CABLE CATH CONNECTION FARASTAR

## (undated) DEVICE — ROSEN CURVED WIRE GUIDE: Brand: ROSEN

## (undated) DEVICE — NON-DEHP HIGH FLOW RATE EXTENSION SET, MALE LUER LOCK ADAPTER

## (undated) DEVICE — INTENDED FOR TISSUE SEPARATION, AND OTHER PROCEDURES THAT REQUIRE A SHARP SURGICAL BLADE TO PUNCTURE OR CUT.: Brand: BARD-PARKER SAFETY BLADES SIZE 15, STERILE

## (undated) DEVICE — STERILE ICS CARDIOVASCULAR PK: Brand: CARDINAL HEALTH

## (undated) DEVICE — 1200CC GUARDIAN II: Brand: GUARDIAN

## (undated) DEVICE — SHEATH STEERABLE FARADRIVE

## (undated) DEVICE — SNAP KOVER: Brand: UNBRANDED